# Patient Record
Sex: FEMALE | Race: WHITE | Employment: OTHER | URBAN - METROPOLITAN AREA
[De-identification: names, ages, dates, MRNs, and addresses within clinical notes are randomized per-mention and may not be internally consistent; named-entity substitution may affect disease eponyms.]

---

## 2017-01-08 PROBLEM — J18.9 HCAP (HEALTHCARE-ASSOCIATED PNEUMONIA): Status: ACTIVE | Noted: 2017-01-08

## 2017-01-15 PROBLEM — N17.9 ACUTE KIDNEY INJURY (HCC): Status: ACTIVE | Noted: 2017-01-15

## 2018-06-05 ENCOUNTER — HOSPITAL ENCOUNTER (OUTPATIENT)
Age: 63
Discharge: HOME | End: 2018-06-05
Payer: MEDICARE

## 2018-06-05 DIAGNOSIS — H54.3 BLINDNESS OF BOTH EYES: ICD-10-CM

## 2018-06-05 DIAGNOSIS — I47.29 NSVT (NONSUSTAINED VENTRICULAR TACHYCARDIA): ICD-10-CM

## 2018-06-05 DIAGNOSIS — F79 MENTAL RETARDATION: ICD-10-CM

## 2018-06-05 DIAGNOSIS — E03.2 HYPOTHYROIDISM DUE TO MEDICATION: ICD-10-CM

## 2018-06-05 LAB
ALBUMIN SERPL-MCNC: 3.6 G/DL (ref 3.5–5.2)
ALP BLD-CCNC: 42 U/L (ref 35–104)
ALT SERPL-CCNC: 9 U/L (ref 0–32)
ANION GAP SERPL CALCULATED.3IONS-SCNC: 7 MMOL/L (ref 7–16)
AST SERPL-CCNC: 16 U/L (ref 0–31)
BASOPHILS ABSOLUTE: 0.02 E9/L (ref 0–0.2)
BASOPHILS RELATIVE PERCENT: 0.4 % (ref 0–2)
BILIRUB SERPL-MCNC: 0.3 MG/DL (ref 0–1.2)
BUN BLDV-MCNC: 20 MG/DL (ref 8–23)
CALCIUM SERPL-MCNC: 9.6 MG/DL (ref 8.6–10.2)
CHLORIDE BLD-SCNC: 105 MMOL/L (ref 98–107)
CHOLESTEROL, TOTAL: 166 MG/DL (ref 0–199)
CO2: 28 MMOL/L (ref 22–29)
CREAT SERPL-MCNC: 1.1 MG/DL (ref 0.5–1)
EOSINOPHILS ABSOLUTE: 0.1 E9/L (ref 0.05–0.5)
EOSINOPHILS RELATIVE PERCENT: 2.1 % (ref 0–6)
GFR AFRICAN AMERICAN: >60
GFR NON-AFRICAN AMERICAN: 50 ML/MIN/1.73
GLUCOSE BLD-MCNC: 76 MG/DL (ref 74–109)
HCT VFR BLD CALC: 36.7 % (ref 34–48)
HDLC SERPL-MCNC: 70 MG/DL
HEMOGLOBIN: 11.1 G/DL (ref 11.5–15.5)
IMMATURE GRANULOCYTES #: 0.04 E9/L
IMMATURE GRANULOCYTES %: 0.8 % (ref 0–5)
LDL CHOLESTEROL CALCULATED: 84 MG/DL (ref 0–99)
LYMPHOCYTES ABSOLUTE: 1.63 E9/L (ref 1.5–4)
LYMPHOCYTES RELATIVE PERCENT: 34.2 % (ref 20–42)
MCH RBC QN AUTO: 30.9 PG (ref 26–35)
MCHC RBC AUTO-ENTMCNC: 30.2 % (ref 32–34.5)
MCV RBC AUTO: 102.2 FL (ref 80–99.9)
MONOCYTES ABSOLUTE: 0.47 E9/L (ref 0.1–0.95)
MONOCYTES RELATIVE PERCENT: 9.9 % (ref 2–12)
NEUTROPHILS ABSOLUTE: 2.5 E9/L (ref 1.8–7.3)
NEUTROPHILS RELATIVE PERCENT: 52.6 % (ref 43–80)
PDW BLD-RTO: 12.4 FL (ref 11.5–15)
PLATELET # BLD: 216 E9/L (ref 130–450)
PMV BLD AUTO: 10.6 FL (ref 7–12)
POTASSIUM SERPL-SCNC: 4.5 MMOL/L (ref 3.5–5)
RBC # BLD: 3.59 E12/L (ref 3.5–5.5)
SODIUM BLD-SCNC: 140 MMOL/L (ref 132–146)
T4 FREE: 1.76 NG/DL (ref 0.93–1.7)
TOTAL PROTEIN: 7.2 G/DL (ref 6.4–8.3)
TRIGL SERPL-MCNC: 61 MG/DL (ref 0–149)
TSH SERPL DL<=0.05 MIU/L-ACNC: 0.43 UIU/ML (ref 0.27–4.2)
VALPROIC ACID LEVEL: 53 MCG/ML (ref 50–100)
VLDLC SERPL CALC-MCNC: 12 MG/DL
WBC # BLD: 4.8 E9/L (ref 4.5–11.5)

## 2018-06-05 PROCEDURE — 80053 COMPREHEN METABOLIC PANEL: CPT

## 2018-06-05 PROCEDURE — 84439 ASSAY OF FREE THYROXINE: CPT

## 2018-06-05 PROCEDURE — 84443 ASSAY THYROID STIM HORMONE: CPT

## 2018-06-05 PROCEDURE — 80164 ASSAY DIPROPYLACETIC ACD TOT: CPT

## 2018-06-05 PROCEDURE — 80061 LIPID PANEL: CPT

## 2018-06-05 PROCEDURE — 36415 COLL VENOUS BLD VENIPUNCTURE: CPT

## 2018-06-05 PROCEDURE — 85025 COMPLETE CBC W/AUTO DIFF WBC: CPT

## 2018-08-09 ENCOUNTER — HOSPITAL ENCOUNTER (OUTPATIENT)
Age: 63
Discharge: HOME | End: 2018-08-09
Payer: MEDICARE

## 2018-08-09 ENCOUNTER — HOSPITAL ENCOUNTER (OUTPATIENT)
Dept: GENERAL RADIOLOGY | Age: 63
Discharge: HOME | End: 2018-08-11
Payer: MEDICARE

## 2018-08-09 DIAGNOSIS — R13.10 DYSPHAGIA, UNSPECIFIED TYPE: ICD-10-CM

## 2018-08-09 LAB
ALBUMIN SERPL-MCNC: 3.7 G/DL (ref 3.5–5.2)
ALP BLD-CCNC: 41 U/L (ref 35–104)
ALT SERPL-CCNC: 10 U/L (ref 0–32)
ANION GAP SERPL CALCULATED.3IONS-SCNC: 7 MMOL/L (ref 7–16)
AST SERPL-CCNC: 16 U/L (ref 0–31)
BASOPHILS ABSOLUTE: 0.03 E9/L (ref 0–0.2)
BASOPHILS RELATIVE PERCENT: 0.6 % (ref 0–2)
BILIRUB SERPL-MCNC: 0.2 MG/DL (ref 0–1.2)
BUN BLDV-MCNC: 22 MG/DL (ref 8–23)
CALCIUM SERPL-MCNC: 9.6 MG/DL (ref 8.6–10.2)
CHLORIDE BLD-SCNC: 106 MMOL/L (ref 98–107)
CHOLESTEROL, FASTING: 194 MG/DL (ref 0–199)
CO2: 30 MMOL/L (ref 22–29)
CREAT SERPL-MCNC: 1.2 MG/DL (ref 0.5–1)
EOSINOPHILS ABSOLUTE: 0.23 E9/L (ref 0.05–0.5)
EOSINOPHILS RELATIVE PERCENT: 4.8 % (ref 0–6)
GFR AFRICAN AMERICAN: 55
GFR NON-AFRICAN AMERICAN: 45 ML/MIN/1.73
GLUCOSE FASTING: 77 MG/DL (ref 74–109)
HCT VFR BLD CALC: 36.4 % (ref 34–48)
HDLC SERPL-MCNC: 81 MG/DL
HEMOGLOBIN: 11.1 G/DL (ref 11.5–15.5)
IMMATURE GRANULOCYTES #: 0.04 E9/L
IMMATURE GRANULOCYTES %: 0.8 % (ref 0–5)
LDL CHOLESTEROL CALCULATED: 97 MG/DL (ref 0–99)
LYMPHOCYTES ABSOLUTE: 1.69 E9/L (ref 1.5–4)
LYMPHOCYTES RELATIVE PERCENT: 35.6 % (ref 20–42)
MCH RBC QN AUTO: 31.4 PG (ref 26–35)
MCHC RBC AUTO-ENTMCNC: 30.5 % (ref 32–34.5)
MCV RBC AUTO: 103.1 FL (ref 80–99.9)
MONOCYTES ABSOLUTE: 0.43 E9/L (ref 0.1–0.95)
MONOCYTES RELATIVE PERCENT: 9.1 % (ref 2–12)
NEUTROPHILS ABSOLUTE: 2.33 E9/L (ref 1.8–7.3)
NEUTROPHILS RELATIVE PERCENT: 49.1 % (ref 43–80)
PDW BLD-RTO: 13 FL (ref 11.5–15)
PLATELET # BLD: 200 E9/L (ref 130–450)
PMV BLD AUTO: 11.1 FL (ref 7–12)
POTASSIUM SERPL-SCNC: 4.5 MMOL/L (ref 3.5–5)
RBC # BLD: 3.53 E12/L (ref 3.5–5.5)
SODIUM BLD-SCNC: 143 MMOL/L (ref 132–146)
TOTAL PROTEIN: 7.6 G/DL (ref 6.4–8.3)
TRIGLYCERIDE, FASTING: 80 MG/DL (ref 0–149)
TSH SERPL DL<=0.05 MIU/L-ACNC: 3.86 UIU/ML (ref 0.27–4.2)
VALPROIC ACID LEVEL: 50 MCG/ML (ref 50–100)
VLDLC SERPL CALC-MCNC: 16 MG/DL
WBC # BLD: 4.8 E9/L (ref 4.5–11.5)

## 2018-08-09 PROCEDURE — 80164 ASSAY DIPROPYLACETIC ACD TOT: CPT

## 2018-08-09 PROCEDURE — 2500000003 HC RX 250 WO HCPCS: Performed by: INTERNAL MEDICINE

## 2018-08-09 PROCEDURE — 74230 X-RAY XM SWLNG FUNCJ C+: CPT

## 2018-08-09 PROCEDURE — G8997 SWALLOW GOAL STATUS: HCPCS

## 2018-08-09 PROCEDURE — 80053 COMPREHEN METABOLIC PANEL: CPT

## 2018-08-09 PROCEDURE — 92611 MOTION FLUOROSCOPY/SWALLOW: CPT

## 2018-08-09 PROCEDURE — G8996 SWALLOW CURRENT STATUS: HCPCS

## 2018-08-09 PROCEDURE — 36415 COLL VENOUS BLD VENIPUNCTURE: CPT

## 2018-08-09 PROCEDURE — 84443 ASSAY THYROID STIM HORMONE: CPT

## 2018-08-09 PROCEDURE — 80061 LIPID PANEL: CPT

## 2018-08-09 PROCEDURE — 85025 COMPLETE CBC W/AUTO DIFF WBC: CPT

## 2018-08-09 RX ADMIN — BARIUM SULFATE 115 ML: 960 POWDER, FOR SUSPENSION ORAL at 08:56

## 2018-08-09 RX ADMIN — BARIUM SULFATE 45 G: 0.6 CREAM ORAL at 08:56

## 2018-08-09 NOTE — PROGRESS NOTES
Speech Pathologist                  PROCEDURE     Consistencies Administered During the Evaluation   Liquids: [x]Thin    [x]Nectar   [x]Honey   Solids:  [x]Pureed/Pudding  []Soft Solid   [x]Cookie   Other:       Method of Intake:   [x]Cup   [x]Spoon  []Straw  []Self Fed  [x]Fed by Clinician     Position:   [x]Upright seated  []Upright Standing  []Supine, elevated 45°   []Anterior/Posterior  [x]Lateral   []Oblique                   RESULTS     ORAL STAGE []Functional  [x]Abnormal      [] Dentition: ([]natural  []missing teeth []edentulous []partials []other )     [] Inadequate labial seal resulting anterior labial spillage from ([]right[] left []midline )     [x] Decreased mastication       [] Delayed A-P transit due to ([]decreased initiation[] reduced lingual strength[]cognitive function )     [] Decreased bolus formation resulting in premature pharyngeal spillage      [] Oral residuals []anterior sulcus  []sub lingually  []right buccal cavity []left buccal cavity  []on tongue base  []throughout oral cavity []on superior tongue  []on palate  []on velum          []Comments:        PHARYNGEAL STAGE     [] Functional  [x]Abnormal       ONSET TIME    [x] Onset time of the pharyngeal swallow was adequate      [] Delayed initiation of the pharyngeal swallow ([]mild []moderate []marked []severe []absent ).        Swallow reflex was triggered at: ([]tongue base []valleculae []pyriform sinus)      PHARYNGEAL RESIDUALS          Vallecula/Pharyngeal Wall    []No significant residuals were noted in the vallecula      [x]Reduced tongue base retraction resulting in: ([x]residuals in vallecula []residual on posterior pharyngeal wall)    These residuals were noted for ([]thin []nectar[x] honey [x]pureed [x]solid)      which( []cleared  []did not clear  []partially cleared [x]mostly cleared)       with ([x]cued []spontaneous [x]double swallow []multiple swallow (  times)  [x]liquid chaser(nectar thick))      []Residuals in the vallecula were noted due to inadequate epiglottic inversion        Pyriform Sinuses    [x]No significant residuals were noted in the pyriform sinuses      [] Residuals in the pyriform sinuses were noted due to ([]pharyngeal weakness []cricopharyngeal dysfunction.)       These residuals were noted for ([]thin []nectar []honey []pureed []solid)       which ([]cleared []did not clear  []partially cleared  []mostly cleared)        with ([]cued []spontaneous  []double swallow []multiple swallow (  times) []liquid chaser)    LARYNGEAL PENETRATION   []Laryngeal penetration was not present during this evaluation      [x]Laryngeal penetration occurred prior to aspiration. Further details under aspiration section. []Laryngeal penetration occurred in the absence of aspiration:       []BEFORE the swallow for ([]thin []nectar []honey[] pureed []solid)      due to: [] decreased bolus formation      []premature pharyngeal entry       []delayed pharyngeal swallow           which  []cleared from the laryngeal vestibule spontaneously  (transient)     []cleared from the laryngeal vestibule with a cued, re-directive throat clear       []remained in the laryngeal vestibule. []penetrated deep into the laryngeal vestibule (to the level of the true vocal folds)      Laryngeal penetration was  ([]trace []mild []moderate []marked []severe ) and      occurred ([]inconsistently  []consistently []only with use of a straw). []DURING  the swallow for ([]thin []nectar []honey[] pureed []solid)       due to: []delayed laryngeal closure      []inadequate laryngeal closure        which  []cleared from the laryngeal vestibule spontaneously  (transient)     []cleared from the laryngeal vestibule with a cued, re-directive throat clear       []remained in the laryngeal vestibule.       []penetrated deep into the laryngeal vestibule (to the level of the true vocal folds)          Laryngeal penetration was  ([]trace []mild []moderate ([]inconsistently  []consistently []only with use of a straw). In response to aspiration,  []A  spontaneous cough/throat clear [] an inconsistent/delayed cough      [x]an absent cough/throat clear was noted     COMPENSATORY STRATEGIES    [x] Compensatory strategies that were beneficial included: []chin tuck [x]double swallow []multiple swallow [x]alternating solids/liquids          []cued redirective cough []cued throat clear       [x] Compensatory strategies that were not beneficial included: []chin tuck []double swallow []multiple swallow []alternating solids/liquids          []cued redirective cough [x]cued throat clear       [] Compensatory strategies were not attempted. STRUCTURAL/FUNCTIONAL ANOMALIES    [x]No structural/functional anomalies were noted      []Inadequate velopharyngeal closure resulting in nasopharyngeal reflux. [] There was presence of Zenkers Diverticulum per Radiologist        []Comments:       CERVICAL ESOPHAGEAL STAGE : [x]Adequate []Inadequate  []Not Assessed     []Cervical osteophytes present per Radiologist   []Structural/mechanical abnormality in cervical esophagus per Radiologist  [] Redirection of bolus from the esophagus into pharynx                                []  Prognosis for improvements is   []  This plan will be re-evaluated and revised in 1 week  if warranted. []  Patient stated goals:   []  Treatment goals discussed with [] patient/  [] family. []  The []  patient/ []  family understand the diagnosis, prognosis and plan of care. [x]The admitting diagnosis and active problem list, as listed below have been reviewed prior to initiation of this evaluation.      ADMITTING DIAGNOSIS: Dysphagia, unspecified type [R13.10]     ACTIVE PROBLEM LIST:   Patient Active Problem List   Diagnosis    Mental retardation    Hypothyroidism    Blindness of both eyes    Hyperglycemia, unspecified    HCAP (healthcare-associated pneumonia)    Pneumonia due to organism  NSVT (nonsustained ventricular tachycardia) (HCC)    Pneumonia of left lower lobe due to infectious organism ([address]    Acidosis    Acute kidney injury ([address]    [facility]    Encounter regarding vascular access for dialysis for ESRD ([facility]    ESRD (end stage renal disease) on dialysis ([facility]    Encounter for palliative care

## 2018-08-21 ENCOUNTER — HOSPITAL ENCOUNTER (OUTPATIENT)
Age: 63
Discharge: HOME | End: 2018-08-21
Payer: MEDICARE

## 2018-08-21 LAB
ALBUMIN SERPL-MCNC: 3.6 G/DL (ref 3.5–5.2)
ALP BLD-CCNC: 41 U/L (ref 35–104)
ALT SERPL-CCNC: 10 U/L (ref 0–32)
ANION GAP SERPL CALCULATED.3IONS-SCNC: 8 MMOL/L (ref 7–16)
AST SERPL-CCNC: 24 U/L (ref 0–31)
BASOPHILS ABSOLUTE: 0.02 E9/L (ref 0–0.2)
BASOPHILS RELATIVE PERCENT: 0.3 % (ref 0–2)
BILIRUB SERPL-MCNC: 0.2 MG/DL (ref 0–1.2)
BUN BLDV-MCNC: 24 MG/DL (ref 8–23)
CALCIUM SERPL-MCNC: 9.9 MG/DL (ref 8.6–10.2)
CHLORIDE BLD-SCNC: 106 MMOL/L (ref 98–107)
CHOLESTEROL, TOTAL: 202 MG/DL (ref 0–199)
CO2: 28 MMOL/L (ref 22–29)
CREAT SERPL-MCNC: 1.2 MG/DL (ref 0.5–1)
EOSINOPHILS ABSOLUTE: 0.16 E9/L (ref 0.05–0.5)
EOSINOPHILS RELATIVE PERCENT: 2.6 % (ref 0–6)
GFR AFRICAN AMERICAN: 55
GFR NON-AFRICAN AMERICAN: 45 ML/MIN/1.73
GLUCOSE BLD-MCNC: 92 MG/DL (ref 74–109)
HCT VFR BLD CALC: 37.4 % (ref 34–48)
HDLC SERPL-MCNC: 79 MG/DL
HEMOGLOBIN: 11.4 G/DL (ref 11.5–15.5)
IMMATURE GRANULOCYTES #: 0.04 E9/L
IMMATURE GRANULOCYTES %: 0.7 % (ref 0–5)
LDL CHOLESTEROL CALCULATED: 101 MG/DL (ref 0–99)
LYMPHOCYTES ABSOLUTE: 1.69 E9/L (ref 1.5–4)
LYMPHOCYTES RELATIVE PERCENT: 27.7 % (ref 20–42)
MCH RBC QN AUTO: 31.7 PG (ref 26–35)
MCHC RBC AUTO-ENTMCNC: 30.5 % (ref 32–34.5)
MCV RBC AUTO: 103.9 FL (ref 80–99.9)
MONOCYTES ABSOLUTE: 0.63 E9/L (ref 0.1–0.95)
MONOCYTES RELATIVE PERCENT: 10.3 % (ref 2–12)
NEUTROPHILS ABSOLUTE: 3.56 E9/L (ref 1.8–7.3)
NEUTROPHILS RELATIVE PERCENT: 58.4 % (ref 43–80)
PDW BLD-RTO: 12.6 FL (ref 11.5–15)
PLATELET # BLD: 227 E9/L (ref 130–450)
PMV BLD AUTO: 10.9 FL (ref 7–12)
POTASSIUM SERPL-SCNC: 5 MMOL/L (ref 3.5–5)
RBC # BLD: 3.6 E12/L (ref 3.5–5.5)
SODIUM BLD-SCNC: 142 MMOL/L (ref 132–146)
T4 FREE: 1.23 NG/DL (ref 0.93–1.7)
TOTAL PROTEIN: 7.7 G/DL (ref 6.4–8.3)
TRIGL SERPL-MCNC: 110 MG/DL (ref 0–149)
TSH SERPL DL<=0.05 MIU/L-ACNC: 1.66 UIU/ML (ref 0.27–4.2)
VALPROIC ACID LEVEL: 36 MCG/ML (ref 50–100)
VLDLC SERPL CALC-MCNC: 22 MG/DL
WBC # BLD: 6.1 E9/L (ref 4.5–11.5)

## 2018-08-21 PROCEDURE — 80061 LIPID PANEL: CPT

## 2018-08-21 PROCEDURE — 80164 ASSAY DIPROPYLACETIC ACD TOT: CPT

## 2018-08-21 PROCEDURE — 84443 ASSAY THYROID STIM HORMONE: CPT

## 2018-08-21 PROCEDURE — 84439 ASSAY OF FREE THYROXINE: CPT

## 2018-08-21 PROCEDURE — 85025 COMPLETE CBC W/AUTO DIFF WBC: CPT

## 2018-08-21 PROCEDURE — 80053 COMPREHEN METABOLIC PANEL: CPT

## 2018-08-21 PROCEDURE — 36415 COLL VENOUS BLD VENIPUNCTURE: CPT

## 2018-08-27 PROBLEM — D75.89 MACROCYTOSIS: Status: ACTIVE | Noted: 2018-08-27

## 2018-09-25 ENCOUNTER — HOSPITAL ENCOUNTER (OUTPATIENT)
Age: 63
Discharge: HOME | End: 2018-09-25
Payer: MEDICARE

## 2018-09-25 DIAGNOSIS — D64.9 ANEMIA, UNSPECIFIED TYPE: ICD-10-CM

## 2018-09-25 DIAGNOSIS — E03.2 HYPOTHYROIDISM DUE TO MEDICATION: ICD-10-CM

## 2018-09-25 DIAGNOSIS — N18.30 CKD (CHRONIC KIDNEY DISEASE) STAGE 3, GFR 30-59 ML/MIN (HCC): ICD-10-CM

## 2018-09-25 LAB
ANION GAP SERPL CALCULATED.3IONS-SCNC: 6 MMOL/L (ref 7–16)
BASOPHILS ABSOLUTE: 0.02 E9/L (ref 0–0.2)
BASOPHILS RELATIVE PERCENT: 0.3 % (ref 0–2)
BUN BLDV-MCNC: 15 MG/DL (ref 8–23)
CALCIUM SERPL-MCNC: 9.3 MG/DL (ref 8.6–10.2)
CHLORIDE BLD-SCNC: 107 MMOL/L (ref 98–107)
CO2: 29 MMOL/L (ref 22–29)
CREAT SERPL-MCNC: 1.1 MG/DL (ref 0.5–1)
EOSINOPHILS ABSOLUTE: 0.19 E9/L (ref 0.05–0.5)
EOSINOPHILS RELATIVE PERCENT: 3.3 % (ref 0–6)
FERRITIN: 44 NG/ML
FOLATE: >20 NG/ML (ref 4.8–24.2)
GFR AFRICAN AMERICAN: >60
GFR NON-AFRICAN AMERICAN: 50 ML/MIN/1.73
GLUCOSE BLD-MCNC: 82 MG/DL (ref 74–109)
HCT VFR BLD CALC: 37.4 % (ref 34–48)
HEMOGLOBIN: 11.3 G/DL (ref 11.5–15.5)
IMMATURE GRANULOCYTES #: 0.03 E9/L
IMMATURE GRANULOCYTES %: 0.5 % (ref 0–5)
IMMATURE RETIC FRACT: 20.3 % (ref 3–15.9)
IRON SATURATION: 32 % (ref 15–50)
IRON: 98 MCG/DL (ref 37–145)
LYMPHOCYTES ABSOLUTE: 1.52 E9/L (ref 1.5–4)
LYMPHOCYTES RELATIVE PERCENT: 26.4 % (ref 20–42)
MCH RBC QN AUTO: 31.6 PG (ref 26–35)
MCHC RBC AUTO-ENTMCNC: 30.2 % (ref 32–34.5)
MCV RBC AUTO: 104.5 FL (ref 80–99.9)
MONOCYTES ABSOLUTE: 0.59 E9/L (ref 0.1–0.95)
MONOCYTES RELATIVE PERCENT: 10.3 % (ref 2–12)
NEUTROPHILS ABSOLUTE: 3.4 E9/L (ref 1.8–7.3)
NEUTROPHILS RELATIVE PERCENT: 59.2 % (ref 43–80)
PDW BLD-RTO: 12.6 FL (ref 11.5–15)
PLATELET # BLD: 246 E9/L (ref 130–450)
PMV BLD AUTO: 10.7 FL (ref 7–12)
POTASSIUM SERPL-SCNC: 5.2 MMOL/L (ref 3.5–5)
RBC # BLD: 3.58 E12/L (ref 3.5–5.5)
RETIC HGB EQUIVALENT: 32.4 PG (ref 28.2–36.6)
RETICULOCYTE ABSOLUTE COUNT: 0.09 E12/L
RETICULOCYTE COUNT PCT: 2.6 % (ref 0.4–1.9)
SODIUM BLD-SCNC: 142 MMOL/L (ref 132–146)
TOTAL IRON BINDING CAPACITY: 305 MCG/DL (ref 250–450)
VITAMIN B-12: 366 PG/ML (ref 211–946)
WBC # BLD: 5.8 E9/L (ref 4.5–11.5)

## 2018-09-25 PROCEDURE — 82728 ASSAY OF FERRITIN: CPT

## 2018-09-25 PROCEDURE — 83540 ASSAY OF IRON: CPT

## 2018-09-25 PROCEDURE — 36415 COLL VENOUS BLD VENIPUNCTURE: CPT

## 2018-09-25 PROCEDURE — 83550 IRON BINDING TEST: CPT

## 2018-09-25 PROCEDURE — 82607 VITAMIN B-12: CPT

## 2018-09-25 PROCEDURE — 82746 ASSAY OF FOLIC ACID SERUM: CPT

## 2018-09-25 PROCEDURE — 85045 AUTOMATED RETICULOCYTE COUNT: CPT

## 2018-09-25 PROCEDURE — 80048 BASIC METABOLIC PNL TOTAL CA: CPT

## 2018-09-25 PROCEDURE — 85025 COMPLETE CBC W/AUTO DIFF WBC: CPT

## 2018-12-10 ENCOUNTER — HOSPITAL ENCOUNTER (EMERGENCY)
Age: 63
Discharge: HOME | End: 2018-12-10
Attending: EMERGENCY MEDICINE
Payer: MEDICARE

## 2018-12-10 VITALS
WEIGHT: 134 LBS | OXYGEN SATURATION: 95 % | SYSTOLIC BLOOD PRESSURE: 145 MMHG | DIASTOLIC BLOOD PRESSURE: 81 MMHG | BODY MASS INDEX: 25.3 KG/M2 | HEIGHT: 61 IN | RESPIRATION RATE: 18 BRPM | HEART RATE: 56 BPM | TEMPERATURE: 98.1 F

## 2018-12-10 DIAGNOSIS — R06.2 WHEEZING: Primary | ICD-10-CM

## 2018-12-10 PROCEDURE — 99284 EMERGENCY DEPT VISIT MOD MDM: CPT

## 2018-12-10 NOTE — LETTER
5 [facility] Emergency Department  [address] [place] [id number]  Phone: [phone]               December 10, 2018    Patient: Bon Del Real   YOB: 1955   Date of Visit: 12/10/2018       To Whom It May Concern:    Harini Eisenberg was seen and treated in our emergency department on 12/10/2018. She is cleared to return to workshop 12/11/2018. Thank you.       Sincerely,       Stephanie Saavedra RN         Signature:__________________________________

## 2018-12-11 NOTE — ED PROVIDER NOTES
lb (60.8 kg)   SpO2 95%   BMI 25.32 kg/m²   Oxygen Saturation Interpretation: Normal      ---------------------------------------------------PHYSICAL EXAM--------------------------------------      Constitutional/General: Alert and oriented x3, well appearing, non toxic in NAD  Head: NC/AT  Eyes: PERRL, EOMI  Mouth: Oropharynx clear, handling secretions, no trismus  Neck: Supple, full ROM, no meningeal signs  Pulmonary: Lungs clear to auscultation bilaterally, no wheezes, rales, or rhonchi. Not in respiratory distress  Cardiovascular:  Regular rate and rhythm, no murmurs, gallops, or rubs. 2+ distal pulses  Abdomen: Soft, non tender, non distended,   Extremities: Moves all extremities x 4. Warm and well perfused  Skin: warm and dry without rash  Neurologic: GCS 15,  Psych: Normal Affect      ------------------------------ ED COURSE/MEDICAL DECISION MAKING----------------------  Medications - No data to display      Medical Decision Making: Will Dc home as she does not want to lay back, in no respiratory distress or discomfort---We'll discharge to group home with close follow-up    Counseling: The emergency provider has spoken with the patient and caregiver and discussed todays results, in addition to providing specific details for the plan of care and counseling regarding the diagnosis and prognosis. Questions are answered at this time and they are agreeable with the plan.      --------------------------------- IMPRESSION AND DISPOSITION ---------------------------------    IMPRESSION  1.  Wheezing        DISPOSITION  Disposition: Discharge to group home  Patient condition is good                  Eladio Burns MD  12/10/18 [id number]

## 2019-02-04 PROBLEM — F91.9 BEHAVIOR DISTURBANCE: Status: ACTIVE | Noted: 2019-02-04

## 2019-04-08 ENCOUNTER — HOSPITAL ENCOUNTER (OUTPATIENT)
Dept: NON INVASIVE DIAGNOSTICS | Age: 64
Discharge: HOME | End: 2019-04-08
Payer: MEDICARE

## 2019-04-08 ENCOUNTER — HOSPITAL ENCOUNTER (OUTPATIENT)
Age: 64
Discharge: HOME | End: 2019-04-08
Payer: MEDICARE

## 2019-04-08 LAB
ALBUMIN SERPL-MCNC: 3.6 G/DL (ref 3.5–5.2)
ALP BLD-CCNC: 43 U/L (ref 35–104)
ALT SERPL-CCNC: 8 U/L (ref 0–32)
ANION GAP SERPL CALCULATED.3IONS-SCNC: 7 MMOL/L (ref 7–16)
AST SERPL-CCNC: 16 U/L (ref 0–31)
BASOPHILS ABSOLUTE: 0.02 E9/L (ref 0–0.2)
BASOPHILS RELATIVE PERCENT: 0.4 % (ref 0–2)
BILIRUB SERPL-MCNC: <0.2 MG/DL (ref 0–1.2)
BUN BLDV-MCNC: 17 MG/DL (ref 8–23)
CALCIUM SERPL-MCNC: 9.4 MG/DL (ref 8.6–10.2)
CHLORIDE BLD-SCNC: 106 MMOL/L (ref 98–107)
CHOLESTEROL, TOTAL: 172 MG/DL (ref 0–199)
CO2: 26 MMOL/L (ref 22–29)
CREAT SERPL-MCNC: 1 MG/DL (ref 0.5–1)
EOSINOPHILS ABSOLUTE: 0.15 E9/L (ref 0.05–0.5)
EOSINOPHILS RELATIVE PERCENT: 2.6 % (ref 0–6)
GFR AFRICAN AMERICAN: >60
GFR NON-AFRICAN AMERICAN: 56 ML/MIN/1.73
GLUCOSE BLD-MCNC: 99 MG/DL (ref 74–99)
HCT VFR BLD CALC: 36.7 % (ref 34–48)
HDLC SERPL-MCNC: 62 MG/DL
HEMOGLOBIN: 11.2 G/DL (ref 11.5–15.5)
IMMATURE GRANULOCYTES #: 0.05 E9/L
IMMATURE GRANULOCYTES %: 0.9 % (ref 0–5)
LDL CHOLESTEROL CALCULATED: 92 MG/DL (ref 0–99)
LYMPHOCYTES ABSOLUTE: 1.37 E9/L (ref 1.5–4)
LYMPHOCYTES RELATIVE PERCENT: 24.1 % (ref 20–42)
MCH RBC QN AUTO: 30.8 PG (ref 26–35)
MCHC RBC AUTO-ENTMCNC: 30.5 % (ref 32–34.5)
MCV RBC AUTO: 100.8 FL (ref 80–99.9)
MONOCYTES ABSOLUTE: 0.7 E9/L (ref 0.1–0.95)
MONOCYTES RELATIVE PERCENT: 12.3 % (ref 2–12)
NEUTROPHILS ABSOLUTE: 3.39 E9/L (ref 1.8–7.3)
NEUTROPHILS RELATIVE PERCENT: 59.7 % (ref 43–80)
PDW BLD-RTO: 12.2 FL (ref 11.5–15)
PLATELET # BLD: 229 E9/L (ref 130–450)
PMV BLD AUTO: 10.6 FL (ref 7–12)
POTASSIUM SERPL-SCNC: 4.3 MMOL/L (ref 3.5–5)
RBC # BLD: 3.64 E12/L (ref 3.5–5.5)
SODIUM BLD-SCNC: 139 MMOL/L (ref 132–146)
TOTAL PROTEIN: 7.2 G/DL (ref 6.4–8.3)
TRIGL SERPL-MCNC: 92 MG/DL (ref 0–149)
VLDLC SERPL CALC-MCNC: 18 MG/DL
WBC # BLD: 5.7 E9/L (ref 4.5–11.5)

## 2019-04-08 PROCEDURE — 80061 LIPID PANEL: CPT

## 2019-04-08 PROCEDURE — 85025 COMPLETE CBC W/AUTO DIFF WBC: CPT

## 2019-04-08 PROCEDURE — 93005 ELECTROCARDIOGRAM TRACING: CPT | Performed by: PSYCHIATRY & NEUROLOGY

## 2019-04-08 PROCEDURE — 80053 COMPREHEN METABOLIC PANEL: CPT

## 2019-04-08 PROCEDURE — 36415 COLL VENOUS BLD VENIPUNCTURE: CPT

## 2019-04-09 LAB
EKG ATRIAL RATE: 60 BPM
EKG P AXIS: 35 DEGREES
EKG P-R INTERVAL: 176 MS
EKG Q-T INTERVAL: 408 MS
EKG QRS DURATION: 88 MS
EKG QTC CALCULATION (BAZETT): 408 MS
EKG R AXIS: -9 DEGREES
EKG T AXIS: -20 DEGREES
EKG VENTRICULAR RATE: 60 BPM

## 2019-04-09 PROCEDURE — 93010 ELECTROCARDIOGRAM REPORT: CPT | Performed by: INTERNAL MEDICINE

## 2019-05-08 ENCOUNTER — HOSPITAL ENCOUNTER (OUTPATIENT)
Age: 64
Discharge: HOME | End: 2019-05-08
Payer: MEDICARE

## 2019-05-09 ENCOUNTER — HOSPITAL ENCOUNTER (OUTPATIENT)
Age: 64
Discharge: HOME | End: 2019-05-09
Payer: MEDICARE

## 2019-05-09 DIAGNOSIS — D75.89 MACROCYTOSIS: ICD-10-CM

## 2019-05-09 DIAGNOSIS — F91.9 BEHAVIOR DISTURBANCE: ICD-10-CM

## 2019-05-09 DIAGNOSIS — F79 MENTAL RETARDATION: ICD-10-CM

## 2019-05-09 DIAGNOSIS — I47.29 NSVT (NONSUSTAINED VENTRICULAR TACHYCARDIA): ICD-10-CM

## 2019-05-09 LAB
ALBUMIN SERPL-MCNC: 4 G/DL (ref 3.5–5.2)
ALP BLD-CCNC: 47 U/L (ref 35–104)
ALT SERPL-CCNC: 10 U/L (ref 0–32)
ANION GAP SERPL CALCULATED.3IONS-SCNC: 9 MMOL/L (ref 7–16)
AST SERPL-CCNC: 16 U/L (ref 0–31)
BILIRUB SERPL-MCNC: 0.2 MG/DL (ref 0–1.2)
BUN BLDV-MCNC: 19 MG/DL (ref 8–23)
CALCIUM SERPL-MCNC: 9.7 MG/DL (ref 8.6–10.2)
CHLORIDE BLD-SCNC: 105 MMOL/L (ref 98–107)
CHOLESTEROL, TOTAL: 188 MG/DL (ref 0–199)
CO2: 29 MMOL/L (ref 22–29)
CREAT SERPL-MCNC: 1.1 MG/DL (ref 0.5–1)
GFR AFRICAN AMERICAN: >60
GFR NON-AFRICAN AMERICAN: 50 ML/MIN/1.73
GLUCOSE BLD-MCNC: 77 MG/DL (ref 74–99)
HDLC SERPL-MCNC: 65 MG/DL
LDL CHOLESTEROL CALCULATED: 106 MG/DL (ref 0–99)
POTASSIUM SERPL-SCNC: 4.4 MMOL/L (ref 3.5–5)
SODIUM BLD-SCNC: 143 MMOL/L (ref 132–146)
TOTAL PROTEIN: 7.8 G/DL (ref 6.4–8.3)
TRIGL SERPL-MCNC: 86 MG/DL (ref 0–149)
TSH SERPL DL<=0.05 MIU/L-ACNC: 2.47 UIU/ML (ref 0.27–4.2)
VLDLC SERPL CALC-MCNC: 17 MG/DL

## 2019-05-09 PROCEDURE — 80053 COMPREHEN METABOLIC PANEL: CPT

## 2019-05-09 PROCEDURE — 80061 LIPID PANEL: CPT

## 2019-05-09 PROCEDURE — 84443 ASSAY THYROID STIM HORMONE: CPT

## 2019-05-09 PROCEDURE — 36415 COLL VENOUS BLD VENIPUNCTURE: CPT

## 2019-07-11 ENCOUNTER — PROCEDURE VISIT (OUTPATIENT)
Dept: PODIATRY | Age: 64
End: 2019-07-11
Payer: MEDICARE

## 2019-07-11 VITALS — TEMPERATURE: 97.2 F | DIASTOLIC BLOOD PRESSURE: 70 MMHG | SYSTOLIC BLOOD PRESSURE: 128 MMHG

## 2019-07-11 DIAGNOSIS — L60.0 INGROWN NAIL: Primary | ICD-10-CM

## 2019-07-11 DIAGNOSIS — I73.9 PERIPHERAL VASCULAR DISEASE, UNSPECIFIED (HCC): ICD-10-CM

## 2019-07-11 DIAGNOSIS — M79.675 PAIN IN LEFT TOE(S): ICD-10-CM

## 2019-07-11 DIAGNOSIS — R26.2 DIFFICULTY WALKING: ICD-10-CM

## 2019-07-11 PROCEDURE — 11730 AVULSION NAIL PLATE SIMPLE 1: CPT | Performed by: PODIATRIST

## 2019-09-11 ENCOUNTER — HOSPITAL ENCOUNTER (OUTPATIENT)
Age: 64
Discharge: HOME | End: 2019-09-11
Payer: MEDICARE

## 2019-09-11 LAB
ALBUMIN SERPL-MCNC: 3.8 G/DL (ref 3.5–5.2)
ALP BLD-CCNC: 47 U/L (ref 35–104)
ALT SERPL-CCNC: 11 U/L (ref 0–32)
ANION GAP SERPL CALCULATED.3IONS-SCNC: 7 MMOL/L (ref 7–16)
AST SERPL-CCNC: 16 U/L (ref 0–31)
BASOPHILS ABSOLUTE: 0.03 E9/L (ref 0–0.2)
BASOPHILS RELATIVE PERCENT: 0.4 % (ref 0–2)
BILIRUB SERPL-MCNC: 0.2 MG/DL (ref 0–1.2)
BUN BLDV-MCNC: 16 MG/DL (ref 8–23)
CALCIUM SERPL-MCNC: 9.4 MG/DL (ref 8.6–10.2)
CHLORIDE BLD-SCNC: 107 MMOL/L (ref 98–107)
CO2: 28 MMOL/L (ref 22–29)
CREAT SERPL-MCNC: 1 MG/DL (ref 0.5–1)
EOSINOPHILS ABSOLUTE: 0.2 E9/L (ref 0.05–0.5)
EOSINOPHILS RELATIVE PERCENT: 2.9 % (ref 0–6)
GFR AFRICAN AMERICAN: >60
GFR NON-AFRICAN AMERICAN: 56 ML/MIN/1.73
GLUCOSE BLD-MCNC: 94 MG/DL (ref 74–99)
HCT VFR BLD CALC: 39.2 % (ref 34–48)
HEMOGLOBIN: 11.9 G/DL (ref 11.5–15.5)
IMMATURE GRANULOCYTES #: 0.06 E9/L
IMMATURE GRANULOCYTES %: 0.9 % (ref 0–5)
LYMPHOCYTES ABSOLUTE: 1.79 E9/L (ref 1.5–4)
LYMPHOCYTES RELATIVE PERCENT: 26.3 % (ref 20–42)
MCH RBC QN AUTO: 31.5 PG (ref 26–35)
MCHC RBC AUTO-ENTMCNC: 30.4 % (ref 32–34.5)
MCV RBC AUTO: 103.7 FL (ref 80–99.9)
MONOCYTES ABSOLUTE: 0.66 E9/L (ref 0.1–0.95)
MONOCYTES RELATIVE PERCENT: 9.7 % (ref 2–12)
NEUTROPHILS ABSOLUTE: 4.06 E9/L (ref 1.8–7.3)
NEUTROPHILS RELATIVE PERCENT: 59.8 % (ref 43–80)
PDW BLD-RTO: 12.6 FL (ref 11.5–15)
PLATELET # BLD: 229 E9/L (ref 130–450)
PMV BLD AUTO: 11.4 FL (ref 7–12)
POTASSIUM SERPL-SCNC: 4.2 MMOL/L (ref 3.5–5)
RBC # BLD: 3.78 E12/L (ref 3.5–5.5)
SODIUM BLD-SCNC: 142 MMOL/L (ref 132–146)
TOTAL PROTEIN: 7.7 G/DL (ref 6.4–8.3)
VALPROIC ACID LEVEL: 59 MCG/ML (ref 50–100)
WBC # BLD: 6.8 E9/L (ref 4.5–11.5)

## 2019-09-11 PROCEDURE — 80053 COMPREHEN METABOLIC PANEL: CPT

## 2019-09-11 PROCEDURE — 80164 ASSAY DIPROPYLACETIC ACD TOT: CPT

## 2019-09-11 PROCEDURE — 85025 COMPLETE CBC W/AUTO DIFF WBC: CPT

## 2019-09-11 PROCEDURE — 36415 COLL VENOUS BLD VENIPUNCTURE: CPT

## 2019-11-14 ENCOUNTER — PROCEDURE VISIT (OUTPATIENT)
Dept: PODIATRY | Age: 64
End: 2019-11-14
Payer: MEDICARE

## 2019-11-14 VITALS — DIASTOLIC BLOOD PRESSURE: 78 MMHG | TEMPERATURE: 98.2 F | SYSTOLIC BLOOD PRESSURE: 112 MMHG

## 2019-11-14 DIAGNOSIS — F79 INTELLECTUAL DISABILITY: ICD-10-CM

## 2019-11-14 DIAGNOSIS — R26.2 DIFFICULTY WALKING: ICD-10-CM

## 2019-11-14 DIAGNOSIS — M79.674 PAIN IN TOE OF RIGHT FOOT: ICD-10-CM

## 2019-11-14 DIAGNOSIS — B35.1 TINEA UNGUIUM: Primary | ICD-10-CM

## 2019-11-14 DIAGNOSIS — I73.9 PERIPHERAL VASCULAR DISEASE, UNSPECIFIED (HCC): ICD-10-CM

## 2019-11-14 DIAGNOSIS — M79.675 PAIN IN LEFT TOE(S): ICD-10-CM

## 2019-11-14 PROCEDURE — 11721 DEBRIDE NAIL 6 OR MORE: CPT | Performed by: PODIATRIST

## 2020-02-06 ENCOUNTER — PROCEDURE VISIT (OUTPATIENT)
Dept: PODIATRY | Age: 65
End: 2020-02-06
Payer: MEDICARE

## 2020-02-06 VITALS
WEIGHT: 128 LBS | TEMPERATURE: 98.1 F | HEIGHT: 61 IN | DIASTOLIC BLOOD PRESSURE: 78 MMHG | SYSTOLIC BLOOD PRESSURE: 129 MMHG | BODY MASS INDEX: 24.17 KG/M2

## 2020-02-06 PROBLEM — L60.0 INGROWN NAIL: Status: RESOLVED | Noted: 2019-07-11 | Resolved: 2020-02-06

## 2020-02-06 PROBLEM — R26.2 DIFFICULTY WALKING: Status: RESOLVED | Noted: 2019-07-11 | Resolved: 2020-02-06

## 2020-02-06 PROBLEM — M79.675 PAIN IN LEFT TOE(S): Status: RESOLVED | Noted: 2019-07-11 | Resolved: 2020-02-06

## 2020-02-06 PROBLEM — I73.9 PERIPHERAL VASCULAR DISEASE, UNSPECIFIED (HCC): Status: RESOLVED | Noted: 2019-07-11 | Resolved: 2020-02-06

## 2020-02-06 PROCEDURE — 11721 DEBRIDE NAIL 6 OR MORE: CPT | Performed by: PODIATRIST

## 2020-02-06 NOTE — PROGRESS NOTES
Walter Vasques  Return Patient    Chief Complaint   Patient presents with    Toe Pain     saw PCP Dr. Stephanie Hernandez on 10/18/2019       Subjective: This Durwood Zabala comes to office for foot and nail care. Pt currently has complaint of thickened, painful, elongated nails that he/she cannot manage by themselves. Pt. Relates pain to nails with shoe gear. Pt's primary care physician is Stephanie Hernandez, [id number]  Past Medical History:   Diagnosis Date    Acute kidney injury ([facility] 01/15/2017    d/t Vancomycin, on Dialysis [facility] right chest    Blind in both eyes     Hemodialysis [facility] ([facility]     Hyperthyroidism     MR (mental retardation)     Osteoarthritis     Pneumonia 01/06/2017       No Known Allergies  Current Outpatient Medications on File Prior to Visit   Medication Sig Dispense Refill    Cholecalciferol (VITAMIN D3) 25 MCG (1000 UT) TABS TAKE 1 TABLET BY MOUTH ONCE DAILY. 30 tablet 2    metoprolol tartrate (LOPRESSOR) 25 MG tablet TAKE 3 TABLETS BY MOUTH TWICE DAILY. HOLD FOR BP <42 SYSTOLIC OR QI<37. 595 tablet 1    Starch, Thickening, (THICK-IT #2) POWD USE AS DIRECTED FOR NECTAR THICK LIQUIDS DUE TO ASPIRATION 2040 g 3    famotidine (PEPCID) 20 MG tablet Take 1 tablet by mouth 2 times daily 60 tablet 3    docusate sodium (SILACE) 150 MG/15ML liquid TAKE 2 TEASPOONSFUL (10ML) BY MOUTH TWICE A  mL 3    amLODIPine (NORVASC) 5 MG tablet TAKE ONE TABLET BY MOUTH AT BEDTIME.  90 tablet 1    Cetylpyridinium Chloride (CREST PRO-HEALTH) 0.07 % LIQD USE AS MOUTH RINSE TWICE DAILY 1000 mL 3    clotrimazole (LOTRIMIN) 1 % cream Apply topically 2 times daily to feet 60 g 5    Disposable Gloves (NITRILE GLOVES MEDIUM) MISC Use as directed daily 200 each 11    docusate sodium (DOCU) 150 MG/15ML liquid TAKE 2 TEASPOONSFUL (10ML) BY MOUTH TWICE A DAY (PLUG/SYR) 810 mL 3    folic acid (FOLVITE) 1 MG tablet ONE DAILY 90 tablet 1    guaiFENesin-dextromethorphan (ROBITUSSIN Left    Dystrophic Changes   [x] [x] [x] [x] [x] [x] [x] [x] [x] [x]  5 4 3 2 1 1 2 3 4 5                         Right                                        Left    Color  [x] [x] [x] [x] [x] [x] [x] [x] [x] [x]  5 4 3 2 1 1 2 3 4 5                          Right                                        Left    Incurvation/Ingrowin   [x] [x] [x] [x] [x] [x] [x] [x] [x] [x]  5 4 3 2 1 1 2 3 4 5                         Right                                        Left    Inflammation/Pain   [x] [x] [x] [x] [x] [x] [x] [x] [x] [x]  5 4 3  2 1 1 2 3 4 5                         Right                                        Left      Nails that are described above are all elongated thickened pitting mycotic yellowish incurvated causing pain with both shoe gear. Palpation nails greater then 3 mm thick painful       Dermatologic Exam:hair loss noted  lower extremity    Skin lesion/ulceration   Skin   Callus   Musculoskeletal:     1st MPJ ROM normal, Bilateral.  Muscle strength 5/5, Bilateral.  Pain present upon palpation of toenails   Bilateral., Bilateral.  Ankle ROM normal,Bilateral.    Dorsally contracted digits , Bilateral.     Vascular: There are class B findings absent posterior tibialis pulses lack of hair growth thickened nails discoloration skin is discolored skin is shiny cool to touch to toes    Neurological:  Sensation present to light touch to level of digits, Bilateral    Foot Exam    General  General Appearance: appears stated age and healthy   Assistance: wheelchair use       Right Foot/Ankle     Inspection and Palpation  Skin Exam: skin changes and abnormal color; Neurovascular  Dorsalis pedis: 1+  Posterior tibial: absent      Left Foot/Ankle      Inspection and Palpation  Skin Exam: skin changes and abnormal color;      Neurovascular  Dorsalis pedis: 1+  Posterior tibial: absent             Ortho Exam  Q7   []Yes    []No                Q8   [x]Yes    []No                     Q9   []Yes []No  Assessment:  76 y.o. female with:   1. Tinea unguium    2. Pain in left toe(s)    3. Pain in toe of right foot    4. Peripheral vascular disease, unspecified ([address]    5. Difficulty walking    6. Mental retardation     No orders of the defined types were placed in this encounter. Plan:   Pt was evaluated and examined. Patient was given personalized discharge instructions. Nails 1-10 were debrided in length and thickness sharply with a nail nipper and  without incident. Pt will follow up in 9 weeks or sooner if any problems arise. Diagnosis was discussed with the pt and all of their questions were answered in detail. Proper foot hygiene and care was discussed with the pt. Patient to check feet daily and contact the office with any questions/problems/concerns. Other comorbidity noted and will be managed by PCP. Pain waiver discussed with patient and confirmed.    2/6/2020      Electronically signed by Joyce Parrish DPM on 2/6/2020 at 9:16 AM  2/6/2020

## 2020-03-25 PROBLEM — N17.9 ACUTE KIDNEY INJURY (HCC): Status: RESOLVED | Noted: 2017-01-15 | Resolved: 2020-03-24

## 2020-07-02 ENCOUNTER — PROCEDURE VISIT (OUTPATIENT)
Dept: PODIATRY | Age: 65
End: 2020-07-02
Payer: MEDICARE

## 2020-07-02 VITALS — SYSTOLIC BLOOD PRESSURE: 112 MMHG | TEMPERATURE: 98.3 F | DIASTOLIC BLOOD PRESSURE: 74 MMHG

## 2020-07-02 PROCEDURE — 99213 OFFICE O/P EST LOW 20 MIN: CPT | Performed by: PODIATRIST

## 2020-07-02 PROCEDURE — 3017F COLORECTAL CA SCREEN DOC REV: CPT | Performed by: PODIATRIST

## 2020-07-02 PROCEDURE — G8427 DOCREV CUR MEDS BY ELIG CLIN: HCPCS | Performed by: PODIATRIST

## 2020-07-02 PROCEDURE — 1090F PRES/ABSN URINE INCON ASSESS: CPT | Performed by: PODIATRIST

## 2020-07-02 PROCEDURE — G8420 CALC BMI NORM PARAMETERS: HCPCS | Performed by: PODIATRIST

## 2020-07-02 PROCEDURE — 1123F ACP DISCUSS/DSCN MKR DOCD: CPT | Performed by: PODIATRIST

## 2020-07-02 PROCEDURE — 4040F PNEUMOC VAC/ADMIN/RCVD: CPT | Performed by: PODIATRIST

## 2020-07-02 PROCEDURE — G8400 PT W/DXA NO RESULTS DOC: HCPCS | Performed by: PODIATRIST

## 2020-07-02 PROCEDURE — L4350 ANKLE CONTROL ORTHO PRE OTS: HCPCS | Performed by: PODIATRIST

## 2020-07-02 PROCEDURE — 11721 DEBRIDE NAIL 6 OR MORE: CPT | Performed by: PODIATRIST

## 2020-07-02 PROCEDURE — 1036F TOBACCO NON-USER: CPT | Performed by: PODIATRIST

## 2020-07-02 NOTE — PROGRESS NOTES
Derm  Toenail Description  Sites of Onychomycosis Involvement (Check affected area)  [x] [x] [x] [x] [x] [x] [x] [x] [x] [x]  5 4 3 2 1 1 2 3 4 5                          Right                                        Left    Thickness  [x] [x] [x] [x] [x] [x] [x] [x] [x] [x]  5 4 3 2 1 1 2 3 4 5                         Right                                        Left    Dystrophic Changes   [x] [x] [x] [x] [x] [x] [x] [x] [x] [x]  5 4 3 2 1 1 2 3 4 5                         Right                                        Left    Color  [x] [x] [x] [x] [x] [x] [x] [x] [x] [x]  5 4 3 2 1 1 2 3 4 5                          Right                                        Left    Incurvation/Ingrowin   [x] [x] [x] [x] [x] [x] [x] [x] [x] [x]  5 4 3 2 1 1 2 3 4 5                         Right                                        Left    Inflammation/Pain   [x] [x] [x] [x] [x] [x] [x] [x] [x] [x]  5 4 3  2 1 1 2 3 4 5                         Right                                        Left      Nails that are described above are all elongated thickened pitting mycotic yellowish incurvated causing pain with both shoe gear. Palpation nails greater then 3 mm thick painful       Dermatologic Exam:hair loss noted  lower extremity    Skin lesion/ulceration   Skin   Callus   Musculoskeletal:     1st MPJ ROM normal, Bilateral.  Muscle strength 5/5, Bilateral.  Pain present upon palpation of toenails   Bilateral., Bilateral.  Ankle ROM normal,Bilateral.    Dorsally contracted digits , Bilateral.     Vascular:   There are class B findings absent posterior tibialis pulses lack of hair growth thickened nails discoloration skin is discolored skin is shiny cool to touch to toes    Neurological:  Sensation present to light touch to level of digits, Bilateral    Foot Exam    General  General Appearance: appears stated age and healthy   Assistance: wheelchair use       Right Foot/Ankle     Inspection and Palpation  Skin Exam: skin changes and abnormal color; Neurovascular  Dorsalis pedis: 1+  Posterior tibial: absent    Muscle Strength  Ankle dorsiflexion: 1  Ankle plantar flexion: 1      Left Foot/Ankle      Inspection and Palpation  Skin Exam: skin changes and abnormal color; Neurovascular  Dorsalis pedis: 1+  Posterior tibial: absent    Muscle Strength  Ankle dorsiflexion: 1  Ankle plantar flexion: 1    Range of Motion    Normal left ankle ROM             Right Ankle Exam   Right ankle exam is normal.  Swelling: mild    Muscle Strength   Dorsiflexion:  1/5  Plantar flexion:  1/5  Anterior tibial:  1/5  Posterior tibial:  1/5  Gastrocsoleus:  1/5  Peroneal muscle:  1/5    Tests   Anterior drawer: physiological laxity  Varus tilt: physiological laxity      Left Ankle Exam   Left ankle exam is normal.  Swelling: mild    Range of Motion   The patient has normal left ankle ROM. Muscle Strength   Dorsiflexion:  1/5   Plantar flexion:  1/5   Anterior tibial:  1/5   Gastrocsoleus:  1/5  Peroneal muscle:  1/5    Tests   Anterior drawer: physiological laxity  Varus tilt: physiological laxity          Q7   []Yes    []No                Q8   [x]Yes    []No                     Q9   []Yes    []No  Assessment:  76 y.o. female with:   1. Ankle weakness    2. Tinea unguium    3. Pain in left toe(s)    4. Pain in toe of right foot    5. Difficulty walking    6. Peripheral vascular disease, unspecified ([address]    7. Mental retardation     No orders of the defined types were placed in this encounter. Plan: Today I did discuss treatment options for the ankle weakness the patient is unable to go through physical therapy will get a try to ankle stirrup braces to be worn during the day leaving off at night. Ankle Stirrup Brace Dispensing  Signed informed consent was obtained from the patient. Brace was fitted and applied. Patient was counseled. Brace is medically necessary to promote and expedite healing ad reduce pain and swelling.     Pt was evaluated and examined. Patient was given personalized discharge instructions. Nails 1-10 were debrided in length and thickness sharply with a nail nipper and  without incident. Pt will follow up in 9 weeks or sooner if any problems arise. Diagnosis was discussed with the pt and all of their questions were answered in detail. Proper foot hygiene and care was discussed with the pt. Patient to check feet daily and contact the office with any questions/problems/concerns. Other comorbidity noted and will be managed by PCP. Pain waiver discussed with patient and confirmed.    7/2/2020      Electronically signed by Sylwia Escobar DPM on 7/2/2020 at 9:27 AM  7/2/2020

## 2020-09-29 ENCOUNTER — HOSPITAL ENCOUNTER (EMERGENCY)
Age: 65
Discharge: HOME | End: 2020-09-29
Attending: EMERGENCY MEDICINE
Payer: MEDICARE

## 2020-09-29 ENCOUNTER — APPOINTMENT (OUTPATIENT)
Dept: ULTRASOUND IMAGING | Age: 65
End: 2020-09-29
Payer: MEDICARE

## 2020-09-29 ENCOUNTER — APPOINTMENT (OUTPATIENT)
Dept: GENERAL RADIOLOGY | Age: 65
End: 2020-09-29
Payer: MEDICARE

## 2020-09-29 VITALS
WEIGHT: 154 LBS | TEMPERATURE: 96.8 F | RESPIRATION RATE: 18 BRPM | BODY MASS INDEX: 29.1 KG/M2 | DIASTOLIC BLOOD PRESSURE: 76 MMHG | OXYGEN SATURATION: 96 % | SYSTOLIC BLOOD PRESSURE: 134 MMHG | HEART RATE: 60 BPM

## 2020-09-29 LAB
ALBUMIN SERPL-MCNC: 3.2 G/DL (ref 3.5–5.2)
ALP BLD-CCNC: 70 U/L (ref 35–104)
ALT SERPL-CCNC: 7 U/L (ref 0–32)
ANION GAP SERPL CALCULATED.3IONS-SCNC: 8 MMOL/L (ref 7–16)
AST SERPL-CCNC: 16 U/L (ref 0–31)
BASOPHILS ABSOLUTE: 0.03 E9/L (ref 0–0.2)
BASOPHILS RELATIVE PERCENT: 0.3 % (ref 0–2)
BILIRUB SERPL-MCNC: <0.2 MG/DL (ref 0–1.2)
BUN BLDV-MCNC: 19 MG/DL (ref 8–23)
CALCIUM SERPL-MCNC: 9.4 MG/DL (ref 8.6–10.2)
CHLORIDE BLD-SCNC: 108 MMOL/L (ref 98–107)
CO2: 28 MMOL/L (ref 22–29)
CREAT SERPL-MCNC: 1 MG/DL (ref 0.5–1)
EKG ATRIAL RATE: 59 BPM
EKG P AXIS: 40 DEGREES
EKG P-R INTERVAL: 154 MS
EKG Q-T INTERVAL: 390 MS
EKG QRS DURATION: 86 MS
EKG QTC CALCULATION (BAZETT): 386 MS
EKG R AXIS: -26 DEGREES
EKG T AXIS: -27 DEGREES
EKG VENTRICULAR RATE: 59 BPM
EOSINOPHILS ABSOLUTE: 0.17 E9/L (ref 0.05–0.5)
EOSINOPHILS RELATIVE PERCENT: 1.9 % (ref 0–6)
GFR AFRICAN AMERICAN: >60
GFR NON-AFRICAN AMERICAN: 55 ML/MIN/1.73
GLUCOSE BLD-MCNC: 94 MG/DL (ref 74–99)
HCT VFR BLD CALC: 28.9 % (ref 34–48)
HEMOGLOBIN: 8.4 G/DL (ref 11.5–15.5)
IMMATURE GRANULOCYTES #: 0.14 E9/L
IMMATURE GRANULOCYTES %: 1.6 % (ref 0–5)
LACTIC ACID: 1.1 MMOL/L (ref 0.5–2.2)
LYMPHOCYTES ABSOLUTE: 2.32 E9/L (ref 1.5–4)
LYMPHOCYTES RELATIVE PERCENT: 26.2 % (ref 20–42)
MCH RBC QN AUTO: 25.2 PG (ref 26–35)
MCHC RBC AUTO-ENTMCNC: 29.1 % (ref 32–34.5)
MCV RBC AUTO: 86.8 FL (ref 80–99.9)
MONOCYTES ABSOLUTE: 1.14 E9/L (ref 0.1–0.95)
MONOCYTES RELATIVE PERCENT: 12.9 % (ref 2–12)
NEUTROPHILS ABSOLUTE: 5.07 E9/L (ref 1.8–7.3)
NEUTROPHILS RELATIVE PERCENT: 57.1 % (ref 43–80)
PDW BLD-RTO: 19 FL (ref 11.5–15)
PLATELET # BLD: 381 E9/L (ref 130–450)
PMV BLD AUTO: 10.2 FL (ref 7–12)
POTASSIUM SERPL-SCNC: 4.4 MMOL/L (ref 3.5–5)
PRO-BNP: 303 PG/ML (ref 0–125)
RBC # BLD: 3.33 E12/L (ref 3.5–5.5)
SODIUM BLD-SCNC: 144 MMOL/L (ref 132–146)
TOTAL PROTEIN: 8 G/DL (ref 6.4–8.3)
TROPONIN: <0.01 NG/ML (ref 0–0.03)
WBC # BLD: 8.9 E9/L (ref 4.5–11.5)

## 2020-09-29 PROCEDURE — 71045 X-RAY EXAM CHEST 1 VIEW: CPT

## 2020-09-29 PROCEDURE — 80053 COMPREHEN METABOLIC PANEL: CPT

## 2020-09-29 PROCEDURE — 93010 ELECTROCARDIOGRAM REPORT: CPT | Performed by: INTERNAL MEDICINE

## 2020-09-29 PROCEDURE — 36415 COLL VENOUS BLD VENIPUNCTURE: CPT

## 2020-09-29 PROCEDURE — 93005 ELECTROCARDIOGRAM TRACING: CPT | Performed by: EMERGENCY MEDICINE

## 2020-09-29 PROCEDURE — 84484 ASSAY OF TROPONIN QUANT: CPT

## 2020-09-29 PROCEDURE — 85025 COMPLETE CBC W/AUTO DIFF WBC: CPT

## 2020-09-29 PROCEDURE — 83880 ASSAY OF NATRIURETIC PEPTIDE: CPT

## 2020-09-29 PROCEDURE — 83605 ASSAY OF LACTIC ACID: CPT

## 2020-09-29 PROCEDURE — 99283 EMERGENCY DEPT VISIT LOW MDM: CPT

## 2020-09-29 PROCEDURE — 99282 EMERGENCY DEPT VISIT SF MDM: CPT

## 2020-09-29 RX ORDER — HYDROCHLOROTHIAZIDE 12.5 MG/1
12.5 TABLET ORAL DAILY
Qty: 7 TABLET | Refills: 0 | Status: ON HOLD | OUTPATIENT
Start: 2020-09-29 | End: 2021-02-09 | Stop reason: HOSPADM

## 2020-09-29 RX ORDER — POLYETHYLENE GLYCOL 3350 17 G/17G
17 POWDER, FOR SOLUTION ORAL DAILY
Status: ON HOLD | COMMUNITY
End: 2021-03-16 | Stop reason: SDUPTHER

## 2020-09-29 NOTE — ED PROVIDER NOTES
HPI:  9/29/20, Time: 1:42 PM EDT      HPI per caregiver, nonverbal patient     Zainab Pagan is a 76 y.o. female presenting to the ED for swollen feet (worse on left, can't velcro shoe) and shortness of breath, beginning 2 days ago. Caregiver states patient having some difficulty bearing weight lately as well (not exactly off balance, might be due to increased edema in feet). The complaint has been persistent, moderate in severity, and worsened by nothing. Maribell Santo denies that patient is having any change in behavior or reactions, eliciting any indication that patient is in pain, any changes in her routine or medication or diet lately. Caregiver denies fever/chills, cough, congestion, chest pain, headache, visual disturbances, focal paresthesias, focal weakness, abdominal pain, nausea, vomiting, diarrhea, constipation, dysuria, hematuria, trauma, neck or back pain or other complaints. ROS:   Pertinent positives and negatives are stated within HPI, all other systems reviewed and are negative.      --------------------------------------------- PAST HISTORY ---------------------------------------------  Past Medical History:  has a past medical history of Acute kidney injury ([facility] [address] Blind in both eyes, Hemodialysis patient ([facility] [address] Hyperthyroidism, MR (mental retardation), Osteoarthritis, and Pneumonia. Past Surgical History:  has a past surgical history that includes other surgical history (Right, 01/17/2017); other surgical history (01/25/2017); chest tube insertion (Right, 02/09/2017); and bronchoscopy (02/10/2017). Social History:  reports that she has never smoked. She has never used smokeless tobacco. She reports that she does not drink alcohol or use drugs. Family History: family history is not on file. The patients home medications have been reviewed. Allergies: Patient has no known allergies.         ---------------------------------------------------PHYSICAL EXAM--------------------------------------    Constitutional:  Well developed, well nourished, no acute distress, non-toxic appearance   Eyes:  conjunctiva normal  HENT:  Atraumatic, external ears normal, nose normal, oropharynx moist. Neck- normal range of motion, no nuchal rigidity   Respiratory:  No respiratory distress, normal breath sounds, no rales, no wheezing   Cardiovascular:  Normal rate, normal rhythm, no murmurs, no gallops, no rubs. Radial and DP pulses 2+ bilaterally. GI:  Soft, nondistended, normal bowel sounds, nontender, no rebound, no guarding   :  No costovertebral angle tenderness   Musculoskeletal:  Bilateral pedal edema 2+ right, 3+ left, no calf redness warmth or tenderness, no deformities. Back- no tenderness  Integument:  Well hydrated, no rash. Adequate perfusion. Neurologic:  Alert & awake, roving eye movement, bound to wheelchair, gets agitated when move her from the chair (baseline per cargiver), attempted to swing at RN, no focal deficits noted. Psychiatric: unab;e      -------------------------------------------------- RESULTS -------------------------------------------------  I have personally reviewed all laboratory and imaging results for this patient. Results are listed below.      LABS:  Results for orders placed or performed during the hospital encounter of 09/29/20   CBC auto differential   Result Value Ref Range    WBC 8.9 4.5 - 11.5 E9/L    RBC 3.33 (L) 3.50 - 5.50 E12/L    Hemoglobin 8.4 (L) 11.5 - 15.5 g/dL    Hematocrit 28.9 (L) 34.0 - 48.0 %    MCV 86.8 80.0 - 99.9 fL    MCH 25.2 (L) 26.0 - 35.0 pg    MCHC 29.1 (L) 32.0 - 34.5 %    RDW 19.0 (H) 11.5 - 15.0 fL    Platelets 998 175 - 074 E9/L    MPV 10.2 7.0 - 12.0 fL    Neutrophils % 57.1 43.0 - 80.0 %    Immature Granulocytes % 1.6 0.0 - 5.0 %    Lymphocytes % 26.2 20.0 - 42.0 %    Monocytes % 12.9 (H) 2.0 - 12.0 %    Eosinophils % 1.9 0.0 - 6.0 %    Basophils % 0.3 0.0 - 2.0 %    Neutrophils Absolute 5.07 1.80 - EKG      ------------------------- NURSING NOTES AND VITALS REVIEWED ---------------------------   The nursing notes within the ED encounter and vital signs as below have been reviewed by myself. /76   Pulse 60   Temp 96.8 °F (36 °C) (Infrared)   Resp 18   Wt 154 lb (69.9 kg)   SpO2 96%   BMI 29.10 kg/m²   Oxygen Saturation Interpretation: Normal    The patients available past medical records and past encounters were reviewed. ------------------------------ ED COURSE/MEDICAL DECISION MAKING----------------------  Medications - No data to display        Procedures:  none      Medical Decision Makin:25 PM EDT  Patient care turned over to Dr Toby Conti pending labs results and further workup and disposition       This patient's ED course included: a personal history and physicial examination, re-evaluation prior to disposition and a personal history and physicial eaxmination    This patient has remained hemodynamically stable and remained unchanged during their ED course. Re-Evaluations: Unable to do ultrasound as the patient is unable to lie flat and the ultrasound tech advises that she cannot do the ultrasound with the patient in the chair  Examination finds good color and warmth of both feet there is 1+ bilateral ankle edema. She has a good pedal pulse bilaterally. Good color and warmth she is able to move her foot and wiggle her toes. Time:   Re-evaluation. Patients symptoms show no change  Repeat physical examination is not changed        Consultations:             na    Critical Care: na        Counseling: The emergency provider has spoken with the caregiver and  and discussed todays results, in addition to providing specific details for the plan of care and counseling regarding the diagnosis and prognosis.   Questions are answered at this time and they are agreeable with the plan.       --------------------------------- IMPRESSION AND DISPOSITION ---------------------------------    IMPRESSION  1.  Ankle edema, bilateral        DISPOSITION  Disposition: Discharge to group home  Patient condition is stable                 Davi Martinez MD  10/03/20 [id number]

## 2020-09-29 NOTE — ED NOTES
Pt fighting and highly upset with any attempt to assist her onto ed cart, pt calms when in w/c, dr Heena Jackson aware and ok to leave in w/c.      Leonidas Valle RN  09/29/20 [id number]

## 2020-09-29 NOTE — ED NOTES
Iv attempt x1 right ac, able to obtain labs but not able to use as an iv site, dsd applied, dr Martha Ann aware of no iv site, ok to hold at this time.      Luzma Pimentel RN  09/29/20 [id number]

## 2020-09-29 NOTE — ED NOTES
Pt again hightly upset with attempt to have her get on to cart for ultrasound, pt remains in w/c, dr Yusuf Ou notified of such-aware not able to do ultrasound     Miguelina Garcia RN  09/29/20 [id number]

## 2020-10-15 ENCOUNTER — PROCEDURE VISIT (OUTPATIENT)
Dept: PODIATRY | Age: 65
End: 2020-10-15
Payer: MEDICARE

## 2020-10-15 VITALS — SYSTOLIC BLOOD PRESSURE: 123 MMHG | TEMPERATURE: 98.2 F | DIASTOLIC BLOOD PRESSURE: 75 MMHG

## 2020-10-15 PROCEDURE — G8427 DOCREV CUR MEDS BY ELIG CLIN: HCPCS | Performed by: PODIATRIST

## 2020-10-15 PROCEDURE — G8484 FLU IMMUNIZE NO ADMIN: HCPCS | Performed by: PODIATRIST

## 2020-10-15 PROCEDURE — 4040F PNEUMOC VAC/ADMIN/RCVD: CPT | Performed by: PODIATRIST

## 2020-10-15 PROCEDURE — 1036F TOBACCO NON-USER: CPT | Performed by: PODIATRIST

## 2020-10-15 PROCEDURE — G8417 CALC BMI ABV UP PARAM F/U: HCPCS | Performed by: PODIATRIST

## 2020-10-15 PROCEDURE — 1090F PRES/ABSN URINE INCON ASSESS: CPT | Performed by: PODIATRIST

## 2020-10-15 PROCEDURE — 1123F ACP DISCUSS/DSCN MKR DOCD: CPT | Performed by: PODIATRIST

## 2020-10-15 PROCEDURE — 11730 AVULSION NAIL PLATE SIMPLE 1: CPT | Performed by: PODIATRIST

## 2020-10-15 PROCEDURE — G8400 PT W/DXA NO RESULTS DOC: HCPCS | Performed by: PODIATRIST

## 2020-10-15 PROCEDURE — 3017F COLORECTAL CA SCREEN DOC REV: CPT | Performed by: PODIATRIST

## 2020-10-15 PROCEDURE — 99213 OFFICE O/P EST LOW 20 MIN: CPT | Performed by: PODIATRIST

## 2020-10-15 NOTE — PROGRESS NOTES
10/15/20  Emre Tan : 1955 Sex: female  Age: 76 y.o. Patient was referred by Cristian Rosenthal MD    Chief Complaint   Patient presents with    Toe Pain     saw pcp Dr. Cristian Rosenthal on 2020       SUBJECTIVE patient is seen with caregiver regarding a discolored right great toenail and also some muscle weakness in the legs. Caregiver stated that she is having a hard time actually getting up she is noncommunicating. HPI  Review of Systems  Const: Denies constitutional symptoms  Musculo: Denies symptoms other than stated above  Skin: Denies symptoms other than stated above       Current Outpatient Medications:     polyethylene glycol (GLYCOLAX) 17 g packet, Take 17 g by mouth daily as needed for Constipation, Disp: , Rfl:     famotidine (PEPCID) 20 MG tablet, TAKE 1 TABLET BY MOUTH TWICE A DAY, Disp: 60 tablet, Rfl: 0    metoprolol tartrate (LOPRESSOR) 25 MG tablet, TAKE 3 TABLETS BY MOUTH TWICE DAILY.  HOLD FOR BP <91 SYSTOLIC OR OO<28., Disp: 180 tablet, Rfl: 0    vitamin D3 (CHOLECALCIFEROL) 25 MCG (1000 UT) TABS tablet, TAKE 1 TABLET BY MOUTH DAILY, Disp: 30 tablet, Rfl: 0    SILACE 150 MG/15ML liquid, TAKE 2 TEASPOONSFUL (10ML) BY MOUTH TWICE A DAY (PLUG/SYR), Disp: 600 mL, Rfl: 0    clotrimazole (LOTRIMIN) 1 % cream, APPLY TOPICALLY TO BOTH FEET TWICE DAILY, Disp: 60 g, Rfl: 0    montelukast (SINGULAIR) 10 MG tablet, TAKE ONE TABLET BY MOUTH AT BEDTIME., Disp: 90 tablet, Rfl: 0    PARoxetine (PAXIL) 10 MG tablet, TAKE 1 TABLET BY MOUTH TWICE A DAY., Disp: 180 tablet, Rfl: 0    Starch, Thickening, (THICK-IT #2) POWD, USE AS DIRECTED FOR NECTAR THICK LIQUIDS DUE TO ASPIRATION, Disp: 2040 g, Rfl: 3    folic acid (FOLVITE) 1 MG tablet, ONE DAILY, Disp: 90 tablet, Rfl: 0    Starch, Thickening, (THICK-IT #2) POWD, USE AS DIRECTED FOR NECTAR THICK LIQUIDS DUE TO ASPIRATION, Disp: 2040 g, Rfl: 3    docusate sodium (SILACE) 150 MG/15ML liquid, TAKE 2 TEASPOONSFUL (10ML) BY MOUTH TWICE A DAY, Disp: 600 mL, Rfl: 3    amLODIPine (NORVASC) 5 MG tablet, TAKE ONE TABLET BY MOUTH AT BEDTIME., Disp: 90 tablet, Rfl: 1    Cetylpyridinium Chloride (CREST PRO-HEALTH) 0.07 % LIQD, USE AS MOUTH RINSE TWICE DAILY, Disp: 1000 mL, Rfl: 3    Disposable Gloves (NITRILE GLOVES MEDIUM) MISC, Use as directed daily, Disp: 200 each, Rfl: 11    docusate sodium (DOCU) 150 MG/15ML liquid, TAKE 2 TEASPOONSFUL (10ML) BY MOUTH TWICE A DAY (PLUG/SYR), Disp: 600 mL, Rfl: 3    guaiFENesin-dextromethorphan (ROBITUSSIN DM) 100-10 MG/5ML syrup, Take 2 teaspoons (10ml) by mouth every 4 hours as needed for cough and congestion. , Disp: 236 mL, Rfl: 3    levothyroxine (SYNTHROID) 137 MCG tablet, ONE EVERY MORNING, Disp: 90 tablet, Rfl: 1    loperamide (RA ANTI-DIARRHEAL) 2 MG capsule, Take one capsule by mouth every 6 hours as needed for diarrhea, Disp: 30 capsule, Rfl: 5    ranitidine (ZANTAC) 150 MG tablet, TAKE 1 TABLET BY MOUTH TWICE A DAY., Disp: 60 tablet, Rfl: 3    Sodium Fluoride (CREST PRO-HEALTH COMPLETE) 0.0221 (0.01 F) % SOLN, Use as mouth rinse twice daily, Disp: 1000 mL, Rfl: 3    valproic acid (DEPAKENE) 250 MG capsule, TAKE 4 CAPSULES (1000MG) BY MOUTH AT BEDTIME, Disp: 360 capsule, Rfl: 1    [place]icap Placard [facility] [place] Does not apply route 3/8/19 - 3/8/24, Duration: 5 years, Disp: 1 each, Rfl: 0    acetaminophen (AMINOFEN) 325 MG tablet, Take 2 tablets by mouth every 4 hours as needed for Pain, Disp: 30 tablet, Rfl: 3    LORazepam (ATIVAN) 2 MG tablet, Take 2 mg by mouth. ., Disp: , Rfl:     OLANZapine zydis (ZYPREXA) 15 MG disintegrating tablet, TAKE ONE TABLET BY MOUTH AT BEDTIME. (Patient taking differently: Take 25 mg by mouth nightly TAKE ONE TABLET BY MOUTH AT BEDTIME.), Disp: 180 tablet, Rfl: 1    sodium bicarbonate 650 MG tablet, Take 1 tablet by mouth 2 times daily, Disp: 60 tablet, Rfl: 1    hydroCHLOROthiazide (HYDRODIURIL) 12.5 MG tablet, Take 1 tablet by mouth daily for 7 days, Disp: 7 tablet, Rfl: 0  No Known Allergies    Past Medical History:   Diagnosis Date    Acute kidney injury ([facility] [address] 01/15/2017    d/t Vancomycin, on Dialysis [facility] right chest    Blind in both eyes     Hemod[facility] patient ([facility]     Hyperthyroidism     MR (mental retardation)     Osteoarthritis     Pneumonia 01/06/2017     Past Surgical History:   Procedure Laterality Date    BRONCHOSCOPY  02/10/2017    CHEST TUBE INSERTION Right 02/09/2017    OTHER SURGICAL HISTORY Right 01/17/2017    tessio insertion     OTHER SURGICAL HISTORY  01/25/2017    PEG tube insertion     No family history on file.   Social History     Socioeconomic History    Marital status: Single     Spouse name: Not on file    Number of children: Not on file    Years of education: Not on file    Highest education level: Not on file   Occupational History    Not on file   Social Needs    Financial resource strain: Not on file    Food insecurity     Worry: Not on file     Inability: Not on file    Transportation needs     Medical: Not on file     Non-medical: Not on file   Tobacco Use    Smoking status: Never Smoker    Smokeless tobacco: Never Used   Substance and Sexual Activity    Alcohol use: No    Drug use: No    Sexual activity: Never   Lifestyle    Physical activity     Days per week: Not on file     Minutes per session: Not on file    Stress: Not on file   Relationships    Social connections     Talks on phone: Not on file     Gets together: Not on file     Attends Orthodoxy service: Not on file     Active member of club or organization: Not on file     Attends meetings of clubs or organizations: Not on file     Relationship status: Not on file    Intimate partner violence     Fear of current or ex partner: Not on file     Emotionally abused: Not on file     Physically abused: Not on file     Forced sexual activity: Not on file   Other Topics Concern    Not on file   Social History Narrative    Not on file Vitals:    10/15/20 1006   BP: 123/75   Temp: 98.2 °F (36.8 °C)   TempSrc: Temporal   Weight: Comment: wheelchair       Focused Lower Extremity Physical Exam:  Vitals:    10/15/20 1006   BP: 123/75   Temp: 98.2 °F (36.8 °C)        Foot Exam    General  General Appearance: appears stated age and healthy   Orientation: unable to assess   Gait: antalgic   Assistance: wheelchair use       Right Foot/Ankle     Inspection and Palpation  Skin Exam: skin changes and abnormal color;     Muscle Strength  Ankle dorsiflexion: 1  Ankle plantar flexion: 1  Ankle inversion: 1  Ankle eversion: 1  Great toe extension: 1  Great toe flexion: 1      Left Foot/Ankle      Muscle Strength  Ankle dorsiflexion: 1  Ankle plantar flexion: 1  Ankle inversion: 1  Ankle eversion: 1  Great toe flexion: 1             Right Ankle Exam     Muscle Strength   Dorsiflexion:  1/5  Plantar flexion:  1/5      Left Ankle Exam     Muscle Strength   Dorsiflexion:  1/5   Plantar flexion:  1/5             Vascular: pulses  dp  pt    Capillary Refill Time:   Hair growth  Skin:    Edema:    Neurologic:      Musculoskeletal/ Orthopedic examination: Muscle strength is decreased both in the ankle leg bilaterally  NAIL right hallux nail is black dried blood underneath the nail the entire nail is mildly painful there is no drainage noted the entire nail is discolored. Web space   Derm:          Assessment and Plan: Total Nail Avulsion  I discussed with the patient and care giver the procedure in extensive detail. We discussed the potential for recurrence, infection, prolonged drainage, delayed healing, overcorrection, undercorrection, recurrence of the deformity and potential need for further treatment. The patient understood. All questions were answered and all concerns were addressed. No guarantees were given. Upon receiving consent the patient was brought to the treatment room   At this point in the time the total nail avulsion was performed. Attention was directed to the nail plate which was freed from the eponychium and nailbed wit a [place] elevator. Next, a straight hemostat was utilized to avulse the nail in total.  No disruption or laceration of the nailbed was noted upon removal of the nail plate. The area was inspected for further evidence of nail and none was found. The area was flushed with saline. After the nail was removed the nail bed is in healthy condition with no signs of laceration there was no bleeding noted no was applied at this time the patient tolerated the procedure and anesthesia well and left the office with vial signs stable and vascular status intact. The patient was given home going instruction and will reappoint for postoperative follow-up. Care worker was advised that if there is any changes to contact me immediately. Today we did discuss of the muscle weakness patient does have orthopedic shoes and orthotics we will continue monitoring with this patient is unable to go through physical therapy. Prudence Hubbard was seen today for toe pain. Diagnoses and all orders for this visit:    Contusion of right great toe with damage to nail, initial encounter        No follow-ups on file. Seen By:  Jeffy Hennessy DPM      Document was created using voice recognition software. Note was reviewed, however may contain grammatical errors.

## 2020-11-03 PROBLEM — J18.9 PNEUMONIA DUE TO ORGANISM: Status: RESOLVED | Noted: 2020-11-03 | Resolved: 2020-11-03

## 2020-11-03 PROBLEM — J18.9 PNEUMONIA OF LEFT LOWER LOBE DUE TO INFECTIOUS ORGANISM: Status: RESOLVED | Noted: 2020-11-03 | Resolved: 2020-11-03

## 2020-11-12 ENCOUNTER — HOSPITAL ENCOUNTER (OUTPATIENT)
Age: 65
Discharge: HOME | End: 2020-11-12
Payer: MEDICARE

## 2020-11-12 LAB
ALBUMIN SERPL-MCNC: 2.6 G/DL (ref 3.5–5.2)
ALP BLD-CCNC: 51 U/L (ref 35–104)
ALT SERPL-CCNC: 6 U/L (ref 0–32)
ANION GAP SERPL CALCULATED.3IONS-SCNC: 6 MMOL/L (ref 7–16)
ANISOCYTOSIS: ABNORMAL
AST SERPL-CCNC: 18 U/L (ref 0–31)
BASOPHILS ABSOLUTE: 0.02 E9/L (ref 0–0.2)
BASOPHILS RELATIVE PERCENT: 0.2 % (ref 0–2)
BILIRUB SERPL-MCNC: <0.2 MG/DL (ref 0–1.2)
BUN BLDV-MCNC: 17 MG/DL (ref 8–23)
CALCIUM SERPL-MCNC: 8.7 MG/DL (ref 8.6–10.2)
CHLORIDE BLD-SCNC: 109 MMOL/L (ref 98–107)
CHOLESTEROL, TOTAL: 119 MG/DL (ref 0–199)
CO2: 27 MMOL/L (ref 22–29)
CREAT SERPL-MCNC: 1 MG/DL (ref 0.5–1)
EOSINOPHILS ABSOLUTE: 0.15 E9/L (ref 0.05–0.5)
EOSINOPHILS RELATIVE PERCENT: 1.8 % (ref 0–6)
GFR AFRICAN AMERICAN: >60
GFR NON-AFRICAN AMERICAN: 55 ML/MIN/1.73
GLUCOSE BLD-MCNC: 93 MG/DL (ref 74–99)
HCT VFR BLD CALC: 27.9 % (ref 34–48)
HDLC SERPL-MCNC: 46 MG/DL
HEMOGLOBIN: 7.7 G/DL (ref 11.5–15.5)
HYPOCHROMIA: ABNORMAL
IMMATURE GRANULOCYTES #: 0.06 E9/L
IMMATURE GRANULOCYTES %: 0.7 % (ref 0–5)
LDL CHOLESTEROL CALCULATED: 59 MG/DL (ref 0–99)
LYMPHOCYTES ABSOLUTE: 1.53 E9/L (ref 1.5–4)
LYMPHOCYTES RELATIVE PERCENT: 18 % (ref 20–42)
MCH RBC QN AUTO: 23.3 PG (ref 26–35)
MCHC RBC AUTO-ENTMCNC: 27.6 % (ref 32–34.5)
MCV RBC AUTO: 84.5 FL (ref 80–99.9)
MONOCYTES ABSOLUTE: 1.21 E9/L (ref 0.1–0.95)
MONOCYTES RELATIVE PERCENT: 14.2 % (ref 2–12)
NEUTROPHILS ABSOLUTE: 5.55 E9/L (ref 1.8–7.3)
NEUTROPHILS RELATIVE PERCENT: 65.1 % (ref 43–80)
PDW BLD-RTO: 19.5 FL (ref 11.5–15)
PLATELET # BLD: 410 E9/L (ref 130–450)
PMV BLD AUTO: 10.2 FL (ref 7–12)
POIKILOCYTES: ABNORMAL
POLYCHROMASIA: ABNORMAL
POTASSIUM SERPL-SCNC: 5 MMOL/L (ref 3.5–5)
RBC # BLD: 3.3 E12/L (ref 3.5–5.5)
SCHISTOCYTES: ABNORMAL
SODIUM BLD-SCNC: 142 MMOL/L (ref 132–146)
TARGET CELLS: ABNORMAL
TEAR DROP CELLS: ABNORMAL
TOTAL PROTEIN: 7.1 G/DL (ref 6.4–8.3)
TRIGL SERPL-MCNC: 70 MG/DL (ref 0–149)
VALPROIC ACID LEVEL: 62 MCG/ML (ref 50–100)
VLDLC SERPL CALC-MCNC: 14 MG/DL
WBC # BLD: 8.5 E9/L (ref 4.5–11.5)

## 2020-11-12 PROCEDURE — 80061 LIPID PANEL: CPT

## 2020-11-12 PROCEDURE — 36415 COLL VENOUS BLD VENIPUNCTURE: CPT

## 2020-11-12 PROCEDURE — 85025 COMPLETE CBC W/AUTO DIFF WBC: CPT

## 2020-11-12 PROCEDURE — 80164 ASSAY DIPROPYLACETIC ACD TOT: CPT

## 2020-11-12 PROCEDURE — 80053 COMPREHEN METABOLIC PANEL: CPT

## 2021-01-14 ENCOUNTER — PROCEDURE VISIT (OUTPATIENT)
Dept: PODIATRY | Age: 66
End: 2021-01-14
Payer: MEDICARE

## 2021-01-14 VITALS — TEMPERATURE: 98.2 F

## 2021-01-14 DIAGNOSIS — I73.9 PERIPHERAL VASCULAR DISEASE, UNSPECIFIED (HCC): ICD-10-CM

## 2021-01-14 DIAGNOSIS — M79.675 PAIN IN LEFT TOE(S): ICD-10-CM

## 2021-01-14 DIAGNOSIS — B35.1 TINEA UNGUIUM: Primary | ICD-10-CM

## 2021-01-14 DIAGNOSIS — R26.2 DIFFICULTY WALKING: ICD-10-CM

## 2021-01-14 DIAGNOSIS — M79.674 PAIN IN TOE OF RIGHT FOOT: ICD-10-CM

## 2021-01-14 DIAGNOSIS — F79 INTELLECTUAL DISABILITY: ICD-10-CM

## 2021-01-14 PROCEDURE — 11721 DEBRIDE NAIL 6 OR MORE: CPT | Performed by: PODIATRIST

## 2021-01-14 NOTE — PROGRESS NOTES
DUE TO ASPIRATION 3047 g 3    folic acid (FOLVITE) 1 MG tablet ONE DAILY 90 tablet 0    Starch, Thickening, (THICK-IT #2) POWD USE AS DIRECTED FOR NECTAR THICK LIQUIDS DUE TO ASPIRATION 2040 g 3    docusate sodium (SILACE) 150 MG/15ML liquid TAKE 2 TEASPOONSFUL (10ML) BY MOUTH TWICE A  mL 3    amLODIPine (NORVASC) 5 MG tablet TAKE ONE TABLET BY MOUTH AT BEDTIME. 90 tablet 1    Cetylpyridinium Chloride (CREST PRO-HEALTH) 0.07 % LIQD USE AS MOUTH RINSE TWICE DAILY 1000 mL 3    Disposable Gloves (NITRILE GLOVES MEDIUM) MISC Use as directed daily 200 each 11    docusate sodium (DOCU) 150 MG/15ML liquid TAKE 2 TEASPOONSFUL (10ML) BY MOUTH TWICE A DAY (PLUG/SYR) 600 mL 3    guaiFENesin-dextromethorphan (ROBITUSSIN DM) 100-10 MG/5ML syrup Take 2 teaspoons (10ml) by mouth every 4 hours as needed for cough and congestion. 236 mL 3    levothyroxine (SYNTHROID) 137 MCG tablet ONE EVERY MORNING 90 tablet 1    loperamide (RA ANTI-DIARRHEAL) 2 MG capsule Take one capsule by mouth every 6 hours as needed for diarrhea 30 capsule 5    ranitidine (ZANTAC) 150 MG tablet TAKE 1 TABLET BY MOUTH TWICE A DAY. 60 tablet 3    Sodium Fluoride (CREST PRO-HEALTH COMPLETE) 0.0221 (0.01 F) % SOLN Use as mouth rinse twice daily 1000 mL 3    valproic acid (DEPAKENE) 250 MG capsule TAKE 4 CAPSULES (1000MG) BY MOUTH AT BEDTIME 360 capsule 1    Handicap Placard MISC by Does not apply route 3/8/19 - 3/8/24, Duration: 5 years 1 each 0    acetaminophen (AMINOFEN) 325 MG tablet Take 2 tablets by mouth every 4 hours as needed for Pain 30 tablet 3    LORazepam (ATIVAN) 2 MG tablet Take 2 mg by mouth. .      OLANZapine zydis (ZYPREXA) 15 MG disintegrating tablet TAKE ONE TABLET BY MOUTH AT BEDTIME. (Patient taking differently: Take 25 mg by mouth nightly TAKE ONE TABLET BY MOUTH AT BEDTIME.) 180 tablet 1    sodium bicarbonate 650 MG tablet Take 1 tablet by mouth 2 times daily 60 tablet 1    hydroCHLOROthiazide (HYDRODIURIL) 12.5 MG tablet Take 1 tablet by mouth daily for 7 days 7 tablet 0     No current facility-administered medications on file prior to visit. Review of Systems  Objective:  General: AAO x 3 in NAD. Derm  Toenail Description  Sites of Onychomycosis Involvement (Check affected area)  [x] [x] [x] [x] [x] [x] [x] [x] [x] [x]  5 4 3 2 1 1 2 3 4 5                          Right                                        Left    Thickness  [x] [x] [x] [x] [x] [x] [x] [x] [x] [x]  5 4 3 2 1 1 2 3 4 5                         Right                                        Left    Dystrophic Changes   [x] [x] [x] [x] [x] [x] [x] [x] [x] [x]  5 4 3 2 1 1 2 3 4 5                         Right                                        Left    Color  [x] [x] [x] [x] [x] [x] [x] [x] [x] [x]  5 4 3 2 1 1 2 3 4 5                          Right                                        Left    Incurvation/Ingrowin   [x] [x] [x] [x] [x] [x] [x] [x] [x] [x]  5 4 3 2 1 1 2 3 4 5                         Right                                        Left    Inflammation/Pain   [x] [x] [x] [x] [x] [x] [x] [x] [x] [x]  5 4 3  2 1 1 2 3 4 5                         Right                                        Left      Nails that are described above are all elongated thickened pitting mycotic yellowish incurvated causing pain with both shoe gear. Palpation nails greater then 3 mm thick painful       Dermatologic Exam:hair loss noted  lower extremity    Skin lesion/ulceration   Skin   Callus   Musculoskeletal:     1st MPJ ROM normal, Bilateral.  Muscle strength 5/5, Bilateral.  Pain present upon palpation of toenails   Bilateral., Bilateral.  Ankle ROM normal,Bilateral.    Dorsally contracted digits , Bilateral.     Vascular:   There are class B findings absent posterior tibialis pulses lack of hair growth thickened nails discoloration skin is discolored skin is shiny cool to touch to toes    Neurological:  Sensation present to light touch to level of digits, Bilateral    Foot Exam    General  General Appearance: appears stated age and healthy   Assistance: wheelchair use       Right Foot/Ankle     Inspection and Palpation  Skin Exam: skin changes and abnormal color; Neurovascular  Dorsalis pedis: 1+  Posterior tibial: absent    Muscle Strength  Ankle dorsiflexion: 1  Ankle plantar flexion: 1      Left Foot/Ankle      Inspection and Palpation  Skin Exam: skin changes and abnormal color; Neurovascular  Dorsalis pedis: 1+  Posterior tibial: absent    Muscle Strength  Ankle dorsiflexion: 1  Ankle plantar flexion: 1    Range of Motion    Normal left ankle ROM             Right Ankle Exam   Right ankle exam is normal.  Swelling: mild    Muscle Strength   Dorsiflexion:  1/5  Plantar flexion:  1/5  Anterior tibial:  1/5  Posterior tibial:  1/5  Gastrocsoleus:  1/5  Peroneal muscle:  1/5    Tests   Anterior drawer: physiological laxity  Varus tilt: physiological laxity      Left Ankle Exam   Left ankle exam is normal.  Swelling: mild    Range of Motion   The patient has normal left ankle ROM. Muscle Strength   Dorsiflexion:  1/5   Plantar flexion:  1/5   Anterior tibial:  1/5   Gastrocsoleus:  1/5  Peroneal muscle:  1/5    Tests   Anterior drawer: physiological laxity  Varus tilt: physiological laxity          Q7   []Yes    []No                Q8   [x]Yes    []No                     Q9   []Yes    []No  Assessment:  78 y.o. female with:   1. Tinea unguium    2. Pain in left toe(s)    3. Pain in toe of right foot    4. Difficulty walking    5. Peripheral vascular disease, unspecified ([address]    6. Mental retardation     No orders of the defined types were placed in this encounter. Plan: .  Pt was evaluated and examined. Patient was given personalized discharge instructions. Nails 1-10 were debrided in length and thickness sharply with a nail nipper and  without incident. Pt will follow up in 9 weeks or sooner if any problems arise.  Diagnosis was discussed with the pt and all of their questions were answered in detail. Proper foot hygiene and care was discussed with the pt. Patient to check feet daily and contact the office with any questions/problems/concerns. Other comorbidity noted and will be managed by PCP. Pain waiver discussed with patient and confirmed.    1/14/2021      Electronically signed by Kelvin Viveros DPM on 1/14/2021 at 11:04 AM  1/14/2021

## 2021-02-08 ENCOUNTER — HOSPITAL ENCOUNTER (INPATIENT)
Age: 66
LOS: 1 days | Discharge: OTHER INSTITUTION | DRG: 641 | End: 2021-02-09
Attending: EMERGENCY MEDICINE | Admitting: INTERNAL MEDICINE
Payer: MEDICARE

## 2021-02-08 ENCOUNTER — APPOINTMENT (OUTPATIENT)
Dept: GENERAL RADIOLOGY | Age: 66
DRG: 641 | End: 2021-02-08
Payer: MEDICARE

## 2021-02-08 ENCOUNTER — APPOINTMENT (OUTPATIENT)
Dept: CT IMAGING | Age: 66
DRG: 641 | End: 2021-02-08
Payer: MEDICARE

## 2021-02-08 DIAGNOSIS — R41.82 ALTERED MENTAL STATUS, UNSPECIFIED ALTERED MENTAL STATUS TYPE: Primary | ICD-10-CM

## 2021-02-08 PROBLEM — J18.9 HCAP (HEALTHCARE-ASSOCIATED PNEUMONIA): Status: RESOLVED | Noted: 2017-01-08 | Resolved: 2021-02-08

## 2021-02-08 PROBLEM — R06.2 WHEEZING: Status: RESOLVED | Noted: 2018-12-10 | Resolved: 2021-02-08

## 2021-02-08 LAB
ALBUMIN SERPL-MCNC: 2.2 G/DL (ref 3.5–5.2)
ALP BLD-CCNC: 66 U/L (ref 35–104)
ALT SERPL-CCNC: 9 U/L (ref 0–32)
AMMONIA: 17.1 UMOL/L (ref 11–51)
ANION GAP SERPL CALCULATED.3IONS-SCNC: 8 MMOL/L (ref 7–16)
ANISOCYTOSIS: ABNORMAL
AST SERPL-CCNC: 27 U/L (ref 0–31)
ATYPICAL LYMPHOCYTE RELATIVE PERCENT: 2 % (ref 0–4)
B.E.: 2.1 MMOL/L (ref -3–0)
BACTERIA: ABNORMAL /HPF
BASOPHILS ABSOLUTE: 0 E9/L (ref 0–0.2)
BASOPHILS RELATIVE PERCENT: 0 % (ref 0–2)
BILIRUB SERPL-MCNC: 0.3 MG/DL (ref 0–1.2)
BILIRUBIN URINE: ABNORMAL
BLOOD, URINE: NEGATIVE
BUN BLDV-MCNC: 15 MG/DL (ref 8–23)
CALCIUM SERPL-MCNC: 8.9 MG/DL (ref 8.6–10.2)
CHLORIDE BLD-SCNC: 103 MMOL/L (ref 98–107)
CLARITY: CLEAR
CO2: 26 MMOL/L (ref 22–29)
COLOR: YELLOW
CREAT SERPL-MCNC: 0.8 MG/DL (ref 0.5–1)
DEVICE: ABNORMAL
EOSINOPHILS ABSOLUTE: 0.24 E9/L (ref 0.05–0.5)
EOSINOPHILS RELATIVE PERCENT: 2 % (ref 0–6)
FIO2 ARTERIAL: 21
GFR AFRICAN AMERICAN: >60
GFR NON-AFRICAN AMERICAN: >60 ML/MIN/1.73
GLUCOSE BLD-MCNC: 134 MG/DL (ref 74–99)
GLUCOSE URINE: NEGATIVE MG/DL
HCO3 ARTERIAL: 26.5 MMOL/L (ref 22–26)
HCT VFR BLD CALC: 39.1 % (ref 34–48)
HEMOGLOBIN: 11.4 G/DL (ref 11.5–15.5)
KETONES, URINE: ABNORMAL MG/DL
LACTIC ACID, SEPSIS: 1.1 MMOL/L (ref 0.5–1.9)
LEUKOCYTE ESTERASE, URINE: NEGATIVE
LYMPHOCYTES ABSOLUTE: 1.33 E9/L (ref 1.5–4)
LYMPHOCYTES RELATIVE PERCENT: 9 % (ref 20–42)
MCH RBC QN AUTO: 27.4 PG (ref 26–35)
MCHC RBC AUTO-ENTMCNC: 29.2 % (ref 32–34.5)
MCV RBC AUTO: 94 FL (ref 80–99.9)
METAMYELOCYTES RELATIVE PERCENT: 1 % (ref 0–1)
METER GLUCOSE: 126 MG/DL (ref 74–99)
MONOCYTES ABSOLUTE: 2.06 E9/L (ref 0.1–0.95)
MONOCYTES RELATIVE PERCENT: 17 % (ref 2–12)
MUCUS: PRESENT /LPF
NEUTROPHILS ABSOLUTE: 8.47 E9/L (ref 1.8–7.3)
NEUTROPHILS RELATIVE PERCENT: 69 % (ref 43–80)
NITRITE, URINE: NEGATIVE
O2 SATURATION: 95.4 % (ref 92–98.5)
OPERATOR ID: 467
PCO2 ARTERIAL: 39.3 MMHG (ref 35–45)
PDW BLD-RTO: 25.1 FL (ref 11.5–15)
PH BLOOD GAS: 7.44 (ref 7.35–7.45)
PH UA: 6 (ref 5–9)
PLATELET # BLD: 183 E9/L (ref 130–450)
PMV BLD AUTO: 10.7 FL (ref 7–12)
PO2 ARTERIAL: 75.2 MMHG (ref 60–80)
POIKILOCYTES: ABNORMAL
POLYCHROMASIA: ABNORMAL
POTASSIUM REFLEX MAGNESIUM: 4.8 MMOL/L (ref 3.5–5)
PRO-BNP: 469 PG/ML (ref 0–125)
PROTEIN UA: ABNORMAL MG/DL
RBC # BLD: 4.16 E12/L (ref 3.5–5.5)
RBC UA: ABNORMAL /HPF (ref 0–2)
REASON FOR REJECTION: NORMAL
REJECTED TEST: NORMAL
SARS-COV-2, NAAT: NOT DETECTED
SCHISTOCYTES: ABNORMAL
SODIUM BLD-SCNC: 137 MMOL/L (ref 132–146)
SOURCE, BLOOD GAS: ABNORMAL
SPECIFIC GRAVITY UA: >=1.03 (ref 1–1.03)
T4 FREE: 1.07 NG/DL (ref 0.93–1.7)
TARGET CELLS: ABNORMAL
TOTAL PROTEIN: 7.9 G/DL (ref 6.4–8.3)
TROPONIN: <0.01 NG/ML (ref 0–0.03)
TSH SERPL DL<=0.05 MIU/L-ACNC: 12.17 UIU/ML (ref 0.27–4.2)
UROBILINOGEN, URINE: 4 E.U./DL
VACUOLATED NEUTROPHILS: ABNORMAL
WBC # BLD: 12.1 E9/L (ref 4.5–11.5)
WBC UA: ABNORMAL /HPF (ref 0–5)

## 2021-02-08 PROCEDURE — 85025 COMPLETE CBC W/AUTO DIFF WBC: CPT

## 2021-02-08 PROCEDURE — 70450 CT HEAD/BRAIN W/O DYE: CPT

## 2021-02-08 PROCEDURE — 93005 ELECTROCARDIOGRAM TRACING: CPT | Performed by: STUDENT IN AN ORGANIZED HEALTH CARE EDUCATION/TRAINING PROGRAM

## 2021-02-08 PROCEDURE — 6370000000 HC RX 637 (ALT 250 FOR IP): Performed by: INTERNAL MEDICINE

## 2021-02-08 PROCEDURE — 71045 X-RAY EXAM CHEST 1 VIEW: CPT

## 2021-02-08 PROCEDURE — 84443 ASSAY THYROID STIM HORMONE: CPT

## 2021-02-08 PROCEDURE — 81001 URINALYSIS AUTO W/SCOPE: CPT

## 2021-02-08 PROCEDURE — 84439 ASSAY OF FREE THYROXINE: CPT

## 2021-02-08 PROCEDURE — 83880 ASSAY OF NATRIURETIC PEPTIDE: CPT

## 2021-02-08 PROCEDURE — 2580000003 HC RX 258: Performed by: INTERNAL MEDICINE

## 2021-02-08 PROCEDURE — 6360000002 HC RX W HCPCS: Performed by: INTERNAL MEDICINE

## 2021-02-08 PROCEDURE — 82962 GLUCOSE BLOOD TEST: CPT

## 2021-02-08 PROCEDURE — 87088 URINE BACTERIA CULTURE: CPT

## 2021-02-08 PROCEDURE — 82803 BLOOD GASES ANY COMBINATION: CPT

## 2021-02-08 PROCEDURE — 1200000000 HC SEMI PRIVATE

## 2021-02-08 PROCEDURE — 99283 EMERGENCY DEPT VISIT LOW MDM: CPT

## 2021-02-08 PROCEDURE — U0002 COVID-19 LAB TEST NON-CDC: HCPCS

## 2021-02-08 PROCEDURE — 83605 ASSAY OF LACTIC ACID: CPT

## 2021-02-08 PROCEDURE — 80053 COMPREHEN METABOLIC PANEL: CPT

## 2021-02-08 PROCEDURE — 2580000003 HC RX 258: Performed by: EMERGENCY MEDICINE

## 2021-02-08 PROCEDURE — 84484 ASSAY OF TROPONIN QUANT: CPT

## 2021-02-08 PROCEDURE — 87040 BLOOD CULTURE FOR BACTERIA: CPT

## 2021-02-08 PROCEDURE — 82140 ASSAY OF AMMONIA: CPT

## 2021-02-08 RX ORDER — FOLIC ACID 1 MG/1
1 TABLET ORAL DAILY
Status: DISCONTINUED | OUTPATIENT
Start: 2021-02-08 | End: 2021-02-10 | Stop reason: HOSPADM

## 2021-02-08 RX ORDER — MONTELUKAST SODIUM 10 MG/1
10 TABLET ORAL NIGHTLY
Status: DISCONTINUED | OUTPATIENT
Start: 2021-02-08 | End: 2021-02-10 | Stop reason: HOSPADM

## 2021-02-08 RX ORDER — AMLODIPINE BESYLATE 5 MG/1
5 TABLET ORAL NIGHTLY
Status: DISCONTINUED | OUTPATIENT
Start: 2021-02-08 | End: 2021-02-10 | Stop reason: HOSPADM

## 2021-02-08 RX ORDER — VALPROIC ACID 250 MG/1
1000 CAPSULE, LIQUID FILLED ORAL NIGHTLY
Status: DISCONTINUED | OUTPATIENT
Start: 2021-02-08 | End: 2021-02-10 | Stop reason: HOSPADM

## 2021-02-08 RX ORDER — SODIUM CHLORIDE 0.9 % (FLUSH) 0.9 %
10 SYRINGE (ML) INJECTION EVERY 12 HOURS SCHEDULED
Status: DISCONTINUED | OUTPATIENT
Start: 2021-02-08 | End: 2021-02-10 | Stop reason: HOSPADM

## 2021-02-08 RX ORDER — POTASSIUM CHLORIDE 20 MEQ/1
40 TABLET, EXTENDED RELEASE ORAL PRN
Status: DISCONTINUED | OUTPATIENT
Start: 2021-02-08 | End: 2021-02-10 | Stop reason: HOSPADM

## 2021-02-08 RX ORDER — POTASSIUM CHLORIDE 7.45 MG/ML
10 INJECTION INTRAVENOUS PRN
Status: DISCONTINUED | OUTPATIENT
Start: 2021-02-08 | End: 2021-02-10 | Stop reason: HOSPADM

## 2021-02-08 RX ORDER — LORAZEPAM 1 MG/1
2 TABLET ORAL NIGHTLY
Status: DISCONTINUED | OUTPATIENT
Start: 2021-02-08 | End: 2021-02-10 | Stop reason: HOSPADM

## 2021-02-08 RX ORDER — ONDANSETRON 2 MG/ML
4 INJECTION INTRAMUSCULAR; INTRAVENOUS EVERY 6 HOURS PRN
Status: DISCONTINUED | OUTPATIENT
Start: 2021-02-08 | End: 2021-02-10 | Stop reason: HOSPADM

## 2021-02-08 RX ORDER — ACETAMINOPHEN 325 MG/1
650 TABLET ORAL EVERY 6 HOURS PRN
Status: DISCONTINUED | OUTPATIENT
Start: 2021-02-08 | End: 2021-02-10 | Stop reason: HOSPADM

## 2021-02-08 RX ORDER — PAROXETINE 10 MG/1
10 TABLET, FILM COATED ORAL 2 TIMES DAILY
Status: DISCONTINUED | OUTPATIENT
Start: 2021-02-08 | End: 2021-02-10 | Stop reason: HOSPADM

## 2021-02-08 RX ORDER — 0.9 % SODIUM CHLORIDE 0.9 %
500 INTRAVENOUS SOLUTION INTRAVENOUS ONCE
Status: COMPLETED | OUTPATIENT
Start: 2021-02-08 | End: 2021-02-08

## 2021-02-08 RX ORDER — POLYETHYLENE GLYCOL 3350 17 G/17G
17 POWDER, FOR SOLUTION ORAL DAILY PRN
Status: DISCONTINUED | OUTPATIENT
Start: 2021-02-08 | End: 2021-02-10 | Stop reason: HOSPADM

## 2021-02-08 RX ORDER — ACETAMINOPHEN 650 MG/1
650 SUPPOSITORY RECTAL EVERY 6 HOURS PRN
Status: DISCONTINUED | OUTPATIENT
Start: 2021-02-08 | End: 2021-02-10 | Stop reason: HOSPADM

## 2021-02-08 RX ORDER — SODIUM CHLORIDE 0.9 % (FLUSH) 0.9 %
10 SYRINGE (ML) INJECTION PRN
Status: DISCONTINUED | OUTPATIENT
Start: 2021-02-08 | End: 2021-02-10 | Stop reason: HOSPADM

## 2021-02-08 RX ORDER — SODIUM CHLORIDE 9 MG/ML
INJECTION, SOLUTION INTRAVENOUS CONTINUOUS
Status: DISCONTINUED | OUTPATIENT
Start: 2021-02-08 | End: 2021-02-10 | Stop reason: HOSPADM

## 2021-02-08 RX ORDER — FAMOTIDINE 20 MG/1
10 TABLET, FILM COATED ORAL 2 TIMES DAILY
Status: DISCONTINUED | OUTPATIENT
Start: 2021-02-08 | End: 2021-02-10 | Stop reason: HOSPADM

## 2021-02-08 RX ORDER — SODIUM BICARBONATE 650 MG/1
650 TABLET ORAL 2 TIMES DAILY
Status: DISCONTINUED | OUTPATIENT
Start: 2021-02-08 | End: 2021-02-10 | Stop reason: HOSPADM

## 2021-02-08 RX ORDER — SENNA PLUS 8.6 MG/1
1 TABLET ORAL DAILY PRN
Status: DISCONTINUED | OUTPATIENT
Start: 2021-02-08 | End: 2021-02-10 | Stop reason: HOSPADM

## 2021-02-08 RX ORDER — PROMETHAZINE HYDROCHLORIDE 25 MG/1
12.5 TABLET ORAL EVERY 6 HOURS PRN
Status: DISCONTINUED | OUTPATIENT
Start: 2021-02-08 | End: 2021-02-10 | Stop reason: HOSPADM

## 2021-02-08 RX ORDER — DOCUSATE SODIUM 50 MG/5ML
150 LIQUID ORAL 2 TIMES DAILY
Status: DISCONTINUED | OUTPATIENT
Start: 2021-02-08 | End: 2021-02-10 | Stop reason: HOSPADM

## 2021-02-08 RX ADMIN — SODIUM CHLORIDE 500 ML: 9 INJECTION, SOLUTION INTRAVENOUS at 17:03

## 2021-02-08 RX ADMIN — VALPROIC ACID 1000 MG: 250 CAPSULE, LIQUID FILLED ORAL at 20:56

## 2021-02-08 RX ADMIN — LORAZEPAM 2 MG: 1 TABLET ORAL at 20:55

## 2021-02-08 RX ADMIN — SODIUM CHLORIDE: 9 INJECTION, SOLUTION INTRAVENOUS at 20:29

## 2021-02-08 RX ADMIN — FOLIC ACID 1 MG: 1 TABLET ORAL at 20:54

## 2021-02-08 RX ADMIN — Medication 10 ML: at 20:56

## 2021-02-08 RX ADMIN — SODIUM BICARBONATE 650 MG: 650 TABLET ORAL at 20:56

## 2021-02-08 RX ADMIN — METOPROLOL TARTRATE 75 MG: 50 TABLET, FILM COATED ORAL at 20:55

## 2021-02-08 RX ADMIN — FAMOTIDINE 10 MG: 20 TABLET ORAL at 20:54

## 2021-02-08 RX ADMIN — AMLODIPINE BESYLATE 5 MG: 5 TABLET ORAL at 20:53

## 2021-02-08 RX ADMIN — PAROXETINE 10 MG: 10 TABLET, FILM COATED ORAL at 20:55

## 2021-02-08 RX ADMIN — DOCUSATE SODIUM 150 MG: 50 LIQUID ORAL at 20:54

## 2021-02-08 RX ADMIN — OLANZAPINE 25 MG: 10 TABLET, ORALLY DISINTEGRATING ORAL at 20:55

## 2021-02-08 RX ADMIN — ENOXAPARIN SODIUM 40 MG: 40 INJECTION SUBCUTANEOUS at 20:54

## 2021-02-08 RX ADMIN — MONTELUKAST SODIUM 10 MG: 10 TABLET, FILM COATED ORAL at 20:55

## 2021-02-08 ASSESSMENT — PAIN SCALES - WONG BAKER: WONGBAKER_NUMERICALRESPONSE: 0

## 2021-02-08 NOTE — ED PROVIDER NOTES
Ernie Mata is a 78 y.o. female with a PMHx significant for MR, hypothyroidism, blindness, who presents for evaluation of fatigue and weakness, beginning one week ago. The complaint has been persistent, moderate in severity, and worsened by nothing. The patient is not saying anything. Care-giver at bedside helping to provide history. Notes that the patient is always under supervision, but usually is able to help the staff. Over the last week there has been a decline in the patients activity and consciousness. Patient has been sleeping a lot more over the last day or two and has not been able to get up or help the staff out. The history is provided by the patient and medical records. Review of Systems   Unable to perform ROS: Acuity of condition        Physical Exam  Vitals signs and nursing note reviewed. Constitutional:       General: She is not in acute distress. Appearance: She is well-developed. She is not ill-appearing. HENT:      Head: Normocephalic and atraumatic. Right Ear: External ear normal.      Left Ear: External ear normal.   Eyes:      Comments: Eyes closed  Hx of being blind     Neck:      Musculoskeletal: Normal range of motion and neck supple. Cardiovascular:      Rate and Rhythm: Normal rate and regular rhythm. Heart sounds: Normal heart sounds. No murmur. Pulmonary:      Effort: Pulmonary effort is normal. No respiratory distress. Breath sounds: Normal breath sounds. No stridor. No wheezing. Abdominal:      General: There is no distension. Palpations: Abdomen is soft. There is no mass. Tenderness: There is no abdominal tenderness. Hernia: No hernia is present. Musculoskeletal:         General: No swelling or tenderness. Skin:     General: Skin is warm and dry. Coloration: Skin is not jaundiced or pale. Neurological:      Mental Status: She is lethargic. GCS: GCS verbal subscore is 3. GCS motor subscore is 4. Comments: Confusion  Lethargic            Procedures     [facility]     ED Course as of Feb 08 1658   Mon Feb 08, 2021 1656 EKG: This EKG is signed and interpreted by me. Rate: 90  Rhythm: Sinus  Interpretation: non-specific EKG depressions, left ventricular hypertrophy, left atrial enlargement, , QRS of 84, QTc 440  Comparison: No significant changes when compared to prior EKG of 09/29/2020      [BB]      ED Course User Index  [BB] Wade Velez, [id number] [facility] presents to the ED for evaluation of fatigue and altered mental status. Patient with history of MR and blindness coming from [facility] [place] home. Workup in the ED revealed labs within / near normal limits, however a patient that is lethargic, but responds to pain. TSH was elevated therefore free T4 was added. Case was discussed with Dr. Tigre Mcknight, who will admit the patient. Patient requires continued workup and management of their symptoms and will be admitted to the hospital for further evaluation and treatment.      --------------------------------------------- PAST HISTORY ---------------------------------------------  Past Medical History:  has a past medical history of Acute kidney injury ([facility] [place] [address] Blind in both eyes, Hemodialysis patient ([facility] [place] [address] Hyperthyroidism, MR (mental retardation), Osteoarthritis, and Pneumonia. Past Surgical History:  has a past surgical history that includes other surgical history (Right, 01/17/2017); other surgical history (01/25/2017); chest tube insertion (Right, 02/09/2017); and bronchoscopy (02/10/2017). Social History:  reports that she has never smoked. She has never used smokeless tobacco. She reports that she does not drink alcohol or use drugs. Family History: family history is not on file. The patients home medications have been reviewed. Allergies: Patient has no known allergies.     -------------------------------------------------- RESULTS -------------------------------------------------    LABS:  Results for orders placed or performed during the hospital encounter of 02/08/21   Lactate, Sepsis   Result Value Ref Range    Lactic Acid, Sepsis 1.1 0.5 - 1.9 mmol/L   CBC Auto Differential   Result Value Ref Range    WBC 12.1 (H) 4.5 - 11.5 E9/L    RBC 4.16 3.50 - 5.50 E12/L    Hemoglobin 11.4 (L) 11.5 - 15.5 g/dL    Hematocrit 39.1 34.0 - 48.0 %    MCV 94.0 80.0 - 99.9 fL    MCH 27.4 26.0 - 35.0 pg    MCHC 29.2 (L) 32.0 - 34.5 %    RDW 25.1 (H) 11.5 - 15.0 fL    Platelets 195 544 - 712 E9/L    MPV 10.7 7.0 - 12.0 fL    Neutrophils % 69.0 43.0 - 80.0 %    Lymphocytes % 9.0 (L) 20.0 - 42.0 %    Monocytes % 17.0 (H) 2.0 - 12.0 %    Eosinophils % 2.0 0.0 - 6.0 %    Basophils % 0.0 0.0 - 2.0 %    Neutrophils Absolute 8.47 (H) 1.80 - 7.30 E9/L    Lymphocytes Absolute 1.33 (L) 1.50 - 4.00 E9/L    Monocytes Absolute 2.06 (H) 0.10 - 0.95 E9/L    Eosinophils Absolute 0.24 0.05 - 0.50 E9/L    Basophils Absolute 0.00 0.00 - 0.20 E9/L    Atypical Lymphocytes Relative 2.0 0.0 - 4.0 %    Metamyelocytes Relative 1.0 0.0 - 1.0 %    Vacuolated Neutrophils 1+     Anisocytosis 3+     Polychromasia 1+     Poikilocytes 1+     Schistocytes 1+     Target Cells 1+    Brain Natriuretic Peptide   Result Value Ref Range    Pro- (H) 0 - 125 pg/mL   Urinalysis, reflex to microscopic   Result Value Ref Range    Color, UA Yellow Straw/Yellow    Clarity, UA Clear Clear    Glucose, Ur Negative Negative mg/dL    Bilirubin Urine MODERATE (A) Negative    Ketones, Urine TRACE (A) Negative mg/dL    Specific Gravity, UA >=1.030 1.005 - 1.030    Blood, Urine Negative Negative    pH, UA 6.0 5.0 - 9.0    Protein, UA TRACE Negative mg/dL    Urobilinogen, Urine 4.0 (A) <2.0 E.U./dL    Nitrite, Urine Negative Negative    Leukocyte Esterase, Urine Negative Negative   TSH WITHOUT REFLEX   Result Value Ref Range    TSH 12.170 (H) 0.270 - 4.200 uIU/mL   COVID-19   Result Value Ref Range SARS-CoV-2, NAAT Not Detected Not Detected   Ammonia   Result Value Ref Range    Ammonia 17.1 11.0 - 51.0 umol/L   SPECIMEN REJECTION   Result Value Ref Range    Rejected Test LAC, TROP.  CMPX     Reason for Rejection see below    Comprehensive Metabolic Panel w/ Reflex to MG   Result Value Ref Range    Sodium 137 132 - 146 mmol/L    Potassium reflex Magnesium 4.8 3.5 - 5.0 mmol/L    Chloride 103 98 - 107 mmol/L    CO2 26 22 - 29 mmol/L    Anion Gap 8 7 - 16 mmol/L    Glucose 134 (H) 74 - 99 mg/dL    BUN 15 8 - 23 mg/dL    CREATININE 0.8 0.5 - 1.0 mg/dL    GFR Non-African American >60 >=60 mL/min/1.73    GFR African American >60     Calcium 8.9 8.6 - 10.2 mg/dL    Total Protein 7.9 6.4 - 8.3 g/dL    Albumin 2.2 (L) 3.5 - 5.2 g/dL    Total Bilirubin 0.3 0.0 - 1.2 mg/dL    Alkaline Phosphatase 66 35 - 104 U/L    ALT 9 0 - 32 U/L    AST 27 0 - 31 U/L   Troponin   Result Value Ref Range    Troponin <0.01 0.00 - 0.03 ng/mL   Arterial Blood Gas, Respiratory Only   Result Value Ref Range    Source: Arterial     FIO2 Arterial 21.0     pH, Blood Gas 7.436 7.350 - 7.450    pCO2, Arterial 39.3 35.0 - 45.0 mmHg    pO2, Arterial 75.2 60.0 - 80.0 mmHg    HCO3, Arterial 26.5 (H) 22.0 - 26.0 mmol/L    B.E. 2.1 (H) -3.0 - 0.0 mmol/L    O2 Sat 95.4 92.0 - 98.5 %          DEVICE 15,065,521,400,820    Microscopic Urinalysis   Result Value Ref Range    Mucus, UA Present (A) None Seen /LPF    WBC, UA 0-1 0 - 5 /HPF    RBC, UA 0-1 0 - 2 /HPF    Bacteria, UA FEW (A) None Seen /HPF   T4, Free   Result Value Ref Range    T4 Free 1.07 0.93 - 1.70 ng/dL   Comprehensive Metabolic Panel w/ Reflex to MG   Result Value Ref Range    Sodium 136 132 - 146 mmol/L    Potassium reflex Magnesium 4.8 3.5 - 5.0 mmol/L    Chloride 101 98 - 107 mmol/L    CO2 25 22 - 29 mmol/L    Anion Gap 10 7 - 16 mmol/L    Glucose 150 (H) 74 - 99 mg/dL    BUN 12 8 - 23 mg/dL    CREATININE 0.7 0.5 - 1.0 mg/dL    GFR Non-African American >60 >=60 mL/min/1.73 GFR African American >60     Calcium 9.0 8.6 - 10.2 mg/dL    Total Protein 7.8 6.4 - 8.3 g/dL    Albumin 2.2 (L) 3.5 - 5.2 g/dL    Total Bilirubin 0.2 0.0 - 1.2 mg/dL    Alkaline Phosphatase 66 35 - 104 U/L    ALT 9 0 - 32 U/L    AST 28 0 - 31 U/L   CBC auto differential   Result Value Ref Range    WBC 14.5 (H) 4.5 - 11.5 E9/L    RBC 4.01 3.50 - 5.50 E12/L    Hemoglobin 10.9 (L) 11.5 - 15.5 g/dL    Hematocrit 36.6 34.0 - 48.0 %    MCV 91.3 80.0 - 99.9 fL    MCH 27.2 26.0 - 35.0 pg    MCHC 29.8 (L) 32.0 - 34.5 %    RDW 24.2 (H) 11.5 - 15.0 fL    Platelets 302 (L) 623 - 450 E9/L    MPV NOT CALC 7.0 - 12.0 fL    Neutrophils % 78.7 43.0 - 80.0 %    Immature Granulocytes % 1.0 0.0 - 5.0 %    Lymphocytes % 7.2 (L) 20.0 - 42.0 %    Monocytes % 12.9 (H) 2.0 - 12.0 %    Eosinophils % 0.1 0.0 - 6.0 %    Basophils % 0.1 0.0 - 2.0 %    Neutrophils Absolute 11.39 (H) 1.80 - 7.30 E9/L    Immature Granulocytes # 0.14 E9/L    Lymphocytes Absolute 1.04 (L) 1.50 - 4.00 E9/L    Monocytes Absolute 1.86 (H) 0.10 - 0.95 E9/L    Eosinophils Absolute 0.01 (L) 0.05 - 0.50 E9/L    Basophils Absolute 0.01 0.00 - 0.20 E9/L    Anisocytosis 2+     Polychromasia 1+     Poikilocytes 1+     Target Cells 1+    POCT Glucose   Result Value Ref Range    Meter Glucose 126 (H) 74 - 99 mg/dL   EKG 12 Lead   Result Value Ref Range    Ventricular Rate 90 BPM    Atrial Rate 90 BPM    P-R Interval 140 ms    QRS Duration 84 ms    Q-T Interval 360 ms    QTc Calculation (Bazett) 440 ms    P Axis 37 degrees    R Axis -9 degrees    T Axis -9 degrees       RADIOLOGY:  CT Head WO Contrast   Final Result   1. There is no acute intracranial abnormality. Specifically, there is no   intracranial hemorrhage. 2. Atrophy and periventricular leukomalacia,      XR CHEST PORTABLE   Final Result   No active pulmonary process.           ------------------------- NURSING NOTES AND VITALS REVIEWED ---------------------------  Date / Time Roomed:  2/8/2021 12:59 PM  ED Bed Assignment:  [id number]    The nursing notes within the ED encounter and vital signs as below have been reviewed. Patient Vitals for the past 24 hrs:   BP Temp Temp src Pulse Resp SpO2 Height Weight   02/09/21 0500 -- -- -- -- -- -- -- 146 lb 4.8 oz (66.4 kg)   02/08/21 2045 132/78 97.8 °F (36.6 °C) Axillary 90 16 96 % -- --   02/08/21 1930 (!) 144/97 98.6 °F (37 °C) Axillary 108 16 95 % 5' 1\" (1.549 m) 143 lb 9.6 oz (65.1 kg)   02/08/21 1843 129/79 97.8 °F (36.6 °C) Axillary 84 16 94 % -- --   02/08/21 1255 137/71 97 °F (36.1 °C) Temporal 92 14 93 % 5' 1\" (1.549 m) 130 lb (59 kg)       Oxygen Saturation Interpretation: Normal    ------------------------------------------ PROGRESS NOTES ------------------------------------------  Counseling:  I have spoken with the patient and discussed todays results, in addition to providing specific details for the plan of care and counseling regarding the diagnosis and prognosis. Their questions are answered at this time and they are agreeable with the plan of admission.    --------------------------------- ADDITIONAL PROVIDER NOTES ---------------------------------  Consultations:  Spoke with Dr. Jasmin Valdovinos. Discussed case. They will admit the patient. This patient's ED course included: a personal history and physicial examination, re-evaluation prior to disposition, multiple bedside re-evaluations, cardiac monitoring, continuous pulse oximetry and complex medical decision making and emergency management    This patient has remained hemodynamically stable during their ED course. Diagnosis:  1. Altered mental status, unspecified altered mental status type        Disposition:  Patient's disposition: Admit to telemetry  Patient's condition is stable.              Sean Menon DO  Resident  02/09/21 7905

## 2021-02-08 NOTE — ED NOTES
Report faxed to [facility]. Spoke to [employer].   Pt ready for transport to bed 901 Levi Nieves, RN  02/08/21 0422

## 2021-02-08 NOTE — ED NOTES
Bed:  [place] [address]  Expected date:   Expected time:   Means of arrival:   Comments:  Feliciano Braun RN  02/08/21 7230

## 2021-02-08 NOTE — H&P
Internal Medicine History & Physical     Name: Roseanna Orosco  : 1955  Chief Complaint: Altered Mental Status (not eating or walking x1 week pt is mentally challanged but normall walks talks and eats but has not been doing any of these x 1 wkl)  Primary Care Physician: Tanya Layton MD  Admission date: 2021  Date of service: 2021     History of Present Illness  Azam Ely is a 78y.o. year old female. She resides at a group home. She has mental retardation. She is blind. There is a caregiver at bedside and the caregiver helps provide the history. The patient does not speak at baseline. She normally does walk at baseline and get up. Apparently the patient has not been eating or drinking much over the last 2 days. She has been very fatigued. She is a lot less active. History is provided by the caregiver at bedside as well as my discussion with the emergency department physician. Nothing in particular seems to make her symptoms better or worse. There is no other associated symptoms currently. ED course:   Initial blood work and imaging studies performed. Admission recommended by ED physician. Case discussed with ED provider. Meds in ED consisted of the following:  Medications   0.9 % sodium chloride bolus (500 mLs Intravenous New Bag 21 1703)       Past Medical History:   Diagnosis Date    Acute kidney injury ([address] 01/15/2017    d/t Vancomycin, on Dialysis [facility] right chest    Blind in both eyes     Hemodialysis patient ([facility] [address]     Hyperthyroidism     MR (mental retardation)     Osteoarthritis     Pneumonia 2017       Past Surgical History:   Procedure Laterality Date    BRONCHOSCOPY  02/10/2017    CHEST TUBE INSERTION Right 2017    OTHER SURGICAL HISTORY Right 2017    tessio insertion     OTHER SURGICAL HISTORY  2017    PEG tube insertion     [address]  No pertinent family medical history with regard to current issues.     Social History  Patient lives at a group home  Employment: None  Illicit drug use- denies  TOBACCO:   reports that she has never smoked. She has never used smokeless tobacco.  ETOH:   reports no history of alcohol use. Home Medications  Prior to Admission medications    Medication Sig Start Date End Date Taking? Authorizing Provider   polyethylene glycol (GLYCOLAX) 17 g packet Take 17 g by mouth daily as needed for Constipation    Historical Provider, MD   hydroCHLOROthiazide (HYDRODIURIL) 12.5 MG tablet Take 1 tablet by mouth daily for 7 days 9/29/20 10/6/20  Tadeo Ray MD   famotidine (PEPCID) 20 MG tablet TAKE 1 TABLET BY MOUTH TWICE A DAY 7/13/20   Junadi Velazquez MD   metoprolol tartrate (LOPRESSOR) 25 MG tablet TAKE 3 TABLETS BY MOUTH TWICE DAILY.  HOLD FOR BP <25 SYSTOLIC OR XA<59. 0/04/46   Junaid Velazquez MD   vitamin D3 (CHOLECALCIFEROL) 25 MCG (1000 UT) TABS tablet TAKE 1 TABLET BY MOUTH DAILY 7/13/20   Junaid Velazquez MD   SILACE 150 MG/15ML liquid TAKE 2 TEASPOONSFUL (10ML) BY MOUTH TWICE A DAY (PLUG/SYR) 6/29/20   Junaid Velazquez MD   clotrimazole (LOTRIMIN) 1 % cream APPLY TOPICALLY TO BOTH FEET TWICE DAILY 6/29/20   Junaid Velazquez MD   montelukast (SINGULAIR) 10 MG tablet TAKE ONE TABLET BY MOUTH AT BEDTIME. 3/20/20   Radha Braxton MD   PARoxetine (PAXIL) 10 MG tablet TAKE 1 TABLET BY MOUTH TWICE A DAY. 3/20/20   Junaid Velazquez MD   Starch, Thickening, (THICK-IT #2) POWD USE AS DIRECTED FOR NECTAR THICK LIQUIDS DUE TO ASPIRATION 3/5/20   Junaid Velazquez MD   folic acid (FOLVITE) 1 MG tablet ONE DAILY 2/19/20   MD Marian Hernandez, Thickening, (THICK-IT #2) POWD USE AS DIRECTED FOR NECTAR THICK LIQUIDS DUE TO ASPIRATION 12/10/19   Junaid Velazquez MD   docusate sodium (SILACE) 150 MG/15ML liquid TAKE 2 TEASPOONSFUL (10ML) BY MOUTH TWICE A DAY 11/22/19   Junaid Velazquez MD   amLODIPine (NORVASC) 5 MG tablet TAKE ONE TABLET BY MOUTH AT BEDTIME. 10/18/19 Mukul Odor, APRN - CNP   Cetylpyridinium Chloride (CREST PRO-HEALTH) 0.07 % LIQD USE AS MOUTH RINSE TWICE DAILY 10/18/19   Mukul Odor, APRN - CNP   Disposable Gloves (NITRILE GLOVES MEDIUM) MISC Use as directed daily 10/18/19   Mukul Odor, APRN - CNP   docusate sodium (DOCU) 150 MG/15ML liquid TAKE 2 TEASPOONSFUL (10ML) BY MOUTH TWICE A DAY (PLUG/SYR) 10/18/19   Mukul Odor, APRN - CNP   guaiFENesin-dextromethorphan (ROBITUSSIN DM) 100-10 MG/5ML syrup Take 2 teaspoons (10ml) by mouth every 4 hours as needed for cough and congestion. 10/18/19   Mukul Odor, APRN - CNP   levothyroxine (SYNTHROID) 137 MCG tablet ONE EVERY MORNING 10/18/19   Mukul Odor, APRN - CNP   loperamide (RA ANTI-DIARRHEAL) 2 MG capsule Take one capsule by mouth every 6 hours as needed for diarrhea 10/18/19   Mukul Odor, APRN - CNP   ranitidine (ZANTAC) 150 MG tablet TAKE 1 TABLET BY MOUTH TWICE A DAY. 10/18/19   Mukul Odor, APRN - CNP   Sodium Fluoride (CREST PRO-HEALTH COMPLETE) 0.0221 (0.01 F) % SOLN Use as mouth rinse twice daily 10/18/19   Mukul Odor, APRN - CNP   valproic acid (DEPAKENE) 250 MG capsule TAKE 4 CAPSULES (1000MG) BY MOUTH AT BEDTIME 10/18/19   Mukul Odor, APRN - CNP   Handicap Placard MISC by Does not apply route 3/8/19 - 3/8/24, Duration: 5 years 3/8/19   Liz Escobar MD   acetaminophen (AMINOFEN) 325 MG tablet Take 2 tablets by mouth every 4 hours as needed for Pain 9/18/18   Junaid Velazquez MD   LORazepam (ATIVAN) 2 MG tablet Take 2 mg by mouth. . 6/12/18   Historical MD Angie   OLANZapine zydis (ZYPREXA) 15 MG disintegrating tablet TAKE ONE TABLET BY MOUTH AT BEDTIME.   Patient taking differently: Take 25 mg by mouth nightly TAKE ONE TABLET BY MOUTH AT BEDTIME. 6/20/18   Junaid Velazquez MD   sodium bicarbonate 650 MG tablet Take 1 tablet by mouth 2 times daily 1/19/17   Junaid Velazquez MD       Allergies  No Known Allergies    Review of Systems  Unable to be obtained    Objective  VITALS:  /71   Pulse 92   Temp 97 °F (36.1 °C) (Temporal)   Resp 14   Ht 5' 1\" (1.549 m)   Wt 130 lb (59 kg)   SpO2 93%   BMI 24.56 kg/m²     Physical Exam:  General: Sleeping, difficult to awaken, appears comfortable, does not follow commands or talk  Eyes: EOM not able to be assessed  Ears: no obvious scars, no lesions, no masses, hearing intact  Mouth: mucous membranes dry, no obvious oral sores  Head: normocephalic, atraumatic  Neck: no JVD, no adenopathy, no thyromegaly, neck is supple, trachea is midline  Back: ROM not assessed, no CVA tenderness. Chest: no pain on palpation  Lungs: Diminished breath clear to auscultation bilaterally, without rhonchi, crackle, wheezing, or rale, no retractions or use of accessory muscles  Heart: regular rate and regular rhythm, no murmur, normal S1, S2  Abdomen: soft, non-tender; bowel sounds normal; no masses, no organomegaly  : Deferred   Extremities: no lower extremity edema, extremities atraumatic, no cyanosis, no clubbing, 2+ pedal pulses palpated  Skin: normal color, normal texture, normal turgor, no rashes, no lesions  Neurologic strength exam not performed, increased muscle tone throughout, face symmetric, tongue midline, no speech    Labs-   Lab Results   Component Value Date    WBC 12.1 (H) 02/08/2021    HGB 11.4 (L) 02/08/2021    HCT 39.1 02/08/2021     02/08/2021     02/08/2021    K 4.8 02/08/2021     02/08/2021    CREATININE 0.8 02/08/2021    BUN 15 02/08/2021    CO2 26 02/08/2021    GLUCOSE 134 (H) 02/08/2021    ALT 9 02/08/2021    AST 27 02/08/2021    INR 1.1 02/17/2017     Lab Results   Component Value Date    CKTOTAL 62 01/19/2017    TROPONINI <0.01 02/08/2021       Recent Radiological Studies:  CT Head WO Contrast   Final Result   1. There is no acute intracranial abnormality. Specifically, there is no   intracranial hemorrhage.    2. Atrophy and periventricular leukomalacia,      XR CHEST PORTABLE Final Result   No active pulmonary process. Assessment  Active Hospital Problems    Diagnosis    Altered mental state [R41.82]     Priority: High    Behavior disturbance [F91.9]    Macrocytosis [D75.89]    ESRD (end stage renal disease) on dialysis ([facility] [address] [N18.6, Z99.2]    Mental retardation [F79]    Hypothyroidism [E03.9]    Blindness of both eyes [H54.3]       Patient Active Problem List    Diagnosis Date Noted    Altered mental state 02/08/2021     Priority: High    Behavior disturbance 02/04/2019    Macrocytosis 08/27/2018    ESRD (end stage renal disease) on dialysis ([facility] [address]     Mental retardation 04/21/2016     Replacing deprecated diagnoses      Hypothyroidism 04/21/2016    Blindness of both eyes 04/21/2016       Plan  · AMS-likely related to dehydration: TSH 12-check free T4. Urinalysis benign. IVF hydration. CT head negative. Chest x-ray negative. · Mental retardation: From a group home. Palliative care consultation for CODE STATUS discussion. · Continue home medications  · PT/OT as able  · Follow labs  · DVT prophylaxis. · Please see orders for further management and care. ·  for discharge planning  · Discharge plan: TBD pending clinical improvement     Tenzin Rahman DO  2/8/2021  5:48 PM    I can be reached through [employer]. NOTE:  This report was transcribed using voice recognition software. Every effort was made to ensure accuracy; however, inadvertent computerized transcription errors may be present.

## 2021-02-09 VITALS
BODY MASS INDEX: 27.62 KG/M2 | DIASTOLIC BLOOD PRESSURE: 72 MMHG | OXYGEN SATURATION: 96 % | HEART RATE: 98 BPM | TEMPERATURE: 98.4 F | SYSTOLIC BLOOD PRESSURE: 140 MMHG | HEIGHT: 61 IN | WEIGHT: 146.3 LBS | RESPIRATION RATE: 16 BRPM

## 2021-02-09 LAB
ALBUMIN SERPL-MCNC: 2.2 G/DL (ref 3.5–5.2)
ALP BLD-CCNC: 66 U/L (ref 35–104)
ALT SERPL-CCNC: 9 U/L (ref 0–32)
ANION GAP SERPL CALCULATED.3IONS-SCNC: 10 MMOL/L (ref 7–16)
ANISOCYTOSIS: ABNORMAL
AST SERPL-CCNC: 28 U/L (ref 0–31)
BASOPHILS ABSOLUTE: 0.01 E9/L (ref 0–0.2)
BASOPHILS RELATIVE PERCENT: 0.1 % (ref 0–2)
BILIRUB SERPL-MCNC: 0.2 MG/DL (ref 0–1.2)
BUN BLDV-MCNC: 12 MG/DL (ref 8–23)
CALCIUM SERPL-MCNC: 9 MG/DL (ref 8.6–10.2)
CHLORIDE BLD-SCNC: 101 MMOL/L (ref 98–107)
CO2: 25 MMOL/L (ref 22–29)
CREAT SERPL-MCNC: 0.7 MG/DL (ref 0.5–1)
EKG ATRIAL RATE: 90 BPM
EKG P AXIS: 37 DEGREES
EKG P-R INTERVAL: 140 MS
EKG Q-T INTERVAL: 360 MS
EKG QRS DURATION: 84 MS
EKG QTC CALCULATION (BAZETT): 440 MS
EKG R AXIS: -9 DEGREES
EKG T AXIS: -9 DEGREES
EKG VENTRICULAR RATE: 90 BPM
EOSINOPHILS ABSOLUTE: 0.01 E9/L (ref 0.05–0.5)
EOSINOPHILS RELATIVE PERCENT: 0.1 % (ref 0–6)
GFR AFRICAN AMERICAN: >60
GFR NON-AFRICAN AMERICAN: >60 ML/MIN/1.73
GLUCOSE BLD-MCNC: 150 MG/DL (ref 74–99)
HCT VFR BLD CALC: 36.6 % (ref 34–48)
HEMOGLOBIN: 10.9 G/DL (ref 11.5–15.5)
IMMATURE GRANULOCYTES #: 0.14 E9/L
IMMATURE GRANULOCYTES %: 1 % (ref 0–5)
LYMPHOCYTES ABSOLUTE: 1.04 E9/L (ref 1.5–4)
LYMPHOCYTES RELATIVE PERCENT: 7.2 % (ref 20–42)
MCH RBC QN AUTO: 27.2 PG (ref 26–35)
MCHC RBC AUTO-ENTMCNC: 29.8 % (ref 32–34.5)
MCV RBC AUTO: 91.3 FL (ref 80–99.9)
METER GLUCOSE: 129 MG/DL (ref 74–99)
MONOCYTES ABSOLUTE: 1.86 E9/L (ref 0.1–0.95)
MONOCYTES RELATIVE PERCENT: 12.9 % (ref 2–12)
NEUTROPHILS ABSOLUTE: 11.39 E9/L (ref 1.8–7.3)
NEUTROPHILS RELATIVE PERCENT: 78.7 % (ref 43–80)
PDW BLD-RTO: 24.2 FL (ref 11.5–15)
PLATELET # BLD: 100 E9/L (ref 130–450)
PMV BLD AUTO: ABNORMAL FL (ref 7–12)
POIKILOCYTES: ABNORMAL
POLYCHROMASIA: ABNORMAL
POTASSIUM REFLEX MAGNESIUM: 4.8 MMOL/L (ref 3.5–5)
RBC # BLD: 4.01 E12/L (ref 3.5–5.5)
SODIUM BLD-SCNC: 136 MMOL/L (ref 132–146)
TARGET CELLS: ABNORMAL
TOTAL PROTEIN: 7.8 G/DL (ref 6.4–8.3)
WBC # BLD: 14.5 E9/L (ref 4.5–11.5)

## 2021-02-09 PROCEDURE — 82962 GLUCOSE BLOOD TEST: CPT

## 2021-02-09 PROCEDURE — 85025 COMPLETE CBC W/AUTO DIFF WBC: CPT

## 2021-02-09 PROCEDURE — 6370000000 HC RX 637 (ALT 250 FOR IP): Performed by: INTERNAL MEDICINE

## 2021-02-09 PROCEDURE — 36415 COLL VENOUS BLD VENIPUNCTURE: CPT

## 2021-02-09 PROCEDURE — 97161 PT EVAL LOW COMPLEX 20 MIN: CPT

## 2021-02-09 PROCEDURE — 6360000002 HC RX W HCPCS: Performed by: INTERNAL MEDICINE

## 2021-02-09 PROCEDURE — 80053 COMPREHEN METABOLIC PANEL: CPT

## 2021-02-09 PROCEDURE — 99221 1ST HOSP IP/OBS SF/LOW 40: CPT | Performed by: NURSE PRACTITIONER

## 2021-02-09 PROCEDURE — 2580000003 HC RX 258: Performed by: INTERNAL MEDICINE

## 2021-02-09 RX ADMIN — METOPROLOL TARTRATE 75 MG: 50 TABLET, FILM COATED ORAL at 10:07

## 2021-02-09 RX ADMIN — SODIUM BICARBONATE 650 MG: 650 TABLET ORAL at 10:07

## 2021-02-09 RX ADMIN — SODIUM CHLORIDE: 9 INJECTION, SOLUTION INTRAVENOUS at 06:00

## 2021-02-09 RX ADMIN — DOCUSATE SODIUM 150 MG: 50 LIQUID ORAL at 10:06

## 2021-02-09 RX ADMIN — ENOXAPARIN SODIUM 40 MG: 40 INJECTION SUBCUTANEOUS at 10:06

## 2021-02-09 RX ADMIN — FOLIC ACID 1 MG: 1 TABLET ORAL at 10:07

## 2021-02-09 RX ADMIN — FAMOTIDINE 10 MG: 20 TABLET ORAL at 10:07

## 2021-02-09 RX ADMIN — LEVOTHYROXINE SODIUM 137 MCG: 25 TABLET ORAL at 06:00

## 2021-02-09 RX ADMIN — PAROXETINE 10 MG: 10 TABLET, FILM COATED ORAL at 10:08

## 2021-02-09 ASSESSMENT — PAIN SCALES - PAIN ASSESSMENT IN ADVANCED DEMENTIA (PAINAD)
NEGVOCALIZATION: 0
BREATHING: 0
TOTALSCORE: 0
BODYLANGUAGE: 0
BODYLANGUAGE: 0
FACIALEXPRESSION: 0
FACIALEXPRESSION: 0
CONSOLABILITY: 0

## 2021-02-09 NOTE — PROGRESS NOTES
Physical Therapy    Facility/Department: [address] MED SURG/TELE  Initial Assessment    NAME: Geovanny Kimball  : 1955  MRN: [id number]    Date of Service: 2021      Attending Provider:  Levon Gore DO    Evaluating PT:  Verito Romero, P.T. Room #:  0466/8955-G  Diagnosis:  AMS, not eating or walking x 1 week  Pertinent PMHx/PSHx:  Mentally challenged, blind, does not speak  Precautions:  Falls, bed/chair alarm    SUBJECTIVE:    Pt lives at a group home per chart which also reports pt walks at baseline, but does not specify if she uses an AD or assist.     OBJECTIVE:   Initial Evaluation  Date: 21 Treatment Short Term/ Long Term   Goals   Was pt agreeable to Eval/treatment? yes     Does pt have pain? Grimaced in pain when I moved her RLE. Bed Mobility  Rolling: dependent  Supine to sit: dependent 1/4 way up  Sit to supine: dependent  Scooting: dependent  MIN A   Transfers Sit to stand: NA  Stand to sit: NA  Stand pivot: NA  MIN A   Ambulation   NA  100 feet with AAD if needed MIN A   Stair negotiation: ascended and descended NA  TBA   AM-PAC 6 Clicks        BLE ROM is WFL. BLE strength is grossly unable to formally assess due to pt not following directions. Balance: sitting is dependent   Endurance: poor    ASSESSMENT:    Comments:  Pt was found in bed asleep and lethargic. She did not wake up with verbal or tactile stimuli including sternal rub. Attempted to sit pt up to EOB to see if that would help wake pt up. Pt did not open her eyes and resisted attempt to sit up by pushing back towards the bed after only getting 1/4 way up. Attempted to arouse pt and pt still lethargic and unable to wake up at this time. Pt was assisted back to bed and left supine in bed L 1/4 turn as found with call light left by patient. Chair/bed alarm: bed alarm was re-activated. Pt's/ family goals   1. Pt did not participate in goal setting.      Patient does not understand(s) diagnosis, prognosis, and plan of care. PLAN OF CARE:    Current Treatment Recommendations      [x] Strengthening     [] ROM   [x] Balance Training   [x] Endurance Training   [x] Transfer Training   [x] Gait Training   [] Stair Training   [] Positioning   [x] Safety and Education Training   [x] Patient/Caregiver Education   [] HEP  [] Other     PT care will be provided in accordance with the objectives noted above. Exercises and functional mobility practice will be used as well as appropriate assistive devices or modalities to obtain goals. Patient and family education will also be administered as needed. Frequency of treatments: 2-5x/week x 1-2 weeks. Time in  10:35  Time out  10:45    Evaluation Time includes thorough review of current medical information, gathering information on past medical history/social history and prior level of function, completion of standardized testing/informal observation of tasks, assessment of data and education on plan of care and goals. CPT codes:  [x] Low Complexity PT evaluation 08715  [] Moderate Complexity PT evaluation 11443  [] High Complexity PT evaluation 73084  [] PT Re-evaluation 97189  [] Gait training 60794 ** minutes  [] Manual therapy 37420 ** minutes  [] Therapeutic activities 81938 ** minutes  [] Therapeutic exercises 11314 ** minutes  [] Neuromuscular reeducation 70532 ** minutes     Josemanuel Jordan., P.T.   License Number: [id number]

## 2021-02-09 NOTE — PROGRESS NOTES
Nurse to nurse called to [address] [facility] at [facility].  She will set up  time for 5:30 pm.

## 2021-02-09 NOTE — DISCHARGE SUMMARY
Internal Medicine Discharge Summary    NAME: Randa Morrow :  1955  MRN:  [id number] Jonh Du MD    ADMITTED: 2021   DISCHARGED: 2021 10:45 PM    ADMITTING PHYSICIAN: Collette Mar, DO    PCP: Pablo Rowan MD    CONSULTANT(S):   IP CONSULT TO INTERNAL MEDICINE  IP CONSULT TO SOCIAL WORK  IP CONSULT TO PALLIATIVE CARE     ADMITTING DIAGNOSIS:   Altered mental state [R41.82]     Please see H&P for further details    DISCHARGE DIAGNOSES:   Active Hospital Problems    Diagnosis    Altered mental state [R41.82]     Priority: High    Behavior disturbance [F91.9]    Macrocytosis [D75.89]    ESRD (end stage renal disease) on dialysis ([address] [N18.6, Z99.2]    Mental retardation [F79]    Hypothyroidism [E03.9]    Blindness of both eyes [H54.3]       BRIEF HISTORY OF PRESENT ILLNESS: Randa Morrow is a 78 y.o. female patient of Pablo Rowan MD who  has a past medical history of Acute kidney injury ([address] Blind in both eyes, Hemodialysis patient ([address] Hyperthyroidism, MR (mental retardation), Osteoarthritis, and Pneumonia. who originally had concerns including Altered Mental Status (not eating or walking x1 week pt is mentally challanged but normall walks talks and eats but has not been doing any of these x 1 wkl). at presentation on 2021, and was found to have Altered mental state [R41.82] after workup. Please see H&P for further details. HOSPITAL COURSE:   The patient presented to the hospital with the chief complaint of Altered Mental Status (not eating or walking x1 week pt is mentally challanged but normall walks talks and eats but has not been doing any of these x 1 wkl). The patient was admitted to the hospital.     · AMS-likely related to dehydration, resolved: TSH elevated--WNL free T4.  Urinalysis benign.  IVF hydration.  CT head negative.  Chest x-ray negative.    · Mental retardation: From a group home.  Palliative care consultation for CODE STATUS discussion. · DC'ed back to group home    BRIEF PHYSICAL EXAMINATION AND LABORATORIES ON DAY OF DISCHARGE:  VITALS:  BP (!) 140/72   Pulse 98   Temp 98.4 °F (36.9 °C) (Axillary)   Resp 16   Ht 5' 1\" (1.549 m)   Wt 146 lb 4.8 oz (66.4 kg)   SpO2 96%   BMI 27.64 kg/m²     Please see note from the same day. LABS[de-identified]  No results for input(s): NA, K, CL, CO2, BUN, CREATININE, GLUCOSE, CALCIUM in the last 72 hours. No results for input(s): ALKPHOS, ALT, AST, PROT, BILITOT, BILIDIR, LABALBU in the last 72 hours. No results for input(s): WBC, RBC, HGB, HCT, MCV, MCH, MCHC, RDW, PLT, MPV in the last 72 hours. Lab Results   Component Value Date    LABA1C 5.3 09/26/2016    LABA1C 5.2 09/22/2016     Lab Results   Component Value Date    INR 1.1 02/17/2017    INR 1.1 02/10/2017    INR 1.1 02/09/2017    PROTIME 11.6 02/17/2017    PROTIME 11.4 02/10/2017    PROTIME 11.9 02/09/2017      Lab Results   Component Value Date    TSH 12.170 (H) 02/08/2021    TSH 2.470 05/09/2019    TSH 1.660 08/21/2018     Lab Results   Component Value Date    TRIG 70 11/12/2020    TRIG 86 05/09/2019    TRIG 92 04/08/2019    HDL 46 11/12/2020    HDL 65 05/09/2019    HDL 62 04/08/2019    LDLCALC 59 11/12/2020    LDLCALC 106 (H) 05/09/2019    LDLCALC 92 04/08/2019     No results for input(s): MG in the last 72 hours. No results for input(s): CKTOTAL, CKMB, TROPONINI in the last 72 hours. No results for input(s): LACTA in the last 72 hours. IMAGING:  Ct Head Wo Contrast    Result Date: 2/8/2021  EXAMINATION: CT OF THE HEAD WITHOUT CONTRAST  2/8/2021 2:04 pm TECHNIQUE: CT of the head was performed without the administration of intravenous contrast. Dose modulation, iterative reconstruction, and/or weight based adjustment of the mA/kV was utilized to reduce the radiation dose to as low as reasonably achievable.  COMPARISON: 03/30/2007 HISTORY: ORDERING SYSTEM PROVIDED HISTORY: altered TECHNOLOGIST PROVIDED HISTORY: Reason for exam:->altered Has a \"code stroke\" or \"stroke alert\" been called? ->No Decision Support Exception->Emergency Medical Condition (MA) FINDINGS: BRAIN/VENTRICLES: There is no acute intracranial hemorrhage, mass effect or midline shift. No abnormal extra-axial fluid collection. The gray-white differentiation is maintained without evidence of an acute infarct. There is no evidence of hydrocephalus. The ventricles, cisterns and sulci are prominent consistent with atrophy. There is decreased attenuation within the periventricular white matter consistent with periventricular leukomalacia. ORBITS: There are chronic changes of the orbits. SINUSES: The visualized paranasal sinuses and mastoid air cells demonstrate no acute abnormality. SOFT TISSUES/SKULL:  No acute abnormality of the visualized skull or soft tissues. 1. There is no acute intracranial abnormality. Specifically, there is no intracranial hemorrhage. 2. Atrophy and periventricular leukomalacia,    Xr Chest Portable    Result Date: 2/8/2021  EXAMINATION: ONE XRAY VIEW OF THE CHEST 2/8/2021 2:05 pm COMPARISON: September 29, 2020 HISTORY: ORDERING SYSTEM PROVIDED HISTORY: altered TECHNOLOGIST PROVIDED HISTORY: Reason for exam:->altered FINDINGS: The lungs are clear. Right hemidiaphragm is elevated. The heart is mildly enlarged. No pneumothorax. The costophrenic angles are clear. No active pulmonary process. MICROBIOLOGY:  BLOOD CX #1  No results for input(s): BC in the last 72 hours. BLOOD CX #2  No results for input(s): Malia Heymann in the last 72 hours. TIP CULTURE  No results for input(s): CXCATHTIP in the last 72 hours. CULTURE, RESPIRATORY   No results for input(s): CULTRESP in the last 72 hours. RESPIRATORY SMEAR  No results for input(s): RESPSMEAR in the last 72 hours. DISPOSITION:  The patient's condition is fair.    The patient is being discharged to group home    DISCHARGE MEDICATIONS:    [address], [address] Medication Instructions AKR:[id number]    Printed on:02/12/21 0318   Medication Information                      acetaminophen (AMINOFEN) 325 MG tablet  Take 2 tablets by mouth every 4 hours as needed for Pain             amLODIPine (NORVASC) 5 MG tablet  TAKE ONE TABLET BY MOUTH AT BEDTIME. Cetylpyridinium Chloride (CREST PRO-HEALTH) 0.07 % LIQD  USE AS MOUTH RINSE TWICE DAILY             clotrimazole (LOTRIMIN) 1 % cream  APPLY TOPICALLY TO BOTH FEET TWICE DAILY             Disposable Gloves (NITRILE GLOVES MEDIUM) MISC  Use as directed daily             docusate sodium (DOCU) 150 MG/15ML liquid  TAKE 2 TEASPOONSFUL (10ML) BY MOUTH TWICE A DAY (PLUG/SYR)             docusate sodium (SILACE) 150 MG/15ML liquid  TAKE 2 TEASPOONSFUL (10ML) BY MOUTH TWICE A DAY             famotidine (PEPCID) 20 MG tablet  TAKE 1 TABLET BY MOUTH TWICE A DAY             folic acid (FOLVITE) 1 MG tablet  ONE DAILY             guaiFENesin-dextromethorphan (ROBITUSSIN DM) 100-10 MG/5ML syrup  Take 2 teaspoons (10ml) by mouth every 4 hours as needed for cough and congestion. Handicap Placard MISC  by Does not apply route 3/8/19 - 3/8/24, Duration: 5 years             levothyroxine (SYNTHROID) 137 MCG tablet  ONE EVERY MORNING             loperamide (RA ANTI-DIARRHEAL) 2 MG capsule  Take one capsule by mouth every 6 hours as needed for diarrhea             LORazepam (ATIVAN) 2 MG tablet  Take 2 mg by mouth. .             metoprolol tartrate (LOPRESSOR) 25 MG tablet  TAKE 3 TABLETS BY MOUTH TWICE DAILY. HOLD FOR BP <75 SYSTOLIC OR BRITT<48.             montelukast (SINGULAIR) 10 MG tablet  TAKE ONE TABLET BY MOUTH AT BEDTIME. OLANZapine zydis (ZYPREXA) 15 MG disintegrating tablet  TAKE ONE TABLET BY MOUTH AT BEDTIME. PARoxetine (PAXIL) 10 MG tablet  TAKE 1 TABLET BY MOUTH TWICE A DAY.              polyethylene glycol (GLYCOLAX) 17 g packet  Take 17 g by mouth daily as needed for Constipation SILACE 150 MG/15ML liquid  TAKE 2 TEASPOONSFUL (10ML) BY MOUTH TWICE A DAY (PLUG/SYR)             sodium bicarbonate 650 MG tablet  Take 1 tablet by mouth 2 times daily             Sodium Fluoride (CREST PRO-HEALTH COMPLETE) 0.0221 (0.01 F) % SOLN  Use as mouth rinse twice daily             Starch, Thickening, (THICK-IT #2) POWD  USE AS DIRECTED FOR NECTAR THICK LIQUIDS DUE TO ASPIRATION             Starch, Thickening, (THICK-IT #2) POWD  USE AS DIRECTED FOR NECTAR THICK LIQUIDS DUE TO ASPIRATION             valproic acid (DEPAKENE) 250 MG capsule  TAKE 4 CAPSULES (1000MG) BY MOUTH AT BEDTIME             vitamin D3 (CHOLECALCIFEROL) 25 MCG (1000 UT) TABS tablet  TAKE 1 TABLET BY MOUTH DAILY                 Discharge Medication List as of 2/9/2021  2:55 PM        Discharge Medication List as of 2/9/2021  2:55 PM      STOP taking these medications       hydroCHLOROthiazide (HYDRODIURIL) 12.5 MG tablet Comments:   Reason for Stopping:         ranitidine (ZANTAC) 150 MG tablet Comments:   Reason for Stopping:             Discharge Medication List as of 2/9/2021  2:55 PM          INTERNAL MEDICINE FOLLOW UP/INSTRUCTIONS:  · Follow-up with primary care physician as directed in discharge paperwork. · Please review results of imaging studies with PCP. · Follow-up with consultants as directed by them. · If recurrence or worsening of symptoms go to the ED or call primary care physician. Preparing for this patient's discharge, including paperwork, orders, instructions, and meeting with patient did required 34 minutes.     Electronically signed by Karen Aldana DO on 2/12/2021 at 12:45 PM

## 2021-02-09 NOTE — CONSULTS
Palliative Care Department  Palliative Care Initial Consult  Provider: Sy GARCIA-CNP  [address] Day: 2  Date of Initial Consult: 2/8/21  Referring Provider: Dr. Marty Epps was consulted for assistance with: Code status Discussion    Chief Complaint: David Harper is a 78 y.o. female with chief complaint of AMS    HPI:   David Harper is a 78 y.o. female with significant past medical history of MR residing at a group home, who is also blind, with ESRD on HD, who was admitted on 2/8/2021 with worse AMS, not eating and not walking as was her baseline. She was managed for dehydration. ASSESSMENT/PLAN:     Pertinent Hospital Diagnoses:  Current medical issues leading to Palliative Medicine involvement include   Active Hospital Problems    Diagnosis Date Noted    Altered mental state [R41.82] 02/08/2021    Behavior disturbance [F91.9] 02/04/2019    Macrocytosis [D75.89] 08/27/2018    ESRD (end stage renal disease) on dialysis ([facility] [N18.6, Z99.2]     Mental retardation [F79] 04/21/2016    Hypothyroidism [E03.9] 04/21/2016    Blindness of both eyes [H54.3] 04/21/2016       Palliative Care Encounter / Counseling Regarding Goals of Care:  Please see detailed goals of care discussion as below.  At this time, David Harper, Does Not have capacity for medical decision-making. Capacity is time limited and situation/question specific.  During encounter her Legal Guardian will act surrogate medical decision-maker   Outcome of goals of care meeting: continue current management, already with plan for d/c back to group home today   Code status: Full Code   o A call was placed to the Legal Guardian, but went to voicemail. DNR-CCA status would likely be appropriate for Ms. Paiz but this will need to be addressed with legal guardian. This may be addressed in the outpatient setting by her PCP or other specialty providers.    Advanced Directives: Legal Guardian Established   Surrogate/Legal NOK:   Darren Solis [phone] Ext# [phone]    Will follow and continued to discuss goals of care pending clinical progression.  Will try and reach guardian again tomorrow if not d/c this evening. Referrals: none today    SUBJECTIVE:   Events/Discussions:  2/9/21:  Ms. Kacey Sanchez was seen at the bedside, resting comfortably in bed. She was unable to participate in the exam and was not in any distress. A call was placed to her LG, but only got voice mail. There is a plan for d/c in place for tonight. Code status needs addressed with LG and the process for change in code status will need to be followed as per guardian's policy and likely can be addressed in outpatient setting. If she does not d/c tonight another attempt will be made tomorrow to discuss code with the patient's guardian. Past Medical History:   Diagnosis Date    Acute kidney injury ([address] 01/15/2017    d/t Vancomycin, on Dialysis [facility] right chest    Blind in both eyes     Hemodialysis patient [facility])     Hyperthyroidism     MR (mental retardation)     Osteoarthritis     Pneumonia 01/06/2017       Past Surgical History:   Procedure Laterality Date    BRONCHOSCOPY  02/10/2017    CHEST TUBE INSERTION Right 02/09/2017    OTHER SURGICAL HISTORY Right 01/17/2017    tessio insertion     OTHER SURGICAL HISTORY  01/25/2017    PEG tube insertion       No family history on file. No Known Allergies    ROS: Pt.  Unable to participate d/t mental status    OBJECTIVE:   Prognosis: unknown    Physical Exam:  BP (!) 144/74   Pulse 100   Temp 98.6 °F (37 °C) (Axillary)   Resp 16   Ht 5' 1\" (1.549 m)   Wt 146 lb 4.8 oz (66.4 kg)   SpO2 96%   BMI 27.64 kg/m²     Gen:  in no acute distress  HEENT:  Normocephalic, conjunctiva pink, no drainage, mucosa moist  Neck:  Supple  Lungs:  CTA bilaterally, no audible rhonchi or wheezes noted  Heart: RRR, no murmur, rub, or gallop noted during exam  Abd:  Soft, non tender, non distended, BS+  M/S/Ext:  Moving all extremities, no edema, pulses present  Skin:  Warm and dry  Neuro:  PERRL, Alert, oriented x 3; following commands    Objective data reviewed: labs, images, records, medication use, vitals and chart    Time/Communication:  Greater than 50% of time spent, total 30 minutes in counseling and coordination of care at the bedside regarding goals of care. Luana GARCIA-SHARA  Palliative Medicine    Patient and the plan of care discussed with the other IDT members of Palliative Care Team, and with patient, as appropriate and available. Thank you for allowing Palliative Medicine to participate in the care of Bobby Pinetta. Note: This report was completed using computerize voiced recognition software. Every effort has been made to ensure accuracy; however, inadvertent computerized transcription errors may be present.

## 2021-02-09 NOTE — DISCHARGE INSTR - COC
Continuity of Care Form    Patient Name: Genie Torres   :  1955  MRN:  [id number]    Admit date:  2021  Discharge date:  2021    Code Status Order: Full Code   Advance Directives:   [facility] Documentation     Date/Time Healthcare Directive Type of Healthcare Directive Copy in [facility] [address] Agent's Name Healthcare Agent's Phone Number    21 [phone]  No, patient does not have an advance directive for healthcare treatment -- -- -- -- --          Admitting Physician:  Debbie Malagon DO  PCP: Magdiel Barnes MD    Discharging Nurse: Phylicia Pickard Unit/Room#: 5572/5984-X  Discharging Unit Phone Number: [phone]    Emergency Contact:   Extended Emergency Contact Information  Primary Emergency Contact: Chad Stephens [facility])  Home Phone: [phone] [phone]  Work Phone: [phone]  Mobile Phone: [phone]  Relation: Legal Guardian   needed?  No    Past Surgical History:  Past Surgical History:   Procedure Laterality Date    BRONCHOSCOPY  02/10/2017    CHEST TUBE INSERTION Right 2017    OTHER SURGICAL HISTORY Right 2017    tessio insertion     OTHER SURGICAL HISTORY  2017    PEG tube insertion       Immunization History:   Immunization History   Administered Date(s) Administered    Influenza Vaccine, unspecified formulation 09/10/2014    Influenza Virus Vaccine 10/21/2017    Influenza, High Dose (Fluzone 65 yrs and older) 10/18/2019    Influenza, Quadv, IM, PF (6 mo and older Fluzone, Flulaval, Fluarix, and 3 yrs and older Afluria) 2016, 2017, 10/03/2018    Pneumococcal Polysaccharide ([id number]) 10/18/2019       Active Problems:  Patient Active Problem List   Diagnosis Code    Mental retardation F79    Hypothyroidism E03.9    Blindness of both eyes H54.3    ESRD (end stage renal disease) on dialysis ([facility] N18.6, Z99.2    Macrocytosis D75.89    Behavior disturbance F91.9  Altered mental state R41.82       Isolation/Infection:   Isolation          No Isolation        Patient Infection Status     Infection Onset Added Last Indicated Last Indicated By Review Planned Expiration Resolved Resolved By    None active    Resolved    COVID-19 Rule Out 02/08/21 02/08/21 02/08/21 COVID-19 (Ordered)   02/08/21 Rule-Out Test Resulted          Nurse Assessment:  Last Vital Signs: BP (!) 144/74   Pulse 100   Temp 98.6 °F (37 °C) (Axillary)   Resp 16   Ht 5' 1\" (1.549 m)   Wt 146 lb 4.8 oz (66.4 kg)   SpO2 96%   BMI 27.64 kg/m²     Last documented pain score (0-10 scale):    Last Weight:   Wt Readings from Last 1 Encounters:   02/09/21 146 lb 4.8 oz (66.4 kg)     Mental Status:  alert    IV Access:  - None    Nursing Mobility/ADLs:  Walking   Dependent  Transfer  Dependent  Bathing  Dependent  Dressing  Dependent  Toileting  Dependent  Feeding  Dependent  Med Admin  Dependent  Med Delivery   prefers mixed with applesauce    Wound Care Documentation and Therapy:        Elimination:  Continence:   · Bowel: No  · Bladder: No  Urinary Catheter: None   Colostomy/Ileostomy/Ileal Conduit: No       Date of Last BM: ***    Intake/Output Summary (Last 24 hours) at 2/9/2021 1446  Last data filed at 2/9/2021 0821  Gross per 24 hour   Intake 984 ml   Output --   Net 984 ml     I/O last 3 completed shifts: In: 100 [P.O.:100]  Out: -     Safety Concerns: At Risk for Falls    Impairments/Disabilities:      Speech and Vision    Nutrition Therapy:  Current Nutrition Therapy:   - Oral Diet:  Dysphagia 2 mechanically altered    Routes of Feeding: Oral  Liquids: Nectar Thick Liquids  Daily Fluid Restriction: no  Last Modified Barium Swallow with Video (Video Swallowing Test): not done    Treatments at the Time of Hospital Discharge:   Respiratory Treatments: ***  Oxygen Therapy:  is not on home oxygen therapy.   Ventilator:    - No ventilator support    Rehab Therapies: Speech/Language Therapy  Weight

## 2021-02-09 NOTE — CARE COORDINATION
Social Work / Discharge Planning : Patient was admitted from [address] [place] group Home. Patient is MR and Blind. Hx states ESRD and on Hemo. However, both LG and patient medical coordinator could NOT find any information in regards to [facility] Dialysis. Charge RN updated. SW spoke to both [facility] [phone] Ext# [phone] and [facility] [address] [facility] [address]  [phone] ext [phone]. Patient plan to return back to [facility] per LG . They will provide transport. Patient admitted with altered mental status. Patient does have a hx of [facility] 5 years ago and this could be when she had hemo-dialysis. SW to follow. Electronically signed by AILYN Granados on 2/9/21 at 9:11 AM EST      Addendum : Patient group Home  [phone] called and cannot  patient. Patient dependent and will need ambulance. Physicians will transport at 9:00 pm Group HOme aware. Please send patient specialized shoes in room with her and also worker from Group home is coming to  her wheelchair. RN updated.  Electronically signed by AILYN Granados on 2/9/21 at 3:34 PM EST

## 2021-02-09 NOTE — PROGRESS NOTES
Nightly Tenzin Loboino, DO   1,000 mg at 02/08/21 2056    sodium chloride flush 0.9 % injection 10 mL  10 mL Intravenous 2 times per day Tenzin E Volino, DO   10 mL at 02/08/21 2056    sodium chloride flush 0.9 % injection 10 mL  10 mL Intravenous PRN Tenzin AHN Volino, DO        potassium chloride (KLOR-CON M) extended release tablet 40 mEq  40 mEq Oral PRN Tenzin AHN Volino, DO        Or    potassium bicarb-citric acid (EFFER-K) effervescent tablet 40 mEq  40 mEq Oral PRN Tenzin Loboino, DO        Or    potassium chloride 10 mEq/100 mL IVPB (Peripheral Line)  10 mEq Intravenous PRN Tenzin E Lashellino, DO        enoxaparin (LOVENOX) injection 40 mg  40 mg Subcutaneous Daily Tenzin E Volino, DO   40 mg at 02/09/21 1006    promethazine (PHENERGAN) tablet 12.5 mg  12.5 mg Oral Q6H PRN Tenzin Loboino, DO        Or    ondansetron (ZOFRAN) injection 4 mg  4 mg Intravenous Q6H PRN Tenzin Loboino, DO        senna (SENOKOT) tablet 8.6 mg  1 tablet Oral Daily PRN Tenzin Loboino, DO        famotidine (PEPCID) tablet 10 mg  10 mg Oral BID Tenzin Loboino, DO   10 mg at 02/09/21 1007    acetaminophen (TYLENOL) tablet 650 mg  650 mg Oral Q6H PRN Tenzin Loboino, DO        Or    acetaminophen (TYLENOL) suppository 650 mg  650 mg Rectal Q6H PRN Tenzin Loboino, DO        0.9 % sodium chloride infusion   Intravenous Continuous Tenzin Rahman, DO 75 mL/hr at 02/09/21 0821 Rate Verify at 02/09/21 0821       PRN Medications  polyethylene glycol, sodium chloride flush, potassium chloride **OR** potassium alternative oral replacement **OR** potassium chloride, promethazine **OR** ondansetron, senna, acetaminophen **OR** acetaminophen    Objective  Most Recent Recorded Vitals  BP (!) 144/74   Pulse 100   Temp 98.6 °F (37 °C) (Axillary)   Resp 16   Ht 5' 1\" (1.549 m)   Wt 146 lb 4.8 oz (66.4 kg)   SpO2 96%   BMI 27.64 kg/m²   I/O last 3 completed shifts: In: 100 [P.O.:100]  Out: -   I/O this shift:   In: 884 [I.V.:884]  Out: - Physical Exam:  General: Eyes were open, moaning  Eyes: conjunctivae/corneas clear, sclera non icteric  Skin: color/texture/turgor normal, no rashes or lesions  Lungs: CTAB, no retractions/use of accessory muscles, no vocal fremitus, no rhonchi, no crackle, no rales  Heart: regular rate, regular rhythm, no murmur  Abdomen: soft, NT; bowel sounds normal; no masses,  no organomegaly  Extremities: atraumatic, no cyanosis, no edema  Neurologic: cranial nerves 2-12 grossly intact, no slurred speech. Most Recent Labs  Lab Results   Component Value Date    WBC 14.5 (H) 02/09/2021    HGB 10.9 (L) 02/09/2021    HCT 36.6 02/09/2021     (L) 02/09/2021     02/09/2021    K 4.8 02/09/2021     02/09/2021    CREATININE 0.7 02/09/2021    BUN 12 02/09/2021    CO2 25 02/09/2021    GLUCOSE 150 (H) 02/09/2021    ALT 9 02/09/2021    AST 28 02/09/2021    INR 1.1 02/17/2017    TSH 12.170 (H) 02/08/2021    LABA1C 5.3 09/26/2016       CT Head WO Contrast   Final Result   1. There is no acute intracranial abnormality. Specifically, there is no   intracranial hemorrhage. 2. Atrophy and periventricular leukomalacia,      XR CHEST PORTABLE   Final Result   No active pulmonary process. Assessment   Active Hospital Problems    Diagnosis    Altered mental state [R41.82]     Priority: High    Behavior disturbance [F91.9]    Macrocytosis [D75.89]    ESRD (end stage renal disease) on dialysis ([facility] [address] [N18.6, Z99.2]    Mental retardation [F79]    Hypothyroidism [E03.9]    Blindness of both eyes [H54.3]         Plan  · AMS-likely related to dehydration, resolved: TSH elevated--WNL free T4. Urinalysis benign. IVF hydration. CT head negative. Chest x-ray negative. · Mental retardation: From a group home. Palliative care consultation for CODE STATUS discussion. · PT/OT as able  · Follow labs  · DVT prophylaxis. · Please see orders for further management and care.   ·  for discharge planning  · Discharge plan: Back to group home today    Electronically signed by Eliseo Phoenix DO on 2/9/2021 at 12:09 PM    I can be reached through [employer].

## 2021-02-10 LAB — URINE CULTURE, ROUTINE: NORMAL

## 2021-02-10 NOTE — PROGRESS NOTES
[facility]  the patient at 5:30 pm, time got pushed back to 9:00 pm. Called to check ETA, was told they will be here within the hour.

## 2021-02-13 LAB
BLOOD CULTURE, ROUTINE: NORMAL
CULTURE, BLOOD 2: NORMAL

## 2021-03-04 ENCOUNTER — HOSPITAL ENCOUNTER (INPATIENT)
Age: 66
LOS: 12 days | Discharge: NURSING, REHAB OR LONG-TERM CARE | DRG: 871 | End: 2021-03-16
Attending: EMERGENCY MEDICINE | Admitting: INTERNAL MEDICINE
Payer: MEDICARE

## 2021-03-04 ENCOUNTER — APPOINTMENT (OUTPATIENT)
Dept: CT IMAGING | Age: 66
DRG: 871 | End: 2021-03-04
Payer: MEDICARE

## 2021-03-04 ENCOUNTER — APPOINTMENT (OUTPATIENT)
Dept: GENERAL RADIOLOGY | Age: 66
DRG: 871 | End: 2021-03-04
Payer: MEDICARE

## 2021-03-04 DIAGNOSIS — N39.0 URINARY TRACT INFECTION WITHOUT HEMATURIA, SITE UNSPECIFIED: ICD-10-CM

## 2021-03-04 DIAGNOSIS — E86.0 DEHYDRATION: ICD-10-CM

## 2021-03-04 DIAGNOSIS — N17.9 AKI (ACUTE KIDNEY INJURY) (HCC): ICD-10-CM

## 2021-03-04 DIAGNOSIS — R41.82 ALTERED MENTAL STATUS, UNSPECIFIED ALTERED MENTAL STATUS TYPE: Primary | ICD-10-CM

## 2021-03-04 DIAGNOSIS — A41.9 SEPTICEMIA (HCC): ICD-10-CM

## 2021-03-04 LAB
ALBUMIN SERPL-MCNC: 1.4 G/DL (ref 3.5–5.2)
ALBUMIN SERPL-MCNC: 1.7 G/DL (ref 3.5–5.2)
ALP BLD-CCNC: 59 U/L (ref 35–104)
ALP BLD-CCNC: 92 U/L (ref 35–104)
ALT SERPL-CCNC: 23 U/L (ref 0–32)
ALT SERPL-CCNC: 9 U/L (ref 0–32)
ANION GAP SERPL CALCULATED.3IONS-SCNC: 12 MMOL/L (ref 7–16)
ANION GAP SERPL CALCULATED.3IONS-SCNC: 8 MMOL/L (ref 7–16)
ANISOCYTOSIS: ABNORMAL
ANISOCYTOSIS: ABNORMAL
APTT: 25.1 SEC (ref 24.5–35.1)
AST SERPL-CCNC: 38 U/L (ref 0–31)
AST SERPL-CCNC: 88 U/L (ref 0–31)
B.E.: -2.1 MMOL/L (ref -3–3)
BACTERIA: ABNORMAL /HPF
BASOPHILIC STIPPLING: ABNORMAL
BASOPHILS ABSOLUTE: 0 E9/L (ref 0–0.2)
BASOPHILS ABSOLUTE: 0 E9/L (ref 0–0.2)
BASOPHILS RELATIVE PERCENT: 0 % (ref 0–2)
BASOPHILS RELATIVE PERCENT: 0 % (ref 0–2)
BILIRUB SERPL-MCNC: 0.5 MG/DL (ref 0–1.2)
BILIRUB SERPL-MCNC: 0.6 MG/DL (ref 0–1.2)
BILIRUBIN URINE: ABNORMAL
BLOOD, URINE: NEGATIVE
BUN BLDV-MCNC: 39 MG/DL (ref 8–23)
BUN BLDV-MCNC: 46 MG/DL (ref 8–23)
CALCIUM SERPL-MCNC: 7.8 MG/DL (ref 8.6–10.2)
CALCIUM SERPL-MCNC: 8.5 MG/DL (ref 8.6–10.2)
CHLORIDE BLD-SCNC: 125 MMOL/L (ref 98–107)
CHLORIDE BLD-SCNC: 132 MMOL/L (ref 98–107)
CHLORIDE URINE RANDOM: 159 MMOL/L
CHLORIDE URINE RANDOM: <20 MMOL/L
CLARITY: ABNORMAL
CO2: 22 MMOL/L (ref 22–29)
CO2: 23 MMOL/L (ref 22–29)
COHB: 0.7 % (ref 0–1.5)
COLOR: ABNORMAL
CREAT SERPL-MCNC: 2.7 MG/DL (ref 0.5–1)
CREAT SERPL-MCNC: 4.1 MG/DL (ref 0.5–1)
CREATININE URINE: 113 MG/DL (ref 29–226)
CREATININE URINE: 428 MG/DL (ref 29–226)
CRITICAL: ABNORMAL
DATE ANALYZED: ABNORMAL
DATE OF COLLECTION: ABNORMAL
EKG ATRIAL RATE: 136 BPM
EKG P AXIS: 41 DEGREES
EKG P-R INTERVAL: 124 MS
EKG Q-T INTERVAL: 280 MS
EKG QRS DURATION: 72 MS
EKG QTC CALCULATION (BAZETT): 421 MS
EKG R AXIS: 12 DEGREES
EKG T AXIS: 15 DEGREES
EKG VENTRICULAR RATE: 136 BPM
EOSINOPHILS ABSOLUTE: 0 E9/L (ref 0.05–0.5)
EOSINOPHILS ABSOLUTE: 0 E9/L (ref 0.05–0.5)
EOSINOPHILS RELATIVE PERCENT: 0 % (ref 0–6)
EOSINOPHILS RELATIVE PERCENT: 0 % (ref 0–6)
GFR AFRICAN AMERICAN: 13
GFR AFRICAN AMERICAN: 21
GFR NON-AFRICAN AMERICAN: 11 ML/MIN/1.73
GFR NON-AFRICAN AMERICAN: 18 ML/MIN/1.73
GLUCOSE BLD-MCNC: 118 MG/DL (ref 74–99)
GLUCOSE BLD-MCNC: 232 MG/DL (ref 74–99)
GLUCOSE URINE: 100 MG/DL
HCO3: 23.7 MMOL/L (ref 22–26)
HCT VFR BLD CALC: 29.5 % (ref 34–48)
HCT VFR BLD CALC: 30.9 % (ref 34–48)
HEMOGLOBIN: 8.4 G/DL (ref 11.5–15.5)
HEMOGLOBIN: 8.7 G/DL (ref 11.5–15.5)
HHB: 5.3 % (ref 0–5)
HYPOCHROMIA: ABNORMAL
HYPOCHROMIA: ABNORMAL
INR BLD: 1.7
KETONES, URINE: 15 MG/DL
LAB: ABNORMAL
LACTIC ACID: 2.6 MMOL/L (ref 0.5–2.2)
LACTIC ACID: 5.6 MMOL/L (ref 0.5–2.2)
LEUKOCYTE ESTERASE, URINE: NEGATIVE
LYMPHOCYTES ABSOLUTE: 1.61 E9/L (ref 1.5–4)
LYMPHOCYTES ABSOLUTE: 2.62 E9/L (ref 1.5–4)
LYMPHOCYTES RELATIVE PERCENT: 15 % (ref 20–42)
LYMPHOCYTES RELATIVE PERCENT: 19 % (ref 20–42)
Lab: ABNORMAL
MAGNESIUM: 1.6 MG/DL (ref 1.6–2.6)
MCH RBC QN AUTO: 29.5 PG (ref 26–35)
MCH RBC QN AUTO: 29.9 PG (ref 26–35)
MCHC RBC AUTO-ENTMCNC: 28.2 % (ref 32–34.5)
MCHC RBC AUTO-ENTMCNC: 28.5 % (ref 32–34.5)
MCV RBC AUTO: 104.7 FL (ref 80–99.9)
MCV RBC AUTO: 105 FL (ref 80–99.9)
METAMYELOCYTES RELATIVE PERCENT: 4 % (ref 0–1)
METAMYELOCYTES RELATIVE PERCENT: 5 % (ref 0–1)
METER GLUCOSE: 105 MG/DL (ref 74–99)
METHB: 0.6 % (ref 0–1.5)
MODE: ABNORMAL
MONOCYTES ABSOLUTE: 0.75 E9/L (ref 0.1–0.95)
MONOCYTES ABSOLUTE: 0.83 E9/L (ref 0.1–0.95)
MONOCYTES RELATIVE PERCENT: 6 % (ref 2–12)
MONOCYTES RELATIVE PERCENT: 7 % (ref 2–12)
MYELOCYTE PERCENT: 1 % (ref 0–0)
NEUTROPHILS ABSOLUTE: 10.35 E9/L (ref 1.8–7.3)
NEUTROPHILS ABSOLUTE: 8.35 E9/L (ref 1.8–7.3)
NEUTROPHILS RELATIVE PERCENT: 70 % (ref 43–80)
NEUTROPHILS RELATIVE PERCENT: 73 % (ref 43–80)
NITRITE, URINE: POSITIVE
NUCLEATED RED BLOOD CELLS: 10 /100 WBC
NUCLEATED RED BLOOD CELLS: 15 /100 WBC
O2 CONTENT: 14.4 ML/DL
O2 SATURATION: 94.6 % (ref 92–98.5)
O2HB: 93.4 % (ref 94–97)
OPERATOR ID: 1926
PATIENT TEMP: 37 C
PCO2: 45 MMHG (ref 35–45)
PDW BLD-RTO: 28 FL (ref 11.5–15)
PDW BLD-RTO: 28.2 FL (ref 11.5–15)
PH BLOOD GAS: 7.34 (ref 7.35–7.45)
PH UA: 5 (ref 5–9)
PHOSPHORUS: 3.5 MG/DL (ref 2.5–4.5)
PLATELET # BLD: 101 E9/L (ref 130–450)
PLATELET # BLD: 128 E9/L (ref 130–450)
PMV BLD AUTO: 10.5 FL (ref 7–12)
PMV BLD AUTO: 11.8 FL (ref 7–12)
PO2: 78.9 MMHG (ref 75–100)
POIKILOCYTES: ABNORMAL
POIKILOCYTES: ABNORMAL
POLYCHROMASIA: ABNORMAL
POLYCHROMASIA: ABNORMAL
POTASSIUM SERPL-SCNC: 4.1 MMOL/L (ref 3.5–5)
POTASSIUM SERPL-SCNC: 4.27 MMOL/L (ref 3.5–5)
POTASSIUM SERPL-SCNC: 5.7 MMOL/L (ref 3.5–5)
POTASSIUM, UR: 35.1 MMOL/L
POTASSIUM, UR: 72.1 MMOL/L
PROCALCITONIN: 25.8 NG/ML (ref 0–0.08)
PROTEIN UA: 100 MG/DL
PROTHROMBIN TIME: 18.8 SEC (ref 9.3–12.4)
RBC # BLD: 2.81 E12/L (ref 3.5–5.5)
RBC # BLD: 2.95 E12/L (ref 3.5–5.5)
RBC UA: ABNORMAL /HPF (ref 0–2)
REASON FOR REJECTION: NORMAL
REJECTED TEST: NORMAL
SARS-COV-2, NAAT: NOT DETECTED
SODIUM BLD-SCNC: 156 MMOL/L (ref 132–146)
SODIUM BLD-SCNC: 166 MMOL/L (ref 132–146)
SODIUM URINE: 153 MMOL/L
SODIUM URINE: 24 MMOL/L
SOURCE, BLOOD GAS: ABNORMAL
SPECIFIC GRAVITY UA: >=1.03 (ref 1–1.03)
TARGET CELLS: ABNORMAL
TARGET CELLS: ABNORMAL
THB: 10.9 G/DL (ref 11.5–16.5)
TIME ANALYZED: 1023
TOTAL PROTEIN: 6.4 G/DL (ref 6.4–8.3)
TOTAL PROTEIN: 6.4 G/DL (ref 6.4–8.3)
TROPONIN: 0.1 NG/ML (ref 0–0.03)
TROPONIN: 0.19 NG/ML (ref 0–0.03)
UREA NITROGEN, UR: 359 MG/DL (ref 800–1666)
UROBILINOGEN, URINE: 4 E.U./DL
WBC # BLD: 10.7 E9/L (ref 4.5–11.5)
WBC # BLD: 13.8 E9/L (ref 4.5–11.5)
WBC UA: ABNORMAL /HPF (ref 0–5)

## 2021-03-04 PROCEDURE — 82570 ASSAY OF URINE CREATININE: CPT

## 2021-03-04 PROCEDURE — 71045 X-RAY EXAM CHEST 1 VIEW: CPT

## 2021-03-04 PROCEDURE — 6370000000 HC RX 637 (ALT 250 FOR IP): Performed by: EMERGENCY MEDICINE

## 2021-03-04 PROCEDURE — 6360000002 HC RX W HCPCS: Performed by: EMERGENCY MEDICINE

## 2021-03-04 PROCEDURE — 85025 COMPLETE CBC W/AUTO DIFF WBC: CPT

## 2021-03-04 PROCEDURE — 96361 HYDRATE IV INFUSION ADD-ON: CPT

## 2021-03-04 PROCEDURE — 82805 BLOOD GASES W/O2 SATURATION: CPT

## 2021-03-04 PROCEDURE — 87635 SARS-COV-2 COVID-19 AMP PRB: CPT

## 2021-03-04 PROCEDURE — 36620 INSERTION CATHETER ARTERY: CPT

## 2021-03-04 PROCEDURE — 70450 CT HEAD/BRAIN W/O DYE: CPT

## 2021-03-04 PROCEDURE — 80053 COMPREHEN METABOLIC PANEL: CPT

## 2021-03-04 PROCEDURE — 84540 ASSAY OF URINE/UREA-N: CPT

## 2021-03-04 PROCEDURE — 93005 ELECTROCARDIOGRAM TRACING: CPT | Performed by: EMERGENCY MEDICINE

## 2021-03-04 PROCEDURE — 82436 ASSAY OF URINE CHLORIDE: CPT

## 2021-03-04 PROCEDURE — 87088 URINE BACTERIA CULTURE: CPT

## 2021-03-04 PROCEDURE — 84100 ASSAY OF PHOSPHORUS: CPT

## 2021-03-04 PROCEDURE — 84145 PROCALCITONIN (PCT): CPT

## 2021-03-04 PROCEDURE — 2580000003 HC RX 258: Performed by: EMERGENCY MEDICINE

## 2021-03-04 PROCEDURE — 84132 ASSAY OF SERUM POTASSIUM: CPT

## 2021-03-04 PROCEDURE — 87040 BLOOD CULTURE FOR BACTERIA: CPT

## 2021-03-04 PROCEDURE — 93010 ELECTROCARDIOGRAM REPORT: CPT | Performed by: INTERNAL MEDICINE

## 2021-03-04 PROCEDURE — 83605 ASSAY OF LACTIC ACID: CPT

## 2021-03-04 PROCEDURE — 02HV33Z INSERTION OF INFUSION DEVICE INTO SUPERIOR VENA CAVA, PERCUTANEOUS APPROACH: ICD-10-PCS | Performed by: INTERNAL MEDICINE

## 2021-03-04 PROCEDURE — 84133 ASSAY OF URINE POTASSIUM: CPT

## 2021-03-04 PROCEDURE — 51702 INSERT TEMP BLADDER CATH: CPT

## 2021-03-04 PROCEDURE — 99285 EMERGENCY DEPT VISIT HI MDM: CPT

## 2021-03-04 PROCEDURE — 2500000003 HC RX 250 WO HCPCS: Performed by: EMERGENCY MEDICINE

## 2021-03-04 PROCEDURE — 2580000003 HC RX 258: Performed by: INTERNAL MEDICINE

## 2021-03-04 PROCEDURE — 74176 CT ABD & PELVIS W/O CONTRAST: CPT

## 2021-03-04 PROCEDURE — 6360000002 HC RX W HCPCS: Performed by: INTERNAL MEDICINE

## 2021-03-04 PROCEDURE — 96374 THER/PROPH/DIAG INJ IV PUSH: CPT

## 2021-03-04 PROCEDURE — 2000000000 HC ICU R&B

## 2021-03-04 PROCEDURE — 81001 URINALYSIS AUTO W/SCOPE: CPT

## 2021-03-04 PROCEDURE — 85730 THROMBOPLASTIN TIME PARTIAL: CPT

## 2021-03-04 PROCEDURE — 85610 PROTHROMBIN TIME: CPT

## 2021-03-04 PROCEDURE — 83735 ASSAY OF MAGNESIUM: CPT

## 2021-03-04 PROCEDURE — 84484 ASSAY OF TROPONIN QUANT: CPT

## 2021-03-04 PROCEDURE — 84300 ASSAY OF URINE SODIUM: CPT

## 2021-03-04 PROCEDURE — 36415 COLL VENOUS BLD VENIPUNCTURE: CPT

## 2021-03-04 RX ORDER — ACETAMINOPHEN 650 MG/1
650 SUPPOSITORY RECTAL ONCE
Status: COMPLETED | OUTPATIENT
Start: 2021-03-04 | End: 2021-03-04

## 2021-03-04 RX ORDER — ONDANSETRON 2 MG/ML
4 INJECTION INTRAMUSCULAR; INTRAVENOUS EVERY 6 HOURS PRN
Status: DISCONTINUED | OUTPATIENT
Start: 2021-03-04 | End: 2021-03-16 | Stop reason: HOSPADM

## 2021-03-04 RX ORDER — PAROXETINE 10 MG/1
10 TABLET, FILM COATED ORAL 2 TIMES DAILY
Status: ON HOLD | COMMUNITY
End: 2021-03-16 | Stop reason: SDUPTHER

## 2021-03-04 RX ORDER — POLYETHYLENE GLYCOL 3350 17 G/17G
17 POWDER, FOR SOLUTION ORAL DAILY PRN
Status: DISCONTINUED | OUTPATIENT
Start: 2021-03-04 | End: 2021-03-16 | Stop reason: HOSPADM

## 2021-03-04 RX ORDER — ACETAMINOPHEN 325 MG/1
650 TABLET ORAL EVERY 6 HOURS PRN
Status: DISCONTINUED | OUTPATIENT
Start: 2021-03-04 | End: 2021-03-16 | Stop reason: HOSPADM

## 2021-03-04 RX ORDER — LEVOTHYROXINE SODIUM 0.15 MG/1
150 TABLET ORAL DAILY
Status: ON HOLD | COMMUNITY
End: 2021-03-16 | Stop reason: SDUPTHER

## 2021-03-04 RX ORDER — OLANZAPINE 5 MG/1
5 TABLET, ORALLY DISINTEGRATING ORAL 2 TIMES DAILY
Status: ON HOLD | COMMUNITY
End: 2021-03-16 | Stop reason: SDUPTHER

## 2021-03-04 RX ORDER — 0.9 % SODIUM CHLORIDE 0.9 %
2000 INTRAVENOUS SOLUTION INTRAVENOUS ONCE
Status: COMPLETED | OUTPATIENT
Start: 2021-03-04 | End: 2021-03-04

## 2021-03-04 RX ORDER — SODIUM CHLORIDE 0.9 % (FLUSH) 0.9 %
10 SYRINGE (ML) INJECTION EVERY 12 HOURS SCHEDULED
Status: DISCONTINUED | OUTPATIENT
Start: 2021-03-04 | End: 2021-03-16 | Stop reason: HOSPADM

## 2021-03-04 RX ORDER — FOLIC ACID 1 MG/1
1 TABLET ORAL DAILY
Status: ON HOLD | COMMUNITY
End: 2021-03-16 | Stop reason: SDUPTHER

## 2021-03-04 RX ORDER — DEXTROSE AND SODIUM CHLORIDE 5; .45 G/100ML; G/100ML
INJECTION, SOLUTION INTRAVENOUS CONTINUOUS
Status: DISCONTINUED | OUTPATIENT
Start: 2021-03-04 | End: 2021-03-07

## 2021-03-04 RX ORDER — SODIUM CHLORIDE, SODIUM LACTATE, POTASSIUM CHLORIDE, CALCIUM CHLORIDE 600; 310; 30; 20 MG/100ML; MG/100ML; MG/100ML; MG/100ML
INJECTION, SOLUTION INTRAVENOUS CONTINUOUS
Status: DISCONTINUED | OUTPATIENT
Start: 2021-03-04 | End: 2021-03-04

## 2021-03-04 RX ORDER — FAMOTIDINE 20 MG/1
20 TABLET, FILM COATED ORAL 2 TIMES DAILY
Status: ON HOLD | COMMUNITY
End: 2021-03-16 | Stop reason: SDUPTHER

## 2021-03-04 RX ORDER — HEPARIN SODIUM 10000 [USP'U]/ML
5000 INJECTION, SOLUTION INTRAVENOUS; SUBCUTANEOUS EVERY 8 HOURS SCHEDULED
Status: DISCONTINUED | OUTPATIENT
Start: 2021-03-04 | End: 2021-03-16 | Stop reason: HOSPADM

## 2021-03-04 RX ORDER — AMLODIPINE BESYLATE 5 MG/1
5 TABLET ORAL NIGHTLY
Status: ON HOLD | COMMUNITY
End: 2021-03-16 | Stop reason: SDUPTHER

## 2021-03-04 RX ORDER — FERROUS SULFATE 325(65) MG
325 TABLET ORAL
Status: ON HOLD | COMMUNITY
End: 2021-03-16 | Stop reason: SDUPTHER

## 2021-03-04 RX ORDER — MONTELUKAST SODIUM 10 MG/1
10 TABLET ORAL NIGHTLY
Status: ON HOLD | COMMUNITY
End: 2021-03-16 | Stop reason: SDUPTHER

## 2021-03-04 RX ORDER — SODIUM CHLORIDE 0.9 % (FLUSH) 0.9 %
10 SYRINGE (ML) INJECTION PRN
Status: DISCONTINUED | OUTPATIENT
Start: 2021-03-04 | End: 2021-03-16 | Stop reason: HOSPADM

## 2021-03-04 RX ORDER — PROMETHAZINE HYDROCHLORIDE 25 MG/1
12.5 TABLET ORAL EVERY 6 HOURS PRN
Status: DISCONTINUED | OUTPATIENT
Start: 2021-03-04 | End: 2021-03-16 | Stop reason: HOSPADM

## 2021-03-04 RX ORDER — ACETAMINOPHEN 650 MG/1
650 SUPPOSITORY RECTAL EVERY 6 HOURS PRN
Status: DISCONTINUED | OUTPATIENT
Start: 2021-03-04 | End: 2021-03-16 | Stop reason: HOSPADM

## 2021-03-04 RX ORDER — SODIUM CHLORIDE 9 MG/ML
INJECTION, SOLUTION INTRAVENOUS CONTINUOUS
Status: DISCONTINUED | OUTPATIENT
Start: 2021-03-04 | End: 2021-03-04

## 2021-03-04 RX ORDER — VALPROIC ACID 250 MG/5ML
1000 SOLUTION ORAL 2 TIMES DAILY
Status: ON HOLD | COMMUNITY
End: 2021-03-16 | Stop reason: SDUPTHER

## 2021-03-04 RX ORDER — OLANZAPINE 15 MG/1
15 TABLET, ORALLY DISINTEGRATING ORAL 2 TIMES DAILY
Status: ON HOLD | COMMUNITY
End: 2021-03-16 | Stop reason: SDUPTHER

## 2021-03-04 RX ADMIN — CEFEPIME HYDROCHLORIDE 2000 MG: 2 INJECTION, POWDER, FOR SOLUTION INTRAVENOUS at 12:01

## 2021-03-04 RX ADMIN — CEFEPIME 1000 MG: 1 INJECTION, POWDER, FOR SOLUTION INTRAMUSCULAR; INTRAVENOUS at 23:50

## 2021-03-04 RX ADMIN — SODIUM CHLORIDE, POTASSIUM CHLORIDE, SODIUM LACTATE AND CALCIUM CHLORIDE: 600; 310; 30; 20 INJECTION, SOLUTION INTRAVENOUS at 11:20

## 2021-03-04 RX ADMIN — SODIUM CHLORIDE 2000 ML: 9 INJECTION, SOLUTION INTRAVENOUS at 10:50

## 2021-03-04 RX ADMIN — Medication 10 ML: at 21:21

## 2021-03-04 RX ADMIN — ACETAMINOPHEN 650 MG: 650 SUPPOSITORY RECTAL at 11:13

## 2021-03-04 RX ADMIN — VASOPRESSIN 0.03 UNITS/MIN: 20 INJECTION INTRAVENOUS at 13:55

## 2021-03-04 RX ADMIN — HEPARIN SODIUM 5000 UNITS: 10000 INJECTION INTRAVENOUS; SUBCUTANEOUS at 21:30

## 2021-03-04 RX ADMIN — VASOPRESSIN 0.03 UNITS/MIN: 20 INJECTION INTRAVENOUS at 23:15

## 2021-03-04 RX ADMIN — DEXTROSE AND SODIUM CHLORIDE: 5; 450 INJECTION, SOLUTION INTRAVENOUS at 23:56

## 2021-03-04 RX ADMIN — SODIUM CHLORIDE: 9 INJECTION, SOLUTION INTRAVENOUS at 10:58

## 2021-03-04 RX ADMIN — VANCOMYCIN HYDROCHLORIDE 1250 MG: 10 INJECTION, POWDER, LYOPHILIZED, FOR SOLUTION INTRAVENOUS at 14:11

## 2021-03-04 RX ADMIN — SODIUM CHLORIDE, POTASSIUM CHLORIDE, SODIUM LACTATE AND CALCIUM CHLORIDE: 600; 310; 30; 20 INJECTION, SOLUTION INTRAVENOUS at 20:20

## 2021-03-04 RX ADMIN — Medication 10 MCG/MIN: at 10:45

## 2021-03-04 ASSESSMENT — PAIN SCALES - PAIN ASSESSMENT IN ADVANCED DEMENTIA (PAINAD)
BREATHING: 0
BREATHING: 0
TOTALSCORE: 0
CONSOLABILITY: 0
FACIALEXPRESSION: 0

## 2021-03-04 ASSESSMENT — PAIN SCALES - WONG BAKER: WONGBAKER_NUMERICALRESPONSE: 0

## 2021-03-04 NOTE — ED PROVIDER NOTES
Department of Emergency Medicine   ED  Provider Note  Admit Date/RoomTime: 3/4/2021 10:14 AM  ED Room: 09/09          History of Present Illness:  3/4/21, Time: 12:34 PM EST  Chief Complaint   Patient presents with    Altered Mental Status     unresponsive                Misa Rios is a 78 y.o. female presenting to the ED for unresponsive. Patient presents from a group home. She has a history of mental retardation, she is legally blind, it is reported that she is bedbound at baseline. They found her unresponsive this morning, around 7 AM, they waited 3 hours that she did not respond, and then they sent her in for evaluation. Came on suddenly, nothing makes it better or worse, there is no reported associated pain. Currently, the patient is unable to provide any other history due to her baseline mental status and current condition. Review of Systems:   Pertinent positives and negatives are stated within HPI, all other systems reviewed and are negative.        --------------------------------------------- PAST HISTORY ---------------------------------------------  Past Medical History:  has a past medical history of Acute kidney injury ([facility] [address] Blind in both eyes, Hemodialysis patient ([facility] [address] Hyperthyroidism, MR (mental retardation), Osteoarthritis, and Pneumonia. Past Surgical History:  has a past surgical history that includes other surgical history (Right, 01/17/2017); other surgical history (01/25/2017); chest tube insertion (Right, 02/09/2017); and bronchoscopy (02/10/2017). Social History:  reports that she has never smoked. She has never used smokeless tobacco. She reports that she does not drink alcohol or use drugs. Family History: family history is not on file. . Unless otherwise noted, family history is non contributory    The patients home medications have been reviewed. Allergies: Patient has no known allergies.         ---------------------------------------------------PHYSICAL EXAM--------------------------------------    Constitutional/General: Unresponsive   Head: Normocephalic and atraumatic  Eyes: PERRL, EOMI, sclera non icteric  Mouth: Oropharynx clear, handling secretions, no trismus, no asymmetry of the posterior oropharynx or uvular edema  Neck: Supple, full ROM, no stridor, no meningeal signs  Respiratory: Mildly coarse diffusely. Not in respiratory distress  Cardiovascular:  Regular tachycardia. Regular rhythm. 2+ distal pulses. Equal extremity pulses. Chest: No chest wall tenderness  GI:  Abdomen Soft, Non tender, Non distended. No rebound, guarding, or rigidity. No pulsatile masses. Musculoskeletal: Moves all extremities x 2. Contracted lower extremities  Integument: skin warm and dry. No rashes. Neurologic: Responds to painful stimuli   Psychiatric: Normal Affect          -------------------------------------------------- RESULTS -------------------------------------------------  I have personally reviewed all laboratory and imaging results for this patient. Results are listed below.      LABS: (Lab results interpreted by me)  Results for orders placed or performed during the hospital encounter of 03/04/21   COVID-19, Rapid    Specimen: Nasopharyngeal Swab   Result Value Ref Range    SARS-CoV-2, NAAT Not Detected Not Detected   Blood Gas, Arterial   Result Value Ref Range    Date Analyzed 20210304     Time Analyzed 1023     Source: Blood Arterial     pH, Blood Gas 7.340 (L) 7.350 - 7.450    PCO2 45.0 35.0 - 45.0 mmHg    PO2 78.9 75.0 - 100.0 mmHg    HCO3 23.7 22.0 - 26.0 mmol/L    B.E. -2.1 -3.0 - 3.0 mmol/L    O2 Sat 94.6 92.0 - 98.5 %    O2Hb 93.4 (L) 94.0 - 97.0 %    COHb 0.7 0.0 - 1.5 %    MetHb 0.6 0.0 - 1.5 %    O2 Content 14.4 mL/dL    HHb 5.3 (H) 0.0 - 5.0 %    tHb (est) 10.9 (L) 11.5 - 16.5 g/dL    Potassium 4.27 3.50 - 5.00 mmol/L    Mode NRB 15L     Date Of Collection      Time Collected      Pt Temp 37.0 C     ID 1926     Lab 85190     Critical(s) Notified .  No Critical Values    Comprehensive Metabolic Panel   Result Value Ref Range    Sodium 166 (HH) 132 - 146 mmol/L    Potassium 5.7 (H) 3.5 - 5.0 mmol/L    Chloride 132 (HH) 98 - 107 mmol/L    CO2 22 22 - 29 mmol/L    Anion Gap 12 7 - 16 mmol/L    Glucose 118 (H) 74 - 99 mg/dL    BUN 46 (H) 8 - 23 mg/dL    CREATININE 4.1 (H) 0.5 - 1.0 mg/dL    GFR Non-African American 11 >=60 mL/min/1.73    GFR African American 13     Calcium 8.5 (L) 8.6 - 10.2 mg/dL    Total Protein 6.4 6.4 - 8.3 g/dL    Albumin 1.4 (L) 3.5 - 5.2 g/dL    Total Bilirubin 0.5 0.0 - 1.2 mg/dL    Alkaline Phosphatase 59 35 - 104 U/L    ALT 9 0 - 32 U/L    AST 38 (H) 0 - 31 U/L   Troponin   Result Value Ref Range    Troponin 0.19 (H) 0.00 - 0.03 ng/mL   Protime-INR   Result Value Ref Range    Protime 18.8 (H) 9.3 - 12.4 sec    INR 1.7    APTT   Result Value Ref Range    aPTT 25.1 24.5 - 35.1 sec   Urinalysis   Result Value Ref Range    Color, UA DARK YELLOW (A) Straw/Yellow    Clarity, UA SL CLOUDY Clear    Glucose, Ur 100 (A) Negative mg/dL    Bilirubin Urine MODERATE (A) Negative    Ketones, Urine 15 (A) Negative mg/dL    Specific Gravity, UA >=1.030 1.005 - 1.030    Blood, Urine Negative Negative    pH, UA 5.0 5.0 - 9.0    Protein,  (A) Negative mg/dL    Urobilinogen, Urine 4.0 (A) <2.0 E.U./dL    Nitrite, Urine POSITIVE (A) Negative    Leukocyte Esterase, Urine Negative Negative   Lactic Acid, Plasma   Result Value Ref Range    Lactic Acid 5.6 (HH) 0.5 - 2.2 mmol/L   CBC Auto Differential   Result Value Ref Range    WBC 10.7 4.5 - 11.5 E9/L    RBC 2.95 (L) 3.50 - 5.50 E12/L    Hemoglobin 8.7 (L) 11.5 - 15.5 g/dL    Hematocrit 30.9 (L) 34.0 - 48.0 %    .7 (H) 80.0 - 99.9 fL    MCH 29.5 26.0 - 35.0 pg    MCHC 28.2 (L) 32.0 - 34.5 %    RDW 28.2 (H) 11.5 - 15.0 fL    Platelets 101 (L) 187 - 450 E9/L    MPV 11.8 7.0 - 12.0 fL    Neutrophils % 73.0 43.0 - 80.0 %    Lymphocytes % 15.0 (L) 20.0 - 42.0 %    Monocytes % 7.0 2.0 - 12.0 % Eosinophils % 0.0 0.0 - 6.0 %    Basophils % 0.0 0.0 - 2.0 %    Neutrophils Absolute 8.35 (H) 1.80 - 7.30 E9/L    Lymphocytes Absolute 1.61 1.50 - 4.00 E9/L    Monocytes Absolute 0.75 0.10 - 0.95 E9/L    Eosinophils Absolute 0.00 (L) 0.05 - 0.50 E9/L    Basophils Absolute 0.00 0.00 - 0.20 E9/L    Metamyelocytes Relative 5.0 (H) 0.0 - 1.0 %    nRBC 15.0 /100 WBC    Anisocytosis 2+     Polychromasia 1+     Hypochromia 1+     Poikilocytes 1+     Target Cells 1+     Basophilic Stippling 1+    SPECIMEN REJECTION   Result Value Ref Range    Rejected Test CBCWD     Reason for Rejection see below    Microscopic Urinalysis   Result Value Ref Range    WBC, UA NONE 0 - 5 /HPF    RBC, UA NONE 0 - 2 /HPF    Bacteria, UA MANY (A) None Seen /HPF   POCT Glucose   Result Value Ref Range    Meter Glucose 105 (H) 74 - 99 mg/dL   EKG 12 Lead   Result Value Ref Range    Ventricular Rate 136 BPM    Atrial Rate 136 BPM    P-R Interval 124 ms    QRS Duration 72 ms    Q-T Interval 280 ms    QTc Calculation (Bazett) 421 ms    P Axis 41 degrees    R Axis 12 degrees    T Axis 15 degrees   ,       RADIOLOGY:  Interpreted by Radiologist unless otherwise specified  CT ABDOMEN PELVIS WO CONTRAST Additional Contrast? None   Final Result   1. Bilateral pleural effusions of mild-to-moderate degree causing   compression atelectasis in the lower lobes posteriorly. 2.  Pattern of constipation. 3.  No indication for acute the inflammatory process in the mid mesentery fat   planes, free intraperitoneal air or ascites. 4.  No obstructive uropathy. 5.  No dilatation of the biliary tree. 6.  Cannot entirely exclude small gallstone in the gallbladder lumen,   artifacts are present. Further evaluation with gallbladder ultrasound is   recommended. CT HEAD WO CONTRAST   Final Result   No acute intracranial process is identified. XR CHEST PORTABLE   Final Result   No acute cardiopulmonary process is identified. EKG Interpretation  Interpreted by emergency department physician, Dr. Alice Reardon, rate 136, no STEMI        ------------------------- NURSING NOTES AND VITALS REVIEWED ---------------------------   The nursing notes within the ED encounter and vital signs as below have been reviewed by myself  BP (!) 120/56   Pulse 111   Temp 99.1 °F (37.3 °C)   Resp 16   Ht 5' 1\" (1.549 m)   Wt 146 lb (66.2 kg)   SpO2 100%   BMI 27.59 kg/m²     Oxygen Saturation Interpretation: Normal    The patients available past medical records and past encounters were reviewed. ------------------------------ ED COURSE/MEDICAL DECISION MAKING----------------------  Medications   norepinephrine (LEVOPHED) 16 mg in dextrose 5% 250 mL infusion (50 mcg/min Intravenous Rate/Dose Change 3/4/21 1338)   lactated ringers infusion ( Intravenous New Bag 3/4/21 1120)   cefepime (MAXIPIME) 2000 mg IVPB extended (mini-bag) (2,000 mg Intravenous New Bag 3/4/21 1201)   norepinephrine (LEVOPHED) 64 MCG/ML infusion SOLN (has no administration in time range)   vancomycin (VANCOCIN) 1,250 mg in dextrose 5 % 250 mL IVPB (1,250 mg Intravenous New Bag 3/4/21 1411)   vasopressin 20 Units in dextrose 5 % 100 mL infusion (0.03 Units/min Intravenous New Bag 3/4/21 1355)   acetaminophen (TYLENOL) suppository 650 mg (650 mg Rectal Given 3/4/21 1113)   0.9 % sodium chloride bolus (0 mLs Intravenous Stopped 3/4/21 1340)     PROCEDURE  3/4/21       Time: 1100 AM    CENTRAL LINE INSERTION  Risks, benefits and alternatives (for applicable procedures below) described. Performed By: Lucero Brar MD.    Indication: centrally administered medications. Informed consent: Unable to be obtained due to patient's condition. .  Procedure: After routine sterile preparation, a left 3-Lumen 7F Central Venous Catheter was placed by femoral vein approach and secured by standard fashion. Ultrasound Guidance:   used.   Number of Attempts: 1  Post-procedure Findings: A post procedural chest x-ray  was not indicated. Patient tolerated the procedure well. The cardiac monitor revealed sinus with a heart rate in the 100s as interpreted by me. The cardiac monitor was ordered secondary to the patient's sepsis and to monitor the patient for dysrhythmia. CPT [id number]         Medical Decision Making:    Patient was mostly normotensive on arrival.  Did receive IV fluids. Nursing unable obtain labs peripherally, FemoStop was obtained to obtain laboratory data. However, during her evaluation, patient did develop hypotension and longer responded to fluids. Central line was placed. She was also febrile, did receive Tylenol, blood cultures were obtained. Labs and imaging reviewed. Patient was started on antibiotics. Reevaluation, she was hypotensive once again despite maxed out Levophed, she was started on vasopressin, did respond. Discussed with the hospitalist along with the intensivist, patient will be admitted. Critical Care Time: 40 minutes         Counseling: The emergency provider has spoken with the patient and discussed todays results, in addition to providing specific details for the plan of care and counseling regarding the diagnosis and prognosis. Questions are answered at this time and they are agreeable with the plan.       --------------------------------- IMPRESSION AND DISPOSITION ---------------------------------    IMPRESSION  1. Altered mental status, unspecified altered mental status type    2. Dehydration    3. Septicemia ([facility] [address]    4. BEENA (acute kidney injury) ([facility]    5. Urinary tract infection without hematuria, site unspecified        DISPOSITION  Disposition: Admit to CCU/ICU  Patient condition is stable        NOTE: This report was transcribed using voice recognition software.  Every effort was made to ensure accuracy; however, inadvertent computerized transcription errors may be present       Fern Reyes MD  03/04/21 2110

## 2021-03-04 NOTE — LETTER
Beneficiary Notification Letter  BPCI Advanced     Your Doctor or [address],    We wanted to let you know that your health care provider, [address] [place] [address] Eduardo, has volunteered to take part in our [facility] for [facility] & Medicaid Services (CMS) Bundled Payments for [facility] (BPCI Advanced). This doesnt change your Medicare rights or benefits and you dont need to do anything. What are bundled payments? A bundled payment combines, or bundles together, payments that Medicare makes to your health care providers for the many different kinds of medical services you might get in a specific time period. In BPCI Advanced, this time period could include a hospital inpatient stay or outpatient procedure, plus 90 days. Why would Medicare bundle payments? Bundled payments are thought of as a value-based way to pay because health care providers are responsible for both the quality and cost of medical care they give. This is a relatively new way of paying health care providers compared to thefee-for-service way Medicare has traditionally paid, where providers are paid separately for each service they provide. Bundled payments encourage these providers to work together to provide better, more coordinated care during your hospital stay, or outpatient procedure, and through your recovery. What does BPCI Advance mean for me? Youre more likely to get even better care when hospitals, doctors, and other health care providers work together. In BPCI Advanced, hospitals, doctors, and other health care providers may be rewarded for providing better, more coordinated health care. Medicare will watch BPCI Advanced participants closely to make sure that you and other patients keep getting efficient, high quality care. What do I need to know about BPCI Advanced?   Whats most important for you to know is that your Medicare rights and benefits wont change because your health care provider is participating in [facility]. Medicare will keep covering all of your medically necessary services. Even though Medicare will pay your doctor in a different way under BPCI Advanced, how much you have to pay wont change. Health care providers and suppliers who are enrolled in Medicare will submit their Medicare claims like they always have. Youll have all the same Medicare rights and protections, including the right to choose which hospital, doctor, or other health care provider you see. If you dont want to get care from a health care provider whos participating in [facility], then youll have to choose a different health care provider whos not participating in the Model. How can I give feedback about my health care? Medicare might ask you to take a voluntary survey about the services and care you received from [address] [place] [address] during your hospital stay or outpatient procedure and for a specific period of time afterwards. You can decide whether you want to take the voluntary survey, but if you do, itll help Medicare make BPCI Advanced and the care of other Medicare patients better. If you have concerns or complaints about your care, you can:   · Talk to your doctor or health care provider. · Contact your Beneficiary and Family Centered Care Quality Improvement   Organization [employer]KA GONSALV[employer] [facility]). You can get your [employer]s phone number  at  Medicare.gov/contacts or by calling 1-800-MEDICARE. TTY users can call  2-750.737.1540. Where can I learn more about BPCI Advanced? Learn more about BPCI Advanced at https://innovation.cms.gov/initiatives/bpci-advanced/:  · A list of all the hospitals and physician group practices in the country participating in [address] [place] [address]. · All of the inpatient and outpatient Clinical Episodes that are currently included under BPCI Advanced.  A Clinical Episode is a grouping of medical conditions or diagnoses that are included in the [address].

## 2021-03-04 NOTE — ED NOTES
Bed: 09  Expected date:   Expected time:   Means of arrival:   Comments:   [id number] [facility], RN  03/04/21 3577

## 2021-03-04 NOTE — CONSULTS
Consult Note  NEPHROLOGY    Reason for Consult: Evaluation of acute kidney injury    Requesting Physician:  Zaire Leal MD    Chief Complaint:  AMS w/ BEENA    History Obtained From:  family member / electronic medical record    History of Present Ilness: This is a 78 y.o.  female with a H/O MR w/ blindness (previous episode of BEENA) who now presents to ED from local group home due to being unresponsive. They found her unresponsive this morning, around 7 AM, they waited 3 hours that she did not respond, and then they sent her in for evaluation    Vitals upon arrival included BP (!) 120/56   Pulse 111   Temp 99.1 °F (37.3 °C)   Resp 16   Ht 5' 1\" (1.549 m)   Wt 146 lb (66.2 kg)   SpO2 100%   BMI 27.59 kg/m². Pertinent labs include sodium 166, potassium 5.7, chloride 132, CO2 22, BUN 46 and creatinine 4.1. ABG show pH 7.340, PCO2 45, PO2 78.9, bicarb 23.7 and 94.6% O2 saturation. CBC includes WBC 10.7, hemoglobin 8.7, hematocrit 30.9, and platelet count 012. Initial UA: Dark yellow, slightly cloudy, moderate bili, >1.030, 100 protein, positive nitrites. CT scan shows bilateral pleural effusions of mild to moderate degree causing compression atelectasis in the lower lobes posteriorly. Also shows no obstructive uropathy. Patient was normotensive upon arrival.  Initially received IV fluids. She soon developed hypotension. Initially started on Levophed. Plans to admit to ICU. Currently examined in the ED setting: she is unresponsive to verbal stimuli. A complete ROS cannot be obtained. Family member is present who called the group home. Apparently pt has had very poor intake over the past several weeks; taking nothing by mouth past 48 hrs. Also no BM for \"several\" weeks. Of note, pt was at this facility in early Feb of this year w/ the same s/s. Also, the pt does not speak at baseline.          Past Medical History:        Diagnosis Date    Acute kidney injury ([facility] [address] 01/15/2017 Intake/Output Summary (Last 24 hours) at 3/4/2021 1436  Last data filed at 3/4/2021 1340  Gross per 24 hour   Intake 2000 ml   Output --   Net 2000 ml     Gen: Unresponsive  Skin: no rash, poor turgor  Heent:  eomi, mmm  Neck: no bruits or jvd noted  Cardiovascular:  S1, S2 without m/r/g  Respiratory: scattered rhonchi    Abdomen:  +bs, soft, nt, nd  Ext: no edema   Psychiatric: mood and affect appropriate  Musculoskeletal:  Rom, muscular strength intact    DATA:      CBC:   Lab Results   Component Value Date    WBC 10.7 03/04/2021    RBC 2.95 03/04/2021    HGB 8.7 03/04/2021    HCT 30.9 03/04/2021    .7 03/04/2021    MCH 29.5 03/04/2021    MCHC 28.2 03/04/2021    RDW 28.2 03/04/2021     03/04/2021    MPV 11.8 03/04/2021     BMP:    Lab Results   Component Value Date     03/04/2021    K 5.7 03/04/2021    K 4.8 02/09/2021     03/04/2021    CO2 22 03/04/2021    BUN 46 03/04/2021    LABALBU 1.4 03/04/2021    LABALBU 3.7 12/30/2011    CREATININE 4.1 03/04/2021    CALCIUM 8.5 03/04/2021    GFRAA 13 03/04/2021    LABGLOM 11 03/04/2021    GLUCOSE 118 03/04/2021    GLUCOSE 97 12/30/2011       RAD:  Ct Abdomen Pelvis Wo Contrast Additional Contrast? None    Result Date: 3/4/2021  EXAMINATION: CT OF THE ABDOMEN AND PELVIS WITHOUT CONTRAST 3/4/2021 12:28 pm TECHNIQUE: CT of the abdomen and pelvis was performed without the administration of intravenous contrast. Multiplanar reformatted images are provided for review. Dose modulation, iterative reconstruction, and/or weight based adjustment of the mA/kV was utilized to reduce the radiation dose to as low as reasonably achievable.  COMPARISON: March 6, 2017 HISTORY: ORDERING SYSTEM PROVIDED HISTORY: fever TECHNOLOGIST PROVIDED HISTORY: Reason for exam:->fever Additional Contrast?->None Decision Support Exception->Emergency Medical Condition (MA) What reading provider will be dictating this exam?->CRC FINDINGS: Presence of bilateral pleural effusions in mild-to-moderate degree causing compression atelectasis in the dependent portion of both lower lobes. The heart has normal size. The there is no pericardial effusion. There are preserved size and density for the liver. The gallbladder is normally distended. There is some increased density in the dependent portion of the gallbladder in part artifacts by adjacent peristalsis. Can further evaluate the gallbladder with gallbladder ultrasound. There is some atrophy of the pancreas. The spleen has unremarkable appearance. There is no dilatation of the biliary tree pancreatic ductal system. Adrenals are not enlarged. Aorta and IVC are of unremarkable appearance. The there is no midline retroperitoneal adenopathy. Patient has a left the femoral venous catheter with tip in the left the common iliac vein. The There are is streaking artifacts in the abdomen due monitoring devices and active peristalsis. There are no indication for free intraperitoneal air or acute inflammatory changes the mid mesentery fat planes. There is no stranding of the pericolonic fat planes. There is no signs for diverticulitis. Fair amount of fecal content is seen in the colon indicating pattern moderate constipation. The a component of fecal rectal retention is also observed. There remarkable appearance for the uterus and for the right ovary. The there is a 2 cm is stable fat containing cyst in the left ovary compatible with a the dermoid type of cyst with discrete the mey of calcification in the wall. There are normal size for the kidneys. There is no renal calculus. There is no obstruction. There is no midline retroperitoneal adenopathy. 1.  Bilateral pleural effusions of mild-to-moderate degree causing compression atelectasis in the lower lobes posteriorly. 2.  Pattern of constipation. 3.  No indication for acute the inflammatory process in the mid mesentery fat planes, free intraperitoneal air or ascites.  4.  No obstructive uropathy. 5.  No dilatation of the biliary tree. 6.  Cannot entirely exclude small gallstone in the gallbladder lumen, artifacts are present. Further evaluation with gallbladder ultrasound is recommended. Ct Head Wo Contrast    Result Date: 3/4/2021  EXAMINATION: CT OF THE HEAD WITHOUT CONTRAST  3/4/2021 8:33 am TECHNIQUE: CT of the head was performed without the administration of intravenous contrast. Dose modulation, iterative reconstruction, and/or weight based adjustment of the mA/kV was utilized to reduce the radiation dose to as low as reasonably achievable. COMPARISON: CT head without contrast 02/08/2021 HISTORY: ORDERING SYSTEM PROVIDED HISTORY: ams TECHNOLOGIST PROVIDED HISTORY: Has a \"code stroke\" or \"stroke alert\" been called? ->No Reason for exam:->ams Decision Support Exception->Emergency Medical Condition (MA) What reading provider will be dictating this exam?->CRC FINDINGS: BRAIN/VENTRICLES: There is no acute intracranial hemorrhage, mass effect or midline shift. No abnormal extra-axial fluid collection. The gray-white differentiation is maintained without evidence of an acute infarct. There is unchanged ventricular dilation without sulcal effacement, compatible with parenchymal atrophy. There is moderate burden of white matter hypoattenuation, which is typically seen in the setting of chronic small vessel ischemic disease. ORBITS: There is unchanged bilateral globe calcification. SINUSES: Mild sinus mucosal thickening is partially imaged in the right maxillary sinus. The visualized portions of the mastoid air cells are clear. SOFT TISSUES/SKULL:  No acute abnormality of the visualized skull or soft tissues. No acute intracranial process is identified.      Xr Chest Portable    Result Date: 3/4/2021  EXAMINATION: ONE XRAY VIEW OF THE CHEST 3/4/2021 8:35 am COMPARISON: One-view chest x-ray 02/08/2021 HISTORY: ORDERING SYSTEM PROVIDED HISTORY: weakness, Possible Stroke TECHNOLOGIST PROVIDED

## 2021-03-05 LAB
ALBUMIN SERPL-MCNC: 1.5 G/DL (ref 3.5–5.2)
ALP BLD-CCNC: 77 U/L (ref 35–104)
ALT SERPL-CCNC: 28 U/L (ref 0–32)
ANION GAP SERPL CALCULATED.3IONS-SCNC: 5 MMOL/L (ref 7–16)
ANION GAP SERPL CALCULATED.3IONS-SCNC: 8 MMOL/L (ref 7–16)
ANION GAP SERPL CALCULATED.3IONS-SCNC: 8 MMOL/L (ref 7–16)
ANISOCYTOSIS: ABNORMAL
ANISOCYTOSIS: ABNORMAL
AST SERPL-CCNC: 89 U/L (ref 0–31)
BASOPHILIC STIPPLING: ABNORMAL
BASOPHILIC STIPPLING: ABNORMAL
BASOPHILS ABSOLUTE: 0 E9/L (ref 0–0.2)
BASOPHILS ABSOLUTE: 0 E9/L (ref 0–0.2)
BASOPHILS RELATIVE PERCENT: 0.6 % (ref 0–2)
BASOPHILS RELATIVE PERCENT: 0.9 % (ref 0–2)
BILIRUB SERPL-MCNC: 0.4 MG/DL (ref 0–1.2)
BUN BLDV-MCNC: 27 MG/DL (ref 8–23)
BUN BLDV-MCNC: 30 MG/DL (ref 8–23)
BUN BLDV-MCNC: 35 MG/DL (ref 8–23)
CALCIUM SERPL-MCNC: 7.2 MG/DL (ref 8.6–10.2)
CALCIUM SERPL-MCNC: 7.3 MG/DL (ref 8.6–10.2)
CALCIUM SERPL-MCNC: 7.5 MG/DL (ref 8.6–10.2)
CHLORIDE BLD-SCNC: 115 MMOL/L (ref 98–107)
CHLORIDE BLD-SCNC: 120 MMOL/L (ref 98–107)
CHLORIDE BLD-SCNC: 120 MMOL/L (ref 98–107)
CO2: 22 MMOL/L (ref 22–29)
CO2: 23 MMOL/L (ref 22–29)
CO2: 25 MMOL/L (ref 22–29)
CREAT SERPL-MCNC: 1.3 MG/DL (ref 0.5–1)
CREAT SERPL-MCNC: 1.6 MG/DL (ref 0.5–1)
CREAT SERPL-MCNC: 2.1 MG/DL (ref 0.5–1)
EOSINOPHILS ABSOLUTE: 0 E9/L (ref 0.05–0.5)
EOSINOPHILS ABSOLUTE: 0.12 E9/L (ref 0.05–0.5)
EOSINOPHILS RELATIVE PERCENT: 0.2 % (ref 0–6)
EOSINOPHILS RELATIVE PERCENT: 0.9 % (ref 0–6)
FERRITIN: 370 NG/ML
FOLATE: 19.8 NG/ML (ref 4.8–24.2)
GFR AFRICAN AMERICAN: 28
GFR AFRICAN AMERICAN: 39
GFR AFRICAN AMERICAN: 49
GFR NON-AFRICAN AMERICAN: 23 ML/MIN/1.73
GFR NON-AFRICAN AMERICAN: 32 ML/MIN/1.73
GFR NON-AFRICAN AMERICAN: 41 ML/MIN/1.73
GLUCOSE BLD-MCNC: 166 MG/DL (ref 74–99)
GLUCOSE BLD-MCNC: 209 MG/DL (ref 74–99)
GLUCOSE BLD-MCNC: 232 MG/DL (ref 74–99)
HCT VFR BLD CALC: 28.4 % (ref 34–48)
HCT VFR BLD CALC: 28.4 % (ref 34–48)
HEMOGLOBIN: 7.7 G/DL (ref 11.5–15.5)
HEMOGLOBIN: 8 G/DL (ref 11.5–15.5)
HYPOCHROMIA: ABNORMAL
HYPOCHROMIA: ABNORMAL
IRON SATURATION: 18 % (ref 15–50)
IRON: 15 MCG/DL (ref 37–145)
LACTIC ACID: 1.5 MMOL/L (ref 0.5–2.2)
LACTIC ACID: 2.1 MMOL/L (ref 0.5–2.2)
LACTIC ACID: 2.3 MMOL/L (ref 0.5–2.2)
LACTIC ACID: 3.8 MMOL/L (ref 0.5–2.2)
LYMPHOCYTES ABSOLUTE: 1.44 E9/L (ref 1.5–4)
LYMPHOCYTES ABSOLUTE: 1.65 E9/L (ref 1.5–4)
LYMPHOCYTES RELATIVE PERCENT: 11.1 % (ref 20–42)
LYMPHOCYTES RELATIVE PERCENT: 13.3 % (ref 20–42)
MAGNESIUM: 2.4 MG/DL (ref 1.6–2.6)
MCH RBC QN AUTO: 28.7 PG (ref 26–35)
MCH RBC QN AUTO: 29.4 PG (ref 26–35)
MCHC RBC AUTO-ENTMCNC: 27.1 % (ref 32–34.5)
MCHC RBC AUTO-ENTMCNC: 28.2 % (ref 32–34.5)
MCV RBC AUTO: 104.4 FL (ref 80–99.9)
MCV RBC AUTO: 106 FL (ref 80–99.9)
METAMYELOCYTES RELATIVE PERCENT: 3.7 % (ref 0–1)
METAMYELOCYTES RELATIVE PERCENT: 8 % (ref 0–1)
METER GLUCOSE: 112 MG/DL (ref 74–99)
METER GLUCOSE: 153 MG/DL (ref 74–99)
METER GLUCOSE: 163 MG/DL (ref 74–99)
METER GLUCOSE: 176 MG/DL (ref 74–99)
METER GLUCOSE: 188 MG/DL (ref 74–99)
METER GLUCOSE: 203 MG/DL (ref 74–99)
MONOCYTES ABSOLUTE: 0.51 E9/L (ref 0.1–0.95)
MONOCYTES ABSOLUTE: 0.92 E9/L (ref 0.1–0.95)
MONOCYTES RELATIVE PERCENT: 3.5 % (ref 2–12)
MONOCYTES RELATIVE PERCENT: 6.5 % (ref 2–12)
MYELOCYTE PERCENT: 0.9 % (ref 0–0)
MYELOCYTE PERCENT: 0.9 % (ref 0–0)
NEUTROPHILS ABSOLUTE: 10.54 E9/L (ref 1.8–7.3)
NEUTROPHILS ABSOLUTE: 10.74 E9/L (ref 1.8–7.3)
NEUTROPHILS RELATIVE PERCENT: 74.3 % (ref 43–80)
NEUTROPHILS RELATIVE PERCENT: 76.9 % (ref 43–80)
NUCLEATED RED BLOOD CELLS: 23 /100 WBC
NUCLEATED RED BLOOD CELLS: 29.6 /100 WBC
PATHOLOGIST REVIEW: NORMAL
PDW BLD-RTO: 27.6 FL (ref 11.5–15)
PDW BLD-RTO: 27.6 FL (ref 11.5–15)
PHOSPHORUS: 2.5 MG/DL (ref 2.5–4.5)
PLATELET # BLD: 62 E9/L (ref 130–450)
PLATELET # BLD: 74 E9/L (ref 130–450)
PLATELET CONFIRMATION: NORMAL
PLATELET CONFIRMATION: NORMAL
PMV BLD AUTO: ABNORMAL FL (ref 7–12)
PMV BLD AUTO: ABNORMAL FL (ref 7–12)
POIKILOCYTES: ABNORMAL
POIKILOCYTES: ABNORMAL
POLYCHROMASIA: ABNORMAL
POLYCHROMASIA: ABNORMAL
POTASSIUM SERPL-SCNC: 3.5 MMOL/L (ref 3.5–5)
POTASSIUM SERPL-SCNC: 3.8 MMOL/L (ref 3.5–5)
POTASSIUM SERPL-SCNC: 3.9 MMOL/L (ref 3.5–5)
RBC # BLD: 2.68 E12/L (ref 3.5–5.5)
RBC # BLD: 2.72 E12/L (ref 3.5–5.5)
SODIUM BLD-SCNC: 145 MMOL/L (ref 132–146)
SODIUM BLD-SCNC: 150 MMOL/L (ref 132–146)
SODIUM BLD-SCNC: 151 MMOL/L (ref 132–146)
TARGET CELLS: ABNORMAL
TARGET CELLS: ABNORMAL
TOTAL IRON BINDING CAPACITY: 83 MCG/DL (ref 250–450)
TOTAL PROTEIN: 6.1 G/DL (ref 6.4–8.3)
TOXIC GRANULATION: ABNORMAL
TROPONIN: 0.08 NG/ML (ref 0–0.03)
VACUOLATED NEUTROPHILS: ABNORMAL
VACUOLATED NEUTROPHILS: ABNORMAL
VANCOMYCIN RANDOM: 13.8 MCG/ML (ref 5–40)
VITAMIN B-12: 1169 PG/ML (ref 211–946)
WBC # BLD: 12.7 E9/L (ref 4.5–11.5)
WBC # BLD: 13.1 E9/L (ref 4.5–11.5)

## 2021-03-05 PROCEDURE — 37799 UNLISTED PX VASCULAR SURGERY: CPT

## 2021-03-05 PROCEDURE — 83605 ASSAY OF LACTIC ACID: CPT

## 2021-03-05 PROCEDURE — 80202 ASSAY OF VANCOMYCIN: CPT

## 2021-03-05 PROCEDURE — 80053 COMPREHEN METABOLIC PANEL: CPT

## 2021-03-05 PROCEDURE — 87081 CULTURE SCREEN ONLY: CPT

## 2021-03-05 PROCEDURE — 2000000000 HC ICU R&B

## 2021-03-05 PROCEDURE — 84484 ASSAY OF TROPONIN QUANT: CPT

## 2021-03-05 PROCEDURE — 84100 ASSAY OF PHOSPHORUS: CPT

## 2021-03-05 PROCEDURE — 82746 ASSAY OF FOLIC ACID SERUM: CPT

## 2021-03-05 PROCEDURE — 82962 GLUCOSE BLOOD TEST: CPT

## 2021-03-05 PROCEDURE — 80048 BASIC METABOLIC PNL TOTAL CA: CPT

## 2021-03-05 PROCEDURE — 2500000003 HC RX 250 WO HCPCS: Performed by: EMERGENCY MEDICINE

## 2021-03-05 PROCEDURE — 2709999900 HC NON-CHARGEABLE SUPPLY

## 2021-03-05 PROCEDURE — 85025 COMPLETE CBC W/AUTO DIFF WBC: CPT

## 2021-03-05 PROCEDURE — 83540 ASSAY OF IRON: CPT

## 2021-03-05 PROCEDURE — 87449 NOS EACH ORGANISM AG IA: CPT

## 2021-03-05 PROCEDURE — 2500000003 HC RX 250 WO HCPCS: Performed by: INTERNAL MEDICINE

## 2021-03-05 PROCEDURE — 6360000002 HC RX W HCPCS: Performed by: INTERNAL MEDICINE

## 2021-03-05 PROCEDURE — 82728 ASSAY OF FERRITIN: CPT

## 2021-03-05 PROCEDURE — 2580000003 HC RX 258: Performed by: INTERNAL MEDICINE

## 2021-03-05 PROCEDURE — 36620 INSERTION CATHETER ARTERY: CPT

## 2021-03-05 PROCEDURE — 83550 IRON BINDING TEST: CPT

## 2021-03-05 PROCEDURE — 82607 VITAMIN B-12: CPT

## 2021-03-05 PROCEDURE — 6370000000 HC RX 637 (ALT 250 FOR IP): Performed by: INTERNAL MEDICINE

## 2021-03-05 PROCEDURE — 83735 ASSAY OF MAGNESIUM: CPT

## 2021-03-05 PROCEDURE — 2700000000 HC OXYGEN THERAPY PER DAY

## 2021-03-05 RX ORDER — LEVOTHYROXINE SODIUM 0.15 MG/1
150 TABLET ORAL DAILY
Status: DISCONTINUED | OUTPATIENT
Start: 2021-03-06 | End: 2021-03-16 | Stop reason: HOSPADM

## 2021-03-05 RX ORDER — MAGNESIUM SULFATE IN WATER 40 MG/ML
2000 INJECTION, SOLUTION INTRAVENOUS ONCE
Status: COMPLETED | OUTPATIENT
Start: 2021-03-05 | End: 2021-03-05

## 2021-03-05 RX ORDER — NICOTINE POLACRILEX 4 MG
15 LOZENGE BUCCAL PRN
Status: DISCONTINUED | OUTPATIENT
Start: 2021-03-05 | End: 2021-03-16 | Stop reason: HOSPADM

## 2021-03-05 RX ORDER — DEXTROSE MONOHYDRATE 50 MG/ML
100 INJECTION, SOLUTION INTRAVENOUS PRN
Status: DISCONTINUED | OUTPATIENT
Start: 2021-03-05 | End: 2021-03-16 | Stop reason: HOSPADM

## 2021-03-05 RX ORDER — FOLIC ACID 5 MG/ML
1 INJECTION, SOLUTION INTRAMUSCULAR; INTRAVENOUS; SUBCUTANEOUS DAILY
Status: DISCONTINUED | OUTPATIENT
Start: 2021-03-05 | End: 2021-03-08

## 2021-03-05 RX ORDER — DEXTROSE MONOHYDRATE 25 G/50ML
12.5 INJECTION, SOLUTION INTRAVENOUS PRN
Status: DISCONTINUED | OUTPATIENT
Start: 2021-03-05 | End: 2021-03-16 | Stop reason: HOSPADM

## 2021-03-05 RX ORDER — THIAMINE HYDROCHLORIDE 100 MG/ML
100 INJECTION, SOLUTION INTRAMUSCULAR; INTRAVENOUS DAILY
Status: DISCONTINUED | OUTPATIENT
Start: 2021-03-05 | End: 2021-03-08

## 2021-03-05 RX ORDER — FOLIC ACID 1 MG/1
1 TABLET ORAL DAILY
Status: DISCONTINUED | OUTPATIENT
Start: 2021-03-05 | End: 2021-03-05

## 2021-03-05 RX ADMIN — HYDROCORTISONE SODIUM SUCCINATE 100 MG: 100 INJECTION, POWDER, FOR SOLUTION INTRAMUSCULAR; INTRAVENOUS at 20:08

## 2021-03-05 RX ADMIN — DEXTROSE AND SODIUM CHLORIDE: 5; 450 INJECTION, SOLUTION INTRAVENOUS at 21:00

## 2021-03-05 RX ADMIN — MAGNESIUM SULFATE HEPTAHYDRATE 2000 MG: 40 INJECTION, SOLUTION INTRAVENOUS at 01:05

## 2021-03-05 RX ADMIN — INSULIN LISPRO 1 UNITS: 100 INJECTION, SOLUTION INTRAVENOUS; SUBCUTANEOUS at 20:30

## 2021-03-05 RX ADMIN — THIAMINE HYDROCHLORIDE 100 MG: 100 INJECTION, SOLUTION INTRAMUSCULAR; INTRAVENOUS at 09:23

## 2021-03-05 RX ADMIN — Medication 22 MCG/MIN: at 02:23

## 2021-03-05 RX ADMIN — Medication 10 ML: at 20:19

## 2021-03-05 RX ADMIN — Medication 29 MCG/MIN: at 12:28

## 2021-03-05 RX ADMIN — CEFEPIME 1000 MG: 1 INJECTION, POWDER, FOR SOLUTION INTRAMUSCULAR; INTRAVENOUS at 06:41

## 2021-03-05 RX ADMIN — Medication 10 ML: at 09:27

## 2021-03-05 RX ADMIN — INSULIN LISPRO 2 UNITS: 100 INJECTION, SOLUTION INTRAVENOUS; SUBCUTANEOUS at 01:06

## 2021-03-05 RX ADMIN — HEPARIN SODIUM 5000 UNITS: 10000 INJECTION INTRAVENOUS; SUBCUTANEOUS at 06:00

## 2021-03-05 RX ADMIN — FOLIC ACID 1 MG: 5 INJECTION, SOLUTION INTRAMUSCULAR; INTRAVENOUS; SUBCUTANEOUS at 09:27

## 2021-03-05 RX ADMIN — DEXTROSE AND SODIUM CHLORIDE: 5; 450 INJECTION, SOLUTION INTRAVENOUS at 10:24

## 2021-03-05 RX ADMIN — INSULIN LISPRO 1 UNITS: 100 INJECTION, SOLUTION INTRAVENOUS; SUBCUTANEOUS at 13:13

## 2021-03-05 RX ADMIN — INSULIN LISPRO 1 UNITS: 100 INJECTION, SOLUTION INTRAVENOUS; SUBCUTANEOUS at 17:03

## 2021-03-05 RX ADMIN — INSULIN LISPRO 1 UNITS: 100 INJECTION, SOLUTION INTRAVENOUS; SUBCUTANEOUS at 09:23

## 2021-03-05 RX ADMIN — FAMOTIDINE 20 MG: 10 INJECTION INTRAVENOUS at 12:28

## 2021-03-05 RX ADMIN — CEFEPIME 1000 MG: 1 INJECTION, POWDER, FOR SOLUTION INTRAMUSCULAR; INTRAVENOUS at 15:59

## 2021-03-05 RX ADMIN — HYDROCORTISONE SODIUM SUCCINATE 100 MG: 100 INJECTION, POWDER, FOR SOLUTION INTRAMUSCULAR; INTRAVENOUS at 12:28

## 2021-03-05 ASSESSMENT — PAIN SCALES - PAIN ASSESSMENT IN ADVANCED DEMENTIA (PAINAD)
BODYLANGUAGE: 0
NEGVOCALIZATION: 0
BODYLANGUAGE: 0
CONSOLABILITY: 0
CONSOLABILITY: 0
TOTALSCORE: 0
TOTALSCORE: 0
BODYLANGUAGE: 0
FACIALEXPRESSION: 0
BREATHING: 0
TOTALSCORE: 0
CONSOLABILITY: 0
FACIALEXPRESSION: 0
CONSOLABILITY: 0
NEGVOCALIZATION: 0
BODYLANGUAGE: 0
TOTALSCORE: 0
CONSOLABILITY: 0
CONSOLABILITY: 0
FACIALEXPRESSION: 0
NEGVOCALIZATION: 0
BREATHING: 0
FACIALEXPRESSION: 0

## 2021-03-05 ASSESSMENT — PAIN SCALES - GENERAL
PAINLEVEL_OUTOF10: 0

## 2021-03-05 NOTE — PLAN OF CARE
Problem: Skin Integrity:  Goal: Will show no infection signs and symptoms  Description: Will show no infection signs and symptoms  Outcome: Met This Shift  Goal: Absence of new skin breakdown  Description: Absence of new skin breakdown  Outcome: Met This Shift     Problem: Falls - Risk of:  Goal: Will remain free from falls  Description: Will remain free from falls  Outcome: Met This Shift  Goal: Absence of physical injury  Description: Absence of physical injury  Outcome: Met This Shift     Problem: Skin Integrity:  Goal: Will show no infection signs and symptoms  Description: Will show no infection signs and symptoms  Outcome: Met This Shift  Goal: Absence of new skin breakdown  Description: Absence of new skin breakdown  Outcome: Met This Shift     Problem: Falls - Risk of:  Goal: Will remain free from falls  Description: Will remain free from falls  Outcome: Met This Shift  Goal: Absence of physical injury  Description: Absence of physical injury  Outcome: Met This Shift   Plan of care reviewed with patient and family, as available.

## 2021-03-05 NOTE — PROCEDURES
Arterial line placement    Procedure: Right radial arterial line placement. Indications: Continuous monitoring of blood pressure in a patient with hypotension + shock, on Levophed. Anesthesia: Local infiltration of 1% lidocaine. Consent:  Unable to be obtained due to the emergent nature of this procedure. Technique: Time Out: Immediately prior to the procedure a \"timeout\" was called to verify the correct patient and procedure. Procedure was done using strict aseptic technique. Sy's test was performed and was normal. right radial site was cleaned with chloraprep and draped. Radial artery was identified, then Lidocaine 1% was infiltrated locally. Radial arterial line was inserted, a good blood flow was obtained, after which guidewire was inserted all the way with no resistance. Then the canula was inserted and needle with guidewire was withdrawn. Pulsatile bright red blood flow was observed. The canula was connected to BP monitoring apparatus and a good quality waveform was noted. Then the canula was secured with 2 stay sutures of 3-0 silk after Lidocaine infiltration, following which dressing was applied. Number of sticks: 2. Number of Kits used: 2. Complications: No immediate complication. Estimated blood loss: About 1 ml. Comment: Patient tolerated the procedure well.      Evangelina Lyle MD PGY-3  3/4/2021 10:02 PM

## 2021-03-05 NOTE — PROGRESS NOTES
Attempted nasoenteric tune x1 assist, pt unable to swallow and tube unable to advance into oropharnyx with must bdirection, RN notified

## 2021-03-05 NOTE — CONSULTS
GENERAL SURGERY  CONSULT NOTE  3/5/2021    Physician Consulted: Dr. Leonard Sue  Reason for Consult: Difficult NGT    CC: Poor oral intake    ADRIANA Orosco is a 78 y.o. female who presents from SNF for AMS and poor oral intake. Patient is MR, and blind, and nonverbal at baseline. Patient has not been able to tolerate oral feedings. She was found to have an BEENA, UTI, and severe dehydration with a sodium 150. An NG tube was attempted to be placed by MICU personnel and a CorPak by PICC team followed which were unsuccessful. CT of the abdomen and pelvis performed on 3/4 demonstrating large hiatal hernia. Patient has a history of previous PEG tube placed in 2017 for dysphagia. She no longer has the PEG tube in place and it is unclear why or how it was removed    Patient is a beltrán of the [facility]    Past Medical History:   Diagnosis Date    Acute kidney injury ([address] 01/15/2017    d/t Vancomycin, on Dialysis [facility] right chest    Blind in both eyes     Hemodialysis patient ([facility] [address]     Hyperthyroidism     MR (mental retardation)     Osteoarthritis     Pneumonia 01/06/2017       Past Surgical History:   Procedure Laterality Date    BRONCHOSCOPY  02/10/2017    CHEST TUBE INSERTION Right 02/09/2017    OTHER SURGICAL HISTORY Right 01/17/2017    tessio insertion     OTHER SURGICAL HISTORY  01/25/2017    PEG tube insertion       Medications Prior to Admission:    Prior to Admission medications    Medication Sig Start Date End Date Taking?  Authorizing Provider   amLODIPine (NORVASC) 5 MG tablet Take 5 mg by mouth nightly   Yes Historical Provider, MD   docusate (COLACE) 50 MG/5ML liquid Take 100 mg by mouth 2 times daily   Yes Historical Provider, MD   famotidine (PEPCID) 20 MG tablet Take 20 mg by mouth 2 times daily   Yes Historical Provider, MD   folic acid (FOLVITE) 1 MG tablet Take 1 mg by mouth daily   Yes Historical Provider, MD   levothyroxine (SYNTHROID) 150 MCG tablet Take 150 mcg by mouth Daily   Yes Historical Provider, MD   metoprolol tartrate (LOPRESSOR) 25 MG tablet Take 75 mg by mouth 2 times daily   Yes Historical Provider, MD   montelukast (SINGULAIR) 10 MG tablet Take 10 mg by mouth nightly   Yes Historical Provider, MD   OLANZapine zydis (ZYPREXA ZYDIS) 15 MG disintegrating tablet Take 15 mg by mouth 2 times daily Given with Zyprexa zydis 5 mg twice a day   Yes Historical Provider, MD   OLANZapine zydis (ZYPREXA) 5 MG disintegrating tablet Take 5 mg by mouth 2 times daily Given with Zyprexa zydis 15 mg twice a day   Yes Historical Provider, MD   ferrous sulfate (IRON 325) 325 (65 Fe) MG tablet Take 325 mg by mouth daily (with breakfast)   Yes Historical Provider, MD   PARoxetine (PAXIL) 10 MG tablet Take 10 mg by mouth 2 times daily   Yes Historical Provider, MD   vitamin D (CHOLECALCIFEROL) 25 MCG (1000 UT) TABS tablet Take 1,000 Units by mouth daily   Yes Historical Provider, MD   valproic acid (DEPACON) 250 MG/5ML SOLN oral solution Take 1,000 mg by mouth 2 times daily   Yes Historical Provider, MD   polyethylene glycol (GLYCOLAX) 17 g packet Take 17 g by mouth daily    Yes Historical Provider, MD       No Known Allergies    No family history on file. Social History     Tobacco Use    Smoking status: Never Smoker    Smokeless tobacco: Never Used   Substance Use Topics    Alcohol use: No    Drug use: No         Review of Systems   Unable to perform due to patient's mental status    PHYSICAL EXAM:    Vitals:    03/05/21 1400   BP:    Pulse: 71   Resp: 18   Temp:    SpO2: 95%       General Appearance:  awake, not alert, nonverbal, in no acute distress  Skin:  Dry  Head/face:  NCAT and Temporal wasting  Eyes:  No gross abnormalities. Lungs:  Breathing Pattern: regular, no distress, on nasal cannula  Heart:  Heart regular rate  Abdomen: Soft, nondistended, no grimace to palpation. Previous PEG site scar noted in epigastrium.   No guarding or rigidity  Extremities: Extremities warm to touch      LABS:    CBC  Recent Labs     03/05/21  0918   WBC 13.1*   HGB 7.7*   HCT 28.4*   PLT 62*     BMP  Recent Labs     03/05/21  1303   *   K 3.9   *   CO2 22   BUN 30*   CREATININE 1.6*   CALCIUM 7.5*     Liver Function  Recent Labs     03/05/21  0408   BILITOT 0.4   AST 89*   ALT 28   ALKPHOS 77   PROT 6.1*   LABALBU 1.5*     No results for input(s): LACTATE in the last 72 hours. Recent Labs     03/04/21  1112   INR 1.7       RADIOLOGY    Ct Abdomen Pelvis Wo Contrast Additional Contrast? None    Result Date: 3/4/2021  EXAMINATION: CT OF THE ABDOMEN AND PELVIS WITHOUT CONTRAST 3/4/2021 12:28 pm TECHNIQUE: CT of the abdomen and pelvis was performed without the administration of intravenous contrast. Multiplanar reformatted images are provided for review. Dose modulation, iterative reconstruction, and/or weight based adjustment of the mA/kV was utilized to reduce the radiation dose to as low as reasonably achievable. COMPARISON: March 6, 2017 HISTORY: ORDERING SYSTEM PROVIDED HISTORY: fever TECHNOLOGIST PROVIDED HISTORY: Reason for exam:->fever Additional Contrast?->None Decision Support Exception->Emergency Medical Condition (MA) What reading provider will be dictating this exam?->CRC FINDINGS: Presence of bilateral pleural effusions in mild-to-moderate degree causing compression atelectasis in the dependent portion of both lower lobes. The heart has normal size. The there is no pericardial effusion. There are preserved size and density for the liver. The gallbladder is normally distended. There is some increased density in the dependent portion of the gallbladder in part artifacts by adjacent peristalsis. Can further evaluate the gallbladder with gallbladder ultrasound. There is some atrophy of the pancreas. The spleen has unremarkable appearance. There is no dilatation of the biliary tree pancreatic ductal system. Adrenals are not enlarged. Aorta and IVC are of unremarkable appearance. The there is no midline retroperitoneal adenopathy. Patient has a left the femoral venous catheter with tip in the left the common iliac vein. The There are is streaking artifacts in the abdomen due monitoring devices and active peristalsis. There are no indication for free intraperitoneal air or acute inflammatory changes the mid mesentery fat planes. There is no stranding of the pericolonic fat planes. There is no signs for diverticulitis. Fair amount of fecal content is seen in the colon indicating pattern moderate constipation. The a component of fecal rectal retention is also observed. There remarkable appearance for the uterus and for the right ovary. The there is a 2 cm is stable fat containing cyst in the left ovary compatible with a the dermoid type of cyst with discrete the mey of calcification in the wall. There are normal size for the kidneys. There is no renal calculus. There is no obstruction. There is no midline retroperitoneal adenopathy. 1.  Bilateral pleural effusions of mild-to-moderate degree causing compression atelectasis in the lower lobes posteriorly. 2.  Pattern of constipation. 3.  No indication for acute the inflammatory process in the mid mesentery fat planes, free intraperitoneal air or ascites. 4.  No obstructive uropathy. 5.  No dilatation of the biliary tree. 6.  Cannot entirely exclude small gallstone in the gallbladder lumen, artifacts are present. Further evaluation with gallbladder ultrasound is recommended. Ct Head Wo Contrast    Result Date: 3/4/2021  EXAMINATION: CT OF THE HEAD WITHOUT CONTRAST  3/4/2021 8:33 am TECHNIQUE: CT of the head was performed without the administration of intravenous contrast. Dose modulation, iterative reconstruction, and/or weight based adjustment of the mA/kV was utilized to reduce the radiation dose to as low as reasonably achievable.  COMPARISON: CT head without contrast 02/08/2021 HISTORY: ORDERING SYSTEM PROVIDED HISTORY: ams TECHNOLOGIST PROVIDED HISTORY: Has a \"code stroke\" or \"stroke alert\" been called? ->No Reason for exam:->ams Decision Support Exception->Emergency Medical Condition (MA) What reading provider will be dictating this exam?->CRC FINDINGS: BRAIN/VENTRICLES: There is no acute intracranial hemorrhage, mass effect or midline shift. No abnormal extra-axial fluid collection. The gray-white differentiation is maintained without evidence of an acute infarct. There is unchanged ventricular dilation without sulcal effacement, compatible with parenchymal atrophy. There is moderate burden of white matter hypoattenuation, which is typically seen in the setting of chronic small vessel ischemic disease. ORBITS: There is unchanged bilateral globe calcification. SINUSES: Mild sinus mucosal thickening is partially imaged in the right maxillary sinus. The visualized portions of the mastoid air cells are clear. SOFT TISSUES/SKULL:  No acute abnormality of the visualized skull or soft tissues. No acute intracranial process is identified. Xr Chest Portable    Result Date: 3/4/2021  EXAMINATION: ONE XRAY VIEW OF THE CHEST 3/4/2021 8:35 am COMPARISON: One-view chest x-ray 02/08/2021 HISTORY: ORDERING SYSTEM PROVIDED HISTORY: weakness, Possible Stroke TECHNOLOGIST PROVIDED HISTORY: Reason for exam:->weakness, Possible Stroke What reading provider will be dictating this exam?->CRC FINDINGS: The cardiac silhouette is within normal limits. The mediastinal contours are within normal limits. Mild central peribronchial thickening appears similar to decreased compared to the previous exam.  No consolidations are identified. No significant pleural effusions or pneumothorax. Osseous degenerative changes are present. EKG leads overlie the chest and abdomen. No acute cardiopulmonary process is identified.          ASSESSMENT:  78 y.o. female with poor oral intake, severe dehydration, BEENA and UTI    PLAN:    -Difficulty passing NGT/CorPak is likely due to large hiatal hernia  -Patient will likely need enteral access long term to assist with nutritional status and hydration  -Planning for PEG tube placement tomorrow, 3/6    Plan discussed with Dr. Isabela Mejía    Electronically signed by Zander Begum DO on 3/5/21 at 4:05 PM EST

## 2021-03-05 NOTE — CARE COORDINATION
Pt is on 5-6 L O2, Fluids, zaragoza, getting Corpak and IV cefepime and Maxipeme. Spoke with Legal Guardian Patience Torres ([phone]  [phone]) about Transition plan of Care. Miguel Stack reports that Pt may be able to return to [facility] d/t facility doing some antibiotics and oxygen. Miguel Stack will speak with Facility and call back. Tentative Discharge plan is to return to [facility] when medically stable. SW/CM to follow for discharge needs.      Austin Huber, L.S.W.  [phone]

## 2021-03-05 NOTE — H&P
distally B/L  Lab Results   Component Value Date    WBC 13.1 (H) 03/05/2021    HGB 7.7 (L) 03/05/2021    HCT 28.4 (L) 03/05/2021    .0 (H) 03/05/2021    PLT 62 (L) 03/05/2021     Lab Results   Component Value Date     03/05/2021    K 3.8 03/05/2021    K 4.8 02/09/2021     03/05/2021    CO2 23 03/05/2021    BUN 35 03/05/2021    CREATININE 2.1 03/05/2021    GLUCOSE 232 03/05/2021    GLUCOSE 97 12/30/2011    CALCIUM 7.2 03/05/2021          ASSESSMENT  1. Altered mental status, unspecified altered mental status type    2. Dehydration    3. Septicemia ([facility] [address]    4. BEENA (acute kidney injury) ([facility] [address]    5.  Urinary tract infection without hematuria, site unspecified      PLAN  Critical care  ID and Renal teams consulted  Monitor labs  ADDENDUM  Updated

## 2021-03-05 NOTE — CONSULTS
(PEPCID) 20 MG tablet Take 20 mg by mouth 2 times daily   Yes Historical Provider, MD   folic acid (FOLVITE) 1 MG tablet Take 1 mg by mouth daily   Yes Historical Provider, MD   levothyroxine (SYNTHROID) 150 MCG tablet Take 150 mcg by mouth Daily   Yes Historical Provider, MD   metoprolol tartrate (LOPRESSOR) 25 MG tablet Take 75 mg by mouth 2 times daily   Yes Historical Provider, MD   montelukast (SINGULAIR) 10 MG tablet Take 10 mg by mouth nightly   Yes Historical Provider, MD   OLANZapine zydis (ZYPREXA ZYDIS) 15 MG disintegrating tablet Take 15 mg by mouth 2 times daily Given with Zyprexa zydis 5 mg twice a day   Yes Historical Provider, MD   OLANZapine zydis (ZYPREXA) 5 MG disintegrating tablet Take 5 mg by mouth 2 times daily Given with Zyprexa zydis 15 mg twice a day   Yes Historical Provider, MD   ferrous sulfate (IRON 325) 325 (65 Fe) MG tablet Take 325 mg by mouth daily (with breakfast)   Yes Historical Provider, MD   PARoxetine (PAXIL) 10 MG tablet Take 10 mg by mouth 2 times daily   Yes Historical Provider, MD   vitamin D (CHOLECALCIFEROL) 25 MCG (1000 UT) TABS tablet Take 1,000 Units by mouth daily   Yes Historical Provider, MD   valproic acid (DEPACON) 250 MG/5ML SOLN oral solution Take 1,000 mg by mouth 2 times daily   Yes Historical Provider, MD   polyethylene glycol (GLYCOLAX) 17 g packet Take 17 g by mouth daily    Yes Historical Provider, MD       Allergies:  Patient has no known allergies. Social History:   TOBACCO:   reports that she has never smoked. She has never used smokeless tobacco.  ETOH:   reports no history of alcohol use. Family History:   No family history on file.     REVIEW OF SYSTEMS:    Patient unresponsive; ROS cannot be obtained    Physical Exam   · Vitals: BP (!) 112/39   Pulse 95   Temp 99.1 °F (37.3 °C)   Resp 14   Ht 5' 1\" (1.549 m)   Wt 146 lb (66.2 kg)   SpO2 95%   BMI 27.59 kg/m²   · ABP (Arterial line BP): 112/48  · ABP mean (Arterial line mean): 69 None    Result Date: 3/4/2021  EXAMINATION: CT OF THE ABDOMEN AND PELVIS WITHOUT CONTRAST 3/4/2021 12:28 pm TECHNIQUE: CT of the abdomen and pelvis was performed without the administration of intravenous contrast. Multiplanar reformatted images are provided for review. Dose modulation, iterative reconstruction, and/or weight based adjustment of the mA/kV was utilized to reduce the radiation dose to as low as reasonably achievable. COMPARISON: March 6, 2017 HISTORY: ORDERING SYSTEM PROVIDED HISTORY: fever TECHNOLOGIST PROVIDED HISTORY: Reason for exam:->fever Additional Contrast?->None Decision Support Exception->Emergency Medical Condition (MA) What reading provider will be dictating this exam?->CRC FINDINGS: Presence of bilateral pleural effusions in mild-to-moderate degree causing compression atelectasis in the dependent portion of both lower lobes. The heart has normal size. The there is no pericardial effusion. There are preserved size and density for the liver. The gallbladder is normally distended. There is some increased density in the dependent portion of the gallbladder in part artifacts by adjacent peristalsis. Can further evaluate the gallbladder with gallbladder ultrasound. There is some atrophy of the pancreas. The spleen has unremarkable appearance. There is no dilatation of the biliary tree pancreatic ductal system. Adrenals are not enlarged. Aorta and IVC are of unremarkable appearance. The there is no midline retroperitoneal adenopathy. Patient has a left the femoral venous catheter with tip in the left the common iliac vein. The There are is streaking artifacts in the abdomen due monitoring devices and active peristalsis. There are no indication for free intraperitoneal air or acute inflammatory changes the mid mesentery fat planes. There is no stranding of the pericolonic fat planes. There is no signs for diverticulitis.  Fair amount of fecal content is seen in the colon indicating pattern moderate constipation. The a component of fecal rectal retention is also observed. There remarkable appearance for the uterus and for the right ovary. The there is a 2 cm is stable fat containing cyst in the left ovary compatible with a the dermoid type of cyst with discrete the mey of calcification in the wall. There are normal size for the kidneys. There is no renal calculus. There is no obstruction. There is no midline retroperitoneal adenopathy. 1.  Bilateral pleural effusions of mild-to-moderate degree causing compression atelectasis in the lower lobes posteriorly. 2.  Pattern of constipation. 3.  No indication for acute the inflammatory process in the mid mesentery fat planes, free intraperitoneal air or ascites. 4.  No obstructive uropathy. 5.  No dilatation of the biliary tree. 6.  Cannot entirely exclude small gallstone in the gallbladder lumen, artifacts are present. Further evaluation with gallbladder ultrasound is recommended. Ct Head Wo Contrast    Result Date: 3/4/2021  EXAMINATION: CT OF THE HEAD WITHOUT CONTRAST  3/4/2021 8:33 am TECHNIQUE: CT of the head was performed without the administration of intravenous contrast. Dose modulation, iterative reconstruction, and/or weight based adjustment of the mA/kV was utilized to reduce the radiation dose to as low as reasonably achievable. COMPARISON: CT head without contrast 02/08/2021 HISTORY: ORDERING SYSTEM PROVIDED HISTORY: ams TECHNOLOGIST PROVIDED HISTORY: Has a \"code stroke\" or \"stroke alert\" been called? ->No Reason for exam:->ams Decision Support Exception->Emergency Medical Condition (MA) What reading provider will be dictating this exam?->CRC FINDINGS: BRAIN/VENTRICLES: There is no acute intracranial hemorrhage, mass effect or midline shift. No abnormal extra-axial fluid collection. The gray-white differentiation is maintained without evidence of an acute infarct.   There is unchanged ventricular dilation without sulcal effacement, compatible with parenchymal atrophy. There is moderate burden of white matter hypoattenuation, which is typically seen in the setting of chronic small vessel ischemic disease. ORBITS: There is unchanged bilateral globe calcification. SINUSES: Mild sinus mucosal thickening is partially imaged in the right maxillary sinus. The visualized portions of the mastoid air cells are clear. SOFT TISSUES/SKULL:  No acute abnormality of the visualized skull or soft tissues. No acute intracranial process is identified. Ct Head Wo Contrast    Result Date: 2/8/2021  EXAMINATION: CT OF THE HEAD WITHOUT CONTRAST  2/8/2021 2:04 pm TECHNIQUE: CT of the head was performed without the administration of intravenous contrast. Dose modulation, iterative reconstruction, and/or weight based adjustment of the mA/kV was utilized to reduce the radiation dose to as low as reasonably achievable. COMPARISON: 03/30/2007 HISTORY: ORDERING SYSTEM PROVIDED HISTORY: altered TECHNOLOGIST PROVIDED HISTORY: Reason for exam:->altered Has a \"code stroke\" or \"stroke alert\" been called? ->No Decision Support Exception->Emergency Medical Condition (MA) FINDINGS: BRAIN/VENTRICLES: There is no acute intracranial hemorrhage, mass effect or midline shift. No abnormal extra-axial fluid collection. The gray-white differentiation is maintained without evidence of an acute infarct. There is no evidence of hydrocephalus. The ventricles, cisterns and sulci are prominent consistent with atrophy. There is decreased attenuation within the periventricular white matter consistent with periventricular leukomalacia. ORBITS: There are chronic changes of the orbits. SINUSES: The visualized paranasal sinuses and mastoid air cells demonstrate no acute abnormality. SOFT TISSUES/SKULL:  No acute abnormality of the visualized skull or soft tissues. 1. There is no acute intracranial abnormality. Specifically, there is no intracranial hemorrhage.  2. Atrophy and periventricular leukomalacia,    Xr Chest Portable    Result Date: 3/4/2021  EXAMINATION: ONE XRAY VIEW OF THE CHEST 3/4/2021 8:35 am COMPARISON: One-view chest x-ray 02/08/2021 HISTORY: ORDERING SYSTEM PROVIDED HISTORY: weakness, Possible Stroke TECHNOLOGIST PROVIDED HISTORY: Reason for exam:->weakness, Possible Stroke What reading provider will be dictating this exam?->CRC FINDINGS: The cardiac silhouette is within normal limits. The mediastinal contours are within normal limits. Mild central peribronchial thickening appears similar to decreased compared to the previous exam.  No consolidations are identified. No significant pleural effusions or pneumothorax. Osseous degenerative changes are present. EKG leads overlie the chest and abdomen. No acute cardiopulmonary process is identified. Xr Chest Portable    Result Date: 2/8/2021  EXAMINATION: ONE XRAY VIEW OF THE CHEST 2/8/2021 2:05 pm COMPARISON: September 29, 2020 HISTORY: ORDERING SYSTEM PROVIDED HISTORY: altered TECHNOLOGIST PROVIDED HISTORY: Reason for exam:->altered FINDINGS: The lungs are clear. Right hemidiaphragm is elevated. The heart is mildly enlarged. No pneumothorax. The costophrenic angles are clear. No active pulmonary process. EKG: sinus tachycardia. Resident's Assessment and Plan     Hailey Garcia is a 78 y.o. female with pmh of MR with blindness and CKD ( likely stage V),     Neuro  1. Mental retardation with legal blindness  - Patient nonverbal and bed bound at baseline  - presented from Group home for unresponsiveness     Cardio  1. Hypovolemic Shock 2/2 poor oral intake/ hypovolemia   - Initial BP: 66/39 mmHg  - Volume resuscitation in ED : 2L bolus NS (30cc/kg) , started on LR @ 150 cc/hr  - Continue vasopressors : Levophed and Vasopressin  - Will wean as able   - Continue to monitor vitals    Pulmonology  1.  Acute Hypoxic Respiratory Failure 2/2 pleural effusions b/l  - requiring 6L NC O2  - Tachypneic in ED - Continue to monitor  - CXR negative   - CT abdo/pelvis: bilateral pleural effusions of mild-to-moderate size causing atelectasis     GI  1. Poor Oral Intake  - no oral intake in last 48 hours , eating poorly over the last few weeks  - started on LR infusion  - monitor labs  - Bedside swallow when more awake, can start tube feeds as appropriate     Nephrology  1. BEENA likely 2/2 to dehydration  - BUN/Creatinine 46/4.1 on presentation; baseline creatinine 0.7-1.0  - K: 5.7 , Na: 166  - Oliguric  - likely pre renal , order urine lytes ; calculate FeNa  - Continue IV fluids  - BMP Q8h   - Follow urine output    2. HAGMA likely secondary to lactic acidosis vs Uremia  - A.5 corrected for albumin  - ABG: pH: 7.34 , CO2: 45, O2: 78  - Initial lactic acid: 5.6 , BUN: 46  - Continue IV fluids  - Trend Lactic Acid , BMP q8     3. Hyperkalemia  - K: 5.7 on presentation  - repeat BMP   - consider calcium gluconate , insulin with D50 if K still elevated  - continue IVF    4. Hypernatremia likely 2/2 dehydration  - Na: 166 on initial presentation  - LR started in ED  - Water deficit 7.379 L   - Repeat BMP  - Started patient on free water boluses ; 300cc q4 hr  - Nephrology consulted; appreciate recs    Endocrinology    No prior medical history    MSK    1. Bed-ridden with bilateral hip contractions    PT/OT evaluation: not ordered  DVT prophylaxis/ GI prophylaxis: heparin    Disposition: [facility]    Renetta Morrison MD, PGY-1   Attending physician: Dr. Chris Zapata Attending Addendum:    Patient seen and examined with the house staff. Old charts reviewed. X-rays personally reviewed through the PACS. Family is updated at the bedside as available. Additional findings listed below as necessary. Additional comments:  1. Septic shock from UTI vs severe hypovolemic shock- will hydrate aggressively  2. BEENA with hypernatremia undoubtedly prerenal- will hydrate and follow renal recs.   3. Probable UTI continue cefepime  4. Lactic acidosis hydration to continue, placed art line. 5. Bilateral pleural effusions are small in size.     30 min CCT

## 2021-03-05 NOTE — PROGRESS NOTES
[address] [facility]  Department of Internal Medicine   Internal Medicine Residency   MICU Progress Note    Patient:  Freddie Freeman 78 y.o. female  MRN: [id number]     Date of Service: 3/5/2021    Allergy: Patient has no known allergies. Subjective     Patient seen and examined this AM  No new changes over night  Hypotensive this AM -- given NICOM with 250 fluid bolus ; unresponsive     24h change: electrolytes improving  ROS: unable to obtain secondary to patient mentation  Objective     VS: BP (!) 113/48   Pulse 71   Temp 97.2 °F (36.2 °C) (Bladder)   Resp 18   Ht 5' 1\" (1.549 m)   Wt 135 lb 14.4 oz (61.6 kg)   SpO2 95%   BMI 25.68 kg/m²   ABP (Arterial line BP): 116/48  ABP mean (Arterial line mean): 72 mmHg    I & O - 24hr:     Intake/Output Summary (Last 24 hours) at 3/5/2021 1510  Last data filed at 3/5/2021 1400  Gross per 24 hour   Intake 4049.96 ml   Output 1160 ml   Net 2889.96 ml       Physical Exam:  · General Appearance: appears stated age and nonverbal  · Neck: no adenopathy, no carotid bruit, no JVD, supple, symmetrical, trachea midline and thyroid not enlarged, symmetric, no tenderness/mass/nodules  · Lung: Coarse breath sounds heard  · Heart: regular rate and rhythm, S1, S2 normal, no murmur, click, rub or gallop  · Abdomen: soft, non-tender; bowel sounds normal; no masses,  no organomegaly  · Extremities:  extremities normal, atraumatic, no cyanosis or edema  · Musculoskeletal: No joint swelling, no muscle tenderness. ROM normal in all joints of extremities.    · Neurologic: Mental status: Non verbal and unresponsive at this time    Lines     site day    Art line   R Radial 2   TLC L Fem 2   PICC None    Hemoaccess None      Oxygen:     6 L NC     ABG:     Lab Results   Component Value Date    PH 7.340 03/04/2021    WNX6KVV 39.3 02/08/2021    PCO2 45.0 03/04/2021    PO2ART 75.2 02/08/2021    PO2 78.9 03/04/2021    RCF1DUS 26.5 02/08/2021    HCO3 23.7 03/04/2021    BE -2.1 03/04/2021    THB 10.9 03/04/2021    O2SAT 94.6 03/04/2021        Medications     Infusions: (Fluid, Sedation, Vasopressors)  IVF:    D5W with 1/2 NS @ 100 cc/hr  Vasopressors   Levophed    Vasopressin  Sedation   none    Nutrition:   Tube feeds    ATB:   Antibiotics  Days   Vancomycin x1 1   Cefepime          Skin issues: none    Patient currently has   Urinary cath  Isolation  DVT prophylaxis/ GI prophylaxis,    Labs     CBC with Differential:    Lab Results   Component Value Date    WBC 13.1 03/05/2021    RBC 2.68 03/05/2021    HGB 7.7 03/05/2021    HCT 28.4 03/05/2021    PLT 62 03/05/2021    .0 03/05/2021    MCH 28.7 03/05/2021    MCHC 27.1 03/05/2021    RDW 27.6 03/05/2021    NRBC 29.6 03/05/2021    SEGSPCT 58 06/26/2011    METASPCT 3.7 03/05/2021    LYMPHOPCT 11.1 03/05/2021    MONOPCT 6.5 03/05/2021    MYELOPCT 0.9 03/05/2021    BASOPCT 0.6 03/05/2021    MONOSABS 0.92 03/05/2021    LYMPHSABS 1.44 03/05/2021    EOSABS 0.12 03/05/2021    BASOSABS 0.00 03/05/2021     CMP:    Lab Results   Component Value Date     03/05/2021    K 3.9 03/05/2021    K 4.8 02/09/2021     03/05/2021    CO2 22 03/05/2021    BUN 30 03/05/2021    CREATININE 1.6 03/05/2021    GFRAA 39 03/05/2021    LABGLOM 32 03/05/2021    GLUCOSE 166 03/05/2021    GLUCOSE 97 12/30/2011    PROT 6.1 03/05/2021    LABALBU 1.5 03/05/2021    LABALBU 3.7 12/30/2011    CALCIUM 7.5 03/05/2021    BILITOT 0.4 03/05/2021    ALKPHOS 77 03/05/2021    AST 89 03/05/2021    ALT 28 03/05/2021     U/A:    Lab Results   Component Value Date    COLORU DARK YELLOW 03/04/2021    PROTEINU 100 03/04/2021    PHUR 5.0 03/04/2021    LABCAST FEW 09/20/2017    WBCUA NONE 03/04/2021    RBCUA NONE 03/04/2021    MUCUS Present 02/08/2021    BACTERIA MANY 03/04/2021    CLARITYU SL CLOUDY 03/04/2021    SPECGRAV >=1.030 03/04/2021    LEUKOCYTESUR Negative 03/04/2021    UROBILINOGEN 4.0 03/04/2021    BILIRUBINUR MODERATE 03/04/2021    BLOODU Negative 03/04/2021 GLUCOSEU 100 2021    AMORPHOUS MODERATE 2017     ABG:    Lab Results   Component Value Date    PH 7.340 2021    PCO2 45.0 2021    PO2 78.9 2021    HCO3 23.7 2021    BE -2.1 2021    O2SAT 94.6 2021     FOLATE:    Lab Results   Component Value Date    FOLATE 19.8 2021     IRON:    Lab Results   Component Value Date    IRON 15 2021     TIBC:    Lab Results   Component Value Date    TIBC 83 2021     FERRITIN:    Lab Results   Component Value Date    FERRITIN 370 2021       Resident's Assessment and Plan     Neuro  1. Mental retardation with legal blindness  - Patient nonverbal and bed bound at baseline  - presented from Group home for unresponsiveness      Cardio  1. Hypovolemic Shock 2/2 poor oral intake/ hypovolemia   - Initial BP: 66/39 mmHg  - Volume resuscitation in ED : 2L bolus NS (30cc/kg) , started on LR @ 150 cc/hr  - NICOM with 250cc NS bolus - not fluid responsive  - Continue vasopressors : Levophed and Vasopressin  - Continue to monitor vitals     Pulmonology  1. Acute Hypoxic Respiratory Failure 2/2 pleural effusions b/l  - requiring 6L NC O2  - Tachypneic in ED   - Continue to monitor  - CXR negative, CT abdo/pelvis: bilateral pleural effusions of mild-to-moderate size causing atelectasis      GI  1. Poor Oral Intake  - no oral intake in last 48 hours prior to ED presentation , eating poorly over the last few weeks  - started on LR infusion --> difficult NG tube placement by IV team; general surgery consulted. Once NG tube placed ; can start tube feedings  - monitor labs     Nephrology  1. BEENA likely 2/2 to dehydration  - BUN/Creatinine 46/4.1 on presentation; baseline creatinine 0.7-1.0  - K: 5.7 , Na: 166  - Oliguric  - FeNa < 1%   - Continue IV fluids  - BMP Q6h   - Follow urine output     2. HAGMA likely secondary to lactic acidosis vs Uremia  - A.5 corrected for albumin  - ABG: pH: 7.34 , CO2: 45, O2: 78  - Initial lactic acid: 5.6 , BUN: 46  - Continue IV fluids  - Trend Lactic Acid , BMP q8     3. Hyperkalemia  - K: 5.7 on presentation , improved on repeat  - consider calcium gluconate , insulin with D50 if K still elevated  - continue IVF     4. Hypernatremia likely 2/2 dehydration  - Na: 166 on initial presentation  - LR started in ED  - Water deficit 7.379 L   - Repeat BMP  - Started patient on free water boluses ; 300cc q4 hr  - Nephrology consulted; appreciate recs    5. Lactic Acidosis  - elevated at 3.8 , already improving  - continue to monitor ; Lactic Acid q6      Endocrinology   1. No prior medical history     MSK     1. Bed-ridden with bilateral hip contractions    DVT/GI prophylaxis:  PT/OT:  Code status:  Disposition:    Renetta Morrison MD, PGY-1    Attending physician: Dr. Cam Edwards Attending Addendum:    Patient seen and examined with the house staff. X-rays personally reviewed through the PACS. Family is updated at the bedside as available. Additional findings listed below as necessary. Additional comments:  1. Hypotension probable hypovolemic shock, continue hydration despite being fluid unresponsive on NICOM. 2. Acute hypoxic respiratory failure- O2 needs coming down  3. BEENA and hypernatremia are responding to fluids and doubt ongoing sepsis so will stop abx tomorrow if no positive cx.  4. Anemic but hg acceptable.     >30 min CCT

## 2021-03-06 LAB
ALBUMIN SERPL-MCNC: 1.5 G/DL (ref 3.5–5.2)
ALP BLD-CCNC: 101 U/L (ref 35–104)
ALT SERPL-CCNC: 30 U/L (ref 0–32)
ANION GAP SERPL CALCULATED.3IONS-SCNC: 5 MMOL/L (ref 7–16)
ANION GAP SERPL CALCULATED.3IONS-SCNC: 6 MMOL/L (ref 7–16)
ANION GAP SERPL CALCULATED.3IONS-SCNC: 7 MMOL/L (ref 7–16)
ANISOCYTOSIS: ABNORMAL
AST SERPL-CCNC: 50 U/L (ref 0–31)
BASOPHILIC STIPPLING: ABNORMAL
BASOPHILS ABSOLUTE: 0 E9/L (ref 0–0.2)
BASOPHILS RELATIVE PERCENT: 0.4 % (ref 0–2)
BILIRUB SERPL-MCNC: 0.4 MG/DL (ref 0–1.2)
BUN BLDV-MCNC: 21 MG/DL (ref 8–23)
BUN BLDV-MCNC: 22 MG/DL (ref 8–23)
BUN BLDV-MCNC: 23 MG/DL (ref 8–23)
BUN BLDV-MCNC: 24 MG/DL (ref 8–23)
BUN BLDV-MCNC: 25 MG/DL (ref 8–23)
CALCIUM SERPL-MCNC: 7.4 MG/DL (ref 8.6–10.2)
CALCIUM SERPL-MCNC: 7.6 MG/DL (ref 8.6–10.2)
CALCIUM SERPL-MCNC: 7.7 MG/DL (ref 8.6–10.2)
CALCIUM SERPL-MCNC: 7.7 MG/DL (ref 8.6–10.2)
CALCIUM SERPL-MCNC: 7.8 MG/DL (ref 8.6–10.2)
CHLORIDE BLD-SCNC: 113 MMOL/L (ref 98–107)
CHLORIDE BLD-SCNC: 114 MMOL/L (ref 98–107)
CHLORIDE BLD-SCNC: 114 MMOL/L (ref 98–107)
CHLORIDE BLD-SCNC: 115 MMOL/L (ref 98–107)
CHLORIDE BLD-SCNC: 116 MMOL/L (ref 98–107)
CO2: 22 MMOL/L (ref 22–29)
CO2: 24 MMOL/L (ref 22–29)
CO2: 25 MMOL/L (ref 22–29)
CO2: 26 MMOL/L (ref 22–29)
CO2: 27 MMOL/L (ref 22–29)
CREAT SERPL-MCNC: 1 MG/DL (ref 0.5–1)
CREAT SERPL-MCNC: 1.1 MG/DL (ref 0.5–1)
CREAT SERPL-MCNC: 1.2 MG/DL (ref 0.5–1)
EOSINOPHILS ABSOLUTE: 0.12 E9/L (ref 0.05–0.5)
EOSINOPHILS RELATIVE PERCENT: 0.9 % (ref 0–6)
GFR AFRICAN AMERICAN: 54
GFR AFRICAN AMERICAN: 60
GFR AFRICAN AMERICAN: >60
GFR NON-AFRICAN AMERICAN: 45 ML/MIN/1.73
GFR NON-AFRICAN AMERICAN: 50 ML/MIN/1.73
GFR NON-AFRICAN AMERICAN: 55 ML/MIN/1.73
GLUCOSE BLD-MCNC: 159 MG/DL (ref 74–99)
GLUCOSE BLD-MCNC: 160 MG/DL (ref 74–99)
GLUCOSE BLD-MCNC: 212 MG/DL (ref 74–99)
GLUCOSE BLD-MCNC: 220 MG/DL (ref 74–99)
GLUCOSE BLD-MCNC: 224 MG/DL (ref 74–99)
HCT VFR BLD CALC: 27.3 % (ref 34–48)
HEMOGLOBIN: 7.8 G/DL (ref 11.5–15.5)
HYPOCHROMIA: ABNORMAL
L. PNEUMOPHILA SEROGP 1 UR AG: NORMAL
LACTIC ACID: 1.5 MMOL/L (ref 0.5–2.2)
LACTIC ACID: 1.9 MMOL/L (ref 0.5–2.2)
LYMPHOCYTES ABSOLUTE: 0.55 E9/L (ref 1.5–4)
LYMPHOCYTES RELATIVE PERCENT: 4.4 % (ref 20–42)
MAGNESIUM: 2 MG/DL (ref 1.6–2.6)
MCH RBC QN AUTO: 28.9 PG (ref 26–35)
MCHC RBC AUTO-ENTMCNC: 28.6 % (ref 32–34.5)
MCV RBC AUTO: 101.1 FL (ref 80–99.9)
METAMYELOCYTES RELATIVE PERCENT: 0.9 % (ref 0–1)
METER GLUCOSE: 155 MG/DL (ref 74–99)
METER GLUCOSE: 161 MG/DL (ref 74–99)
METER GLUCOSE: 175 MG/DL (ref 74–99)
METER GLUCOSE: 194 MG/DL (ref 74–99)
METER GLUCOSE: 258 MG/DL (ref 74–99)
METER GLUCOSE: 335 MG/DL (ref 74–99)
MONOCYTES ABSOLUTE: 0.55 E9/L (ref 0.1–0.95)
MONOCYTES RELATIVE PERCENT: 4.4 % (ref 2–12)
MRSA CULTURE ONLY: NORMAL
MYELOCYTE PERCENT: 1.8 % (ref 0–0)
NEUTROPHILS ABSOLUTE: 12.42 E9/L (ref 1.8–7.3)
NEUTROPHILS RELATIVE PERCENT: 87.7 % (ref 43–80)
NUCLEATED RED BLOOD CELLS: 13.2 /100 WBC
PDW BLD-RTO: 26.9 FL (ref 11.5–15)
PHOSPHORUS: 1.8 MG/DL (ref 2.5–4.5)
PLATELET # BLD: 57 E9/L (ref 130–450)
PLATELET CONFIRMATION: NORMAL
PMV BLD AUTO: ABNORMAL FL (ref 7–12)
POIKILOCYTES: ABNORMAL
POLYCHROMASIA: ABNORMAL
POTASSIUM SERPL-SCNC: 3 MMOL/L (ref 3.5–5)
POTASSIUM SERPL-SCNC: 3.5 MMOL/L (ref 3.5–5)
POTASSIUM SERPL-SCNC: 3.7 MMOL/L (ref 3.5–5)
POTASSIUM SERPL-SCNC: 4.3 MMOL/L (ref 3.5–5)
POTASSIUM SERPL-SCNC: 5.2 MMOL/L (ref 3.5–5)
RBC # BLD: 2.7 E12/L (ref 3.5–5.5)
SODIUM BLD-SCNC: 142 MMOL/L (ref 132–146)
SODIUM BLD-SCNC: 143 MMOL/L (ref 132–146)
SODIUM BLD-SCNC: 144 MMOL/L (ref 132–146)
SODIUM BLD-SCNC: 146 MMOL/L (ref 132–146)
SODIUM BLD-SCNC: 149 MMOL/L (ref 132–146)
STREP PNEUMONIAE ANTIGEN, URINE: NORMAL
TARGET CELLS: ABNORMAL
TOTAL PROTEIN: 6.3 G/DL (ref 6.4–8.3)
URINE CULTURE, ROUTINE: NORMAL
WBC # BLD: 13.8 E9/L (ref 4.5–11.5)

## 2021-03-06 PROCEDURE — 83735 ASSAY OF MAGNESIUM: CPT

## 2021-03-06 PROCEDURE — 2700000000 HC OXYGEN THERAPY PER DAY

## 2021-03-06 PROCEDURE — 85025 COMPLETE CBC W/AUTO DIFF WBC: CPT

## 2021-03-06 PROCEDURE — 2500000003 HC RX 250 WO HCPCS: Performed by: INTERNAL MEDICINE

## 2021-03-06 PROCEDURE — 2580000003 HC RX 258: Performed by: INTERNAL MEDICINE

## 2021-03-06 PROCEDURE — 6360000002 HC RX W HCPCS: Performed by: INTERNAL MEDICINE

## 2021-03-06 PROCEDURE — 92610 EVALUATE SWALLOWING FUNCTION: CPT

## 2021-03-06 PROCEDURE — 6370000000 HC RX 637 (ALT 250 FOR IP): Performed by: INTERNAL MEDICINE

## 2021-03-06 PROCEDURE — 2500000003 HC RX 250 WO HCPCS: Performed by: EMERGENCY MEDICINE

## 2021-03-06 PROCEDURE — 80053 COMPREHEN METABOLIC PANEL: CPT

## 2021-03-06 PROCEDURE — 84100 ASSAY OF PHOSPHORUS: CPT

## 2021-03-06 PROCEDURE — 2000000000 HC ICU R&B

## 2021-03-06 PROCEDURE — 83605 ASSAY OF LACTIC ACID: CPT

## 2021-03-06 PROCEDURE — 82962 GLUCOSE BLOOD TEST: CPT

## 2021-03-06 PROCEDURE — 80048 BASIC METABOLIC PNL TOTAL CA: CPT

## 2021-03-06 RX ORDER — POTASSIUM CHLORIDE 29.8 MG/ML
40 INJECTION INTRAVENOUS
Status: COMPLETED | OUTPATIENT
Start: 2021-03-06 | End: 2021-03-06

## 2021-03-06 RX ADMIN — HYDROCORTISONE SODIUM SUCCINATE 100 MG: 100 INJECTION, POWDER, FOR SOLUTION INTRAMUSCULAR; INTRAVENOUS at 11:09

## 2021-03-06 RX ADMIN — Medication 18 MCG/MIN: at 00:13

## 2021-03-06 RX ADMIN — FOLIC ACID 1 MG: 5 INJECTION, SOLUTION INTRAMUSCULAR; INTRAVENOUS; SUBCUTANEOUS at 09:05

## 2021-03-06 RX ADMIN — CEFEPIME 1000 MG: 1 INJECTION, POWDER, FOR SOLUTION INTRAMUSCULAR; INTRAVENOUS at 00:00

## 2021-03-06 RX ADMIN — DEXTROSE AND SODIUM CHLORIDE: 5; 450 INJECTION, SOLUTION INTRAVENOUS at 17:49

## 2021-03-06 RX ADMIN — POTASSIUM PHOSPHATE, MONOBASIC AND POTASSIUM PHOSPHATE, DIBASIC 20 MMOL: 224; 236 INJECTION, SOLUTION, CONCENTRATE INTRAVENOUS at 16:48

## 2021-03-06 RX ADMIN — INSULIN LISPRO 1 UNITS: 100 INJECTION, SOLUTION INTRAVENOUS; SUBCUTANEOUS at 09:20

## 2021-03-06 RX ADMIN — INSULIN LISPRO 3 UNITS: 100 INJECTION, SOLUTION INTRAVENOUS; SUBCUTANEOUS at 20:30

## 2021-03-06 RX ADMIN — INSULIN LISPRO 1 UNITS: 100 INJECTION, SOLUTION INTRAVENOUS; SUBCUTANEOUS at 17:01

## 2021-03-06 RX ADMIN — POTASSIUM CHLORIDE 40 MEQ: 400 INJECTION, SOLUTION INTRAVENOUS at 17:50

## 2021-03-06 RX ADMIN — INSULIN LISPRO 1 UNITS: 100 INJECTION, SOLUTION INTRAVENOUS; SUBCUTANEOUS at 13:10

## 2021-03-06 RX ADMIN — HYDROCORTISONE SODIUM SUCCINATE 100 MG: 100 INJECTION, POWDER, FOR SOLUTION INTRAMUSCULAR; INTRAVENOUS at 04:55

## 2021-03-06 RX ADMIN — THIAMINE HYDROCHLORIDE 100 MG: 100 INJECTION, SOLUTION INTRAMUSCULAR; INTRAVENOUS at 09:04

## 2021-03-06 RX ADMIN — Medication 10 ML: at 20:16

## 2021-03-06 RX ADMIN — CEFEPIME 1000 MG: 1 INJECTION, POWDER, FOR SOLUTION INTRAMUSCULAR; INTRAVENOUS at 06:30

## 2021-03-06 RX ADMIN — DEXTROSE AND SODIUM CHLORIDE: 5; 450 INJECTION, SOLUTION INTRAVENOUS at 07:40

## 2021-03-06 RX ADMIN — INSULIN LISPRO 1 UNITS: 100 INJECTION, SOLUTION INTRAVENOUS; SUBCUTANEOUS at 00:14

## 2021-03-06 RX ADMIN — Medication 10 ML: at 09:04

## 2021-03-06 RX ADMIN — INSULIN LISPRO 4 UNITS: 100 INJECTION, SOLUTION INTRAVENOUS; SUBCUTANEOUS at 04:55

## 2021-03-06 RX ADMIN — FAMOTIDINE 20 MG: 10 INJECTION INTRAVENOUS at 09:04

## 2021-03-06 RX ADMIN — HYDROCORTISONE SODIUM SUCCINATE 100 MG: 100 INJECTION, POWDER, FOR SOLUTION INTRAMUSCULAR; INTRAVENOUS at 18:10

## 2021-03-06 RX ADMIN — POTASSIUM CHLORIDE 40 MEQ: 400 INJECTION, SOLUTION INTRAVENOUS at 15:46

## 2021-03-06 ASSESSMENT — PAIN SCALES - PAIN ASSESSMENT IN ADVANCED DEMENTIA (PAINAD)
CONSOLABILITY: 0
TOTALSCORE: 0
BODYLANGUAGE: 0
TOTALSCORE: 0
BREATHING: 0
FACIALEXPRESSION: 0
BREATHING: 0
NEGVOCALIZATION: 0
BREATHING: 0
NEGVOCALIZATION: 0
BREATHING: 0
BODYLANGUAGE: 0
BODYLANGUAGE: 0
CONSOLABILITY: 0
NEGVOCALIZATION: 0
NEGVOCALIZATION: 0
TOTALSCORE: 0
NEGVOCALIZATION: 0
BREATHING: 0
BREATHING: 0
FACIALEXPRESSION: 0
NEGVOCALIZATION: 0
TOTALSCORE: 0
BODYLANGUAGE: 0
BODYLANGUAGE: 0
CONSOLABILITY: 0
TOTALSCORE: 0
CONSOLABILITY: 0
NEGVOCALIZATION: 0
BREATHING: 0
TOTALSCORE: 0
NEGVOCALIZATION: 0
FACIALEXPRESSION: 0
BREATHING: 0
BODYLANGUAGE: 0

## 2021-03-06 ASSESSMENT — PAIN SCALES - GENERAL
PAINLEVEL_OUTOF10: 0

## 2021-03-06 NOTE — PROGRESS NOTES
Sp[facility] with [facility] in regards to placement of PEG tube. Per guardian, Bianka Jacob, consent cannot be given by her until after patient has had a SLP evaluation for legal purposes since inserting a PEG tube is considered altering the patient's body. Dr. Martín Swann notified of need for SLP evaluation.

## 2021-03-06 NOTE — PROGRESS NOTES
Message left for on call guardian from [employer] in regards to SLP evaluation findings. Awaiting return call.

## 2021-03-06 NOTE — PROGRESS NOTES
[facility]  Department of Internal Medicine   Internal Medicine Residency   MICU Progress Note    Patient:  Suresh Thomasllor 78 y.o. female  MRN: [id number]     Date of Service: 3/6/2021    Allergy: Patient has no known allergies. Subjective     Patient seen and examined this AM  No acute changes over night     24h change: electrolytes improving  ROS: unable to obtain secondary to patient mentation  Objective     VS: BP 95/60   Pulse 75   Temp 96.6 °F (35.9 °C) (Bladder)   Resp 19   Ht 5' 1\" (1.549 m)   Wt 143 lb 1.6 oz (64.9 kg)   SpO2 96%   BMI 27.04 kg/m²   ABP (Arterial line BP): 129/60  ABP mean (Arterial line mean): 87 mmHg    I & O - 24hr:     Intake/Output Summary (Last 24 hours) at 3/6/2021 4641  Last data filed at 3/6/2021 0700  Gross per 24 hour   Intake 3143.96 ml   Output 1680 ml   Net 1463.96 ml       Physical Exam:  · General Appearance: appears stated age and nonverbal  · Neck: no adenopathy, no carotid bruit, no JVD, supple, symmetrical, trachea midline and thyroid not enlarged, symmetric, no tenderness/mass/nodules  · Lung: Coarse breath sounds heard  · Heart: regular rate and rhythm, S1, S2 normal, no murmur, click, rub or gallop  · Abdomen: soft, non-tender; bowel sounds normal; no masses,  no organomegaly  · Extremities:  extremities normal, atraumatic, no cyanosis or edema  · Musculoskeletal: No joint swelling, no muscle tenderness. ROM normal in all joints of extremities.    · Neurologic: Mental status: Non verbal and unresponsive at this time    Lines     site day    Art line   R Radial 3   TLC L Fem 3   PICC None    Hemoaccess None      Oxygen:     6 L NC     ABG:     Lab Results   Component Value Date    PH 7.340 03/04/2021    KQL5JKT 39.3 02/08/2021    PCO2 45.0 03/04/2021    PO2ART 75.2 02/08/2021    PO2 78.9 03/04/2021    PCK5LLJ 26.5 02/08/2021    HCO3 23.7 03/04/2021    BE -2.1 03/04/2021    THB 10.9 03/04/2021    O2SAT 94.6 03/04/2021        Medications Infusions: (Fluid, Sedation, Vasopressors)  IVF:    D5W with 1/2 NS @ 100 cc/hr  Vasopressors   Levophed 7 cc/hr   Vasopressin discontinued  Sedation   none    Nutrition:   On IVF for now ; requiring PEG tube for tube feed initiation    ATB:   Antibiotics  Days   Vancomycin x1 1   Cefepime 3 d/c 3/6/2021         Skin issues: none    Patient currently has   Urinary cath  Isolation  DVT prophylaxis/ GI prophylaxis,    Labs     CBC with Differential:    Lab Results   Component Value Date    WBC 13.8 03/06/2021    RBC 2.70 03/06/2021    HGB 7.8 03/06/2021    HCT 27.3 03/06/2021    PLT 57 03/06/2021    .1 03/06/2021    MCH 28.9 03/06/2021    MCHC 28.6 03/06/2021    RDW 26.9 03/06/2021    NRBC 13.2 03/06/2021    SEGSPCT 58 06/26/2011    METASPCT 0.9 03/06/2021    LYMPHOPCT 4.4 03/06/2021    MONOPCT 4.4 03/06/2021    MYELOPCT 1.8 03/06/2021    BASOPCT 0.4 03/06/2021    MONOSABS 0.55 03/06/2021    LYMPHSABS 0.55 03/06/2021    EOSABS 0.12 03/06/2021    BASOSABS 0.00 03/06/2021     CMP:    Lab Results   Component Value Date     03/06/2021    K 3.5 03/06/2021    K 4.8 02/09/2021     03/06/2021    CO2 26 03/06/2021    BUN 24 03/06/2021    CREATININE 1.2 03/06/2021    GFRAA 54 03/06/2021    LABGLOM 45 03/06/2021    GLUCOSE 224 03/06/2021    GLUCOSE 97 12/30/2011    PROT 6.3 03/06/2021    LABALBU 1.5 03/06/2021    LABALBU 3.7 12/30/2011    CALCIUM 7.7 03/06/2021    BILITOT 0.4 03/06/2021    ALKPHOS 101 03/06/2021    AST 50 03/06/2021    ALT 30 03/06/2021     U/A:    Lab Results   Component Value Date    COLORU DARK YELLOW 03/04/2021    PROTEINU 100 03/04/2021    PHUR 5.0 03/04/2021    LABCAST FEW 09/20/2017    WBCUA NONE 03/04/2021    RBCUA NONE 03/04/2021    MUCUS Present 02/08/2021    BACTERIA MANY 03/04/2021    CLARITYU SL CLOUDY 03/04/2021    SPECGRAV >=1.030 03/04/2021    LEUKOCYTESUR Negative 03/04/2021    UROBILINOGEN 4.0 03/04/2021    BILIRUBINUR MODERATE 03/04/2021    BLOODU Negative 03/04/2021 5  - Initial lactic acid: 5.6 , BUN: 46 , improved to normal LA, Bun: 24  - Continue IV fluids     3. Hyperkalemia - resolved  - K: 5.7 on presentation , current 3.5  - continue IVF     4. Hypernatremia likely 2/2 dehydration - improving  - Na: 166 on initial presentation --> 149 today  - LR started in ED  - Water deficit 7.379 L --> 2.5L currently   - Nephrology consulted; appreciate recs    5. Lactic Acidosis - resolved  - Initial 3.8 --> 1.9 today     Endocrinology   1. No prior medical history     Hematology:    1. Thrombocytopenia  - platelets acutely decreasing : 128 --> 101 --> 74 --> 62 --> 57 today  - Heparin held in setting of thrombocytopenia  - Cefepime discontinued. No other offending medications  - Refer to Pathology read of Peripheral Blood Smear  - Continue to monitor     MSK     1. Bed-ridden with bilateral hip contractions      DVT/GI prophylaxis: heparin held  PT/OT: not indicated  Code status: Full  [facility]    Tomeka Lopez MD, PGY-1    Attending physician: Dr. Vicente Prieto Attending Addendum:    Patient seen and examined with the house staff. X-rays personally reviewed through the PACS. Family is updated at the bedside as available. Additional findings listed below as necessary. Additional comments:  1. Hypotension resolving pull a line as nonfxing. Wean off norepi. 2. BEENA and hypernatremia are improving with hydration. 3. Thrombocytopenia may be due to cefepime and as no positive cx, will dc. 4. Dysphagia for PEG.

## 2021-03-06 NOTE — PROGRESS NOTES
Dr. Adkins Age updated on inability to obtain treatment consent at this time for PEG tube placement d/t need for SLP eval.

## 2021-03-06 NOTE — PROGRESS NOTES
Telephone consent obtained x 2 nurses from [facility], guardian from [facility] for placement of PEG tube.

## 2021-03-06 NOTE — PROGRESS NOTES
Upon attempt to swab mouth and perform mouthcare, patient clamped mouth shut on suction toothette. Patient unable to follow commands.

## 2021-03-06 NOTE — PROGRESS NOTES
SPEECH/LANGUAGE PATHOLOGY  CLINICAL ASSESSMENT OF SWALLOWING FUNCTION    PATIENT NAME:  Roseanna Orosco      :  1955      TODAY'S DATE:  3/6/2021  ROOM:  26/4426-A    SUMMARY OF EVALUATION     DYSPHAGIA DIAGNOSIS:  Symptoms of Severe Oropharyngeal Dysphagia       DIET RECOMMENDATIONS:  NPO (nothing by mouth including oral meds)  - recommend a PEG to maintain adequate nutrition/hydration. Patient is inappropriate for pleasure feeding secondary to overt symptoms of aspiration even with pudding. FEEDING RECOMMENDATIONS:     Assistance level:  Not applicable      Compensatory strategies recommended: Not applicable    THERAPY RECOMMENDATIONS:      Dysphagia therapy is not recommended- patient opened mouth reflexively to spoon, but did not follow any other commands. PROCEDURE     Consistencies Administered During the Evaluation   Liquids: pudding thick liquid   Solids:  N/A      Method of Intake:   spoon  Fed by clinician      Position:   Seated, upright                        RESULTS     Oral Stage:      SLP only administered pudding via spoon. Inadequate labial seal resulting anterior labial spillage from midline. Oral residuals post swallow. Pharyngeal Stage:      Latent wet cough was noted after presentation of pudding consistency liquid. Patient required suctioning from nursing after trials to clear. No further trials presented. The Speech Language Pathologist (SLP) completed education with the patient regarding results of evaluation. Explained that Speech Pathology intervention is not warranted  at this time     The Patient did not demonstrate understanding of the diagnosis, prognosis and plan of care- secondary to cognition        CPT code:  48786  bedside swallow eval      [x]The admitting diagnosis and active problem list, as listed below have been reviewed prior to initiation of this evaluation.      ADMITTING DIAGNOSIS: Sepsis ([facility] [address] [A41.9]     ACTIVE PROBLEM LIST:   Patient Active Problem List   Diagnosis    Mental retardation    Hypothyroidism    Blindness of both eyes    ESRD (end stage renal disease) on dialysis ([facility] [address]    Macrocytosis    Behavior disturbance    Altered mental state    Sepsis [facility])       [address] [place], [place] [address]Rhonda [address]  3/6/2021

## 2021-03-06 NOTE — PROGRESS NOTES
Comprehensive Nutrition Assessment    Type and Reason for Visit:  Initial, Positive Nutrition Screen    Nutrition Recommendations/Plan: Continue NPO  Noted need for SLP. Noted inability to place NGT at this time in addition to inability to contain consent for PEG placement. Recommend to advance nutrition as medically appropriate. Nutrition Assessment:  Pt found unresponsive, noted sepsis, AMS, BEENA. H/o  Remains NPO at this time. Malnutrition Assessment:  Malnutrition Status:  No malnutrition    Context:  Chronic Illness     Findings of the 6 clinical characteristics of malnutrition:  Energy Intake:  Mild decrease in energy intake (Comment)  Weight Loss:  No significant weight loss     Body Fat Loss:  No significant body fat loss     Muscle Mass Loss:  No significant muscle mass loss    Fluid Accumulation:  No significant fluid accumulation     Strength:  Not Performed    Estimated Daily Nutrient Needs:  Energy (kcal):  MSJ 1085 x1.2= 1302; ; Weight Used for Energy Requirements:  Admission     Protein (g):  60-70(1.3-1.5gm/kg IBW); Weight Used for Protein Requirements:  Ideal          Nutrition Related Findings:  nonverbal, blind, bedridden, hypernatremia, elevated BSL, hypotension, active BS, soft abd, +1 edema, + I/Os, AMS      Wounds:  None       Current Nutrition Therapies:    Diet NPO, After Midnight Exceptions are: Sips with Meds    Anthropometric Measures:  · Height: 5' 1\" (154.9 cm)  · Current Body Weight: 135 lb (61.2 kg)(3/5 actual; 3/6 143# actual)   · Usual Body Weight: (2/8/21 146# actual; 10/2019 134# actual- noted varied wt's however overall appears stable)     · Ideal Body Weight: 105 lbs; % Ideal Body Weight 128.6 %   · BMI: 25.5  · BMI Categories: Overweight (BMI 25.0-29. 9)       Nutrition Diagnosis:   · Inadequate oral intake related to cognitive or neurological impairment as evidenced by NPO or clear liquid status due to medical condition    Nutrition Interventions: Nutrition Education/Counseling:  Education not indicated   Coordination of Nutrition Care:  Continue to monitor while inpatient, Coordination of Community Care, Swallow Evaluation, Speech Therapy    Goals:  Nutrition progression       Nutrition Monitoring and Evaluation:   Food/Nutrient Intake Outcomes:  Diet Advancement/Tolerance  Physical Signs/Symptoms Outcomes:  Biochemical Data, Nutrition Focused Physical Findings, Skin, Chewing or Swallowing, Weight, GI Status, Fluid Status or Edema, Hemodynamic Status     Electronically signed by Edwin Velasquez MS, RD, LD on 3/6/21 at 9:18 AM EST

## 2021-03-06 NOTE — PROGRESS NOTES
[facility]  Nephrology Attending Progress Note  Reddy Perla. Mukul Romero MD        SUBJECTIVE:     3/4: This is a 78 y.o.  female with a H/O MR w/ blindness (previous episode of BEENA) who now presents to ED from local group home due to being unresponsive. They found her unresponsive this morning, around 7 AM, they waited 3 hours that she did not respond, and then they sent her in for evaluation     Vitals upon arrival included BP (!) 120/56   Pulse 111   Temp 99.1 °F (37.3 °C)   Resp 16   Ht 5' 1\" (1.549 m)   Wt 146 lb (66.2 kg)   SpO2 100%   BMI 27.59 kg/m². Pertinent labs include sodium 166, potassium 5.7, chloride 132, CO2 22, BUN 46 and creatinine 4.1. ABG show pH 7.340, PCO2 45, PO2 78.9, bicarb 23.7 and 94.6% O2 saturation. CBC includes WBC 10.7, hemoglobin 8.7, hematocrit 30.9, and platelet count 448. Initial UA: Dark yellow, slightly cloudy, moderate bili, >1.030, 100 protein, positive nitrites. CT scan shows bilateral pleural effusions of mild to moderate degree causing compression atelectasis in the lower lobes posteriorly. Also shows no obstructive uropathy. Patient was normotensive upon arrival.  Initially received IV fluids. She soon developed hypotension. Initially started on Levophed. Plans to admit to ICU.     Currently examined in the ED setting: she is unresponsive to verbal stimuli. A complete ROS cannot be obtained. Family member is present who called the group home. Apparently pt has had very poor intake over the past several weeks; taking nothing by mouth past 48 hrs. Also no BM for \"several\" weeks. Of note, pt was at this facility in early Feb of this year w/ the same s/s. Also, the pt does not speak at baseline.      3/5: pt seen in icu      PROBLEM LIST:    Patient Active Problem List   Diagnosis    Mental retardation    Hypothyroidism    Blindness of both eyes    ESRD (end stage renal disease) on dialysis ([facility] [address]    Macrocytosis    Behavior disturbance    Altered mental state    Sepsis ([facility]        PAST MEDICAL HISTORY:    Past Medical History:   Diagnosis Date    Acute kidney injury ([facility] 01/15/2017    d/t Vancomycin, on Dialysis [facility] right chest    Blind in both eyes     Hemodialysis patient ([facility]     Hyperthyroidism     MR (mental retardation)     Osteoarthritis     Pneumonia 01/06/2017       DIET:    Diet NPO, After Midnight Exceptions are: Sips with Meds     PHYSICAL EXAM:     Patient Vitals for the past 24 hrs:   BP Temp Temp src Pulse Resp SpO2 Height Weight   03/06/21 1000 102/62 95.2 °F (35.1 °C) Bladder 66 19 95 % -- --   03/06/21 0913 -- -- -- -- -- -- 5' 1\" (1.549 m) --   03/06/21 0900 (!) 102/47 -- -- 60 20 100 % -- --   03/06/21 0800 (!) 112/52 96.4 °F (35.8 °C) Bladder 65 16 97 % -- --   03/06/21 0700 -- -- -- 67 18 97 % -- --   03/06/21 0600 -- -- -- 75 19 96 % -- 143 lb 1.6 oz (64.9 kg)   03/06/21 0500 -- -- -- 77 21 95 % -- --   03/06/21 0400 95/60 96.6 °F (35.9 °C) Bladder 73 23 97 % -- --   03/06/21 0300 -- -- -- 83 25 97 % -- --   03/06/21 0200 -- -- -- 83 19 97 % -- --   03/06/21 0100 -- -- -- 81 21 97 % -- --   03/06/21 0000 139/60 96.8 °F (36 °C) Bladder 72 25 97 % -- --   03/05/21 2300 -- -- -- 66 21 97 % -- --   03/05/21 2200 -- -- -- 73 19 95 % -- --   03/05/21 2100 -- -- -- 74 22 95 % -- --   03/05/21 2000 (!) 121/51 96.8 °F (36 °C) Bladder 69 21 94 % -- --   03/05/21 1900 -- -- -- 71 20 96 % -- --   03/05/21 1800 (!) 130/57 -- -- 70 17 91 % -- --   03/05/21 1700 (!) 128/57 -- -- 76 16 96 % -- --   03/05/21 1600 (!) 131/57 97.3 °F (36.3 °C) Bladder 99 21 95 % -- --   03/05/21 1500 (!) 149/68 -- -- 85 19 94 % -- --   03/05/21 1400 (!) 116/48 -- -- 71 18 95 % -- --   03/05/21 1300 (!) 118/53 -- -- 86 17 94 % -- --   03/05/21 1200 (!) 116/47 97.2 °F (36.2 °C) Bladder 74 16 95 % -- --   @      Intake/Output Summary (Last 24 hours) at 3/6/2021 1117  Last data filed at 3/6/2021 1000  Gross per 24 hour   Intake 3143.96 ml   Output 1720 ml   Net 1423.96 ml         Wt Readings from Last 3 Encounters:   03/06/21 143 lb 1.6 oz (64.9 kg)   02/09/21 146 lb 4.8 oz (66.4 kg)   09/29/20 154 lb (69.9 kg)       Constitutional:  Pt is lethargic, nonverbal  Head: normocephalic, atraumatic  Neck: no JVD  Cardiovascular: regular rate and rhythm, no murmurs, gallops, or rubs  Respiratory:  No rales, rhochi, or wheezes  Gastrointestinal:  Soft, nontender, nondistended, bowel sounds x 4  Ext: no edema  Skin: dry, no rash    MEDS (scheduled):    insulin lispro  0-6 Units Subcutaneous K3D    folic acid  1 mg Intravenous Daily    thiamine  100 mg Intravenous Daily    hydrocortisone sodium succinate PF  100 mg Intravenous Q8H    famotidine (PEPCID) injection  20 mg Intravenous Daily    [Held by provider] levothyroxine  150 mcg Oral Daily    sodium chloride flush  10 mL Intravenous 2 times per day    [Held by provider] heparin (porcine)  5,000 Units Subcutaneous 3 times per day    cefepime  1,000 mg Intravenous Q8H       MEDS (infusions):   dextrose      norepinephrine 4 mcg/min (03/06/21 1041)    vasopressin (Septic Shock) infusion Stopped (03/05/21 0650)    dextrose 5 % and 0.45 % NaCl 100 mL/hr at 03/06/21 0740       MEDS (prn):  glucose, dextrose, glucagon (rDNA), dextrose, sodium chloride flush, promethazine **OR** ondansetron, polyethylene glycol, acetaminophen **OR** acetaminophen    DATA:    Recent Labs     03/05/21  0408 03/05/21  0918 03/06/21  0438   WBC 12.7* 13.1* 13.8*   HGB 8.0* 7.7* 7.8*   HCT 28.4* 28.4* 27.3*   .4* 106.0* 101.1*   PLT 74* 62* 57*     Recent Labs     03/04/21  2103 03/05/21  0408 03/05/21  0408 03/06/21  0153 03/06/21  0438 03/06/21  0906   * 151*   < > 143 149* 144   K 4.1 3.8   < > 4.3 3.5 3.7   * 120*   < > 113* 116* 115*   CO2 23 23   < > 25 26 24   BUN 39* 35*   < > 25* 24* 23   CREATININE 2.7* 2.1*   < > 1.1* 1.2* 1.0   LABGLOM 18 23   < > 50 45 55   GLUCOSE 232* 232*   < > 212* 224* 160*   CALCIUM 7.8* 7.2*   < > 7.4* 7.7* 7.7*   ALT 23 28  --   --  30  --    AST 88* 89*  --   --  50*  --    BILITOT 0.6 0.4  --   --  0.4  --    ALKPHOS 92 77  --   --  101  --    MG 1.6 2.4  --   --  2.0  --    PHOS 3.5 2.5  --   --  1.8*  --     < > = values in this interval not displayed. Lab Results   Component Value Date    LABALBU 1.5 (L) 03/06/2021    LABALBU 1.5 (L) 03/05/2021    LABALBU 1.7 (L) 03/04/2021     Lab Results   Component Value Date    TSH 12.170 (H) 02/08/2021       Iron Studies  Lab Results   Component Value Date    IRON 15 (L) 03/05/2021    TIBC 83 (L) 03/05/2021    FERRITIN 370 03/05/2021     Vitamin B-12   Date Value Ref Range Status   03/05/2021 1169 (H) 211 - 946 pg/mL Final     Folate   Date Value Ref Range Status   03/05/2021 19.8 4.8 - 24.2 ng/mL Final       Vit D, 25-Hydroxy   Date Value Ref Range Status   12/30/2011 46 30 - 80 ng/mL Final     Comment:     REFERENCE INTERVAL- Vitamin D, 25-Hydroxy    This assay accurately quantifies the sum of vitamin D3,  25-hydroxy and vitamin D2, 25-hydroxy.     0-17 years-  Deficiency- less than 20 ng/mL  Optimum level- greater than or equal to 20 ng/mL*  *(Bonds CL et al. Pediatrics 2008- 122- 1142-52.)    18 years and older-  Deficiency- Less than 20 ng/mL  Insufficiency- 20-29 ng/mL  Optimum Level- 30-80 ng/mL  Possible Toxicity- Greater than 150 ng/mL     PTH   Date Value Ref Range Status   01/26/2017 105 (H) 15 - 65 pg/mL Final       No components found for: URIC    Lab Results   Component Value Date    COLORU DARK YELLOW 03/04/2021    NITRU POSITIVE 03/04/2021    GLUCOSEU 100 03/04/2021    KETUA 15 03/04/2021    UROBILINOGEN 4.0 03/04/2021    BILIRUBINUR MODERATE 03/04/2021       No results found for: Paige Vital      IMPRESSION/RECOMMENDATIONS:      1) Acute kidney injury  Most recent baseline serum creatinine 0.7 -> 0.8 in February 2021  Of note patient had a episode of renal failure in 2017 requiring short-term hemodialysis  admitting serum creatinine is 4.1>2.1>1  Likely prerenal in the setting of severe dehydration and hypotension  CT scan reviewed: No obstructive process  UA noted: Very concentrated urine  Has been started on aggressive IV fluids in the ED  fena < 1  Good uo 1.6L     2) Hypernatremia  In the setting of severe dehydration  Follow response to aggressive hypotonic fluids     3) Hyperkalemia  In the setting of BEENA  Caution w/ recent constipation   May need treated medically      4) Hypotension  Likely in the setting of sepsis and hypovolemia  Has been started on IV pressors, now off  Blood/urine cultures drawn     5) Anemia  No obvious bleeding  Check fe b12 fol     6) Hx MR  Also blindness     Marcy Red.  Tommy Sung MD

## 2021-03-06 NOTE — PROGRESS NOTES
PT SEEN AND EXAMINED. discussed with Dr. Evert Dorado  Chart reviewed. meds reviewed. D/w nursing + family as available. EXAM: IN GENERAL, ROSEANNE Martinez ROS NEGx10 EXCEPT:   /71   Pulse 65   Temp 97 °F (36.1 °C) (Bladder)   Resp 23   Ht 5' 1\" (1.549 m)   Wt 143 lb 1.6 oz (64.9 kg)   SpO2 100%   BMI 27.04 kg/m²   GEN: not really responsive. HEENT: NCAT. EOMI. VIMAL  NECK: NO JVD. TRACH MIDLINE. NO BRUITS. NO THYROMEGALY. LUNGS: CTA BL NO RALES, RHONCHI OR WHEEZES. GOOD EXCURSION. CV: Regular rate and rhythm, NO Murmurs, Rubs, Or gallops  ABD: Soft. Nontender. Normal bowel sounds. No organomegaly  EXT:No clubbing cyanosis or edema  Neuro: Alert and oriented x 3. No focal motor deficits. No sensory deficits. Reflexes appear intact.   Labs/data reviewedLABS: CBC with Differential:    Lab Results   Component Value Date    WBC 13.8 03/06/2021    RBC 2.70 03/06/2021    HGB 7.8 03/06/2021    HCT 27.3 03/06/2021    PLT 57 03/06/2021    .1 03/06/2021    MCH 28.9 03/06/2021    MCHC 28.6 03/06/2021    RDW 26.9 03/06/2021    NRBC 13.2 03/06/2021    SEGSPCT 58 06/26/2011    METASPCT 0.9 03/06/2021    LYMPHOPCT 4.4 03/06/2021    MONOPCT 4.4 03/06/2021    MYELOPCT 1.8 03/06/2021    BASOPCT 0.4 03/06/2021    MONOSABS 0.55 03/06/2021    LYMPHSABS 0.55 03/06/2021    EOSABS 0.12 03/06/2021    BASOSABS 0.00 03/06/2021     Platelets:    Lab Results   Component Value Date    PLT 57 03/06/2021     CMP:    Lab Results   Component Value Date     03/06/2021    K 3.7 03/06/2021    K 4.8 02/09/2021     03/06/2021    CO2 24 03/06/2021    BUN 23 03/06/2021    CREATININE 1.0 03/06/2021    GFRAA >60 03/06/2021    LABGLOM 55 03/06/2021    GLUCOSE 160 03/06/2021    GLUCOSE 97 12/30/2011    PROT 6.3 03/06/2021    LABALBU 1.5 03/06/2021    LABALBU 3.7 12/30/2011    CALCIUM 7.7 03/06/2021    BILITOT 0.4 03/06/2021    ALKPHOS 101 03/06/2021    AST 50 03/06/2021    ALT 30 03/06/2021     Magnesium:    Lab Results   Component Value Date MG 2.0 03/06/2021     LDH:    Lab Results   Component Value Date     01/26/2017     PT/INR:    Lab Results   Component Value Date    PROTIME 18.8 03/04/2021    INR 1.7 03/04/2021     Last 3 Troponin:    Lab Results   Component Value Date    TROPONINI 0.08 03/05/2021    TROPONINI 0.10 03/04/2021    TROPONINI 0.19 03/04/2021     ABG:    Lab Results   Component Value Date    PH 7.340 03/04/2021    PCO2 45.0 03/04/2021    PO2 78.9 03/04/2021    HCO3 23.7 03/04/2021    BE -2.1 03/04/2021    O2SAT 94.6 03/04/2021     IRON:    Lab Results   Component Value Date    IRON 15 03/05/2021     IMAGING    Ct Abdomen Pelvis Wo Contrast Additional Contrast? None    Result Date: 3/4/2021  EXAMINATION: CT OF THE ABDOMEN AND PELVIS WITHOUT CONTRAST 3/4/2021 12:28 pm TECHNIQUE: CT of the abdomen and pelvis was performed without the administration of intravenous contrast. Multiplanar reformatted images are provided for review. Dose modulation, iterative reconstruction, and/or weight based adjustment of the mA/kV was utilized to reduce the radiation dose to as low as reasonably achievable. COMPARISON: March 6, 2017 HISTORY: ORDERING SYSTEM PROVIDED HISTORY: fever TECHNOLOGIST PROVIDED HISTORY: Reason for exam:->fever Additional Contrast?->None Decision Support Exception->Emergency Medical Condition (MA) What reading provider will be dictating this exam?->CRC FINDINGS: Presence of bilateral pleural effusions in mild-to-moderate degree causing compression atelectasis in the dependent portion of both lower lobes. The heart has normal size. The there is no pericardial effusion. There are preserved size and density for the liver. The gallbladder is normally distended. There is some increased density in the dependent portion of the gallbladder in part artifacts by adjacent peristalsis. Can further evaluate the gallbladder with gallbladder ultrasound. There is some atrophy of the pancreas. The spleen has unremarkable appearance. There is no dilatation of the biliary tree pancreatic ductal system. Adrenals are not enlarged. Aorta and IVC are of unremarkable appearance. The there is no midline retroperitoneal adenopathy. Patient has a left the femoral venous catheter with tip in the left the common iliac vein. The There are is streaking artifacts in the abdomen due monitoring devices and active peristalsis. There are no indication for free intraperitoneal air or acute inflammatory changes the mid mesentery fat planes. There is no stranding of the pericolonic fat planes. There is no signs for diverticulitis. Fair amount of fecal content is seen in the colon indicating pattern moderate constipation. The a component of fecal rectal retention is also observed. There remarkable appearance for the uterus and for the right ovary. The there is a 2 cm is stable fat containing cyst in the left ovary compatible with a the dermoid type of cyst with discrete the mey of calcification in the wall. There are normal size for the kidneys. There is no renal calculus. There is no obstruction. There is no midline retroperitoneal adenopathy. 1.  Bilateral pleural effusions of mild-to-moderate degree causing compression atelectasis in the lower lobes posteriorly. 2.  Pattern of constipation. 3.  No indication for acute the inflammatory process in the mid mesentery fat planes, free intraperitoneal air or ascites. 4.  No obstructive uropathy. 5.  No dilatation of the biliary tree. 6.  Cannot entirely exclude small gallstone in the gallbladder lumen, artifacts are present. Further evaluation with gallbladder ultrasound is recommended.      Ct Head Wo Contrast    Result Date: 3/4/2021  EXAMINATION: CT OF THE HEAD WITHOUT CONTRAST  3/4/2021 8:33 am TECHNIQUE: CT of the head was performed without the administration of intravenous contrast. Dose modulation, iterative reconstruction, and/or weight based adjustment of the mA/kV was utilized to reduce the Sips with Meds    Medications:    Scheduled Meds:   insulin lispro  0-6 Units Subcutaneous H5T    folic acid  1 mg Intravenous Daily    thiamine  100 mg Intravenous Daily    hydrocortisone sodium succinate PF  100 mg Intravenous Q8H    famotidine (PEPCID) injection  20 mg Intravenous Daily    [Held by provider] levothyroxine  150 mcg Oral Daily    sodium chloride flush  10 mL Intravenous 2 times per day    [Held by provider] heparin (porcine)  5,000 Units Subcutaneous 3 times per day       Continuous Infusions:   dextrose      norepinephrine 8 mcg/min (03/06/21 1249)    vasopressin (Septic Shock) infusion Stopped (03/05/21 0650)    dextrose 5 % and 0.45 % NaCl 100 mL/hr at 03/06/21 0740       PRN Meds:glucose, dextrose, glucagon (rDNA), dextrose, sodium chloride flush, promethazine **OR** ondansetron, polyethylene glycol, acetaminophen **OR** acetaminophen    A/P:      Patient Active Problem List   Diagnosis    Mental retardation    Hypothyroidism    Blindness of both eyes    ESRD (end stage renal disease) on dialysis ([facility] [address]    Macrocytosis    Behavior disturbance    Altered mental state    Sepsis ([facility] [address]    hypo-volemic shock  Acute hypoxic respiratory failure  BEENA  hyperkalemia  Lactic acidosis  PLAN:continue critical care monitoring in ICU  Will place PEG tube    I can be reached though [employer] or [facility] at [phone]

## 2021-03-07 ENCOUNTER — ANESTHESIA (OUTPATIENT)
Dept: ENDOSCOPY | Age: 66
DRG: 871 | End: 2021-03-07
Payer: MEDICARE

## 2021-03-07 ENCOUNTER — ANESTHESIA EVENT (OUTPATIENT)
Dept: ENDOSCOPY | Age: 66
DRG: 871 | End: 2021-03-07
Payer: MEDICARE

## 2021-03-07 VITALS — DIASTOLIC BLOOD PRESSURE: 69 MMHG | OXYGEN SATURATION: 95 % | SYSTOLIC BLOOD PRESSURE: 118 MMHG

## 2021-03-07 LAB
ALBUMIN SERPL-MCNC: 1.5 G/DL (ref 3.5–5.2)
ALP BLD-CCNC: 93 U/L (ref 35–104)
ALT SERPL-CCNC: 27 U/L (ref 0–32)
ANION GAP SERPL CALCULATED.3IONS-SCNC: 2 MMOL/L (ref 7–16)
ANION GAP SERPL CALCULATED.3IONS-SCNC: 4 MMOL/L (ref 7–16)
ANION GAP SERPL CALCULATED.3IONS-SCNC: 5 MMOL/L (ref 7–16)
ANISOCYTOSIS: ABNORMAL
AST SERPL-CCNC: 30 U/L (ref 0–31)
BASOPHILIC STIPPLING: ABNORMAL
BASOPHILS ABSOLUTE: 0 E9/L (ref 0–0.2)
BASOPHILS RELATIVE PERCENT: 0.3 % (ref 0–2)
BILIRUB SERPL-MCNC: 0.2 MG/DL (ref 0–1.2)
BUN BLDV-MCNC: 17 MG/DL (ref 8–23)
BUN BLDV-MCNC: 18 MG/DL (ref 8–23)
BUN BLDV-MCNC: 18 MG/DL (ref 8–23)
BUN BLDV-MCNC: 19 MG/DL (ref 8–23)
BUN BLDV-MCNC: 19 MG/DL (ref 8–23)
CALCIUM IONIZED: 1.25 MMOL/L (ref 1.15–1.33)
CALCIUM SERPL-MCNC: 7.4 MG/DL (ref 8.6–10.2)
CALCIUM SERPL-MCNC: 7.5 MG/DL (ref 8.6–10.2)
CALCIUM SERPL-MCNC: 7.6 MG/DL (ref 8.6–10.2)
CALCIUM SERPL-MCNC: 7.7 MG/DL (ref 8.6–10.2)
CALCIUM SERPL-MCNC: 7.9 MG/DL (ref 8.6–10.2)
CHLORIDE BLD-SCNC: 110 MMOL/L (ref 98–107)
CHLORIDE BLD-SCNC: 111 MMOL/L (ref 98–107)
CHLORIDE BLD-SCNC: 112 MMOL/L (ref 98–107)
CHLORIDE BLD-SCNC: 114 MMOL/L (ref 98–107)
CHLORIDE BLD-SCNC: 115 MMOL/L (ref 98–107)
CO2: 23 MMOL/L (ref 22–29)
CO2: 23 MMOL/L (ref 22–29)
CO2: 24 MMOL/L (ref 22–29)
CO2: 24 MMOL/L (ref 22–29)
CO2: 25 MMOL/L (ref 22–29)
CREAT SERPL-MCNC: 0.8 MG/DL (ref 0.5–1)
CREAT SERPL-MCNC: 0.9 MG/DL (ref 0.5–1)
EOSINOPHILS ABSOLUTE: 0 E9/L (ref 0.05–0.5)
EOSINOPHILS RELATIVE PERCENT: 0 % (ref 0–6)
GFR AFRICAN AMERICAN: >60
GFR NON-AFRICAN AMERICAN: >60 ML/MIN/1.73
GLUCOSE BLD-MCNC: 133 MG/DL (ref 74–99)
GLUCOSE BLD-MCNC: 138 MG/DL (ref 74–99)
GLUCOSE BLD-MCNC: 168 MG/DL (ref 74–99)
GLUCOSE BLD-MCNC: 193 MG/DL (ref 74–99)
GLUCOSE BLD-MCNC: 224 MG/DL (ref 74–99)
HCT VFR BLD CALC: 24.5 % (ref 34–48)
HEMOGLOBIN: 7.4 G/DL (ref 11.5–15.5)
HYPOCHROMIA: ABNORMAL
LYMPHOCYTES ABSOLUTE: 0.35 E9/L (ref 1.5–4)
LYMPHOCYTES RELATIVE PERCENT: 2.7 % (ref 20–42)
MAGNESIUM: 1.8 MG/DL (ref 1.6–2.6)
MCH RBC QN AUTO: 29.4 PG (ref 26–35)
MCHC RBC AUTO-ENTMCNC: 30.2 % (ref 32–34.5)
MCV RBC AUTO: 97.2 FL (ref 80–99.9)
METAMYELOCYTES RELATIVE PERCENT: 4.4 % (ref 0–1)
METER GLUCOSE: 132 MG/DL (ref 74–99)
METER GLUCOSE: 134 MG/DL (ref 74–99)
METER GLUCOSE: 158 MG/DL (ref 74–99)
METER GLUCOSE: 220 MG/DL (ref 74–99)
METER GLUCOSE: 222 MG/DL (ref 74–99)
MONOCYTES ABSOLUTE: 0.58 E9/L (ref 0.1–0.95)
MONOCYTES RELATIVE PERCENT: 5.3 % (ref 2–12)
MYELOCYTE PERCENT: 1.8 % (ref 0–0)
NEUTROPHILS ABSOLUTE: 10.65 E9/L (ref 1.8–7.3)
NEUTROPHILS RELATIVE PERCENT: 85 % (ref 43–80)
NUCLEATED RED BLOOD CELLS: 10.6 /100 WBC
OVALOCYTES: ABNORMAL
PDW BLD-RTO: 26.6 FL (ref 11.5–15)
PHOSPHORUS: 1.8 MG/DL (ref 2.5–4.5)
PLATELET # BLD: 53 E9/L (ref 130–450)
PLATELET CONFIRMATION: NORMAL
PMV BLD AUTO: ABNORMAL FL (ref 7–12)
POIKILOCYTES: ABNORMAL
POLYCHROMASIA: ABNORMAL
POTASSIUM SERPL-SCNC: 4.2 MMOL/L (ref 3.5–5)
POTASSIUM SERPL-SCNC: 4.3 MMOL/L (ref 3.5–5)
POTASSIUM SERPL-SCNC: 4.4 MMOL/L (ref 3.5–5)
POTASSIUM SERPL-SCNC: 4.4 MMOL/L (ref 3.5–5)
POTASSIUM SERPL-SCNC: 4.7 MMOL/L (ref 3.5–5)
PROMYELOCYTES PERCENT: 0.9 % (ref 0–0)
RBC # BLD: 2.52 E12/L (ref 3.5–5.5)
SODIUM BLD-SCNC: 137 MMOL/L (ref 132–146)
SODIUM BLD-SCNC: 139 MMOL/L (ref 132–146)
SODIUM BLD-SCNC: 141 MMOL/L (ref 132–146)
SODIUM BLD-SCNC: 142 MMOL/L (ref 132–146)
SODIUM BLD-SCNC: 143 MMOL/L (ref 132–146)
TARGET CELLS: ABNORMAL
TEAR DROP CELLS: ABNORMAL
TOTAL PROTEIN: 5.8 G/DL (ref 6.4–8.3)
WBC # BLD: 11.7 E9/L (ref 4.5–11.5)

## 2021-03-07 PROCEDURE — 6360000002 HC RX W HCPCS: Performed by: SURGERY

## 2021-03-07 PROCEDURE — 2700000000 HC OXYGEN THERAPY PER DAY

## 2021-03-07 PROCEDURE — 2580000003 HC RX 258: Performed by: INTERNAL MEDICINE

## 2021-03-07 PROCEDURE — 6370000000 HC RX 637 (ALT 250 FOR IP): Performed by: INTERNAL MEDICINE

## 2021-03-07 PROCEDURE — 2500000003 HC RX 250 WO HCPCS: Performed by: EMERGENCY MEDICINE

## 2021-03-07 PROCEDURE — 86022 PLATELET ANTIBODIES: CPT

## 2021-03-07 PROCEDURE — 2709999900 HC NON-CHARGEABLE SUPPLY: Performed by: SURGERY

## 2021-03-07 PROCEDURE — 0DH63UZ INSERTION OF FEEDING DEVICE INTO STOMACH, PERCUTANEOUS APPROACH: ICD-10-PCS | Performed by: SURGERY

## 2021-03-07 PROCEDURE — 6360000002 HC RX W HCPCS: Performed by: INTERNAL MEDICINE

## 2021-03-07 PROCEDURE — 80048 BASIC METABOLIC PNL TOTAL CA: CPT

## 2021-03-07 PROCEDURE — 36592 COLLECT BLOOD FROM PICC: CPT

## 2021-03-07 PROCEDURE — 3E0G76Z INTRODUCTION OF NUTRITIONAL SUBSTANCE INTO UPPER GI, VIA NATURAL OR ARTIFICIAL OPENING: ICD-10-PCS | Performed by: SURGERY

## 2021-03-07 PROCEDURE — 3700000000 HC ANESTHESIA ATTENDED CARE: Performed by: SURGERY

## 2021-03-07 PROCEDURE — 2500000003 HC RX 250 WO HCPCS: Performed by: SURGERY

## 2021-03-07 PROCEDURE — 82330 ASSAY OF CALCIUM: CPT

## 2021-03-07 PROCEDURE — 80053 COMPREHEN METABOLIC PANEL: CPT

## 2021-03-07 PROCEDURE — 2500000003 HC RX 250 WO HCPCS: Performed by: INTERNAL MEDICINE

## 2021-03-07 PROCEDURE — 6360000002 HC RX W HCPCS: Performed by: NURSE ANESTHETIST, CERTIFIED REGISTERED

## 2021-03-07 PROCEDURE — 43246 EGD PLACE GASTROSTOMY TUBE: CPT | Performed by: SURGERY

## 2021-03-07 PROCEDURE — 43762 RPLC GTUBE NO REVJ TRC: CPT

## 2021-03-07 PROCEDURE — 3700000001 HC ADD 15 MINUTES (ANESTHESIA): Performed by: SURGERY

## 2021-03-07 PROCEDURE — 84100 ASSAY OF PHOSPHORUS: CPT

## 2021-03-07 PROCEDURE — 85025 COMPLETE CBC W/AUTO DIFF WBC: CPT

## 2021-03-07 PROCEDURE — 99221 1ST HOSP IP/OBS SF/LOW 40: CPT | Performed by: PHYSICIAN ASSISTANT

## 2021-03-07 PROCEDURE — 83735 ASSAY OF MAGNESIUM: CPT

## 2021-03-07 PROCEDURE — 82962 GLUCOSE BLOOD TEST: CPT

## 2021-03-07 PROCEDURE — 3609013300 HC EGD TUBE PLACEMENT: Performed by: SURGERY

## 2021-03-07 PROCEDURE — 2000000000 HC ICU R&B

## 2021-03-07 RX ORDER — SODIUM CHLORIDE, SODIUM LACTATE, POTASSIUM CHLORIDE, CALCIUM CHLORIDE 600; 310; 30; 20 MG/100ML; MG/100ML; MG/100ML; MG/100ML
INJECTION, SOLUTION INTRAVENOUS CONTINUOUS
Status: ACTIVE | OUTPATIENT
Start: 2021-03-07 | End: 2021-03-07

## 2021-03-07 RX ORDER — LIDOCAINE HYDROCHLORIDE 10 MG/ML
INJECTION, SOLUTION INFILTRATION; PERINEURAL PRN
Status: DISCONTINUED | OUTPATIENT
Start: 2021-03-07 | End: 2021-03-07 | Stop reason: ALTCHOICE

## 2021-03-07 RX ORDER — MAGNESIUM SULFATE 1 G/100ML
1000 INJECTION INTRAVENOUS ONCE
Status: COMPLETED | OUTPATIENT
Start: 2021-03-07 | End: 2021-03-07

## 2021-03-07 RX ORDER — MINERAL OIL/HYDROPHIL PETROLAT
OINTMENT (GRAM) TOPICAL 2 TIMES DAILY PRN
Status: DISCONTINUED | OUTPATIENT
Start: 2021-03-07 | End: 2021-03-16 | Stop reason: HOSPADM

## 2021-03-07 RX ORDER — PROPOFOL 10 MG/ML
INJECTION, EMULSION INTRAVENOUS PRN
Status: DISCONTINUED | OUTPATIENT
Start: 2021-03-07 | End: 2021-03-07 | Stop reason: SDUPTHER

## 2021-03-07 RX ADMIN — INSULIN LISPRO 2 UNITS: 100 INJECTION, SOLUTION INTRAVENOUS; SUBCUTANEOUS at 04:05

## 2021-03-07 RX ADMIN — Medication 10 ML: at 09:00

## 2021-03-07 RX ADMIN — INSULIN LISPRO 2 UNITS: 100 INJECTION, SOLUTION INTRAVENOUS; SUBCUTANEOUS at 16:31

## 2021-03-07 RX ADMIN — HYDROCORTISONE SODIUM SUCCINATE 100 MG: 100 INJECTION, POWDER, FOR SOLUTION INTRAMUSCULAR; INTRAVENOUS at 11:07

## 2021-03-07 RX ADMIN — MAGNESIUM SULFATE HEPTAHYDRATE 1000 MG: 1 INJECTION, SOLUTION INTRAVENOUS at 08:59

## 2021-03-07 RX ADMIN — FAMOTIDINE 20 MG: 10 INJECTION INTRAVENOUS at 08:59

## 2021-03-07 RX ADMIN — HYDROCORTISONE SODIUM SUCCINATE 100 MG: 100 INJECTION, POWDER, FOR SOLUTION INTRAMUSCULAR; INTRAVENOUS at 04:06

## 2021-03-07 RX ADMIN — POTASSIUM PHOSPHATE, MONOBASIC AND POTASSIUM PHOSPHATE, DIBASIC 20 MMOL: 224; 236 INJECTION, SOLUTION, CONCENTRATE INTRAVENOUS at 09:00

## 2021-03-07 RX ADMIN — THIAMINE HYDROCHLORIDE 100 MG: 100 INJECTION, SOLUTION INTRAMUSCULAR; INTRAVENOUS at 08:59

## 2021-03-07 RX ADMIN — HYDROCORTISONE SODIUM SUCCINATE 100 MG: 100 INJECTION, POWDER, FOR SOLUTION INTRAMUSCULAR; INTRAVENOUS at 20:15

## 2021-03-07 RX ADMIN — PROPOFOL 150 MG: 10 INJECTION, EMULSION INTRAVENOUS at 14:03

## 2021-03-07 RX ADMIN — INSULIN LISPRO 4 UNITS: 100 INJECTION, SOLUTION INTRAVENOUS; SUBCUTANEOUS at 11:25

## 2021-03-07 RX ADMIN — Medication 10 ML: at 20:25

## 2021-03-07 RX ADMIN — FOLIC ACID 1 MG: 5 INJECTION, SOLUTION INTRAMUSCULAR; INTRAVENOUS; SUBCUTANEOUS at 09:00

## 2021-03-07 RX ADMIN — Medication 7 MCG/MIN: at 08:58

## 2021-03-07 RX ADMIN — CEFAZOLIN 2000 MG: 10 INJECTION, POWDER, FOR SOLUTION INTRAVENOUS at 14:03

## 2021-03-07 RX ADMIN — SODIUM CHLORIDE, POTASSIUM CHLORIDE, SODIUM LACTATE AND CALCIUM CHLORIDE: 600; 310; 30; 20 INJECTION, SOLUTION INTRAVENOUS at 11:07

## 2021-03-07 RX ADMIN — DEXTROSE AND SODIUM CHLORIDE: 5; 450 INJECTION, SOLUTION INTRAVENOUS at 05:38

## 2021-03-07 RX ADMIN — SODIUM CHLORIDE, PRESERVATIVE FREE 10 ML: 5 INJECTION INTRAVENOUS at 11:08

## 2021-03-07 ASSESSMENT — PAIN SCALES - PAIN ASSESSMENT IN ADVANCED DEMENTIA (PAINAD)
BODYLANGUAGE: 0
FACIALEXPRESSION: 0
BREATHING: 0
TOTALSCORE: 0
BODYLANGUAGE: 0
NEGVOCALIZATION: 0
CONSOLABILITY: 0
NEGVOCALIZATION: 0
FACIALEXPRESSION: 0
BREATHING: 0
TOTALSCORE: 0
BODYLANGUAGE: 0
NEGVOCALIZATION: 0
CONSOLABILITY: 0
TOTALSCORE: 0
BODYLANGUAGE: 1
CONSOLABILITY: 0
NEGVOCALIZATION: 0
BREATHING: 0
TOTALSCORE: 0
NEGVOCALIZATION: 1
FACIALEXPRESSION: 0
BODYLANGUAGE: 1

## 2021-03-07 ASSESSMENT — PAIN SCALES - GENERAL
PAINLEVEL_OUTOF10: 0
PAINLEVEL_OUTOF10: 2

## 2021-03-07 NOTE — ANESTHESIA PRE PROCEDURE
Department of Anesthesiology  Preprocedure Note       Name:  Bettie Covarrubias   Age:  78 y.o.  :  1955                                          MRN:  [id number]         Date:  3/7/2021      Surgeon: Jay Marcum):  Сергей Torres MD    Procedure: Procedure(s):  EGD PEG TUBE PLACEMENT    Medications prior to admission:   Prior to Admission medications    Medication Sig Start Date End Date Taking?  Authorizing Provider   amLODIPine (NORVASC) 5 MG tablet Take 5 mg by mouth nightly   Yes Historical Provider, MD   docusate (COLACE) 50 MG/5ML liquid Take 100 mg by mouth 2 times daily   Yes Historical Provider, MD   famotidine (PEPCID) 20 MG tablet Take 20 mg by mouth 2 times daily   Yes Historical Provider, MD   folic acid (FOLVITE) 1 MG tablet Take 1 mg by mouth daily   Yes Historical Provider, MD   levothyroxine (SYNTHROID) 150 MCG tablet Take 150 mcg by mouth Daily   Yes Historical Provider, MD   metoprolol tartrate (LOPRESSOR) 25 MG tablet Take 75 mg by mouth 2 times daily   Yes Historical Provider, MD   montelukast (SINGULAIR) 10 MG tablet Take 10 mg by mouth nightly   Yes Historical Provider, MD   OLANZapine zydis (ZYPREXA ZYDIS) 15 MG disintegrating tablet Take 15 mg by mouth 2 times daily Given with Zyprexa zydis 5 mg twice a day   Yes Historical Provider, MD   OLANZapine zydis (ZYPREXA) 5 MG disintegrating tablet Take 5 mg by mouth 2 times daily Given with Zyprexa zydis 15 mg twice a day   Yes Historical Provider, MD   ferrous sulfate (IRON 325) 325 (65 Fe) MG tablet Take 325 mg by mouth daily (with breakfast)   Yes Historical Provider, MD   PARoxetine (PAXIL) 10 MG tablet Take 10 mg by mouth 2 times daily   Yes Historical Provider, MD   vitamin D (CHOLECALCIFEROL) 25 MCG (1000 UT) TABS tablet Take 1,000 Units by mouth daily   Yes Historical Provider, MD   valproic acid (DEPACON) 250 MG/5ML SOLN oral solution Take 1,000 mg by mouth 2 times daily   Yes Historical Provider, MD   polyethylene glycol (GLYCOLAX) 17 g packet Take 17 g by mouth daily    Yes Historical Provider, MD       Current medications:    Current Facility-Administered Medications   Medication Dose Route Frequency Provider Last Rate Last Admin    insulin lispro (HUMALOG) injection vial 0-12 Units  0-12 Units Subcutaneous TID  Romy Kern MD   4 Units at 03/07/21 1125    lactated ringers infusion   Intravenous Continuous Preston Arriaga  mL/hr at 03/07/21 1107 New Bag at 03/07/21 1107    mineral oil-hydrophil petrolat ointment   Topical BID PRN Preston Arriaga MD        glucose (GLUTOSE) 40 % oral gel 15 g  15 g Oral PRN Paulo Bautista MD        dextrose 50 % IV solution  12.5 g Intravenous PRN Paulo Bautista MD        glucagon (rDNA) injection 1 mg  1 mg Intramuscular PRN Paulo Bautista MD        dextrose 5 % solution  100 mL/hr Intravenous PRN Paulo Bautista MD        folic acid injection 1 mg  1 mg Intravenous Daily Preston Arriaga MD   1 mg at 03/07/21 0900    thiamine (B-1) injection 100 mg  100 mg Intravenous Daily Preston Arriaga MD   100 mg at 03/07/21 0859    hydrocortisone sodium succinate PF (SOLU-CORTEF) injection 100 mg  100 mg Intravenous Q8H Preston Arriaga MD   100 mg at 03/07/21 1107    famotidine (PEPCID) injection 20 mg  20 mg Intravenous Daily Preston Arriaga MD   20 mg at 03/07/21 0859    [Held by provider] levothyroxine (SYNTHROID) tablet 150 mcg  150 mcg Oral Daily Paulo Bautista MD        norepinephrine (LEVOPHED) 16 mg in dextrose 5% 250 mL infusion  10 mcg/min Intravenous Continuous Williams Alvares MD 6.6 mL/hr at 03/07/21 0858 7 mcg/min at 03/07/21 0858    vasopressin 20 Units in dextrose 5 % 100 mL infusion  0.01-0.03 Units/min Intravenous Continuous Williams Alvares MD   Stopped at 03/05/21 0650    sodium chloride flush 0.9 % injection 10 mL  10 mL Intravenous 2 times per day Paulo Bautista MD   10 mL at 03/07/21 0900    sodium chloride flush 0.9 % injection 10 mL  10 mL Intravenous PRN Jose Carlos Dejesus MD   10 mL at 03/07/21 1108    [Held by provider] heparin (porcine) injection 5,000 Units  5,000 Units Subcutaneous 3 times per day Jose Carlos Dejesus MD   5,000 Units at 03/05/21 0600    promethazine (PHENERGAN) tablet 12.5 mg  12.5 mg Oral Q6H PRN Jose Carlos Dejesus MD        Or    ondansetron [facility]) injection 4 mg  4 mg Intravenous Q6H PRN Jose Carlos Dejesus MD        polyethylene glycol [facility]) packet 17 g  17 g Oral Daily PRN Jose Carlos Dejesus MD        acetaminophen (TYLENOL) tablet 650 mg  650 mg Oral Q6H PRN Jose Carlos Dejesus MD        Or    acetaminophen (TYLENOL) suppository 650 mg  650 mg Rectal Q6H PRN Jose Carlos Dejesus MD           Allergies:  No Known Allergies    Problem List:    Patient Active Problem List   Diagnosis Code    Mental retardation F79    Hypothyroidism E03.9    Blindness of both eyes H54.3    ESRD (end stage renal disease) on dialysis ([facility] [address] N18.6, Z99.2    Macrocytosis D75.89    Behavior disturbance F91.9    Altered mental state R41.82    Sepsis ([address] A41.9       Past Medical History:        Diagnosis Date    Acute kidney injury ([address] 01/15/2017    d/t Vancomycin, on Dialysis [facility] right chest    Blind in both eyes     Hemodialysis patient ([facility] [address]     Hyperthyroidism     MR (mental retardation)     Osteoarthritis     Pneumonia 01/06/2017       Past Surgical History:        Procedure Laterality Date    BRONCHOSCOPY  02/10/2017    CHEST TUBE INSERTION Right 02/09/2017    OTHER SURGICAL HISTORY Right 01/17/2017    tessio insertion     OTHER SURGICAL HISTORY  01/25/2017    PEG tube insertion       Social History:    Social History     Tobacco Use    Smoking status: Never Smoker    Smokeless tobacco: Never Used   Substance Use Topics    Alcohol use:  No                                Counseling given: Not Answered      Vital Signs (Current):   Vitals:    03/07/21 1000 03/07/21 1100 03/07/21 1200 03/07/21 1300   BP: (!) 105/45 (!) 96/46 (!) 101/49 (!) 98/48   Pulse: 91 90 89 80   Resp: (!) 31 25 24 28   Temp:   98.8 °F (37.1 °C)    TempSrc:   Bladder    SpO2: 99% 95% 98% 99%   Weight:       Height:                                                  BP Readings from Last 3 Encounters:   03/07/21 (!) 98/48   02/09/21 (!) 140/72   10/15/20 123/75       NPO Status:                                                                                 BMI:   Wt Readings from Last 3 Encounters:   03/07/21 145 lb 4.8 oz (65.9 kg)   02/09/21 146 lb 4.8 oz (66.4 kg)   09/29/20 154 lb (69.9 kg)     Body mass index is 27.45 kg/m². CBC:   Lab Results   Component Value Date    WBC 11.7 03/07/2021    RBC 2.52 03/07/2021    HGB 7.4 03/07/2021    HCT 24.5 03/07/2021    MCV 97.2 03/07/2021    RDW 26.6 03/07/2021    PLT 53 03/07/2021       CMP:   Lab Results   Component Value Date     03/07/2021    K 4.4 03/07/2021    K 4.8 02/09/2021     03/07/2021    CO2 23 03/07/2021    BUN 18 03/07/2021    CREATININE 0.9 03/07/2021    GFRAA >60 03/07/2021    LABGLOM >60 03/07/2021    GLUCOSE 168 03/07/2021    GLUCOSE 97 12/30/2011    PROT 5.8 03/07/2021    CALCIUM 7.6 03/07/2021    BILITOT 0.2 03/07/2021    ALKPHOS 93 03/07/2021    AST 30 03/07/2021    ALT 27 03/07/2021       POC Tests: No results for input(s): POCGLU, POCNA, POCK, POCCL, POCBUN, POCHEMO, POCHCT in the last 72 hours.     Coags:   Lab Results   Component Value Date    PROTIME 18.8 03/04/2021    INR 1.7 03/04/2021    APTT 25.1 03/04/2021       HCG (If Applicable): No results found for: PREGTESTUR, PREGSERUM, HCG, HCGQUANT     ABGs:   Lab Results   Component Value Date    PO2ART 75.2 02/08/2021    VGW2ZUP 39.3 02/08/2021    WQY1GQZ 26.5 02/08/2021        Type & Screen (If Applicable):  No results found for: LABABO, LABRH    Drug/Infectious Status (If Applicable):  No results found for: HIV, HEPCAB    COVID-19 Screening (If Applicable):   Lab Results   Component Value Date COVID19 Not Detected 03/04/2021         Anesthesia Evaluation    Airway: Mallampati: II  TM distance: >3 FB   Neck ROM: full  Mouth opening: > = 3 FB Dental:          Pulmonary:   (+) pneumonia:                             Cardiovascular:                      Neuro/Psych:   (+) psychiatric history:            GI/Hepatic/Renal:             Endo/Other:    (+) hypothyroidism, hyperthyroidism::., .                 Abdominal:           Vascular:                                        Anesthesia Plan      MAC     ASA 3       Induction: intravenous. MIPS: Postoperative opioids intended and Prophylactic antiemetics administered.                       Shyanne Fisher MD   3/7/2021

## 2021-03-07 NOTE — CONSULTS
Palliative Care Department  [phone]  Palliative Care Initial Consult  Provider Sylwia Gómez PA-C    Osvaldo Barlow  [id number]  Hospital Day: 4    Referring Provider: Malena Rider MD  Palliative Medicine was consulted for assistance with: Goals of care    CHIEF COMPLAINT: Unresponsiveness    ASSESSMENT/PLAN:     Pertinent hospital diagnoses:  1. Autisum/MR   - resides in group home/ has guardian appointed by [facility]  2. Dysphagia  -Speech, modified diet ordered prior to admission  -PEG tube placed 3/7  3. Acute kidney injury  -Nephrology consulted and following  -Likely prerenal due to severe dehydration and hypotension    PALLIATIVE CARE ENCOUNTER  - Outcome of goals of care meeting:   - n/a  - Capacity: At this time, Osvaldo Barlow, Does Not have capacity for medical decision-making. Capacity is time limited and situation/question specific  - Surrogate decision maker/Legal NOK:    -Guardian Adult Protective Services, Leonor Brito [phone] ext [phone]  - Code Status:   full  - Advanced directives: none  - Spiritual assessment: to be determine  - Bereavement and grief: none identified    - DISPO: PEG 3/7    Referrals to: none today    HPI:   Osvaldo Barlow is a 78 y.o. with a past medical history of severe autism, blindness, hypothyroidism, history of acute kidney injury, GERD, anemia, hypertension, dysphagia modified diet who presented to the emergency department from group home with unresponsiveness. Patient completed workup in the ED and was admitted on 3/4/2021 with diagnosis(ses) of altered mental status, dehydration, septicemia, acute kidney injury and urinary tract infection. Patient required ICU admission with requirements of pressure support. Unresponsive.   No visitors at bedside    Past Medical History:   Diagnosis Date    Acute kidney injury ([address] 01/15/2017    d/t Vancomycin, on Dialysis [facility] right chest    Blind in both eyes     Hemod[facility])     Hyperthyroidism     MR (mental retardation)     Osteoarthritis     Pneumonia 01/06/2017       Past Surgical History:   Procedure Laterality Date    BRONCHOSCOPY  02/10/2017    CHEST TUBE INSERTION Right 02/09/2017    OTHER SURGICAL HISTORY Right 01/17/2017    tessio insertion     OTHER SURGICAL HISTORY  01/25/2017    PEG tube insertion       Inpatient medications reviewed: yes  Home Medications reviewed: yes    No Known Allergies  No family history on file. Social history:   status: no  Marital status: Single  Living status: group  home  Work history: unknown  Tobacco use: no  Drug use: no  Alcohol use: no    Review of Systems:  unable to obtained due to current condition of patient    OBJECTIVE:   Prognosis: unknown    Physical Exam:  /60   Pulse 87   Temp 98.8 °F (37.1 °C) (Bladder)   Resp 25   Ht 5' 1\" (1.549 m)   Wt 145 lb 4.8 oz (65.9 kg)   SpO2 95%   BMI 27.45 kg/m²   Gen: Appears stated age, NAD, warm blanket on  HEENT:  Normocephalic, atraumatic, mucosa dry, O2 via nasal cannula  Neck:  Supple, trachea midline  Lungs: Unlabored but coarse breath sounds  Heart[de-identified]  RRR, distant heart tones, no murmur, rub, or gallop noted during exam  Abd:  Soft, non distended, bowel sounds present  :  zaragoza  Ext:  Trace edema of LE  Skin:  Warm and dry without rash, bruising, petechiae  Neuro: Nonverbal, not responsive    Objective data reviewed: labs, images, records, medication use, vitals and chart  Results for Julio C Bro (MRN [id number]) as of 3/7/2021 15:44   Ref.  Range 3/7/2021 04:04   WBC Latest Ref Range: 4.5 - 11.5 E9/L 11.7 (H)   RBC Latest Ref Range: 3.50 - 5.50 E12/L 2.52 (L)   Hemoglobin Quant Latest Ref Range: 11.5 - 15.5 g/dL 7.4 (L)   Hematocrit Latest Ref Range: 34.0 - 48.0 % 24.5 (L)   MCV Latest Ref Range: 80.0 - 99.9 fL 97.2   MCH Latest Ref Range: 26.0 - 35.0 pg 29.4   MCHC Latest Ref Range: 32.0 - 34.5 % 30.2 (L)   MPV Latest Ref Range: 7.0 - 12.0 fL NOT CALC   RDW Latest Ref Range: 11.5 - 15.0 fL 26.6 (H)   Platelet Count Latest Ref Range: 130 - 450 E9/L 53 (L)   Platelet Confirmation Unknown CONFIRMED   Neutrophils % Latest Ref Range: 43.0 - 80.0 % 85.0 (H)   Lymphocyte % Latest Ref Range: 20.0 - 42.0 % 2.7 (L)   Monocytes % Latest Ref Range: 2.0 - 12.0 % 5.3   Eosinophils % Latest Ref Range: 0.0 - 6.0 % 0.0   Basophils % Latest Ref Range: 0.0 - 2.0 % 0.3   Neutrophils Absolute Latest Ref Range: 1.80 - 7.30 E9/L 10.65 (H)   Lymphocytes Absolute Latest Ref Range: 1.50 - 4.00 E9/L 0.35 (L)   Monocytes Absolute Latest Ref Range: 0.10 - 0.95 E9/L 0.58   Eosinophils Absolute Latest Ref Range: 0.05 - 0.50 E9/L 0.00 (L)   Basophils Absolute Latest Ref Range: 0.00 - 0.20 E9/L 0.00   Myelocyte Percent Latest Ref Range: 0 - 0 % 1.8   Promyelocytes Percent Latest Ref Range: 0 - 0 % 0.9   Nucleated Red Blood Cells Latest Units: /100 WBC 10.6   Poikilocytes Unknown 2+   Polychromasia Unknown 2+   Target Cells Unknown 2+   Tear Drop Cells Unknown 1+   Anisocytosis Unknown 3+   Hypochromia Unknown 2+   Ovalocytes Unknown 1+   Basophilic Stippling Unknown 1+   Metamyelocytes Relative Latest Ref Range: 0.0 - 1.0 % 4.4 (H)       Time/Communication  Greater than 50% of time spent, total 30 minutes in counseling and coordination of care at the bedside/over the telephone regarding see above. Thank you for allowing Palliative Medicine to participate in the care of Malathi Spence. Provider Any Amaya PA-C  Palliative Medicine    Note: This report was completed using computerize voiced recognition software. Every effort has been made to ensure accuracy; however, inadvertent computerized transcription errors may be present.

## 2021-03-07 NOTE — PROGRESS NOTES
[facility]  Nephrology Attending Progress Note  Warner Goldmann. Scott Morrow MD        SUBJECTIVE:     3/4: This is a 78 y.o.  female with a H/O MR w/ blindness (previous episode of BEENA) who now presents to ED from local group home due to being unresponsive. They found her unresponsive this morning, around 7 AM, they waited 3 hours that she did not respond, and then they sent her in for evaluation     Vitals upon arrival included BP (!) 120/56   Pulse 111   Temp 99.1 °F (37.3 °C)   Resp 16   Ht 5' 1\" (1.549 m)   Wt 146 lb (66.2 kg)   SpO2 100%   BMI 27.59 kg/m². Pertinent labs include sodium 166, potassium 5.7, chloride 132, CO2 22, BUN 46 and creatinine 4.1. ABG show pH 7.340, PCO2 45, PO2 78.9, bicarb 23.7 and 94.6% O2 saturation. CBC includes WBC 10.7, hemoglobin 8.7, hematocrit 30.9, and platelet count 969. Initial UA: Dark yellow, slightly cloudy, moderate bili, >1.030, 100 protein, positive nitrites. CT scan shows bilateral pleural effusions of mild to moderate degree causing compression atelectasis in the lower lobes posteriorly. Also shows no obstructive uropathy. Patient was normotensive upon arrival.  Initially received IV fluids. She soon developed hypotension. Initially started on Levophed. Plans to admit to ICU.     Currently examined in the ED setting: she is unresponsive to verbal stimuli. A complete ROS cannot be obtained. Family member is present who called the group home. Apparently pt has had very poor intake over the past several weeks; taking nothing by mouth past 48 hrs. Also no BM for \"several\" weeks. Of note, pt was at this facility in early Feb of this year w/ the same s/s. Also, the pt does not speak at baseline.      3/5: pt seen in icu    3/7 :pt seen in icu, nonverbal, blind      PROBLEM LIST:    Patient Active Problem List   Diagnosis    Mental retardation    Hypothyroidism    Blindness of both eyes    ESRD (end stage renal disease) on dialysis ([facility] [address] Net 2151.92 ml         Wt Readings from Last 3 Encounters:   03/07/21 145 lb 4.8 oz (65.9 kg)   02/09/21 146 lb 4.8 oz (66.4 kg)   09/29/20 154 lb (69.9 kg)       Constitutional:  Pt is lethargic, nonverbal  Head: normocephalic, atraumatic  Neck: no JVD  Cardiovascular: regular rate and rhythm, no murmurs, gallops, or rubs  Respiratory:  No rales, rhochi, or wheezes  Gastrointestinal:  Soft, nontender, nondistended, bowel sounds x 4  Ext: no edema  Skin: dry, no rash    MEDS (scheduled):    potassium phosphate IVPB  20 mmol Intravenous Once    insulin lispro  0-12 Units Subcutaneous TID WC    folic acid  1 mg Intravenous Daily    thiamine  100 mg Intravenous Daily    hydrocortisone sodium succinate PF  100 mg Intravenous Q8H    famotidine (PEPCID) injection  20 mg Intravenous Daily    [Held by provider] levothyroxine  150 mcg Oral Daily    sodium chloride flush  10 mL Intravenous 2 times per day    [Held by provider] heparin (porcine)  5,000 Units Subcutaneous 3 times per day       MEDS (infusions):   lactated ringers      dextrose      norepinephrine 7 mcg/min (03/07/21 0858)    vasopressin (Septic Shock) infusion Stopped (03/05/21 0650)       MEDS (prn):  mineral oil-hydrophil petrolat, glucose, dextrose, glucagon (rDNA), dextrose, sodium chloride flush, promethazine **OR** ondansetron, polyethylene glycol, acetaminophen **OR** acetaminophen    DATA:    Recent Labs     03/05/21 0918 03/06/21 0438 03/07/21  0404   WBC 13.1* 13.8* 11.7*   HGB 7.7* 7.8* 7.4*   HCT 28.4* 27.3* 24.5*   .0* 101.1* 97.2   PLT 62* 57* 53*     Recent Labs     03/05/21  0408 03/05/21  0408 03/06/21 0438 03/06/21 0438 03/07/21  0212 03/07/21  0404 03/07/21  0918   *   < > 149*   < > 142 137 143   K 3.8   < > 3.5   < > 4.4 4.2 4.4   *   < > 116*   < > 114* 110* 115*   CO2 23   < > 26   < > 24 25 23   BUN 35*   < > 24*   < > 19 19 18   CREATININE 2.1*   < > 1.2*   < > 0.9 0.9 0.9   LABGLOM 23   < > 45   < > >60 >60 >60   GLUCOSE 232*   < > 224*   < > 193* 224* 168*   CALCIUM 7.2*   < > 7.7*   < > 7.5* 7.7* 7.6*   ALT 28  --  30  --   --  27  --    AST 89*  --  50*  --   --  30  --    BILITOT 0.4  --  0.4  --   --  0.2  --    ALKPHOS 77  --  101  --   --  93  --    MG 2.4  --  2.0  --   --  1.8  --    PHOS 2.5  --  1.8*  --   --  1.8*  --     < > = values in this interval not displayed. Lab Results   Component Value Date    LABALBU 1.5 (L) 03/07/2021    LABALBU 1.5 (L) 03/06/2021    LABALBU 1.5 (L) 03/05/2021     Lab Results   Component Value Date    TSH 12.170 (H) 02/08/2021       Iron Studies  Lab Results   Component Value Date    IRON 15 (L) 03/05/2021    TIBC 83 (L) 03/05/2021    FERRITIN 370 03/05/2021     Vitamin B-12   Date Value Ref Range Status   03/05/2021 1169 (H) 211 - 946 pg/mL Final     Folate   Date Value Ref Range Status   03/05/2021 19.8 4.8 - 24.2 ng/mL Final       Vit D, 25-Hydroxy   Date Value Ref Range Status   12/30/2011 46 30 - 80 ng/mL Final     Comment:     REFERENCE INTERVAL- Vitamin D, 25-Hydroxy    This assay accurately quantifies the sum of vitamin D3,  25-hydroxy and vitamin D2, 25-hydroxy.     0-17 years-  Deficiency- less than 20 ng/mL  Optimum level- greater than or equal to 20 ng/mL*  *(Bonds CL et al. Pediatrics 2008- 122- 1142-52.)    18 years and older-  Deficiency- Less than 20 ng/mL  Insufficiency- 20-29 ng/mL  Optimum Level- 30-80 ng/mL  Possible Toxicity- Greater than 150 ng/mL     PTH   Date Value Ref Range Status   01/26/2017 105 (H) 15 - 65 pg/mL Final       No components found for: URIC    Lab Results   Component Value Date    COLORU DARK YELLOW 03/04/2021    NITRU POSITIVE 03/04/2021    GLUCOSEU 100 03/04/2021    KETUA 15 03/04/2021    UROBILINOGEN 4.0 03/04/2021    BILIRUBINUR MODERATE 03/04/2021       No results found for: Jared Beckham      IMPRESSION/RECOMMENDATIONS:      1) Acute kidney injury  Most recent baseline serum creatinine 0.7 -> 0.8 in February 2021  Of note patient had a episode of renal failure in 2017 requiring short-term hemodialysis  admitting serum creatinine is 4.1>2.1>1>0.9  Likely prerenal in the setting of severe dehydration and hypotension  CT scan reviewed: No obstructive process  UA noted: Very concentrated urine  fena < 1  Good uo      2) Hypernatremia  In the setting of severe dehydration  Follow response to aggressive hypotonic fluids     3) Hyperkalemia  In the setting of BEENA  resolved     4) Hypotension  Likely in the setting of sepsis and hypovolemia  Has been started on IV pressors  Blood/urine cultures drawn     5) Anemia  No obvious bleeding  nl fe b12 fol  trf < 7     6) Hx MR  Also blindness     [facility].  Chad Rendon MD

## 2021-03-07 NOTE — PLAN OF CARE
Problem: Skin Integrity:  Goal: Will show no infection signs and symptoms  Description: Will show no infection signs and symptoms  Outcome: Met This Shift  Goal: Absence of new skin breakdown  Description: Absence of new skin breakdown  Outcome: Met This Shift     Problem: Falls - Risk of:  Goal: Will remain free from falls  Description: Will remain free from falls  Outcome: Met This Shift  Goal: Absence of physical injury  Description: Absence of physical injury  Outcome: Met This Shift   Plan of care reviewed with patient and family, as available.

## 2021-03-07 NOTE — PROGRESS NOTES
[facility]  Department of Internal Medicine   Internal Medicine Residency   MICU Progress Note    Patient:  Erlinda Cummings 78 y.o. female  MRN: [id number]     Date of Service: 3/7/2021    Allergy: Patient has no known allergies. Subjective     Patient seen and examined this morning   Open her eyes to voice command, doesn not follow other command   On 4l NC sating above 90  No acute changes over night  For PEG tube today   ROS: unable to obtain secondary to patient mentation     24h change: electrolytes improving      Objective     VS: /60   Pulse 87   Temp 98.8 °F (37.1 °C) (Bladder)   Resp 25   Ht 5' 1\" (1.549 m)   Wt 145 lb 4.8 oz (65.9 kg)   SpO2 95%   BMI 27.45 kg/m²   ABP (Arterial line BP): 91/43  ABP mean (Arterial line mean): (!) 59 mmHg    I & O - 24hr:     Intake/Output Summary (Last 24 hours) at 3/7/2021 1651  Last data filed at 3/7/2021 1600  Gross per 24 hour   Intake 3791.92 ml   Output 907 ml   Net 2884.92 ml       Physical Exam:  · General Appearance: appears stated age and nonverbal  · Neck: no adenopathy, no carotid bruit, no JVD, supple, symmetrical, trachea midline and thyroid not enlarged, symmetric, no tenderness/mass/nodules  · Lung: Coarse breath sounds heard  · Heart: regular rate and rhythm, S1, S2 normal, no murmur, click, rub or gallop  · Abdomen: soft, non-tender; bowel sounds normal; no masses,  no organomegaly  · Extremities:  extremities normal, atraumatic, no cyanosis or edema  · Musculoskeletal: No joint swelling, no muscle tenderness. ROM normal in all joints of extremities.    · Neurologic: Mental status: Non verbal and unresponsive at this time    Lines     site day    Art line   R Radial 3   TLC L Fem 3   PICC None    Hemoaccess None      Oxygen:     6 L NC     ABG:     Lab Results   Component Value Date    PH 7.340 03/04/2021    LPN3BGA 39.3 02/08/2021    PCO2 45.0 03/04/2021    PO2ART 75.2 02/08/2021    PO2 78.9 03/04/2021    OZV6KCK 26.5 SPECGRAV >=1.030 03/04/2021    LEUKOCYTESUR Negative 03/04/2021    UROBILINOGEN 4.0 03/04/2021    BILIRUBINUR MODERATE 03/04/2021    BLOODU Negative 03/04/2021    GLUCOSEU 100 03/04/2021    AMORPHOUS MODERATE 01/21/2017     ABG:    Lab Results   Component Value Date    PH 7.340 03/04/2021    PCO2 45.0 03/04/2021    PO2 78.9 03/04/2021    HCO3 23.7 03/04/2021    BE -2.1 03/04/2021    O2SAT 94.6 03/04/2021     FOLATE:    Lab Results   Component Value Date    FOLATE 19.8 03/05/2021     IRON:    Lab Results   Component Value Date    IRON 15 03/05/2021     TIBC:    Lab Results   Component Value Date    TIBC 83 03/05/2021     FERRITIN:    Lab Results   Component Value Date    FERRITIN 370 03/05/2021       Resident's Assessment and Plan     The patient is a 78 y.o. female with pmh of MR w/blindness and CKD (likely stage IV) , with previous episodes of BEENA who presents from group home for being unresponsive. Found to have BEENA, hypotensive, hypernatremic and hyperkalemia in the ED.  We are managing her in the follwog    Neuro  Mental retardation with legal blindness  Patient nonverbal and bed bound at baseline   presented from Group home for unresponsiveness      Hypovolemic Shock 2/2 poor oral intake/ hypovolemia   Initial BP: 66/39 mmHg, Volume resuscitation in ED : 2L bolus NS (30cc/kg) , started on LR @ 150 cc/hr  NICOM with 250cc NS bolus - not fluid responsive  Continue Levophed, wean as able   1 L LR today  Continue to monitor vitals  Improving      Acute Hypoxic Respiratory insufficiency 2/2 shock vs atelectasis   Required 6L NC O2 on presentation   CXR negative, CT abdo/pelvis: bilateral pleural effusions of mild-to-moderate size causing atelectasis   Improving, wean NC as able   Clx negative thus far     Dysphagia   No oral intake in last 48 hours prior to ED presentation , eating poorly over the last few weeks  Failed Bedside swallow, unsuccessful Corpak attempts   For PEG tube placement tonight or on Monday   She has history of peg tube per records   Continue  cc per hour     BEENA likely 2/2 to dehydration  BUN/Creatinine 46/4.1 on presentation; baseline creatinine 0.7-1.0, FeNa < 1%   Resolved   Dr. Bere Oquendo is following  Cr today is 0.9      HAGMA likely secondary to lactic acidosis vs Uremia - resolved  A.5 corrected for albumin , current A  Initial lactic acid: 5.6 , BUN: 46 , improved to normal LA, Bun: 24    Hyperkalemia - resolved  K: 5.7 on presentation , current 4.2     Hypernatremia likely 2/2 dehydration  Na: 166 on initial presentation, received LR in the ED  Nephrology following  Resolved with IV fluid   137 today     Thrombocytopenia  Platelets acutely decreasing : clowly treding down, today   Heparin held in setting of thrombocytopenia  Cefepime discontinued. No other offending medications  Hem onc consult on Monday      Lactic Acidosis - resolved    DVT/GI prophylaxis: PCD  PT/OT: not indicated  Code status: Full  Disposition:continue ICU care    Romy Kern MD, PGY-1  Internal Medicine   Attending physician: Dr. Vicente Prieto Attending Addendum:    Patient seen and examined with the house staff. X-rays personally reviewed through the PACS. Family is updated at the bedside as available. Additional findings listed below as necessary. Additional comments:  1. Now s/p peg  2. Hypotension persists and she continues to require norepi, likely on basis of continued volume depletion though better, continue hydration and wean norepi. 3. Acute hypoxic resp failure etiology unclear but O2 needs dropping. 4. Thrombocytopenia consult heme tomorrow if not improved.   5. BEENA and hypernatremia better

## 2021-03-07 NOTE — PROGRESS NOTES
PT SEEN AND EXAMINED. No significant change. Remains in ICU  Chart reviewed. meds reviewed. D/w nursing + family as available. EXAM: IN GENERAL, NAD. Vira Libman ROS NEGx10 EXCEPT:   /63   Pulse 87   Temp 97.3 °F (36.3 °C) (Bladder)   Resp 27   Ht 5' 1\" (1.549 m)   Wt 145 lb 4.8 oz (65.9 kg)   SpO2 97%   BMI 27.45 kg/m²   GEN: not really responsive. HEENT: NCAT. EOMI. VIMAL  NECK: NO JVD. TRACH MIDLINE. NO BRUITS. NO THYROMEGALY. LUNGS: CTA BL NO RALES, RHONCHI OR WHEEZES. GOOD EXCURSION. CV: Regular rate and rhythm, NO Murmurs, Rubs, Or gallops  ABD: Soft. Nontender. Normal bowel sounds. No organomegaly  EXT:No clubbing cyanosis or edema  Neuro: Alert and oriented x 3. No focal motor deficits. No sensory deficits. Reflexes appear intact.   Labs/data reviewedLABS: CBC with Differential:    Lab Results   Component Value Date    WBC 11.7 03/07/2021    RBC 2.52 03/07/2021    HGB 7.4 03/07/2021    HCT 24.5 03/07/2021    PLT 53 03/07/2021    MCV 97.2 03/07/2021    MCH 29.4 03/07/2021    MCHC 30.2 03/07/2021    RDW 26.6 03/07/2021    NRBC 10.6 03/07/2021    SEGSPCT 58 06/26/2011    METASPCT 4.4 03/07/2021    LYMPHOPCT 2.7 03/07/2021    PROMYELOPCT 0.9 03/07/2021    MONOPCT 5.3 03/07/2021    MYELOPCT 1.8 03/07/2021    BASOPCT 0.3 03/07/2021    MONOSABS 0.58 03/07/2021    LYMPHSABS 0.35 03/07/2021    EOSABS 0.00 03/07/2021    BASOSABS 0.00 03/07/2021     Platelets:    Lab Results   Component Value Date    PLT 53 03/07/2021     CMP:    Lab Results   Component Value Date     03/07/2021    K 4.2 03/07/2021    K 4.8 02/09/2021     03/07/2021    CO2 25 03/07/2021    BUN 19 03/07/2021    CREATININE 0.9 03/07/2021    GFRAA >60 03/07/2021    LABGLOM >60 03/07/2021    GLUCOSE 224 03/07/2021    GLUCOSE 97 12/30/2011    PROT 5.8 03/07/2021    LABALBU 1.5 03/07/2021    LABALBU 3.7 12/30/2011    CALCIUM 7.7 03/07/2021    BILITOT 0.2 03/07/2021    ALKPHOS 93 03/07/2021    AST 30 03/07/2021    ALT 27 03/07/2021 Magnesium:    Lab Results   Component Value Date    MG 1.8 03/07/2021     LDH:    Lab Results   Component Value Date     01/26/2017     PT/INR:    Lab Results   Component Value Date    PROTIME 18.8 03/04/2021    INR 1.7 03/04/2021     Last 3 Troponin:    Lab Results   Component Value Date    TROPONINI 0.08 03/05/2021    TROPONINI 0.10 03/04/2021    TROPONINI 0.19 03/04/2021     ABG:    Lab Results   Component Value Date    PH 7.340 03/04/2021    PCO2 45.0 03/04/2021    PO2 78.9 03/04/2021    HCO3 23.7 03/04/2021    BE -2.1 03/04/2021    O2SAT 94.6 03/04/2021     IRON:    Lab Results   Component Value Date    IRON 15 03/05/2021     IMAGING    Ct Abdomen Pelvis Wo Contrast Additional Contrast? None    Result Date: 3/4/2021  EXAMINATION: CT OF THE ABDOMEN AND PELVIS WITHOUT CONTRAST 3/4/2021 12:28 pm TECHNIQUE: CT of the abdomen and pelvis was performed without the administration of intravenous contrast. Multiplanar reformatted images are provided for review. Dose modulation, iterative reconstruction, and/or weight based adjustment of the mA/kV was utilized to reduce the radiation dose to as low as reasonably achievable. COMPARISON: March 6, 2017 HISTORY: ORDERING SYSTEM PROVIDED HISTORY: fever TECHNOLOGIST PROVIDED HISTORY: Reason for exam:->fever Additional Contrast?->None Decision Support Exception->Emergency Medical Condition (MA) What reading provider will be dictating this exam?->CRC FINDINGS: Presence of bilateral pleural effusions in mild-to-moderate degree causing compression atelectasis in the dependent portion of both lower lobes. The heart has normal size. The there is no pericardial effusion. There are preserved size and density for the liver. The gallbladder is normally distended. There is some increased density in the dependent portion of the gallbladder in part artifacts by adjacent peristalsis. Can further evaluate the gallbladder with gallbladder ultrasound.  There is some atrophy of the pancreas. The spleen has unremarkable appearance. There is no dilatation of the biliary tree pancreatic ductal system. Adrenals are not enlarged. Aorta and IVC are of unremarkable appearance. The there is no midline retroperitoneal adenopathy. Patient has a left the femoral venous catheter with tip in the left the common iliac vein. The There are is streaking artifacts in the abdomen due monitoring devices and active peristalsis. There are no indication for free intraperitoneal air or acute inflammatory changes the mid mesentery fat planes. There is no stranding of the pericolonic fat planes. There is no signs for diverticulitis. Fair amount of fecal content is seen in the colon indicating pattern moderate constipation. The a component of fecal rectal retention is also observed. There remarkable appearance for the uterus and for the right ovary. The there is a 2 cm is stable fat containing cyst in the left ovary compatible with a the dermoid type of cyst with discrete the mey of calcification in the wall. There are normal size for the kidneys. There is no renal calculus. There is no obstruction. There is no midline retroperitoneal adenopathy. 1.  Bilateral pleural effusions of mild-to-moderate degree causing compression atelectasis in the lower lobes posteriorly. 2.  Pattern of constipation. 3.  No indication for acute the inflammatory process in the mid mesentery fat planes, free intraperitoneal air or ascites. 4.  No obstructive uropathy. 5.  No dilatation of the biliary tree. 6.  Cannot entirely exclude small gallstone in the gallbladder lumen, artifacts are present. Further evaluation with gallbladder ultrasound is recommended.      Ct Head Wo Contrast    Result Date: 3/4/2021  EXAMINATION: CT OF THE HEAD WITHOUT CONTRAST  3/4/2021 8:33 am TECHNIQUE: CT of the head was performed without the administration of intravenous contrast. Dose modulation, iterative reconstruction, and/or weight based adjustment of the mA/kV was utilized to reduce the radiation dose to as low as reasonably achievable. COMPARISON: CT head without contrast 02/08/2021 HISTORY: ORDERING SYSTEM PROVIDED HISTORY: ams TECHNOLOGIST PROVIDED HISTORY: Has a \"code stroke\" or \"stroke alert\" been called? ->No Reason for exam:->ams Decision Support Exception->Emergency Medical Condition (MA) What reading provider will be dictating this exam?->CRC FINDINGS: BRAIN/VENTRICLES: There is no acute intracranial hemorrhage, mass effect or midline shift. No abnormal extra-axial fluid collection. The gray-white differentiation is maintained without evidence of an acute infarct. There is unchanged ventricular dilation without sulcal effacement, compatible with parenchymal atrophy. There is moderate burden of white matter hypoattenuation, which is typically seen in the setting of chronic small vessel ischemic disease. ORBITS: There is unchanged bilateral globe calcification. SINUSES: Mild sinus mucosal thickening is partially imaged in the right maxillary sinus. The visualized portions of the mastoid air cells are clear. SOFT TISSUES/SKULL:  No acute abnormality of the visualized skull or soft tissues. No acute intracranial process is identified. Ct Head Wo Contrast    Result Date: 2/8/2021  EXAMINATION: CT OF THE HEAD WITHOUT CONTRAST  2/8/2021 2:04 pm TECHNIQUE: CT of the head was performed without the administration of intravenous contrast. Dose modulation, iterative reconstruction, and/or weight based adjustment of the mA/kV was utilized to reduce the radiation dose to as low as reasonably achievable. COMPARISON: 03/30/2007 HISTORY: ORDERING SYSTEM PROVIDED HISTORY: altered TECHNOLOGIST PROVIDED HISTORY: Reason for exam:->altered Has a \"code stroke\" or \"stroke alert\" been called? ->No Decision Support Exception->Emergency Medical Condition (MA) FINDINGS: BRAIN/VENTRICLES: There is no acute intracranial hemorrhage, mass effect or midline shift.  No abnormal extra-axial fluid collection. The gray-white differentiation is maintained without evidence of an acute infarct. There is no evidence of hydrocephalus. The ventricles, cisterns and sulci are prominent consistent with atrophy. There is decreased attenuation within the periventricular white matter consistent with periventricular leukomalacia. ORBITS: There are chronic changes of the orbits. SINUSES: The visualized paranasal sinuses and mastoid air cells demonstrate no acute abnormality. SOFT TISSUES/SKULL:  No acute abnormality of the visualized skull or soft tissues. 1. There is no acute intracranial abnormality. Specifically, there is no intracranial hemorrhage. 2. Atrophy and periventricular leukomalacia,    Xr Chest Portable    Result Date: 3/4/2021  EXAMINATION: ONE XRAY VIEW OF THE CHEST 3/4/2021 8:35 am COMPARISON: One-view chest x-ray 02/08/2021 HISTORY: ORDERING SYSTEM PROVIDED HISTORY: weakness, Possible Stroke TECHNOLOGIST PROVIDED HISTORY: Reason for exam:->weakness, Possible Stroke What reading provider will be dictating this exam?->CRC FINDINGS: The cardiac silhouette is within normal limits. The mediastinal contours are within normal limits. Mild central peribronchial thickening appears similar to decreased compared to the previous exam.  No consolidations are identified. No significant pleural effusions or pneumothorax. Osseous degenerative changes are present. EKG leads overlie the chest and abdomen. No acute cardiopulmonary process is identified. Xr Chest Portable    Result Date: 2/8/2021  EXAMINATION: ONE XRAY VIEW OF THE CHEST 2/8/2021 2:05 pm COMPARISON: September 29, 2020 HISTORY: ORDERING SYSTEM PROVIDED HISTORY: altered TECHNOLOGIST PROVIDED HISTORY: Reason for exam:->altered FINDINGS: The lungs are clear. Right hemidiaphragm is elevated. The heart is mildly enlarged. No pneumothorax. The costophrenic angles are clear.     No active pulmonary process.       DIET:  Diet NPO, After Midnight Exceptions are: Sips with Meds    Medications:    Scheduled Meds:   potassium phosphate IVPB  20 mmol Intravenous Once    magnesium sulfate  1,000 mg Intravenous Once    insulin lispro  0-12 Units Subcutaneous TID WC    folic acid  1 mg Intravenous Daily    thiamine  100 mg Intravenous Daily    hydrocortisone sodium succinate PF  100 mg Intravenous Q8H    famotidine (PEPCID) injection  20 mg Intravenous Daily    [Held by provider] levothyroxine  150 mcg Oral Daily    sodium chloride flush  10 mL Intravenous 2 times per day    [Held by provider] heparin (porcine)  5,000 Units Subcutaneous 3 times per day       Continuous Infusions:   dextrose      norepinephrine 7 mcg/min (03/07/21 0858)    vasopressin (Septic Shock) infusion Stopped (03/05/21 0650)       PRN Meds:glucose, dextrose, glucagon (rDNA), dextrose, sodium chloride flush, promethazine **OR** ondansetron, polyethylene glycol, acetaminophen **OR** acetaminophen    A/P:      Patient Active Problem List   Diagnosis    Mental retardation    Hypothyroidism    Blindness of both eyes    ESRD (end stage renal disease) on dialysis ([facility] [address]    Macrocytosis    Behavior disturbance    Altered mental state    Sepsis ([facility] [address]    hypo-volemic shock  Acute hypoxic respiratory failure  BEENA  hyperkalemia  Lactic acidosis  PLAN:continue critical care monitoring in ICU  Will place PEG tube    I can be reached though [employer] or [facility] [place] at [phone]

## 2021-03-08 LAB
ALBUMIN SERPL-MCNC: 1.9 G/DL (ref 3.5–5.2)
ALP BLD-CCNC: 98 U/L (ref 35–104)
ALT SERPL-CCNC: 22 U/L (ref 0–32)
ANION GAP SERPL CALCULATED.3IONS-SCNC: 3 MMOL/L (ref 7–16)
ANION GAP SERPL CALCULATED.3IONS-SCNC: 5 MMOL/L (ref 7–16)
ANION GAP SERPL CALCULATED.3IONS-SCNC: 6 MMOL/L (ref 7–16)
ANION GAP SERPL CALCULATED.3IONS-SCNC: 6 MMOL/L (ref 7–16)
ANISOCYTOSIS: ABNORMAL
APTT: 25.7 SEC (ref 24.5–35.1)
AST SERPL-CCNC: 23 U/L (ref 0–31)
BASOPHILS ABSOLUTE: 0 E9/L (ref 0–0.2)
BASOPHILS RELATIVE PERCENT: 0.3 % (ref 0–2)
BILIRUB SERPL-MCNC: 0.3 MG/DL (ref 0–1.2)
BUN BLDV-MCNC: 18 MG/DL (ref 8–23)
BUN BLDV-MCNC: 18 MG/DL (ref 8–23)
BUN BLDV-MCNC: 19 MG/DL (ref 8–23)
BUN BLDV-MCNC: 20 MG/DL (ref 8–23)
CALCIUM SERPL-MCNC: 7.9 MG/DL (ref 8.6–10.2)
CALCIUM SERPL-MCNC: 8.1 MG/DL (ref 8.6–10.2)
CALCIUM SERPL-MCNC: 8.3 MG/DL (ref 8.6–10.2)
CALCIUM SERPL-MCNC: 8.4 MG/DL (ref 8.6–10.2)
CHLORIDE BLD-SCNC: 112 MMOL/L (ref 98–107)
CHLORIDE BLD-SCNC: 112 MMOL/L (ref 98–107)
CHLORIDE BLD-SCNC: 114 MMOL/L (ref 98–107)
CHLORIDE BLD-SCNC: 114 MMOL/L (ref 98–107)
CO2: 21 MMOL/L (ref 22–29)
CO2: 24 MMOL/L (ref 22–29)
CREAT SERPL-MCNC: 0.8 MG/DL (ref 0.5–1)
EOSINOPHILS ABSOLUTE: 0 E9/L (ref 0.05–0.5)
EOSINOPHILS RELATIVE PERCENT: 0.1 % (ref 0–6)
GFR AFRICAN AMERICAN: >60
GFR NON-AFRICAN AMERICAN: >60 ML/MIN/1.73
GLUCOSE BLD-MCNC: 197 MG/DL (ref 74–99)
GLUCOSE BLD-MCNC: 213 MG/DL (ref 74–99)
GLUCOSE BLD-MCNC: 215 MG/DL (ref 74–99)
GLUCOSE BLD-MCNC: 243 MG/DL (ref 74–99)
HCT VFR BLD CALC: 25.1 % (ref 34–48)
HEMOGLOBIN: 7.6 G/DL (ref 11.5–15.5)
HYPOCHROMIA: ABNORMAL
INR BLD: 1.1
LYMPHOCYTES ABSOLUTE: 0.12 E9/L (ref 1.5–4)
LYMPHOCYTES RELATIVE PERCENT: 0.9 % (ref 20–42)
MAGNESIUM: 2.1 MG/DL (ref 1.6–2.6)
MCH RBC QN AUTO: 28.8 PG (ref 26–35)
MCHC RBC AUTO-ENTMCNC: 30.3 % (ref 32–34.5)
MCV RBC AUTO: 95.1 FL (ref 80–99.9)
METAMYELOCYTES RELATIVE PERCENT: 0.9 % (ref 0–1)
METER GLUCOSE: 193 MG/DL (ref 74–99)
METER GLUCOSE: 199 MG/DL (ref 74–99)
METER GLUCOSE: 208 MG/DL (ref 74–99)
METER GLUCOSE: 225 MG/DL (ref 74–99)
METER GLUCOSE: 244 MG/DL (ref 74–99)
MONOCYTES ABSOLUTE: 0.6 E9/L (ref 0.1–0.95)
MONOCYTES RELATIVE PERCENT: 5.4 % (ref 2–12)
MYELOCYTE PERCENT: 1.8 % (ref 0–0)
NEUTROPHILS ABSOLUTE: 11.19 E9/L (ref 1.8–7.3)
NEUTROPHILS RELATIVE PERCENT: 91.1 % (ref 43–80)
NUCLEATED RED BLOOD CELLS: 23.2 /100 WBC
PDW BLD-RTO: 26.8 FL (ref 11.5–15)
PHOSPHORUS: 2.2 MG/DL (ref 2.5–4.5)
PLATELET # BLD: 66 E9/L (ref 130–450)
PLATELET CONFIRMATION: NORMAL
PMV BLD AUTO: ABNORMAL FL (ref 7–12)
POIKILOCYTES: ABNORMAL
POLYCHROMASIA: ABNORMAL
POTASSIUM SERPL-SCNC: 4 MMOL/L (ref 3.5–5)
POTASSIUM SERPL-SCNC: 4.3 MMOL/L (ref 3.5–5)
POTASSIUM SERPL-SCNC: 4.4 MMOL/L (ref 3.5–5)
POTASSIUM SERPL-SCNC: 4.4 MMOL/L (ref 3.5–5)
PROTHROMBIN TIME: 12.4 SEC (ref 9.3–12.4)
RBC # BLD: 2.64 E12/L (ref 3.5–5.5)
SCHISTOCYTES: ABNORMAL
SODIUM BLD-SCNC: 139 MMOL/L (ref 132–146)
SODIUM BLD-SCNC: 141 MMOL/L (ref 132–146)
SODIUM BLD-SCNC: 141 MMOL/L (ref 132–146)
SODIUM BLD-SCNC: 144 MMOL/L (ref 132–146)
TARGET CELLS: ABNORMAL
TOTAL PROTEIN: 6.3 G/DL (ref 6.4–8.3)
WBC # BLD: 11.9 E9/L (ref 4.5–11.5)

## 2021-03-08 PROCEDURE — 85610 PROTHROMBIN TIME: CPT

## 2021-03-08 PROCEDURE — 6360000002 HC RX W HCPCS: Performed by: INTERNAL MEDICINE

## 2021-03-08 PROCEDURE — 84100 ASSAY OF PHOSPHORUS: CPT

## 2021-03-08 PROCEDURE — 87206 SMEAR FLUORESCENT/ACID STAI: CPT

## 2021-03-08 PROCEDURE — 87186 SC STD MICRODIL/AGAR DIL: CPT

## 2021-03-08 PROCEDURE — 2700000000 HC OXYGEN THERAPY PER DAY

## 2021-03-08 PROCEDURE — 2000000000 HC ICU R&B

## 2021-03-08 PROCEDURE — 87077 CULTURE AEROBIC IDENTIFY: CPT

## 2021-03-08 PROCEDURE — 6370000000 HC RX 637 (ALT 250 FOR IP): Performed by: INTERNAL MEDICINE

## 2021-03-08 PROCEDURE — 85025 COMPLETE CBC W/AUTO DIFF WBC: CPT

## 2021-03-08 PROCEDURE — 99233 SBSQ HOSP IP/OBS HIGH 50: CPT | Performed by: INTERNAL MEDICINE

## 2021-03-08 PROCEDURE — 36592 COLLECT BLOOD FROM PICC: CPT

## 2021-03-08 PROCEDURE — 82962 GLUCOSE BLOOD TEST: CPT

## 2021-03-08 PROCEDURE — 2580000003 HC RX 258: Performed by: INTERNAL MEDICINE

## 2021-03-08 PROCEDURE — 83735 ASSAY OF MAGNESIUM: CPT

## 2021-03-08 PROCEDURE — 2500000003 HC RX 250 WO HCPCS: Performed by: INTERNAL MEDICINE

## 2021-03-08 PROCEDURE — 31720 CLEARANCE OF AIRWAYS: CPT

## 2021-03-08 PROCEDURE — 80053 COMPREHEN METABOLIC PANEL: CPT

## 2021-03-08 PROCEDURE — 80048 BASIC METABOLIC PNL TOTAL CA: CPT

## 2021-03-08 PROCEDURE — 85730 THROMBOPLASTIN TIME PARTIAL: CPT

## 2021-03-08 PROCEDURE — 87070 CULTURE OTHR SPECIMN AEROBIC: CPT

## 2021-03-08 RX ORDER — GAUZE BANDAGE 2" X 2"
100 BANDAGE TOPICAL DAILY
Status: DISCONTINUED | OUTPATIENT
Start: 2021-03-09 | End: 2021-03-16 | Stop reason: HOSPADM

## 2021-03-08 RX ORDER — FOLIC ACID 1 MG/1
1 TABLET ORAL DAILY
Status: DISCONTINUED | OUTPATIENT
Start: 2021-03-09 | End: 2021-03-16 | Stop reason: HOSPADM

## 2021-03-08 RX ORDER — MIDODRINE HYDROCHLORIDE 5 MG/1
5 TABLET ORAL
Status: DISCONTINUED | OUTPATIENT
Start: 2021-03-08 | End: 2021-03-16 | Stop reason: HOSPADM

## 2021-03-08 RX ORDER — FAMOTIDINE 20 MG/1
20 TABLET, FILM COATED ORAL DAILY
Status: DISCONTINUED | OUTPATIENT
Start: 2021-03-09 | End: 2021-03-10

## 2021-03-08 RX ORDER — BUMETANIDE 0.25 MG/ML
0.5 INJECTION, SOLUTION INTRAMUSCULAR; INTRAVENOUS 2 TIMES DAILY
Status: DISCONTINUED | OUTPATIENT
Start: 2021-03-08 | End: 2021-03-14

## 2021-03-08 RX ORDER — BUMETANIDE 0.25 MG/ML
1 INJECTION, SOLUTION INTRAMUSCULAR; INTRAVENOUS 2 TIMES DAILY
Status: DISCONTINUED | OUTPATIENT
Start: 2021-03-08 | End: 2021-03-08

## 2021-03-08 RX ADMIN — MIDODRINE HYDROCHLORIDE 5 MG: 5 TABLET ORAL at 16:52

## 2021-03-08 RX ADMIN — SODIUM CHLORIDE, PRESERVATIVE FREE 10 ML: 5 INJECTION INTRAVENOUS at 10:36

## 2021-03-08 RX ADMIN — INSULIN LISPRO 4 UNITS: 100 INJECTION, SOLUTION INTRAVENOUS; SUBCUTANEOUS at 12:13

## 2021-03-08 RX ADMIN — INSULIN LISPRO 4 UNITS: 100 INJECTION, SOLUTION INTRAVENOUS; SUBCUTANEOUS at 08:49

## 2021-03-08 RX ADMIN — BUMETANIDE 1 MG: 0.25 INJECTION, SOLUTION INTRAMUSCULAR; INTRAVENOUS at 10:36

## 2021-03-08 RX ADMIN — Medication 10 ML: at 20:30

## 2021-03-08 RX ADMIN — INSULIN LISPRO 2 UNITS: 100 INJECTION, SOLUTION INTRAVENOUS; SUBCUTANEOUS at 16:29

## 2021-03-08 RX ADMIN — HYDROCORTISONE SODIUM SUCCINATE 100 MG: 100 INJECTION, POWDER, FOR SOLUTION INTRAMUSCULAR; INTRAVENOUS at 10:35

## 2021-03-08 RX ADMIN — FAMOTIDINE 20 MG: 10 INJECTION INTRAVENOUS at 08:48

## 2021-03-08 RX ADMIN — Medication 10 ML: at 08:48

## 2021-03-08 RX ADMIN — BUMETANIDE 0.5 MG: 0.25 INJECTION, SOLUTION INTRAMUSCULAR; INTRAVENOUS at 16:29

## 2021-03-08 RX ADMIN — SODIUM CHLORIDE, PRESERVATIVE FREE 10 ML: 5 INJECTION INTRAVENOUS at 04:03

## 2021-03-08 RX ADMIN — FOLIC ACID 1 MG: 5 INJECTION, SOLUTION INTRAMUSCULAR; INTRAVENOUS; SUBCUTANEOUS at 09:08

## 2021-03-08 RX ADMIN — HYDROCORTISONE SODIUM SUCCINATE 50 MG: 100 INJECTION, POWDER, FOR SOLUTION INTRAMUSCULAR; INTRAVENOUS at 20:30

## 2021-03-08 RX ADMIN — MIDODRINE HYDROCHLORIDE 5 MG: 5 TABLET ORAL at 12:13

## 2021-03-08 RX ADMIN — THIAMINE HYDROCHLORIDE 100 MG: 100 INJECTION, SOLUTION INTRAMUSCULAR; INTRAVENOUS at 08:47

## 2021-03-08 RX ADMIN — HYDROCORTISONE SODIUM SUCCINATE 100 MG: 100 INJECTION, POWDER, FOR SOLUTION INTRAMUSCULAR; INTRAVENOUS at 04:03

## 2021-03-08 ASSESSMENT — PAIN SCALES - PAIN ASSESSMENT IN ADVANCED DEMENTIA (PAINAD)
NEGVOCALIZATION: 0
BREATHING: 0
BODYLANGUAGE: 1
FACIALEXPRESSION: 0
TOTALSCORE: 0
BODYLANGUAGE: 0
FACIALEXPRESSION: 0
CONSOLABILITY: 0
BREATHING: 1
NEGVOCALIZATION: 0
CONSOLABILITY: 0
BREATHING: 1
NEGVOCALIZATION: 0
TOTALSCORE: 0
CONSOLABILITY: 0
TOTALSCORE: 0
CONSOLABILITY: 0
BODYLANGUAGE: 0
BREATHING: 0
CONSOLABILITY: 0
FACIALEXPRESSION: 0

## 2021-03-08 ASSESSMENT — PAIN SCALES - GENERAL
PAINLEVEL_OUTOF10: 0
PAINLEVEL_OUTOF10: 2
PAINLEVEL_OUTOF10: 0

## 2021-03-08 NOTE — PROGRESS NOTES
[address] [facility]  Department of Internal Medicine   Internal Medicine Residency   MICU Progress Note    Patient:  Suresh Goetz 78 y.o. female  MRN: [id number]     Date of Service: 3/8/2021    Allergy: Patient has no known allergies. Cc follow up on hypoxia  Subjective     Patient seen and examined this morning   Open her eyes to voice command, doesn not follow other command   On 2l NC saturating above 90  No acute changes over night  POD 1 from PEG tube  ROS: unable to obtain secondary to patient mentation     24h change: electrolytes improved    Objective     VS: /70   Pulse 105   Temp 98.2 °F (36.8 °C)   Resp 26   Ht 5' 1\" (1.549 m)   Wt 151 lb (68.5 kg)   SpO2 94%   BMI 28.53 kg/m²   ABP (Arterial line BP): 91/43  ABP mean (Arterial line mean): (!) 59 mmHg    I & O - 24hr:     Intake/Output Summary (Last 24 hours) at 3/8/2021 1713  Last data filed at 3/8/2021 1700  Gross per 24 hour   Intake 2018.02 ml   Output 1240 ml   Net 778.02 ml       Physical Exam:  · General Appearance: appears stated age and nonverbal  · Neck: no adenopathy, no carotid bruit, no JVD, supple, symmetrical, trachea midline and thyroid not enlarged, symmetric, no tenderness/mass/nodules  · Lung: Coarse breath sounds heard  · Heart: regular rate and rhythm, S1, S2 normal, no murmur, click, rub or gallop  · Abdomen: soft, non-tender; bowel sounds normal; no masses,  no organomegaly  · Extremities:  extremities normal, atraumatic, no cyanosis or edema  · Musculoskeletal: No joint swelling, no muscle tenderness. ROM normal in all joints of extremities.    · Neurologic: Mental status: Non verbal and unresponsive at this time    Lines     site day    Art line   R Radial 4 - removed   TLC L Fem 4   PICC None    Hemoaccess None      Oxygen:     2 L NC     ABG:     Lab Results   Component Value Date    PH 7.340 03/04/2021    CPN0SLC 39.3 02/08/2021    PCO2 45.0 03/04/2021    PO2ART 75.2 02/08/2021    PO2 78.9 03/04/2021 JPR8YRY 26.5 02/08/2021    HCO3 23.7 03/04/2021    BE -2.1 03/04/2021    THB 10.9 03/04/2021    O2SAT 94.6 03/04/2021        Medications     Infusions: (Fluid, Sedation, Vasopressors)  IVF:      Vasopressors   Weaned off Levophed   Sedation   none    Nutrition:   Tube feeds initiated     ATB:   Antibiotics  Days   Vancomycin x1 1   Cefepime 3 d/c 3/6/2021         Skin issues: none    Patient currently has   Urinary cath  Isolation  DVT prophylaxis/ GI prophylaxis  Palliative care consult; signed off  Nephrology consult; following  Hematology consult ; following  Labs     CBC with Differential:    Lab Results   Component Value Date    WBC 11.9 03/08/2021    RBC 2.64 03/08/2021    HGB 7.6 03/08/2021    HCT 25.1 03/08/2021    PLT 66 03/08/2021    MCV 95.1 03/08/2021    MCH 28.8 03/08/2021    MCHC 30.3 03/08/2021    RDW 26.8 03/08/2021    NRBC 23.2 03/08/2021    SEGSPCT 58 06/26/2011    METASPCT 0.9 03/08/2021    LYMPHOPCT 0.9 03/08/2021    PROMYELOPCT 0.9 03/07/2021    MONOPCT 5.4 03/08/2021    MYELOPCT 1.8 03/08/2021    BASOPCT 0.3 03/08/2021    MONOSABS 0.60 03/08/2021    LYMPHSABS 0.12 03/08/2021    EOSABS 0.00 03/08/2021    BASOSABS 0.00 03/08/2021     CMP:    Lab Results   Component Value Date     03/08/2021    K 4.0 03/08/2021    K 4.8 02/09/2021     03/08/2021    CO2 21 03/08/2021    BUN 20 03/08/2021    CREATININE 0.8 03/08/2021    GFRAA >60 03/08/2021    LABGLOM >60 03/08/2021    GLUCOSE 197 03/08/2021    GLUCOSE 97 12/30/2011    PROT 6.3 03/08/2021    LABALBU 1.9 03/08/2021    LABALBU 3.7 12/30/2011    CALCIUM 8.3 03/08/2021    BILITOT 0.3 03/08/2021    ALKPHOS 98 03/08/2021    AST 23 03/08/2021    ALT 22 03/08/2021     U/A:    Lab Results   Component Value Date    COLORU DARK YELLOW 03/04/2021    PROTEINU 100 03/04/2021    PHUR 5.0 03/04/2021    LABCAST FEW 09/20/2017    WBCUA NONE 03/04/2021    RBCUA NONE 03/04/2021    MUCUS Present 02/08/2021    BACTERIA MANY 03/04/2021    CLARITYU SL CLOUDY 03/04/2021    SPECGRAV >=1.030 03/04/2021    LEUKOCYTESUR Negative 03/04/2021    UROBILINOGEN 4.0 03/04/2021    BILIRUBINUR MODERATE 03/04/2021    BLOODU Negative 03/04/2021    GLUCOSEU 100 03/04/2021    AMORPHOUS MODERATE 01/21/2017     ABG:    Lab Results   Component Value Date    PH 7.340 03/04/2021    PCO2 45.0 03/04/2021    PO2 78.9 03/04/2021    HCO3 23.7 03/04/2021    BE -2.1 03/04/2021    O2SAT 94.6 03/04/2021     FOLATE:    Lab Results   Component Value Date    FOLATE 19.8 03/05/2021     IRON:    Lab Results   Component Value Date    IRON 15 03/05/2021     TIBC:    Lab Results   Component Value Date    TIBC 83 03/05/2021     FERRITIN:    Lab Results   Component Value Date    FERRITIN 370 03/05/2021       Resident's Assessment and Plan     The patient is a 78 y.o. female with pmh of MR w/blindness and CKD (likely stage IV) , with previous episodes of BEENA who presents from group home for being unresponsive. Found to have BEENA, hypotensive, hypernatremic and hyperkalemia in the ED.  We are managing her in the follwog    Neuro - baseline  Mental retardation with legal blindness  Patient nonverbal and bed bound at baseline   presented from Group home for unresponsiveness      Hypovolemic Shock 2/2 poor oral intake/ hypovolemia - improved  Initial BP: 66/39 mmHg, Volume resuscitation in ED : 2L bolus NS (30cc/kg) , started on LR @ 150 cc/hr  NICOM with 250cc NS bolus - not fluid responsive  Weaned off Levophed, BP stable  Continue to monitor vitals      Acute Hypoxic Respiratory insufficiency 2/2 shock vs atelectasis   Required 6L NC O2 on presentation ---> improved to 2L NC this AM  CXR negative, CT abdo/pelvis: bilateral pleural effusions of mild-to-moderate size causing atelectasis   Improving, wean NC as able   Clx negative thus far     Dysphagia   No oral intake in last 48 hours prior to ED presentation , eating poorly over the last few weeks  Failed Bedside swallow, unsuccessful Corpak attempts   POD 1 PEG tube placement  She has history of peg tube per records     BEENA likely 2/2 to dehydration - resolved  BUN/Creatinine 46/4.1 on presentation; baseline creatinine 0.7-1.0, FeNa < 1%   Dr. Ferny Chávez is following  Cr today is 0.8      HAGMA likely secondary to lactic acidosis vs Uremia - resolved  A.5 corrected for albumin , current A  Initial lactic acid: 5.6 , BUN: 46 , improved to normal LA, Bun: 24    Hyperkalemia - resolved  K: 5.7 on presentation , current 4.0     Hypernatremia likely 2/2 dehydration - resolved  Na: 166 on initial presentation, received LR in the ED  Nephrology following  Resolved with IV fluid   141 today     Thrombocytopenia  Platelets acutely decreasing : clowly treding down, today   Heparin held in setting of thrombocytopenia  Cefepime discontinued. No other offending medications  Hem onc consulted, recs appreciated: Check Coagulopathy-INR, PT/APTT, check for VINH     Lactic Acidosis - resolved    DVT/GI prophylaxis: PCD  PT/OT: not indicated  Code status: Full  Disposition:continue ICU care    Lillard Goldmann, MD, PGY-1   Attending physician: Dr. Chely Lopez personally saw, examined and provided care for the patient. Radiographs, labs and medication list were reviewed by me independently. I spoke with bedside nursing, therapists and consultants. Critical care services and times documented are independent of procedures and multidisciplinary rounds with Residents. Additionally comprehensive, multidisciplinary rounds were conducted with the MICU team. The case was discussed in detail and plans for care were established. Review of Residents documentation was conducted and revisions were made as appropriate. I agree with the above documented exam, problem list and plan of care.     unresponsive   History of MR   Na 166   Non verbal   S.p peg   Low plt   Consult  hematology   Start Midodrine  Get rid off pressors   Ebenezer Treviño MD,[facility]  Pulmonary&Critical Care Medicine   Director of Lung Nodule [place]  Medical Director of [address] [place] [address]    Bernardo Aguilera

## 2021-03-08 NOTE — PROGRESS NOTES
GENERAL SURGERY PROGRESS NOTE:    Pt seen and examined. No acute issues overnight. PEG in place at 3.5 cm with bumper easily rotated. Abdomen is soft, nontender, nondistended. Okay for tube feeds and medications through PEG. Management per primary. Surgery will sign off.   Electronically signed by Hieu Gomez DO on 3/8/2021 at 5:58 PM

## 2021-03-08 NOTE — CONSULTS
Department of Medicine  Division of Hematology/Oncology  Attending Consult Note      Reason for Consult:  Worsening Thrombocytopenia   Requesting Physician: Viri Bartlett    CHIEF COMPLAINT:  unresponsiveness     History Obtained From:  Patient and EMR    HISTORY OF PRESENT ILLNESS:      The patient is a 78 y.o. female with significant past medical history of BEENA, blind in both eyes,  Hyperthyroidism, MR was found unresponsive at her group home. She is bed bound at baseline. They found her unresponsive in the morning around 7 am. She was brought in 3 hours later after initially finding patient unresponsive. Labs on admission show , Bun 46, CR 4.1, WBC 10.7, Hemoglobin 8.7, Platelets 132. CT abdomen/pelvis showed BL pleural effusions of mild-to-moderate degree causing compression atelectasis in the LL posteriorly. Pattern of constipation. No indication for acute inflammatory process in the mid mesentery fat planes, free intraperitoneal air or ascites. No obstructive uropathy. No dilation of the biliary tree. Cannot entirely exclude small gallstone in the gallbladder lumen, artifacts are present. CT head was negative. CXR was negative. Per family, patient has has decreased oral intake over the past several weeks. No BM for several weeks on admission. UA was positive for UTI. Received Maxipime 3/4-3/6. She received Vacomycin 3/4/21 and 1 dose of Ancef 3/7/21. She was also hypovolemic and hypotensive on admission requiring pressor blood pressure support. PEG tube placed 3/7/21. Received heparin 3/4/21-on hold currently. Patient is unable to provide any health information. No distress. On 4LNC.     Past Medical History:        Diagnosis Date    Acute kidney injury ([address] 01/15/2017    d/t Vancomycin, on Dialysis [facility] right chest    Blind in both eyes     [facility]     Hyperthyroidism     MR (mental retardation)     Osteoarthritis     Pneumonia 01/06/2017       Past Surgical History:        Procedure Laterality Date    BRONCHOSCOPY  02/10/2017    CHEST TUBE INSERTION Right 02/09/2017    GASTROSTOMY TUBE PLACEMENT N/A 3/7/2021    EGD PEG TUBE PLACEMENT performed by Cherilyn Landau, MD at [facility] [address] Right 01/17/2017    tessio insertion     OTHER SURGICAL HISTORY  01/25/2017    PEG tube insertion       Current Medications:    Current Facility-Administered Medications: [START ON 3/9/2021] famotidine (PEPCID) tablet 20 mg, 20 mg, Oral, Daily  [START ON 1/2/5634] folic acid (FOLVITE) tablet 1 mg, 1 mg, Oral, Daily  [START ON 3/9/2021] thiamine mononitrate tablet 100 mg, 100 mg, Oral, Daily  bumetanide (BUMEX) injection 1 mg, 1 mg, Intravenous, BID  insulin lispro (HUMALOG) injection vial 0-12 Units, 0-12 Units, Subcutaneous, TID WC  mineral oil-hydrophil petrolat ointment, , Topical, BID PRN  glucose (GLUTOSE) 40 % oral gel 15 g, 15 g, Oral, PRN  dextrose 50 % IV solution, 12.5 g, Intravenous, PRN  glucagon (rDNA) injection 1 mg, 1 mg, Intramuscular, PRN  dextrose 5 % solution, 100 mL/hr, Intravenous, PRN  hydrocortisone sodium succinate PF (SOLU-CORTEF) injection 100 mg, 100 mg, Intravenous, Q8H  [Held by provider] levothyroxine (SYNTHROID) tablet 150 mcg, 150 mcg, Oral, Daily  norepinephrine (LEVOPHED) 16 mg in dextrose 5% 250 mL infusion, 10 mcg/min, Intravenous, Continuous  sodium chloride flush 0.9 % injection 10 mL, 10 mL, Intravenous, 2 times per day  sodium chloride flush 0.9 % injection 10 mL, 10 mL, Intravenous, PRN  [Held by provider] heparin (porcine) injection 5,000 Units, 5,000 Units, Subcutaneous, 3 times per day  promethazine (PHENERGAN) tablet 12.5 mg, 12.5 mg, Oral, Q6H PRN **OR** ondansetron (ZOFRAN) injection 4 mg, 4 mg, Intravenous, Q6H PRN  polyethylene glycol (GLYCOLAX) packet 17 g, 17 g, Oral, Daily PRN  acetaminophen (TYLENOL) tablet 650 mg, 650 mg, Oral, Q6H PRN **OR** acetaminophen (TYLENOL) suppository 650 mg, 650 mg, Rectal, Q6H PRN    Allergies:  Patient has no known allergies. Social History:   Social History     Socioeconomic History    Marital status: Single     Spouse name: Not on file    Number of children: Not on file    Years of education: Not on file    Highest education level: Not on file   Occupational History    Not on file   Social Needs    Financial resource strain: Not on file    Food insecurity     Worry: Not on file     Inability: Not on file    Transportation needs     Medical: Not on file     Non-medical: Not on file   Tobacco Use    Smoking status: Never Smoker    Smokeless tobacco: Never Used   Substance and Sexual Activity    Alcohol use: No    Drug use: No    Sexual activity: Never   Lifestyle    Physical activity     Days per week: Not on file     Minutes per session: Not on file    Stress: Not on file   Relationships    Social connections     Talks on phone: Not on file     Gets together: Not on file     Attends [employer] service: Not on file     Active member of club or organization: Not on file     Attends meetings of clubs or organizations: Not on file     Relationship status: Not on file    Intimate partner violence     Fear of current or ex partner: Not on file     Emotionally abused: Not on file     Physically abused: Not on file     Forced sexual activity: Not on file   Other Topics Concern    Not on file   Social History Narrative    Not on file       Family History:     No family history on file. REVIEW OF SYSTEMS:    Patient is nonverbal.  Information obtained from the chart. Please see HPI    PHYSICAL EXAM:      Vitals:  BP (!) 109/55   Pulse 106   Temp 98.2 °F (36.8 °C) (Bladder)   Resp 27   Ht 5' 1\" (1.549 m)   Wt 151 lb (68.5 kg)   SpO2 93%   BMI 28.53 kg/m²     CONSTITUTIONAL:  no apparent distress, opens eyes when she hears voice, does not follow command otherwise, nonverbal,   HEENT:  Normocepalic, atraumatic, no obvious abnormality.  Lids and lashes normal, PERRLA, EOMI, sclera clear, conjunctiva normal,  NECK:  Supple, full ROM, symmetrical, trachea midline, no adenopathy, thyroid symmetric, not enlarged and no tenderness, skin normal  HEMATOLOGIC/LYMPHATICS:  no cervical or supraclavicular lymphadenopathy  LUNGS:  No increased work of breathing, good air exchange, diminished, no crackles or wheezing  CARDIOVASCULAR:  Normal apical impulse, regular rate and rhythm, normal S1 and S2, no S3 or S4, and no murmur noted  ABDOMEN:  No scars, normal BS, soft, non-distended, non-tender, no masses palpated, no hepatosplenomegaly, Peg tube C/D/I  MUSCULOSKELETAL:  No redness, warmth, or swelling of the joints; motor strength is 5 out of 5 in all extremities bilaterally; tone is normal  Lines: Curry    DATA:      CMP:    Lab Results   Component Value Date     03/08/2021    K 4.4 03/08/2021    K 4.8 02/09/2021     03/08/2021    CO2 24 03/08/2021    BUN 19 03/08/2021    PROT 6.3 03/08/2021       CBC:    Recent Labs     03/06/21  0438 03/07/21  0404 03/08/21  0401   WBC 13.8* 11.7* 11.9*   HGB 7.8* 7.4* 7.6*   PLT 57* 53* 66*       Radiology:    Ct Abdomen Pelvis Wo Contrast Additional Contrast? None    Result Date: 3/4/2021  EXAMINATION: CT OF THE ABDOMEN AND PELVIS WITHOUT CONTRAST 3/4/2021 12:28 pm TECHNIQUE: CT of the abdomen and pelvis was performed without the administration of intravenous contrast. Multiplanar reformatted images are provided for review. Dose modulation, iterative reconstruction, and/or weight based adjustment of the mA/kV was utilized to reduce the radiation dose to as low as reasonably achievable.  COMPARISON: March 6, 2017 HISTORY: ORDERING SYSTEM PROVIDED HISTORY: fever TECHNOLOGIST PROVIDED HISTORY: Reason for exam:->fever Additional Contrast?->None Decision Support Exception->Emergency Medical Condition (MA) What reading provider will be dictating this exam?->CRC FINDINGS: Presence of bilateral pleural effusions in mild-to-moderate degree causing compression atelectasis in the dependent portion of both lower lobes. The heart has normal size. The there is no pericardial effusion. There are preserved size and density for the liver. The gallbladder is normally distended. There is some increased density in the dependent portion of the gallbladder in part artifacts by adjacent peristalsis. Can further evaluate the gallbladder with gallbladder ultrasound. There is some atrophy of the pancreas. The spleen has unremarkable appearance. There is no dilatation of the biliary tree pancreatic ductal system. Adrenals are not enlarged. Aorta and IVC are of unremarkable appearance. The there is no midline retroperitoneal adenopathy. Patient has a left the femoral venous catheter with tip in the left the common iliac vein. The There are is streaking artifacts in the abdomen due monitoring devices and active peristalsis. There are no indication for free intraperitoneal air or acute inflammatory changes the mid mesentery fat planes. There is no stranding of the pericolonic fat planes. There is no signs for diverticulitis. Fair amount of fecal content is seen in the colon indicating pattern moderate constipation. The a component of fecal rectal retention is also observed. There remarkable appearance for the uterus and for the right ovary. The there is a 2 cm is stable fat containing cyst in the left ovary compatible with a the dermoid type of cyst with discrete the mey of calcification in the wall. There are normal size for the kidneys. There is no renal calculus. There is no obstruction. There is no midline retroperitoneal adenopathy. 1.  Bilateral pleural effusions of mild-to-moderate degree causing compression atelectasis in the lower lobes posteriorly. 2.  Pattern of constipation. 3.  No indication for acute the inflammatory process in the mid mesentery fat planes, free intraperitoneal air or ascites. 4.  No obstructive uropathy.  5.  No dilatation of the biliary tree. 6.  Cannot entirely exclude small gallstone in the gallbladder lumen, artifacts are present. Further evaluation with gallbladder ultrasound is recommended. Ct Head Wo Contrast    Result Date: 3/4/2021  EXAMINATION: CT OF THE HEAD WITHOUT CONTRAST  3/4/2021 8:33 am TECHNIQUE: CT of the head was performed without the administration of intravenous contrast. Dose modulation, iterative reconstruction, and/or weight based adjustment of the mA/kV was utilized to reduce the radiation dose to as low as reasonably achievable. COMPARISON: CT head without contrast 02/08/2021 HISTORY: ORDERING SYSTEM PROVIDED HISTORY: ams TECHNOLOGIST PROVIDED HISTORY: Has a \"code stroke\" or \"stroke alert\" been called? ->No Reason for exam:->ams Decision Support Exception->Emergency Medical Condition (MA) What reading provider will be dictating this exam?->CRC FINDINGS: BRAIN/VENTRICLES: There is no acute intracranial hemorrhage, mass effect or midline shift. No abnormal extra-axial fluid collection. The gray-white differentiation is maintained without evidence of an acute infarct. There is unchanged ventricular dilation without sulcal effacement, compatible with parenchymal atrophy. There is moderate burden of white matter hypoattenuation, which is typically seen in the setting of chronic small vessel ischemic disease. ORBITS: There is unchanged bilateral globe calcification. SINUSES: Mild sinus mucosal thickening is partially imaged in the right maxillary sinus. The visualized portions of the mastoid air cells are clear. SOFT TISSUES/SKULL:  No acute abnormality of the visualized skull or soft tissues. No acute intracranial process is identified.      Ct Head Wo Contrast    Result Date: 2/8/2021  EXAMINATION: CT OF THE HEAD WITHOUT CONTRAST  2/8/2021 2:04 pm TECHNIQUE: CT of the head was performed without the administration of intravenous contrast. Dose modulation, iterative reconstruction, and/or weight based adjustment of the mA/kV was utilized to reduce the radiation dose to as low as reasonably achievable. COMPARISON: 03/30/2007 HISTORY: ORDERING SYSTEM PROVIDED HISTORY: altered TECHNOLOGIST PROVIDED HISTORY: Reason for exam:->altered Has a \"code stroke\" or \"stroke alert\" been called? ->No Decision Support Exception->Emergency Medical Condition (MA) FINDINGS: BRAIN/VENTRICLES: There is no acute intracranial hemorrhage, mass effect or midline shift. No abnormal extra-axial fluid collection. The gray-white differentiation is maintained without evidence of an acute infarct. There is no evidence of hydrocephalus. The ventricles, cisterns and sulci are prominent consistent with atrophy. There is decreased attenuation within the periventricular white matter consistent with periventricular leukomalacia. ORBITS: There are chronic changes of the orbits. SINUSES: The visualized paranasal sinuses and mastoid air cells demonstrate no acute abnormality. SOFT TISSUES/SKULL:  No acute abnormality of the visualized skull or soft tissues. 1. There is no acute intracranial abnormality. Specifically, there is no intracranial hemorrhage. 2. Atrophy and periventricular leukomalacia,    Xr Chest Portable    Result Date: 3/4/2021  EXAMINATION: ONE XRAY VIEW OF THE CHEST 3/4/2021 8:35 am COMPARISON: One-view chest x-ray 02/08/2021 HISTORY: ORDERING SYSTEM PROVIDED HISTORY: weakness, Possible Stroke TECHNOLOGIST PROVIDED HISTORY: Reason for exam:->weakness, Possible Stroke What reading provider will be dictating this exam?->CRC FINDINGS: The cardiac silhouette is within normal limits. The mediastinal contours are within normal limits. Mild central peribronchial thickening appears similar to decreased compared to the previous exam.  No consolidations are identified. No significant pleural effusions or pneumothorax. Osseous degenerative changes are present. EKG leads overlie the chest and abdomen.      No acute cardiopulmonary process is identified. Xr Chest Portable    Result Date: 2/8/2021  EXAMINATION: ONE XRAY VIEW OF THE CHEST 2/8/2021 2:05 pm COMPARISON: September 29, 2020 HISTORY: ORDERING SYSTEM PROVIDED HISTORY: altered TECHNOLOGIST PROVIDED HISTORY: Reason for exam:->altered FINDINGS: The lungs are clear. Right hemidiaphragm is elevated. The heart is mildly enlarged. No pneumothorax. The costophrenic angles are clear. No active pulmonary process. IMPRESSION:   25-year-old with history of MR and legally blind presented with unresponsiveness was found with worsening thrombocytopenia. Today, WBC 11.9, Hemoglobin 7.6, Platelets 66. RECOMMENDATIONS:  Worsening thrombocytopenia likely resulted from septic shock from UTI, recent antibiotic use, BEENA, dehydration. Maintain platelets >46,287 or 30,000 with active signs of bleeding. Looking back in Epic, patient previously had normal platelet count dating back to 2019. Check Coagulopathy-INR, PT/APTT  Recently hospitalized in February, with slight decrease in platelet count at that time  Will discuss with     Electronically signed by RADHA Childers NP on 3/8/2021 at 11:46 AM     Patient was seen and examined. Chart reviewed. Case discussed with Mrs. Elin Hughes. I agree with above documentation. Anemia and thrombocytopenia are probably multifactorial including acute inflammatory process, medications, poor nutrition with possible chronic liver disorder (hypoalbuminemia and abnormal liver enzymes on admission). No evidence of hemolysis so far. Leukocytosis is reactive to inflammatory process. Continue current supportive care with blood transfusion support to keep hemoglobin above 7.5 g/dL. And platelet count above 10 or above 30 if there is active bleeding.

## 2021-03-08 NOTE — PROGRESS NOTES
Subjective:  Pt resting in nad, she does attempt to move your hand away when you touch her. Objective:    BP (!) 142/65   Pulse 78   Temp 98.8 °F (37.1 °C) (Bladder)   Resp 19   Ht 5' 1\" (1.549 m)   Wt 151 lb (68.5 kg)   SpO2 95%   BMI 28.53 kg/m²   HEENT no adenopathy no bruits  Heart:  RRR, no murmurs, gallops, or rubs.   Lungs:  CTA bilaterally, no wheeze, rales or rhonchi  Abd: bowel sounds present, nontender, nondistended, no masses  Extrem:  No clubbing, cyanosis, or edema  WBC/Hgb/Hct/Plts:  11.9/7.6/25.1/66 (03/08 6248) basic metabolic panel     Assessment:    Patient Active Problem List   Diagnosis    Mental retardation    Hypothyroidism    Blindness of both eyes    ESRD (end stage renal disease) on dialysis ([address]    Macrocytosis    Behavior disturbance    Altered mental state    Sepsis ([address]       Plan:    Start PEG tube feedings  Once more stable, PT consult      Mario Donovan  7:38 AM  3/8/2021

## 2021-03-08 NOTE — CARE COORDINATION
3/8 Care Coordination: Pt remains in MICU,Was found unresponsive at [address] [facility]. On 3lnc,Had PEG placed 3/7. Remains Hypotensive, On IV Levophed. Plan at discharge isto return to [address] [facility],[address]. Legal Guardian Ryan Velez ([phone]) Palliative Care is consulted. Pt remains Full Code. CM/SW will continue to follow for discharge planning.    Manuel HERNÁNDEZN,RN--[phone]

## 2021-03-08 NOTE — PLAN OF CARE
Problem: Skin Integrity:  Goal: Will show no infection signs and symptoms  Description: Will show no infection signs and symptoms  Outcome: Met This Shift  Goal: Absence of new skin breakdown  Description: Absence of new skin breakdown  Outcome: Met This Shift     Problem: Falls - Risk of:  Goal: Will remain free from falls  Description: Will remain free from falls  Outcome: Met This Shift  Goal: Absence of physical injury  Description: Absence of physical injury  Outcome: Met This Shift     Problem: Pain:  Goal: Pain level will decrease  Description: Pain level will decrease  Outcome: Met This Shift  Goal: Control of acute pain  Description: Control of acute pain  Outcome: Met This Shift  Goal: Control of chronic pain  Description: Control of chronic pain  Outcome: Met This Shift   Plan of care reviewed with patient and family, as available.

## 2021-03-08 NOTE — PROGRESS NOTES
Nephrology Resident  Inpatient Progress Note    Admit Date: 3/4/2021                                  PCP: Carmen Odonnell MD    Subjective: The patient is V 6 y.o. 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 Found to have BEENA, hypotension, hypernatremic and hyperkalemia in the ED. Patient seen and examined, remains altered which is her baseline. She is awake and groans at baseline, likely from her underlying history of severe cognitive impairment. Hypernatremia today improved Na down to 139 today, IVF d/cd yesterday. Cr down to 0.8, BUN 19, however Urine output still suboptimal <0.5 cc/kg/hr. Vitals:  VITALS:  /71   Pulse 105   Temp 98.2 °F (36.8 °C) (Bladder)   Resp 24   Ht 5' 1\" (1.549 m)   Wt 151 lb (68.5 kg)   SpO2 95%   BMI 28.53 kg/m²   24HR INTAKE/OUTPUT:      Intake/Output Summary (Last 24 hours) at 3/8/2021 1548  Last data filed at 3/8/2021 1417  Gross per 24 hour   Intake 1928.02 ml   Output 1177 ml   Net 751.02 ml     CURRENT PULSE OXIMETRY:  SpO2: 95 %  24HR PULSE OXIMETRY RANGE:  SpO2  Av.9 %  Min: 89 %  Max: 99 %        I/O:      I/O last 3 completed shifts: In: 1928 [I.V.:1204; NG/GT:724]  Out: 1177 [Urine:1177]  No intake/output data recorded.     Medications:    IV:   dextrose        [START ON 3/9/2021] famotidine  20 mg Oral Daily    [START ON 4798] folic acid  1 mg Oral Daily    [START ON 3/9/2021] thiamine mononitrate  100 mg Oral Daily    midodrine  5 mg Oral TID WC    bumetanide  0.5 mg Intravenous BID    hydrocortisone sodium succinate PF  50 mg Intravenous Q8H    insulin lispro  0-12 Units Subcutaneous TID WC    [Held by provider] levothyroxine  150 mcg Oral Daily    sodium chloride flush  10 mL Intravenous 2 times per day    [Held by provider] heparin (porcine)  5,000 Units Subcutaneous 3 times per day        Current Meds:  Current Facility-Administered Medications   Medication Dose Route Frequency Provider Last Rate Last Admin    [START ON 3/9/2021] famotidine (PEPCID) tablet 20 mg  20 mg Oral Daily Redell Grew., DO        [START ON 5/1/3285] folic acid (FOLVITE) tablet 1 mg  1 mg Oral Daily Redell Grew., DO        [START ON 3/9/2021] thiamine mononitrate tablet 100 mg  100 mg Oral Daily Redell Grew., DO        midodrine (PROAMATINE) tablet 5 mg  5 mg Oral TID  Js Haley Jr., DO   5 mg at 03/08/21 1213    bumetanide (BUMEX) injection 0.5 mg  0.5 mg Intravenous BID Js Haley Jr., DO        hydrocortisone sodium succinate PF (SOLU-CORTEF) injection 50 mg  50 mg Intravenous Q8H Ebenezer Treviño MD        insulin lispro (HUMALOG) injection vial 0-12 Units  0-12 Units Subcutaneous TID  Nathalie Smith MD   4 Units at 03/08/21 1213    mineral oil-hydrophil petrolat ointment   Topical BID PRN Alexandra Newman MD        glucose (GLUTOSE) 40 % oral gel 15 g  15 g Oral PRN Chi Bergman MD        dextrose 50 % IV solution  12.5 g Intravenous PRN Chi Bergman MD        glucagon (rDNA) injection 1 mg  1 mg Intramuscular PRN Chi Bergman MD        dextrose 5 % solution  100 mL/hr Intravenous PRN Chi Bergman MD        [Held by provider] levothyroxine (SYNTHROID) tablet 150 mcg  150 mcg Oral Daily Chi Bergman MD        sodium chloride flush 0.9 % injection 10 mL  10 mL Intravenous 2 times per day Chi Bergman MD   10 mL at 03/08/21 0848    sodium chloride flush 0.9 % injection 10 mL  10 mL Intravenous PRN Chi Bergman MD   10 mL at 03/08/21 1036    [Held by provider] heparin (porcine) injection 5,000 Units  5,000 Units Subcutaneous 3 times per day Chi Bergman MD   5,000 Units at 03/05/21 0600    promethazine (PHENERGAN) tablet 12.5 mg  12.5 mg Oral Q6H PRN Chi Bergman MD        Or    [facility] 4 mg  4 mg Assessment:   1. Severe dehydration with BEENA Cr 0.8 urine output still suboptimal, Hypernatremia (resolved), Hyperkalemia (resolved)   2. Shock, likely hypovolemic vs septic   3. History of severe cognitive dysfunction, blindness   4. Dysphagia s/p PEG 3/7 on TF   5. Hypoxic respiratory failure on NC      Plan:  1. Will start a trial of bumex 0.5 mg IV q12 with close monitoring of urine output. Monitor BMP Q 6 hourly   2. Remains on pressor support with levophed,weaning down. Also started on midodrine 5 mg tid   3. On stress dose steroids, being weaned down to 50 mg q 8   4. Other problems per MICU team        Catherine Link MD, PGY3  Internal Medicine Resident     Attending: Dr. Oumar Hsieh    Patient seen and examined ,all key components of the history and physical exam performed by me,  case discussed with  Medical Resident, all pertinent labs and radiologic tests independently reviewed, agree with above.     BEENA, with improvement; UO still low; fluid balance +10L with peripheral edema; still requiring pressors, being weaned; diuresis with caution      Wanda Gold MD

## 2021-03-08 NOTE — PROGRESS NOTES
PALLIATIVE MEDICINE    Palliative medicine consulted for goals of care. Patient has a legal guardian and had a PEG tube placed 3/7/2021. Patient's goals of care have been established. Patient code status remains a full code. 2 physicians need to establish a diagnosis and provide recommendations for code status changes for a guardian to present in court if changes are to be made to code status. Discharge plan is for patient to return to group home. No additional palliative medicine needs identified currently. Palliative medicine will sign-off. Please re-consult our service if additional Palliative medicine needs arise or there is a change in the patients condition. Thank you for the consult.     Ilia Vickers PA-C

## 2021-03-09 ENCOUNTER — APPOINTMENT (OUTPATIENT)
Dept: GENERAL RADIOLOGY | Age: 66
DRG: 871 | End: 2021-03-09
Payer: MEDICARE

## 2021-03-09 LAB
ALBUMIN SERPL-MCNC: 1.7 G/DL (ref 3.5–5.2)
ALP BLD-CCNC: 89 U/L (ref 35–104)
ALT SERPL-CCNC: 16 U/L (ref 0–32)
ANION GAP SERPL CALCULATED.3IONS-SCNC: 2 MMOL/L (ref 7–16)
ANISOCYTOSIS: ABNORMAL
AST SERPL-CCNC: 21 U/L (ref 0–31)
B.E.: 1.4 MMOL/L (ref -3–0)
BASOPHILIC STIPPLING: ABNORMAL
BASOPHILS ABSOLUTE: 0 E9/L (ref 0–0.2)
BASOPHILS RELATIVE PERCENT: 0.3 % (ref 0–2)
BILIRUB SERPL-MCNC: 0.2 MG/DL (ref 0–1.2)
BLOOD CULTURE, ROUTINE: NORMAL
BUN BLDV-MCNC: 27 MG/DL (ref 8–23)
CALCIUM SERPL-MCNC: 8.1 MG/DL (ref 8.6–10.2)
CHLORIDE BLD-SCNC: 112 MMOL/L (ref 98–107)
CO2: 29 MMOL/L (ref 22–29)
CREAT SERPL-MCNC: 0.9 MG/DL (ref 0.5–1)
CULTURE, BLOOD 2: NORMAL
DELIVERY SYSTEMS: ABNORMAL
DEVICE: ABNORMAL
EOSINOPHILS ABSOLUTE: 0 E9/L (ref 0.05–0.5)
EOSINOPHILS RELATIVE PERCENT: 0 % (ref 0–6)
FIO2 ARTERIAL: 5
GFR AFRICAN AMERICAN: >60
GFR NON-AFRICAN AMERICAN: >60 ML/MIN/1.73
GLUCOSE BLD-MCNC: 210 MG/DL (ref 74–99)
HBA1C MFR BLD: 5.4 % (ref 4–5.6)
HCO3 ARTERIAL: 25.8 MMOL/L (ref 22–26)
HCT VFR BLD CALC: 24.2 % (ref 34–48)
HEMOGLOBIN: 7.3 G/DL (ref 11.5–15.5)
HYPOCHROMIA: ABNORMAL
LYMPHOCYTES ABSOLUTE: 0.92 E9/L (ref 1.5–4)
LYMPHOCYTES RELATIVE PERCENT: 7.9 % (ref 20–42)
MAGNESIUM: 2.1 MG/DL (ref 1.6–2.6)
MCH RBC QN AUTO: 29.2 PG (ref 26–35)
MCHC RBC AUTO-ENTMCNC: 30.2 % (ref 32–34.5)
MCV RBC AUTO: 96.8 FL (ref 80–99.9)
METAMYELOCYTES RELATIVE PERCENT: 2.6 % (ref 0–1)
METER GLUCOSE: 120 MG/DL (ref 74–99)
METER GLUCOSE: 145 MG/DL (ref 74–99)
METER GLUCOSE: 161 MG/DL (ref 74–99)
METER GLUCOSE: 198 MG/DL (ref 74–99)
MONOCYTES ABSOLUTE: 1.15 E9/L (ref 0.1–0.95)
MONOCYTES RELATIVE PERCENT: 9.6 % (ref 2–12)
MYELOCYTE PERCENT: 2.6 % (ref 0–0)
NEUTROPHILS ABSOLUTE: 9.43 E9/L (ref 1.8–7.3)
NEUTROPHILS RELATIVE PERCENT: 77.2 % (ref 43–80)
NUCLEATED RED BLOOD CELLS: 35.1 /100 WBC
O2 SATURATION: 89.4 % (ref 92–98.5)
OPERATOR ID: 1921
OVALOCYTES: ABNORMAL
PATIENT TEMP: 37
PCO2 (TEMP CORRECTED): 39.3 MMHG (ref 35–45)
PDW BLD-RTO: 26.8 FL (ref 11.5–15)
PH (TEMPERATURE CORRECTED): 7.43 (ref 7.35–7.45)
PHOSPHORUS: 2.8 MG/DL (ref 2.5–4.5)
PLATELET # BLD: 84 E9/L (ref 130–450)
PLATELET CONFIRMATION: NORMAL
PMV BLD AUTO: ABNORMAL FL (ref 7–12)
PO2 (TEMP CORRECTED): 55.8 MMHG (ref 60–80)
POIKILOCYTES: ABNORMAL
POLYCHROMASIA: ABNORMAL
POTASSIUM SERPL-SCNC: 4.1 MMOL/L (ref 3.5–5)
RBC # BLD: 2.5 E12/L (ref 3.5–5.5)
SCHISTOCYTES: ABNORMAL
SODIUM BLD-SCNC: 143 MMOL/L (ref 132–146)
SOURCE, BLOOD GAS: ABNORMAL
TARGET CELLS: ABNORMAL
TOTAL PROTEIN: 5.9 G/DL (ref 6.4–8.3)
WBC # BLD: 11.5 E9/L (ref 4.5–11.5)

## 2021-03-09 PROCEDURE — 2000000000 HC ICU R&B

## 2021-03-09 PROCEDURE — 36592 COLLECT BLOOD FROM PICC: CPT

## 2021-03-09 PROCEDURE — 71045 X-RAY EXAM CHEST 1 VIEW: CPT

## 2021-03-09 PROCEDURE — 80053 COMPREHEN METABOLIC PANEL: CPT

## 2021-03-09 PROCEDURE — 94640 AIRWAY INHALATION TREATMENT: CPT

## 2021-03-09 PROCEDURE — 6360000002 HC RX W HCPCS: Performed by: INTERNAL MEDICINE

## 2021-03-09 PROCEDURE — 2580000003 HC RX 258: Performed by: FAMILY MEDICINE

## 2021-03-09 PROCEDURE — 6360000002 HC RX W HCPCS: Performed by: FAMILY MEDICINE

## 2021-03-09 PROCEDURE — 84100 ASSAY OF PHOSPHORUS: CPT

## 2021-03-09 PROCEDURE — 2500000003 HC RX 250 WO HCPCS: Performed by: INTERNAL MEDICINE

## 2021-03-09 PROCEDURE — 82962 GLUCOSE BLOOD TEST: CPT

## 2021-03-09 PROCEDURE — 82803 BLOOD GASES ANY COMBINATION: CPT

## 2021-03-09 PROCEDURE — 2580000003 HC RX 258: Performed by: INTERNAL MEDICINE

## 2021-03-09 PROCEDURE — 6370000000 HC RX 637 (ALT 250 FOR IP): Performed by: INTERNAL MEDICINE

## 2021-03-09 PROCEDURE — 85025 COMPLETE CBC W/AUTO DIFF WBC: CPT

## 2021-03-09 PROCEDURE — 2700000000 HC OXYGEN THERAPY PER DAY

## 2021-03-09 PROCEDURE — 99233 SBSQ HOSP IP/OBS HIGH 50: CPT | Performed by: INTERNAL MEDICINE

## 2021-03-09 PROCEDURE — 83735 ASSAY OF MAGNESIUM: CPT

## 2021-03-09 PROCEDURE — 6370000000 HC RX 637 (ALT 250 FOR IP): Performed by: FAMILY MEDICINE

## 2021-03-09 PROCEDURE — 83036 HEMOGLOBIN GLYCOSYLATED A1C: CPT

## 2021-03-09 RX ORDER — LIDOCAINE HYDROCHLORIDE 10 MG/ML
5 INJECTION, SOLUTION EPIDURAL; INFILTRATION; INTRACAUDAL; PERINEURAL ONCE
Status: DISCONTINUED | OUTPATIENT
Start: 2021-03-09 | End: 2021-03-16 | Stop reason: HOSPADM

## 2021-03-09 RX ORDER — SODIUM CHLORIDE FOR INHALATION 3 %
4 VIAL, NEBULIZER (ML) INHALATION PRN
Status: DISCONTINUED | OUTPATIENT
Start: 2021-03-09 | End: 2021-03-16 | Stop reason: HOSPADM

## 2021-03-09 RX ORDER — SODIUM CHLORIDE 0.9 % (FLUSH) 0.9 %
10 SYRINGE (ML) INJECTION EVERY 12 HOURS SCHEDULED
Status: DISCONTINUED | OUTPATIENT
Start: 2021-03-09 | End: 2021-03-16 | Stop reason: HOSPADM

## 2021-03-09 RX ORDER — SODIUM CHLORIDE 0.9 % (FLUSH) 0.9 %
10 SYRINGE (ML) INJECTION PRN
Status: DISCONTINUED | OUTPATIENT
Start: 2021-03-09 | End: 2021-03-16 | Stop reason: HOSPADM

## 2021-03-09 RX ORDER — IPRATROPIUM BROMIDE AND ALBUTEROL SULFATE 2.5; .5 MG/3ML; MG/3ML
1 SOLUTION RESPIRATORY (INHALATION)
Status: DISCONTINUED | OUTPATIENT
Start: 2021-03-09 | End: 2021-03-16 | Stop reason: HOSPADM

## 2021-03-09 RX ADMIN — MIDODRINE HYDROCHLORIDE 5 MG: 5 TABLET ORAL at 08:31

## 2021-03-09 RX ADMIN — Medication 100 MG: at 08:31

## 2021-03-09 RX ADMIN — BUMETANIDE 0.5 MG: 0.25 INJECTION, SOLUTION INTRAMUSCULAR; INTRAVENOUS at 16:32

## 2021-03-09 RX ADMIN — SODIUM CHLORIDE, PRESERVATIVE FREE 10 ML: 5 INJECTION INTRAVENOUS at 20:55

## 2021-03-09 RX ADMIN — MIDODRINE HYDROCHLORIDE 5 MG: 5 TABLET ORAL at 16:30

## 2021-03-09 RX ADMIN — IPRATROPIUM BROMIDE AND ALBUTEROL SULFATE 1 AMPULE: 2.5; .5 SOLUTION RESPIRATORY (INHALATION) at 20:19

## 2021-03-09 RX ADMIN — Medication 10 ML: at 08:31

## 2021-03-09 RX ADMIN — INSULIN LISPRO 2 UNITS: 100 INJECTION, SOLUTION INTRAVENOUS; SUBCUTANEOUS at 16:33

## 2021-03-09 RX ADMIN — INSULIN LISPRO 2 UNITS: 100 INJECTION, SOLUTION INTRAVENOUS; SUBCUTANEOUS at 12:03

## 2021-03-09 RX ADMIN — Medication: at 08:32

## 2021-03-09 RX ADMIN — SODIUM CHLORIDE, PRESERVATIVE FREE 10 ML: 5 INJECTION INTRAVENOUS at 12:01

## 2021-03-09 RX ADMIN — BUMETANIDE 0.5 MG: 0.25 INJECTION, SOLUTION INTRAMUSCULAR; INTRAVENOUS at 08:31

## 2021-03-09 RX ADMIN — FAMOTIDINE 20 MG: 20 TABLET, FILM COATED ORAL at 08:31

## 2021-03-09 RX ADMIN — INSULIN LISPRO 2 UNITS: 100 INJECTION, SOLUTION INTRAVENOUS; SUBCUTANEOUS at 08:34

## 2021-03-09 RX ADMIN — AMPICILLIN AND SULBACTAM 3000 MG: 2; 1 INJECTION, POWDER, FOR SOLUTION INTRAMUSCULAR; INTRAVENOUS at 19:49

## 2021-03-09 RX ADMIN — MIDODRINE HYDROCHLORIDE 5 MG: 5 TABLET ORAL at 12:07

## 2021-03-09 RX ADMIN — Medication 10 ML: at 20:55

## 2021-03-09 RX ADMIN — HYDROCORTISONE SODIUM SUCCINATE 50 MG: 100 INJECTION, POWDER, FOR SOLUTION INTRAMUSCULAR; INTRAVENOUS at 03:20

## 2021-03-09 RX ADMIN — SODIUM CHLORIDE, PRESERVATIVE FREE 10 ML: 5 INJECTION INTRAVENOUS at 16:32

## 2021-03-09 RX ADMIN — ACETAMINOPHEN 650 MG: 325 TABLET ORAL at 01:15

## 2021-03-09 RX ADMIN — HYDROCORTISONE SODIUM SUCCINATE 50 MG: 100 INJECTION, POWDER, FOR SOLUTION INTRAMUSCULAR; INTRAVENOUS at 19:49

## 2021-03-09 RX ADMIN — FOLIC ACID 1 MG: 1 TABLET ORAL at 08:31

## 2021-03-09 RX ADMIN — HYDROCORTISONE SODIUM SUCCINATE 50 MG: 100 INJECTION, POWDER, FOR SOLUTION INTRAMUSCULAR; INTRAVENOUS at 12:01

## 2021-03-09 RX ADMIN — IPRATROPIUM BROMIDE AND ALBUTEROL SULFATE 1 AMPULE: 2.5; .5 SOLUTION RESPIRATORY (INHALATION) at 16:46

## 2021-03-09 RX ADMIN — AMPICILLIN AND SULBACTAM 3000 MG: 2; 1 INJECTION, POWDER, FOR SOLUTION INTRAMUSCULAR; INTRAVENOUS at 14:21

## 2021-03-09 ASSESSMENT — PAIN SCALES - PAIN ASSESSMENT IN ADVANCED DEMENTIA (PAINAD)
NEGVOCALIZATION: 0
BODYLANGUAGE: 0
FACIALEXPRESSION: 0
TOTALSCORE: 0
BREATHING: 0
FACIALEXPRESSION: 0
BODYLANGUAGE: 0
TOTALSCORE: 0
BODYLANGUAGE: 0
CONSOLABILITY: 0
CONSOLABILITY: 0
NEGVOCALIZATION: 2
BREATHING: 0
FACIALEXPRESSION: 0
CONSOLABILITY: 1
BODYLANGUAGE: 0
CONSOLABILITY: 0
CONSOLABILITY: 0
TOTALSCORE: 0
FACIALEXPRESSION: 0
BODYLANGUAGE: 0
NEGVOCALIZATION: 0
NEGVOCALIZATION: 0
TOTALSCORE: 0
NEGVOCALIZATION: 0
TOTALSCORE: 0
FACIALEXPRESSION: 0
BREATHING: 0
TOTALSCORE: 0
BODYLANGUAGE: 0
TOTALSCORE: 0
BREATHING: 0
BREATHING: 0
CONSOLABILITY: 0
BREATHING: 0
NEGVOCALIZATION: 0
BREATHING: 0
CONSOLABILITY: 0
CONSOLABILITY: 0
FACIALEXPRESSION: 0
BREATHING: 0
NEGVOCALIZATION: 0
NEGVOCALIZATION: 0

## 2021-03-09 ASSESSMENT — PAIN SCALES - GENERAL
PAINLEVEL_OUTOF10: 0
PAINLEVEL_OUTOF10: 0

## 2021-03-09 NOTE — PATIENT CARE CONFERENCE
P Quality Flow/Interdisciplinary Rounds Progress Note        Quality Flow Rounds held on March 9, 2021    Disciplines Attending:  , pt/ot, resp therapy, bedside nurse, charge nurse, nursing management  Nurys Marr was admitted on 3/4/2021 10:14 AM    Anticipated Discharge Date:       Disposition:    Jose Score:  Jose Scale Score: 12    Readmission Risk              Risk of Unplanned Readmission:        30           Discussed patient goal for the day, patient clinical progression, and barriers to discharge. The following Goal(s) of the Day/Commitment(s) have been identified:  Check transfer.       Isatu Simon  March 9, 2021

## 2021-03-09 NOTE — PROGRESS NOTES
[address] [facility]  Department of Internal Medicine   Internal Medicine Residency   MICU Progress Note    Patient:  Erlinda Cummings 78 y.o. female  MRN: [id number]     Date of Service: 3/9/2021    Allergy: Patient has no known allergies. Cc follow up on hypoxia  Subjective     Patient seen and examined this morning   Open her eyes to voice command, doesn not follow other command   Acute increase of O2 requirement today ; on 5L , concern for aspiration pneumonia - CXR ordered. No acute changes over night  POD 2 from PEG tube  ROS: unable to obtain secondary to patient mentation     24h change: electrolytes improved    Objective     VS: /86   Pulse 121   Temp 98.2 °F (36.8 °C) (Bladder)   Resp 30   Ht 5' 1\" (1.549 m)   Wt 155 lb (70.3 kg)   SpO2 93%   BMI 29.29 kg/m²   ABP (Arterial line BP): 91/43  ABP mean (Arterial line mean): (!) 59 mmHg    I & O - 24hr:     Intake/Output Summary (Last 24 hours) at 3/9/2021 1140  Last data filed at 3/9/2021 1100  Gross per 24 hour   Intake 1802.36 ml   Output 1590 ml   Net 212.36 ml       Physical Exam:  · General Appearance: appears stated age and nonverbal  · Neck: no adenopathy, no carotid bruit, no JVD, supple, symmetrical, trachea midline and thyroid not enlarged, symmetric, no tenderness/mass/nodules  · Lung: Coarse breath sounds heard  · Heart: regular rate and rhythm, S1, S2 normal, no murmur, click, rub or gallop  · Abdomen: soft, non-tender; bowel sounds normal; no masses,  no organomegaly  · Extremities:  extremities normal, atraumatic, no cyanosis or edema  · Musculoskeletal: No joint swelling, no muscle tenderness. ROM normal in all joints of extremities.    · Neurologic: Mental status: Non verbal and unresponsive at this time    Lines     site day    Art line   R Radial 4 - removed   TLC L Fem 5   PICC None    Hemoaccess None      Oxygen:     5 L NC     ABG:     Lab Results   Component Value Date    PH 7.340 03/04/2021    FBO9YFH 39.3 02/08/2021    PCO2 45.0 03/04/2021    PO2ART 75.2 02/08/2021    PO2 78.9 03/04/2021    MGB2WXN 26.5 02/08/2021    HCO3 23.7 03/04/2021    BE -2.1 03/04/2021    THB 10.9 03/04/2021    O2SAT 94.6 03/04/2021        Medications     Infusions: (Fluid, Sedation, Vasopressors)  IVF:      Vasopressors   Weaned off Levophed   Sedation   none    Nutrition:   Tube feeds - held for concern of aspiration    ATB:   Antibiotics  Days   Vancomycin x1 1 dose only   Cefepime 3 d/c 3/6/2021   Unasysn 1     Skin issues: none    Patient currently has   Urinary cath  Isolation  DVT prophylaxis/ GI prophylaxis  Palliative care consult; signed off  Nephrology consult; following  Hematology consult ; following  Labs     CBC with Differential:    Lab Results   Component Value Date    WBC 11.5 03/09/2021    RBC 2.50 03/09/2021    HGB 7.3 03/09/2021    HCT 24.2 03/09/2021    PLT 84 03/09/2021    MCV 96.8 03/09/2021    MCH 29.2 03/09/2021    MCHC 30.2 03/09/2021    RDW 26.8 03/09/2021    NRBC 35.1 03/09/2021    SEGSPCT 58 06/26/2011    METASPCT 2.6 03/09/2021    LYMPHOPCT 7.9 03/09/2021    PROMYELOPCT 0.9 03/07/2021    MONOPCT 9.6 03/09/2021    MYELOPCT 2.6 03/09/2021    BASOPCT 0.3 03/09/2021    MONOSABS 1.15 03/09/2021    LYMPHSABS 0.92 03/09/2021    EOSABS 0.00 03/09/2021    BASOSABS 0.00 03/09/2021     CMP:    Lab Results   Component Value Date     03/09/2021    K 4.1 03/09/2021    K 4.8 02/09/2021     03/09/2021    CO2 29 03/09/2021    BUN 27 03/09/2021    CREATININE 0.9 03/09/2021    GFRAA >60 03/09/2021    LABGLOM >60 03/09/2021    GLUCOSE 210 03/09/2021    GLUCOSE 97 12/30/2011    PROT 5.9 03/09/2021    LABALBU 1.7 03/09/2021    LABALBU 3.7 12/30/2011    CALCIUM 8.1 03/09/2021    BILITOT 0.2 03/09/2021    ALKPHOS 89 03/09/2021    AST 21 03/09/2021    ALT 16 03/09/2021     U/A:    Lab Results   Component Value Date    COLORU DARK YELLOW 03/04/2021    PROTEINU 100 03/04/2021    PHUR 5.0 03/04/2021    LABCAST FEW 09/20/2017 requirement right now  Clx negative thus far     Concern for Aspiration Pneumonia   Acute increase in O2 requirement - currently 5l NC  CXR (3/9/2021) : New patchy opacity in left mid lung may be atelectasis or small focus of edema.  Differential includes a new small region of pneumonitis. Resp culture growing gram - rods  Patient started on Unasyn 3g q6 daily for a course of 10 days preferably   ABG ordered  Duonebs , 3% Saline Nebulizer, Chest Vest    Dysphagia   No oral intake in last 48 hours prior to ED presentation , eating poorly over the last few weeks  Failed Bedside swallow, unsuccessful [place]ak attempts   POD 1 PEG tube placement  She has history of peg tube per records     BEENA likely 2/2 to dehydration - resolved  BUN/Creatinine 46/4.1 on presentation; baseline creatinine 0.7-1.0, FeNa < 1%   Dr. Raman Loss is following  Cr today is 0.9      HAGMA likely secondary to lactic acidosis vs Uremia - resolved  A.5 corrected for albumin , current A  Initial lactic acid: 5.6 , BUN: 46 , improved to normal LA, Bun: 24    Hyperkalemia - resolved  K: 5.7 on presentation , current 4.1     Hypernatremia likely 2/2 dehydration - resolved  Na: 166 on initial presentation, received LR in the ED  Nephrology following  Resolved with IV fluid   143 today     Thrombocytopenia - improving  Platelets acutely decreasing initially: slowly trending down, improved today - 84   Heparin held in setting of thrombocytopenia ; PCDs in place  Hem onc consulted, recs appreciated: PT/PTT INR stable. Awaiting VINH antibodies. Lactic Acidosis - resolved    DVT/GI prophylaxis: PCD  PT/OT: not indicated  Code status: Full  Disposition:continue ICU care    Srinivasa Acevedo MD, PGY-1   Attending physician: Dr. Yasmin Lerma personally saw, examined and provided care for the patient. Radiographs, labs and medication list were reviewed by me independently. I spoke with bedside nursing, therapists and consultants.  Critical care services and times documented are independent of procedures and multidisciplinary rounds with Residents. Additionally comprehensive, multidisciplinary rounds were conducted with the MICU team. The case was discussed in detail and plans for care were established. Review of Residents documentation was conducted and revisions were made as appropriate. I agree with the above documented exam, problem list and plan of care. Non verbal ,more alert  Tachycardia  On 5 L O2 ,possible aspiration  History of MR   Na 143  Non verbal   S.p peg   Low plt ,improved   Consult  hematology . appreciate her input   Start Midodrine  Get rid off pressors  Nephrology following               Cinthia 36  Pulmonary&Critical Care Medicine   Director of [address] [place] [address] Director of [facility]    Christina Turpin

## 2021-03-09 NOTE — CARE COORDINATION
3/9 Care Coordination: Pt cont in MICU, Had Peg 3/8, start TF's. Spoke with Syl at [facility]. They agreeable to accept back with peg and TF's. Can not do IVF's. Legal Guardian Ryan Velez ([phone]  [phone]) is on board for [place] to return to [facility]. CM/SW will continue to follow for discharge planning.    Manuel HERNÁNDEZN,RN--[phone]

## 2021-03-09 NOTE — PROGRESS NOTES
Subjective:  Patient is non-verbal, appears in no distress. More alert today. She was just suctioned by bedside nurse. Tube feeds are currently on hold due to increase 02 levels and frequent NTS. Objective:    BP (!) 101/57   Pulse 116   Temp 98.6 °F (37 °C) (Bladder)   Resp 28   Ht 5' 1\" (1.549 m)   Wt 155 lb (70.3 kg)   SpO2 (!) 89%   BMI 29.29 kg/m²     General: NAD, non-verbal  HEENT: No thrush or mucositis, EOMI, PERRLA  Heart:  RRR, no murmurs, gallops, or rubs.   Lungs:  CTA bilaterally, no wheeze, rales or rhonchi  Abd: bowel sounds present, nontender, nondistended, no masses, PEG tube C/D/I  Extrem:  No clubbing, cyanosis, or edema  Lymphatics: No palpable adenopathy in cervical and supraclavicular regions  Skin: Intact, no petechia or purpura    CBC with Differential:    Lab Results   Component Value Date    WBC 11.5 03/09/2021    RBC 2.50 03/09/2021    HGB 7.3 03/09/2021    HCT 24.2 03/09/2021    PLT 84 03/09/2021    MCV 96.8 03/09/2021    MCH 29.2 03/09/2021    MCHC 30.2 03/09/2021    RDW 26.8 03/09/2021    NRBC 35.1 03/09/2021    SEGSPCT 58 06/26/2011    METASPCT 2.6 03/09/2021    LYMPHOPCT 7.9 03/09/2021    PROMYELOPCT 0.9 03/07/2021    MONOPCT 9.6 03/09/2021    MYELOPCT 2.6 03/09/2021    BASOPCT 0.3 03/09/2021    MONOSABS 1.15 03/09/2021    LYMPHSABS 0.92 03/09/2021    EOSABS 0.00 03/09/2021    BASOSABS 0.00 03/09/2021     CMP:    Lab Results   Component Value Date     03/09/2021    K 4.1 03/09/2021    K 4.8 02/09/2021     03/09/2021    CO2 29 03/09/2021    BUN 27 03/09/2021    CREATININE 0.9 03/09/2021    GFRAA >60 03/09/2021    LABGLOM >60 03/09/2021    GLUCOSE 210 03/09/2021    GLUCOSE 97 12/30/2011    PROT 5.9 03/09/2021    LABALBU 1.7 03/09/2021    LABALBU 3.7 12/30/2011    CALCIUM 8.1 03/09/2021    BILITOT 0.2 03/09/2021    ALKPHOS 89 03/09/2021    AST 21 03/09/2021    ALT 16 03/09/2021          Current Facility-Administered Medications:     ipratropium-albuterol (Moise Brandon) nebulizer solution 1 ampule, 1 ampule, Inhalation, Q4H Francisca GREEN MD    sodium chloride (Inhalant) 3 % nebulizer solution 4 mL, 4 mL, Nebulization, PRN, Francsica Anton MD    ampicillin-sulbactam (UNASYN) 3000 mg ivpb minibag, 3,000 mg, Intravenous, Q6H, Francisca Anton MD    famotidine (PEPCID) tablet 20 mg, 20 mg, Oral, Daily, Rober ., DO, 20 mg at 29/69/44 8444    folic acid (FOLVITE) tablet 1 mg, 1 mg, Oral, Daily, Rober ., DO, 1 mg at 03/09/21 3577    thiamine mononitrate tablet 100 mg, 100 mg, Oral, Daily, [place] ., DO, 100 mg at 03/09/21 0831    midodrine (PROAMATINE) tablet 5 mg, 5 mg, Oral, TID , Rober ., DO, 5 mg at 03/09/21 1207    bumetanide (BUMEX) injection 0.5 mg, 0.5 mg, Intravenous, BID, Rober ., DO, 0.5 mg at 03/09/21 0831    hydrocortisone sodium succinate PF (SOLU-CORTEF) injection 50 mg, 50 mg, Intravenous, Q8H, Ebenezer Treviño MD, 50 mg at 03/09/21 1201    insulin lispro (HUMALOG) injection vial 0-12 Units, 0-12 Units, Subcutaneous, TID , Lavon Perez MD, 2 Units at 03/09/21 1203    mineral oil-hydrophil petrolat ointment, , Topical, BID PRN, Gideon Cerna MD, Given at 03/09/21 0832    glucose (GLUTOSE) 40 % oral gel 15 g, 15 g, Oral, PRN, Mathew Giraldo MD    dextrose 50 % IV solution, 12.5 g, Intravenous, PRN, Mathew Giraldo MD    glucagon (rDNA) injection 1 mg, 1 mg, Intramuscular, PRN, Mathew Giraldo MD    dextrose 5 % solution, 100 mL/hr, Intravenous, PRN, Mathew Giraldo MD    [Held by provider] levothyroxine (SYNTHROID) tablet 150 mcg, 150 mcg, Oral, Daily, Mathew Giraldo MD    sodium chloride flush 0.9 % injection 10 mL, 10 mL, Intravenous, 2 times per day, Mathew Giraldo MD, 10 mL at 03/09/21 0831    sodium chloride flush 0.9 % injection 10 mL, 10 mL, Intravenous, PRN, Mathew Giraldo MD, 10 mL at 03/09/21 1201    [Held by provider] heparin (porcine) injection 5,000 Units, 5,000 Units, Subcutaneous, 3 times per day, Sola Ledesma MD, 5,000 Units at 03/05/21 0600    promethazine (PHENERGAN) tablet 12.5 mg, 12.5 mg, Oral, Q6H PRN **OR** ondansetron (ZOFRAN) injection 4 mg, 4 mg, Intravenous, Q6H PRN, Sola Ledesma MD    polyethylene glycol (GLYCOLAX) packet 17 g, 17 g, Oral, Daily PRN, Sola Ledesma MD    acetaminophen (TYLENOL) tablet 650 mg, 650 mg, Oral, Q6H PRN, 650 mg at 03/09/21 0115 **OR** acetaminophen (TYLENOL) suppository 650 mg, 650 mg, Rectal, Q6H PRN, Sola Ledesma MD    Assessment:    Active Problems:    Sepsis ([facility] [address]  Resolved Problems:    * No resolved hospital problems. *    58-year-old with history of MR and legally blind presented with unresponsiveness was found with worsening thrombocytopenia. Plan: Thrombocytopenia improving. Tube feeds on hold for possible aspiration. Anemia and thrombocytopenia likely are multifactorial resulting from septic shock from UTI, recent antibiotic use, BEENA, poor nutrition. Maintain platelets >42,562 or 30,000 with active signs of bleeding.   No evidence of hemolysis  Leukocytosis is reactive to inflammatory process  We will continue to follow along       Electronically signed by RADHA Dumont NP on 3/9/2021 at 2:02 PM

## 2021-03-09 NOTE — PLAN OF CARE
Problem: Skin Integrity:  Goal: Will show no infection signs and symptoms  Description: Will show no infection signs and symptoms  Outcome: Met This Shift  Goal: Absence of new skin breakdown  Description: Absence of new skin breakdown  Outcome: Met This Shift     Problem: Falls - Risk of:  Goal: Will remain free from falls  Description: Will remain free from falls  Outcome: Met This Shift  Goal: Absence of physical injury  Description: Absence of physical injury  Outcome: Met This Shift     Problem: Inadequate oral food/beverage intake (NI-2.1)  Goal: Food and/or Nutrient Delivery  Description: Individualized approach for food/nutrient provision. 3/9/2021 1318 by Yessy Rouse RD, LD  Outcome: Met This Shift     Problem: Pain:  Goal: Pain level will decrease  Description: Pain level will decrease  Outcome: Met This Shift  Goal: Control of acute pain  Description: Control of acute pain  Outcome: Met This Shift  Goal: Control of chronic pain  Description: Control of chronic pain  Outcome: Met This Shift   Plan of care reviewed with patient and guardian, as available.

## 2021-03-09 NOTE — PROGRESS NOTES
Subjective: The patient is awake and alert. No problems overnight. Denies chest pain, angina, and dyspnea. Denies abdominal pain. Tolerating diet. No nausea or vomiting. She is non-verbal.  Objective:    BP 98/61   Pulse 97   Temp 98.4 °F (36.9 °C) (Bladder)   Resp 20   Ht 5' 1\" (1.549 m)   Wt 155 lb (70.3 kg)   SpO2 96%   BMI 29.29 kg/m²   HEENT no adenopathy no bruits  Heart:  RRR, no murmurs, gallops, or rubs.   Lungs:  CTA bilaterally, no wheeze, rales or rhonchi  Abd: bowel sounds present, nontender, nondistended, no masses  Extrem:  No clubbing, cyanosis, or edema  WBC/Hgb/Hct/Plts:  11.5/7.3/24.2/84 (03/09 1528) basic metabolic panel   Lab Results   Component Value Date     03/09/2021    K 4.1 03/09/2021    K 4.8 02/09/2021     03/09/2021    CO2 29 03/09/2021    BUN 27 03/09/2021    CREATININE 0.9 03/09/2021    GLUCOSE 210 03/09/2021    GLUCOSE 97 12/30/2011    CALCIUM 8.1 03/09/2021        Assessment:    Patient Active Problem List   Diagnosis    Mental retardation    Hypothyroidism    Blindness of both eyes    ESRD (end stage renal disease) on dialysis ([facility] [address] [facility]    Macrocytosis    Behavior disturbance    Altered mental state    Sepsis ([facility] [address]       Plan:    Monitor TF and her tolerance  Critical care  May need placement       Prudence Ellyn  6:39 AM  3/9/2021

## 2021-03-09 NOTE — PROGRESS NOTES
Message left for Keiko Cavanaugh guardian from [facility] in regards to consent for PICC line insertion. Awaiting return call.

## 2021-03-09 NOTE — PROGRESS NOTES
Nephrology Resident  Inpatient Progress Note    Admit Date: 3/4/2021                                  PCP: Landen Landeros MD    Subjective: The patient is K 78 y.o. 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 Found to have BEENA, hypotension, hypernatremic and hyperkalemia in the ED. Patient seen and examined, remains altered which is her baseline. She is awake and groans at baseline, likely from her underlying history of severe cognitive impairment. Diuresis initiated yesterday with 0.5 mg IV Bumex every 12 hourly. Patient with urine output of 1.520 L in the last 24 hours, still remains 10 L positive today. CR 0.9, . Vitals:  VITALS:  /67   Pulse 110   Temp 98.6 °F (37 °C) (Bladder)   Resp 30   Ht 5' 1\" (1.549 m)   Wt 155 lb (70.3 kg)   SpO2 92%   BMI 29.29 kg/m²   24HR INTAKE/OUTPUT:      Intake/Output Summary (Last 24 hours) at 3/9/2021 1453  Last data filed at 3/9/2021 1400  Gross per 24 hour   Intake 1817.36 ml   Output 857 ml   Net 960.36 ml     CURRENT PULSE OXIMETRY:  SpO2: 92 %  24HR PULSE OXIMETRY RANGE:  SpO2  Av.7 %  Min: 89 %  Max: 98 %        I/O:      I/O last 3 completed shifts:   In: 1682.4 [I.V.:300.4; NG/GT:1382]  Out: 1520 [Urine:1520]  I/O this shift:  In: 637 [I.V.:100; NG/GT:537]  Out: 232 [Urine:232]    Medications:    IV:   dextrose        ipratropium-albuterol  1 ampule Inhalation Q4H WA    ampicillin-sulbactam  3,000 mg Intravenous Q6H    famotidine  20 mg Oral Daily    folic acid  1 mg Oral Daily    thiamine mononitrate  100 mg Oral Daily    midodrine  5 mg Oral TID WC    bumetanide  0.5 mg Intravenous BID    hydrocortisone sodium succinate PF  50 mg Intravenous Q8H    insulin lispro  0-12 Units Subcutaneous TID WC    [Held by provider] levothyroxine  150 mcg Oral Daily    sodium chloride flush  10 mL Intravenous 2 times per day   [facility] by provider] heparin (porcine)  5,000 Units Subcutaneous 3 times per day        Current Meds:  Current Facility-Administered Medications   Medication Dose Route Frequency Provider Last Rate Last Admin    ipratropium-albuterol (DUONEB) nebulizer solution 1 ampule  1 ampule Inhalation Q4H WA Chely Coyle MD        sodium chloride (Inhalant) 3 % nebulizer solution 4 mL  4 mL Nebulization PRN Chely Coyle MD        ampicillin-sulbactam (UNASYN) 3000 mg ivpb minibag  3,000 mg Intravenous Q6H Chely Coyle MD   Stopped at 03/09/21 1450    famotidine (PEPCID) tablet 20 mg  20 mg Oral Daily [facility] Presumjimy Tan., DO   20 mg at 04/87/19 8615    folic acid (FOLVITE) tablet 1 mg  1 mg Oral Daily [facility] DO   1 mg at 03/09/21 0831    thiamine mononitrate tablet 100 mg  100 mg Oral Daily [facility] Presume Mich Garcia, DO   100 mg at 03/09/21 0831    midodrine (PROAMATINE) tablet 5 mg  5 mg Oral TID  [facility]   5 mg at 03/09/21 1207    bumetanide (BUMEX) injection 0.5 mg  0.5 mg Intravenous BID Harlan Tubbs Jr., DO   0.5 mg at 03/09/21 0831    hydrocortisone sodium succinate PF (SOLU-CORTEF) injection 50 mg  50 mg Intravenous Q8H Ebenezer Treviño MD   50 mg at 03/09/21 1201    insulin lispro (HUMALOG) injection vial 0-12 Units  0-12 Units Subcutaneous TID  Mariam Hart MD   2 Units at 03/09/21 1203    mineral oil-hydrophil petrolat ointment   Topical BID PRN Selma Hernández MD   Given at 03/09/21 0832    glucose (GLUTOSE) 40 % oral gel 15 g  15 g Oral PRN Ashanti Hodge MD        dextrose 50 % IV solution  12.5 g Intravenous PRN Ashanti Hodge MD        glucagon (rDNA) injection 1 mg  1 mg Intramuscular PRN Ashanti Hodge MD        dextrose 5 % solution  100 mL/hr Intravenous PRN Ashanti Hodge MD        [Held by provider] levothyroxine (SYNTHROID) tablet 150 mcg  150 mcg Oral Daily Ashanti Hodge MD        sodium chloride flush 0.9 % injection 10 mL  10 mL Intravenous 2 times per day Paulo Bautista MD   10 mL at 03/09/21 0831    sodium chloride flush 0.9 % injection 10 mL  10 mL Intravenous PRN Paulo Bautista MD   10 mL at 03/09/21 1201    [Held by provider] heparin (porcine) injection 5,000 Units  5,000 Units Subcutaneous 3 times per day Paulo Bautista MD   5,000 Units at 03/05/21 0600    promethazine (PHENERGAN) tablet 12.5 mg  12.5 mg Oral Q6H PRN Paulo Bautista MD        Or    ondansetron [facility]) injection 4 mg  4 mg Intravenous Q6H PRN Paulo Bautista MD        polyethylene glycol [facility]) packet 17 g  17 g Oral Daily PRN Paulo Bautista MD        acetaminophen (TYLENOL) tablet 650 mg  650 mg Oral Q6H PRN Paulo Bautista MD   650 mg at 03/09/21 0115    Or    acetaminophen (TYLENOL) suppository 650 mg  650 mg Rectal Q6H PRN Paulo Bautista MD           Diet:  DIET TUBE FEED CONTINUOUS/CYCLIC NPO; Diabetic 1.5; Nasogastric; 20; 55; 24; Exceptions are: Sips with Meds     EXAM:  General: No distress. Awake, but non-verbal at baseline. Groans  Eyes: No sclera icterus. No conjunctival injection. ENT: No discharge. Pharynx clear. Neck: Trachea midline. Normal thyroid. Lungs: No accessory muscle use. No crackles. No wheezing. No rhonchi. CV: Regular rate. Regular rhythm. No murmur or rub. .   Abd: Non-tender. Non-distended. No masses. No organmegaly. Normal bowel sounds. PEG in-situ  Skin: Warm and dry. No nodule on exposed extremities. No rash on exposed extremities. Ext: No cyanosis, clubbing, ankle edema    Neuro: Awake.  But non-verbal at baseline     Results:   CBC:   Recent Labs     03/07/21  0404 03/08/21  0401 03/09/21  0439   WBC 11.7* 11.9* 11.5   HGB 7.4* 7.6* 7.3*   PLT 53* 66* 84*      BMP:    Recent Labs     03/08/21  0839 03/08/21  1416 03/09/21  0439    141 143   K 4.4 4.0 4.1   * 114* 112*   CO2 24 21* 29   BUN 19 20 27*   CREATININE 0.8 0.8 0.9   GLUCOSE 243* 197* 210* Hepatic:   Recent Labs     03/07/21  0404 03/08/21  0401 03/09/21  0439   AST 30 23 21   ALT 27 22 16   BILITOT 0.2 0.3 0.2   ALKPHOS 93 98 89      Troponin: No results for input(s): TROPONINI in the last 72 hours. BNP: No results for input(s): BNP in the last 72 hours. Lipids: No results for input(s): CHOL, HDL in the last 72 hours. Invalid input(s): LDLCALCU   ABGs:   Lab Results   Component Value Date    PO2ART 75.2 02/08/2021    OOX9YZD 39.3 02/08/2021      INR:   Recent Labs     03/08/21  1416   INR 1.1         Assessment:   1. Severe dehydration with oliguric BEENA-improving hypernatremia-, Hyperkalemia (resolved)   2. Shock-resolved, patient now off pressors   3. History of severe cognitive dysfunction, blindness   4. Dysphagia s/p PEG 3/7 on TF(held overnight due to concerns for aspiration)   5. Acute hypoxic respiratory failure on NC, increasing oxygen requirements today likely 2/2 aspiration. Plan:  1. Continue diuresis with bumex 0.5 mg IV q12 and continue with close monitoring of urine output. Monitor BMP DAILY    2.   Now off pressors, continue to wean down steroids 50 mg q. 8.   3. On stress dose steroids, being weaned down to 50 mg q 8   4. Started on Unasyn for possible aspiration. Follow CXR and respiratory status. 5.  Other problems per MICU team        Danish Moseley MD, PGY3  Internal Medicine Resident     Attending: Dr. Natali Hsieh    Patient seen and examined all key components of the physical performed independently , case discussed with NP, all pertinent labs and radiologic tests personally reviewed agree with above. BEENA  With near complete resolution; prerenal due to severe dehydration;  Na 166 on presentation ; Cr peaked at 4 now 0.9; required IVF, now fluid balance +10 L ; on low dose diuretics with good response  Lysbeth Hatchet, MD

## 2021-03-09 NOTE — PROGRESS NOTES
Comprehensive Nutrition Assessment    Type and Reason for Visit:  Reassess, Consult    Nutrition Recommendations/Plan: Recommend change in tube feed rate and product to better help meet nutritional needs. Recommend Diabetic (Glucerna 1.2) @50/hr to provide 1200ml, 1440 calories, 72g protein, 966ml water. This product and rate meets 100% of patients calorie and protein needs. Nutrition Assessment:  Patient from group home currently NPO and receiving tube feedings ; s/p PEG placement 3/7 ; hx of MR ; BEENA on CKD ; noted septic shock and thrombocyopenia ; symptoms of severe oropharyngeal dysphagia per speech ; will provide updated TF recommendations    Malnutrition Assessment:  Malnutrition Status:  Insufficient data    Context:  Acute Illness     Findings of the 6 clinical characteristics of malnutrition:  Energy Intake:  No significant decrease in energy intake(via TF)  Weight Loss:  Unable to assess(d/t lack of weight history)     Body Fat Loss:  Unable to assess     Muscle Mass Loss:  Unable to assess    Fluid Accumulation:  No significant fluid accumulation     Strength:  Not Performed    Estimated Daily Nutrient Needs:  Energy (kcal):  6515-1189 (REE 1136 x 1.2 SF); Weight Used for Energy Requirements:  Current     Protein (g):  60-75 (1.3-1.5g/kg IBW);  Weight Used for Protein Requirements:  Ideal        Fluid (ml/day):  per renal; Method Used for Fluid Requirements:  Standard Renal      Nutrition Related Findings:  +I&Os (+10.3 L), 1-2+ edema, non-verbal, blind, active BS, rounded abd, PEG, redness/excoriation to buttocks, dysphagia, elevated BSL      Wounds:  None       Current Nutrition Therapies:    Current Tube Feeding (TF) Orders:  · Feeding Route: Nasogastric  · Formula: 1.5 Diabetic  · Schedule: Continuous  · Water Flushes: per critical care  · Current TF & Flush Orders Provides: Glucerna 1.5 @55/hr provides 1320ml, 1980 calories, 109g protein, 1002ml water      Anthropometric Measures:  · Height: 5' 1\" (154.9 cm)  · Current Body Weight: 155 lb (70.3 kg)(3/9, no method ; will not use for calculations d/t most likely fluid accumulation)   · Admission Body Weight: 146 lb (66.2 kg)(3/4, no method ; will use for calculations)    · Usual Body Weight: (2/8/21 146# actual; 10/2019 134# actual- noted varied wt's however overall appears stable)     · Ideal Body Weight: 105 lbs; % Ideal Body Weight 147.6 %   · BMI: 29.3  · BMI Categories: Overweight (BMI 25.0-29. 9)       Nutrition Diagnosis:   · Inadequate oral intake related to cognitive or neurological impairment as evidenced by NPO or clear liquid status due to medical condition, poor intake prior to admission, swallow study results, nutrition support - enteral nutrition      Nutrition Interventions:   Food and/or Nutrient Delivery:  Continue NPO, Modify Tube Feeding  Nutrition Education/Counseling:  Education not indicated   Coordination of Nutrition Care:  Continue to monitor while inpatient, Speech Therapy, Swallow Evaluation    Goals:  Tube feeding will meet nutritional needs with good tolerance       Nutrition Monitoring and Evaluation:   Behavioral-Environmental Outcomes:  Beliefs and Attitutes   Food/Nutrient Intake Outcomes:  Enteral Nutrition Intake/Tolerance  Physical Signs/Symptoms Outcomes:  Biochemical Data, GI Status, Fluid Status or Edema, Hemodynamic Status, Nutrition Focused Physical Findings, Skin, Weight     Discharge Planning:     Too soon to determine     Electronically signed by Judit Dumas RD, LD on 3/9/21 at 1:18 PM EST    Contact: [phone]

## 2021-03-10 LAB
ABO/RH: NORMAL
ALBUMIN SERPL-MCNC: 1.8 G/DL (ref 3.5–5.2)
ALP BLD-CCNC: 75 U/L (ref 35–104)
ALT SERPL-CCNC: 14 U/L (ref 0–32)
ANION GAP SERPL CALCULATED.3IONS-SCNC: 4 MMOL/L (ref 7–16)
ANISOCYTOSIS: ABNORMAL
ANTIBODY SCREEN: NORMAL
AST SERPL-CCNC: 22 U/L (ref 0–31)
BASOPHILIC STIPPLING: ABNORMAL
BASOPHILS ABSOLUTE: 0 E9/L (ref 0–0.2)
BASOPHILS RELATIVE PERCENT: 0.2 % (ref 0–2)
BILIRUB SERPL-MCNC: 0.3 MG/DL (ref 0–1.2)
BLOOD BANK DISPENSE STATUS: NORMAL
BLOOD BANK PRODUCT CODE: NORMAL
BPU ID: NORMAL
BUN BLDV-MCNC: 30 MG/DL (ref 8–23)
CALCIUM SERPL-MCNC: 7.9 MG/DL (ref 8.6–10.2)
CHLORIDE BLD-SCNC: 110 MMOL/L (ref 98–107)
CO2: 29 MMOL/L (ref 22–29)
CREAT SERPL-MCNC: 0.7 MG/DL (ref 0.5–1)
CULTURE, RESPIRATORY: ABNORMAL
CULTURE, RESPIRATORY: ABNORMAL
DESCRIPTION BLOOD BANK: NORMAL
EOSINOPHILS ABSOLUTE: 0 E9/L (ref 0.05–0.5)
EOSINOPHILS RELATIVE PERCENT: 0 % (ref 0–6)
GFR AFRICAN AMERICAN: >60
GFR NON-AFRICAN AMERICAN: >60 ML/MIN/1.73
GLUCOSE BLD-MCNC: 115 MG/DL (ref 74–99)
HCT VFR BLD CALC: 21.6 % (ref 34–48)
HCT VFR BLD CALC: 29 % (ref 34–48)
HEMOGLOBIN: 6.6 G/DL (ref 11.5–15.5)
HEMOGLOBIN: 8.9 G/DL (ref 11.5–15.5)
HEPARIN PF4 ANTIBODY: 0.37 OD
HYPOCHROMIA: ABNORMAL
LYMPHOCYTES ABSOLUTE: 0.7 E9/L (ref 1.5–4)
LYMPHOCYTES RELATIVE PERCENT: 6.1 % (ref 20–42)
MAGNESIUM: 2.1 MG/DL (ref 1.6–2.6)
MCH RBC QN AUTO: 29.7 PG (ref 26–35)
MCHC RBC AUTO-ENTMCNC: 30.6 % (ref 32–34.5)
MCV RBC AUTO: 97.3 FL (ref 80–99.9)
METER GLUCOSE: 124 MG/DL (ref 74–99)
METER GLUCOSE: 130 MG/DL (ref 74–99)
METER GLUCOSE: 131 MG/DL (ref 74–99)
METER GLUCOSE: 148 MG/DL (ref 74–99)
METER GLUCOSE: 98 MG/DL (ref 74–99)
MONOCYTES ABSOLUTE: 0.46 E9/L (ref 0.1–0.95)
MONOCYTES RELATIVE PERCENT: 4.3 % (ref 2–12)
MYELOCYTE PERCENT: 0.9 % (ref 0–0)
NEUTROPHILS ABSOLUTE: 10.44 E9/L (ref 1.8–7.3)
NEUTROPHILS RELATIVE PERCENT: 88.7 % (ref 43–80)
NUCLEATED RED BLOOD CELLS: 10.4 /100 WBC
ORGANISM: ABNORMAL
OVALOCYTES: ABNORMAL
PDW BLD-RTO: 26.5 FL (ref 11.5–15)
PHOSPHORUS: 2.6 MG/DL (ref 2.5–4.5)
PLATELET # BLD: 110 E9/L (ref 130–450)
PMV BLD AUTO: 11.1 FL (ref 7–12)
POIKILOCYTES: ABNORMAL
POLYCHROMASIA: ABNORMAL
POTASSIUM SERPL-SCNC: 4 MMOL/L (ref 3.5–5)
RBC # BLD: 2.22 E12/L (ref 3.5–5.5)
SCHISTOCYTES: ABNORMAL
SMEAR, RESPIRATORY: ABNORMAL
SMUDGE CELLS: ABNORMAL
SODIUM BLD-SCNC: 143 MMOL/L (ref 132–146)
TARGET CELLS: ABNORMAL
TOTAL PROTEIN: 5.7 G/DL (ref 6.4–8.3)
WBC # BLD: 11.6 E9/L (ref 4.5–11.5)

## 2021-03-10 PROCEDURE — 6360000002 HC RX W HCPCS: Performed by: INTERNAL MEDICINE

## 2021-03-10 PROCEDURE — 82962 GLUCOSE BLOOD TEST: CPT

## 2021-03-10 PROCEDURE — P9047 ALBUMIN (HUMAN), 25%, 50ML: HCPCS | Performed by: INTERNAL MEDICINE

## 2021-03-10 PROCEDURE — 86900 BLOOD TYPING SEROLOGIC ABO: CPT

## 2021-03-10 PROCEDURE — 2580000003 HC RX 258: Performed by: INTERNAL MEDICINE

## 2021-03-10 PROCEDURE — 6370000000 HC RX 637 (ALT 250 FOR IP): Performed by: FAMILY MEDICINE

## 2021-03-10 PROCEDURE — C9113 INJ PANTOPRAZOLE SODIUM, VIA: HCPCS | Performed by: INTERNAL MEDICINE

## 2021-03-10 PROCEDURE — 36430 TRANSFUSION BLD/BLD COMPNT: CPT

## 2021-03-10 PROCEDURE — 83735 ASSAY OF MAGNESIUM: CPT

## 2021-03-10 PROCEDURE — 85025 COMPLETE CBC W/AUTO DIFF WBC: CPT

## 2021-03-10 PROCEDURE — 86850 RBC ANTIBODY SCREEN: CPT

## 2021-03-10 PROCEDURE — 2500000003 HC RX 250 WO HCPCS: Performed by: INTERNAL MEDICINE

## 2021-03-10 PROCEDURE — 85018 HEMOGLOBIN: CPT

## 2021-03-10 PROCEDURE — 86901 BLOOD TYPING SEROLOGIC RH(D): CPT

## 2021-03-10 PROCEDURE — 2580000003 HC RX 258: Performed by: FAMILY MEDICINE

## 2021-03-10 PROCEDURE — P9016 RBC LEUKOCYTES REDUCED: HCPCS

## 2021-03-10 PROCEDURE — 36415 COLL VENOUS BLD VENIPUNCTURE: CPT

## 2021-03-10 PROCEDURE — 80053 COMPREHEN METABOLIC PANEL: CPT

## 2021-03-10 PROCEDURE — 6360000002 HC RX W HCPCS: Performed by: FAMILY MEDICINE

## 2021-03-10 PROCEDURE — 84100 ASSAY OF PHOSPHORUS: CPT

## 2021-03-10 PROCEDURE — 86923 COMPATIBILITY TEST ELECTRIC: CPT

## 2021-03-10 PROCEDURE — 2700000000 HC OXYGEN THERAPY PER DAY

## 2021-03-10 PROCEDURE — 2060000000 HC ICU INTERMEDIATE R&B

## 2021-03-10 PROCEDURE — 6370000000 HC RX 637 (ALT 250 FOR IP): Performed by: INTERNAL MEDICINE

## 2021-03-10 PROCEDURE — 99222 1ST HOSP IP/OBS MODERATE 55: CPT | Performed by: SURGERY

## 2021-03-10 PROCEDURE — 85014 HEMATOCRIT: CPT

## 2021-03-10 PROCEDURE — 94640 AIRWAY INHALATION TREATMENT: CPT

## 2021-03-10 RX ORDER — SODIUM CHLORIDE 9 MG/ML
INJECTION, SOLUTION INTRAVENOUS PRN
Status: DISCONTINUED | OUTPATIENT
Start: 2021-03-10 | End: 2021-03-16 | Stop reason: HOSPADM

## 2021-03-10 RX ORDER — SODIUM CHLORIDE 9 MG/ML
10 INJECTION INTRAVENOUS 2 TIMES DAILY
Status: DISCONTINUED | OUTPATIENT
Start: 2021-03-10 | End: 2021-03-16 | Stop reason: HOSPADM

## 2021-03-10 RX ORDER — SODIUM CHLORIDE 9 MG/ML
10 INJECTION INTRAVENOUS DAILY
Status: DISCONTINUED | OUTPATIENT
Start: 2021-03-10 | End: 2021-03-10 | Stop reason: SDUPTHER

## 2021-03-10 RX ORDER — PANTOPRAZOLE SODIUM 40 MG/10ML
40 INJECTION, POWDER, LYOPHILIZED, FOR SOLUTION INTRAVENOUS 2 TIMES DAILY
Status: DISCONTINUED | OUTPATIENT
Start: 2021-03-10 | End: 2021-03-10 | Stop reason: SDUPTHER

## 2021-03-10 RX ORDER — ALBUMIN (HUMAN) 12.5 G/50ML
25 SOLUTION INTRAVENOUS 3 TIMES DAILY
Status: COMPLETED | OUTPATIENT
Start: 2021-03-10 | End: 2021-03-10

## 2021-03-10 RX ORDER — PANTOPRAZOLE SODIUM 40 MG/10ML
40 INJECTION, POWDER, LYOPHILIZED, FOR SOLUTION INTRAVENOUS 2 TIMES DAILY
Status: DISCONTINUED | OUTPATIENT
Start: 2021-03-10 | End: 2021-03-16 | Stop reason: HOSPADM

## 2021-03-10 RX ADMIN — IPRATROPIUM BROMIDE AND ALBUTEROL SULFATE 1 AMPULE: 2.5; .5 SOLUTION RESPIRATORY (INHALATION) at 20:43

## 2021-03-10 RX ADMIN — MIDODRINE HYDROCHLORIDE 5 MG: 5 TABLET ORAL at 11:46

## 2021-03-10 RX ADMIN — ALBUMIN (HUMAN) 25 G: 0.25 INJECTION, SOLUTION INTRAVENOUS at 15:05

## 2021-03-10 RX ADMIN — SODIUM CHLORIDE, PRESERVATIVE FREE 10 ML: 5 INJECTION INTRAVENOUS at 21:35

## 2021-03-10 RX ADMIN — BUMETANIDE 0.5 MG: 0.25 INJECTION, SOLUTION INTRAMUSCULAR; INTRAVENOUS at 08:42

## 2021-03-10 RX ADMIN — AMPICILLIN AND SULBACTAM 3000 MG: 2; 1 INJECTION, POWDER, FOR SOLUTION INTRAMUSCULAR; INTRAVENOUS at 08:09

## 2021-03-10 RX ADMIN — IPRATROPIUM BROMIDE AND ALBUTEROL SULFATE 1 AMPULE: 2.5; .5 SOLUTION RESPIRATORY (INHALATION) at 09:59

## 2021-03-10 RX ADMIN — SODIUM CHLORIDE, PRESERVATIVE FREE 10 ML: 5 INJECTION INTRAVENOUS at 08:07

## 2021-03-10 RX ADMIN — HYDROCORTISONE SODIUM SUCCINATE 50 MG: 100 INJECTION, POWDER, FOR SOLUTION INTRAMUSCULAR; INTRAVENOUS at 16:04

## 2021-03-10 RX ADMIN — BUMETANIDE 0.5 MG: 0.25 INJECTION, SOLUTION INTRAMUSCULAR; INTRAVENOUS at 16:14

## 2021-03-10 RX ADMIN — ALBUMIN (HUMAN) 25 G: 0.25 INJECTION, SOLUTION INTRAVENOUS at 21:35

## 2021-03-10 RX ADMIN — FAMOTIDINE 20 MG: 20 TABLET, FILM COATED ORAL at 08:42

## 2021-03-10 RX ADMIN — MIDODRINE HYDROCHLORIDE 5 MG: 5 TABLET ORAL at 08:42

## 2021-03-10 RX ADMIN — Medication 100 MG: at 08:42

## 2021-03-10 RX ADMIN — PANTOPRAZOLE SODIUM 40 MG: 40 INJECTION, POWDER, FOR SOLUTION INTRAVENOUS at 21:35

## 2021-03-10 RX ADMIN — FOLIC ACID 1 MG: 1 TABLET ORAL at 08:42

## 2021-03-10 RX ADMIN — Medication: at 08:49

## 2021-03-10 RX ADMIN — IPRATROPIUM BROMIDE AND ALBUTEROL SULFATE 1 AMPULE: 2.5; .5 SOLUTION RESPIRATORY (INHALATION) at 17:16

## 2021-03-10 RX ADMIN — AMPICILLIN AND SULBACTAM 3000 MG: 2; 1 INJECTION, POWDER, FOR SOLUTION INTRAMUSCULAR; INTRAVENOUS at 02:30

## 2021-03-10 RX ADMIN — SODIUM CHLORIDE, PRESERVATIVE FREE 10 ML: 5 INJECTION INTRAVENOUS at 11:46

## 2021-03-10 RX ADMIN — ALBUMIN (HUMAN) 25 G: 0.25 INJECTION, SOLUTION INTRAVENOUS at 11:41

## 2021-03-10 RX ADMIN — HYDROCORTISONE SODIUM SUCCINATE 50 MG: 100 INJECTION, POWDER, FOR SOLUTION INTRAMUSCULAR; INTRAVENOUS at 04:15

## 2021-03-10 RX ADMIN — CEFTRIAXONE SODIUM 2000 MG: 2 INJECTION, POWDER, FOR SOLUTION INTRAMUSCULAR; INTRAVENOUS at 12:59

## 2021-03-10 ASSESSMENT — PAIN SCALES - PAIN ASSESSMENT IN ADVANCED DEMENTIA (PAINAD)
BODYLANGUAGE: 0
BREATHING: 0
NEGVOCALIZATION: 0
BODYLANGUAGE: 0
TOTALSCORE: 0
TOTALSCORE: 0
NEGVOCALIZATION: 0
BODYLANGUAGE: 0
BREATHING: 0
FACIALEXPRESSION: 0
NEGVOCALIZATION: 0
BREATHING: 0
BODYLANGUAGE: 0
FACIALEXPRESSION: 0
TOTALSCORE: 0
BODYLANGUAGE: 0
BREATHING: 0
CONSOLABILITY: 0
NEGVOCALIZATION: 0
BREATHING: 0
FACIALEXPRESSION: 0
CONSOLABILITY: 0

## 2021-03-10 NOTE — PLAN OF CARE
Problem: Skin Integrity:  Goal: Will show no infection signs and symptoms  Description: Will show no infection signs and symptoms  3/9/2021 2235 by Steve Watson RN  Outcome: Ongoing  3/9/2021 1752 by Kirill Paris RN  Outcome: Met This Shift     Problem: Skin Integrity:  Goal: Absence of new skin breakdown  Description: Absence of new skin breakdown  3/9/2021 2235 by Steve Watson RN  Outcome: Ongoing     Problem: Falls - Risk of:  Goal: Will remain free from falls  Description: Will remain free from falls  3/9/2021 2235 by Steve Watson RN  Outcome: Ongoing     Problem: Falls - Risk of:  Goal: Absence of physical injury  Description: Absence of physical injury  3/9/2021 2235 by Steve Watson RN  Outcome: Ongoing     Problem: Pain:  Goal: Pain level will decrease  Description: Pain level will decrease  3/9/2021 2235 by Steve Watson RN  Outcome: Ongoing     Problem: Pain:  Goal: Control of acute pain  Description: Control of acute pain  3/9/2021 2235 by Steve Watson RN  Outcome: Ongoing     Problem: Pain:  Goal: Control of chronic pain  Description: Control of chronic pain  3/9/2021 2235 by Steve Watson RN  Outcome: Ongoing

## 2021-03-10 NOTE — PROGRESS NOTES
Subjective:  Patient is non-verbal, appears in no distress. More alert today, mumbling. Tube feeds were restarted. She is getting 1 unit of PRBCs today. Objective:    /71   Pulse 103   Temp 98.8 °F (37.1 °C) (Bladder)   Resp 20   Ht 5' 1\" (1.549 m)   Wt 155 lb (70.3 kg)   SpO2 93%   BMI 29.29 kg/m²     General: NAD, non-verbal  HEENT: No thrush or mucositis, EOMI, PERRLA  Heart:  RRR, no murmurs, gallops, or rubs.   Lungs:  CTA bilaterally, no wheeze, rales or rhonchi  Abd: bowel sounds present, nontender, nondistended, no masses, PEG tube C/D/I  Extrem:  No clubbing, cyanosis, or edema  Lymphatics: No palpable adenopathy in cervical and supraclavicular regions  Skin: Intact, no petechia or purpura    CBC with Differential:    Lab Results   Component Value Date    WBC 11.6 03/10/2021    RBC 2.22 03/10/2021    HGB 6.6 03/10/2021    HCT 21.6 03/10/2021     03/10/2021    MCV 97.3 03/10/2021    MCH 29.7 03/10/2021    MCHC 30.6 03/10/2021    RDW 26.5 03/10/2021    NRBC 10.4 03/10/2021    SEGSPCT 58 06/26/2011    METASPCT 2.6 03/09/2021    LYMPHOPCT 6.1 03/10/2021    PROMYELOPCT 0.9 03/07/2021    MONOPCT 4.3 03/10/2021    MYELOPCT 0.9 03/10/2021    BASOPCT 0.2 03/10/2021    MONOSABS 0.46 03/10/2021    LYMPHSABS 0.70 03/10/2021    EOSABS 0.00 03/10/2021    BASOSABS 0.00 03/10/2021     CMP:    Lab Results   Component Value Date     03/10/2021    K 4.0 03/10/2021    K 4.8 02/09/2021     03/10/2021    CO2 29 03/10/2021    BUN 30 03/10/2021    CREATININE 0.7 03/10/2021    GFRAA >60 03/10/2021    LABGLOM >60 03/10/2021    GLUCOSE 115 03/10/2021    GLUCOSE 97 12/30/2011    PROT 5.7 03/10/2021    LABALBU 1.8 03/10/2021    LABALBU 3.7 12/30/2011    CALCIUM 7.9 03/10/2021    BILITOT 0.3 03/10/2021    ALKPHOS 75 03/10/2021    AST 22 03/10/2021    ALT 14 03/10/2021          Current Facility-Administered Medications:     0.9 % sodium chloride infusion, , Intravenous, PRN, Anjali Arredondo., DO    hydrocortisone sodium succinate PF (SOLU-CORTEF) injection 50 mg, 50 mg, Intravenous, Q12H, [facility] Paci., DO    albumin human 25 % IV solution 25 g, 25 g, Intravenous, TID, Ray Keene MD, Stopped at 03/10/21 1243    cefTRIAXone (ROCEPHIN) 2,000 mg in sterile water 20 mL IV syringe, 2,000 mg, Intravenous, Q24H, Tamiko Raya Paci., DO    pantoprazole (PROTONIX) injection 40 mg, 40 mg, Intravenous, BID **AND** sodium chloride (PF) 0.9 % injection 10 mL, 10 mL, Intravenous, BID, [facility] Elver Paci., DO    ipratropium-albuterol (DUONEB) nebulizer solution 1 ampule, 1 ampule, Inhalation, Q4H WA, Ruth Ann Christensen MD, 1 ampule at 03/10/21 0959    sodium chloride (Inhalant) 3 % nebulizer solution 4 mL, 4 mL, Nebulization, PRN, Ruth Ann Christensen MD    lidocaine PF 1 % injection 5 mL, 5 mL, Intradermal, Once, Ruth Ann Christensen MD    sodium chloride flush 0.9 % injection 10 mL, 10 mL, Intravenous, 2 times per day, Ruth Ann Christensen MD, 10 mL at 03/10/21 0807    sodium chloride flush 0.9 % injection 10 mL, 10 mL, Intravenous, PRN, Ruth Ann Christensen MD    folic acid (FOLVITE) tablet 1 mg, 1 mg, Oral, Daily, Razia Fill., DO, 1 mg at 03/10/21 4169    thiamine mononitrate tablet 100 mg, 100 mg, Oral, Daily, Razia Fill., DO, 100 mg at 03/10/21 1751    midodrine (PROAMATINE) tablet 5 mg, 5 mg, Oral, TID , [facility] Paci., DO, 5 mg at 03/10/21 1146    bumetanide (BUMEX) injection 0.5 mg, 0.5 mg, Intravenous, BID, Razia Fill., DO, 0.5 mg at 03/10/21 8192    insulin lispro (HUMALOG) injection vial 0-12 Units, 0-12 Units, Subcutaneous, TID , Ben Vickers MD, Stopped at 03/10/21 1156    mineral oil-hydrophil petrolat ointment, , Topical, BID PRN, Nigel Ricks MD, Given at 03/10/21 0849    glucose (GLUTOSE) 40 % oral gel 15 g, 15 g, Oral, PRN, Sola Ledesma MD    dextrose 50 % IV solution, 12.5 g, Intravenous, PRN, Sola Ledesma MD    glucagon (rDNA) injection 1 mg, 1 mg, Intramuscular, PRN, Vania Guzman MD    dextrose 5 % solution, 100 mL/hr, Intravenous, PRN, Vania Guzman MD    levothyroxine (SYNTHROID) tablet 150 mcg, 150 mcg, Oral, Daily, Vania Guzman MD    sodium chloride flush 0.9 % injection 10 mL, 10 mL, Intravenous, 2 times per day, Vania Guzman MD, 10 mL at 03/09/21 2055    sodium chloride flush 0.9 % injection 10 mL, 10 mL, Intravenous, PRN, Vania Guzman MD, 10 mL at 03/09/21 1632    [Held by provider] heparin (porcine) injection 5,000 Units, 5,000 Units, Subcutaneous, 3 times per day, Vania Guzman MD, 5,000 Units at 03/05/21 0600    promethazine (PHENERGAN) tablet 12.5 mg, 12.5 mg, Oral, Q6H PRN **OR** ondansetron (ZOFRAN) injection 4 mg, 4 mg, Intravenous, Q6H PRN, Vania Guzman MD    polyethylene glycol (GLYCOLAX) packet 17 g, 17 g, Oral, Daily PRN, Vania Guzman MD    acetaminophen (TYLENOL) tablet 650 mg, 650 mg, Oral, Q6H PRN, 650 mg at 03/09/21 0115 **OR** acetaminophen (TYLENOL) suppository 650 mg, 650 mg, Rectal, Q6H PRN, Vania Guzman MD    Assessment:    Active Problems:    Sepsis ([address]  Resolved Problems:    * No resolved hospital problems. *    59-year-old with history of MR and legally blind presented with unresponsiveness was found with worsening thrombocytopenia. Plan: Thrombocytopenia improving- today 110. Anemia and thrombocytopenia likely are multifactorial resulting from septic shock from UTI, recent antibiotic use, BEENA, poor nutrition. Maintain platelets >20,206 or 30,000 with active signs of bleeding and hemoglobin >7g/dL- She is getting 1 unit of PRBCs for 6.6.    No evidence of hemolysis  Leukocytosis is reactive to inflammatory process  We will continue to follow along       Electronically signed by RADHA Iqbal NP on 3/10/2021 at 12:51 PM

## 2021-03-10 NOTE — CARE COORDINATION
3/10 Care Coordination:Katerina remains in MICU, Cont with High residuals with PEG. Will get 1 unit PC's. Spoke with Donal Arriaga on 3/9  at [facility]. They agreeable to accept back with peg and TF's. Can not do IVF's. Legal Guardian Josiane Morillo ([phone]) is on board [facility]. CM/SW will continue to follow for discharge planning.    Malena HERNÁNDEZN,RN--[phone]

## 2021-03-10 NOTE — PROGRESS NOTES
[facility]   ATTENDING PHYSICIAN PROGRESS NOTE     I have examined the patient, reviewed the record, and discussed the case with the APN/ Resident. I have reviewed all relevant labs and imaging data. The following summarizes my clinical findings and independent assessment. CC: anemia--rule out GI bleed    Asked to evaluate pt with anemia. Pt unable to provide history or ROS. Information obtained from discussion with nurse and review of medical record.     Awake  Hrt:  Regular  Lungs:  Fairly clear bilaterally  Abd:  Soft; BS active; NT/ND  Skin:  Warm/dry  Rectal:  Heme negative    Patient Active Problem List    Diagnosis Date Noted    Sepsis ([facility] [address] 03/04/2021    Altered mental state 02/08/2021    Behavior disturbance 02/04/2019    Macrocytosis 08/27/2018    ESRD (end stage renal disease) on dialysis ([facility] [address]     Mental retardation 04/21/2016    Hypothyroidism 04/21/2016    Blindness of both eyes 04/21/2016       Anemia--doubt GI bleed given heme negative stool  No plans for scopes  Please call if needed  Discussed with Dr. Fabian Andrade and MICU team    Ladan Landeros MD, FACS  3/10/2021  4:46 PM

## 2021-03-10 NOTE — PROGRESS NOTES
Nephrology Resident  Inpatient Progress Note    Admit Date: 3/4/2021                                  PCP: Tita Malagon MD    Subjective: The patient is I 49 y.o. 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 Found to have BEENA, hypotension, hypernatremic and hyperkalemia in the ED. Patient seen and examined, mentation a little improved today, patient able to vocalize to the best of her ability that she wants food. Diuresis increased continues with 0.5 mg IV Bumex every 12 hourly. Patient with urine output of 1.278 L in the last 24 hours, still remains 10 L positive today. CR 0.7, . H&H 6.6 today, planning for 1 unit PRBC. FOBT positive, Protonix increased to twice daily, consult placed to [address] [place] [address]. Vitals:  VITALS:  /70   Pulse 98   Temp 98.4 °F (36.9 °C)   Resp 18   Ht 5' 1\" (1.549 m)   Wt 155 lb (70.3 kg)   SpO2 92%   BMI 29.29 kg/m²   24HR INTAKE/OUTPUT:      Intake/Output Summary (Last 24 hours) at 3/10/2021 1146  Last data filed at 3/10/2021 1054  Gross per 24 hour   Intake 1327.2 ml   Output 1798 ml   Net -470.8 ml     CURRENT PULSE OXIMETRY:  SpO2: 92 %  24HR PULSE OXIMETRY RANGE:  SpO2  Av.9 %  Min: 89 %  Max: 99 %        I/O:      I/O last 3 completed shifts:   In: 5060 [I.V.:701; NG/GT:627]  Out: 1278 [Urine:1278]  I/O this shift:  In: 119.2 [I.V.:39.2; NG/GT:80]  Out: 685 [Urine:685]    Medications:    IV:   sodium chloride      dextrose        hydrocortisone sodium succinate PF  50 mg Intravenous Q12H    albumin human  25 g Intravenous TID    cefTRIAXone (ROCEPHIN) IV  2,000 mg Intravenous Q24H    pantoprazole  40 mg Intravenous BID    And    sodium chloride (PF)  10 mL Intravenous Daily    ipratropium-albuterol  1 ampule Inhalation Q4H WA    lidocaine 1 % injection  5 mL Intradermal Once    sodium chloride flush  10 mL Intravenous 2 times per day    folic acid  1 mg Oral Daily    thiamine mononitrate  100 mg Oral Daily    midodrine  5 mg Oral TID     bumetanide  0.5 mg Intravenous BID    insulin lispro  0-12 Units Subcutaneous TID     levothyroxine  150 mcg Oral Daily    sodium chloride flush  10 mL Intravenous 2 times per day    [Held by provider] heparin (porcine)  5,000 Units Subcutaneous 3 times per day        Current Meds:  Current Facility-Administered Medications   Medication Dose Route Frequency Provider Last Rate Last Admin    0.9 % sodium chloride infusion   Intravenous PRN Renato Contras., DO        hydrocortisone sodium succinate PF (SOLU-CORTEF) injection 50 mg  50 mg Intravenous Q12H Renato Contras., DO        albumin human 25 % IV solution 25 g  25 g Intravenous TID Damien Hsieh  mL/hr at 03/10/21 1141 25 g at 03/10/21 1141    cefTRIAXone (ROCEPHIN) 2,000 mg in sterile water 20 mL IV syringe  2,000 mg Intravenous Q24H Teresita Bhagat Jr., DO        pantoprazole (PROTONIX) injection 40 mg  40 mg Intravenous BID Marie Santana Jr., DO        And    sodium chloride (PF) 0.9 % injection 10 mL  10 mL Intravenous Daily Teresita Heart Sax., DO   10 mL at 03/10/21 1146    ipratropium-albuterol (DUONEB) nebulizer solution 1 ampule  1 ampule Inhalation Q4H WA Renetta Huff MD   1 ampule at 03/10/21 0959    sodium chloride (Inhalant) 3 % nebulizer solution 4 mL  4 mL Nebulization PRN Renetta Huff MD        lidocaine PF 1 % injection 5 mL  5 mL Intradermal Once Renetta Huff MD        sodium chloride flush 0.9 % injection 10 mL  10 mL Intravenous 2 times per day Renetta Huff MD   10 mL at 03/10/21 0807    sodium chloride flush 0.9 % injection 10 mL  10 mL Intravenous PRN Renetta Huff MD        folic acid (FOLVITE) tablet 1 mg  1 mg Oral Daily Renato Contras., DO   1 mg at 03/10/21 9311    thiamine mononitrate tablet 100 mg  100 mg Oral Daily Teresita Bhagat Jr., DO   100 mg at 03/10/21 1293    midodrine (PROAMATINE) tablet 5 mg  5 mg Oral TID  Brandon Foil., DO   5 mg at 03/10/21 1146    bumetanide (BUMEX) injection 0.5 mg  0.5 mg Intravenous BID Nolia Zheng Jr., DO   0.5 mg at 03/10/21 7314    insulin lispro (HUMALOG) injection vial 0-12 Units  0-12 Units Subcutaneous TID  Claven Schirmer, MD   2 Units at 03/09/21 1633    mineral oil-hydrophil petrolat ointment   Topical BID PRN Will Ryan MD   Given at 03/10/21 0849    glucose (GLUTOSE) 40 % oral gel 15 g  15 g Oral PRN Rk Tracey MD        dextrose 50 % IV solution  12.5 g Intravenous PRN Rk Tracey MD        glucagon (rDNA) injection 1 mg  1 mg Intramuscular PRN Rk Tracey MD        dextrose 5 % solution  100 mL/hr Intravenous PRN Rk Tracey MD        levothyroxine (SYNTHROID) tablet 150 mcg  150 mcg Oral Daily Rk Tracey MD        sodium chloride flush 0.9 % injection 10 mL  10 mL Intravenous 2 times per day Rk Tracey MD   10 mL at 03/09/21 2055    sodium chloride flush 0.9 % injection 10 mL  10 mL Intravenous PRN Rk Tracey MD   10 mL at 03/09/21 1632    [Held by provider] heparin (porcine) injection 5,000 Units  5,000 Units Subcutaneous 3 times per day Rk Tracey MD   5,000 Units at 03/05/21 0600    promethazine (PHENERGAN) tablet 12.5 mg  12.5 mg Oral Q6H PRN Rk Tracey MD        Or    ondansetron [facility]) injection 4 mg  4 mg Intravenous Q6H PRN Rk Tracey MD        polyethylene glycol (GLYCOLAX) packet 17 g  17 g Oral Daily PRN Rk Tracey MD        acetaminophen (TYLENOL) tablet 650 mg  650 mg Oral Q6H PRN Rk Tracey MD   650 mg at 03/09/21 0115    Or    acetaminophen (TYLENOL) suppository 650 mg  650 mg Rectal Q6H PRN Rk Tracey MD           Diet:  DIET TUBE FEED CONTINUOUS/CYCLIC NPO; Diabetic 1.5; Nasogastric; 20; 55; 24; Exceptions are: Sips with Meds     EXAM:  General: No distress. Awake, but non-verbal at baseline. Groans  Eyes: No sclera icterus. No conjunctival injection. ENT: No discharge. Pharynx clear. Neck: Trachea midline. Normal thyroid. Lungs: No accessory muscle use. No crackles. No wheezing. No rhonchi. CV: Regular rate. Regular rhythm. No murmur or rub. .   Abd: Non-tender. Non-distended. No masses. No organmegaly. Normal bowel sounds. PEG in-situ  Skin: Warm and dry. No nodule on exposed extremities. No rash on exposed extremities. Ext: No cyanosis, clubbing, pitting edema +2   Neuro: Awake. But non-verbal at baseline     Results:   CBC:   Recent Labs     03/08/21  0401 03/09/21  0439 03/10/21  0435   WBC 11.9* 11.5 11.6*   HGB 7.6* 7.3* 6.6*   PLT 66* 84* 110*      BMP:    Recent Labs     03/08/21  1416 03/09/21  0439 03/10/21  0435    143 143   K 4.0 4.1 4.0   * 112* 110*   CO2 21* 29 29   BUN 20 27* 30*   CREATININE 0.8 0.9 0.7   GLUCOSE 197* 210* 115*       Hepatic:   Recent Labs     03/08/21  0401 03/09/21  0439 03/10/21  0435   AST 23 21 22   ALT 22 16 14   BILITOT 0.3 0.2 0.3   ALKPHOS 98 89 75      Troponin: No results for input(s): TROPONINI in the last 72 hours. BNP: No results for input(s): BNP in the last 72 hours. Lipids: No results for input(s): CHOL, HDL in the last 72 hours. Invalid input(s): LDLCALCU   ABGs:   Lab Results   Component Value Date    PO2ART 75.2 02/08/2021    VDL4ACO 39.3 02/08/2021      INR:   Recent Labs     03/08/21  1416   INR 1.1         Assessment:   1. Severe dehydration with oliguric BEENA/hyponatremia/hyperkalemia-improved   2. Shock-resolved, patient now off pressors   3. History of severe cognitive dysfunction, blindness   4. Dysphagia s/p PEG 3/7 on TF(still on hold)   5. Acute hypoxic respiratory failure likely 2/2 Klebsiella pneumonia. Started on Unasyn yesterday   6. Volume overload, remains 10 L positive, patient third spacing Albumin low at 1.8   7.   Acute anemia, concerns for GI bleed with FOBT positive. 1 unit PRBC ordered 3/10 for Hb 6.6.  GS consulted    Plan:  1. Start albumin 25 g every 8 hourly for 3 doses with bumex 0.5 mg IV q12 and continue with close monitoring of urine output. Monitor BMP DAILY    2.   Now off pressors, continue to wean down steroids 50 mg q. 12, on midodrine 5 mg 3 times daily   4. Started on Unasyn for possible aspiration. Follow CXR and respiratory status. 5.  Other problems per MICU team        Taz Lyon MD, PGY3  Internal Medicine Resident     Attending: Dr. Ariel Hsieh    Patient seen and examined ,all key components of the history and physical exam performed by me,  case discussed with  Medical Resident, all pertinent labs and radiologic tests independently reviewed, agree with above.      Significant peripheral edema with hypoalbuminemia; good response to low dose bumex; will give few doses of Albumin  Coreen Douglas MD

## 2021-03-11 LAB
ALBUMIN SERPL-MCNC: 3 G/DL (ref 3.5–5.2)
ALP BLD-CCNC: 68 U/L (ref 35–104)
ALT SERPL-CCNC: 15 U/L (ref 0–32)
ANION GAP SERPL CALCULATED.3IONS-SCNC: 8 MMOL/L (ref 7–16)
ANISOCYTOSIS: ABNORMAL
AST SERPL-CCNC: 25 U/L (ref 0–31)
BASOPHILS ABSOLUTE: 0 E9/L (ref 0–0.2)
BASOPHILS RELATIVE PERCENT: 0.2 % (ref 0–2)
BILIRUB SERPL-MCNC: 0.4 MG/DL (ref 0–1.2)
BUN BLDV-MCNC: 23 MG/DL (ref 8–23)
CALCIUM SERPL-MCNC: 8.6 MG/DL (ref 8.6–10.2)
CHLORIDE BLD-SCNC: 110 MMOL/L (ref 98–107)
CO2: 32 MMOL/L (ref 22–29)
CREAT SERPL-MCNC: 0.6 MG/DL (ref 0.5–1)
EOSINOPHILS ABSOLUTE: 0.11 E9/L (ref 0.05–0.5)
EOSINOPHILS RELATIVE PERCENT: 0.9 % (ref 0–6)
GFR AFRICAN AMERICAN: >60
GFR NON-AFRICAN AMERICAN: >60 ML/MIN/1.73
GLUCOSE BLD-MCNC: 130 MG/DL (ref 74–99)
HCT VFR BLD CALC: 24.8 % (ref 34–48)
HCT VFR BLD CALC: 30.5 % (ref 34–48)
HCT VFR BLD CALC: 33.1 % (ref 34–48)
HEMOGLOBIN: 10.1 G/DL (ref 11.5–15.5)
HEMOGLOBIN: 7.9 G/DL (ref 11.5–15.5)
HEMOGLOBIN: 9.5 G/DL (ref 11.5–15.5)
HYPOCHROMIA: ABNORMAL
LYMPHOCYTES ABSOLUTE: 2.14 E9/L (ref 1.5–4)
LYMPHOCYTES RELATIVE PERCENT: 16.8 % (ref 20–42)
MAGNESIUM: 2 MG/DL (ref 1.6–2.6)
MCH RBC QN AUTO: 29.8 PG (ref 26–35)
MCHC RBC AUTO-ENTMCNC: 31.9 % (ref 32–34.5)
MCV RBC AUTO: 93.6 FL (ref 80–99.9)
METER GLUCOSE: 136 MG/DL (ref 74–99)
METER GLUCOSE: 149 MG/DL (ref 74–99)
METER GLUCOSE: 178 MG/DL (ref 74–99)
MONOCYTES ABSOLUTE: 1.01 E9/L (ref 0.1–0.95)
MONOCYTES RELATIVE PERCENT: 8 % (ref 2–12)
MYELOCYTE PERCENT: 0.9 % (ref 0–0)
NEUTROPHILS ABSOLUTE: 9.32 E9/L (ref 1.8–7.3)
NEUTROPHILS RELATIVE PERCENT: 73.5 % (ref 43–80)
NUCLEATED RED BLOOD CELLS: 15.9 /100 WBC
PAPPENHEIMER BODIES: ABNORMAL
PDW BLD-RTO: 23.8 FL (ref 11.5–15)
PHOSPHORUS: 2.3 MG/DL (ref 2.5–4.5)
PLATELET # BLD: 175 E9/L (ref 130–450)
PMV BLD AUTO: 11.2 FL (ref 7–12)
POIKILOCYTES: ABNORMAL
POLYCHROMASIA: ABNORMAL
POTASSIUM SERPL-SCNC: 3 MMOL/L (ref 3.5–5)
RBC # BLD: 2.65 E12/L (ref 3.5–5.5)
SODIUM BLD-SCNC: 150 MMOL/L (ref 132–146)
TARGET CELLS: ABNORMAL
TOTAL PROTEIN: 6 G/DL (ref 6.4–8.3)
WBC # BLD: 12.6 E9/L (ref 4.5–11.5)

## 2021-03-11 PROCEDURE — 99233 SBSQ HOSP IP/OBS HIGH 50: CPT | Performed by: INTERNAL MEDICINE

## 2021-03-11 PROCEDURE — 2580000003 HC RX 258: Performed by: INTERNAL MEDICINE

## 2021-03-11 PROCEDURE — 6360000002 HC RX W HCPCS: Performed by: INTERNAL MEDICINE

## 2021-03-11 PROCEDURE — 2060000000 HC ICU INTERMEDIATE R&B

## 2021-03-11 PROCEDURE — 82962 GLUCOSE BLOOD TEST: CPT

## 2021-03-11 PROCEDURE — 6370000000 HC RX 637 (ALT 250 FOR IP): Performed by: INTERNAL MEDICINE

## 2021-03-11 PROCEDURE — 80053 COMPREHEN METABOLIC PANEL: CPT

## 2021-03-11 PROCEDURE — 36415 COLL VENOUS BLD VENIPUNCTURE: CPT

## 2021-03-11 PROCEDURE — 84100 ASSAY OF PHOSPHORUS: CPT

## 2021-03-11 PROCEDURE — 85014 HEMATOCRIT: CPT

## 2021-03-11 PROCEDURE — 85025 COMPLETE CBC W/AUTO DIFF WBC: CPT

## 2021-03-11 PROCEDURE — C9113 INJ PANTOPRAZOLE SODIUM, VIA: HCPCS | Performed by: INTERNAL MEDICINE

## 2021-03-11 PROCEDURE — 2700000000 HC OXYGEN THERAPY PER DAY

## 2021-03-11 PROCEDURE — 85018 HEMOGLOBIN: CPT

## 2021-03-11 PROCEDURE — 2500000003 HC RX 250 WO HCPCS: Performed by: INTERNAL MEDICINE

## 2021-03-11 PROCEDURE — 94640 AIRWAY INHALATION TREATMENT: CPT

## 2021-03-11 PROCEDURE — 83735 ASSAY OF MAGNESIUM: CPT

## 2021-03-11 RX ADMIN — MIDODRINE HYDROCHLORIDE 5 MG: 5 TABLET ORAL at 08:00

## 2021-03-11 RX ADMIN — SODIUM CHLORIDE, PRESERVATIVE FREE 10 ML: 5 INJECTION INTRAVENOUS at 22:01

## 2021-03-11 RX ADMIN — IPRATROPIUM BROMIDE AND ALBUTEROL SULFATE 1 AMPULE: 2.5; .5 SOLUTION RESPIRATORY (INHALATION) at 15:49

## 2021-03-11 RX ADMIN — PANTOPRAZOLE SODIUM 40 MG: 40 INJECTION, POWDER, FOR SOLUTION INTRAVENOUS at 09:22

## 2021-03-11 RX ADMIN — Medication 10 ML: at 09:20

## 2021-03-11 RX ADMIN — MIDODRINE HYDROCHLORIDE 5 MG: 5 TABLET ORAL at 12:11

## 2021-03-11 RX ADMIN — HYDROCORTISONE SODIUM SUCCINATE 50 MG: 100 INJECTION, POWDER, FOR SOLUTION INTRAMUSCULAR; INTRAVENOUS at 05:24

## 2021-03-11 RX ADMIN — POTASSIUM BICARBONATE 40 MEQ: 782 TABLET, EFFERVESCENT ORAL at 13:31

## 2021-03-11 RX ADMIN — BUMETANIDE 0.5 MG: 0.25 INJECTION, SOLUTION INTRAMUSCULAR; INTRAVENOUS at 09:22

## 2021-03-11 RX ADMIN — IPRATROPIUM BROMIDE AND ALBUTEROL SULFATE 1 AMPULE: 2.5; .5 SOLUTION RESPIRATORY (INHALATION) at 21:03

## 2021-03-11 RX ADMIN — IPRATROPIUM BROMIDE AND ALBUTEROL SULFATE 1 AMPULE: 2.5; .5 SOLUTION RESPIRATORY (INHALATION) at 08:28

## 2021-03-11 RX ADMIN — HYDROCORTISONE SODIUM SUCCINATE 50 MG: 100 INJECTION, POWDER, FOR SOLUTION INTRAMUSCULAR; INTRAVENOUS at 16:33

## 2021-03-11 RX ADMIN — Medication 100 MG: at 09:22

## 2021-03-11 RX ADMIN — MIDODRINE HYDROCHLORIDE 5 MG: 5 TABLET ORAL at 16:33

## 2021-03-11 RX ADMIN — CEFTRIAXONE SODIUM 2000 MG: 2 INJECTION, POWDER, FOR SOLUTION INTRAMUSCULAR; INTRAVENOUS at 13:18

## 2021-03-11 RX ADMIN — SODIUM CHLORIDE, PRESERVATIVE FREE 10 ML: 5 INJECTION INTRAVENOUS at 09:22

## 2021-03-11 RX ADMIN — PANTOPRAZOLE SODIUM 40 MG: 40 INJECTION, POWDER, FOR SOLUTION INTRAVENOUS at 22:02

## 2021-03-11 RX ADMIN — SODIUM CHLORIDE, PRESERVATIVE FREE 10 ML: 5 INJECTION INTRAVENOUS at 05:25

## 2021-03-11 RX ADMIN — IPRATROPIUM BROMIDE AND ALBUTEROL SULFATE 1 AMPULE: 2.5; .5 SOLUTION RESPIRATORY (INHALATION) at 12:44

## 2021-03-11 RX ADMIN — FOLIC ACID 1 MG: 1 TABLET ORAL at 09:21

## 2021-03-11 RX ADMIN — INSULIN LISPRO 2 UNITS: 100 INJECTION, SOLUTION INTRAVENOUS; SUBCUTANEOUS at 12:14

## 2021-03-11 RX ADMIN — SODIUM CHLORIDE, PRESERVATIVE FREE 10 ML: 5 INJECTION INTRAVENOUS at 16:33

## 2021-03-11 RX ADMIN — LEVOTHYROXINE SODIUM 150 MCG: 0.15 TABLET ORAL at 05:24

## 2021-03-11 RX ADMIN — INSULIN LISPRO 2 UNITS: 100 INJECTION, SOLUTION INTRAVENOUS; SUBCUTANEOUS at 08:15

## 2021-03-11 ASSESSMENT — PAIN SCALES - GENERAL: PAINLEVEL_OUTOF10: 0

## 2021-03-11 NOTE — PROGRESS NOTES
Nephrology Resident  Inpatient Progress Note    Admit Date: 3/4/2021                                  PCP: Roberth Reich MD    Subjective: The patient is J 42 y.o. 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 Found to have BEEAN, hypotension, hypernatremic and hyperkalemia in the ED.     3/10: Patient seen and examined, mentation a little improved today, patient able to vocalize to the best of her ability that she wants food. Diuresis increased continues with 0.5 mg IV Bumex every 12 hourly. Patient with urine output of 1.278 L in the last 24 hours, still remains 10 L positive today. CR 0.7, . H&H 6.6 today, planning for 1 unit PRBC. FOBT positive, Protonix increased to twice daily, consult placed to GS.    3/11:Loud outbursts most of today    Vitals:  VITALS:  /68   Pulse 84   Temp 97.3 °F (36.3 °C) (Temporal)   Resp 22   Ht 5' 1\" (1.549 m)   Wt 155 lb (70.3 kg)   SpO2 95%   BMI 29.29 kg/m²   24HR INTAKE/OUTPUT:      Intake/Output Summary (Last 24 hours) at 3/11/2021 1255  Last data filed at 3/11/2021 0800  Gross per 24 hour   Intake 1215.19 ml   Output 3185 ml   Net -1969.81 ml     CURRENT PULSE OXIMETRY:  SpO2: 95 %  24HR PULSE OXIMETRY RANGE:  SpO2  Av.3 %  Min: 85 %  Max: 95 %        I/O:      I/O last 3 completed shifts:   In: 1364.4 [I.V.:366.4; Blood:330; NG/GT:668]  Out: 3720 [Urine:3870]  I/O this shift:  In: 30 [NG/GT:30]  Out: 100 [Urine:100]    Medications:    IV:   sodium chloride      dextrose        hydrocortisone sodium succinate PF  50 mg Intravenous Q12H    cefTRIAXone (ROCEPHIN) IV  2,000 mg Intravenous Q24H    pantoprazole  40 mg Intravenous BID    And    sodium chloride (PF)  10 mL Intravenous BID    ipratropium-albuterol  1 ampule Inhalation Q4H WA    lidocaine 1 % injection  5 mL Intradermal Once    sodium chloride flush  10 mL Intravenous 2 times per day    folic acid  1 mg Oral Daily    thiamine mononitrate  100 mg Oral Daily    midodrine  5 mg Oral TID     bumetanide  0.5 mg Intravenous BID    insulin lispro  0-12 Units Subcutaneous TID     levothyroxine  150 mcg Oral Daily    sodium chloride flush  10 mL Intravenous 2 times per day    [Held by provider] heparin (porcine)  5,000 Units Subcutaneous 3 times per day        Current Meds:  Current Facility-Administered Medications   Medication Dose Route Frequency Provider Last Rate Last Admin    0.9 % sodium chloride infusion   Intravenous PRN Bobie Thomas., DO        hydrocortisone sodium succinate PF (SOLU-CORTEF) injection 50 mg  50 mg Intravenous Q12H Nisa Bhagat Jr., DO   50 mg at 03/11/21 0524    cefTRIAXone (ROCEPHIN) 2,000 mg in sterile water 20 mL IV syringe  2,000 mg Intravenous Q24H Elijah Santo Jr., DO   2,000 mg at 03/10/21 1259    pantoprazole (PROTONIX) injection 40 mg  40 mg Intravenous BID Elijah Santo Jr., DO   40 mg at 03/11/21 6283    And    sodium chloride (PF) 0.9 % injection 10 mL  10 mL Intravenous BID Nisa Bhagat Jr., DO   10 mL at 03/11/21 2792    ipratropium-albuterol (DUONEB) nebulizer solution 1 ampule  1 ampule Inhalation Q4H WA Elijah Santo Jr., DO   1 ampule at 03/11/21 1244    sodium chloride (Inhalant) 3 % nebulizer solution 4 mL  4 mL Nebulization PRN Bobie Thomas., DO        lidocaine PF 1 % injection 5 mL  5 mL Intradermal Once Bobie Thomas., DO        sodium chloride flush 0.9 % injection 10 mL  10 mL Intravenous 2 times per day Bobie Thomas., DO   10 mL at 03/11/21 5365    sodium chloride flush 0.9 % injection 10 mL  10 mL Intravenous PRN Bobie Thomas., DO   10 mL at 10/10/75 7231    folic acid (FOLVITE) tablet 1 mg  1 mg Oral Daily Bobie Thomas., DO   1 mg at 03/11/21 6911    thiamine mononitrate tablet 100 mg  100 mg Oral Daily Nisa Bhagat Jr., DO   100 mg at 03/11/21 7128    midodrine (PROAMATINE) tablet 5 mg  5 mg Oral TID  Rollene Cowboy., DO   5 mg at 03/11/21 1211    bumetanide (BUMEX) injection 0.5 mg  0.5 mg Intravenous BID Irl Ira Ralph Alvaradoer., DO   0.5 mg at 03/11/21 2421    insulin lispro (HUMALOG) injection vial 0-12 Units  0-12 Units Subcutaneous TID  Laura Mejia Jr., DO   2 Units at 03/11/21 1214    mineral oil-hydrophil petrolat ointment   Topical BID PRN Rollene Cowboy., DO   Given at 03/10/21 0849    glucose (GLUTOSE) 40 % oral gel 15 g  15 g Oral PRN Rollene Cowboy., DO        dextrose 50 % IV solution  12.5 g Intravenous PRN Rollene Cowboy., DO        glucagon (rDNA) injection 1 mg  1 mg Intramuscular PRN Rollene Cowboy., DO        dextrose 5 % solution  100 mL/hr Intravenous PRN Rollene Cowboy., DO        levothyroxine (SYNTHROID) tablet 150 mcg  150 mcg Oral Daily Rollene Cowboy., DO   150 mcg at 03/11/21 9392    sodium chloride flush 0.9 % injection 10 mL  10 mL Intravenous 2 times per day Rollene Cowboy., DO   10 mL at 03/11/21 0920    sodium chloride flush 0.9 % injection 10 mL  10 mL Intravenous PRN Rollene Cowboy., DO   10 mL at 03/09/21 1632    [Held by provider] heparin (porcine) injection 5,000 Units  5,000 Units Subcutaneous 3 times per day Rollene Cowboy., DO   5,000 Units at 03/05/21 0600    promethazine (PHENERGAN) tablet 12.5 mg  12.5 mg Oral Q6H PRN Laura Abo ., DO        Or    ondansetron [facility]) injection 4 mg  4 mg Intravenous Q6H PRN Rollene Cowboy., DO        polyethylene glycol [facility]) packet 17 g  17 g Oral Daily PRN Rollene Cowboy., DO        acetaminophen (TYLENOL) tablet 650 mg  650 mg Oral Q6H PRN Rollene Cowboy., DO   650 mg at 03/09/21 0115    Or    acetaminophen (TYLENOL) suppository 650 mg  650 mg Rectal Q6H PRN Christine Bryson., DO           Diet:  DIET TUBE FEED CONTINUOUS/CYCLIC NPO; Diabetic 1.5; Nasogastric; 20; 54; 24; Exceptions are: Sips with Meds     EXAM:  General: No distress. Awake, but non-verbal at baseline. Loud outbursts  Eyes: No sclera icterus. No conjunctival injection. ENT: No discharge. Pharynx clear. Neck: Trachea midline. Normal thyroid. Lungs: No accessory muscle use. No crackles. No wheezing. No rhonchi. CV: Regular rate. Regular rhythm. No murmur or rub. .   Abd: Non-tender. Non-distended. No masses. No organmegaly. Normal bowel sounds. PEG in-situ  Skin: Warm and dry. No nodule on exposed extremities. No rash on exposed extremities. Ext: No cyanosis, clubbing, pitting edema +2   Neuro: Awake. But non-verbal at baseline     Results:   CBC:   Recent Labs     03/09/21  0439 03/10/21  0435 03/10/21  2156 03/11/21  0456   WBC 11.5 11.6*  --  12.6*   HGB 7.3* 6.6* 8.9* 7.9*   PLT 84* 110*  --  175      BMP:    Recent Labs     03/09/21  0439 03/10/21  0435 03/11/21  0456    143 150*   K 4.1 4.0 3.0*   * 110* 110*   CO2 29 29 32*   BUN 27* 30* 23   CREATININE 0.9 0.7 0.6   GLUCOSE 210* 115* 130*       Hepatic:   Recent Labs     03/09/21  0439 03/10/21  0435 03/11/21  0456   AST 21 22 25   ALT 16 14 15   BILITOT 0.2 0.3 0.4   ALKPHOS 89 75 68      Troponin: No results for input(s): TROPONINI in the last 72 hours. BNP: No results for input(s): BNP in the last 72 hours. Lipids: No results for input(s): CHOL, HDL in the last 72 hours. Invalid input(s): LDLCALCU   ABGs:   Lab Results   Component Value Date    PO2ART 75.2 02/08/2021    PLC6MWO 39.3 02/08/2021      INR:   Recent Labs     03/08/21  1416   INR 1.1         Assessment:   1. Severe dehydration with oliguric BEENA/hyponatremia/hyperkalemia-improved   2. Shock-resolved, patient now off pressors   3. History of severe cognitive dysfunction, blindness   4. Dysphagia s/p PEG 3/7 on TF(still on hold)   5. Acute hypoxic respiratory failure likely 2/2 Klebsiella pneumonia. Started on Unasyn 3/9   6. Volume overload, brisk diuresis   7.   Acute anemia, concerns for GI bleed with FOBT positive. Received 1 unit PRBC; Gen Surg consulted    Plan:  1. Hold bumex for now; brisk UO last 24 bhrs   2. Supplement K   3. Started on Unasyn for possible aspiration. Follow CXR and respiratory status. 4. Continue to monitor labs.       James Huynh MD

## 2021-03-11 NOTE — CARE COORDINATION
Spoke to Legal Glory Mack (-[phone] X 2) regarding discharge planning. She would like pt to return to [address] [place] [address]. They can accept her back with the Peg tube but NOT IV's. I will follow up with them once treatment plan is confirmed. She is agreeable to [facility] if needed. Will review choices with her if needed. She asked that pt get a new PCP. Called [facility] and set up new pt appt: [facility] 2 office. Mon 3/22 @ 1pm. All info added to AVS, will update guardian. IV Rocephin and IV Solu Cortef continues.  CM will continue to follow    Liam HERNÁNDEZN, RN  [facility] Case Management  [phone]

## 2021-03-11 NOTE — PROGRESS NOTES
HCO3 23.7 03/04/2021    BE 1.4 03/09/2021    THB 10.9 03/04/2021    O2SAT 89.4 03/09/2021        Medications     Infusions: (Fluid, Sedation, Vasopressors)   IVF: NONE    Nutrition:   N.p.o.    ATB:   Antibiotics  Days   Unasyn restarted 1               Patient currently has   Urinary cath  DVT prophylaxis/ GI prophylaxis,    Palliative care consult--signed off  Labs     CBC:   Lab Results   Component Value Date    WBC 11.6 03/10/2021    RBC 2.22 03/10/2021    HGB 8.9 03/10/2021    HCT 29.0 03/10/2021    MCV 97.3 03/10/2021    MCH 29.7 03/10/2021    MCHC 30.6 03/10/2021    RDW 26.5 03/10/2021     03/10/2021    MPV 11.1 03/10/2021     CMP:    Lab Results   Component Value Date     03/10/2021    K 4.0 03/10/2021    K 4.8 02/09/2021     03/10/2021    CO2 29 03/10/2021    BUN 30 03/10/2021    CREATININE 0.7 03/10/2021    GFRAA >60 03/10/2021    LABGLOM >60 03/10/2021    GLUCOSE 115 03/10/2021    GLUCOSE 97 12/30/2011    PROT 5.7 03/10/2021    LABALBU 1.8 03/10/2021    LABALBU 3.7 12/30/2011    CALCIUM 7.9 03/10/2021    BILITOT 0.3 03/10/2021    ALKPHOS 75 03/10/2021    AST 22 03/10/2021    ALT 14 03/10/2021       Imaging Studies:  CXR: 03/09/21:  Impression   New patchy opacity in left mid lung may be atelectasis or small focus of   edema.  Differential includes a new small region of pneumonitis. Resident's Assessment and Plan     1. Intellectual disability with legal blindness--stable  Patient is nonverbal and bedbound at group home, currently at baseline    2. Shock likely hypovolemic 2/2 poor oral intake--resolved  Patient currently off pressors, blood pressure has been stable  · Continue to monitor vitals    3. Acute hypoxic respiratory insufficiency 2/2 shock versus atelectasis vs concern for aspiration pneumonia --resolved  Oxygen requirement has gone down, from 6 L on presentation to room air. Chest x-ray showed some concern for aspiration pneumonia.   Receiving Unasyn  · On room air, monitor vitals  · ABG showing prolonged AA gradient  · Chest vest, 3% saline nebulizer, duo neb  · Respiratory culture showed gram-negative rods  · Rocephin 2 g daily started, stopped Unasyn    3. Dysphagia s/p PEG tube placement  Patient failed bedside swallow, unsuccessful CorPak attempts, PEG tube placed, can resume tube feeds    4. BEENA, likely prerenal 2/2 hypovolemia due to poor oral intake--resolved  BUN today: 30, creatinine: 0.7, Dr. Samano Rough following  · Continue daily BMP    5. Thrombocytopenia  Per Heme-onc consultation, thrombocytopenia likely secondary to septic shock, should resolve with resolution of acute infection. Platelets today: 684  · Daily CBC  · HIT panel has been negative, unlikely HIT    Resolved issues: Lactic acidosis, hypernatremia  Plan: Transfer out of ICU      Simon Keith MD, PGY-1  I personally saw, examined and provided care for the patient. Radiographs, labs and medication list were reviewed by me independently. I spoke with bedside nursing, therapists and consultants. Critical care services and times documented are independent of procedures and multidisciplinary rounds with Residents. Additionally comprehensive, multidisciplinary rounds were conducted with the MICU team. The case was discussed in detail and plans for care were established. Review of Residents documentation was conducted and revisions were made as appropriate. I agree with the above documented exam, problem list and plan of care.       Attending physician: Dr. Paola Foy  Pulmonary&Critical Care Medicine   Director of [address] [place] [address] Director of [address] [place] [address]    Maria Ines Mabry

## 2021-03-12 LAB
ALBUMIN SERPL-MCNC: 3.2 G/DL (ref 3.5–5.2)
ALP BLD-CCNC: 95 U/L (ref 35–104)
ALT SERPL-CCNC: 20 U/L (ref 0–32)
ANION GAP SERPL CALCULATED.3IONS-SCNC: 10 MMOL/L (ref 7–16)
ANISOCYTOSIS: ABNORMAL
AST SERPL-CCNC: 30 U/L (ref 0–31)
BASOPHILS ABSOLUTE: 0 E9/L (ref 0–0.2)
BASOPHILS RELATIVE PERCENT: 0.3 % (ref 0–2)
BILIRUB SERPL-MCNC: 0.4 MG/DL (ref 0–1.2)
BUN BLDV-MCNC: 29 MG/DL (ref 8–23)
CALCIUM SERPL-MCNC: 9.3 MG/DL (ref 8.6–10.2)
CHLORIDE BLD-SCNC: 105 MMOL/L (ref 98–107)
CO2: 33 MMOL/L (ref 22–29)
CREAT SERPL-MCNC: 0.6 MG/DL (ref 0.5–1)
EOSINOPHILS ABSOLUTE: 0 E9/L (ref 0.05–0.5)
EOSINOPHILS RELATIVE PERCENT: 0.5 % (ref 0–6)
GFR AFRICAN AMERICAN: >60
GFR NON-AFRICAN AMERICAN: >60 ML/MIN/1.73
GLUCOSE BLD-MCNC: 130 MG/DL (ref 74–99)
HCT VFR BLD CALC: 31.3 % (ref 34–48)
HCT VFR BLD CALC: 31.8 % (ref 34–48)
HCT VFR BLD CALC: 33.2 % (ref 34–48)
HEMOGLOBIN: 10 G/DL (ref 11.5–15.5)
HEMOGLOBIN: 9.4 G/DL (ref 11.5–15.5)
HEMOGLOBIN: 9.8 G/DL (ref 11.5–15.5)
HYPOCHROMIA: ABNORMAL
LYMPHOCYTES ABSOLUTE: 2.77 E9/L (ref 1.5–4)
LYMPHOCYTES RELATIVE PERCENT: 18.3 % (ref 20–42)
MAGNESIUM: 2.3 MG/DL (ref 1.6–2.6)
MCH RBC QN AUTO: 30.2 PG (ref 26–35)
MCHC RBC AUTO-ENTMCNC: 30.8 % (ref 32–34.5)
MCV RBC AUTO: 97.8 FL (ref 80–99.9)
METAMYELOCYTES RELATIVE PERCENT: 0.9 % (ref 0–1)
METER GLUCOSE: 164 MG/DL (ref 74–99)
METER GLUCOSE: 199 MG/DL (ref 74–99)
METER GLUCOSE: 86 MG/DL (ref 74–99)
METER GLUCOSE: 95 MG/DL (ref 74–99)
MONOCYTES ABSOLUTE: 0.62 E9/L (ref 0.1–0.95)
MONOCYTES RELATIVE PERCENT: 4.3 % (ref 2–12)
NEUTROPHILS ABSOLUTE: 11.86 E9/L (ref 1.8–7.3)
NEUTROPHILS RELATIVE PERCENT: 76.5 % (ref 43–80)
NUCLEATED RED BLOOD CELLS: 7.8 /100 WBC
OVALOCYTES: ABNORMAL
PDW BLD-RTO: 24.4 FL (ref 11.5–15)
PHOSPHORUS: 2 MG/DL (ref 2.5–4.5)
PLATELET # BLD: 245 E9/L (ref 130–450)
PMV BLD AUTO: 11.4 FL (ref 7–12)
POIKILOCYTES: ABNORMAL
POLYCHROMASIA: ABNORMAL
POTASSIUM SERPL-SCNC: 4.1 MMOL/L (ref 3.5–5)
RBC # BLD: 3.25 E12/L (ref 3.5–5.5)
SODIUM BLD-SCNC: 148 MMOL/L (ref 132–146)
TARGET CELLS: ABNORMAL
TOTAL PROTEIN: 7.3 G/DL (ref 6.4–8.3)
WBC # BLD: 15.4 E9/L (ref 4.5–11.5)

## 2021-03-12 PROCEDURE — 84100 ASSAY OF PHOSPHORUS: CPT

## 2021-03-12 PROCEDURE — 85025 COMPLETE CBC W/AUTO DIFF WBC: CPT

## 2021-03-12 PROCEDURE — 6370000000 HC RX 637 (ALT 250 FOR IP): Performed by: INTERNAL MEDICINE

## 2021-03-12 PROCEDURE — 2580000003 HC RX 258: Performed by: INTERNAL MEDICINE

## 2021-03-12 PROCEDURE — 83735 ASSAY OF MAGNESIUM: CPT

## 2021-03-12 PROCEDURE — 85014 HEMATOCRIT: CPT

## 2021-03-12 PROCEDURE — 99233 SBSQ HOSP IP/OBS HIGH 50: CPT | Performed by: INTERNAL MEDICINE

## 2021-03-12 PROCEDURE — 2700000000 HC OXYGEN THERAPY PER DAY

## 2021-03-12 PROCEDURE — 82962 GLUCOSE BLOOD TEST: CPT

## 2021-03-12 PROCEDURE — 36415 COLL VENOUS BLD VENIPUNCTURE: CPT

## 2021-03-12 PROCEDURE — 85018 HEMOGLOBIN: CPT

## 2021-03-12 PROCEDURE — C9113 INJ PANTOPRAZOLE SODIUM, VIA: HCPCS | Performed by: INTERNAL MEDICINE

## 2021-03-12 PROCEDURE — 80053 COMPREHEN METABOLIC PANEL: CPT

## 2021-03-12 PROCEDURE — 94640 AIRWAY INHALATION TREATMENT: CPT

## 2021-03-12 PROCEDURE — 6360000002 HC RX W HCPCS: Performed by: INTERNAL MEDICINE

## 2021-03-12 PROCEDURE — 2060000000 HC ICU INTERMEDIATE R&B

## 2021-03-12 RX ADMIN — FOLIC ACID 1 MG: 1 TABLET ORAL at 09:08

## 2021-03-12 RX ADMIN — CEFTRIAXONE SODIUM 2000 MG: 2 INJECTION, POWDER, FOR SOLUTION INTRAMUSCULAR; INTRAVENOUS at 12:15

## 2021-03-12 RX ADMIN — MIDODRINE HYDROCHLORIDE 5 MG: 5 TABLET ORAL at 08:00

## 2021-03-12 RX ADMIN — SODIUM CHLORIDE, PRESERVATIVE FREE 10 ML: 5 INJECTION INTRAVENOUS at 21:32

## 2021-03-12 RX ADMIN — SODIUM CHLORIDE, PRESERVATIVE FREE 10 ML: 5 INJECTION INTRAVENOUS at 09:08

## 2021-03-12 RX ADMIN — IPRATROPIUM BROMIDE AND ALBUTEROL SULFATE 1 AMPULE: 2.5; .5 SOLUTION RESPIRATORY (INHALATION) at 09:46

## 2021-03-12 RX ADMIN — SODIUM CHLORIDE, PRESERVATIVE FREE 10 ML: 5 INJECTION INTRAVENOUS at 05:48

## 2021-03-12 RX ADMIN — HYDROCORTISONE SODIUM SUCCINATE 50 MG: 100 INJECTION, POWDER, FOR SOLUTION INTRAMUSCULAR; INTRAVENOUS at 05:47

## 2021-03-12 RX ADMIN — IPRATROPIUM BROMIDE AND ALBUTEROL SULFATE 1 AMPULE: 2.5; .5 SOLUTION RESPIRATORY (INHALATION) at 12:52

## 2021-03-12 RX ADMIN — SODIUM CHLORIDE, PRESERVATIVE FREE 10 ML: 5 INJECTION INTRAVENOUS at 09:09

## 2021-03-12 RX ADMIN — MIDODRINE HYDROCHLORIDE 5 MG: 5 TABLET ORAL at 17:00

## 2021-03-12 RX ADMIN — Medication 100 MG: at 09:08

## 2021-03-12 RX ADMIN — PANTOPRAZOLE SODIUM 40 MG: 40 INJECTION, POWDER, FOR SOLUTION INTRAVENOUS at 21:32

## 2021-03-12 RX ADMIN — PANTOPRAZOLE SODIUM 40 MG: 40 INJECTION, POWDER, FOR SOLUTION INTRAVENOUS at 09:08

## 2021-03-12 RX ADMIN — LEVOTHYROXINE SODIUM 150 MCG: 0.15 TABLET ORAL at 05:47

## 2021-03-12 RX ADMIN — MIDODRINE HYDROCHLORIDE 5 MG: 5 TABLET ORAL at 12:15

## 2021-03-12 RX ADMIN — INSULIN LISPRO 2 UNITS: 100 INJECTION, SOLUTION INTRAVENOUS; SUBCUTANEOUS at 12:15

## 2021-03-12 RX ADMIN — INSULIN LISPRO 2 UNITS: 100 INJECTION, SOLUTION INTRAVENOUS; SUBCUTANEOUS at 08:20

## 2021-03-12 RX ADMIN — PETROLATUM: 420 OINTMENT TOPICAL at 22:42

## 2021-03-12 ASSESSMENT — PAIN SCALES - GENERAL
PAINLEVEL_OUTOF10: 0

## 2021-03-12 NOTE — CARE COORDINATION
Pt was changed from Iv Unasyn to Iv rocephin yesterday. Will need to determine length of abx. Per Nursing pt is at goal for TF and tolerating. If pt is NOT discharging with IV abx plan is to return to [place]. If pt will require long term IV abx pt will require [facility]IO[facility]TA.

## 2021-03-12 NOTE — PROGRESS NOTES
Subjective:    No problems overnight. Denies chest pain, angina, and dyspnea. Denies abdominal pain. No nausea or vomiting. Objective:  Pt is resting in nad  /68   Pulse 92   Temp 97.9 °F (36.6 °C) (Temporal)   Resp 22   Ht 5' 1\" (1.549 m)   Wt 155 lb (70.3 kg)   SpO2 94%   BMI 29.29 kg/m²   HEENT no adenopathy no bruits  Heart:  RRR, no murmurs, gallops, or rubs.   Lungs:  CTA bilaterally, no wheeze, rales or rhonchi  Abd: bowel sounds present, nontender, nondistended, no masses  Extrem:  No clubbing, cyanosis, or edema  WBC/Hgb/Hct/Plts:  15.4/9.8/31.8/245 (03/12 2113) basic metabolic panel     Assessment:    Patient Active Problem List   Diagnosis    Mental retardation    Hypothyroidism    Blindness of both eyes    ESRD (end stage renal disease) on dialysis ([address]    Macrocytosis    Behavior disturbance    Altered mental state    Sepsis ([address]       Plan:  Monitor PEG tube output  Cont as ordered         Diane Anaya  7:46 AM  3/12/2021

## 2021-03-12 NOTE — PROGRESS NOTES
Subjective:  Patient is non-verbal. No distress today. Resting comfortably. Denies any pain. Objective:    /68   Pulse 92   Temp 97.9 °F (36.6 °C) (Temporal)   Resp 22   Ht 5' 1\" (1.549 m)   Wt 155 lb (70.3 kg)   SpO2 94%   BMI 29.29 kg/m²     General: NAD, non-verbal  HEENT: No thrush or mucositis, EOMI, PERRLA  Heart:  RRR, no murmurs, gallops, or rubs.   Lungs:  CTA bilaterally, no wheeze, rales or rhonchi  Abd: bowel sounds present, nontender, nondistended, no masses, PEG tube C/D/I  Extrem:  No clubbing, cyanosis, or edema  Lymphatics: No palpable adenopathy in cervical and supraclavicular regions  Skin: Intact, no petechia or purpura    CBC with Differential:    Lab Results   Component Value Date    WBC 15.4 03/12/2021    RBC 3.25 03/12/2021    HGB 9.8 03/12/2021    HCT 31.8 03/12/2021     03/12/2021    MCV 97.8 03/12/2021    MCH 30.2 03/12/2021    MCHC 30.8 03/12/2021    RDW 24.4 03/12/2021    NRBC 7.8 03/12/2021    SEGSPCT 58 06/26/2011    METASPCT 0.9 03/12/2021    LYMPHOPCT 18.3 03/12/2021    PROMYELOPCT 0.9 03/07/2021    MONOPCT 4.3 03/12/2021    MYELOPCT 0.9 03/11/2021    BASOPCT 0.3 03/12/2021    MONOSABS 0.62 03/12/2021    LYMPHSABS 2.77 03/12/2021    EOSABS 0.00 03/12/2021    BASOSABS 0.00 03/12/2021     CMP:    Lab Results   Component Value Date     03/12/2021    K 4.1 03/12/2021    K 4.8 02/09/2021     03/12/2021    CO2 33 03/12/2021    BUN 29 03/12/2021    CREATININE 0.6 03/12/2021    GFRAA >60 03/12/2021    LABGLOM >60 03/12/2021    GLUCOSE 130 03/12/2021    GLUCOSE 97 12/30/2011    PROT 7.3 03/12/2021    LABALBU 3.2 03/12/2021    LABALBU 3.7 12/30/2011    CALCIUM 9.3 03/12/2021    BILITOT 0.4 03/12/2021    ALKPHOS 95 03/12/2021    AST 30 03/12/2021    ALT 20 03/12/2021          Current Facility-Administered Medications:     0.9 % sodium chloride infusion, , Intravenous, PRN, Laura Mejia Jr., DO    cefTRIAXone (ROCEPHIN) 2,000 mg in sterile water 20 mL IV syringe, 2,000 mg, Intravenous, Q24H, Bobglenis Thomas., DO, 2,000 mg at 03/11/21 1318    pantoprazole (PROTONIX) injection 40 mg, 40 mg, Intravenous, BID, 40 mg at 03/11/21 2202 **AND** sodium chloride (PF) 0.9 % injection 10 mL, 10 mL, Intravenous, BID, Nisa Rolon., DO, 10 mL at 03/11/21 2201    ipratropium-albuterol (DUONEB) nebulizer solution 1 ampule, 1 ampule, Inhalation, Q4H WA, Bobglenis Thomas., DO, 1 ampule at 03/11/21 2103    sodium chloride (Inhalant) 3 % nebulizer solution 4 mL, 4 mL, Nebulization, PRN, Rober Thomas., DO    lidocaine PF 1 % injection 5 mL, 5 mL, Intradermal, Once, Rober Thomas., DO    sodium chloride flush 0.9 % injection 10 mL, 10 mL, Intravenous, 2 times per day, Bobglenis Thomas., DO, 10 mL at 03/11/21 2201    sodium chloride flush 0.9 % injection 10 mL, 10 mL, Intravenous, PRN, Rober Thomas., DO, 10 mL at 13/29/26 6150    folic acid (FOLVITE) tablet 1 mg, 1 mg, Oral, Daily, Rober Thomas., DO, 1 mg at 03/11/21 5276    thiamine mononitrate tablet 100 mg, 100 mg, Oral, Daily, Rober Thomas., DO, 100 mg at 03/11/21 0072    midodrine (PROAMATINE) tablet 5 mg, 5 mg, Oral, TID Rober., DO, 5 mg at 03/11/21 1633    [Held by provider] bumetanide (BUMEX) injection 0.5 mg, 0.5 mg, Intravenous, BID, Rober Thomas., DO, 0.5 mg at 03/11/21 1716    insulin lispro (HUMALOG) injection vial 0-12 Units, 0-12 Units, Subcutaneous, TID Rober., DO, Stopped at 03/11/21 1629    mineral oil-hydrophil petrolat ointment, , Topical, BID PRN, Rober Guzman., DO, Given at 03/10/21 0849    glucose (GLUTOSE) 40 % oral gel 15 g, 15 g, Oral, PRN, Nisa Rolon., DO    dextrose 50 % IV solution, 12.5 g, Intravenous, PRN, Nisa Rolon., DO    glucagon (rDNA) injection 1 mg, 1 mg, Intramuscular, PRN, oRber Guzman., DO    dextrose 5 % solution, 100 mL/hr, Intravenous, PRN, Nisa Ballesteros Tejinder Hernandez., DO    levothyroxine (SYNTHROID) tablet 150 mcg, 150 mcg, Oral, Daily, Shima Chroman., DO, 150 mcg at 03/12/21 0547    sodium chloride flush 0.9 % injection 10 mL, 10 mL, Intravenous, 2 times per day, Shima Chroman., DO, 10 mL at 03/11/21 0920    sodium chloride flush 0.9 % injection 10 mL, 10 mL, Intravenous, PRN, Shima Chroman., DO, 10 mL at 03/09/21 1632    [Held by provider] heparin (porcine) injection 5,000 Units, 5,000 Units, Subcutaneous, 3 times per day, Shima Chroman., DO, 5,000 Units at 03/05/21 0600    promethazine (PHENERGAN) tablet 12.5 mg, 12.5 mg, Oral, Q6H PRN **OR** ondansetron (ZOFRAN) injection 4 mg, 4 mg, Intravenous, Q6H PRN, Shima Chroman., DO    polyethylene glycol [facility]) packet 17 g, 17 g, Oral, Daily PRN, Shima Chroman., DO    acetaminophen (TYLENOL) tablet 650 mg, 650 mg, Oral, Q6H PRN, 650 mg at 03/09/21 0115 **OR** acetaminophen (TYLENOL) suppository 650 mg, 650 mg, Rectal, Q6H PRN, Shima Chroman., DO    Assessment:    Active Problems:    Sepsis ([address]  Resolved Problems:    * No resolved hospital problems. *    41-year-old with history of MR and legally blind presented with unresponsiveness was found with worsening thrombocytopenia. Anemia and thrombocytopenia likely are multifactorial resulting from septic shock from UTI, recent antibiotic use, BEENA, poor nutrition. Plan: Thrombocytopenia improvied and now has normalized. Maintain platelets >95,368 or 30,000 with active signs of bleeding and hemoglobin >7g/dL  No evidence of hemolysis  Leukocytosis is reactive to inflammatory process  She is ok for discharge from our standpoint, if anything else is needed please let us know. Electronically signed by RADHA Hein NP on 3/12/2021 at 8:21 AM     Attending Addendum:     Patient seen and examined personally. Reviewed pertinent labs and imaging reports. Progress note has been updated to reflect my changes. Agree with the note above.       Garrett Tuttle MD  Medical Oncologist/Hematologist  [facility] for [address]

## 2021-03-12 NOTE — PROGRESS NOTES
Pulmonary [address] [facility]                             Critical Care Consult/Progress Note :      Follow up aspiration pneumonia    Subjective   Confused   History limited   No fever or chills   On 3 L  screeming most of the time     Objective     VS: /75   Pulse 96   Temp 96.9 °F (36.1 °C) (Temporal)   Resp 20   Ht 5' 1\" (1.549 m)   Wt 155 lb (70.3 kg)   SpO2 94%   BMI 29.29 kg/m²   ABP (Arterial line BP): 91/43  ABP mean (Arterial line mean): (!) 59 mmHg    I & O - 24hr:     Intake/Output Summary (Last 24 hours) at 3/12/2021 1552  Last data filed at 3/12/2021 1441  Gross per 24 hour   Intake 2329 ml   Output 1220 ml   Net 1109 ml       Physical Exam:  · General Appearance: appears stated age and nonverbal  · Neck: no adenopathy, no carotid bruit, no JVD, supple, symmetrical, trachea midline and thyroid not enlarged, symmetric, no tenderness/mass/nodules  · Lung: Coarse breath sounds heard  · Heart: regular rate and rhythm, S1, S2 normal, no murmur, click, rub or gallop  · Abdomen: soft, non-tender; bowel sounds normal; no masses,  no organomegaly  · Extremities:  extremities normal, atraumatic, no cyanosis or edema  · Musculoskeletal: No joint swelling, no muscle tenderness. ROM normal in all joints of extremities.    · Neurologic: Mental status: Non verbal and unresponsive at this time    Labs     CBC:   Lab Results   Component Value Date    WBC 15.4 03/12/2021    RBC 3.25 03/12/2021    HGB 9.4 03/12/2021    HCT 31.3 03/12/2021    MCV 97.8 03/12/2021    MCH 30.2 03/12/2021    MCHC 30.8 03/12/2021    RDW 24.4 03/12/2021     03/12/2021    MPV 11.4 03/12/2021     CMP:    Lab Results   Component Value Date     03/12/2021    K 4.1 03/12/2021    K 4.8 02/09/2021     03/12/2021    CO2 33 03/12/2021    BUN 29 03/12/2021    CREATININE 0.6 03/12/2021    GFRAA >60 03/12/2021    LABGLOM >60 03/12/2021    GLUCOSE 130 03/12/2021    GLUCOSE 97 12/30/2011    PROT 7.3 03/12/2021    LABALBU 3.2 03/12/2021    LABALBU 3.7 12/30/2011    CALCIUM 9.3 03/12/2021    BILITOT 0.4 03/12/2021    ALKPHOS 95 03/12/2021    AST 30 03/12/2021    ALT 20 03/12/2021       Imaging Studies:  CXR: 03/09/21:  Impression   New patchy opacity in left mid lung may be atelectasis or small focus of   edema.  Differential includes a new small region of pneumonitis. Assessment and Plan       1 - Shock likely hypovolemic 2/2 poor oral intake--resolved         Continue to monitor vitals    2-  Acute hypoxic respiratory insufficiency 2/2 shock versus atelectasis vs concern for aspiration pneumonia --resolved  Oxygen requirement has gone down, from 6 L on presentation to room air. Chest x-ray showed some concern for aspiration pneumonia. Receiving Unasyn  · On room air 3 L , monitor vitals  · Chest vest, 3% saline nebulizer, duo neb  · Respiratory culture showed gram-negative rods      4. Dysphagia s/p PEG tube placement  Patient failed bedside swallow, unsuccessful CorPak attempts, PEG tube placed, can resume tube feeds    4. BEENA, likely prerenal 2/2 hypovolemia due to poor oral intake--resolved  BUN today: 30, creatinine: 0.7, Dr. Renay Morfin following  · Continue daily BMP    · Daily CBC  · HIT panel has been negative, unlikely HIT  .       Ebenezer Treviño MD,[facility]  Pulmonary&Critical Care Medicine   Director of Lung Nodule Center  Medical Director of [address] [place] [address]    Edelmira Cristobal

## 2021-03-12 NOTE — PROGRESS NOTES
Nephrology Resident  Inpatient Progress Note    Admit Date: 3/4/2021                                  PCP: Luis Whatley MD    Subjective: The patient is W 8 y.o. 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 Found to have BEENA, hypotension, hypernatremic and hyperkalemia in the ED.     3/10: Patient seen and examined, mentation a little improved today, patient able to vocalize to the best of her ability that she wants food. Diuresis increased continues with 0.5 mg IV Bumex every 12 hourly. Patient with urine output of 1.278 L in the last 24 hours, still remains 10 L positive today. CR 0.7, . H&H 6.6 today, planning for 1 unit PRBC. FOBT positive, Protonix increased to twice daily, consult placed to GS.    3/11:Loud outbursts most of today  3/12: No new c/o    Vitals:  VITALS:  /75   Pulse 96   Temp 96.9 °F (36.1 °C) (Temporal)   Resp 20   Ht 5' 1\" (1.549 m)   Wt 155 lb (70.3 kg)   SpO2 94%   BMI 29.29 kg/m²   24HR INTAKE/OUTPUT:      Intake/Output Summary (Last 24 hours) at 3/12/2021 1712  Last data filed at 3/12/2021 1441  Gross per 24 hour   Intake 2329 ml   Output 1220 ml   Net 1109 ml     CURRENT PULSE OXIMETRY:  SpO2: 94 %  24HR PULSE OXIMETRY RANGE:  SpO2  Av %  Min: 94 %  Max: 97 %        I/O:      I/O last 3 completed shifts: In: 2329 [NG/GT:2329]  Out: 1220 [Urine:1220]  No intake/output data recorded.     Medications:    IV:   sodium chloride      dextrose        cefTRIAXone (ROCEPHIN) IV  2,000 mg Intravenous Q24H    pantoprazole  40 mg Intravenous BID    And    sodium chloride (PF)  10 mL Intravenous BID    ipratropium-albuterol  1 ampule Inhalation Q4H WA    lidocaine 1 % injection  5 mL Intradermal Once    sodium chloride flush  10 mL Intravenous 2 times per day    folic acid  1 mg Oral Daily    thiamine mononitrate  100 mg Oral Daily    midodrine  5 mg Oral TID     [Held by provider] bumetanide  0.5 mg Intravenous BID    insulin lispro  0-12 Units Subcutaneous TID     levothyroxine  150 mcg Oral Daily    sodium chloride flush  10 mL Intravenous 2 times per day    [Held by provider] heparin (porcine)  5,000 Units Subcutaneous 3 times per day        Current Meds:  Current Facility-Administered Medications   Medication Dose Route Frequency Provider Last Rate Last Admin    0.9 % sodium chloride infusion   Intravenous PRN Amber Stains., DO        cefTRIAXone (ROCEPHIN) 2,000 mg in sterile water 20 mL IV syringe  2,000 mg Intravenous Q24H [place] Jr., DO   2,000 mg at 03/12/21 1215    pantoprazole (PROTONIX) injection 40 mg  40 mg Intravenous BID [place] Jr., DO   40 mg at 03/12/21 4216    And    sodium chloride (PF) 0.9 % injection 10 mL  10 mL Intravenous BID Suman Pattong., DO   10 mL at 03/12/21 6057    ipratropium-albuterol (DUONEB) nebulizer solution 1 ampule  1 ampule Inhalation Q4H WA [place] Jr., DO   1 ampule at 03/12/21 1252    sodium chloride (Inhalant) 3 % nebulizer solution 4 mL  4 mL Nebulization PRN Amber Stains., DO        lidocaine PF 1 % injection 5 mL  5 mL Intradermal Once [place] Stains., DO        sodium chloride flush 0.9 % injection 10 mL  10 mL Intravenous 2 times per day [place] Stains., DO   10 mL at 03/12/21 0908    sodium chloride flush 0.9 % injection 10 mL  10 mL Intravenous PRN [place] Jr., DO   10 mL at 62/50/49 3879    folic acid (FOLVITE) tablet 1 mg  1 mg Oral Daily Suman Bhagat Jr., DO   1 mg at 03/12/21 8545    thiamine mononitrate tablet 100 mg  100 mg Oral Daily Suman Bhagat Jr., DO   100 mg at 03/12/21 0908    midodrine (PROAMATINE) tablet 5 mg  5 mg Oral TID  [place] Jr., DO   5 mg at 03/12/21 1700    [Held by provider] bumetanide (BUMEX) injection 0.5 mg  0.5 mg Intravenous BID [place] Stains., DO   0.5 mg at 03/11/21 0922    insulin lispro (HUMALOG) injection vial 0-12 Units  0-12 Units Subcutaneous TID  Ephriam Tom Chirinos., DO   Stopped at 03/12/21 1659    mineral oil-hydrophil petrolat ointment   Topical BID PRN Darryl Shikha., DO   Given at 03/10/21 0849    glucose (GLUTOSE) 40 % oral gel 15 g  15 g Oral PRN Darryl Shikha., DO        dextrose 50 % IV solution  12.5 g Intravenous PRN Darryl Shikha., DO        glucagon (rDNA) injection 1 mg  1 mg Intramuscular PRN Darryl Shikha., DO        dextrose 5 % solution  100 mL/hr Intravenous PRN Darryl Shikha., DO        levothyroxine (SYNTHROID) tablet 150 mcg  150 mcg Oral Daily Darryl Shikha., DO   150 mcg at 03/12/21 0547    sodium chloride flush 0.9 % injection 10 mL  10 mL Intravenous 2 times per day Darryl Shikha., DO   10 mL at 03/11/21 0920    sodium chloride flush 0.9 % injection 10 mL  10 mL Intravenous PRN [place] Shikha., DO   10 mL at 03/09/21 1632    [Held by provider] heparin (porcine) injection 5,000 Units  5,000 Units Subcutaneous 3 times per day [place] Shikha., DO   5,000 Units at 03/05/21 0600    promethazine (PHENERGAN) tablet 12.5 mg  12.5 mg Oral Q6H PRN Ephriam Tom Chirinos., DO        Or    ondansetron [facility]) injection 4 mg  4 mg Intravenous Q6H PRN [place] Shikha., DO        polyethylene glycol [facility]) packet 17 g  17 g Oral Daily PRN Darryl Shikha., DO        acetaminophen (TYLENOL) tablet 650 mg  650 mg Oral Q6H PRN Darryl Shikha., DO   650 mg at 03/09/21 0115    Or    acetaminophen (TYLENOL) suppository 650 mg  650 mg Rectal Q6H PRN Darryl Shikha., DO           Diet:  DIET TUBE FEED CONTINUOUS/CYCLIC NPO; Diabetic 1.5; Nasogastric; 20; 55; 24; Exceptions are: Sips with Meds     EXAM:  General: No distress. Awake, but non-verbal at baseline. Loud outbursts  Eyes: No sclera icterus. No conjunctival injection. ENT: No discharge. Pharynx clear.   Neck: Trachea midline. Normal thyroid. Lungs: No accessory muscle use. No crackles. No wheezing. No rhonchi. CV: Regular rate. Regular rhythm. No murmur or rub. .   Abd: Non-tender. Non-distended. No masses. No organmegaly. Normal bowel sounds. PEG in-situ  Skin: Warm and dry. No nodule on exposed extremities. No rash on exposed extremities. Ext: No cyanosis, clubbing, pitting edema +2   Neuro: Awake. But non-verbal at baseline     Results:   CBC:   Recent Labs     03/10/21  0435 03/10/21  0435 03/11/21  0456 03/11/21  0456 03/11/21  2254 03/12/21  0508 03/12/21  1316   WBC 11.6*  --  12.6*  --   --  15.4*  --    HGB 6.6*   < > 7.9*   < > 9.5* 9.8* 9.4*   *  --  175  --   --  245  --     < > = values in this interval not displayed. BMP:    Recent Labs     03/10/21  0435 03/11/21  0456 03/12/21  0508    150* 148*   K 4.0 3.0* 4.1   * 110* 105   CO2 29 32* 33*   BUN 30* 23 29*   CREATININE 0.7 0.6 0.6   GLUCOSE 115* 130* 130*       Hepatic:   Recent Labs     03/10/21  0435 03/11/21  0456 03/12/21  0508   AST 22 25 30   ALT 14 15 20   BILITOT 0.3 0.4 0.4   ALKPHOS 75 68 95      Troponin: No results for input(s): TROPONINI in the last 72 hours. BNP: No results for input(s): BNP in the last 72 hours. Lipids: No results for input(s): CHOL, HDL in the last 72 hours. Invalid input(s): LDLCALCU   ABGs:   Lab Results   Component Value Date    PO2ART 75.2 02/08/2021    IAQ0OWO 39.3 02/08/2021      INR:   No results for input(s): INR in the last 72 hours. Assessment:   1. Severe dehydration with oliguric BEENA/hyponatremia/hyperkalemia-improved   2. Shock-resolved, patient now off pressors   3. History of severe cognitive dysfunction, blindness   4. Dysphagia s/p PEG 3/7 on TF(still on hold)   5. Acute hypoxic respiratory failure likely 2/2 Klebsiella pneumonia. Started on Unasyn 3/9   6. Acute anemia, concerns for GI bleed with FOBT positive. Received 1 unit PRBC; Gen Surg consulted   7. Hypernatremia; improved    Plan:  1. Continue to hold bumex   2. Supplement K as needed   3. Started on Unasyn for possible aspiration. Follow CXR and respiratory status. 4. Continue to monitor labs.       Ventura Lassiter MD

## 2021-03-12 NOTE — PROGRESS NOTES
Pulmonary [address] [facility]                             Critical Care Consult/Progress Note :      Follow up aspiration pneumonia    Subjective   Confused   History limited   No fever or chills     Objective     VS: /66   Pulse 82   Temp 97.3 °F (36.3 °C) (Temporal)   Resp 20   Ht 5' 1\" (1.549 m)   Wt 155 lb (70.3 kg)   SpO2 95%   BMI 29.29 kg/m²   ABP (Arterial line BP): 91/43  ABP mean (Arterial line mean): (!) 59 mmHg    I & O - 24hr:     Intake/Output Summary (Last 24 hours) at 3/11/2021 2140  Last data filed at 3/11/2021 1800  Gross per 24 hour   Intake 1461 ml   Output 3200 ml   Net -1739 ml       Physical Exam:  · General Appearance: appears stated age and nonverbal  · Neck: no adenopathy, no carotid bruit, no JVD, supple, symmetrical, trachea midline and thyroid not enlarged, symmetric, no tenderness/mass/nodules  · Lung: Coarse breath sounds heard  · Heart: regular rate and rhythm, S1, S2 normal, no murmur, click, rub or gallop  · Abdomen: soft, non-tender; bowel sounds normal; no masses,  no organomegaly  · Extremities:  extremities normal, atraumatic, no cyanosis or edema  · Musculoskeletal: No joint swelling, no muscle tenderness. ROM normal in all joints of extremities.    · Neurologic: Mental status: Non verbal and unresponsive at this time    Labs     CBC:   Lab Results   Component Value Date    WBC 12.6 03/11/2021    RBC 2.65 03/11/2021    HGB 10.1 03/11/2021    HCT 33.1 03/11/2021    MCV 93.6 03/11/2021    MCH 29.8 03/11/2021    MCHC 31.9 03/11/2021    RDW 23.8 03/11/2021     03/11/2021    MPV 11.2 03/11/2021     CMP:    Lab Results   Component Value Date     03/11/2021    K 3.0 03/11/2021    K 4.8 02/09/2021     03/11/2021    CO2 32 03/11/2021    BUN 23 03/11/2021    CREATININE 0.6 03/11/2021    GFRAA >60 03/11/2021    LABGLOM >60 03/11/2021    GLUCOSE 130 03/11/2021    GLUCOSE 97 12/30/2011    PROT 6.0 03/11/2021    LABALBU 3.0 03/11/2021    LABALBU 3.7 12/30/2011    CALCIUM 8.6 03/11/2021    BILITOT 0.4 03/11/2021    ALKPHOS 68 03/11/2021    AST 25 03/11/2021    ALT 15 03/11/2021       Imaging Studies:  CXR: 03/09/21:  Impression   New patchy opacity in left mid lung may be atelectasis or small focus of   edema.  Differential includes a new small region of pneumonitis. Assessment and Plan     1. Intellectual disability with legal blindness--stable  Patient is nonverbal and bedbound at group home, currently at baseline    2. Shock likely hypovolemic 2/2 poor oral intake--resolved         Continue to monitor vitals    3. Acute hypoxic respiratory insufficiency 2/2 shock versus atelectasis vs concern for aspiration pneumonia --resolved  Oxygen requirement has gone down, from 6 L on presentation to room air. Chest x-ray showed some concern for aspiration pneumonia. Receiving Unasyn  · On room air, monitor vitals  · Chest vest, 3% saline nebulizer, duo neb  · Respiratory culture showed gram-negative rods      4. Dysphagia s/p PEG tube placement  Patient failed bedside swallow, unsuccessful CorPak attempts, PEG tube placed, can resume tube feeds    4. BEENA, likely prerenal 2/2 hypovolemia due to poor oral intake--resolved  BUN today: 30, creatinine: 0.7, Dr. Denia Angel following  · Continue daily BMP    5. Thrombocytopenia  Per Heme-onc consultation, thrombocytopenia likely secondary to septic shock, should resolve with resolution of acute infection. Platelets today: 599  · Daily CBC  · HIT panel has been negative, unlikely HIT  .       Ebenezer Treviño MD,[facility]  Pulmonary&Critical Care Medicine   Director of Lung Nodule Center  Medical Director of [address] [place] [address]    Lorrie Jara

## 2021-03-13 LAB
ALBUMIN SERPL-MCNC: 2.6 G/DL (ref 3.5–5.2)
ALP BLD-CCNC: 80 U/L (ref 35–104)
ALT SERPL-CCNC: 20 U/L (ref 0–32)
ANION GAP SERPL CALCULATED.3IONS-SCNC: 6 MMOL/L (ref 7–16)
ANISOCYTOSIS: ABNORMAL
AST SERPL-CCNC: 31 U/L (ref 0–31)
BASOPHILS ABSOLUTE: 0 E9/L (ref 0–0.2)
BASOPHILS RELATIVE PERCENT: 0.2 % (ref 0–2)
BILIRUB SERPL-MCNC: 0.4 MG/DL (ref 0–1.2)
BUN BLDV-MCNC: 26 MG/DL (ref 8–23)
CALCIUM SERPL-MCNC: 8.4 MG/DL (ref 8.6–10.2)
CHLORIDE BLD-SCNC: 105 MMOL/L (ref 98–107)
CO2: 32 MMOL/L (ref 22–29)
CREAT SERPL-MCNC: 0.5 MG/DL (ref 0.5–1)
EOSINOPHILS ABSOLUTE: 0 E9/L (ref 0.05–0.5)
EOSINOPHILS RELATIVE PERCENT: 1.5 % (ref 0–6)
GFR AFRICAN AMERICAN: >60
GFR NON-AFRICAN AMERICAN: >60 ML/MIN/1.73
GLUCOSE BLD-MCNC: 134 MG/DL (ref 74–99)
HCT VFR BLD CALC: 27.1 % (ref 34–48)
HCT VFR BLD CALC: 29.9 % (ref 34–48)
HEMOGLOBIN: 8.3 G/DL (ref 11.5–15.5)
HEMOGLOBIN: 9.1 G/DL (ref 11.5–15.5)
HYPOCHROMIA: ABNORMAL
LYMPHOCYTES ABSOLUTE: 2.15 E9/L (ref 1.5–4)
LYMPHOCYTES RELATIVE PERCENT: 14.8 % (ref 20–42)
MAGNESIUM: 2.2 MG/DL (ref 1.6–2.6)
MCH RBC QN AUTO: 30.1 PG (ref 26–35)
MCHC RBC AUTO-ENTMCNC: 30.4 % (ref 32–34.5)
MCV RBC AUTO: 99 FL (ref 80–99.9)
METAMYELOCYTES RELATIVE PERCENT: 0.9 % (ref 0–1)
METER GLUCOSE: 109 MG/DL (ref 74–99)
METER GLUCOSE: 138 MG/DL (ref 74–99)
METER GLUCOSE: 155 MG/DL (ref 74–99)
METER GLUCOSE: 93 MG/DL (ref 74–99)
MONOCYTES ABSOLUTE: 0.57 E9/L (ref 0.1–0.95)
MONOCYTES RELATIVE PERCENT: 3.5 % (ref 2–12)
NEUTROPHILS ABSOLUTE: 11.73 E9/L (ref 1.8–7.3)
NEUTROPHILS RELATIVE PERCENT: 80.9 % (ref 43–80)
NUCLEATED RED BLOOD CELLS: 2.6 /100 WBC
OVALOCYTES: ABNORMAL
PDW BLD-RTO: 24 FL (ref 11.5–15)
PHOSPHORUS: 2.4 MG/DL (ref 2.5–4.5)
PLATELET # BLD: 230 E9/L (ref 130–450)
PMV BLD AUTO: 10.9 FL (ref 7–12)
POIKILOCYTES: ABNORMAL
POLYCHROMASIA: ABNORMAL
POTASSIUM SERPL-SCNC: 3.6 MMOL/L (ref 3.5–5)
RBC # BLD: 3.02 E12/L (ref 3.5–5.5)
SODIUM BLD-SCNC: 143 MMOL/L (ref 132–146)
TARGET CELLS: ABNORMAL
TEAR DROP CELLS: ABNORMAL
TOTAL PROTEIN: 6 G/DL (ref 6.4–8.3)
WBC # BLD: 14.3 E9/L (ref 4.5–11.5)

## 2021-03-13 PROCEDURE — 85025 COMPLETE CBC W/AUTO DIFF WBC: CPT

## 2021-03-13 PROCEDURE — 80053 COMPREHEN METABOLIC PANEL: CPT

## 2021-03-13 PROCEDURE — 2580000003 HC RX 258: Performed by: INTERNAL MEDICINE

## 2021-03-13 PROCEDURE — 2700000000 HC OXYGEN THERAPY PER DAY

## 2021-03-13 PROCEDURE — 85014 HEMATOCRIT: CPT

## 2021-03-13 PROCEDURE — 85018 HEMOGLOBIN: CPT

## 2021-03-13 PROCEDURE — 83735 ASSAY OF MAGNESIUM: CPT

## 2021-03-13 PROCEDURE — 6370000000 HC RX 637 (ALT 250 FOR IP): Performed by: INTERNAL MEDICINE

## 2021-03-13 PROCEDURE — 99232 SBSQ HOSP IP/OBS MODERATE 35: CPT | Performed by: INTERNAL MEDICINE

## 2021-03-13 PROCEDURE — 2060000000 HC ICU INTERMEDIATE R&B

## 2021-03-13 PROCEDURE — 36415 COLL VENOUS BLD VENIPUNCTURE: CPT

## 2021-03-13 PROCEDURE — 6360000002 HC RX W HCPCS: Performed by: INTERNAL MEDICINE

## 2021-03-13 PROCEDURE — 82962 GLUCOSE BLOOD TEST: CPT

## 2021-03-13 PROCEDURE — C9113 INJ PANTOPRAZOLE SODIUM, VIA: HCPCS | Performed by: INTERNAL MEDICINE

## 2021-03-13 PROCEDURE — 94640 AIRWAY INHALATION TREATMENT: CPT

## 2021-03-13 PROCEDURE — 84100 ASSAY OF PHOSPHORUS: CPT

## 2021-03-13 RX ADMIN — PANTOPRAZOLE SODIUM 40 MG: 40 INJECTION, POWDER, FOR SOLUTION INTRAVENOUS at 21:00

## 2021-03-13 RX ADMIN — SODIUM CHLORIDE, PRESERVATIVE FREE 10 ML: 5 INJECTION INTRAVENOUS at 09:39

## 2021-03-13 RX ADMIN — INSULIN LISPRO 2 UNITS: 100 INJECTION, SOLUTION INTRAVENOUS; SUBCUTANEOUS at 09:42

## 2021-03-13 RX ADMIN — MIDODRINE HYDROCHLORIDE 5 MG: 5 TABLET ORAL at 17:15

## 2021-03-13 RX ADMIN — Medication 10 ML: at 09:00

## 2021-03-13 RX ADMIN — Medication 10 ML: at 21:00

## 2021-03-13 RX ADMIN — LEVOTHYROXINE SODIUM 150 MCG: 0.15 TABLET ORAL at 07:03

## 2021-03-13 RX ADMIN — SODIUM CHLORIDE, PRESERVATIVE FREE 10 ML: 5 INJECTION INTRAVENOUS at 21:00

## 2021-03-13 RX ADMIN — CEFTRIAXONE SODIUM 2000 MG: 2 INJECTION, POWDER, FOR SOLUTION INTRAMUSCULAR; INTRAVENOUS at 12:51

## 2021-03-13 RX ADMIN — IPRATROPIUM BROMIDE AND ALBUTEROL SULFATE 1 AMPULE: 2.5; .5 SOLUTION RESPIRATORY (INHALATION) at 11:40

## 2021-03-13 RX ADMIN — IPRATROPIUM BROMIDE AND ALBUTEROL SULFATE 1 AMPULE: 2.5; .5 SOLUTION RESPIRATORY (INHALATION) at 08:50

## 2021-03-13 RX ADMIN — Medication 100 MG: at 09:40

## 2021-03-13 RX ADMIN — FOLIC ACID 1 MG: 1 TABLET ORAL at 09:42

## 2021-03-13 RX ADMIN — PETROLATUM: 420 OINTMENT TOPICAL at 09:42

## 2021-03-13 RX ADMIN — PANTOPRAZOLE SODIUM 40 MG: 40 INJECTION, POWDER, FOR SOLUTION INTRAVENOUS at 09:39

## 2021-03-13 RX ADMIN — IPRATROPIUM BROMIDE AND ALBUTEROL SULFATE 1 AMPULE: 2.5; .5 SOLUTION RESPIRATORY (INHALATION) at 16:10

## 2021-03-13 RX ADMIN — MIDODRINE HYDROCHLORIDE 5 MG: 5 TABLET ORAL at 12:52

## 2021-03-13 RX ADMIN — MIDODRINE HYDROCHLORIDE 5 MG: 5 TABLET ORAL at 09:39

## 2021-03-13 RX ADMIN — IPRATROPIUM BROMIDE AND ALBUTEROL SULFATE 1 AMPULE: 2.5; .5 SOLUTION RESPIRATORY (INHALATION) at 20:53

## 2021-03-13 RX ADMIN — PETROLATUM: 420 OINTMENT TOPICAL at 01:25

## 2021-03-13 ASSESSMENT — PAIN SCALES - WONG BAKER
WONGBAKER_NUMERICALRESPONSE: 0

## 2021-03-13 NOTE — PROGRESS NOTES
Nephrology Resident  Inpatient Progress Note    Admit Date: 3/4/2021                                  PCP: Comfort Ambrosio MD    Subjective: The patient is P 26 y.o. 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 Found to have BEENA, hypotension, hypernatremic and hyperkalemia in the ED.     3/10: Patient seen and examined, mentation a little improved today, patient able to vocalize to the best of her ability that she wants food. Diuresis increased continues with 0.5 mg IV Bumex every 12 hourly. Patient with urine output of 1.278 L in the last 24 hours, still remains 10 L positive today. CR 0.7, . H&H 6.6 today, planning for 1 unit PRBC. FOBT positive, Protonix increased to twice daily, consult placed to [facility].    3/11:Loud outbursts most of today  3/12: No new c/o  3/13:No new c/o; less agitated today    Vitals:  VITALS:  BP (!) 157/77   Pulse 88   Temp 97 °F (36.1 °C) (Temporal)   Resp 18   Ht 5' 1\" (1.549 m)   Wt 146 lb 12.8 oz (66.6 kg)   SpO2 95%   BMI 27.74 kg/m²   24HR INTAKE/OUTPUT:      Intake/Output Summary (Last 24 hours) at 3/13/2021 1515  Last data filed at 3/13/2021 0800  Gross per 24 hour   Intake 1308 ml   Output 870 ml   Net 438 ml     CURRENT PULSE OXIMETRY:  SpO2: 95 %  24HR PULSE OXIMETRY RANGE:  SpO2  Av.8 %  Min: 89 %  Max: 99 %        I/O:      I/O last 3 completed shifts: In: 1308 [NG/GT:1308]  Out: 870 [Urine:870]  No intake/output data recorded.     Medications:    IV:   sodium chloride      dextrose        cefTRIAXone (ROCEPHIN) IV  2,000 mg Intravenous Q24H    pantoprazole  40 mg Intravenous BID    And    sodium chloride (PF)  10 mL Intravenous BID    ipratropium-albuterol  1 ampule Inhalation Q4H WA    lidocaine 1 % injection  5 mL Intradermal Once    sodium chloride flush  10 mL Intravenous 2 times per day    folic acid  1 mg Oral Daily    thiamine mononitrate  100 mg Oral Daily    midodrine  5 mg Oral TID     [Held by provider] bumetanide  0.5 mg Intravenous BID    insulin lispro  0-12 Units Subcutaneous TID     levothyroxine  150 mcg Oral Daily    sodium chloride flush  10 mL Intravenous 2 times per day    [Held by provider] heparin (porcine)  5,000 Units Subcutaneous 3 times per day        Current Meds:  Current Facility-Administered Medications   Medication Dose Route Frequency Provider Last Rate Last Admin    white petrolatum ointment   Topical BID PRN Zygmunt Seat, DO   Given at 03/13/21 6706    0.9 % sodium chloride infusion   Intravenous PRN Azael Lucas Jr., DO        cefTRIAXone (ROCEPHIN) 2,000 mg in sterile water 20 mL IV syringe  2,000 mg Intravenous Q24H Azael Lucas Jr., DO   2,000 mg at 03/13/21 1251    pantoprazole (PROTONIX) injection 40 mg  40 mg Intravenous BID Azael Lucas Jr., DO   40 mg at 03/13/21 8307    And    sodium chloride (PF) 0.9 % injection 10 mL  10 mL Intravenous BID Shanita Bhagat Jr., DO   10 mL at 03/13/21 0495    ipratropium-albuterol (DUONEB) nebulizer solution 1 ampule  1 ampule Inhalation Q4H WA Azael Lucas Jr., DO   1 ampule at 03/13/21 1140    sodium chloride (Inhalant) 3 % nebulizer solution 4 mL  4 mL Nebulization PRN Dory Combs., DO        lidocaine PF 1 % injection 5 mL  5 mL Intradermal Once Dory Olmstead, DO        sodium chloride flush 0.9 % injection 10 mL  10 mL Intravenous 2 times per day Dory Combs., DO   10 mL at 03/13/21 2649    sodium chloride flush 0.9 % injection 10 mL  10 mL Intravenous PRN Azael Lucas Jr., DO   10 mL at 88/95/17 2481    folic acid (FOLVITE) tablet 1 mg  1 mg Oral Daily Shanita Bhagat Jr., DO   1 mg at 03/13/21 7791    thiamine mononitrate tablet 100 mg  100 mg Oral Daily Shanita Bhagat Jr., DO   100 mg at 03/13/21 0940    midodrine (PROAMATINE) tablet 5 mg  5 mg Oral TID  Dory Combs., DO   5 mg bleed with FOBT positive. Received 1 unit PRBC; H/H stable   7. Hypernatremia; improved    Plan:  1. Continue to hold bumex   2. Supplement K as needed     3. Continue to monitor labs.       Roslynn Krabbe, MD

## 2021-03-13 NOTE — PROGRESS NOTES
Subjective:    Pt is resting in nad, no acute issues overnight. Objective:    BP (!) 122/58   Pulse 93   Temp 97.6 °F (36.4 °C) (Tympanic)   Resp 20   Ht 5' 1\" (1.549 m)   Wt 146 lb 12.8 oz (66.6 kg)   SpO2 97%   BMI 27.74 kg/m²   HEENT no adenopathy no bruits  Heart:  RRR, no murmurs, gallops, or rubs.   Lungs:  CTA bilaterally, no wheeze, rales or rhonchi  Abd: bowel sounds present, nontender, nondistended, no masses  Extrem:  No clubbing, cyanosis, or edema  WBC/Hgb/Hct/Plts:  14.3/9.1/29.9/230 (03/13 4016) basic metabolic panel     Assessment:    Patient Active Problem List   Diagnosis    Mental retardation    Hypothyroidism    Blindness of both eyes    ESRD (end stage renal disease) on dialysis ([address]    Macrocytosis    Behavior disturbance    Altered mental state    Sepsis ([address]       Plan:    Discharge once length of abx determined      Ray Orr  6:29 AM  3/13/2021

## 2021-03-13 NOTE — PLAN OF CARE
Problem: Skin Integrity:  Goal: Will show no infection signs and symptoms  Description: Will show no infection signs and symptoms  Outcome: Met This Shift     Problem: Falls - Risk of:  Goal: Will remain free from falls  Description: Will remain free from falls  Outcome: Met This Shift     Problem: Pain:  Goal: Control of acute pain  Description: Control of acute pain  Outcome: Met This Shift

## 2021-03-14 LAB
ALBUMIN SERPL-MCNC: 2.6 G/DL (ref 3.5–5.2)
ALP BLD-CCNC: 75 U/L (ref 35–104)
ALT SERPL-CCNC: 19 U/L (ref 0–32)
ANION GAP SERPL CALCULATED.3IONS-SCNC: 8 MMOL/L (ref 7–16)
ANISOCYTOSIS: ABNORMAL
AST SERPL-CCNC: 27 U/L (ref 0–31)
BASOPHILS ABSOLUTE: 0 E9/L (ref 0–0.2)
BASOPHILS RELATIVE PERCENT: 0.1 % (ref 0–2)
BILIRUB SERPL-MCNC: 0.3 MG/DL (ref 0–1.2)
BUN BLDV-MCNC: 22 MG/DL (ref 8–23)
CALCIUM SERPL-MCNC: 8.8 MG/DL (ref 8.6–10.2)
CHLORIDE BLD-SCNC: 103 MMOL/L (ref 98–107)
CO2: 32 MMOL/L (ref 22–29)
CREAT SERPL-MCNC: 0.6 MG/DL (ref 0.5–1)
EOSINOPHILS ABSOLUTE: 0.41 E9/L (ref 0.05–0.5)
EOSINOPHILS RELATIVE PERCENT: 2.7 % (ref 0–6)
GFR AFRICAN AMERICAN: >60
GFR NON-AFRICAN AMERICAN: >60 ML/MIN/1.73
GLUCOSE BLD-MCNC: 128 MG/DL (ref 74–99)
HCT VFR BLD CALC: 27.7 % (ref 34–48)
HCT VFR BLD CALC: 28.5 % (ref 34–48)
HCT VFR BLD CALC: 29.5 % (ref 34–48)
HEMOGLOBIN: 8.1 G/DL (ref 11.5–15.5)
HEMOGLOBIN: 8.6 G/DL (ref 11.5–15.5)
HEMOGLOBIN: 8.8 G/DL (ref 11.5–15.5)
HYPOCHROMIA: ABNORMAL
LYMPHOCYTES ABSOLUTE: 0.75 E9/L (ref 1.5–4)
LYMPHOCYTES RELATIVE PERCENT: 5.3 % (ref 20–42)
MAGNESIUM: 2.1 MG/DL (ref 1.6–2.6)
MCH RBC QN AUTO: 29.7 PG (ref 26–35)
MCHC RBC AUTO-ENTMCNC: 29.8 % (ref 32–34.5)
MCV RBC AUTO: 99.7 FL (ref 80–99.9)
METAMYELOCYTES RELATIVE PERCENT: 0.9 % (ref 0–1)
METER GLUCOSE: 116 MG/DL (ref 74–99)
METER GLUCOSE: 136 MG/DL (ref 74–99)
METER GLUCOSE: 137 MG/DL (ref 74–99)
METER GLUCOSE: 154 MG/DL (ref 74–99)
METER GLUCOSE: 55 MG/DL (ref 74–99)
MONOCYTES ABSOLUTE: 0 E9/L (ref 0.1–0.95)
MONOCYTES RELATIVE PERCENT: 4.2 % (ref 2–12)
MYELOCYTE PERCENT: 0.9 % (ref 0–0)
NEUTROPHILS ABSOLUTE: 13.8 E9/L (ref 1.8–7.3)
NEUTROPHILS RELATIVE PERCENT: 90.3 % (ref 43–80)
NUCLEATED RED BLOOD CELLS: 4.4 /100 WBC
OVALOCYTES: ABNORMAL
PDW BLD-RTO: 24.2 FL (ref 11.5–15)
PHOSPHORUS: 2.7 MG/DL (ref 2.5–4.5)
PLATELET # BLD: 225 E9/L (ref 130–450)
PMV BLD AUTO: 10.9 FL (ref 7–12)
POIKILOCYTES: ABNORMAL
POLYCHROMASIA: ABNORMAL
POTASSIUM SERPL-SCNC: 4.5 MMOL/L (ref 3.5–5)
RBC # BLD: 2.96 E12/L (ref 3.5–5.5)
SODIUM BLD-SCNC: 143 MMOL/L (ref 132–146)
TARGET CELLS: ABNORMAL
TOTAL PROTEIN: 6.2 G/DL (ref 6.4–8.3)
WBC # BLD: 15 E9/L (ref 4.5–11.5)

## 2021-03-14 PROCEDURE — 2060000000 HC ICU INTERMEDIATE R&B

## 2021-03-14 PROCEDURE — 94640 AIRWAY INHALATION TREATMENT: CPT

## 2021-03-14 PROCEDURE — 36415 COLL VENOUS BLD VENIPUNCTURE: CPT

## 2021-03-14 PROCEDURE — 82962 GLUCOSE BLOOD TEST: CPT

## 2021-03-14 PROCEDURE — 85018 HEMOGLOBIN: CPT

## 2021-03-14 PROCEDURE — 80053 COMPREHEN METABOLIC PANEL: CPT

## 2021-03-14 PROCEDURE — 85025 COMPLETE CBC W/AUTO DIFF WBC: CPT

## 2021-03-14 PROCEDURE — 6370000000 HC RX 637 (ALT 250 FOR IP): Performed by: INTERNAL MEDICINE

## 2021-03-14 PROCEDURE — 99232 SBSQ HOSP IP/OBS MODERATE 35: CPT | Performed by: INTERNAL MEDICINE

## 2021-03-14 PROCEDURE — 6360000002 HC RX W HCPCS: Performed by: INTERNAL MEDICINE

## 2021-03-14 PROCEDURE — 2580000003 HC RX 258: Performed by: INTERNAL MEDICINE

## 2021-03-14 PROCEDURE — 83735 ASSAY OF MAGNESIUM: CPT

## 2021-03-14 PROCEDURE — C9113 INJ PANTOPRAZOLE SODIUM, VIA: HCPCS | Performed by: INTERNAL MEDICINE

## 2021-03-14 PROCEDURE — 85014 HEMATOCRIT: CPT

## 2021-03-14 PROCEDURE — 84100 ASSAY OF PHOSPHORUS: CPT

## 2021-03-14 RX ORDER — BUMETANIDE 1 MG/1
1 TABLET ORAL 2 TIMES DAILY
Status: DISCONTINUED | OUTPATIENT
Start: 2021-03-14 | End: 2021-03-16 | Stop reason: HOSPADM

## 2021-03-14 RX ADMIN — PANTOPRAZOLE SODIUM 40 MG: 40 INJECTION, POWDER, FOR SOLUTION INTRAVENOUS at 09:40

## 2021-03-14 RX ADMIN — IPRATROPIUM BROMIDE AND ALBUTEROL SULFATE 1 AMPULE: 2.5; .5 SOLUTION RESPIRATORY (INHALATION) at 16:41

## 2021-03-14 RX ADMIN — CEFTRIAXONE SODIUM 2000 MG: 2 INJECTION, POWDER, FOR SOLUTION INTRAMUSCULAR; INTRAVENOUS at 13:35

## 2021-03-14 RX ADMIN — PANTOPRAZOLE SODIUM 40 MG: 40 INJECTION, POWDER, FOR SOLUTION INTRAVENOUS at 20:46

## 2021-03-14 RX ADMIN — SODIUM CHLORIDE, PRESERVATIVE FREE 10 ML: 5 INJECTION INTRAVENOUS at 20:46

## 2021-03-14 RX ADMIN — INSULIN LISPRO 2 UNITS: 100 INJECTION, SOLUTION INTRAVENOUS; SUBCUTANEOUS at 13:34

## 2021-03-14 RX ADMIN — IPRATROPIUM BROMIDE AND ALBUTEROL SULFATE 1 AMPULE: 2.5; .5 SOLUTION RESPIRATORY (INHALATION) at 12:04

## 2021-03-14 RX ADMIN — IPRATROPIUM BROMIDE AND ALBUTEROL SULFATE 1 AMPULE: 2.5; .5 SOLUTION RESPIRATORY (INHALATION) at 09:28

## 2021-03-14 RX ADMIN — LEVOTHYROXINE SODIUM 150 MCG: 0.15 TABLET ORAL at 06:57

## 2021-03-14 RX ADMIN — SODIUM CHLORIDE, PRESERVATIVE FREE 10 ML: 5 INJECTION INTRAVENOUS at 09:38

## 2021-03-14 RX ADMIN — FOLIC ACID 1 MG: 1 TABLET ORAL at 09:39

## 2021-03-14 RX ADMIN — Medication 10 ML: at 09:41

## 2021-03-14 RX ADMIN — DEXTROSE MONOHYDRATE 12.5 G: 25 INJECTION, SOLUTION INTRAVENOUS at 16:49

## 2021-03-14 RX ADMIN — Medication 100 MG: at 09:40

## 2021-03-14 RX ADMIN — IPRATROPIUM BROMIDE AND ALBUTEROL SULFATE 1 AMPULE: 2.5; .5 SOLUTION RESPIRATORY (INHALATION) at 20:37

## 2021-03-14 RX ADMIN — MIDODRINE HYDROCHLORIDE 5 MG: 5 TABLET ORAL at 17:01

## 2021-03-14 RX ADMIN — SODIUM CHLORIDE, PRESERVATIVE FREE 10 ML: 5 INJECTION INTRAVENOUS at 09:40

## 2021-03-14 RX ADMIN — MIDODRINE HYDROCHLORIDE 5 MG: 5 TABLET ORAL at 14:19

## 2021-03-14 RX ADMIN — MIDODRINE HYDROCHLORIDE 5 MG: 5 TABLET ORAL at 09:39

## 2021-03-14 RX ADMIN — BUMETANIDE 1 MG: 1 TABLET ORAL at 16:17

## 2021-03-14 ASSESSMENT — PAIN SCALES - WONG BAKER
WONGBAKER_NUMERICALRESPONSE: 0
WONGBAKER_NUMERICALRESPONSE: 0

## 2021-03-14 ASSESSMENT — PAIN SCALES - PAIN ASSESSMENT IN ADVANCED DEMENTIA (PAINAD)
BREATHING: 0
CONSOLABILITY: 0
NEGVOCALIZATION: 0
BREATHING: 0
TOTALSCORE: 0
BODYLANGUAGE: 0

## 2021-03-14 ASSESSMENT — PAIN SCALES - GENERAL
PAINLEVEL_OUTOF10: 0
PAINLEVEL_OUTOF10: 0

## 2021-03-14 NOTE — PROGRESS NOTES
Subjective: The patient is resting in nad. Objective:    /75   Pulse 96   Temp 98.2 °F (36.8 °C) (Axillary)   Resp 18   Ht 5' 1\" (1.549 m)   Wt 146 lb 12.8 oz (66.6 kg)   SpO2 92%   BMI 27.74 kg/m²   HEENT no adenopathy no bruits  Heart:  RRR, no murmurs, gallops, or rubs.   Lungs:  CTA bilaterally, no wheeze, rales or rhonchi  Abd: bowel sounds present, nontender, nondistended, no masses  Extrem:  No clubbing, cyanosis, or edema  WBC/Hgb/Hct/Plts:  15.0/8.8/29.5/225 (03/14 8975) basic metabolic panel     Assessment:    Patient Active Problem List   Diagnosis    Mental retardation    Hypothyroidism    Blindness of both eyes    [facility]D (end stage [facility] disease) [facility]    Macrocytosis    Behavior disturbance    Altered mental state    Sepsis [facility])       Plan:  Discharge planning, she may need SNF         Olivia Cartagena  6:53 AM  3/14/2021

## 2021-03-14 NOTE — PROGRESS NOTES
Pulmonary [facility]                             Critical Care Consult/Progress Note :      Follow up aspiration pneumonia    Subjective   Confused but more calm today   History limited   No fever or chills   On 1 L      Objective     VS: BP 98/65   Pulse 87   Temp 97.2 °F (36.2 °C) (Temporal)   Resp 20   Ht 5' 1\" (1.549 m)   Wt 146 lb 12.8 oz (66.6 kg)   SpO2 96%   BMI 27.74 kg/m²   ABP (Arterial line BP): 91/43  ABP mean (Arterial line mean): (!) 59 mmHg    I & O - 24hr:     Intake/Output Summary (Last 24 hours) at 3/13/2021 2028  Last data filed at 3/13/2021 0800  Gross per 24 hour   Intake 1058 ml   Output 610 ml   Net 448 ml       Physical Exam:  · General Appearance: appears stated age and nonverbal  · Neck: no adenopathy, no carotid bruit, no JVD, supple, symmetrical, trachea midline and thyroid not enlarged, symmetric, no tenderness/mass/nodules  · Lung: Coarse breath sounds heard  · Heart: regular rate and rhythm, S1, S2 normal, no murmur, click, rub or gallop  · Abdomen: soft, non-tender; bowel sounds normal; no masses,  no organomegaly  · Extremities:  extremities normal, atraumatic, no cyanosis or edema  · Musculoskeletal: No joint swelling, no muscle tenderness. ROM normal in all joints of extremities.    · Neurologic: Mental status: Non verbal and unresponsive at this time    Labs     CBC:   Lab Results   Component Value Date    WBC 14.3 03/13/2021    RBC 3.02 03/13/2021    HGB 8.3 03/13/2021    HCT 27.1 03/13/2021    MCV 99.0 03/13/2021    MCH 30.1 03/13/2021    MCHC 30.4 03/13/2021    RDW 24.0 03/13/2021     03/13/2021    MPV 10.9 03/13/2021     CMP:    Lab Results   Component Value Date     03/13/2021    K 3.6 03/13/2021    K 4.8 02/09/2021     03/13/2021    CO2 32 03/13/2021    BUN 26 03/13/2021    CREATININE 0.5 03/13/2021    GFRAA >60 03/13/2021    LABGLOM >60 03/13/2021    GLUCOSE 134 03/13/2021    GLUCOSE 97 12/30/2011    PROT 6.0 03/13/2021    LABALBU 2.6 03/13/2021    LABALBU 3.7 12/30/2011    CALCIUM 8.4 03/13/2021    BILITOT 0.4 03/13/2021    ALKPHOS 80 03/13/2021    AST 31 03/13/2021    ALT 20 03/13/2021       Imaging Studies:  CXR: 03/09/21:  Impression   New patchy opacity in left mid lung may be atelectasis or small focus of   edema.  Differential includes a new small region of pneumonitis. Assessment and Plan       1 - Shock likely hypovolemic 2/2 poor oral intake--resolved         Continue to monitor vitals    2-  Acute hypoxic respiratory insufficiency 2/2 shock versus atelectasis vs concern for aspiration pneumonia --resolved  On 1 L    4. Dysphagia s/p PEG tube placement  Patient failed bedside swallow, unsuccessful CorPak attempts, PEG tube placed, can resume tube feeds    4. BEENA, \improved   · Daily CBC  · HIT panel has been negative, unlikely HIT  .   Discharge plan     Ebenezer Treviño MD,[facility]  Pulmonary&Critical Care Medicine   Director of [address] [place] [address] Director of [address] [place] [address]    Ebenezer Edmonds

## 2021-03-14 NOTE — PLAN OF CARE
Problem: Skin Integrity:  Goal: Will show no infection signs and symptoms  Description: Will show no infection signs and symptoms  3/14/2021 1106 by Erskine Riedel, RN  Outcome: Met This Shift  3/14/2021 1106 by Erskine Riedel, RN  Outcome: Met This Shift

## 2021-03-14 NOTE — PROGRESS NOTES
Nephrology Resident  Inpatient Progress Note    Admit Date: 3/4/2021                                  PCP: Horacio Cruz MD    Subjective: The patient is H 87 y.o. 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 Found to have BEENA, hypotension, hypernatremic and hyperkalemia in the ED.     3/10: Patient seen and examined, mentation a little improved today, patient able to vocalize to the best of her ability that she wants food. Diuresis increased continues with 0.5 mg IV Bumex every 12 hourly. Patient with urine output of 1.278 L in the last 24 hours, still remains 10 L positive today. CR 0.7, . H&H 6.6 today, planning for 1 unit PRBC. FOBT positive, Protonix increased to twice daily, consult placed to [address] [place] [address].    3/11:Loud outbursts most of today  3/12: No new c/o  3/13:No new c/o; less agitated today  3/14: Continues to have outbursts, no other acute issues reported    Vitals:  VITALS:  /69   Pulse 95   Temp 98.1 °F (36.7 °C) (Axillary)   Resp 18   Ht 5' 1\" (1.549 m)   Wt 146 lb 12.8 oz (66.6 kg)   SpO2 98%   BMI 27.74 kg/m²   24HR INTAKE/OUTPUT:      Intake/Output Summary (Last 24 hours) at 3/14/2021 1101  Last data filed at 3/14/2021 2316  Gross per 24 hour   Intake 1380 ml   Output 520 ml   Net 860 ml     CURRENT PULSE OXIMETRY:  SpO2: 98 %  24HR PULSE OXIMETRY RANGE:  SpO2  Av.4 %  Min: 92 %  Max: 98 %        I/O:      I/O last 3 completed shifts: In: 1380 [NG/GT:1380]  Out: 580 [Urine:580]  No intake/output data recorded.     Medications:    IV:   sodium chloride      dextrose        cefTRIAXone (ROCEPHIN) IV  2,000 mg Intravenous Q24H    pantoprazole  40 mg Intravenous BID    And    sodium chloride (PF)  10 mL Intravenous BID    ipratropium-albuterol  1 ampule Inhalation Q4H WA    lidocaine 1 % injection  5 mL Intradermal Once    sodium chloride flush  10 mL Intravenous 2 times per day    folic acid  1 mg Oral Daily    thiamine mononitrate  100 mg Oral Daily    midodrine  5 mg Oral TID     [Held by provider] bumetanide  0.5 mg Intravenous BID    insulin lispro  0-12 Units Subcutaneous TID     levothyroxine  150 mcg Oral Daily    sodium chloride flush  10 mL Intravenous 2 times per day    [Held by provider] heparin (porcine)  5,000 Units Subcutaneous 3 times per day        Current Meds:  Current Facility-Administered Medications   Medication Dose Route Frequency Provider Last Rate Last Admin    white petrolatum ointment   Topical BID PRN Nanci Pronto, DO   Given at 03/13/21 2540    0.9 % sodium chloride infusion   Intravenous PRN Elijah Santo Jr., DO        cefTRIAXone (ROCEPHIN) 2,000 mg in sterile water 20 mL IV syringe  2,000 mg Intravenous Q24H Nisa Bhagat Jr., DO   2,000 mg at 03/13/21 1251    pantoprazole (PROTONIX) injection 40 mg  40 mg Intravenous BID Elijah Santo Jr., DO   40 mg at 03/14/21 0940    And    sodium chloride (PF) 0.9 % injection 10 mL  10 mL Intravenous BID Nisa Rolon., DO   10 mL at 03/14/21 0940    ipratropium-albuterol (DUONEB) nebulizer solution 1 ampule  1 ampule Inhalation Q4H WA Elijah Santo Jr., DO   1 ampule at 03/13/21 2053    sodium chloride (Inhalant) 3 % nebulizer solution 4 mL  4 mL Nebulization PRN Elijah Santo Jr., DO        lidocaine PF 1 % injection 5 mL  5 mL Intradermal Once Bobie Thomas., DO        sodium chloride flush 0.9 % injection 10 mL  10 mL Intravenous 2 times per day Bobie Thomas., DO   10 mL at 03/14/21 0940    sodium chloride flush 0.9 % injection 10 mL  10 mL Intravenous PRN Bobie Thomas., DO   10 mL at 82/05/75 0229    folic acid (FOLVITE) tablet 1 mg  1 mg Oral Daily Bobie Thomas., DO   1 mg at 03/14/21 4187    thiamine mononitrate tablet 100 mg  100 mg Oral Daily Nisa Bhagat Jr., DO   100 mg at 03/14/21 0940    midodrine (PROAMATINE) tablet 5 mg  5 mg Oral TID  Lizzy Opoka., DO   5 mg at 03/14/21 6686    [Held by provider] bumetanide (BUMEX) injection 0.5 mg  0.5 mg Intravenous BID Chris Lint Jr., DO   0.5 mg at 03/11/21 8980    insulin lispro (HUMALOG) injection vial 0-12 Units  0-12 Units Subcutaneous TID  Taniya Shells Rob Adolfo., DO   Stopped at 03/13/21 1606    mineral oil-hydrophil petrolat ointment   Topical BID PRN Lizzy Opoka., DO   Given at 03/10/21 0849    glucose (GLUTOSE) 40 % oral gel 15 g  15 g Oral PRN Lizzy Opoka., DO        dextrose 50 % IV solution  12.5 g Intravenous PRN Lizzy Opoka., DO        glucagon (rDNA) injection 1 mg  1 mg Intramuscular PRN Lizzy Opoka., DO        dextrose 5 % solution  100 mL/hr Intravenous PRN Lizzy Opoka., DO        levothyroxine (SYNTHROID) tablet 150 mcg  150 mcg Oral Daily Lizzy Opoka., DO   150 mcg at 03/14/21 1233    sodium chloride flush 0.9 % injection 10 mL  10 mL Intravenous 2 times per day Lizzy Opoka., DO   10 mL at 03/14/21 8615    sodium chloride flush 0.9 % injection 10 mL  10 mL Intravenous PRN Lizzy Opoka., DO   10 mL at 03/09/21 1632    [Held by provider] heparin (porcine) injection 5,000 Units  5,000 Units Subcutaneous 3 times per day Lizzy Opoka., DO   5,000 Units at 03/05/21 0600    promethazine (PHENERGAN) tablet 12.5 mg  12.5 mg Oral Q6H PRN Mittie Monica Jr., DO        Or    ondansetron [facility]) injection 4 mg  4 mg Intravenous Q6H PRN Lizzy Opoka., DO        polyethylene glycol [facility]) packet 17 g  17 g Oral Daily PRN Lizzy Opoka., DO        acetaminophen (TYLENOL) tablet 650 mg  650 mg Oral Q6H PRN Lizzy Opoka., DO   650 mg at 03/09/21 0115    Or    acetaminophen (TYLENOL) suppository 650 mg  650 mg Rectal Q6H PRN Lizzy Opoka., DO           Diet:  DIET TUBE FEED CONTINUOUS/CYCLIC NPO; Diabetic 1.5; Nasogastric; 20; 55; 24; Exceptions are: Sips with Meds     EXAM:  General: No distress. Awake, but non-verbal at baseline. Loud outbursts  Eyes: No sclera icterus. No conjunctival injection. ENT: No discharge. Pharynx clear. Neck: Trachea midline. Normal thyroid. Lungs: No accessory muscle use. No crackles. No wheezing. No rhonchi. CV: Regular rate. Regular rhythm. No murmur or rub. .   Abd: Non-tender. Non-distended. No masses. No organmegaly. Normal bowel sounds. PEG in-situ  Skin: Warm and dry. No nodule on exposed extremities. No rash on exposed extremities. Ext: No cyanosis, clubbing, pitting edema +2   Neuro: Awake. But non-verbal at baseline     Results:   CBC:   Recent Labs     03/12/21  0508 03/12/21  0508 03/13/21 0431 03/13/21  1502 03/13/21  2321 03/14/21 0458   WBC 15.4*  --  14.3*  --   --  15.0*   HGB 9.8*   < > 9.1* 8.3* 8.6* 8.8*     --  230  --   --  225    < > = values in this interval not displayed. BMP:    Recent Labs     03/12/21  0508 03/13/21 0431 03/14/21 0458   * 143 143   K 4.1 3.6 4.5    105 103   CO2 33* 32* 32*   BUN 29* 26* 22   CREATININE 0.6 0.5 0.6   GLUCOSE 130* 134* 128*       Hepatic:   Recent Labs     03/12/21  0508 03/13/21 0431 03/14/21  0458   AST 30 31 27   ALT 20 20 19   BILITOT 0.4 0.4 0.3   ALKPHOS 95 80 75      Troponin: No results for input(s): TROPONINI in the last 72 hours. BNP: No results for input(s): BNP in the last 72 hours. Lipids: No results for input(s): CHOL, HDL in the last 72 hours. Invalid input(s): LDLCALCU   ABGs:   Lab Results   Component Value Date    PO2ART 75.2 02/08/2021    [id number] 39.3 02/08/2021      INR:   No results for input(s): INR in the last 72 hours. Assessment/Plans  1. Severe dehydration with oliguric BEENA/hyponatremia/hyperkalemia-improved  -now with signs of overhydration; cum fluid balance +10 liters  -will resume low dose bumex  2. Shock-resolved  3. History of severe cognitive dysfunction, blindness  4.  Dysphagia s/p PEG 3/7 on TF(still on hold)  5. Acute hypoxic respiratory failure likely 2/2 Klebsiella pneumonia. Started on Unasyn 3/9  6. Acute anemia, concerns for GI bleed with FOBT positive. Received 1 unit PRBC; H/H stable  7.  Hypernatremia; improved      Severiano Rolon MD

## 2021-03-14 NOTE — PLAN OF CARE
Problem: Skin Integrity:  Goal: Will show no infection signs and symptoms  Description: Will show no infection signs and symptoms  3/14/2021 1704 by Jamal De Oliveira, RN  Outcome: Met This Shift  3/14/2021 1106 by Jamal De Oliveira RN  Outcome: Met This Shift  3/14/2021 1106 by Jamal De Oliveira, RN  Outcome: Met This Shift

## 2021-03-15 LAB
ALBUMIN SERPL-MCNC: 2.5 G/DL (ref 3.5–5.2)
ALP BLD-CCNC: 81 U/L (ref 35–104)
ALT SERPL-CCNC: 19 U/L (ref 0–32)
ANION GAP SERPL CALCULATED.3IONS-SCNC: 6 MMOL/L (ref 7–16)
ANISOCYTOSIS: ABNORMAL
AST SERPL-CCNC: 28 U/L (ref 0–31)
BASOPHILS ABSOLUTE: 0 E9/L (ref 0–0.2)
BASOPHILS RELATIVE PERCENT: 0.1 % (ref 0–2)
BILIRUB SERPL-MCNC: 0.3 MG/DL (ref 0–1.2)
BUN BLDV-MCNC: 21 MG/DL (ref 8–23)
CALCIUM SERPL-MCNC: 8.8 MG/DL (ref 8.6–10.2)
CHLORIDE BLD-SCNC: 101 MMOL/L (ref 98–107)
CO2: 30 MMOL/L (ref 22–29)
CREAT SERPL-MCNC: 0.6 MG/DL (ref 0.5–1)
EOSINOPHILS ABSOLUTE: 0.14 E9/L (ref 0.05–0.5)
EOSINOPHILS RELATIVE PERCENT: 0.9 % (ref 0–6)
GFR AFRICAN AMERICAN: >60
GFR NON-AFRICAN AMERICAN: >60 ML/MIN/1.73
GLUCOSE BLD-MCNC: 150 MG/DL (ref 74–99)
HCT VFR BLD CALC: 27.8 % (ref 34–48)
HCT VFR BLD CALC: 28.5 % (ref 34–48)
HCT VFR BLD CALC: 29.2 % (ref 34–48)
HCT VFR BLD CALC: 32.1 % (ref 34–48)
HEMOGLOBIN: 8.2 G/DL (ref 11.5–15.5)
HEMOGLOBIN: 8.7 G/DL (ref 11.5–15.5)
HEMOGLOBIN: 8.9 G/DL (ref 11.5–15.5)
HEMOGLOBIN: 9.4 G/DL (ref 11.5–15.5)
HYPOCHROMIA: ABNORMAL
LYMPHOCYTES ABSOLUTE: 1.86 E9/L (ref 1.5–4)
LYMPHOCYTES RELATIVE PERCENT: 12.4 % (ref 20–42)
MAGNESIUM: 2.1 MG/DL (ref 1.6–2.6)
MCH RBC QN AUTO: 30.3 PG (ref 26–35)
MCHC RBC AUTO-ENTMCNC: 30.5 % (ref 32–34.5)
MCV RBC AUTO: 99.3 FL (ref 80–99.9)
METER GLUCOSE: 105 MG/DL (ref 74–99)
METER GLUCOSE: 115 MG/DL (ref 74–99)
METER GLUCOSE: 127 MG/DL (ref 74–99)
METER GLUCOSE: 147 MG/DL (ref 74–99)
MONOCYTES ABSOLUTE: 0.16 E9/L (ref 0.1–0.95)
MONOCYTES RELATIVE PERCENT: 0.9 % (ref 2–12)
MYELOCYTE PERCENT: 0.9 % (ref 0–0)
NEUTROPHILS ABSOLUTE: 13.33 E9/L (ref 1.8–7.3)
NEUTROPHILS RELATIVE PERCENT: 85 % (ref 43–80)
NUCLEATED RED BLOOD CELLS: 0.9 /100 WBC
PDW BLD-RTO: 23.4 FL (ref 11.5–15)
PHOSPHORUS: 3.2 MG/DL (ref 2.5–4.5)
PLATELET # BLD: 257 E9/L (ref 130–450)
PMV BLD AUTO: 11.6 FL (ref 7–12)
POIKILOCYTES: ABNORMAL
POLYCHROMASIA: ABNORMAL
POTASSIUM SERPL-SCNC: 4.8 MMOL/L (ref 3.5–5)
RBC # BLD: 2.87 E12/L (ref 3.5–5.5)
SODIUM BLD-SCNC: 137 MMOL/L (ref 132–146)
TARGET CELLS: ABNORMAL
TOTAL PROTEIN: 6.5 G/DL (ref 6.4–8.3)
WBC # BLD: 15.5 E9/L (ref 4.5–11.5)

## 2021-03-15 PROCEDURE — 82962 GLUCOSE BLOOD TEST: CPT

## 2021-03-15 PROCEDURE — 2580000003 HC RX 258: Performed by: INTERNAL MEDICINE

## 2021-03-15 PROCEDURE — 2060000000 HC ICU INTERMEDIATE R&B

## 2021-03-15 PROCEDURE — 85025 COMPLETE CBC W/AUTO DIFF WBC: CPT

## 2021-03-15 PROCEDURE — 6370000000 HC RX 637 (ALT 250 FOR IP): Performed by: INTERNAL MEDICINE

## 2021-03-15 PROCEDURE — 85018 HEMOGLOBIN: CPT

## 2021-03-15 PROCEDURE — 83735 ASSAY OF MAGNESIUM: CPT

## 2021-03-15 PROCEDURE — 94640 AIRWAY INHALATION TREATMENT: CPT

## 2021-03-15 PROCEDURE — 36415 COLL VENOUS BLD VENIPUNCTURE: CPT

## 2021-03-15 PROCEDURE — 6360000002 HC RX W HCPCS: Performed by: INTERNAL MEDICINE

## 2021-03-15 PROCEDURE — C9113 INJ PANTOPRAZOLE SODIUM, VIA: HCPCS | Performed by: INTERNAL MEDICINE

## 2021-03-15 PROCEDURE — 80053 COMPREHEN METABOLIC PANEL: CPT

## 2021-03-15 PROCEDURE — 85014 HEMATOCRIT: CPT

## 2021-03-15 PROCEDURE — 84100 ASSAY OF PHOSPHORUS: CPT

## 2021-03-15 RX ORDER — GAUZE BANDAGE 2" X 2"
100 BANDAGE TOPICAL DAILY
Qty: 30 TABLET | Refills: 0 | Status: SHIPPED | OUTPATIENT
Start: 2021-03-16 | End: 2021-03-16

## 2021-03-15 RX ORDER — BUMETANIDE 1 MG/1
1 TABLET ORAL 2 TIMES DAILY
Qty: 30 TABLET | Refills: 3 | Status: SHIPPED | OUTPATIENT
Start: 2021-03-15 | End: 2021-03-16

## 2021-03-15 RX ORDER — MIDODRINE HYDROCHLORIDE 5 MG/1
5 TABLET ORAL
Qty: 90 TABLET | Refills: 3 | Status: SHIPPED | OUTPATIENT
Start: 2021-03-15 | End: 2021-03-16

## 2021-03-15 RX ADMIN — Medication 100 MG: at 08:36

## 2021-03-15 RX ADMIN — MIDODRINE HYDROCHLORIDE 5 MG: 5 TABLET ORAL at 08:36

## 2021-03-15 RX ADMIN — BUMETANIDE 1 MG: 1 TABLET ORAL at 16:10

## 2021-03-15 RX ADMIN — IPRATROPIUM BROMIDE AND ALBUTEROL SULFATE 1 AMPULE: 2.5; .5 SOLUTION RESPIRATORY (INHALATION) at 08:37

## 2021-03-15 RX ADMIN — SODIUM CHLORIDE, PRESERVATIVE FREE 10 ML: 5 INJECTION INTRAVENOUS at 08:37

## 2021-03-15 RX ADMIN — SODIUM CHLORIDE, PRESERVATIVE FREE 10 ML: 5 INJECTION INTRAVENOUS at 20:44

## 2021-03-15 RX ADMIN — LEVOTHYROXINE SODIUM 150 MCG: 0.15 TABLET ORAL at 06:34

## 2021-03-15 RX ADMIN — INSULIN LISPRO 2 UNITS: 100 INJECTION, SOLUTION INTRAVENOUS; SUBCUTANEOUS at 08:38

## 2021-03-15 RX ADMIN — PETROLATUM: 420 OINTMENT TOPICAL at 14:56

## 2021-03-15 RX ADMIN — PANTOPRAZOLE SODIUM 40 MG: 40 INJECTION, POWDER, FOR SOLUTION INTRAVENOUS at 08:37

## 2021-03-15 RX ADMIN — BUMETANIDE 1 MG: 1 TABLET ORAL at 08:36

## 2021-03-15 RX ADMIN — MIDODRINE HYDROCHLORIDE 5 MG: 5 TABLET ORAL at 16:14

## 2021-03-15 RX ADMIN — IPRATROPIUM BROMIDE AND ALBUTEROL SULFATE 1 AMPULE: 2.5; .5 SOLUTION RESPIRATORY (INHALATION) at 12:43

## 2021-03-15 RX ADMIN — FOLIC ACID 1 MG: 1 TABLET ORAL at 08:36

## 2021-03-15 RX ADMIN — PANTOPRAZOLE SODIUM 40 MG: 40 INJECTION, POWDER, FOR SOLUTION INTRAVENOUS at 20:44

## 2021-03-15 RX ADMIN — CEFTRIAXONE SODIUM 2000 MG: 2 INJECTION, POWDER, FOR SOLUTION INTRAMUSCULAR; INTRAVENOUS at 14:56

## 2021-03-15 RX ADMIN — IPRATROPIUM BROMIDE AND ALBUTEROL SULFATE 1 AMPULE: 2.5; .5 SOLUTION RESPIRATORY (INHALATION) at 21:00

## 2021-03-15 RX ADMIN — MIDODRINE HYDROCHLORIDE 5 MG: 5 TABLET ORAL at 12:25

## 2021-03-15 RX ADMIN — IPRATROPIUM BROMIDE AND ALBUTEROL SULFATE 1 AMPULE: 2.5; .5 SOLUTION RESPIRATORY (INHALATION) at 18:29

## 2021-03-15 ASSESSMENT — PAIN SCALES - GENERAL: PAINLEVEL_OUTOF10: 0

## 2021-03-15 ASSESSMENT — PAIN SCALES - PAIN ASSESSMENT IN ADVANCED DEMENTIA (PAINAD)
CONSOLABILITY: 0
BODYLANGUAGE: 0
BREATHING: 0
BODYLANGUAGE: 0
CONSOLABILITY: 0
BREATHING: 0
CONSOLABILITY: 0
NEGVOCALIZATION: 0
FACIALEXPRESSION: 0
TOTALSCORE: 0
BREATHING: 0
NEGVOCALIZATION: 0
NEGVOCALIZATION: 0
CONSOLABILITY: 0
FACIALEXPRESSION: 0
BODYLANGUAGE: 0
FACIALEXPRESSION: 0
NEGVOCALIZATION: 0
FACIALEXPRESSION: 0
BODYLANGUAGE: 0
FACIALEXPRESSION: 0
BODYLANGUAGE: 0
CONSOLABILITY: 0
CONSOLABILITY: 0

## 2021-03-15 NOTE — PROGRESS NOTES
Comprehensive Nutrition Assessment    Type and Reason for Visit:  Reassess    Nutrition Recommendations/Plan: 1. Current EN exceeding pt's estimated needs w/ potassium trending up:  -Recommend modifying current formula rate to 40 ml/hrx23 hrs (hold 30 min before/after synthroid)= 960 ml tv, 1440 kcal, 79 gm protein, 729 ml free water.     -Note, should potassium continue to trend up, recommend modifying to renal formula @ 35 ml/hrx23 hrs= 840 ml tv, 1512 kcal, 68 gm protein, 608 ml free water      Nutrition Assessment:  Pt admit w/ AMS 2/2 sepsis w/ noted dehydration at admit. PMH: ESRD on HD, mental retardation. Noted severe dysphagia. Multiple failed corpak placements now s/p PEG 3/7 on continuous feeds. No notes of intolerance to feed or nursing report. Potassium trending up. Per nephro, pt now experiencing possible over hydration. Will modify EN and follow.     Malnutrition Assessment:  Malnutrition Status:  Insufficient data    Context:  Acute Illness     Findings of the 6 clinical characteristics of malnutrition:  Energy Intake:  No significant decrease in energy intake(via TF)  Weight Loss:  Unable to assess(d/t lack of weight history)     Body Fat Loss:  Unable to assess     Muscle Mass Loss:  Unable to assess    Fluid Accumulation:  No significant fluid accumulation     Strength:  Not Performed    Estimated Daily Nutrient Needs:  Energy (kcal):  MSJ;1140x1.2=1368; kcal; Weight Used for Energy Requirements:  Current     Protein (g):  60-75 gm; Weight Used for Protein Requirements:  Ideal(1.3-1.5 g/kg IBW)        Fluid (ml/day):  Per renal; Method Used for Fluid Requirements:  Standard Renal      Nutrition Related Findings:  AMS, missing teeth, severe dysphagia, PEG, +BS, trace/+1 edema, +I/O's      Wounds:  Pressure Injury, Stage II(coccyx)       Current Nutrition Therapies:    Current Tube Feeding (TF) Orders:  · Feeding Route: PEG  · Formula: 1.5 Diabetic  · Schedule: Continuous  · Water

## 2021-03-15 NOTE — PROGRESS NOTES
Patient unable to be discharged back to [facility] as staff reports Melissa Harrell have not ordered feeding pump and tube feeding solution for Kecia Sterling! \"  Dr Lobito Caba notified via Datezr.

## 2021-03-15 NOTE — PROGRESS NOTES
Nephrology Resident  Inpatient Progress Note    Admit Date: 3/4/2021                                  PCP: Roberto Carlos Riddle MD    Subjective: The patient is Z 67 y.o. 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 Found to have BEENA, hypotension, hypernatremic and hyperkalemia in the ED.     3/10: Patient seen and examined, mentation a little improved today, patient able to vocalize to the best of her ability that she wants food. Diuresis increased continues with 0.5 mg IV Bumex every 12 hourly. Patient with urine output of 1.278 L in the last 24 hours, still remains 10 L positive today. CR 0.7, . H&H 6.6 today, planning for 1 unit PRBC. FOBT positive, Protonix increased to twice daily, consult placed to [address] [place] [address].    3/11:Loud outbursts most of today  3/12: No new c/o  3/13:No new c/o; less agitated today  3/14: Continues to have outbursts, no other acute issues reported  3/15: No new c/o    Vitals:  VITALS:  /76   Pulse 88   Temp 97 °F (36.1 °C) (Temporal)   Resp 18   Ht 5' 1\" (1.549 m)   Wt 147 lb 4.8 oz (66.8 kg)   SpO2 94%   BMI 27.83 kg/m²   24HR INTAKE/OUTPUT:      Intake/Output Summary (Last 24 hours) at 3/15/2021 1620  Last data filed at 3/15/2021 1549  Gross per 24 hour   Intake 2477 ml   Output 3260 ml   Net -783 ml     CURRENT PULSE OXIMETRY:  SpO2: 94 %  24HR PULSE OXIMETRY RANGE:  SpO2  Av.5 %  Min: 90 %  Max: 95 %        I/O:      I/O last 3 completed shifts: In: 6661 [NG/GT:2477]  Out: 3110 [Urine:3110]  I/O this shift:   In: 0   Out: 150 [Urine:150]    Medications:    IV:   sodium chloride      dextrose        bumetanide  1 mg Oral BID    cefTRIAXone (ROCEPHIN) IV  2,000 mg Intravenous Q24H    pantoprazole  40 mg Intravenous BID    And    sodium chloride (PF)  10 mL Intravenous BID    ipratropium-albuterol  1 ampule Inhalation Q4H WA    lidocaine 1 % injection  5 mL Intradermal Once    sodium chloride flush  10 mL Intravenous 2 times per day    folic acid  1 mg Oral Daily    thiamine mononitrate  100 mg Oral Daily    midodrine  5 mg Oral TID     insulin lispro  0-12 Units Subcutaneous TID     levothyroxine  150 mcg Oral Daily    sodium chloride flush  10 mL Intravenous 2 times per day    [Held by provider] heparin (porcine)  5,000 Units Subcutaneous 3 times per day        Current Meds:  Current Facility-Administered Medications   Medication Dose Route Frequency Provider Last Rate Last Admin    bumetanide (BUMEX) tablet 1 mg  1 mg Oral BID Yoseph Hsieh MD   1 mg at 03/15/21 1610    white petrolatum ointment   Topical BID PRN Marilyn Alvarez, DO   Given at 03/15/21 1456    0.9 % sodium chloride infusion   Intravenous PRN Jose R Henriquez Jr., DO        cefTRIAXone (ROCEPHIN) 2,000 mg in sterile water 20 mL IV syringe  2,000 mg Intravenous Q24H Jose R Henriquez Jr., DO   2,000 mg at 03/15/21 1456    pantoprazole (PROTONIX) injection 40 mg  40 mg Intravenous BID Jose R Henriquez Jr., DO   40 mg at 03/15/21 0873    And    sodium chloride (PF) 0.9 % injection 10 mL  10 mL Intravenous BID Latoya Shadi Cortez., DO   10 mL at 03/15/21 0837    ipratropium-albuterol (DUONEB) nebulizer solution 1 ampule  1 ampule Inhalation Q4H WA Jose R Henriquez Jr., DO   1 ampule at 03/15/21 1243    sodium chloride (Inhalant) 3 % nebulizer solution 4 mL  4 mL Nebulization PRN Georga Covert., DO        lidocaine PF 1 % injection 5 mL  5 mL Intradermal Once Georga Covert., DO        sodium chloride flush 0.9 % injection 10 mL  10 mL Intravenous 2 times per day Georga Covert., DO   10 mL at 03/15/21 0837    sodium chloride flush 0.9 % injection 10 mL  10 mL Intravenous PRN Jose R Henriquez Jr., DO   10 mL at 46/29/31 0883    folic acid (FOLVITE) tablet 1 mg  1 mg Oral Daily Latoya Shadi Bhagat Jr., DO   1 mg at 03/15/21 6521    thiamine mononitrate tablet 100 mg Dysphagia s/p PEG 3/7 on TF(still on hold)  5. Acute hypoxic respiratory failure likely 2/2 Klebsiella pneumonia. Started on Unasyn 3/9  6. Acute anemia, concerns for GI bleed with FOBT positive. Received 1 unit PRBC; H/H stable  7.  Hypernatremia; improved    OK for discharge from a Renal standpoint      Nura Dugan MD

## 2021-03-15 NOTE — PROGRESS NOTES
Pulmonary [facility]                             Pulmonary Radha Davis Note :      Follow up aspiration pneumonia    Subjective     No change in condtion Confused but more calm today   History limited   No fever or chills   On 1 L      Objective     VS: BP (!) 121/58   Pulse 91   Temp 97.5 °F (36.4 °C) (Axillary)   Resp 15   Ht 5' 1\" (1.549 m)   Wt 146 lb 12.8 oz (66.6 kg)   SpO2 95%   BMI 27.74 kg/m²   ABP (Arterial line BP): 91/43  ABP mean (Arterial line mean): (!) 59 mmHg    I & O - 24hr:     Intake/Output Summary (Last 24 hours) at 3/14/2021 2139  Last data filed at 3/14/2021 2106  Gross per 24 hour   Intake 1380 ml   Output 2230 ml   Net -850 ml       Physical Exam:  · General Appearance: appears stated age and nonverbal  · Neck: no adenopathy, no carotid bruit, no JVD, supple, symmetrical, trachea midline and        thyroid not enlarged, symmetric, no tenderness/mass/nodules  · Lung: Coarse breath sounds heard  · Heart: regular rate and rhythm, S1, S2 normal, no murmur, click, rub or gallop  · Abdomen: soft, non-tender; bowel sounds normal; no masses,  no organomegaly  · Extremities:  extremities normal, atraumatic, no cyanosis or edema  · Musculoskeletal: No joint swelling, no muscle tenderness. ROM normal in all joints of extremities.    · Neurologic: Mental status: Non verbal and unresponsive at this time    Labs     CBC:   Lab Results   Component Value Date    WBC 15.0 03/14/2021    RBC 2.96 03/14/2021    HGB 8.1 03/14/2021    HCT 27.7 03/14/2021    MCV 99.7 03/14/2021    MCH 29.7 03/14/2021    MCHC 29.8 03/14/2021    RDW 24.2 03/14/2021     03/14/2021    MPV 10.9 03/14/2021     CMP:    Lab Results   Component Value Date     03/14/2021    K 4.5 03/14/2021    K 4.8 02/09/2021     03/14/2021    CO2 32 03/14/2021    BUN 22 03/14/2021    CREATININE 0.6 03/14/2021    GFRAA >60 03/14/2021    LABGLOM >60 03/14/2021    GLUCOSE 128 03/14/2021 GLUCOSE 97 12/30/2011    PROT 6.2 03/14/2021    LABALBU 2.6 03/14/2021    LABALBU 3.7 12/30/2011    CALCIUM 8.8 03/14/2021    BILITOT 0.3 03/14/2021    ALKPHOS 75 03/14/2021    AST 27 03/14/2021    ALT 19 03/14/2021       Imaging Studies:  CXR: 03/09/21:  Impression   New patchy opacity in left mid lung may be atelectasis or small focus of   edema.  Differential includes a new small region of pneumonitis. Assessment and Plan   Hypoxia :   On 1 L and to wean RA   No issues   Aspiration precautions   Pulmonary toilet to avoid atelectasias       Shock likely hypovolemic 2/2 poor oral intake--resolved         Continue to monitor vitals      Dysphagia s/p PEG tube placement  Patient failed bedside swallow, unsuccessful CorPak attempts, PEG tube placed, can resume tube feeds    BEENA, improved   Daily CBC  HIT panel has been negative, unlikely HIT  .   Discharge plan     Ebenezer Treviño MD,[facility]  Pulmonary&Critical Care Medicine   Director of [address] [place] [address] Director of [address] [place] [address]    Christina Turpin

## 2021-03-15 NOTE — PLAN OF CARE
Problem: Inadequate oral food/beverage intake (NI-2.1)  Goal: Food and/or Nutrient Delivery  Description: Individualized approach for food/nutrient provision. Outcome: Met This Shift  Note: Recommend modifying current formula rate to 40 ml/hrx23 hrs (hold 30 min before/after synthroid)= 960 ml tv, 1440 kcal, 79 gm protein, 729 ml free water.

## 2021-03-15 NOTE — CARE COORDINATION
Spoke with Hitesh Cuellar at [facility], nurse coordinator. [phone] Despite knowing patient obtained PEG 3/7, they have made no preparations to have peg pump and supplies in place ? ?? And cannot explain why they have not made these arrangements. I faxed med rec and tube feeding info to Hitesh Cuellar [phone] and she will make arrangements to accept patient tomorrow. They are refusing discharge today. For questions I can be reached at [phone].  Cris Newell [facility]

## 2021-03-15 NOTE — PROGRESS NOTES
Subjective: The patient is resting in nad. No problems overnight. Objective:  Pt is resting in nad   BP (!) 149/77   Pulse 92   Temp 98.1 °F (36.7 °C) (Axillary)   Resp 16   Ht 5' 1\" (1.549 m)   Wt 147 lb 4.8 oz (66.8 kg)   SpO2 94%   BMI 27.83 kg/m²   HEENT no adenopathy no bruits  Heart:  RRR, no murmurs, gallops, or rubs.   Lungs:  CTA bilaterally, no wheeze, rales or rhonchi  Abd: bowel sounds present, nontender, nondistended, no masses  Extrem:  No clubbing, cyanosis, or edema  WBC/Hgb/Hct/Plts:  15.5/8.7/28.5/257 (03/15 4046) basic metabolic panel     Assessment:    Patient Active Problem List   Diagnosis    Mental retardation    Hypothyroidism    Blindness of both eyes    [facility]    Macrocytosis    Behavior disturbance    Altered mental state    Sepsis [facility])       Plan:  Discharge planning         Mary Canada  7:32 AM  3/15/2021

## 2021-03-16 VITALS
DIASTOLIC BLOOD PRESSURE: 49 MMHG | TEMPERATURE: 97.5 F | WEIGHT: 148 LBS | HEIGHT: 61 IN | RESPIRATION RATE: 16 BRPM | HEART RATE: 84 BPM | OXYGEN SATURATION: 96 % | BODY MASS INDEX: 27.94 KG/M2 | SYSTOLIC BLOOD PRESSURE: 97 MMHG

## 2021-03-16 LAB
ALBUMIN SERPL-MCNC: 2.3 G/DL (ref 3.5–5.2)
ALP BLD-CCNC: 78 U/L (ref 35–104)
ALT SERPL-CCNC: 18 U/L (ref 0–32)
ANION GAP SERPL CALCULATED.3IONS-SCNC: 7 MMOL/L (ref 7–16)
ANISOCYTOSIS: ABNORMAL
AST SERPL-CCNC: 27 U/L (ref 0–31)
BASOPHILS ABSOLUTE: 0 E9/L (ref 0–0.2)
BASOPHILS RELATIVE PERCENT: 0.2 % (ref 0–2)
BILIRUB SERPL-MCNC: 0.4 MG/DL (ref 0–1.2)
BUN BLDV-MCNC: 19 MG/DL (ref 8–23)
CALCIUM SERPL-MCNC: 8.7 MG/DL (ref 8.6–10.2)
CHLORIDE BLD-SCNC: 98 MMOL/L (ref 98–107)
CO2: 26 MMOL/L (ref 22–29)
CREAT SERPL-MCNC: 0.7 MG/DL (ref 0.5–1)
EOSINOPHILS ABSOLUTE: 0.21 E9/L (ref 0.05–0.5)
EOSINOPHILS RELATIVE PERCENT: 1.7 % (ref 0–6)
GFR AFRICAN AMERICAN: >60
GFR NON-AFRICAN AMERICAN: >60 ML/MIN/1.73
GLUCOSE BLD-MCNC: 136 MG/DL (ref 74–99)
HCT VFR BLD CALC: 28.1 % (ref 34–48)
HCT VFR BLD CALC: 31.1 % (ref 34–48)
HEMOGLOBIN: 8.4 G/DL (ref 11.5–15.5)
HEMOGLOBIN: 9.3 G/DL (ref 11.5–15.5)
HYPOCHROMIA: ABNORMAL
LYMPHOCYTES ABSOLUTE: 0.97 E9/L (ref 1.5–4)
LYMPHOCYTES RELATIVE PERCENT: 7.8 % (ref 20–42)
MAGNESIUM: 2.2 MG/DL (ref 1.6–2.6)
MCH RBC QN AUTO: 29.7 PG (ref 26–35)
MCHC RBC AUTO-ENTMCNC: 29.9 % (ref 32–34.5)
MCV RBC AUTO: 99.3 FL (ref 80–99.9)
METER GLUCOSE: 113 MG/DL (ref 74–99)
METER GLUCOSE: 155 MG/DL (ref 74–99)
MONOCYTES ABSOLUTE: 0 E9/L (ref 0.1–0.95)
MONOCYTES RELATIVE PERCENT: 3.1 % (ref 2–12)
NEUTROPHILS ABSOLUTE: 10.89 E9/L (ref 1.8–7.3)
NEUTROPHILS RELATIVE PERCENT: 90.4 % (ref 43–80)
OVALOCYTES: ABNORMAL
PDW BLD-RTO: 22.7 FL (ref 11.5–15)
PHOSPHORUS: 3.2 MG/DL (ref 2.5–4.5)
PLATELET # BLD: 229 E9/L (ref 130–450)
PMV BLD AUTO: 9.9 FL (ref 7–12)
POIKILOCYTES: ABNORMAL
POLYCHROMASIA: ABNORMAL
POTASSIUM SERPL-SCNC: 5 MMOL/L (ref 3.5–5)
RBC # BLD: 2.83 E12/L (ref 3.5–5.5)
SARS-COV-2, NAAT: NOT DETECTED
SCHISTOCYTES: ABNORMAL
SODIUM BLD-SCNC: 131 MMOL/L (ref 132–146)
TARGET CELLS: ABNORMAL
TOTAL PROTEIN: 6.8 G/DL (ref 6.4–8.3)
WBC # BLD: 12.1 E9/L (ref 4.5–11.5)

## 2021-03-16 PROCEDURE — 2580000003 HC RX 258: Performed by: INTERNAL MEDICINE

## 2021-03-16 PROCEDURE — 6370000000 HC RX 637 (ALT 250 FOR IP): Performed by: INTERNAL MEDICINE

## 2021-03-16 PROCEDURE — 85018 HEMOGLOBIN: CPT

## 2021-03-16 PROCEDURE — 6360000002 HC RX W HCPCS: Performed by: INTERNAL MEDICINE

## 2021-03-16 PROCEDURE — 85025 COMPLETE CBC W/AUTO DIFF WBC: CPT

## 2021-03-16 PROCEDURE — C9113 INJ PANTOPRAZOLE SODIUM, VIA: HCPCS | Performed by: INTERNAL MEDICINE

## 2021-03-16 PROCEDURE — 36415 COLL VENOUS BLD VENIPUNCTURE: CPT

## 2021-03-16 PROCEDURE — 83735 ASSAY OF MAGNESIUM: CPT

## 2021-03-16 PROCEDURE — 84100 ASSAY OF PHOSPHORUS: CPT

## 2021-03-16 PROCEDURE — 87635 SARS-COV-2 COVID-19 AMP PRB: CPT

## 2021-03-16 PROCEDURE — 94640 AIRWAY INHALATION TREATMENT: CPT

## 2021-03-16 PROCEDURE — 85014 HEMATOCRIT: CPT

## 2021-03-16 PROCEDURE — 82962 GLUCOSE BLOOD TEST: CPT

## 2021-03-16 PROCEDURE — 80053 COMPREHEN METABOLIC PANEL: CPT

## 2021-03-16 RX ORDER — FAMOTIDINE 20 MG/1
20 TABLET, FILM COATED ORAL 2 TIMES DAILY
Qty: 60 TABLET | Refills: 3 | Status: SHIPPED | OUTPATIENT
Start: 2021-03-16 | End: 2021-03-31 | Stop reason: SDUPTHER

## 2021-03-16 RX ORDER — GAUZE BANDAGE 2" X 2"
100 BANDAGE TOPICAL DAILY
Qty: 30 TABLET | Refills: 0 | Status: SHIPPED | OUTPATIENT
Start: 2021-03-16 | End: 2021-07-06

## 2021-03-16 RX ORDER — OLANZAPINE 15 MG/1
15 TABLET, ORALLY DISINTEGRATING ORAL 2 TIMES DAILY
Qty: 30 TABLET | Refills: 3 | Status: SHIPPED | OUTPATIENT
Start: 2021-03-16 | End: 2021-09-21 | Stop reason: ALTCHOICE

## 2021-03-16 RX ORDER — LEVOTHYROXINE SODIUM 0.15 MG/1
150 TABLET ORAL DAILY
Qty: 30 TABLET | Refills: 3 | Status: SHIPPED | OUTPATIENT
Start: 2021-03-16 | End: 2021-03-31 | Stop reason: SDUPTHER

## 2021-03-16 RX ORDER — MONTELUKAST SODIUM 10 MG/1
10 TABLET ORAL NIGHTLY
Qty: 30 TABLET | Refills: 3 | Status: SHIPPED | OUTPATIENT
Start: 2021-03-16 | End: 2021-03-31 | Stop reason: SDUPTHER

## 2021-03-16 RX ORDER — MIDODRINE HYDROCHLORIDE 5 MG/1
5 TABLET ORAL
Qty: 90 TABLET | Refills: 3 | Status: SHIPPED | OUTPATIENT
Start: 2021-03-16 | End: 2021-03-31 | Stop reason: ALTCHOICE

## 2021-03-16 RX ORDER — POLYETHYLENE GLYCOL 3350 17 G/17G
17 POWDER, FOR SOLUTION ORAL DAILY
Qty: 527 G | Refills: 1 | Status: SHIPPED | OUTPATIENT
Start: 2021-03-16 | End: 2022-10-17 | Stop reason: DRUGHIGH

## 2021-03-16 RX ORDER — OLANZAPINE 5 MG/1
5 TABLET, ORALLY DISINTEGRATING ORAL 2 TIMES DAILY
Qty: 30 TABLET | Refills: 3 | Status: SHIPPED | OUTPATIENT
Start: 2021-03-16 | End: 2021-09-21 | Stop reason: ALTCHOICE

## 2021-03-16 RX ORDER — BUMETANIDE 1 MG/1
1 TABLET ORAL 2 TIMES DAILY
Qty: 30 TABLET | Refills: 3 | Status: SHIPPED | OUTPATIENT
Start: 2021-03-16 | End: 2021-03-31 | Stop reason: SDUPTHER

## 2021-03-16 RX ORDER — PAROXETINE 10 MG/1
10 TABLET, FILM COATED ORAL 2 TIMES DAILY
Qty: 30 TABLET | Refills: 3 | Status: SHIPPED | OUTPATIENT
Start: 2021-03-16 | End: 2021-03-31 | Stop reason: SDUPTHER

## 2021-03-16 RX ORDER — VALPROIC ACID 250 MG/5ML
1000 SOLUTION ORAL 2 TIMES DAILY
Qty: 300 ML | Refills: 0 | Status: SHIPPED | OUTPATIENT
Start: 2021-03-16

## 2021-03-16 RX ORDER — FOLIC ACID 1 MG/1
1 TABLET ORAL DAILY
Qty: 30 TABLET | Refills: 3 | Status: SHIPPED | OUTPATIENT
Start: 2021-03-16 | End: 2021-03-31 | Stop reason: SDUPTHER

## 2021-03-16 RX ORDER — FERROUS SULFATE 325(65) MG
325 TABLET ORAL
Qty: 30 TABLET | Refills: 3 | Status: SHIPPED | OUTPATIENT
Start: 2021-03-16 | End: 2021-03-31 | Stop reason: SDUPTHER

## 2021-03-16 RX ORDER — AMLODIPINE BESYLATE 5 MG/1
5 TABLET ORAL NIGHTLY
Qty: 30 TABLET | Refills: 3 | Status: SHIPPED | OUTPATIENT
Start: 2021-03-16 | End: 2021-03-31 | Stop reason: ALTCHOICE

## 2021-03-16 RX ADMIN — CEFTRIAXONE SODIUM 2000 MG: 2 INJECTION, POWDER, FOR SOLUTION INTRAMUSCULAR; INTRAVENOUS at 13:05

## 2021-03-16 RX ADMIN — PANTOPRAZOLE SODIUM 40 MG: 40 INJECTION, POWDER, FOR SOLUTION INTRAVENOUS at 10:16

## 2021-03-16 RX ADMIN — MIDODRINE HYDROCHLORIDE 5 MG: 5 TABLET ORAL at 10:16

## 2021-03-16 RX ADMIN — SODIUM CHLORIDE, PRESERVATIVE FREE 10 ML: 5 INJECTION INTRAVENOUS at 10:16

## 2021-03-16 RX ADMIN — MIDODRINE HYDROCHLORIDE 5 MG: 5 TABLET ORAL at 13:05

## 2021-03-16 RX ADMIN — LEVOTHYROXINE SODIUM 150 MCG: 0.15 TABLET ORAL at 05:41

## 2021-03-16 RX ADMIN — BUMETANIDE 1 MG: 1 TABLET ORAL at 17:33

## 2021-03-16 RX ADMIN — IPRATROPIUM BROMIDE AND ALBUTEROL SULFATE 1 AMPULE: 2.5; .5 SOLUTION RESPIRATORY (INHALATION) at 17:47

## 2021-03-16 RX ADMIN — FOLIC ACID 1 MG: 1 TABLET ORAL at 10:17

## 2021-03-16 RX ADMIN — BUMETANIDE 1 MG: 1 TABLET ORAL at 10:16

## 2021-03-16 RX ADMIN — IPRATROPIUM BROMIDE AND ALBUTEROL SULFATE 1 AMPULE: 2.5; .5 SOLUTION RESPIRATORY (INHALATION) at 12:03

## 2021-03-16 RX ADMIN — MIDODRINE HYDROCHLORIDE 5 MG: 5 TABLET ORAL at 17:33

## 2021-03-16 RX ADMIN — IPRATROPIUM BROMIDE AND ALBUTEROL SULFATE 1 AMPULE: 2.5; .5 SOLUTION RESPIRATORY (INHALATION) at 08:44

## 2021-03-16 RX ADMIN — Medication 100 MG: at 10:16

## 2021-03-16 ASSESSMENT — PAIN SCALES - PAIN ASSESSMENT IN ADVANCED DEMENTIA (PAINAD)
CONSOLABILITY: 0
CONSOLABILITY: 0
TOTALSCORE: 0
FACIALEXPRESSION: 0
FACIALEXPRESSION: 0
BODYLANGUAGE: 0
BREATHING: 0
NEGVOCALIZATION: 0
FACIALEXPRESSION: 0
TOTALSCORE: 0
FACIALEXPRESSION: 0
BODYLANGUAGE: 0
BREATHING: 0
BODYLANGUAGE: 0

## 2021-03-16 ASSESSMENT — PAIN SCALES - GENERAL
PAINLEVEL_OUTOF10: 0
PAINLEVEL_OUTOF10: 0

## 2021-03-16 NOTE — CARE COORDINATION
Facilitated a call between Dr. Kamila Logan and Glenn Lovell. Both have spoken and Donnie Armas called me, she is now agreeable to [facility]. Aware that DC is for today. CM and BEVERLEY aware.

## 2021-03-16 NOTE — DISCHARGE INSTR - COC
Continuity of Care Form    Patient Name: Ernie Mata   :  1955  MRN:  [id number]    Admit date:  3/4/2021  Discharge date:  3/16/21    Code Status Order: Full Code   Advance Directives:   [facility] Documentation       Date/Time Healthcare Directive Type of Healthcare Directive Copy in [facility] [address] Agent's Name Healthcare Agent's Phone Number    21  No, patient does not have an advance directive for healthcare treatment -- -- -- -- --            Admitting Physician:  Roberth Reich MD  PCP: Rboerth Reich MD    Discharging Nurse: [facility] Unit/Room#: 9536/9826-L  Discharging Unit Phone Number: [phone]    Emergency Contact:   Extended Emergency Contact Information  Primary Emergency Contact: Carolin Gonzalez)  Home Phone: [phone] [phone]  Work Phone: [phone]  Mobile Phone: [phone]  Relation: Legal Guardian   needed?  No    Past Surgical History:  Past Surgical History:   Procedure Laterality Date    BRONCHOSCOPY  02/10/2017    CHEST TUBE INSERTION Right 2017    GASTROSTOMY TUBE PLACEMENT N/A 3/7/2021    EGD PEG TUBE PLACEMENT performed by Riley Buchanan MD at [place] [address] Right 2017    tessio insertion     OTHER SURGICAL HISTORY  2017    PEG tube insertion       Immunization History:   Immunization History   Administered Date(s) Administered    Influenza Vaccine, unspecified formulation 09/10/2014    Influenza Virus Vaccine 10/21/2017    Influenza, High Dose (Fluzone 65 yrs and older) 10/18/2019    Influenza, Quadv, IM, PF (6 mo and older Fluzone, Flulaval, Fluarix, and 3 yrs and older Afluria) 2016, 2017, 10/03/2018    Pneumococcal Polysaccharide ([id number]) 10/18/2019       Active Problems:  Patient Active Problem List   Diagnosis Code    Mental retardation F79    Hypothyroidism E03.9    Blindness of both eyes H54.3    ESRD (end stage renal disease) on dialysis ([facility] N18.6, Z99.2    Macrocytosis D75.89    Behavior disturbance F91.9    Altered mental state R41.82    [facility] A41.9       Isolation/Infection:   Isolation            No Isolation          Patient Infection Status       Infection Onset Added Last Indicated Last Indicated By Review Planned Expiration Resolved Resolved By    None active    Resolved    COVID-19 Rule Out 03/04/21 03/04/21 03/04/21 COVID-19, Rapid (Ordered)   03/04/21 Rule-Out Test Resulted    COVID-19 Rule Out 02/08/21 02/08/21 02/08/21 COVID-19 (Ordered)   02/08/21 Rule-Out Test Resulted            Nurse Assessment:  Last Vital Signs: /78   Pulse 100   Temp 97.4 °F (36.3 °C) (Axillary)   Resp 20   Ht 5' 1\" (1.549 m)   Wt 148 lb (67.1 kg)   SpO2 94%   BMI 27.96 kg/m²     Last documented pain score (0-10 scale): Pain Level: 0  Last Weight:   Wt Readings from Last 1 Encounters:   03/16/21 148 lb (67.1 kg)     Mental Status:  disoriented and hx MR, does not follow commands    IV Access:  - None    Nursing Mobility/ADLs:  Walking   Dependent  Transfer  Dependent  Bathing  Dependent  Dressing  Dependent  Toileting  Dependent  Feeding  Dependent  Med Admin  Dependent  Med Delivery   crushed and in PEG    Wound Care Documentation and Therapy:  Wound 03/13/21 Coccyx (Active)   Wound Etiology Pressure Stage  2 03/13/21 0632   Dressing/Treatment Pharmaceutical agent (see MAR) 03/16/21 0830   Wound Length (cm) 1.6 cm 03/13/21 0632   Wound Width (cm) 0.2 cm 03/13/21 9919   Wound Depth (cm) 0.1 cm 03/13/21 7741   Wound Surface Area (cm^2) 0.32 cm^2 03/13/21 0632   Wound Volume (cm^3) 0.03 cm^3 03/13/21 3075   Wound Assessment Pink/red 03/16/21 0830   Drainage Amount None 03/16/21 0830   Deann-wound Assessment Dry/flaky 03/16/21 0830   Number of days: 3        Elimination:  Continence:   · Bowel: No  · Bladder: No  Urinary Catheter:  Insertion Date: 3/4/21   Colostomy/Ileostomy/Ileal Conduit: No       Date of Last BM: 3/16/21    Safety Concerns:     Aspiration Risk    Impairments/Disabilities:      Speech, Vision and Hearing    Nutrition Therapy:  Current Nutrition Therapy:   - Tube Feedings:  Sandra@[employer], 250mL water Q6H    Routes of Feeding: Gastrostomy Tube  Liquids: No Liquids  Daily Fluid Restriction: no  Last Modified Barium Swallow with Video (Video Swallowing Test): not done    Treatments at the Time of Hospital Discharge:   Respiratory Treatments: see MAR  Oxygen Therapy:  is not on home oxygen therapy. Ventilator:    - No ventilator support    Rehab Therapies: Physical Therapy and Occupational Therapy  Weight Bearing Status/Restrictions: No weight bearing restirctions  Other Medical Equipment (for information only, NOT a DME order):  hospital bed    Patient's personal belongings (please select all that are sent with patient):  None    RN SIGNATURE:  Electronically signed by Anamika Macedo RN on 3/16/21 at 2:52 PM EDT    CASE MANAGEMENT/SOCIAL WORK SECTION    Inpatient Status Date: ***    Readmission Risk Assessment Score:  Readmission Risk              Risk of Unplanned Readmission:        27           Discharging to Facility/ Agency   · Name:   · Address:  · Phone:  · Fax:    Dialysis Facility (if applicable)   · Name:  · Address:  · Dialysis Schedule:  · Phone:  · Fax:    / signature: {Esignature:[id number]:::0}    PHYSICIAN SECTION    Prognosis: {Prognosis:8380215036:::0}    Condition at Discharge: Isidra Paulson Patient Condition:168920837:::0}    Rehab Potential (if transferring to Rehab): {Prognosis:3297461116:::0}    Recommended Labs or Other Treatments After Discharge: ***    Physician Certification: I certify the above information and transfer of Albin Saini  is necessary for the continuing treatment of the diagnosis listed and that she requires {Admit to Appropriate Level of Care:94587:::0} for {GREATER/LESS:615175436} 30 days.      Update Admission H&P: {CHP DME Changes in HDJSB:323754502:::3}    PHYSICIAN SIGNATURE:  Electronically signed by Lacy Pearson DO on 3/16/2021 at 1:30 PM

## 2021-03-16 NOTE — PROGRESS NOTES
Subjective:    Pt is resting in nad. Objective:    /68   Pulse 81   Temp 96.1 °F (35.6 °C) (Oral)   Resp 18   Ht 5' 1\" (1.549 m)   Wt 147 lb 4.8 oz (66.8 kg)   SpO2 94%   BMI 27.83 kg/m²   HEENT no adenopathy no bruits  Heart:  RRR, no murmurs, gallops, or rubs.   Lungs:  CTA bilaterally, no wheeze, rales or rhonchi  Abd: bowel sounds present, nontender, nondistended, no masses  Extrem:  No clubbing, cyanosis, or edema  WBC/Hgb/Hct/Plts:  12.1/8.4/28.1/229 (03/16 9284) basic metabolic panel     Assessment:    Patient Active Problem List   Diagnosis    Mental retardation    Hypothyroidism    Blindness of both eyes    ESRD (end stage renal disease) [facility] ([facility]    Macrocytosis    Behavior disturbance    Altered mental state    Sepsis [facility])       Plan:    Discharge today      Pillo Villalobos  6:51 AM  3/16/2021

## 2021-03-16 NOTE — CARE COORDINATION
Call placed to [facility]. Spoke with Ronak Sellers. Notified of discharge order for today. She states she is just reviewing documents faxed yesterday and needs to meet with another supervisor and will call me back. Asked that she please determine transportation prior to return call. Ariadna Sanders from [facility] called to the nurses station. They continue to not have any supplies for this pt. They have not contacted any companies as of yet and are still unprepared to accept his pt. Message sent to Dr Karissa James. Orders for all supplies and HHC received. Message left with Long Richardson with [facility] D[facility] for Supplies. Per [facility] they use [facility] for HHC Just need the orders. Goyo Lerma MRDD investigator called re: the above. He will look into the issues discussed and get Katerina's  involved. Spoke with Pt's guardian. Katrina Lucas. She is in agreement for [facility]. Prefers [facility] Referral called to [place] with [facility]. They are able to accept pt today. 1800 # for Latanya at [facility] given. Rapid COVID sent. Discharge transport set up with [facility] for 3pm. Pending COVID result    Syl from [facility] updated pt discharge plan in now [facility]. DME and Vishnu [address] orders faxed to [facility] at  [phone]. Per Leighton Ricardo at [facility] now states if orders are faxed they are able to have ALL equipment there today. If they can get arrangements made Latanya's supervisor would like her to go to [facility] due to Covid concerns. Transport changed to 6pm at request of [facility]. Pt's bedside RN updated.

## 2021-03-25 NOTE — DISCHARGE SUMMARY
Physician Discharge Summary     Patient ID:  Olman Flores  [id number]  71 y.o.  1955    Admit date: 3/4/2021    Discharge date and time: 3/16/2021  7:10 PM     Admission Diagnoses: Sepsis [facility]) [A41.9]    Discharge Diagnoses:   Patient Active Problem List   Diagnosis    Mental retardation    Hypothyroidism    Blindness of both eyes    ESRD (end stage renal disease) [facility] dialysis ([facility]    Macrocytosis    Behavior disturbance    Altered mental state    Sepsis ([facility]     No current facility-administered medications for this encounter.      Current Outpatient Medications:     montelukast (SINGULAIR) 10 MG tablet, 1 tablet by PEG Tube route nightly, Disp: 30 tablet, Rfl: 3    valproic acid (DEPACON) 250 MG/5ML SOLN oral solution, 20 mLs by Per G Tube route 2 times daily, Disp: 300 mL, Rfl: 0    PARoxetine (PAXIL) 10 MG tablet, 1 tablet by PEG Tube route 2 times daily, Disp: 30 tablet, Rfl: 3    OLANZapine zydis (ZYPREXA ZYDIS) 15 MG disintegrating tablet, Place 1 tablet under the tongue 2 times daily Given with Zyprexa zydis 5 mg twice a day, Disp: 30 tablet, Rfl: 3    OLANZapine zydis (ZYPREXA) 5 MG disintegrating tablet, Place 1 tablet under the tongue 2 times daily Given with Zyprexa zydis 15 mg twice a day, Disp: 30 tablet, Rfl: 3    metoprolol tartrate (LOPRESSOR) 25 MG tablet, 3 tablets by PEG Tube route 2 times daily, Disp: 60 tablet, Rfl: 3    amLODIPine (NORVASC) 5 MG tablet, 1 tablet by PEG Tube route nightly, Disp: 30 tablet, Rfl: 3    bumetanide (BUMEX) 1 MG tablet, 1 tablet by PEG Tube route 2 times daily, Disp: 30 tablet, Rfl: 3    ferrous sulfate (IRON 325) 325 (65 Fe) MG tablet, 1 tablet by PEG Tube route daily (with breakfast), Disp: 30 tablet, Rfl: 3    folic acid (FOLVITE) 1 MG tablet, 1 tablet by PEG Tube route daily, Disp: 30 tablet, Rfl: 3    docusate (COLACE) 50 MG/5ML liquid, 10 mLs by Per G Tube route 2 times daily, Disp: 300 mL, Rfl: 0    polyethylene glycol (GLYCOLAX) 17 g

## 2021-03-31 ENCOUNTER — OFFICE VISIT (OUTPATIENT)
Dept: FAMILY MEDICINE CLINIC | Age: 66
End: 2021-03-31
Payer: MEDICARE

## 2021-03-31 VITALS
DIASTOLIC BLOOD PRESSURE: 80 MMHG | SYSTOLIC BLOOD PRESSURE: 126 MMHG | OXYGEN SATURATION: 95 % | HEART RATE: 75 BPM | TEMPERATURE: 97.3 F

## 2021-03-31 DIAGNOSIS — A41.9 SEPSIS, DUE TO UNSPECIFIED ORGANISM, UNSPECIFIED WHETHER ACUTE ORGAN DYSFUNCTION PRESENT (HCC): ICD-10-CM

## 2021-03-31 DIAGNOSIS — R60.9 EDEMA, UNSPECIFIED TYPE: ICD-10-CM

## 2021-03-31 DIAGNOSIS — K59.00 CONSTIPATION, UNSPECIFIED CONSTIPATION TYPE: ICD-10-CM

## 2021-03-31 DIAGNOSIS — F79 INTELLECTUAL DISABILITY: Primary | ICD-10-CM

## 2021-03-31 DIAGNOSIS — I73.9 PERIPHERAL VASCULAR DISEASE (HCC): ICD-10-CM

## 2021-03-31 DIAGNOSIS — I10 ESSENTIAL HYPERTENSION: ICD-10-CM

## 2021-03-31 DIAGNOSIS — Z93.1 S/P PERCUTANEOUS ENDOSCOPIC GASTROSTOMY (PEG) TUBE PLACEMENT (HCC): ICD-10-CM

## 2021-03-31 DIAGNOSIS — E03.9 ACQUIRED HYPOTHYROIDISM: ICD-10-CM

## 2021-03-31 DIAGNOSIS — N18.9 ANEMIA DUE TO CHRONIC KIDNEY DISEASE, UNSPECIFIED CKD STAGE: ICD-10-CM

## 2021-03-31 DIAGNOSIS — D63.1 ANEMIA DUE TO CHRONIC KIDNEY DISEASE, UNSPECIFIED CKD STAGE: ICD-10-CM

## 2021-03-31 DIAGNOSIS — J30.2 SEASONAL ALLERGIES: ICD-10-CM

## 2021-03-31 DIAGNOSIS — K21.9 GASTROESOPHAGEAL REFLUX DISEASE WITHOUT ESOPHAGITIS: ICD-10-CM

## 2021-03-31 DIAGNOSIS — E55.9 VITAMIN D DEFICIENCY: ICD-10-CM

## 2021-03-31 PROCEDURE — 1123F ACP DISCUSS/DSCN MKR DOCD: CPT | Performed by: INTERNAL MEDICINE

## 2021-03-31 PROCEDURE — 1036F TOBACCO NON-USER: CPT | Performed by: INTERNAL MEDICINE

## 2021-03-31 PROCEDURE — G8427 DOCREV CUR MEDS BY ELIG CLIN: HCPCS | Performed by: INTERNAL MEDICINE

## 2021-03-31 PROCEDURE — 3017F COLORECTAL CA SCREEN DOC REV: CPT | Performed by: INTERNAL MEDICINE

## 2021-03-31 PROCEDURE — 1111F DSCHRG MED/CURRENT MED MERGE: CPT | Performed by: INTERNAL MEDICINE

## 2021-03-31 PROCEDURE — 99204 OFFICE O/P NEW MOD 45 MIN: CPT | Performed by: INTERNAL MEDICINE

## 2021-03-31 PROCEDURE — 4040F PNEUMOC VAC/ADMIN/RCVD: CPT | Performed by: INTERNAL MEDICINE

## 2021-03-31 PROCEDURE — G8417 CALC BMI ABV UP PARAM F/U: HCPCS | Performed by: INTERNAL MEDICINE

## 2021-03-31 PROCEDURE — G8400 PT W/DXA NO RESULTS DOC: HCPCS | Performed by: INTERNAL MEDICINE

## 2021-03-31 PROCEDURE — G8484 FLU IMMUNIZE NO ADMIN: HCPCS | Performed by: INTERNAL MEDICINE

## 2021-03-31 PROCEDURE — 1090F PRES/ABSN URINE INCON ASSESS: CPT | Performed by: INTERNAL MEDICINE

## 2021-03-31 RX ORDER — FAMOTIDINE 20 MG/1
20 TABLET, FILM COATED ORAL 2 TIMES DAILY
Qty: 60 TABLET | Refills: 3 | Status: ON HOLD
Start: 2021-03-31 | End: 2021-08-18 | Stop reason: HOSPADM

## 2021-03-31 RX ORDER — PAROXETINE 10 MG/1
10 TABLET, FILM COATED ORAL 2 TIMES DAILY
Qty: 30 TABLET | Refills: 3 | Status: SHIPPED
Start: 2021-03-31 | End: 2021-09-21

## 2021-03-31 RX ORDER — MONTELUKAST SODIUM 10 MG/1
10 TABLET ORAL NIGHTLY
Qty: 30 TABLET | Refills: 3 | Status: SHIPPED
Start: 2021-03-31 | End: 2021-09-21

## 2021-03-31 RX ORDER — FERROUS SULFATE 325(65) MG
325 TABLET ORAL
Qty: 30 TABLET | Refills: 3 | Status: SHIPPED
Start: 2021-03-31 | End: 2021-08-26

## 2021-03-31 RX ORDER — BUMETANIDE 1 MG/1
1 TABLET ORAL DAILY PRN
Qty: 30 TABLET | Refills: 3 | Status: SHIPPED
Start: 2021-03-31 | End: 2021-09-21 | Stop reason: ALTCHOICE

## 2021-03-31 RX ORDER — LEVOTHYROXINE SODIUM 0.15 MG/1
150 TABLET ORAL DAILY
Qty: 30 TABLET | Refills: 3 | Status: SHIPPED
Start: 2021-03-31 | End: 2021-07-07

## 2021-03-31 RX ORDER — FOLIC ACID 1 MG/1
1 TABLET ORAL DAILY
Qty: 30 TABLET | Refills: 3 | Status: SHIPPED
Start: 2021-03-31 | End: 2021-08-26

## 2021-03-31 NOTE — PROGRESS NOTES
[facility] PRIMARY CARE  [address] [address]  Dept: [phone]  Dept Fax: [phone]     NAME: Anahi Mccullough        :  1955        MRN:  [id number]    Chief Complaint   Patient presents with    New Patient     Patient brought in by  / Patient is now at [address] [place] [address],[address] from Hospital       History of Present Illness  Anahi Mccullough is a 78 y.o. female who presents today to [facility]. Patient with MR, severe autism, blindness, nonverbal, and wheelchair-bound. She was recently admitted to the hospital after being found unresponsive subsequently being diagnosed with aspiration pneumonia. History obtained via records from the EMR as patient is nonverbal and presents today with the  from her group home as well as a group home staff member that provides the face sheet. The patient's guardian is not present and there is no family or caretaker present that is familiar with the patient's hospitalization. Per hospital records patient had had poor p.o. intake for several days prior to presentation to the hospital.  After treatment for her aspiration pneumonia patient failed multiple swallow studies and it was decided that a PEG tube would be her best option and this was agreed upon by the patient's legal guardian. PEG tube was placed on 2021 by Dr. Christine Kirkpatrick. Dietitian made recommendations for tube feeds which patient seemed to have tolerated well. She finished her course of antibiotics and was discharged to SNF after the hospital.  She has since returned back to her group home and per reports of  and other staff members present with her today she is at her baseline mental status currently. Review of Systems  Please see HPI above. All bolded are positive. Unable to obtain full review of systems as patient is unresponsive and nonverbal at baseline.   Per staff members that work with the patient there are no reported fevers, chills, diarrhea, constipation, edema, or signs of distress or pain. Medical History   Past Medical History:   Diagnosis Date    Acute kidney injury ([facility] [address] 01/15/2017    d/t Vancomycin, on Dialysis [facility] right chest    Blind in both eyes     Hemodialysis patient ([facility] [address]     Hyperthyroidism     MR (mental retardation)     Osteoarthritis     Pneumonia 01/06/2017       Surgical History   Past Surgical History:   Procedure Laterality Date    BRONCHOSCOPY  02/10/2017    CHEST TUBE INSERTION Right 02/09/2017    GASTROSTOMY TUBE PLACEMENT N/A 3/7/2021    EGD PEG TUBE PLACEMENT performed by Kari Velasco [facility] Right 01/17/2017    tessio insertion     OTHER SURGICAL HISTORY  01/25/2017    PEG tube insertion       Family History  No family history on file.     Social History  Social History     Tobacco Use    Smoking status: Never Smoker    Smokeless tobacco: Never Used   Substance Use Topics    Alcohol use: No       Home Medications  Current Outpatient Medications   Medication Sig Dispense Refill    bumetanide (BUMEX) 1 MG tablet 1 tablet by PEG Tube route daily as needed (swelling) 30 tablet 3    docusate (COLACE) 50 MG/5ML liquid 10 mLs by Per G Tube route 2 times daily 300 mL 0    famotidine (PEPCID) 20 MG tablet 1 tablet by PEG Tube route 2 times daily 60 tablet 3    ferrous sulfate (IRON 325) 325 (65 Fe) MG tablet 1 tablet by PEG Tube route daily (with breakfast) 30 tablet 3    folic acid (FOLVITE) 1 MG tablet 1 tablet by PEG Tube route daily 30 tablet 3    levothyroxine (SYNTHROID) 150 MCG tablet 1 tablet by PEG Tube route Daily 30 tablet 3    metoprolol tartrate (LOPRESSOR) 25 MG tablet 3 tablets by PEG Tube route 2 times daily 60 tablet 3    montelukast (SINGULAIR) 10 MG tablet 1 tablet by PEG Tube route nightly 30 tablet 3    PARoxetine (PAXIL) 10 MG tablet 1 tablet by PEG Tube route 2 times daily 30 tablet 3    vitamin D (CHOLECALCIFEROL) 25 MCG (1000 UT) TABS tablet 1 tablet by PEG Tube route daily 30 tablet 0    valproic acid (DEPACON) 250 MG/5ML SOLN oral solution 20 mLs by Per G Tube route 2 times daily 300 mL 0    OLANZapine zydis (ZYPREXA ZYDIS) 15 MG disintegrating tablet Place 1 tablet under the tongue 2 times daily Given with Zyprexa zydis 5 mg twice a day 30 tablet 3    OLANZapine zydis (ZYPREXA) 5 MG disintegrating tablet Place 1 tablet under the tongue 2 times daily Given with Zyprexa zydis 15 mg twice a day 30 tablet 3    polyethylene glycol (GLYCOLAX) 17 g packet 17 g by Per G Tube route daily 527 g 1    thiamine mononitrate 100 MG tablet Take 1 tablet by mouth daily 30 tablet 0     No current facility-administered medications for this visit. Allergies  No Known Allergies    Objective  Vitals:    03/31/21 1424   BP: 126/80   Pulse: 75   Temp: 97.3 °F (36.3 °C)   TempSrc: Temporal   SpO2: 95%        Physical Exam:  General: Awake but has eyes closed most of the time, non verbal at baseline, wheelchair bound, No acute distress  Head: Normocephalic, atraumatic  Eyes: conjunctivae/corneas clear  Mouth: Mucous membranes moist with no pharyngeal exudate or erythema  Neck: no JVD, no adenopathy, no carotid bruit, supple, symmetrical, trachea midline  Back: symmetric, No CVA tenderness. Lungs: clear to auscultation bilaterally without wheezes, rales, or rhonchi  Heart: regular rate and rhythm, S1, S2 normal, no murmur, click, rub or gallop  Abdomen: soft, non-tender; bowel sounds normal; no masses,  no organomegaly, PEG tube site is clean and dry with no surrounding erythema. Extremities: atraumatic, no cyanosis, no edema  Skin: dry without rashes or lesions  Neurologic: non verbal and wheel chair bound. Does not follow commands. At her baseline.     Labs  Lab Results   Component Value Date    WBC 12.1 (H) 03/16/2021    HGB 9.3 (L) 03/16/2021    HCT 31.1 (L) 03/16/2021     03/16/2021 no pericardial effusion. There are preserved size and density for the liver. The gallbladder is normally distended. There is some increased density in the dependent portion of the gallbladder in part artifacts by adjacent peristalsis. Can further evaluate the gallbladder with gallbladder ultrasound. There is some atrophy of the pancreas. The spleen has unremarkable appearance. There is no dilatation of the biliary tree pancreatic ductal system. Adrenals are not enlarged. Aorta and IVC are of unremarkable appearance. The there is no midline retroperitoneal adenopathy. Patient has a left the femoral venous catheter with tip in the left the common iliac vein. The There are is streaking artifacts in the abdomen due monitoring devices and active peristalsis. There are no indication for free intraperitoneal air or acute inflammatory changes the mid mesentery fat planes. There is no stranding of the pericolonic fat planes. There is no signs for diverticulitis. Fair amount of fecal content is seen in the colon indicating pattern moderate constipation. The a component of fecal rectal retention is also observed. There remarkable appearance for the uterus and for the right ovary. The there is a 2 cm is stable fat containing cyst in the left ovary compatible with a the dermoid type of cyst with discrete the mey of calcification in the wall. There are normal size for the kidneys. There is no renal calculus. There is no obstruction. There is no midline retroperitoneal adenopathy. 1.  Bilateral pleural effusions of mild-to-moderate degree causing compression atelectasis in the lower lobes posteriorly. 2.  Pattern of constipation. 3.  No indication for acute the inflammatory process in the mid mesentery fat planes, free intraperitoneal air or ascites. 4.  No obstructive uropathy. 5.  No dilatation of the biliary tree. 6.  Cannot entirely exclude small gallstone in the gallbladder lumen, artifacts are present. Further evaluation with gallbladder ultrasound is recommended. Ct Head Wo Contrast    Result Date: 3/4/2021  EXAMINATION: CT OF THE HEAD WITHOUT CONTRAST  3/4/2021 8:33 am TECHNIQUE: CT of the head was performed without the administration of intravenous contrast. Dose modulation, iterative reconstruction, and/or weight based adjustment of the mA/kV was utilized to reduce the radiation dose to as low as reasonably achievable. COMPARISON: CT head without contrast 02/08/2021 HISTORY: ORDERING SYSTEM PROVIDED HISTORY: ams TECHNOLOGIST PROVIDED HISTORY: Has a \"code stroke\" or \"stroke alert\" been called? ->No Reason for exam:->ams Decision Support Exception->Emergency Medical Condition (MA) What reading provider will be dictating this exam?->CRC FINDINGS: BRAIN/VENTRICLES: There is no acute intracranial hemorrhage, mass effect or midline shift. No abnormal extra-axial fluid collection. The gray-white differentiation is maintained without evidence of an acute infarct. There is unchanged ventricular dilation without sulcal effacement, compatible with parenchymal atrophy. There is moderate burden of white matter hypoattenuation, which is typically seen in the setting of chronic small vessel ischemic disease. ORBITS: There is unchanged bilateral globe calcification. SINUSES: Mild sinus mucosal thickening is partially imaged in the right maxillary sinus. The visualized portions of the mastoid air cells are clear. SOFT TISSUES/SKULL:  No acute abnormality of the visualized skull or soft tissues. No acute intracranial process is identified.      Xr Chest Portable    Result Date: 3/9/2021  EXAMINATION: ONE XRAY VIEW OF THE CHEST 3/9/2021 1:58 pm COMPARISON: 03/04/2021 HISTORY: ORDERING SYSTEM PROVIDED HISTORY: concern for aspiration pneumonia TECHNOLOGIST PROVIDED HISTORY: Reason for exam:->concern for aspiration pneumonia What reading provider will be dictating this exam?->CRC FINDINGS: New small nonspecific patchy opacity is noted in the left mid lung adjacent to the cardiac margin. Cardiac silhouette size is at upper limits of normal.  No vascular congestion. No pneumothorax or pleural effusion. No acute displaced fracture. Degenerative change in the regional skeleton. New patchy opacity in left mid lung may be atelectasis or small focus of edema. Differential includes a new small region of pneumonitis. Xr Chest Portable    Result Date: 3/4/2021  EXAMINATION: ONE XRAY VIEW OF THE CHEST 3/4/2021 8:35 am COMPARISON: One-view chest x-ray 02/08/2021 HISTORY: ORDERING SYSTEM PROVIDED HISTORY: weakness, Possible Stroke TECHNOLOGIST PROVIDED HISTORY: Reason for exam:->weakness, Possible Stroke What reading provider will be dictating this exam?->CRC FINDINGS: The cardiac silhouette is within normal limits. The mediastinal contours are within normal limits. Mild central peribronchial thickening appears similar to decreased compared to the previous exam.  No consolidations are identified. No significant pleural effusions or pneumothorax. Osseous degenerative changes are present. EKG leads overlie the chest and abdomen. No acute cardiopulmonary process is identified. Health Maintenance Due   Topic Date Due    COVID-19 Vaccine (1) Never done    Hepatitis B vaccine (1 of 3 - Risk Recombivax 3-dose series) Never done    DTaP/Tdap/Td vaccine (1 - Tdap) Never done    Shingles Vaccine (1 of 2) Never done    DEXA (modify frequency per FRAX score)  Never done    Annual Wellness Visit (AWV)  Never done    Breast cancer screen  01/22/2020    Pneumococcal 65+ yrs at Risk Vaccine (2 of 2 - PCV13) 10/18/2020         Assessment and Roberta presents today to establish care.      Dashawn Never was seen today for new patient and follow-up from hospital.    Diagnoses and all orders for this visit:    Mental retardation  -     MN DISCHARGE MEDS RECONCILED W/ CURRENT OUTPATIENT MED if overdue, as this is important in assessing for undiagnosed pathology, especially cancer, as well as protecting against potentially harmful/life threatening disease. Care taker verbalizes understanding and agrees with above counseling, assessment and plan. All questions answered. Laurie Ronquillo DO  4/1/2021  1:34 PM        Post-Discharge Transitional Care Management Services or Hospital Follow Up    Ольга Awad   YOB: 1955    Date of Office Visit:  3/31/2021  Date of Hospital Admission: 3/4/21  Date of Hospital Discharge: 3/16/21  Risk of hospital readmission (high >=14%.  Medium >=10%) :Readmission Risk Score: 27      Care management risk score Rising risk (score 2-5) and Complex Care (Scores >=6): 1     Non face to face  following discharge, date last encounter closed (first attempt may have been earlier): *No documented post hospital discharge outreach found in the last 14 days    Call initiated 2 business days of discharge: *No response recorded in the last 14 days    Patient Active Problem List   Diagnosis    Mental retardation    Hypothyroidism    Blindness of both eyes    ESRD (end stage renal disease) on dialysis ([facility] [address]    Macrocytosis    Behavior disturbance    Peripheral vascular disease ([facility] [address]    Altered mental state    Sepsis ([facility] [address]       No Known Allergies    Medications listed as ordered at the time of discharge from hospital   [address] Medication Instructions OZIEL:    Printed on:04/01/21 7597   Medication Information                      bumetanide (BUMEX) 1 MG tablet  1 tablet by PEG Tube route daily as needed (swelling)             docusate (COLACE) 50 MG/5ML liquid  10 mLs by Per G Tube route 2 times daily             famotidine (PEPCID) 20 MG tablet  1 tablet by PEG Tube route 2 times daily             ferrous sulfate (IRON 325) 325 (65 Fe) MG tablet  1 tablet by PEG Tube route daily (with breakfast)             folic acid (FOLVITE) 1 MG tablet  1 tablet by PEG Tube route daily             levothyroxine (SYNTHROID) 150 MCG tablet  1 tablet by PEG Tube route Daily             metoprolol tartrate (LOPRESSOR) 25 MG tablet  3 tablets by PEG Tube route 2 times daily             montelukast (SINGULAIR) 10 MG tablet  1 tablet by PEG Tube route nightly             OLANZapine zydis (ZYPREXA ZYDIS) 15 MG disintegrating tablet  Place 1 tablet under the tongue 2 times daily Given with Zyprexa zydis 5 mg twice a day             OLANZapine zydis (ZYPREXA) 5 MG disintegrating tablet  Place 1 tablet under the tongue 2 times daily Given with Zyprexa zydis 15 mg twice a day             PARoxetine (PAXIL) 10 MG tablet  1 tablet by PEG Tube route 2 times daily             polyethylene glycol (GLYCOLAX) 17 g packet  17 g by Per G Tube route daily             thiamine mononitrate 100 MG tablet  Take 1 tablet by mouth daily             valproic acid (DEPACON) 250 MG/5ML SOLN oral solution  20 mLs by Per G Tube route 2 times daily             vitamin D (CHOLECALCIFEROL) 25 MCG (1000 UT) TABS tablet  1 tablet by PEG Tube route daily                   Medications marked \"taking\" at this time  Outpatient Medications Marked as Taking for the 3/31/21 encounter (Office Visit) with Rose Nichols, DO   Medication Sig Dispense Refill    bumetanide (BUMEX) 1 MG tablet 1 tablet by PEG Tube route daily as needed (swelling) 30 tablet 3    docusate (COLACE) 50 MG/5ML liquid 10 mLs by Per G Tube route 2 times daily 300 mL 0    famotidine (PEPCID) 20 MG tablet 1 tablet by PEG Tube route 2 times daily 60 tablet 3    ferrous sulfate (IRON 325) 325 (65 Fe) MG tablet 1 tablet by PEG Tube route daily (with breakfast) 30 tablet 3    folic acid (FOLVITE) 1 MG tablet 1 tablet by PEG Tube route daily 30 tablet 3    levothyroxine (SYNTHROID) 150 MCG tablet 1 tablet by PEG Tube route Daily 30 tablet 3    metoprolol tartrate (LOPRESSOR) 25 MG tablet 3 tablets by PEG Tube route 2 times daily 60 tablet 3    montelukast (SINGULAIR) 10 MG tablet 1 tablet by PEG Tube route nightly 30 tablet 3    PARoxetine (PAXIL) 10 MG tablet 1 tablet by PEG Tube route 2 times daily 30 tablet 3    vitamin D (CHOLECALCIFEROL) 25 MCG (1000 UT) TABS tablet 1 tablet by PEG Tube route daily 30 tablet 0    valproic acid (DEPACON) 250 MG/5ML SOLN oral solution 20 mLs by Per G Tube route 2 times daily 300 mL 0    OLANZapine zydis (ZYPREXA ZYDIS) 15 MG disintegrating tablet Place 1 tablet under the tongue 2 times daily Given with Zyprexa zydis 5 mg twice a day 30 tablet 3    OLANZapine zydis (ZYPREXA) 5 MG disintegrating tablet Place 1 tablet under the tongue 2 times daily Given with Zyprexa zydis 15 mg twice a day 30 tablet 3    polyethylene glycol (GLYCOLAX) 17 g packet 17 g by Per G Tube route daily 527 g 1    thiamine mononitrate 100 MG tablet Take 1 tablet by mouth daily 30 tablet 0        Medications patient taking as of now reconciled against medications ordered at time of hospital discharge: Yes    Chief Complaint   Patient presents with    New Patient     Patient brought in by  / Patient is now at [address] [facility] from Hospital       History of Present illness - Follow up of Hospital diagnosis(es):   Mental retardation  Legal blindness  Hypovolemic shock secondary to hypovolemia secondary to poor oral intake  Acute hypoxic respiratory insufficiency secondary to aspiration pneumonia  Dysphagia  BEENA on CKD    Inpatient course: Discharge summary reviewed- see chart.     Interval history/Current status: see HPI above    Medical Decision Making: high complexity    Shwetha Fisher DO  4/1/2021  1:34 PM

## 2021-04-01 ENCOUNTER — TELEPHONE (OUTPATIENT)
Dept: FAMILY MEDICINE CLINIC | Age: 66
End: 2021-04-01

## 2021-04-01 DIAGNOSIS — E55.9 VITAMIN D DEFICIENCY: ICD-10-CM

## 2021-04-02 PROBLEM — Z99.2 END STAGE RENAL FAILURE ON DIALYSIS (HCC): Status: ACTIVE | Noted: 2017-07-13

## 2021-04-02 PROBLEM — N18.9 ANEMIA DUE TO CHRONIC KIDNEY DISEASE: Status: ACTIVE | Noted: 2021-04-02

## 2021-04-02 PROBLEM — E55.9 VITAMIN D DEFICIENCY: Status: ACTIVE | Noted: 2021-04-02

## 2021-04-02 PROBLEM — Z93.1 S/P PERCUTANEOUS ENDOSCOPIC GASTROSTOMY (PEG) TUBE PLACEMENT (HCC): Status: ACTIVE | Noted: 2021-04-02

## 2021-04-02 PROBLEM — D63.1 ANEMIA DUE TO CHRONIC KIDNEY DISEASE: Status: ACTIVE | Noted: 2021-04-02

## 2021-04-02 PROBLEM — N18.6 END STAGE RENAL FAILURE ON DIALYSIS (HCC): Status: ACTIVE | Noted: 2017-07-13

## 2021-04-02 PROBLEM — J18.9 PNEUMONIA DUE TO INFECTIOUS ORGANISM: Status: ACTIVE | Noted: 2017-01-08

## 2021-04-02 PROBLEM — K21.9 GASTROESOPHAGEAL REFLUX DISEASE WITHOUT ESOPHAGITIS: Status: ACTIVE | Noted: 2021-04-02

## 2021-04-02 PROBLEM — I10 ESSENTIAL HYPERTENSION: Status: ACTIVE | Noted: 2021-04-02

## 2021-04-02 PROBLEM — N17.9 ACUTE RENAL FAILURE SYNDROME (HCC): Status: ACTIVE | Noted: 2017-01-15

## 2021-04-15 ENCOUNTER — PROCEDURE VISIT (OUTPATIENT)
Dept: PODIATRY | Age: 66
End: 2021-04-15
Payer: MEDICARE

## 2021-04-15 VITALS — TEMPERATURE: 98.2 F | SYSTOLIC BLOOD PRESSURE: 112 MMHG | DIASTOLIC BLOOD PRESSURE: 78 MMHG

## 2021-04-15 DIAGNOSIS — M79.675 PAIN IN LEFT TOE(S): ICD-10-CM

## 2021-04-15 DIAGNOSIS — F79 INTELLECTUAL DISABILITY: ICD-10-CM

## 2021-04-15 DIAGNOSIS — B35.1 TINEA UNGUIUM: Primary | ICD-10-CM

## 2021-04-15 DIAGNOSIS — I73.9 PERIPHERAL VASCULAR DISEASE, UNSPECIFIED (HCC): ICD-10-CM

## 2021-04-15 DIAGNOSIS — M79.674 PAIN IN TOE OF RIGHT FOOT: ICD-10-CM

## 2021-04-15 DIAGNOSIS — R26.2 DIFFICULTY WALKING: ICD-10-CM

## 2021-04-15 PROCEDURE — 11721 DEBRIDE NAIL 6 OR MORE: CPT | Performed by: PODIATRIST

## 2021-04-15 NOTE — PROGRESS NOTES
lesion/ulceration   Skin   Callus   Musculoskeletal:     1st MPJ ROM normal, Bilateral.  Muscle strength 5/5, Bilateral.  Pain present upon palpation of toenails   Bilateral., Bilateral.  Ankle ROM normal,Bilateral.    Dorsally contracted digits , Bilateral.     Vascular: There are class B findings absent posterior tibialis pulses lack of hair growth thickened nails discoloration skin is discolored skin is shiny cool to touch to toes    Neurological:  Sensation present to light touch to level of digits, Bilateral    Foot Exam    General  General Appearance: appears stated age and healthy   Assistance: wheelchair use       Right Foot/Ankle     Inspection and Palpation  Skin Exam: skin changes and abnormal color; Neurovascular  Dorsalis pedis: 1+  Posterior tibial: absent    Muscle Strength  Ankle dorsiflexion: 1  Ankle plantar flexion: 1      Left Foot/Ankle      Inspection and Palpation  Skin Exam: skin changes and abnormal color; Neurovascular  Dorsalis pedis: 1+  Posterior tibial: absent    Muscle Strength  Ankle dorsiflexion: 1  Ankle plantar flexion: 1    Range of Motion    Normal left ankle ROM             Right Ankle Exam   Right ankle exam is normal.  Swelling: mild    Muscle Strength   Dorsiflexion:  1/5  Plantar flexion:  1/5  Anterior tibial:  1/5  Posterior tibial:  1/5  Gastrocsoleus:  1/5  Peroneal muscle:  1/5    Tests   Anterior drawer: physiological laxity  Varus tilt: physiological laxity      Left Ankle Exam   Left ankle exam is normal.  Swelling: mild    Range of Motion   The patient has normal left ankle ROM. Muscle Strength   Dorsiflexion:  1/5   Plantar flexion:  1/5   Anterior tibial:  1/5   Gastrocsoleus:  1/5  Peroneal muscle:  1/5    Tests   Anterior drawer: physiological laxity  Varus tilt: physiological laxity          Q7   []Yes    []No                Q8   [x]Yes    []No                     Q9   []Yes    []No  Assessment:  78 y.o. female with:   1. Tinea unguium    2.  Pain

## 2021-04-26 RX ORDER — CHOLECALCIFEROL TAB 125 MCG (5000 UNIT) 125 MCG (5000 UT)
TAB
Qty: 28 TABLET | Refills: 0 | Status: SHIPPED
Start: 2021-04-26 | End: 2021-05-16

## 2021-04-30 ENCOUNTER — OFFICE VISIT (OUTPATIENT)
Dept: FAMILY MEDICINE CLINIC | Age: 66
End: 2021-04-30
Payer: MEDICARE

## 2021-04-30 VITALS
HEART RATE: 80 BPM | RESPIRATION RATE: 15 BRPM | SYSTOLIC BLOOD PRESSURE: 98 MMHG | HEIGHT: 63 IN | BODY MASS INDEX: 31.89 KG/M2 | WEIGHT: 180 LBS | DIASTOLIC BLOOD PRESSURE: 70 MMHG | OXYGEN SATURATION: 94 % | TEMPERATURE: 97 F

## 2021-04-30 DIAGNOSIS — I10 ESSENTIAL HYPERTENSION: ICD-10-CM

## 2021-04-30 DIAGNOSIS — E03.9 ACQUIRED HYPOTHYROIDISM: Primary | ICD-10-CM

## 2021-04-30 DIAGNOSIS — F73 PROFOUND INTELLECTUAL DISABILITY: ICD-10-CM

## 2021-04-30 PROBLEM — N18.6 END STAGE RENAL FAILURE ON DIALYSIS (HCC): Status: RESOLVED | Noted: 2017-07-13 | Resolved: 2021-04-30

## 2021-04-30 PROBLEM — Z99.2 END STAGE RENAL FAILURE ON DIALYSIS (HCC): Status: RESOLVED | Noted: 2017-07-13 | Resolved: 2021-04-30

## 2021-04-30 PROBLEM — J18.9 PNEUMONIA DUE TO INFECTIOUS ORGANISM: Status: RESOLVED | Noted: 2017-01-08 | Resolved: 2021-04-30

## 2021-04-30 PROBLEM — A41.9 SEPSIS (HCC): Status: RESOLVED | Noted: 2021-03-04 | Resolved: 2021-04-30

## 2021-04-30 PROBLEM — N18.9 CHRONIC KIDNEY DISEASE: Status: ACTIVE | Noted: 2017-07-13

## 2021-04-30 PROCEDURE — G8417 CALC BMI ABV UP PARAM F/U: HCPCS | Performed by: INTERNAL MEDICINE

## 2021-04-30 PROCEDURE — 3017F COLORECTAL CA SCREEN DOC REV: CPT | Performed by: INTERNAL MEDICINE

## 2021-04-30 PROCEDURE — 4040F PNEUMOC VAC/ADMIN/RCVD: CPT | Performed by: INTERNAL MEDICINE

## 2021-04-30 PROCEDURE — G8427 DOCREV CUR MEDS BY ELIG CLIN: HCPCS | Performed by: INTERNAL MEDICINE

## 2021-04-30 PROCEDURE — 99213 OFFICE O/P EST LOW 20 MIN: CPT | Performed by: INTERNAL MEDICINE

## 2021-04-30 PROCEDURE — 1036F TOBACCO NON-USER: CPT | Performed by: INTERNAL MEDICINE

## 2021-04-30 PROCEDURE — 1090F PRES/ABSN URINE INCON ASSESS: CPT | Performed by: INTERNAL MEDICINE

## 2021-04-30 PROCEDURE — 1123F ACP DISCUSS/DSCN MKR DOCD: CPT | Performed by: INTERNAL MEDICINE

## 2021-04-30 PROCEDURE — G8400 PT W/DXA NO RESULTS DOC: HCPCS | Performed by: INTERNAL MEDICINE

## 2021-04-30 RX ORDER — OLANZAPINE 20 MG/1
TABLET, ORALLY DISINTEGRATING ORAL
Status: ON HOLD | COMMUNITY
Start: 2021-04-21 | End: 2021-08-18 | Stop reason: HOSPADM

## 2021-04-30 ASSESSMENT — PATIENT HEALTH QUESTIONNAIRE - PHQ9
SUM OF ALL RESPONSES TO PHQ9 QUESTIONS 1 & 2: 0
SUM OF ALL RESPONSES TO PHQ QUESTIONS 1-9: 0
1. LITTLE INTEREST OR PLEASURE IN DOING THINGS: 0

## 2021-04-30 NOTE — PROGRESS NOTES
[facility] PRIMARY CARE  [address] [place] [address]  Dept: [phone]  Dept Fax: [phone]     NAME: Oskar Mcguire        :  1955        MRN:  [id number]    Chief Complaint   Patient presents with   Katarzyna Sidhu Follow-up     ONE MOS FOLLOW UP / HERE WITH CAREGIVER        Subjective     History of Present Illness  Oskar Mcguire is a 78 y.o. female who presents today for routine follow up and medication refill. Patient is non-verbal at baseline and unable to provide history. She has her caregiver Denise Lopez present with her today who provides history. There are no complaints or concerns today, patient is doing well. She is tolerating her feeds and is at her baseline. Review of Systems  Please see HPI above. All bolded are positive. Unable to obtain full review of systems as patient is unresponsive and nonverbal at baseline. Per staff members that work with the patient there are no reported fevers, chills, diarrhea, constipation, edema, or signs of distress or pain.     Past Medical Hx:  Past Medical History:   Diagnosis Date    Acute kidney injury ([facility] 01/15/2017    d/t Vancomycin, on Dialysis [facility] right chest    Blind in both eyes     Essential hypertension 2021    Gastroesophageal reflux disease without esophagitis 2021    Hemodialysis patient ([facility]     Hyperthyroidism     MR (mental retardation)     Osteoarthritis     Peripheral vascular disease ([facility]     Pneumonia 2017    Pneumonia due to infectious organism 2017    Sepsis ([facility] 3/4/2021       Past Surgical Hx:  Past Surgical History:   Procedure Laterality Date    BRONCHOSCOPY  02/10/2017    CHEST TUBE INSERTION Right 2017    GASTROSTOMY TUBE PLACEMENT N/A 3/7/2021    EGD PEG TUBE PLACEMENT performed by Svitlana Dailey MD at [facility] Right 2017    tessio insertion     OTHER SURGICAL HISTORY  2017    PEG tube insertion       Family Hx:  No family history on file.     Social Hx:  Social History     Tobacco Use    Smoking status: Never Smoker    Smokeless tobacco: Never Used   Substance Use Topics    Alcohol use: No       Home Medications:  Current Outpatient Medications   Medication Sig Dispense Refill    metoprolol tartrate (LOPRESSOR) 25 MG tablet 3 tablets by PEG Tube route 2 times daily 60 tablet 3    OLANZapine zydis (ZYPREXA) 20 MG disintegrating tablet       NATURAL VITAMIN D-3 125 MCG (5000 UT) TABS tablet TAKE 1 TABLET VIA G-TUBE ONCE DAILY 28 tablet 0    bumetanide (BUMEX) 1 MG tablet 1 tablet by PEG Tube route daily as needed (swelling) 30 tablet 3    docusate (COLACE) 50 MG/5ML liquid 10 mLs by Per G Tube route 2 times daily 300 mL 0    famotidine (PEPCID) 20 MG tablet 1 tablet by PEG Tube route 2 times daily 60 tablet 3    ferrous sulfate (IRON 325) 325 (65 Fe) MG tablet 1 tablet by PEG Tube route daily (with breakfast) 30 tablet 3    folic acid (FOLVITE) 1 MG tablet 1 tablet by PEG Tube route daily 30 tablet 3    levothyroxine (SYNTHROID) 150 MCG tablet 1 tablet by PEG Tube route Daily 30 tablet 3    montelukast (SINGULAIR) 10 MG tablet 1 tablet by PEG Tube route nightly 30 tablet 3    PARoxetine (PAXIL) 10 MG tablet 1 tablet by PEG Tube route 2 times daily 30 tablet 3    vitamin D (CHOLECALCIFEROL) 25 MCG (1000 UT) TABS tablet 1 tablet by PEG Tube route daily 30 tablet 0    valproic acid (DEPACON) 250 MG/5ML SOLN oral solution 20 mLs by Per G Tube route 2 times daily 300 mL 0    OLANZapine zydis (ZYPREXA ZYDIS) 15 MG disintegrating tablet Place 1 tablet under the tongue 2 times daily Given with Zyprexa zydis 5 mg twice a day 30 tablet 3    OLANZapine zydis (ZYPREXA) 5 MG disintegrating tablet Place 1 tablet under the tongue 2 times daily Given with Zyprexa zydis 15 mg twice a day 30 tablet 3    polyethylene glycol (GLYCOLAX) 17 g packet 17 g by Per G Tube route daily 527 g 1    thiamine mononitrate 100 MG tablet Take 1 tablet by mouth daily 30 tablet 0     No current facility-administered medications for this visit. Allergies:  No Known Allergies    Objective     Vitals:    04/30/21 1011   BP: 98/70   Pulse: 80   Resp: 15   Temp: 97 °F (36.1 °C)   TempSrc: Temporal   SpO2: 94%   Weight: 180 lb (81.6 kg)   Height: 5' 3\" (1.6 m)        Physical Exam  General: Awake but has eyes closed most of the time, non verbal at baseline, wheelchair bound, No acute distress  Head: Normocephalic, atraumatic  Eyes: conjunctivae/corneas clear  Mouth: Mucous membranes moist with no pharyngeal exudate or erythema  Neck: no JVD, no adenopathy, no carotid bruit, supple, symmetrical, trachea midline  Back: symmetric, No CVA tenderness. Lungs: clear to auscultation bilaterally without wheezes, rales, or rhonchi  Heart: regular rate and rhythm, S1, S2 normal, no murmur, click, rub or gallop  Abdomen: soft, non-tender; bowel sounds normal; no masses,  no organomegaly, PEG tube site is clean and dry with no surrounding erythema. Extremities: atraumatic, no cyanosis, no edema  Skin: dry without rashes or lesions  Neurologic: non verbal and wheel chair bound. Does not follow commands. At her baseline.     Labs:  Lab Results   Component Value Date    WBC 12.1 (H) 03/16/2021    HGB 9.3 (L) 03/16/2021    HCT 31.1 (L) 03/16/2021     03/16/2021     (L) 03/16/2021    K 5.0 03/16/2021    CL 98 03/16/2021    CREATININE 0.7 03/16/2021    BUN 19 03/16/2021    CO2 26 03/16/2021    GLUCOSE 136 (H) 03/16/2021    ALT 18 03/16/2021    AST 27 03/16/2021    INR 1.1 03/08/2021     Lab Results   Component Value Date    TSH 12.170 (H) 02/08/2021     Lab Results   Component Value Date    TRIG 70 11/12/2020    TRIG 86 05/09/2019    TRIG 92 04/08/2019     Lab Results   Component Value Date    HDL 46 11/12/2020    HDL 65 05/09/2019    HDL 62 04/08/2019     Lab Results   Component Value Date    LDLCALC 59 11/12/2020 to treatment; patient and/or guardian verbalizes understanding, agrees, feels comfortable with and wishes to proceed with above treatment plan. Advised patient to call Hilda Stringer new medication issues, and read all Rx info from pharmacy to assure aware of all possible risks and side effects of any medication before taking. Reviewed age and gender appropriate health screening exams and vaccinations. Advised patient regarding importance of keeping up with recommended health maintenance and to schedule as soon as possible if overdue, as this is important in assessing for undiagnosed pathology, especially cancer, as well as protecting against potentially harmful/life threatening disease. Patient verbalizes understanding and agrees with above counseling, assessment and plan. All questions answered.     Oleksandr Hairston,    4/30/2021  11:03 AM

## 2021-05-16 RX ORDER — CHOLECALCIFEROL TAB 125 MCG (5000 UNIT) 125 MCG (5000 UT)
TAB
Qty: 28 TABLET | Refills: 0 | Status: SHIPPED
Start: 2021-05-16 | Stop reason: SDUPTHER

## 2021-05-17 RX ORDER — VALPROIC ACID 250 MG/5ML
1000 SOLUTION ORAL 2 TIMES DAILY
Qty: 300 ML | Refills: 0 | OUTPATIENT
Start: 2021-05-17

## 2021-05-19 RX ORDER — VALPROIC ACID 250 MG/5ML
1000 SOLUTION ORAL 2 TIMES DAILY
Qty: 300 ML | Refills: 0 | OUTPATIENT
Start: 2021-05-19

## 2021-05-19 NOTE — TELEPHONE ENCOUNTER
Last Appointment:  4/30/2021  Future Appointments   Date Time Provider Marissa Law   7/15/2021 10:00 AM EUGENIO Bryant [facility]   7/30/2021 10:30 AM DO ANAI Patel [facility]

## 2021-06-02 RX ORDER — VALPROIC ACID 250 MG/5ML
1000 SOLUTION ORAL 2 TIMES DAILY
Qty: 300 ML | Refills: 0 | OUTPATIENT
Start: 2021-06-02

## 2021-06-04 RX ORDER — LANOLIN ALCOHOL/MO/W.PET/CERES
CREAM (GRAM) TOPICAL
Qty: 28 TABLET | Refills: 5 | Status: SHIPPED
Start: 2021-06-04 | End: 2021-09-21

## 2021-06-04 RX ORDER — CHOLECALCIFEROL TAB 125 MCG (5000 UNIT) 125 MCG (5000 UT)
TAB
Qty: 28 TABLET | Refills: 5 | Status: SHIPPED
Start: 2021-06-04 | End: 2021-09-21 | Stop reason: ALTCHOICE

## 2021-06-04 NOTE — TELEPHONE ENCOUNTER
Last Appointment:  4/30/2021  Future Appointments   Date Time Provider Marissa Law   7/15/2021 10:00 AM EUGENIO Lee [facility]   7/28/2021 10:00 AM Barry Early MD [facility]   7/30/2021 10:30 AM Alanis Zaldivar  [address]

## 2021-06-04 NOTE — TELEPHONE ENCOUNTER
Bethany spoke with Fabiola Ely at [facility] and advised that they would need to get the refill from Dr. Jasbir Curtis.

## 2021-07-02 ENCOUNTER — TELEPHONE (OUTPATIENT)
Dept: FAMILY MEDICINE CLINIC | Age: 66
End: 2021-07-02

## 2021-07-02 NOTE — TELEPHONE ENCOUNTER
Phone call from Reese at [facility].     Would like to know if patient should continue with vitamin B1 and if they could get a new script for twice daily. She would also like to know if order could be given for home health  RN or LPN twice daily to give supplement. Can reach Reese at [phone]. Please advise.

## 2021-07-13 NOTE — TELEPHONE ENCOUNTER
I spoke with Nura Chaidez at [facility]. She will need an order for a home health nurse to give all supplements. We can fax order to [phone].

## 2021-07-13 NOTE — TELEPHONE ENCOUNTER
There are refills for the B1 prescription that was previously sent. It looks like there were 5 refills on it just a couple months ago. Are they able to take a verbal order that I'm okay with a home health RN or LPN to give the supplement? They already give her all her other medications. ..not sure why they would need a separate order for the B1 supplement.

## 2021-07-14 ENCOUNTER — HOSPITAL ENCOUNTER (OUTPATIENT)
Age: 66
Discharge: HOME | End: 2021-07-14
Payer: MEDICARE

## 2021-07-14 DIAGNOSIS — E03.9 ACQUIRED HYPOTHYROIDISM: ICD-10-CM

## 2021-07-14 DIAGNOSIS — F73 PROFOUND INTELLECTUAL DISABILITY: Primary | ICD-10-CM

## 2021-07-14 LAB — T4 FREE: 1.04 NG/DL (ref 0.93–1.7)

## 2021-07-14 PROCEDURE — 36415 COLL VENOUS BLD VENIPUNCTURE: CPT

## 2021-07-14 PROCEDURE — 84439 ASSAY OF FREE THYROXINE: CPT

## 2021-07-30 ENCOUNTER — OFFICE VISIT (OUTPATIENT)
Dept: FAMILY MEDICINE CLINIC | Age: 66
End: 2021-07-30
Payer: MEDICARE

## 2021-07-30 VITALS
DIASTOLIC BLOOD PRESSURE: 60 MMHG | HEIGHT: 63 IN | WEIGHT: 145 LBS | TEMPERATURE: 97.3 F | SYSTOLIC BLOOD PRESSURE: 104 MMHG | RESPIRATION RATE: 12 BRPM | BODY MASS INDEX: 25.69 KG/M2 | OXYGEN SATURATION: 96 % | HEART RATE: 68 BPM

## 2021-07-30 DIAGNOSIS — F03.91 DEMENTIA WITH BEHAVIORAL DISTURBANCE, UNSPECIFIED DEMENTIA TYPE: ICD-10-CM

## 2021-07-30 DIAGNOSIS — N18.9 CHRONIC KIDNEY DISEASE, UNSPECIFIED CKD STAGE: ICD-10-CM

## 2021-07-30 DIAGNOSIS — E03.9 ACQUIRED HYPOTHYROIDISM: ICD-10-CM

## 2021-07-30 DIAGNOSIS — F73 PROFOUND INTELLECTUAL DISABILITY: Primary | ICD-10-CM

## 2021-07-30 DIAGNOSIS — E55.9 VITAMIN D DEFICIENCY: ICD-10-CM

## 2021-07-30 DIAGNOSIS — K21.9 GASTROESOPHAGEAL REFLUX DISEASE WITHOUT ESOPHAGITIS: ICD-10-CM

## 2021-07-30 PROCEDURE — 4040F PNEUMOC VAC/ADMIN/RCVD: CPT | Performed by: INTERNAL MEDICINE

## 2021-07-30 PROCEDURE — 1123F ACP DISCUSS/DSCN MKR DOCD: CPT | Performed by: INTERNAL MEDICINE

## 2021-07-30 PROCEDURE — 3017F COLORECTAL CA SCREEN DOC REV: CPT | Performed by: INTERNAL MEDICINE

## 2021-07-30 PROCEDURE — G8400 PT W/DXA NO RESULTS DOC: HCPCS | Performed by: INTERNAL MEDICINE

## 2021-07-30 PROCEDURE — 1036F TOBACCO NON-USER: CPT | Performed by: INTERNAL MEDICINE

## 2021-07-30 PROCEDURE — G8417 CALC BMI ABV UP PARAM F/U: HCPCS | Performed by: INTERNAL MEDICINE

## 2021-07-30 PROCEDURE — 99213 OFFICE O/P EST LOW 20 MIN: CPT | Performed by: INTERNAL MEDICINE

## 2021-07-30 PROCEDURE — 1090F PRES/ABSN URINE INCON ASSESS: CPT | Performed by: INTERNAL MEDICINE

## 2021-07-30 PROCEDURE — G8427 DOCREV CUR MEDS BY ELIG CLIN: HCPCS | Performed by: INTERNAL MEDICINE

## 2021-07-30 NOTE — PROGRESS NOTES
[facility] PRIMARY CARE  [address] [place] [id number]  Dept: [phone]  Dept Fax: [phone]     NAME: Delma Srinivasan        :  1955        MRN:  [id number]    Chief Complaint   Patient presents with    3 Month Follow-Up     3 month follow up. No concerns per caregiver. Subjective     History of Present Illness  Delam Srinivasan is a 78 y.o. female who presents today for routine follow up and medication refill. Patient is non-verbal at baseline and unable to provide history. She has her caregiver present with her today who provides history. There are no complaints or concerns today, patient is doing well. She is tolerating her feeds and is at her baseline. Review of Systems  Please see HPI above. All bolded are positive. Unable to obtain full review of systems as patient is unresponsive and nonverbal at baseline. Per staff members that work with the patient there are no reported fevers, chills, diarrhea, constipation, edema, or signs of distress or pain.     Past Medical Hx:  Past Medical History:   Diagnosis Date    Acute kidney injury ([address] 01/15/2017    d/t Vancomycin, on Dialysis [facility] right chest    Blind in both eyes     Essential hypertension 2021    Gastroesophageal reflux disease without esophagitis 2021    Hemodialysis patient ([address]     Hyperthyroidism     MR (mental retardation)     Osteoarthritis     Peripheral vascular disease ([address]     Pneumonia 2017    Pneumonia due to infectious organism 2017    Sepsis ([address] 3/4/2021       Past Surgical Hx:  Past Surgical History:   Procedure Laterality Date    BRONCHOSCOPY  02/10/2017    CHEST TUBE INSERTION Right 2017    GASTROSTOMY TUBE PLACEMENT N/A 3/7/2021    EGD PEG TUBE PLACEMENT performed by Charlie Earl MD at [facility] [address] Right 2017    tessio insertion     OTHER SURGICAL HISTORY  2017 PEG tube insertion       Family Hx:  No family history on file.     Social Hx:  Social History     Tobacco Use    Smoking status: Never Smoker    Smokeless tobacco: Never Used   Substance Use Topics    Alcohol use: No       Home Medications:  Current Outpatient Medications   Medication Sig Dispense Refill    levothyroxine (SYNTHROID) 150 MCG tablet Take 1 tablet by mouth Daily 30 tablet 5    metoprolol tartrate (LOPRESSOR) 25 MG tablet Take 3 tablets by mouth 2 times daily 180 tablet 5    NATURAL VITAMIN D-3 125 MCG (5000 UT) TABS tablet TAKE 1 TABLET VIA G-TUBE ONCE DAILY 28 tablet 5    thiamine 100 MG tablet TAKE 1 TABLET VIA G-TUBE DAILY 28 tablet 5    OLANZapine zydis (ZYPREXA) 20 MG disintegrating tablet Taking once daily      docusate (COLACE) 50 MG/5ML liquid 10 mLs by Per G Tube route 2 times daily 300 mL 0    famotidine (PEPCID) 20 MG tablet 1 tablet by PEG Tube route 2 times daily 60 tablet 3    ferrous sulfate (IRON 325) 325 (65 Fe) MG tablet 1 tablet by PEG Tube route daily (with breakfast) 30 tablet 3    folic acid (FOLVITE) 1 MG tablet 1 tablet by PEG Tube route daily 30 tablet 3    montelukast (SINGULAIR) 10 MG tablet 1 tablet by PEG Tube route nightly 30 tablet 3    PARoxetine (PAXIL) 10 MG tablet 1 tablet by PEG Tube route 2 times daily 30 tablet 3    vitamin D (CHOLECALCIFEROL) 25 MCG (1000 UT) TABS tablet 1 tablet by PEG Tube route daily 30 tablet 0    valproic acid (DEPACON) 250 MG/5ML SOLN oral solution 20 mLs by Per G Tube route 2 times daily 300 mL 0    OLANZapine zydis (ZYPREXA ZYDIS) 15 MG disintegrating tablet Place 1 tablet under the tongue 2 times daily Given with Zyprexa zydis 5 mg twice a day (Patient taking differently: Place 15 mg under the tongue 2 times daily Taking once daily) 30 tablet 3    OLANZapine zydis (ZYPREXA) 5 MG disintegrating tablet Place 1 tablet under the tongue 2 times daily Given with Zyprexa zydis 15 mg twice a day (Patient taking differently: Place 5 mg under the tongue 2 times daily Taking once daily) 30 tablet 3    polyethylene glycol (GLYCOLAX) 17 g packet 17 g by Per G Tube route daily 527 g 1    bumetanide (BUMEX) 1 MG tablet 1 tablet by PEG Tube route daily as needed (swelling) 30 tablet 3     No current facility-administered medications for this visit. Allergies:  No Known Allergies    Objective     Vitals:    07/30/21 1037   BP: 104/60   Site: Left Upper Arm   Pulse: 68   Resp: 12   Temp: 97.3 °F (36.3 °C)   TempSrc: Temporal   SpO2: 96%   Weight: 145 lb (65.8 kg)   Height: 5' 3\" (1.6 m)        Physical Exam  General: Awake but has eyes closed most of the time, non verbal at baseline, wheelchair bound, No acute distress  Head: Normocephalic, atraumatic  Eyes: conjunctivae/corneas clear  Mouth: Mucous membranes moist with no pharyngeal exudate or erythema  Neck: no JVD, no adenopathy, no carotid bruit, supple, symmetrical, trachea midline  Back: symmetric, No CVA tenderness. Lungs: clear to auscultation bilaterally without wheezes, rales, or rhonchi  Heart: regular rate and rhythm, S1, S2 normal, no murmur, click, rub or gallop  Abdomen: soft, non-tender; bowel sounds normal; no masses,  no organomegaly, PEG tube site is clean and dry with no surrounding erythema. Extremities: atraumatic, no cyanosis, no edema  Skin: dry without rashes or lesions  Neurologic: non verbal and wheel chair bound. Does not follow commands. At her baseline.     Labs:  Lab Results   Component Value Date    WBC 12.1 (H) 03/16/2021    HGB 9.3 (L) 03/16/2021    HCT 31.1 (L) 03/16/2021     03/16/2021     (L) 03/16/2021    K 5.0 03/16/2021    CL 98 03/16/2021    CREATININE 0.7 03/16/2021    BUN 19 03/16/2021    CO2 26 03/16/2021    GLUCOSE 136 (H) 03/16/2021    ALT 18 03/16/2021    AST 27 03/16/2021    INR 1.1 03/08/2021     Lab Results   Component Value Date    TSH 12.170 (H) 02/08/2021     Lab Results   Component Value Date    TRIG 70 11/12/2020 TRIG 86 05/09/2019    TRIG 92 04/08/2019     Lab Results   Component Value Date    HDL 46 11/12/2020    HDL 65 05/09/2019    HDL 62 04/08/2019     Lab Results   Component Value Date    LDLCALC 59 11/12/2020    LDLCALC 106 (H) 05/09/2019    LDLCALC 92 04/08/2019     Lab Results   Component Value Date    LABA1C 5.4 03/09/2021     Lab Results   Component Value Date    INR 1.1 03/08/2021    INR 1.7 03/04/2021    INR 1.1 02/17/2017    PROTIME 12.4 03/08/2021    PROTIME 18.8 (H) 03/04/2021    PROTIME 11.6 02/17/2017      *All recent labs were reviewed. Please see electronic chart for a more comprehensive set of labs    Radiology:  No results found. Assessment and Plan     Patient is a 78 y.o. female who presented to the office for follow up. Full problem list is as follows:  Patient Active Problem List   Diagnosis    Profound intellectual disability    Hypothyroidism    Impairment level: total impairment of both eyes    Hyperglycemia    Nonsustained ventricular tachycardia (HCC)    Acute renal failure syndrome (HCC)    Macrocytosis    Disruptive behavior disorder    Ingrowing nail    Peripheral vascular disease (HCC)    Altered mental state    Onychomycosis    Anemia due to chronic kidney disease    Essential hypertension    Vitamin D deficiency    Gastroesophageal reflux disease without esophagitis    S/P percutaneous endoscopic gastrostomy (PEG) tube placement (HCC)    Chronic kidney disease    Dementia with behavioral disturbance, unspecified dementia type ([address]       Maria Dolores Cintron was seen today for 3 month follow-up.     Diagnoses and all orders for this visit:    Profound intellectual disability    Dementia with behavioral disturbance, unspecified dementia type ([address]    Acquired hypothyroidism    Vitamin D deficiency    Gastroesophageal reflux disease without esophagitis    Chronic kidney disease, unspecified CKD stage           Educational materials and/or home exercises printed for patient's review and were included in patient instructions on his/her After Visit Summary and given to patient at the end of visit. Counseled regarding above diagnosis, including possible risks and complications, especially if left uncontrolled. Counseled regarding the possible side effects, risks, benefits and alternatives to treatment; patient and/or guardian verbalizes understanding, agrees, feels comfortable with and wishes to proceed with above treatment plan. Advised patient to call Aakash Duqueutz new medication issues, and read all Rx info from pharmacy to assure aware of all possible risks and side effects of any medication before taking. Reviewed age and gender appropriate health screening exams and vaccinations. Advised patient regarding importance of keeping up with recommended health maintenance and to schedule as soon as possible if overdue, as this is important in assessing for undiagnosed pathology, especially cancer, as well as protecting against potentially harmful/life threatening disease. Patient verbalizes understanding and agrees with above counseling, assessment and plan. All questions answered.     Margaret Cunha DO   7/30/2021  10:59 AM

## 2021-08-03 ENCOUNTER — APPOINTMENT (OUTPATIENT)
Dept: GENERAL RADIOLOGY | Age: 66
End: 2021-08-03
Payer: MEDICARE

## 2021-08-03 ENCOUNTER — HOSPITAL ENCOUNTER (EMERGENCY)
Age: 66
Discharge: NURSING, REHAB OR LONG-TERM CARE | End: 2021-08-04
Attending: EMERGENCY MEDICINE
Payer: MEDICARE

## 2021-08-03 DIAGNOSIS — K94.23 PEG TUBE MALFUNCTION (HCC): Primary | ICD-10-CM

## 2021-08-03 PROCEDURE — 99284 EMERGENCY DEPT VISIT MOD MDM: CPT

## 2021-08-03 PROCEDURE — 6360000004 HC RX CONTRAST MEDICATION: Performed by: RADIOLOGY

## 2021-08-03 PROCEDURE — 74018 RADEX ABDOMEN 1 VIEW: CPT

## 2021-08-03 PROCEDURE — 43762 RPLC GTUBE NO REVJ TRC: CPT

## 2021-08-03 RX ADMIN — DIATRIZOATE MEGLUMINE AND DIATRIZOATE SODIUM 20 ML: 600; 100 SOLUTION ORAL; RECTAL at 21:55

## 2021-08-04 VITALS
BODY MASS INDEX: 25.69 KG/M2 | TEMPERATURE: 97.8 F | HEART RATE: 75 BPM | SYSTOLIC BLOOD PRESSURE: 156 MMHG | WEIGHT: 145 LBS | RESPIRATION RATE: 16 BRPM | OXYGEN SATURATION: 94 % | HEIGHT: 63 IN | DIASTOLIC BLOOD PRESSURE: 63 MMHG

## 2021-08-04 NOTE — ED PROVIDER NOTES
pulses 2+ bilaterally. GI:  Soft, nondistended, normal bowel sounds, nontender, no organomegaly, no mass, no rebound, no guarding. PEG tube ostomy site open and some mild bleeding present   :  No costovertebral angle tenderness   Musculoskeletal:  No edema, no tenderness, no deformities. Integument:  Well hydrated, no visible rash. Adequate perfusion. Neurologic:  Alert & oriented x 3, CN 2-12 normal no focal deficits noted. Psychiatric:  Speech and behavior at baseline (swings at people but redirectable)      -------------------------------------------------- RESULTS -------------------------------------------------  I have personally reviewed all laboratory and imaging results for this patient. Results are listed below. LABS:  No results found for this visit on 08/03/21. RADIOLOGY:  Interpreted by Radiologist.  XR ABDOMEN (KUB) (SINGLE AP VIEW)   Final Result   Nonspecific bowel gas pattern. Gastrostomy tube in the stomach.           ------------------------- NURSING NOTES AND VITALS REVIEWED ---------------------------  The nursing notes within the ED encounter and vital signs as below have been reviewed by myself. BP (!) 105/58   Pulse 81   Temp 97.8 °F (36.6 °C) (Oral)   Resp 16   Ht 5' 3\" (1.6 m)   Wt 145 lb (65.8 kg)   SpO2 94%   BMI 25.69 kg/m²   Oxygen Saturation Interpretation: Normal      The patients available past medical records and past encounters were reviewed. ------------------------------ ED COURSE/MEDICAL DECISION MAKING----------------------  Medications   diatrizoate meglumine-sodium (GASTROGRAFIN) 66-10 % solution 20 mL (20 mLs Oral Given 8/3/21 3779)           Procedures:   PROCEDURE  8/3/21       Time: 9:00 PM EDT  FEEDING TUBE REPLACEMENT  Risks, benefits and alternatives (for applicable procedures below) described. Performed By: Letty Fernando DO. Indication: Tube pulled out by patient.    Informed consent: Consent unable to be obtained due to patient's condition. .  Local Anesthesia:  not required/indicated. Procedure: A feeding tube was replaced using a 20 Martiniquais gastrostomy tube and the bulb was inflated using 15 cc of sterile water. Tube was secured to skin pre-attached rubber skin bumper device                                      Post-Procedure: Tube position confirmed by x-ray with contrast injection. Patient tolerated the procedure well. Complications:  None. Medical Decision Making:    PEG tube in place. Abdominal binder placed to prevent patient from pulling at peg tube      This patient's ED course included: re-evaluation prior to disposition and a personal history and physicial eaxmination    This patient has remained hemodynamically stable and improved during their ED course. Re-Evaluations:  Time: 11:10 PM EDT   Re-evaluation. Patients symptoms are improving  Repeat physical examination is improved      Consultations:   none    Critical Care: none    I, Chanel Isidro, DO, am the Primary Provider of Record    Counseling: The emergency provider has spoken with the patient and caretaker and discussed todays results, in addition to providing specific details for the plan of care and counseling regarding the diagnosis and prognosis.   Questions are answered at this time and they are agreeable with the plan.    --------------------------- IMPRESSION AND DISPOSITION ---------------------------------    IMPRESSION  1. PEG tube malfunction ([facility]        DISPOSITION  Disposition: Discharge to home  Patient condition is stable              Muriel Jeffrey DO  08/03/21 4316

## 2021-08-04 NOTE — ED NOTES
Pt resting in bed. No concerns at this time. Caregiver at bedside.      Franco Fagan RN  08/04/21 0104

## 2021-08-04 NOTE — ED NOTES
[facility] called for transport back to [address] [place] [address]. Approx ETA 2 hours. 0130.      Kaitlynn Carrillo RN  08/03/21 4073

## 2021-08-04 NOTE — ED NOTES
Bed: 25  Expected date:   Expected time:   Means of arrival:   Comments:  [address] Ezequiel Lora RN  08/03/21 2004

## 2021-08-05 ENCOUNTER — HOSPITAL ENCOUNTER (EMERGENCY)
Age: 66
Discharge: HOME | End: 2021-08-06
Attending: STUDENT IN AN ORGANIZED HEALTH CARE EDUCATION/TRAINING PROGRAM
Payer: MEDICARE

## 2021-08-05 ENCOUNTER — APPOINTMENT (OUTPATIENT)
Dept: GENERAL RADIOLOGY | Age: 66
End: 2021-08-05
Payer: MEDICARE

## 2021-08-05 ENCOUNTER — APPOINTMENT (OUTPATIENT)
Dept: CT IMAGING | Age: 66
End: 2021-08-05
Payer: MEDICARE

## 2021-08-05 DIAGNOSIS — Z46.59 ENCOUNTER FOR CARE RELATED TO FEEDING TUBE: Primary | ICD-10-CM

## 2021-08-05 LAB
ALBUMIN SERPL-MCNC: 2.8 G/DL (ref 3.5–5.2)
ALP BLD-CCNC: 78 U/L (ref 35–104)
ALT SERPL-CCNC: 9 U/L (ref 0–32)
ANION GAP SERPL CALCULATED.3IONS-SCNC: 8 MMOL/L (ref 7–16)
AST SERPL-CCNC: 29 U/L (ref 0–31)
BASOPHILS ABSOLUTE: 0.03 E9/L (ref 0–0.2)
BASOPHILS RELATIVE PERCENT: 0.3 % (ref 0–2)
BILIRUB SERPL-MCNC: 0.2 MG/DL (ref 0–1.2)
BUN BLDV-MCNC: 25 MG/DL (ref 6–23)
CALCIUM SERPL-MCNC: 10.1 MG/DL (ref 8.6–10.2)
CHLORIDE BLD-SCNC: 98 MMOL/L (ref 98–107)
CO2: 27 MMOL/L (ref 22–29)
CREAT SERPL-MCNC: 0.7 MG/DL (ref 0.5–1)
EOSINOPHILS ABSOLUTE: 0.11 E9/L (ref 0.05–0.5)
EOSINOPHILS RELATIVE PERCENT: 1.3 % (ref 0–6)
GFR AFRICAN AMERICAN: >60
GFR NON-AFRICAN AMERICAN: >60 ML/MIN/1.73
GLUCOSE BLD-MCNC: 108 MG/DL (ref 74–99)
HCT VFR BLD CALC: 38.8 % (ref 34–48)
HEMOGLOBIN: 12.2 G/DL (ref 11.5–15.5)
IMMATURE GRANULOCYTES #: 0.08 E9/L
IMMATURE GRANULOCYTES %: 0.9 % (ref 0–5)
LACTIC ACID: 0.9 MMOL/L (ref 0.5–2.2)
LIPASE: 34 U/L (ref 13–60)
LYMPHOCYTES ABSOLUTE: 1.87 E9/L (ref 1.5–4)
LYMPHOCYTES RELATIVE PERCENT: 21.3 % (ref 20–42)
MCH RBC QN AUTO: 31.5 PG (ref 26–35)
MCHC RBC AUTO-ENTMCNC: 31.4 % (ref 32–34.5)
MCV RBC AUTO: 100.3 FL (ref 80–99.9)
MONOCYTES ABSOLUTE: 1.13 E9/L (ref 0.1–0.95)
MONOCYTES RELATIVE PERCENT: 12.9 % (ref 2–12)
NEUTROPHILS ABSOLUTE: 5.54 E9/L (ref 1.8–7.3)
NEUTROPHILS RELATIVE PERCENT: 63.3 % (ref 43–80)
PDW BLD-RTO: 15.4 FL (ref 11.5–15)
PLATELET # BLD: 259 E9/L (ref 130–450)
PMV BLD AUTO: 10.2 FL (ref 7–12)
POTASSIUM REFLEX MAGNESIUM: 5.1 MMOL/L (ref 3.5–5)
RBC # BLD: 3.87 E12/L (ref 3.5–5.5)
SODIUM BLD-SCNC: 133 MMOL/L (ref 132–146)
TOTAL PROTEIN: 8.6 G/DL (ref 6.4–8.3)
WBC # BLD: 8.8 E9/L (ref 4.5–11.5)

## 2021-08-05 PROCEDURE — 99283 EMERGENCY DEPT VISIT LOW MDM: CPT

## 2021-08-05 PROCEDURE — 83690 ASSAY OF LIPASE: CPT

## 2021-08-05 PROCEDURE — 74177 CT ABD & PELVIS W/CONTRAST: CPT

## 2021-08-05 PROCEDURE — 74018 RADEX ABDOMEN 1 VIEW: CPT

## 2021-08-05 PROCEDURE — 6360000004 HC RX CONTRAST MEDICATION: Performed by: RADIOLOGY

## 2021-08-05 PROCEDURE — 2580000003 HC RX 258: Performed by: RADIOLOGY

## 2021-08-05 PROCEDURE — 6360000004 HC RX CONTRAST MEDICATION: Performed by: STUDENT IN AN ORGANIZED HEALTH CARE EDUCATION/TRAINING PROGRAM

## 2021-08-05 PROCEDURE — 80053 COMPREHEN METABOLIC PANEL: CPT

## 2021-08-05 PROCEDURE — 83605 ASSAY OF LACTIC ACID: CPT

## 2021-08-05 PROCEDURE — 85025 COMPLETE CBC W/AUTO DIFF WBC: CPT

## 2021-08-05 RX ORDER — SODIUM CHLORIDE 0.9 % (FLUSH) 0.9 %
10 SYRINGE (ML) INJECTION PRN
Status: COMPLETED | OUTPATIENT
Start: 2021-08-05 | End: 2021-08-05

## 2021-08-05 RX ADMIN — IOHEXOL 50 ML: 240 INJECTION, SOLUTION INTRATHECAL; INTRAVASCULAR; INTRAVENOUS; ORAL at 19:30

## 2021-08-05 RX ADMIN — IOPAMIDOL 75 ML: 755 INJECTION, SOLUTION INTRAVENOUS at 22:57

## 2021-08-05 RX ADMIN — Medication 10 ML: at 22:54

## 2021-08-05 RX ADMIN — DIATRIZOATE MEGLUMINE AND DIATRIZOATE SODIUM 60 ML: 600; 100 SOLUTION ORAL; RECTAL at 16:59

## 2021-08-05 NOTE — ED PROVIDER NOTES
Olivia Ricardo is a 78 y.o. female with a PMHx significant for MR, non-verbal who presents for evaluation of feeding tube problem, beginning prior to arrival.  The complaint has been persistent, moderate in severity, and worsened by nothing. The patient is non-verbal with history of MR and the history is obtained from the caregiver at bedside. Notes that the patient was brought in two days prior for feeding tube replacement. Since returning to the facility she continues to have difficulty with feeds. They note that the feed just comes back out. The history is provided by the patient and medical records. Review of Systems   Unable to perform ROS: Patient nonverbal        Physical Exam  Vitals and nursing note reviewed. Constitutional:       General: She is not in acute distress. Appearance: Normal appearance. She is well-developed. She is not ill-appearing. HENT:      Head: Normocephalic and atraumatic. Right Ear: External ear normal.      Left Ear: External ear normal.   Eyes:      Conjunctiva/sclera: Conjunctivae normal.   Cardiovascular:      Rate and Rhythm: Normal rate and regular rhythm. Pulmonary:      Effort: Pulmonary effort is normal. No respiratory distress. Breath sounds: Normal breath sounds. Abdominal:      General: There is no distension. Palpations: Abdomen is soft. Tenderness: There is no abdominal tenderness. Comments: Site around tube without erythema or drainage. Musculoskeletal:         General: No deformity. Normal range of motion. Cervical back: Normal range of motion and neck supple. Skin:     General: Skin is warm. Coloration: Skin is not jaundiced or pale. Neurological:      Mental Status: She is alert. Mental status is at baseline. Procedures     MDM     ED Course as of Aug 08 1042   Thu Aug 05, 2021   0351 Imaging concerning for obstruction. Will order some additional labs and CT abdomen pelvis at this point time. XR ABDOMEN (KUB) (SINGLE AP VIEW) [BB]   2231 Patient was signed out to me by Dr. Ruth Ann Agosto. Please see Dr. Vickie Almaguer note for complete history and physical. Briefly, patient presents for evaluation of PEG tube. Signed out pending CT AP. [JA]   Fri Aug 06, 2021   0020 No acute findings on abdominal CT. I discussed findings with caregiver at bedside and advised PCP follow-up. [JA]      ED Course User Index  [BB] DO Pablo  [JA] MD Татьяна Hernándezo presents to the ED for evaluation of feeding tube problem. Workup in the ED revealed feeding tube that workds however appears to have residual. Imaging obtained to confirm placement. KUB then ordered to rule out obstruction which demonstrated non-specific bowel gas patterns with large amount of retained contrast throughout the bowel loops. At that time it was decided to add additional labs and imaging to rule out obstruction. Patient signed out to oncoming physician pending imaging results. CT was negative for findings of obstruction. Patient continues to be non-toxic on re-evaluation. Findings were discussed with the patient's care giver and reasons to immediately return to the ED were articulated to them. They will follow-up with their PCP.    --------------------------------------------- PAST HISTORY ---------------------------------------------  Past Medical History:  has a past medical history of Acute kidney injury ([facility] [address] Blind in both eyes, Essential hypertension, Gastroesophageal reflux disease without esophagitis, Hemodialysis patient ([facility] [address] Hyperthyroidism, MR (mental retardation), Osteoarthritis, Peripheral vascular disease ([facility] [address] Pneumonia, Pneumonia due to infectious organism, and Sepsis ([facility] [address]     Past Surgical History:  has a past surgical history that includes other surgical history (Right, 01/17/2017); other surgical history (01/25/2017); chest tube insertion (Right, 02/09/2017); bronchoscopy (02/10/2017); and Gastrostomy tube placement (N/A, 3/7/2021). Social History:  reports that she has never smoked. She has never used smokeless tobacco. She reports that she does not drink alcohol and does not use drugs. Family History: family history is not on file. The patients home medications have been reviewed. Allergies: Patient has no known allergies.     -------------------------------------------------- RESULTS -------------------------------------------------  Labs:  Results for orders placed or performed during the hospital encounter of 08/05/21   CBC Auto Differential   Result Value Ref Range    WBC 8.8 4.5 - 11.5 E9/L    RBC 3.87 3.50 - 5.50 E12/L    Hemoglobin 12.2 11.5 - 15.5 g/dL    Hematocrit 38.8 34.0 - 48.0 %    .3 (H) 80.0 - 99.9 fL    MCH 31.5 26.0 - 35.0 pg    MCHC 31.4 (L) 32.0 - 34.5 %    RDW 15.4 (H) 11.5 - 15.0 fL    Platelets 375 742 - 272 E9/L    MPV 10.2 7.0 - 12.0 fL    Neutrophils % 63.3 43.0 - 80.0 %    Immature Granulocytes % 0.9 0.0 - 5.0 %    Lymphocytes % 21.3 20.0 - 42.0 %    Monocytes % 12.9 (H) 2.0 - 12.0 %    Eosinophils % 1.3 0.0 - 6.0 %    Basophils % 0.3 0.0 - 2.0 %    Neutrophils Absolute 5.54 1.80 - 7.30 E9/L    Immature Granulocytes # 0.08 E9/L    Lymphocytes Absolute 1.87 1.50 - 4.00 E9/L    Monocytes Absolute 1.13 (H) 0.10 - 0.95 E9/L    Eosinophils Absolute 0.11 0.05 - 0.50 E9/L    Basophils Absolute 0.03 0.00 - 0.20 E9/L   Comprehensive Metabolic Panel w/ Reflex to MG   Result Value Ref Range    Sodium 133 132 - 146 mmol/L    Potassium reflex Magnesium 5.1 (H) 3.5 - 5.0 mmol/L    Chloride 98 98 - 107 mmol/L    CO2 27 22 - 29 mmol/L    Anion Gap 8 7 - 16 mmol/L    Glucose 108 (H) 74 - 99 mg/dL    BUN 25 (H) 6 - 23 mg/dL    CREATININE 0.7 0.5 - 1.0 mg/dL    GFR Non-African American >60 >=60 mL/min/1.73    GFR African American >60     Calcium 10.1 8.6 - 10.2 mg/dL    Total Protein 8.6 (H) 6.4 - 8.3 g/dL    Albumin 2.8 (L) 3.5 - 5.2 g/dL    Total Bilirubin 0.2 0.0 - 1.2 mg/dL    Alkaline Phosphatase 78 35 - 104 U/L    ALT 9 0 - 32 U/L    AST 29 0 - 31 U/L   Lipase   Result Value Ref Range    Lipase 34 13 - 60 U/L   Lactic Acid, Plasma   Result Value Ref Range    Lactic Acid 0.9 0.5 - 2.2 mmol/L       Radiology:  CT ABDOMEN PELVIS W IV CONTRAST Additional Contrast? Oral (Through tube)   Final Result   No evidence of bowel obstruction. Large hiatal hernia. Small bilateral pleural effusions and bilateral basilar atelectasis. Fatty lesion in the left adnexa consistent with dermoid measuring   approximately 2.8 cm in diameter. XR ABDOMEN (KUB) (SINGLE AP VIEW)   Final Result   Nonspecific bowel gas pattern with large amount of retained contrast   throughout the bowel loops. XR ABDOMEN FOR NG/OG/NE TUBE PLACEMENT   Final Result   1. Satisfactory position of the gastrostomy tube within the stomach.             ------------------------- NURSING NOTES AND VITALS REVIEWED ---------------------------  Date / Time Roomed:  8/5/2021  2:19 PM  ED Bed Assignment:  VIDAL/VIDAL    The nursing notes within the ED encounter and vital signs as below have been reviewed. /68   Pulse 76   Temp 98 °F (36.7 °C) (Temporal)   Resp 16   Wt 140 lb (63.5 kg)   SpO2 95%   BMI 24.80 kg/m²   Oxygen Saturation Interpretation: Normal      ------------------------------------------ PROGRESS NOTES ------------------------------------------  10:44 AM EDT  I have spoken with the patient and discussed todays results, in addition to providing specific details for the plan of care and counseling regarding the diagnosis and prognosis. Their questions are answered at this time and they are agreeable with the plan. I discussed at length with them reasons for immediate return here for re evaluation. They will followup with their primary care physician by calling their office tomorrow.       --------------------------------- ADDITIONAL PROVIDER NOTES ---------------------------------  At this time the patient is without objective evidence of an acute process requiring hospitalization or inpatient management. They have remained hemodynamically stable throughout their entire ED visit and are stable for discharge with outpatient follow-up. The plan has been discussed in detail and they are aware of the specific conditions for emergent return, as well as the importance of follow-up. Discharge Medication List as of 8/6/2021  1:33 AM          Diagnosis:  1. Encounter for care related to feeding tube        Disposition:  Patient's disposition: Discharge to facility  Patient's condition is stable.          Gardenia Ormond, DO  08/08/21 1046

## 2021-08-05 NOTE — ED PROVIDER NOTES
FIRST PROVIDER CONTACT ASSESSMENT NOTE                                                                                                Department of Emergency Medicine                                                      First Provider Note  21  12:14 PM EDT  NAME: Amrita Hong  : 1955  MRN: [id number]    Chief Complaint: No chief complaint on file. History of Present Illness:   Amrita Hong is a 78 y.o. female who presents to the ED for feeding tube issue. The patient pulled it out on Tuesday she was seen here in the ED and her feeding tube was replaced. Staff is reporting that since the tube was replaced both medications and food are coming back up and not staying down. Focused Physical Exam:  VS:    ED Triage Vitals   BP Temp Temp src Pulse Resp SpO2 Height Weight   -- -- -- -- -- -- -- --        General: Alert and in no apparent distress. Medical History:  has a past medical history of Acute kidney injury ([address] Blind in both eyes, Essential hypertension, Gastroesophageal reflux disease without esophagitis, Hemodialysis patient ([address] Hyperthyroidism, MR (mental retardation), Osteoarthritis, Peripheral vascular disease ([address] Pneumonia, Pneumonia due to infectious organism, and Sepsis ([address] Surgical History:  has a past surgical history that includes other surgical history (Right, 2017); other surgical history (2017); chest tube insertion (Right, 2017); bronchoscopy (02/10/2017); and Gastrostomy tube placement (N/A, 3/7/2021). Social History:  reports that she has never smoked. She has never used smokeless tobacco. She reports that she does not drink alcohol and does not use drugs. Family History: family history is not on file. Allergies: Patient has no known allergies.      Initial Plan of Care:  Initiate Treatment-Testing, Proceed toTreatment Area When Bed Available for ED Attending/MLP to Continue Care    -------------------------------------------------END OF FIRST PROVIDER CONTACT ASSESSMENT NOTE--------------------------------------------------------  Electronically signed by Citlalli Jeronimo PA-C   DD: 8/5/21        Citlalli Jeronimo PA-C  08/05/21 1215

## 2021-08-06 VITALS
BODY MASS INDEX: 24.8 KG/M2 | DIASTOLIC BLOOD PRESSURE: 68 MMHG | RESPIRATION RATE: 16 BRPM | OXYGEN SATURATION: 95 % | HEART RATE: 76 BPM | WEIGHT: 140 LBS | SYSTOLIC BLOOD PRESSURE: 118 MMHG | TEMPERATURE: 98 F

## 2021-08-06 NOTE — ED NOTES
Pt had large liquid, green BM.  and nurse cleaned pt, change linen, changed gown.      Yun Zhong RN  08/05/21 2100

## 2021-08-12 ENCOUNTER — OFFICE VISIT (OUTPATIENT)
Dept: FAMILY MEDICINE CLINIC | Age: 66
End: 2021-08-12
Payer: MEDICARE

## 2021-08-12 VITALS
HEIGHT: 63 IN | RESPIRATION RATE: 21 BRPM | WEIGHT: 150 LBS | OXYGEN SATURATION: 95 % | BODY MASS INDEX: 26.58 KG/M2 | TEMPERATURE: 97.9 F | HEART RATE: 98 BPM | DIASTOLIC BLOOD PRESSURE: 68 MMHG | SYSTOLIC BLOOD PRESSURE: 128 MMHG

## 2021-08-12 DIAGNOSIS — Z93.1 S/P PERCUTANEOUS ENDOSCOPIC GASTROSTOMY (PEG) TUBE PLACEMENT (HCC): Primary | ICD-10-CM

## 2021-08-12 PROCEDURE — G8417 CALC BMI ABV UP PARAM F/U: HCPCS | Performed by: INTERNAL MEDICINE

## 2021-08-12 PROCEDURE — G8427 DOCREV CUR MEDS BY ELIG CLIN: HCPCS | Performed by: INTERNAL MEDICINE

## 2021-08-12 PROCEDURE — 1036F TOBACCO NON-USER: CPT | Performed by: INTERNAL MEDICINE

## 2021-08-12 PROCEDURE — 1090F PRES/ABSN URINE INCON ASSESS: CPT | Performed by: INTERNAL MEDICINE

## 2021-08-12 PROCEDURE — 4040F PNEUMOC VAC/ADMIN/RCVD: CPT | Performed by: INTERNAL MEDICINE

## 2021-08-12 PROCEDURE — 99213 OFFICE O/P EST LOW 20 MIN: CPT | Performed by: INTERNAL MEDICINE

## 2021-08-12 PROCEDURE — G8400 PT W/DXA NO RESULTS DOC: HCPCS | Performed by: INTERNAL MEDICINE

## 2021-08-12 PROCEDURE — 1123F ACP DISCUSS/DSCN MKR DOCD: CPT | Performed by: INTERNAL MEDICINE

## 2021-08-12 PROCEDURE — 3017F COLORECTAL CA SCREEN DOC REV: CPT | Performed by: INTERNAL MEDICINE

## 2021-08-12 RX ORDER — LORAZEPAM 2 MG/1
2 TABLET ORAL PRN
Status: ON HOLD | COMMUNITY
End: 2022-10-02 | Stop reason: HOSPADM

## 2021-08-12 NOTE — PROGRESS NOTES
[facility]  [address] [place] [id number]  Dept: [phone]  Dept Fax: [phone]     NAME: Татьяна Rivas        :  1955        MRN:  [id number]    Chief Complaint   Patient presents with    Follow-up     ER follow up       Subjective     History of Present Illness  Татьяна Rivas is a 78 y.o. female who presents today for routine follow up and medication refill. Patient is non-verbal at baseline and unable to provide history. She has her caregiver present with her today who provides history. Patient was recently in the emergency department after pulling out her PEG tube dislodgment. PEG tube was successfully replaced in the ED patient was discharged home. The caregiver states that she has had some mild difficulty patient meds and tube feeds since the PEG tube was replaced. They will be calling the surgeon who initially placed the PEG later today to schedule a follow-up appointment. Review of Systems  Please see HPI above. All bolded are positive. Unable to obtain full review of systems as patient is unresponsive and nonverbal at baseline. Per staff members that work with the patient there are no reported fevers, chills, diarrhea, constipation, edema, or signs of distress or pain.     Past Medical Hx:  Past Medical History:   Diagnosis Date    Acute kidney injury ([address] 01/15/2017    d/t Vancomycin, on Dialysis [facility] right chest    Blind in both eyes     Essential hypertension 2021    Gastroesophageal reflux disease without esophagitis 2021    Hemodialysis patient ([address]     Hyperthyroidism     MR (mental retardation)     Osteoarthritis     Peripheral vascular disease ([address]     Pneumonia 2017    Pneumonia due to infectious organism 2017    Sepsis ([address] 3/4/2021       Past Surgical Hx:  Past Surgical History:   Procedure Laterality Date    BRONCHOSCOPY  02/10/2017    CHEST TUBE INSERTION Right erythema  Neck: no JVD, no adenopathy, no carotid bruit, supple, symmetrical, trachea midline  Back: symmetric, No CVA tenderness. Lungs: clear to auscultation bilaterally without wheezes, rales, or rhonchi  Heart: regular rate and rhythm, S1, S2 normal, no murmur, click, rub or gallop  Abdomen: soft, non-tender; bowel sounds normal; no masses,  no organomegaly, PEG tube site is clean and dry with no surrounding erythema. Extremities: atraumatic, no cyanosis, no edema  Skin: dry without rashes or lesions  Neurologic: non verbal and wheel chair bound. Does not follow commands. At her baseline. Labs:  Lab Results   Component Value Date    WBC 10.8 08/17/2021    HGB 13.2 08/17/2021    HCT 42.9 08/17/2021     08/17/2021     08/17/2021    K 4.0 08/17/2021     08/17/2021    CREATININE 0.8 08/17/2021    BUN 27 (H) 08/17/2021    CO2 28 08/17/2021    GLUCOSE 114 (H) 08/17/2021    ALT 6 08/17/2021    AST 15 08/17/2021    INR 1.1 03/08/2021     Lab Results   Component Value Date    TSH 12.170 (H) 02/08/2021     Lab Results   Component Value Date    TRIG 70 11/12/2020    TRIG 86 05/09/2019    TRIG 92 04/08/2019     Lab Results   Component Value Date    HDL 46 11/12/2020    HDL 65 05/09/2019    HDL 62 04/08/2019     Lab Results   Component Value Date    LDLCALC 59 11/12/2020    LDLCALC 106 (H) 05/09/2019    LDLCALC 92 04/08/2019     Lab Results   Component Value Date    LABA1C 5.4 03/09/2021     Lab Results   Component Value Date    INR 1.1 03/08/2021    INR 1.7 03/04/2021    INR 1.1 02/17/2017    PROTIME 12.4 03/08/2021    PROTIME 18.8 (H) 03/04/2021    PROTIME 11.6 02/17/2017      *All recent labs were reviewed. Please see electronic chart for a more comprehensive set of labs    Radiology:  No results found. Assessment and Plan     Patient is a 78 y.o. female who presented to the office for follow up.    Full problem list is as follows:  Patient Active Problem List   Diagnosis    Profound intellectual disability    Hypothyroidism    Impairment level: total impairment of both eyes    Hyperglycemia    Nonsustained ventricular tachycardia (HCC)    Acute renal failure syndrome (HCC)    Macrocytosis    Disruptive behavior disorder    Ingrowing nail    Peripheral vascular disease (HCC)    Altered mental state    Onychomycosis    Anemia due to chronic kidney disease    Essential hypertension    Vitamin D deficiency    Gastroesophageal reflux disease without esophagitis    S/P percutaneous endoscopic gastrostomy (PEG) tube placement (HCC)    Chronic kidney disease    Dementia with behavioral disturbance, unspecified dementia type ([address]    Gastrostomy tube dysfunction ([address]       George Aguillon was seen today for follow-up. Diagnoses and all orders for this visit:    S/P percutaneous endoscopic gastrostomy (PEG) tube placement [facility])    Patient's caregiver was encouraged to call the surgeon who initially placed her PEG tube and schedule a follow-up appointment for evaluation. Educational materials and/or home exercises printed for patient's review and were included in patient instructions on his/her After Visit Summary and given to patient at the end of visit. Counseled regarding above diagnosis, including possible risks and complications, especially if left uncontrolled. Counseled regarding the possible side effects, risks, benefits and alternatives to treatment; patient and/or guardian verbalizes understanding, agrees, feels comfortable with and wishes to proceed with above treatment plan. Advised patient to call Jose Hou new medication issues, and read all Rx info from pharmacy to assure aware of all possible risks and side effects of any medication before taking. Reviewed age and gender appropriate health screening exams and vaccinations.   Advised patient regarding importance of keeping up with recommended health maintenance and to schedule as soon as possible if overdue, as this is important in assessing for undiagnosed pathology, especially cancer, as well as protecting against potentially harmful/life threatening disease. Patient verbalizes understanding and agrees with above counseling, assessment and plan. All questions answered.     Lina Broussard DO   8/17/2021  6:07 PM

## 2021-08-16 ENCOUNTER — HOSPITAL ENCOUNTER (INPATIENT)
Age: 66
LOS: 2 days | Discharge: OTHER INSTITUTION | DRG: 393 | End: 2021-08-18
Attending: EMERGENCY MEDICINE | Admitting: INTERNAL MEDICINE
Payer: MEDICARE

## 2021-08-16 ENCOUNTER — APPOINTMENT (OUTPATIENT)
Dept: GENERAL RADIOLOGY | Age: 66
DRG: 393 | End: 2021-08-16
Payer: MEDICARE

## 2021-08-16 ENCOUNTER — APPOINTMENT (OUTPATIENT)
Dept: CT IMAGING | Age: 66
DRG: 393 | End: 2021-08-16
Payer: MEDICARE

## 2021-08-16 DIAGNOSIS — K44.9 HIATAL HERNIA: ICD-10-CM

## 2021-08-16 DIAGNOSIS — K94.23 GASTROSTOMY TUBE DYSFUNCTION (HCC): Primary | ICD-10-CM

## 2021-08-16 DIAGNOSIS — K92.0 HEMATEMESIS, PRESENCE OF NAUSEA NOT SPECIFIED: ICD-10-CM

## 2021-08-16 LAB
ALBUMIN SERPL-MCNC: 2.8 G/DL (ref 3.5–5.2)
ALP BLD-CCNC: 57 U/L (ref 35–104)
ALT SERPL-CCNC: 11 U/L (ref 0–32)
ANION GAP SERPL CALCULATED.3IONS-SCNC: 4 MMOL/L (ref 7–16)
AST SERPL-CCNC: 45 U/L (ref 0–31)
BASOPHILS ABSOLUTE: 0.06 E9/L (ref 0–0.2)
BASOPHILS RELATIVE PERCENT: 0.5 % (ref 0–2)
BILIRUB SERPL-MCNC: 0.2 MG/DL (ref 0–1.2)
BUN BLDV-MCNC: 27 MG/DL (ref 6–23)
CALCIUM SERPL-MCNC: 10 MG/DL (ref 8.6–10.2)
CHLORIDE BLD-SCNC: 103 MMOL/L (ref 98–107)
CO2: 31 MMOL/L (ref 22–29)
CREAT SERPL-MCNC: 0.7 MG/DL (ref 0.5–1)
EOSINOPHILS ABSOLUTE: 0.43 E9/L (ref 0.05–0.5)
EOSINOPHILS RELATIVE PERCENT: 3.5 % (ref 0–6)
GFR AFRICAN AMERICAN: >60
GFR NON-AFRICAN AMERICAN: >60 ML/MIN/1.73
GLUCOSE BLD-MCNC: 94 MG/DL (ref 74–99)
HCT VFR BLD CALC: 39.5 % (ref 34–48)
HEMOGLOBIN: 12.4 G/DL (ref 11.5–15.5)
IMMATURE GRANULOCYTES #: 0.19 E9/L
IMMATURE GRANULOCYTES %: 1.5 % (ref 0–5)
LIPASE: 39 U/L (ref 13–60)
LYMPHOCYTES ABSOLUTE: 2.53 E9/L (ref 1.5–4)
LYMPHOCYTES RELATIVE PERCENT: 20.3 % (ref 20–42)
MCH RBC QN AUTO: 32.5 PG (ref 26–35)
MCHC RBC AUTO-ENTMCNC: 31.4 % (ref 32–34.5)
MCV RBC AUTO: 103.4 FL (ref 80–99.9)
MONOCYTES ABSOLUTE: 1.3 E9/L (ref 0.1–0.95)
MONOCYTES RELATIVE PERCENT: 10.5 % (ref 2–12)
NEUTROPHILS ABSOLUTE: 7.93 E9/L (ref 1.8–7.3)
NEUTROPHILS RELATIVE PERCENT: 63.7 % (ref 43–80)
PDW BLD-RTO: 15.8 FL (ref 11.5–15)
PLATELET # BLD: 231 E9/L (ref 130–450)
PMV BLD AUTO: 11.3 FL (ref 7–12)
POTASSIUM REFLEX MAGNESIUM: 5.6 MMOL/L (ref 3.5–5)
PROCALCITONIN: 0.05 NG/ML (ref 0–0.08)
RBC # BLD: 3.82 E12/L (ref 3.5–5.5)
SODIUM BLD-SCNC: 138 MMOL/L (ref 132–146)
TOTAL PROTEIN: 8.5 G/DL (ref 6.4–8.3)
WBC # BLD: 12.4 E9/L (ref 4.5–11.5)

## 2021-08-16 PROCEDURE — 6360000002 HC RX W HCPCS: Performed by: EMERGENCY MEDICINE

## 2021-08-16 PROCEDURE — 74018 RADEX ABDOMEN 1 VIEW: CPT

## 2021-08-16 PROCEDURE — 71045 X-RAY EXAM CHEST 1 VIEW: CPT

## 2021-08-16 PROCEDURE — 85025 COMPLETE CBC W/AUTO DIFF WBC: CPT

## 2021-08-16 PROCEDURE — 6360000004 HC RX CONTRAST MEDICATION: Performed by: RADIOLOGY

## 2021-08-16 PROCEDURE — 83690 ASSAY OF LIPASE: CPT

## 2021-08-16 PROCEDURE — C9113 INJ PANTOPRAZOLE SODIUM, VIA: HCPCS | Performed by: EMERGENCY MEDICINE

## 2021-08-16 PROCEDURE — 80053 COMPREHEN METABOLIC PANEL: CPT

## 2021-08-16 PROCEDURE — 2060000000 HC ICU INTERMEDIATE R&B

## 2021-08-16 PROCEDURE — 99285 EMERGENCY DEPT VISIT HI MDM: CPT

## 2021-08-16 PROCEDURE — 99223 1ST HOSP IP/OBS HIGH 75: CPT | Performed by: INTERNAL MEDICINE

## 2021-08-16 PROCEDURE — 74176 CT ABD & PELVIS W/O CONTRAST: CPT

## 2021-08-16 PROCEDURE — 84145 PROCALCITONIN (PCT): CPT

## 2021-08-16 RX ORDER — BUMETANIDE 1 MG/1
1 TABLET ORAL DAILY PRN
Status: DISCONTINUED | OUTPATIENT
Start: 2021-08-16 | End: 2021-08-18 | Stop reason: HOSPADM

## 2021-08-16 RX ORDER — ONDANSETRON 2 MG/ML
4 INJECTION INTRAMUSCULAR; INTRAVENOUS EVERY 6 HOURS PRN
Status: DISCONTINUED | OUTPATIENT
Start: 2021-08-16 | End: 2021-08-18 | Stop reason: HOSPADM

## 2021-08-16 RX ORDER — SODIUM CHLORIDE 9 MG/ML
25 INJECTION, SOLUTION INTRAVENOUS PRN
Status: DISCONTINUED | OUTPATIENT
Start: 2021-08-16 | End: 2021-08-18 | Stop reason: HOSPADM

## 2021-08-16 RX ORDER — SODIUM CHLORIDE 0.9 % (FLUSH) 0.9 %
5-40 SYRINGE (ML) INJECTION EVERY 12 HOURS SCHEDULED
Status: DISCONTINUED | OUTPATIENT
Start: 2021-08-16 | End: 2021-08-18 | Stop reason: HOSPADM

## 2021-08-16 RX ORDER — FERROUS SULFATE 325(65) MG
325 TABLET ORAL
Status: DISCONTINUED | OUTPATIENT
Start: 2021-08-17 | End: 2021-08-18 | Stop reason: HOSPADM

## 2021-08-16 RX ORDER — OLANZAPINE 5 MG/1
5 TABLET, ORALLY DISINTEGRATING ORAL 2 TIMES DAILY
Status: DISCONTINUED | OUTPATIENT
Start: 2021-08-16 | End: 2021-08-18 | Stop reason: HOSPADM

## 2021-08-16 RX ORDER — LANOLIN ALCOHOL/MO/W.PET/CERES
100 CREAM (GRAM) TOPICAL DAILY
Status: DISCONTINUED | OUTPATIENT
Start: 2021-08-17 | End: 2021-08-18 | Stop reason: HOSPADM

## 2021-08-16 RX ORDER — LEVOFLOXACIN 5 MG/ML
750 INJECTION, SOLUTION INTRAVENOUS EVERY 24 HOURS
Status: DISCONTINUED | OUTPATIENT
Start: 2021-08-16 | End: 2021-08-17

## 2021-08-16 RX ORDER — POLYETHYLENE GLYCOL 3350 17 G/17G
17 POWDER, FOR SOLUTION ORAL DAILY
Status: DISCONTINUED | OUTPATIENT
Start: 2021-08-17 | End: 2021-08-18 | Stop reason: HOSPADM

## 2021-08-16 RX ORDER — PANTOPRAZOLE SODIUM 40 MG/10ML
40 INJECTION, POWDER, LYOPHILIZED, FOR SOLUTION INTRAVENOUS ONCE
Status: COMPLETED | OUTPATIENT
Start: 2021-08-16 | End: 2021-08-16

## 2021-08-16 RX ORDER — SODIUM CHLORIDE 0.9 % (FLUSH) 0.9 %
5-40 SYRINGE (ML) INJECTION PRN
Status: DISCONTINUED | OUTPATIENT
Start: 2021-08-16 | End: 2021-08-18 | Stop reason: HOSPADM

## 2021-08-16 RX ORDER — FOLIC ACID 1 MG/1
1 TABLET ORAL DAILY
Status: DISCONTINUED | OUTPATIENT
Start: 2021-08-17 | End: 2021-08-18 | Stop reason: HOSPADM

## 2021-08-16 RX ORDER — ACETAMINOPHEN 650 MG/1
650 SUPPOSITORY RECTAL EVERY 6 HOURS PRN
Status: DISCONTINUED | OUTPATIENT
Start: 2021-08-16 | End: 2021-08-18 | Stop reason: HOSPADM

## 2021-08-16 RX ORDER — ACETAMINOPHEN 325 MG/1
650 TABLET ORAL EVERY 6 HOURS PRN
Status: DISCONTINUED | OUTPATIENT
Start: 2021-08-16 | End: 2021-08-18 | Stop reason: HOSPADM

## 2021-08-16 RX ORDER — LORAZEPAM 1 MG/1
2 TABLET ORAL EVERY 4 HOURS PRN
Status: DISCONTINUED | OUTPATIENT
Start: 2021-08-16 | End: 2021-08-18 | Stop reason: HOSPADM

## 2021-08-16 RX ORDER — LEVOTHYROXINE SODIUM 0.07 MG/1
150 TABLET ORAL DAILY
Status: DISCONTINUED | OUTPATIENT
Start: 2021-08-17 | End: 2021-08-18 | Stop reason: HOSPADM

## 2021-08-16 RX ORDER — PAROXETINE 10 MG/1
10 TABLET, FILM COATED ORAL 2 TIMES DAILY
Status: DISCONTINUED | OUTPATIENT
Start: 2021-08-16 | End: 2021-08-18 | Stop reason: HOSPADM

## 2021-08-16 RX ORDER — POLYETHYLENE GLYCOL 3350 17 G/17G
17 POWDER, FOR SOLUTION ORAL DAILY PRN
Status: DISCONTINUED | OUTPATIENT
Start: 2021-08-16 | End: 2021-08-18 | Stop reason: HOSPADM

## 2021-08-16 RX ORDER — MONTELUKAST SODIUM 10 MG/1
10 TABLET ORAL NIGHTLY
Status: DISCONTINUED | OUTPATIENT
Start: 2021-08-16 | End: 2021-08-18 | Stop reason: HOSPADM

## 2021-08-16 RX ORDER — ONDANSETRON 4 MG/1
4 TABLET, ORALLY DISINTEGRATING ORAL EVERY 8 HOURS PRN
Status: DISCONTINUED | OUTPATIENT
Start: 2021-08-16 | End: 2021-08-18 | Stop reason: HOSPADM

## 2021-08-16 RX ORDER — VALPROIC ACID 250 MG/5ML
1000 SOLUTION ORAL 2 TIMES DAILY
Status: DISCONTINUED | OUTPATIENT
Start: 2021-08-16 | End: 2021-08-18 | Stop reason: HOSPADM

## 2021-08-16 RX ADMIN — DIATRIZOATE MEGLUMINE AND DIATRIZOATE SODIUM 120 ML: 660; 100 LIQUID ORAL; RECTAL at 18:05

## 2021-08-16 RX ADMIN — PANTOPRAZOLE SODIUM 40 MG: 40 INJECTION, POWDER, FOR SOLUTION INTRAVENOUS at 20:01

## 2021-08-16 NOTE — H&P
Sepsis ([facility] [address] 3/4/2021       Surgical History:  Past Surgical History:   Procedure Laterality Date    BRONCHOSCOPY  02/10/2017    CHEST TUBE INSERTION Right 02/09/2017    GASTROSTOMY TUBE PLACEMENT N/A 3/7/2021    EGD PEG TUBE PLACEMENT performed by Roseanna Morales MD at [facility] [address] Right 01/17/2017    tessio insertion     OTHER SURGICAL HISTORY  01/25/2017    PEG tube insertion       Medications Prior to Admission:    Prior to Admission medications    Medication Sig Start Date End Date Taking?  Authorizing Provider   LORazepam (ATIVAN) 2 MG tablet Take 2 mg by mouth. g tube    Historical Provider, MD   levothyroxine (SYNTHROID) 150 MCG tablet Take 1 tablet by mouth Daily 7/7/21 1/3/22  Mario Alberto Shoulders, DO   metoprolol tartrate (LOPRESSOR) 25 MG tablet Take 3 tablets by mouth 2 times daily 6/15/21 12/12/21  Mario Alberto Shoulders, DO   NATURAL VITAMIN D-3 125 MCG (5000 UT) TABS tablet TAKE 1 TABLET VIA G-TUBE ONCE DAILY 6/4/21   Mario Alberto Shoulders, DO   thiamine 100 MG tablet TAKE 1 TABLET VIA G-TUBE DAILY 6/4/21   Mario Alberto Shoulders, DO   NATURAL VITAMIN D-3 125 MCG (5000 UT) TABS tablet TAKE 1 TABLET VIA G-TUBE ONCE DAILY 5/16/21   RADHA Leon - CNP   OLANZapine zydis (ZYPREXA) 20 MG disintegrating tablet Taking once daily 4/21/21   Historical Provider, MD   bumetanide (BUMEX) 1 MG tablet 1 tablet by PEG Tube route daily as needed (swelling) 3/31/21 7/29/21  Mario Alberto Shoulders, DO   docusate (COLACE) 50 MG/5ML liquid 10 mLs by Per G Tube route 2 times daily 3/31/21   Mario Alberto Shoulders, DO   famotidine (PEPCID) 20 MG tablet 1 tablet by PEG Tube route 2 times daily 3/31/21   Mario Alberto Shoulders, DO   ferrous sulfate (IRON 325) 325 (65 Fe) MG tablet 1 tablet by PEG Tube route daily (with breakfast) 3/31/21   Mario Alberto Shoulders, DO   folic acid (FOLVITE) 1 MG tablet 1 tablet by PEG Tube route daily 3/31/21   Izabela N KwasDO lisa mukherjeest (SINGULAIR) 10 MG tablet 1 tablet by PEG Tube route nightly 3/31/21   Austin Hennessy DO   PARoxetine (PAXIL) 10 MG tablet 1 tablet by PEG Tube route 2 times daily 3/31/21   Austin Hennessy DO   vitamin D (CHOLECALCIFEROL) 25 MCG (1000 UT) TABS tablet 1 tablet by PEG Tube route daily 3/31/21   Austin Hennessy DO   valproic acid (DEPACON) 250 MG/5ML SOLN oral solution 20 mLs by Per G Tube route 2 times daily 3/16/21   Jennifer Diaz DO   OLANZapine zydis (ZYPREXA ZYDIS) 15 MG disintegrating tablet Place 1 tablet under the tongue 2 times daily Given with Zyprexa zydis 5 mg twice a day  Patient taking differently: Place 15 mg under the tongue 2 times daily Taking once daily 3/16/21   Jennifer Diaz,    OLANZapine zydis (ZYPREXA) 5 MG disintegrating tablet Place 1 tablet under the tongue 2 times daily Given with Zyprexa zydis 15 mg twice a day  Patient not taking: Reported on 8/12/2021 3/16/21   Jennifer Diaz DO   polyethylene glycol (GLYCOLAX) 17 g packet 17 g by Per G Tube route daily 3/16/21   Jennifer Diaz DO       Allergies:    Patient has no known allergies. Social History:    reports that she has never smoked. She has never used smokeless tobacco. She reports that she does not drink alcohol and does not use drugs. Family History:   family history is not on file.        PHYSICAL EXAM:  Vitals:  /60   Pulse 72   Temp 97.6 °F (36.4 °C) (Axillary)   Resp 15   Ht 5' 3\" (1.6 m)   Wt 150 lb (68 kg)   SpO2 96%   BMI 26.57 kg/m²   General Appearance: H/o MR- not answering questions appropriately or following commands  Skin: warm and dry  Head: normocephalic and atraumatic  Neck: neck supple and non tender without mass   Pulmonary/Chest: clear to auscultation bilaterally  Cardiovascular: normal rate, normal S1 and S2 and no carotid bruits  Abdomen: soft, non-tender, non-distended, normal bowel sounds- G- tube abd binder on  Extremities: no cyanosis, no clubbing and no edema  Neurologic: speech clear       LABS:  Recent Labs     08/16/21  1515      K 5.6*      CO2 31*   BUN 27*   CREATININE 0.7   GLUCOSE 94   CALCIUM 10.0       Recent Labs     08/16/21  1515   WBC 12.4*   RBC 3.82   HGB 12.4   HCT 39.5   .4*   MCH 32.5   MCHC 31.4*   RDW 15.8*      MPV 11.3       No results for input(s): POCGLU in the last 72 hours. Radiology:   CT ABDOMEN PELVIS WO CONTRAST Additional Contrast? None   Final Result   Infiltrates and pleural effusion lung bases concerning for pneumonia. A large hiatal hernia with a G-tube with bulb somewhat over distended in the   duodenum which may be inflamed and may need to be repositioned. Diverticulosis of colon without diverticulitis. There is constipation. 2.3 x 2.7 cm fatty mass/dermoid in the left adnexa. XR ABDOMEN FOR NG/OG/NE TUBE PLACEMENT    (Results Pending)         ASSESSMENT:      Active Problems:    Gastrostomy tube dysfunction (HCC)  Resolved Problems:    * No resolved hospital problems. *      PLAN:    1.  G- tube dysfunction: pt had placed several months ago when she became ill and not eating well. Now only takes meds/ feeding via peg. She has pulled out tube recently twice. 3rd tube placed at bedside in ER. Also she has been having N/V/ + diarrhea. CT abdomen ordered and reviewed. Showing large large hiatal hernia. Per ER - discussed with surgery ? Scope. Consult surgery. Will make NPO. OK for tube feeding/ meds when ok with surgery. Continue to keep ABD binder on. 2.  Leukocyotosis:? Volume contraction- monitor     3. Possible pneumonia: check procal- hold off on abx for now. No cough/ no sob    4. H/o MR: per caregiver pt appears at baseline. Continue meds     5. Intractable/ N/V/ diarrhea; surgery consulted. ? Issue with G tube vs viral etiology. monitor    Code Status: FULL  DVT prophylaxis: lovenox      NOTE: This report was transcribed using voice recognition software.  Every effort was made to ensure accuracy; however, inadvertent computerized transcription errors may be present.   Electronically signed by SCHUYLER Lake on 8/16/2021 at 6:28 PM

## 2021-08-17 LAB
ALBUMIN SERPL-MCNC: 3.2 G/DL (ref 3.5–5.2)
ALP BLD-CCNC: 65 U/L (ref 35–104)
ALT SERPL-CCNC: 6 U/L (ref 0–32)
ANION GAP SERPL CALCULATED.3IONS-SCNC: 7 MMOL/L (ref 7–16)
AST SERPL-CCNC: 15 U/L (ref 0–31)
BASOPHILS ABSOLUTE: 0.04 E9/L (ref 0–0.2)
BASOPHILS RELATIVE PERCENT: 0.4 % (ref 0–2)
BILIRUB SERPL-MCNC: 0.3 MG/DL (ref 0–1.2)
BUN BLDV-MCNC: 27 MG/DL (ref 6–23)
CALCIUM SERPL-MCNC: 10.6 MG/DL (ref 8.6–10.2)
CHLORIDE BLD-SCNC: 105 MMOL/L (ref 98–107)
CO2: 28 MMOL/L (ref 22–29)
CREAT SERPL-MCNC: 0.8 MG/DL (ref 0.5–1)
EOSINOPHILS ABSOLUTE: 0.27 E9/L (ref 0.05–0.5)
EOSINOPHILS RELATIVE PERCENT: 2.5 % (ref 0–6)
GFR AFRICAN AMERICAN: >60
GFR NON-AFRICAN AMERICAN: >60 ML/MIN/1.73
GLUCOSE BLD-MCNC: 114 MG/DL (ref 74–99)
HCT VFR BLD CALC: 42.9 % (ref 34–48)
HEMOGLOBIN: 13.2 G/DL (ref 11.5–15.5)
IMMATURE GRANULOCYTES #: 0.12 E9/L
IMMATURE GRANULOCYTES %: 1.1 % (ref 0–5)
LYMPHOCYTES ABSOLUTE: 2.18 E9/L (ref 1.5–4)
LYMPHOCYTES RELATIVE PERCENT: 20.2 % (ref 20–42)
MCH RBC QN AUTO: 31.7 PG (ref 26–35)
MCHC RBC AUTO-ENTMCNC: 30.8 % (ref 32–34.5)
MCV RBC AUTO: 103.1 FL (ref 80–99.9)
MONOCYTES ABSOLUTE: 1.1 E9/L (ref 0.1–0.95)
MONOCYTES RELATIVE PERCENT: 10.2 % (ref 2–12)
NEUTROPHILS ABSOLUTE: 7.06 E9/L (ref 1.8–7.3)
NEUTROPHILS RELATIVE PERCENT: 65.6 % (ref 43–80)
PDW BLD-RTO: 15.8 FL (ref 11.5–15)
PLATELET # BLD: 343 E9/L (ref 130–450)
PMV BLD AUTO: 9.5 FL (ref 7–12)
POTASSIUM SERPL-SCNC: 4 MMOL/L (ref 3.5–5)
PROCALCITONIN: 0.06 NG/ML (ref 0–0.08)
RBC # BLD: 4.16 E12/L (ref 3.5–5.5)
SODIUM BLD-SCNC: 140 MMOL/L (ref 132–146)
TOTAL PROTEIN: 8.7 G/DL (ref 6.4–8.3)
WBC # BLD: 10.8 E9/L (ref 4.5–11.5)

## 2021-08-17 PROCEDURE — 6370000000 HC RX 637 (ALT 250 FOR IP): Performed by: INTERNAL MEDICINE

## 2021-08-17 PROCEDURE — 2060000000 HC ICU INTERMEDIATE R&B

## 2021-08-17 PROCEDURE — 99232 SBSQ HOSP IP/OBS MODERATE 35: CPT | Performed by: INTERNAL MEDICINE

## 2021-08-17 PROCEDURE — 85025 COMPLETE CBC W/AUTO DIFF WBC: CPT

## 2021-08-17 PROCEDURE — 6360000002 HC RX W HCPCS: Performed by: INTERNAL MEDICINE

## 2021-08-17 PROCEDURE — 99222 1ST HOSP IP/OBS MODERATE 55: CPT | Performed by: SURGERY

## 2021-08-17 PROCEDURE — 36415 COLL VENOUS BLD VENIPUNCTURE: CPT

## 2021-08-17 PROCEDURE — 80053 COMPREHEN METABOLIC PANEL: CPT

## 2021-08-17 PROCEDURE — 2580000003 HC RX 258: Performed by: INTERNAL MEDICINE

## 2021-08-17 PROCEDURE — 84145 PROCALCITONIN (PCT): CPT

## 2021-08-17 RX ORDER — PANTOPRAZOLE SODIUM 40 MG/1
40 TABLET, DELAYED RELEASE ORAL
Status: DISCONTINUED | OUTPATIENT
Start: 2021-08-18 | End: 2021-08-18 | Stop reason: HOSPADM

## 2021-08-17 RX ORDER — PANTOPRAZOLE SODIUM 40 MG/1
40 TABLET, DELAYED RELEASE ORAL
Qty: 30 TABLET | Refills: 5 | Status: SHIPPED | OUTPATIENT
Start: 2021-08-17 | End: 2021-09-21 | Stop reason: ALTCHOICE

## 2021-08-17 RX ADMIN — LORAZEPAM 2 MG: 1 TABLET ORAL at 20:29

## 2021-08-17 RX ADMIN — VALPROIC ACID 1000 MG: 250 SOLUTION ORAL at 10:27

## 2021-08-17 RX ADMIN — PAROXETINE 10 MG: 10 TABLET, FILM COATED ORAL at 20:30

## 2021-08-17 RX ADMIN — LEVOFLOXACIN 750 MG: 5 INJECTION, SOLUTION INTRAVENOUS at 00:20

## 2021-08-17 RX ADMIN — MONTELUKAST SODIUM 10 MG: 10 TABLET, FILM COATED ORAL at 20:29

## 2021-08-17 RX ADMIN — METOPROLOL TARTRATE 75 MG: 50 TABLET, FILM COATED ORAL at 20:29

## 2021-08-17 RX ADMIN — SODIUM CHLORIDE, PRESERVATIVE FREE 10 ML: 5 INJECTION INTRAVENOUS at 00:20

## 2021-08-17 RX ADMIN — METOPROLOL TARTRATE 75 MG: 50 TABLET, FILM COATED ORAL at 10:28

## 2021-08-17 RX ADMIN — FERROUS SULFATE TAB 325 MG (65 MG ELEMENTAL FE) 325 MG: 325 (65 FE) TAB at 10:28

## 2021-08-17 RX ADMIN — SODIUM CHLORIDE, PRESERVATIVE FREE 10 ML: 5 INJECTION INTRAVENOUS at 10:29

## 2021-08-17 RX ADMIN — VALPROIC ACID 1000 MG: 250 SOLUTION ORAL at 20:30

## 2021-08-17 RX ADMIN — SODIUM CHLORIDE, PRESERVATIVE FREE 10 ML: 5 INJECTION INTRAVENOUS at 20:30

## 2021-08-17 RX ADMIN — OLANZAPINE 5 MG: 5 TABLET, ORALLY DISINTEGRATING ORAL at 20:30

## 2021-08-17 RX ADMIN — Medication 100 MG: at 10:27

## 2021-08-17 RX ADMIN — PAROXETINE 10 MG: 10 TABLET, FILM COATED ORAL at 10:28

## 2021-08-17 RX ADMIN — LEVOTHYROXINE SODIUM 150 MCG: 75 TABLET ORAL at 10:27

## 2021-08-17 RX ADMIN — OLANZAPINE 5 MG: 5 TABLET, ORALLY DISINTEGRATING ORAL at 10:28

## 2021-08-17 RX ADMIN — FOLIC ACID 1 MG: 1 TABLET ORAL at 10:28

## 2021-08-17 RX ADMIN — ENOXAPARIN SODIUM 40 MG: 40 INJECTION SUBCUTANEOUS at 10:27

## 2021-08-17 ASSESSMENT — PAIN SCALES - GENERAL
PAINLEVEL_OUTOF10: 0
PAINLEVEL_OUTOF10: 0

## 2021-08-17 NOTE — CARE COORDINATION
8/17/2021  Social Work Discharge Planning:Pt is for [facility] and guardian is Maryjo JACKSON. Pt is on room air. SW confirmed with Km Swann that plan is to return. Km Swann will need called when Pt discharges. SW left a voicemail with the [facility] requesting a call back to discuss discharge needs and planning. Electronically signed by AILYN Tellez on 8/17/2021 at 2:44 PM    8/17/2021  Social Work Discharge Planning:SW received a call from Dalton Solis [facility] healthcare coordinator at the [facility], who said when Pt is ready to discharge to call [facility] [place]-[phone] and she will need discharge paperwork faxed to her at [phone]. They will likely be able to transport Pt if they are notified early enough in the day.  Electronically signed by AILYN Tellez on 8/17/2021 at 3:05 PM

## 2021-08-17 NOTE — PROGRESS NOTES
Spoke with Bronson Moran at [facility] requesting name of tube feeding formula, rate and amount of flush. Bronson Moran unable to supply information. Waiting for call back from manager at [facility].

## 2021-08-17 NOTE — CONSULTS
GENERAL SURGERY  CONSULT NOTE  8/17/2021    Physician Consulted: Dr. Ranelle Heimlich  Reason for Consult: Peg tube malposition   Referring Physician: Dr. Donna WRIGHT  Jessica Silva is a 78 y.o. female who presents for evaluation of feeding tube problem. Patient has a history of significant intellectual disability, history obtained from ED provider note. States that patients feeding tube was replaced two days ago and patient has been having difficulty with feeds since, where he feeds regurgitate back through the tube. CTAP shows the tube distal to the stomach in the first part of the duodenum, which is likely the cause of her dysfunction. This morning tube was pulled back and no sits 2.5cm at skin and is appropriately mobile. Bumper placed firm to skin to avoid inward migration. Past Medical History:   Diagnosis Date    Acute kidney injury ([address] 01/15/2017    d/t Vancomycin, on Dialysis [facility] right chest    Blind in both eyes     Essential hypertension 4/2/2021    Gastroesophageal reflux disease without esophagitis 4/2/2021    Hemodialysis patient ([address]     Hyperthyroidism     MR (mental retardation)     Osteoarthritis     Peripheral vascular disease ([address]     Pneumonia 01/06/2017    Pneumonia due to infectious organism 1/8/2017    Sepsis ([address] 3/4/2021       Past Surgical History:   Procedure Laterality Date    BRONCHOSCOPY  02/10/2017    CHEST TUBE INSERTION Right 02/09/2017    GASTROSTOMY TUBE PLACEMENT N/A 3/7/2021    EGD PEG TUBE PLACEMENT performed by Beatriz Whittington MD at [facility] Right 01/17/2017    tessio insertion     OTHER SURGICAL HISTORY  01/25/2017    PEG tube insertion       Medications Prior to Admission:    Prior to Admission medications    Medication Sig Start Date End Date Taking?  Authorizing Provider   LORazepam (ATIVAN) 2 MG tablet Take 2 mg by mouth. g tube   Yes Historical Provider, MD   levothyroxine (SYNTHROID) 150 MCG tablet Take 1 tablet by mouth Daily 7/7/21 1/3/22 Yes Mando Quezada, DO   metoprolol tartrate (LOPRESSOR) 25 MG tablet Take 3 tablets by mouth 2 times daily 6/15/21 12/12/21 Yes Izabela Clemens, DO   NATURAL VITAMIN D-3 125 MCG (5000 UT) TABS tablet TAKE 1 TABLET VIA G-TUBE ONCE DAILY 6/4/21  Yes Mando Quezada, DO   OLANZapine zydis (ZYPREXA) 20 MG disintegrating tablet Taking once daily 4/21/21  Yes Historical Provider, MD   docusate (COLACE) 50 MG/5ML liquid 10 mLs by Per G Tube route 2 times daily 3/31/21  Yes Mando Quezada, DO   famotidine (PEPCID) 20 MG tablet 1 tablet by PEG Tube route 2 times daily 3/31/21  Yes Mando Quezada, DO   ferrous sulfate (IRON 325) 325 (65 Fe) MG tablet 1 tablet by PEG Tube route daily (with breakfast) 3/31/21  Yes Mando Quezada, DO   folic acid (FOLVITE) 1 MG tablet 1 tablet by PEG Tube route daily 3/31/21  Yes Mando Quezada, DO   montelukast (SINGULAIR) 10 MG tablet 1 tablet by PEG Tube route nightly 3/31/21  Yes Mando Quezada, DO   PARoxetine (PAXIL) 10 MG tablet 1 tablet by PEG Tube route 2 times daily 3/31/21  Yes Mando Quezada, DO   vitamin D (CHOLECALCIFEROL) 25 MCG (1000 UT) TABS tablet 1 tablet by PEG Tube route daily 3/31/21  Yes Mando Quezada, DO   valproic acid (DEPACON) 250 MG/5ML SOLN oral solution 20 mLs by Per G Tube route 2 times daily 3/16/21  Yes Zuleyka Harrington, DO   OLANZapine zydis (ZYPREXA ZYDIS) 15 MG disintegrating tablet Place 1 tablet under the tongue 2 times daily Given with Zyprexa zydis 5 mg twice a day  Patient taking differently: Place 15 mg under the tongue 2 times daily Taking once daily 3/16/21  Yes Larena , DO   OLANZapine zydis (ZYPREXA) 5 MG disintegrating tablet Place 1 tablet under the tongue 2 times daily Given with Zyprexa zydis 15 mg twice a day 3/16/21  Yes Zuleyka Harrington, DO   polyethylene glycol (GLYCOLAX) 17 g packet 17 g by Per G Tube route daily 3/16/21  Yes Zuleyka Harrington, DO thiamine 100 MG tablet TAKE 1 TABLET VIA G-TUBE DAILY 6/4/21   Doneta Serge, DO   NATURAL VITAMIN D-3 125 MCG (5000 UT) TABS tablet TAKE 1 TABLET VIA G-TUBE ONCE DAILY 5/16/21   RADHA Marsh - CNP   bumetanide (BUMEX) 1 MG tablet 1 tablet by PEG Tube route daily as needed (swelling) 3/31/21 7/29/21  Doneta Serge, DO     Allergies:  No Known Allergies    History reviewed. No pertinent family history related to PEG tube issues. Social History     Tobacco Use    Smoking status: Never Smoker    Smokeless tobacco: Never Used   Substance Use Topics    Alcohol use: No    Drug use: No         Review of Systems   Unable to assess      PHYSICAL EXAM:    Vitals:    08/17/21 0730   BP: 136/85   Pulse: 112   Resp: 18   Temp: 98.5 °F (36.9 °C)   SpO2: 93%       General -  no distress  HEENT - Normocephalic, Atraumatic. No Scleral Icterus  Neck - supple, trachea midline  Respiratory - Breathing comfortably, no respiratory distress  Heart - mild tachycardia  Abdomen - soft, nontender without rebound or guarding, nondistended. PEG in place ~3cm  Neurological - unable to assess due to patient status  Psych - unable to assess due to patient status  Musculoskeletal - unable to assess due to patient status  Skin - warm, pink      LABS:    CBC  Recent Labs     08/17/21  0325   WBC 10.8   HGB 13.2   HCT 42.9        BMP  Recent Labs     08/17/21  0325      K 4.0      CO2 28   BUN 27*   CREATININE 0.8   CALCIUM 10.6*     Liver Function  Recent Labs     08/16/21  1515 08/16/21  1515 08/17/21  0325   LIPASE 39  --   --    BILITOT 0.2   < > 0.3   AST 45*   < > 15   ALT 11   < > 6   ALKPHOS 57   < > 65   PROT 8.5*   < > 8.7*   LABALBU 2.8*   < > 3.2*    < > = values in this interval not displayed. No results for input(s): LACTATE in the last 72 hours. No results for input(s): INR, PTT in the last 72 hours.     Invalid input(s): PT    RADIOLOGY    CT ABDOMEN PELVIS WO CONTRAST Additional Contrast? None    Result Date: 8/16/2021  EXAMINATION: CT OF THE ABDOMEN AND PELVIS WITHOUT CONTRAST 8/16/2021 3:53 pm TECHNIQUE: CT of the abdomen and pelvis was performed without the administration of intravenous contrast. Multiplanar reformatted images are provided for review. Dose modulation, iterative reconstruction, and/or weight based adjustment of the mA/kV was utilized to reduce the radiation dose to as low as reasonably achievable. COMPARISON: August 5, 2020 HISTORY: ORDERING SYSTEM PROVIDED HISTORY: pain TECHNOLOGIST PROVIDED HISTORY: Reason for exam:->pain Additional Contrast?->None Decision Support Exception - unselect if not a suspected or confirmed emergency medical condition->Emergency Medical Condition (MA) FINDINGS: The lung bases demonstrate patchy bibasilar infiltrates and small pleural effusion likely pneumonia. Large hiatal hernia is noted. Liver, gallbladder, spleen, pancreas, the adrenals and the kidneys are normal.  A G-tube is noted with the  bulb somewhat over distended in the duodenum which may be inflamed and may need to be repositioned. 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 Pelvis. The bladder is distended. 2.7 x 2.3 cm some fatty mass in the left adnexa is noted concerning for a dermoid cyst.  There is diverticulosis of the colon without diverticulitis. Appendix is normal.     Infiltrates and pleural effusion lung bases concerning for pneumonia. A large hiatal hernia with a G-tube with bulb somewhat over distended in the duodenum which may be inflamed and may need to be repositioned. Diverticulosis of colon without diverticulitis. There is constipation. 2.3 x 2.7 cm fatty mass/dermoid in the left adnexa.      XR CHEST PORTABLE    Result Date: 8/17/2021  EXAMINATION: ONE XRAY VIEW OF THE CHEST 8/16/2021 11:25 pm COMPARISON: 03/09/2021 HISTORY: ORDERING SYSTEM PROVIDED HISTORY: dyspnea TECHNOLOGIST PROVIDED HISTORY: Reason for exam:->dyspnea FINDINGS: EKG leads overlie the chest.  Heart size is normal.  No pneumothorax. Lung volumes are diminished. Coarsened interstitial opacities are likely chronic. Minimal left greater than right parenchymal opacities appear similar to previous and are favored to represent areas of scarring. A right infrahilar opacity demonstrates a slight nodular configuration. This however is thought to be artifactual as no nodule was seen in this region on the CT of the abdomen performed 7 hours prior to this exam.     No significant change. PA and lateral views would be useful for further assessment, if symptoms persist.     XR ABDOMEN FOR NG/OG/NE TUBE PLACEMENT    Result Date: 8/16/2021  EXAMINATION: ONE SUPINE XRAY VIEW(S) OF THE ABDOMEN 8/16/2021 5:51 pm COMPARISON: None. HISTORY: ORDERING SYSTEM PROVIDED HISTORY: G-tube Placement / Confirmation TECHNOLOGIST PROVIDED HISTORY: Gastrograffin Reason for exam:->G-tube Placement / Confirmation FINDINGS: Contrast identified within the lumen of the stomach and duodenum. No evidence of extravasation of contrast.  This indicates that the gastrostomy tube tip is within the gastric body. The rest of the abdomen is unremarkable. Normal lung bases. Gastrostomy tube tip balloon within the lumen of the gastric body. No evidence of extravasation of contrast from the lumen.          ASSESSMENT:  78 y.o. female with peg tube dysfunction     PLAN:  - Gtube replaced and in position  -daily PPI     Plan discussed with Dr. Monika Mcbride     Electronically signed by Marlene Brenner MD on 8/17/21 at 8:08 AM EDT      Attending Physician Statement:     Chief Complaint:        Chief Complaint   Patient presents with    Hematemesis       Coffee ground emesis per EMS         I have examined the patient and performed the key aspects of physical exam, reviewed the record (including all pertinent and new radiology images and laboratory findings), and discussed the case with the surgical team.  I agree with the assessment and plan with the following additions, corrections, and changes. 14pt review of symptoms unable to be obtained due to patient status. Attending Physician Statement:          Discussed case with Dr. Sofia Lowry in the ER last evening. Appeared that the tube had migrated into the pyloric channel/duodenum causing mechanical outlet obstruction with vomiting. Questionable blood in the emesis reported. I recommended that the balloon be deflated and the tube completely removed and a new one replaced and a tube study performed. That was done and the follow-up placement shows adequate positioning of her feeding tube with filling of the stomach and duodenum. Tube is in place approximately 3 cm at the skin today. Bumper is secured. Abdomen is benign. Okay to trial tube feeds. Once she is tolerating that, she can discharged from surgical standpoint. No plans for endoscopy unless ongoing bleeding noted. Can continue PPI.     Navneet Eller MD  08/17/21  9:01 AM     NOTE: This report, in part or full, may have been transcribed using voice recognition software. Every effort was made to ensure accuracy; however, inadvertent computerized transcription errors may be present.  Please excuse any transcriptional grammatical or spelling errors that may have escaped my editorial review.

## 2021-08-17 NOTE — PLAN OF CARE
Problem: Falls - Risk of:  Goal: Will remain free from falls  Description: Will remain free from falls  8/17/2021 1156 by Basilio Mejía RN  Outcome: Ongoing  8/17/2021 0612 by Nanette Jones RN  Outcome: Met This Shift  Goal: Absence of physical injury  Description: Absence of physical injury  8/17/2021 1156 by Basilio Mejía RN  Outcome: Ongoing  8/17/2021 0612 by Nanette Jones RN  Outcome: Met This Shift

## 2021-08-17 NOTE — ED PROVIDER NOTES
Department of Emergency Medicine   ED  Provider Note  Admit Date/RoomTime: 8/16/2021  1:52 PM  ED Room: 0405/0405-A          History of Present Illness:  8/17/21, Time: 4:55 PM EDT  Chief Complaint   Patient presents with    Hematemesis     Coffee ground emesis per EMS                Yris Light is a 78 y.o. female presenting to the ED for hematemesis, beginning 3 days ago. The complaint has been intermittent, moderate in severity, and worsened by tube feeds. Caretaker is here with patient and provides history as the patient is developmentally delayed. Patient had PEG tube placed in March and had it replaced approximately a month ago after being dislodged. Caretaker says that tube feeds went well for a few weeks and over the last week they started to notice bile return after placing the tube feed. This progressed to the patient also vomiting after tube feeds and this developed into coffee-ground emesis over the last few days. Patient is still been acting her typical self. They did not notice any problems with the PEG tube. Review of Systems:   Unable to obtain given history MR.        --------------------------------------------- PAST HISTORY ---------------------------------------------  Past Medical History:  has a past medical history of Acute kidney injury ([facility] [address] Blind in both eyes, Essential hypertension, Gastroesophageal reflux disease without esophagitis, Hemodialysis patient ([facility] [address] Hyperthyroidism, MR (mental retardation), Osteoarthritis, Peripheral vascular disease ([facility] [address] Pneumonia, Pneumonia due to infectious organism, and Sepsis ([facility] [address] Past Surgical History:  has a past surgical history that includes other surgical history (Right, 01/17/2017); other surgical history (01/25/2017); chest tube insertion (Right, 02/09/2017); bronchoscopy (02/10/2017); and Gastrostomy tube placement (N/A, 3/7/2021). Social History:  reports that she has never smoked.  She has never used smokeless tobacco. 43.0 - 80.0 %    Immature Granulocytes % 1.5 0.0 - 5.0 %    Lymphocytes % 20.3 20.0 - 42.0 %    Monocytes % 10.5 2.0 - 12.0 %    Eosinophils % 3.5 0.0 - 6.0 %    Basophils % 0.5 0.0 - 2.0 %    Neutrophils Absolute 7.93 (H) 1.80 - 7.30 E9/L    Immature Granulocytes # 0.19 E9/L    Lymphocytes Absolute 2.53 1.50 - 4.00 E9/L    Monocytes Absolute 1.30 (H) 0.10 - 0.95 E9/L    Eosinophils Absolute 0.43 0.05 - 0.50 E9/L    Basophils Absolute 0.06 0.00 - 0.20 E9/L   Comprehensive Metabolic Panel w/ Reflex to MG   Result Value Ref Range    Sodium 138 132 - 146 mmol/L    Potassium reflex Magnesium 5.6 (H) 3.5 - 5.0 mmol/L    Chloride 103 98 - 107 mmol/L    CO2 31 (H) 22 - 29 mmol/L    Anion Gap 4 (L) 7 - 16 mmol/L    Glucose 94 74 - 99 mg/dL    BUN 27 (H) 6 - 23 mg/dL    CREATININE 0.7 0.5 - 1.0 mg/dL    GFR Non-African American >60 >=60 mL/min/1.73    GFR African American >60     Calcium 10.0 8.6 - 10.2 mg/dL    Total Protein 8.5 (H) 6.4 - 8.3 g/dL    Albumin 2.8 (L) 3.5 - 5.2 g/dL    Total Bilirubin 0.2 0.0 - 1.2 mg/dL    Alkaline Phosphatase 57 35 - 104 U/L    ALT 11 0 - 32 U/L    AST 45 (H) 0 - 31 U/L   Lipase   Result Value Ref Range    Lipase 39 13 - 60 U/L   Procalcitonin   Result Value Ref Range    Procalcitonin 0.05 0.00 - 0.08 ng/mL   Procalcitonin   Result Value Ref Range    Procalcitonin 0.06 0.00 - 0.08 ng/mL   CBC WITH AUTO DIFFERENTIAL   Result Value Ref Range    WBC 10.8 4.5 - 11.5 E9/L    RBC 4.16 3.50 - 5.50 E12/L    Hemoglobin 13.2 11.5 - 15.5 g/dL    Hematocrit 42.9 34.0 - 48.0 %    .1 (H) 80.0 - 99.9 fL    MCH 31.7 26.0 - 35.0 pg    MCHC 30.8 (L) 32.0 - 34.5 %    RDW 15.8 (H) 11.5 - 15.0 fL    Platelets 718 175 - 972 E9/L    MPV 9.5 7.0 - 12.0 fL    Neutrophils % 65.6 43.0 - 80.0 %    Immature Granulocytes % 1.1 0.0 - 5.0 %    Lymphocytes % 20.2 20.0 - 42.0 %    Monocytes % 10.2 2.0 - 12.0 %    Eosinophils % 2.5 0.0 - 6.0 %    Basophils % 0.4 0.0 - 2.0 %    Neutrophils Absolute 7.06 1.80 - 7.30 (Axillary)   Resp 19   Ht 5' 3\" (1.6 m)   Wt 142 lb (64.4 kg)   SpO2 96%   BMI 25.15 kg/m²     Oxygen Saturation Interpretation: Normal      The patients available past medical records and past encounters were reviewed.         ------------------------------ ED COURSE/MEDICAL DECISION MAKING----------------------  Medications   bumetanide (BUMEX) tablet 1 mg (has no administration in time range)   ferrous sulfate (IRON 325) tablet 325 mg (325 mg PEG Tube Given 9/99/44 1824)   folic acid (FOLVITE) tablet 1 mg (1 mg PEG Tube Given 8/17/21 1028)   levothyroxine (SYNTHROID) tablet 150 mcg (150 mcg Oral Given 8/17/21 1027)   LORazepam (ATIVAN) tablet 2 mg (has no administration in time range)   metoprolol tartrate (LOPRESSOR) tablet 75 mg (75 mg Oral Given 8/17/21 1028)   montelukast (SINGULAIR) tablet 10 mg (10 mg PEG Tube Not Given 8/16/21 2353)   vitamin D3 (CHOLECALCIFEROL) tablet 5,000 Units (has no administration in time range)   OLANZapine zydis (ZYPREXA) disintegrating tablet 5 mg (5 mg Sublingual Given 8/17/21 1028)   PARoxetine (PAXIL) tablet 10 mg (10 mg PEG Tube Given 8/17/21 1028)   polyethylene glycol (GLYCOLAX) packet 17 g (17 g Per G Tube Not Given 8/17/21 1123)   thiamine tablet 100 mg (100 mg Oral Given 8/17/21 1027)   valproic acid (DEPAKENE) 250 MG/5ML oral solution 1,000 mg (1,000 mg Per G Tube Given 8/17/21 1027)   vitamin D (CHOLECALCIFEROL) tablet 1,000 Units (has no administration in time range)   sodium chloride flush 0.9 % injection 5-40 mL (10 mLs Intravenous Given 8/17/21 1029)   sodium chloride flush 0.9 % injection 5-40 mL (has no administration in time range)   0.9 % sodium chloride infusion (has no administration in time range)   enoxaparin (LOVENOX) injection 40 mg (40 mg Subcutaneous Given 8/17/21 1027)   ondansetron (ZOFRAN-ODT) disintegrating tablet 4 mg (has no administration in time range)     Or   ondansetron (ZOFRAN) injection 4 mg (has no administration in time range) polyethylene glycol (GLYCOLAX) packet 17 g (has no administration in time range)   acetaminophen (TYLENOL) tablet 650 mg (has no administration in time range)     Or   acetaminophen (TYLENOL) suppository 650 mg (has no administration in time range)   diatrizoate meglumine-sodium (GASTROGRAFIN) 66-10 % solution 120 mL (120 mLs Per G Tube Given 8/16/21 1805)   pantoprazole (PROTONIX) tablet 40 mg (has no administration in time range)   pantoprazole (PROTONIX) injection 40 mg (40 mg Intravenous Given 8/16/21 2001)              Medical Decision Making:     I, Dr. Zoë Welch am the primary provider of record    Patient presents with coffee-ground emesis after tube feeds. Patient has a soft nontender nonsurgical abdomen. CT scan shows that the bulb is entering the duodenum with irritation to the area. I discussed patient care with Dr. Dheeraj Rose who recommend I deflate the bulb remove the PEG tube and replace it with another same size PEG. He thinks the coffee-ground emesis could be associated with ulceration from the bulb and he will see her tomorrow with medical admission. Discussed patient care with admitting physician. This was at the end of my shift so the linda Duarte replaced the PEG tube and obtained a Gastrografin study for tube placement. Re-Evaluations:       Exam unchanged. This patient's ED course included: a personal history and physicial examination, re-evaluation prior to disposition, multiple bedside re-evaluations, IV medications and complex medical decision making and emergency management    This patient has remained hemodynamically stable during their ED course. Consultations:  Dr. Lisa Cordero      Counseling: The emergency provider has spoken with the patient and caretaker and discussed todays results, in addition to providing specific details for the plan of care and counseling regarding the diagnosis and prognosis.   Questions are answered at this time and they are agreeable with the plan.       --------------------------------- IMPRESSION AND DISPOSITION ---------------------------------    IMPRESSION  1. Gastrostomy tube dysfunction ([address]    2. Hematemesis, presence of nausea not specified    3. Hiatal hernia        DISPOSITION  Disposition: Admit to med/surg floor  Patient condition is stable        NOTE: This report was transcribed using voice recognition software.  Every effort was made to ensure accuracy; however, inadvertent computerized transcription errors may be present        Aris Delgadillo MD  08/17/21 3375

## 2021-08-17 NOTE — PROGRESS NOTES
[facility]   Progress Note    Admitting Date and Time: 8/16/2021  1:52 PM  Admit Dx: Hiatal hernia [K44.9]  Gastrostomy tube dysfunction (HCC) [K94.23]  Hematemesis, presence of nausea not specified [K92.0]     Seen for follow on multiple problems as listed below    Subjective:  She follows simple commands , but unable to communicate fully. She does talk on her own , making noises & gestures. D/w nursi ng that's her baseline. She is comfortable & in no distress. ROS: denies fever, chills, cp, sob, n/v, HA unless stated above.      [START ON 8/18/2021] pantoprazole  40 mg Oral QAM AC    ferrous sulfate  325 mg PEG Tube Daily with breakfast    folic acid  1 mg PEG Tube Daily    levothyroxine  150 mcg Oral Daily    metoprolol tartrate  75 mg Oral BID    montelukast  10 mg PEG Tube Nightly    vitamin D3  5,000 Units Oral Daily    OLANZapine zydis  5 mg Sublingual BID    PARoxetine  10 mg PEG Tube BID    polyethylene glycol  17 g Per G Tube Daily    thiamine  100 mg Oral Daily    valproic acid  1,000 mg Per G Tube BID    vitamin D  1,000 Units PEG Tube Daily    sodium chloride flush  5-40 mL Intravenous 2 times per day    enoxaparin  40 mg Subcutaneous Daily    levofloxacin  750 mg Intravenous Q24H     bumetanide, 1 mg, Daily PRN  LORazepam, 2 mg, Q4H PRN  sodium chloride flush, 5-40 mL, PRN  sodium chloride, 25 mL, PRN  ondansetron, 4 mg, Q8H PRN   Or  ondansetron, 4 mg, Q6H PRN  polyethylene glycol, 17 g, Daily PRN  acetaminophen, 650 mg, Q6H PRN   Or  acetaminophen, 650 mg, Q6H PRN  diatrizoate meglumine-sodium, 120 mL, ONCE PRN         Objective:    /85   Pulse 112   Temp 98.5 °F (36.9 °C) (Axillary)   Resp 18   Ht 5' 3\" (1.6 m)   Wt 142 lb (64.4 kg)   SpO2 93%   BMI 25.15 kg/m²   General Appearance: alert , with MR, talking on her own , making gestures, at times follow simple commands,can not communicate  Skin: warm and dry  Head: normocephalic and

## 2021-08-18 VITALS
HEART RATE: 80 BPM | SYSTOLIC BLOOD PRESSURE: 120 MMHG | WEIGHT: 146.8 LBS | BODY MASS INDEX: 26.01 KG/M2 | TEMPERATURE: 98.7 F | DIASTOLIC BLOOD PRESSURE: 55 MMHG | RESPIRATION RATE: 18 BRPM | OXYGEN SATURATION: 97 % | HEIGHT: 63 IN

## 2021-08-18 PROCEDURE — 6370000000 HC RX 637 (ALT 250 FOR IP): Performed by: SURGERY

## 2021-08-18 PROCEDURE — 99232 SBSQ HOSP IP/OBS MODERATE 35: CPT | Performed by: SURGERY

## 2021-08-18 PROCEDURE — 99238 HOSP IP/OBS DSCHRG MGMT 30/<: CPT | Performed by: INTERNAL MEDICINE

## 2021-08-18 PROCEDURE — 2580000003 HC RX 258: Performed by: INTERNAL MEDICINE

## 2021-08-18 PROCEDURE — 6360000002 HC RX W HCPCS: Performed by: INTERNAL MEDICINE

## 2021-08-18 PROCEDURE — 6370000000 HC RX 637 (ALT 250 FOR IP): Performed by: INTERNAL MEDICINE

## 2021-08-18 RX ORDER — METOPROLOL TARTRATE 75 MG/1
75 TABLET, FILM COATED ORAL 2 TIMES DAILY
Qty: 60 TABLET | Refills: 3 | Status: SHIPPED | OUTPATIENT
Start: 2021-08-18 | End: 2021-09-21 | Stop reason: ALTCHOICE

## 2021-08-18 RX ADMIN — VALPROIC ACID 1000 MG: 250 SOLUTION ORAL at 08:24

## 2021-08-18 RX ADMIN — FOLIC ACID 1 MG: 1 TABLET ORAL at 08:24

## 2021-08-18 RX ADMIN — FERROUS SULFATE TAB 325 MG (65 MG ELEMENTAL FE) 325 MG: 325 (65 FE) TAB at 08:24

## 2021-08-18 RX ADMIN — ENOXAPARIN SODIUM 40 MG: 40 INJECTION SUBCUTANEOUS at 08:24

## 2021-08-18 RX ADMIN — SODIUM CHLORIDE, PRESERVATIVE FREE 10 ML: 5 INJECTION INTRAVENOUS at 08:25

## 2021-08-18 RX ADMIN — LEVOTHYROXINE SODIUM 150 MCG: 75 TABLET ORAL at 06:19

## 2021-08-18 RX ADMIN — OLANZAPINE 5 MG: 5 TABLET, ORALLY DISINTEGRATING ORAL at 08:24

## 2021-08-18 RX ADMIN — METOPROLOL TARTRATE 75 MG: 50 TABLET, FILM COATED ORAL at 08:24

## 2021-08-18 RX ADMIN — PANTOPRAZOLE SODIUM 40 MG: 40 TABLET, DELAYED RELEASE ORAL at 06:19

## 2021-08-18 RX ADMIN — PAROXETINE 10 MG: 10 TABLET, FILM COATED ORAL at 08:24

## 2021-08-18 RX ADMIN — Medication 100 MG: at 08:23

## 2021-08-18 ASSESSMENT — PAIN SCALES - GENERAL: PAINLEVEL_OUTOF10: 0

## 2021-08-18 NOTE — DISCHARGE SUMMARY
[facility] EMERGENCY SERVICE Physician Discharge Summary       Tong Gomez, [address] [id number] [address] [address] [phone]          Activity level: As tolerated    Diet: Diet NPO  ADULT TUBE FEEDING; PEG; 2.0 Calorie; Bolus; 4 TIMES DAILY; 240; Syringe Push; 130; Before and after each bolus    Dispo: to group home    Condition on Discharge - stable     Patient ID:  Adele Schuler  [id number]  89 y.o.  1955    Admit date: 8/16/2021    Discharge date and time:  8/18/2021  9:29 AM    Admission Diagnoses: Active Problems:    Gastrostomy tube dysfunction (HCC)  Resolved Problems:    * No resolved hospital problems. *      Discharge Diagnoses: Active Problems:    Gastrostomy tube dysfunction (HCC)  Resolved Problems:    * No resolved hospital problems. *      Consults:  IP CONSULT TO GENERAL SURGERY    Hospital Course:  She is a 79-year-old female with past medical history of MR, blindness , history of PEG tube and on tube feeds, she is a resident of [facility] -she was sent to ER for evaluation of nausea vomiting and diarrhea. As per caregiver at [facility] she has pulled out G-tube 2 times, which was replaced 2 days prior to arrival in ER. After replacement tube feeds were accepted until the day of admission she started with nausea vomiting and diarrhea. Denied any fever chills or rigors. No ill contacts at [facility]. No dysuria. She cannot provide any information. All information was obtained from caregiver. Surgery was consulted from ER. As per surgery PEG tube was pulled out and replaced. She was admitted overnight for observation. Surgery evaluated and recommended that tube is in the right position and tube feeds can be started. She is accepting tube feeds very well. Now she is returning back to [facility].   CT showed infiltrates and pleural effusion at lung bases concerning for pneumonia, large hiatal hernia with G-tube bulb somewhat over distended and may be inflamed and need to be repositioned-which was repositioned. She initially received antibiotics in ER. But since she did not had any cough, short of breath, fever or chills, procalcitonin was normal, she was observed off of antibiotics and monitored. She had reactive leukocytosis which resolved in 1 day. Diarrhea was secondary to laxatives, which also has resolved. Other comorbid conditions were managed by continuing home medications. She is now discharged to group home in stable status. She will follow with PCP as out pt. Discharge Exam:  Vitals:    08/17/21 1500 08/17/21 2015 08/18/21 0238 08/18/21 0800   BP: 133/83 139/77  (!) 120/55   Pulse: 99 94  80   Resp: 19 18  18   Temp: 98.3 °F (36.8 °C) 97.2 °F (36.2 °C)  98.7 °F (37.1 °C)   TempSrc: Axillary Axillary  Axillary   SpO2: 96% 97%  97%   Weight:   146 lb 12.8 oz (66.6 kg)    Height:           General Appearance: alert , with MR , making gestures and talking to her own, at times following simple commands. Skin: warm and dry  Head: normocephalic and atraumatic  Neck: supple and non-tender without mass  Pulmonary/Chest: clear to auscultation bilaterally  Cardiovascular: normal rate, regular rhythm, normal S1 and S2  Abdomen: soft, non-tender, non-distended, normal bowel sounds, no masses or organomegaly, PEG in place  & binder on. Extremities: no cyanosis, clubbing  Neurologic: speech clear  LABS:  Recent Labs     08/16/21  1515 08/17/21  0325    140   K 5.6* 4.0    105   CO2 31* 28   BUN 27* 27*   CREATININE 0.7 0.8   GLUCOSE 94 114*   CALCIUM 10.0 10.6*       Recent Labs     08/16/21  1515 08/17/21  0325   WBC 12.4* 10.8   RBC 3.82 4.16   HGB 12.4 13.2   HCT 39.5 42.9   .4* 103.1*   MCH 32.5 31.7   MCHC 31.4* 30.8*   RDW 15.8* 15.8*    343   MPV 11.3 9.5       No results for input(s): POCGLU in the last 72 hours. Imaging:   XR CHEST PORTABLE   Final Result   No significant change.   PA and lateral views would be useful for further   assessment, if symptoms persist.         XR ABDOMEN FOR NG/OG/NE TUBE PLACEMENT   Final Result   Gastrostomy tube tip balloon within the lumen of the gastric body. No   evidence of extravasation of contrast from the lumen. CT ABDOMEN PELVIS WO CONTRAST Additional Contrast? None   Final Result   Infiltrates and pleural effusion lung bases concerning for pneumonia. A large hiatal hernia with a G-tube with bulb somewhat over distended in the   duodenum which may be inflamed and may need to be repositioned. Diverticulosis of colon without diverticulitis. There is constipation. 2.3 x 2.7 cm fatty mass/dermoid in the left adnexa. Patient Instructions:      Medication List      START taking these medications    pantoprazole 40 MG tablet  Commonly known as: PROTONIX  Take 1 tablet by mouth every morning (before breakfast)        CHANGE how you take these medications    * OLANZapine zydis 15 MG disintegrating tablet  Commonly known as: ZyPREXA zydis  Place 1 tablet under the tongue 2 times daily Given with Zyprexa zydis 5 mg twice a day  What changed:   · additional instructions  · Another medication with the same name was removed. Continue taking this medication, and follow the directions you see here. * OLANZapine zydis 5 MG disintegrating tablet  Commonly known as: ZYPREXA  Place 1 tablet under the tongue 2 times daily Given with Zyprexa zydis 15 mg twice a day  What changed:   · Another medication with the same name was changed. Make sure you understand how and when to take each. · Another medication with the same name was removed. Continue taking this medication, and follow the directions you see here. * This list has 2 medication(s) that are the same as other medications prescribed for you. Read the directions carefully, and ask your doctor or other care provider to review them with you.             CONTINUE taking these medications    bumetanide 1 MG tablet  Commonly known as: BUMEX  1 tablet by PEG Tube route daily as needed (swelling)     docusate 50 MG/5ML liquid  Commonly known as: COLACE  10 mLs by Per G Tube route 2 times daily     ferrous sulfate 325 (65 Fe) MG tablet  Commonly known as: IRON 325  1 tablet by PEG Tube route daily (with breakfast)     folic acid 1 MG tablet  Commonly known as: FOLVITE  1 tablet by PEG Tube route daily     levothyroxine 150 MCG tablet  Commonly known as: SYNTHROID  Take 1 tablet by mouth Daily     LORazepam 2 MG tablet  Commonly known as: ATIVAN     metoprolol tartrate 25 MG tablet  Commonly known as: LOPRESSOR  Take 3 tablets by mouth 2 times daily     montelukast 10 MG tablet  Commonly known as: SINGULAIR  1 tablet by PEG Tube route nightly     PARoxetine 10 MG tablet  Commonly known as: PAXIL  1 tablet by PEG Tube route 2 times daily     polyethylene glycol 17 g packet  Commonly known as: GLYCOLAX  17 g by Per G Tube route daily     thiamine 100 MG tablet  TAKE 1 TABLET VIA G-TUBE DAILY     valproic acid 250 MG/5ML Soln oral solution  Commonly known as: DEPAKENE  20 mLs by Per G Tube route 2 times daily     * vitamin D 25 MCG (1000 UT) Tabs tablet  Commonly known as: CHOLECALCIFEROL  1 tablet by PEG Tube route daily     * Natural Vitamin D-3 125 MCG (5000 UT) Tabs tablet  Generic drug: vitamin D3  TAKE 1 TABLET VIA G-TUBE ONCE DAILY         * This list has 2 medication(s) that are the same as other medications prescribed for you. Read the directions carefully, and ask your doctor or other care provider to review them with you.             STOP taking these medications    famotidine 20 MG tablet  Commonly known as: PEPCID           Where to Get Your Medications      These medications were sent to [facility], [address] [facility] [address], [address] [address]    Phone: [phone]   · pantoprazole 40 MG tablet           Note that 30 minutes was spent in preparing discharge papers, discussing discharge with patient, medication review, etc.    Signed:  Electronically signed by Kailey Sanders MD on 8/18/2021 at 9:29 AM    NOTE: This report was transcribed using voice recognition software. Every effort was made to ensure accuracy; however, inadvertent computerized transcription errors may be present.

## 2021-08-18 NOTE — DISCHARGE INSTR - DIET
 Good nutrition is important when healing from an illness, injury, or surgery. Follow any nutrition recommendations given to you during your hospital stay.  If you were given an oral nutrition supplement while in the hospital, continue to take this supplement at home. You can take it with meals, in-between meals, and/or before bedtime. These supplements can be purchased at most local grocery stores, pharmacies, and chain super-stores.  If you have any questions about your diet or nutrition, call the hospital and ask for the dietitian.     Peg tube  Bolus tube feeding 2 anita hn 4 times daily 250ml with 130 ml water flush before and after  10 ml water flush before and after meds

## 2021-08-18 NOTE — CARE COORDINATION
8/18/2021  Social Work Discharge Planning:Pt will return to the [address] [facility] group home. BEVERLEY called [phone] - to inform of discharge. Mabel Perdue said they will not set up transport until she is faxed [phone]- Pts discharge paperwork to review. BEVERLEY notified nurse for discharge paperwork. Venus Meléndez [phone] will need to be notified of discharge as well. Electronically signed by AILYN Ferreira on 8/18/2021 at 9:38 AM    8/18/2021  Social Work Discharge Planning:BEVERLEY was informed by Mabel Perdue with [facility] that they will pick Pt up between 1-2 today. They are requesting [facility]. BEVERLEY made a referral to [facility].  Electronically signed by AILYN Ferreira on 8/18/2021 at 12:04 PM

## 2021-08-18 NOTE — PROGRESS NOTES
Chief Complaint   Patient presents with    Hematemesis     Coffee ground emesis per EMS      I have examined the patient, performed key aspects of physical exam, and reviewed the record (including all pertinent and new radiology images and laboratory findings). GENERAL SURGERY PROGRESS NOTE         Subjective    No events overnight. Tolerating Diet NPO  ADULT TUBE FEEDING; PEG; 2.0 Calorie; Bolus; 4 TIMES DAILY; 240; Syringe Push; 130; Before and after each bolus. No nausea/vomiting. Scheduled medications:   pantoprazole  40 mg Oral QAM AC    ferrous sulfate  325 mg PEG Tube Daily with breakfast    folic acid  1 mg PEG Tube Daily    levothyroxine  150 mcg Oral Daily    metoprolol tartrate  75 mg Oral BID    montelukast  10 mg PEG Tube Nightly    vitamin D3  5,000 Units Oral Daily    OLANZapine zydis  5 mg Sublingual BID    PARoxetine  10 mg PEG Tube BID    polyethylene glycol  17 g Per G Tube Daily    thiamine  100 mg Oral Daily    valproic acid  1,000 mg Per G Tube BID    vitamin D  1,000 Units PEG Tube Daily    sodium chloride flush  5-40 mL Intravenous 2 times per day    enoxaparin  40 mg Subcutaneous Daily       PRN medications:   bumetanide, LORazepam, sodium chloride flush, sodium chloride, ondansetron **OR** ondansetron, polyethylene glycol, acetaminophen **OR** acetaminophen, diatrizoate meglumine-sodium      Objective    Vitals:    08/17/21 1500 08/17/21 2015 08/18/21 0238 08/18/21 0800   BP: 133/83 139/77  (!) 120/55   Pulse: 99 94  80   Resp: 19 18  18   Temp: 98.3 °F (36.8 °C) 97.2 °F (36.2 °C)  98.7 °F (37.1 °C)   TempSrc: Axillary Axillary  Axillary   SpO2: 96% 97%  97%   Weight:   146 lb 12.8 oz (66.6 kg)    Height:           I/O last 3 completed shifts:   In: 1400 [NG/GT:1400]  Out: -      Gen: NAD  Resp: Breathing Comfortably  CV: Reg Rate  Abd: Soft, No TTP, ND, No R/G. PEG in place 3cm      Labs:    CBC  Recent Labs     08/17/21  0325   WBC 10.8   HGB 13.2   HCT 42.9   PLT 343     BMP  Recent Labs     08/17/21  0325      K 4.0      CO2 28   BUN 27*   CREATININE 0.8   CALCIUM 10.6*     Liver Function  Recent Labs     08/16/21  1515 08/16/21  1515 08/17/21  0325   LIPASE 39  --   --    BILITOT 0.2   < > 0.3   AST 45*   < > 15   ALT 11   < > 6   ALKPHOS 57   < > 65   PROT 8.5*   < > 8.7*   LABALBU 2.8*   < > 3.2*    < > = values in this interval not displayed. No results for input(s): LACTATE in the last 72 hours. No results for input(s): INR, PTT in the last 72 hours. Invalid input(s): PT    Imaging:    CT ABDOMEN PELVIS WO CONTRAST Additional Contrast? None    Result Date: 8/16/2021  EXAMINATION: CT OF THE ABDOMEN AND PELVIS WITHOUT CONTRAST 8/16/2021 3:53 pm TECHNIQUE: CT of the abdomen and pelvis was performed without the administration of intravenous contrast. Multiplanar reformatted images are provided for review. Dose modulation, iterative reconstruction, and/or weight based adjustment of the mA/kV was utilized to reduce the radiation dose to as low as reasonably achievable. COMPARISON: August 5, 2020 HISTORY: ORDERING SYSTEM PROVIDED HISTORY: pain TECHNOLOGIST PROVIDED HISTORY: Reason for exam:->pain Additional Contrast?->None Decision Support Exception - unselect if not a suspected or confirmed emergency medical condition->Emergency Medical Condition (MA) FINDINGS: The lung bases demonstrate patchy bibasilar infiltrates and small pleural effusion likely pneumonia. Large hiatal hernia is noted. Liver, gallbladder, spleen, pancreas, the adrenals and the kidneys are normal.  A G-tube is noted with the  bulb somewhat over distended in the duodenum which may be inflamed and may need to be repositioned. 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 Pelvis. The bladder is distended.   2.7 x 2.3 cm some fatty mass in the left adnexa is noted concerning for a dermoid cyst.  There is diverticulosis of the colon without diverticulitis. Appendix is normal.     Infiltrates and pleural effusion lung bases concerning for pneumonia. A large hiatal hernia with a G-tube with bulb somewhat over distended in the duodenum which may be inflamed and may need to be repositioned. Diverticulosis of colon without diverticulitis. There is constipation. 2.3 x 2.7 cm fatty mass/dermoid in the left adnexa. XR CHEST PORTABLE    Result Date: 8/17/2021  EXAMINATION: ONE XRAY VIEW OF THE CHEST 8/16/2021 11:25 pm COMPARISON: 03/09/2021 HISTORY: ORDERING SYSTEM PROVIDED HISTORY: dyspnea TECHNOLOGIST PROVIDED HISTORY: Reason for exam:->dyspnea FINDINGS: EKG leads overlie the chest.  Heart size is normal.  No pneumothorax. Lung volumes are diminished. Coarsened interstitial opacities are likely chronic. Minimal left greater than right parenchymal opacities appear similar to previous and are favored to represent areas of scarring. A right infrahilar opacity demonstrates a slight nodular configuration. This however is thought to be artifactual as no nodule was seen in this region on the CT of the abdomen performed 7 hours prior to this exam.     No significant change. PA and lateral views would be useful for further assessment, if symptoms persist.     XR ABDOMEN FOR NG/OG/NE TUBE PLACEMENT    Result Date: 8/16/2021  EXAMINATION: ONE SUPINE XRAY VIEW(S) OF THE ABDOMEN 8/16/2021 5:51 pm COMPARISON: None. HISTORY: ORDERING SYSTEM PROVIDED HISTORY: G-tube Placement / Confirmation TECHNOLOGIST PROVIDED HISTORY: Gastrograffin Reason for exam:->G-tube Placement / Confirmation FINDINGS: Contrast identified within the lumen of the stomach and duodenum. No evidence of extravasation of contrast.  This indicates that the gastrostomy tube tip is within the gastric body. The rest of the abdomen is unremarkable. Normal lung bases.      Gastrostomy tube tip balloon within the lumen of the gastric body.  No evidence of extravasation of contrast from the lumen. Assessment/Plan  78 y.o. female admitted 8/16/2021 with PEG malposition, now   resolved s/p replacement in ED. Tolerating Tube feeds. Important to keep bolster about 3cm to prevent migration of tube, and if binder being used to keep on abdomen and not chest. Otherwise, okay to discharge from surgical standpoint. Carlin Condon MD  08/18/21  8:44 AM    NOTE: This report, in part or full, may have been transcribed using voice recognition software. Every effort was made to ensure accuracy; however, inadvertent computerized transcription errors may be present. Please excuse any transcriptional grammatical or spelling errors that may have escaped my editorial review.

## 2021-08-18 NOTE — PROGRESS NOTES
8/18/2021  12:08 PM      Nutrition Assessment- Enteral Nutrition    Type and Reason for Visit:  Initial    Nutrition Recommendations/Plan: Continue current EN and flushes, as tolerated and consider decrease water flushes, if pt appears volume overloaded    Nutrition Assessment:  Discharge planning to [facility] home today. PEG had been malpositioned and now fixed. Pt tolerating TF. Estimated Daily Nutrient Needs:  Energy (kcal):   (25-30 kcal/kg); Weight Used for Energy Requirements:  Admission     Protein (g):  65-75 (1.2-1.4 g/kg); Weight Used for Protein Requirements:  Ideal        Fluid (ml/day):  ; Method Used for Fluid Requirements:  1 ml/kcal        Current Nutrition Therapies:    Diet NPO  ADULT TUBE FEEDING; PEG; 2.0 Calorie; Bolus; 4 TIMES DAILY; 240; Syringe Push; 130; Before and after each bolus  Current Tube Feeding (TF) Orders:  · Feeding Route: PEG  · Formula: 2.0 Calorie  · Schedule:  Bolus  · Additives/Modulars:  None  · Water Flushes: 130 ml before and after each bolus  · Goal TF & Flush Orders Provides: 240 ml bolus QID= 960 ml/d,  1920 anita, 81 g pro, 2688 ml total free water      Anthropometric Measures:  · Height: 5' 3\" (160 cm)  · Current Body Weight: 146 lb 12.8 oz (66.6 kg) (8/18 bedscale)   · Admission Body Weight: 142 lb (64.4 kg) (8/17 bedscale)    · Usual Body Weight: 148 lb (67.1 kg) (per EMR x 5 mo)     · Ideal Body Weight: 115 lbs; % Ideal Body Weight 127.7 %   · BMI: 26    Discharge Planning:      Continue Enteral Nutrition    Electronically signed by Juan Manuel Cerna RD, CNSC, LD on 8/18/21 at 12:08 PM EDT    Contact: [phone]

## 2021-08-18 NOTE — PLAN OF CARE
Problem: Skin Integrity:  Goal: Will show no infection signs and symptoms  Description: Will show no infection signs and symptoms  Outcome: Ongoing  Goal: Absence of new skin breakdown  Description: Absence of new skin breakdown  Outcome: Ongoing     Problem: Falls - Risk of:  Goal: Will remain free from falls  Description: Will remain free from falls  8/17/2021 2048 by CRYSTAL Bermeo  Outcome: Ongoing  8/17/2021 1156 by Moreno Gómez RN  Outcome: Ongoing  Goal: Absence of physical injury  Description: Absence of physical injury  8/17/2021 2048 by Gail Bermeo  Outcome: Ongoing  8/17/2021 1156 by Moreno Gómez RN  Outcome: Ongoing

## 2021-08-18 NOTE — DISCHARGE INSTR - COC
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    Ingrowing nail L60.0    Peripheral vascular disease ([facility]) I73.9    Altered mental state R41.82    Onychomycosis B35.1    Anemia due to chronic kidney disease N18.9, D63.1    Essential hypertension I10    Vitamin D deficiency E55.9    Gastroesophageal reflux disease without esophagitis K21.9    S/P percutaneous endoscopic gastrostomy (PEG) tube placement ([facility]) Z93.1    Chronic kidney disease N18.9    Dementia with behavioral disturbance, unspecified dementia type ([facility]) F03.91    Gastrostomy tube dysfunction ([facility]) K94.23       Isolation/Infection:   Isolation          No Isolation        Patient Infection Status     Infection Onset Added Last Indicated Last Indicated By Review Planned Expiration Resolved Resolved By    None active    Resolved    COVID-19 Rule Out 03/04/21 03/04/21 03/04/21 COVID-19, Rapid (Ordered)   03/04/21 Rule-Out Test Resulted    COVID-19 Rule Out 02/08/21 02/08/21 02/08/21 COVID-19 (Ordered)   02/08/21 Rule-Out Test Resulted          Nurse Assessment:  Last Vital Signs: BP (!) 120/55   Pulse 80   Temp 98.7 °F (37.1 °C) (Axillary)   Resp 18   Ht 5' 3\" (1.6 m)   Wt 146 lb 12.8 oz (66.6 kg)   SpO2 97%   BMI 26.00 kg/m²     Last documented pain score (0-10 scale): Pain Level: 0  Last Weight:   Wt Readings from Last 1 Encounters:   08/18/21 146 lb 12.8 oz (66.6 kg)     Mental Status:  disoriented    IV Access:  - None    Nursing Mobility/ADLs:  Walking   Dependent  Transfer  Dependent  Bathing  Dependent  Dressing  Dependent  Toileting  Dependent  Feeding  Dependent  Med Admin  Dependent  Med Delivery   crushed    Wound Care Documentation and Therapy:  Wound 03/13/21 Coccyx (Active)   Number of days: 158        Elimination:  Continence:   · Bowel: No  · Bladder: No  Urinary Catheter: None   Colostomy/Ileostomy/Ileal Conduit: No       Date of Last BM: 8/18/21    Intake/Output Summary (Last 24 hours) at 8/18/2021 0938  Last data filed at 8/18/2021 0800  Gross per 24 hour   Intake 1420 ml   Output --   Net 1420 ml     I/O last 3 completed shifts: In: 1400 [NG/GT:1400]  Out: -     Safety Concerns: At Risk for Falls    Impairments/Disabilities:      Vision    Nutrition Therapy:  Current Nutrition Therapy:   - Tube Feedings:  250 ml 4 times per day    Routes of Feeding: Gastrostomy Tube  Liquids: Thin Liquids  Daily Fluid Restriction: no  Last Modified Barium Swallow with Video (Video Swallowing Test): not done    Treatments at the Time of Hospital Discharge:   Respiratory Treatments: ***  Oxygen Therapy:  is not on home oxygen therapy.   Ventilator:    - No ventilator support    Rehab Therapies: Physical Therapy  Weight Bearing Status/Restrictions: No weight bearing restirctions  Other Medical Equipment (for information only, NOT a DME order):  hospital bed  Other Treatments: ***    Patient's personal belongings (please select all that are sent with patient):  None    RN SIGNATURE:  Electronically signed by Ed Hernandez RN on 8/18/21 at 9:41 AM EDT    CASE MANAGEMENT/SOCIAL WORK SECTION    Inpatient Status Date: ***    Readmission Risk Assessment Score:  Readmission Risk              Risk of Unplanned Readmission:  28           Discharging to Facility/ Agency   · Name:   · Address:  · Phone:  · Fax:    Dialysis Facility (if applicable)   · Name:  · Address:  · Dialysis Schedule:  · Phone:  · Fax:    / signature: {Esignature:[id number]}    PHYSICIAN SECTION    Prognosis: {Prognosis:0601495634}    Condition at Discharge: [address] [place] [address] Patient Condition:[id number]}    Rehab Potential (if transferring to Rehab): {Prognosis:3029557224}    Recommended Labs or Other Treatments After Discharge: ***    Physician Certification: I certify the above information and transfer of Mena Felder  is necessary for the continuing treatment of the diagnosis listed and that she requires {Admit to Appropriate Level of Care:06005} for {GREATER/LESS:274856907} 30 days.      Update Admission H&P: {CHP DME Changes in FEYRN:104722427}    PHYSICIAN SIGNATURE:  {Esignature:[id number]}

## 2021-08-19 ENCOUNTER — CARE COORDINATION (OUTPATIENT)
Dept: CASE MANAGEMENT | Age: 66
End: 2021-08-19

## 2021-08-19 NOTE — CARE COORDINATION
[facility] Transitions Initial Follow Up Call    Call within 2 business days of discharge: Yes    Patient: Shanon Cornejo Patient : 1955   MRN: [id number]  Reason for Admission: Hematemesis  Discharge Date: 21 RARS: Readmission Risk Score: 28      Last Discharge [facility]       Complaint Diagnosis Description Type Department Provider    21 Hematemesis Gastrostomy tube dysfunction ([facility] [address] . .. ED to Hosp-Admission (Discharged) (ADMITTED) PERNELL Valenzuela MD; Josue Kramer. .. Spoke with: Patient's caregiver, Roberto Da Silva, at [address]. CTN explained role and reason for call, and Austin Saavedra is receptive to participating in hospital follow up call. Facility: [facility]    Non-face-to-face services provided:  Obtained and reviewed discharge summary and/or continuity of care documents   [place]hel Springsyolanda Saavedra is unable to complete medication review at this time and reports she is aware of patient's medication changes upon hospital discharge    Care Transitions 24 Hour Call    Do you have any ongoing symptoms?: No  Do you have a copy of your discharge instructions?: Yes  Do you have all of your prescriptions and are they filled?: Yes  Have you scheduled your follow up appointment?:  (Comment: facility to schedule appointment for next week with PCP)  Were you discharged with any Home Care or Post Acute Services: No  Do you feel like you have everything you need to keep you well at home?: Yes  Care Transitions Interventions  No Identified Needs    Austin Saavedra reports patient is doing well. -reports functioning Gastrostomy tube   -reports patient is tolerating bolus tube feedings  -denies patient with hematemesis or diarrhea  -voices no needs or concerns at this time    CTN thanked Austin Saavedra for her time regarding this patient.        Follow Up  Future Appointments   Date Time Provider Marissa Law   2021 10:00 AM Chacha Hernandez MD [facility]   10/29/2021 11:00 AM Nikky Langston [address] [facility]       Reford Reasons, RN

## 2021-08-23 ENCOUNTER — TELEPHONE (OUTPATIENT)
Dept: FAMILY MEDICINE CLINIC | Age: 66
End: 2021-08-23

## 2021-08-23 NOTE — TELEPHONE ENCOUNTER
Lana Jiménez from [facility] home health said they went to see yolanda and she needs in home continuous care for tube feedings 3x daily and medications home health care is not a good fit they recommend you getting her the care she needs

## 2021-08-23 NOTE — TELEPHONE ENCOUNTER
What does she need? Previously her care givers at her group home were doing all her tube feeds and medications. If this is no longer the case and home health care is unable to provide the care she needs, she may need to move to a different facility that is able to meet all her needs. This is a conversation that will need to be had with her legal guardian.

## 2021-08-25 DIAGNOSIS — D63.1 ANEMIA DUE TO CHRONIC KIDNEY DISEASE, UNSPECIFIED CKD STAGE: ICD-10-CM

## 2021-08-25 DIAGNOSIS — N18.9 ANEMIA DUE TO CHRONIC KIDNEY DISEASE, UNSPECIFIED CKD STAGE: ICD-10-CM

## 2021-08-25 NOTE — TELEPHONE ENCOUNTER
Last Appointment:  8/12/2021  Future Appointments   Date Time Provider Marissa Law   8/27/2021 11:00 AM Ibteh Webb  [address]   9/22/2021 10:00 AM Una Phillip MD [facility]   10/29/2021 11:00 AM Ibeth Webb  [address]

## 2021-08-26 RX ORDER — FOLIC ACID 1 MG/1
TABLET ORAL
Qty: 28 TABLET | Refills: 0 | Status: SHIPPED
Start: 2021-08-26 | End: 2021-09-21

## 2021-08-26 RX ORDER — FERROUS SULFATE 325(65) MG
TABLET ORAL
Qty: 28 TABLET | Refills: 0 | Status: SHIPPED
Start: 2021-08-26 | End: 2021-09-21

## 2021-08-27 ENCOUNTER — OFFICE VISIT (OUTPATIENT)
Dept: FAMILY MEDICINE CLINIC | Age: 66
End: 2021-08-27
Payer: MEDICARE

## 2021-08-27 VITALS
HEIGHT: 62 IN | DIASTOLIC BLOOD PRESSURE: 70 MMHG | HEART RATE: 75 BPM | WEIGHT: 150 LBS | TEMPERATURE: 97.3 F | SYSTOLIC BLOOD PRESSURE: 118 MMHG | OXYGEN SATURATION: 95 % | BODY MASS INDEX: 27.6 KG/M2

## 2021-08-27 DIAGNOSIS — K94.23 GASTROSTOMY TUBE DYSFUNCTION (HCC): Primary | ICD-10-CM

## 2021-08-27 DIAGNOSIS — R39.81 FUNCTIONAL URINARY INCONTINENCE: ICD-10-CM

## 2021-08-27 DIAGNOSIS — Z93.1 S/P PERCUTANEOUS ENDOSCOPIC GASTROSTOMY (PEG) TUBE PLACEMENT (HCC): ICD-10-CM

## 2021-08-27 PROCEDURE — 99495 TRANSJ CARE MGMT MOD F2F 14D: CPT | Performed by: INTERNAL MEDICINE

## 2021-08-27 PROCEDURE — 1111F DSCHRG MED/CURRENT MED MERGE: CPT | Performed by: INTERNAL MEDICINE

## 2021-08-27 NOTE — PROGRESS NOTES
tablet  Take 1 tablet by mouth Daily             LORazepam (ATIVAN) 2 MG tablet  Take 2 mg by mouth. g tube             metoprolol tartrate (LOPRESSOR) 25 MG tablet  Take 3 tablets by mouth 2 times daily             metoprolol tartrate 75 MG TABS  75 mg by PEG Tube route 2 times daily             montelukast (SINGULAIR) 10 MG tablet  1 tablet by PEG Tube route nightly             NATURAL VITAMIN D-3 125 MCG (5000 UT) TABS tablet  TAKE 1 TABLET VIA G-TUBE ONCE DAILY             OLANZapine zydis (ZYPREXA ZYDIS) 15 MG disintegrating tablet  Place 1 tablet under the tongue 2 times daily Given with Zyprexa zydis 5 mg twice a day             OLANZapine zydis (ZYPREXA) 5 MG disintegrating tablet  Place 1 tablet under the tongue 2 times daily Given with Zyprexa zydis 15 mg twice a day             pantoprazole (PROTONIX) 40 MG tablet  Take 1 tablet by mouth every morning (before breakfast)             PARoxetine (PAXIL) 10 MG tablet  1 tablet by PEG Tube route 2 times daily             polyethylene glycol (GLYCOLAX) 17 g packet  17 g by Per G Tube route daily             thiamine 100 MG tablet  TAKE 1 TABLET VIA G-TUBE DAILY             valproic acid (DEPACON) 250 MG/5ML SOLN oral solution  20 mLs by Per G Tube route 2 times daily             vitamin D (CHOLECALCIFEROL) 25 MCG (1000 UT) TABS tablet  1 tablet by PEG Tube route daily                   Medications marked \"taking\" at this time  Outpatient Medications Marked as Taking for the 8/27/21 encounter (Office Visit) with Randolph Strange, DO   Medication Sig Dispense Refill    FEROSUL 325 (65 Fe) MG tablet TAKE 1 TABLET VIA G-TUBE ONCE A DAY.  28 tablet 0    folic acid (FOLVITE) 1 MG tablet TAKE 1 TABLET VIA G-TUBE DAILY 28 tablet 0    metoprolol tartrate 75 MG TABS 75 mg by PEG Tube route 2 times daily 60 tablet 3    pantoprazole (PROTONIX) 40 MG tablet Take 1 tablet by mouth every morning (before breakfast) 30 tablet 5    levothyroxine (SYNTHROID) 150 MCG Problems:    Gastrostomy tube dysfunction (HCC)  Resolved Problems:    * No resolved hospital problems. *    Inpatient course: Discharge summary reviewed- see chart. She is a 79-year-old female with past medical history of MR, blindness , history of PEG tube and on tube feeds, she is a resident of nursing home -she was sent to ER for evaluation of nausea vomiting and diarrhea. As per caregiver at nursing home she has pulled out G-tube 2 times, which was replaced 2 days prior to arrival in ER. After replacement tube feeds were accepted until the day of admission she started with nausea vomiting and diarrhea. Denied any fever chills or rigors. No ill contacts at nursing home. No dysuria. She cannot provide any information. All information was obtained from caregiver. Surgery was consulted from ER. As per surgery PEG tube was pulled out and replaced. She was admitted overnight for observation. Surgery evaluated and recommended that tube is in the right position and tube feeds can be started. She is accepting tube feeds very well. Now she is returning back to nursing home. CT showed infiltrates and pleural effusion at lung bases concerning for pneumonia, large hiatal hernia with G-tube bulb somewhat over distended and may be inflamed and need to be repositioned-which was repositioned. She initially received antibiotics in ER. But since she did not had any cough, short of breath, fever or chills, procalcitonin was normal, she was observed off of antibiotics and monitored. She had reactive leukocytosis which resolved in 1 day. Diarrhea was secondary to laxatives, which also has resolved. Other comorbid conditions were managed by continuing home medications. She is now discharged to [facility] in stable status. She will follow with PCP as out pt. Interval history/Current status: Patient has been doing well since discharge.  Her caregiver, who is present with her today states that the tube feeds and medications have been tolerated well. She was encouraged to make a follow up appointment with the surgeon who placed the tube for any future problems. A comprehensive review of systems was negative except for what was noted in the HPI. There were no vitals filed for this visit. There is no height or weight on file to calculate BMI. Wt Readings from Last 3 Encounters:   08/18/21 146 lb 12.8 oz (66.6 kg)   08/12/21 150 lb (68 kg)   08/05/21 140 lb (63.5 kg)     BP Readings from Last 3 Encounters:   08/18/21 (!) 120/55   08/12/21 128/68   08/06/21 118/68        Physical Exam:  Akshat Alberto but has eyes closed most of the time, non verbal at baseline, wheelchair bound, No acute distress  Head: Normocephalic, atraumatic  Eyes: conjunctivae/corneas clear  Mouth: Mucous membranes moist with no pharyngeal exudate or erythema  Neck: no JVD, no adenopathy, no carotid bruit, supple, symmetrical, trachea midline  Back: symmetric, No CVA tenderness. Lungs: clear to auscultation bilaterally without wheezes, rales, or rhonchi  Heart: regular rate and rhythm, S1, S2 normal, no murmur, click, rub or gallop  Abdomen: soft, non-tender; bowel sounds normal; no masses,  no organomegaly, PEG tube site is clean and dry with no surrounding erythema. Extremities: atraumatic, no cyanosis, no edema  Skin: dry without rashes or lesions  Neurologic: non verbal and wheel chair bound. Does not follow commands. At her baseline. Assessment/Plan:  1. Functional urinary incontinence    2. S/P percutaneous endoscopic gastrostomy (PEG) tube placement ([address]    - MA DISCHARGE MEDS RECONCILED W/ CURRENT OUTPATIENT MED LIST    3.  Gastrostomy tube dysfunction ([address]    - MA DISCHARGE MEDS RECONCILED W/ CURRENT OUTPATIENT MED LIST        Medical Decision Making: moderate complexity    Wade Mcpherson DO  9/6/2021  5:51 PM

## 2021-08-31 ENCOUNTER — HOSPITAL ENCOUNTER (EMERGENCY)
Age: 66
Discharge: HOME | End: 2021-08-31
Attending: EMERGENCY MEDICINE
Payer: MEDICARE

## 2021-08-31 VITALS
SYSTOLIC BLOOD PRESSURE: 101 MMHG | BODY MASS INDEX: 26.58 KG/M2 | OXYGEN SATURATION: 94 % | DIASTOLIC BLOOD PRESSURE: 66 MMHG | HEIGHT: 63 IN | HEART RATE: 90 BPM | RESPIRATION RATE: 14 BRPM | WEIGHT: 150 LBS | TEMPERATURE: 97 F

## 2021-08-31 DIAGNOSIS — Z93.1 S/P PERCUTANEOUS ENDOSCOPIC GASTROSTOMY (PEG) TUBE PLACEMENT (HCC): Primary | ICD-10-CM

## 2021-08-31 PROCEDURE — 99283 EMERGENCY DEPT VISIT LOW MDM: CPT

## 2021-08-31 NOTE — ED PROVIDER NOTES
HPI:  8/31/21, Time: 3:49 PM EDT         Yon Mata is a 78 y.o. female presenting to the ED for possible PEG tube dysfunction. Patient's care has a chronic PEG tube. Group home was concerned the PEG tube was difficult to flush, they say there was a green contents when they flushed it. Otherwise marking appropriate. No report of leaking. No report of bleeding. No report of vomiting. Came on suddenly, nothing makes it better or worse, no associated pain. Patient is otherwise a poor historian due to mental retardation and cannot provide any other history. Review of Systems:   Unable to obtain due to the patient's baseline mental status          --------------------------------------------- PAST HISTORY ---------------------------------------------  Past Medical History:  has a past medical history of Acute kidney injury ([facility] [address] Blind in both eyes, Essential hypertension, Gastroesophageal reflux disease without esophagitis, Hemodialysis patient ([facility] [address] Hyperthyroidism, MR (mental retardation), Osteoarthritis, Peripheral vascular disease ([facility] [address] Pneumonia, Pneumonia due to infectious organism, and Sepsis ([facility] [address] Past Surgical History:  has a past surgical history that includes other surgical history (Right, 01/17/2017); other surgical history (01/25/2017); chest tube insertion (Right, 02/09/2017); bronchoscopy (02/10/2017); and Gastrostomy tube placement (N/A, 3/7/2021). Social History:  reports that she has never smoked. She has never used smokeless tobacco. She reports that she does not drink alcohol and does not use drugs. Family History: family history is not on file. The patients home medications have been reviewed. Allergies: Patient has no known allergies.     -------------------------------------------------- RESULTS -------------------------------------------------  All laboratory and radiology results have been personally reviewed by myself   LABS:  No results found for this visit on 08/31/21. RADIOLOGY:  Interpreted by Radiologist.  No orders to display       ------------------------- NURSING NOTES AND VITALS REVIEWED ---------------------------   The nursing notes within the ED encounter and vital signs as below have been reviewed. /66   Pulse 90   Temp 97 °F (36.1 °C) (Temporal)   Resp 14   Ht 5' 3\" (1.6 m)   Wt 150 lb (68 kg)   SpO2 94%   BMI 26.57 kg/m²   Oxygen Saturation Interpretation: Normal      ---------------------------------------------------PHYSICAL EXAM--------------------------------------      Constitutional/General: Alert   Head: Normocephalic and atraumatic  Eyes: PERRL, EOMI  Mouth: Oropharynx clear, handling secretions, no trismus  Neck: Supple, full ROM,   Pulmonary: Lungs clear to auscultation bilaterally, no wheezes, rales, or rhonchi. Not in respiratory distress  Cardiovascular:  Regular rate and rhythm, no murmurs, gallops, or rubs. 2+ distal pulses  Abdomen: Soft, non tender, non distended, PEG tube intact, no acute findings surrounding the PEG tube  Extremities: Moves all extremities x 4. Warm and well perfused  Skin: warm and dry without rash  Neurologic: GCS 15,  Psych: Normal Affect      ------------------------------ ED COURSE/MEDICAL DECISION MAKING----------------------  Medications - No data to display      ED COURSE:       Medical Decision Making:    PEG tube was flushed easily per nursing. Patient hemodynamically stable, afebrile, no infectious signs. Therefore, patient was discharged back to her facility. Counseling: The emergency provider has spoken with the patient and discussed todays results, in addition to providing specific details for the plan of care and counseling regarding the diagnosis and prognosis. Questions are answered at this time and they are agreeable with the plan.      --------------------------------- IMPRESSION AND DISPOSITION ---------------------------------    IMPRESSION  1.  S/P percutaneous endoscopic gastrostomy (PEG) tube placement ([facility] [address]        DISPOSITION  Disposition: Discharge to home  Patient condition is stable      NOTE: This report was transcribed using voice recognition software.  Every effort was made to ensure accuracy; however, inadvertent computerized transcription errors may be present        Minerva Sesay MD  09/02/21 [id number]

## 2021-08-31 NOTE — ED PROVIDER NOTES
FIRST PROVIDER CONTACT ASSESSMENT NOTE                                                                                                Department of Emergency Medicine                                                      First Provider Note  21  3:03 PM EDT  NAME: Mahendra Darling  : 1955  MRN: [id number]    Chief Complaint: Other (peg tube complications)      History of Present Illness:   Mahendra Darling is a 78 y.o. female who presents to the ED for concern for PEG tube malfunction per caregiver    Focused Physical Exam:  VS:    ED Triage Vitals [21 1459]   BP Temp Temp Source Pulse Resp SpO2 Height Weight   101/66 97 °F (36.1 °C) Temporal 90 14 94 % 5' 3\" (1.6 m) 150 lb (68 kg)        General: Alert and in no apparent distress. Medical History:  has a past medical history of Acute kidney injury ([facility] [address] Blind in both eyes, Essential hypertension, Gastroesophageal reflux disease without esophagitis, Hemodialysis patient ([facility] [address] Hyperthyroidism, MR (mental retardation), Osteoarthritis, Peripheral vascular disease ([facility] [address] Pneumonia, Pneumonia due to infectious organism, and Sepsis ([facility] [address] Surgical History:  has a past surgical history that includes other surgical history (Right, 2017); other surgical history (2017); chest tube insertion (Right, 2017); bronchoscopy (02/10/2017); and Gastrostomy tube placement (N/A, 3/7/2021). Social History:  reports that she has never smoked. She has never used smokeless tobacco. She reports that she does not drink alcohol and does not use drugs. Family History: family history is not on file. Allergies: Patient has no known allergies.      Initial Plan of Care:  Initiate Treatment-Testing, Proceed toTreatment Area When Bed Available for ED Attending/MLP to Continue Care    -------------------------------------------------END OF FIRST PROVIDER CONTACT ASSESSMENT NOTE--------------------------------------------------------  Electronically signed by Valerie Styles   DD: 8/31/21       Elise Bueno PA-C  08/31/21 [id number]

## 2021-08-31 NOTE — ED NOTES
Brendan Galvan RN flushed peg tube with no resistance or issues.  Dr. Itzel Giles notified of this     Cherelle Herrera RN  08/31/21 [id number]

## 2021-09-02 ENCOUNTER — TELEPHONE (OUTPATIENT)
Dept: FAMILY MEDICINE CLINIC | Age: 66
End: 2021-09-02

## 2021-09-02 NOTE — TELEPHONE ENCOUNTER
I'm not sure what Blu Vazquez was following for her. I would recommend that she follow up with them based on their recommendations.

## 2021-09-02 NOTE — TELEPHONE ENCOUNTER
I spoke with Azael Greene and gave her your instructions. She will call tomorrow and schedule an appointment. She also inquired about Duane Najera continuing with [facility]?

## 2021-09-02 NOTE — TELEPHONE ENCOUNTER
----- Message from Tim Jiménez sent at 9/1/2021  4:03 PM EDT -----  Subject: Message to Provider    QUESTIONS  Information for Provider? Duane Najera was in for an appt on 8-12 and there was a   conversation in regards to a general surgeon with regard to peg 2. She is   wanting to know if this is something your office will set up or will they   have to. Please contact Lili Flores to let her know. [address] [facility]  ---------------------------------------------------------------------------  --------------  Sebastián DE LEON  What is the best way for the office to contact you? OK to leave message on   voicemail  Preferred Call Back Phone Number? [phone]  ---------------------------------------------------------------------------  --------------  SCRIPT ANSWERS  Relationship to Patient? Third Party  Representative Name?  Florencia Segura

## 2021-09-03 ENCOUNTER — TELEPHONE (OUTPATIENT)
Dept: SURGERY | Age: 66
End: 2021-09-03

## 2021-09-03 NOTE — TELEPHONE ENCOUNTER
Received a call from [facility] [phone] #[phone] who is wanting to schedule with Dr. Argentina Bernstein for peg tube. [facility] stated the patient is having a problem with peg tube and went to ED recently. MA asked if peg is working. [facility] stated yes, and it flushes, no infections sign not none what type of problem she is having. MA told her to talk to her nurse and call back our office. Told her we are happy to schedule the appt but wants to know what type of issues she has with her peg. She verbalized understanding and stated she is going to talk with the nurse and call back our office.    Electronically signed by River Renee on 9/3/2021 at 5:21 PM

## 2021-09-08 ENCOUNTER — HOSPITAL ENCOUNTER (OUTPATIENT)
Age: 66
Discharge: HOME | End: 2021-09-08
Payer: MEDICARE

## 2021-09-08 LAB
ALBUMIN SERPL-MCNC: 3.3 G/DL (ref 3.5–5.2)
ALP BLD-CCNC: 67 U/L (ref 35–104)
ALT SERPL-CCNC: 5 U/L (ref 0–32)
ANION GAP SERPL CALCULATED.3IONS-SCNC: 10 MMOL/L (ref 7–16)
AST SERPL-CCNC: 13 U/L (ref 0–31)
BASOPHILS ABSOLUTE: 0.04 E9/L (ref 0–0.2)
BASOPHILS RELATIVE PERCENT: 0.3 % (ref 0–2)
BILIRUB SERPL-MCNC: 0.3 MG/DL (ref 0–1.2)
BUN BLDV-MCNC: 42 MG/DL (ref 6–23)
CALCIUM SERPL-MCNC: 10 MG/DL (ref 8.6–10.2)
CHLORIDE BLD-SCNC: 115 MMOL/L (ref 98–107)
CHOLESTEROL, TOTAL: 170 MG/DL (ref 0–199)
CO2: 25 MMOL/L (ref 22–29)
CREAT SERPL-MCNC: 0.9 MG/DL (ref 0.5–1)
EOSINOPHILS ABSOLUTE: 0.25 E9/L (ref 0.05–0.5)
EOSINOPHILS RELATIVE PERCENT: 2.1 % (ref 0–6)
GFR AFRICAN AMERICAN: >60
GFR NON-AFRICAN AMERICAN: >60 ML/MIN/1.73
GLUCOSE BLD-MCNC: 125 MG/DL (ref 74–99)
HCT VFR BLD CALC: 41.5 % (ref 34–48)
HDLC SERPL-MCNC: 72 MG/DL
HEMOGLOBIN: 12.5 G/DL (ref 11.5–15.5)
IMMATURE GRANULOCYTES #: 0.08 E9/L
IMMATURE GRANULOCYTES %: 0.7 % (ref 0–5)
LDL CHOLESTEROL CALCULATED: 79 MG/DL (ref 0–99)
LYMPHOCYTES ABSOLUTE: 1.95 E9/L (ref 1.5–4)
LYMPHOCYTES RELATIVE PERCENT: 16.1 % (ref 20–42)
MCH RBC QN AUTO: 32.6 PG (ref 26–35)
MCHC RBC AUTO-ENTMCNC: 30.1 % (ref 32–34.5)
MCV RBC AUTO: 108.1 FL (ref 80–99.9)
MONOCYTES ABSOLUTE: 1.22 E9/L (ref 0.1–0.95)
MONOCYTES RELATIVE PERCENT: 10.1 % (ref 2–12)
NEUTROPHILS ABSOLUTE: 8.56 E9/L (ref 1.8–7.3)
NEUTROPHILS RELATIVE PERCENT: 70.7 % (ref 43–80)
PDW BLD-RTO: 14.6 FL (ref 11.5–15)
PLATELET # BLD: 296 E9/L (ref 130–450)
PMV BLD AUTO: 10.4 FL (ref 7–12)
POTASSIUM SERPL-SCNC: 3.7 MMOL/L (ref 3.5–5)
RBC # BLD: 3.84 E12/L (ref 3.5–5.5)
SODIUM BLD-SCNC: 150 MMOL/L (ref 132–146)
TOTAL PROTEIN: 8.3 G/DL (ref 6.4–8.3)
TRIGL SERPL-MCNC: 94 MG/DL (ref 0–149)
TSH SERPL DL<=0.05 MIU/L-ACNC: 18.46 UIU/ML (ref 0.27–4.2)
VALPROIC ACID LEVEL: 12 MCG/ML (ref 50–100)
VLDLC SERPL CALC-MCNC: 19 MG/DL
WBC # BLD: 12.1 E9/L (ref 4.5–11.5)

## 2021-09-08 PROCEDURE — 80061 LIPID PANEL: CPT

## 2021-09-08 PROCEDURE — 84443 ASSAY THYROID STIM HORMONE: CPT

## 2021-09-08 PROCEDURE — 80053 COMPREHEN METABOLIC PANEL: CPT

## 2021-09-08 PROCEDURE — 36415 COLL VENOUS BLD VENIPUNCTURE: CPT

## 2021-09-08 PROCEDURE — 85025 COMPLETE CBC W/AUTO DIFF WBC: CPT

## 2021-09-08 PROCEDURE — 80164 ASSAY DIPROPYLACETIC ACD TOT: CPT

## 2021-09-15 ENCOUNTER — OFFICE VISIT (OUTPATIENT)
Dept: SURGERY | Age: 66
End: 2021-09-15
Payer: MEDICARE

## 2021-09-15 VITALS — HEART RATE: 80 BPM | RESPIRATION RATE: 18 BRPM | SYSTOLIC BLOOD PRESSURE: 108 MMHG | DIASTOLIC BLOOD PRESSURE: 60 MMHG

## 2021-09-15 DIAGNOSIS — K94.23 PEG TUBE MALFUNCTION (HCC): Primary | ICD-10-CM

## 2021-09-15 PROCEDURE — 4040F PNEUMOC VAC/ADMIN/RCVD: CPT | Performed by: SURGERY

## 2021-09-15 PROCEDURE — 1090F PRES/ABSN URINE INCON ASSESS: CPT | Performed by: SURGERY

## 2021-09-15 PROCEDURE — 1123F ACP DISCUSS/DSCN MKR DOCD: CPT | Performed by: SURGERY

## 2021-09-15 PROCEDURE — 1036F TOBACCO NON-USER: CPT | Performed by: SURGERY

## 2021-09-15 PROCEDURE — 3017F COLORECTAL CA SCREEN DOC REV: CPT | Performed by: SURGERY

## 2021-09-15 PROCEDURE — 1111F DSCHRG MED/CURRENT MED MERGE: CPT | Performed by: SURGERY

## 2021-09-15 PROCEDURE — G8400 PT W/DXA NO RESULTS DOC: HCPCS | Performed by: SURGERY

## 2021-09-15 PROCEDURE — 99213 OFFICE O/P EST LOW 20 MIN: CPT | Performed by: SURGERY

## 2021-09-15 PROCEDURE — G8427 DOCREV CUR MEDS BY ELIG CLIN: HCPCS | Performed by: SURGERY

## 2021-09-15 PROCEDURE — G8417 CALC BMI ABV UP PARAM F/U: HCPCS | Performed by: SURGERY

## 2021-09-15 NOTE — PROGRESS NOTES
General Surgery Progress Note:    Cc: peg issues    S: she is ok, having some issues with tube feeds having issues with some spit up as well. Objective:  @/60   Pulse 80   Resp 18 @    Physical -     Gen: NAD, pleasant confused non verbal,   Resp: Breathing Comfortably, no resp distress  CV: Reg Rate  Abd: G tube was burried to the hilt. I withdrew it to 3cm at the skin and secured the bumper with a blue zip tie. EXT nvi    Assessment/Plan: G tube malfunction,     - I think the peg is intermittently obstructing and causing GOO and emesis/ severe reflux which I think is where her spit up is coming from. - I educated care givers about keeping the tube at 3cm at the skin or 4cm at the bumper.     - I would not pursue treatment for the hiatal hernia     Electronically Signed by Ermelinda Boudreaux MD FACS   2:53 PM

## 2021-09-16 ENCOUNTER — TELEPHONE (OUTPATIENT)
Dept: SURGERY | Age: 66
End: 2021-09-16

## 2021-09-16 NOTE — TELEPHONE ENCOUNTER
Received a call from [facility] the nurse Kendra Peck. She wants to know if the patient need a follow up appt. MA told her the follow up is as needed. Kendra Peck stated she was able to see her mychart so no need to fax the notes.   Electronically signed by Heather Blake on 9/16/2021 at 10:32 AM

## 2021-09-21 ENCOUNTER — HOSPITAL ENCOUNTER (INPATIENT)
Age: 66
LOS: 9 days | Discharge: NURSING, REHAB OR LONG-TERM CARE | DRG: 871 | End: 2021-09-30
Attending: EMERGENCY MEDICINE | Admitting: FAMILY MEDICINE
Payer: MEDICARE

## 2021-09-21 ENCOUNTER — APPOINTMENT (OUTPATIENT)
Dept: GENERAL RADIOLOGY | Age: 66
DRG: 871 | End: 2021-09-21
Payer: MEDICARE

## 2021-09-21 ENCOUNTER — APPOINTMENT (OUTPATIENT)
Dept: CT IMAGING | Age: 66
DRG: 871 | End: 2021-09-21
Payer: MEDICARE

## 2021-09-21 DIAGNOSIS — J18.9 PNEUMONIA DUE TO INFECTIOUS ORGANISM, UNSPECIFIED LATERALITY, UNSPECIFIED PART OF LUNG: ICD-10-CM

## 2021-09-21 DIAGNOSIS — R41.82 ALTERED MENTAL STATUS, UNSPECIFIED ALTERED MENTAL STATUS TYPE: Primary | ICD-10-CM

## 2021-09-21 DIAGNOSIS — R65.21 SEPSIS WITH ACUTE ORGAN DYSFUNCTION AND SEPTIC SHOCK, DUE TO UNSPECIFIED ORGANISM, UNSPECIFIED TYPE (HCC): ICD-10-CM

## 2021-09-21 DIAGNOSIS — A41.9 SEPSIS WITH ACUTE ORGAN DYSFUNCTION AND SEPTIC SHOCK, DUE TO UNSPECIFIED ORGANISM, UNSPECIFIED TYPE (HCC): ICD-10-CM

## 2021-09-21 PROBLEM — E86.0 SEVERE DEHYDRATION: Status: ACTIVE | Noted: 2021-09-21

## 2021-09-21 PROBLEM — J96.01 ACUTE RESPIRATORY FAILURE WITH HYPOXIA (HCC): Status: ACTIVE | Noted: 2021-09-21

## 2021-09-21 PROBLEM — N17.9 ACUTE RENAL FAILURE (ARF) (HCC): Status: ACTIVE | Noted: 2021-09-21

## 2021-09-21 LAB
ADENOVIRUS BY PCR: NOT DETECTED
ALBUMIN SERPL-MCNC: 2.2 G/DL (ref 3.5–5.2)
ALP BLD-CCNC: 57 U/L (ref 35–104)
ALT SERPL-CCNC: 14 U/L (ref 0–32)
ANION GAP SERPL CALCULATED.3IONS-SCNC: 11 MMOL/L (ref 7–16)
ANION GAP SERPL CALCULATED.3IONS-SCNC: 12 MMOL/L (ref 7–16)
ANION GAP SERPL CALCULATED.3IONS-SCNC: 14 MMOL/L (ref 7–16)
ANISOCYTOSIS: ABNORMAL
AST SERPL-CCNC: 23 U/L (ref 0–31)
ATYPICAL LYMPHOCYTE RELATIVE PERCENT: 2 % (ref 0–4)
B.E.: -2.3 MMOL/L (ref -3–0)
BACTERIA: ABNORMAL /HPF
BASOPHILS ABSOLUTE: 0 E9/L (ref 0–0.2)
BASOPHILS RELATIVE PERCENT: 0 % (ref 0–2)
BILIRUB SERPL-MCNC: 0.4 MG/DL (ref 0–1.2)
BILIRUBIN URINE: ABNORMAL
BLOOD, URINE: NEGATIVE
BORDETELLA PARAPERTUSSIS BY PCR: NOT DETECTED
BORDETELLA PERTUSSIS BY PCR: NOT DETECTED
BUN BLDV-MCNC: 53 MG/DL (ref 6–23)
BUN BLDV-MCNC: 59 MG/DL (ref 6–23)
BUN BLDV-MCNC: 59 MG/DL (ref 6–23)
CALCIUM SERPL-MCNC: 7.3 MG/DL (ref 8.6–10.2)
CALCIUM SERPL-MCNC: 8.2 MG/DL (ref 8.6–10.2)
CALCIUM SERPL-MCNC: 9.3 MG/DL (ref 8.6–10.2)
CHLAMYDOPHILIA PNEUMONIAE BY PCR: NOT DETECTED
CHLORIDE BLD-SCNC: 126 MMOL/L (ref 98–107)
CHLORIDE BLD-SCNC: 127 MMOL/L (ref 98–107)
CHLORIDE BLD-SCNC: 132 MMOL/L (ref 98–107)
CHLORIDE URINE RANDOM: <20 MMOL/L
CLARITY: CLEAR
CO2: 15 MMOL/L (ref 22–29)
CO2: 16 MMOL/L (ref 22–29)
CO2: 19 MMOL/L (ref 22–29)
COLOR: YELLOW
CORONAVIRUS 229E BY PCR: NOT DETECTED
CORONAVIRUS HKU1 BY PCR: NOT DETECTED
CORONAVIRUS NL63 BY PCR: NOT DETECTED
CORONAVIRUS OC43 BY PCR: NOT DETECTED
CORTISOL TOTAL: 23.93 MCG/DL (ref 2.68–18.4)
CREAT SERPL-MCNC: 2.1 MG/DL (ref 0.5–1)
CREAT SERPL-MCNC: 2.3 MG/DL (ref 0.5–1)
CREAT SERPL-MCNC: 2.4 MG/DL (ref 0.5–1)
CREATININE URINE: 295 MG/DL (ref 29–226)
CRYSTALS, UA: ABNORMAL /HPF
DELIVERY SYSTEMS: ABNORMAL
DEVICE: ABNORMAL
EOSINOPHILS ABSOLUTE: 0 E9/L (ref 0.05–0.5)
EOSINOPHILS RELATIVE PERCENT: 0 % (ref 0–6)
FIO2 ARTERIAL: 15
GFR AFRICAN AMERICAN: 24
GFR AFRICAN AMERICAN: 26
GFR AFRICAN AMERICAN: 28
GFR NON-AFRICAN AMERICAN: 20 ML/MIN/1.73
GFR NON-AFRICAN AMERICAN: 21 ML/MIN/1.73
GFR NON-AFRICAN AMERICAN: 23 ML/MIN/1.73
GLUCOSE BLD-MCNC: 252 MG/DL (ref 74–99)
GLUCOSE BLD-MCNC: 320 MG/DL (ref 74–99)
GLUCOSE BLD-MCNC: 345 MG/DL (ref 74–99)
GLUCOSE URINE: 100 MG/DL
HCO3 ARTERIAL: 22.2 MMOL/L (ref 22–26)
HCT VFR BLD CALC: 41.3 % (ref 34–48)
HEMOGLOBIN: 12.3 G/DL (ref 11.5–15.5)
HUMAN METAPNEUMOVIRUS BY PCR: NOT DETECTED
HUMAN RHINOVIRUS/ENTEROVIRUS BY PCR: NOT DETECTED
HYPOCHROMIA: ABNORMAL
INFLUENZA A BY PCR: NOT DETECTED
INFLUENZA B BY PCR: NOT DETECTED
KETONES, URINE: ABNORMAL MG/DL
LACTIC ACID, SEPSIS: 2.8 MMOL/L (ref 0.5–1.9)
LACTIC ACID, SEPSIS: 3.2 MMOL/L (ref 0.5–1.9)
LACTIC ACID: 2.8 MMOL/L (ref 0.5–2.2)
LEUKOCYTE ESTERASE, URINE: NEGATIVE
LYMPHOCYTES ABSOLUTE: 1.39 E9/L (ref 1.5–4)
LYMPHOCYTES RELATIVE PERCENT: 19 % (ref 20–42)
MCH RBC QN AUTO: 33.1 PG (ref 26–35)
MCHC RBC AUTO-ENTMCNC: 29.8 % (ref 32–34.5)
MCV RBC AUTO: 111 FL (ref 80–99.9)
METAMYELOCYTES RELATIVE PERCENT: 3 % (ref 0–1)
MONOCYTES ABSOLUTE: 0.53 E9/L (ref 0.1–0.95)
MONOCYTES RELATIVE PERCENT: 8 % (ref 2–12)
MYCOPLASMA PNEUMONIAE BY PCR: NOT DETECTED
NEUTROPHILS ABSOLUTE: 4.69 E9/L (ref 1.8–7.3)
NEUTROPHILS RELATIVE PERCENT: 68 % (ref 43–80)
NITRITE, URINE: NEGATIVE
NUCLEATED RED BLOOD CELLS: 7 /100 WBC
O2 SATURATION: 95.8 % (ref 92–98.5)
OPERATOR ID: 913
PARAINFLUENZA VIRUS 1 BY PCR: NOT DETECTED
PARAINFLUENZA VIRUS 2 BY PCR: NOT DETECTED
PARAINFLUENZA VIRUS 3 BY PCR: NOT DETECTED
PARAINFLUENZA VIRUS 4 BY PCR: NOT DETECTED
PATIENT TEMP: 37
PCO2 (TEMP CORRECTED): 36.8 MMHG (ref 35–45)
PDW BLD-RTO: 15.7 FL (ref 11.5–15)
PH (TEMPERATURE CORRECTED): 7.39 (ref 7.35–7.45)
PH UA: 5 (ref 5–9)
PLATELET # BLD: 131 E9/L (ref 130–450)
PMV BLD AUTO: 14 FL (ref 7–12)
PO2 (TEMP CORRECTED): 81.1 MMHG (ref 60–80)
POLYCHROMASIA: ABNORMAL
POTASSIUM REFLEX MAGNESIUM: 4.2 MMOL/L (ref 3.5–5)
POTASSIUM SERPL-SCNC: 4.1 MMOL/L (ref 3.5–5)
POTASSIUM SERPL-SCNC: 4.8 MMOL/L (ref 3.5–5)
POTASSIUM, UR: 75.7 MMOL/L
PRO-BNP: 1249 PG/ML (ref 0–125)
PROCALCITONIN: 61.46 NG/ML (ref 0–0.08)
PROTEIN UA: 100 MG/DL
RBC # BLD: 3.72 E12/L (ref 3.5–5.5)
RBC UA: ABNORMAL /HPF (ref 0–2)
RESPIRATORY SYNCYTIAL VIRUS BY PCR: NOT DETECTED
SARS-COV-2, NAAT: NOT DETECTED
SARS-COV-2, PCR: NOT DETECTED
SODIUM BLD-SCNC: 152 MMOL/L (ref 132–146)
SODIUM BLD-SCNC: 160 MMOL/L (ref 132–146)
SODIUM BLD-SCNC: 160 MMOL/L (ref 132–146)
SODIUM URINE: 49 MMOL/L
SOURCE, BLOOD GAS: ABNORMAL
SPECIFIC GRAVITY UA: >=1.03 (ref 1–1.03)
TOTAL PROTEIN: 6.9 G/DL (ref 6.4–8.3)
TROPONIN, HIGH SENSITIVITY: 132 NG/L (ref 0–9)
TROPONIN, HIGH SENSITIVITY: 145 NG/L (ref 0–9)
UREA NITROGEN, UR: 495 MG/DL (ref 800–1666)
UROBILINOGEN, URINE: 1 E.U./DL
VALPROIC ACID LEVEL: 3 MCG/ML (ref 50–100)
WBC # BLD: 6.6 E9/L (ref 4.5–11.5)
WBC UA: ABNORMAL /HPF (ref 0–5)

## 2021-09-21 PROCEDURE — 2580000003 HC RX 258: Performed by: INTERNAL MEDICINE

## 2021-09-21 PROCEDURE — 2580000003 HC RX 258: Performed by: FAMILY MEDICINE

## 2021-09-21 PROCEDURE — 82436 ASSAY OF URINE CHLORIDE: CPT

## 2021-09-21 PROCEDURE — 82533 TOTAL CORTISOL: CPT

## 2021-09-21 PROCEDURE — 87449 NOS EACH ORGANISM AG IA: CPT

## 2021-09-21 PROCEDURE — 36415 COLL VENOUS BLD VENIPUNCTURE: CPT

## 2021-09-21 PROCEDURE — 6360000002 HC RX W HCPCS: Performed by: FAMILY MEDICINE

## 2021-09-21 PROCEDURE — 87040 BLOOD CULTURE FOR BACTERIA: CPT

## 2021-09-21 PROCEDURE — 2000000000 HC ICU R&B

## 2021-09-21 PROCEDURE — 96365 THER/PROPH/DIAG IV INF INIT: CPT

## 2021-09-21 PROCEDURE — 80048 BASIC METABOLIC PNL TOTAL CA: CPT

## 2021-09-21 PROCEDURE — 99284 EMERGENCY DEPT VISIT MOD MDM: CPT

## 2021-09-21 PROCEDURE — 36592 COLLECT BLOOD FROM PICC: CPT

## 2021-09-21 PROCEDURE — 96361 HYDRATE IV INFUSION ADD-ON: CPT

## 2021-09-21 PROCEDURE — 2500000003 HC RX 250 WO HCPCS: Performed by: STUDENT IN AN ORGANIZED HEALTH CARE EDUCATION/TRAINING PROGRAM

## 2021-09-21 PROCEDURE — 02HV33Z INSERTION OF INFUSION DEVICE INTO SUPERIOR VENA CAVA, PERCUTANEOUS APPROACH: ICD-10-PCS | Performed by: STUDENT IN AN ORGANIZED HEALTH CARE EDUCATION/TRAINING PROGRAM

## 2021-09-21 PROCEDURE — 87088 URINE BACTERIA CULTURE: CPT

## 2021-09-21 PROCEDURE — 83605 ASSAY OF LACTIC ACID: CPT

## 2021-09-21 PROCEDURE — 2580000003 HC RX 258: Performed by: STUDENT IN AN ORGANIZED HEALTH CARE EDUCATION/TRAINING PROGRAM

## 2021-09-21 PROCEDURE — 74176 CT ABD & PELVIS W/O CONTRAST: CPT

## 2021-09-21 PROCEDURE — 83880 ASSAY OF NATRIURETIC PEPTIDE: CPT

## 2021-09-21 PROCEDURE — 84484 ASSAY OF TROPONIN QUANT: CPT

## 2021-09-21 PROCEDURE — 6370000000 HC RX 637 (ALT 250 FOR IP): Performed by: STUDENT IN AN ORGANIZED HEALTH CARE EDUCATION/TRAINING PROGRAM

## 2021-09-21 PROCEDURE — 2500000003 HC RX 250 WO HCPCS: Performed by: FAMILY MEDICINE

## 2021-09-21 PROCEDURE — 80164 ASSAY DIPROPYLACETIC ACD TOT: CPT

## 2021-09-21 PROCEDURE — 84540 ASSAY OF URINE/UREA-N: CPT

## 2021-09-21 PROCEDURE — 84145 PROCALCITONIN (PCT): CPT

## 2021-09-21 PROCEDURE — 96360 HYDRATION IV INFUSION INIT: CPT

## 2021-09-21 PROCEDURE — 70450 CT HEAD/BRAIN W/O DYE: CPT

## 2021-09-21 PROCEDURE — 71250 CT THORAX DX C-: CPT

## 2021-09-21 PROCEDURE — 6360000002 HC RX W HCPCS: Performed by: STUDENT IN AN ORGANIZED HEALTH CARE EDUCATION/TRAINING PROGRAM

## 2021-09-21 PROCEDURE — 85025 COMPLETE CBC W/AUTO DIFF WBC: CPT

## 2021-09-21 PROCEDURE — 99223 1ST HOSP IP/OBS HIGH 75: CPT | Performed by: FAMILY MEDICINE

## 2021-09-21 PROCEDURE — 80053 COMPREHEN METABOLIC PANEL: CPT

## 2021-09-21 PROCEDURE — 84300 ASSAY OF URINE SODIUM: CPT

## 2021-09-21 PROCEDURE — 82570 ASSAY OF URINE CREATININE: CPT

## 2021-09-21 PROCEDURE — 87081 CULTURE SCREEN ONLY: CPT

## 2021-09-21 PROCEDURE — 81001 URINALYSIS AUTO W/SCOPE: CPT

## 2021-09-21 PROCEDURE — 0202U NFCT DS 22 TRGT SARS-COV-2: CPT

## 2021-09-21 PROCEDURE — 6370000000 HC RX 637 (ALT 250 FOR IP): Performed by: FAMILY MEDICINE

## 2021-09-21 PROCEDURE — 87635 SARS-COV-2 COVID-19 AMP PRB: CPT

## 2021-09-21 PROCEDURE — 82803 BLOOD GASES ANY COMBINATION: CPT

## 2021-09-21 PROCEDURE — 2580000003 HC RX 258: Performed by: EMERGENCY MEDICINE

## 2021-09-21 PROCEDURE — 84133 ASSAY OF URINE POTASSIUM: CPT

## 2021-09-21 PROCEDURE — 93005 ELECTROCARDIOGRAM TRACING: CPT | Performed by: STUDENT IN AN ORGANIZED HEALTH CARE EDUCATION/TRAINING PROGRAM

## 2021-09-21 PROCEDURE — 71045 X-RAY EXAM CHEST 1 VIEW: CPT

## 2021-09-21 PROCEDURE — 2700000000 HC OXYGEN THERAPY PER DAY

## 2021-09-21 RX ORDER — OLANZAPINE 20 MG/1
20 TABLET ORAL 2 TIMES DAILY
COMMUNITY

## 2021-09-21 RX ORDER — BISACODYL 10 MG
10 SUPPOSITORY, RECTAL RECTAL DAILY PRN
Status: DISCONTINUED | OUTPATIENT
Start: 2021-09-21 | End: 2021-09-30 | Stop reason: HOSPADM

## 2021-09-21 RX ORDER — LEVOTHYROXINE SODIUM 0.07 MG/1
150 TABLET ORAL DAILY
Status: DISCONTINUED | OUTPATIENT
Start: 2021-09-22 | End: 2021-09-30 | Stop reason: HOSPADM

## 2021-09-21 RX ORDER — FOLIC ACID 1 MG/1
1 TABLET ORAL DAILY
COMMUNITY
End: 2021-12-16

## 2021-09-21 RX ORDER — ONDANSETRON 2 MG/ML
4 INJECTION INTRAMUSCULAR; INTRAVENOUS EVERY 6 HOURS PRN
Status: DISCONTINUED | OUTPATIENT
Start: 2021-09-21 | End: 2021-09-30 | Stop reason: HOSPADM

## 2021-09-21 RX ORDER — SODIUM CHLORIDE 9 MG/ML
25 INJECTION, SOLUTION INTRAVENOUS PRN
Status: DISCONTINUED | OUTPATIENT
Start: 2021-09-21 | End: 2021-09-30 | Stop reason: HOSPADM

## 2021-09-21 RX ORDER — BUMETANIDE 1 MG/1
1 TABLET ORAL PRN
COMMUNITY
End: 2022-05-18

## 2021-09-21 RX ORDER — GUAIFENESIN AND DEXTROMETHORPHAN HYDROBROMIDE 100; 10 MG/5ML; MG/5ML
10 SOLUTION ORAL EVERY 4 HOURS PRN
COMMUNITY
End: 2022-10-17

## 2021-09-21 RX ORDER — OLANZAPINE 15 MG/1
20 TABLET, ORALLY DISINTEGRATING ORAL EVERY MORNING
COMMUNITY
End: 2022-10-17

## 2021-09-21 RX ORDER — SODIUM CHLORIDE 0.9 % (FLUSH) 0.9 %
5-40 SYRINGE (ML) INJECTION EVERY 12 HOURS SCHEDULED
Status: DISCONTINUED | OUTPATIENT
Start: 2021-09-21 | End: 2021-09-30 | Stop reason: HOSPADM

## 2021-09-21 RX ORDER — ACETAMINOPHEN 325 MG/1
650 TABLET ORAL EVERY 6 HOURS PRN
Status: DISCONTINUED | OUTPATIENT
Start: 2021-09-21 | End: 2021-09-30 | Stop reason: HOSPADM

## 2021-09-21 RX ORDER — FERROUS SULFATE 325(65) MG
325 TABLET ORAL
COMMUNITY
End: 2021-10-13

## 2021-09-21 RX ORDER — 0.9 % SODIUM CHLORIDE 0.9 %
30 INTRAVENOUS SOLUTION INTRAVENOUS ONCE
Status: DISCONTINUED | OUTPATIENT
Start: 2021-09-21 | End: 2021-09-21 | Stop reason: ALTCHOICE

## 2021-09-21 RX ORDER — VALPROIC ACID 250 MG/5ML
1000 SOLUTION ORAL 2 TIMES DAILY
Status: DISCONTINUED | OUTPATIENT
Start: 2021-09-21 | End: 2021-09-23

## 2021-09-21 RX ORDER — FAMOTIDINE 20 MG/1
20 TABLET, FILM COATED ORAL 2 TIMES DAILY
Status: DISCONTINUED | OUTPATIENT
Start: 2021-09-21 | End: 2021-09-22

## 2021-09-21 RX ORDER — ONDANSETRON 4 MG/1
4 TABLET, ORALLY DISINTEGRATING ORAL EVERY 8 HOURS PRN
Status: DISCONTINUED | OUTPATIENT
Start: 2021-09-21 | End: 2021-09-30 | Stop reason: HOSPADM

## 2021-09-21 RX ORDER — OLANZAPINE 10 MG/1
20 TABLET ORAL NIGHTLY
Status: DISCONTINUED | OUTPATIENT
Start: 2021-09-21 | End: 2021-09-30 | Stop reason: HOSPADM

## 2021-09-21 RX ORDER — ACETAMINOPHEN 650 MG/1
650 SUPPOSITORY RECTAL ONCE
Status: COMPLETED | OUTPATIENT
Start: 2021-09-21 | End: 2021-09-21

## 2021-09-21 RX ORDER — PAROXETINE 10 MG/1
10 TABLET, FILM COATED ORAL 2 TIMES DAILY
COMMUNITY
End: 2021-10-26

## 2021-09-21 RX ORDER — MONTELUKAST SODIUM 10 MG/1
10 TABLET ORAL NIGHTLY
Status: DISCONTINUED | OUTPATIENT
Start: 2021-09-21 | End: 2021-09-30 | Stop reason: HOSPADM

## 2021-09-21 RX ORDER — ACETAMINOPHEN 325 MG/1
650 TABLET ORAL EVERY 4 HOURS PRN
COMMUNITY

## 2021-09-21 RX ORDER — LEVOTHYROXINE SODIUM 0.15 MG/1
150 TABLET ORAL DAILY
COMMUNITY
End: 2021-12-16

## 2021-09-21 RX ORDER — MONTELUKAST SODIUM 10 MG/1
10 TABLET ORAL NIGHTLY
COMMUNITY
End: 2021-10-26

## 2021-09-21 RX ORDER — LANOLIN ALCOHOL/MO/W.PET/CERES
100 CREAM (GRAM) TOPICAL DAILY
Status: DISCONTINUED | OUTPATIENT
Start: 2021-09-22 | End: 2021-09-30 | Stop reason: HOSPADM

## 2021-09-21 RX ORDER — OLANZAPINE 10 MG/1
20 TABLET, ORALLY DISINTEGRATING ORAL EVERY MORNING
Status: DISCONTINUED | OUTPATIENT
Start: 2021-09-22 | End: 2021-09-26

## 2021-09-21 RX ORDER — 0.9 % SODIUM CHLORIDE 0.9 %
1000 INTRAVENOUS SOLUTION INTRAVENOUS ONCE
Status: COMPLETED | OUTPATIENT
Start: 2021-09-21 | End: 2021-09-21

## 2021-09-21 RX ORDER — POLYETHYLENE GLYCOL 3350 17 G/17G
17 POWDER, FOR SOLUTION ORAL DAILY
Status: DISCONTINUED | OUTPATIENT
Start: 2021-09-22 | End: 2021-09-30 | Stop reason: HOSPADM

## 2021-09-21 RX ORDER — THIAMINE MONONITRATE (VIT B1) 100 MG
100 TABLET ORAL DAILY
COMMUNITY
End: 2021-12-10 | Stop reason: ALTCHOICE

## 2021-09-21 RX ORDER — OLANZAPINE 5 MG/1
20 TABLET, ORALLY DISINTEGRATING ORAL EVERY MORNING
COMMUNITY
End: 2022-10-17

## 2021-09-21 RX ORDER — SODIUM CHLORIDE 9 MG/ML
INJECTION, SOLUTION INTRAVENOUS EVERY 24 HOURS
Status: DISCONTINUED | OUTPATIENT
Start: 2021-09-22 | End: 2021-09-27

## 2021-09-21 RX ORDER — PAROXETINE 10 MG/1
10 TABLET, FILM COATED ORAL 2 TIMES DAILY
Status: DISCONTINUED | OUTPATIENT
Start: 2021-09-21 | End: 2021-09-30 | Stop reason: HOSPADM

## 2021-09-21 RX ORDER — DIMETHICONE, OXYBENZONE, AND PADIMATE O 2; 2.5; 6.6 G/100G; G/100G; G/100G
STICK TOPICAL PRN
COMMUNITY

## 2021-09-21 RX ORDER — BISACODYL 10 MG
10 SUPPOSITORY, RECTAL RECTAL DAILY PRN
COMMUNITY
End: 2022-10-17

## 2021-09-21 RX ORDER — VITAMIN B COMPLEX
5000 TABLET ORAL DAILY
Status: DISCONTINUED | OUTPATIENT
Start: 2021-09-22 | End: 2021-09-30 | Stop reason: HOSPADM

## 2021-09-21 RX ORDER — DIMETHICONE, OXYBENZONE, AND PADIMATE O 2; 2.5; 6.6 G/100G; G/100G; G/100G
STICK TOPICAL PRN
Status: DISCONTINUED | OUTPATIENT
Start: 2021-09-21 | End: 2021-09-30 | Stop reason: HOSPADM

## 2021-09-21 RX ORDER — SODIUM CHLORIDE 0.9 % (FLUSH) 0.9 %
5-40 SYRINGE (ML) INJECTION PRN
Status: DISCONTINUED | OUTPATIENT
Start: 2021-09-21 | End: 2021-09-30 | Stop reason: HOSPADM

## 2021-09-21 RX ORDER — SODIUM CHLORIDE 9 MG/ML
INJECTION, SOLUTION INTRAVENOUS CONTINUOUS
Status: DISCONTINUED | OUTPATIENT
Start: 2021-09-21 | End: 2021-09-22

## 2021-09-21 RX ORDER — FAMOTIDINE 20 MG/1
20 TABLET, FILM COATED ORAL 2 TIMES DAILY
COMMUNITY
End: 2021-10-07 | Stop reason: SDUPTHER

## 2021-09-21 RX ORDER — ACETAMINOPHEN 650 MG/1
650 SUPPOSITORY RECTAL EVERY 6 HOURS PRN
Status: DISCONTINUED | OUTPATIENT
Start: 2021-09-21 | End: 2021-09-30 | Stop reason: HOSPADM

## 2021-09-21 RX ORDER — BACITRACIN ZINC, POLYMYXIN B SULFATE, NEOMYCIN SULFATE 400; 5000; 3.5 [USP'U]/G; [USP'U]/G; MG/G
OINTMENT TOPICAL 2 TIMES DAILY PRN
COMMUNITY

## 2021-09-21 RX ADMIN — PAROXETINE 10 MG: 10 TABLET, FILM COATED ORAL at 21:13

## 2021-09-21 RX ADMIN — SODIUM CHLORIDE 1000 ML: 9 INJECTION, SOLUTION INTRAVENOUS at 11:53

## 2021-09-21 RX ADMIN — MONTELUKAST SODIUM 10 MG: 10 TABLET, FILM COATED ORAL at 21:13

## 2021-09-21 RX ADMIN — DOCUSATE SODIUM 100 MG: 50 LIQUID ORAL at 21:13

## 2021-09-21 RX ADMIN — SODIUM CHLORIDE 1000 ML: 9 INJECTION, SOLUTION INTRAVENOUS at 18:00

## 2021-09-21 RX ADMIN — SODIUM CHLORIDE: 9 INJECTION, SOLUTION INTRAVENOUS at 11:23

## 2021-09-21 RX ADMIN — CEFEPIME HYDROCHLORIDE 2000 MG: 2 INJECTION, POWDER, FOR SOLUTION INTRAVENOUS at 11:18

## 2021-09-21 RX ADMIN — HYDROCORTISONE SODIUM SUCCINATE 100 MG: 100 INJECTION, POWDER, FOR SOLUTION INTRAMUSCULAR; INTRAVENOUS at 17:59

## 2021-09-21 RX ADMIN — SODIUM CHLORIDE 1000 ML: 9 INJECTION, SOLUTION INTRAVENOUS at 11:52

## 2021-09-21 RX ADMIN — ACETAMINOPHEN 650 MG: 650 SUPPOSITORY RECTAL at 11:19

## 2021-09-21 RX ADMIN — SODIUM CHLORIDE: 0.45 INJECTION, SOLUTION INTRAVENOUS at 22:13

## 2021-09-21 RX ADMIN — Medication 10 ML: at 21:13

## 2021-09-21 RX ADMIN — FAMOTIDINE 20 MG: 20 TABLET, FILM COATED ORAL at 21:13

## 2021-09-21 RX ADMIN — ENOXAPARIN SODIUM 30 MG: 30 INJECTION, SOLUTION INTRAVENOUS; SUBCUTANEOUS at 21:12

## 2021-09-21 RX ADMIN — SODIUM CHLORIDE: 0.45 INJECTION, SOLUTION INTRAVENOUS at 18:03

## 2021-09-21 RX ADMIN — NOREPINEPHRINE BITARTRATE 12 MCG/MIN: 1 INJECTION, SOLUTION, CONCENTRATE INTRAVENOUS at 14:41

## 2021-09-21 RX ADMIN — NOREPINEPHRINE BITARTRATE 50 MCG/MIN: 1 INJECTION, SOLUTION, CONCENTRATE INTRAVENOUS at 22:14

## 2021-09-21 RX ADMIN — NOREPINEPHRINE BITARTRATE 17 MCG/MIN: 1 INJECTION, SOLUTION, CONCENTRATE INTRAVENOUS at 16:10

## 2021-09-21 RX ADMIN — Medication: at 21:13

## 2021-09-21 RX ADMIN — OLANZAPINE 20 MG: 10 TABLET, FILM COATED ORAL at 21:13

## 2021-09-21 RX ADMIN — NOREPINEPHRINE BITARTRATE 27 MG/ML: 1 INJECTION, SOLUTION, CONCENTRATE INTRAVENOUS at 16:48

## 2021-09-21 RX ADMIN — SODIUM CHLORIDE: 9 INJECTION, SOLUTION INTRAVENOUS at 23:00

## 2021-09-21 RX ADMIN — VALPROIC ACID 1000 MG: 250 SOLUTION ORAL at 21:13

## 2021-09-21 RX ADMIN — NOREPINEPHRINE BITARTRATE 37 MCG/MIN: 1 INJECTION, SOLUTION, CONCENTRATE INTRAVENOUS at 18:00

## 2021-09-21 ASSESSMENT — PAIN SCALES - GENERAL
PAINLEVEL_OUTOF10: 0

## 2021-09-21 NOTE — H&P
[facility] History and Physical      CHIEF COMPLAINT:    Fever, shortness of breath, altered mental status    History of Present Illness:    78 yr old female with a past hx of renal failure requiring HD X 2, complete blindness, MRDD who presented from her group home with fever last night at 102 and shortness of breath that started today, elevated HR today at group home -- pt's temp at 103 when she arrived to ER. Pt is usually verbal with saying her name repeatedly but has stopped talking altogether. Usually alert and mainly stays in her room after eating. No known sick contacts at [facility]. Pt has PEG tube and doesn't take anything orally. No diarrhea. No noted cough. No N/V. Tolerating tube feeds and PEG working OK. Patient unable to give any of her ROS. In ER -- received cefepime 2000 mg IV X 1, vancomycin 1000 mg IV X 1, fluid bolus, Levophed started in ER for shock, right femoral line placed.     Informant(s) for H&P: caregiver    REVIEW OF SYSTEMS:  A comprehensive review of systems was negative except for: what is in the HPI  No headache, no ear or eye symptoms, no pharyngitis, no rhinorrhea, no neck pain, no chest pain, no shortness of breath, no palpitations, no presyncope, no syncope, no abdominal pain, no nausea or vomiting, no diarrhea, no urinary symptoms, no vertigo, no rashes, no pruritus, no anorexia, no fevers, no chills, no sweats, no fatigue, no focal numbness, no tingling, no weakness, no hematemesis, no hematochezia    PMH:  Past Medical History:   Diagnosis Date    Acute kidney injury ([facility] [address] 01/15/2017    d/t Vancomycin, on Dialysis [facility] right chest    Blind in both eyes     Essential hypertension 4/2/2021    Gastroesophageal reflux disease without esophagitis 4/2/2021    Hemodialysis patient ([facility] [address]     Hyperthyroidism     MR (mental retardation)     Osteoarthritis     Peripheral vascular disease ([facility] [address]     Pneumonia 01/06/2017    Pneumonia due to infectious organism 1/8/2017    Sepsis ([facility] 3/4/2021       Surgical History:  Past Surgical History:   Procedure Laterality Date    BRONCHOSCOPY  02/10/2017    CHEST TUBE INSERTION Right 02/09/2017    GASTROSTOMY TUBE PLACEMENT N/A 3/7/2021    EGD PEG TUBE PLACEMENT performed by Clement Roth MD at [id number] Right 01/17/2017    tessio insertion     OTHER SURGICAL HISTORY  01/25/2017    PEG tube insertion       Medications Prior to Admission:    Prior to Admission medications    Medication Sig Start Date End Date Taking?  Authorizing Provider   famotidine (PEPCID) 20 MG tablet 20 mg by Per G Tube route 2 times daily   Yes Historical Provider, MD   ferrous sulfate (IRON 325) 325 (65 Fe) MG tablet 325 mg by Per G Tube route daily (with breakfast)   Yes Historical Provider, MD   folic acid (FOLVITE) 1 MG tablet 1 mg by Per G Tube route daily   Yes Historical Provider, MD   levothyroxine (SYNTHROID) 150 MCG tablet 150 mcg by Per G Tube route Daily   Yes Historical Provider, MD   metoprolol tartrate (LOPRESSOR) 25 MG tablet 75 mg by Per G Tube route 2 times daily Indications: *HOLD IF SBP<100 AND/OR PULSE <60*   Yes Historical Provider, MD   montelukast (SINGULAIR) 10 MG tablet 10 mg by Per G Tube route nightly   Yes Historical Provider, MD   PARoxetine (PAXIL) 10 MG tablet 10 mg by Per G Tube route 2 times daily   Yes Historical Provider, MD   vitamin B-1 (THIAMINE) 100 MG tablet 100 mg by Per G Tube route daily   Yes Historical Provider, MD   Cholecalciferol (VITAMIN D3) 125 MCG (5000 UT) TABS 5,000 Units by Per G Tube route daily   Yes Historical Provider, MD   acetaminophen (TYLENOL) 325 MG tablet 650 mg by Per G Tube route every 4 hours as needed for Pain or Fever   Yes Historical Provider, MD   bismuth subsalicylate (PEPTO BISMOL) 262 MG/15ML suspension 30 mLs by Per G Tube route every 6 hours as needed for Indigestion, Heartburn, Diarrhea, Nausea or Other (GAS)   Yes Historical Provider, MD   medicated lip balm (BLISTEX/CARMEX) 2-2.5-6.6 % STCK Apply topically as needed for Dry Lips (CRACKED LIPS)   Yes Historical Provider, MD   bisacodyl (DULCOLAX) 10 MG suppository Place 10 mg rectally daily as needed for Constipation (NO BM FOR 3 DAYS)   Yes Historical Provider, MD   SUNSCREENS EX Apply topically as needed (SUNBURN PREVENTION) Indications: *SPF 48*   Yes Historical Provider, MD   Dextromethorphan-guaiFENesin  MG/5ML SYRP 10 mLs by Per G Tube route every 4 hours as needed for Cough (CONGESTION)   Yes Historical Provider, MD   Neomycin-Bacitracin-Polymyxin (TRIPLE ANTIBIOTIC) OINT Apply topically 2 times daily as needed (CUTS/SCRAPES/SKIN ABRASIONS)   Yes Historical Provider, MD   bumetanide (BUMEX) 1 MG tablet 1 mg by Per G Tube route as needed (SWELLING)   Yes Historical Provider, MD   OLANZapine zydis (ZYPREXA ZYDIS) 5 MG disintegrating tablet Take 20 mg by mouth every morning Indications: VIA G-TUBE *TAKE ALONG WITH 15MG=20MG*  *SEE OTHER ORDER*   Yes Historical Provider, MD   OLANZapine zydis (ZYPREXA ZYDIS) 15 MG disintegrating tablet 20 mg every morning Indications: VIA G-TUBE *TAKE ALONG WITH 5MG=20MG*  *SEE OTHER ORDER*   Yes Historical Provider, MD   OLANZapine (ZYPREXA) 20 MG tablet 20 mg by Per G Tube route nightly   Yes Historical Provider, MD   LORazepam (ATIVAN) 2 MG tablet 2 mg by Per G Tube route as needed (2HRS BEFORE DENTAL APPT, TAKE 2ND DOSE TO DENTAL APPT).     Yes Historical Provider, MD   docusate (COLACE) 50 MG/5ML liquid 10 mLs by Per G Tube route 2 times daily 3/31/21  Yes Dipti Baron, DO   valproic acid (DEPACON) 250 MG/5ML SOLN oral solution 20 mLs by Per G Tube route 2 times daily 3/16/21  Yes Mukul Barnett DO   polyethylene glycol (GLYCOLAX) 17 g packet 17 g by Per G Tube route daily 3/16/21  Yes Mukul Barnett,    NATURAL VITAMIN D-3 125 MCG (5000 UT) TABS tablet TAKE 1 TABLET VIA G-TUBE ONCE DAILY 5/16/21   Catie Waddell, APRN - CNP Allergies:    Patient has no known allergies. Social History:    reports that she has never smoked. She has never used smokeless tobacco. She reports that she does not drink alcohol and does not use drugs. Family History:   family history is not on file. Unavailable -- caregiver is not aware of pt's family history    PHYSICAL EXAM:  Vitals:  BP (!) 68/45   Pulse 124   Temp 103 °F (39.4 °C)   Resp 22   Wt 115 lb 11.2 oz (52.5 kg)   SpO2 92%   BMI 20.50 kg/m²   General Appearance: obtunded, not responsive to voice or to touch, in no acute distress, nourished, caregiver present, ill appearing  Skin: warm and dry, poor turgor, no rashes, no jaundice  Head: normocephalic and atraumatic  Mouth:  Very dry, tacky, coated tongue (is chronically NPO)  Eyes: corneae are completely opaque bilaterally, unsure if equal pupils or if they are reactive, unable to assess occular movement, conjunctivae normal  Neck: neck supple and non tender without mass, no thyromegaly  Pulmonary/Chest: clear to auscultation bilaterally- no wheezes, rales or rhonchi, normal air movement, no respiratory distress on 15 liters  Cardiovascular: normal rate, normal S1 and S2 and no carotid bruits  Abdomen: soft, non-tender, non-distended, normal bowel sounds, no masses or organomegaly, no guarding, no rebound  Extremities: no cyanosis, no clubbing and no edema, good pedal pulses bilaterally, good cap refill of fingers/toes  Neurologic: obtunded, not able to perform neuro exam, no facial asymmetry, moving extremities equally      LABS:  Recent Labs     09/21/21  1057   *   K 4.2   *   CO2 19*   BUN 59*   CREATININE 2.4*   GLUCOSE 345*   CALCIUM 9.3       Recent Labs     09/21/21  1057   WBC 6.6   RBC 3.72   HGB 12.3   HCT 41.3   .0*   MCH 33.1   MCHC 29.8*   RDW 15.7*      MPV 14.0*       No results for input(s): POCGLU in the last 72 hours.     Hepatic Function Panel:    Lab Results   Component Value Date ALKPHOS 57 09/21/2021    ALT 14 09/21/2021    AST 23 09/21/2021    PROT 6.9 09/21/2021    BILITOT 0.4 09/21/2021    LABALBU 2.2 09/21/2021    LABALBU 3.7 12/30/2011     Albumin:    Lab Results   Component Value Date    LABALBU 2.2 09/21/2021    LABALBU 3.7 12/30/2011     Calcium:    Lab Results   Component Value Date    CALCIUM 9.3 09/21/2021     Magnesium:    Lab Results   Component Value Date    MG 2.2 03/16/2021     Phosphorus:    Lab Results   Component Value Date    PHOS 3.2 03/16/2021     U/A:    Lab Results   Component Value Date    COLORU Yellow 09/21/2021    PROTEINU 100 09/21/2021    PHUR 5.0 09/21/2021    LABCAST FEW 09/20/2017    WBCUA 0-1 09/21/2021    RBCUA NONE 09/21/2021    MUCUS Present 02/08/2021    BACTERIA MODERATE 09/21/2021    CLARITYU Clear 09/21/2021    SPECGRAV >=1.030 09/21/2021    LEUKOCYTESUR Negative 09/21/2021    UROBILINOGEN 1.0 09/21/2021    BILIRUBINUR SMALL 09/21/2021    BLOODU Negative 09/21/2021    GLUCOSEU 100 09/21/2021    AMORPHOUS MODERATE 01/21/2017     ABG:    Lab Results   Component Value Date    PH 7.340 03/04/2021    PCO2 45.0 03/04/2021    PO2 78.9 03/04/2021    HCO3 23.7 03/04/2021    BE -2.3 09/21/2021    O2SAT 95.8 09/21/2021     VBG:    Lab Results   Component Value Date    PHVEN 7.38 01/26/2017     LIPASE:    Lab Results   Component Value Date    LIPASE 39 08/16/2021       Radiology:   CT HEAD WO CONTRAST   Final Result   1. There is no acute intracranial abnormality. Specifically, there is no   intracranial hemorrhage. 2. Atrophy and periventricular leukomalacia,         CT CHEST WO CONTRAST   Final Result   CT OF THE CHEST:      1. Pulmonale infiltrates in the dependent aspects of the lungs bilaterally,   left greater than right, concerning for pneumonia, possibly   aspiration-related. The appearance and distribution is not characteristic of   COVID-19, although it cannot be definitively excluded. 2. Large hiatal hernia.    CT OF THE ABDOMEN AND PELVIS: 1.  No acute abnormality is seen in the abdomen or the pelvis. 2. Stable 2.3 cm left ovarian dermoid cyst.   3. Short segment of distal colonic diverticulosis without diverticulitis. 4. Gastrostomy tube in situ. CT ABDOMEN PELVIS WO CONTRAST Additional Contrast? None   Final Result   CT OF THE CHEST:      1. Pulmonale infiltrates in the dependent aspects of the lungs bilaterally,   left greater than right, concerning for pneumonia, possibly   aspiration-related. The appearance and distribution is not characteristic of   COVID-19, although it cannot be definitively excluded. 2. Large hiatal hernia. CT OF THE ABDOMEN AND PELVIS:      1.  No acute abnormality is seen in the abdomen or the pelvis. 2. Stable 2.3 cm left ovarian dermoid cyst.   3. Short segment of distal colonic diverticulosis without diverticulitis. 4. Gastrostomy tube in situ. XR CHEST PORTABLE   Final Result   No acute process. EKG:   SR, no acute ischemic changes    ASSESSMENT:      Principal Problem:    Septic shock (HCC)  Active Problems:    Impairment level: total impairment of both eyes    Essential hypertension    S/P percutaneous endoscopic gastrostomy (PEG) tube placement ([facility])    Dementia with behavioral disturbance, unspecified dementia type (HCC)    Severe dehydration    Acute renal failure (ARF) (HCC)    Acute respiratory failure with hypoxia (HCC)  Resolved Problems:    * No resolved hospital problems. *      PLAN:    1. Septic shock (versus volume shock)  -IV fluid  -ICU admit  -Levophed drip  -IV vancomycin and cefepime for now empirically  -watch urine/blood culture  -CT does show bilateral LL infiltrates    2. Acute respiratory failure -- CXR clear, no pneumonia, but CT of chest does show infiltrates in bilateral lower lobes  -Oxygen support, RT, wean as able    3.   Acute renal failure -- has required HD X 2 in the past for ARF; baseline creatinine 0.6 mg; likely mostly pre-renal due to shock and due to excess fluid losses from illness/fever and also on fairly low amt of free fluid flushing for PEG (60 cc every 4 hours) -- severe dehydration  With hyponatremia -- 160  -BMP daily  -IV fluid  -Consult to nephrology/Dr. Medley/in process    4. Hyponatremia -- free water deficit, at 160 today  -IV fluid as ordered by nephrology  -BMP daily  -Discharge on higher free water flush regimen    5. Fever -- bacterial vs viral etiology -- COVID negative  -Await full resp viral panel  -Doesn't have diarrhea -- no need to send stools at this time    6. Elevated pro-BNP -- 1249 -- likely due to renal failure    7. Elevated lactic acid -- 2.8, then 3.2 -- due to septic shock    8. Metabolic acidosis -- due to lactic acidosis/shock  -Daily BMP    9. Hx of MRDD, complete blindness -- lives in group home    10. Atrial tachycardia -- upon ER arrival HR was in the 140's/150's -- likely due to septic shock and dehydration, HR has improved to the 120's after IV fluid  -Telemetry      Code Status: TOTAL support  DVT prophylaxis: Lovenox      NOTE: This report was transcribed using voice recognition software. Every effort was made to ensure accuracy; however, inadvertent computerized transcription errors may be present.     Electronically signed by Taylor Aquino DO on 9/21/2021 at 5:03 PM

## 2021-09-21 NOTE — FLOWSHEET NOTE
CI HR MAP TPRI SVI TFC   Baseline 2.9 387 65 0240 24 31.8   Test 3.3 211 47 0801 28 33.2   Change 10.4% -0.2% 0.0% -9.4% 14.0% 4.5%     NICOM w/ 250ml IV NS fluid challenge over 5min

## 2021-09-21 NOTE — ED PROVIDER NOTES
Patient presents with respiratory distress. She is MR and at baseline talks but typically in the third person. Her caregiver states that last night patient developed a fever greater than 102 degrees. When she came in this morning patient was having difficulty breathing and continued to have a fever of 101.4. She states that the patient does have a feeding tube however there is no episode where they were concerned for aspiration. They also noted that patient had a heart rate in the 150s and was hypotensive and so they called the EMS squad. Patient was found to be hypoxic in the 40s% and was placed on nonrebreather with improvement. The history is provided by a caregiver. The history is limited by the condition of the patient. No  was used. Review of Systems   Unable to perform ROS: Mental status change        Physical Exam  Vitals and nursing note reviewed. Constitutional:       Appearance: She is well-developed. She is ill-appearing. HENT:      Head: Normocephalic and atraumatic. Eyes:      Conjunctiva/sclera: Conjunctivae normal.   Cardiovascular:      Rate and Rhythm: Regular rhythm. Tachycardia present. Heart sounds: Normal heart sounds. No murmur heard. Pulmonary:      Effort: Tachypnea and respiratory distress present. Breath sounds: Examination of the right-upper field reveals rhonchi and rales. Examination of the left-upper field reveals rhonchi and rales. Examination of the right-middle field reveals rhonchi and rales. Examination of the right-lower field reveals rhonchi and rales. Examination of the left-lower field reveals rhonchi and rales. Rhonchi and rales present. No wheezing. Abdominal:      General: Bowel sounds are normal.      Palpations: Abdomen is soft. Tenderness: There is no abdominal tenderness. There is no guarding or rebound.       Comments: PEG tube in place   Musculoskeletal:      Cervical back: Normal range of motion and neck supple. Right lower leg: No edema. Left lower leg: No edema. Skin:     General: Skin is warm and dry. Neurological:      Mental Status: She is alert and oriented to person, place, and time. Cranial Nerves: No cranial nerve deficit. Coordination: Coordination normal.          Procedures   Central Line Placement Procedure Note    Indication: centrally administered medicationscentral venous monitoring    Consent: Unable to be obtained due to patient's condition. Procedure: The patient was positioned appropriately and the skin over the right femoral vein was prepped with betadine and draped in a sterile fashion. Local anesthesia was obtained by infiltration using 1% Lidocaine without epinephrine. A large bore needle was used to identify the vein. A guide wire was then inserted into the vein through the needle. A triple lumen catheter was then inserted into the vessel over the guide wire using the Seldinger technique. All ports showed good, free flowing blood return and were flushed with saline solution. The catheter was then securely fastened to the skin with suture at 20 cm. Two sutures were placed into the kit included tube clamp, proximal eyelets and a suture end from each of the securing sutures was extended around the catheter and tied to the proximal eyelets as an added measure to prevent dislodgement. An antibiotic disk was placed and the site was then covered with a sterile dressing. A post procedure X-ray was not indicated. The patient tolerated the procedure well. Complications: bleeding        MDM  Number of Diagnoses or Management Options  Diagnosis management comments: Patient presents with altered mental status and respiratory distress. Patient had a fever. Broad-spectrum antibiotics were started in addition patient was started on Tylenol. She was also hypotensive and tachycardic. 30 cc/kg bolus was started. Cultures were drawn.   Chest x-ray did not show any acute cardiopulmonary processes. CBC was largely unremarkable. CMP did show hyper natremia and hyper chloremia at 1 6127 respectively. Patient also had an elevated creatinine at 2.4 which is up from her baseline of 0.8. Fluids have already been given. Lactate was elevated 2.8. Troponin was elevated at 145. Repeat was. Urinalysis not show any leukocytes or nitrites. ABG was unremarkable. CT of the head did not show any acute intracranial abnormalities. CT of the abdomen did not show any acute intra-abdominal pathology. CT of the chest did however show concern for pneumonia. Patient is already on broad-spectrum antibiotics. Patient did not improve with fluids. Central line was placed and patient was started on Levophed. At this point patient continues to be altered. She will also need to be admitted for continued IV antibiotics. Patient admitted to ICU. Amount and/or Complexity of Data Reviewed  Clinical lab tests: reviewed  Tests in the radiology section of CPT®: reviewed  Tests in the medicine section of CPT®: reviewed  Decide to obtain previous medical records or to obtain history from someone other than the patient: yes         ED Course as of Sep 22 1123   Tue Sep 21, 2021   1151 EKG shows sinus tachycardia with a ventricular rate of 143 bpm.  Normal axis. There are no obvious ST changes or T wave inversions. Largely comparable to previous EKG on 3/4/2021. [BB]      ED Course User Index  [BB] Monique Longoria DO        ED Course as of Sep 22 1123   Tue Sep 21, 2021   1151 EKG shows sinus tachycardia with a ventricular rate of 143 bpm.  Normal axis. There are no obvious ST changes or T wave inversions. Largely comparable to previous EKG on 3/4/2021.     [BB]      ED Course User Index  [BB] Monique Longoria DO       --------------------------------------------- PAST HISTORY ---------------------------------------------  Past Medical History:  has a past medical history of Acute kidney injury ([facility] [address] Blind in both eyes, Essential hypertension, Gastroesophageal reflux disease without esophagitis, Hemodialysis patient ([facility] [address] Hyperthyroidism, MR (mental retardation), Osteoarthritis, Peripheral vascular disease ([facility] [address] Pneumonia, Pneumonia due to infectious organism, and [facility] Past Surgical History:  has a past surgical history that includes other surgical history (Right, 01/17/2017); other surgical history (01/25/2017); chest tube insertion (Right, 02/09/2017); bronchoscopy (02/10/2017); and Gastrostomy tube placement (N/A, 3/7/2021). Social History:  reports that she has never smoked. She has never used smokeless tobacco. She reports that she does not drink alcohol and does not use drugs. Family History: family history is not on file. The patients home medications have been reviewed. Allergies: Patient has no known allergies.     -------------------------------------------------- RESULTS -------------------------------------------------    LABS:  Results for orders placed or performed during the hospital encounter of 09/21/21   COVID-19, Rapid    Specimen: Nasopharyngeal Swab   Result Value Ref Range    SARS-CoV-2, NAAT Not Detected Not Detected   Respiratory Panel, Molecular, with COVID-19 (Restricted: peds pts or suitable admitted adults)    Specimen: Nasopharyngeal   Result Value Ref Range    Adenovirus by PCR Not Detected Not Detected    Bordetella parapertussis by PCR Not Detected Not Detected    Bordetella pertussis by PCR Not Detected Not Detected    Chlamydophilia pneumoniae by PCR Not Detected Not Detected    Coronavirus 229E by PCR Not Detected Not Detected    Coronavirus HKU1 by PCR Not Detected Not Detected    Coronavirus NL63 by PCR Not Detected Not Detected    Coronavirus OC43 by PCR Not Detected Not Detected    SARS-CoV-2, PCR Not Detected Not Detected    Human Metapneumovirus by PCR Not Detected Not Detected    Human Rhinovirus/Enterovirus by PCR Not Detected Not Detected    Influenza A by PCR Not Detected Not Detected    Influenza B by PCR Not Detected Not Detected    Mycoplasma pneumoniae by PCR Not Detected Not Detected    Parainfluenza Virus 1 by PCR Not Detected Not Detected    Parainfluenza Virus 2 by PCR Not Detected Not Detected    Parainfluenza Virus 3 by PCR Not Detected Not Detected    Parainfluenza Virus 4 by PCR Not Detected Not Detected    Respiratory Syncytial Virus by PCR Not Detected Not Detected   Legionella antigen, urine    Specimen: Urine, straight catheter   Result Value Ref Range    L. pneumophila Serogp 1 Ur Ag       Presumptive Negative -suggesting no recent or current infections  with Legionella pneumophila serogroup 1. Infection to Legionella cannot be ruled out since other serogroups  and species may cause infection, antigen may not be present in  early infection, or level of antigen may be below the  detection limit.   Normal Range: Presumptive Negative     CBC Auto Differential   Result Value Ref Range    WBC 6.6 4.5 - 11.5 E9/L    RBC 3.72 3.50 - 5.50 E12/L    Hemoglobin 12.3 11.5 - 15.5 g/dL    Hematocrit 41.3 34.0 - 48.0 %    .0 (H) 80.0 - 99.9 fL    MCH 33.1 26.0 - 35.0 pg    MCHC 29.8 (L) 32.0 - 34.5 %    RDW 15.7 (H) 11.5 - 15.0 fL    Platelets 985 640 - 151 E9/L    MPV 14.0 (H) 7.0 - 12.0 fL    Neutrophils % 68.0 43.0 - 80.0 %    Lymphocytes % 19.0 (L) 20.0 - 42.0 %    Monocytes % 8.0 2.0 - 12.0 %    Eosinophils % 0.0 0.0 - 6.0 %    Basophils % 0.0 0.0 - 2.0 %    Neutrophils Absolute 4.69 1.80 - 7.30 E9/L    Lymphocytes Absolute 1.39 (L) 1.50 - 4.00 E9/L    Monocytes Absolute 0.53 0.10 - 0.95 E9/L    Eosinophils Absolute 0.00 (L) 0.05 - 0.50 E9/L    Basophils Absolute 0.00 0.00 - 0.20 E9/L    Atypical Lymphocytes Relative 2.0 0.0 - 4.0 %    Metamyelocytes Relative 3.0 (H) 0.0 - 1.0 %    nRBC 7.0 /100 WBC    Anisocytosis 1+     Polychromasia 1+     Hypochromia 1+    Comprehensive Metabolic Panel w/ Reflex to MG   Result Value Ref Range    Sodium 160 (H) 132 - 146 mmol/L    Potassium reflex Magnesium 4.2 3.5 - 5.0 mmol/L    Chloride 127 (HH) 98 - 107 mmol/L    CO2 19 (L) 22 - 29 mmol/L    Anion Gap 14 7 - 16 mmol/L    Glucose 345 (H) 74 - 99 mg/dL    BUN 59 (H) 6 - 23 mg/dL    CREATININE 2.4 (H) 0.5 - 1.0 mg/dL    GFR Non-African American 20 >=60 mL/min/1.73    GFR African American 24     Calcium 9.3 8.6 - 10.2 mg/dL    Total Protein 6.9 6.4 - 8.3 g/dL    Albumin 2.2 (L) 3.5 - 5.2 g/dL    Total Bilirubin 0.4 0.0 - 1.2 mg/dL    Alkaline Phosphatase 57 35 - 104 U/L    ALT 14 0 - 32 U/L    AST 23 0 - 31 U/L   Brain Natriuretic Peptide   Result Value Ref Range    Pro-BNP 1,249 (H) 0 - 125 pg/mL   Troponin   Result Value Ref Range    Troponin, High Sensitivity 145 (H) 0 - 9 ng/L   Lactate, Sepsis   Result Value Ref Range    Lactic Acid, Sepsis 2.8 (H) 0.5 - 1.9 mmol/L   Lactate, Sepsis   Result Value Ref Range    Lactic Acid, Sepsis 3.2 (H) 0.5 - 1.9 mmol/L   Urinalysis, reflex to microscopic   Result Value Ref Range    Color, UA Yellow Straw/Yellow    Clarity, UA Clear Clear    Glucose, Ur 100 (A) Negative mg/dL    Bilirubin Urine SMALL (A) Negative    Ketones, Urine TRACE (A) Negative mg/dL    Specific Gravity, UA >=1.030 1.005 - 1.030    Blood, Urine Negative Negative    pH, UA 5.0 5.0 - 9.0    Protein,  (A) Negative mg/dL    Urobilinogen, Urine 1.0 <2.0 E.U./dL    Nitrite, Urine Negative Negative    Leukocyte Esterase, Urine Negative Negative   Arterial Blood Gas, Respiratory Only   Result Value Ref Range    Source: Arterial     FIO2 Arterial 15.0     Pt Temp 37.0     PH (TEMPERATURE CORRECTED) 7.389 7.350 - 7.450    PCO2 (TEMP CORRECTED) 36.8 35.0 - 45.0 mmHg    PO2 (TEMP CORRECTED) 81.1 (H) 60.0 - 80.0 mmHg    HCO3, Arterial 22.2 22.0 - 26.0 mmol/L    B.E. -2.3 -3.0 - 0.0 mmol/L    O2 Sat 95.8 92.0 - 98.5 %          DEVICE 15,065,[id number],820     Delivery Systems NRB    Microscopic Urinalysis   Result Value Ref Range WBC, UA 0-1 0 - 5 /HPF    RBC, UA NONE 0 - 2 /HPF    Bacteria, UA MODERATE (A) None Seen /HPF    CRYSTALS, UA Few (A) None Seen /HPF   Troponin   Result Value Ref Range    Troponin, High Sensitivity 132 (H) 0 - 9 ng/L   CORTISOL   Result Value Ref Range    Cortisol 23.93 (H) 2.68 - 18.40 mcg/dL   Urine electrolytes   Result Value Ref Range    Sodium, Ur 49 Not Established mmol/L    Potassium, Ur 75.7 Not Established mmol/L    Chloride <20 Not Established mmol/L   Creatinine, Random Urine   Result Value Ref Range    Creatinine, Ur 295 (H) 29 - 226 mg/dL   Urea nitrogen, urine   Result Value Ref Range    Urea Nitrogen, Ur 495 (L) 800 - 1666 mg/dL   Basic Metabolic Panel   Result Value Ref Range    Sodium 160 (H) 132 - 146 mmol/L    Potassium 4.1 3.5 - 5.0 mmol/L    Chloride 132 (HH) 98 - 107 mmol/L    CO2 16 (L) 22 - 29 mmol/L    Anion Gap 12 7 - 16 mmol/L    Glucose 252 (H) 74 - 99 mg/dL    BUN 59 (H) 6 - 23 mg/dL    CREATININE 2.3 (H) 0.5 - 1.0 mg/dL    GFR Non-African American 21 >=60 mL/min/1.73    GFR African American 26     Calcium 8.2 (L) 8.6 - 10.2 mg/dL   Lactic Acid, Plasma   Result Value Ref Range    Lactic Acid 2.8 (H) 0.5 - 2.2 mmol/L   Valproic acid level, total   Result Value Ref Range    Valproic Acid Lvl 3 (L) 50 - 100 mcg/mL   Basic metabolic panel   Result Value Ref Range    Sodium 152 (H) 132 - 146 mmol/L    Potassium 4.8 3.5 - 5.0 mmol/L    Chloride 126 (HH) 98 - 107 mmol/L    CO2 15 (L) 22 - 29 mmol/L    Anion Gap 11 7 - 16 mmol/L    Glucose 320 (H) 74 - 99 mg/dL    BUN 53 (H) 6 - 23 mg/dL    CREATININE 2.1 (H) 0.5 - 1.0 mg/dL    GFR Non-African American 23 >=60 mL/min/1.73    GFR African American 28     Calcium 7.3 (L) 8.6 - 10.2 mg/dL   Basic metabolic panel   Result Value Ref Range    Sodium 150 (H) 132 - 146 mmol/L    Potassium 5.1 (H) 3.5 - 5.0 mmol/L    Chloride 125 (H) 98 - 107 mmol/L    CO2 15 (L) 22 - 29 mmol/L    Anion Gap 10 7 - 16 mmol/L    Glucose 379 (H) 74 - 99 mg/dL    BUN 50 (H) 6 - 23 mg/dL    CREATININE 1.8 (H) 0.5 - 1.0 mg/dL    GFR Non-African American 28 >=60 mL/min/1.73    GFR African American 34     Calcium 7.2 (L) 8.6 - 10.2 mg/dL   Comprehensive Metabolic Panel   Result Value Ref Range    Sodium 151 (H) 132 - 146 mmol/L    Potassium 4.3 3.5 - 5.0 mmol/L    Chloride 125 (H) 98 - 107 mmol/L    CO2 16 (L) 22 - 29 mmol/L    Anion Gap 10 7 - 16 mmol/L    Glucose 304 (H) 74 - 99 mg/dL    BUN 46 (H) 6 - 23 mg/dL    CREATININE 1.5 (H) 0.5 - 1.0 mg/dL    GFR Non-African American 35 >=60 mL/min/1.73    GFR African American 42     Calcium 7.4 (L) 8.6 - 10.2 mg/dL    Total Protein 6.3 (L) 6.4 - 8.3 g/dL    Albumin 2.2 (L) 3.5 - 5.2 g/dL    Total Bilirubin 0.3 0.0 - 1.2 mg/dL    Alkaline Phosphatase 56 35 - 104 U/L    ALT 15 0 - 32 U/L    AST 28 0 - 31 U/L   Magnesium   Result Value Ref Range    Magnesium 2.0 1.6 - 2.6 mg/dL   Phosphorus   Result Value Ref Range    Phosphorus 2.8 2.5 - 4.5 mg/dL   CBC WITH AUTO DIFFERENTIAL   Result Value Ref Range    WBC 19.3 (H) 4.5 - 11.5 E9/L    RBC 2.99 (L) 3.50 - 5.50 E12/L    Hemoglobin 9.8 (L) 11.5 - 15.5 g/dL    Hematocrit 34.1 34.0 - 48.0 %    .0 (H) 80.0 - 99.9 fL    MCH 32.8 26.0 - 35.0 pg    MCHC 28.7 (L) 32.0 - 34.5 %    RDW 15.3 (H) 11.5 - 15.0 fL    Platelets 277 (L) 562 - 450 E9/L    MPV 13.6 (H) 7.0 - 12.0 fL    Neutrophils % 76.5 43.0 - 80.0 %    Lymphocytes % 11.3 (L) 20.0 - 42.0 %    Monocytes % 3.5 2.0 - 12.0 %    Eosinophils % 0.0 0.0 - 6.0 %    Basophils % 0.9 0.0 - 2.0 %    Neutrophils Absolute 16.21 (H) 1.80 - 7.30 E9/L    Lymphocytes Absolute 2.12 1.50 - 4.00 E9/L    Monocytes Absolute 0.77 0.10 - 0.95 E9/L    Eosinophils Absolute 0.00 (L) 0.05 - 0.50 E9/L    Basophils Absolute 0.17 0.00 - 0.20 E9/L    Metamyelocytes Relative 7.8 (H) 0.0 - 1.0 %    nRBC 0.0 /100 WBC    Poikilocytes 1+     [place] Cells 1+    Lactic acid, plasma   Result Value Ref Range    Lactic Acid 4.4 (HH) 0.5 - 2.2 mmol/L   Lactic acid, plasma   Result Value Ref Range    Lactic Acid 2.3 (H) 0.5 - 2.2 mmol/L   Valproic acid level, total   Result Value Ref Range    Valproic Acid Lvl 32 (L) 50 - 100 mcg/mL   VANCOMYCIN, RANDOM   Result Value Ref Range    Vancomycin Rm <4.0 (L) 5.0 - 40.0 mcg/mL   Procalcitonin   Result Value Ref Range    Procalcitonin 61.46 (H) 0.00 - 0.08 ng/mL   EKG 12 Lead   Result Value Ref Range    Ventricular Rate 143 BPM    Atrial Rate 143 BPM    P-R Interval 128 ms    QRS Duration 104 ms    Q-T Interval 296 ms    QTc Calculation (Bazett) 456 ms    P Axis 22 degrees    R Axis 10 degrees    T Axis 44 degrees       RADIOLOGY:  CT HEAD WO CONTRAST   Final Result   1. There is no acute intracranial abnormality. Specifically, there is no   intracranial hemorrhage. 2. Atrophy and periventricular leukomalacia,         CT CHEST WO CONTRAST   Final Result   CT OF THE CHEST:      1. Pulmonale infiltrates in the dependent aspects of the lungs bilaterally,   left greater than right, concerning for pneumonia, possibly   aspiration-related. The appearance and distribution is not characteristic of   COVID-19, although it cannot be definitively excluded. 2. Large hiatal hernia. CT OF THE ABDOMEN AND PELVIS:      1.  No acute abnormality is seen in the abdomen or the pelvis. 2. Stable 2.3 cm left ovarian dermoid cyst.   3. Short segment of distal colonic diverticulosis without diverticulitis. 4. Gastrostomy tube in situ. CT ABDOMEN PELVIS WO CONTRAST Additional Contrast? None   Final Result   CT OF THE CHEST:      1. Pulmonale infiltrates in the dependent aspects of the lungs bilaterally,   left greater than right, concerning for pneumonia, possibly   aspiration-related. The appearance and distribution is not characteristic of   COVID-19, although it cannot be definitively excluded. 2. Large hiatal hernia. CT OF THE ABDOMEN AND PELVIS:      1.  No acute abnormality is seen in the abdomen or the pelvis.    2. Stable 2.3 cm left ovarian dermoid cyst.   3. Short segment of distal colonic diverticulosis without diverticulitis. 4. Gastrostomy tube in situ. XR CHEST PORTABLE   Final Result   No acute process. ------------------------- NURSING NOTES AND VITALS REVIEWED ---------------------------  Date / Time Roomed:  9/21/2021 10:16 AM  ED Bed Assignment:  [id number]    The nursing notes within the ED encounter and vital signs as below have been reviewed.      Patient Vitals for the past 24 hrs:   BP Temp Temp src Pulse Resp SpO2 Height Weight   09/22/21 0952 -- -- -- 96 17 98 % -- --   09/22/21 0600 (!) 116/58 -- -- 96 24 94 % -- 140 lb 14 oz (63.9 kg)   09/22/21 0500 (!) 121/56 -- -- 80 21 95 % -- --   09/22/21 0400 (!) 118/54 98.4 °F (36.9 °C) Bladder 90 (!) 36 91 % -- --   09/22/21 0300 (!) 119/58 -- -- 85 22 98 % -- --   09/22/21 0205 (!) 123/58 -- -- 79 29 93 % -- --   09/22/21 0100 (!) 111/58 -- -- 94 18 94 % -- --   09/22/21 0000 (!) 125/58 98.6 °F (37 °C) Rectal 103 20 91 % -- --   09/21/21 2355 -- -- -- -- -- 93 % -- --   09/21/21 2300 (!) 110/58 -- -- 99 17 98 % -- --   09/21/21 2200 (!) 84/46 -- -- 95 26 97 % -- --   09/21/21 2100 (!) 107/51 -- -- 97 30 96 % -- --   09/21/21 2000 (!) 73/40 -- -- 107 24 97 % -- --   09/21/21 1918 (!) 97/49 101.3 °F (38.5 °C) Axillary 122 30 94 % -- --   09/21/21 1913 -- 101.3 °F (38.5 °C) Axillary -- -- -- 5' 3\" (1.6 m) 134 lb 4.2 oz (60.9 kg)   09/21/21 1820 (!) 90/50 103 °F (39.4 °C) -- 120 22 92 % -- --   09/21/21 1649 (!) 68/45 -- -- 124 22 92 % -- --   09/21/21 1608 (!) 70/45 103 °F (39.4 °C) -- 130 22 93 % -- --   09/21/21 1439 (!) 78/43 103.1 °F (39.5 °C) Axillary 129 22 93 % -- --   09/21/21 1336 (!) 80/41 -- -- 127 22 93 % -- --   09/21/21 1157 83/66 -- -- 137 24 93 % -- --   09/21/21 1124 82/61 -- -- -- -- 93 % -- --       Oxygen Saturation Interpretation: Normal    ------------------------------------------ PROGRESS NOTES

## 2021-09-21 NOTE — PROGRESS NOTES
-Consulted to dose vancomycin for sepsis of unknown origin.  -Height listed from 8/31 was 63 inches, making CrCl < 20 mL/min.  -Vancomycin loading dose of 1000 mg x 1 ordered.  -Will dose vancomycin by level. -Vancomycin level ordered for 9/22 (0600). -Will evaluate vancomycin level and renal function to determine further vancomycin dosing plans.

## 2021-09-21 NOTE — CONSULTS
Nephrology Consult Note  Patient's Name: Flakita Lara  3:00 PM  9/21/2021    Nephrologist: Mariam Wilson    Reason for Consult: BEENA  Requesting Physician:  Barrett Lundborg, DO    Chief Complaint:  Fever and SOB    History Obtained From:  past medical records    History of Present Ilness:    Flakita Lara is a 78 y.o. female with prior history of BEENA in March of 2021 and in 2017 requiring hemodialysis. Creatinine at D/C 0.6mg/dl. Pt has a Hx MR and being blind and has a PEG tube in place and lives in a group home. Pt developed a fever to 102 wih tachycardia and hypotension and hypoxic at the home today and in the ED T 103.1 and BP down to 78/43 wih . Per the care giver with the pt she was doing OK yesterday. She currently has a PEG in place and receives 4 cans of per day of TF and what sounds by description as four 60ml syringes of water    Past Medical History:   Diagnosis Date    Acute kidney injury ([address] 01/15/2017    d/t Vancomycin, on Dialysis [facility] right chest    Blind in both eyes     Essential hypertension 4/2/2021    Gastroesophageal reflux disease without esophagitis 4/2/2021    Hemodialysis patient ([address]     Hyperthyroidism     MR (mental retardation)     Osteoarthritis     Peripheral vascular disease ([address]     Pneumonia 01/06/2017    Pneumonia due to infectious organism 1/8/2017    Sepsis ([address] 3/4/2021       Past Surgical History:   Procedure Laterality Date    BRONCHOSCOPY  02/10/2017    CHEST TUBE INSERTION Right 02/09/2017    GASTROSTOMY TUBE PLACEMENT N/A 3/7/2021    EGD PEG TUBE PLACEMENT performed by Ezekiel Wilson MD at [facility] [address] Right 01/17/2017    tessio insertion     OTHER SURGICAL HISTORY  01/25/2017    PEG tube insertion       No family history on file. reports that she has never smoked. She has never used smokeless tobacco. She reports that she does not drink alcohol and does not use drugs.     Allergies:  Patient has no known allergies. Current Medications:    vancomycin 1000 mg IVPB in 250 mL D5W addavial, Once  norepinephrine (LEVOPHED) 16,000 mcg in dextrose 5 % 250 mL infusion, Continuous  hydrocortisone sodium succinate PF (SOLU-CORTEF) injection 100 mg, Once        Review of Systems:   Review of systems not obtained due to patient factors.     Physical exam:   Constitutional:  Older female on a venti mask  Vitals: VITALS:  BP (!) 78/43   Pulse 129   Temp 103.1 °F (39.5 °C) (Axillary)   Resp 22   Wt 115 lb 11.2 oz (52.5 kg)   SpO2 93%   BMI 20.50 kg/m²   24HR INTAKE/OUTPUT:  No intake or output data in the 24 hours ending 09/21/21 1502  URINARY CATHETER OUTPUT (Curry):     DRAIN/TUBE OUTPUT:     VENT SETTINGS:  Vent Information  SpO2: 93 %  Additional Respiratory  Assessments  Pulse: 129  Resp: 22  SpO2: 93 %    Skin: no rash, turgor poor  Heent:  eomi, mmm  Neck: no bruits or jvd noted  Cardiovascular:Tachy  S1, S2 without S3 or a rub  Respiratory: Poor inspiratory effort, crackles in the base  Abdomen:  +bs, soft, nt, nd, PEG in place  Ext: (-) bilat lower extremity edema  Psychiatric:pt withdrew to physical touch did not verbally interact    Data:   Labs:  CBC:   Lab Results   Component Value Date    WBC 6.6 09/21/2021    RBC 3.72 09/21/2021    HGB 12.3 09/21/2021    HCT 41.3 09/21/2021    .0 09/21/2021    MCH 33.1 09/21/2021    MCHC 29.8 09/21/2021    RDW 15.7 09/21/2021     09/21/2021    MPV 14.0 09/21/2021     CBC with Differential:    Lab Results   Component Value Date    WBC 6.6 09/21/2021    RBC 3.72 09/21/2021    HGB 12.3 09/21/2021    HCT 41.3 09/21/2021     09/21/2021    .0 09/21/2021    MCH 33.1 09/21/2021    MCHC 29.8 09/21/2021    RDW 15.7 09/21/2021    NRBC 7.0 09/21/2021    SEGSPCT 58 06/26/2011    METASPCT 3.0 09/21/2021    LYMPHOPCT 19.0 09/21/2021    PROMYELOPCT 0.9 03/07/2021    MONOPCT 8.0 09/21/2021    MYELOPCT 0.9 03/15/2021    BASOPCT 0.0 09/21/2021    MONOSABS 0.53 09/21/2021    LYMPHSABS 1.39 09/21/2021    EOSABS 0.00 09/21/2021    BASOSABS 0.00 09/21/2021     Hemoglobin/Hematocrit:    Lab Results   Component Value Date    HGB 12.3 09/21/2021    HCT 41.3 09/21/2021     CMP:    Lab Results   Component Value Date     09/21/2021    K 4.2 09/21/2021     09/21/2021    CO2 19 09/21/2021    BUN 59 09/21/2021    CREATININE 2.4 09/21/2021    GFRAA 24 09/21/2021    LABGLOM 20 09/21/2021    GLUCOSE 345 09/21/2021    GLUCOSE 97 12/30/2011    PROT 6.9 09/21/2021    LABALBU 2.2 09/21/2021    LABALBU 3.7 12/30/2011    CALCIUM 9.3 09/21/2021    BILITOT 0.4 09/21/2021    ALKPHOS 57 09/21/2021    AST 23 09/21/2021    ALT 14 09/21/2021     BMP:    Lab Results   Component Value Date     09/21/2021    K 4.2 09/21/2021     09/21/2021    CO2 19 09/21/2021    BUN 59 09/21/2021    LABALBU 2.2 09/21/2021    LABALBU 3.7 12/30/2011    CREATININE 2.4 09/21/2021    CALCIUM 9.3 09/21/2021    GFRAA 24 09/21/2021    LABGLOM 20 09/21/2021    GLUCOSE 345 09/21/2021    GLUCOSE 97 12/30/2011     Hepatic Function Panel:    Lab Results   Component Value Date    ALKPHOS 57 09/21/2021    ALT 14 09/21/2021    AST 23 09/21/2021    PROT 6.9 09/21/2021    BILITOT 0.4 09/21/2021    LABALBU 2.2 09/21/2021    LABALBU 3.7 12/30/2011     Albumin:    Lab Results   Component Value Date    LABALBU 2.2 09/21/2021    LABALBU 3.7 12/30/2011     Calcium:    Lab Results   Component Value Date    CALCIUM 9.3 09/21/2021     Ionized Calcium:  No results found for: IONCA  Magnesium:    Lab Results   Component Value Date    MG 2.2 03/16/2021     Phosphorus:    Lab Results   Component Value Date    PHOS 3.2 03/16/2021     LDH:    Lab Results   Component Value Date     01/26/2017     Uric Acid:  No results found for: LABURIC, URICACID  PT/INR:    Lab Results   Component Value Date    PROTIME 12.4 03/08/2021    INR 1.1 03/08/2021     PTT:    Lab Results   Component Value Date    APTT 25.7 03/08/2021 [APTT}  Troponin:    Lab Results   Component Value Date    TROPONINI 0.08 03/05/2021     U/A:    Lab Results   Component Value Date    COLORU Yellow 09/21/2021    PROTEINU 100 09/21/2021    PHUR 5.0 09/21/2021    LABCAST FEW 09/20/2017    WBCUA 0-1 09/21/2021    RBCUA NONE 09/21/2021    MUCUS Present 02/08/2021    BACTERIA MODERATE 09/21/2021    CLARITYU Clear 09/21/2021    SPECGRAV >=1.030 09/21/2021    LEUKOCYTESUR Negative 09/21/2021    UROBILINOGEN 1.0 09/21/2021    BILIRUBINUR SMALL 09/21/2021    BLOODU Negative 09/21/2021    GLUCOSEU 100 09/21/2021    AMORPHOUS MODERATE 01/21/2017     ABG:    Lab Results   Component Value Date    PH 7.340 03/04/2021    PCO2 45.0 03/04/2021    PO2 78.9 03/04/2021    HCO3 23.7 03/04/2021    BE -2.3 09/21/2021    O2SAT 95.8 09/21/2021     HgBA1c:    Lab Results   Component Value Date    LABA1C 5.4 03/09/2021     Microalbumen/Creatinine ratio:  No components found for: RUCREAT  FLP:    Lab Results   Component Value Date    TRIG 94 09/08/2021    HDL 72 09/08/2021    LDLCALC 79 09/08/2021    LABVLDL 19 09/08/2021     TSH:    Lab Results   Component Value Date    TSH 18.460 09/08/2021     VITAMIN B12: No components found for: B12  FOLATE:    Lab Results   Component Value Date    FOLATE 19.8 03/05/2021     Iron Saturation:  No components found for: PERCENTFE  FERRITIN:    Lab Results   Component Value Date    FERRITIN 370 03/05/2021     AMYLASE:  No results found for: AMYLASE  LIPASE:    Lab Results   Component Value Date    LIPASE 39 08/16/2021     Fibrinogen Level:  No components found for: FIB  24 Hour Urine for Protein:  No components found for: RAWUPRO, UHRS3, JCUK17YL, UTV3  24 Hour Urine for Creatinine Clearance:  No components found for: CREAT4, UHRS10, UTV10     Imaging:  CXR results:  EXAMINATION:   ONE XRAY VIEW OF THE CHEST       9/21/2021 11:16 am       COMPARISON:   None.       HISTORY:   ORDERING SYSTEM PROVIDED HISTORY: SOB   TECHNOLOGIST PROVIDED HISTORY:   Reason for exam:->SOB       FINDINGS:   The lungs are without acute focal process.  There is no effusion or   pneumothorax. The cardiomediastinal silhouette is without acute process. The   osseous structures are without acute process.           Impression   No acute process. EXAMINATION:   CT OF THE CHEST WITHOUT CONTRAST; CT OF THE ABDOMEN AND PELVIS WITHOUT   CONTRAST 9/21/2021 12:23 pm       TECHNIQUE:   CT of the chest was performed without the administration of intravenous   contrast. Multiplanar reformatted images are provided for review. Dose   modulation, iterative reconstruction, and/or weight based adjustment of the   mA/kV was utilized to reduce the radiation dose to as low as reasonably   achievable.; CT of the abdomen and pelvis was performed without the   administration of intravenous contrast. Multiplanar reformatted images are   provided for review. Dose modulation, iterative reconstruction, and/or weight   based adjustment of the mA/kV was utilized to reduce the radiation dose to as   low as reasonably achievable.       COMPARISON:   None.       HISTORY:   ORDERING SYSTEM PROVIDED HISTORY: sepsis   TECHNOLOGIST PROVIDED HISTORY:   Reason for exam:->sepsis   Decision Support Exception - unselect if not a suspected or confirmed   emergency medical condition->Emergency Medical Condition (MA)       FINDINGS:   CT OF THE CHEST:       MEDIASTINUM: The heart is normal in size.  The main pulmonary artery is   normal in caliber.  The thoracic aorta is of normal caliber.  Large hiatal   hernia is seen.       LUNGS/PLEURA: Pulmonary infiltrates are seen in the dependent of the lungs   bilaterally most in the lower lobes left greater The central airway is clear.    No pneumothorax or pleural effusion is seen.       BONES/SOFT TISSUES: No fracture or bony destructive lesion.  Prominent   flowing anterior osteophytes in the cervical spine, extending to the upper   thoracic spine to the level T4 indicate diffuse idiopathic skeletal   hyperostosis.       CT OF THE ABDOMEN AND PELVIS:       ORGANS:       Liver: Unremarkable.       Gallbladder: Unremarkable.       Pancreas: Unremarkable.       Spleen:  Unremarkable.       Adrenals: Unremarkable.       Kidneys: Unremarkable.       GI/BOWEL: No bowel wall thickening or obstruction.  Short segment of   prominent diverticulosis is seen in the distal descending and proximal   sigmoid colon.  No evidence of acute diverticulitis.  Normal appendix. Gastrostomy tube in situ.       PERITONEUM/EXTRA PERITONEUM: No lymphadenopathy.  No free air or free fluid   is seen.  The abdominal aorta and pelvic arteries are unremarkable.       PELVIS: The urinary bladder is decompressed by a  Curry catheter.  It is   therefore not well evaluated.  Within this limitation, no gross abnormality   is detected.  Within limits of the CT technique, the uterus is unremarkable. Grossly stable 2.3  cm left ovarian fatty dermoid. .       BONES/SOFT TISSUES: The visualized bones are intact without fracture or focal   lesion.           Impression   CT OF THE CHEST:       1. Pulmonale infiltrates in the dependent aspects of the lungs bilaterally,   left greater than right, concerning for pneumonia, possibly   aspiration-related.  The appearance and distribution is not characteristic of   COVID-19, although it cannot be definitively excluded. 2. Large hiatal hernia. CT OF THE ABDOMEN AND PELVIS:       1.  No acute abnormality is seen in the abdomen or the pelvis. 2. Stable 2.3 cm left ovarian dermoid cyst.   3. Short segment of distal colonic diverticulosis without diverticulitis. 4. Gastrostomy tube in situ. Assessment  1-Stage III BEENA sec to decreased effective renal perfusion in the setting of the hypotension and the sepsis  UA gluc 100, bili small, ketones tr, SG 1.030, blood (-), protein 100mg/dl, Ni and LE (-), lorenza mod  Dosed with cefepime and Vanc in the ED  PLAN:  1. Await  UC and BC  2.

## 2021-09-22 LAB
ALBUMIN SERPL-MCNC: 2.2 G/DL (ref 3.5–5.2)
ALP BLD-CCNC: 56 U/L (ref 35–104)
ALT SERPL-CCNC: 15 U/L (ref 0–32)
ANION GAP SERPL CALCULATED.3IONS-SCNC: 10 MMOL/L (ref 7–16)
ANION GAP SERPL CALCULATED.3IONS-SCNC: 8 MMOL/L (ref 7–16)
AST SERPL-CCNC: 28 U/L (ref 0–31)
BASOPHILS ABSOLUTE: 0.17 E9/L (ref 0–0.2)
BASOPHILS RELATIVE PERCENT: 0.9 % (ref 0–2)
BILIRUB SERPL-MCNC: 0.3 MG/DL (ref 0–1.2)
BUN BLDV-MCNC: 37 MG/DL (ref 6–23)
BUN BLDV-MCNC: 41 MG/DL (ref 6–23)
BUN BLDV-MCNC: 46 MG/DL (ref 6–23)
BUN BLDV-MCNC: 50 MG/DL (ref 6–23)
BURR CELLS: ABNORMAL
CALCIUM SERPL-MCNC: 7.2 MG/DL (ref 8.6–10.2)
CALCIUM SERPL-MCNC: 7.3 MG/DL (ref 8.6–10.2)
CALCIUM SERPL-MCNC: 7.4 MG/DL (ref 8.6–10.2)
CALCIUM SERPL-MCNC: 7.4 MG/DL (ref 8.6–10.2)
CHLORIDE BLD-SCNC: 122 MMOL/L (ref 98–107)
CHLORIDE BLD-SCNC: 125 MMOL/L (ref 98–107)
CO2: 15 MMOL/L (ref 22–29)
CO2: 16 MMOL/L (ref 22–29)
CO2: 17 MMOL/L (ref 22–29)
CO2: 20 MMOL/L (ref 22–29)
CORTISOL TOTAL: 45.07 MCG/DL (ref 2.68–18.4)
CREAT SERPL-MCNC: 1.1 MG/DL (ref 0.5–1)
CREAT SERPL-MCNC: 1.3 MG/DL (ref 0.5–1)
CREAT SERPL-MCNC: 1.5 MG/DL (ref 0.5–1)
CREAT SERPL-MCNC: 1.8 MG/DL (ref 0.5–1)
EKG ATRIAL RATE: 143 BPM
EKG P AXIS: 22 DEGREES
EKG P-R INTERVAL: 128 MS
EKG Q-T INTERVAL: 296 MS
EKG QRS DURATION: 104 MS
EKG QTC CALCULATION (BAZETT): 456 MS
EKG R AXIS: 10 DEGREES
EKG T AXIS: 44 DEGREES
EKG VENTRICULAR RATE: 143 BPM
EOSINOPHILS ABSOLUTE: 0 E9/L (ref 0.05–0.5)
EOSINOPHILS RELATIVE PERCENT: 0 % (ref 0–6)
GFR AFRICAN AMERICAN: 34
GFR AFRICAN AMERICAN: 42
GFR AFRICAN AMERICAN: 49
GFR AFRICAN AMERICAN: 60
GFR NON-AFRICAN AMERICAN: 28 ML/MIN/1.73
GFR NON-AFRICAN AMERICAN: 35 ML/MIN/1.73
GFR NON-AFRICAN AMERICAN: 41 ML/MIN/1.73
GFR NON-AFRICAN AMERICAN: 49 ML/MIN/1.73
GLUCOSE BLD-MCNC: 133 MG/DL (ref 74–99)
GLUCOSE BLD-MCNC: 197 MG/DL (ref 74–99)
GLUCOSE BLD-MCNC: 304 MG/DL (ref 74–99)
GLUCOSE BLD-MCNC: 379 MG/DL (ref 74–99)
HCT VFR BLD CALC: 34.1 % (ref 34–48)
HEMOGLOBIN: 9.8 G/DL (ref 11.5–15.5)
L. PNEUMOPHILA SEROGP 1 UR AG: NORMAL
LACTIC ACID: 1.4 MMOL/L (ref 0.5–2.2)
LACTIC ACID: 2.3 MMOL/L (ref 0.5–2.2)
LACTIC ACID: 3.8 MMOL/L (ref 0.5–2.2)
LACTIC ACID: 4.4 MMOL/L (ref 0.5–2.2)
LYMPHOCYTES ABSOLUTE: 2.12 E9/L (ref 1.5–4)
LYMPHOCYTES RELATIVE PERCENT: 11.3 % (ref 20–42)
MAGNESIUM: 2 MG/DL (ref 1.6–2.6)
MCH RBC QN AUTO: 32.8 PG (ref 26–35)
MCHC RBC AUTO-ENTMCNC: 28.7 % (ref 32–34.5)
MCV RBC AUTO: 114 FL (ref 80–99.9)
METAMYELOCYTES RELATIVE PERCENT: 7.8 % (ref 0–1)
MONOCYTES ABSOLUTE: 0.77 E9/L (ref 0.1–0.95)
MONOCYTES RELATIVE PERCENT: 3.5 % (ref 2–12)
NEUTROPHILS ABSOLUTE: 16.21 E9/L (ref 1.8–7.3)
NEUTROPHILS RELATIVE PERCENT: 76.5 % (ref 43–80)
NUCLEATED RED BLOOD CELLS: 0 /100 WBC
PDW BLD-RTO: 15.3 FL (ref 11.5–15)
PHOSPHORUS: 2.8 MG/DL (ref 2.5–4.5)
PLATELET # BLD: 117 E9/L (ref 130–450)
PMV BLD AUTO: 13.6 FL (ref 7–12)
POIKILOCYTES: ABNORMAL
POTASSIUM SERPL-SCNC: 3.5 MMOL/L (ref 3.5–5)
POTASSIUM SERPL-SCNC: 3.8 MMOL/L (ref 3.5–5)
POTASSIUM SERPL-SCNC: 4.3 MMOL/L (ref 3.5–5)
POTASSIUM SERPL-SCNC: 5.1 MMOL/L (ref 3.5–5)
RBC # BLD: 2.99 E12/L (ref 3.5–5.5)
SODIUM BLD-SCNC: 150 MMOL/L (ref 132–146)
SODIUM BLD-SCNC: 150 MMOL/L (ref 132–146)
SODIUM BLD-SCNC: 151 MMOL/L (ref 132–146)
SODIUM BLD-SCNC: 152 MMOL/L (ref 132–146)
TOTAL PROTEIN: 6.3 G/DL (ref 6.4–8.3)
URIC ACID, SERUM: 8 MG/DL (ref 2.4–5.7)
VALPROIC ACID LEVEL: 32 MCG/ML (ref 50–100)
VANCOMYCIN RANDOM: <4 MCG/ML (ref 5–40)
WBC # BLD: 19.3 E9/L (ref 4.5–11.5)

## 2021-09-22 PROCEDURE — 80164 ASSAY DIPROPYLACETIC ACD TOT: CPT

## 2021-09-22 PROCEDURE — 99233 SBSQ HOSP IP/OBS HIGH 50: CPT | Performed by: STUDENT IN AN ORGANIZED HEALTH CARE EDUCATION/TRAINING PROGRAM

## 2021-09-22 PROCEDURE — 6370000000 HC RX 637 (ALT 250 FOR IP): Performed by: INTERNAL MEDICINE

## 2021-09-22 PROCEDURE — 2580000003 HC RX 258: Performed by: FAMILY MEDICINE

## 2021-09-22 PROCEDURE — 83735 ASSAY OF MAGNESIUM: CPT

## 2021-09-22 PROCEDURE — 2000000000 HC ICU R&B

## 2021-09-22 PROCEDURE — 6370000000 HC RX 637 (ALT 250 FOR IP): Performed by: FAMILY MEDICINE

## 2021-09-22 PROCEDURE — 83605 ASSAY OF LACTIC ACID: CPT

## 2021-09-22 PROCEDURE — 84550 ASSAY OF BLOOD/URIC ACID: CPT

## 2021-09-22 PROCEDURE — 94667 MNPJ CHEST WALL 1ST: CPT

## 2021-09-22 PROCEDURE — 36592 COLLECT BLOOD FROM PICC: CPT

## 2021-09-22 PROCEDURE — 6360000002 HC RX W HCPCS: Performed by: INTERNAL MEDICINE

## 2021-09-22 PROCEDURE — 2500000003 HC RX 250 WO HCPCS: Performed by: FAMILY MEDICINE

## 2021-09-22 PROCEDURE — 80053 COMPREHEN METABOLIC PANEL: CPT

## 2021-09-22 PROCEDURE — 6360000002 HC RX W HCPCS: Performed by: FAMILY MEDICINE

## 2021-09-22 PROCEDURE — 2500000003 HC RX 250 WO HCPCS: Performed by: INTERNAL MEDICINE

## 2021-09-22 PROCEDURE — 94664 DEMO&/EVAL PT USE INHALER: CPT

## 2021-09-22 PROCEDURE — 94640 AIRWAY INHALATION TREATMENT: CPT

## 2021-09-22 PROCEDURE — 2580000003 HC RX 258: Performed by: INTERNAL MEDICINE

## 2021-09-22 PROCEDURE — 80202 ASSAY OF VANCOMYCIN: CPT

## 2021-09-22 PROCEDURE — 85025 COMPLETE CBC W/AUTO DIFF WBC: CPT

## 2021-09-22 PROCEDURE — 2700000000 HC OXYGEN THERAPY PER DAY

## 2021-09-22 PROCEDURE — 80048 BASIC METABOLIC PNL TOTAL CA: CPT

## 2021-09-22 PROCEDURE — 84100 ASSAY OF PHOSPHORUS: CPT

## 2021-09-22 PROCEDURE — 36415 COLL VENOUS BLD VENIPUNCTURE: CPT

## 2021-09-22 PROCEDURE — 82533 TOTAL CORTISOL: CPT

## 2021-09-22 RX ORDER — SODIUM CHLORIDE, SODIUM LACTATE, POTASSIUM CHLORIDE, AND CALCIUM CHLORIDE .6; .31; .03; .02 G/100ML; G/100ML; G/100ML; G/100ML
1000 INJECTION, SOLUTION INTRAVENOUS ONCE
Status: COMPLETED | OUTPATIENT
Start: 2021-09-22 | End: 2021-09-22

## 2021-09-22 RX ORDER — SODIUM CHLORIDE FOR INHALATION 3 %
4 VIAL, NEBULIZER (ML) INHALATION EVERY 6 HOURS
Status: DISCONTINUED | OUTPATIENT
Start: 2021-09-22 | End: 2021-09-30 | Stop reason: HOSPADM

## 2021-09-22 RX ORDER — MIDODRINE HYDROCHLORIDE 5 MG/1
5 TABLET ORAL
Status: DISCONTINUED | OUTPATIENT
Start: 2021-09-22 | End: 2021-09-23

## 2021-09-22 RX ORDER — ALBUTEROL SULFATE 2.5 MG/3ML
2.5 SOLUTION RESPIRATORY (INHALATION) EVERY 6 HOURS
Status: DISCONTINUED | OUTPATIENT
Start: 2021-09-22 | End: 2021-09-30 | Stop reason: HOSPADM

## 2021-09-22 RX ORDER — FAMOTIDINE 20 MG/1
20 TABLET, FILM COATED ORAL DAILY
Status: DISCONTINUED | OUTPATIENT
Start: 2021-09-23 | End: 2021-09-30 | Stop reason: HOSPADM

## 2021-09-22 RX ADMIN — Medication 5000 UNITS: at 08:34

## 2021-09-22 RX ADMIN — SODIUM BICARBONATE: 84 INJECTION, SOLUTION INTRAVENOUS at 08:29

## 2021-09-22 RX ADMIN — Medication 10 ML: at 20:56

## 2021-09-22 RX ADMIN — MIDODRINE HYDROCHLORIDE 5 MG: 5 TABLET ORAL at 11:48

## 2021-09-22 RX ADMIN — MIDODRINE HYDROCHLORIDE 5 MG: 5 TABLET ORAL at 16:59

## 2021-09-22 RX ADMIN — POLYETHYLENE GLYCOL 3350 17 G: 17 POWDER, FOR SOLUTION ORAL at 08:35

## 2021-09-22 RX ADMIN — SODIUM CHLORIDE SOLN NEBU 3% 4 ML: 3 NEBU SOLN at 22:10

## 2021-09-22 RX ADMIN — NOREPINEPHRINE BITARTRATE 34 MCG/MIN: 1 INJECTION, SOLUTION, CONCENTRATE INTRAVENOUS at 04:20

## 2021-09-22 RX ADMIN — MONTELUKAST SODIUM 10 MG: 10 TABLET, FILM COATED ORAL at 20:56

## 2021-09-22 RX ADMIN — PAROXETINE 10 MG: 10 TABLET, FILM COATED ORAL at 20:57

## 2021-09-22 RX ADMIN — PAROXETINE 10 MG: 10 TABLET, FILM COATED ORAL at 08:36

## 2021-09-22 RX ADMIN — SODIUM BICARBONATE: 84 INJECTION, SOLUTION INTRAVENOUS at 19:41

## 2021-09-22 RX ADMIN — DOCUSATE SODIUM 100 MG: 50 LIQUID ORAL at 20:56

## 2021-09-22 RX ADMIN — OLANZAPINE 20 MG: 10 TABLET, FILM COATED ORAL at 23:30

## 2021-09-22 RX ADMIN — SODIUM CHLORIDE SOLN NEBU 3% 4 ML: 3 NEBU SOLN at 14:42

## 2021-09-22 RX ADMIN — VALPROIC ACID 1000 MG: 250 SOLUTION ORAL at 20:57

## 2021-09-22 RX ADMIN — ALBUTEROL SULFATE 2.5 MG: 2.5 SOLUTION RESPIRATORY (INHALATION) at 22:10

## 2021-09-22 RX ADMIN — VALPROIC ACID 1000 MG: 250 SOLUTION ORAL at 08:35

## 2021-09-22 RX ADMIN — ENOXAPARIN SODIUM 30 MG: 30 INJECTION, SOLUTION INTRAVENOUS; SUBCUTANEOUS at 08:36

## 2021-09-22 RX ADMIN — CEFEPIME HYDROCHLORIDE 2000 MG: 2 INJECTION, POWDER, FOR SOLUTION INTRAVENOUS at 11:48

## 2021-09-22 RX ADMIN — ALBUTEROL SULFATE 2.5 MG: 2.5 SOLUTION RESPIRATORY (INHALATION) at 14:42

## 2021-09-22 RX ADMIN — VANCOMYCIN HYDROCHLORIDE 1250 MG: 10 INJECTION, POWDER, LYOPHILIZED, FOR SOLUTION INTRAVENOUS at 11:51

## 2021-09-22 RX ADMIN — Medication 10 ML: at 08:36

## 2021-09-22 RX ADMIN — SODIUM CHLORIDE, POTASSIUM CHLORIDE, SODIUM LACTATE AND CALCIUM CHLORIDE 1000 ML: 600; 310; 30; 20 INJECTION, SOLUTION INTRAVENOUS at 09:50

## 2021-09-22 RX ADMIN — Medication 100 MG: at 08:35

## 2021-09-22 RX ADMIN — FAMOTIDINE 20 MG: 20 TABLET, FILM COATED ORAL at 08:35

## 2021-09-22 RX ADMIN — DOCUSATE SODIUM 100 MG: 50 LIQUID ORAL at 08:35

## 2021-09-22 RX ADMIN — LEVOTHYROXINE SODIUM 150 MCG: 75 TABLET ORAL at 05:56

## 2021-09-22 RX ADMIN — OLANZAPINE 20 MG: 10 TABLET, ORALLY DISINTEGRATING ORAL at 08:35

## 2021-09-22 ASSESSMENT — PAIN SCALES - GENERAL
PAINLEVEL_OUTOF10: 0
PAINLEVEL_OUTOF10: 0

## 2021-09-22 NOTE — PROGRESS NOTES
Department of Internal Medicine  Nephrology Attending Progress Note        SUBJECTIVE:  Miss Cat Leon not responding to questioning, had issues with hypotension during the night, receiving a bolus of lactated ringers with some improvement, dose of pressors reduced, Hypernatremia improving      PMH  Prior hx of BEENA secondary to [facility]  toxicity required dialysis  Baseline CKD 2 cr 0.9  HTN  Blindness  Hiatus hernia with GERD  Mental retardation with behavioral disturbances  PEG with recent issues flushing  Hypoalbuminemia  Hypothyroidism  Constipation with overflow diarrhea, CT confirming stool burden   anemia    Physical Exam:    Vitals:    09/22/21 0952   BP:    Pulse: 96   Resp: 17   Temp:    SpO2: 98%       I/O last 24 hours:  Intake/Output 2723/535    Weight: 140?      General Appearance:  Opens eyes but not responding to verbal questioning, poor dental hygeine  Skin:  No rashes   Neck:  neck- supple, no mass, non-tender and no bruits  Lungs:  Coarse rhonchi  Heart:   regular rate and rhythm     Abdominal: +bs. + peg +distension  Extremities: 1+ edema of  bilateral lower extremities   Peripheral Pulses:  +2    DATA:    CBC with Differential:    Lab Results   Component Value Date    WBC 19.3 09/22/2021    RBC 2.99 09/22/2021    HGB 9.8 09/22/2021    HCT 34.1 09/22/2021     09/22/2021    .0 09/22/2021    MCH 32.8 09/22/2021    MCHC 28.7 09/22/2021    RDW 15.3 09/22/2021    NRBC 0.0 09/22/2021    SEGSPCT 58 06/26/2011    METASPCT 7.8 09/22/2021    LYMPHOPCT 11.3 09/22/2021    PROMYELOPCT 0.9 03/07/2021    MONOPCT 3.5 09/22/2021    MYELOPCT 0.9 03/15/2021    BASOPCT 0.9 09/22/2021    MONOSABS 0.77 09/22/2021    LYMPHSABS 2.12 09/22/2021    EOSABS 0.00 09/22/2021    BASOSABS 0.17 09/22/2021     CMP:    Lab Results   Component Value Date     09/22/2021    K 4.3 09/22/2021    K 4.2 09/21/2021     09/22/2021    CO2 16 09/22/2021    BUN 46 09/22/2021    CREATININE 1.5 09/22/2021    GFRAA 42 09/22/2021 LABGLOM 35 09/22/2021    GLUCOSE 304 09/22/2021    GLUCOSE 97 12/30/2011    PROT 6.3 09/22/2021    LABALBU 2.2 09/22/2021    LABALBU 3.7 12/30/2011    CALCIUM 7.4 09/22/2021    BILITOT 0.3 09/22/2021    ALKPHOS 56 09/22/2021    AST 28 09/22/2021    ALT 15 09/22/2021     Magnesium:    Lab Results   Component Value Date    MG 2.0 09/22/2021     Phosphorus:    Lab Results   Component Value Date    PHOS 2.8 09/22/2021     U/A:    Lab Results   Component Value Date    COLORU Yellow 09/21/2021    PROTEINU 100 09/21/2021    PHUR 5.0 09/21/2021    LABCAST FEW 09/20/2017    WBCUA 0-1 09/21/2021    RBCUA NONE 09/21/2021    MUCUS Present 02/08/2021    BACTERIA MODERATE 09/21/2021    CLARITYU Clear 09/21/2021    SPECGRAV >=1.030 09/21/2021    LEUKOCYTESUR Negative 09/21/2021    UROBILINOGEN 1.0 09/21/2021    BILIRUBINUR SMALL 09/21/2021    BLOODU Negative 09/21/2021    GLUCOSEU 100 09/21/2021    AMORPHOUS MODERATE 01/21/2017     ABG:    Lab Results   Component Value Date    PH 7.340 03/04/2021    PCO2 45.0 03/04/2021    PO2 78.9 03/04/2021    HCO3 23.7 03/04/2021    BE -2.3 09/21/2021    O2SAT 95.8 09/21/2021     fx ex of Na 0.23%  fx ex of urea 6.6%     vancomycin  20 mg/kg IntraVENous Once    sodium chloride (Inhalant)  4 mL Nebulization Q6H    albuterol  2.5 mg Nebulization Q6H    [START ON 9/23/2021] famotidine  20 mg Per G Tube Daily    lactated ringers bolus  1,000 mL IntraVENous Once    midodrine  5 mg Oral TID WC    thiamine  100 mg Per G Tube Daily    valproic acid  1,000 mg Per G Tube BID    polyethylene glycol  17 g Per G Tube Daily    PARoxetine  10 mg Per G Tube BID    OLANZapine zydis  20 mg Oral QAM    OLANZapine  20 mg Per G Tube Nightly    Vitamin D  5,000 Units Per G Tube Daily    docusate  100 mg Per G Tube BID    levothyroxine  150 mcg Per G Tube Daily    [Held by provider] metoprolol tartrate  75 mg Per G Tube BID    montelukast  10 mg Per G Tube Nightly    sodium chloride flush  5-40 mL IntraVENous 2 times per day    enoxaparin  30 mg SubCUTAneous Daily    cefepime  2,000 mg IntraVENous Q24H    vancomycin (VANCOCIN) intermittent dosing (placeholder)   Other RX Placeholder      sodium bicarbonate infusion 100 mL/hr at 09/22/21 0829    norepinephrine 30 mcg/min (09/22/21 0537)    sodium chloride      sodium chloride       bisacodyl, medicated lip balm, sodium chloride flush, sodium chloride, ondansetron **OR** ondansetron, acetaminophen **OR** acetaminophen    IMPRESSION/RECOMMENDATIONS:      Hypernatremia improving   BEENA stage3 with prerenal indices consistent with sepsis? Volume depletion  Sepsis secondary to aspiration from recent peg tube issues?  Cultures pending   Metabolic acidosis NAG receiving iv replacement hopefully can transition to po  Hypoalbuminemia reflecting decreased feedings related to peg tube issues, had been 3.3 beginning of the month  Anemia drop from admission reflection hydration check iron     Yayo Garcia MD  9/22/2021 10:11 AM

## 2021-09-22 NOTE — PROGRESS NOTES
Physician Progress Note      Adry Hunter  CSN #:                  [id number]  :                       1955  ADMIT DATE:       2021 10:16 AM  DISCH DATE:  RESPONDING  PROVIDER #:        Lona Awad MD          QUERY TEXT:    Patient admitted with Sepsis. Noted documentation of ESRD in IM  progress   note. In order to support the diagnosis of ESRD, please include additional   clinical indicators in your documentation. Or please document if the diagnosis   of ESRD has been ruled out after further study. The medical record reflects the following:  Risk Factors: h/o HD d/t BEENA  Clinical Indicators: BUN/Cr/GFR: 42/0.9/>60 (21) -> 59/2.4/24 on   admission, per IM  \". Eladio Anaya Septic shock  likely secondary to HAP pneumonia,   ESRD on hemodialysis. Eladio Anaya Acute renal failure -- has required HD X 2 in the past   for ARF; baseline creatinine 0.6 mg; likely mostly pre-renal due to shock and   due to excess fluid losses from illness/fever. Eladio [employer]ricks [employer] \", per renal \". .. Prior hx of   BEENA secondary to [facility] toxicity required dialysis  Baseline CKD 2 cr 0.9. Eladio Power Eladio Power \"  Treatment: renal consult, serial lab monitoring    Thank you,  April Nohemi Jones RN, BSN, CDIS  Clinical Documentation Improvement  [email or web address]  Options provided:  -- ESRD present as evidenced by, Please document evidence. -- ESRD was ruled out, BEENA on CKD stage 2  -- Other - I will add my own diagnosis  -- Disagree - Not applicable / Not valid  -- Disagree - Clinically unable to determine / Unknown  -- Refer to Clinical Documentation Reviewer    PROVIDER RESPONSE TEXT:    BEENA on CKD Stage 2, ESRD was ruled out after study.     Query created by: Evelyn Álvarez on 2021 2:34 PM      Electronically signed by:  Lona Awad MD 2021 2:52 PM

## 2021-09-22 NOTE — PROGRESS NOTES
Patient admitted from ER to 216, with the following belongings socks, placed on monitor, patient oriented to room and unit visiting hours. Patient guide at bedside, reviewed patient rights and responsibilities. MRSA nasal swab obtained. Bed alarm on. Call light within reach.  Trupti Jose RN

## 2021-09-22 NOTE — FLOWSHEET NOTE
Notified Dr. Elizabeth Crew of 1800 lab results. Mere Christine Na 150, K: 3.5, BUN: 37, , Cr 1.1. No new orders at this time.

## 2021-09-22 NOTE — PROGRESS NOTES
[facility] Progress Note    Admitting Date and Time: 9/21/2021 10:16 AM  Admit Dx: Sepsis ([address] [A41.9]    Subjective:  Patient is being followed for Sepsis [facility]) [A41.9]   Patient poor historian, minimally answering questions. Per RN: Patient continues to be on Levophed. ROS: denies fever, chills, cp, sob, n/v, HA unless stated above.       vancomycin  20 mg/kg IntraVENous Once    sodium chloride (Inhalant)  4 mL Nebulization Q6H    albuterol  2.5 mg Nebulization Q6H    [START ON 9/23/2021] famotidine  20 mg Per G Tube Daily    midodrine  5 mg Oral TID WC    thiamine  100 mg Per G Tube Daily    valproic acid  1,000 mg Per G Tube BID    polyethylene glycol  17 g Per G Tube Daily    PARoxetine  10 mg Per G Tube BID    OLANZapine zydis  20 mg Oral QAM    OLANZapine  20 mg Per G Tube Nightly    Vitamin D  5,000 Units Per G Tube Daily    docusate  100 mg Per G Tube BID    levothyroxine  150 mcg Per G Tube Daily    [Held by provider] metoprolol tartrate  75 mg Per G Tube BID    montelukast  10 mg Per G Tube Nightly    sodium chloride flush  5-40 mL IntraVENous 2 times per day    enoxaparin  30 mg SubCUTAneous Daily    cefepime  2,000 mg IntraVENous Q24H    vancomycin (VANCOCIN) intermittent dosing (placeholder)   Other RX Placeholder     bisacodyl, 10 mg, Daily PRN  medicated lip balm, , PRN  sodium chloride flush, 5-40 mL, PRN  sodium chloride, 25 mL, PRN  ondansetron, 4 mg, Q8H PRN   Or  ondansetron, 4 mg, Q6H PRN  acetaminophen, 650 mg, Q6H PRN   Or  acetaminophen, 650 mg, Q6H PRN         Objective:    BP (!) 100/55   Pulse 86   Temp 97.3 °F (36.3 °C) (Bladder)   Resp 18   Ht 5' 3\" (1.6 m)   Wt 140 lb 14 oz (63.9 kg)   SpO2 99%   BMI 24.95 kg/m²     General Appearance: alert , minimally responding to questions, on 4 L oxygen by nasal cannula  Skin: warm and dry  Head: normocephalic and atraumatic  Eyes: pupils equal, round, and reactive to light, extraocular eye movements intact, conjunctivae normal  Neck: neck supple and non tender without mass   Pulmonary/Chest: Diminished bilaterally- no wheezes, + rales or rhonchi, normal air movement, no respiratory distress  Cardiovascular: normal rate, normal S1 and S2 and no carotid bruits  Abdomen: soft, non-tender, non-distended, normal bowel sounds, no masses or organomegaly  Extremities: no cyanosis, no clubbing and no edema  Neurologic: no cranial nerve deficit and speech normal        Recent Labs     09/22/21  0200 09/22/21  0555 09/22/21  1055   * 151* 152*   K 5.1* 4.3 3.8   * 125* 125*   CO2 15* 16* 17*   BUN 50* 46* 41*   CREATININE 1.8* 1.5* 1.3*   GLUCOSE 379* 304* 197*   CALCIUM 7.2* 7.4* 7.4*       Recent Labs     09/21/21  1057 09/22/21  0555   WBC 6.6 19.3*   RBC 3.72 2.99*   HGB 12.3 9.8*   HCT 41.3 34.1   .0* 114.0*   MCH 33.1 32.8   MCHC 29.8* 28.7*   RDW 15.7* 15.3*    117*   MPV 14.0* 13.6*       Micro:  No components found for: [facility])    Radiology:   CT ABDOMEN PELVIS WO CONTRAST Additional Contrast? None    Result Date: 9/21/2021  EXAMINATION: CT OF THE CHEST WITHOUT CONTRAST; CT OF THE ABDOMEN AND PELVIS WITHOUT CONTRAST 9/21/2021 12:23 pm TECHNIQUE: CT of the chest was performed without the administration of intravenous contrast. Multiplanar reformatted images are provided for review. Dose modulation, iterative reconstruction, and/or weight based adjustment of the mA/kV was utilized to reduce the radiation dose to as low as reasonably achievable.; CT of the abdomen and pelvis was performed without the administration of intravenous contrast. Multiplanar reformatted images are provided for review. Dose modulation, iterative reconstruction, and/or weight based adjustment of the mA/kV was utilized to reduce the radiation dose to as low as reasonably achievable. COMPARISON: None.  HISTORY: ORDERING SYSTEM PROVIDED HISTORY: sepsis TECHNOLOGIST PROVIDED HISTORY: Reason for exam:->sepsis definitively excluded. 2. Large hiatal hernia. CT OF THE ABDOMEN AND PELVIS: 1.  No acute abnormality is seen in the abdomen or the pelvis. 2. Stable 2.3 cm left ovarian dermoid cyst. 3. Short segment of distal colonic diverticulosis without diverticulitis. 4. Gastrostomy tube in situ. CT HEAD WO CONTRAST    Result Date: 9/21/2021  EXAMINATION: CT OF THE HEAD WITHOUT CONTRAST  9/21/2021 12:23 pm TECHNIQUE: CT of the head was performed without the administration of intravenous contrast. Dose modulation, iterative reconstruction, and/or weight based adjustment of the mA/kV was utilized to reduce the radiation dose to as low as reasonably achievable. COMPARISON: 02/08/2021 HISTORY: ORDERING SYSTEM PROVIDED HISTORY: AMS TECHNOLOGIST PROVIDED HISTORY: Has a \"code stroke\" or \"stroke alert\" been called? ->No Reason for exam:->[facility] Decision Support Exception - unselect if not a suspected or confirmed emergency medical condition->Emergency Medical Condition (MA) FINDINGS: BRAIN/VENTRICLES: There is no acute intracranial hemorrhage, mass effect or midline shift. No abnormal extra-axial fluid collection. The gray-white differentiation is maintained without evidence of an acute infarct. There is no evidence of hydrocephalus. The ventricles, cisterns and sulci are prominent consistent with atrophy. There is decreased attenuation within the periventricular white matter consistent with periventricular leukomalacia. ORBITS: The visualized portion of the orbits demonstrate no acute abnormality. SINUSES: The visualized paranasal sinuses and mastoid air cells demonstrate no acute abnormality. SOFT TISSUES/SKULL:  No acute abnormality of the visualized skull or soft tissues. 1. There is no acute intracranial abnormality. Specifically, there is no intracranial hemorrhage.  2. Atrophy and periventricular leukomalacia,     CT CHEST WO CONTRAST    Result Date: 9/21/2021  EXAMINATION: CT OF THE CHEST WITHOUT CONTRAST; CT OF THE ABDOMEN AND PELVIS WITHOUT CONTRAST 9/21/2021 12:23 pm TECHNIQUE: CT of the chest was performed without the administration of intravenous contrast. Multiplanar reformatted images are provided for review. Dose modulation, iterative reconstruction, and/or weight based adjustment of the mA/kV was utilized to reduce the radiation dose to as low as reasonably achievable.; CT of the abdomen and pelvis was performed without the administration of intravenous contrast. Multiplanar reformatted images are provided for review. Dose modulation, iterative reconstruction, and/or weight based adjustment of the mA/kV was utilized to reduce the radiation dose to as low as reasonably achievable. COMPARISON: None. HISTORY: ORDERING SYSTEM PROVIDED HISTORY: sepsis TECHNOLOGIST PROVIDED HISTORY: Reason for exam:->sepsis Decision Support Exception - unselect if not a suspected or confirmed emergency medical condition->Emergency Medical Condition (MA) FINDINGS: CT OF THE CHEST: MEDIASTINUM: The heart is normal in size. The main pulmonary artery is normal in caliber. The thoracic aorta is of normal caliber. Large hiatal hernia is seen. LUNGS/PLEURA: Pulmonary infiltrates are seen in the dependent of the lungs bilaterally most in the lower lobes left greater The central airway is clear. No pneumothorax or pleural effusion is seen. BONES/SOFT TISSUES: No fracture or bony destructive lesion. Prominent flowing anterior osteophytes in the cervical spine, extending to the upper thoracic spine to the level T4 indicate diffuse idiopathic skeletal hyperostosis. CT OF THE ABDOMEN AND PELVIS: ORGANS: Liver: Unremarkable. Gallbladder: Unremarkable. Pancreas: Unremarkable. Spleen:  Unremarkable. Adrenals: Unremarkable. Kidneys: Unremarkable. GI/BOWEL: No bowel wall thickening or obstruction. Short segment of prominent diverticulosis is seen in the distal descending and proximal sigmoid colon. No evidence of acute diverticulitis. Normal appendix. Gastrostomy tube in situ. PERITONEUM/EXTRA PERITONEUM: No lymphadenopathy. No free air or free fluid is seen. The abdominal aorta and pelvic arteries are unremarkable. PELVIS: The urinary bladder is decompressed by a  Curry catheter. It is therefore not well evaluated. Within this limitation, no gross abnormality is detected. Within limits of the CT technique, the uterus is unremarkable. Grossly stable 2.3  cm left ovarian fatty dermoid. Mere Emmett BONES/SOFT TISSUES: The visualized bones are intact without fracture or focal lesion. CT OF THE CHEST: 1. Pulmonale infiltrates in the dependent aspects of the lungs bilaterally, left greater than right, concerning for pneumonia, possibly aspiration-related. The appearance and distribution is not characteristic of COVID-19, although it cannot be definitively excluded. 2. Large hiatal hernia. CT OF THE ABDOMEN AND PELVIS: 1.  No acute abnormality is seen in the abdomen or the pelvis. 2. Stable 2.3 cm left ovarian dermoid cyst. 3. Short segment of distal colonic diverticulosis without diverticulitis. 4. Gastrostomy tube in situ. XR CHEST PORTABLE    Result Date: 9/21/2021  EXAMINATION: ONE XRAY VIEW OF THE CHEST 9/21/2021 11:16 am COMPARISON: None. HISTORY: ORDERING SYSTEM PROVIDED HISTORY: SOB TECHNOLOGIST PROVIDED HISTORY: Reason for exam:->SOB FINDINGS: The lungs are without acute focal process. There is no effusion or pneumothorax. The cardiomediastinal silhouette is without acute process. The osseous structures are without acute process. No acute process.        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)    Dementia with behavioral disturbance, unspecified dementia type (HCC)    Severe dehydration    Acute renal failure (ARF) (HCC)    Acute respiratory failure with hypoxia [facility])  Resolved Problems:    * No resolved hospital problems. *      Plan:  1. Septic shock  likely secondary to HAP pneumonia. -IV fluid, Levophed drip  -IV vancomycin and cefepime   - f/u urine/blood culture - CT does show bilateral LL infiltrates     2. Acute respiratory failure -- CXR clear, no pneumonia, but CT of chest does show infiltrates in bilateral lower lobes  -Oxygen support -currently on 4 L oxygen by nasal cannula, RT, wean as able     3. Acute renal failure -- has required HD X 2 in the past for ARF; baseline creatinine 0.6 mg; likely mostly pre-renal due to shock and due to excess fluid losses from illness/fever and also on fairly low amt of free fluid flushing for PEG (60 cc every 4 hours) -- severe dehydration  With hyponatremia -- 160, BMP daily, IV fluid  -Consult to nephrology/Dr. Medley.     4. Hyponatremia -- free water deficit, at 160 today  -IV fluid as ordered by nephrology, BMP daily  -Discharge on higher free water flush regimen     5. Fever -- bacterial vs viral etiology -- COVID negative,  resp viral panel -negative, pro-Braden 61     6. Elevated pro-BNP -- 1249 -- likely due to renal failure     7.  Elevated lactic acid -- 2.8, then 3.2 -- due to septic shock     8. Metabolic acidosis -- due to lactic acidosis/shock  -Daily BMP     9. Hx of MRDD, complete blindness -- lives in group home     10. Atrial tachycardia -- upon ER arrival HR was in the 140's/150's -- likely due to septic shock and dehydration, HR has improved to the 120's after IV fluid  -Telemetry        Code Status: TOTAL support -family in process of updating CODE STATUS to DNR CCA. DVT prophylaxis: Lovenox      NOTE: This report was transcribed using voice recognition software. Every effort was made to ensure accuracy; however, inadvertent computerized transcription errors may be present.   Electronically signed by Raúl Pritchett MD on 9/22/2021 at 1:05 PM

## 2021-09-22 NOTE — PROGRESS NOTES
2200 BMP reviewed with Dr Sadaf Barajas. Per Dr Sadaf Barajas, change the iv fluids to 0.9 NS at 100ml/hr, repeat BMP in 4 hrs.  Jerri Coker RN

## 2021-09-22 NOTE — PLAN OF CARE
Problem: Falls - Risk of:  Goal: Will remain free from falls  Description: Will remain free from falls  Outcome: Met This Shift  Goal: Absence of physical injury  Description: Absence of physical injury  Outcome: Met This Shift     Problem: Skin Integrity:  Goal: Will show no infection signs and symptoms  Description: Will show no infection signs and symptoms  Outcome: Met This Shift  Goal: Absence of new skin breakdown  Description: Absence of new skin breakdown  Outcome: Met This Shift     Problem: Gas Exchange - Impaired:  Goal: Levels of oxygenation will improve  Description: Levels of oxygenation will improve  Outcome: Met This Shift     Problem: Venous Thromboembolism:  Goal: Will show no signs or symptoms of venous thromboembolism  Description: Will show no signs or symptoms of venous thromboembolism  Outcome: Met This Shift     Problem: Discharge Planning:  Goal: Discharged to appropriate level of care  Description: Discharged to appropriate level of care  Outcome: Not Met This Shift     Problem: Infection, Septic Shock:  Goal: Will show no infection signs and symptoms  Description: Will show no infection signs and symptoms  Outcome: Not Met This Shift     Problem: Tissue Perfusion, Altered:  Goal: Circulatory function within specified parameters  Description: Circulatory function within specified parameters  Outcome: Not Met This Shift

## 2021-09-22 NOTE — PATIENT CARE CONFERENCE
Intensive Care Daily Quality Rounding Checklist      ICU Team Members: Dr. Isamar Harper, Dr. Iva Cardenas (resident), clinical pharmacist, pharmacy students, charge nurse, bedside nurse, respiratory therapist    ICU Day #: NUMBER: 2    Intubation Date: N/A    Ventilator Day #: N/A    Central Line Insertion Date: September 21        Day #: NUMBER: 2     Arterial Line Insertion Date: N/A      Day #: N/A    Temporary Hemodialysis Catheter Insertion Date: N/A      Day #: N/A    DVT Prophylaxis: Lovenox    GI Prophylaxis: Pepcid    Curry Catheter Insertion Date: September 21       Day #: 2      Continued need (if yes, reason documented and discussed with physician): yes, ICU, BEENA, I&O management     Skin Issues/ Wounds and ordered treatment discussed on rounds: No issues, SOS precautions    Goals/ Plans for the Day: Daily labs, wean O2 as able, wean Levophed as able, serial BMP checks per Nephrology, give 1000mL IV bolus, start tube feeds

## 2021-09-22 NOTE — CONSULTS
Critical Care Admit/Consult Note         Patient - Flakita Lara   MRN -  [id number]   [facility] # - [de-identified]   - 1955      Date of Admission -  2021 10:16 AM  Date of evaluation -  2021  [address] Day - 1            ADMIT/CONSULT DETAILS     Reason for Admit/Consult   Septic shock    Gokul Quinones MD  Primary Care Physician - Barrett Lundborg,          HPI   The patient is a 78 y.o. female with significant past medical history of hypothyroidism, MRDD, chronic kidney disease. Patient is nonverbal at baseline, answering my questions history obtained via chart review. Patient presented emergency department due to complaint of fever from her group home complaint of fever. Reported fever was 103 when she arrived to the ER. Patient was given fluid resuscitation was noted to have pneumonia on her CAT scans and was started on vasopressors due to her continued hypotension. She was on vancomycin and cefepime in the ER as well. Patient also still noted to have an acute kidney injury nephrology was also consulted. .         Past Medical History         Diagnosis Date    Acute kidney injury ([address] 01/15/2017    d/t Vancomycin, on Dialysis [facility] right chest    Blind in both eyes     Essential hypertension 2021    Gastroesophageal reflux disease without esophagitis 2021    Hemodialysis patient ([address]     Hyperthyroidism     MR (mental retardation)     Osteoarthritis     Peripheral vascular disease ([address]     Pneumonia 2017    Pneumonia due to infectious organism 2017    Sepsis ([address] 3/4/2021        Past Surgical History           Procedure Laterality Date    BRONCHOSCOPY  02/10/2017    CHEST TUBE INSERTION Right 2017    GASTROSTOMY TUBE PLACEMENT N/A 3/7/2021    EGD PEG TUBE PLACEMENT performed by Ezekiel Wilson MD at [facility] [address] Right 2017    tessio insertion     OTHER SURGICAL nightly  PARoxetine (PAXIL) 10 MG tablet, 10 mg by Per G Tube route 2 times daily  vitamin B-1 (THIAMINE) 100 MG tablet, 100 mg by Per G Tube route daily  Cholecalciferol (VITAMIN D3) 125 MCG (5000 UT) TABS, 5,000 Units by Per G Tube route daily  acetaminophen (TYLENOL) 325 MG tablet, 650 mg by Per G Tube route every 4 hours as needed for Pain or Fever  bismuth subsalicylate (PEPTO BISMOL) 262 MG/15ML suspension, 30 mLs by Per G Tube route every 6 hours as needed for Indigestion, Heartburn, Diarrhea, Nausea or Other (GAS)  medicated lip balm (BLISTEX/CARMEX) 2-2.5-6.6 % STCK, Apply topically as needed for Dry Lips (CRACKED LIPS)  bisacodyl (DULCOLAX) 10 MG suppository, Place 10 mg rectally daily as needed for Constipation (NO BM FOR 3 DAYS)  SUNSCREENS EX, Apply topically as needed (SUNBURN PREVENTION) Indications: *SPF 50*  Dextromethorphan-guaiFENesin  MG/5ML SYRP, 10 mLs by Per G Tube route every 4 hours as needed for Cough (CONGESTION)  Neomycin-Bacitracin-Polymyxin (TRIPLE ANTIBIOTIC) OINT, Apply topically 2 times daily as needed (CUTS/SCRAPES/SKIN ABRASIONS)  bumetanide (BUMEX) 1 MG tablet, 1 mg by Per G Tube route as needed (SWELLING)  OLANZapine zydis (ZYPREXA ZYDIS) 5 MG disintegrating tablet, Take 20 mg by mouth every morning Indications: VIA G-TUBE *TAKE ALONG WITH 15MG=20MG* *SEE OTHER ORDER*  OLANZapine zydis (ZYPREXA ZYDIS) 15 MG disintegrating tablet, 20 mg every morning Indications: VIA G-TUBE *TAKE ALONG WITH 5MG=20MG* *SEE OTHER ORDER*  OLANZapine (ZYPREXA) 20 MG tablet, 20 mg by Per G Tube route nightly  LORazepam (ATIVAN) 2 MG tablet, 2 mg by Per G Tube route as needed (2HRS BEFORE DENTAL APPT, TAKE 2ND DOSE TO DENTAL APPT).    docusate (COLACE) 50 MG/5ML liquid, 10 mLs by Per G Tube route 2 times daily  valproic acid (DEPACON) 250 MG/5ML SOLN oral solution, 20 mLs by Per G Tube route 2 times daily  polyethylene glycol (GLYCOLAX) 17 g packet, 17 g by Per G Tube route daily    Diet/Nutrition Diet NPO  ADULT TUBE FEEDING; PEG; Standard with Fiber; Continuous; 10; Yes; 10; Q 4 hours; 10; 40; Q 1 hour    Allergies   Patient has no known allergies. Social History   Tobacco   reports that she has never smoked. She has never used smokeless tobacco.    Alcohol     reports no history of alcohol use. Occupational history :    Family History   History reviewed. No pertinent family history. Sleep History   n/a    ROS     REVIEW OF SYSTEMS:  Review of systems not obtained due to patient factors - mental status    Lines and Devices   2 peripheral IVs  Triple-lumen catheter right femoral    Mechanical Ventilation Data   VENT SETTINGS (Comprehensive)  Vent Information  SpO2: 97 %  Additional Respiratory  Assessments  Pulse: 81  Resp: 16  SpO2: 97 %  Oral Care: Mouth swabbed, Lip moisturizer applied    ABG  Lab Results   Component Value Date    PH 7.340 2021    PCO2 45.0 2021    PO2 78.9 2021    HCO3 23.7 2021    O2SAT 95.8 2021     Lab Results   Component Value Date    MODE NRB 15L 2021           Vitals    height is 5' 3\" (1.6 m) and weight is 140 lb 14 oz (63.9 kg). Her bladder temperature is 97.3 °F (36.3 °C). Her blood pressure is 87/49 (abnormal) and her pulse is 81. Her respiration is 16 and oxygen saturation is 97%.        Temperature Range: Temp: 97.3 °F (36.3 °C) Temp  Av °F (37.8 °C)  Min: 97.3 °F (36.3 °C)  Max: 103 °F (39.4 °C)  BP Range:  Systolic (89IDD), IIX:106 , Min:68 , HML:997     Diastolic (22GCP), HUW:24, Min:40, Max:64    Pulse Range: Pulse  Av.7  Min: 79  Max: 130  Respiration Range: Resp  Av.4  Min: 15  Max: 36  Current Pulse Ox[de-identified]  SpO2: 97 %  24HR Pulse Ox Range:  SpO2  Av.3 %  Min: 91 %  Max: 99 %  Oxygen Amount and Delivery: O2 Flow Rate (L/min): 6 L/min      I/O (24 Hours)    Patient Vitals for the past 8 hrs:   BP Temp Temp src Pulse Resp SpO2   21 1400 (!) 87/49 -- -- 81 16 97 %   21 1300 (!) 96/52 -- -- 83 17 97 %   09/22/21 1200 (!) 100/55 97.3 °F (36.3 °C) Bladder 86 18 99 %   09/22/21 1158 -- 97.3 °F (36.3 °C) -- -- -- --   09/22/21 1100 122/64 -- -- 86 19 98 %   09/22/21 1000 (!) 100/54 -- -- 95 15 96 %   09/22/21 0952 -- -- -- 96 17 98 %   09/22/21 0900 (!) 114/54 -- -- 79 27 98 %       Intake/Output Summary (Last 24 hours) at 9/22/2021 1440  Last data filed at 9/22/2021 1400  Gross per 24 hour   Intake 4943 ml   Output 1140 ml   Net 3803 ml     I/O last 3 completed shifts: In: 6106 [I.V.:2723]  Out: 535 [Urine:535]   Date 09/22/21 0000 - 09/22/21 2359   Shift 9147-3438 2523-2943 0883-3485 24 Hour Total   INTAKE   I.V.(mL/kg) 979(15.3) 2220(34.7)  3199(50.1)   Shift Total(mL/kg) 668(78.6) 2220(34.7)  3199(50.1)   OUTPUT   Urine(mL/kg/hr) 430(0.8) 605  1035   Shift Total(mL/kg) 430(6.7) 605(9.5)  1035(16.2)   Weight (kg) 63.9 63.9 63.9 63.9     Patient Vitals for the past 96 hrs (Last 3 readings):   Weight   09/22/21 0600 140 lb 14 oz (63.9 kg)   09/21/21 1913 134 lb 4.2 oz (60.9 kg)   09/21/21 1120 115 lb 11.2 oz (52.5 kg)         Drains/Tubes Outputs  PEG tube  Curry catheter  Exam         PHYSICAL EXAM:  CONSTITUTIONAL: Ill-appearing, nonverbal  EYES: Pupils equal and reactive  LUNGS: Coarse breath sounds bilaterally, crackles noted  CARDIOVASCULAR:  Normal apical impulse, regular rate and rhythm, normal S1 and S2, no S3 or S4, and no murmur noted  ABDOMEN:  No scars, normal bowel sounds, soft, non-distended, non-tender, no masses palpated, no hepatosplenomegally, PEG tube noted, abdominal binder noted  MUSCULOSKELETAL:  There is no redness, warmth, or swelling of the joints.     NEUROLOGIC: Patient nonverbal, does not follow commands  SKIN:  no bruising or bleeding and normal skin color, texture, turgor    Data   Old records and images have been reviewed    Lab Results   CBC     Lab Results   Component Value Date    WBC 19.3 09/22/2021    RBC 2.99 09/22/2021    HGB 9.8 09/22/2021    HCT 34.1 09/22/2021     09/22/2021    .0 09/22/2021    MCH 32.8 09/22/2021    MCHC 28.7 09/22/2021    RDW 15.3 09/22/2021    NRBC 0.0 09/22/2021    SEGSPCT 58 06/26/2011    METASPCT 7.8 09/22/2021    LYMPHOPCT 11.3 09/22/2021    PROMYELOPCT 0.9 03/07/2021    MONOPCT 3.5 09/22/2021    MYELOPCT 0.9 03/15/2021    BASOPCT 0.9 09/22/2021    MONOSABS 0.77 09/22/2021    LYMPHSABS 2.12 09/22/2021    EOSABS 0.00 09/22/2021    BASOSABS 0.17 09/22/2021       BMP   Lab Results   Component Value Date     09/22/2021    K 3.8 09/22/2021    K 4.2 09/21/2021     09/22/2021    CO2 17 09/22/2021    BUN 41 09/22/2021    CREATININE 1.3 09/22/2021    GLUCOSE 197 09/22/2021    GLUCOSE 97 12/30/2011    CALCIUM 7.4 09/22/2021       LFTS  Lab Results   Component Value Date    ALKPHOS 56 09/22/2021    ALT 15 09/22/2021    AST 28 09/22/2021    PROT 6.3 09/22/2021    BILITOT 0.3 09/22/2021    LABALBU 2.2 09/22/2021    LABALBU 3.7 12/30/2011       INR  No results for input(s): PROTIME, INR in the last 72 hours. APTT  No results for input(s): APTT in the last 72 hours. Lactic Acid  Lab Results   Component Value Date    LACTA 3.8 09/22/2021    LACTA 2.3 09/22/2021    LACTA 4.4 09/22/2021        BNP   No results for input(s): BNP in the last 72 hours. Cultures     Recent Labs     09/21/21  1052   BC 24 Hours no growth     Recent Labs     09/21/21  1052   BLOODCULT2 24 Hours no growth       Recent Labs     09/21/21  1059   LABURIN <10,000 CFU/mL  Gram positive organism               Radiology     CT Scans  Impression   CT OF THE CHEST:       1. Pulmonale infiltrates in the dependent aspects of the lungs bilaterally,   left greater than right, concerning for pneumonia, possibly   aspiration-related.  The appearance and distribution is not characteristic of   COVID-19, although it cannot be definitively excluded. 2. Large hiatal hernia. CT OF THE ABDOMEN AND PELVIS:       1.  No acute abnormality is seen in the abdomen or the pelvis.    2. Stable 2.3 cm left ovarian dermoid cyst.   3. Short segment of distal colonic diverticulosis without diverticulitis. 4. Gastrostomy tube in situ. SYSTEMS ASSESSMENT    Neuro   History of MR, nonverbal  Paxil  Zyprexa  Valproic acid    Respiratory   Bilateral Pneumonia-cefepime and Vanco  3% normal saline nebulizer with albuterol  On 3 L nasal cannula-wean as able  Follow cultures  Legionella urine  Chest vest  Cardiovascular   Septic shock requiring Levophed-wean as able MAP greater than 70  Midodrine 5 mg 3 times daily    Gastrointestinal   Colace, glycocalyx  GI prophylaxis Pepcid 20 mg  Okay to start tube feeds  Has peg tube    Renal   Acute kidney injury  Renal function improving  Nephrology following  Continue monitor     Infectious Disease   Sepsis with septic shock requiring Levophed  Pneumonia-cefepime  Follow cultures  Continue monitor      Hematology/Oncology   Lovenox 30 mg daily  Anemia  Place less than 7  Continue monitor    Endocrine   History of hypothyroidism Synthroid 150 MCG  Permissive hyperglycemia  Continue monitor    Social/Spiritual/DNR/Other   Full code    The patient was seen and evaluated by myself and Krupa Kaiser MD PGY-2  9/22/2021 2:40 PM    I personally saw, examined and provided care for the patient. Radiographs, labs and medication list were reviewed by me independently. I spoke with bedside nursing, therapists and consultants. Critical care services and times documented are independent of procedures and multidisciplinary rounds with Residents. Additionally comprehensive, multidisciplinary rounds were conducted with the MICU team. The case was discussed in detail and plans for care were established. Review of Residents documentation was conducted and revisions were made as appropriate. I agree with the above documented exam, problem list and plan of care.   Edd Bello MD   CCT excluding procedures:45'

## 2021-09-22 NOTE — FLOWSHEET NOTE
Updated [facility], guardian. Updated on patients status, will fax over [facility] paperwork.

## 2021-09-23 LAB
ALBUMIN SERPL-MCNC: 1.9 G/DL (ref 3.5–5.2)
ALP BLD-CCNC: 61 U/L (ref 35–104)
ALT SERPL-CCNC: 13 U/L (ref 0–32)
ANION GAP SERPL CALCULATED.3IONS-SCNC: 7 MMOL/L (ref 7–16)
ANION GAP SERPL CALCULATED.3IONS-SCNC: 7 MMOL/L (ref 7–16)
ANION GAP SERPL CALCULATED.3IONS-SCNC: 8 MMOL/L (ref 7–16)
ANISOCYTOSIS: ABNORMAL
AST SERPL-CCNC: 22 U/L (ref 0–31)
BASOPHILS ABSOLUTE: 0 E9/L (ref 0–0.2)
BASOPHILS RELATIVE PERCENT: 0 % (ref 0–2)
BILIRUB SERPL-MCNC: 0.3 MG/DL (ref 0–1.2)
BUN BLDV-MCNC: 27 MG/DL (ref 6–23)
BUN BLDV-MCNC: 30 MG/DL (ref 6–23)
BUN BLDV-MCNC: 35 MG/DL (ref 6–23)
BURR CELLS: ABNORMAL
CALCIUM SERPL-MCNC: 7 MG/DL (ref 8.6–10.2)
CALCIUM SERPL-MCNC: 7 MG/DL (ref 8.6–10.2)
CALCIUM SERPL-MCNC: 7.1 MG/DL (ref 8.6–10.2)
CHLORIDE BLD-SCNC: 115 MMOL/L (ref 98–107)
CHLORIDE BLD-SCNC: 117 MMOL/L (ref 98–107)
CHLORIDE BLD-SCNC: 118 MMOL/L (ref 98–107)
CO2: 21 MMOL/L (ref 22–29)
CO2: 22 MMOL/L (ref 22–29)
CO2: 23 MMOL/L (ref 22–29)
CREAT SERPL-MCNC: 0.8 MG/DL (ref 0.5–1)
CREAT SERPL-MCNC: 0.9 MG/DL (ref 0.5–1)
CREAT SERPL-MCNC: 1 MG/DL (ref 0.5–1)
EOSINOPHILS ABSOLUTE: 0.25 E9/L (ref 0.05–0.5)
EOSINOPHILS RELATIVE PERCENT: 1.7 % (ref 0–6)
GFR AFRICAN AMERICAN: >60
GFR NON-AFRICAN AMERICAN: 55 ML/MIN/1.73
GFR NON-AFRICAN AMERICAN: >60 ML/MIN/1.73
GFR NON-AFRICAN AMERICAN: >60 ML/MIN/1.73
GLUCOSE BLD-MCNC: 104 MG/DL (ref 74–99)
GLUCOSE BLD-MCNC: 118 MG/DL (ref 74–99)
GLUCOSE BLD-MCNC: 91 MG/DL (ref 74–99)
HCT VFR BLD CALC: 30.4 % (ref 34–48)
HEMOGLOBIN: 9 G/DL (ref 11.5–15.5)
IRON SATURATION: 36 % (ref 15–50)
IRON: 42 MCG/DL (ref 37–145)
LACTIC ACID: 1.4 MMOL/L (ref 0.5–2.2)
LACTIC ACID: 1.8 MMOL/L (ref 0.5–2.2)
LYMPHOCYTES ABSOLUTE: 2.24 E9/L (ref 1.5–4)
LYMPHOCYTES RELATIVE PERCENT: 14.8 % (ref 20–42)
MAGNESIUM: 1.9 MG/DL (ref 1.6–2.6)
MCH RBC QN AUTO: 32.5 PG (ref 26–35)
MCHC RBC AUTO-ENTMCNC: 29.6 % (ref 32–34.5)
MCV RBC AUTO: 109.7 FL (ref 80–99.9)
METAMYELOCYTES RELATIVE PERCENT: 6.1 % (ref 0–1)
MONOCYTES ABSOLUTE: 0.6 E9/L (ref 0.1–0.95)
MONOCYTES RELATIVE PERCENT: 4.4 % (ref 2–12)
MRSA CULTURE ONLY: NORMAL
NEUTROPHILS ABSOLUTE: 11.77 E9/L (ref 1.8–7.3)
NEUTROPHILS RELATIVE PERCENT: 73 % (ref 43–80)
NUCLEATED RED BLOOD CELLS: 1.7 /100 WBC
PDW BLD-RTO: 15 FL (ref 11.5–15)
PHOSPHORUS: 1.9 MG/DL (ref 2.5–4.5)
PLATELET # BLD: 87 E9/L (ref 130–450)
PLATELET CONFIRMATION: NORMAL
PMV BLD AUTO: 13.4 FL (ref 7–12)
POIKILOCYTES: ABNORMAL
POLYCHROMASIA: ABNORMAL
POTASSIUM SERPL-SCNC: 3.4 MMOL/L (ref 3.5–5)
POTASSIUM SERPL-SCNC: 3.4 MMOL/L (ref 3.5–5)
POTASSIUM SERPL-SCNC: 3.8 MMOL/L (ref 3.5–5)
PROCALCITONIN: 42.14 NG/ML (ref 0–0.08)
RBC # BLD: 2.77 E12/L (ref 3.5–5.5)
SODIUM BLD-SCNC: 145 MMOL/L (ref 132–146)
SODIUM BLD-SCNC: 146 MMOL/L (ref 132–146)
SODIUM BLD-SCNC: 147 MMOL/L (ref 132–146)
TOTAL IRON BINDING CAPACITY: 117 MCG/DL (ref 250–450)
TOTAL PROTEIN: 4.8 G/DL (ref 6.4–8.3)
URINE CULTURE, ROUTINE: NORMAL
VACUOLATED NEUTROPHILS: ABNORMAL
VANCOMYCIN RANDOM: 11.5 MCG/ML (ref 5–40)
WBC # BLD: 14.9 E9/L (ref 4.5–11.5)

## 2021-09-23 PROCEDURE — 83605 ASSAY OF LACTIC ACID: CPT

## 2021-09-23 PROCEDURE — 83735 ASSAY OF MAGNESIUM: CPT

## 2021-09-23 PROCEDURE — 85025 COMPLETE CBC W/AUTO DIFF WBC: CPT

## 2021-09-23 PROCEDURE — 2500000003 HC RX 250 WO HCPCS: Performed by: INTERNAL MEDICINE

## 2021-09-23 PROCEDURE — 80053 COMPREHEN METABOLIC PANEL: CPT

## 2021-09-23 PROCEDURE — 2060000000 HC ICU INTERMEDIATE R&B

## 2021-09-23 PROCEDURE — 6370000000 HC RX 637 (ALT 250 FOR IP): Performed by: INTERNAL MEDICINE

## 2021-09-23 PROCEDURE — 99233 SBSQ HOSP IP/OBS HIGH 50: CPT | Performed by: INTERNAL MEDICINE

## 2021-09-23 PROCEDURE — 2580000003 HC RX 258: Performed by: INTERNAL MEDICINE

## 2021-09-23 PROCEDURE — 6360000002 HC RX W HCPCS: Performed by: INTERNAL MEDICINE

## 2021-09-23 PROCEDURE — 6370000000 HC RX 637 (ALT 250 FOR IP): Performed by: STUDENT IN AN ORGANIZED HEALTH CARE EDUCATION/TRAINING PROGRAM

## 2021-09-23 PROCEDURE — 2580000003 HC RX 258: Performed by: FAMILY MEDICINE

## 2021-09-23 PROCEDURE — 83550 IRON BINDING TEST: CPT

## 2021-09-23 PROCEDURE — 80202 ASSAY OF VANCOMYCIN: CPT

## 2021-09-23 PROCEDURE — 6360000002 HC RX W HCPCS: Performed by: FAMILY MEDICINE

## 2021-09-23 PROCEDURE — 6370000000 HC RX 637 (ALT 250 FOR IP): Performed by: FAMILY MEDICINE

## 2021-09-23 PROCEDURE — 2700000000 HC OXYGEN THERAPY PER DAY

## 2021-09-23 PROCEDURE — 83540 ASSAY OF IRON: CPT

## 2021-09-23 PROCEDURE — 84100 ASSAY OF PHOSPHORUS: CPT

## 2021-09-23 PROCEDURE — 80048 BASIC METABOLIC PNL TOTAL CA: CPT

## 2021-09-23 PROCEDURE — 94640 AIRWAY INHALATION TREATMENT: CPT

## 2021-09-23 PROCEDURE — 84145 PROCALCITONIN (PCT): CPT

## 2021-09-23 PROCEDURE — 94668 MNPJ CHEST WALL SBSQ: CPT

## 2021-09-23 PROCEDURE — 36415 COLL VENOUS BLD VENIPUNCTURE: CPT

## 2021-09-23 PROCEDURE — 36592 COLLECT BLOOD FROM PICC: CPT

## 2021-09-23 RX ORDER — VALPROIC ACID 250 MG/5ML
500 SOLUTION ORAL 2 TIMES DAILY
Status: DISCONTINUED | OUTPATIENT
Start: 2021-09-23 | End: 2021-09-30 | Stop reason: HOSPADM

## 2021-09-23 RX ORDER — MIDODRINE HYDROCHLORIDE 5 MG/1
10 TABLET ORAL
Status: DISCONTINUED | OUTPATIENT
Start: 2021-09-23 | End: 2021-09-26

## 2021-09-23 RX ADMIN — POTASSIUM BICARBONATE 20 MEQ: 782 TABLET, EFFERVESCENT ORAL at 02:17

## 2021-09-23 RX ADMIN — FAMOTIDINE 20 MG: 20 TABLET, FILM COATED ORAL at 08:29

## 2021-09-23 RX ADMIN — Medication 100 MG: at 08:30

## 2021-09-23 RX ADMIN — LEVOTHYROXINE SODIUM 150 MCG: 75 TABLET ORAL at 05:43

## 2021-09-23 RX ADMIN — Medication 5000 UNITS: at 08:29

## 2021-09-23 RX ADMIN — OLANZAPINE 20 MG: 10 TABLET, ORALLY DISINTEGRATING ORAL at 08:30

## 2021-09-23 RX ADMIN — VALPROIC ACID 500 MG: 250 SOLUTION ORAL at 21:52

## 2021-09-23 RX ADMIN — POLYETHYLENE GLYCOL 3350 17 G: 17 POWDER, FOR SOLUTION ORAL at 08:31

## 2021-09-23 RX ADMIN — DOCUSATE SODIUM 100 MG: 50 LIQUID ORAL at 08:31

## 2021-09-23 RX ADMIN — SODIUM CHLORIDE SOLN NEBU 3% 4 ML: 3 NEBU SOLN at 13:20

## 2021-09-23 RX ADMIN — OLANZAPINE 20 MG: 10 TABLET, FILM COATED ORAL at 08:31

## 2021-09-23 RX ADMIN — MONTELUKAST SODIUM 10 MG: 10 TABLET, FILM COATED ORAL at 21:52

## 2021-09-23 RX ADMIN — PAROXETINE 10 MG: 10 TABLET, FILM COATED ORAL at 21:52

## 2021-09-23 RX ADMIN — SODIUM CHLORIDE SOLN NEBU 3% 4 ML: 3 NEBU SOLN at 08:21

## 2021-09-23 RX ADMIN — ALBUTEROL SULFATE 2.5 MG: 2.5 SOLUTION RESPIRATORY (INHALATION) at 13:20

## 2021-09-23 RX ADMIN — DOCUSATE SODIUM 100 MG: 50 LIQUID ORAL at 21:51

## 2021-09-23 RX ADMIN — MIDODRINE HYDROCHLORIDE 10 MG: 5 TABLET ORAL at 17:12

## 2021-09-23 RX ADMIN — ENOXAPARIN SODIUM 30 MG: 30 INJECTION, SOLUTION INTRAVENOUS; SUBCUTANEOUS at 08:29

## 2021-09-23 RX ADMIN — PAROXETINE 10 MG: 10 TABLET, FILM COATED ORAL at 08:30

## 2021-09-23 RX ADMIN — SODIUM BICARBONATE: 84 INJECTION, SOLUTION INTRAVENOUS at 05:45

## 2021-09-23 RX ADMIN — Medication 10 ML: at 08:28

## 2021-09-23 RX ADMIN — MIDODRINE HYDROCHLORIDE 5 MG: 5 TABLET ORAL at 11:57

## 2021-09-23 RX ADMIN — MIDODRINE HYDROCHLORIDE 5 MG: 5 TABLET ORAL at 08:30

## 2021-09-23 RX ADMIN — OLANZAPINE 20 MG: 10 TABLET, FILM COATED ORAL at 21:52

## 2021-09-23 RX ADMIN — VALPROIC ACID 1000 MG: 250 SOLUTION ORAL at 08:31

## 2021-09-23 RX ADMIN — SODIUM CHLORIDE: 9 INJECTION, SOLUTION INTRAVENOUS at 16:23

## 2021-09-23 RX ADMIN — CEFEPIME HYDROCHLORIDE 2000 MG: 2 INJECTION, POWDER, FOR SOLUTION INTRAVENOUS at 10:50

## 2021-09-23 RX ADMIN — ALBUTEROL SULFATE 2.5 MG: 2.5 SOLUTION RESPIRATORY (INHALATION) at 08:21

## 2021-09-23 RX ADMIN — POTASSIUM PHOSPHATE, MONOBASIC AND POTASSIUM PHOSPHATE, DIBASIC 20 MMOL: 224; 236 INJECTION, SOLUTION, CONCENTRATE INTRAVENOUS at 10:51

## 2021-09-23 ASSESSMENT — PAIN SCALES - GENERAL
PAINLEVEL_OUTOF10: 0
PAINLEVEL_OUTOF10: 0

## 2021-09-23 NOTE — PROGRESS NOTES
Critical Care Team - Daily Progress Note      Date and time: 2021 1:42 PM  Patient's name:  Gibson Gann Record Number: [id number]  Patient's account/billing number: [de-identified]  Patient's YOB: 1955  Age: 78 y.o. Date of Admission: 2021 10:16 AM  Length of stay during current admission: 2      Primary Care Physician: Radha Power DO  ICU Attending Physician: Dr. Caesar Ng Status: Full Code    Reason for ICU admission: sepsis with septic shock      SUBJECTIVE:     OVERNIGHT EVENTS:        : Patient overnight remained critically ill, will increase her midodrine today, she has been able to get weaned off her Levophed. OBJECTIVE:     VITAL SIGNS:  BP (!) 97/52   Pulse 79   Temp 98 °F (36.7 °C) (Rectal)   Resp 20   Ht 5' 3\" (1.6 m)   Wt 143 lb 11.8 oz (65.2 kg)   SpO2 95%   BMI 25.46 kg/m²   Tmax over 24 hours:  Temp (24hrs), Av.3 °F (36.3 °C), Min:97 °F (36.1 °C), Max:98 °F (36.7 °C)      Patient Vitals for the past 6 hrs:   BP Temp Temp src Pulse Resp SpO2   21 1323 -- -- -- -- 20 --   21 1322 (!) 97/52 -- -- -- 20 --   21 1225 (!) 96/51 98 °F (36.7 °C) Rectal 79 14 95 %   21 1200 -- -- -- 83 14 96 %   21 1100 -- -- -- 81 14 96 %   21 0947 (!) 81/54 -- -- -- 18 98 %   21 0855 -- -- -- -- 18 --   21 0825 -- -- -- -- 20 --   21 0807 (!) 97/55 97.3 °F (36.3 °C) Rectal 82 18 97 %   21 0806 -- -- Rectal -- -- --         Intake/Output Summary (Last 24 hours) at 2021 1342  Last data filed at 2021 0858  Gross per 24 hour   Intake 3348 ml   Output 995 ml   Net 2353 ml     Wt Readings from Last 2 Encounters:   21 143 lb 11.8 oz (65.2 kg)   21 150 lb (68 kg)     Body mass index is 25.46 kg/m².         PHYSICAL EXAM:  CONSTITUTIONAL: Ill-appearing, nonverbal  EYES: Pupils equal and reactive  LUNGS: Coarse breath sounds bilaterally, improved from yesterday  CARDIOVASCULAR:  Normal apical impulse, regular rate and rhythm, normal S1 and S2, no S3 or S4, and no murmur noted  ABDOMEN:  No scars, normal bowel sounds, soft, non-distended, non-tender, no masses palpated, no hepatosplenomegally, PEG tube noted, abdominal binder noted  MUSCULOSKELETAL:  There is no redness, warmth, or swelling of the joints.     NEUROLOGIC: Patient nonverbal, does not follow commands  SKIN:  no bruising or bleeding and normal skin color, texture, turgor  Any additional physical findings:    MEDICATIONS:    Scheduled Meds:   potassium phosphate IVPB  20 mmol IntraVENous Once    valproic acid  500 mg Per G Tube BID    sodium chloride (Inhalant)  4 mL Nebulization Q6H    albuterol  2.5 mg Nebulization Q6H    famotidine  20 mg Per G Tube Daily    midodrine  5 mg Oral TID WC    thiamine  100 mg Per G Tube Daily    polyethylene glycol  17 g Per G Tube Daily    PARoxetine  10 mg Per G Tube BID    OLANZapine zydis  20 mg Oral QAM    OLANZapine  20 mg Per G Tube Nightly    Vitamin D  5,000 Units Per G Tube Daily    docusate  100 mg Per G Tube BID    levothyroxine  150 mcg Per G Tube Daily    [Held by provider] metoprolol tartrate  75 mg Per G Tube BID    montelukast  10 mg Per G Tube Nightly    sodium chloride flush  5-40 mL IntraVENous 2 times per day    enoxaparin  30 mg SubCUTAneous Daily    cefepime  2,000 mg IntraVENous Q24H     Continuous Infusions:   sodium bicarbonate infusion 100 mL/hr at 09/23/21 0545    sodium chloride      sodium chloride       PRN Meds:   bisacodyl, 10 mg, Daily PRN  medicated lip balm, , PRN  sodium chloride flush, 5-40 mL, PRN  sodium chloride, 25 mL, PRN  ondansetron, 4 mg, Q8H PRN   Or  ondansetron, 4 mg, Q6H PRN  acetaminophen, 650 mg, Q6H PRN   Or  acetaminophen, 650 mg, Q6H PRN          VENT SETTINGS (Comprehensive) (if applicable):  Vent Information  SpO2: 95 %  Additional Respiratory  Assessments  Pulse: 79  Resp: 20  SpO2: 95 %  Oral Care: Mouth swabbed, Lip moisturizer applied, Mouth suctioned, Mouth moisturizer    ABGs:     Laboratory findings:    Complete Blood Count:   Recent Labs     09/21/21  1057 09/22/21  0555 09/23/21  0555   WBC 6.6 19.3* 14.9*   HGB 12.3 9.8* 9.0*   HCT 41.3 34.1 30.4*    117* 87*        Last 3 Blood Glucose:   Recent Labs     09/23/21  0010 09/23/21  0555 09/23/21  1032   GLUCOSE 118* 91 104*        PT/INR:    Lab Results   Component Value Date    PROTIME 12.4 03/08/2021    INR 1.1 03/08/2021     PTT:    Lab Results   Component Value Date    APTT 25.7 03/08/2021       Comprehensive Metabolic Profile:   Recent Labs     09/22/21  0555 09/23/21  0010 09/23/21  0555 09/23/21  1032   NA  --  147* 146 145   K  --  3.4* 3.8 3.4*   CL  --  118* 117* 115*   CO2  --  21* 22 23   BUN  --  35* 30* 27*   CREATININE  --  1.0 0.9 0.8   GLUCOSE  --  118* 91 104*   CALCIUM  --  7.1* 7.0* 7.0*   PROT   < >  --  4.8*  --    LABALBU   < >  --  1.9*  --    BILITOT   < >  --  0.3  --    ALKPHOS   < >  --  61  --    AST   < >  --  22  --    ALT   < >  --  13  --     < > = values in this interval not displayed. Magnesium:   Lab Results   Component Value Date    MG 1.9 09/23/2021     Phosphorus:   Lab Results   Component Value Date    PHOS 1.9 09/23/2021     Ionized Calcium:   Lab Results   Component Value Date    CAION 1.25 03/07/2021        Urinalysis:     Troponin: No results for input(s): TROPONINI in the last 72 hours.      SYSTEMS ASSESSMENT    Neuro   History of MR, nonverbal  Paxil  Zyprexa  Valproic acid    Respiratory   Bilateral Pneumonia-cefepime and Vanco  3% normal saline nebulizer with albuterol  On 3 L nasal cannula-wean as able  Follow cultures  Legionella urine  Chest vest  Cardiovascular   Sepsis with septic shock-resolved  Midodrine 10 mg 3 times daily    Gastrointestinal   Colace, glycocalyx  GI prophylaxis Pepcid 20 mg  Okay to start tube feeds  Has peg tube    Renal   Acute kidney injury  Renal function improving  Nephrology following  Continue

## 2021-09-23 NOTE — CARE COORDINATION
9/23/2021  Social Work Discharge Planning:Pt is from the [address] [place] [address]. Pt is MRDD, blind, nonverbal and has a PEG tube. Pt is currently on 3l o2 here. IV ATB. SW left voicemail with guardian Dulce Chelsie  X 2. Guardian will need to be notified when Pt discharges. SW called the [facility] home and left a voicemail for a return [phone]  [phone].  Electronically signed by AILYN Shi on 9/23/2021 at 12:13 PM

## 2021-09-23 NOTE — PATIENT CARE CONFERENCE
Intensive Care Daily Quality Rounding Checklist      ICU Team Members: Dr. Rene Linda, clinical pharmacist, pharmacy students, bedside RN, TL    ICU Day #: 3    Intubation Date: N/A    Ventilator Day #: N/A    Central Line Insertion Date: Sept. 21        Day #: 3     Arterial Line Insertion Date: N/A      Day #: NA    Temporary Hemodialysis Catheter Insertion Date:       Day # :N/A    DVT Prophylaxis: lovenox    GI Prophylaxis: pepcid    Curry Catheter Insertion Date:  Sept 21       Day #: 3      Continued need (if yes, reason documented and discussed with physician):     Skin Issues/ Wounds and ordered treatment discussed on rounds:    Goals/ Plans for the Day: insert PIVs, remove Fem TLC once PIVs in place, if unable to place, keep TLC, increase TF to goal, check with renal regarding IVFs, transfer IM

## 2021-09-23 NOTE — PROGRESS NOTES
Nephrology Progress Note  Patient's Name: Ruthie Hendrix  2:49 PM  9/23/2021    Nephrologist: Antonio Ariza    Reason for Consult:  BEENA    History of Present Ilness from the 9/21/21 note:    Ruthie Hendrix is a 78 y.o. female with prior history of BEENA in March of 2021 and in 2017 requiring hemodialysis. Creatinine at D/C 0.6mg/dl. Pt has a Hx MR and being blind and has a PEG tube in place and lives in a group home. Pt developed a fever to 102 wih tachycardia and hypotension and hypoxic at the home today and in the ED T 103.1 and BP down to 78/43 wih . Per the care giver with the pt she was doing OK yesterday. She currently has a PEG in place and receives 4 cans of per day of TF and what sounds by description as four 60ml syringes of water    9/23/21: Pt somnolent seen in MICU-off pressor at the time of my visit    Past Medical History:   Diagnosis Date    Acute kidney injury ([address] 01/15/2017    d/t Vancomycin, on Dialysis [facility] right chest    Blind in both eyes     Essential hypertension 4/2/2021    Gastroesophageal reflux disease without esophagitis 4/2/2021    Hemodialysis patient ([address]     Hyperthyroidism     MR (mental retardation)     Osteoarthritis     Peripheral vascular disease ([address]     Pneumonia 01/06/2017    Pneumonia due to infectious organism 1/8/2017    Sepsis ([address] 3/4/2021       Past Surgical History:   Procedure Laterality Date    BRONCHOSCOPY  02/10/2017    CHEST TUBE INSERTION Right 02/09/2017    GASTROSTOMY TUBE PLACEMENT N/A 3/7/2021    EGD PEG TUBE PLACEMENT performed by Clement Roth MD at [facility] [address]90 Right 01/17/2017    tessio insertion     OTHER SURGICAL HISTORY  01/25/2017    PEG tube insertion       History reviewed. No pertinent family history. reports that she has never smoked. She has never used smokeless tobacco. She reports that she does not drink alcohol and does not use drugs.     Allergies:  Patient has no known allergies. Current Medications:    potassium phosphate 20 mmol in dextrose 5 % 250 mL IVPB, Once  valproic acid (DEPAKENE) 250 MG/5ML oral solution 500 mg, BID  midodrine (PROAMATINE) tablet 10 mg, TID WC  sodium bicarbonate 50 mEq in sodium chloride 0.45 % 1,000 mL infusion, Continuous  sodium chloride (Inhalant) 3 % nebulizer solution 4 mL, Q6H  albuterol (PROVENTIL) nebulizer solution 2.5 mg, Q6H  famotidine (PEPCID) tablet 20 mg, Daily  thiamine tablet 100 mg, Daily  polyethylene glycol (GLYCOLAX) packet 17 g, Daily  PARoxetine (PAXIL) tablet 10 mg, BID  OLANZapine zydis (ZYPREXA) disintegrating tablet 20 mg, QAM  OLANZapine (ZYPREXA) tablet 20 mg, Nightly  bisacodyl (DULCOLAX) suppository 10 mg, Daily PRN  Vitamin D (CHOLECALCIFEROL) tablet 5,000 Units, Daily  docusate (COLACE) 50 MG/5ML liquid 100 mg, BID  levothyroxine (SYNTHROID) tablet 150 mcg, Daily  medicated lip balm (BLISTEX/CARMEX) stick, PRN  [Held by provider] metoprolol tartrate (LOPRESSOR) tablet 75 mg, BID  montelukast (SINGULAIR) tablet 10 mg, Nightly  sodium chloride flush 0.9 % injection 5-40 mL, 2 times per day  sodium chloride flush 0.9 % injection 5-40 mL, PRN  0.9 % sodium chloride infusion, PRN  enoxaparin (LOVENOX) injection 30 mg, Daily  ondansetron (ZOFRAN-ODT) disintegrating tablet 4 mg, Q8H PRN   Or  ondansetron (ZOFRAN) injection 4 mg, Q6H PRN  acetaminophen (TYLENOL) tablet 650 mg, Q6H PRN   Or  acetaminophen (TYLENOL) suppository 650 mg, Q6H PRN  cefepime (MAXIPIME) 2000 mg IVPB extended (mini-bag), Q24H  0.9 % sodium chloride infusion, Q24H        Review of Systems:   Review of systems not obtained due to patient factors.     Physical exam:   Constitutional:  Older female somnolent  Vitals: VITALS:  BP (!) 101/57   Pulse 79   Temp 98 °F (36.7 °C) (Rectal)   Resp 20   Ht 5' 3\" (1.6 m)   Wt 143 lb 11.8 oz (65.2 kg)   SpO2 95%   BMI 25.46 kg/m²   24HR INTAKE/OUTPUT:      Intake/Output Summary (Last 24 hours) at 9/23/2021 [address] filed at 9/23/2021 1448  Gross per 24 hour   Intake 3741 ml   Output 1055 ml   Net 2686 ml     URINARY CATHETER OUTPUT (Curry):  Urethral Catheter Straight-tip 16 fr-Output (mL): 250 mL  DRAIN/TUBE OUTPUT:     VENT SETTINGS:  Vent Information  SpO2: 95 %  Additional Respiratory  Assessments  Pulse: 79  Resp: 20  SpO2: 95 %  Oral Care: Mouth swabbed, Lip moisturizer applied, Mouth suctioned, Mouth moisturizer    Skin: no rash, turgor poor  Heent:  eomi, mmm  Neck: no bruits or jvd noted  Cardiovascular:Tachy  S1, S2 without S3 or a rub  Respiratory: Poor inspiratory effort, crackles in the base  Abdomen:  +bs, soft, nt, nd, PEG in place  Ext: (-) bilat lower extremity edema  Psychiatric:pt withdrew to physical touch did not verbally interact    Data:   Labs:  CBC:   Lab Results   Component Value Date    WBC 14.9 09/23/2021    RBC 2.77 09/23/2021    HGB 9.0 09/23/2021    HCT 30.4 09/23/2021    .7 09/23/2021    MCH 32.5 09/23/2021    MCHC 29.6 09/23/2021    RDW 15.0 09/23/2021    PLT 87 09/23/2021    MPV 13.4 09/23/2021     CBC with Differential:    Lab Results   Component Value Date    WBC 14.9 09/23/2021    RBC 2.77 09/23/2021    HGB 9.0 09/23/2021    HCT 30.4 09/23/2021    PLT 87 09/23/2021    .7 09/23/2021    MCH 32.5 09/23/2021    MCHC 29.6 09/23/2021    RDW 15.0 09/23/2021    NRBC 1.7 09/23/2021    SEGSPCT 58 06/26/2011    METASPCT 6.1 09/23/2021    LYMPHOPCT 14.8 09/23/2021    PROMYELOPCT 0.9 03/07/2021    MONOPCT 4.4 09/23/2021    MYELOPCT 0.9 03/15/2021    BASOPCT 0.0 09/23/2021    MONOSABS 0.60 09/23/2021    LYMPHSABS 2.24 09/23/2021    EOSABS 0.25 09/23/2021    BASOSABS 0.00 09/23/2021     Hemoglobin/Hematocrit:    Lab Results   Component Value Date    HGB 9.0 09/23/2021    HCT 30.4 09/23/2021     CMP:    Lab Results   Component Value Date     09/23/2021    K 3.4 09/23/2021    K 4.2 09/21/2021     09/23/2021    CO2 23 09/23/2021    BUN 27 09/23/2021    CREATININE 0.8 09/23/2021    GFRAA >60 09/23/2021    LABGLOM >60 09/23/2021    GLUCOSE 104 09/23/2021    GLUCOSE 97 12/30/2011    PROT 4.8 09/23/2021    LABALBU 1.9 09/23/2021    LABALBU 3.7 12/30/2011    CALCIUM 7.0 09/23/2021    BILITOT 0.3 09/23/2021    ALKPHOS 61 09/23/2021    AST 22 09/23/2021    ALT 13 09/23/2021     BMP:    Lab Results   Component Value Date     09/23/2021    K 3.4 09/23/2021    K 4.2 09/21/2021     09/23/2021    CO2 23 09/23/2021    BUN 27 09/23/2021    LABALBU 1.9 09/23/2021    LABALBU 3.7 12/30/2011    CREATININE 0.8 09/23/2021    CALCIUM 7.0 09/23/2021    GFRAA >60 09/23/2021    LABGLOM >60 09/23/2021    GLUCOSE 104 09/23/2021    GLUCOSE 97 12/30/2011     Hepatic Function Panel:    Lab Results   Component Value Date    ALKPHOS 61 09/23/2021    ALT 13 09/23/2021    AST 22 09/23/2021    PROT 4.8 09/23/2021    BILITOT 0.3 09/23/2021    LABALBU 1.9 09/23/2021    LABALBU 3.7 12/30/2011     Albumin:    Lab Results   Component Value Date    LABALBU 1.9 09/23/2021    LABALBU 3.7 12/30/2011     Calcium:    Lab Results   Component Value Date    CALCIUM 7.0 09/23/2021     Ionized Calcium:  No results found for: IONCA  Magnesium:    Lab Results   Component Value Date    MG 1.9 09/23/2021     Phosphorus:    Lab Results   Component Value Date    PHOS 1.9 09/23/2021     LDH:    Lab Results   Component Value Date     01/26/2017     Uric Acid:    Lab Results   Component Value Date    LABURIC 8.0 09/22/2021     PT/INR:    Lab Results   Component Value Date    PROTIME 12.4 03/08/2021    INR 1.1 03/08/2021     PTT:    Lab Results   Component Value Date    APTT 25.7 03/08/2021   [APTT}  Troponin:    Lab Results   Component Value Date    TROPONINI 0.08 03/05/2021     U/A:    Lab Results   Component Value Date    COLORU Yellow 09/21/2021    PROTEINU 100 09/21/2021    PHUR 5.0 09/21/2021    LABCAST FEW 09/20/2017    WBCUA 0-1 09/21/2021    RBCUA NONE 09/21/2021    MUCUS Present 02/08/2021    BACTERIA MODERATE 09/21/2021    CLARITYU Clear 09/21/2021    SPECGRAV >=1.030 09/21/2021    LEUKOCYTESUR Negative 09/21/2021    UROBILINOGEN 1.0 09/21/2021    BILIRUBINUR SMALL 09/21/2021    BLOODU Negative 09/21/2021    GLUCOSEU 100 09/21/2021    AMORPHOUS MODERATE 01/21/2017     ABG:    Lab Results   Component Value Date    PH 7.340 03/04/2021    PCO2 45.0 03/04/2021    PO2 78.9 03/04/2021    HCO3 23.7 03/04/2021    BE -2.3 09/21/2021    O2SAT 95.8 09/21/2021     HgBA1c:    Lab Results   Component Value Date    LABA1C 5.4 03/09/2021     Microalbumen/Creatinine ratio:  No components found for: RUCREAT  FLP:    Lab Results   Component Value Date    TRIG 94 09/08/2021    HDL 72 09/08/2021    LDLCALC 79 09/08/2021    LABVLDL 19 09/08/2021     TSH:    Lab Results   Component Value Date    TSH 18.460 09/08/2021     VITAMIN B12: No components found for: B12  FOLATE:    Lab Results   Component Value Date    FOLATE 19.8 03/05/2021     Iron Saturation:  No components found for: PERCENTFE  FERRITIN:    Lab Results   Component Value Date    FERRITIN 370 03/05/2021     AMYLASE:  No results found for: AMYLASE  LIPASE:    Lab Results   Component Value Date    LIPASE 39 08/16/2021     Fibrinogen Level:  No components found for: FIB  24 Hour Urine for Protein:  No components found for: RAWUPRO, UHRS3, PCEX29UX, UTV3  24 Hour Urine for Creatinine Clearance:  No components found for: CREAT4, UHRS10, UTV10     Imaging:  CXR results:  EXAMINATION:   ONE XRAY VIEW OF THE CHEST       9/21/2021 11:16 am       COMPARISON:   None.       HISTORY:   ORDERING SYSTEM PROVIDED HISTORY: SOB   TECHNOLOGIST PROVIDED HISTORY:   Reason for exam:->SOB       FINDINGS:   The lungs are without acute focal process.  There is no effusion or   pneumothorax. The cardiomediastinal silhouette is without acute process. The   osseous structures are without acute process.           Impression   No acute process.      EXAMINATION:   CT OF THE CHEST WITHOUT CONTRAST; CT OF THE ABDOMEN AND PELVIS WITHOUT   CONTRAST 9/21/2021 12:23 pm       TECHNIQUE:   CT of the chest was performed without the administration of intravenous   contrast. Multiplanar reformatted images are provided for review. Dose   modulation, iterative reconstruction, and/or weight based adjustment of the   mA/kV was utilized to reduce the radiation dose to as low as reasonably   achievable.; CT of the abdomen and pelvis was performed without the   administration of intravenous contrast. Multiplanar reformatted images are   provided for review. Dose modulation, iterative reconstruction, and/or weight   based adjustment of the mA/kV was utilized to reduce the radiation dose to as   low as reasonably achievable.       COMPARISON:   None.       HISTORY:   ORDERING SYSTEM PROVIDED HISTORY: sepsis   TECHNOLOGIST PROVIDED HISTORY:   Reason for exam:->sepsis   Decision Support Exception - unselect if not a suspected or confirmed   emergency medical condition->Emergency Medical Condition (MA)       FINDINGS:   CT OF THE CHEST:       MEDIASTINUM: The heart is normal in size.  The main pulmonary artery is   normal in caliber.  The thoracic aorta is of normal caliber.  Large hiatal   hernia is seen.       LUNGS/PLEURA: Pulmonary infiltrates are seen in the dependent of the lungs   bilaterally most in the lower lobes left greater The central airway is clear.    No pneumothorax or pleural effusion is seen.       BONES/SOFT TISSUES: No fracture or bony destructive lesion.  Prominent   flowing anterior osteophytes in the cervical spine, extending to the upper   thoracic spine to the level T4 indicate diffuse idiopathic skeletal   hyperostosis.       CT OF THE ABDOMEN AND PELVIS:       ORGANS:       Liver: Unremarkable.       Gallbladder: Unremarkable.       Pancreas: Unremarkable.       Spleen:  Unremarkable.       Adrenals: Unremarkable.       Kidneys: Unremarkable.       GI/BOWEL: No bowel wall thickening or obstruction.  Short segment of   prominent diverticulosis is seen in the distal descending and proximal   sigmoid colon.  No evidence of acute diverticulitis.  Normal appendix. Gastrostomy tube in situ.       PERITONEUM/EXTRA PERITONEUM: No lymphadenopathy.  No free air or free fluid   is seen.  The abdominal aorta and pelvic arteries are unremarkable.       PELVIS: The urinary bladder is decompressed by a  Curry catheter.  It is   therefore not well evaluated.  Within this limitation, no gross abnormality   is detected.  Within limits of the CT technique, the uterus is unremarkable. Grossly stable 2.3  cm left ovarian fatty dermoid. .       BONES/SOFT TISSUES: The visualized bones are intact without fracture or focal   lesion.           Impression   CT OF THE CHEST:       1. Pulmonale infiltrates in the dependent aspects of the lungs bilaterally,   left greater than right, concerning for pneumonia, possibly   aspiration-related.  The appearance and distribution is not characteristic of   COVID-19, although it cannot be definitively excluded. 2. Large hiatal hernia. CT OF THE ABDOMEN AND PELVIS:       1.  No acute abnormality is seen in the abdomen or the pelvis. 2. Stable 2.3 cm left ovarian dermoid cyst.   3. Short segment of distal colonic diverticulosis without diverticulitis. 4. Gastrostomy tube in situ. Assessment  1-Stage III BEENA sec to decreased effective renal perfusion in the setting of the hypotension and the sepsis as evidenced by the FeNa  UA gluc 100, bili small, ketones tr, SG 1.030, blood (-), protein 100mg/dl, Ni and LE (-), lorenza mod  Dosed with cefepime and Vanc in the ED  BC (-)   UC <10k gram (+)   Cr down to 0.8mg/dl  PLAN:  1. Follow labs    2-Hypernatremia with a free water deficit 3.73L based on the wt 115# and a Na+ 160  PLAN:  1. Stop the IV HCO3  2.  Increase the Free water via the feeding tube    3-Mixed Acid base Disorder with an AGMA from the lactic acidosis and a met alkalosis due to outpt loop diuretic  PLAN:  1. Follow     4- Hyperglycemia-improved  PLAN:  1. Defer to Primary Service    5- Hypokalemia and Hypophosphatemia in the setting of the improving BEENA  PLAN:  1.  Supplementing with po K+ and IV KPO4    Thank you  for allowing us to participate in care of Gregory Rodriguez MD  2:49 PM  9/23/2021

## 2021-09-23 NOTE — PROGRESS NOTES
[facility]   Progress Note    Admitting Date and Time: 9/21/2021 10:16 AM  Admit Dx: Sepsis ([facility] [A41.9]    Subjective: Admitted on 21st, came with fever, shortness of breath, change in mental status. Patient with ESRD, on hemodialysis, does have blindness, MRDD from group home. Patient with PEG. Admitted with septic shock, needed pressors, evidence of acute respiratory failure, CT had shown bilateral lower lobe infiltrates. Patient Covid negative. Evidence of lactic acidosis. Did have atrial tachycardia. Per nephrology sepsis suspected secondary to aspiration from recent PEG tube issues. Remains on cefepime. Patient was admitted with Sepsis ([facility] [A41.9]. Patient is resting, on nasal cannula, no communication, minimal response to pain, only time she moves when she tries to cough. Per RN: We have received paperwork from POA to change to CCA status, needed to physicians to fill out the papers. ROS: denies fever, chills, cp, sob, n/v, HA unless stated above.      potassium phosphate IVPB  20 mmol IntraVENous Once    valproic acid  500 mg Per G Tube BID    midodrine  10 mg Oral TID WC    sodium chloride (Inhalant)  4 mL Nebulization Q6H    albuterol  2.5 mg Nebulization Q6H    famotidine  20 mg Per G Tube Daily    thiamine  100 mg Per G Tube Daily    polyethylene glycol  17 g Per G Tube Daily    PARoxetine  10 mg Per G Tube BID    OLANZapine zydis  20 mg Oral QAM    OLANZapine  20 mg Per G Tube Nightly    Vitamin D  5,000 Units Per G Tube Daily    docusate  100 mg Per G Tube BID    levothyroxine  150 mcg Per G Tube Daily    [Held by provider] metoprolol tartrate  75 mg Per G Tube BID    montelukast  10 mg Per G Tube Nightly    sodium chloride flush  5-40 mL IntraVENous 2 times per day    enoxaparin  30 mg SubCUTAneous Daily    cefepime  2,000 mg IntraVENous Q24H     bisacodyl, 10 mg, Daily PRN  medicated lip balm, , PRN  sodium chloride flush, 5-40 mL, PRN  sodium chloride, 25 mL, PRN  ondansetron, 4 mg, Q8H PRN   Or  ondansetron, 4 mg, Q6H PRN  acetaminophen, 650 mg, Q6H PRN   Or  acetaminophen, 650 mg, Q6H PRN         Objective:    BP (!) 101/57   Pulse 79   Temp 98 °F (36.7 °C) (Rectal)   Resp 20   Ht 5' 3\" (1.6 m)   Wt 143 lb 11.8 oz (65.2 kg)   SpO2 95%   BMI 25.46 kg/m²   General Appearance: Resting on nasal cannula, no communication. Skin: warm and dry, no rash or erythema  Head: normocephalic and atraumatic  Eyes: pupils equal, round, and reactive to light, extraocular eye movements intact, conjunctivae normal  ENT: tympanic membrane, external ear and ear canal normal bilaterally, oropharynx clear and moist with normal mucous membranes  Neck: neck supple and non tender without mass, no thyromegaly or thyroid nodules, no cervical lymphadenopathy   Pulmonary/Chest: clear to auscultation bilaterally- no wheezes, rales or rhonchi, normal air movement, no respiratory distress  Cardiovascular: normal rate, normal S1 and S2, no gallops, intact distal pulses and no carotid bruits  Abdomen: soft, non-tender, non-distended, normal bowel sounds, no masses or organomegaly and does have a PEG tube, PEG tube site wet with somewhat leaking. Does have a right groin triple-lumen. Does have Curry catheter, makes urine. Recent Labs     09/23/21  0010 09/23/21  0555 09/23/21  1032   * 146 145   K 3.4* 3.8 3.4*   * 117* 115*   CO2 21* 22 23   BUN 35* 30* 27*   CREATININE 1.0 0.9 0.8   GLUCOSE 118* 91 104*   CALCIUM 7.1* 7.0* 7.0*       Recent Labs     09/21/21  1057 09/22/21  0555 09/23/21  0555   WBC 6.6 19.3* 14.9*   RBC 3.72 2.99* 2.77*   HGB 12.3 9.8* 9.0*   HCT 41.3 34.1 30.4*   .0* 114.0* 109.7*   MCH 33.1 32.8 32.5   MCHC 29.8* 28.7* 29.6*   RDW 15.7* 15.3* 15.0    117* 87*   MPV 14.0* 13.6* 13.4*     Sodium 147  K3.4  Chloride 118  Calcium 7.1    Radiology:   CT HEAD WO CONTRAST   Final Result   1.  There is no acute intracranial abnormality. Specifically, there is no   intracranial hemorrhage. 2. Atrophy and periventricular leukomalacia,         CT CHEST WO CONTRAST   Final Result   CT OF THE CHEST:      1. Pulmonale infiltrates in the dependent aspects of the lungs bilaterally,   left greater than right, concerning for pneumonia, possibly   aspiration-related. The appearance and distribution is not characteristic of   COVID-19, although it cannot be definitively excluded. 2. Large hiatal hernia. CT OF THE ABDOMEN AND PELVIS:      1.  No acute abnormality is seen in the abdomen or the pelvis. 2. Stable 2.3 cm left ovarian dermoid cyst.   3. Short segment of distal colonic diverticulosis without diverticulitis. 4. Gastrostomy tube in situ. CT ABDOMEN PELVIS WO CONTRAST Additional Contrast? None   Final Result   CT OF THE CHEST:      1. Pulmonale infiltrates in the dependent aspects of the lungs bilaterally,   left greater than right, concerning for pneumonia, possibly   aspiration-related. The appearance and distribution is not characteristic of   COVID-19, although it cannot be definitively excluded. 2. Large hiatal hernia. CT OF THE ABDOMEN AND PELVIS:      1.  No acute abnormality is seen in the abdomen or the pelvis. 2. Stable 2.3 cm left ovarian dermoid cyst.   3. Short segment of distal colonic diverticulosis without diverticulitis. 4. Gastrostomy tube in situ. XR CHEST PORTABLE   Final Result   No acute process. 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 *      Plan:  1.  Septic shock, improving, now off of Levophed, however started on midodrine. 2. More due to HCAP, remains on cefepime, day 2.  3. Acute renal failure, remains on HD, nephrology following, no change  4. Hypernatremia, hyperchloremia, hypokalemia, nephrology on board. 5. Changing CODE STATUS requested by POA, my part completed. 6. Patient with MRDD, blindness, at baseline. 7. Presented with fever, now improved, normal temperature for last 36 hours. 8. Hypothyroidism, remains on her Synthroid at 150 MCG. 9. Remains on DVT prophylaxis with Lovenox and GI prophylaxis with famotidine.            Electronically signed by Sana Barton MD on 9/23/2021 at 3:08 PM

## 2021-09-24 LAB
ALBUMIN SERPL-MCNC: 1.7 G/DL (ref 3.5–5.2)
ALP BLD-CCNC: 76 U/L (ref 35–104)
ALT SERPL-CCNC: 12 U/L (ref 0–32)
ANION GAP SERPL CALCULATED.3IONS-SCNC: 5 MMOL/L (ref 7–16)
AST SERPL-CCNC: 15 U/L (ref 0–31)
BASOPHILS ABSOLUTE: 0 E9/L (ref 0–0.2)
BASOPHILS RELATIVE PERCENT: 0 % (ref 0–2)
BILIRUB SERPL-MCNC: 0.3 MG/DL (ref 0–1.2)
BUN BLDV-MCNC: 20 MG/DL (ref 6–23)
CALCIUM SERPL-MCNC: 7.8 MG/DL (ref 8.6–10.2)
CHLORIDE BLD-SCNC: 109 MMOL/L (ref 98–107)
CO2: 25 MMOL/L (ref 22–29)
CREAT SERPL-MCNC: 0.7 MG/DL (ref 0.5–1)
EOSINOPHILS ABSOLUTE: 0 E9/L (ref 0.05–0.5)
EOSINOPHILS RELATIVE PERCENT: 0 % (ref 0–6)
GFR AFRICAN AMERICAN: >60
GFR NON-AFRICAN AMERICAN: >60 ML/MIN/1.73
GLUCOSE BLD-MCNC: 129 MG/DL (ref 74–99)
HCT VFR BLD CALC: 28.9 % (ref 34–48)
HEMOGLOBIN: 8.8 G/DL (ref 11.5–15.5)
HYPOCHROMIA: ABNORMAL
LYMPHOCYTES ABSOLUTE: 1.13 E9/L (ref 1.5–4)
LYMPHOCYTES RELATIVE PERCENT: 11.3 % (ref 20–42)
MAGNESIUM: 2 MG/DL (ref 1.6–2.6)
MCH RBC QN AUTO: 32.2 PG (ref 26–35)
MCHC RBC AUTO-ENTMCNC: 30.4 % (ref 32–34.5)
MCV RBC AUTO: 105.9 FL (ref 80–99.9)
METER GLUCOSE: 109 MG/DL (ref 74–99)
MONOCYTES ABSOLUTE: 0.31 E9/L (ref 0.1–0.95)
MONOCYTES RELATIVE PERCENT: 2.6 % (ref 2–12)
NEUTROPHILS ABSOLUTE: 8.86 E9/L (ref 1.8–7.3)
NEUTROPHILS RELATIVE PERCENT: 86.1 % (ref 43–80)
NUCLEATED RED BLOOD CELLS: 3.5 /100 WBC
PDW BLD-RTO: 14.6 FL (ref 11.5–15)
PHOSPHORUS: 2 MG/DL (ref 2.5–4.5)
PLATELET # BLD: 92 E9/L (ref 130–450)
PLATELET CONFIRMATION: NORMAL
PMV BLD AUTO: 13.2 FL (ref 7–12)
POTASSIUM SERPL-SCNC: 4 MMOL/L (ref 3.5–5)
RBC # BLD: 2.73 E12/L (ref 3.5–5.5)
SODIUM BLD-SCNC: 139 MMOL/L (ref 132–146)
TOTAL PROTEIN: 5.2 G/DL (ref 6.4–8.3)
VACUOLATED NEUTROPHILS: ABNORMAL
WBC # BLD: 10.3 E9/L (ref 4.5–11.5)

## 2021-09-24 PROCEDURE — 94640 AIRWAY INHALATION TREATMENT: CPT

## 2021-09-24 PROCEDURE — 99233 SBSQ HOSP IP/OBS HIGH 50: CPT | Performed by: INTERNAL MEDICINE

## 2021-09-24 PROCEDURE — 94669 MECHANICAL CHEST WALL OSCILL: CPT

## 2021-09-24 PROCEDURE — 2500000003 HC RX 250 WO HCPCS: Performed by: INTERNAL MEDICINE

## 2021-09-24 PROCEDURE — 2580000003 HC RX 258: Performed by: INTERNAL MEDICINE

## 2021-09-24 PROCEDURE — 83735 ASSAY OF MAGNESIUM: CPT

## 2021-09-24 PROCEDURE — 36415 COLL VENOUS BLD VENIPUNCTURE: CPT

## 2021-09-24 PROCEDURE — 84100 ASSAY OF PHOSPHORUS: CPT

## 2021-09-24 PROCEDURE — 6360000002 HC RX W HCPCS: Performed by: INTERNAL MEDICINE

## 2021-09-24 PROCEDURE — 6370000000 HC RX 637 (ALT 250 FOR IP): Performed by: INTERNAL MEDICINE

## 2021-09-24 PROCEDURE — 82962 GLUCOSE BLOOD TEST: CPT

## 2021-09-24 PROCEDURE — 6370000000 HC RX 637 (ALT 250 FOR IP): Performed by: STUDENT IN AN ORGANIZED HEALTH CARE EDUCATION/TRAINING PROGRAM

## 2021-09-24 PROCEDURE — 94668 MNPJ CHEST WALL SBSQ: CPT

## 2021-09-24 PROCEDURE — 85025 COMPLETE CBC W/AUTO DIFF WBC: CPT

## 2021-09-24 PROCEDURE — 80053 COMPREHEN METABOLIC PANEL: CPT

## 2021-09-24 PROCEDURE — 2700000000 HC OXYGEN THERAPY PER DAY

## 2021-09-24 PROCEDURE — 2060000000 HC ICU INTERMEDIATE R&B

## 2021-09-24 RX ADMIN — VALPROIC ACID 500 MG: 250 SOLUTION ORAL at 20:27

## 2021-09-24 RX ADMIN — SODIUM BICARBONATE: 84 INJECTION, SOLUTION INTRAVENOUS at 01:38

## 2021-09-24 RX ADMIN — PAROXETINE 10 MG: 10 TABLET, FILM COATED ORAL at 08:21

## 2021-09-24 RX ADMIN — ENOXAPARIN SODIUM 30 MG: 30 INJECTION, SOLUTION INTRAVENOUS; SUBCUTANEOUS at 08:21

## 2021-09-24 RX ADMIN — SODIUM CHLORIDE SOLN NEBU 3% 4 ML: 3 NEBU SOLN at 19:50

## 2021-09-24 RX ADMIN — VALPROIC ACID 500 MG: 250 SOLUTION ORAL at 08:21

## 2021-09-24 RX ADMIN — ALBUTEROL SULFATE 2.5 MG: 2.5 SOLUTION RESPIRATORY (INHALATION) at 19:50

## 2021-09-24 RX ADMIN — SODIUM CHLORIDE SOLN NEBU 3% 4 ML: 3 NEBU SOLN at 08:42

## 2021-09-24 RX ADMIN — Medication 10 ML: at 08:22

## 2021-09-24 RX ADMIN — MIDODRINE HYDROCHLORIDE 10 MG: 5 TABLET ORAL at 12:05

## 2021-09-24 RX ADMIN — ALBUTEROL SULFATE 2.5 MG: 2.5 SOLUTION RESPIRATORY (INHALATION) at 01:13

## 2021-09-24 RX ADMIN — Medication 10 ML: at 20:27

## 2021-09-24 RX ADMIN — LEVOTHYROXINE SODIUM 150 MCG: 75 TABLET ORAL at 05:47

## 2021-09-24 RX ADMIN — SODIUM BICARBONATE: 84 INJECTION, SOLUTION INTRAVENOUS at 12:33

## 2021-09-24 RX ADMIN — Medication 5000 UNITS: at 08:22

## 2021-09-24 RX ADMIN — MIDODRINE HYDROCHLORIDE 10 MG: 5 TABLET ORAL at 18:24

## 2021-09-24 RX ADMIN — SODIUM CHLORIDE SOLN NEBU 3% 4 ML: 3 NEBU SOLN at 13:39

## 2021-09-24 RX ADMIN — Medication 100 MG: at 08:22

## 2021-09-24 RX ADMIN — ALBUTEROL SULFATE 2.5 MG: 2.5 SOLUTION RESPIRATORY (INHALATION) at 13:39

## 2021-09-24 RX ADMIN — OLANZAPINE 20 MG: 10 TABLET, FILM COATED ORAL at 20:27

## 2021-09-24 RX ADMIN — DOCUSATE SODIUM 100 MG: 50 LIQUID ORAL at 20:27

## 2021-09-24 RX ADMIN — FAMOTIDINE 20 MG: 20 TABLET, FILM COATED ORAL at 08:22

## 2021-09-24 RX ADMIN — CEFEPIME HYDROCHLORIDE 2000 MG: 2 INJECTION, POWDER, FOR SOLUTION INTRAVENOUS at 12:05

## 2021-09-24 RX ADMIN — MIDODRINE HYDROCHLORIDE 10 MG: 5 TABLET ORAL at 08:21

## 2021-09-24 RX ADMIN — POTASSIUM PHOSPHATE, MONOBASIC AND POTASSIUM PHOSPHATE, DIBASIC 30 MMOL: 224; 236 INJECTION, SOLUTION, CONCENTRATE INTRAVENOUS at 13:54

## 2021-09-24 RX ADMIN — OLANZAPINE 20 MG: 10 TABLET, ORALLY DISINTEGRATING ORAL at 08:24

## 2021-09-24 RX ADMIN — POLYETHYLENE GLYCOL 3350 17 G: 17 POWDER, FOR SOLUTION ORAL at 08:21

## 2021-09-24 RX ADMIN — ALBUTEROL SULFATE 2.5 MG: 2.5 SOLUTION RESPIRATORY (INHALATION) at 08:42

## 2021-09-24 RX ADMIN — SODIUM CHLORIDE SOLN NEBU 3% 4 ML: 3 NEBU SOLN at 01:13

## 2021-09-24 RX ADMIN — PAROXETINE 10 MG: 10 TABLET, FILM COATED ORAL at 20:27

## 2021-09-24 RX ADMIN — MONTELUKAST SODIUM 10 MG: 10 TABLET, FILM COATED ORAL at 20:27

## 2021-09-24 RX ADMIN — DOCUSATE SODIUM 100 MG: 50 LIQUID ORAL at 08:21

## 2021-09-24 ASSESSMENT — PAIN SCALES - GENERAL
PAINLEVEL_OUTOF10: 0

## 2021-09-24 NOTE — PLAN OF CARE
Problem: Falls - Risk of:  Goal: Will remain free from falls  Description: Will remain free from falls  9/24/2021 1230 by Filomena Justin RN  Outcome: Met This Shift  9/23/2021 2255 by Dereck Molina RN  Outcome: Ongoing  Goal: Absence of physical injury  Description: Absence of physical injury  9/24/2021 1230 by Filomena Justin RN  Outcome: Met This Shift  9/23/2021 2255 by Dereck Molina RN  Outcome: Ongoing     Problem: Skin Integrity:  Goal: Will show no infection signs and symptoms  Description: Will show no infection signs and symptoms  9/24/2021 1230 by Filomena Justin RN  Outcome: Met This Shift  9/23/2021 2255 by Dereck Molina RN  Outcome: Ongoing  Goal: Absence of new skin breakdown  Description: Absence of new skin breakdown  9/24/2021 1230 by Filomena Justin RN  Outcome: Met This Shift  9/23/2021 2255 by Dereck Molina RN  Outcome: Ongoing     Problem: Gas Exchange - Impaired:  Goal: Levels of oxygenation will improve  Description: Levels of oxygenation will improve  9/24/2021 1230 by Filomena Justin RN  Outcome: Met This Shift  9/23/2021 2255 by Dereck Molina RN  Outcome: Ongoing     Problem: Infection, Septic Shock:  Goal: Will show no infection signs and symptoms  Description: Will show no infection signs and symptoms  Outcome: Met This Shift

## 2021-09-24 NOTE — PROGRESS NOTES
Nephrology Progress Note  Patient's Name: Jerri Marrero  3:28 PM  9/24/2021    Nephrologist: Brayden Tiwari    Reason for Consult:  BEENA    History of Present Ilness from the 9/21/21 note:    Jerri Marrero is a 78 y.o. female with prior history of BEENA in March of 2021 and in 2017 requiring hemodialysis. Creatinine at D/C 0.6mg/dl. Pt has a Hx MR and being blind and has a PEG tube in place and lives in a group home. Pt developed a fever to 102 wih tachycardia and hypotension and hypoxic at the home today and in the ED T 103.1 and BP down to 78/43 wih . Per the care giver with the pt she was doing OK yesterday. She currently has a PEG in place and receives 4 cans of per day of TF and what sounds by description as four 60ml syringes of water    9/24/21: No new issues overnight    Past Medical History:   Diagnosis Date    Acute kidney injury ([address] 01/15/2017    d/t Vancomycin, on Dialysis [facility] right chest    Blind in both eyes     Essential hypertension 4/2/2021    Gastroesophageal reflux disease without esophagitis 4/2/2021    Hemodialysis patient ([address]     Hyperthyroidism     MR (mental retardation)     Osteoarthritis     Peripheral vascular disease ([address]     Pneumonia 01/06/2017    Pneumonia due to infectious organism 1/8/2017    Sepsis ([address] 3/4/2021       Past Surgical History:   Procedure Laterality Date    BRONCHOSCOPY  02/10/2017    CHEST TUBE INSERTION Right 02/09/2017    GASTROSTOMY TUBE PLACEMENT N/A 3/7/2021    EGD PEG TUBE PLACEMENT performed by Adriana Burris MD at [facility] Right 01/17/2017    tessio insertion     OTHER SURGICAL HISTORY  01/25/2017    PEG tube insertion       History reviewed. No pertinent family history. reports that she has never smoked. She has never used smokeless tobacco. She reports that she does not drink alcohol and does not use drugs. Allergies:  Patient has no known allergies.     Current Medications:    potassium phosphate 30 mmol in dextrose 5 % 250 mL IVPB, Once  valproic acid (DEPAKENE) 250 MG/5ML oral solution 500 mg, BID  midodrine (PROAMATINE) tablet 10 mg, TID WC  sodium bicarbonate 50 mEq in sodium chloride 0.45 % 1,000 mL infusion, Continuous  sodium chloride (Inhalant) 3 % nebulizer solution 4 mL, Q6H  albuterol (PROVENTIL) nebulizer solution 2.5 mg, Q6H  famotidine (PEPCID) tablet 20 mg, Daily  thiamine tablet 100 mg, Daily  polyethylene glycol (GLYCOLAX) packet 17 g, Daily  PARoxetine (PAXIL) tablet 10 mg, BID  OLANZapine zydis (ZYPREXA) disintegrating tablet 20 mg, QAM  OLANZapine (ZYPREXA) tablet 20 mg, Nightly  bisacodyl (DULCOLAX) suppository 10 mg, Daily PRN  Vitamin D (CHOLECALCIFEROL) tablet 5,000 Units, Daily  docusate (COLACE) 50 MG/5ML liquid 100 mg, BID  levothyroxine (SYNTHROID) tablet 150 mcg, Daily  medicated lip balm (BLISTEX/CARMEX) stick, PRN  [Held by provider] metoprolol tartrate (LOPRESSOR) tablet 75 mg, BID  montelukast (SINGULAIR) tablet 10 mg, Nightly  sodium chloride flush 0.9 % injection 5-40 mL, 2 times per day  sodium chloride flush 0.9 % injection 5-40 mL, PRN  0.9 % sodium chloride infusion, PRN  enoxaparin (LOVENOX) injection 30 mg, Daily  ondansetron (ZOFRAN-ODT) disintegrating tablet 4 mg, Q8H PRN   Or  ondansetron (ZOFRAN) injection 4 mg, Q6H PRN  acetaminophen (TYLENOL) tablet 650 mg, Q6H PRN   Or  acetaminophen (TYLENOL) suppository 650 mg, Q6H PRN  cefepime (MAXIPIME) 2000 mg IVPB extended (mini-bag), Q24H  0.9 % sodium chloride infusion, Q24H        Review of Systems:   Review of systems not obtained due to patient factors.     Physical exam:   Constitutional:  Older female somnolent  Vitals: VITALS:  BP 93/67   Pulse 86   Temp 97.3 °F (36.3 °C) (Axillary)   Resp 18   Ht 5' 3\" (1.6 m)   Wt 162 lb 9.6 oz (73.8 kg)   SpO2 98%   BMI 28.80 kg/m²   24HR INTAKE/OUTPUT:      Intake/Output Summary (Last 24 hours) at 9/24/2021 1407  Last data filed at 9/24/2021 1322  Gross per 24 hour   Intake 4273 ml   Output 1275 ml   Net 2998 ml     URINARY CATHETER OUTPUT (Curry):  Urethral Catheter Straight-tip 16 fr-Output (mL): 450 mL  DRAIN/TUBE OUTPUT:     VENT SETTINGS:  Vent Information  SpO2: 98 %  Additional Respiratory  Assessments  Pulse: 86  Resp: 18  SpO2: 98 %  Oral Care: Mouth swabbed, Mouth moisturizer    Skin: no rash, turgor poor  Heent:  eomi, mmm  Neck: no bruits or jvd noted  Cardiovascular:Tachy  S1, S2 without S3 or a rub  Respiratory: Poor inspiratory effort, crackles in the base  Abdomen:  +bs, soft, nt, nd, PEG in place  Ext: (-) bilat lower extremity edema  Psychiatric:pt withdrew to physical touch did not verbally interact    Data:   Labs:  CBC:   Lab Results   Component Value Date    WBC 10.3 09/24/2021    RBC 2.73 09/24/2021    HGB 8.8 09/24/2021    HCT 28.9 09/24/2021    .9 09/24/2021    MCH 32.2 09/24/2021    MCHC 30.4 09/24/2021    RDW 14.6 09/24/2021    PLT 92 09/24/2021    MPV 13.2 09/24/2021     CBC with Differential:    Lab Results   Component Value Date    WBC 10.3 09/24/2021    RBC 2.73 09/24/2021    HGB 8.8 09/24/2021    HCT 28.9 09/24/2021    PLT 92 09/24/2021    .9 09/24/2021    MCH 32.2 09/24/2021    MCHC 30.4 09/24/2021    RDW 14.6 09/24/2021    NRBC 3.5 09/24/2021    SEGSPCT 58 06/26/2011    METASPCT 6.1 09/23/2021    LYMPHOPCT 11.3 09/24/2021    PROMYELOPCT 0.9 03/07/2021    MONOPCT 2.6 09/24/2021    MYELOPCT 0.9 03/15/2021    BASOPCT 0.0 09/24/2021    MONOSABS 0.31 09/24/2021    LYMPHSABS 1.13 09/24/2021    EOSABS 0.00 09/24/2021    BASOSABS 0.00 09/24/2021     Hemoglobin/Hematocrit:    Lab Results   Component Value Date    HGB 8.8 09/24/2021    HCT 28.9 09/24/2021     CMP:    Lab Results   Component Value Date     09/24/2021    K 4.0 09/24/2021    K 4.2 09/21/2021     09/24/2021    CO2 25 09/24/2021    BUN 20 09/24/2021    CREATININE 0.7 09/24/2021    GFRAA >60 09/24/2021    LABGLOM >60 09/24/2021    GLUCOSE 129 09/24/2021 GLUCOSE 97 12/30/2011    PROT 5.2 09/24/2021    LABALBU 1.7 09/24/2021    LABALBU 3.7 12/30/2011    CALCIUM 7.8 09/24/2021    BILITOT 0.3 09/24/2021    ALKPHOS 76 09/24/2021    AST 15 09/24/2021    ALT 12 09/24/2021     BMP:    Lab Results   Component Value Date     09/24/2021    K 4.0 09/24/2021    K 4.2 09/21/2021     09/24/2021    CO2 25 09/24/2021    BUN 20 09/24/2021    LABALBU 1.7 09/24/2021    LABALBU 3.7 12/30/2011    CREATININE 0.7 09/24/2021    CALCIUM 7.8 09/24/2021    GFRAA >60 09/24/2021    LABGLOM >60 09/24/2021    GLUCOSE 129 09/24/2021    GLUCOSE 97 12/30/2011     Hepatic Function Panel:    Lab Results   Component Value Date    ALKPHOS 76 09/24/2021    ALT 12 09/24/2021    AST 15 09/24/2021    PROT 5.2 09/24/2021    BILITOT 0.3 09/24/2021    LABALBU 1.7 09/24/2021    LABALBU 3.7 12/30/2011     Albumin:    Lab Results   Component Value Date    LABALBU 1.7 09/24/2021    LABALBU 3.7 12/30/2011     Calcium:    Lab Results   Component Value Date    CALCIUM 7.8 09/24/2021     Ionized Calcium:  No results found for: IONCA  Magnesium:    Lab Results   Component Value Date    MG 2.0 09/24/2021     Phosphorus:    Lab Results   Component Value Date    PHOS 2.0 09/24/2021     LDH:    Lab Results   Component Value Date     01/26/2017     Uric Acid:    Lab Results   Component Value Date    LABURIC 8.0 09/22/2021     PT/INR:    Lab Results   Component Value Date    PROTIME 12.4 03/08/2021    INR 1.1 03/08/2021     PTT:    Lab Results   Component Value Date    APTT 25.7 03/08/2021   [APTT}  Troponin:    Lab Results   Component Value Date    TROPONINI 0.08 03/05/2021     U/A:    Lab Results   Component Value Date    COLORU Yellow 09/21/2021    PROTEINU 100 09/21/2021    PHUR 5.0 09/21/2021    LABCAST FEW 09/20/2017    WBCUA 0-1 09/21/2021    RBCUA NONE 09/21/2021    MUCUS Present 02/08/2021    BACTERIA MODERATE 09/21/2021    CLARITYU Clear 09/21/2021    SPECGRAV >=1.030 09/21/2021    LEUKOCYTESUR Negative 09/21/2021    UROBILINOGEN 1.0 09/21/2021    BILIRUBINUR SMALL 09/21/2021    BLOODU Negative 09/21/2021    GLUCOSEU 100 09/21/2021    AMORPHOUS MODERATE 01/21/2017     ABG:    Lab Results   Component Value Date    PH 7.340 03/04/2021    PCO2 45.0 03/04/2021    PO2 78.9 03/04/2021    HCO3 23.7 03/04/2021    BE -2.3 09/21/2021    O2SAT 95.8 09/21/2021     HgBA1c:    Lab Results   Component Value Date    LABA1C 5.4 03/09/2021     Microalbumen/Creatinine ratio:  No components found for: RUCREAT  FLP:    Lab Results   Component Value Date    TRIG 94 09/08/2021    HDL 72 09/08/2021    LDLCALC 79 09/08/2021    LABVLDL 19 09/08/2021     TSH:    Lab Results   Component Value Date    TSH 18.460 09/08/2021     VITAMIN B12: No components found for: B12  FOLATE:    Lab Results   Component Value Date    FOLATE 19.8 03/05/2021     Iron Saturation:  No components found for: PERCENTFE  FERRITIN:    Lab Results   Component Value Date    FERRITIN 370 03/05/2021     AMYLASE:  No results found for: AMYLASE  LIPASE:    Lab Results   Component Value Date    LIPASE 39 08/16/2021     Fibrinogen Level:  No components found for: FIB  24 Hour Urine for Protein:  No components found for: RAWUPRO, UHRS3, XAKC59PO, UTV3  24 Hour Urine for Creatinine Clearance:  No components found for: CREAT4, UHRS10, UTV10     Imaging:  CXR results:  EXAMINATION:   ONE XRAY VIEW OF THE CHEST       9/21/2021 11:16 am       COMPARISON:   None.       HISTORY:   ORDERING SYSTEM PROVIDED HISTORY: SOB   TECHNOLOGIST PROVIDED HISTORY:   Reason for exam:->SOB       FINDINGS:   The lungs are without acute focal process.  There is no effusion or   pneumothorax. The cardiomediastinal silhouette is without acute process. The   osseous structures are without acute process.           Impression   No acute process.      EXAMINATION:   CT OF THE CHEST WITHOUT CONTRAST; CT OF THE ABDOMEN AND PELVIS WITHOUT   CONTRAST 9/21/2021 12:23 pm       TECHNIQUE:   CT of the chest was performed without the administration of intravenous   contrast. Multiplanar reformatted images are provided for review. Dose   modulation, iterative reconstruction, and/or weight based adjustment of the   mA/kV was utilized to reduce the radiation dose to as low as reasonably   achievable.; CT of the abdomen and pelvis was performed without the   administration of intravenous contrast. Multiplanar reformatted images are   provided for review. Dose modulation, iterative reconstruction, and/or weight   based adjustment of the mA/kV was utilized to reduce the radiation dose to as   low as reasonably achievable.       COMPARISON:   None.       HISTORY:   ORDERING SYSTEM PROVIDED HISTORY: sepsis   TECHNOLOGIST PROVIDED HISTORY:   Reason for exam:->sepsis   Decision Support Exception - unselect if not a suspected or confirmed   emergency medical condition->Emergency Medical Condition (MA)       FINDINGS:   CT OF THE CHEST:       MEDIASTINUM: The heart is normal in size.  The main pulmonary artery is   normal in caliber.  The thoracic aorta is of normal caliber.  Large hiatal   hernia is seen.       LUNGS/PLEURA: Pulmonary infiltrates are seen in the dependent of the lungs   bilaterally most in the lower lobes left greater The central airway is clear. No pneumothorax or pleural effusion is seen.       BONES/SOFT TISSUES: No fracture or bony destructive lesion.  Prominent   flowing anterior osteophytes in the cervical spine, extending to the upper   thoracic spine to the level T4 indicate diffuse idiopathic skeletal   hyperostosis.       CT OF THE ABDOMEN AND PELVIS:       ORGANS:       Liver: Unremarkable.       Gallbladder: Unremarkable.       Pancreas: Unremarkable.       Spleen:  Unremarkable.       Adrenals: Unremarkable.       Kidneys: Unremarkable.       GI/BOWEL: No bowel wall thickening or obstruction.  Short segment of   prominent diverticulosis is seen in the distal descending and proximal   sigmoid colon. Hypophosphatemia in the setting of the improving BEENA  K+ WNL  PO4 2.0  PLAN:  1.  Supplementing with  IV KPO4    Little else to add from a renal standpoint will sign off  Thank you  for allowing us to participate in care of Elizabeth Live MD  2:07 PM  9/24/2021

## 2021-09-24 NOTE — PROGRESS NOTES
Comprehensive Nutrition Assessment    Type and Reason for Visit:  Initial, Positive Nutrition Screen    Nutrition Recommendations/Plan: Continue NPO. Recommend to Modify Current EN to meet estimated needs more appropriately. TF Recommendations:  Standard w/ Fiber (Jevity 1.5) @ 45 ml/hr Cyclic x 23 hrs/d (30 min hold pre/post Synthroid) to provide 1035 ml TV, 1553 kcals, 66 gm Pro, 787 ml water. 100 ml flush q 4 hrs to provide 1387 ml total water (TF+Flush). If bolus feeds desired prior to d/c, please consult for updated recommendations. Nutrition Assessment:  Pt adm w/ SOB/fever and worsening AMS x ~1d pta. PMHx Dementia/MRDD; adm w/ sepsis/septic shock, BEENA/CKD, dehydration, metabolic acidosis, ARF. Pt at risk d/t poor intake pta d/t AMS w/ dysphagia/Chronic PEG 2/2 Dementia/MRDD hx. Will provide TF Rec's and monitor. Malnutrition Assessment:  Malnutrition Status: At risk for malnutrition (Comment)    Context:  Chronic Illness     Findings of the 6 clinical characteristics of malnutrition:  Energy Intake:  7 - 75% or less estimated energy requirements for 1 month or longer  Weight Loss:  Unable to assess (2/2 fluid shifts w/ CKD hx)     Body Fat Loss:  No significant body fat loss     Muscle Mass Loss:  No significant muscle mass loss    Fluid Accumulation:  Unable to assess (2/2 CKD)     Strength:  Not Performed    Estimated Daily Nutrient Needs:  Energy (kcal):  5843-8351 (MSJx1. 2SF); Weight Used for Energy Requirements:  Admission     Protein (g):  60-75 (1.2-1.4 gm/kg d/t BEENA/CKD hx); Weight Used for Protein Requirements:  Admission        Fluid (ml/day):  7946-7618; Method Used for Fluid Requirements:  1 ml/kcal      Nutrition Related Findings:   AMSx4, MRDD, Blind, poor dentition, Abd/BS WDL, +PEG, +1 Generalized edema, +I/O's      Wounds:  None       Current Nutrition Therapies:    Current Tube Feeding (TF) Orders:  · Feeding Route: PEG  · Formula: Standard with Outcomes:  GI Status, Fluid Status or Edema, Hemodynamic Status, Biochemical Data, Nutrition Focused Physical Findings, Skin, Weight     Discharge Planning:    Enteral Nutrition     Electronically signed by Ghazala Shea RD, LD on 9/24/21 at 1:01 PM EDT    Contact: ext [phone]

## 2021-09-24 NOTE — PROGRESS NOTES
[facility]   Progress Note    Admitting Date and Time: 9/21/2021 10:16 AM  Admit Dx: Sepsis ([facility] [A41.9]    Subjective: Admitted on 21st, came with fever, shortness of breath, change in mental status. Patient with ESRD, on hemodialysis, does have blindness, MRDD from group home. Patient was not on oxygen at the group home prior to coming to hospital.  Patient with PEG. Admitted with septic shock, needed pressors, evidence of acute respiratory failure, CT had shown bilateral lower lobe infiltrates. Patient Covid negative. Evidence of lactic acidosis. Did have atrial tachycardia. Per nephrology sepsis suspected secondary to aspiration from recent PEG tube issues. Remains on cefepime. POA had requested change in CODE STATUS, paperwork was completed on 23rd. renal has signed off. Patient was admitted with Sepsis ([facility] [A41.9]. Patient is resting, on nasal cannula, no communication, more response to pain compared to yesterday. Per RN: Patient with nasty cough, pulmonary consult is changed to different group, at this time evaluation pending. ROS: denies fever, chills, cp, sob, n/v, HA unless stated above.      valproic acid  500 mg Per G Tube BID    midodrine  10 mg Oral TID WC    sodium chloride (Inhalant)  4 mL Nebulization Q6H    albuterol  2.5 mg Nebulization Q6H    famotidine  20 mg Per G Tube Daily    thiamine  100 mg Per G Tube Daily    polyethylene glycol  17 g Per G Tube Daily    PARoxetine  10 mg Per G Tube BID    OLANZapine zydis  20 mg Oral QAM    OLANZapine  20 mg Per G Tube Nightly    Vitamin D  5,000 Units Per G Tube Daily    docusate  100 mg Per G Tube BID    levothyroxine  150 mcg Per G Tube Daily    [Held by provider] metoprolol tartrate  75 mg Per G Tube BID    montelukast  10 mg Per G Tube Nightly    sodium chloride flush  5-40 mL IntraVENous 2 times per day    enoxaparin  30 mg SubCUTAneous Daily    cefepime  2,000 mg IntraVENous Q24H bisacodyl, 10 mg, Daily PRN  medicated lip balm, , PRN  sodium chloride flush, 5-40 mL, PRN  sodium chloride, 25 mL, PRN  ondansetron, 4 mg, Q8H PRN   Or  ondansetron, 4 mg, Q6H PRN  acetaminophen, 650 mg, Q6H PRN   Or  acetaminophen, 650 mg, Q6H PRN         Objective:    BP 93/67   Pulse 86   Temp 97.3 °F (36.3 °C) (Axillary)   Resp 18   Ht 5' 3\" (1.6 m)   Wt 162 lb 9.6 oz (73.8 kg)   SpO2 98%   BMI 28.80 kg/m²   General Appearance: Resting on nasal cannula, no communication. Skin: warm and dry, no rash or erythema  Head: normocephalic and atraumatic  Eyes: pupils equal, round, and reactive to light, extraocular eye movements intact, conjunctivae normal  ENT: tympanic membrane, external ear and ear canal normal bilaterally, oropharynx clear and moist with normal mucous membranes  Neck: neck supple and non tender without mass, no thyromegaly or thyroid nodules, no cervical lymphadenopathy   Pulmonary/Chest: clear to auscultation bilaterally- no wheezes, rales or rhonchi, normal air movement, no respiratory distress  Cardiovascular: normal rate, normal S1 and S2, no gallops, intact distal pulses and no carotid bruits  Abdomen: soft, non-tender, non-distended, normal bowel sounds, no masses or organomegaly and does have a PEG tube, PEG tube site wet with somewhat leaking. Does have a right groin triple-lumen. Does have Curry catheter, makes urine. Does have a PEG tube and binder.       Recent Labs     09/23/21  0555 09/23/21  1032 09/24/21  0540    145 139   K 3.8 3.4* 4.0   * 115* 109*   CO2 22 23 25   BUN 30* 27* 20   CREATININE 0.9 0.8 0.7   GLUCOSE 91 104* 129*   CALCIUM 7.0* 7.0* 7.8*       Recent Labs     09/22/21  0555 09/23/21  0555 09/24/21  0540   WBC 19.3* 14.9* 10.3   RBC 2.99* 2.77* 2.73*   HGB 9.8* 9.0* 8.8*   HCT 34.1 30.4* 28.9*   .0* 109.7* 105.9*   MCH 32.8 32.5 32.2   MCHC 28.7* 29.6* 30.4*   RDW 15.3* 15.0 14.6   * 87* 92*   MPV 13.6* 13.4* 13.2*     Sodium 147 /139  K3.4 /4.0  Phosphorus 2.0  Chloride 118  Calcium 7.1  Hemoglobin 8.8  Procalcitonin 42.1 on 23rd    Radiology:   CT HEAD WO CONTRAST   Final Result   1. There is no acute intracranial abnormality. Specifically, there is no   intracranial hemorrhage. 2. Atrophy and periventricular leukomalacia,         CT CHEST WO CONTRAST   Final Result   CT OF THE CHEST:      1. Pulmonale infiltrates in the dependent aspects of the lungs bilaterally,   left greater than right, concerning for pneumonia, possibly   aspiration-related. The appearance and distribution is not characteristic of   COVID-19, although it cannot be definitively excluded. 2. Large hiatal hernia. CT OF THE ABDOMEN AND PELVIS:      1.  No acute abnormality is seen in the abdomen or the pelvis. 2. Stable 2.3 cm left ovarian dermoid cyst.   3. Short segment of distal colonic diverticulosis without diverticulitis. 4. Gastrostomy tube in situ. CT ABDOMEN PELVIS WO CONTRAST Additional Contrast? None   Final Result   CT OF THE CHEST:      1. Pulmonale infiltrates in the dependent aspects of the lungs bilaterally,   left greater than right, concerning for pneumonia, possibly   aspiration-related. The appearance and distribution is not characteristic of   COVID-19, although it cannot be definitively excluded. 2. Large hiatal hernia. CT OF THE ABDOMEN AND PELVIS:      1.  No acute abnormality is seen in the abdomen or the pelvis. 2. Stable 2.3 cm left ovarian dermoid cyst.   3. Short segment of distal colonic diverticulosis without diverticulitis. 4. Gastrostomy tube in situ. XR CHEST PORTABLE   Final Result   No acute process.              Assessment:    Principal Problem:    Septic shock (HCC)  Active Problems:    Impairment level: total impairment of both eyes    Essential hypertension    S/P percutaneous endoscopic gastrostomy (PEG) tube placement (HCC)    Dementia with behavioral disturbance, unspecified dementia type ([facility]    Severe dehydration    Acute renal failure (ARF) (HCC)    Acute respiratory failure with hypoxia (HCC)  Resolved Problems:    * No resolved hospital problems. *      Plan:  1. Septic shock, improving, now off of Levophed, patient out of ICU, still BP borderline, does remain on midodrine. 2. More due to HCAP, remains on cefepime, day 3.  3. Acute renal failure, remains on HD, nephrology following, no change  4. Hypernatremia, hyperchloremia, hypokalemia, improved, nephrology has signed off.  5. Changing CODE STATUS requested by POA, my part completed. Patient still in the computer showing full code, okay to change to CCA. 6. Patient with MRDD, blindness, at baseline. 7. Presented with fever, now improved, normal temperature for last 36 hours. More due to suspected aspiration pneumonia, with still BP borderline in spite of midodrine we will continue same antibiotic coverage. Antibiotics were started by pulmonary critical care, will wait for input from pulmonology group, though this is going to be a different group taking over. 8. Hypothyroidism, remains on her Synthroid at 150 MCG. 9. Remains on DVT prophylaxis with Lovenox and GI prophylaxis with famotidine. 10. DC planning, likely will depend upon duration and type of antibiotics planned, stabilizing of her blood pressure without any pressors, at this time patient is not ready for DC planning.            Electronically signed by Chris Bañuelos MD on 9/24/2021 at 9:05 AM

## 2021-09-24 NOTE — CARE COORDINATION
Social work / Discharge Planning:        Patient is from [facility]. Social work spoke to [address]. Either Hillary Mccloud or Laney Ramirez will need to be contacted when discharge is anticipated. Hillary Mccloud is not sure if patient can return on IV antibiotics if needed for discharge. Social work requested call back today with that information. The patient uses a wc at the group home. She is currently using 3 liters of oxygen which she does not use at the group home. Social work informed [facility] staff that any barriers to her return to the group home need to be communicated prior to discharge. Case management staff will need to inform [facility] as soon as discharge is anticipated for a medical review of information. Social work questioned if they will transport patient for discharge and did not get a clear answer.     Electronically signed by AILYN Garcia on 9/24/2021 at 1:17 PM

## 2021-09-25 LAB
ALBUMIN SERPL-MCNC: 1.9 G/DL (ref 3.5–5.2)
ALP BLD-CCNC: 75 U/L (ref 35–104)
ALT SERPL-CCNC: 11 U/L (ref 0–32)
ANION GAP SERPL CALCULATED.3IONS-SCNC: 7 MMOL/L (ref 7–16)
ANISOCYTOSIS: ABNORMAL
AST SERPL-CCNC: 17 U/L (ref 0–31)
BASOPHILS ABSOLUTE: 0.05 E9/L (ref 0–0.2)
BASOPHILS RELATIVE PERCENT: 0.4 % (ref 0–2)
BILIRUB SERPL-MCNC: 0.2 MG/DL (ref 0–1.2)
BUN BLDV-MCNC: 14 MG/DL (ref 6–23)
CALCIUM SERPL-MCNC: 8.3 MG/DL (ref 8.6–10.2)
CHLORIDE BLD-SCNC: 106 MMOL/L (ref 98–107)
CO2: 26 MMOL/L (ref 22–29)
CREAT SERPL-MCNC: 0.6 MG/DL (ref 0.5–1)
EOSINOPHILS ABSOLUTE: 0.14 E9/L (ref 0.05–0.5)
EOSINOPHILS RELATIVE PERCENT: 1.2 % (ref 0–6)
GFR AFRICAN AMERICAN: >60
GFR NON-AFRICAN AMERICAN: >60 ML/MIN/1.73
GLUCOSE BLD-MCNC: 141 MG/DL (ref 74–99)
HCT VFR BLD CALC: 29 % (ref 34–48)
HEMOGLOBIN: 9.2 G/DL (ref 11.5–15.5)
IMMATURE GRANULOCYTES #: 0.19 E9/L
IMMATURE GRANULOCYTES %: 1.6 % (ref 0–5)
LYMPHOCYTES ABSOLUTE: 1.18 E9/L (ref 1.5–4)
LYMPHOCYTES RELATIVE PERCENT: 10 % (ref 20–42)
MAGNESIUM: 2.1 MG/DL (ref 1.6–2.6)
MCH RBC QN AUTO: 33 PG (ref 26–35)
MCHC RBC AUTO-ENTMCNC: 31.7 % (ref 32–34.5)
MCV RBC AUTO: 103.9 FL (ref 80–99.9)
MONOCYTES ABSOLUTE: 0.6 E9/L (ref 0.1–0.95)
MONOCYTES RELATIVE PERCENT: 5.1 % (ref 2–12)
NEUTROPHILS ABSOLUTE: 9.61 E9/L (ref 1.8–7.3)
NEUTROPHILS RELATIVE PERCENT: 81.7 % (ref 43–80)
PDW BLD-RTO: 14.1 FL (ref 11.5–15)
PHOSPHORUS: 2.7 MG/DL (ref 2.5–4.5)
PLATELET # BLD: 107 E9/L (ref 130–450)
PMV BLD AUTO: 12.9 FL (ref 7–12)
POTASSIUM SERPL-SCNC: 5 MMOL/L (ref 3.5–5)
PROCALCITONIN: 13.79 NG/ML (ref 0–0.08)
RBC # BLD: 2.79 E12/L (ref 3.5–5.5)
SODIUM BLD-SCNC: 139 MMOL/L (ref 132–146)
TOTAL PROTEIN: 5.6 G/DL (ref 6.4–8.3)
VACUOLATED NEUTROPHILS: ABNORMAL
WBC # BLD: 11.8 E9/L (ref 4.5–11.5)

## 2021-09-25 PROCEDURE — 83735 ASSAY OF MAGNESIUM: CPT

## 2021-09-25 PROCEDURE — 94668 MNPJ CHEST WALL SBSQ: CPT

## 2021-09-25 PROCEDURE — 84145 PROCALCITONIN (PCT): CPT

## 2021-09-25 PROCEDURE — 6370000000 HC RX 637 (ALT 250 FOR IP): Performed by: INTERNAL MEDICINE

## 2021-09-25 PROCEDURE — 99233 SBSQ HOSP IP/OBS HIGH 50: CPT | Performed by: INTERNAL MEDICINE

## 2021-09-25 PROCEDURE — 2580000003 HC RX 258: Performed by: INTERNAL MEDICINE

## 2021-09-25 PROCEDURE — 6360000002 HC RX W HCPCS: Performed by: INTERNAL MEDICINE

## 2021-09-25 PROCEDURE — 2700000000 HC OXYGEN THERAPY PER DAY

## 2021-09-25 PROCEDURE — 36415 COLL VENOUS BLD VENIPUNCTURE: CPT

## 2021-09-25 PROCEDURE — 94640 AIRWAY INHALATION TREATMENT: CPT

## 2021-09-25 PROCEDURE — 85025 COMPLETE CBC W/AUTO DIFF WBC: CPT

## 2021-09-25 PROCEDURE — 1200000000 HC SEMI PRIVATE

## 2021-09-25 PROCEDURE — 80053 COMPREHEN METABOLIC PANEL: CPT

## 2021-09-25 PROCEDURE — 94669 MECHANICAL CHEST WALL OSCILL: CPT

## 2021-09-25 PROCEDURE — 84100 ASSAY OF PHOSPHORUS: CPT

## 2021-09-25 PROCEDURE — 6370000000 HC RX 637 (ALT 250 FOR IP): Performed by: STUDENT IN AN ORGANIZED HEALTH CARE EDUCATION/TRAINING PROGRAM

## 2021-09-25 RX ADMIN — MIDODRINE HYDROCHLORIDE 10 MG: 5 TABLET ORAL at 10:50

## 2021-09-25 RX ADMIN — LEVOTHYROXINE SODIUM 150 MCG: 75 TABLET ORAL at 05:43

## 2021-09-25 RX ADMIN — SODIUM CHLORIDE SOLN NEBU 3% 4 ML: 3 NEBU SOLN at 14:29

## 2021-09-25 RX ADMIN — SODIUM CHLORIDE SOLN NEBU 3% 4 ML: 3 NEBU SOLN at 19:32

## 2021-09-25 RX ADMIN — VALPROIC ACID 500 MG: 250 SOLUTION ORAL at 10:50

## 2021-09-25 RX ADMIN — DOCUSATE SODIUM 100 MG: 50 LIQUID ORAL at 22:05

## 2021-09-25 RX ADMIN — POLYETHYLENE GLYCOL 3350 17 G: 17 POWDER, FOR SOLUTION ORAL at 10:50

## 2021-09-25 RX ADMIN — SODIUM CHLORIDE, PRESERVATIVE FREE 10 ML: 5 INJECTION INTRAVENOUS at 09:55

## 2021-09-25 RX ADMIN — Medication 5000 UNITS: at 10:50

## 2021-09-25 RX ADMIN — SODIUM CHLORIDE: 9 INJECTION, SOLUTION INTRAVENOUS at 12:39

## 2021-09-25 RX ADMIN — MONTELUKAST SODIUM 10 MG: 10 TABLET, FILM COATED ORAL at 22:05

## 2021-09-25 RX ADMIN — ENOXAPARIN SODIUM 30 MG: 30 INJECTION, SOLUTION INTRAVENOUS; SUBCUTANEOUS at 10:49

## 2021-09-25 RX ADMIN — Medication 100 MG: at 10:51

## 2021-09-25 RX ADMIN — Medication 10 ML: at 22:05

## 2021-09-25 RX ADMIN — CEFEPIME HYDROCHLORIDE 2000 MG: 2 INJECTION, POWDER, FOR SOLUTION INTRAVENOUS at 10:55

## 2021-09-25 RX ADMIN — MIDODRINE HYDROCHLORIDE 10 MG: 5 TABLET ORAL at 17:58

## 2021-09-25 RX ADMIN — MIDODRINE HYDROCHLORIDE 10 MG: 5 TABLET ORAL at 13:11

## 2021-09-25 RX ADMIN — SODIUM CHLORIDE SOLN NEBU 3% 4 ML: 3 NEBU SOLN at 02:20

## 2021-09-25 RX ADMIN — DOCUSATE SODIUM 100 MG: 50 LIQUID ORAL at 10:50

## 2021-09-25 RX ADMIN — PAROXETINE 10 MG: 10 TABLET, FILM COATED ORAL at 22:06

## 2021-09-25 RX ADMIN — FAMOTIDINE 20 MG: 20 TABLET, FILM COATED ORAL at 10:51

## 2021-09-25 RX ADMIN — ALBUTEROL SULFATE 2.5 MG: 2.5 SOLUTION RESPIRATORY (INHALATION) at 02:20

## 2021-09-25 RX ADMIN — SODIUM CHLORIDE SOLN NEBU 3% 4 ML: 3 NEBU SOLN at 10:54

## 2021-09-25 RX ADMIN — VALPROIC ACID 500 MG: 250 SOLUTION ORAL at 22:06

## 2021-09-25 RX ADMIN — PAROXETINE 10 MG: 10 TABLET, FILM COATED ORAL at 10:50

## 2021-09-25 RX ADMIN — ALBUTEROL SULFATE 2.5 MG: 2.5 SOLUTION RESPIRATORY (INHALATION) at 14:29

## 2021-09-25 RX ADMIN — OLANZAPINE 20 MG: 10 TABLET, FILM COATED ORAL at 22:06

## 2021-09-25 RX ADMIN — OLANZAPINE 20 MG: 10 TABLET, ORALLY DISINTEGRATING ORAL at 10:51

## 2021-09-25 RX ADMIN — ALBUTEROL SULFATE 2.5 MG: 2.5 SOLUTION RESPIRATORY (INHALATION) at 19:32

## 2021-09-25 RX ADMIN — ALBUTEROL SULFATE 2.5 MG: 2.5 SOLUTION RESPIRATORY (INHALATION) at 10:54

## 2021-09-25 RX ADMIN — Medication 10 ML: at 10:51

## 2021-09-25 ASSESSMENT — PAIN SCALES - GENERAL
PAINLEVEL_OUTOF10: 0
PAINLEVEL_OUTOF10: 0

## 2021-09-25 NOTE — PLAN OF CARE
Problem: Falls - Risk of:  Goal: Will remain free from falls  Description: Will remain free from falls  9/24/2021 2130 by Nava Garcia RN  Outcome: Met This Shift  9/24/2021 1230 by Nava Garcia RN  Outcome: Met This Shift  Goal: Absence of physical injury  Description: Absence of physical injury  9/24/2021 2130 by Nvaa Garcia RN  Outcome: Met This Shift  9/24/2021 1230 by Nava Garcia RN  Outcome: Met This Shift     Problem: Skin Integrity:  Goal: Will show no infection signs and symptoms  Description: Will show no infection signs and symptoms  9/24/2021 2130 by Nava Garcia RN  Outcome: Met This Shift  9/24/2021 1230 by Nava Garcia RN  Outcome: Met This Shift  Goal: Absence of new skin breakdown  Description: Absence of new skin breakdown  9/24/2021 2130 by Nava Garcia RN  Outcome: Met This Shift  9/24/2021 1230 by Nava Garcia RN  Outcome: Met This Shift     Problem: Gas Exchange - Impaired:  Goal: Levels of oxygenation will improve  Description: Levels of oxygenation will improve  9/24/2021 2130 by Nava Garcia RN  Outcome: Met This Shift  9/24/2021 1230 by Nava Garcia RN  Outcome: Met This Shift     Problem: Infection, Septic Shock:  Goal: Will show no infection signs and symptoms  Description: Will show no infection signs and symptoms  Outcome: Met This Shift     Problem: Tissue Perfusion, Altered:  Goal: Circulatory function within specified parameters  Description: Circulatory function within specified parameters  Outcome: Met This Shift     Problem: Inadequate oral food/beverage intake (NI-2.1)  Goal: Food and/or Nutrient Delivery  Description: Individualized approach for food/nutrient provision.   9/24/2021 1259 by Td Mcnulty RD, LD  Outcome: Met This Shift

## 2021-09-25 NOTE — PROGRESS NOTES
[facility]   [facility]    Admitting Date and Time: 9/21/2021 10:16 AM  Admit Dx: Sepsis ([facility] [A41.9]    Subjective: Admitted on 21st, came with fever, shortness of breath, change in mental status. Patient with ESRD, on hemodialysis, does have blindness, MRDD from group home. Patient was not on oxygen at the group home prior to coming to hospital.  Patient with PEG. Admitted with septic shock, needed pressors, evidence of acute respiratory failure, CT had shown bilateral lower lobe infiltrates. Patient Covid negative. Evidence of lactic acidosis. Did have atrial tachycardia. Per nephrology sepsis suspected secondary to aspiration from recent PEG tube issues. Remains on cefepime. POA had requested change in CODE STATUS, paperwork was completed on 23rd. renal has signed off. Pulmonary input pending    Patient was admitted with Sepsis ([facility] [A41.9]. Patient is resting, on nasal cannula, does try to obey some commands, more interactive compared to yesterday. Still does have cough. Per RN: Patient has new peripheral IV, can be discontinue groin triple-lumen as well as can patient go to [employer], both were okayed. ROS: denies fever, chills, cp, sob, n/v, HA unless stated above.      valproic acid  500 mg Per G Tube BID    midodrine  10 mg Oral TID WC    sodium chloride (Inhalant)  4 mL Nebulization Q6H    albuterol  2.5 mg Nebulization Q6H    famotidine  20 mg Per G Tube Daily    thiamine  100 mg Per G Tube Daily    polyethylene glycol  17 g Per G Tube Daily    PARoxetine  10 mg Per G Tube BID    OLANZapine zydis  20 mg Oral QAM    OLANZapine  20 mg Per G Tube Nightly    Vitamin D  5,000 Units Per G Tube Daily    docusate  100 mg Per G Tube BID    levothyroxine  150 mcg Per G Tube Daily    [Held by provider] metoprolol tartrate  75 mg Per G Tube BID    montelukast  10 mg Per G Tube Nightly    sodium chloride flush  5-40 mL IntraVENous 2 times per day    enoxaparin  30 mg SubCUTAneous Daily    cefepime  2,000 mg IntraVENous Q24H     bisacodyl, 10 mg, Daily PRN  medicated lip balm, , PRN  sodium chloride flush, 5-40 mL, PRN  sodium chloride, 25 mL, PRN  ondansetron, 4 mg, Q8H PRN   Or  ondansetron, 4 mg, Q6H PRN  acetaminophen, 650 mg, Q6H PRN   Or  acetaminophen, 650 mg, Q6H PRN         Objective:    /65   Pulse 89   Temp 98 °F (36.7 °C) (Axillary)   Resp 20   Ht 5' 3\" (1.6 m)   Wt 161 lb 4.8 oz (73.2 kg)   SpO2 98%   BMI 28.57 kg/m²   General Appearance: Resting on nasal cannula, no communication. Skin: warm and dry, no rash or erythema  Head: normocephalic and atraumatic  Eyes: pupils equal, round, and reactive to light, extraocular eye movements intact, conjunctivae normal  ENT: tympanic membrane, external ear and ear canal normal bilaterally, oropharynx clear and moist with normal mucous membranes  Neck: neck supple and non tender without mass, no thyromegaly or thyroid nodules, no cervical lymphadenopathy   Pulmonary/Chest: clear to auscultation bilaterally- no wheezes, rales or rhonchi, normal air movement, no respiratory distress  Cardiovascular: normal rate, normal S1 and S2, no gallops, intact distal pulses and no carotid bruits  Abdomen: soft, non-tender, non-distended, normal bowel sounds, no masses or organomegaly and does have a PEG tube, PEG tube site wet with somewhat leaking. Does have a right groin triple-lumen. Does have Curry catheter, makes urine. Does have a PEG tube and secured with abdominal binder.       Recent Labs     09/23/21  1032 09/24/21  0540 09/25/21  0230    139 139   K 3.4* 4.0 5.0   * 109* 106   CO2 23 25 26   BUN 27* 20 14   CREATININE 0.8 0.7 0.6   GLUCOSE 104* 129* 141*   CALCIUM 7.0* 7.8* 8.3*       Recent Labs     09/23/21  0555 09/24/21  0540 09/25/21  0230   WBC 14.9* 10.3 11.8*   RBC 2.77* 2.73* 2.79*   HGB 9.0* 8.8* 9.2*   HCT 30.4* 28.9* 29.0*   .7* 105.9* 103.9*   MCH 32.5 32.2 33.0 MCHC 29.6* 30.4* 31.7*   RDW 15.0 14.6 14.1   PLT 87* 92* 107*   MPV 13.4* 13.2* 12.9*     Sodium 147 /139  K3.4 /4.0  Phosphorus 2.0  Chloride 118  Calcium 7.1  Hemoglobin 8.8  Procalcitonin 42.1 on 23rd  WBC 11.8    Radiology:   CT HEAD WO CONTRAST   Final Result   1. There is no acute intracranial abnormality. Specifically, there is no   intracranial hemorrhage. 2. Atrophy and periventricular leukomalacia,         CT CHEST WO CONTRAST   Final Result   CT OF THE CHEST:      1. Pulmonale infiltrates in the dependent aspects of the lungs bilaterally,   left greater than right, concerning for pneumonia, possibly   aspiration-related. The appearance and distribution is not characteristic of   COVID-19, although it cannot be definitively excluded. 2. Large hiatal hernia. CT OF THE ABDOMEN AND PELVIS:      1.  No acute abnormality is seen in the abdomen or the pelvis. 2. Stable 2.3 cm left ovarian dermoid cyst.   3. Short segment of distal colonic diverticulosis without diverticulitis. 4. Gastrostomy tube in situ. CT ABDOMEN PELVIS WO CONTRAST Additional Contrast? None   Final Result   CT OF THE CHEST:      1. Pulmonale infiltrates in the dependent aspects of the lungs bilaterally,   left greater than right, concerning for pneumonia, possibly   aspiration-related. The appearance and distribution is not characteristic of   COVID-19, although it cannot be definitively excluded. 2. Large hiatal hernia. CT OF THE ABDOMEN AND PELVIS:      1.  No acute abnormality is seen in the abdomen or the pelvis. 2. Stable 2.3 cm left ovarian dermoid cyst.   3. Short segment of distal colonic diverticulosis without diverticulitis. 4. Gastrostomy tube in situ. XR CHEST PORTABLE   Final Result   No acute process.              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 *      Plan:  1. Septic shock, improving, now off of Levophed, patient out of ICU, still BP borderline, does remain on midodrine. 2. More due to HCAP, remains on cefepime, day 4.  3. Acute renal failure, remains on HD, nephrology following, no change  4. Hypernatremia, hyperchloremia, hypokalemia, improved, nephrology has signed off.  5. As requested by POA, patient is now CCA. 6. Patient with MRDD, blindness, at baseline. 7. Presented with fever, now improved, normal temperature for several days. More due to suspected aspiration pneumonia, with still BP borderline in spite of midodrine we will continue same antibiotic coverage. Antibiotics were started by pulmonary critical care, will wait for input from pulmonology group-pending at this time, though this is going to be a different group taking over. Nursing were told to renotify if patient was not seen after 24 hours of  consult. 8. Hypothyroidism, remains on her Synthroid at 150 MCG. 9. Remains on DVT prophylaxis with Lovenox and GI prophylaxis with famotidine. 10. DC planning, likely will depend upon duration and type of antibiotics planned, stabilizing of her blood pressure without any pressors, when okay with pulmonary, at this time patient is not ready for DC planning, however can be transferred out of stepdown unit. Also okay to DC TLC.            Electronically signed by Heidi Galeana MD on 9/25/2021 at 9:03 AM

## 2021-09-26 ENCOUNTER — APPOINTMENT (OUTPATIENT)
Dept: GENERAL RADIOLOGY | Age: 66
DRG: 871 | End: 2021-09-26
Payer: MEDICARE

## 2021-09-26 LAB
ALBUMIN SERPL-MCNC: 1.7 G/DL (ref 3.5–5.2)
ALP BLD-CCNC: 82 U/L (ref 35–104)
ALT SERPL-CCNC: 12 U/L (ref 0–32)
ANION GAP SERPL CALCULATED.3IONS-SCNC: 5 MMOL/L (ref 7–16)
ANISOCYTOSIS: ABNORMAL
AST SERPL-CCNC: 18 U/L (ref 0–31)
BASOPHILS ABSOLUTE: 0 E9/L (ref 0–0.2)
BASOPHILS RELATIVE PERCENT: 0 % (ref 0–2)
BILIRUB SERPL-MCNC: <0.2 MG/DL (ref 0–1.2)
BLOOD CULTURE, ROUTINE: NORMAL
BUN BLDV-MCNC: 10 MG/DL (ref 6–23)
CALCIUM SERPL-MCNC: 8.8 MG/DL (ref 8.6–10.2)
CHLORIDE BLD-SCNC: 105 MMOL/L (ref 98–107)
CO2: 27 MMOL/L (ref 22–29)
CREAT SERPL-MCNC: 0.6 MG/DL (ref 0.5–1)
CULTURE, BLOOD 2: NORMAL
EOSINOPHILS ABSOLUTE: 0 E9/L (ref 0.05–0.5)
EOSINOPHILS RELATIVE PERCENT: 0 % (ref 0–6)
GFR AFRICAN AMERICAN: >60
GFR NON-AFRICAN AMERICAN: >60 ML/MIN/1.73
GLUCOSE BLD-MCNC: 113 MG/DL (ref 74–99)
HCT VFR BLD CALC: 25.6 % (ref 34–48)
HEMOGLOBIN: 8.2 G/DL (ref 11.5–15.5)
HYPOCHROMIA: ABNORMAL
LYMPHOCYTES ABSOLUTE: 1.48 E9/L (ref 1.5–4)
LYMPHOCYTES RELATIVE PERCENT: 10.4 % (ref 20–42)
MAGNESIUM: 2 MG/DL (ref 1.6–2.6)
MCH RBC QN AUTO: 33.3 PG (ref 26–35)
MCHC RBC AUTO-ENTMCNC: 32 % (ref 32–34.5)
MCV RBC AUTO: 104.1 FL (ref 80–99.9)
MONOCYTES ABSOLUTE: 0.74 E9/L (ref 0.1–0.95)
MONOCYTES RELATIVE PERCENT: 5.2 % (ref 2–12)
NEUTROPHILS ABSOLUTE: 12.43 E9/L (ref 1.8–7.3)
NEUTROPHILS RELATIVE PERCENT: 84.4 % (ref 43–80)
NUCLEATED RED BLOOD CELLS: 0.9 /100 WBC
PDW BLD-RTO: 14.5 FL (ref 11.5–15)
PHOSPHORUS: 2.5 MG/DL (ref 2.5–4.5)
PLATELET # BLD: 128 E9/L (ref 130–450)
PMV BLD AUTO: 12.9 FL (ref 7–12)
POIKILOCYTES: ABNORMAL
POLYCHROMASIA: ABNORMAL
POTASSIUM SERPL-SCNC: 4.7 MMOL/L (ref 3.5–5)
RBC # BLD: 2.46 E12/L (ref 3.5–5.5)
SODIUM BLD-SCNC: 137 MMOL/L (ref 132–146)
TARGET CELLS: ABNORMAL
TOTAL PROTEIN: 5.2 G/DL (ref 6.4–8.3)
WBC # BLD: 14.8 E9/L (ref 4.5–11.5)

## 2021-09-26 PROCEDURE — 6370000000 HC RX 637 (ALT 250 FOR IP): Performed by: STUDENT IN AN ORGANIZED HEALTH CARE EDUCATION/TRAINING PROGRAM

## 2021-09-26 PROCEDURE — 94669 MECHANICAL CHEST WALL OSCILL: CPT

## 2021-09-26 PROCEDURE — 36415 COLL VENOUS BLD VENIPUNCTURE: CPT

## 2021-09-26 PROCEDURE — 85025 COMPLETE CBC W/AUTO DIFF WBC: CPT

## 2021-09-26 PROCEDURE — 71045 X-RAY EXAM CHEST 1 VIEW: CPT

## 2021-09-26 PROCEDURE — 94640 AIRWAY INHALATION TREATMENT: CPT

## 2021-09-26 PROCEDURE — 99233 SBSQ HOSP IP/OBS HIGH 50: CPT | Performed by: INTERNAL MEDICINE

## 2021-09-26 PROCEDURE — 80053 COMPREHEN METABOLIC PANEL: CPT

## 2021-09-26 PROCEDURE — 2580000003 HC RX 258: Performed by: INTERNAL MEDICINE

## 2021-09-26 PROCEDURE — 94668 MNPJ CHEST WALL SBSQ: CPT

## 2021-09-26 PROCEDURE — 1200000000 HC SEMI PRIVATE

## 2021-09-26 PROCEDURE — 6360000002 HC RX W HCPCS: Performed by: INTERNAL MEDICINE

## 2021-09-26 PROCEDURE — 6370000000 HC RX 637 (ALT 250 FOR IP): Performed by: INTERNAL MEDICINE

## 2021-09-26 PROCEDURE — 83735 ASSAY OF MAGNESIUM: CPT

## 2021-09-26 PROCEDURE — 84100 ASSAY OF PHOSPHORUS: CPT

## 2021-09-26 RX ORDER — MIDODRINE HYDROCHLORIDE 5 MG/1
7.5 TABLET ORAL
Status: DISCONTINUED | OUTPATIENT
Start: 2021-09-26 | End: 2021-09-30 | Stop reason: HOSPADM

## 2021-09-26 RX ADMIN — OLANZAPINE 20 MG: 10 TABLET, ORALLY DISINTEGRATING ORAL at 09:37

## 2021-09-26 RX ADMIN — VALPROIC ACID 500 MG: 250 SOLUTION ORAL at 20:40

## 2021-09-26 RX ADMIN — ALBUTEROL SULFATE 2.5 MG: 2.5 SOLUTION RESPIRATORY (INHALATION) at 19:37

## 2021-09-26 RX ADMIN — MIDODRINE HYDROCHLORIDE 10 MG: 5 TABLET ORAL at 12:37

## 2021-09-26 RX ADMIN — DOCUSATE SODIUM 100 MG: 50 LIQUID ORAL at 20:40

## 2021-09-26 RX ADMIN — DOCUSATE SODIUM 100 MG: 50 LIQUID ORAL at 09:38

## 2021-09-26 RX ADMIN — LEVOTHYROXINE SODIUM 150 MCG: 75 TABLET ORAL at 06:53

## 2021-09-26 RX ADMIN — CEFEPIME HYDROCHLORIDE 2000 MG: 2 INJECTION, POWDER, FOR SOLUTION INTRAVENOUS at 12:37

## 2021-09-26 RX ADMIN — Medication 5000 UNITS: at 09:36

## 2021-09-26 RX ADMIN — Medication 10 ML: at 20:40

## 2021-09-26 RX ADMIN — FAMOTIDINE 20 MG: 20 TABLET, FILM COATED ORAL at 09:36

## 2021-09-26 RX ADMIN — PAROXETINE 10 MG: 10 TABLET, FILM COATED ORAL at 09:37

## 2021-09-26 RX ADMIN — ENOXAPARIN SODIUM 30 MG: 30 INJECTION, SOLUTION INTRAVENOUS; SUBCUTANEOUS at 09:36

## 2021-09-26 RX ADMIN — MONTELUKAST SODIUM 10 MG: 10 TABLET, FILM COATED ORAL at 20:40

## 2021-09-26 RX ADMIN — MIDODRINE HYDROCHLORIDE 7.5 MG: 5 TABLET ORAL at 16:57

## 2021-09-26 RX ADMIN — ALBUTEROL SULFATE 2.5 MG: 2.5 SOLUTION RESPIRATORY (INHALATION) at 01:26

## 2021-09-26 RX ADMIN — SODIUM CHLORIDE SOLN NEBU 3% 4 ML: 3 NEBU SOLN at 13:33

## 2021-09-26 RX ADMIN — SODIUM CHLORIDE: 9 INJECTION, SOLUTION INTRAVENOUS at 14:24

## 2021-09-26 RX ADMIN — SODIUM CHLORIDE SOLN NEBU 3% 4 ML: 3 NEBU SOLN at 09:32

## 2021-09-26 RX ADMIN — POLYETHYLENE GLYCOL 3350 17 G: 17 POWDER, FOR SOLUTION ORAL at 09:36

## 2021-09-26 RX ADMIN — Medication 100 MG: at 09:36

## 2021-09-26 RX ADMIN — MIDODRINE HYDROCHLORIDE 10 MG: 5 TABLET ORAL at 09:36

## 2021-09-26 RX ADMIN — VALPROIC ACID 500 MG: 250 SOLUTION ORAL at 09:37

## 2021-09-26 RX ADMIN — SODIUM CHLORIDE SOLN NEBU 3% 4 ML: 3 NEBU SOLN at 01:26

## 2021-09-26 RX ADMIN — ALBUTEROL SULFATE 2.5 MG: 2.5 SOLUTION RESPIRATORY (INHALATION) at 13:33

## 2021-09-26 RX ADMIN — OLANZAPINE 20 MG: 10 TABLET, FILM COATED ORAL at 20:40

## 2021-09-26 RX ADMIN — Medication 10 ML: at 09:38

## 2021-09-26 RX ADMIN — PAROXETINE 10 MG: 10 TABLET, FILM COATED ORAL at 20:40

## 2021-09-26 RX ADMIN — SODIUM CHLORIDE SOLN NEBU 3% 4 ML: 3 NEBU SOLN at 19:36

## 2021-09-26 RX ADMIN — ALBUTEROL SULFATE 2.5 MG: 2.5 SOLUTION RESPIRATORY (INHALATION) at 09:31

## 2021-09-26 NOTE — PROGRESS NOTES
Consult to Dr. Jen Reid, Pulmonology, resent out per Dr. Calista Canavan request.  Electronically signed by Carol Ann Tadeo RN on 9/26/2021 at 12:42 PM     Dr. Jen Reid will see the pt.   Electronically signed by Carol Ann Tadeo RN on 9/26/2021 at 12:43 PM

## 2021-09-26 NOTE — PROGRESS NOTES
[facility]   Progress Note    Admitting Date and Time: 9/21/2021 10:16 AM  Admit Dx: Sepsis ([facility] [A41.9]    Subjective: Admitted on 21st, came with fever, shortness of breath, change in mental status. Patient with ESRD, on hemodialysis, does have blindness, MRDD from group home. Patient was not on oxygen at the group home prior to coming to hospital.  Patient with PEG. Admitted with septic shock, needed pressors, evidence of acute respiratory failure, CT had shown bilateral lower lobe infiltrates. Patient Covid negative. Evidence of lactic acidosis. Did have atrial tachycardia. Per nephrology sepsis suspected secondary to aspiration from recent PEG tube issues. Remains on cefepime. POA had requested change in CODE STATUS, paperwork was completed on 23rd. renal has signed off. Pulmonary input pending    Patient was admitted with Sepsis ([facility] [A41.9]. Patient is resting, on nasal cannula, today in deep sleep, no good communication. Per RN: Pulmonary has been renotified of the pending consult. ROS: denies fever, chills, cp, sob, n/v, HA unless stated above.      valproic acid  500 mg Per G Tube BID    midodrine  10 mg Oral TID WC    sodium chloride (Inhalant)  4 mL Nebulization Q6H    albuterol  2.5 mg Nebulization Q6H    famotidine  20 mg Per G Tube Daily    thiamine  100 mg Per G Tube Daily    polyethylene glycol  17 g Per G Tube Daily    PARoxetine  10 mg Per G Tube BID    OLANZapine zydis  20 mg Oral QAM    OLANZapine  20 mg Per G Tube Nightly    Vitamin D  5,000 Units Per G Tube Daily    docusate  100 mg Per G Tube BID    levothyroxine  150 mcg Per G Tube Daily    [Held by provider] metoprolol tartrate  75 mg Per G Tube BID    montelukast  10 mg Per G Tube Nightly    sodium chloride flush  5-40 mL IntraVENous 2 times per day    enoxaparin  30 mg SubCUTAneous Daily    cefepime  2,000 mg IntraVENous Q24H     bisacodyl, 10 mg, Daily PRN  medicated lip balm, , PRN  sodium chloride flush, 5-40 mL, PRN  sodium chloride, 25 mL, PRN  ondansetron, 4 mg, Q8H PRN   Or  ondansetron, 4 mg, Q6H PRN  acetaminophen, 650 mg, Q6H PRN   Or  acetaminophen, 650 mg, Q6H PRN         Objective:    /65   Pulse 102   Temp 97.1 °F (36.2 °C) (Axillary)   Resp 18   Ht 5' 3\" (1.6 m)   Wt 162 lb (73.5 kg)   SpO2 93%   BMI 28.70 kg/m²   General Appearance: Resting on nasal cannula, no communication. Skin: warm and dry, no rash or erythema  Head: normocephalic and atraumatic  Eyes: pupils equal, round, and reactive to light, extraocular eye movements intact, conjunctivae normal  ENT: tympanic membrane, external ear and ear canal normal bilaterally, oropharynx clear and moist with normal mucous membranes  Neck: neck supple and non tender without mass, no thyromegaly or thyroid nodules, no cervical lymphadenopathy   Pulmonary/Chest: clear to auscultation bilaterally- no wheezes, rales or rhonchi, normal air movement, no respiratory distress  Cardiovascular: normal rate, normal S1 and S2, no gallops, intact distal pulses and no carotid bruits  Abdomen: soft, non-tender, non-distended, normal bowel sounds, no masses or organomegaly and does have a PEG tube, PEG tube site wet with somewhat leaking. Does have a right groin triple-lumen. Does have Curry catheter, makes urine. Does have a PEG tube and secured with abdominal binder.       Recent Labs     09/24/21  0540 09/25/21  0230 09/26/21  0541    139 137   K 4.0 5.0 4.7   * 106 105   CO2 25 26 27   BUN 20 14 10   CREATININE 0.7 0.6 0.6   GLUCOSE 129* 141* 113*   CALCIUM 7.8* 8.3* 8.8       Recent Labs     09/24/21  0540 09/25/21  0230 09/26/21  0541   WBC 10.3 11.8* 14.8*   RBC 2.73* 2.79* 2.46*   HGB 8.8* 9.2* 8.2*   HCT 28.9* 29.0* 25.6*   .9* 103.9* 104.1*   MCH 32.2 33.0 33.3   MCHC 30.4* 31.7* 32.0   RDW 14.6 14.1 14.5   PLT 92* 107* 128*   MPV 13.2* 12.9* 12.9*     Sodium 147 /139  K3.4 /4.0  Phosphorus 2.0  Chloride 118  Calcium 7.1  Hemoglobin 8.8  Procalcitonin 42.1 on 23rd  WBC 11.8    Radiology:   CT HEAD WO CONTRAST   Final Result   1. There is no acute intracranial abnormality. Specifically, there is no   intracranial hemorrhage. 2. Atrophy and periventricular leukomalacia,         CT CHEST WO CONTRAST   Final Result   CT OF THE CHEST:      1. Pulmonale infiltrates in the dependent aspects of the lungs bilaterally,   left greater than right, concerning for pneumonia, possibly   aspiration-related. The appearance and distribution is not characteristic of   COVID-19, although it cannot be definitively excluded. 2. Large hiatal hernia. CT OF THE ABDOMEN AND PELVIS:      1.  No acute abnormality is seen in the abdomen or the pelvis. 2. Stable 2.3 cm left ovarian dermoid cyst.   3. Short segment of distal colonic diverticulosis without diverticulitis. 4. Gastrostomy tube in situ. CT ABDOMEN PELVIS WO CONTRAST Additional Contrast? None   Final Result   CT OF THE CHEST:      1. Pulmonale infiltrates in the dependent aspects of the lungs bilaterally,   left greater than right, concerning for pneumonia, possibly   aspiration-related. The appearance and distribution is not characteristic of   COVID-19, although it cannot be definitively excluded. 2. Large hiatal hernia. CT OF THE ABDOMEN AND PELVIS:      1.  No acute abnormality is seen in the abdomen or the pelvis. 2. Stable 2.3 cm left ovarian dermoid cyst.   3. Short segment of distal colonic diverticulosis without diverticulitis. 4. Gastrostomy tube in situ. XR CHEST PORTABLE   Final Result   No acute process.              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 *      Plan:  1. Septic shock, improving, now off of Levophed, patient out of ICU, still BP borderline, does remain on midodrine. Will try to decrease midodrine to 7.5 mg 3 times a day from 10 3 times daily. 2. More due to HCAP, remains on cefepime, day 5. We will also repeat chest x-ray today. 3. Acute renal failure, remains on HD, nephrology following, no change  4. Hypernatremia, hyperchloremia, hypokalemia, improved, nephrology has signed off.  5. As requested by POA, patient is now CCA. 6. Patient with MRDD, blindness, at baseline. 7. Presented with fever, now improved, normal temperature for several days. More due to suspected aspiration pneumonia, for now he will try to decrease midodrine, will plan to continue 7 days of cefepime, pulmonary input is pending, I will repeat a chest x-ray. 8. Hypothyroidism, remains on her Synthroid at 150 MCG. 9. Remains on DVT prophylaxis with Lovenox and GI prophylaxis with famotidine. 10. Patient more sleepy today, did cut back on the morning dose of Zyprexa back to 15 as she was taking at [facility] from 20. 11. DC planning, likely will be back to [facility] on completion of antibiotics. If no other complications arise.            Electronically signed by Sana Barton MD on 9/26/2021 at 8:43 AM

## 2021-09-26 NOTE — PROGRESS NOTES
Notified Dr. Wilmer Durham that CXR he ordered has resulted.   Electronically signed by Tracey Whittington RN on 9/26/2021 at 4:49 PM

## 2021-09-26 NOTE — PROGRESS NOTES
OK with Dr. Parks Leader to increase tube feed to 45mL/hr as recommended by dietary.   Electronically signed by Nadia Hairston RN on 9/26/2021 at 12:05 PM

## 2021-09-27 LAB
ALBUMIN SERPL-MCNC: 1.9 G/DL (ref 3.5–5.2)
ALP BLD-CCNC: 104 U/L (ref 35–104)
ALT SERPL-CCNC: 15 U/L (ref 0–32)
ANION GAP SERPL CALCULATED.3IONS-SCNC: 4 MMOL/L (ref 7–16)
ANISOCYTOSIS: ABNORMAL
AST SERPL-CCNC: 24 U/L (ref 0–31)
ATYPICAL LYMPHOCYTE RELATIVE PERCENT: 1.8 % (ref 0–4)
BASOPHILIC STIPPLING: ABNORMAL
BASOPHILS ABSOLUTE: 0 E9/L (ref 0–0.2)
BASOPHILS RELATIVE PERCENT: 0 % (ref 0–2)
BILIRUB SERPL-MCNC: <0.2 MG/DL (ref 0–1.2)
BUN BLDV-MCNC: 11 MG/DL (ref 6–23)
CALCIUM SERPL-MCNC: 9.1 MG/DL (ref 8.6–10.2)
CHLORIDE BLD-SCNC: 105 MMOL/L (ref 98–107)
CO2: 28 MMOL/L (ref 22–29)
CREAT SERPL-MCNC: 0.7 MG/DL (ref 0.5–1)
EOSINOPHILS ABSOLUTE: 0.37 E9/L (ref 0.05–0.5)
EOSINOPHILS RELATIVE PERCENT: 2.6 % (ref 0–6)
GFR AFRICAN AMERICAN: >60
GFR NON-AFRICAN AMERICAN: >60 ML/MIN/1.73
GLUCOSE BLD-MCNC: 119 MG/DL (ref 74–99)
HCT VFR BLD CALC: 28.1 % (ref 34–48)
HEMOGLOBIN: 8.8 G/DL (ref 11.5–15.5)
HYPOCHROMIA: ABNORMAL
LYMPHOCYTES ABSOLUTE: 2.56 E9/L (ref 1.5–4)
LYMPHOCYTES RELATIVE PERCENT: 16.5 % (ref 20–42)
MAGNESIUM: 1.9 MG/DL (ref 1.6–2.6)
MCH RBC QN AUTO: 32.4 PG (ref 26–35)
MCHC RBC AUTO-ENTMCNC: 31.3 % (ref 32–34.5)
MCV RBC AUTO: 103.3 FL (ref 80–99.9)
METAMYELOCYTES RELATIVE PERCENT: 1.7 % (ref 0–1)
MONOCYTES ABSOLUTE: 1.28 E9/L (ref 0.1–0.95)
MONOCYTES RELATIVE PERCENT: 8.7 % (ref 2–12)
MYELOCYTE PERCENT: 0.9 % (ref 0–0)
NEUTROPHILS ABSOLUTE: 9.94 E9/L (ref 1.8–7.3)
NEUTROPHILS RELATIVE PERCENT: 67.8 % (ref 43–80)
NUCLEATED RED BLOOD CELLS: 0.9 /100 WBC
PDW BLD-RTO: 14.5 FL (ref 11.5–15)
PHOSPHORUS: 3.5 MG/DL (ref 2.5–4.5)
PLATELET # BLD: 221 E9/L (ref 130–450)
PMV BLD AUTO: 11.9 FL (ref 7–12)
POIKILOCYTES: ABNORMAL
POLYCHROMASIA: ABNORMAL
POTASSIUM SERPL-SCNC: 5.3 MMOL/L (ref 3.5–5)
RBC # BLD: 2.72 E12/L (ref 3.5–5.5)
REASON FOR REJECTION: NORMAL
REJECTED TEST: NORMAL
SODIUM BLD-SCNC: 137 MMOL/L (ref 132–146)
TARGET CELLS: ABNORMAL
TEAR DROP CELLS: ABNORMAL
TOTAL PROTEIN: 6 G/DL (ref 6.4–8.3)
VACUOLATED NEUTROPHILS: ABNORMAL
WBC # BLD: 14.2 E9/L (ref 4.5–11.5)

## 2021-09-27 PROCEDURE — 99223 1ST HOSP IP/OBS HIGH 75: CPT | Performed by: INTERNAL MEDICINE

## 2021-09-27 PROCEDURE — 83735 ASSAY OF MAGNESIUM: CPT

## 2021-09-27 PROCEDURE — 6370000000 HC RX 637 (ALT 250 FOR IP): Performed by: INTERNAL MEDICINE

## 2021-09-27 PROCEDURE — 80053 COMPREHEN METABOLIC PANEL: CPT

## 2021-09-27 PROCEDURE — 6360000002 HC RX W HCPCS: Performed by: INTERNAL MEDICINE

## 2021-09-27 PROCEDURE — 99232 SBSQ HOSP IP/OBS MODERATE 35: CPT | Performed by: INTERNAL MEDICINE

## 2021-09-27 PROCEDURE — 2580000003 HC RX 258: Performed by: INTERNAL MEDICINE

## 2021-09-27 PROCEDURE — 94640 AIRWAY INHALATION TREATMENT: CPT

## 2021-09-27 PROCEDURE — 2700000000 HC OXYGEN THERAPY PER DAY

## 2021-09-27 PROCEDURE — 36415 COLL VENOUS BLD VENIPUNCTURE: CPT

## 2021-09-27 PROCEDURE — 85025 COMPLETE CBC W/AUTO DIFF WBC: CPT

## 2021-09-27 PROCEDURE — 84100 ASSAY OF PHOSPHORUS: CPT

## 2021-09-27 PROCEDURE — 94669 MECHANICAL CHEST WALL OSCILL: CPT

## 2021-09-27 PROCEDURE — 1200000000 HC SEMI PRIVATE

## 2021-09-27 RX ADMIN — Medication 10 ML: at 09:02

## 2021-09-27 RX ADMIN — CEFEPIME HYDROCHLORIDE 2000 MG: 2 INJECTION, POWDER, FOR SOLUTION INTRAVENOUS at 11:09

## 2021-09-27 RX ADMIN — ENOXAPARIN SODIUM 30 MG: 30 INJECTION, SOLUTION INTRAVENOUS; SUBCUTANEOUS at 08:54

## 2021-09-27 RX ADMIN — Medication 5000 UNITS: at 08:54

## 2021-09-27 RX ADMIN — MIDODRINE HYDROCHLORIDE 7.5 MG: 5 TABLET ORAL at 11:09

## 2021-09-27 RX ADMIN — ALBUTEROL SULFATE 2.5 MG: 2.5 SOLUTION RESPIRATORY (INHALATION) at 01:07

## 2021-09-27 RX ADMIN — OLANZAPINE 15 MG: 10 TABLET, ORALLY DISINTEGRATING ORAL at 08:52

## 2021-09-27 RX ADMIN — PAROXETINE 10 MG: 10 TABLET, FILM COATED ORAL at 08:53

## 2021-09-27 RX ADMIN — Medication 10 ML: at 21:39

## 2021-09-27 RX ADMIN — SODIUM CHLORIDE SOLN NEBU 3% 4 ML: 3 NEBU SOLN at 01:06

## 2021-09-27 RX ADMIN — VALPROIC ACID 500 MG: 250 SOLUTION ORAL at 08:51

## 2021-09-27 RX ADMIN — ALBUTEROL SULFATE 2.5 MG: 2.5 SOLUTION RESPIRATORY (INHALATION) at 07:50

## 2021-09-27 RX ADMIN — ALBUTEROL SULFATE 2.5 MG: 2.5 SOLUTION RESPIRATORY (INHALATION) at 14:00

## 2021-09-27 RX ADMIN — POLYETHYLENE GLYCOL 3350 17 G: 17 POWDER, FOR SOLUTION ORAL at 08:54

## 2021-09-27 RX ADMIN — OLANZAPINE 20 MG: 10 TABLET, FILM COATED ORAL at 21:40

## 2021-09-27 RX ADMIN — LEVOTHYROXINE SODIUM 150 MCG: 75 TABLET ORAL at 06:56

## 2021-09-27 RX ADMIN — MIDODRINE HYDROCHLORIDE 7.5 MG: 5 TABLET ORAL at 08:53

## 2021-09-27 RX ADMIN — PAROXETINE 10 MG: 10 TABLET, FILM COATED ORAL at 21:40

## 2021-09-27 RX ADMIN — Medication 100 MG: at 08:53

## 2021-09-27 RX ADMIN — MIDODRINE HYDROCHLORIDE 7.5 MG: 5 TABLET ORAL at 17:44

## 2021-09-27 RX ADMIN — SODIUM CHLORIDE SOLN NEBU 3% 4 ML: 3 NEBU SOLN at 07:51

## 2021-09-27 RX ADMIN — AMPICILLIN AND SULBACTAM 3000 MG: 1; 2 INJECTION, POWDER, FOR SOLUTION INTRAMUSCULAR; INTRAVENOUS at 21:42

## 2021-09-27 RX ADMIN — VALPROIC ACID 500 MG: 250 SOLUTION ORAL at 21:40

## 2021-09-27 RX ADMIN — DOCUSATE SODIUM 100 MG: 50 LIQUID ORAL at 21:40

## 2021-09-27 RX ADMIN — ALBUTEROL SULFATE 2.5 MG: 2.5 SOLUTION RESPIRATORY (INHALATION) at 21:02

## 2021-09-27 RX ADMIN — FAMOTIDINE 20 MG: 20 TABLET, FILM COATED ORAL at 08:54

## 2021-09-27 RX ADMIN — SODIUM CHLORIDE SOLN NEBU 3% 4 ML: 3 NEBU SOLN at 14:00

## 2021-09-27 RX ADMIN — SODIUM CHLORIDE SOLN NEBU 3% 4 ML: 3 NEBU SOLN at 21:02

## 2021-09-27 RX ADMIN — MONTELUKAST SODIUM 10 MG: 10 TABLET, FILM COATED ORAL at 21:40

## 2021-09-27 RX ADMIN — DOCUSATE SODIUM 100 MG: 50 LIQUID ORAL at 08:54

## 2021-09-27 NOTE — CONSULTS
Pulmonary [address] [facility]                             Pulmonary Consult/Progress Note :          Patient: Flory Pope  MRN: [id number]  : 1955      Date of Admission: .2021 10:16 AM    Consulting Physician:        Reason for Consultation:Dr Downs   CC : follow up on pneumonia   HPI:   Flory Pope is a 78y.o. year old with   significant past medical history of  MRDD, chronic kidney disease and recent admission on 2021 to ICU with hypovolemia shock and possible septic shock . Barbara Stands Patient is nonverbal at baseline, answering my questions history obtained via chart review. Patient presented emergency department  On  due to complaint of fever from her group home complaint of fever. Reported fever was 103 when she arrived to the ER. Patient was given fluid resuscitation was noted to have pneumonia on her CAT scans and was started on vasopressors due to her continued hypotension. She was on vancomycin and cefepime in the ER as well. Patient also still noted to have an acute kidney injury nephrology was also consulted. .     Most of history obtained from chart and records as patient non verbal and does not give any information   She had high wbc on admission     PAST MEDICAL HISTORY:   Past Medical History:   Diagnosis Date    Acute kidney injury ([address] 01/15/2017    d/t Vancomycin, on Dialysis [facility] right chest    Blind in both eyes     Essential hypertension 2021    Gastroesophageal reflux disease without esophagitis 2021    Hemodialysis patient ([address]     Hyperthyroidism     MR (mental retardation)     Osteoarthritis     Peripheral vascular disease ([address]     Pneumonia 2017    Pneumonia due to infectious organism 2017    Sepsis ([address] 3/4/2021       PAST SURGICAL HISTORY:   Past Surgical History:   Procedure Laterality Date    BRONCHOSCOPY  02/10/2017    CHEST TUBE INSERTION Right 2017    GASTROSTOMY TUBE PLACEMENT N/A 3/7/2021    EGD PEG TUBE PLACEMENT performed by Milburn Lanes, MD at [address] Right 01/17/2017    tessio insertion     OTHER SURGICAL HISTORY  01/25/2017    PEG tube insertion       FAMILY HISTORY:   History reviewed. No pertinent family history. SOCIAL HISTORY:   Social History     Socioeconomic History    Marital status: Single     Spouse name: Not on file    Number of children: Not on file    Years of education: Not on file    Highest education level: Not on file   Occupational History    Not on file   Tobacco Use    Smoking status: Never Smoker    Smokeless tobacco: Never Used   Vaping Use    Vaping Use: Never used   Substance and Sexual Activity    Alcohol use: No    Drug use: No    Sexual activity: Never   Other Topics Concern    Not on file   Social History Narrative    Not on file     Social Determinants of Health     Financial Resource Strain:     Difficulty of Paying Living Expenses:    Food Insecurity:     Worried About Running Out of Food in the Last Year:     [address] in the Last Year:    Transportation Needs:     Lack of Transportation (Medical):      Lack of Transportation (Non-Medical):    Physical Activity:     Days of Exercise per Week:     Minutes of Exercise per Session:    Stress:     Feeling of Stress :    Social Connections:     Frequency of Communication with Friends and Family:     Frequency of Social Gatherings with Friends and Family:     Attends Jain Services:     Active Member of Clubs or Organizations:     Attends Club or Organization Meetings:     Marital Status:    Intimate Partner Violence:     Fear of Current or Ex-Partner:     Emotionally Abused:     Physically Abused:     Sexually Abused:      Social History     Tobacco Use   Smoking Status Never Smoker   Smokeless Tobacco Never Used     Social History     Substance and Sexual Activity   Alcohol Use No     Social History     Substance and Sexual Activity   Drug Use No             HOME MEDICATIONS:  Prior to Admission medications    Medication Sig Start Date End Date Taking?  Authorizing Provider   famotidine (PEPCID) 20 MG tablet 20 mg by Per G Tube route 2 times daily   Yes Historical Provider, MD   ferrous sulfate (IRON 325) 325 (65 Fe) MG tablet 325 mg by Per G Tube route daily (with breakfast)   Yes Historical Provider, MD   folic acid (FOLVITE) 1 MG tablet 1 mg by Per G Tube route daily   Yes Historical Provider, MD   levothyroxine (SYNTHROID) 150 MCG tablet 150 mcg by Per G Tube route Daily   Yes Historical Provider, MD   metoprolol tartrate (LOPRESSOR) 25 MG tablet 75 mg by Per G Tube route 2 times daily Indications: *HOLD IF SBP<100 AND/OR PULSE <60*   Yes Historical Provider, MD   montelukast (SINGULAIR) 10 MG tablet 10 mg by Per G Tube route nightly   Yes Historical Provider, MD   PARoxetine (PAXIL) 10 MG tablet 10 mg by Per G Tube route 2 times daily   Yes Historical Provider, MD   vitamin B-1 (THIAMINE) 100 MG tablet 100 mg by Per G Tube route daily   Yes Historical Provider, MD   Cholecalciferol (VITAMIN D3) 125 MCG (5000 UT) TABS 5,000 Units by Per G Tube route daily   Yes Historical Provider, MD   acetaminophen (TYLENOL) 325 MG tablet 650 mg by Per G Tube route every 4 hours as needed for Pain or Fever   Yes Historical Provider, MD   bismuth subsalicylate (PEPTO BISMOL) 262 MG/15ML suspension 30 mLs by Per G Tube route every 6 hours as needed for Indigestion, Heartburn, Diarrhea, Nausea or Other (GAS)   Yes Historical Provider, MD   medicated lip balm (BLISTEX/CARMEX) 2-2.5-6.6 % STCK Apply topically as needed for Dry Lips (CRACKED LIPS)   Yes Historical Provider, MD   bisacodyl (DULCOLAX) 10 MG suppository Place 10 mg rectally daily as needed for Constipation (NO BM FOR 3 DAYS)   Yes Historical Provider, MD   SUNSCREENS EX Apply topically as needed (SUNBURN PREVENTION) Indications: *SPF 48*   Yes Historical Provider, MD Dextromethorphan-guaiFENesin  MG/5ML SYRP 10 mLs by Per G Tube route every 4 hours as needed for Cough (CONGESTION)   Yes Historical Provider, MD   Neomycin-Bacitracin-Polymyxin (TRIPLE ANTIBIOTIC) OINT Apply topically 2 times daily as needed (CUTS/SCRAPES/SKIN ABRASIONS)   Yes Historical Provider, MD   bumetanide (BUMEX) 1 MG tablet 1 mg by Per G Tube route as needed (SWELLING)   Yes Historical Provider, MD   OLANZapine zydis (ZYPREXA ZYDIS) 5 MG disintegrating tablet Take 20 mg by mouth every morning Indications: VIA G-TUBE *TAKE ALONG WITH 15MG=20MG*  *SEE OTHER ORDER*   Yes Historical Provider, MD   OLANZapine zydis (ZYPREXA ZYDIS) 15 MG disintegrating tablet 20 mg every morning Indications: VIA G-TUBE *TAKE ALONG WITH 5MG=20MG*  *SEE OTHER ORDER*   Yes Historical Provider, MD   OLANZapine (ZYPREXA) 20 MG tablet 20 mg by Per G Tube route nightly   Yes Historical Provider, MD   LORazepam (ATIVAN) 2 MG tablet 2 mg by Per G Tube route as needed (2HRS BEFORE DENTAL APPT, TAKE 2ND DOSE TO DENTAL APPT).     Yes Historical Provider, MD   docusate (COLACE) 50 MG/5ML liquid 10 mLs by Per G Tube route 2 times daily 3/31/21  Yes Catie Guzmán, DO   valproic acid (DEPACON) 250 MG/5ML SOLN oral solution 20 mLs by Per G Tube route 2 times daily 3/16/21  Yes Xander Gomez, DO   polyethylene glycol (GLYCOLAX) 17 g packet 17 g by Per G Tube route daily 3/16/21  Yes Xander Gomez, DO   NATURAL VITAMIN D-3 125 MCG (5000 UT) TABS tablet TAKE 1 TABLET VIA G-TUBE ONCE DAILY 5/16/21   Amanda Christianson APRN - CNP       CURRENT MEDICATIONS:  Current Facility-Administered Medications: [START ON 9/27/2021] OLANZapine zydis (ZYPREXA) disintegrating tablet 15 mg, 15 mg, Oral, QAM  midodrine (PROAMATINE) tablet 7.5 mg, 7.5 mg, Oral, TID WC  valproic acid (DEPAKENE) 250 MG/5ML oral solution 500 mg, 500 mg, Per G Tube, BID  sodium chloride (Inhalant) 3 % nebulizer solution 4 mL, 4 mL, Nebulization, Q6H  albuterol (PROVENTIL) 09/26/21 162 lb (73.5 kg)   08/31/21 150 lb (68 kg)   08/27/21 150 lb (68 kg)     Temp Readings from Last 3 Encounters:   09/26/21 97.8 °F (36.6 °C) (Axillary)   08/31/21 97 °F (36.1 °C) (Temporal)   08/27/21 97.3 °F (36.3 °C) (Temporal)     TMAX:  BP Readings from Last 3 Encounters:   09/26/21 120/60   09/15/21 108/60   08/31/21 101/66     Pulse Readings from Last 3 Encounters:   09/26/21 102   09/15/21 80   08/31/21 90           INTAKE/OUTPUTS:  I/O last 3 completed shifts:   In: 1727 [NG/GT:2548]  Out: 1850 [Urine:1850]    Intake/Output Summary (Last 24 hours) at 9/26/2021 2011  Last data filed at 9/26/2021 1746  Gross per 24 hour   Intake 588 ml   Output 1900 ml   Net -1312 ml       General Appearance:   Non verbal   Neck: neck supple and non tender without mass, no thyromegaly, no thyroid nodules and no cervical adenopathy   Pulmonary/Chest:rhonchi bilteral    Cardiovascular: normal rate, regular rhythm, normal S1 and S2, no murmurs, rubs, clicks or gallops, distal pulses intact, no carotid bruits, no murmurs, no gallops, no carotid bruits and no JVD   Abdomen: obese, soft, non-tender, non-distended, normal bowel sounds, no masses or organomegaly   Extremitiesno edema   Musculoskeletal: normal range of motion, no joint swelling, deformity or tenderness   Neurologic awake ,alert but no communication     LABS/IMAGING:    CBC:  Lab Results   Component Value Date    WBC 14.8 (H) 09/26/2021    HGB 8.2 (L) 09/26/2021    HCT 25.6 (L) 09/26/2021    .1 (H) 09/26/2021     (L) 09/26/2021    LYMPHOPCT 10.4 (L) 09/26/2021    RBC 2.46 (L) 09/26/2021    MCH 33.3 09/26/2021    MCHC 32.0 09/26/2021    RDW 14.5 09/26/2021    NEUTOPHILPCT 84.4 (H) 09/26/2021    MONOPCT 5.2 09/26/2021    BASOPCT 0.0 09/26/2021    NEUTROABS 12.43 (H) 09/26/2021    LYMPHSABS 1.48 (L) 09/26/2021    MONOSABS 0.74 09/26/2021    EOSABS 0.00 (L) 09/26/2021    BASOSABS 0.00 09/26/2021       Recent Labs     09/26/21  0541 09/25/21  0230 09/24/21  0540   WBC 14.8* 11.8* 10.3   HGB 8.2* 9.2* 8.8*   HCT 25.6* 29.0* 28.9*   .1* 103.9* 105.9*   * 107* 92*       BMP:   Recent Labs     09/24/21  0540 09/25/21  0230 09/26/21  0541    139 137   K 4.0 5.0 4.7   * 106 105   CO2 25 26 27   PHOS 2.0* 2.7 2.5   BUN 20 14 10   CREATININE 0.7 0.6 0.6       MG:   Lab Results   Component Value Date    MG 2.0 09/26/2021     Ca/Phos:   Lab Results   Component Value Date    CALCIUM 8.8 09/26/2021    PHOS 2.5 09/26/2021     Amylase: No results found for: AMYLASE  Lipase:   Lab Results   Component Value Date    LIPASE 39 08/16/2021     LIVER PROFILE:   Recent Labs     09/24/21  0540 09/25/21  0230 09/26/21  0541   AST 15 17 18   ALT 12 11 12   BILITOT 0.3 0.2 <0.2   ALKPHOS 76 75 82       PT/INR: No results for input(s): PROTIME, INR in the last 72 hours. APTT: No results for input(s): APTT in the last 72 hours.     Cardiac Enzymes:  Lab Results   Component Value Date    CKTOTAL 62 01/19/2017    TROPONINI 0.08 (H) 03/05/2021             PROBLEM LIST:  Patient Active Problem List   Diagnosis    Profound intellectual disability    Hypothyroidism    Impairment level: total impairment of both eyes    Hyperglycemia    Nonsustained ventricular tachycardia (HCC)    Acute renal failure syndrome (HCC)    Macrocytosis    Disruptive behavior disorder    Ingrowing nail    Peripheral vascular disease (HCC)    Altered mental state    Onychomycosis    Anemia due to chronic kidney disease    Essential hypertension    Vitamin D deficiency    Gastroesophageal reflux disease without esophagitis    S/P percutaneous endoscopic gastrostomy (PEG) tube placement (HCC)    Chronic kidney disease    Dementia with behavioral disturbance, unspecified dementia type ([address]    Gastrostomy tube dysfunction ([address]    Sepsis ([address]    Septic shock ([address]    Severe dehydration    Acute renal failure (ARF) ([address]    Acute respiratory failure with hypoxia ([address] ASSESSMENT:  1.) Bilateral pneumonia  2.)septic shock resolved   3. )Hypovolemia   4.)Patinet with PEG tube   5. )BEENA       PLAN:  *-will change abx to Unasyn and on discharge Augmentin 875 mg bid X 7 days   *-shock has resolve d  *-unfortunately cannot cooperate as she need aggressive pulmonary toilet ,incentiivie spirometer and flutter valve   *-prognosis poor giving all above ,but will continue with therapy   *_if diuresis needed ,defer to nephrology     *wean O2   Once wbc go down ,then she can be discharged from pulmonary stand point  BD       Thank you very much for allowing me to participate in the care of this pleasant patient , should you have any questions ,please do not hesitate to contact me      Ebenezer Treviño MD,[facility]  Pulmonary&Critical Care Medicine   Director of [address] [place] [address] Director of [address] [place] [address]    Murali Annland    NOTE: This report was transcribed using voice recognition software. Every effort was made to ensure accuracy; however, inadvertent computerized transcription errors may be present.

## 2021-09-27 NOTE — PLAN OF CARE
Problem: Falls - Risk of:  Goal: Will remain free from falls  Description: Will remain free from falls  9/27/2021 0229 by Shayan Arnold RN  Outcome: Met This Shift     Problem: Falls - Risk of:  Goal: Absence of physical injury  Description: Absence of physical injury  9/27/2021 0229 by Shayan Arnold RN  Outcome: Met This Shift     Problem: Skin Integrity:  Goal: Will show no infection signs and symptoms  Description: Will show no infection signs and symptoms  9/27/2021 0229 by Shayan Arnold RN  Outcome: Met This Shift     Problem: Skin Integrity:  Goal: Absence of new skin breakdown  Description: Absence of new skin breakdown  9/27/2021 0229 by Shayan Arnold RN  Outcome: Met This Shift

## 2021-09-27 NOTE — PROGRESS NOTES
[facility]   Progress Note    Admitting Date and Time: 9/21/2021 10:16 AM  Admit Dx: Sepsis ([facility] [A41.9]    Subjective: Admitted on 21st, came with fever, shortness of breath, change in mental status. Patient with ESRD, on hemodialysis, does have blindness, MRDD from group home. Patient was not on oxygen at the group home prior to coming to hospital.  Patient with PEG. Admitted with septic shock, needed pressors, evidence of acute respiratory failure, CT had shown bilateral lower lobe infiltrates. Patient Covid negative. Evidence of lactic acidosis. Did have atrial tachycardia. Per nephrology sepsis suspected secondary to aspiration from recent PEG tube issues. Remains on cefepime. POA had requested change in CODE STATUS, paperwork was completed on 23rd. renal has signed off. Pulmonary input pending, unfortunately worsening radiographic findings yesterday. Patient was admitted with Sepsis ([facility] [A41.9]. Patient is resting, on nasal cannula, today in deep sleep, no good communication. Does do some grimacing, little activity when try to move the patient. Per RN: Will update me once pulmonary evaluation is in. ROS: denies fever, chills, cp, sob, n/v, HA unless stated above.      OLANZapine zydis  15 mg Oral QAM    midodrine  7.5 mg Oral TID WC    valproic acid  500 mg Per G Tube BID    sodium chloride (Inhalant)  4 mL Nebulization Q6H    albuterol  2.5 mg Nebulization Q6H    famotidine  20 mg Per G Tube Daily    thiamine  100 mg Per G Tube Daily    polyethylene glycol  17 g Per G Tube Daily    PARoxetine  10 mg Per G Tube BID    OLANZapine  20 mg Per G Tube Nightly    Vitamin D  5,000 Units Per G Tube Daily    docusate  100 mg Per G Tube BID    levothyroxine  150 mcg Per G Tube Daily    [Held by provider] metoprolol tartrate  75 mg Per G Tube BID    montelukast  10 mg Per G Tube Nightly    sodium chloride flush  5-40 mL IntraVENous 2 times per day    enoxaparin  30 mg SubCUTAneous Daily    cefepime  2,000 mg IntraVENous Q24H     bisacodyl, 10 mg, Daily PRN  medicated lip balm, , PRN  sodium chloride flush, 5-40 mL, PRN  sodium chloride, 25 mL, PRN  ondansetron, 4 mg, Q8H PRN   Or  ondansetron, 4 mg, Q6H PRN  acetaminophen, 650 mg, Q6H PRN   Or  acetaminophen, 650 mg, Q6H PRN         Objective:    BP (!) 110/54   Pulse 107   Temp 97.8 °F (36.6 °C) (Axillary)   Resp 18   Ht 5' 3\" (1.6 m)   Wt 161 lb 3.2 oz (73.1 kg)   SpO2 93%   BMI 28.56 kg/m²   General Appearance: Resting on nasal cannula, no communication. Skin: warm and dry, no rash or erythema  Head: normocephalic and atraumatic  Eyes: pupils equal, round, and reactive to light, extraocular eye movements intact, conjunctivae normal  ENT: tympanic membrane, external ear and ear canal normal bilaterally, oropharynx clear and moist with normal mucous membranes  Neck: neck supple and non tender without mass, no thyromegaly or thyroid nodules, no cervical lymphadenopathy   Pulmonary/Chest: clear to auscultation bilaterally- no wheezes, rales or rhonchi, normal air movement, no respiratory distress  Cardiovascular: normal rate, normal S1 and S2, no gallops, intact distal pulses and no carotid bruits  Abdomen: soft, non-tender, non-distended, normal bowel sounds, no masses or organomegaly and does have a PEG tube, PEG tube site wet with somewhat leaking. Does have a right groin triple-lumen. Does have Curry catheter, makes urine. Does have a PEG tube and secured with abdominal binder.       Recent Labs     09/25/21  0230 09/26/21  0541 09/27/21  0315    137 137   K 5.0 4.7 5.3*    105 105   CO2 26 27 28   BUN 14 10 11   CREATININE 0.6 0.6 0.7   GLUCOSE 141* 113* 119*   CALCIUM 8.3* 8.8 9.1       Recent Labs     09/25/21  0230 09/26/21  0541 09/27/21  0448   WBC 11.8* 14.8* 14.2*   RBC 2.79* 2.46* 2.72*   HGB 9.2* 8.2* 8.8*   HCT 29.0* 25.6* 28.1*   .9* 104.1* 103.3*   MCH 33.0 33.3 32.4 MCHC 31.7* 32.0 31.3*   RDW 14.1 14.5 14.5   * 128* 221   MPV 12.9* 12.9* 11.9     Sodium 147 /139  K3.4 /4.0  Phosphorus 2.0  Chloride 118  Calcium 7.1  Hemoglobin 8.8  Procalcitonin 42.1 on 23rd  WBC 11.8    Radiology:   XR CHEST PORTABLE   Final Result   Increased opacities in left hilar location and retrocardiac left lower lobe   as well as left costophrenic sulcus. Findings could suggest increasing   atelectasis or pneumonia with left pleural effusion. CT HEAD WO CONTRAST   Final Result   1. There is no acute intracranial abnormality. Specifically, there is no   intracranial hemorrhage. 2. Atrophy and periventricular leukomalacia,         CT CHEST WO CONTRAST   Final Result   CT OF THE CHEST:      1. Pulmonale infiltrates in the dependent aspects of the lungs bilaterally,   left greater than right, concerning for pneumonia, possibly   aspiration-related. The appearance and distribution is not characteristic of   COVID-19, although it cannot be definitively excluded. 2. Large hiatal hernia. CT OF THE ABDOMEN AND PELVIS:      1.  No acute abnormality is seen in the abdomen or the pelvis. 2. Stable 2.3 cm left ovarian dermoid cyst.   3. Short segment of distal colonic diverticulosis without diverticulitis. 4. Gastrostomy tube in situ. CT ABDOMEN PELVIS WO CONTRAST Additional Contrast? None   Final Result   CT OF THE CHEST:      1. Pulmonale infiltrates in the dependent aspects of the lungs bilaterally,   left greater than right, concerning for pneumonia, possibly   aspiration-related. The appearance and distribution is not characteristic of   COVID-19, although it cannot be definitively excluded. 2. Large hiatal hernia. CT OF THE ABDOMEN AND PELVIS:      1.  No acute abnormality is seen in the abdomen or the pelvis. 2. Stable 2.3 cm left ovarian dermoid cyst.   3. Short segment of distal colonic diverticulosis without diverticulitis. 4. Gastrostomy tube in situ. XR CHEST PORTABLE   Final Result   No acute process. Assessment:    Principal Problem:    Septic shock (HCC)  Active Problems:    Impairment level: total impairment of both eyes    Essential hypertension    S/P percutaneous endoscopic gastrostomy (PEG) tube placement ([facility])    Dementia with behavioral disturbance, unspecified dementia type ([facility])    Severe dehydration    Acute renal failure (ARF) ([facility])    Acute respiratory failure with hypoxia ([facility])  Resolved Problems:    * No resolved hospital problems. *      Plan:  1. Septic shock, improving, now off of Levophed, patient out of ICU, still BP borderline, does remain on midodrine. Will try to decrease midodrine to 7.5 mg 3 times a day from 10 3 times daily. Though BP has remained stable but cannot titrate further lower down now. 2. More due to HCAP, remains on cefepime, day 6.   3. Acute renal failure, remains on HD, nephrology following, no change  4. Hypernatremia, hyperchloremia, hypokalemia, improved, nephrology has signed off.  5. As requested by POA, patient is now CCA. 6. Patient with MRDD, blindness, at baseline. 7. Presented with fever, now improved, normal temperature for several days. More due to suspected aspiration pneumonia, for now he will try to decrease midodrine, will plan to continue 7 days of cefepime, pulmonary input is pending, I will repeat a chest x-ray. 8. Hypothyroidism, remains on her Synthroid at 150 MCG. 9. Remains on DVT prophylaxis with Lovenox and GI prophylaxis with famotidine. 10. Patient more sleepy today, did cut back on the morning dose of Zyprexa back to 15 as she was taking at [facility] from 20. 11. DC planning, likely will be back to [facility] on completion of antibiotics. Can happen as early as tomorrow, however for now will wait for input from pulmonary. Also if no other complications arise.            Electronically signed by Dipti Lockhart MD on 9/27/2021 at 11:43 AM

## 2021-09-27 NOTE — PLAN OF CARE
Problem: Falls - Risk of:  Goal: Will remain free from falls  Description: Will remain free from falls  Outcome: Met This Shift  Goal: Absence of physical injury  Description: Absence of physical injury  Outcome: Met This Shift     Problem: Skin Integrity:  Goal: Will show no infection signs and symptoms  Description: Will show no infection signs and symptoms  Outcome: Met This Shift  Goal: Absence of new skin breakdown  Description: Absence of new skin breakdown  Outcome: Met This Shift     Problem: Gas Exchange - Impaired:  Goal: Levels of oxygenation will improve  Description: Levels of oxygenation will improve  Outcome: Met This Shift     Problem: Infection, Septic Shock:  Goal: Will show no infection signs and symptoms  Description: Will show no infection signs and symptoms  Outcome: Met This Shift

## 2021-09-27 NOTE — PROGRESS NOTES
P Quality Flow/Interdisciplinary Rounds Progress Note        Quality Flow Rounds held on September 27, 2021    Disciplines Attending:  Bedside Nurse, ,  and Nursing Unit Leadership    Da Santana was admitted on 9/21/2021 10:16 AM    Anticipated Discharge Date:  Expected Discharge Date: 09/30/21    Disposition:    Jose Score:  Jose Scale Score: 11    Readmission Risk              Risk of Unplanned Readmission:  41           Discussed patient goal for the day, patient clinical progression, and barriers to discharge.   The following Goal(s) of the Day/Commitment(s) have been identified:  Diagnostics - Report Results and Labs - Report Results, discharge planning      Jeanette Pinon RN  September 27, 2021

## 2021-09-28 LAB
ALBUMIN SERPL-MCNC: 1.7 G/DL (ref 3.5–5.2)
ALP BLD-CCNC: 80 U/L (ref 35–104)
ALT SERPL-CCNC: 12 U/L (ref 0–32)
ANION GAP SERPL CALCULATED.3IONS-SCNC: 4 MMOL/L (ref 7–16)
ANISOCYTOSIS: ABNORMAL
AST SERPL-CCNC: 17 U/L (ref 0–31)
BASOPHILIC STIPPLING: ABNORMAL
BASOPHILS ABSOLUTE: 0.14 E9/L (ref 0–0.2)
BASOPHILS RELATIVE PERCENT: 0.9 % (ref 0–2)
BILIRUB SERPL-MCNC: <0.2 MG/DL (ref 0–1.2)
BUN BLDV-MCNC: 8 MG/DL (ref 6–23)
CALCIUM SERPL-MCNC: 8.8 MG/DL (ref 8.6–10.2)
CHLORIDE BLD-SCNC: 101 MMOL/L (ref 98–107)
CO2: 28 MMOL/L (ref 22–29)
CREAT SERPL-MCNC: 0.6 MG/DL (ref 0.5–1)
EOSINOPHILS ABSOLUTE: 0.29 E9/L (ref 0.05–0.5)
EOSINOPHILS RELATIVE PERCENT: 1.8 % (ref 0–6)
GFR AFRICAN AMERICAN: >60
GFR NON-AFRICAN AMERICAN: >60 ML/MIN/1.73
GLUCOSE BLD-MCNC: 111 MG/DL (ref 74–99)
HCT VFR BLD CALC: 23.9 % (ref 34–48)
HEMOGLOBIN: 7.6 G/DL (ref 11.5–15.5)
LYMPHOCYTES ABSOLUTE: 2.9 E9/L (ref 1.5–4)
LYMPHOCYTES RELATIVE PERCENT: 17.5 % (ref 20–42)
MAGNESIUM: 1.8 MG/DL (ref 1.6–2.6)
MCH RBC QN AUTO: 33.3 PG (ref 26–35)
MCHC RBC AUTO-ENTMCNC: 31.8 % (ref 32–34.5)
MCV RBC AUTO: 104.8 FL (ref 80–99.9)
METAMYELOCYTES RELATIVE PERCENT: 3.5 % (ref 0–1)
MONOCYTES ABSOLUTE: 1.13 E9/L (ref 0.1–0.95)
MONOCYTES RELATIVE PERCENT: 7 % (ref 2–12)
MYELOCYTE PERCENT: 1.8 % (ref 0–0)
NEUTROPHILS ABSOLUTE: 11.75 E9/L (ref 1.8–7.3)
NEUTROPHILS RELATIVE PERCENT: 67.5 % (ref 43–80)
NUCLEATED RED BLOOD CELLS: 3.5 /100 WBC
PDW BLD-RTO: 14.6 FL (ref 11.5–15)
PHOSPHORUS: 3.3 MG/DL (ref 2.5–4.5)
PLATELET # BLD: 334 E9/L (ref 130–450)
PMV BLD AUTO: 11 FL (ref 7–12)
POIKILOCYTES: ABNORMAL
POLYCHROMASIA: ABNORMAL
POTASSIUM SERPL-SCNC: 5.1 MMOL/L (ref 3.5–5)
RBC # BLD: 2.28 E12/L (ref 3.5–5.5)
SODIUM BLD-SCNC: 133 MMOL/L (ref 132–146)
TARGET CELLS: ABNORMAL
TOTAL PROTEIN: 5.4 G/DL (ref 6.4–8.3)
WBC # BLD: 16.1 E9/L (ref 4.5–11.5)

## 2021-09-28 PROCEDURE — 6370000000 HC RX 637 (ALT 250 FOR IP): Performed by: INTERNAL MEDICINE

## 2021-09-28 PROCEDURE — 2580000003 HC RX 258: Performed by: INTERNAL MEDICINE

## 2021-09-28 PROCEDURE — 1200000000 HC SEMI PRIVATE

## 2021-09-28 PROCEDURE — 6360000002 HC RX W HCPCS: Performed by: INTERNAL MEDICINE

## 2021-09-28 PROCEDURE — 80053 COMPREHEN METABOLIC PANEL: CPT

## 2021-09-28 PROCEDURE — 99233 SBSQ HOSP IP/OBS HIGH 50: CPT | Performed by: INTERNAL MEDICINE

## 2021-09-28 PROCEDURE — 85025 COMPLETE CBC W/AUTO DIFF WBC: CPT

## 2021-09-28 PROCEDURE — 94640 AIRWAY INHALATION TREATMENT: CPT

## 2021-09-28 PROCEDURE — 83735 ASSAY OF MAGNESIUM: CPT

## 2021-09-28 PROCEDURE — 36415 COLL VENOUS BLD VENIPUNCTURE: CPT

## 2021-09-28 PROCEDURE — 84100 ASSAY OF PHOSPHORUS: CPT

## 2021-09-28 PROCEDURE — 94669 MECHANICAL CHEST WALL OSCILL: CPT

## 2021-09-28 PROCEDURE — 2700000000 HC OXYGEN THERAPY PER DAY

## 2021-09-28 RX ADMIN — VALPROIC ACID 500 MG: 250 SOLUTION ORAL at 20:23

## 2021-09-28 RX ADMIN — ALBUTEROL SULFATE 2.5 MG: 2.5 SOLUTION RESPIRATORY (INHALATION) at 14:39

## 2021-09-28 RX ADMIN — DOCUSATE SODIUM 100 MG: 50 LIQUID ORAL at 20:23

## 2021-09-28 RX ADMIN — VALPROIC ACID 500 MG: 250 SOLUTION ORAL at 11:06

## 2021-09-28 RX ADMIN — SODIUM CHLORIDE SOLN NEBU 3% 4 ML: 3 NEBU SOLN at 19:01

## 2021-09-28 RX ADMIN — PAROXETINE 10 MG: 10 TABLET, FILM COATED ORAL at 20:22

## 2021-09-28 RX ADMIN — AMPICILLIN AND SULBACTAM 3000 MG: 1; 2 INJECTION, POWDER, FOR SOLUTION INTRAMUSCULAR; INTRAVENOUS at 03:11

## 2021-09-28 RX ADMIN — SODIUM CHLORIDE SOLN NEBU 3% 4 ML: 3 NEBU SOLN at 01:33

## 2021-09-28 RX ADMIN — SODIUM CHLORIDE SOLN NEBU 3% 4 ML: 3 NEBU SOLN at 14:39

## 2021-09-28 RX ADMIN — Medication 10 ML: at 20:24

## 2021-09-28 RX ADMIN — PAROXETINE 10 MG: 10 TABLET, FILM COATED ORAL at 11:03

## 2021-09-28 RX ADMIN — Medication 100 MG: at 11:06

## 2021-09-28 RX ADMIN — OLANZAPINE 20 MG: 10 TABLET, FILM COATED ORAL at 11:04

## 2021-09-28 RX ADMIN — POLYETHYLENE GLYCOL 3350 17 G: 17 POWDER, FOR SOLUTION ORAL at 11:03

## 2021-09-28 RX ADMIN — ALBUTEROL SULFATE 2.5 MG: 2.5 SOLUTION RESPIRATORY (INHALATION) at 01:33

## 2021-09-28 RX ADMIN — DOCUSATE SODIUM 100 MG: 50 LIQUID ORAL at 11:06

## 2021-09-28 RX ADMIN — MONTELUKAST SODIUM 10 MG: 10 TABLET, FILM COATED ORAL at 20:22

## 2021-09-28 RX ADMIN — ALBUTEROL SULFATE 2.5 MG: 2.5 SOLUTION RESPIRATORY (INHALATION) at 07:57

## 2021-09-28 RX ADMIN — ENOXAPARIN SODIUM 30 MG: 30 INJECTION, SOLUTION INTRAVENOUS; SUBCUTANEOUS at 11:09

## 2021-09-28 RX ADMIN — MIDODRINE HYDROCHLORIDE 7.5 MG: 5 TABLET ORAL at 16:40

## 2021-09-28 RX ADMIN — MIDODRINE HYDROCHLORIDE 7.5 MG: 5 TABLET ORAL at 11:03

## 2021-09-28 RX ADMIN — OLANZAPINE 20 MG: 10 TABLET, FILM COATED ORAL at 20:22

## 2021-09-28 RX ADMIN — OLANZAPINE 15 MG: 10 TABLET, ORALLY DISINTEGRATING ORAL at 11:11

## 2021-09-28 RX ADMIN — AMPICILLIN AND SULBACTAM 3000 MG: 1; 2 INJECTION, POWDER, FOR SOLUTION INTRAMUSCULAR; INTRAVENOUS at 14:51

## 2021-09-28 RX ADMIN — Medication 5000 UNITS: at 11:02

## 2021-09-28 RX ADMIN — FAMOTIDINE 20 MG: 20 TABLET, FILM COATED ORAL at 11:06

## 2021-09-28 RX ADMIN — LEVOTHYROXINE SODIUM 150 MCG: 75 TABLET ORAL at 06:00

## 2021-09-28 RX ADMIN — ALBUTEROL SULFATE 2.5 MG: 2.5 SOLUTION RESPIRATORY (INHALATION) at 19:01

## 2021-09-28 RX ADMIN — SODIUM CHLORIDE SOLN NEBU 3% 4 ML: 3 NEBU SOLN at 07:57

## 2021-09-28 RX ADMIN — Medication 10 ML: at 11:12

## 2021-09-28 RX ADMIN — AMPICILLIN AND SULBACTAM 3000 MG: 1; 2 INJECTION, POWDER, FOR SOLUTION INTRAMUSCULAR; INTRAVENOUS at 11:09

## 2021-09-28 RX ADMIN — AMPICILLIN AND SULBACTAM 3000 MG: 1; 2 INJECTION, POWDER, FOR SOLUTION INTRAMUSCULAR; INTRAVENOUS at 20:21

## 2021-09-28 ASSESSMENT — PAIN SCALES - GENERAL: PAINLEVEL_OUTOF10: 0

## 2021-09-28 NOTE — CONSULTS
[address] [place] [address] [facility]  Consultation Note     Admit Date: 9/21/2021 10:16 AM    Reason for Consult:   Persistent leukocytosis. Unresolving pneumonia. Questionable postobstructive pneumonia. Choice of antibiotic. Possible discharge following input from ID    Attending Physician:  Randa Olivia*    HISTORY OF PRESENT ILLNESS:             The history is obtained from extensive review of available past medical records. The patient is a 78 y.o. female who is not previously known to the ID service. The patient presents to the ED at [facility] on 9/21/2021 with a fever greater than 102 °F and difficulty breathing. There was concern for possible aspiration. She was hypoxemic and placed on a nonrebreather. Her white count was normal but then jumped up to 19.3. Respiratory panel was negative. Sodium was 160 with a creatinine of 2.6. Lactic acid was 3.2. The patient was admitted to the ICU with a diagnosis of septic shock. She was placed on vasopressors. She was treated with Vancomycin and Cefepime. Pharmacy was asked to dose antibiotics. Vasopressors were eventually discontinued. Nares screen was negative for MRSA. Vancomycin was discontinued. The patient was transferred out of the ICU. She was followed by pulmonary. Antibiotics were changed to Unasyn.     Past Medical History:        Diagnosis Date    Acute kidney injury ([address] 01/15/2017    d/t Vancomycin, on Dialysis [facility] right chest    Blind in both eyes     Essential hypertension 4/2/2021    Gastroesophageal reflux disease without esophagitis 4/2/2021    Hemodialysis patient ([address]     Hyperthyroidism     MR (mental retardation)     Osteoarthritis     Peripheral vascular disease ([address]     Pneumonia 01/06/2017    Pneumonia due to infectious organism 1/8/2017    Sepsis ([address] 3/4/2021     Past Surgical History:        Procedure Laterality Date    BRONCHOSCOPY  02/10/2017    CHEST TUBE INSERTION Right 02/09/2017    GASTROSTOMY TUBE PLACEMENT N/A 3/7/2021    EGD PEG TUBE PLACEMENT performed by Billy Mars [facility] at [facility] [address] Right 01/17/2017    tessio insertion     OTHER SURGICAL HISTORY  01/25/2017    PEG tube insertion     Current Medications:   Scheduled Meds:   ampicillin-sulbactam  3,000 mg IntraVENous Q6H    OLANZapine zydis  15 mg Oral QAM    midodrine  7.5 mg Oral TID WC    valproic acid  500 mg Per G Tube BID    sodium chloride (Inhalant)  4 mL Nebulization Q6H    albuterol  2.5 mg Nebulization Q6H    famotidine  20 mg Per G Tube Daily    thiamine  100 mg Per G Tube Daily    polyethylene glycol  17 g Per G Tube Daily    PARoxetine  10 mg Per G Tube BID    OLANZapine  20 mg Per G Tube Nightly    Vitamin D  5,000 Units Per G Tube Daily    docusate  100 mg Per G Tube BID    levothyroxine  150 mcg Per G Tube Daily    [Held by provider] metoprolol tartrate  75 mg Per G Tube BID    montelukast  10 mg Per G Tube Nightly    sodium chloride flush  5-40 mL IntraVENous 2 times per day    enoxaparin  30 mg SubCUTAneous Daily     Continuous Infusions:   sodium chloride       PRN Meds:bisacodyl, medicated lip balm, sodium chloride flush, sodium chloride, ondansetron **OR** ondansetron, acetaminophen **OR** acetaminophen    Allergies:  Patient has no known allergies.     Social History:   Social History     Socioeconomic History    Marital status: Single     Spouse name: None    Number of children: None    Years of education: None    Highest education level: None   Occupational History    None   Tobacco Use    Smoking status: Never Smoker    Smokeless tobacco: Never Used   Vaping Use    Vaping Use: Never used   Substance and Sexual Activity    Alcohol use: No    Drug use: No    Sexual activity: Never   Other Topics Concern    None   Social History Narrative    None     Social Determinants of Health     Financial Resource Strain:     Difficulty of Paying Living Expenses:    Food Insecurity:     Worried About Running Out of Food in the Last Year:     [address] in the Last Year:    Transportation Needs:     Lack of Transportation (Medical):  Lack of Transportation (Non-Medical):    Physical Activity:     Days of Exercise per Week:     Minutes of Exercise per Session:    Stress:     Feeling of Stress :    Social Connections:     Frequency of Communication with Friends and Family:     Frequency of Social Gatherings with Friends and Family:     Attends Jewish Services:     Active Member of Clubs or Organizations:     Attends Club or Organization Meetings:     Marital Status:    Intimate Partner Violence:     Fear of Current or Ex-Partner:     Emotionally Abused:     Physically Abused:     Sexually Abused:         Family History:   History reviewed. No pertinent family history. . Otherwise non-pertinent to the chief complaint. REVIEW OF SYSTEMS:    A 10 point review of systems could not be obtained from the patient. She has MRDD and it is nonverbal.  Otherwise, as in the HPI    PHYSICAL EXAM:    Vitals:   BP (!) 106/58   Pulse 109   Temp 97.6 °F (36.4 °C) (Axillary)   Resp 20   Ht 5' 3\" (1.6 m)   Wt 162 lb 1 oz (73.5 kg)   SpO2 97%   BMI 28.71 kg/m²   Constitutional: The patient is lying in bed and resting comfortably. No distress. She moans but does not utter comprehensible words. Skin: Warm and dry. No rashes were noted. HEENT: Opacified corneas. No jaundice. Moist mucous membranes, no ulcerations, no thrush. Neck: Supple to movements. No lymphadenopathy. Chest: No use of accessory muscles to breathe. Symmetrical expansion. Scattered rhonchi. No crackles. Cardiovascular: S1 and S2 are rhythmic and regular. No murmurs appreciated. Abdomen: Positive bowel sounds to auscultation. Benign to palpation. No masses felt. No hepatosplenomegaly. Binder. PEG. Extremities:  minimal edema.   Lines: peripheral      CBC+dif:  Recent Labs     09/26/21  0541 09/26/21  0541 09/27/21  0448 09/27/21  0448 09/28/21  0308   WBC 14.8*  --  14.2*  --  16.1*   HGB 8.2*   < > 8.8*   < > 7.6*   HCT 25.6*   < > 28.1*   < > 23.9*   .1*   < > 103.3*   < > 104.8*   *   < > 221   < > 334   NEUTROABS 12.43*   < > 9.94*   < > 11.75*    < > = values in this interval not displayed.      Lab Results   Component Value Date    CRP 11.5 (H) 01/25/2017      No results found for: CRPHS  No results found for: SEDRATE  Lab Results   Component Value Date    ALT 12 09/28/2021    AST 17 09/28/2021    ALKPHOS 80 09/28/2021    BILITOT <0.2 09/28/2021     Lab Results   Component Value Date     09/28/2021    K 5.1 09/28/2021    K 4.2 09/21/2021     09/28/2021    CO2 28 09/28/2021    BUN 8 09/28/2021    CREATININE 0.6 09/28/2021    GFRAA >60 09/28/2021    LABGLOM >60 09/28/2021    GLUCOSE 111 09/28/2021    GLUCOSE 97 12/30/2011    PROT 5.4 09/28/2021    LABALBU 1.7 09/28/2021    LABALBU 3.7 12/30/2011    CALCIUM 8.8 09/28/2021    BILITOT <0.2 09/28/2021    ALKPHOS 80 09/28/2021    AST 17 09/28/2021    ALT 12 09/28/2021       Lab Results   Component Value Date    PROTIME 12.4 03/08/2021    INR 1.1 03/08/2021       Lab Results   Component Value Date    TSH 18.460 09/08/2021       Lab Results   Component Value Date    COLORU Yellow 09/21/2021    PHUR 5.0 09/21/2021    LABCAST FEW 09/20/2017    WBCUA 0-1 09/21/2021    RBCUA NONE 09/21/2021    MUCUS Present 02/08/2021    BACTERIA MODERATE 09/21/2021    CLARITYU Clear 09/21/2021    SPECGRAV >=1.030 09/21/2021    LEUKOCYTESUR Negative 09/21/2021    UROBILINOGEN 1.0 09/21/2021    BILIRUBINUR SMALL 09/21/2021    BLOODU Negative 09/21/2021    GLUCOSEU 100 09/21/2021    AMORPHOUS MODERATE 01/21/2017       Lab Results   Component Value Date    HCO3 23.7 03/04/2021    BE -2.3 09/21/2021    O2SAT 95.8 09/21/2021    PH 7.340 03/04/2021    PCO2 45.0 03/04/2021    PO2 78.9 03/04/2021 Radiology:  Chest x-ray reviewed    Microbiology:  Pending  No results for input(s): BC in the last 72 hours. No results for input(s): ORG in the last 72 hours. No results for input(s): Remona Finley in the last 72 hours. No results for input(s): STREPNEUMAGU in the last 72 hours. No results for input(s): LP1UAG in the last 72 hours. No results for input(s): ASO in the last 72 hours. No results for input(s): CULTRESP in the last 72 hours. No results for input(s): PROCAL in the last 72 hours. Assessment:  · Probable left lower lobe pneumonia  · Sepsis with septic shock associated to the above, resolved  · Leukocytosis associated to the above  · Fever secondary to pneumonia, improved    Plan:    · Antibiotics are currently being managed by pulmonary. We will defer to them  · You may consider changing Augmentin to Cefdinir and Levofloxacin  · Will follow with you    Thank you for having us see this patient in consultation. I will be discussing this case with the treating physicians.     Joel Steen MD  1:24 PM  9/28/2021

## 2021-09-28 NOTE — PROGRESS NOTES
[facility]   Progress Note    Admitting Date and Time: 9/21/2021 10:16 AM  Admit Dx: Sepsis ([facility] [A41.9]    Subjective: Admitted on 21st, came with fever, shortness of breath, change in mental status. Patient with ESRD, on hemodialysis, does have blindness, MRDD from group home. Patient was not on oxygen at the group home prior to coming to hospital.  Patient with PEG. Admitted with septic shock, needed pressors, evidence of acute respiratory failure, CT had shown bilateral lower lobe infiltrates. Patient Covid negative. Evidence of lactic acidosis. Did have atrial tachycardia. Per nephrology sepsis suspected secondary to aspiration from recent PEG tube issues. Remains on cefepime. POA had requested change in CODE STATUS, paperwork was completed on 23rd. renal has signed off. Pulmonary evaluated, plans to change antibiotics to Augmentin on DC, worsening radiographic findings yesterday, okay for DC pending leukocytosis improves, recommendations from ID where of Levaquin plus Omnicef instead of Augmentin. Patient was admitted with Sepsis ([facility] [A41.9]. Patient is resting, on nasal cannula, today in deep sleep, no good communication. Does do some grimacing, little activity when try to move the patient. Per RN: No new issues. ROS: denies fever, chills, cp, sob, n/v, HA unless stated above.      ampicillin-sulbactam  3,000 mg IntraVENous Q6H    OLANZapine zydis  15 mg Oral QAM    midodrine  7.5 mg Oral TID WC    valproic acid  500 mg Per G Tube BID    sodium chloride (Inhalant)  4 mL Nebulization Q6H    albuterol  2.5 mg Nebulization Q6H    famotidine  20 mg Per G Tube Daily    thiamine  100 mg Per G Tube Daily    polyethylene glycol  17 g Per G Tube Daily    PARoxetine  10 mg Per G Tube BID    OLANZapine  20 mg Per G Tube Nightly    Vitamin D  5,000 Units Per G Tube Daily    docusate  100 mg Per G Tube BID    levothyroxine  150 mcg Per G Tube Daily    [Held by provider] metoprolol tartrate  75 mg Per G Tube BID    montelukast  10 mg Per G Tube Nightly    sodium chloride flush  5-40 mL IntraVENous 2 times per day    enoxaparin  30 mg SubCUTAneous Daily     bisacodyl, 10 mg, Daily PRN  medicated lip balm, , PRN  sodium chloride flush, 5-40 mL, PRN  sodium chloride, 25 mL, PRN  ondansetron, 4 mg, Q8H PRN   Or  ondansetron, 4 mg, Q6H PRN  acetaminophen, 650 mg, Q6H PRN   Or  acetaminophen, 650 mg, Q6H PRN         Objective:    BP (!) 106/58   Pulse 109   Temp 97.6 °F (36.4 °C) (Axillary)   Resp 20   Ht 5' 3\" (1.6 m)   Wt 162 lb 1 oz (73.5 kg)   SpO2 97%   BMI 28.71 kg/m²   General Appearance: Resting on nasal cannula, no communication. Skin: warm and dry, no rash or erythema  Head: normocephalic and atraumatic  Eyes: pupils equal, round, and reactive to light, extraocular eye movements intact, conjunctivae normal  ENT: tympanic membrane, external ear and ear canal normal bilaterally, oropharynx clear and moist with normal mucous membranes  Neck: neck supple and non tender without mass, no thyromegaly or thyroid nodules, no cervical lymphadenopathy   Pulmonary/Chest: clear to auscultation bilaterally- no wheezes, rales or rhonchi, normal air movement, no respiratory distress  Cardiovascular: normal rate, normal S1 and S2, no gallops, intact distal pulses and no carotid bruits  Abdomen: soft, non-tender, non-distended, normal bowel sounds, no masses or organomegaly and does have a PEG tube, PEG tube site wet with somewhat leaking. Does have a right groin triple-lumen. Does have Curry catheter, makes urine. Does have a PEG tube and secured with abdominal binder.       Recent Labs     09/26/21  0541 09/27/21  0315 09/28/21  0308    137 133   K 4.7 5.3* 5.1*    105 101   CO2 27 28 28   BUN 10 11 8   CREATININE 0.6 0.7 0.6   GLUCOSE 113* 119* 111*   CALCIUM 8.8 9.1 8.8       Recent Labs     09/26/21  0541 09/27/21  0448 09/28/21  0308   WBC 14.8* 14.2* 16.1*   RBC 2.46* 2.72* 2.28*   HGB 8.2* 8.8* 7.6*   HCT 25.6* 28.1* 23.9*   .1* 103.3* 104.8*   MCH 33.3 32.4 33.3   MCHC 32.0 31.3* 31.8*   RDW 14.5 14.5 14.6   * 221 334   MPV 12.9* 11.9 11.0     Sodium 147 /139  K3.4 /4.0 /5.1  Phosphorus 2.0  Chloride 118  Calcium 7.1  Hemoglobin 8.8 /7.6  Procalcitonin 42.1 on 23rd  WBC 11.8 /16.1    Radiology:   XR CHEST PORTABLE   Final Result   Increased opacities in left hilar location and retrocardiac left lower lobe   as well as left costophrenic sulcus. Findings could suggest increasing   atelectasis or pneumonia with left pleural effusion. CT HEAD WO CONTRAST   Final Result   1. There is no acute intracranial abnormality. Specifically, there is no   intracranial hemorrhage. 2. Atrophy and periventricular leukomalacia,         CT CHEST WO CONTRAST   Final Result   CT OF THE CHEST:      1. Pulmonale infiltrates in the dependent aspects of the lungs bilaterally,   left greater than right, concerning for pneumonia, possibly   aspiration-related. The appearance and distribution is not characteristic of   COVID-19, although it cannot be definitively excluded. 2. Large hiatal hernia. CT OF THE ABDOMEN AND PELVIS:      1.  No acute abnormality is seen in the abdomen or the pelvis. 2. Stable 2.3 cm left ovarian dermoid cyst.   3. Short segment of distal colonic diverticulosis without diverticulitis. 4. Gastrostomy tube in situ. CT ABDOMEN PELVIS WO CONTRAST Additional Contrast? None   Final Result   CT OF THE CHEST:      1. Pulmonale infiltrates in the dependent aspects of the lungs bilaterally,   left greater than right, concerning for pneumonia, possibly   aspiration-related. The appearance and distribution is not characteristic of   COVID-19, although it cannot be definitively excluded. 2. Large hiatal hernia. CT OF THE ABDOMEN AND PELVIS:      1.  No acute abnormality is seen in the abdomen or the pelvis.    2. Stable 2.3 cm left ovarian dermoid cyst.   3. Short segment of distal colonic diverticulosis without diverticulitis. 4. Gastrostomy tube in situ. XR CHEST PORTABLE   Final Result   No acute process. Assessment:    Principal Problem:    Septic shock (HCC)  Active Problems:    Impairment level: total impairment of both eyes    Essential hypertension    S/P percutaneous endoscopic gastrostomy (PEG) tube placement (HCC)    Dementia with behavioral disturbance, unspecified dementia type (HCC)    Severe dehydration    Acute renal failure (ARF) ([facility])    Acute respiratory failure with hypoxia ([facility])  Resolved Problems:    * No resolved hospital problems. *      Plan:  1. Septic shock, improving, now off of Levophed, patient out of ICU, still BP borderline, does remain on midodrine. Will try to decrease midodrine to 7.5 mg 3 times a day from 10 3 times daily. Though BP has remained stable but cannot titrate further lower down now. 2. More due to HCAP, remains on cefepime, day 6.   3. Acute renal failure, remains on HD, nephrology following, no change  4. Hypernatremia, hyperchloremia, hypokalemia, improved, nephrology has signed off.  5. As requested by POA, patient is now CCA. 6. Patient with MRDD, blindness, at baseline. 7. Presented with fever, now improved, normal temperature for several days. More due to suspected aspiration pneumonia, for now he will try to decrease midodrine, will plan to continue 7 days of cefepime, pulmonary input is pending, I will repeat a chest x-ray. 8. Hypothyroidism, remains on her Synthroid at 150 MCG. 9. Remains on DVT prophylaxis with Lovenox and GI prophylaxis with famotidine. 10. Patient more sleepy today, did cut back on the morning dose of Zyprexa back to 15 as she was taking at [facility] from 20.   11. Leukocytosis, likely related to infection, pulmonary wants to discharge patient when leukocytosis improves, unfortunately has been worsening, did get ID opinion, did speak to them pulmonary remains final on antibiotics. 12. DC planning, likely will be back to ECF, unfortunately will stay in hospital till at least downward trend begins on leukocytosis. If worsening of leukocytosis tomorrow then will need CT of the chest and further evaluation. DC can happen as early as tomorrow, however for now will wait for another day.            Electronically signed by Mukul Jaramillo MD on 9/28/2021 at 8:19 AM

## 2021-09-28 NOTE — PATIENT CARE CONFERENCE
[facility] Quality Flow/Interdisciplinary Rounds Progress Note        Quality Flow Rounds held on September 28, 2021    Disciplines Attending:  Bedside Nurse, ,  and Nursing Unit Leadership    Khris Mo was admitted on 9/21/2021 10:16 AM    Anticipated Discharge Date:  Expected Discharge Date: 09/30/21    Disposition:    Jose Score:  Jose Scale Score: 13    Readmission Risk              Risk of Unplanned Readmission:  41           Discussed patient goal for the day, patient clinical progression, and barriers to discharge.   The following Goal(s) of the Day/Commitment(s) have been identified:  Labs - Report Results      Ling Graham RN  September 28, 2021

## 2021-09-29 LAB
ALBUMIN SERPL-MCNC: 2.1 G/DL (ref 3.5–5.2)
ALP BLD-CCNC: 80 U/L (ref 35–104)
ALT SERPL-CCNC: 12 U/L (ref 0–32)
ANION GAP SERPL CALCULATED.3IONS-SCNC: 6 MMOL/L (ref 7–16)
ANISOCYTOSIS: ABNORMAL
AST SERPL-CCNC: 20 U/L (ref 0–31)
BASOPHILIC STIPPLING: ABNORMAL
BASOPHILS ABSOLUTE: 0 E9/L (ref 0–0.2)
BASOPHILS RELATIVE PERCENT: 0 % (ref 0–2)
BILIRUB SERPL-MCNC: <0.2 MG/DL (ref 0–1.2)
BUN BLDV-MCNC: 7 MG/DL (ref 6–23)
CALCIUM SERPL-MCNC: 9.5 MG/DL (ref 8.6–10.2)
CHLORIDE BLD-SCNC: 102 MMOL/L (ref 98–107)
CO2: 29 MMOL/L (ref 22–29)
CREAT SERPL-MCNC: 0.6 MG/DL (ref 0.5–1)
EOSINOPHILS ABSOLUTE: 0.25 E9/L (ref 0.05–0.5)
EOSINOPHILS RELATIVE PERCENT: 1.7 % (ref 0–6)
GFR AFRICAN AMERICAN: >60
GFR NON-AFRICAN AMERICAN: >60 ML/MIN/1.73
GLUCOSE BLD-MCNC: 104 MG/DL (ref 74–99)
HCT VFR BLD CALC: 26.7 % (ref 34–48)
HEMOGLOBIN: 8.2 G/DL (ref 11.5–15.5)
LYMPHOCYTES ABSOLUTE: 1.47 E9/L (ref 1.5–4)
LYMPHOCYTES RELATIVE PERCENT: 10.4 % (ref 20–42)
MAGNESIUM: 1.7 MG/DL (ref 1.6–2.6)
MCH RBC QN AUTO: 33.5 PG (ref 26–35)
MCHC RBC AUTO-ENTMCNC: 30.7 % (ref 32–34.5)
MCV RBC AUTO: 109 FL (ref 80–99.9)
METAMYELOCYTES RELATIVE PERCENT: 0.9 % (ref 0–1)
MONOCYTES ABSOLUTE: 1.91 E9/L (ref 0.1–0.95)
MONOCYTES RELATIVE PERCENT: 13.1 % (ref 2–12)
MYELOCYTE PERCENT: 3.5 % (ref 0–0)
NEUTROPHILS ABSOLUTE: 11.03 E9/L (ref 1.8–7.3)
NEUTROPHILS RELATIVE PERCENT: 70.4 % (ref 43–80)
NUCLEATED RED BLOOD CELLS: 3.5 /100 WBC
PDW BLD-RTO: 14.9 FL (ref 11.5–15)
PHOSPHORUS: 3.1 MG/DL (ref 2.5–4.5)
PLATELET # BLD: 519 E9/L (ref 130–450)
PMV BLD AUTO: 10.8 FL (ref 7–12)
POLYCHROMASIA: ABNORMAL
POTASSIUM SERPL-SCNC: 5 MMOL/L (ref 3.5–5)
RBC # BLD: 2.45 E12/L (ref 3.5–5.5)
SODIUM BLD-SCNC: 137 MMOL/L (ref 132–146)
TOTAL PROTEIN: 6 G/DL (ref 6.4–8.3)
WBC # BLD: 14.7 E9/L (ref 4.5–11.5)

## 2021-09-29 PROCEDURE — 85025 COMPLETE CBC W/AUTO DIFF WBC: CPT

## 2021-09-29 PROCEDURE — 6360000002 HC RX W HCPCS: Performed by: INTERNAL MEDICINE

## 2021-09-29 PROCEDURE — 36415 COLL VENOUS BLD VENIPUNCTURE: CPT

## 2021-09-29 PROCEDURE — 6370000000 HC RX 637 (ALT 250 FOR IP): Performed by: INTERNAL MEDICINE

## 2021-09-29 PROCEDURE — 84100 ASSAY OF PHOSPHORUS: CPT

## 2021-09-29 PROCEDURE — 2580000003 HC RX 258: Performed by: INTERNAL MEDICINE

## 2021-09-29 PROCEDURE — 94668 MNPJ CHEST WALL SBSQ: CPT

## 2021-09-29 PROCEDURE — 1200000000 HC SEMI PRIVATE

## 2021-09-29 PROCEDURE — 2700000000 HC OXYGEN THERAPY PER DAY

## 2021-09-29 PROCEDURE — 80053 COMPREHEN METABOLIC PANEL: CPT

## 2021-09-29 PROCEDURE — 94669 MECHANICAL CHEST WALL OSCILL: CPT

## 2021-09-29 PROCEDURE — 94640 AIRWAY INHALATION TREATMENT: CPT

## 2021-09-29 PROCEDURE — 83735 ASSAY OF MAGNESIUM: CPT

## 2021-09-29 PROCEDURE — 99239 HOSP IP/OBS DSCHRG MGMT >30: CPT | Performed by: INTERNAL MEDICINE

## 2021-09-29 RX ORDER — ALBUTEROL SULFATE 2.5 MG/3ML
2.5 SOLUTION RESPIRATORY (INHALATION) EVERY 6 HOURS
Qty: 120 EACH | Refills: 3 | Status: SHIPPED | OUTPATIENT
Start: 2021-09-29

## 2021-09-29 RX ORDER — LEVOFLOXACIN 250 MG/1
500 TABLET ORAL DAILY
Qty: 14 TABLET | Refills: 0 | Status: SHIPPED | OUTPATIENT
Start: 2021-09-29 | End: 2021-10-06

## 2021-09-29 RX ORDER — LEVOFLOXACIN 250 MG/1
500 TABLET ORAL DAILY
Qty: 14 TABLET | Refills: 0 | Status: SHIPPED | OUTPATIENT
Start: 2021-09-29 | End: 2021-09-29 | Stop reason: SDUPTHER

## 2021-09-29 RX ORDER — CEFDINIR 300 MG/1
300 CAPSULE ORAL 2 TIMES DAILY
Qty: 14 CAPSULE | Refills: 0 | Status: SHIPPED | OUTPATIENT
Start: 2021-09-29 | End: 2021-10-06

## 2021-09-29 RX ORDER — MIDODRINE HYDROCHLORIDE 2.5 MG/1
7.5 TABLET ORAL
Qty: 135 TABLET | Refills: 0 | Status: SHIPPED | OUTPATIENT
Start: 2021-09-29 | End: 2021-10-07 | Stop reason: SDUPTHER

## 2021-09-29 RX ORDER — CEFDINIR 300 MG/1
300 CAPSULE ORAL 2 TIMES DAILY
Qty: 14 CAPSULE | Refills: 0 | Status: SHIPPED | OUTPATIENT
Start: 2021-09-29 | End: 2021-09-29 | Stop reason: SDUPTHER

## 2021-09-29 RX ADMIN — AMPICILLIN AND SULBACTAM 3000 MG: 1; 2 INJECTION, POWDER, FOR SOLUTION INTRAMUSCULAR; INTRAVENOUS at 08:44

## 2021-09-29 RX ADMIN — ALBUTEROL SULFATE 2.5 MG: 2.5 SOLUTION RESPIRATORY (INHALATION) at 12:11

## 2021-09-29 RX ADMIN — MIDODRINE HYDROCHLORIDE 7.5 MG: 5 TABLET ORAL at 08:41

## 2021-09-29 RX ADMIN — LEVOTHYROXINE SODIUM 150 MCG: 75 TABLET ORAL at 06:08

## 2021-09-29 RX ADMIN — SODIUM CHLORIDE SOLN NEBU 3% 4 ML: 3 NEBU SOLN at 21:40

## 2021-09-29 RX ADMIN — VALPROIC ACID 500 MG: 250 SOLUTION ORAL at 08:44

## 2021-09-29 RX ADMIN — Medication 10 ML: at 08:42

## 2021-09-29 RX ADMIN — ENOXAPARIN SODIUM 30 MG: 30 INJECTION, SOLUTION INTRAVENOUS; SUBCUTANEOUS at 08:42

## 2021-09-29 RX ADMIN — SODIUM CHLORIDE SOLN NEBU 3% 4 ML: 3 NEBU SOLN at 01:32

## 2021-09-29 RX ADMIN — MIDODRINE HYDROCHLORIDE 7.5 MG: 5 TABLET ORAL at 16:23

## 2021-09-29 RX ADMIN — POLYETHYLENE GLYCOL 3350 17 G: 17 POWDER, FOR SOLUTION ORAL at 08:41

## 2021-09-29 RX ADMIN — ALBUTEROL SULFATE 2.5 MG: 2.5 SOLUTION RESPIRATORY (INHALATION) at 01:32

## 2021-09-29 RX ADMIN — FAMOTIDINE 20 MG: 20 TABLET, FILM COATED ORAL at 08:41

## 2021-09-29 RX ADMIN — SODIUM CHLORIDE SOLN NEBU 3% 4 ML: 3 NEBU SOLN at 07:37

## 2021-09-29 RX ADMIN — Medication 5000 UNITS: at 08:41

## 2021-09-29 RX ADMIN — PAROXETINE 10 MG: 10 TABLET, FILM COATED ORAL at 08:45

## 2021-09-29 RX ADMIN — SODIUM CHLORIDE SOLN NEBU 3% 4 ML: 3 NEBU SOLN at 12:10

## 2021-09-29 RX ADMIN — ALBUTEROL SULFATE 2.5 MG: 2.5 SOLUTION RESPIRATORY (INHALATION) at 21:40

## 2021-09-29 RX ADMIN — OLANZAPINE 15 MG: 10 TABLET, ORALLY DISINTEGRATING ORAL at 08:44

## 2021-09-29 RX ADMIN — AMPICILLIN AND SULBACTAM 3000 MG: 1; 2 INJECTION, POWDER, FOR SOLUTION INTRAMUSCULAR; INTRAVENOUS at 16:22

## 2021-09-29 RX ADMIN — AMPICILLIN AND SULBACTAM 3000 MG: 1; 2 INJECTION, POWDER, FOR SOLUTION INTRAMUSCULAR; INTRAVENOUS at 02:25

## 2021-09-29 RX ADMIN — Medication 100 MG: at 08:45

## 2021-09-29 RX ADMIN — ALBUTEROL SULFATE 2.5 MG: 2.5 SOLUTION RESPIRATORY (INHALATION) at 07:37

## 2021-09-29 RX ADMIN — DOCUSATE SODIUM 100 MG: 50 LIQUID ORAL at 08:41

## 2021-09-29 ASSESSMENT — PAIN SCALES - GENERAL: PAINLEVEL_OUTOF10: 0

## 2021-09-29 NOTE — PROGRESS NOTES
Event Note    · Discussed with [facility] pulmonology. Star for discharge. Geoff Ocampo. Samuel Saleh M.D., F.C.C.P.     [facility] [address] [facility], [address] [place] [address] [facility] R[facility], [address] [place], [facility]

## 2021-09-29 NOTE — DISCHARGE SUMMARY
[facility] EMERGENCY SERVICE Physician Discharge Summary       No follow-up provider specified. Activity level: As tolerated and allowed at [facility]    Diet: Diet NPO  ADULT TUBE FEEDING; PEG; Standard with Fiber; Continuous; 10; Yes; 10; Q 4 hours; 45; 80; Q 1 hour    Labs:    Dispo: Back to ECF    Condition at discharge: Stable     Continue supplemental oxygen via nasal canula @ 2 LPM round-the-clock. Continue CPAP / BiPAP during sleep as prior to admission. Patient ID:  Cecilia Campos  [id number]  78 y.o.  1955    Admit date: 9/21/2021    Discharge date and time:  9/29/2021  10:53 AM    Admission Diagnoses: Principal Problem:    Septic shock ([address]  Active Problems:    Impairment level: total impairment of both eyes    Essential hypertension    S/P percutaneous endoscopic gastrostomy (PEG) tube placement ([address]    Dementia with behavioral disturbance, unspecified dementia type ([address]    Severe dehydration    Acute renal failure (ARF) (HCC)    Acute respiratory failure with hypoxia (HCC)  Resolved Problems:    * No resolved hospital problems. 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 *      Consults:  IP CONSULT TO NEPHROLOGY  IP CONSULT TO CRITICAL CARE  IP CONSULT TO PULMONOLOGY  IP CONSULT TO IV TEAM  IP CONSULT TO INFECTIOUS DISEASES  IP CONSULT TO IV TEAM    Procedures: None    Hospital Course: Patient was admitted with Sepsis ([address] [A41.9]. Patient Admitted on 21st, came with fever, shortness of breath, change in mental status.   Patient with ESRD, on hemodialysis, does have blindness, MRDD from group home. Patient was not on oxygen at the group home prior to coming to hospital.  Patient with PEG. Admitted with septic shock, needed pressors, evidence of acute respiratory failure, CT had shown bilateral lower lobe infiltrates. Patient Covid negative. Evidence of lactic acidosis. Did have atrial tachycardia. Per nephrology sepsis suspected secondary to aspiration from recent PEG tube issues. Remains on cefepime. POA had requested change in CODE STATUS, paperwork was completed on 23rd. renal has signed off. Pulmonary evaluated, plans to change antibiotics to Augmentin on DC, worsening radiographic findings yesterday, okay for DC pending leukocytosis improves, recommendations from ID where of Levaquin plus Omnicef instead of Augmentin. As of today patient is more alert, also shows downward trend in leukocytosis, will continue with the DC plans for patient to be able to go to ECF.   Nursing will also verify with pulmonary    Discharge Exam:  Vitals:    09/28/21 1915 09/29/21 0032 09/29/21 0734 09/29/21 0830   BP: (!) 106/56   131/60   Pulse: 90   107   Resp: 20  20 20   Temp: 97.6 °F (36.4 °C)   98.7 °F (37.1 °C)   TempSrc: Axillary   Oral   SpO2: 93%  95%    Weight:  158 lb (71.7 kg)     Height:           General Appearance: disoriented, with flat affect, frail-appearing and at baseline  Skin: warm and dry, no rash or erythema  Head: normocephalic and atraumatic  Eyes: pupils equal, round, and reactive to light, extraocular eye movements intact, conjunctivae normal  ENT: tympanic membrane, external ear and ear canal normal bilaterally, oropharynx clear and moist with normal mucous membranes  Neck: neck supple and non tender without mass, no thyromegaly or thyroid nodules, no cervical lymphadenopathy   Pulmonary/Chest: clear to auscultation bilaterally- no wheezes, rales or rhonchi, normal air movement, no respiratory distress  Cardiovascular: normal rate, normal S1 and S2, no gallops, intact distal pulses and no carotid bruits  Abdomen: soft, non-tender, non-distended, normal bowel sounds, no masses or organomegaly  I/O last 3 completed shifts: In: 2090 [NG/GT:2090]  Out: 500 [Urine:500]  I/O this shift:  In: 10 [I.V.:10]  Out: -       LABS:  Recent Labs     09/27/21  0315 09/28/21 0308 09/29/21  0622    133 137   K 5.3* 5.1* 5.0    101 102   CO2 28 28 29   BUN 11 8 7   CREATININE 0.7 0.6 0.6   GLUCOSE 119* 111* 104*   CALCIUM 9.1 8.8 9.5       Recent Labs     09/27/21 0448 09/28/21 0308 09/29/21  0622   WBC 14.2* 16.1* 14.7*   RBC 2.72* 2.28* 2.45*   HGB 8.8* 7.6* 8.2*   HCT 28.1* 23.9* 26.7*   .3* 104.8* 109.0*   MCH 32.4 33.3 33.5   MCHC 31.3* 31.8* 30.7*   RDW 14.5 14.6 14.9    334 519*   MPV 11.9 11.0 10.8       Imaging:   XR CHEST PORTABLE   Final Result   Increased opacities in left hilar location and retrocardiac left lower lobe   as well as left costophrenic sulcus. Findings could suggest increasing   atelectasis or pneumonia with left pleural effusion. CT HEAD WO CONTRAST   Final Result   1. There is no acute intracranial abnormality. Specifically, there is no   intracranial hemorrhage. 2. Atrophy and periventricular leukomalacia,         CT CHEST WO CONTRAST   Final Result   CT OF THE CHEST:      1. Pulmonale infiltrates in the dependent aspects of the lungs bilaterally,   left greater than right, concerning for pneumonia, possibly   aspiration-related. The appearance and distribution is not characteristic of   COVID-19, although it cannot be definitively excluded. 2. Large hiatal hernia. CT OF THE ABDOMEN AND PELVIS:      1.  No acute abnormality is seen in the abdomen or the pelvis. 2. Stable 2.3 cm left ovarian dermoid cyst.   3. Short segment of distal colonic diverticulosis without diverticulitis. 4. Gastrostomy tube in situ.          CT ABDOMEN PELVIS WO CONTRAST Additional Contrast? None   Final Result   CT OF THE CHEST:      1. Pulmonale infiltrates in the dependent aspects of the lungs bilaterally,   left greater than right, concerning for pneumonia, possibly   aspiration-related. The appearance and distribution is not characteristic of   COVID-19, although it cannot be definitively excluded. 2. Large hiatal hernia. CT OF THE ABDOMEN AND PELVIS:      1.  No acute abnormality is seen in the abdomen or the pelvis. 2. Stable 2.3 cm left ovarian dermoid cyst.   3. Short segment of distal colonic diverticulosis without diverticulitis. 4. Gastrostomy tube in situ. XR CHEST PORTABLE   Final Result   No acute process. Patient Instructions:      Medication List      START taking these medications    albuterol (2.5 MG/3ML) 0.083% nebulizer solution  Commonly known as: PROVENTIL  Take 3 mLs by nebulization every 6 hours     cefdinir 300 MG capsule  Commonly known as: OMNICEF  Take 1 capsule by mouth 2 times daily for 7 days     Full Kit Nebulizer Set Misc  Use as directed with nebulized medication. levoFLOXacin 250 MG tablet  Commonly known as: Levaquin  Take 2 tablets by mouth daily for 7 days     midodrine 2.5 MG tablet  Commonly known as: PROAMATINE  Take 3 tablets by mouth 3 times daily (with meals) for 15 days        CHANGE how you take these medications    ferrous sulfate 325 (65 Fe) MG tablet  Commonly known as: IRON 325  What changed: Another medication with the same name was removed. Continue taking this medication, and follow the directions you see here. folic acid 1 MG tablet  Commonly known as: Constanza Mattes  What changed: Another medication with the same name was removed. Continue taking this medication, and follow the directions you see here. metoprolol tartrate 25 MG tablet  Commonly known as: LOPRESSOR  What changed: Another medication with the same name was removed. Continue taking this medication, and follow the directions you see here.      montelukast 10 MG tablet  Commonly known as: SINGULAIR  What changed: Another medication with the same name was removed. Continue taking this medication, and follow the directions you see here. PARoxetine 10 MG tablet  Commonly known as: PAXIL  What changed: Another medication with the same name was removed. Continue taking this medication, and follow the directions you see here. Vitamin D3 125 MCG (5000 UT) Tabs  What changed: Another medication with the same name was removed. Continue taking this medication, and follow the directions you see here. CONTINUE taking these medications    acetaminophen 325 MG tablet  Commonly known as: TYLENOL     bisacodyl 10 MG suppository  Commonly known as: DULCOLAX     bumetanide 1 MG tablet  Commonly known as: BUMEX     Dextromethorphan-guaiFENesin  MG/5ML Syrp     docusate 50 MG/5ML liquid  Commonly known as: COLACE  10 mLs by Per G Tube route 2 times daily     famotidine 20 MG tablet  Commonly known as: PEPCID     levothyroxine 150 MCG tablet  Commonly known as: SYNTHROID     LORazepam 2 MG tablet  Commonly known as: ATIVAN     * medicated lip balm 2-2.5-6.6 % Stck     * SUNSCREENS EX     polyethylene glycol 17 g packet  Commonly known as: GLYCOLAX  17 g by Per G Tube route daily     Triple Antibiotic Oint     valproic acid 250 MG/5ML Soln oral solution  Commonly known as: DEPAKENE  20 mLs by Per G Tube route 2 times daily     vitamin B-1 100 MG tablet  Commonly known as: THIAMINE     * ZyPREXA zydis 5 MG disintegrating tablet  Generic drug: OLANZapine zydis     * ZyPREXA zydis 15 MG disintegrating tablet  Generic drug: OLANZapine zydis     * OLANZapine 20 MG tablet  Commonly known as: ZYPREXA         * This list has 5 medication(s) that are the same as other medications prescribed for you. Read the directions carefully, and ask your doctor or other care provider to review them with you.             STOP taking these medications    bismuth subsalicylate 631 WM/86GV suspension  Commonly known as: PEPTO BISMOL     thiamine 100 MG tablet           Where to Get Your Medications      These medications were sent to [facility] [address] [address] [facility] [address] [place] [address]    Phone: [phone]   · albuterol (2.5 MG/3ML) 0.083% nebulizer solution  · cefdinir 300 MG capsule  · levoFLOXacin 250 MG tablet  · midodrine 2.5 MG tablet     You can get these medications from any pharmacy    Bring a paper prescription for each of these medications  · Full Kit Nebulizer Set Misc           Note that more than 30 minutes was spent in preparing discharge papers, discussing discharge with patient, medication review, etc.    Signed:  Electronically signed by Masood Baird MD on 9/29/2021 at 10:53 AM    NOTE: This report was transcribed using voice recognition software. Every effort was made to ensure accuracy; however, inadvertent computerized transcription errors may be present.

## 2021-09-29 NOTE — CARE COORDINATION
Social Work discharge planning   Sw also spoke to Reese at [facility], who advised they need all of pt's new Rxs and orders an to transport pt by 3p any day  Due to their staffing and ability to obtain pt's meds. Sw notified charge RN Herlinda Davis. Reese said they will transport pt back to [facility] at discharge.   Electronically signed by Cindy Acuna on 9/29/2021 at 9:42 AM

## 2021-09-29 NOTE — DISCHARGE INSTR - COC
Continuity of Care Form    Patient Name: Elene Paget   :  1955  MRN:  [id number]    Admit date:  2021  Discharge date:  21    Code Status Order: DNR-CCA   Advance Directives:      Admitting Physician:  Governor Fidencio DO  PCP: Dwight Victor DO    Discharging Nurse: [facility] Unit/Room#: 5413/3956-Z  Discharging Unit Phone Number: [phone]    Emergency Contact:   Extended Emergency Contact Information  Primary Emergency Contact: Thyra Patient)  Home Phone: [phone]  Mobile Phone: [phone]  Relation: Legal Guardian   needed? No  Secondary Emergency Contact: [facility] Phone: [phone]  Work Phone: [phone]  Relation: Other  Preferred language: [address] needed?  No    Past Surgical History:  Past Surgical History:   Procedure Laterality Date    BRONCHOSCOPY  02/10/2017    CHEST TUBE INSERTION Right 2017    GASTROSTOMY TUBE PLACEMENT N/A 3/7/2021    EGD PEG TUBE PLACEMENT performed by Eduardo Bernabe MD at [place] [address] Right 2017    tessio insertion     OTHER SURGICAL HISTORY  2017    PEG tube insertion       Immunization History:   Immunization History   Administered Date(s) Administered    Influenza Vaccine, unspecified formulation 09/10/2014    Influenza Virus Vaccine 10/21/2017    Influenza Whole 09/10/2014    Influenza, High Dose (Fluzone 65 yrs and older) 10/18/2019    Influenza, Darlene Pleva, IM, PF (6 mo and older Fluzone, Flulaval, Fluarix, and 3 yrs and older Afluria) 2016, 2017, 10/21/2017, 10/03/2018    Pneumococcal Polysaccharide (Ptaotthvx63) 10/18/2019       Active Problems:  Patient Active Problem List   Diagnosis Code    Profound intellectual disability F73    Hypothyroidism E03.9    Impairment level: total impairment of both eyes H54.0X55    Hyperglycemia R73.9    Nonsustained ventricular tachycardia (HCC) I47.2    Acute renal failure syndrome ([facility] N17.9    Macrocytosis D75.89    Disruptive behavior disorder F91.9    Ingrowing nail L60.0    Peripheral vascular disease ([facility]) I73.9    Altered mental state R41.82    Onychomycosis B35.1    Anemia due to chronic kidney disease N18.9, D63.1    Essential hypertension I10    Vitamin D deficiency E55.9    Gastroesophageal reflux disease without esophagitis K21.9    S/P percutaneous endoscopic gastrostomy (PEG) tube placement ([facility]) Z93.1    Chronic kidney disease N18.9    Dementia with behavioral disturbance, unspecified dementia type ([facility] F03.91    Gastrostomy tube dysfunction ([facility]) K94.23    Sepsis ([facility]) A41.9    Septic shock ([facility]) A41.9, R65.21    Severe dehydration E86.0    Acute renal failure (ARF) ([facility]) N17.9    Acute respiratory failure with hypoxia ([facility]) J96.01       Isolation/Infection:   Isolation            No Isolation          Patient Infection Status       Infection Onset Added Last Indicated Last Indicated By Review Planned Expiration Resolved Resolved By    None active    Resolved    COVID-19 Rule Out 09/21/21 09/21/21 09/21/21 Respiratory Panel, Molecular, with COVID-19 (Restricted: peds pts or suitable admitted adults) (Ordered)   09/21/21 Rule-Out Test Resulted    COVID-19 Rule Out 09/21/21 09/21/21 09/21/21 COVID-19, Rapid (Ordered)   09/21/21 Rule-Out Test Resulted    COVID-19 Rule Out 03/04/21 03/04/21 03/04/21 COVID-19, Rapid (Ordered)   03/04/21 Rule-Out Test Resulted    COVID-19 Rule Out 02/08/21 02/08/21 02/08/21 COVID-19 (Ordered)   02/08/21 Rule-Out Test Resulted            Nurse Assessment:  Last Vital Signs: /60   Pulse 107   Temp 98.7 °F (37.1 °C) (Oral)   Resp 20   Ht 5' 3\" (1.6 m)   Wt 158 lb (71.7 kg)   SpO2 95%   BMI 27.99 kg/m²     Last documented pain score (0-10 scale): Pain Level: 0  Last Weight:   Wt Readings from Last 1 Encounters:   09/29/21 158 lb (71.7 kg)     Mental Status:  disoriented    IV Access:  - None    Nursing Fax: [phone]        ignature: Electronically signed by Preston Troncoso on 9/30/21 at 12:51 PM EDT    PHYSICIAN SECTION    Prognosis: {Prognosis:[id number]:::0}    Condition at Discharge: Stable    Rehab Potential (if transferring to Rehab): {Prognosis:0755679786:::0}    Recommended Labs or Other Treatments After Discharge: ***    Physician Certification: I certify the above information and transfer of Zoey Torres  is necessary for the continuing treatment of the diagnosis listed and that she requires [place] for less 30 days.      Update Admission H&P: {CHP DME Changes in HandP:[id number]:::0}    PHYSICIAN SIGNATURE:  Electronically signed by Mihir Echavarria MD on 9/29/21 at 10:53 AM EDT

## 2021-09-29 NOTE — PROGRESS NOTES
Reached out to Dr. Paulette Gil via perfect serve regarding discharge, stated Dr. Linda Encarnacion will see today.  Electronically signed by Mary Niño RN on 9/29/2021 at 10:02 AM

## 2021-09-29 NOTE — PROGRESS NOTES
[address] [place] [address] [facility]  Progress Note    SUBJECTIVE:  Chief Complaint   Patient presents with    Altered Mental Status     Patient is lying in bed, no distress  Seems comfortable. No issues reported per nursing     Review of systems:  As stated above in the chief complaint, otherwise negative. Medications:  Scheduled Meds:   ampicillin-sulbactam  3,000 mg IntraVENous Q6H    OLANZapine zydis  15 mg Oral QAM    midodrine  7.5 mg Oral TID WC    valproic acid  500 mg Per G Tube BID    sodium chloride (Inhalant)  4 mL Nebulization Q6H    albuterol  2.5 mg Nebulization Q6H    famotidine  20 mg Per G Tube Daily    thiamine  100 mg Per G Tube Daily    polyethylene glycol  17 g Per G Tube Daily    PARoxetine  10 mg Per G Tube BID    OLANZapine  20 mg Per G Tube Nightly    Vitamin D  5,000 Units Per G Tube Daily    docusate  100 mg Per G Tube BID    levothyroxine  150 mcg Per G Tube Daily    [Held by provider] metoprolol tartrate  75 mg Per G Tube BID    montelukast  10 mg Per G Tube Nightly    sodium chloride flush  5-40 mL IntraVENous 2 times per day    enoxaparin  30 mg SubCUTAneous Daily     Continuous Infusions:   sodium chloride       PRN Meds:bisacodyl, medicated lip balm, sodium chloride flush, sodium chloride, ondansetron **OR** ondansetron, acetaminophen **OR** acetaminophen    OBJECTIVE:  /60   Pulse 107   Temp 98.7 °F (37.1 °C) (Oral)   Resp 20   Ht 5' 3\" (1.6 m)   Wt 158 lb (71.7 kg)   SpO2 95%   BMI 27.99 kg/m²   Temp  Av °F (36.7 °C)  Min: 97.6 °F (36.4 °C)  Max: 98.7 °F (37.1 °C)  Constitutional: The patient is lying in bed, resting comfortably. Nonverbal.   Skin: Warm and dry. No rashes were noted. HEENT: eyes are milky white, opacified. blind. Moist mucous membranes. No ulcerations or thrush. Neck: Supple to movements. Chest: No respiratory distress. Symmetrical expansion. Scattered rhonchi but improved.    Cardiovascular: S1 and S2 are rhythmic and regular. No murmurs appreciated. Abdomen: Positive bowel sounds to auscultation. Benign to palpation. No masses felt. PEG. Binder. Extremities: trace edema.   Lines: peripheral    Laboratory and Tests Review:  Lab Results   Component Value Date    WBC 14.7 (H) 09/29/2021    WBC 16.1 (H) 09/28/2021    WBC 14.2 (H) 09/27/2021    HGB 8.2 (L) 09/29/2021    HCT 26.7 (L) 09/29/2021    .0 (H) 09/29/2021     (H) 09/29/2021     Lab Results   Component Value Date    NEUTROABS 11.03 (H) 09/29/2021    NEUTROABS 11.75 (H) 09/28/2021    NEUTROABS 9.94 (H) 09/27/2021     No results found for: [facility]  Lab Results   Component Value Date    ALT 12 09/29/2021    AST 20 09/29/2021    ALKPHOS 80 09/29/2021    BILITOT <0.2 09/29/2021     Lab Results   Component Value Date     09/29/2021    K 5.0 09/29/2021    K 4.2 09/21/2021     09/29/2021    CO2 29 09/29/2021    BUN 7 09/29/2021    CREATININE 0.6 09/29/2021    CREATININE 0.6 09/28/2021    CREATININE 0.7 09/27/2021    GFRAA >60 09/29/2021    LABGLOM >60 09/29/2021    GLUCOSE 104 09/29/2021    GLUCOSE 97 12/30/2011    PROT 6.0 09/29/2021    LABALBU 2.1 09/29/2021    LABALBU 3.7 12/30/2011    CALCIUM 9.5 09/29/2021    BILITOT <0.2 09/29/2021    ALKPHOS 80 09/29/2021    AST 20 09/29/2021    ALT 12 09/29/2021     Lab Results   Component Value Date    CRP 11.5 (H) 01/25/2017     No results found for: [address]  Radiology:  Noted     Microbiology:   Blood cultures: negative final   MRSA screen: negative  Urine culture: <10K GPO  Respiratory Viral Panel: negative     ASSESSMENT:  · Probable left lower lobe pneumonia  · Sepsis with septic shock associated to the above, resolved  · Leukocytosis associated to the above, improved   · Fever secondary to pneumonia, improved    PLAN:  · Antibiotics reconciled for discharge by hospitalist - cefdinir & levofloxacin  · Aspiration precautions   · Ok to discharge back to group home today     Naun Sosa, APRN - CNP  12:20 PM  9/29/2021     Patient seen and examined. I had a face to face encounter with the patient. Agree with exam.  Assessment and plan as outlined above and directed by me. Addition and corrections were done as deemed appropriate. My exam and plan include: The patient seems to be responding to current antibiotic therapy. These have been reconciled. She can be discharged from ID standpoint.     Moreno Wilson MD  9/29/2021  12:58 PM

## 2021-09-29 NOTE — PROGRESS NOTES
Discharge paperwork faxed at this time.  Electronically signed by Renetta Raygoza RN on 9/29/2021 at 11:48 AM

## 2021-09-29 NOTE — PROGRESS NOTES
Young Ariza at [facility] confirmed receiving fax, nurse to nurse report was done she said that they were still working out a time for her to come back but it would definitely be before 3pm

## 2021-09-29 NOTE — PROGRESS NOTES
Spoke to Reese from [employer], updated that patient likely to be discharged today. Asked that she be called at discharge at [phone] and discharge paperwork faxed to [phone].   Electronically signed by Renetta Raygoza RN on 9/29/2021 at 9:30 AM

## 2021-09-30 VITALS
HEIGHT: 63 IN | OXYGEN SATURATION: 93 % | SYSTOLIC BLOOD PRESSURE: 141 MMHG | HEART RATE: 102 BPM | TEMPERATURE: 99.1 F | WEIGHT: 153 LBS | BODY MASS INDEX: 27.11 KG/M2 | DIASTOLIC BLOOD PRESSURE: 69 MMHG | RESPIRATION RATE: 18 BRPM

## 2021-09-30 LAB
ALBUMIN SERPL-MCNC: 2 G/DL (ref 3.5–5.2)
ALP BLD-CCNC: 87 U/L (ref 35–104)
ALT SERPL-CCNC: 11 U/L (ref 0–32)
ANION GAP SERPL CALCULATED.3IONS-SCNC: 7 MMOL/L (ref 7–16)
ANISOCYTOSIS: ABNORMAL
AST SERPL-CCNC: 19 U/L (ref 0–31)
ATYPICAL LYMPHOCYTE RELATIVE PERCENT: 0.9 % (ref 0–4)
BASOPHILIC STIPPLING: ABNORMAL
BASOPHILS ABSOLUTE: 0 E9/L (ref 0–0.2)
BASOPHILS RELATIVE PERCENT: 0 % (ref 0–2)
BILIRUB SERPL-MCNC: <0.2 MG/DL (ref 0–1.2)
BUN BLDV-MCNC: 6 MG/DL (ref 6–23)
CALCIUM SERPL-MCNC: 9.6 MG/DL (ref 8.6–10.2)
CHLORIDE BLD-SCNC: 101 MMOL/L (ref 98–107)
CO2: 27 MMOL/L (ref 22–29)
CREAT SERPL-MCNC: 0.7 MG/DL (ref 0.5–1)
EOSINOPHILS ABSOLUTE: 0.26 E9/L (ref 0.05–0.5)
EOSINOPHILS RELATIVE PERCENT: 1.8 % (ref 0–6)
GFR AFRICAN AMERICAN: >60
GFR NON-AFRICAN AMERICAN: >60 ML/MIN/1.73
GLUCOSE BLD-MCNC: 116 MG/DL (ref 74–99)
HCT VFR BLD CALC: 27.9 % (ref 34–48)
HEMOGLOBIN: 8.5 G/DL (ref 11.5–15.5)
HYPOCHROMIA: ABNORMAL
LYMPHOCYTES ABSOLUTE: 2.27 E9/L (ref 1.5–4)
LYMPHOCYTES RELATIVE PERCENT: 15 % (ref 20–42)
MAGNESIUM: 2.1 MG/DL (ref 1.6–2.6)
MCH RBC QN AUTO: 32.6 PG (ref 26–35)
MCHC RBC AUTO-ENTMCNC: 30.5 % (ref 32–34.5)
MCV RBC AUTO: 106.9 FL (ref 80–99.9)
METAMYELOCYTES RELATIVE PERCENT: 3.5 % (ref 0–1)
MONOCYTES ABSOLUTE: 1.42 E9/L (ref 0.1–0.95)
MONOCYTES RELATIVE PERCENT: 9.7 % (ref 2–12)
MYELOCYTE PERCENT: 1.8 % (ref 0–0)
NEUTROPHILS ABSOLUTE: 10.37 E9/L (ref 1.8–7.3)
NEUTROPHILS RELATIVE PERCENT: 67.3 % (ref 43–80)
NUCLEATED RED BLOOD CELLS: 6.2 /100 WBC
PDW BLD-RTO: 15.1 FL (ref 11.5–15)
PHOSPHORUS: 3.2 MG/DL (ref 2.5–4.5)
PLATELET # BLD: 622 E9/L (ref 130–450)
PMV BLD AUTO: 10.3 FL (ref 7–12)
POIKILOCYTES: ABNORMAL
POLYCHROMASIA: ABNORMAL
POTASSIUM SERPL-SCNC: 4.8 MMOL/L (ref 3.5–5)
RBC # BLD: 2.61 E12/L (ref 3.5–5.5)
SARS-COV-2, NAAT: NOT DETECTED
SODIUM BLD-SCNC: 135 MMOL/L (ref 132–146)
TARGET CELLS: ABNORMAL
TOTAL PROTEIN: 6.4 G/DL (ref 6.4–8.3)
VACUOLATED NEUTROPHILS: ABNORMAL
WBC # BLD: 14.2 E9/L (ref 4.5–11.5)

## 2021-09-30 PROCEDURE — 36415 COLL VENOUS BLD VENIPUNCTURE: CPT

## 2021-09-30 PROCEDURE — 6360000002 HC RX W HCPCS: Performed by: INTERNAL MEDICINE

## 2021-09-30 PROCEDURE — 94640 AIRWAY INHALATION TREATMENT: CPT

## 2021-09-30 PROCEDURE — 2700000000 HC OXYGEN THERAPY PER DAY

## 2021-09-30 PROCEDURE — 2580000003 HC RX 258: Performed by: INTERNAL MEDICINE

## 2021-09-30 PROCEDURE — 84100 ASSAY OF PHOSPHORUS: CPT

## 2021-09-30 PROCEDURE — 6370000000 HC RX 637 (ALT 250 FOR IP): Performed by: INTERNAL MEDICINE

## 2021-09-30 PROCEDURE — 87635 SARS-COV-2 COVID-19 AMP PRB: CPT

## 2021-09-30 PROCEDURE — 83735 ASSAY OF MAGNESIUM: CPT

## 2021-09-30 PROCEDURE — 80053 COMPREHEN METABOLIC PANEL: CPT

## 2021-09-30 PROCEDURE — 99238 HOSP IP/OBS DSCHRG MGMT 30/<: CPT | Performed by: INTERNAL MEDICINE

## 2021-09-30 PROCEDURE — 85025 COMPLETE CBC W/AUTO DIFF WBC: CPT

## 2021-09-30 PROCEDURE — 94669 MECHANICAL CHEST WALL OSCILL: CPT

## 2021-09-30 RX ADMIN — AMPICILLIN AND SULBACTAM 3000 MG: 1; 2 INJECTION, POWDER, FOR SOLUTION INTRAMUSCULAR; INTRAVENOUS at 00:12

## 2021-09-30 RX ADMIN — POLYETHYLENE GLYCOL 3350 17 G: 17 POWDER, FOR SOLUTION ORAL at 09:10

## 2021-09-30 RX ADMIN — FAMOTIDINE 20 MG: 20 TABLET, FILM COATED ORAL at 09:10

## 2021-09-30 RX ADMIN — MONTELUKAST SODIUM 10 MG: 10 TABLET, FILM COATED ORAL at 00:13

## 2021-09-30 RX ADMIN — ALBUTEROL SULFATE 2.5 MG: 2.5 SOLUTION RESPIRATORY (INHALATION) at 07:53

## 2021-09-30 RX ADMIN — ALBUTEROL SULFATE 2.5 MG: 2.5 SOLUTION RESPIRATORY (INHALATION) at 12:31

## 2021-09-30 RX ADMIN — ENOXAPARIN SODIUM 30 MG: 30 INJECTION, SOLUTION INTRAVENOUS; SUBCUTANEOUS at 09:10

## 2021-09-30 RX ADMIN — VALPROIC ACID 500 MG: 250 SOLUTION ORAL at 00:12

## 2021-09-30 RX ADMIN — Medication 100 MG: at 09:09

## 2021-09-30 RX ADMIN — AMPICILLIN AND SULBACTAM 3000 MG: 1; 2 INJECTION, POWDER, FOR SOLUTION INTRAMUSCULAR; INTRAVENOUS at 13:13

## 2021-09-30 RX ADMIN — LEVOTHYROXINE SODIUM 150 MCG: 75 TABLET ORAL at 06:02

## 2021-09-30 RX ADMIN — AMPICILLIN AND SULBACTAM 3000 MG: 1; 2 INJECTION, POWDER, FOR SOLUTION INTRAMUSCULAR; INTRAVENOUS at 05:52

## 2021-09-30 RX ADMIN — VALPROIC ACID 500 MG: 250 SOLUTION ORAL at 09:08

## 2021-09-30 RX ADMIN — DOCUSATE SODIUM 100 MG: 50 LIQUID ORAL at 00:12

## 2021-09-30 RX ADMIN — PAROXETINE 10 MG: 10 TABLET, FILM COATED ORAL at 00:13

## 2021-09-30 RX ADMIN — Medication 5000 UNITS: at 09:11

## 2021-09-30 RX ADMIN — MIDODRINE HYDROCHLORIDE 7.5 MG: 5 TABLET ORAL at 13:13

## 2021-09-30 RX ADMIN — PAROXETINE 10 MG: 10 TABLET, FILM COATED ORAL at 09:09

## 2021-09-30 RX ADMIN — MIDODRINE HYDROCHLORIDE 7.5 MG: 5 TABLET ORAL at 09:10

## 2021-09-30 RX ADMIN — DOCUSATE SODIUM 100 MG: 50 LIQUID ORAL at 09:10

## 2021-09-30 RX ADMIN — OLANZAPINE 20 MG: 10 TABLET, FILM COATED ORAL at 00:13

## 2021-09-30 RX ADMIN — Medication 10 ML: at 09:11

## 2021-09-30 RX ADMIN — OLANZAPINE 15 MG: 10 TABLET, ORALLY DISINTEGRATING ORAL at 09:09

## 2021-09-30 RX ADMIN — SODIUM CHLORIDE, PRESERVATIVE FREE 10 ML: 5 INJECTION INTRAVENOUS at 13:18

## 2021-09-30 RX ADMIN — SODIUM CHLORIDE SOLN NEBU 3% 4 ML: 3 NEBU SOLN at 07:53

## 2021-09-30 RX ADMIN — SODIUM CHLORIDE SOLN NEBU 3% 4 ML: 3 NEBU SOLN at 12:31

## 2021-09-30 NOTE — PROGRESS NOTES
[address] [place] [address] [facility]  Progress Note    Face to face encounter   SUBJECTIVE:  Chief Complaint   Patient presents with    Altered Mental Status     Patient is lying in bed, no distress- on room air. No fevers. Seems comfortable. No issues reported per nursing - for discharge hopefully later today     Review of systems:  As stated above in the chief complaint, otherwise negative. Medications:  Scheduled Meds:   ampicillin-sulbactam  3,000 mg IntraVENous Q6H    OLANZapine zydis  15 mg Oral QAM    midodrine  7.5 mg Oral TID WC    valproic acid  500 mg Per G Tube BID    sodium chloride (Inhalant)  4 mL Nebulization Q6H    albuterol  2.5 mg Nebulization Q6H    famotidine  20 mg Per G Tube Daily    thiamine  100 mg Per G Tube Daily    polyethylene glycol  17 g Per G Tube Daily    PARoxetine  10 mg Per G Tube BID    OLANZapine  20 mg Per G Tube Nightly    Vitamin D  5,000 Units Per G Tube Daily    docusate  100 mg Per G Tube BID    levothyroxine  150 mcg Per G Tube Daily    [Held by provider] metoprolol tartrate  75 mg Per G Tube BID    montelukast  10 mg Per G Tube Nightly    sodium chloride flush  5-40 mL IntraVENous 2 times per day    enoxaparin  30 mg SubCUTAneous Daily     Continuous Infusions:   sodium chloride       PRN Meds:bisacodyl, medicated lip balm, sodium chloride flush, sodium chloride, ondansetron **OR** ondansetron, acetaminophen **OR** acetaminophen    OBJECTIVE:  BP (!) 141/69   Pulse 102   Temp 99.1 °F (37.3 °C) (Axillary)   Resp 18   Ht 5' 3\" (1.6 m)   Wt 153 lb (69.4 kg)   SpO2 93%   BMI 27.10 kg/m²   Temp  Av.5 °F (36.9 °C)  Min: 97.9 °F (36.6 °C)  Max: 99.1 °F (37.3 °C)  Constitutional: The patient is lying in bed, resting comfortably. Nonverbal.   Skin: Warm and dry. No rashes were noted. HEENT: eyes are milky white, opacified. blind. Moist mucous membranes. No ulcerations or thrush. Neck: Supple to movements.    Chest: No hospitalist - cefdinir & levofloxacin  · Aspiration precautions - at high risk for aspiration pneumonia   · Ok to discharge back to group home  · For discharge hopefully later today     RADHA Pacheco - CNS  12:03 PM  9/30/2021     Patient seen and examined. I had a face to face encounter with the patient. Agree with exam.  Assessment and plan as outlined above and directed by me. Addition and corrections were done as deemed appropriate. My exam and plan include: The patient is still on Unasyn. We will defer further antibiotic treatment to hospitalist service. ID will sign off.     Mireya Wolf MD  9/30/2021  12:54 PM

## 2021-09-30 NOTE — CARE COORDINATION
Social Work discharge planning   Alfreda called Jose Martin Dominguez at [employer] [phone] but was only able to leave message to call Sw back asap for discharge today. Alfreda also called pt's guardian PAM (spoke to [facility]) [phone] and advised her of issues yesterday with pt's discharge, and asked her assistance to get pt back to the group home today. [facility] said she will call Alan Avalos and call SW back. Alfreda faed discharge Summary to Jose Martin Dominguez at [facility] fax [phone]. SW called WhitneyRodrigomor with [id number] who confirmed delivery of nebulizer to group home last night. Electronically signed by Preston Troncoso on 9/30/2021 at 10:59 AM     Addendum-    Sw received call back from [facility] worker [facility], who advised she spoke to [address] [facility] and they are awaiting approval for pt to return with [facility] Nurse [phone]. [facility] with Manueli advised Sw to call the [facility] nurse idalia, who has to approve pt's return to [place] reportedly. Sw called number and was transferred to [facility] in [employer]. But Sw only able to lvm to call Sw back asap today for pt that has been discharged for 2 days. Electronically signed by Preston Troncoso on 9/30/2021 at 11:17 AM     Addendum    Alfreda received call back from [facility] with [address] of DD ph [phone]. She said she thought pt went back to group home yesterday. Alfreda advised her that nebulizer was confirmed delivered yesterday. And that pt is at risk for infection here in the hospital.  And that charge Rn Ze Haley talked to [place] a few times yesterday re: meds through PEG, which pt had PEG prior to admit. Alfreda asked [facility] to assist in getting pt back to [place] by 3pm ([place]'s rule) today. Sw updated charge [employer]. Electronically signed by Preston Troncoso on 9/30/2021 at 11:53 AM     Addendum-    Sw received call back from [facility] guardian Jed Wilhelm, who advised the [address] will not have a nurse until Monday 10/4/21 and can not accept pt back until after they have a nurse there. Connor Fontanez said she and Fabián Shafer from [facility] in [facility] are trying to see if there is any other home pt can go to with nurse. Pt does not have LTAC criteria and [facility] will require [facility]. Electronically signed by Shakira Yates on 9/30/2021 at 12:37 PM     Addendum-    Per Vicki Tracy with [facility], they can accept pt and will call SW when we can set uptime. [facility] advised THEY will do [facility] once pt get there. Sw notified [facility] guardian Connor Fontanez and [facility] worker Fabián Shafer, and charge [employer]. Will give RN Covid test.  Electronically signed by Shakira Yates on 9/30/2021 at 12:49 PM     Addendum- Per Vicki Tracy at [facility], their sister facility [facility] is going to accept pt today instead of [facility]. Alfreda notified RN and [facility]. [facility] advised they are ordering chest vest for pt also.   Electronically signed by Shakira Yates on 9/30/2021 at 1:44 PM

## 2021-09-30 NOTE — PROGRESS NOTES
P Quality Flow/Interdisciplinary Rounds Progress Note        Quality Flow Rounds held on September 30, 2021    Disciplines Attending:  Bedside Nurse, ,  and Nursing Unit Leadership    Cecilia Campos was admitted on 9/21/2021 10:16 AM    Anticipated Discharge Date:  Expected Discharge Date: 09/30/21    Disposition:    Jose Score:  Jose Scale Score: 15    Readmission Risk              Risk of Unplanned Readmission:  43           Discussed patient goal for the day, patient clinical progression, and barriers to discharge.   The following Goal(s) of the Day/Commitment(s) have been identified:  Discharge - Obtain Order      Otilio Harris RN  September 30, 2021

## 2021-09-30 NOTE — DISCHARGE SUMMARY
[facility] EMERGENCY SERVICE Physician Discharge Summary       [address] [place], [place]. [facility] [place] [address]  [phone]        Patient ID:  Tank Johnson  [id number]  78 y.o.  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 *      Consults:  IP CONSULT TO NEPHROLOGY  IP CONSULT TO CRITICAL CARE  IP CONSULT TO PULMONOLOGY  IP CONSULT TO IV TEAM  IP CONSULT TO INFECTIOUS DISEASES  IP CONSULT TO IV TEAM    Hospital Course: Patient was admitted with Sepsis ([address] [A41.9]. 42-year-old female who was admitted with sepsis due to pneumonia from a group [place]. Patient was discharged from this hospital on 9/29 and discharge summary was done at that time. However no one could get a hold of anyone at the group home and she stayed overnight. [facility] is very reluctant to take patient back and instead she was referred to [facility] nursing [facility].   Patient will be discharged there until she can be sent back to her group home. There were no medical issues in the intervening overnight. Full discharge summary with explanation of hospital stay was done on 9/29. Discharge Exam:  Vitals:    09/30/21 0000 09/30/21 0035 09/30/21 0630 09/30/21 0845   BP: (!) 141/78   (!) 141/69   Pulse: 107   102   Resp: 18   18   Temp: 97.9 °F (36.6 °C)   99.1 °F (37.3 °C)   TempSrc: Axillary   Axillary   SpO2: 98%  93% 93%   Weight:  153 lb (69.4 kg)     Height:         LABS:  Recent Labs     09/28/21  0308 09/29/21  0622 09/30/21  0544    137 135   K 5.1* 5.0 4.8    102 101   CO2 28 29 27   BUN 8 7 6   CREATININE 0.6 0.6 0.7   GLUCOSE 111* 104* 116*   CALCIUM 8.8 9.5 9.6       Recent Labs     09/28/21  0308 09/29/21  0622 09/30/21  0544   WBC 16.1* 14.7* 14.2*   RBC 2.28* 2.45* 2.61*   HGB 7.6* 8.2* 8.5*   HCT 23.9* 26.7* 27.9*   .8* 109.0* 106.9*   MCH 33.3 33.5 32.6   MCHC 31.8* 30.7* 30.5*   RDW 14.6 14.9 15.1*    519* 622*   MPV 11.0 10.8 10.3       No results for input(s): POCGLU in the last 72 hours. Imaging:   XR CHEST PORTABLE   Final Result   Increased opacities in left hilar location and retrocardiac left lower lobe   as well as left costophrenic sulcus. Findings could suggest increasing   atelectasis or pneumonia with left pleural effusion. CT HEAD WO CONTRAST   Final Result   1. There is no acute intracranial abnormality. Specifically, there is no   intracranial hemorrhage. 2. Atrophy and periventricular leukomalacia,         CT CHEST WO CONTRAST   Final Result   CT OF THE CHEST:      1. Pulmonale infiltrates in the dependent aspects of the lungs bilaterally,   left greater than right, concerning for pneumonia, possibly   aspiration-related. The appearance and distribution is not characteristic of   COVID-19, although it cannot be definitively excluded. 2. Large hiatal hernia. CT OF THE ABDOMEN AND PELVIS:      1.  No acute abnormality is seen in the abdomen or the pelvis.    2. Stable 2.3 cm left ovarian dermoid cyst.   3. Short segment of distal colonic diverticulosis without diverticulitis. 4. Gastrostomy tube in situ. CT ABDOMEN PELVIS WO CONTRAST Additional Contrast? None   Final Result   CT OF THE CHEST:      1. Pulmonale infiltrates in the dependent aspects of the lungs bilaterally,   left greater than right, concerning for pneumonia, possibly   aspiration-related. The appearance and distribution is not characteristic of   COVID-19, although it cannot be definitively excluded. 2. Large hiatal hernia. CT OF THE ABDOMEN AND PELVIS:      1.  No acute abnormality is seen in the abdomen or the pelvis. 2. Stable 2.3 cm left ovarian dermoid cyst.   3. Short segment of distal colonic diverticulosis without diverticulitis. 4. Gastrostomy tube in situ. XR CHEST PORTABLE   Final Result   No acute process. Patient Instructions:      Medication List      START taking these medications    albuterol (2.5 MG/3ML) 0.083% nebulizer solution  Commonly known as: PROVENTIL  Take 3 mLs by nebulization every 6 hours     cefdinir 300 MG capsule  Commonly known as: OMNICEF  1 capsule by PEG Tube route 2 times daily for 7 days     Full Kit Nebulizer Set Misc  Use as directed with nebulized medication. levoFLOXacin 250 MG tablet  Commonly known as: Levaquin  2 tablets by PEG Tube route daily for 7 days     midodrine 2.5 MG tablet  Commonly known as: PROAMATINE  Take 3 tablets by mouth 3 times daily (with meals) for 15 days        CHANGE how you take these medications    ferrous sulfate 325 (65 Fe) MG tablet  Commonly known as: IRON 325  What changed: Another medication with the same name was removed. Continue taking this medication, and follow the directions you see here. folic acid 1 MG tablet  Commonly known as: Rosibel Mcconnell  What changed: Another medication with the same name was removed. Continue taking this medication, and follow the directions you see here. metoprolol tartrate 25 MG tablet  Commonly known as: LOPRESSOR  What changed: Another medication with the same name was removed. Continue taking this medication, and follow the directions you see here. montelukast 10 MG tablet  Commonly known as: SINGULAIR  What changed: Another medication with the same name was removed. Continue taking this medication, and follow the directions you see here. PARoxetine 10 MG tablet  Commonly known as: PAXIL  What changed: Another medication with the same name was removed. Continue taking this medication, and follow the directions you see here. Vitamin D3 125 MCG (5000 UT) Tabs  What changed: Another medication with the same name was removed. Continue taking this medication, and follow the directions you see here.         CONTINUE taking these medications    acetaminophen 325 MG tablet  Commonly known as: TYLENOL     bisacodyl 10 MG suppository  Commonly known as: DULCOLAX     bumetanide 1 MG tablet  Commonly known as: BUMEX     Dextromethorphan-guaiFENesin  MG/5ML Syrp     docusate 50 MG/5ML liquid  Commonly known as: COLACE  10 mLs by Per G Tube route 2 times daily     famotidine 20 MG tablet  Commonly known as: PEPCID     levothyroxine 150 MCG tablet  Commonly known as: SYNTHROID     LORazepam 2 MG tablet  Commonly known as: ATIVAN     * medicated lip balm 2-2.5-6.6 % Stck     * SUNSCREENS EX     polyethylene glycol 17 g packet  Commonly known as: GLYCOLAX  17 g by Per G Tube route daily     Triple Antibiotic Oint     valproic acid 250 MG/5ML Soln oral solution  Commonly known as: DEPAKENE  20 mLs by Per G Tube route 2 times daily     vitamin B-1 100 MG tablet  Commonly known as: THIAMINE     * ZyPREXA zydis 5 MG disintegrating tablet  Generic drug: OLANZapine zydis     * ZyPREXA zydis 15 MG disintegrating tablet  Generic drug: OLANZapine zydis     * OLANZapine 20 MG tablet  Commonly known as: ZYPREXA         * This list has 5 medication(s) that are the same as other medications prescribed for you. Read the directions carefully, and ask your doctor or other care provider to review them with you. STOP taking these medications    bismuth subsalicylate 564 OQ/04IB suspension  Commonly known as: PEPTO BISMOL     thiamine 100 MG tablet           Where to Get Your Medications      These medications were sent to [address] [facility] [address] [facility] [address]    Phone: [phone]   · albuterol (2.5 MG/3ML) 0.083% nebulizer solution  · cefdinir 300 MG capsule  · levoFLOXacin 250 MG tablet  · midodrine 2.5 MG tablet     You can get these medications from any pharmacy    Bring a paper prescription for each of these medications  · Full Kit Nebulizer Set Misc           Note that less than 30 minutes was spent in preparing discharge papers, discussing discharge with patient, medication review, etc.    Signed:  Electronically signed by Miriam Lawson MD on 9/30/2021 at 2:47 PM    NOTE: This report was transcribed using voice recognition software. Every effort was made to ensure accuracy; however, inadvertent computerized transcription errors may be present.

## 2021-09-30 NOTE — PROGRESS NOTES
Notified Dr. Frantz Peterson of discharge to [facility].   Electronically signed by Sim Carvajal RN on 9/30/2021 at 1:27 PM

## 2021-10-07 RX ORDER — ALBUTEROL SULFATE 2.5 MG/3ML
2.5 SOLUTION RESPIRATORY (INHALATION) EVERY 6 HOURS PRN
COMMUNITY
End: 2021-10-07 | Stop reason: SDUPTHER

## 2021-10-07 RX ORDER — ALBUTEROL SULFATE 2.5 MG/3ML
2.5 SOLUTION RESPIRATORY (INHALATION) EVERY 6 HOURS PRN
Qty: 120 EACH | Refills: 3 | Status: SHIPPED
Start: 2021-10-07 | End: 2022-10-17 | Stop reason: SDUPTHER

## 2021-10-07 RX ORDER — FAMOTIDINE 20 MG/1
20 TABLET, FILM COATED ORAL 2 TIMES DAILY
Qty: 60 TABLET | Refills: 3 | Status: SHIPPED
Start: 2021-10-07 | End: 2022-01-18

## 2021-10-07 RX ORDER — MIDODRINE HYDROCHLORIDE 2.5 MG/1
7.5 TABLET ORAL
Qty: 270 TABLET | Refills: 3 | Status: SHIPPED
Start: 2021-10-07 | End: 2021-10-29 | Stop reason: SDUPTHER

## 2021-10-13 ENCOUNTER — TELEPHONE (OUTPATIENT)
Dept: SURGERY | Age: 66
End: 2021-10-13

## 2021-10-14 ENCOUNTER — HOSPITAL ENCOUNTER (EMERGENCY)
Age: 66
Discharge: HOME | End: 2021-10-15
Attending: EMERGENCY MEDICINE
Payer: MEDICARE

## 2021-10-14 ENCOUNTER — APPOINTMENT (OUTPATIENT)
Dept: GENERAL RADIOLOGY | Age: 66
End: 2021-10-14
Payer: MEDICARE

## 2021-10-14 ENCOUNTER — TELEPHONE (OUTPATIENT)
Dept: OTHER | Facility: CLINIC | Age: 66
End: 2021-10-14

## 2021-10-14 VITALS
HEIGHT: 63 IN | HEART RATE: 84 BPM | DIASTOLIC BLOOD PRESSURE: 69 MMHG | WEIGHT: 160 LBS | OXYGEN SATURATION: 99 % | BODY MASS INDEX: 28.35 KG/M2 | TEMPERATURE: 98.2 F | SYSTOLIC BLOOD PRESSURE: 132 MMHG | RESPIRATION RATE: 16 BRPM

## 2021-10-14 DIAGNOSIS — K94.23 GASTROSTOMY TUBE DYSFUNCTION (HCC): Primary | ICD-10-CM

## 2021-10-14 PROCEDURE — 99284 EMERGENCY DEPT VISIT MOD MDM: CPT

## 2021-10-14 PROCEDURE — 6360000004 HC RX CONTRAST MEDICATION: Performed by: EMERGENCY MEDICINE

## 2021-10-14 PROCEDURE — 6370000000 HC RX 637 (ALT 250 FOR IP): Performed by: EMERGENCY MEDICINE

## 2021-10-14 PROCEDURE — 74018 RADEX ABDOMEN 1 VIEW: CPT

## 2021-10-14 PROCEDURE — 43762 RPLC GTUBE NO REVJ TRC: CPT

## 2021-10-14 RX ADMIN — Medication 10 ML: at 20:22

## 2021-10-14 RX ADMIN — DIATRIZOATE MEGLUMINE AND DIATRIZOATE SODIUM 40 ML: 600; 100 SOLUTION ORAL; RECTAL at 20:55

## 2021-10-14 RX ADMIN — DIATRIZOATE MEGLUMINE AND DIATRIZOATE SODIUM 30 ML: 600; 100 SOLUTION ORAL; RECTAL at 22:50

## 2021-10-14 NOTE — ED PROVIDER NOTES
HPI:  10/14/21,   Time: 7:15 PM EDT       Redd Todd is a 78 y.o. female presenting to the ED for G-tube clogged, beginning 6 hours ago. The complaint has been persistent, moderate in severity, and worsened by nothing. [age]-year-old female comes in from a group home she is nonverbal at baseline due to intellectual disability. She comes in with group caregiver who gives the history stating that the G-tube was placed in March. They have really not had much issue. Today it was just clogged. Had a little issue with yesterday but were able to manually suction and get her to start working today just was not working for them despite suctioning. Staff states this is the patient's main source of feeds. They are able to push medication but unable to do gravity trickle tube feeds. Patient has not had any nausea vomiting no fever no distress no belly pain. Review of Systems:   Pertinent positives and negatives are stated within HPI, all other systems reviewed and are negative.          --------------------------------------------- PAST HISTORY ---------------------------------------------  Past Medical History:  has a past medical history of Acute kidney injury ([facility] Blind in both eyes, Essential hypertension, Gastroesophageal reflux disease without esophagitis, Hemodialysis patient ([facility] Hyperthyroidism, MR (mental retardation), Osteoarthritis, Peripheral vascular disease ([facility] Pneumonia, Pneumonia due to infectious organism, and Sepsis ([facility] Past Surgical History:  has a past surgical history that includes other surgical history (Right, 01/17/2017); other surgical history (01/25/2017); chest tube insertion (Right, 02/09/2017); bronchoscopy (02/10/2017); and Gastrostomy tube placement (N/A, 3/7/2021). Social History:  reports that she has never smoked. She has never used smokeless tobacco. She reports that she does not drink alcohol and does not use drugs.     Family History: family history is not on file.     The patients home medications have been reviewed. Allergies: Patient has no known allergies. ---------------------------------------------------PHYSICAL EXAM--------------------------------------    Constitutional/General: Alert well appearing, non toxic in NAD; nonverbal at baseline. Head: Normocephalic and atraumatic  Eyes: Bilateral cornea scarred and clouded. Blind at baseline. Conjunctive normal, sclera non icteric  Mouth: Oropharynx clear, handling secretions, no trismus, no asymmetry of the posterior oropharynx or uvular edema  Neck: Supple, full ROM, non tender to palpation in the midline, no stridor, no crepitus, no meningeal signs  Respiratory: Lungs clear to auscultation bilaterally, no wheezes, rales, or rhonchi. Not in respiratory distress  Cardiovascular:  Regular rate. Regular rhythm. No murmurs, gallops, or rubs. 2+ distal pulses  Chest: No chest wall tenderness  GI:  Abdomen Soft, Non tender, G-tube in place no redness or irritation around the site no drainage. Non distended. +BS. No organomegaly, no palpable masses,  No rebound, guarding, or rigidity. Musculoskeletal: Moves all extremities x 4. Warm and well perfused, no clubbing, cyanosis, or edema. Capillary refill <3 seconds  Integument: skin warm and dry. No rashes. Lymphatic: no lymphadenopathy noted  Neurologic no focal deficits  Psychiatric: Normal Affect    -------------------------------------------------- RESULTS -------------------------------------------------  I have personally reviewed all laboratory and imaging results for this patient. Results are listed below. LABS:  No results found for this visit on 10/14/21.     RADIOLOGY:  Interpreted by Radiologist.  XR ABDOMEN FOR NG/OG/NE TUBE PLACEMENT    (Results Pending)         ------------------------- NURSING NOTES AND VITALS REVIEWED ---------------------------   The nursing notes within the ED encounter and vital signs as below have been reviewed by myself. /60   Pulse 70   Temp 98.2 °F (36.8 °C) (Oral)   Resp 16   Ht 5' 3\" (1.6 m)   Wt 160 lb (72.6 kg)   SpO2 98%   BMI 28.34 kg/m²   Oxygen Saturation Interpretation: Normal    The patients available past medical records and past encounters were reviewed. ------------------------------ ED COURSE/MEDICAL DECISION MAKING----------------------  Medications   clog zapper kit 10 mL (has no administration in time range)         ED COURSE:  ED Course as of Oct 14 2016   Thu Oct 14, 2021   2016 Signed ou to Dr. Juanito Gimenez awaiting clogs after an x-ray confirming G-tube placement. [KK]      ED Course User Index  [KK] Fani Kevin MD       Medical Decision Making:    nonunctioning G-tube group home today. Unable to flush. Presented for further evaluation. Nonverbal at baseline we will attempt to declog           This patient has remained hemodynamically stable during their ED course. Nursing states they were able to push fluids into the tube unable to retrieve anything back. They can retry the labs after to see if we are able to get better functioning of the tube. Possible, Tube Will Need to Be Replaced. I Signed the Patient out to My Colleague Dr. Seema Solitario and Determination Whether She Will Need Replaced. Patient Was Stable on Signout. Re-Evaluations:             Re-evaluation. Patients symptoms are improving    Re-examination  10/14/21   7:15 PM EDT          Vital Signs:   Vitals:    10/14/21 1909 10/14/21 1947   BP: (!) 150/77 137/60   Pulse: 75 70   Resp: 18 16   Temp: 98.2 °F (36.8 °C)    TempSrc: Oral    SpO2: 94% 98%   Weight:  160 lb (72.6 kg)   Height:  5' 3\" (1.6 m)           Counseling: The emergency provider has spoken with the patient and discussed todays results, in addition to providing specific details for the plan of care and counseling regarding the diagnosis and prognosis.   Questions are answered at this time and they are agreeable with the plan.       --------------------------------- IMPRESSION AND DISPOSITION ---------------------------------    IMPRESSION  G-tube malfunction    DISPOSITION  Disposition:8:17 PM EDT  I have signed this patient out to the oncoming physician, Dr. Yvette Brewster. I have discussed the patient's initial exam, treatment and plan of care with the on coming physician. I have notified the patient / family of the change in treating physician and answered their questions to this point. NOTE: This report was transcribed using voice recognition software.  Every effort was made to ensure accuracy; however, inadvertent computerized transcription errors may be present        Al Culver MD  10/16/21 [id number]

## 2021-10-14 NOTE — TELEPHONE ENCOUNTER
Received a call back from [address] The Nurse stated the pt is doing well. She will ask the facility if they offers therapy for patient in stead of going to outpatient therapy.   Electronically signed by Toney Condon on 10/14/2021 at 8:57 AM

## 2021-10-14 NOTE — TELEPHONE ENCOUNTER
Last Appointment:  8/27/2021  Future Appointments   Date Time Provider Marissa Law   10/22/2021 11:30 AM Catie Guzmán [facility]   10/29/2021 11:00 AM Catie Guzmán, [address] [facility]

## 2021-10-15 ENCOUNTER — TELEPHONE (OUTPATIENT)
Dept: OTHER | Facility: CLINIC | Age: 66
End: 2021-10-15

## 2021-10-15 NOTE — ED PROVIDER NOTES
48-year-old female history of renal failure dementia hypertension history of have gastrostomy tube placed sent in by nursing home for evaluation for gastrostomy tube dysfunction. Per nursing facility they are brought flush it but not able to get trickle feeds through and there seems to be an obstruction with a trying to slow feeds. They were concerned because this is the patient's main source of nutrition so sentiment for evaluation. Patient is not a provide any history. But there caregiver at bedside states no other issues at this time. The history is provided by a caregiver. The history is limited by the condition of the patient. Review of Systems   Unable to perform ROS: Dementia        Physical Exam  Constitutional:       Comments: Patient is alert but nonresponsive to my questions. Likely due to dementia. Patient appears to be mildly chronically ill. HENT:      Head: Normocephalic and atraumatic. Mouth/Throat:      Mouth: Mucous membranes are moist.   Eyes:      Extraocular Movements: Extraocular movements intact. Pupils: Pupils are equal, round, and reactive to light. Cardiovascular:      Rate and Rhythm: Normal rate and regular rhythm. Pulses: Normal pulses. Heart sounds: Normal heart sounds. Pulmonary:      Effort: Pulmonary effort is normal.      Breath sounds: Normal breath sounds. Abdominal:      General: Abdomen is flat. Bowel sounds are normal. There is no distension. Palpations: Abdomen is soft. Tenderness: There is no abdominal tenderness. There is no guarding. Musculoskeletal:         General: Normal range of motion. Skin:     General: Skin is warm. Capillary Refill: Capillary refill takes less than 2 seconds. Neurological:      General: No focal deficit present. Mental Status: She is alert and oriented to person, place, and time.           Procedures   PROCEDURE  10/14/21       Time: 9:40PM    FEEDING TUBE REPLACEMENT  Risks, benefits and alternatives (for applicable procedures below) described. Performed By: Ruba Blake DO. Indication: Tube malfunction. Informed consent: Consent unable to be obtained due to the emergent nature of this procedure. .  Local Anesthesia:  not required/indicated. Procedure: A feeding tube was replaced using a 20 Thai gastrostomy tube and the bulb was inflated using 15 cc of sterile water. Tube was secured to skin pre-attached rubber skin bumper device                                                          . Post-Procedure: Tube position confirmed by x-ray with contrast injection. Patient tolerated the procedure well. Complications:  None. MDM  Number of Diagnoses or Management Options  Gastrostomy tube dysfunction [facility])  Diagnosis management comments: Received patient from Dr. Graciela Domínguez. Patient was seen and evaluated initial x-ray was performed showing good flow into the intralumen M however patient was not able to get slow trickle tube feeds to pass through G-tube so the G-tube was replaced. It was found to successfully be replaced patient was discharged back to nursing home for further evaluation. ED Course as of Oct 14 2347   Thu Oct 14, 2021   2016 Signed ou to Dr. James Peterson awaiting clogs after an x-ray confirming G-tube placement. [KK]      ED Course User Index  [KK] Bobby Elmore MD        --------------------------------------------- PAST HISTORY ---------------------------------------------  Past Medical History:  has a past medical history of Acute kidney injury ([address] Blind in both eyes, Essential hypertension, Gastroesophageal reflux disease without esophagitis, Hemod[facility] ([facility] Hyperthyroidism, MR (mental retardation), Osteoarthritis, Peripheral vascular disease ([address] Pneumonia, Pneumonia due to infectious organism, and Sepsis ([facility]     Past Surgical History:  has a past surgical history that includes other surgical history (Right, 01/17/2017); other surgical history (01/25/2017); chest tube insertion (Right, 02/09/2017); bronchoscopy (02/10/2017); and Gastrostomy tube placement (N/A, 3/7/2021). Social History:  reports that she has never smoked. She has never used smokeless tobacco. She reports that she does not drink alcohol and does not use drugs. Family History: family history is not on file. The patients home medications have been reviewed. Allergies: Patient has no known allergies. -------------------------------------------------- RESULTS -------------------------------------------------  Labs:  No results found for this visit on 10/14/21. Radiology:  XR ABDOMEN FOR NG/OG/NE TUBE PLACEMENT   Final Result   Feeding tube identified overlying the upper abdomen. Following injection of   an oral contrast material, duodenum and small-bowel outlined suggesting that   the tube tip is intraluminal.         XR ABDOMEN FOR NG/OG/NE TUBE PLACEMENT   Final Result   Intraluminal position of the feeding tube tip as evidenced by small-bowel   outlined by oral contrast following injection.             ------------------------- NURSING NOTES AND VITALS REVIEWED ---------------------------  Date / Time Roomed:  10/14/2021  6:48 PM  ED Bed Assignment:  13/13    The nursing notes within the ED encounter and vital signs as below have been reviewed. /60   Pulse 70   Temp 98.2 °F (36.8 °C) (Oral)   Resp 16   Ht 5' 3\" (1.6 m)   Wt 160 lb (72.6 kg)   SpO2 98%   BMI 28.34 kg/m²   Oxygen Saturation Interpretation: Normal      ------------------------------------------ PROGRESS NOTES ------------------------------------------  I have spoken with the patient and discussed todays results, in addition to providing specific details for the plan of care and counseling regarding the diagnosis and prognosis. Their questions are answered at this time and they are agreeable with the plan.  I discussed at length with them reasons for immediate return here for re evaluation. They will followup with their primary care physician by calling their office on Monday.      --------------------------------- ADDITIONAL PROVIDER NOTES ---------------------------------  At this time the patient is without objective evidence of an acute process requiring hospitalization or inpatient management. They have remained hemodynamically stable throughout their entire ED visit and are stable for discharge with outpatient follow-up. The plan has been discussed in detail and they are aware of the specific conditions for emergent return, as well as the importance of follow-up. New Prescriptions    No medications on file       Diagnosis:  1. Gastrostomy tube dysfunction (HCC)        Disposition:  Patient's disposition: Discharge to nursing home  Patient's condition is stable.        Nelsy Tarango, DO  10/14/21 [id number]

## 2021-10-15 NOTE — ED NOTES
Attempt to withdraw 8cc clog zapper fluid unsuccessful. No residual obtained but what appears to be bile visible at distal end of tube. Dr. Shon Silva notified.      Margarette Hodgkin, RN  10/14/21 2133

## 2021-10-15 NOTE — ED NOTES
G-tube placement confirmation via auscultation of 30cc air. Abdomen is soft non-distended with BS x4 quadrants. Able to easily flush peg with 30cc H2O but unable to get a return of same. H20 will not flow to gravity, but pushes easily. Dr. Dupree Necessary notified. Clog zapper instilled per order. Will assess in 30/60 minutes per instructions.      Shobha Roberson RN  10/14/21 2027

## 2021-10-22 ENCOUNTER — OFFICE VISIT (OUTPATIENT)
Dept: FAMILY MEDICINE CLINIC | Age: 66
End: 2021-10-22
Payer: MEDICARE

## 2021-10-22 VITALS
TEMPERATURE: 97.3 F | HEART RATE: 72 BPM | OXYGEN SATURATION: 95 % | BODY MASS INDEX: 26.57 KG/M2 | SYSTOLIC BLOOD PRESSURE: 106 MMHG | WEIGHT: 150 LBS | DIASTOLIC BLOOD PRESSURE: 68 MMHG

## 2021-10-22 DIAGNOSIS — J96.01 ACUTE RESPIRATORY FAILURE WITH HYPOXIA (HCC): Primary | ICD-10-CM

## 2021-10-22 DIAGNOSIS — A41.9 SEPTIC SHOCK (HCC): ICD-10-CM

## 2021-10-22 DIAGNOSIS — F73 PROFOUND INTELLECTUAL DISABILITY: ICD-10-CM

## 2021-10-22 DIAGNOSIS — F03.91 DEMENTIA WITH BEHAVIORAL DISTURBANCE, UNSPECIFIED DEMENTIA TYPE: ICD-10-CM

## 2021-10-22 DIAGNOSIS — R65.21 SEPTIC SHOCK (HCC): ICD-10-CM

## 2021-10-22 PROCEDURE — 1111F DSCHRG MED/CURRENT MED MERGE: CPT | Performed by: INTERNAL MEDICINE

## 2021-10-22 PROCEDURE — 1036F TOBACCO NON-USER: CPT | Performed by: INTERNAL MEDICINE

## 2021-10-22 PROCEDURE — G8427 DOCREV CUR MEDS BY ELIG CLIN: HCPCS | Performed by: INTERNAL MEDICINE

## 2021-10-22 PROCEDURE — G8400 PT W/DXA NO RESULTS DOC: HCPCS | Performed by: INTERNAL MEDICINE

## 2021-10-22 PROCEDURE — 99214 OFFICE O/P EST MOD 30 MIN: CPT | Performed by: INTERNAL MEDICINE

## 2021-10-22 PROCEDURE — G8484 FLU IMMUNIZE NO ADMIN: HCPCS | Performed by: INTERNAL MEDICINE

## 2021-10-22 PROCEDURE — 4040F PNEUMOC VAC/ADMIN/RCVD: CPT | Performed by: INTERNAL MEDICINE

## 2021-10-22 PROCEDURE — G8417 CALC BMI ABV UP PARAM F/U: HCPCS | Performed by: INTERNAL MEDICINE

## 2021-10-22 PROCEDURE — 1090F PRES/ABSN URINE INCON ASSESS: CPT | Performed by: INTERNAL MEDICINE

## 2021-10-22 PROCEDURE — 3017F COLORECTAL CA SCREEN DOC REV: CPT | Performed by: INTERNAL MEDICINE

## 2021-10-22 PROCEDURE — 1123F ACP DISCUSS/DSCN MKR DOCD: CPT | Performed by: INTERNAL MEDICINE

## 2021-10-22 RX ORDER — MIDODRINE HYDROCHLORIDE 2.5 MG/1
2.5 TABLET ORAL 3 TIMES DAILY
COMMUNITY
End: 2021-10-22

## 2021-10-22 NOTE — PROGRESS NOTES
History:   Diagnosis Date    Acute kidney injury ([facility] 01/15/2017    d/t Vancomycin, on Dialysis [facility] right chest    Blind in both eyes     Essential hypertension 4/2/2021    Gastroesophageal reflux disease without esophagitis 4/2/2021    [facility]     Hyperthyroidism     MR (mental retardation)     Osteoarthritis     Peripheral vascular disease ([facility]     Pneumonia 01/06/2017    Pneumonia due to infectious organism 1/8/2017    Sepsis ([facility] 3/4/2021       Past Surgical Hx:  Past Surgical History:   Procedure Laterality Date    BRONCHOSCOPY  02/10/2017    CHEST TUBE INSERTION Right 02/09/2017    GASTROSTOMY TUBE PLACEMENT N/A 3/7/2021    EGD PEG TUBE PLACEMENT performed by Mary Turner MD at [place] [address] Right 01/17/2017    tessio insertion     OTHER SURGICAL HISTORY  01/25/2017    PEG tube insertion       Family Hx:  No family history on file. Social Hx:  Social History     Tobacco Use    Smoking status: Never Smoker    Smokeless tobacco: Never Used   Substance Use Topics    Alcohol use: No       Home Medications:  Current Outpatient Medications   Medication Sig Dispense Refill    FEROSUL 325 (65 Fe) MG tablet TAKE 1 TABLET VIA G-TUBE ONCE A DAY.  28 tablet 5    midodrine (PROAMATINE) 2.5 MG tablet Take 3 tablets by mouth 3 times daily (with meals) 270 tablet 3    famotidine (PEPCID) 20 MG tablet 1 tablet by Per G Tube route 2 times daily 60 tablet 3    folic acid (FOLVITE) 1 MG tablet 1 mg by Per G Tube route daily      levothyroxine (SYNTHROID) 150 MCG tablet 150 mcg by Per G Tube route Daily      montelukast (SINGULAIR) 10 MG tablet 10 mg by Per G Tube route nightly      PARoxetine (PAXIL) 10 MG tablet 10 mg by Per G Tube route 2 times daily      vitamin B-1 (THIAMINE) 100 MG tablet 100 mg by Per G Tube route daily      OLANZapine zydis (ZYPREXA ZYDIS) 5 MG disintegrating tablet Take 20 mg by mouth every morning Indications: VIA G-TUBE *TAKE ALONG WITH 15MG=20MG*  *SEE OTHER ORDER*      OLANZapine zydis (ZYPREXA ZYDIS) 15 MG disintegrating tablet 20 mg every morning Indications: VIA G-TUBE *TAKE ALONG WITH 5MG=20MG*  *SEE OTHER ORDER*      OLANZapine (ZYPREXA) 20 MG tablet 20 mg by Per G Tube route nightly      docusate (COLACE) 50 MG/5ML liquid 10 mLs by Per G Tube route 2 times daily 300 mL 0    polyethylene glycol (GLYCOLAX) 17 g packet 17 g by Per G Tube route daily 527 g 1    albuterol (PROVENTIL) (2.5 MG/3ML) 0.083% nebulizer solution Take 3 mLs by nebulization every 6 hours as needed for Wheezing 120 each 3    albuterol (PROVENTIL) (2.5 MG/3ML) 0.083% nebulizer solution Take 3 mLs by nebulization every 6 hours (Patient not taking: Reported on 10/7/2021) 120 each 3    Respiratory Therapy Supplies (FULL KIT NEBULIZER SET) MISC Use as directed with nebulized medication. 1 each 0    Cholecalciferol (VITAMIN D3) 125 MCG (5000 UT) TABS 5,000 Units by Per G Tube route daily      acetaminophen (TYLENOL) 325 MG tablet 650 mg by Per G Tube route every 4 hours as needed for Pain or Fever      medicated lip balm (BLISTEX/CARMEX) 2-2.5-6.6 % STCK Apply topically as needed for Dry Lips (CRACKED LIPS)      bisacodyl (DULCOLAX) 10 MG suppository Place 10 mg rectally daily as needed for Constipation (NO BM FOR 3 DAYS)      SUNSCREENS EX Apply topically as needed (SUNBURN PREVENTION) Indications: *SPF 50*      Dextromethorphan-guaiFENesin  MG/5ML SYRP 10 mLs by Per G Tube route every 4 hours as needed for Cough (CONGESTION)      Neomycin-Bacitracin-Polymyxin (TRIPLE ANTIBIOTIC) OINT Apply topically 2 times daily as needed (CUTS/SCRAPES/SKIN ABRASIONS)      bumetanide (BUMEX) 1 MG tablet 1 mg by Per G Tube route as needed (SWELLING)      LORazepam (ATIVAN) 2 MG tablet 2 mg by Per G Tube route as needed (2HRS BEFORE DENTAL APPT, TAKE 2ND DOSE TO DENTAL APPT).   (Patient not taking: Reported on 10/22/2021)      valproic acid (DEPACON) 250 MG/5ML SOLN oral solution 20 mLs by Per G Tube route 2 times daily 300 mL 0     No current facility-administered medications for this visit. Allergies:  No Known Allergies    Objective     Vitals:    10/22/21 1132   BP: 106/68   Site: Left Upper Arm   Position: Sitting   Cuff Size: Medium Adult   Pulse: 72   Temp: 97.3 °F (36.3 °C)   TempSrc: Temporal   SpO2: 95%   Weight: 150 lb (68 kg)        Physical Exam  General: Awake but has eyes closed most of the time, non verbal at baseline, wheelchair bound, No acute distress  Head: Normocephalic, atraumatic  Eyes: conjunctivae/corneas clear  Mouth: Mucous membranes moist with no pharyngeal exudate or erythema  Neck: no JVD, no adenopathy, no carotid bruit, supple, symmetrical, trachea midline  Back: symmetric, No CVA tenderness. Lungs: clear to auscultation bilaterally without wheezes, rales, or rhonchi  Heart: regular rate and rhythm, S1, S2 normal, no murmur, click, rub or gallop  Abdomen: soft, non-tender; bowel sounds normal; no masses,  no organomegaly, PEG tube site is clean and dry with no surrounding erythema. Extremities: atraumatic, no cyanosis, no edema  Skin: dry without rashes or lesions  Neurologic: non verbal and wheel chair bound. Does not follow commands. At her baseline.     Labs:  Lab Results   Component Value Date    WBC 14.2 (H) 09/30/2021    HGB 8.5 (L) 09/30/2021    HCT 27.9 (L) 09/30/2021     (H) 09/30/2021     09/30/2021    K 4.8 09/30/2021     09/30/2021    CREATININE 0.7 09/30/2021    BUN 6 09/30/2021    CO2 27 09/30/2021    GLUCOSE 116 (H) 09/30/2021    ALT 11 09/30/2021    AST 19 09/30/2021    INR 1.1 03/08/2021     Lab Results   Component Value Date    TSH 18.460 (H) 09/08/2021     Lab Results   Component Value Date    TRIG 94 09/08/2021    TRIG 70 11/12/2020    TRIG 86 05/09/2019     Lab Results   Component Value Date    HDL 72 09/08/2021    HDL 46 11/12/2020    HDL 65 05/09/2019     Lab Results Component Value Date    LDLCALC 79 09/08/2021    LDLCALC 59 11/12/2020    LDLCALC 106 (H) 05/09/2019     Lab Results   Component Value Date    LABA1C 5.4 03/09/2021     Lab Results   Component Value Date    INR 1.1 03/08/2021    INR 1.7 03/04/2021    INR 1.1 02/17/2017    PROTIME 12.4 03/08/2021    PROTIME 18.8 (H) 03/04/2021    PROTIME 11.6 02/17/2017      *All recent labs were reviewed. Please see electronic chart for a more comprehensive set of labs    Radiology:  No results found. Assessment and Plan     Patient is a 78 y.o. female who presented to the office for follow up. Full problem list is as follows:  Patient Active Problem List   Diagnosis    Profound intellectual disability    Hypothyroidism    Impairment level: total impairment of both eyes    Hyperglycemia    Nonsustained ventricular tachycardia (HCC)    Acute renal failure syndrome (HCC)    Macrocytosis    Disruptive behavior disorder    Ingrowing nail    Peripheral vascular disease (HCC)    Altered mental state    Onychomycosis    Anemia due to chronic kidney disease    Essential hypertension    Vitamin D deficiency    Gastroesophageal reflux disease without esophagitis    S/P percutaneous endoscopic gastrostomy (PEG) tube placement (HCC)    Chronic kidney disease    Dementia with behavioral disturbance, unspecified dementia type ([address]    Gastrostomy tube dysfunction ([address]    Sepsis ([address]    Septic shock ([address]    Severe dehydration    Acute renal failure (ARF) ([address]    Acute respiratory failure with hypoxia ([address]       Evert Steen was seen today for follow-up from hospital.    Diagnoses and all orders for this visit:    Acute respiratory failure with hypoxia ([address]    Profound intellectual disability    Dementia with behavioral disturbance, unspecified dementia type ([address]    Septic shock ([address]    -Patient's hospitalization was reviewed. She is outside the window for a TCM visit.   -Medication list was reviewed and patient's metoprolol will be discontinued at this time as it seems patient is being given metoprolol in the morning of her systolic pressures are above 100 and then later throughout the day she is being dosed with midodrine due to low blood pressures. We will trial patient off of metoprolol altogether for couple weeks with midodrine on board as needed. Patient's caregiver was instructed to call with any elevated blood pressures. Patient will have her blood pressure checked 3 times daily.    -Patient's next visit will be her annual wellness visit. I did advise the patient should be accompanied by the lead caregiver who knows the most regarding her history and if possible her legal guardian should also be present. Educational materials and/or home exercises printed for patient's review and were included in patient instructions on his/her After Visit Summary and given to patient at the end of visit. Counseled regarding above diagnosis, including possible risks and complications, especially if left uncontrolled. Counseled regarding the possible side effects, risks, benefits and alternatives to treatment; patient and/or guardian verbalizes understanding, agrees, feels comfortable with and wishes to proceed with above treatment plan. Advised patient to call Kriss Lopez new medication issues, and read all Rx info from pharmacy to assure aware of all possible risks and side effects of any medication before taking. Patient caregiver verbalizes understanding and agrees with above counseling, assessment and plan. All questions answered.     Kody Sanabria DO   10/22/2021  12:25 PM

## 2021-10-28 ENCOUNTER — TELEPHONE (OUTPATIENT)
Dept: FAMILY MEDICINE CLINIC | Age: 66
End: 2021-10-28

## 2021-10-28 NOTE — TELEPHONE ENCOUNTER
[facility] pharmacy needs updated Midodrine rx to state that patient is taking prn for Bp less then 100.

## 2021-10-29 ENCOUNTER — TELEPHONE (OUTPATIENT)
Dept: FAMILY MEDICINE CLINIC | Age: 66
End: 2021-10-29

## 2021-10-29 DIAGNOSIS — K59.00 CONSTIPATION, UNSPECIFIED CONSTIPATION TYPE: ICD-10-CM

## 2021-10-29 DIAGNOSIS — I95.9 HYPOTENSION, UNSPECIFIED HYPOTENSION TYPE: Primary | ICD-10-CM

## 2021-10-29 RX ORDER — MIDODRINE HYDROCHLORIDE 2.5 MG/1
7.5 TABLET ORAL 3 TIMES DAILY PRN
Qty: 270 TABLET | Refills: 3 | Status: SHIPPED
Start: 2021-10-29 | End: 2022-05-18

## 2021-10-29 NOTE — TELEPHONE ENCOUNTER
[facility] called to say they never received the corrected script for midodrine 2.5mg as needed with a blood pressure less than 100. Can you please send today?

## 2021-11-04 NOTE — TELEPHONE ENCOUNTER
Last Appointment:  10/22/2021  Future Appointments   Date Time Provider Marissa Law   11/12/2021 11:00 AM Brennan Mcclendon  [address]

## 2021-12-10 ENCOUNTER — OFFICE VISIT (OUTPATIENT)
Dept: FAMILY MEDICINE CLINIC | Age: 66
End: 2021-12-10
Payer: MEDICARE

## 2021-12-10 ENCOUNTER — HOSPITAL ENCOUNTER (EMERGENCY)
Age: 66
Discharge: HOME | End: 2021-12-11
Attending: EMERGENCY MEDICINE
Payer: MEDICARE

## 2021-12-10 ENCOUNTER — APPOINTMENT (OUTPATIENT)
Dept: GENERAL RADIOLOGY | Age: 66
End: 2021-12-10
Payer: MEDICARE

## 2021-12-10 VITALS
BODY MASS INDEX: 23.94 KG/M2 | HEART RATE: 92 BPM | WEIGHT: 135.1 LBS | TEMPERATURE: 99.1 F | OXYGEN SATURATION: 100 % | SYSTOLIC BLOOD PRESSURE: 144 MMHG | HEIGHT: 63 IN | RESPIRATION RATE: 16 BRPM | DIASTOLIC BLOOD PRESSURE: 78 MMHG

## 2021-12-10 VITALS
HEART RATE: 84 BPM | DIASTOLIC BLOOD PRESSURE: 60 MMHG | RESPIRATION RATE: 16 BRPM | SYSTOLIC BLOOD PRESSURE: 110 MMHG | WEIGHT: 150 LBS | BODY MASS INDEX: 26.58 KG/M2 | HEIGHT: 63 IN | TEMPERATURE: 96.8 F | OXYGEN SATURATION: 94 %

## 2021-12-10 DIAGNOSIS — Z23 NEEDS FLU SHOT: ICD-10-CM

## 2021-12-10 DIAGNOSIS — Z00.00 ROUTINE GENERAL MEDICAL EXAMINATION AT A HEALTH CARE FACILITY: Primary | ICD-10-CM

## 2021-12-10 DIAGNOSIS — Z43.1 PEG (PERCUTANEOUS ENDOSCOPIC GASTROSTOMY) ADJUSTMENT/REPLACEMENT/REMOVAL (HCC): Primary | ICD-10-CM

## 2021-12-10 PROCEDURE — G0438 PPPS, INITIAL VISIT: HCPCS | Performed by: INTERNAL MEDICINE

## 2021-12-10 PROCEDURE — 74018 RADEX ABDOMEN 1 VIEW: CPT

## 2021-12-10 PROCEDURE — 4040F PNEUMOC VAC/ADMIN/RCVD: CPT | Performed by: INTERNAL MEDICINE

## 2021-12-10 PROCEDURE — 99283 EMERGENCY DEPT VISIT LOW MDM: CPT

## 2021-12-10 PROCEDURE — 1123F ACP DISCUSS/DSCN MKR DOCD: CPT | Performed by: INTERNAL MEDICINE

## 2021-12-10 PROCEDURE — G0008 ADMIN INFLUENZA VIRUS VAC: HCPCS | Performed by: INTERNAL MEDICINE

## 2021-12-10 PROCEDURE — 90694 VACC AIIV4 NO PRSRV 0.5ML IM: CPT | Performed by: INTERNAL MEDICINE

## 2021-12-10 PROCEDURE — G8484 FLU IMMUNIZE NO ADMIN: HCPCS | Performed by: INTERNAL MEDICINE

## 2021-12-10 PROCEDURE — 3017F COLORECTAL CA SCREEN DOC REV: CPT | Performed by: INTERNAL MEDICINE

## 2021-12-10 RX ORDER — PANTOPRAZOLE SODIUM 40 MG/1
40 GRANULE, DELAYED RELEASE ORAL
COMMUNITY

## 2021-12-10 SDOH — ECONOMIC STABILITY: FOOD INSECURITY: WITHIN THE PAST 12 MONTHS, THE FOOD YOU BOUGHT JUST DIDN'T LAST AND YOU DIDN'T HAVE MONEY TO GET MORE.: NEVER TRUE

## 2021-12-10 SDOH — ECONOMIC STABILITY: FOOD INSECURITY: WITHIN THE PAST 12 MONTHS, YOU WORRIED THAT YOUR FOOD WOULD RUN OUT BEFORE YOU GOT MONEY TO BUY MORE.: NEVER TRUE

## 2021-12-10 ASSESSMENT — LIFESTYLE VARIABLES
AUDIT TOTAL SCORE: INCOMPLETE
AUDIT-C TOTAL SCORE: INCOMPLETE
HOW OFTEN DO YOU HAVE A DRINK CONTAINING ALCOHOL: 0
HOW OFTEN DO YOU HAVE A DRINK CONTAINING ALCOHOL: NEVER

## 2021-12-10 ASSESSMENT — PATIENT HEALTH QUESTIONNAIRE - PHQ9
1. LITTLE INTEREST OR PLEASURE IN DOING THINGS: 0
SUM OF ALL RESPONSES TO PHQ QUESTIONS 1-9: 0
SUM OF ALL RESPONSES TO PHQ9 QUESTIONS 1 & 2: 0
SUM OF ALL RESPONSES TO PHQ QUESTIONS 1-9: 0
SUM OF ALL RESPONSES TO PHQ QUESTIONS 1-9: 0
2. FEELING DOWN, DEPRESSED OR HOPELESS: 0

## 2021-12-10 ASSESSMENT — SOCIAL DETERMINANTS OF HEALTH (SDOH): HOW HARD IS IT FOR YOU TO PAY FOR THE VERY BASICS LIKE FOOD, HOUSING, MEDICAL CARE, AND HEATING?: NOT HARD AT ALL

## 2021-12-10 ASSESSMENT — PAIN SCALES - GENERAL: PAINLEVEL_OUTOF10: 0

## 2021-12-10 ASSESSMENT — PAIN SCALES - WONG BAKER: WONGBAKER_NUMERICALRESPONSE: 0

## 2021-12-10 NOTE — PATIENT INSTRUCTIONS
Personalized Preventive Plan for Flory Pope - 12/10/2021  Medicare offers a range of preventive health benefits. Some of the tests and screenings are paid in full while other may be subject to a deductible, co-insurance, and/or copay. Some of these benefits include a comprehensive review of your medical history including lifestyle, illnesses that may run in your family, and various assessments and screenings as appropriate. After reviewing your medical record and screening and assessments performed today your provider may have ordered immunizations, labs, imaging, and/or referrals for you. A list of these orders (if applicable) as well as your Preventive Care list are included within your After Visit Summary for your review. Other Preventive Recommendations:    · A preventive eye exam performed by an eye specialist is recommended every 1-2 years to screen for glaucoma; cataracts, macular degeneration, and other eye disorders. · A preventive dental visit is recommended every 6 months. · Try to get at least 150 minutes of exercise per week or 10,000 steps per day on a pedometer . · Order or download the FREE \"Exercise & Physical Activity: Your Everyday Guide\" from [employer]. Call 3-504.399.3944 or search [employer] online. · You need 4427-8031 mg of calcium and 9800-0580 IU of vitamin D per day. It is possible to meet your calcium requirement with diet alone, but a vitamin D supplement is usually necessary to meet this goal.  · When exposed to the sun, use a sunscreen that protects against both UVA and UVB radiation with an SPF of 30 or greater. Reapply every 2 to 3 hours or after sweating, drying off with a towel, or swimming. · Always wear a seat belt when traveling in a car. Always wear a helmet when riding a bicycle or motorcycle.

## 2021-12-10 NOTE — PROGRESS NOTES
imMedicare Annual Wellness Visit  Name: Olayinka Hoyt Date: 2021   MRN: [id number] Sex: Female   Age: 78 y.o. Ethnicity: Non- / Non    : 1955 Race: White (non-)      Chung Butterfield is here for Medicare AWV (Patient is here for AWV.)    Screenings for behavioral, psychosocial and functional/safety risks, and cognitive dysfunction are all negative except as indicated below. These results, as well as other patient data from the [address] [facility] form, are documented in Flowsheets linked to this Encounter. No Known Allergies      Prior to Visit Medications    Medication Sig Taking? Authorizing Provider   NATURAL VITAMIN D-3 125 MCG (5000 UT) TABS tablet TAKE 1 TABLET VIA G-TUBE ONCE DAILY Yes Dori Glass, DO   Disposable Gloves (POWDER FREE NITRILE GLOVES MED) MISC As Directed Yes Dori Glass, DO   midodrine (PROAMATINE) 2.5 MG tablet Take 3 tablets by mouth 3 times daily as needed (hypotension, give only if systolic BP is <171 (BP checks three times daily)) Yes Dori Glass, DO   docusate (COLACE) 50 MG/5ML liquid 10 mLs by Per G Tube route 2 times daily Yes Dori Glass, DO   PARoxetine (PAXIL) 10 MG tablet 1 tablet by Per G Tube route 2 times daily Yes Izabela Dove, DO   montelukast (SINGULAIR) 10 MG tablet 1 tablet by Per G Tube route nightly Yes Izabela Dove, DO   FEROSUL 325 (65 Fe) MG tablet TAKE 1 TABLET VIA G-TUBE ONCE A DAY.  Yes Dori Glass, DO   famotidine (PEPCID) 20 MG tablet 1 tablet by Per G Tube route 2 times daily Yes Jennifer Hou, DO   albuterol (PROVENTIL) (2.5 MG/3ML) 0.083% nebulizer solution Take 3 mLs by nebulization every 6 hours as needed for Wheezing Yes Jennifer Felicianoo, DO   albuterol (PROVENTIL) (2.5 MG/3ML) 0.083% nebulizer solution Take 3 mLs by nebulization every 6 hours Yes Freddrick Fothergill, MD   Respiratory Therapy Supplies (FULL KIT NEBULIZER SET) MISC Use as directed with polyethylene glycol (GLYCOLAX) 17 g packet 17 g by Per G Tube route daily Yes Maria Luz Husbands, DO   pantoprazole sodium (PROTONIX) 40 MG PACK packet 40 mg by Per G Tube route every morning (before breakfast)  Historical Provider, MD   NATURAL VITAMIN D-3 125 MCG (5000 UT) TABS tablet TAKE 1 TABLET VIA G-TUBE ONCE DAILY  Tong Omalley, APRN - CNP         Past Medical History:   Diagnosis Date    Acute kidney injury ([facility] 01/15/2017    d/t Vancomycin, on Dialysis [facility] right chest    Blind in both eyes     Essential hypertension 4/2/2021    Gastroesophageal reflux disease without esophagitis 4/2/2021    Hemodialysis patient ([facility]     Hyperthyroidism     MR (mental retardation)     Osteoarthritis     Peripheral vascular disease ([facility]     Pneumonia 01/06/2017    Pneumonia due to infectious organism 1/8/2017    Sepsis ([facility] 3/4/2021       Past Surgical History:   Procedure Laterality Date    BRONCHOSCOPY  02/10/2017    CHEST TUBE INSERTION Right 02/09/2017    GASTROSTOMY TUBE PLACEMENT N/A 3/7/2021    EGD PEG TUBE PLACEMENT performed by Billy Mars MD at [place] [address] Right 01/17/2017    tessio insertion     OTHER SURGICAL HISTORY  01/25/2017    PEG tube insertion       No family history on file. CareTeam (Including outside providers/suppliers regularly involved in providing care):   Patient Care Team:  Roque Lange DO as PCP - General (Internal Medicine)  Roque Lange DO as PCP - [facility] Empaneled Provider    Wt Readings from Last 3 Encounters:   12/10/21 135 lb 1.6 oz (61.3 kg)   12/10/21 150 lb (68 kg)   10/22/21 150 lb (68 kg)     Vitals:    12/10/21 1044   BP: 110/60   Pulse: 84   Resp: 16   Temp: 96.8 °F (36 °C)   SpO2: 94%   Weight: 150 lb (68 kg)   Height: 5' 3\" (1.6 m)     Body mass index is 26.57 kg/m².     Based upon direct observation of the patient, evaluation of cognition reveals patient with a profound chronic cognitive deficit. Patient's complete Health Risk Assessment and screening values have been reviewed and are found in Flowsheets. The following problems were reviewed today and where indicated follow up appointments were made and/or referrals ordered. All questions below were answered by patient's legal guardian who was present at her appointment today. Positive Risk Factor Screenings with Interventions:            General Health and ACP:  General  In general, how would you say your health is?: Good  In the past 7 days, have you experienced any of the following? New or Increased Pain, New or Increased Fatigue, Loneliness, Social Isolation, Stress or Anger?: None of These  Do you get the social and emotional support that you need?: Yes  Do you have a Living Will?: (!) No  Advance Directives     Power of  Living Will ACP-Advance Directive ACP-Power of     Not on File Filed on 10/04/21 Filed Not on File      General Health Risk Interventions:  · No living will on file, due to patient having a court appointmented legal gardian there are protacols in place if patient were to have an illness or condition incompatable with life.      Health Habits/Nutrition:  Health Habits/Nutrition  Do you exercise for at least 20 minutes 2-3 times per week?: (!) No  Have you lost any weight without trying in the past 3 months?: No  Do you eat only one meal per day?: No  Have you seen the dentist within the past year?: Yes  Body mass index: (!) 26.57  Health Habits/Nutrition Interventions:  · Patient is wheelchair bound    Hearing/Vision:  No exam data present  Hearing/Vision  Do you or your family notice any trouble with your hearing that hasn't been managed with hearing aids?: No  Do you have difficulty driving, watching TV, or doing any of your daily activities because of your eyesight?: (!) Yes  Have you had an eye exam within the past year?: Yes  Hearing/Vision Interventions:  · patient is blind     ADL:  ADLs  In the past 7 days, did you need help from others to perform any of the following everyday activities? Eating, dressing, grooming, bathing, toileting, or walking/balance?: (!) Eating, Dressing, Grooming, Bathing, Toileting, Walking/Balance  In the past 7 days, did you need help from others to take care of any of the following? Laundry, housekeeping, banking/finances, shopping, telephone use, food preparation, transportation, or taking medications?: (!) Laundry, Housekeeping, Banking/Finances, Shopping, Telephone Use, Food Preparation, Transportation, Taking Medications  ADL Interventions:  · Patient is a resident at [facility], staff performs all ADLS for her.     Personalized Preventive Plan   Current Health Maintenance Status  Immunization History   Administered Date(s) Administered    Influenza Vaccine, unspecified formulation 09/10/2014    Influenza Virus Vaccine 10/21/2017    Influenza Whole 09/10/2014    Influenza, High Dose (Fluzone 65 yrs and older) 10/18/2019    Influenza, Darlene Pleva, IM, PF (6 mo and older Fluzone, Flulaval, Fluarix, and 3 yrs and older Afluria) 09/26/2016, 09/25/2017, 10/21/2017, 10/03/2018    Influenza, Quadv, adjuvanted, 65 yrs +, IM, PF (Fluad) 12/10/2021    Pneumococcal Polysaccharide ([id number]) 10/18/2019        Health Maintenance   Topic Date Due    COVID-19 Vaccine (1) Never done    DTaP/Tdap/Td vaccine (1 - Tdap) Never done    Shingles Vaccine (1 of 2) Never done    DEXA (modify frequency per FRAX score)  Never done   [employer] Visit (AWV)  Never done    Breast cancer screen  01/22/2020    TSH testing  09/08/2022    Potassium monitoring  09/30/2022    Creatinine monitoring  09/30/2022    Lipid screen  09/08/2026    Colon cancer screen colonoscopy  01/19/2028    Flu vaccine  Completed    Pneumococcal 65+ years Vaccine  Completed    Hepatitis C screen  Completed    Hepatitis A vaccine  Aged Out    Hepatitis B vaccine  Aged Out    Hib vaccine  Aged C/ Vinicio Portia 19 Meningococcal (ACWY) vaccine  Aged Out     Recommendations for Preventive Services Due: see orders and patient instructions/AVS.  . Recommended screening schedule for the next 5-10 years is provided to the patient in written form: see Patient Instructions/AVS.    Alicia Carey was seen today for medicare awv.     Diagnoses and all orders for this visit:    Routine general medical examination at a health care facility    Needs flu shot  -     INFLUENZA, QUADV, ADJUVANTED, 65 YRS =, IM, PF, PREFILL SYR, 0.5ML (FLUAD)             Saurabh Urias DO

## 2021-12-11 NOTE — ED NOTES
[facility] notified of need for transport back to [facility].  ETA 0430.      Maddie Hassan RN  12/10/21 1418

## 2021-12-11 NOTE — ED PROVIDER NOTES
[facility]  eMERGENCY dEPARTMENT eNCOUnter      Pt Name: Leonie Barajas  MRN: [id number]  [place] 1955  Date of evaluation: 12/10/2021  Provider: Cosme Torres MD     [address] [place] [address]       Chief Complaint   Patient presents with    G Tube Complications     patient pulled out; from [facility]   (Location/Symptom, Timing/Onset,Context/Setting, Quality, Duration, Modifying Factors, Severity) Note limiting factors. Presents here with a chief complaint of PEG tube out. Patient really is not verbal.  She is a member of a group home. According to her caregiver the tube has been out approximately 1 hour. No other complaints. Leonie Barajas is a 78 y.o. female who presents to the emergency department      Nursing Notes were reviewed.     REVIEW OF SYSTEMS    (2+ for4; 10+ for level 5)     Review of Systems   Unable to perform ROS: Patient nonverbal       PAST MEDICAL HISTORY     Past Medical History:   Diagnosis Date    Acute kidney injury ([address] 01/15/2017    d/t Vancomycin, on Dialysis [facility] right chest    Blind in both eyes     Essential hypertension 4/2/2021    Gastroesophageal reflux disease without esophagitis 4/2/2021    Hemodialysis patient ([address]     Hyperthyroidism     MR (mental retardation)     Osteoarthritis     Peripheral vascular disease ([address]     Pneumonia 01/06/2017    Pneumonia due to infectious organism 1/8/2017    Sepsis ([address] 3/4/2021       SURGICALHISTORY       Past Surgical History:   Procedure Laterality Date    BRONCHOSCOPY  02/10/2017    CHEST TUBE INSERTION Right 02/09/2017    GASTROSTOMY TUBE PLACEMENT N/A 3/7/2021    EGD PEG TUBE PLACEMENT performed by Billy Mars MD at [place] [address] Right 01/17/2017    tessio insertion     OTHER SURGICAL HISTORY  01/25/2017    PEG tube insertion       CURRENT MEDICATIONS       Previous Medications    ACETAMINOPHEN (TYLENOL) 325 MG TABLET    650 mg by Per G Tube route every 4 hours as needed for Pain or Fever    ALBUTEROL (PROVENTIL) (2.5 MG/3ML) 0.083% NEBULIZER SOLUTION    Take 3 mLs by nebulization every 6 hours    ALBUTEROL (PROVENTIL) (2.5 MG/3ML) 0.083% NEBULIZER SOLUTION    Take 3 mLs by nebulization every 6 hours as needed for Wheezing    BISACODYL (DULCOLAX) 10 MG SUPPOSITORY    Place 10 mg rectally daily as needed for Constipation (NO BM FOR 3 DAYS)    BUMETANIDE (BUMEX) 1 MG TABLET    1 mg by Per G Tube route as needed (SWELLING)    DEXTROMETHORPHAN-GUAIFENESIN  MG/5ML SYRP    10 mLs by Per G Tube route every 4 hours as needed for Cough (CONGESTION)    DISPOSABLE GLOVES (POWDER FREE NITRILE GLOVES MED) MISC    As Directed    DOCUSATE (COLACE) 50 MG/5ML LIQUID    10 mLs by Per G Tube route 2 times daily    FAMOTIDINE (PEPCID) 20 MG TABLET    1 tablet by Per G Tube route 2 times daily    FEROSUL 325 (65 FE) MG TABLET    TAKE 1 TABLET VIA G-TUBE ONCE A DAY. FOLIC ACID (FOLVITE) 1 MG TABLET    1 mg by Per G Tube route daily    LEVOTHYROXINE (SYNTHROID) 150 MCG TABLET    150 mcg by Per G Tube route Daily    LORAZEPAM (ATIVAN) 2 MG TABLET    2 mg by Per G Tube route as needed (2HRS BEFORE DENTAL APPT, TAKE 2ND DOSE TO DENTAL APPT).      MEDICATED LIP BALM (BLISTEX/CARMEX) 2-2.5-6.6 % STCK    Apply topically as needed for Dry Lips (CRACKED LIPS)    MIDODRINE (PROAMATINE) 2.5 MG TABLET    Take 3 tablets by mouth 3 times daily as needed (hypotension, give only if systolic BP is <356 (BP checks three times daily))    MONTELUKAST (SINGULAIR) 10 MG TABLET    1 tablet by Per G Tube route nightly    NATURAL VITAMIN D-3 125 MCG (5000 UT) TABS TABLET    TAKE 1 TABLET VIA G-TUBE ONCE DAILY    NEOMYCIN-BACITRACIN-POLYMYXIN (TRIPLE ANTIBIOTIC) OINT    Apply topically 2 times daily as needed (CUTS/SCRAPES/SKIN ABRASIONS)    OLANZAPINE (ZYPREXA) 20 MG TABLET    20 mg by Per G Tube route nightly OLANZAPINE ZYDIS (ZYPREXA ZYDIS) 15 MG DISINTEGRATING TABLET    20 mg every morning Indications: VIA G-TUBE *TAKE ALONG WITH 5MG=20MG*  *SEE OTHER ORDER*    OLANZAPINE ZYDIS (ZYPREXA ZYDIS) 5 MG DISINTEGRATING TABLET    Take 20 mg by mouth every morning Indications: VIA G-TUBE *TAKE ALONG WITH 15MG=20MG*  *SEE OTHER ORDER*    PANTOPRAZOLE SODIUM (PROTONIX) 40 MG PACK PACKET    40 mg by Per G Tube route every morning (before breakfast)    PAROXETINE (PAXIL) 10 MG TABLET    1 tablet by Per G Tube route 2 times daily    POLYETHYLENE GLYCOL (GLYCOLAX) 17 G PACKET    17 g by Per G Tube route daily    RESPIRATORY THERAPY SUPPLIES (FULL KIT NEBULIZER SET) MISC    Use as directed with nebulized medication. SUNSCREENS EX    Apply topically as needed (SUNBURN PREVENTION) Indications: *SPF 50*    VALPROIC ACID (DEPACON) 250 MG/5ML SOLN ORAL SOLUTION    20 mLs by Per G Tube route 2 times daily            Patient has no known allergies. FAMILY HISTORY     History reviewed. No pertinent family history. SOCIAL HISTORY       Social History     Socioeconomic History    Marital status: Single     Spouse name: None    Number of children: None    Years of education: None    Highest education level: None   Occupational History    None   Tobacco Use    Smoking status: Never Smoker    Smokeless tobacco: Never Used   Vaping Use    Vaping Use: Never used   Substance and Sexual Activity    Alcohol use: No    Drug use: No    Sexual activity: Never   Other Topics Concern    None   Social History Narrative    None     Social Determinants of Health     Financial Resource Strain: Low Risk     Difficulty of Paying Living Expenses: Not hard at all   Food Insecurity: No Food Insecurity    Worried About Running Out of Food in the Last Year: Never true    Tosha of Food in the Last Year: Never true   Transportation Needs:     Lack of Transportation (Medical): Not on file    Lack of Transportation (Non-Medical):  Not on file   Physical Activity:     Days of Exercise per Week: Not on file    Minutes of Exercise per Session: Not on file   Stress:     Feeling of Stress : Not on file   Social Connections:     Frequency of Communication with Friends and Family: Not on file    Frequency of Social Gatherings with Friends and Family: Not on file    Attends Mandaen Services: Not on file    Active Member of [address] [place] [address] or Organizations: Not on file    Attends Club or Organization Meetings: Not on file    Marital Status: Not on file   Intimate Partner Violence:     Fear of Current or Ex-Partner: Not on file    Emotionally Abused: Not on file    Physically Abused: Not on file    Sexually Abused: Not on file   Housing Stability:     Unable to Pay for Housing in the Last Year: Not on file    Number of [place] in the Last Year: Not on file    Unstable Housing in the Last Year: Not on file       SCREENINGS    [place] Coma Scale  Eye Opening: Spontaneous  Best Verbal Response: Confused  Best Motor Response: Localizes pain  [place] Coma Scale Score: 13 @FLOW([id number])@    PHYSICAL EXAM    (5+ for level 4, 8+ for level 5)     ED Triage Vitals   BP Temp Temp src Pulse Resp SpO2 Height Weight   -- -- -- -- -- -- -- --       Physical Exam  Vitals and nursing note reviewed. Constitutional:       General: She is not in acute distress. Appearance: She is well-developed. She is not diaphoretic. HENT:      Head: Normocephalic and atraumatic. Nose: Nose normal.      Mouth/Throat:      Pharynx: No oropharyngeal exudate. Eyes:      Comments: Opaque corneas. Patient is blind   Neck:      Thyroid: No thyromegaly. Vascular: No JVD. Trachea: No tracheal deviation. Cardiovascular:      Rate and Rhythm: Normal rate and regular rhythm. Heart sounds: Normal heart sounds. No murmur heard. No gallop. Pulmonary:      Effort: Pulmonary effort is normal. No respiratory distress. Breath sounds: Normal breath sounds. No stridor. No wheezing or rales. Chest:      Chest wall: No tenderness. Abdominal:      General: There is no distension. Palpations: Abdomen is soft. There is no mass. Tenderness: There is no abdominal tenderness. There is no guarding or rebound. Comments: PEG site without trauma   Musculoskeletal:         General: No tenderness or deformity. Cervical back: Normal range of motion and neck supple. Comments: Baseline   Lymphadenopathy:      Cervical: No cervical adenopathy. Skin:     General: Skin is warm and dry. Coloration: Skin is not pale. Findings: No erythema or rash. Neurological:      Mental Status: She is alert. Mental status is at baseline. Motor: No abnormal muscle tone. Psychiatric:      Comments: Unable to evaluate         DIAGNOSTIC RESULTS     EKG (Per Emergency Physician):       RADIOLOGY (Per Rockney Gitelman):   Adequate placement of G-tube    Interpretation per the Radiologist below, if available at the time of this note:  No results found.    :  Labs Reviewed - No data to display    All other labs were within normal range or not returned as of this dictation. EMERGENCY DEPARTMENT COURSE and DIFFERENTIALDIAGNOSIS/MDM:   Vitals:    Vitals:    12/10/21 2051   BP: (!) 144/78   Pulse: 92   Resp: 16   Temp: 99.1 °F (37.3 °C)   TempSrc: Oral   SpO2: 100%   Weight: 135 lb 1.6 oz (61.3 kg)   Height: 5' 3\" (1.6 m)       Medications - No data to display    MDM  Number of Diagnoses or Management Options  PEG (percutaneous endoscopic gastrostomy) adjustment/replacement/removal ([facility]  Diagnosis management comments: Patient presented after having her G-tube out. This was replaced with a [address]. Placement was checked and appears to be placed without any extravasation. Patient discharged back to her group home.   .      CONSULTS:  None    PROCEDURES:  Unless otherwise noted below, none     Procedures    FINAL IMPRESSION      1. PEG (percutaneous endoscopic gastrostomy) adjustment/replacement/removal [facility])        DISPOSITION/PLAN   DISPOSITION Decision To Discharge 12/10/2021 09:08:11 PM      PATIENT REFERRED TO:  Dariusz Alvarez DO  [address] [place] [address]  [phone]    Schedule an appointment as soon as possible for a visit         DISCHARGE MEDICATIONS:  New Prescriptions    No medications on file          (Please note:  Portions of this note were completed with a voice recognition program.  Efforts were made to edit thedictations but occasionally words and phrases are mis-transcribed.)    Form v2016. J.5-cn    Lm Packer MD (electronically signed)  Emergency Medicine Provider         Lm Packer MD  12/10/21 0858

## 2021-12-11 NOTE — ED NOTES
Discharge instructions given and reviewed with house supervisor at bedside with [address] to follow up with Dr Manuel Álvarez. Questions and concerns addressed. Transportation to return back to facility to be arranged.       Danial Cranker, RN  12/10/21 6367

## 2021-12-11 NOTE — ED NOTES
Patient's caregiver decided to take patient home in her personal car. Patient departed ED in w/c with staff and caregiver in no apparent distress, personal belongings taken.       Valentino Maas, RN  12/11/21 0141

## 2022-01-06 ENCOUNTER — PROCEDURE VISIT (OUTPATIENT)
Dept: PODIATRY | Age: 67
End: 2022-01-06
Payer: MEDICARE

## 2022-01-06 VITALS — DIASTOLIC BLOOD PRESSURE: 74 MMHG | TEMPERATURE: 98.2 F | SYSTOLIC BLOOD PRESSURE: 128 MMHG

## 2022-01-06 DIAGNOSIS — R26.2 DIFFICULTY WALKING: ICD-10-CM

## 2022-01-06 DIAGNOSIS — M79.675 PAIN IN LEFT TOE(S): ICD-10-CM

## 2022-01-06 DIAGNOSIS — M79.674 PAIN IN TOE OF RIGHT FOOT: ICD-10-CM

## 2022-01-06 DIAGNOSIS — F79 INTELLECTUAL DISABILITY: ICD-10-CM

## 2022-01-06 DIAGNOSIS — B35.1 TINEA UNGUIUM: Primary | ICD-10-CM

## 2022-01-06 DIAGNOSIS — I73.9 PERIPHERAL VASCULAR DISEASE, UNSPECIFIED (HCC): ICD-10-CM

## 2022-01-06 PROCEDURE — 11721 DEBRIDE NAIL 6 OR MORE: CPT | Performed by: PODIATRIST

## 2022-01-06 NOTE — PROGRESS NOTES
[facility]  Return Patient    Chief Complaint   Patient presents with    Toe Pain     saw pcp Dr. Dorothy Kramer 12/10/2021       Subjective: This Ruthann Ards comes to office for foot and nail care. Pt currently has complaint of thickened, painful, elongated nails that he/she cannot manage by themselves. Pt. Relates pain to nails with shoe gear. Pt's primary care physician is Ash Flannery DO.   Patient is seen with caregiver  Past Medical History:   Diagnosis Date    Acute kidney injury ([address] 01/15/2017    d/t Vancomycin, on Dialysis [facility] right chest    Blind in both eyes     Essential hypertension 4/2/2021    Gastroesophageal reflux disease without esophagitis 4/2/2021    Hemodialysis patient ([address]     Hyperthyroidism     MR (mental retardation)     Osteoarthritis     Peripheral vascular disease ([address]     Pneumonia 01/06/2017    Pneumonia due to infectious organism 1/8/2017    Sepsis ([address] 3/4/2021       No Known Allergies  Current Outpatient Medications on File Prior to Visit   Medication Sig Dispense Refill    folic acid (FOLVITE) 1 MG tablet TAKE 1 TABLET VIA G-TUBE DAILY 30 tablet 5    levothyroxine (SYNTHROID) 150 MCG tablet [email or web address] 1 TABLET VIA G-TUBE ONCE DAILY 30 tablet 5    pantoprazole sodium (PROTONIX) 40 MG PACK packet 40 mg by Per G Tube route every morning (before breakfast)      NATURAL VITAMIN D-3 125 MCG (5000 UT) TABS tablet TAKE 1 TABLET VIA G-TUBE ONCE DAILY 28 tablet 5    Disposable Gloves (POWDER FREE NITRILE GLOVES MED) MISC As Directed 2 each 5    midodrine (PROAMATINE) 2.5 MG tablet Take 3 tablets by mouth 3 times daily as needed (hypotension, give only if systolic BP is <662 (BP checks three times daily)) 270 tablet 3    docusate (COLACE) 50 MG/5ML liquid 10 mLs by Per G Tube route 2 times daily 600 mL 11    PARoxetine (PAXIL) 10 MG tablet 1 tablet by Per G Tube route 2 times daily 180 tablet 1    montelukast (SINGULAIR) 10 MG tablet 1 tablet by Per G Tube route nightly 90 tablet 1    FEROSUL 325 (65 Fe) MG tablet TAKE 1 TABLET VIA G-TUBE ONCE A DAY. 28 tablet 5    famotidine (PEPCID) 20 MG tablet 1 tablet by Per G Tube route 2 times daily 60 tablet 3    albuterol (PROVENTIL) (2.5 MG/3ML) 0.083% nebulizer solution Take 3 mLs by nebulization every 6 hours as needed for Wheezing 120 each 3    albuterol (PROVENTIL) (2.5 MG/3ML) 0.083% nebulizer solution Take 3 mLs by nebulization every 6 hours 120 each 3    Respiratory Therapy Supplies (FULL KIT NEBULIZER SET) MISC Use as directed with nebulized medication. 1 each 0    acetaminophen (TYLENOL) 325 MG tablet 650 mg by Per G Tube route every 4 hours as needed for Pain or Fever      medicated lip balm (BLISTEX/CARMEX) 2-2.5-6.6 % STCK Apply topically as needed for Dry Lips (CRACKED LIPS)      bisacodyl (DULCOLAX) 10 MG suppository Place 10 mg rectally daily as needed for Constipation (NO BM FOR 3 DAYS)      SUNSCREENS EX Apply topically as needed (SUNBURN PREVENTION) Indications: *SPF 50*      Dextromethorphan-guaiFENesin  MG/5ML SYRP 10 mLs by Per G Tube route every 4 hours as needed for Cough (CONGESTION)      Neomycin-Bacitracin-Polymyxin (TRIPLE ANTIBIOTIC) OINT Apply topically 2 times daily as needed (CUTS/SCRAPES/SKIN ABRASIONS)      bumetanide (BUMEX) 1 MG tablet 1 mg by Per G Tube route as needed (SWELLING)      OLANZapine zydis (ZYPREXA ZYDIS) 5 MG disintegrating tablet Take 20 mg by mouth every morning Indications: VIA G-TUBE *TAKE ALONG WITH 15MG=20MG*  *SEE OTHER ORDER*      OLANZapine zydis (ZYPREXA ZYDIS) 15 MG disintegrating tablet 20 mg every morning Indications: VIA G-TUBE *TAKE ALONG WITH 5MG=20MG*  *SEE OTHER ORDER*      OLANZapine (ZYPREXA) 20 MG tablet 20 mg by Per G Tube route nightly      LORazepam (ATIVAN) 2 MG tablet 2 mg by Per G Tube route as needed (2HRS BEFORE DENTAL APPT, TAKE 2ND DOSE TO DENTAL APPT).        valproic acid (DEPACON) 250 MG/5ML SOLN oral solution 20 mLs by Per G Tube route 2 times daily 300 mL 0    polyethylene glycol (GLYCOLAX) 17 g packet 17 g by Per G Tube route daily 527 g 1    [DISCONTINUED] NATURAL VITAMIN D-3 125 MCG (5000 UT) TABS tablet TAKE 1 TABLET VIA G-TUBE ONCE DAILY 28 tablet 0     No current facility-administered medications on file prior to visit. Review of Systems  Objective:  General: AAO x 3 in NAD. Derm  Toenail Description  Sites of Onychomycosis Involvement (Check affected area)  [x] [x] [x] [x] [x] [x] [x] [x] [x] [x]  5 4 3 2 1 1 2 3 4 5                          Right                                        Left    Thickness  [x] [x] [x] [x] [x] [x] [x] [x] [x] [x]  5 4 3 2 1 1 2 3 4 5                         Right                                        Left    Dystrophic Changes   [x] [x] [x] [x] [x] [x] [x] [x] [x] [x]  5 4 3 2 1 1 2 3 4 5                         Right                                        Left    Color  [x] [x] [x] [x] [x] [x] [x] [x] [x] [x]  5 4 3 2 1 1 2 3 4 5                          Right                                        Left    Incurvation/Ingrowin   [x] [x] [x] [x] [x] [x] [x] [x] [x] [x]  5 4 3 2 1 1 2 3 4 5                         Right                                        Left    Inflammation/Pain   [x] [x] [x] [x] [x] [x] [x] [x] [x] [x]  5 4 3  2 1 1 2 3 4 5                         Right                                        Left      Nails that are described above are all elongated thickened pitting mycotic yellowish incurvated causing pain with both shoe gear. Palpation nails greater then 3 mm thick painful       Dermatologic Exam:hair loss noted  lower extremity    Skin lesion/ulceration   Skin   Callus   Musculoskeletal:     1st MPJ ROM normal, Bilateral.  Muscle strength 5/5, Bilateral.  Pain present upon palpation of toenails   Bilateral., Bilateral.  Ankle ROM normal,Bilateral.    Dorsally contracted digits , Bilateral.     Vascular:   There are class B findings absent posterior tibialis pulses lack of hair growth thickened nails discoloration skin is discolored skin is shiny cool to touch to toes    Neurological:  Sensation present to light touch to level of digits, Bilateral    Foot Exam    General  General Appearance: appears stated age and healthy   Assistance: wheelchair use       Right Foot/Ankle     Inspection and Palpation  Skin Exam: skin changes and abnormal color; Neurovascular  Dorsalis pedis: 1+  Posterior tibial: absent    Muscle Strength  Ankle dorsiflexion: 1  Ankle plantar flexion: 1      Left Foot/Ankle      Inspection and Palpation  Skin Exam: skin changes and abnormal color; Neurovascular  Dorsalis pedis: 1+  Posterior tibial: absent    Muscle Strength  Ankle dorsiflexion: 1  Ankle plantar flexion: 1    Range of Motion    Normal left ankle ROM             Right Ankle Exam   Right ankle exam is normal.  Swelling: mild    Muscle Strength   Dorsiflexion:  1/5  Plantar flexion:  1/5  Anterior tibial:  1/5  Posterior tibial:  1/5  Gastrocsoleus:  1/5  Peroneal muscle:  1/5    Tests   Anterior drawer: physiological laxity  Varus tilt: physiological laxity      Left Ankle Exam   Left ankle exam is normal.  Swelling: mild    Range of Motion   The patient has normal left ankle ROM. Muscle Strength   Dorsiflexion:  1/5   Plantar flexion:  1/5   Anterior tibial:  1/5   Gastrocsoleus:  1/5  Peroneal muscle:  1/5    Tests   Anterior drawer: physiological laxity  Varus tilt: physiological laxity          Q7   []Yes    []No                Q8   [x]Yes    []No                     Q9   []Yes    []No  Assessment:  75 y.o. female with:   1. Tinea unguium    2. Pain in left toe(s)    3. Pain in toe of right foot    4. Difficulty walking    5. Peripheral vascular disease, unspecified ([facility] [address]    6. Mental retardation     No orders of the defined types were placed in this encounter. Plan: .  Pt was evaluated and examined.  Patient was given personalized discharge instructions. Nails 1-10 were debrided in length and thickness sharply with a nail nipper and  without incident. Pt will follow up in 9 weeks or sooner if any problems arise. Diagnosis was discussed with the pt and all of their questions were answered in detail. Proper foot hygiene and care was discussed with the pt. Patient to check feet daily and contact the office with any questions/problems/concerns. Other comorbidity noted and will be managed by PCP. Pain waiver discussed with patient and confirmed.    1/6/2022      Electronically signed by Robert Whitaker DPM on 1/6/2022 at 9:22 AM  1/6/2022

## 2022-01-13 DIAGNOSIS — Z20.822 EXPOSURE TO CONFIRMED CASE OF COVID-19: ICD-10-CM

## 2022-01-13 DIAGNOSIS — J10.1 INFLUENZA B: Primary | ICD-10-CM

## 2022-01-13 RX ORDER — OSELTAMIVIR PHOSPHATE 6 MG/ML
75 FOR SUSPENSION ORAL DAILY
Qty: 62.5 ML | Refills: 0 | Status: SHIPPED | OUTPATIENT
Start: 2022-01-13 | End: 2022-01-18

## 2022-02-03 ENCOUNTER — HOSPITAL ENCOUNTER (OUTPATIENT)
Age: 67
Discharge: HOME | End: 2022-02-03
Payer: MEDICARE

## 2022-02-03 LAB
ALBUMIN SERPL-MCNC: 3.4 G/DL (ref 3.5–5.2)
ALP BLD-CCNC: 59 U/L (ref 35–104)
ALT SERPL-CCNC: 8 U/L (ref 0–32)
ANION GAP SERPL CALCULATED.3IONS-SCNC: 10 MMOL/L (ref 7–16)
AST SERPL-CCNC: 15 U/L (ref 0–31)
BILIRUB SERPL-MCNC: 0.2 MG/DL (ref 0–1.2)
BUN BLDV-MCNC: 20 MG/DL (ref 6–23)
CALCIUM SERPL-MCNC: 9.9 MG/DL (ref 8.6–10.2)
CHLORIDE BLD-SCNC: 98 MMOL/L (ref 98–107)
CO2: 27 MMOL/L (ref 22–29)
CREAT SERPL-MCNC: 0.7 MG/DL (ref 0.5–1)
GFR AFRICAN AMERICAN: >60
GFR NON-AFRICAN AMERICAN: >60 ML/MIN/1.73
GLUCOSE BLD-MCNC: 94 MG/DL (ref 74–99)
HCT VFR BLD CALC: 37.9 % (ref 34–48)
HEMOGLOBIN: 12 G/DL (ref 11.5–15.5)
MCH RBC QN AUTO: 31.3 PG (ref 26–35)
MCHC RBC AUTO-ENTMCNC: 31.7 % (ref 32–34.5)
MCV RBC AUTO: 98.7 FL (ref 80–99.9)
PDW BLD-RTO: 13.2 FL (ref 11.5–15)
PLATELET # BLD: 223 E9/L (ref 130–450)
PMV BLD AUTO: 10.8 FL (ref 7–12)
POTASSIUM SERPL-SCNC: 4.7 MMOL/L (ref 3.5–5)
RBC # BLD: 3.84 E12/L (ref 3.5–5.5)
SODIUM BLD-SCNC: 135 MMOL/L (ref 132–146)
TOTAL PROTEIN: 7.7 G/DL (ref 6.4–8.3)
WBC # BLD: 6.7 E9/L (ref 4.5–11.5)

## 2022-02-03 PROCEDURE — 80053 COMPREHEN METABOLIC PANEL: CPT

## 2022-02-03 PROCEDURE — 85027 COMPLETE CBC AUTOMATED: CPT

## 2022-02-03 PROCEDURE — 36415 COLL VENOUS BLD VENIPUNCTURE: CPT

## 2022-02-08 ENCOUNTER — OFFICE VISIT (OUTPATIENT)
Dept: OBGYN | Age: 67
End: 2022-02-08
Payer: MEDICARE

## 2022-02-08 VITALS
HEIGHT: 62 IN | SYSTOLIC BLOOD PRESSURE: 136 MMHG | BODY MASS INDEX: 25.76 KG/M2 | RESPIRATION RATE: 16 BRPM | DIASTOLIC BLOOD PRESSURE: 82 MMHG | HEART RATE: 80 BPM | WEIGHT: 140 LBS

## 2022-02-08 DIAGNOSIS — Z12.31 ENCOUNTER FOR SCREENING MAMMOGRAM FOR BREAST CANCER: ICD-10-CM

## 2022-02-08 DIAGNOSIS — Z01.419 WELL WOMAN EXAM: Primary | ICD-10-CM

## 2022-02-08 PROCEDURE — 3017F COLORECTAL CA SCREEN DOC REV: CPT | Performed by: OBSTETRICS & GYNECOLOGY

## 2022-02-08 PROCEDURE — 1123F ACP DISCUSS/DSCN MKR DOCD: CPT | Performed by: OBSTETRICS & GYNECOLOGY

## 2022-02-08 PROCEDURE — 99213 OFFICE O/P EST LOW 20 MIN: CPT | Performed by: OBSTETRICS & GYNECOLOGY

## 2022-02-08 PROCEDURE — 4040F PNEUMOC VAC/ADMIN/RCVD: CPT | Performed by: OBSTETRICS & GYNECOLOGY

## 2022-02-08 PROCEDURE — 1036F TOBACCO NON-USER: CPT | Performed by: OBSTETRICS & GYNECOLOGY

## 2022-02-08 PROCEDURE — 99203 OFFICE O/P NEW LOW 30 MIN: CPT | Performed by: OBSTETRICS & GYNECOLOGY

## 2022-02-08 PROCEDURE — 1090F PRES/ABSN URINE INCON ASSESS: CPT | Performed by: OBSTETRICS & GYNECOLOGY

## 2022-02-08 PROCEDURE — G8427 DOCREV CUR MEDS BY ELIG CLIN: HCPCS | Performed by: OBSTETRICS & GYNECOLOGY

## 2022-02-08 PROCEDURE — G8417 CALC BMI ABV UP PARAM F/U: HCPCS | Performed by: OBSTETRICS & GYNECOLOGY

## 2022-02-08 PROCEDURE — G8484 FLU IMMUNIZE NO ADMIN: HCPCS | Performed by: OBSTETRICS & GYNECOLOGY

## 2022-02-08 PROCEDURE — G8400 PT W/DXA NO RESULTS DOC: HCPCS | Performed by: OBSTETRICS & GYNECOLOGY

## 2022-02-08 NOTE — PROGRESS NOTES
Profoundly retarded patient for well woman. Blind as well. Last time here we were unable to examine. Paps are no longer indicated after age 72. Upper PE is wnl all regions systems ans areas. .  Slight tachycardia. Breasts: no palpable masses. Mammogram requested. Pelvic:  External exam was negative. Digital exam was normal.  No masses noted. No bleeding found. Pap no longer indicated. IMP: Basically normal exam.  Mammogram ordered. RTC prn basic. Check Tachycardia.

## 2022-02-08 NOTE — PROGRESS NOTES
Pt from facility today with caregiver.   Pap no longer needed  External exam was negative  Upper exam normal Mammogram ordered

## 2022-03-10 NOTE — TELEPHONE ENCOUNTER
Last Appointment:  12/10/2021  Future Appointments   Date Time Provider Marissa Law   5/5/2022  9:00 AM EUGENIO Mccullough [facility]

## 2022-03-11 RX ORDER — FERROUS SULFATE 325(65) MG
TABLET ORAL
Qty: 90 TABLET | Refills: 1 | Status: SHIPPED | OUTPATIENT
Start: 2022-03-11

## 2022-03-20 ENCOUNTER — HOSPITAL ENCOUNTER (EMERGENCY)
Age: 67
Discharge: HOME | End: 2022-03-20
Attending: STUDENT IN AN ORGANIZED HEALTH CARE EDUCATION/TRAINING PROGRAM
Payer: MEDICARE

## 2022-03-20 ENCOUNTER — APPOINTMENT (OUTPATIENT)
Dept: GENERAL RADIOLOGY | Age: 67
End: 2022-03-20
Payer: MEDICARE

## 2022-03-20 VITALS
RESPIRATION RATE: 16 BRPM | WEIGHT: 140 LBS | TEMPERATURE: 98.7 F | SYSTOLIC BLOOD PRESSURE: 151 MMHG | OXYGEN SATURATION: 98 % | HEART RATE: 80 BPM | BODY MASS INDEX: 25.61 KG/M2 | DIASTOLIC BLOOD PRESSURE: 81 MMHG

## 2022-03-20 DIAGNOSIS — K94.23 PEG TUBE MALFUNCTION (HCC): Primary | ICD-10-CM

## 2022-03-20 PROCEDURE — 99283 EMERGENCY DEPT VISIT LOW MDM: CPT

## 2022-03-20 PROCEDURE — 43762 RPLC GTUBE NO REVJ TRC: CPT

## 2022-03-20 PROCEDURE — 6360000004 HC RX CONTRAST MEDICATION: Performed by: STUDENT IN AN ORGANIZED HEALTH CARE EDUCATION/TRAINING PROGRAM

## 2022-03-20 PROCEDURE — 74018 RADEX ABDOMEN 1 VIEW: CPT

## 2022-03-20 RX ADMIN — DIATRIZOATE MEGLUMINE AND DIATRIZOATE SODIUM 60 ML: 600; 100 SOLUTION ORAL; RECTAL at 03:02

## 2022-03-20 NOTE — ED NOTES
Unable to flush peg tube. Used 50cc syringe and water. Met with resistance unable to flush.      Stephanie Martinez RN  03/20/22 [id number]

## 2022-03-20 NOTE — ED PROVIDER NOTES
Patient is a 78-year-old female presents from [facility] for concern for malfunctioning PEG tube. Patient presents with caregiver who states that the PEG tube has not been functional for the past day. They have been unable to push fluids through it. PEG tube is not new, is noted to be a 22 Frisian/15 cc. Patient is at her baseline CO x1, incomprehensible sounds. Patient without any trauma. Patient without any reported bleeding. Review of Systems   Unable to perform ROS: Patient nonverbal      Physical Exam  Vitals and nursing note reviewed. Constitutional:       General: She is awake. She is not in acute distress. Appearance: She is normal weight. She is not ill-appearing or toxic-appearing. HENT:      Mouth/Throat:      Mouth: Mucous membranes are moist.   Eyes:      Pupils: Pupils are equal, round, and reactive to light. Comments: Pupils and iris, noted to be opaque white bilateral   Cardiovascular:      Rate and Rhythm: Normal rate and regular rhythm. Pulses: Normal pulses. Radial pulses are 2+ on the right side and 2+ on the left side. Heart sounds: Normal heart sounds. Pulmonary:      Effort: Pulmonary effort is normal.      Breath sounds: Normal breath sounds. Abdominal:      General: Abdomen is flat. There is no distension. Palpations: Abdomen is soft. Tenderness: There is no abdominal tenderness. Comments: PEG tube in place, no erythema, stoma is clean, no signs of infection. Musculoskeletal:         General: Normal range of motion. Cervical back: Normal range of motion and neck supple. Skin:     General: Skin is warm and dry. Capillary Refill: Capillary refill takes less than 2 seconds. Neurological:      General: No focal deficit present. Mental Status: She is alert. Mental status is at baseline. GCS: GCS eye subscore is 4. GCS verbal subscore is 2. GCS motor subscore is 5. Motor: Motor function is intact. PROCEDURE  3/20/22         FEEDING TUBE REPLACEMENT  Risks, benefits and alternatives (for applicable procedures below) described. Performed By: Lora Alonzo MD.    Indication: Tube malfunction. Informed consent obtained: Verbal   Procedure: A feeding tube was replaced using a 22 french gastrostomy tube and the bulb was inflated using 15 cc of normal saline at 6cm depth. Tube was secured to skin with an adhesive dressing. Post-Procedure: Tube position confirmed by x-ray with contrast injection. Patient tolerated the procedure well. Complications:  None. MDM  Number of Diagnoses or Management Options  PEG tube malfunction [facility])  Diagnosis management comments: Patient is a 79-year-old female presents for evaluation of nonfunctional PEG tube. Patient presents via EMS, with caretaker. Patient presents awake, at her baseline mental status, generally nonverbal.  Vital signs are noted. Physical exam shows PEG tube in place in the abdomen, significantly pushed into the abdomen. Nurse attempted flushing and it was unsuccessful. Decision was made to replace the tube and this was successful per procedure note. X-ray with contrast shows good placement. Patient was monitored in the emergency department and will be discharged back to skilled nursing facility, patient to return if needed. Patient to follow-up outpatient as needed. Amount and/or Complexity of Data Reviewed  Tests in the radiology section of CPT®: ordered       --------------------------------------------- PAST HISTORY ---------------------------------------------  Past Medical History:  has a past medical history of Acute kidney injury ([address] Blind in both eyes, Essential hypertension, Gastroesophageal reflux disease without esophagitis, Hemodialysis patient ([facility] [address] Hyperthyroidism, MR (mental retardation), Osteoarthritis, Peripheral vascular disease ([facility] [address] Pneumonia, Pneumonia due to infectious organism, and Sepsis ([facility] [address]     Past Surgical History:  has a past surgical history that includes other surgical history (Right, 01/17/2017); other surgical history (01/25/2017); chest tube insertion (Right, 02/09/2017); bronchoscopy (02/10/2017); and Gastrostomy tube placement (N/A, 3/7/2021). Social History:  reports that she has never smoked. She has never used smokeless tobacco. She reports that she does not drink alcohol and does not use drugs. Family History: family history is not on file. The patients home medications have been reviewed. Allergies: Patient has no known allergies. -------------------------------------------------- RESULTS -------------------------------------------------  Labs:  No results found for this visit on 03/20/22. Radiology:  XR ABDOMEN FOR NG/OG/NE TUBE PLACEMENT   Final Result   Gastrostomy tube in satisfactory position. Nonobstructive bowel gas pattern. Possible hiatal hernia.           ------------------------- NURSING NOTES AND VITALS REVIEWED ---------------------------  Date / Time Roomed:  3/20/2022  1:27 AM  ED Bed Assignment:  27/27    The nursing notes within the ED encounter and vital signs as below have been reviewed. BP (!) 151/81   Pulse 80   Temp 98.7 °F (37.1 °C)   Resp 16   Wt 140 lb (63.5 kg)   SpO2 98%   BMI 25.61 kg/m²   Oxygen Saturation Interpretation: Normal      ------------------------------------------ PROGRESS NOTES ------------------------------------------  3:15 AM EDT  I have spoken with the Caregiver and discussed todays results, in addition to providing specific details for the plan of care and counseling regarding the diagnosis and prognosis. Their questions are answered at this time and they are agreeable with the plan. I discussed at length with them reasons for immediate return here for re evaluation. They will followup with their primary care physician by calling their office as needed.     --------------------------------- ADDITIONAL PROVIDER NOTES ---------------------------------  At this time the patient is without objective evidence of an acute process requiring hospitalization or inpatient management. They have remained hemodynamically stable throughout their entire ED visit and are stable for discharge with outpatient follow-up. The plan has been discussed in detail and they are aware of the specific conditions for emergent return, as well as the importance of follow-up. Discharge Medication List as of 3/20/2022  3:53 AM        Diagnosis:  1. PEG tube malfunction ([facility] [address]        Disposition:  Patient's disposition: Discharge to nursing home  Patient's condition is stable. 3/20/22, 3:15 AM EDT.     This note is prepared by Dorita Parrish MD -PGY- 3                     Dorita Parrish MD  Resident  03/21/22 5539

## 2022-03-21 ENCOUNTER — HOSPITAL ENCOUNTER (EMERGENCY)
Age: 67
Discharge: HOME | End: 2022-03-21
Attending: EMERGENCY MEDICINE
Payer: MEDICARE

## 2022-03-21 ENCOUNTER — APPOINTMENT (OUTPATIENT)
Dept: GENERAL RADIOLOGY | Age: 67
End: 2022-03-21
Payer: MEDICARE

## 2022-03-21 VITALS
SYSTOLIC BLOOD PRESSURE: 133 MMHG | TEMPERATURE: 97.1 F | OXYGEN SATURATION: 93 % | DIASTOLIC BLOOD PRESSURE: 67 MMHG | HEART RATE: 93 BPM | RESPIRATION RATE: 14 BRPM

## 2022-03-21 DIAGNOSIS — R11.10 NON-INTRACTABLE VOMITING, PRESENCE OF NAUSEA NOT SPECIFIED, UNSPECIFIED VOMITING TYPE: Primary | ICD-10-CM

## 2022-03-21 PROCEDURE — 99283 EMERGENCY DEPT VISIT LOW MDM: CPT

## 2022-03-21 PROCEDURE — 71046 X-RAY EXAM CHEST 2 VIEWS: CPT

## 2022-03-21 NOTE — CARE COORDINATION
Social Work/Transition of Care:      Pt in need of transportation back to [facility] contacted [facility] they are agreeable to transport ETA  3 hours, SW requested trip to be outsourced. SW completed Ambulance transport information and updated caregiver at bedside.     Electronically signed by Lauryn Gonzalez on 0/72/0328 at 2:17 PM

## 2022-03-21 NOTE — ED PROVIDER NOTES
no distension. Palpations: Abdomen is soft. There is no mass. Tenderness: There is no right CVA tenderness, left CVA tenderness or guarding. Hernia: No hernia is present. Genitourinary:     Rectum: Guaiac result negative. Musculoskeletal:      Cervical back: Normal range of motion. No rigidity. Right lower leg: No edema. Left lower leg: No edema. Skin:     General: Skin is warm and dry. Capillary Refill: Capillary refill takes less than 2 seconds. Findings: No bruising, erythema or lesion. Neurological:      Mental Status: She is alert. Mental status is at baseline. Psychiatric:         Judgment: Judgment normal.          Procedures     MDM     75 y.o. female  MR, autism, hypothyroidism presents from home with caregivers who provide all history. They state that this morning patient had black thick sputum by her bed at around 6 am- about half a cup. While in the ED patient was hemodynamically stable, nontoxic-appearing, in no respiratory distress. PEG tube was suction, stomach contents did look dark brown and watery-Gastroccult negative for blood. Guaiac was Hemoccult negative. Patient did not have any new episodes of vomiting while in the ED and remained stable throughout his stay. Caregivers feel comfortable bringing patient home and will follow up outpatient with PCP. They understands to return to the ED in case of worsening symptoms. ED Course as of 03/24/22 1631   Mon Mar 21, 2022   1154 ATTENDING PROVIDER ATTESTATION:     I have personally performed and/or participated in the history, exam, medical decision making, and procedures and agree with all pertinent clinical information unless otherwise noted. I have also reviewed and agree with the past medical, family and social history unless otherwise noted.     I have discussed this patient in detail with the resident and provided the instruction and education regarding the evidence-based evaluation and treatment of blood on pillow. History: Patient from local nursing facility. They advised that this morning, he noted a small amount of dried brown material on his pillow next to his head. Question of hemoptysis, hematemesis, or other nonbloody source. He just had G-tube replacement performed a couple days ago at this facility. G-tube is not working without difficulty since change. My findings: Janice Caldwell is a 75 y.o. female whom is in no distress. Physical exam reveals she is nonresponsive at baseline. Lungs are diminished, abdomen with G-tube present and no hemorrhage. Abdomen is soft, nondistended. Bowel sounds normal.  Extremities are intact. Skin is warm and dry. My plan: Symptomatic and supportive care. G-tube to suction to evaluate for hemorrhage. Chest x-ray. Electronically signed by Ian Cárdenas DO on 3/21/22 at 11:54 AM EDT       [TG]      ED Course User Index  [TG] Ian Cárdenas DO       --------------------------------------------- PAST HISTORY ---------------------------------------------  Past Medical History:  has a past medical history of Acute kidney injury ([facility] [address] Blind in both eyes, Essential hypertension, Gastroesophageal reflux disease without esophagitis, Hemodialysis patient ([facility] [address] Hyperthyroidism, MR (mental retardation), Osteoarthritis, Peripheral vascular disease ([facility] [address] Pneumonia, Pneumonia due to infectious organism, and Sepsis ([facility] [address] Past Surgical History:  has a past surgical history that includes other surgical history (Right, 01/17/2017); other surgical history (01/25/2017); chest tube insertion (Right, 02/09/2017); bronchoscopy (02/10/2017); and Gastrostomy tube placement (N/A, 3/7/2021). Social History:  reports that she has never smoked. She has never used smokeless tobacco. She reports that she does not drink alcohol and does not use drugs. Family History: family history is not on file.      The patients home medications have been reviewed. Allergies: Patient has no known allergies. -------------------------------------------------- RESULTS -------------------------------------------------  Labs:  No results found for this visit on 03/21/22. Radiology:  XR CHEST (2 VW)   Final Result   Diminished lung volumes with findings which are favored to be chronic and   appear similar to previous. Given the patient's history of hemoptysis,   consider additional imaging including CT as indicated. ------------------------- NURSING NOTES AND VITALS REVIEWED ---------------------------  Date / Time Roomed:  3/21/2022 10:46 AM  ED Bed Assignment:  02/02    The nursing notes within the ED encounter and vital signs as below have been reviewed. /67   Pulse 93   Temp 97.1 °F (36.2 °C) (Axillary)   Resp 14   SpO2 93%   Oxygen Saturation Interpretation: Normal      ------------------------------------------ PROGRESS NOTES ------------------------------------------  4:31 PM EDT  I have spoken with the patient and discussed todays results, in addition to providing specific details for the plan of care and counseling regarding the diagnosis and prognosis. Their questions are answered at this time and they are agreeable with the plan. I discussed at length with them reasons for immediate return here for re evaluation. They will followup with their primary care physician by calling their office tomorrow. --------------------------------- ADDITIONAL PROVIDER NOTES ---------------------------------  At this time the patient is without objective evidence of an acute process requiring hospitalization or inpatient management. They have remained hemodynamically stable throughout their entire ED visit and are stable for discharge with outpatient follow-up. The plan has been discussed in detail and they are aware of the specific conditions for emergent return, as well as the importance of follow-up.       Discharge Medication List as of 3/21/2022  1:33 PM          Diagnosis:  1. Non-intractable vomiting, presence of nausea not specified, unspecified vomiting type        Disposition:  Patient's disposition: Discharge to home  Patient's condition is stable.            Niko Rodriguez MD  Resident  03/24/22 2707

## 2022-03-23 ENCOUNTER — TELEPHONE (OUTPATIENT)
Dept: SURGERY | Age: 67
End: 2022-03-23

## 2022-03-23 NOTE — TELEPHONE ENCOUNTER
Received a call from [facility] Cheli Gar [phone] who stated that the patient went to ED for peg tube replacement. She wants to make a follow up appt with Dr. Victor M Cadena. MA told her that as long as the peg tube is working and she is feeling ok, follow up is as needed PRN. Cheli Gar stated she still wants to make an appt with Dr. Victor M Cadena for peg tube management. MA told her that MA can talk with her nurse. Cheli Gar stated her nurse will call our office. They have my direct phone number.    Electronically signed by Klaus Padilla on 3/23/2022 at 9:25 AM

## 2022-03-23 NOTE — TELEPHONE ENCOUNTER
Received a call from the nurse Sharath Lechuga who stated that the patient is having nausea vomiting and occationally, the peg tube is not working then she goes back to ED. Per Pricila Fournier, her PCP refused to look and treat her peg tube. She wants to know if the patient can see Dr. Flor Lujan. MA told her we can schedule the patient in the office. Sharath Lechuga stated she will have [address] call our office to schedule the appt.   Electronically signed by Valiant Blizzard on 3/23/2022 at 3:42 PM

## 2022-03-24 ENCOUNTER — OFFICE VISIT (OUTPATIENT)
Dept: FAMILY MEDICINE CLINIC | Age: 67
End: 2022-03-24
Payer: MEDICARE

## 2022-03-24 VITALS
SYSTOLIC BLOOD PRESSURE: 114 MMHG | TEMPERATURE: 97.8 F | OXYGEN SATURATION: 95 % | DIASTOLIC BLOOD PRESSURE: 74 MMHG | HEART RATE: 94 BPM

## 2022-03-24 DIAGNOSIS — K94.23 GASTROSTOMY TUBE DYSFUNCTION (HCC): Primary | ICD-10-CM

## 2022-03-24 PROCEDURE — 3017F COLORECTAL CA SCREEN DOC REV: CPT | Performed by: INTERNAL MEDICINE

## 2022-03-24 PROCEDURE — 1090F PRES/ABSN URINE INCON ASSESS: CPT | Performed by: INTERNAL MEDICINE

## 2022-03-24 PROCEDURE — 1036F TOBACCO NON-USER: CPT | Performed by: INTERNAL MEDICINE

## 2022-03-24 PROCEDURE — G8427 DOCREV CUR MEDS BY ELIG CLIN: HCPCS | Performed by: INTERNAL MEDICINE

## 2022-03-24 PROCEDURE — G8400 PT W/DXA NO RESULTS DOC: HCPCS | Performed by: INTERNAL MEDICINE

## 2022-03-24 PROCEDURE — 99213 OFFICE O/P EST LOW 20 MIN: CPT | Performed by: INTERNAL MEDICINE

## 2022-03-24 PROCEDURE — 1123F ACP DISCUSS/DSCN MKR DOCD: CPT | Performed by: INTERNAL MEDICINE

## 2022-03-24 PROCEDURE — 4040F PNEUMOC VAC/ADMIN/RCVD: CPT | Performed by: INTERNAL MEDICINE

## 2022-03-24 PROCEDURE — G8417 CALC BMI ABV UP PARAM F/U: HCPCS | Performed by: INTERNAL MEDICINE

## 2022-03-24 PROCEDURE — G8484 FLU IMMUNIZE NO ADMIN: HCPCS | Performed by: INTERNAL MEDICINE

## 2022-03-24 NOTE — PROGRESS NOTES
[facility] PRIMARY CARE  [address] [place] [address]  Yuni Harvey [address]  Dept: [phone]  Dept Fax: [phone]     NAME: Carlos Fernandez        :  1955        MRN:  [id number]    Chief Complaint   Patient presents with   [facility] ED Follow-up     Was in the ER twice. Once because her g tube was clogged and once for vomiting. Subjective     History of Present Illness  Carlos Fernandez is a 75 y.o. female who presents today for an acute visit for ED follow up. Patient has been seen in the ED twice recently due to issues with her G-tube. Initially tube was not flushing and needed replaced. On monday she had coffee ground emesis and was brought back to the ED for evaluation. The new G-tube that was replaced in the ED, does not have clamp, it slides in and out and is difficult to maintain and use, per staff. They report that the prior tube she had that was placed by the surgeon works much better and they are hoping they can get some to have on hand so that when future problems arise, they have their own tubes to bring to the ED with them to be replaced. Follow up appointment was made with surgery on 22         Review of Systems  Please see HPI above. All bolded are positive. Unable to obtain full review of systems as patient is unresponsive and nonverbal at baseline.  Per staff members that work with the patient there are no reported fevers, chills, diarrhea, constipation, edema, or signs of distress or pain      Home Medications:  Current Outpatient Medications   Medication Sig Dispense Refill    FEROSUL 325 (65 Fe) MG tablet TAKE 1 TABLET VIA G-TUBE ONCE A DAY. 90 tablet 1    famotidine (PEPCID) 20 MG tablet TAKE (1) TABLET VIA PEG TUBE TWO TIMES DAILY.  60 tablet 5    folic acid (FOLVITE) 1 MG tablet TAKE 1 TABLET VIA G-TUBE DAILY 30 tablet 5    levothyroxine (SYNTHROID) 150 MCG tablet [email or web address] 1 TABLET VIA G-TUBE ONCE DAILY 30 tablet 5    pantoprazole sodium (PROTONIX) 40 MG PACK packet 40 mg by Per G Tube route every morning (before breakfast)      NATURAL VITAMIN D-3 125 MCG (5000 UT) TABS tablet TAKE 1 TABLET VIA G-TUBE ONCE DAILY 28 tablet 5    Disposable Gloves (POWDER FREE NITRILE GLOVES MED) MISC As Directed 2 each 5    midodrine (PROAMATINE) 2.5 MG tablet Take 3 tablets by mouth 3 times daily as needed (hypotension, give only if systolic BP is <529 (BP checks three times daily)) 270 tablet 3    docusate (COLACE) 50 MG/5ML liquid 10 mLs by Per G Tube route 2 times daily 600 mL 11    PARoxetine (PAXIL) 10 MG tablet 1 tablet by Per G Tube route 2 times daily 180 tablet 1    montelukast (SINGULAIR) 10 MG tablet 1 tablet by Per G Tube route nightly 90 tablet 1    albuterol (PROVENTIL) (2.5 MG/3ML) 0.083% nebulizer solution Take 3 mLs by nebulization every 6 hours 120 each 3    Respiratory Therapy Supplies (FULL KIT NEBULIZER SET) MISC Use as directed with nebulized medication.  1 each 0    acetaminophen (TYLENOL) 325 MG tablet 650 mg by Per G Tube route every 4 hours as needed for Pain or Fever      medicated lip balm (BLISTEX/CARMEX) 2-2.5-6.6 % STCK Apply topically as needed for Dry Lips (CRACKED LIPS)      SUNSCREENS EX Apply topically as needed (SUNBURN PREVENTION) Indications: *SPF 48*      Dextromethorphan-guaiFENesin  MG/5ML SYRP 10 mLs by Per G Tube route every 4 hours as needed for Cough (CONGESTION)      Neomycin-Bacitracin-Polymyxin (TRIPLE ANTIBIOTIC) OINT Apply topically 2 times daily as needed (CUTS/SCRAPES/SKIN ABRASIONS)      bumetanide (BUMEX) 1 MG tablet 1 mg by Per G Tube route as needed (SWELLING)      OLANZapine zydis (ZYPREXA ZYDIS) 5 MG disintegrating tablet Take 20 mg by mouth every morning Indications: VIA G-TUBE *TAKE ALONG WITH 15MG=20MG*  *SEE OTHER ORDER*      OLANZapine zydis (ZYPREXA ZYDIS) 15 MG disintegrating tablet 20 mg every morning Indications: VIA G-TUBE *TAKE ALONG WITH 5MG=20MG*  *SEE OTHER ORDER*      OLANZapine (ZYPREXA) 20 MG tablet 20 mg by Per G Tube route nightly      LORazepam (ATIVAN) 2 MG tablet 2 mg by Per G Tube route as needed (2HRS BEFORE DENTAL APPT, TAKE 2ND DOSE TO DENTAL APPT).  valproic acid (DEPACON) 250 MG/5ML SOLN oral solution 20 mLs by Per G Tube route 2 times daily 300 mL 0    polyethylene glycol (GLYCOLAX) 17 g packet 17 g by Per G Tube route daily 527 g 1    albuterol (PROVENTIL) (2.5 MG/3ML) 0.083% nebulizer solution Take 3 mLs by nebulization every 6 hours as needed for Wheezing (Patient not taking: Reported on 3/24/2022) 120 each 3    bisacodyl (DULCOLAX) 10 MG suppository Place 10 mg rectally daily as needed for Constipation (NO BM FOR 3 DAYS) (Patient not taking: Reported on 3/24/2022)       No current facility-administered medications for this visit. Allergies:  No Known Allergies    Objective     Vitals:    03/24/22 1514   BP: 114/74   Pulse: 94   Temp: 97.8 °F (36.6 °C)   TempSrc: Temporal   SpO2: 95%        Physical Exam  [place] but has eyes closed most of the time, non verbal at baseline, wheelchair bound, No acute distress  Head: Normocephalic, atraumatic  Eyes: conjunctivae/corneas clear  Mouth: Mucous membranes moist with no pharyngeal exudate or erythema  Neck: no JVD, no adenopathy, no carotid bruit, supple, symmetrical, trachea midline  Back: symmetric, ROM normal, No CVA tenderness. Lungs: clear to auscultation bilaterally without wheezes, rales, or rhonchi  Heart: regular rate and rhythm, S1, S2 normal, no murmur, click, rub or gallop  Abdomen: soft, non-tender; bowel sounds normal; no masses,  no organomegaly, PEG tube site is clean and dry with no surrounding erythema. Extremities: atraumatic, no cyanosis, no edema  Skin: dry without rashes or lesions  Neurologic: non verbal and wheel chair bound. Does not follow commands. At her baseline. Assessment and Plan     Patient is a 75 y.o. female who presented to the office for an acute visit. Jaqui Espinoza was seen today for ed follow-up. Diagnoses and all orders for this visit:    Gastrostomy tube dysfunction ([facility] [address]    - needs follow up with surgery. Ideally patient's caregivers would be able to have back ups of the G-tube that works well for the patient so that when future problems occur, they have their own supplies to bring to the ED with them. Educational materials and/or home exercises printed for patient's review and were included in patient instructions on his/her After Visit Summary and given to patient at the end of visit. Counseled regarding above diagnosis, including possible risks and complications, especially if left uncontrolled or untreated. Advised to present to the Emergency Department if symptoms worsen. Counseled regarding the possible side effects, risks, benefits and alternatives to treatment; patient and/or guardian verbalizes understanding, agrees, feels comfortable with and wishes to proceed with above treatment plan. Advised caregiver to call [facility] new medication issues, and read all Rx info from pharmacy to assure aware of all possible risks and side effects of any medication before taking. Patient's caregiver verbalizes understanding and agrees with above counseling, assessment and plan. All questions answered.     Adam Babin,

## 2022-04-06 NOTE — TELEPHONE ENCOUNTER
Last Appointment:  3/24/2022  Future Appointments   Date Time Provider Marissa Kevini   4/15/2022  8:30 AM Kaylyn Carranza MD [facility] GEN SURG [facility]   5/5/2022  9:00 AM EUGENIO Mcgrath POD [facility]   6/21/2022  9:30 AM DO ANAI Pruitt [facility]

## 2022-04-07 RX ORDER — MONTELUKAST SODIUM 10 MG/1
TABLET ORAL
Qty: 90 TABLET | Refills: 0 | Status: SHIPPED
Start: 2022-04-07 | End: 2022-06-29

## 2022-04-07 RX ORDER — PAROXETINE 10 MG/1
TABLET, FILM COATED ORAL
Qty: 180 TABLET | Refills: 0 | Status: SHIPPED
Start: 2022-04-07 | End: 2022-06-29

## 2022-04-12 ENCOUNTER — APPOINTMENT (OUTPATIENT)
Dept: GENERAL RADIOLOGY | Age: 67
End: 2022-04-12
Payer: MEDICARE

## 2022-04-12 ENCOUNTER — HOSPITAL ENCOUNTER (EMERGENCY)
Age: 67
Discharge: NURSING, REHAB OR LONG-TERM CARE | End: 2022-04-12
Attending: EMERGENCY MEDICINE
Payer: MEDICARE

## 2022-04-12 VITALS
SYSTOLIC BLOOD PRESSURE: 129 MMHG | TEMPERATURE: 98.8 F | DIASTOLIC BLOOD PRESSURE: 66 MMHG | RESPIRATION RATE: 16 BRPM | HEART RATE: 82 BPM | OXYGEN SATURATION: 97 %

## 2022-04-12 DIAGNOSIS — K94.20 COMPLICATION OF GASTROSTOMY TUBE (HCC): Primary | ICD-10-CM

## 2022-04-12 PROCEDURE — 43762 RPLC GTUBE NO REVJ TRC: CPT

## 2022-04-12 PROCEDURE — 74018 RADEX ABDOMEN 1 VIEW: CPT

## 2022-04-12 PROCEDURE — 6360000004 HC RX CONTRAST MEDICATION: Performed by: STUDENT IN AN ORGANIZED HEALTH CARE EDUCATION/TRAINING PROGRAM

## 2022-04-12 PROCEDURE — 99284 EMERGENCY DEPT VISIT MOD MDM: CPT

## 2022-04-12 RX ADMIN — DIATRIZOATE MEGLUMINE AND DIATRIZOATE SODIUM 60 ML: 600; 100 SOLUTION ORAL; RECTAL at 16:25

## 2022-04-12 NOTE — ED PROVIDER NOTES
[address]  Department of Emergency Medicine     Written by: Dorothy Garcia DO  Patient Name: Tonya Schafer  Attending Provider: Renata Rebollar DO  Admit Date: 2022  1:28 PM  MRN: [id number]    : 1955        Chief Complaint   Patient presents with   Uy[facility] Me G Tube Complications     per group home, G Tube not working, getting worse over past few days     - Chief complaint    HPI   Tonya Schafer is a 75 y.o. female presenting to the ED for evaluation of G Tube Complications (per group home, G Tube not working, getting worse over past few days )    Patient is a 27-year-old female with history of dementia with behavioral disturbance and profound intellectual disability, nonambulatory at baseline with chronic extremity contractures who lives at a group home and has a chronic PEG tube, presenting for evaluation of G-tube complications. She has history of multiple encounters for this in the past.  Per caretaker who is with her at bedside, they have been having issues with flushing the G-tube over the past few days but were able to fix it up until today. The G-tube has not been dislodged or changed; has not been able to flush anything for the past several hours. History is obtained from caretaker and chart review. Otherwise limited due to baseline mentation. ENCOUNTER LIMITATION:    Please note that the HPI, ROS, Past History, and Physical Examination are limited due to this patients dementia. Review of Systems:   A complete review of systems was unable to be performed secondary to the limitations noted above    Physical Exam  Vitals and nursing note reviewed. Constitutional:       General: She is not in acute distress. Appearance: She is not toxic-appearing. HENT:      Head: Normocephalic and atraumatic. Right Ear: External ear normal.      Left Ear: External ear normal.      Nose: Nose normal. No rhinorrhea.       Mouth/Throat:      Mouth: Mucous membranes are moist.      Pharynx: Oropharynx is clear. Eyes:      Extraocular Movements: Extraocular movements intact. Conjunctiva/sclera: Conjunctivae normal.      Pupils: Pupils are equal, round, and reactive to light. Cardiovascular:      Rate and Rhythm: Normal rate and regular rhythm. Pulses: Normal pulses. Heart sounds: Normal heart sounds. Pulmonary:      Effort: Pulmonary effort is normal. No respiratory distress. Breath sounds: Normal breath sounds. No wheezing or rales. Abdominal:      General: Bowel sounds are normal.      Palpations: Abdomen is soft. Tenderness: There is no abdominal tenderness. There is no guarding. Comments: PEG tube in place and is normal in appearance at site of entry. No redness or warmth or sign of infection. Musculoskeletal:         General: Normal range of motion. Cervical back: Normal range of motion and neck supple. Right lower leg: No edema. Left lower leg: No edema. Skin:     General: Skin is warm and dry. Capillary Refill: Capillary refill takes less than 2 seconds. Neurological:      Mental Status: She is alert. Comments: Baseline mild contractures of all 4 extremities, she is at her baseline mentation and physical functioning. History of dementia and cognitive deficits. Psychiatric:         Mood and Affect: Mood normal.         Behavior: Behavior normal.          Procedures   Gastrostomy Tube Replacement Procedure Note    Indication: gastrostomy tube malfunction    Procedure: The patient was placed in the supine position and the patient's gastric tube was replaced using a 22 Portuguese gastrostomy tube and the bulb was inflated using 15 cc of normal saline. The placement was verified by injection and aspiration of fluid and x-ray with contrast injection. The patient tolerated the procedure well.     Complications: None          MDM     This is a 68-year-old female with chronic PEG tube presenting for evaluation of PEG tube malfunction. Tube is in place on arrival and is normal in appearance but will not flush. Tube was exchanged as noted above, flushes normally and confirmed placement with Gastrografin x-ray. Patient's vitals are stable, she is in no acute distress and there are no other complaints. She is stable and appropriate for discharge back to group home. Discussed results and plan with patient's caretaker who is at bedside, they voiced understanding and are amenable. Return precautions were discussed. ED Course as of 04/14/22 1123   Tue Apr 12, 2022   1448 RN unable to flush the G-tube. We will replace. [VG]   1526 G tube exchanged using 22 Fr 15 cc g tube, no issues during procedure. XR ordered and is pending.  [VG]      ED Course User Index  [VG] Shelley Sher DO       I have discussed this patient with my attending, who has seen the patient and agrees with this disposition. Patient was seen and evaluated by myself and my attending Edyta Pereira DO. Assessment and Plan discussed with attending provider, please see attestation for final plan of care.       --------------------------------------------- PAST HISTORY ---------------------------------------------  Past Medical History:  has a past medical history of Acute kidney injury ([facility] [address] Blind in both eyes, Essential hypertension, Gastroesophageal reflux disease without esophagitis, Hemodialysis patient ([facility] [address] Hyperthyroidism, MR (mental retardation), Osteoarthritis, Peripheral vascular disease ([facility] [address] Pneumonia, Pneumonia due to infectious organism, and Sepsis ([facility] [address] Past Surgical History:  has a past surgical history that includes other surgical history (Right, 01/17/2017); other surgical history (01/25/2017); chest tube insertion (Right, 02/09/2017); bronchoscopy (02/10/2017); and Gastrostomy tube placement (N/A, 3/7/2021). Social History:  reports that she has never smoked.  She has never used smokeless tobacco. She reports that she follow-up. Discharge Medication List as of 4/12/2022  4:36 PM          Diagnosis:  1. Complication of gastrostomy tube [facility])        Disposition:  Patient's disposition: Discharge to group home  Patient's condition is stable.        Pinky Patino DO  Resident  04/14/22 1134

## 2022-04-15 ENCOUNTER — OFFICE VISIT (OUTPATIENT)
Dept: SURGERY | Age: 67
End: 2022-04-15
Payer: MEDICARE

## 2022-04-15 VITALS
DIASTOLIC BLOOD PRESSURE: 53 MMHG | HEART RATE: 87 BPM | BODY MASS INDEX: 25.76 KG/M2 | TEMPERATURE: 97.3 F | RESPIRATION RATE: 18 BRPM | SYSTOLIC BLOOD PRESSURE: 125 MMHG | WEIGHT: 140 LBS | HEIGHT: 62 IN | OXYGEN SATURATION: 96 %

## 2022-04-15 DIAGNOSIS — K94.23 PEG TUBE MALFUNCTION (HCC): Primary | ICD-10-CM

## 2022-04-15 PROCEDURE — G8417 CALC BMI ABV UP PARAM F/U: HCPCS | Performed by: SURGERY

## 2022-04-15 PROCEDURE — 99211 OFF/OP EST MAY X REQ PHY/QHP: CPT | Performed by: SURGERY

## 2022-04-15 PROCEDURE — G8427 DOCREV CUR MEDS BY ELIG CLIN: HCPCS | Performed by: SURGERY

## 2022-04-15 NOTE — PROGRESS NOTES
General Surgery Progress Note:    Cc: peg issues    S: peg is getting advanced into the stomach/duodenum and then not working. ... Objective:  @BP (!) 125/53   Pulse 87   Temp 97.3 °F (36.3 °C) (Infrared)   Resp 18   Ht 5' 2\" (1.575 m)   Wt 140 lb (63.5 kg)   SpO2 96%   BMI 25.61 kg/m² @    Physical -     Gen: NAD  Resp: Breathing Comfortably, no resp distress  CV: Reg Rate  Abd: peg bumper adjusted tape applied to shaft of tube to keep it at 4 cm  EXT nvi    Assessment/Plan: peg issues    Discussed case at length with care giver, discussed case with nurse who covers facility. PEG instructions given.      Electronically Signed by Amalia Davis MD FACS   8:55 AM

## 2022-05-04 RX ORDER — CHOLECALCIFEROL TAB 125 MCG (5000 UNIT) 125 MCG (5000 UT)
TAB
Qty: 90 TABLET | Refills: 1 | Status: SHIPPED | OUTPATIENT
Start: 2022-05-04

## 2022-05-04 NOTE — TELEPHONE ENCOUNTER
Last Appointment:  3/24/2022  Future Appointments   Date Time Provider Marissa Law   5/5/2022  9:00 AM EUGENIO Sánchez [facility]   6/21/2022  9:30 AM Julee Ray  [address]

## 2022-05-05 ENCOUNTER — PROCEDURE VISIT (OUTPATIENT)
Dept: PODIATRY | Age: 67
End: 2022-05-05
Payer: MEDICARE

## 2022-05-05 VITALS
WEIGHT: 140 LBS | BODY MASS INDEX: 25.61 KG/M2 | SYSTOLIC BLOOD PRESSURE: 124 MMHG | TEMPERATURE: 97.3 F | DIASTOLIC BLOOD PRESSURE: 63 MMHG

## 2022-05-05 DIAGNOSIS — R26.2 DIFFICULTY WALKING: ICD-10-CM

## 2022-05-05 DIAGNOSIS — M79.675 PAIN IN LEFT TOE(S): ICD-10-CM

## 2022-05-05 DIAGNOSIS — R29.898 ANKLE WEAKNESS: ICD-10-CM

## 2022-05-05 DIAGNOSIS — I73.9 PERIPHERAL VASCULAR DISEASE, UNSPECIFIED (HCC): ICD-10-CM

## 2022-05-05 DIAGNOSIS — F79 INTELLECTUAL DISABILITY: ICD-10-CM

## 2022-05-05 DIAGNOSIS — M79.674 PAIN IN TOE OF RIGHT FOOT: ICD-10-CM

## 2022-05-05 DIAGNOSIS — B35.1 TINEA UNGUIUM: Primary | ICD-10-CM

## 2022-05-05 PROCEDURE — 11721 DEBRIDE NAIL 6 OR MORE: CPT | Performed by: PODIATRIST

## 2022-05-05 NOTE — PROGRESS NOTES
Shannon Damonortega  Return Patient    Chief Complaint   Patient presents with    Toe Pain     saw pcp Dr. Derek French 4/15/22       Subjective: This Littie Dus comes to office for foot and nail care. Pt currently has complaint of thickened, painful, elongated nails that he/she cannot manage by themselves. Pt. Relates pain to nails with shoe gear. Pt's primary care physician is Sharath Gillette DO. Patient is seen with caregiver  Past Medical History:   Diagnosis Date    Acute kidney injury ([facility] [address] 01/15/2017    d/t Vancomycin, on Dialysis [facility] right chest    Blind in both eyes     Essential hypertension 4/2/2021    Gastroesophageal reflux disease without esophagitis 4/2/2021    Hemodialysis patient ([facility] [address]     Hyperthyroidism     MR (mental retardation)     Osteoarthritis     Peripheral vascular disease ([facility] [address]     Pneumonia 01/06/2017    Pneumonia due to infectious organism 1/8/2017    Sepsis ([facility] [address] 3/4/2021       No Known Allergies  Current Outpatient Medications on File Prior to Visit   Medication Sig Dispense Refill    NATURAL VITAMIN D-3 125 MCG (5000 UT) TABS tablet TAKE 1 TABLET VIA G-TUBE ONCE DAILY 90 tablet 1    montelukast (SINGULAIR) 10 MG tablet TAKE 1 TABLET VIA G-TUBE AT BEDTIME. 90 tablet 0    PARoxetine (PAXIL) 10 MG tablet TAKE 1 TABLET VIA G-TUBE TWICE A  tablet 0    FEROSUL 325 (65 Fe) MG tablet TAKE 1 TABLET VIA G-TUBE ONCE A DAY.  90 tablet 1    folic acid (FOLVITE) 1 MG tablet TAKE 1 TABLET VIA G-TUBE DAILY 30 tablet 5    levothyroxine (SYNTHROID) 150 MCG tablet [email or web address] 1 TABLET VIA G-TUBE ONCE DAILY 30 tablet 5    pantoprazole sodium (PROTONIX) 40 MG PACK packet 40 mg by Per G Tube route every morning (before breakfast)      Disposable Gloves (POWDER FREE NITRILE GLOVES MED) MISC As Directed 2 each 5    docusate (COLACE) 50 MG/5ML liquid 10 mLs by Per G Tube route 2 times daily 600 mL 11    albuterol (PROVENTIL) (2.5 MG/3ML) 0.083% nebulizer solution Take 3 mLs by nebulization every 6 hours as needed for Wheezing 120 each 3    albuterol (PROVENTIL) (2.5 MG/3ML) 0.083% nebulizer solution Take 3 mLs by nebulization every 6 hours 120 each 3    Respiratory Therapy Supplies (FULL KIT NEBULIZER SET) MISC Use as directed with nebulized medication. 1 each 0    acetaminophen (TYLENOL) 325 MG tablet 650 mg by Per G Tube route every 4 hours as needed for Pain or Fever      medicated lip balm (BLISTEX/CARMEX) 2-2.5-6.6 % STCK Apply topically as needed for Dry Lips (CRACKED LIPS)      bisacodyl (DULCOLAX) 10 MG suppository Place 10 mg rectally daily as needed for Constipation (NO BM FOR 3 DAYS)       SUNSCREENS EX Apply topically as needed (SUNBURN PREVENTION) Indications: *SPF 50*      Dextromethorphan-guaiFENesin  MG/5ML SYRP 10 mLs by Per G Tube route every 4 hours as needed for Cough (CONGESTION)      Neomycin-Bacitracin-Polymyxin (TRIPLE ANTIBIOTIC) OINT Apply topically 2 times daily as needed (CUTS/SCRAPES/SKIN ABRASIONS)      bumetanide (BUMEX) 1 MG tablet 1 mg by Per G Tube route as needed (SWELLING)      OLANZapine zydis (ZYPREXA ZYDIS) 5 MG disintegrating tablet Take 20 mg by mouth every morning Indications: VIA G-TUBE *TAKE ALONG WITH 15MG=20MG*  *SEE OTHER ORDER*      OLANZapine zydis (ZYPREXA ZYDIS) 15 MG disintegrating tablet 20 mg every morning Indications: VIA G-TUBE *TAKE ALONG WITH 5MG=20MG*  *SEE OTHER ORDER*      OLANZapine (ZYPREXA) 20 MG tablet 20 mg by Per G Tube route nightly      LORazepam (ATIVAN) 2 MG tablet 2 mg by Per G Tube route as needed (2HRS BEFORE DENTAL APPT, TAKE 2ND DOSE TO DENTAL APPT).  valproic acid (DEPACON) 250 MG/5ML SOLN oral solution 20 mLs by Per G Tube route 2 times daily 300 mL 0    polyethylene glycol (GLYCOLAX) 17 g packet 17 g by Per G Tube route daily 527 g 1    famotidine (PEPCID) 20 MG tablet TAKE (1) TABLET VIA PEG TUBE TWO TIMES DAILY.  60 tablet 5    midodrine (PROAMATINE) 2.5 MG tablet Take 3 tablets by mouth 3 times daily as needed (hypotension, give only if systolic BP is <393 (BP checks three times daily)) 270 tablet 3    [DISCONTINUED] NATURAL VITAMIN D-3 125 MCG (5000 UT) TABS tablet TAKE 1 TABLET VIA G-TUBE ONCE DAILY 28 tablet 0     No current facility-administered medications on file prior to visit. Review of Systems  Objective:  General: AAO x 3 in NAD. Derm  Toenail Description  Sites of Onychomycosis Involvement (Check affected area)  [x] [x] [x] [x] [x] [x] [x] [x] [x] [x]  5 4 3 2 1 1 2 3 4 5                          Right                                        Left    Thickness  [x] [x] [x] [x] [x] [x] [x] [x] [x] [x]  5 4 3 2 1 1 2 3 4 5                         Right                                        Left    Dystrophic Changes   [x] [x] [x] [x] [x] [x] [x] [x] [x] [x]  5 4 3 2 1 1 2 3 4 5                         Right                                        Left    Color  [x] [x] [x] [x] [x] [x] [x] [x] [x] [x]  5 4 3 2 1 1 2 3 4 5                          Right                                        Left    Incurvation/Ingrowin   [x] [x] [x] [x] [x] [x] [x] [x] [x] [x]  5 4 3 2 1 1 2 3 4 5                         Right                                        Left    Inflammation/Pain   [x] [x] [x] [x] [x] [x] [x] [x] [x] [x]  5 4 3  2 1 1 2 3 4 5                         Right                                        Left      Nails that are described above are all elongated thickened pitting mycotic yellowish incurvated causing pain with both shoe gear. Palpation nails greater then 3 mm thick painful       Dermatologic Exam:hair loss noted  lower extremity    Skin lesion/ulceration   Skin   Callus   Musculoskeletal:     1st MPJ ROM normal, Bilateral.  Muscle strength 5/5, Bilateral.  Pain present upon palpation of toenails   Bilateral., Bilateral.  Ankle ROM normal,Bilateral.    Dorsally contracted digits , Bilateral.     Vascular:   There are class B findings absent posterior tibialis pulses lack of hair growth thickened nails discoloration skin is discolored skin is shiny cool to touch to toes    Neurological:  Sensation present to light touch to level of digits, Bilateral    Foot Exam    General  General Appearance: appears stated age and healthy   Assistance: wheelchair use       Right Foot/Ankle     Inspection and Palpation  Skin Exam: skin changes and abnormal color; Neurovascular  Dorsalis pedis: 1+  Posterior tibial: absent    Muscle Strength  Ankle dorsiflexion: 1  Ankle plantar flexion: 1      Left Foot/Ankle      Inspection and Palpation  Skin Exam: skin changes and abnormal color; Neurovascular  Dorsalis pedis: 1+  Posterior tibial: absent    Muscle Strength  Ankle dorsiflexion: 1  Ankle plantar flexion: 1    Range of Motion    Normal left ankle ROM             Right Ankle Exam   Right ankle exam is normal.  Swelling: mild    Muscle Strength   Dorsiflexion:  1/5  Plantar flexion:  1/5  Anterior tibial:  1/5  Posterior tibial:  1/5  Gastrocsoleus:  1/5  Peroneal muscle:  1/5    Tests   Anterior drawer: physiological laxity  Varus tilt: physiological laxity      Left Ankle Exam   Left ankle exam is normal.  Swelling: mild    Range of Motion   The patient has normal left ankle ROM. Muscle Strength   Dorsiflexion:  1/5   Plantar flexion:  1/5   Anterior tibial:  1/5   Gastrocsoleus:  1/5  Peroneal muscle:  1/5    Tests   Anterior drawer: physiological laxity  Varus tilt: physiological laxity          Q7   []Yes    []No                Q8   [x]Yes    []No                     Q9   []Yes    []No  Assessment:  75 y.o. female with:   1. Tinea unguium    2. Pain in left toe(s)    3. Pain in toe of right foot    4. Difficulty walking    5. Peripheral vascular disease, unspecified ([facility] [address]    6. Ankle weakness    7. Mental retardation     No orders of the defined types were placed in this encounter. Plan: .  Pt was evaluated and examined.  Patient was given personalized discharge instructions. Nails 1-10 were debrided in length and thickness sharply with a nail nipper and  without incident. Pt will follow up in 9 weeks or sooner if any problems arise. Diagnosis was discussed with the pt and all of their questions were answered in detail. Proper foot hygiene and care was discussed with the pt. Patient to check feet daily and contact the office with any questions/problems/concerns. Other comorbidity noted and will be managed by PCP. Pain waiver discussed with patient and confirmed.    5/5/2022      Electronically signed by Caitlin Peralta DPM on 5/5/2022 at 9:13 AM  5/5/2022

## 2022-05-18 DIAGNOSIS — I95.9 HYPOTENSION, UNSPECIFIED HYPOTENSION TYPE: ICD-10-CM

## 2022-05-18 RX ORDER — MIDODRINE HYDROCHLORIDE 2.5 MG/1
TABLET ORAL
Qty: 252 TABLET | Refills: 0 | Status: SHIPPED | OUTPATIENT
Start: 2022-05-18

## 2022-05-18 RX ORDER — BUMETANIDE 1 MG/1
TABLET ORAL
Qty: 30 TABLET | Refills: 5 | Status: SHIPPED | OUTPATIENT
Start: 2022-05-18

## 2022-05-18 NOTE — TELEPHONE ENCOUNTER
Last Appointment:  3/24/2022  Future Appointments   Date Time Provider Marissa Law   6/21/2022  9:30 AM Dontrell Mccollum [facility]   9/8/2022  9:00 AM Chevis Hodgkin, DPM [facility]

## 2022-05-23 DIAGNOSIS — I95.9 HYPOTENSION, UNSPECIFIED HYPOTENSION TYPE: ICD-10-CM

## 2022-05-23 RX ORDER — MIDODRINE HYDROCHLORIDE 2.5 MG/1
TABLET ORAL
Qty: 252 TABLET | Refills: 0 | OUTPATIENT
Start: 2022-05-23

## 2022-06-02 NOTE — TELEPHONE ENCOUNTER
Last Appointment:  3/24/2022  Future Appointments   Date Time Provider Marissa Law   6/21/2022  9:30 AM Alejandro Vera [facility]   9/8/2022  9:00 AM EUGENIO العلي [facility]

## 2022-06-03 RX ORDER — FOLIC ACID 1 MG/1
TABLET ORAL
Qty: 90 TABLET | Refills: 1 | Status: SHIPPED | OUTPATIENT
Start: 2022-06-03

## 2022-06-03 RX ORDER — LEVOTHYROXINE SODIUM 0.15 MG/1
TABLET ORAL
Qty: 90 TABLET | Refills: 1 | Status: SHIPPED | OUTPATIENT
Start: 2022-06-03

## 2022-06-14 ENCOUNTER — HOSPITAL ENCOUNTER (EMERGENCY)
Age: 67
Discharge: HOME | End: 2022-06-14
Attending: EMERGENCY MEDICINE
Payer: MEDICARE

## 2022-06-14 ENCOUNTER — APPOINTMENT (OUTPATIENT)
Dept: GENERAL RADIOLOGY | Age: 67
End: 2022-06-14
Payer: MEDICARE

## 2022-06-14 VITALS
SYSTOLIC BLOOD PRESSURE: 140 MMHG | HEIGHT: 62 IN | HEART RATE: 80 BPM | RESPIRATION RATE: 16 BRPM | OXYGEN SATURATION: 96 % | WEIGHT: 140 LBS | DIASTOLIC BLOOD PRESSURE: 72 MMHG | TEMPERATURE: 98 F | BODY MASS INDEX: 25.76 KG/M2

## 2022-06-14 DIAGNOSIS — K94.23 MALFUNCTION OF PERCUTANEOUS ENDOSCOPIC GASTROSTOMY (PEG) TUBE (HCC): Primary | ICD-10-CM

## 2022-06-14 PROCEDURE — 6360000004 HC RX CONTRAST MEDICATION: Performed by: RADIOLOGY

## 2022-06-14 PROCEDURE — 74018 RADEX ABDOMEN 1 VIEW: CPT

## 2022-06-14 PROCEDURE — 99283 EMERGENCY DEPT VISIT LOW MDM: CPT

## 2022-06-14 RX ADMIN — DIATRIZOATE MEGLUMINE AND DIATRIZOATE SODIUM 30 ML: 660; 100 LIQUID ORAL; RECTAL at 13:52

## 2022-06-14 NOTE — ED PROVIDER NOTES
HPI     Patient is a 75 y.o. female presents with a chief complaint oF PEG tube stoma change. Patient is coming with a feeding tube problem. Patient reportedly has a PEG tube stoma that has coming out. Patient has been here multiple times for this in the past.  Patient still has a normal functioning PEG tube. Patient appears to be in no distress. Patient is unable to provide further history Seeing as she is nonverbal.  Review of Systems   Unable to perform ROS: Dementia        Physical Exam  Vitals and nursing note reviewed. Constitutional:       General: She is not in acute distress. Appearance: She is well-developed. She is not ill-appearing, toxic-appearing or diaphoretic. HENT:      Head: Normocephalic and atraumatic. Eyes:      Conjunctiva/sclera: Conjunctivae normal.   Cardiovascular:      Rate and Rhythm: Normal rate and regular rhythm. Heart sounds: Normal heart sounds. No murmur heard. Pulmonary:      Effort: Pulmonary effort is normal. No respiratory distress. Breath sounds: Normal breath sounds. No wheezing or rales. Abdominal:      General: Bowel sounds are normal.      Palpations: Abdomen is soft. Tenderness: There is no abdominal tenderness. There is no guarding or rebound. Comments: Patient has a PEG tube in place small area of stoma. No apparent distress. Abdomen is soft. Musculoskeletal:      Cervical back: Normal range of motion and neck supple. Skin:     General: Skin is warm and dry. Neurological:      Mental Status: She is oriented to person, place, and time. Cranial Nerves: No cranial nerve deficit. Coordination: Coordination normal.          Procedures     MDM         Patient is a 75 y.o. female presenting with PEG tube. Patient's PEG tube appears appropriate. Patient had KUB with Gastrografin. Patient clinically appears well. Patient be discharged home with follow-up with general surgeon. .     Patient was given return precautions. Patient will follow up with their primary care provider. Patient is agreeable to this plan. Patient has remained stable throughout their stay in the ED. Patient was seen and evaluated by myself and my attending Jeison Romero DO. Assessment and Plan discussed with attending provider, please see attestation for final plan of care. This note was done using dictation software and there may be some grammatical errors associated with this. Shahida Jackson MD            --------------------------------------------- PAST HISTORY ---------------------------------------------  Past Medical History:  has a past medical history of Acute kidney injury ([facility] [address] Blind in both eyes, Essential hypertension, Gastroesophageal reflux disease without esophagitis, Hemodialysis patient ([facility] [place] [address] Hyperthyroidism, MR (mental retardation), Osteoarthritis, Peripheral vascular disease ([facility] [place] [address] Pneumonia, Pneumonia due to infectious organism, and Sepsis ([facility] [place] [address] Past Surgical History:  has a past surgical history that includes other surgical history (Right, 01/17/2017); other surgical history (01/25/2017); chest tube insertion (Right, 02/09/2017); bronchoscopy (02/10/2017); and Gastrostomy tube placement (N/A, 3/7/2021). Social History:  reports that she has never smoked. She has never used smokeless tobacco. She reports that she does not drink alcohol and does not use drugs. Family History: family history is not on file. The patients home medications have been reviewed. Allergies: Patient has no known allergies. -------------------------------------------------- RESULTS -------------------------------------------------  Labs:  No results found for this visit on 06/14/22.     Radiology:  XR ABDOMEN FOR NG/OG/NE TUBE PLACEMENT   Final Result   Peg tube tip within the distal gastric body with no evidence for   extravasation of contrast.             ------------------------- NURSING NOTES AND VITALS REVIEWED ---------------------------  Date / Time Roomed:  6/14/2022 12:08 PM  ED Bed Assignment:  28/28    The nursing notes within the ED encounter and vital signs as below have been reviewed. BP (!) 140/72   Pulse 80   Temp 98 °F (36.7 °C)   Resp 16   Ht 5' 2\" (1.575 m)   Wt 140 lb (63.5 kg)   SpO2 96%   BMI 25.61 kg/m²   Oxygen Saturation Interpretation: Normal      ------------------------------------------ PROGRESS NOTES ------------------------------------------  3:17 PM EDT  I have spoken with the patient and family if present and discussed todays results, in addition to providing specific details for the plan of care and counseling regarding the diagnosis and prognosis. Their questions are answered at this time and they are agreeable with the plan. I discussed at length with them reasons for immediate return here for re evaluation. They will followup with their primary care provider by calling their office as soon as possible.      --------------------------------- ADDITIONAL PROVIDER NOTES ---------------------------------  At this time the patient is without objective evidence of an acute process requiring hospitalization or inpatient management. They have remained hemodynamically stable throughout their entire ED visit and are stable for discharge with outpatient follow-up. The plan has been discussed in detail and they are aware of the specific conditions for emergent return, as well as the importance of follow-up. New Prescriptions    No medications on file       Diagnosis:  1. Malfunction of percutaneous endoscopic gastrostomy (PEG) tube ([address]        Disposition:  Patient's disposition: Discharge to home  Patient's condition is stable.          Hardy Carpenter MD  Resident  06/14/22 0901

## 2022-06-19 ENCOUNTER — HOSPITAL ENCOUNTER (EMERGENCY)
Age: 67
Discharge: NURSING, REHAB OR LONG-TERM CARE | End: 2022-06-19
Attending: EMERGENCY MEDICINE
Payer: MEDICARE

## 2022-06-19 VITALS
TEMPERATURE: 97.8 F | WEIGHT: 140 LBS | DIASTOLIC BLOOD PRESSURE: 65 MMHG | HEART RATE: 94 BPM | SYSTOLIC BLOOD PRESSURE: 143 MMHG | BODY MASS INDEX: 25.76 KG/M2 | RESPIRATION RATE: 16 BRPM | OXYGEN SATURATION: 95 % | HEIGHT: 62 IN

## 2022-06-19 DIAGNOSIS — T85.528A DISLODGED GASTROSTOMY TUBE: Primary | ICD-10-CM

## 2022-06-19 PROCEDURE — 99283 EMERGENCY DEPT VISIT LOW MDM: CPT

## 2022-06-19 PROCEDURE — 43762 RPLC GTUBE NO REVJ TRC: CPT

## 2022-06-19 NOTE — ED PROVIDER NOTES
Patient presents from a local nursing facility for reinsertion of a dislodged gastrostomy tube. It is unknown when the tube had dislodged. Patient was found today with the tube out. The history is provided by the EMS personnel. Illness   The current episode started today. The onset is undetermined. The problem occurs continuously. The problem has been unchanged. The problem is mild. Nothing relieves the symptoms. Nothing aggravates the symptoms. Pertinent negatives include no fever. Review of Systems   Unable to perform ROS: Patient nonverbal   Constitutional: Negative for fever. Physical Exam  Vitals and nursing note reviewed. Constitutional:       Appearance: She is well-developed. HENT:      Head: Normocephalic and atraumatic. Eyes:      Comments: Bilateral opacification of the eyes. Patient is blind. Cardiovascular:      Rate and Rhythm: Normal rate and regular rhythm. Heart sounds: Normal heart sounds. No murmur heard. Pulmonary:      Effort: Pulmonary effort is normal. No respiratory distress. Breath sounds: Normal breath sounds. No wheezing or rales. Abdominal:      General: Bowel sounds are normal.      Palpations: Abdomen is soft. Tenderness: There is no abdominal tenderness. There is no guarding or rebound. Comments: Left-sided ostomy is noted. Musculoskeletal:      Cervical back: Normal range of motion and neck supple. Skin:     General: Skin is warm and dry. Neurological:      Mental Status: She is alert. GCS: GCS eye subscore is 4. GCS verbal subscore is 5. GCS motor subscore is 6. Comments: Patient's verbal responses and intelligible. Procedures     Gastrostomy Tube Replacement Procedure Note    Indication: Spontaneous dislodgement    Procedure: Attempt at replacement with a 25 [place] was not possible due to significant closing of the ostomy site.  The patient was placed in the supine position and the patient's gastric tube was replaced using a 18 Turkish gastrostomy tube and the bulb was inflated using 15 cc of normal saline. The placement was verified by injection of air with auscultation and injection and aspiration of fluid. The patient tolerated the procedure well. Complications: None      --------------------------------------------- PAST HISTORY ---------------------------------------------  Past Medical History:  has a past medical history of Acute kidney injury ([facility] [address] Blind in both eyes, Essential hypertension, Gastroesophageal reflux disease without esophagitis, Hemodialysis patient ([facility] [address] Hyperthyroidism, MR (mental retardation), Osteoarthritis, [facility] Pneumonia, Pneumonia due to infectious organism, and Sepsis ([facility] Past Surgical History:  has a past surgical history that includes other surgical history (Right, 01/17/2017); other surgical history (01/25/2017); chest tube insertion (Right, 02/09/2017); bronchoscopy (02/10/2017); and Gastrostomy tube placement (N/A, 3/7/2021). Social History:  reports that she has never smoked. She has never used smokeless tobacco. She reports that she does not drink alcohol and does not use drugs. Family History: family history is not on file. The patients home medications have been reviewed. Allergies: Patient has no known allergies. -------------------------------------------------- RESULTS -------------------------------------------------  Labs:  No results found for this visit on 06/19/22. Radiology:  No orders to display       ------------------------- NURSING NOTES AND VITALS REVIEWED ---------------------------  Date / Time Roomed:  6/19/2022 10:02 AM  ED Bed Assignment:  23/23    The nursing notes within the ED encounter and vital signs as below have been reviewed.    BP (!) 143/65   Pulse 94   Temp 97.8 °F (36.6 °C) (Axillary)   Resp 16   Ht 5' 2\" (1.575 m)   Wt 140 lb (63.5 kg)   SpO2 95%   BMI 25.61 kg/m² Oxygen Saturation Interpretation: Normal      ------------------------------------------ PROGRESS NOTES ------------------------------------------  10:18 AM EDT  They will followup with their primary care physician by calling their office tomorrow. --------------------------------- ADDITIONAL PROVIDER NOTES ---------------------------------  At this time the patient is without objective evidence of an acute process requiring hospitalization or inpatient management. They have remained hemodynamically stable throughout their entire ED visit and are stable for discharge with outpatient follow-up. The plan has been discussed in detail and they are aware of the specific conditions for emergent return, as well as the importance of follow-up. New Prescriptions    No medications on file       Diagnosis:  1. Dislodged gastrostomy tube        Disposition:  Patient's disposition: Discharge to nursing home  Patient's condition is stable.             Marcia Govea DO  06/19/22 1023

## 2022-06-19 NOTE — ED NOTES
Placement verified with 20ml brown aspirate and 60 ml flush pt tolerated well, no leakage/drainage noted. abd binder secured.       Isadora Morin RN  06/19/22 7038

## 2022-06-21 ENCOUNTER — OFFICE VISIT (OUTPATIENT)
Dept: FAMILY MEDICINE CLINIC | Age: 67
End: 2022-06-21
Payer: MEDICARE

## 2022-06-21 VITALS
DIASTOLIC BLOOD PRESSURE: 78 MMHG | HEART RATE: 56 BPM | RESPIRATION RATE: 14 BRPM | SYSTOLIC BLOOD PRESSURE: 138 MMHG | TEMPERATURE: 98.6 F | OXYGEN SATURATION: 95 %

## 2022-06-21 DIAGNOSIS — K94.23 GASTROSTOMY TUBE DYSFUNCTION (HCC): Primary | ICD-10-CM

## 2022-06-21 DIAGNOSIS — F03.91 DEMENTIA WITH BEHAVIORAL DISTURBANCE, UNSPECIFIED DEMENTIA TYPE: ICD-10-CM

## 2022-06-21 DIAGNOSIS — E86.0 SEVERE DEHYDRATION: ICD-10-CM

## 2022-06-21 DIAGNOSIS — R63.39 FEEDING DIFFICULTY IN ADULT: ICD-10-CM

## 2022-06-21 DIAGNOSIS — I73.9 PERIPHERAL VASCULAR DISEASE (HCC): ICD-10-CM

## 2022-06-21 DIAGNOSIS — F73 PROFOUND INTELLECTUAL DISABILITY: ICD-10-CM

## 2022-06-21 PROBLEM — N17.9 ACUTE RENAL FAILURE SYNDROME (HCC): Status: RESOLVED | Noted: 2017-01-15 | Resolved: 2022-06-21

## 2022-06-21 PROBLEM — A41.9 SEPSIS (HCC): Status: RESOLVED | Noted: 2021-09-21 | Resolved: 2022-06-21

## 2022-06-21 PROBLEM — J96.01 ACUTE RESPIRATORY FAILURE WITH HYPOXIA (HCC): Status: RESOLVED | Noted: 2021-09-21 | Resolved: 2022-06-21

## 2022-06-21 PROBLEM — R65.21 SEPTIC SHOCK (HCC): Status: RESOLVED | Noted: 2021-09-21 | Resolved: 2022-06-21

## 2022-06-21 PROBLEM — A41.9 SEPTIC SHOCK (HCC): Status: RESOLVED | Noted: 2021-09-21 | Resolved: 2022-06-21

## 2022-06-21 PROBLEM — N17.9 ACUTE RENAL FAILURE (ARF) (HCC): Status: RESOLVED | Noted: 2021-09-21 | Resolved: 2022-06-21

## 2022-06-21 PROCEDURE — 1036F TOBACCO NON-USER: CPT | Performed by: INTERNAL MEDICINE

## 2022-06-21 PROCEDURE — G8417 CALC BMI ABV UP PARAM F/U: HCPCS | Performed by: INTERNAL MEDICINE

## 2022-06-21 PROCEDURE — 1123F ACP DISCUSS/DSCN MKR DOCD: CPT | Performed by: INTERNAL MEDICINE

## 2022-06-21 PROCEDURE — 3017F COLORECTAL CA SCREEN DOC REV: CPT | Performed by: INTERNAL MEDICINE

## 2022-06-21 PROCEDURE — 1090F PRES/ABSN URINE INCON ASSESS: CPT | Performed by: INTERNAL MEDICINE

## 2022-06-21 PROCEDURE — 99213 OFFICE O/P EST LOW 20 MIN: CPT | Performed by: INTERNAL MEDICINE

## 2022-06-21 PROCEDURE — G8427 DOCREV CUR MEDS BY ELIG CLIN: HCPCS | Performed by: INTERNAL MEDICINE

## 2022-06-21 PROCEDURE — G8400 PT W/DXA NO RESULTS DOC: HCPCS | Performed by: INTERNAL MEDICINE

## 2022-06-21 RX ORDER — BACITRACIN ZINC AND POLYMYXIN B SULFATE 500; 1000 [USP'U]/G; [USP'U]/G
OINTMENT TOPICAL
Qty: 30 G | Refills: 5 | Status: SHIPPED | OUTPATIENT
Start: 2022-06-21 | End: 2022-06-28

## 2022-06-21 ASSESSMENT — PATIENT HEALTH QUESTIONNAIRE - PHQ9
1. LITTLE INTEREST OR PLEASURE IN DOING THINGS: 0
SUM OF ALL RESPONSES TO PHQ QUESTIONS 1-9: 0
SUM OF ALL RESPONSES TO PHQ QUESTIONS 1-9: 0
SUM OF ALL RESPONSES TO PHQ9 QUESTIONS 1 & 2: 0
SUM OF ALL RESPONSES TO PHQ QUESTIONS 1-9: 0
SUM OF ALL RESPONSES TO PHQ QUESTIONS 1-9: 0
2. FEELING DOWN, DEPRESSED OR HOPELESS: 0

## 2022-06-21 NOTE — PROGRESS NOTES
[facility]  [address] [place] [address]  [place] [address]  Dept: [phone]  Dept Fax: [phone]     NAME: Shanon Cornejo        :  1955        MRN:  [id number]    Chief Complaint   Patient presents with    3 Month Follow-Up     feeding tube prolapsed; went to ER 1 week ago awaiting a surgical appt; wants to have the tube DC'd so she can eat orally again       Subjective     History of Present Illness  Shanon Cornejo is a 75 y.o. female who presents today for routine follow up. Patient was again seen in the emergency department for her PEG tube being displaced. Tube was initially replaced on 2022 and she returned to the ED on 2022 for recurrent dislodgment. A smaller tube had to be placed at that time. Caregivers with the patient today report that they would like the feeding tube to be removed so the patient can eat orally again. Patient has been repeatedly asking for food. Patient will need to have a swallow study first.  They do have an appointment coming up with general surgery to discuss this on 2022. Swallowing study will be ordered today to start the process.           Review of Systems  Please see HPI above. All bolded are positive. Unable to obtain full review of systems as patient is unresponsive and nonverbal at baseline.  Per staff members that work with the patient there are no reported fevers, chills, diarrhea, constipation, edema, or signs of distress or pain      Home Medications:  Current Outpatient Medications   Medication Sig Dispense Refill    bacitracin-polymyxin b (POLYSPORIN) 500-90326 UNIT/GM ointment Apply topically 2 times daily.  30 g 5    Gauze Pads & Dressings 2\"X2\" PADS 1 each by Does not apply route daily Apply to stoma 30 each 5    folic acid (FOLVITE) 1 MG tablet TAKE 1 TABLET VIA G-TUBE DAILY 90 tablet 1    levothyroxine (SYNTHROID) 150 MCG tablet [email or web address] 1 TABLET VIA G-TUBE ONCE DAILY 90 tablet 1    bumetanide (BUMEX) 1 MG tablet TAKE 1 TABLET VIA G-TUBE DAILY AS NEEDED FOR SWELLING. 30 tablet 5    midodrine (PROAMATINE) 2.5 MG tablet TAKE 3 TABLET VIA G-TUBE THREE TIMES DAILY AS NEEDED. (HYPOTENSION, GIVE ONLY IF SYSTOLIC BP IS <092. (BP CHECKS 3 TIMES A DAY. 252 tablet 0    NATURAL VITAMIN D-3 125 MCG (5000 UT) TABS tablet TAKE 1 TABLET VIA G-TUBE ONCE DAILY 90 tablet 1    montelukast (SINGULAIR) 10 MG tablet TAKE 1 TABLET VIA G-TUBE AT BEDTIME. 90 tablet 0    PARoxetine (PAXIL) 10 MG tablet TAKE 1 TABLET VIA G-TUBE TWICE A  tablet 0    FEROSUL 325 (65 Fe) MG tablet TAKE 1 TABLET VIA G-TUBE ONCE A DAY. 90 tablet 1    pantoprazole sodium (PROTONIX) 40 MG PACK packet 40 mg by Per G Tube route every morning (before breakfast)      Disposable Gloves (POWDER FREE NITRILE GLOVES MED) MISC As Directed 2 each 5    docusate (COLACE) 50 MG/5ML liquid 10 mLs by Per G Tube route 2 times daily 600 mL 11    albuterol (PROVENTIL) (2.5 MG/3ML) 0.083% nebulizer solution Take 3 mLs by nebulization every 6 hours as needed for Wheezing 120 each 3    albuterol (PROVENTIL) (2.5 MG/3ML) 0.083% nebulizer solution Take 3 mLs by nebulization every 6 hours 120 each 3    Respiratory Therapy Supplies (FULL KIT NEBULIZER SET) MISC Use as directed with nebulized medication.  1 each 0    acetaminophen (TYLENOL) 325 MG tablet 650 mg by Per G Tube route every 4 hours as needed for Pain or Fever      medicated lip balm (BLISTEX/CARMEX) 2-2.5-6.6 % STCK Apply topically as needed for Dry Lips (CRACKED LIPS)      bisacodyl (DULCOLAX) 10 MG suppository Place 10 mg rectally daily as needed for Constipation (NO BM FOR 3 DAYS)       SUNSCREENS EX Apply topically as needed (SUNBURN PREVENTION) Indications: *SPF 50*      Dextromethorphan-guaiFENesin  MG/5ML SYRP 10 mLs by Per G Tube route every 4 hours as needed for Cough (CONGESTION)      Neomycin-Bacitracin-Polymyxin (TRIPLE ANTIBIOTIC) OINT Apply topically 2 times daily as needed (CUTS/SCRAPES/SKIN ABRASIONS)      OLANZapine zydis (ZYPREXA ZYDIS) 5 MG disintegrating tablet Take 20 mg by mouth every morning Indications: VIA G-TUBE *TAKE ALONG WITH 15MG=20MG*  *SEE OTHER ORDER*      OLANZapine zydis (ZYPREXA ZYDIS) 15 MG disintegrating tablet 20 mg every morning Indications: VIA G-TUBE *TAKE ALONG WITH 5MG=20MG*  *SEE OTHER ORDER*      OLANZapine (ZYPREXA) 20 MG tablet 20 mg by Per G Tube route nightly      LORazepam (ATIVAN) 2 MG tablet 2 mg by Per G Tube route as needed (2HRS BEFORE DENTAL APPT, TAKE 2ND DOSE TO DENTAL APPT).  valproic acid (DEPACON) 250 MG/5ML SOLN oral solution 20 mLs by Per G Tube route 2 times daily 300 mL 0    polyethylene glycol (GLYCOLAX) 17 g packet 17 g by Per G Tube route daily 527 g 1    famotidine (PEPCID) 20 MG tablet TAKE (1) TABLET VIA PEG TUBE TWO TIMES DAILY. 60 tablet 5     No current facility-administered medications for this visit. Allergies:  No Known Allergies    Objective     Vitals:    06/21/22 0916   BP: 138/78   Pulse: 56   Resp: 14   Temp: 98.6 °F (37 °C)   SpO2: 95%        Physical Exam  Parminder Pressman but has eyes closed most of the time, non verbal at baseline, wheelchair bound, No acute distress  Head: Normocephalic, atraumatic  Eyes: conjunctivae/corneas clear  Mouth: Mucous membranes moist with no pharyngeal exudate or erythema  Neck: no JVD, no adenopathy, no carotid bruit, supple, symmetrical, trachea midline  Back: symmetric, ROM normal, No CVA tenderness. Lungs: clear to auscultation bilaterally without wheezes, rales, or rhonchi  Heart: regular rate and rhythm, S1, S2 normal, no murmur, click, rub or gallop  Abdomen: soft, non-tender; bowel sounds normal; no masses,  no organomegaly, PEG tube site is clean and dry with no surrounding erythema. Mild prolapse present at the ostomy site.   Extremities: atraumatic, no cyanosis, no edema  Skin: dry without rashes or lesions  Neurologic: non verbal and wheel chair bound. Does not follow commands. At her baseline. Assessment and Plan     Patient is a 75 y.o. female who presented to the office for an acute visit. Nikolai Fitzgerald was seen today for 3 month follow-up. Diagnoses and all orders for this visit:    Gastrostomy tube dysfunction ([facility] [address]  -     bacitracin-polymyxin b (POLYSPORIN) [id number] UNIT/GM ointment; Apply topically 2 times daily.  -     Gauze Pads & Dressings 2\"X2\" PADS; 1 each by Does not apply route daily Apply to stoma    Dementia with behavioral disturbance, unspecified dementia type ([facility] [address]    Peripheral vascular disease ([facility] [address]    Profound intellectual disability    Feeding difficulty in adult  -     SLP video swallow; Future    Severe dehydration           Educational materials and/or home exercises printed for patient's review and were included in patient instructions on his/her After Visit Summary and given to patient at the end of visit. Counseled regarding above diagnosis, including possible risks and complications, especially if left uncontrolled or untreated. Advised to present to the Emergency Department if symptoms worsen. Counseled regarding the possible side effects, risks, benefits and alternatives to treatment; patient and/or guardian verbalizes understanding, agrees, feels comfortable with and wishes to proceed with above treatment plan. Advised caregiver to call Illene Bud new medication issues, and read all Rx info from pharmacy to assure aware of all possible risks and side effects of any medication before taking. Patient's caregiver verbalizes understanding and agrees with above counseling, assessment and plan. All questions answered.     Tien Gordon DO

## 2022-06-23 DIAGNOSIS — K94.23 GASTROSTOMY TUBE DYSFUNCTION (HCC): ICD-10-CM

## 2022-06-28 DIAGNOSIS — R13.10 DYSPHAGIA, UNSPECIFIED TYPE: Primary | ICD-10-CM

## 2022-06-29 ENCOUNTER — TELEPHONE (OUTPATIENT)
Dept: FAMILY MEDICINE CLINIC | Age: 67
End: 2022-06-29

## 2022-06-29 NOTE — TELEPHONE ENCOUNTER
Sanjiv Ward called to ask that an order be put into epic for a modified barium swallow with video. Current order cannot be used.   Please call stef with any questions [phone]

## 2022-06-29 NOTE — TELEPHONE ENCOUNTER
There was an order placed for a modified barium swallow with video in Katerina's chart already. Order was placed by Niko Lawson under my name. I had tried placing this order yesterday and it alerted me that it was a duplicate.

## 2022-06-30 DIAGNOSIS — R13.10 DYSPHAGIA, UNSPECIFIED TYPE: Primary | ICD-10-CM

## 2022-06-30 RX ORDER — FAMOTIDINE 20 MG/1
TABLET, FILM COATED ORAL
Qty: 180 TABLET | Refills: 1 | Status: SHIPPED | OUTPATIENT
Start: 2022-06-30 | End: 2022-10-17

## 2022-06-30 RX ORDER — MONTELUKAST SODIUM 10 MG/1
TABLET ORAL
Qty: 90 TABLET | Refills: 1 | Status: SHIPPED | OUTPATIENT
Start: 2022-06-30

## 2022-06-30 RX ORDER — PAROXETINE 10 MG/1
TABLET, FILM COATED ORAL
Qty: 180 TABLET | Refills: 1 | Status: SHIPPED | OUTPATIENT
Start: 2022-06-30

## 2022-07-05 DIAGNOSIS — Z12.39 SCREENING FOR BREAST CANCER USING NON-MAMMOGRAM MODALITY: Primary | ICD-10-CM

## 2022-07-05 DIAGNOSIS — N64.2 ATROPHY OF BREAST: ICD-10-CM

## 2022-07-06 ENCOUNTER — OFFICE VISIT (OUTPATIENT)
Dept: SURGERY | Age: 67
End: 2022-07-06
Payer: MEDICARE

## 2022-07-06 VITALS
HEIGHT: 62 IN | TEMPERATURE: 97.2 F | OXYGEN SATURATION: 97 % | BODY MASS INDEX: 25.61 KG/M2 | DIASTOLIC BLOOD PRESSURE: 72 MMHG | HEART RATE: 81 BPM | SYSTOLIC BLOOD PRESSURE: 125 MMHG | RESPIRATION RATE: 18 BRPM

## 2022-07-06 DIAGNOSIS — K94.23 PEG TUBE MALFUNCTION (HCC): Primary | ICD-10-CM

## 2022-07-06 PROCEDURE — 3017F COLORECTAL CA SCREEN DOC REV: CPT | Performed by: SURGERY

## 2022-07-06 PROCEDURE — 99212 OFFICE O/P EST SF 10 MIN: CPT | Performed by: SURGERY

## 2022-07-06 PROCEDURE — 1036F TOBACCO NON-USER: CPT | Performed by: SURGERY

## 2022-07-06 PROCEDURE — G8417 CALC BMI ABV UP PARAM F/U: HCPCS | Performed by: SURGERY

## 2022-07-06 PROCEDURE — 1090F PRES/ABSN URINE INCON ASSESS: CPT | Performed by: SURGERY

## 2022-07-06 PROCEDURE — 1123F ACP DISCUSS/DSCN MKR DOCD: CPT | Performed by: SURGERY

## 2022-07-06 PROCEDURE — G8400 PT W/DXA NO RESULTS DOC: HCPCS | Performed by: SURGERY

## 2022-07-06 PROCEDURE — G8427 DOCREV CUR MEDS BY ELIG CLIN: HCPCS | Performed by: SURGERY

## 2022-07-07 ENCOUNTER — HOSPITAL ENCOUNTER (OUTPATIENT)
Dept: GENERAL RADIOLOGY | Age: 67
Discharge: HOME | End: 2022-07-09
Payer: MEDICARE

## 2022-07-07 DIAGNOSIS — R63.39 FEEDING DISTURBANCE: ICD-10-CM

## 2022-07-07 PROCEDURE — 92611 MOTION FLUOROSCOPY/SWALLOW: CPT | Performed by: SPEECH-LANGUAGE PATHOLOGIST

## 2022-07-07 PROCEDURE — 74230 X-RAY XM SWLNG FUNCJ C+: CPT

## 2022-07-07 PROCEDURE — 2500000003 HC RX 250 WO HCPCS: Performed by: RADIOLOGY

## 2022-07-07 PROCEDURE — 92526 ORAL FUNCTION THERAPY: CPT | Performed by: SPEECH-LANGUAGE PATHOLOGIST

## 2022-07-07 RX ADMIN — BARIUM SULFATE 120 ML: 400 SUSPENSION ORAL at 10:11

## 2022-07-07 RX ADMIN — BARIUM SULFATE 10 ML: 400 PASTE ORAL at 10:10

## 2022-07-07 RX ADMIN — BARIUM SULFATE 70 G: 0.81 POWDER, FOR SUSPENSION ORAL at 10:10

## 2022-07-07 NOTE — PROGRESS NOTES
SPEECH/LANGUAGE PATHOLOGY  VIDEOFLUOROSCOPIC STUDY OF SWALLOWING (MBS)   and PLAN OF CARE    PATIENT NAME:  Jennifer Busch  (female)     MRN:  [id number]    :  1955  (75 y.o.)  STATUS:  Outpatient    TODAY'S DATE:  2022  REFERRING PROVIDER:   Katie Brown DO   SPECIFIC PROVIDER ORDER: FL modified barium swallow with video   REASON FOR REFERRAL: assess for possible initiation of oral diet   EVALUATING THERAPIST: Shanthi Ruggiero SLP      RESULTS:      DYSPHAGIA DIAGNOSIS:  moderate oral phase dysphagia and severe-profound pharyngeal phase dysphagia including high risk of aspiration of all consistencies    DIET RECOMMENDATIONS:  NPO -- including medications.  Nutrition, fluids and medication to be administered through current PEG tube    FEEDING RECOMMENDATIONS:    Assistance level:  Not applicable     Compensatory strategies recommended: Not applicable     Discussed recommendations with nursing and/or faxed report to referring provider: Yes    Laryngeal Penetration and Aspiration:  Penetration WITH aspiration was observed in today's study with  thin liquid, mildly thick liquid (nectar)    SPEECH THERAPY  PLAN OF CARE   The dysphagia POC is established based on physician order and dysphagia diagnosis    Dysphagia therapy is not recommended       Conditions Requiring Skilled Therapeutic Intervention for dysphagia:    Not applicable    SPECIFIC DYSPHAGIA INTERVENTIONS TO INCLUDE:     not applicable    Specific instructions for next treatment:  not applicable   Treatment Goals:    Short Term Goals:  Not applicable no therapy warranted     Long Term Goals:   Not applicable no therapy warranted      Patient/family Goal:    not applicable    Plan of care discussed with Patient and caregiver  The Caregiver understand(s) the diagnosis, prognosis and plan of care     Rehabilitation Potential/Prognosis: poor                      ADMITTING DIAGNOSIS: Feeding disturbance [R63.39]     VISIT DIAGNOSIS:   Visit Diagnoses       Codes    Feeding disturbance     R63.39              PATIENT REPORT/COMPLAINT: patient currently NPO pending results of this evaluation    PRIOR LEVEL OF SWALLOW FUNCTION:    Past History of Dysphagia?:  yes    Home diet: NPO -- including medications. Nutrition, fluids and medication to be administered through current NG/PEG tube. Current Diet Order:  No diet orders on file    PROCEDURE:  Consistencies Administered During the Evaluation   Liquids: thin liquid and nectar thick liquid   Solids:  pureed foods      Method of Intake:   cup, spoon  Fed by clinician      Position:   Seated, upright, Lateral plane    INSTRUMENTAL ASSESSMENT:    ORAL PREP/ ORAL PHASE:    Inadequate labial seal resulting anterior labial spillage from midline  Delayed A-P transit due to: reduced lingual strength  and cognitive function   Piecemeal deglutition  Oral residuals were noted :  throughout the oral cavity     PHARYNGEAL PHASE:     ONSET TIME       Delayed initiation of the pharyngeal swallow was noted with swallow reflex triggered at the level of the vallecula        PHARYNGEAL RESIDUALS        Vallecula/Pharyngeal Wall          Significant residuals in vallecula and/or posterior pharyngeal wall for all consistencies administered which eventually cleared with spontaneous multiple swallow. This may be partially due to anatomical changes to the cervical spine. Pyriform Sinuses      No significant residuals were noted in the pyriform sinuses     LARYNGEAL PENETRATION   Laryngeal penetration occurred prior to aspiration. Further details under aspiration section. ASPIRATION  Aspiration occurred AFTER the swallow for thin liquid and nectar consistency liquid due to pharyngeal residuals and residuals in the laryngeal vestibule . Aspiration was moderate and occurred consistently . an inconsistent cough was noted. Pt is at high risk of aspiration of puree due to significant residuals.      PENETRATION-ASPIRATION SCALE (PAS):  THIN 7 = Material enters the airway, passes below the vocal folds, and is not ejected from the trachea despite effort   MILDLY THICK 7 = Material enters the airway, passes below the vocal folds, and is not ejected from the trachea despite effort   MODERATELY THICK item not administered  PUREE 1 = Material does not enter the airway  HARD SOLID item not administered       COMPENSATORY STRATEGIES    Compensatory strategies were not attempted      STRUCTURAL/FUNCTIONAL ANOMALIES   Anatomical changes to cervical spine    CERVICAL ESOPHAGEAL STAGE :     Was not assessed          ___________    Cognition:   Confusion noted, Language impaired,  and Insight to current deficits impaired,    Oral Peripheral Examination   Could not test    Current Respiratory Status   room air     Parameters of Speech Production  CNT  Pain: No pain reported. EDUCATION:   The Speech Language Pathologist (SLP) completed education regarding results of evaluation and that intervention is not warranted at this time. Learner: Patient and caregiver  Education: Reviewed results and recommendations of this evaluation  Evaluation of Education:  Verbalizes understanding    This plan may be re-evaluated and revised as warranted. Evaluation Time includes thorough review of current medical information, gathering information on past medical history/social history and prior level of function, completion of standardized testing/informal observation of tasks, assessment of data and education on plan of care and goals. INTERVENTION    Pt/family educated on above results and plan of care. Pt/family trained on compensatory strategies for safe swallow and signs and symptoms of aspiration (throat clearing, coughing/choking, wet vocal quality. , etc.) and encouraged to notify staff if observed. Handouts provided as warranted. Pt/family encouraged to engage in question/answer session.  All questions answered and pt/family verbalized understanding of above. [x]The admitting diagnosis and active problem list, have been reviewed prior to initiation of this evaluation. CPT Code: 28118  dysphagia study, 77195 dysphagia therapy    ACTIVE PROBLEM LIST:   Patient Active Problem List   Diagnosis    Profound intellectual disability    Hypothyroidism    Impairment level: total impairment of both eyes    Hyperglycemia    Nonsustained ventricular tachycardia (HCC)    Macrocytosis    Disruptive behavior disorder    Ingrowing nail    Peripheral vascular disease (HCC)    Altered mental state    Onychomycosis    Anemia due to chronic kidney disease    Essential hypertension    Vitamin D deficiency    Gastroesophageal reflux disease without esophagitis    S/P percutaneous endoscopic gastrostomy (PEG) tube placement (HCC)    Chronic kidney disease    Dementia with behavioral disturbance, unspecified dementia type ([address]    Gastrostomy tube dysfunction ([address]    Severe dehydration         Erby Crigler Narciso Browning M. A. CCC/SLP [id number]  Speech-Language Pathologist

## 2022-07-08 ENCOUNTER — TELEPHONE (OUTPATIENT)
Dept: FAMILY MEDICINE CLINIC | Age: 67
End: 2022-07-08

## 2022-07-08 DIAGNOSIS — R13.12 DYSPHAGIA, OROPHARYNGEAL PHASE: Primary | ICD-10-CM

## 2022-07-08 NOTE — TELEPHONE ENCOUNTER
Jacques Paris called in requesting speech Eval in home for Caridad Maciasen to get feeding tube out.      Please contact jagjit at [phone]

## 2022-07-19 ENCOUNTER — HOSPITAL ENCOUNTER (EMERGENCY)
Age: 67
Discharge: HOME | End: 2022-07-19
Attending: STUDENT IN AN ORGANIZED HEALTH CARE EDUCATION/TRAINING PROGRAM
Payer: MEDICARE

## 2022-07-19 VITALS
WEIGHT: 140 LBS | BODY MASS INDEX: 25.76 KG/M2 | OXYGEN SATURATION: 94 % | HEIGHT: 62 IN | RESPIRATION RATE: 16 BRPM | HEART RATE: 92 BPM | SYSTOLIC BLOOD PRESSURE: 144 MMHG | DIASTOLIC BLOOD PRESSURE: 69 MMHG | TEMPERATURE: 98.1 F

## 2022-07-19 DIAGNOSIS — K94.23 PEG TUBE MALFUNCTION (HCC): Primary | ICD-10-CM

## 2022-07-19 PROCEDURE — 99283 EMERGENCY DEPT VISIT LOW MDM: CPT

## 2022-07-19 PROCEDURE — 6370000000 HC RX 637 (ALT 250 FOR IP): Performed by: STUDENT IN AN ORGANIZED HEALTH CARE EDUCATION/TRAINING PROGRAM

## 2022-07-19 RX ADMIN — Medication 10 ML: at 11:38

## 2022-07-19 ASSESSMENT — PAIN - FUNCTIONAL ASSESSMENT: PAIN_FUNCTIONAL_ASSESSMENT: NONE - DENIES PAIN

## 2022-07-19 NOTE — ED NOTES
Report to [facility], care of patient relinquished.       Ame Emerson RN  07/19/22 2710

## 2022-07-19 NOTE — ED PROVIDER NOTES
Department of Emergency Medicine   ED  Provider Note  Admit Date/RoomTime: 7/19/2022 10:03 AM  ED Room: 10/10          History of Present Illness:  7/19/22, Time: 10:08 AM EDT  Chief Complaint   Patient presents with    G Tube Complications     Clogged g tube          Charlene Patterson is a 75 y.o. female presenting to the ED for peg tube being clogged, beginning today. The complaint has been persistent, moderate in severity, and worsened by nothing. The patient is a 76-year-old female with a history of intellectual disability and nonverbal at baseline who presents to the emergency department via EMS from home with her caregiver for a clogged G-tube. Patient symptoms are sudden onset, persistent, moderate in severity, and nothing makes it better or worse. This occurred today and she was brought here after the facility noticed the PEG tube was not working. The PEG tube was initially placed in March 2021 by Dr. Petar Maradiaga. History is provided by caregiver. She states that was at midnight last night and then this morning around 9:00 AM she noticed that she could not flush it and it was clogged. She states that this has happened in the past.    Review of Systems: Limited due to mental status        --------------------------------------------- PAST HISTORY ---------------------------------------------  Past Medical History:  has a past medical history of Acute kidney injury ([facility] [address] Blind in both eyes, Essential hypertension, Gastroesophageal reflux disease without esophagitis, Hemodialysis patient ([facility] [address] Hyperthyroidism, MR (mental retardation), Osteoarthritis, Peripheral vascular disease ([facility] [address] Pneumonia, Pneumonia due to infectious organism, and Sepsis ([facility] [address]     Past Surgical History:  has a past surgical history that includes other surgical history (Right, 01/17/2017); other surgical history (01/25/2017); chest tube insertion (Right, 02/09/2017); bronchoscopy (02/10/2017); and Gastrostomy tube placement (N/A, 3/7/2021). Social History:  reports that she has never smoked. She has never used smokeless tobacco. She reports that she does not drink alcohol and does not use drugs. Family History: family history is not on file. . Unless otherwise noted, family history is non contributory    The patients home medications have been reviewed. Allergies: Patient has no known allergies. I have reviewed the past medical history, past surgical history, social history, and family history    ---------------------------------------------------PHYSICAL EXAM--------------------------------------    Constitutional/General: Patient is alert and at her baseline mentation. Head: Normocephalic and atraumatic  Eyes:  EOMI, sclera non icteric  ENT: Oropharynx clear, handling secretions, no trismus, no asymmetry of the posterior oropharynx or uvular edema  Neck: Supple, full ROM, no stridor, no meningeal signs  Respiratory: Lungs clear to auscultation bilaterally, no wheezes, rales, or rhonchi. Not in respiratory distress  Cardiovascular:  Regular rate. Regular rhythm. No murmurs, no gallops, no rubs. 2+ distal pulses. Equal extremity pulses. Gastrointestinal: Abdomen soft and nontender. There is a feeding tube in the upper abdomen that is clean/dry/intact. No surrounding erythema present or drainage or discharge surrounding the site. Musculoskeletal: Moves all extremities x 4. Warm and well perfused, no clubbing, no cyanosis, no edema. Capillary refill <3 seconds  Skin: skin warm and dry. No rashes. Neurologic: GCS 14 at baseline mentation, no focal deficits, symmetric strength 5/5 in the upper and lower extremities bilaterally  Psychiatric: Normal Affect    -------------------------------------------------- RESULTS -------------------------------------------------  I have personally reviewed all laboratory and imaging results for this patient. Results are listed below.      LABS: (Lab results interpreted by me)  No results found for this visit on 07/19/22.,       RADIOLOGY:  Interpreted by Radiologist unless otherwise specified  No orders to display         ------------------------- NURSING NOTES AND VITALS REVIEWED ---------------------------   The nursing notes within the ED encounter and vital signs as below have been reviewed by myself  BP (!) 144/69   Pulse 92   Temp 98.1 °F (36.7 °C)   Resp 16   Ht 5' 2\" (1.575 m)   Wt 140 lb (63.5 kg)   SpO2 94%   BMI 25.61 kg/m²     Oxygen Saturation Interpretation: Normal    The patients available past medical records and past encounters were reviewed. ------------------------------ ED COURSE/MEDICAL DECISION MAKING----------------------  Medications   clog zapper kit 10 mL (10 mLs PEG Tube Given 7/19/22 1138)         I, Dr. Kiko Selby, am the primary provider of record    Medical Decision Making:   The patient is a 19-year-old female who presents the emergency department for clogged feeding tube. She is hemodynamically stable, nontoxic, and in no acute distress. No other concerns or complaints. Use the clogs zapper and the PEG tube then flushed without any difficulties. Patient will be discharged home with caregiver at this time. Oxygen Saturation Interpretation: 94 % on room air. Re-Evaluations:  ED Course as of 07/19/22 1221   Tue Jul 19, 2022   1219 Nurse states after because [id number] flushes with ease. Patient will be discharged home with caregiver at this time. [KG]      ED Course User Index  [KG] Jeremy Novoa DO             This patient's ED course included: a personal history and physicial examination, re-evaluation prior to disposition, multiple bedside re-evaluations, IV medications, cardiac monitoring, continuous pulse oximetry, and complex medical decision making and emergency management    This patient has remained hemodynamically stable during their ED course. Counseling:    The emergency provider has spoken with the patient and discussed todays results, in addition to providing specific details for the plan of care and counseling regarding the diagnosis and prognosis. Questions are answered at this time and they are agreeable with the plan.       --------------------------------- IMPRESSION AND DISPOSITION ---------------------------------    IMPRESSION  1. PEG tube malfunction ([address]        DISPOSITION  Disposition: Discharge to home  Patient condition is stable        NOTE: This report was transcribed using voice recognition software.  Every effort was made to ensure accuracy; however, inadvertent computerized transcription errors may be present       Seferino Carter DO  07/19/22 1224

## 2022-08-05 ENCOUNTER — HOSPITAL ENCOUNTER (OUTPATIENT)
Dept: GENERAL RADIOLOGY | Age: 67
Discharge: HOME | End: 2022-08-07
Payer: MEDICARE

## 2022-08-05 DIAGNOSIS — Z01.419 WELL WOMAN EXAM: ICD-10-CM

## 2022-08-05 DIAGNOSIS — Z12.31 ENCOUNTER FOR SCREENING MAMMOGRAM FOR BREAST CANCER: ICD-10-CM

## 2022-08-05 PROCEDURE — 77063 BREAST TOMOSYNTHESIS BI: CPT

## 2022-08-15 ENCOUNTER — HOSPITAL ENCOUNTER (EMERGENCY)
Age: 67
Discharge: HOME | End: 2022-08-15
Attending: EMERGENCY MEDICINE
Payer: MEDICARE

## 2022-08-15 ENCOUNTER — APPOINTMENT (OUTPATIENT)
Dept: GENERAL RADIOLOGY | Age: 67
End: 2022-08-15
Payer: MEDICARE

## 2022-08-15 ENCOUNTER — APPOINTMENT (OUTPATIENT)
Dept: CT IMAGING | Age: 67
End: 2022-08-15
Payer: MEDICARE

## 2022-08-15 VITALS
OXYGEN SATURATION: 99 % | HEART RATE: 80 BPM | TEMPERATURE: 97 F | RESPIRATION RATE: 20 BRPM | BODY MASS INDEX: 25.76 KG/M2 | WEIGHT: 140 LBS | DIASTOLIC BLOOD PRESSURE: 80 MMHG | SYSTOLIC BLOOD PRESSURE: 157 MMHG | HEIGHT: 62 IN

## 2022-08-15 DIAGNOSIS — E16.2 HYPOGLYCEMIA: ICD-10-CM

## 2022-08-15 DIAGNOSIS — R40.4 TRANSIENT ALTERATION OF AWARENESS: Primary | ICD-10-CM

## 2022-08-15 LAB
ALBUMIN SERPL-MCNC: 3.1 G/DL (ref 3.5–5.2)
ALP BLD-CCNC: 55 U/L (ref 35–104)
ALT SERPL-CCNC: 8 U/L (ref 0–32)
ANION GAP SERPL CALCULATED.3IONS-SCNC: 8 MMOL/L (ref 7–16)
AST SERPL-CCNC: 17 U/L (ref 0–31)
BACTERIA: ABNORMAL /HPF
BASOPHILS ABSOLUTE: 0.01 E9/L (ref 0–0.2)
BASOPHILS RELATIVE PERCENT: 0.2 % (ref 0–2)
BILIRUB SERPL-MCNC: 0.3 MG/DL (ref 0–1.2)
BILIRUBIN URINE: NEGATIVE
BLOOD, URINE: NEGATIVE
BUN BLDV-MCNC: 17 MG/DL (ref 6–23)
CALCIUM SERPL-MCNC: 9.7 MG/DL (ref 8.6–10.2)
CHLORIDE BLD-SCNC: 95 MMOL/L (ref 98–107)
CHP ED QC CHECK: NORMAL
CLARITY: CLEAR
CO2: 25 MMOL/L (ref 22–29)
COLOR: YELLOW
CREAT SERPL-MCNC: 0.7 MG/DL (ref 0.5–1)
EKG ATRIAL RATE: 64 BPM
EKG P AXIS: 27 DEGREES
EKG P-R INTERVAL: 160 MS
EKG Q-T INTERVAL: 378 MS
EKG QRS DURATION: 78 MS
EKG QTC CALCULATION (BAZETT): 389 MS
EKG R AXIS: 16 DEGREES
EKG T AXIS: -20 DEGREES
EKG VENTRICULAR RATE: 64 BPM
EOSINOPHILS ABSOLUTE: 0.14 E9/L (ref 0.05–0.5)
EOSINOPHILS RELATIVE PERCENT: 2.5 % (ref 0–6)
GFR AFRICAN AMERICAN: >60
GFR NON-AFRICAN AMERICAN: >60 ML/MIN/1.73
GLUCOSE BLD-MCNC: 62 MG/DL (ref 74–99)
GLUCOSE BLD-MCNC: 64 MG/DL
GLUCOSE URINE: NEGATIVE MG/DL
HCT VFR BLD CALC: 37 % (ref 34–48)
HEMOGLOBIN: 11.8 G/DL (ref 11.5–15.5)
IMMATURE GRANULOCYTES #: 0.01 E9/L
IMMATURE GRANULOCYTES %: 0.2 % (ref 0–5)
KETONES, URINE: NEGATIVE MG/DL
LACTIC ACID: 0.9 MMOL/L (ref 0.5–2.2)
LEUKOCYTE ESTERASE, URINE: NEGATIVE
LYMPHOCYTES ABSOLUTE: 1.45 E9/L (ref 1.5–4)
LYMPHOCYTES RELATIVE PERCENT: 26 % (ref 20–42)
MCH RBC QN AUTO: 30.8 PG (ref 26–35)
MCHC RBC AUTO-ENTMCNC: 31.9 % (ref 32–34.5)
MCV RBC AUTO: 96.6 FL (ref 80–99.9)
METER GLUCOSE: 109 MG/DL (ref 74–99)
METER GLUCOSE: 64 MG/DL (ref 74–99)
MONOCYTES ABSOLUTE: 0.65 E9/L (ref 0.1–0.95)
MONOCYTES RELATIVE PERCENT: 11.6 % (ref 2–12)
NEUTROPHILS ABSOLUTE: 3.32 E9/L (ref 1.8–7.3)
NEUTROPHILS RELATIVE PERCENT: 59.5 % (ref 43–80)
NITRITE, URINE: NEGATIVE
PDW BLD-RTO: 12.6 FL (ref 11.5–15)
PH UA: 6.5 (ref 5–9)
PLATELET # BLD: 215 E9/L (ref 130–450)
PMV BLD AUTO: 10.6 FL (ref 7–12)
POTASSIUM REFLEX MAGNESIUM: 4.7 MMOL/L (ref 3.5–5)
PRO-BNP: 76 PG/ML (ref 0–125)
PROTEIN UA: NEGATIVE MG/DL
RBC # BLD: 3.83 E12/L (ref 3.5–5.5)
RBC UA: ABNORMAL /HPF (ref 0–2)
SARS-COV-2, NAAT: NOT DETECTED
SODIUM BLD-SCNC: 128 MMOL/L (ref 132–146)
SPECIFIC GRAVITY UA: <=1.005 (ref 1–1.03)
TOTAL PROTEIN: 7 G/DL (ref 6.4–8.3)
TROPONIN, HIGH SENSITIVITY: 11 NG/L (ref 0–9)
TROPONIN, HIGH SENSITIVITY: 11 NG/L (ref 0–9)
UROBILINOGEN, URINE: 2 E.U./DL
WBC # BLD: 5.6 E9/L (ref 4.5–11.5)
WBC UA: ABNORMAL /HPF (ref 0–5)

## 2022-08-15 PROCEDURE — 85025 COMPLETE CBC W/AUTO DIFF WBC: CPT

## 2022-08-15 PROCEDURE — 83605 ASSAY OF LACTIC ACID: CPT

## 2022-08-15 PROCEDURE — 2580000003 HC RX 258: Performed by: STUDENT IN AN ORGANIZED HEALTH CARE EDUCATION/TRAINING PROGRAM

## 2022-08-15 PROCEDURE — 93005 ELECTROCARDIOGRAM TRACING: CPT | Performed by: EMERGENCY MEDICINE

## 2022-08-15 PROCEDURE — 82962 GLUCOSE BLOOD TEST: CPT

## 2022-08-15 PROCEDURE — 99285 EMERGENCY DEPT VISIT HI MDM: CPT

## 2022-08-15 PROCEDURE — 80053 COMPREHEN METABOLIC PANEL: CPT

## 2022-08-15 PROCEDURE — 83880 ASSAY OF NATRIURETIC PEPTIDE: CPT

## 2022-08-15 PROCEDURE — 96360 HYDRATION IV INFUSION INIT: CPT

## 2022-08-15 PROCEDURE — 93005 ELECTROCARDIOGRAM TRACING: CPT | Performed by: STUDENT IN AN ORGANIZED HEALTH CARE EDUCATION/TRAINING PROGRAM

## 2022-08-15 PROCEDURE — 87635 SARS-COV-2 COVID-19 AMP PRB: CPT

## 2022-08-15 PROCEDURE — 84484 ASSAY OF TROPONIN QUANT: CPT

## 2022-08-15 PROCEDURE — 71045 X-RAY EXAM CHEST 1 VIEW: CPT

## 2022-08-15 PROCEDURE — 81001 URINALYSIS AUTO W/SCOPE: CPT

## 2022-08-15 PROCEDURE — 70450 CT HEAD/BRAIN W/O DYE: CPT

## 2022-08-15 RX ORDER — DEXTROSE MONOHYDRATE 100 MG/ML
INJECTION, SOLUTION INTRAVENOUS CONTINUOUS
Status: ACTIVE | OUTPATIENT
Start: 2022-08-15 | End: 2022-08-15

## 2022-08-15 RX ADMIN — DEXTROSE MONOHYDRATE 1000 ML: 100 INJECTION, SOLUTION INTRAVENOUS at 13:56

## 2022-08-15 ASSESSMENT — PAIN - FUNCTIONAL ASSESSMENT: PAIN_FUNCTIONAL_ASSESSMENT: NONE - DENIES PAIN

## 2022-08-15 NOTE — DISCHARGE INSTRUCTIONS
Continue all medications as prescribed  Follow-up with primary care doctor  If you notice any new worrisome symptoms please return to emergency department for evaluation

## 2022-08-15 NOTE — ED PROVIDER NOTES
[facility]  Department of Emergency Medicine     Written by: Kaur Galvan DO  Patient Name: Tank Johnson  Attending Provider: Daria Ngo MD  Admit Date: 8/15/2022 11:36 AM  MRN: [id number]                   : 1955        No chief complaint on file. - Chief complaint    Patient is a 68-year-old female past medical history of hypothyroidism, MR, hypertension and ESRD. Patient presents with chief complaint of altered mental status. According to caretakers at the bedside patient has been less responsive this morning than usual.  Patient is usually alert and playful and she will talk but not make sense. However this morning patient was somewhat difficult to arouse she did not appear to be at her normal state. Symptoms appear to be moderate in severity and cough since onset there are no exacerbating relieving factors. There are no similar episodes reported in the past.  Further history review of systems limited secondary to patient's nonverbal status. The history is provided by the patient. No  was used. Review of Systems   Unable to perform ROS: Patient nonverbal      Physical Exam  Vitals and nursing note reviewed. Constitutional:       General: She is not in acute distress. Appearance: Normal appearance. HENT:      Head: Normocephalic and atraumatic. Nose: No congestion or rhinorrhea. Mouth/Throat:      Mouth: Mucous membranes are moist.      Pharynx: Oropharynx is clear. Eyes:      Extraocular Movements: Extraocular movements intact. Pupils: Pupils are equal, round, and reactive to light. Cardiovascular:      Rate and Rhythm: Normal rate and regular rhythm. Heart sounds: No murmur heard. No gallop. Pulmonary:      Effort: Pulmonary effort is normal. No respiratory distress. Breath sounds: No wheezing, rhonchi or rales. Abdominal:      General: Abdomen is flat. Palpations: Abdomen is soft.  There is no mass. Tenderness: There is no abdominal tenderness. There is no guarding. Hernia: No hernia is present. Musculoskeletal:         General: No swelling, tenderness or signs of injury. Normal range of motion. Cervical back: Normal range of motion. No rigidity. No muscular tenderness. Skin:     General: Skin is warm and dry. Capillary Refill: Capillary refill takes less than 2 seconds. Neurological:      General: No focal deficit present. Mental Status: Mental status is at baseline. She is confused. Comments: Patient is alert to stimuli. She is moving all extremities. Patient largely nonverbal.   Psychiatric:         Mood and Affect: Mood normal.         Behavior: Behavior normal.        Procedures   EKG #1:  Interpreted by emergency department physician unless otherwise noted. Time:  1340    Rate: 64  Rhythm: Sinus. Interpretation: EKG obtained demonstrated normal sinus rhythm, rate 64, normal axis, , T wave inversions noted in leads V3 as well as aVF, mild ST segment elevations noted in aVL as well as V2 likely repolarization abnormality. Comparison: changes compared to previous EKG. EKG #2:  Interpreted by emergency department physician unless otherwise noted. Time:  1425    Rate: 72  Rhythm: Sinus. Interpretation: EKG obtained demonstrated normal sinus rhythm, rate 72, normal axis, , persistent T wave inversions noted in leads III and aVF as well as ST segment elevations consistent with LVH. [employer] Comparison: stable as compared to patient's most recent EKG. MDM  Number of Diagnoses or Management Options  Hypoglycemia  Transient alteration of awareness  Diagnosis management comments: Patient 70-year-old female past medical history of hypothyroidism, MR, and hypertension. Patient presents with chief complaint of altered mental status. Vital signs stable presentation.   On physical exam heart regular rate and rhythm, lungs clear to auscultation bilaterally, abdomen soft nontender no rebound or guarding. On neuro exam patient is alert, oriented to person place or time she is largely nonverbal but she is moving all extremities to painful stimuli. EKG obtained demonstrate no acute ischemic changes likely LVH. CBC obtained demonstrate no acute abnormalities, CMP obtained demonstrate demonstrated mild hypoglycemia sugar of 62, lactic acid 0.9, proBNP 76, troponin was obtained was abnormal repeat 11. CT scan of the head obtained demonstrate no acute maladies, patient given D10 infusion repeat blood sugar 109. Repeat EKG unchanged. Plan consistent transient alteration likely secondary to mild hyperglycemia. Reevaluation patient is back to baseline staff at the bedside confirmed. Decision made to discharge patient. Patient instructed to follow-up with primary care doctor tomorrow in addition if caretakers note any new worrisome symptoms patient should return to my department for evaluation. Plan of care discussed including discharge, all questions were answered, patient discharged in stable condition       Amount and/or Complexity of Data Reviewed  Clinical lab tests: ordered and reviewed  Tests in the radiology section of CPT®: ordered and reviewed  Decide to obtain previous medical records or to obtain history from someone other than the patient: yes       --------------------------------------------- PAST HISTORY ---------------------------------------------  Past Medical History:  has a past medical history of Acute kidney injury ([facility] [address] Blind in both eyes, Essential hypertension, Gastroesophageal reflux disease without esophagitis, Hemodialysis patient ([facility] [address] Hyperthyroidism, MR (mental retardation), Osteoarthritis, Peripheral vascular disease ([facility] [address] Pneumonia, Pneumonia due to infectious organism, and Sepsis ([facility] [address]     Past Surgical History:  has a past surgical history that includes other surgical history (Right, 01/17/2017); other surgical history (01/25/2017); chest tube insertion (Right, 02/09/2017); bronchoscopy (02/10/2017); and Gastrostomy tube placement (N/A, 3/7/2021). Social History:  reports that she has never smoked. She has never used smokeless tobacco. She reports that she does not drink alcohol and does not use drugs. Family History: family history is not on file. The patients home medications have been reviewed. Allergies: Patient has no known allergies.     -------------------------------------------------- RESULTS -------------------------------------------------  Labs:  Results for orders placed or performed during the hospital encounter of 08/15/22   COVID-19, Rapid    Specimen: Nasopharyngeal Swab   Result Value Ref Range    SARS-CoV-2, NAAT Not Detected Not Detected   CBC with Auto Differential   Result Value Ref Range    WBC 5.6 4.5 - 11.5 E9/L    RBC 3.83 3.50 - 5.50 E12/L    Hemoglobin 11.8 11.5 - 15.5 g/dL    Hematocrit 37.0 34.0 - 48.0 %    MCV 96.6 80.0 - 99.9 fL    MCH 30.8 26.0 - 35.0 pg    MCHC 31.9 (L) 32.0 - 34.5 %    RDW 12.6 11.5 - 15.0 fL    Platelets 611 276 - 620 E9/L    MPV 10.6 7.0 - 12.0 fL    Neutrophils % 59.5 43.0 - 80.0 %    Immature Granulocytes % 0.2 0.0 - 5.0 %    Lymphocytes % 26.0 20.0 - 42.0 %    Monocytes % 11.6 2.0 - 12.0 %    Eosinophils % 2.5 0.0 - 6.0 %    Basophils % 0.2 0.0 - 2.0 %    Neutrophils Absolute 3.32 1.80 - 7.30 E9/L    Immature Granulocytes # 0.01 E9/L    Lymphocytes Absolute 1.45 (L) 1.50 - 4.00 E9/L    Monocytes Absolute 0.65 0.10 - 0.95 E9/L    Eosinophils Absolute 0.14 0.05 - 0.50 E9/L    Basophils Absolute 0.01 0.00 - 0.20 E9/L   Comprehensive Metabolic Panel w/ Reflex to MG   Result Value Ref Range    Sodium 128 (L) 132 - 146 mmol/L    Potassium reflex Magnesium 4.7 3.5 - 5.0 mmol/L    Chloride 95 (L) 98 - 107 mmol/L    CO2 25 22 - 29 mmol/L    Anion Gap 8 7 - 16 mmol/L    Glucose 62 (L) 74 - 99 mg/dL    BUN 17 6 - 23 mg/dL    Creatinine 0.7 0.5 - 1.0 mg/dL    GFR Non-African American >60 >=60 mL/min/1.73    GFR African American >60     Calcium 9.7 8.6 - 10.2 mg/dL    Total Protein 7.0 6.4 - 8.3 g/dL    Albumin 3.1 (L) 3.5 - 5.2 g/dL    Total Bilirubin 0.3 0.0 - 1.2 mg/dL    Alkaline Phosphatase 55 35 - 104 U/L    ALT 8 0 - 32 U/L    AST 17 0 - 31 U/L   Troponin   Result Value Ref Range    Troponin, High Sensitivity 11 (H) 0 - 9 ng/L   Brain Natriuretic Peptide   Result Value Ref Range    Pro-BNP 76 0 - 125 pg/mL   Urinalysis with Microscopic   Result Value Ref Range    Color, UA Yellow Straw/Yellow    Clarity, UA Clear Clear    Glucose, Ur Negative Negative mg/dL    Bilirubin Urine Negative Negative    Ketones, Urine Negative Negative mg/dL    Specific Gravity, UA <=1.005 1.005 - 1.030    Blood, Urine Negative Negative    pH, UA 6.5 5.0 - 9.0    Protein, UA Negative Negative mg/dL    Urobilinogen, Urine 2.0 (A) <2.0 E.U./dL    Nitrite, Urine Negative Negative    Leukocyte Esterase, Urine Negative Negative    WBC, UA NONE 0 - 5 /HPF    RBC, UA NONE 0 - 2 /HPF    Bacteria, UA NONE SEEN None Seen /HPF   Lactic Acid   Result Value Ref Range    Lactic Acid 0.9 0.5 - 2.2 mmol/L   Troponin   Result Value Ref Range    Troponin, High Sensitivity 11 (H) 0 - 9 ng/L   POCT Glucose   Result Value Ref Range    Glucose 64 mg/dL    QC OK? pass    POCT Glucose   Result Value Ref Range    Meter Glucose 64 (L) 74 - 99 mg/dL   POCT Glucose   Result Value Ref Range    Meter Glucose 109 (H) 74 - 99 mg/dL   EKG 12 Lead   Result Value Ref Range    Ventricular Rate 64 BPM    Atrial Rate 64 BPM    P-R Interval 160 ms    QRS Duration 78 ms    Q-T Interval 378 ms    QTc Calculation (Bazett) 389 ms    P Axis 27 degrees    R Axis 16 degrees    T Axis -20 degrees       Radiology:  CT Head WO Contrast   Final Result   1. No acute intracranial abnormality. 2. Chronic small vessel ischemic disease and generalized cerebral volume loss. XR CHEST PORTABLE   Final Result   No acute process. Calcific density overlying the left heart border. This is favored to   represent a benign calcification and of no clinical significance. ------------------------- NURSING NOTES AND VITALS REVIEWED ---------------------------  Date / Time Roomed:  8/15/2022 11:36 AM  ED Bed Assignment:  24/24    The nursing notes within the ED encounter and vital signs as below have been reviewed. BP (!) 157/80   Pulse 80   Temp 97 °F (36.1 °C) (Axillary)   Resp 20   Ht 5' 2\" (1.575 m)   Wt 140 lb (63.5 kg)   SpO2 99%   BMI 25.61 kg/m²   Oxygen Saturation Interpretation: Normal      ------------------------------------------ PROGRESS NOTES ------------------------------------------  4:52 PM EDT  I have spoken with the patient and discussed todays results, in addition to providing specific details for the plan of care and counseling regarding the diagnosis and prognosis. Their questions are answered at this time and they are agreeable with the plan. I discussed at length with them reasons for immediate return here for re evaluation. They will followup with their primary care physician by calling their office tomorrow. --------------------------------- ADDITIONAL PROVIDER NOTES ---------------------------------  At this time the patient is without objective evidence of an acute process requiring hospitalization or inpatient management. They have remained hemodynamically stable throughout their entire ED visit and are stable for discharge with outpatient follow-up. The plan has been discussed in detail and they are aware of the specific conditions for emergent return, as well as the importance of follow-up. New Prescriptions    No medications on file       Diagnosis:  1. Transient alteration of awareness    2. Hypoglycemia        Disposition:  Patient's disposition: Discharge to group home  Patient's condition is stable.             Patient was seen and evaluated by myself and my attending Chuck Javed MD Lilia. Assessment and Plan discussed with attending provider, please see attestation for final plan of care.      DO Jaky Agee DO  Resident  08/15/22 [id number]

## 2022-08-16 LAB
EKG ATRIAL RATE: 72 BPM
EKG P AXIS: 47 DEGREES
EKG P-R INTERVAL: 162 MS
EKG Q-T INTERVAL: 384 MS
EKG QRS DURATION: 86 MS
EKG QTC CALCULATION (BAZETT): 420 MS
EKG R AXIS: 13 DEGREES
EKG T AXIS: -12 DEGREES
EKG VENTRICULAR RATE: 72 BPM

## 2022-08-17 ENCOUNTER — OFFICE VISIT (OUTPATIENT)
Dept: SURGERY | Age: 67
End: 2022-08-17
Payer: MEDICARE

## 2022-08-17 VITALS
HEART RATE: 83 BPM | HEIGHT: 62 IN | OXYGEN SATURATION: 98 % | DIASTOLIC BLOOD PRESSURE: 78 MMHG | RESPIRATION RATE: 18 BRPM | TEMPERATURE: 97.9 F | SYSTOLIC BLOOD PRESSURE: 129 MMHG | BODY MASS INDEX: 25.61 KG/M2

## 2022-08-17 DIAGNOSIS — K94.23 PEG TUBE MALFUNCTION (HCC): Primary | ICD-10-CM

## 2022-08-17 PROCEDURE — G8427 DOCREV CUR MEDS BY ELIG CLIN: HCPCS | Performed by: SURGERY

## 2022-08-17 PROCEDURE — G8417 CALC BMI ABV UP PARAM F/U: HCPCS | Performed by: SURGERY

## 2022-08-17 PROCEDURE — 1036F TOBACCO NON-USER: CPT | Performed by: SURGERY

## 2022-08-17 PROCEDURE — 3017F COLORECTAL CA SCREEN DOC REV: CPT | Performed by: SURGERY

## 2022-08-17 PROCEDURE — G8400 PT W/DXA NO RESULTS DOC: HCPCS | Performed by: SURGERY

## 2022-08-17 PROCEDURE — 1090F PRES/ABSN URINE INCON ASSESS: CPT | Performed by: SURGERY

## 2022-08-17 PROCEDURE — 99212 OFFICE O/P EST SF 10 MIN: CPT | Performed by: SURGERY

## 2022-08-17 PROCEDURE — 1123F ACP DISCUSS/DSCN MKR DOCD: CPT | Performed by: SURGERY

## 2022-08-25 ENCOUNTER — TELEPHONE (OUTPATIENT)
Dept: SURGERY | Age: 67
End: 2022-08-25

## 2022-08-25 NOTE — TELEPHONE ENCOUNTER
Received a call from [facility]  Kendell Francis [phone] wanting to inform our office that she will stop delegate DSP(direct service personal) to assess her G-tube. Instead, she will ask the nursing agency to send nurse to patient's group home to assess her G-tube. Juniorortega Royce stated that DSP is not adequate of her G tube care.   Electronically signed by Alexia Chong on 8/25/2022 at 3:57 PM

## 2022-09-02 DIAGNOSIS — K59.00 CONSTIPATION, UNSPECIFIED CONSTIPATION TYPE: ICD-10-CM

## 2022-09-02 NOTE — TELEPHONE ENCOUNTER
Last Appointment:  6/21/2022  Future Appointments   Date Time Provider Marissa Law   9/8/2022  9:00 AM EUGENIO Connelly [facility]   9/13/2022  2:30 PM Kody Sanabria DO [facility]   9/21/2022  9:45 AM Kody Sanabria DO [address] [place] [address] Street

## 2022-09-09 ENCOUNTER — APPOINTMENT (OUTPATIENT)
Dept: GENERAL RADIOLOGY | Age: 67
End: 2022-09-09
Payer: MEDICARE

## 2022-09-09 ENCOUNTER — HOSPITAL ENCOUNTER (EMERGENCY)
Age: 67
Discharge: HOME | End: 2022-09-09
Attending: EMERGENCY MEDICINE
Payer: MEDICARE

## 2022-09-09 VITALS
OXYGEN SATURATION: 96 % | HEART RATE: 95 BPM | DIASTOLIC BLOOD PRESSURE: 60 MMHG | RESPIRATION RATE: 18 BRPM | TEMPERATURE: 98.1 F | SYSTOLIC BLOOD PRESSURE: 122 MMHG | HEIGHT: 62 IN | WEIGHT: 140 LBS | BODY MASS INDEX: 25.76 KG/M2

## 2022-09-09 DIAGNOSIS — K94.23 PEG TUBE MALFUNCTION (HCC): Primary | ICD-10-CM

## 2022-09-09 LAB
CHP ED QC CHECK: NORMAL
GLUCOSE BLD-MCNC: 78 MG/DL
METER GLUCOSE: 78 MG/DL (ref 74–99)

## 2022-09-09 PROCEDURE — 99283 EMERGENCY DEPT VISIT LOW MDM: CPT

## 2022-09-09 PROCEDURE — 74018 RADEX ABDOMEN 1 VIEW: CPT

## 2022-09-09 PROCEDURE — 6360000004 HC RX CONTRAST MEDICATION

## 2022-09-09 PROCEDURE — 43762 RPLC GTUBE NO REVJ TRC: CPT

## 2022-09-09 PROCEDURE — 82962 GLUCOSE BLOOD TEST: CPT

## 2022-09-09 RX ADMIN — DIATRIZOATE MEGLUMINE AND DIATRIZOATE SODIUM 30 ML: 600; 100 SOLUTION ORAL; RECTAL at 09:23

## 2022-09-09 RX ADMIN — DIATRIZOATE MEGLUMINE AND DIATRIZOATE SODIUM 60 ML: 660; 100 LIQUID ORAL; RECTAL at 12:15

## 2022-09-09 ASSESSMENT — PAIN - FUNCTIONAL ASSESSMENT: PAIN_FUNCTIONAL_ASSESSMENT: NONE - DENIES PAIN

## 2022-09-09 NOTE — ED PROVIDER NOTES
[facility]  Department of Emergency Medicine     Written by: Jelani Wilkins MD  Patient Name: Terrell Arita  Attending Provider: Brigitte Cruz DO  Admit Date: 2022  8:36 AM  MRN: [id number]                   : 1955        Chief Complaint   Patient presents with    G Tube Complications     Pulled out gtube at home     - Chief complaint    Patient is a 71-year-old female with past medical history of CKD, dementia, and PEG tube presenting after pulling out her G-tube. Chart review reveals the PEG tube was initially placed in 2021 by Dr. Kaylan Gardner. History was unable to be obtained from the patient and obtained from the home staff at bedside. Home staff reports patient pulled out her PEG tube which is happened several times in the past.  Staff replaced the tube however wants to confirm placement of the tube prior to using it. Staff reports no recent fevers, mental status change, evidence of shortness of breath, chest pain, nausea, vomiting, abdominal pain, urinary symptoms, change in bowel habits, or recent sick contacts. Review of Systems   Unable to perform ROS: Dementia      Physical Exam  Constitutional:       General: She is not in acute distress. Appearance: Normal appearance. She is not ill-appearing or toxic-appearing. HENT:      Head: Normocephalic and atraumatic. Right Ear: Hearing normal.      Left Ear: Hearing normal.      Nose: Nose normal.      Mouth/Throat:      Lips: Pink. Mouth: Mucous membranes are moist.      Pharynx: Oropharynx is clear. Eyes:      Extraocular Movements: Extraocular movements intact. Conjunctiva/sclera: Conjunctivae normal.      Pupils: Pupils are equal, round, and reactive to light. Cardiovascular:      Rate and Rhythm: Normal rate and regular rhythm. Pulses: Normal pulses. Heart sounds: S1 normal and S2 normal.   Pulmonary:      Effort: Pulmonary effort is normal. No respiratory distress.       Breath sounds: Normal breath sounds and air entry. Abdominal:      General: Abdomen is flat. The ostomy site is clean. Bowel sounds are normal. There is no distension or abdominal bruit. Palpations: Abdomen is soft. There is no fluid wave or mass. Tenderness: There is no abdominal tenderness. There is no right CVA tenderness, left CVA tenderness or guarding. Musculoskeletal:         General: No swelling or tenderness. Normal range of motion. Cervical back: Normal range of motion and neck supple. Right lower leg: No edema. Left lower leg: No edema. Skin:     General: Skin is warm and dry. Capillary Refill: Capillary refill takes less than 2 seconds. Findings: No bruising, lesion or rash. Neurological:      General: No focal deficit present. Mental Status: She is alert and oriented to person, place, and time. Mental status is at baseline. Motor: No weakness. Psychiatric:         Attention and Perception: Attention normal.         Mood and Affect: Mood and affect normal.         Behavior: Behavior is cooperative. Procedures       MDM  Number of Diagnoses or Management Options  PEG tube malfunction ([facility] [address]  Diagnosis management comments: Patient is a 68-year-old female with past medical history of CKD, dementia, and PEG tube presenting after pulling out her G-tube. At the time of examination the patient was vitally stable. KUB with contrast reveals PEG tube is in place. Upon reevaluation house staff reports PEG tube balloon may not be functioning properly and would like to consider changing out the PEG tube. PEG tube balloon appears to not inflate. We will change the PEG tube. 18 Grenadian PEG was replaced with new 18 Grenadian PEG. Repeat KUB with contrast reveals new PEG tube is in place. Will provide patient her tube feed to avoid hypoglycemia per home staff. Following tube feed, patient remains vitally stable and will be discharged.             ED Course as of 09/10/22 1424   Fri Sep 09, 2022   1156 PEG tube was replaced at bedside. Old 25 Finnish tube had evidence of insufficient balloon. New 18 Finnish tube was placed with 15 cc of sterile water inflated. [VG]   7360 She tolerated her feeding well through the new PEG tube. She is ready for discharge at this time. [VG]      ED Course User Index  [VG] Malaika Keene MD       --------------------------------------------- PAST HISTORY ---------------------------------------------  Past Medical History:  has a past medical history of Acute kidney injury ([facility] [address] Blind in both eyes, Essential hypertension, Gastroesophageal reflux disease without esophagitis, Hemodialysis patient ([facility] [address] Hyperthyroidism, MR (mental retardation), Osteoarthritis, Peripheral vascular disease ([facility] [address] Pneumonia, Pneumonia due to infectious organism, and Sepsis ([facility] [address] Past Surgical History:  has a past surgical history that includes other surgical history (Right, 01/17/2017); other surgical history (01/25/2017); chest tube insertion (Right, 02/09/2017); bronchoscopy (02/10/2017); and Gastrostomy tube placement (N/A, 3/7/2021). Social History:  reports that she has never smoked. She has never used smokeless tobacco. She reports that she does not drink alcohol and does not use drugs. Family History: family history is not on file. The patients home medications have been reviewed. Allergies: Patient has no known allergies. -------------------------------------------------- RESULTS -------------------------------------------------  Labs:  Results for orders placed or performed during the hospital encounter of 09/09/22   POCT glucose   Result Value Ref Range    Glucose 78 mg/dL    QC OK? ok    POCT Glucose   Result Value Ref Range    Meter Glucose 78 74 - 99 mg/dL       Radiology:  XR ABDOMEN FOR NG/OG/NE TUBE PLACEMENT   Final Result   Gastrostomy tube within the stomach.          XR ABDOMEN (KUB) (SINGLE AP VIEW)   Final Result   Position of the gastrostomy tube within the gastric lumen. No significant   abnormal findings. See above.             ------------------------- NURSING NOTES AND VITALS REVIEWED ---------------------------  Date / Time Roomed:  9/9/2022  8:36 AM  ED Bed Assignment:  25/25    The nursing notes within the ED encounter and vital signs as below have been reviewed. /60   Pulse 95   Temp 98.1 °F (36.7 °C) (Axillary)   Resp 18   Ht 5' 2\" (1.575 m)   Wt 140 lb (63.5 kg)   SpO2 96%   BMI 25.61 kg/m²   Oxygen Saturation Interpretation: Normal      ------------------------------------------ PROGRESS NOTES ------------------------------------------  I have spoken with the patient and discussed todays results, in addition to providing specific details for the plan of care and counseling regarding the diagnosis and prognosis. Their questions are answered at this time and they are agreeable with the plan. I discussed at length with them reasons for immediate return here for re evaluation. They will followup with primary care by calling their office tomorrow. --------------------------------- ADDITIONAL PROVIDER NOTES ---------------------------------  At this time the patient is without objective evidence of an acute process requiring hospitalization or inpatient management. They have remained hemodynamically stable throughout their entire ED visit and are stable for discharge with outpatient follow-up. The plan has been discussed in detail and they are aware of the specific conditions for emergent return, as well as the importance of follow-up. Discharge Medication List as of 9/9/2022 10:40 AM          Diagnosis:  1. PEG tube malfunction ([facility] [address]        Disposition:  Patient's disposition: Discharge to home  Patient's condition is stable. Patient was seen and evaluated by myself and my attending Eric Fernandez DO.  Assessment and Plan discussed with attending provider, please see attestation for final plan of care.      MD Andreas Montero MD  Resident  09/10/22 0036

## 2022-09-09 NOTE — DISCHARGE INSTRUCTIONS
Please follow-up with your primary care physician to discuss your recent emergency room visit and follow-up on your symptoms. Return to the emergency room if you experience further complications of your PEG tube, PEG tube falls out, bleeding around the site of the tube, inability to feed through the tube, or changes to stool.

## 2022-09-09 NOTE — ED NOTES
240 cc Jevity tube feed given over gravity with 30 cc sterile water flush before and after feeding. HOB maintained greater than 30 degrees during and after feeding. Pt tolerated well.      Xuan Schwartz RN  09/09/22 [id number]

## 2022-09-09 NOTE — ED NOTES
Discharge instructions reviewed with caregiver. She verbalized understanding.      Zoila Weiner RN  09/09/22 [id number]

## 2022-09-09 NOTE — ED NOTES
Pt is from group home and has caregiver present. Pt pulled g-tube out around 6 am while caregiver was with another resident. This has apparently happened before. Caregiver states pt is at baseline and there have been no other issues or complaints.      Keenan Johnson RN  09/09/22 1720

## 2022-09-09 NOTE — ED NOTES
Pt's caregiver came to desk and stated that patient's blood sugar is going to drop soon d/t her not having a feeding. She would like tube replaced so she can get pt her feeding.      Renay Shaw RN  09/09/22 2476

## 2022-09-13 ENCOUNTER — OFFICE VISIT (OUTPATIENT)
Dept: FAMILY MEDICINE CLINIC | Age: 67
End: 2022-09-13
Payer: MEDICARE

## 2022-09-13 VITALS
OXYGEN SATURATION: 99 % | HEART RATE: 67 BPM | SYSTOLIC BLOOD PRESSURE: 116 MMHG | TEMPERATURE: 97.4 F | DIASTOLIC BLOOD PRESSURE: 74 MMHG | RESPIRATION RATE: 16 BRPM

## 2022-09-13 DIAGNOSIS — I10 ESSENTIAL HYPERTENSION: Primary | ICD-10-CM

## 2022-09-13 DIAGNOSIS — H54.0X55: ICD-10-CM

## 2022-09-13 DIAGNOSIS — I73.9 PERIPHERAL VASCULAR DISEASE (HCC): ICD-10-CM

## 2022-09-13 DIAGNOSIS — F73 PROFOUND INTELLECTUAL DISABILITY: ICD-10-CM

## 2022-09-13 DIAGNOSIS — F03.91 DEMENTIA WITH BEHAVIORAL DISTURBANCE, UNSPECIFIED DEMENTIA TYPE: ICD-10-CM

## 2022-09-13 DIAGNOSIS — K21.9 GASTROESOPHAGEAL REFLUX DISEASE WITHOUT ESOPHAGITIS: ICD-10-CM

## 2022-09-13 DIAGNOSIS — Z93.1 S/P PERCUTANEOUS ENDOSCOPIC GASTROSTOMY (PEG) TUBE PLACEMENT (HCC): ICD-10-CM

## 2022-09-13 DIAGNOSIS — I47.29 NONSUSTAINED VENTRICULAR TACHYCARDIA: ICD-10-CM

## 2022-09-13 PROCEDURE — G8427 DOCREV CUR MEDS BY ELIG CLIN: HCPCS | Performed by: INTERNAL MEDICINE

## 2022-09-13 PROCEDURE — 1123F ACP DISCUSS/DSCN MKR DOCD: CPT | Performed by: INTERNAL MEDICINE

## 2022-09-13 PROCEDURE — G8400 PT W/DXA NO RESULTS DOC: HCPCS | Performed by: INTERNAL MEDICINE

## 2022-09-13 PROCEDURE — G8417 CALC BMI ABV UP PARAM F/U: HCPCS | Performed by: INTERNAL MEDICINE

## 2022-09-13 PROCEDURE — 3017F COLORECTAL CA SCREEN DOC REV: CPT | Performed by: INTERNAL MEDICINE

## 2022-09-13 PROCEDURE — 1036F TOBACCO NON-USER: CPT | Performed by: INTERNAL MEDICINE

## 2022-09-13 PROCEDURE — 1090F PRES/ABSN URINE INCON ASSESS: CPT | Performed by: INTERNAL MEDICINE

## 2022-09-13 PROCEDURE — 99213 OFFICE O/P EST LOW 20 MIN: CPT | Performed by: INTERNAL MEDICINE

## 2022-09-13 NOTE — PROGRESS NOTES
[facility] PRIMARY CARE  [address] [place] [id number]  Dept: [phone]  Dept Fax: [phone]     NAME: Steven Jiang        :  1955        MRN:  [id number]    Chief Complaint   Patient presents with    Follow-up     ER follow up. She pulled her PEG tube out. She was diagnosed with hypoglycemia. Subjective     History of Present Illness  Steven Jiang is a 75 y.o. female who presents today for routine follow up and recent ED visit. Peg tube was displaced again. Caregiver present with patient today explains that the hypoglycemia was due to the PEG not functioning for over 12 hours prior to being replaced. Patient's only intake of calories is via peg. Review of Systems  Please see HPI above. All bolded are positive. Unable to obtain full review of systems as patient is unresponsive and nonverbal at baseline.   Per staff members that work with the patient there are no reported fevers, chills, diarrhea, constipation, edema, or signs of distress or pain    Past Medical Hx:  Past Medical History:   Diagnosis Date    Acute kidney injury ([facility] [address] 01/15/2017    d/t Vancomycin, on Dialysis [facility] right chest    Blind in both eyes     Essential hypertension 2021    Gastroesophageal reflux disease without esophagitis 2021    Hemodialysis patient [facility])     Hyperthyroidism     MR (mental retardation)     Osteoarthritis     Peripheral vascular disease ([facility] [address]     Pneumonia 2017    Pneumonia due to infectious organism 2017    Sepsis ([facility] [address] 3/4/2021       Past Surgical Hx:  Past Surgical History:   Procedure Laterality Date    BRONCHOSCOPY  02/10/2017    CHEST TUBE INSERTION Right 2017    GASTROSTOMY TUBE PLACEMENT N/A 3/7/2021    EGD PEG TUBE PLACEMENT performed by Ophelai Elias MD at [address] [place] [address] Right 2017    tessio insertion     OTHER SURGICAL HISTORY  2017    PEG tube insertion       Family Hx:  No family history on file. Social Hx:  Social History     Tobacco Use    Smoking status: Never    Smokeless tobacco: Never   Substance Use Topics    Alcohol use: No       Home Medications:  Current Outpatient Medications   Medication Sig Dispense Refill    docusate (COLACE) 50 MG/5ML liquid TAKE 2 TEASPOONSFUL (10ML) VIA G-TUBE TWICE A DAY (PLUG/SYR) 600 mL 0    famotidine (PEPCID) 20 MG tablet TAKE (1) TABLET VIA PEG TUBE TWO TIMES DAILY. 180 tablet 1    montelukast (SINGULAIR) 10 MG tablet TAKE 1 TABLET VIA G-TUBE AT BEDTIME. 90 tablet 1    PARoxetine (PAXIL) 10 MG tablet TAKE 1 TABLET VIA G-TUBE TWICE A  tablet 1    folic acid (FOLVITE) 1 MG tablet TAKE 1 TABLET VIA G-TUBE DAILY 90 tablet 1    levothyroxine (SYNTHROID) 150 MCG tablet [employer] 1 TABLET VIA G-TUBE ONCE DAILY 90 tablet 1    bumetanide (BUMEX) 1 MG tablet TAKE 1 TABLET VIA G-TUBE DAILY AS NEEDED FOR SWELLING. 30 tablet 5    midodrine (PROAMATINE) 2.5 MG tablet TAKE 3 TABLET VIA G-TUBE THREE TIMES DAILY AS NEEDED. (HYPOTENSION, GIVE ONLY IF SYSTOLIC BP IS <498. (BP CHECKS 3 TIMES A DAY. 252 tablet 0    NATURAL VITAMIN D-3 125 MCG (5000 UT) TABS tablet TAKE 1 TABLET VIA G-TUBE ONCE DAILY 90 tablet 1    FEROSUL 325 (65 Fe) MG tablet TAKE 1 TABLET VIA G-TUBE ONCE A DAY. 90 tablet 1    pantoprazole sodium (PROTONIX) 40 MG PACK packet 40 mg by Per G Tube route every morning (before breakfast)      Disposable Gloves (POWDER FREE NITRILE GLOVES MED) MISC As Directed 2 each 5    albuterol (PROVENTIL) (2.5 MG/3ML) 0.083% nebulizer solution Take 3 mLs by nebulization every 6 hours 120 each 3    Respiratory Therapy Supplies (FULL KIT NEBULIZER SET) MISC Use as directed with nebulized medication.  1 each 0    acetaminophen (TYLENOL) 325 MG tablet 650 mg by Per G Tube route every 4 hours as needed for Pain or Fever      medicated lip balm (BLISTEX/CARMEX) 2-2.5-6.6 % STCK Apply topically as needed for Dry Lips (CRACKED LIPS)      bisacodyl (DULCOLAX) 10 MG suppository Place 10 mg rectally daily as needed for Constipation (NO BM FOR 3 DAYS)       SUNSCREENS EX Apply topically as needed (SUNBURN PREVENTION) Indications: *SPF 50*      Dextromethorphan-guaiFENesin  MG/5ML SYRP 10 mLs by Per G Tube route every 4 hours as needed for Cough (CONGESTION)      Neomycin-Bacitracin-Polymyxin (TRIPLE ANTIBIOTIC) OINT Apply topically 2 times daily as needed (CUTS/SCRAPES/SKIN ABRASIONS)      OLANZapine zydis (ZYPREXA) 5 MG disintegrating tablet Take 20 mg by mouth every morning Indications: VIA G-TUBE *TAKE ALONG WITH 15MG=20MG*  *SEE OTHER ORDER*      OLANZapine zydis (ZYPREXA) 15 MG disintegrating tablet 20 mg every morning Indications: VIA G-TUBE *TAKE ALONG WITH 5MG=20MG*  *SEE OTHER ORDER*      OLANZapine (ZYPREXA) 20 MG tablet 20 mg by Per G Tube route nightly      LORazepam (ATIVAN) 2 MG tablet 2 mg by Per G Tube route as needed (2HRS BEFORE DENTAL APPT, TAKE 2ND DOSE TO DENTAL APPT). valproic acid (DEPACON) 250 MG/5ML SOLN oral solution 20 mLs by Per G Tube route 2 times daily 300 mL 0    polyethylene glycol (GLYCOLAX) 17 g packet 17 g by Per G Tube route daily 527 g 1    Gauze Pads & Dressings 2\"X2\" PADS 1 each by Does not apply route in the morning and at bedtime Apply to stoma 10 each 5    albuterol (PROVENTIL) (2.5 MG/3ML) 0.083% nebulizer solution Take 3 mLs by nebulization every 6 hours as needed for Wheezing (Patient not taking: Reported on 9/13/2022) 120 each 3     No current facility-administered medications for this visit.         Allergies:  No Known Allergies    Objective     Vitals:    09/13/22 1430   BP: 116/74   Pulse: 67   Resp: 16   Temp: 97.4 °F (36.3 °C)   TempSrc: Temporal   SpO2: 99%        Physical Exam  General: Awake and speaking today although difficult to understand, wheelchair bound, No acute distress  Head: Normocephalic, atraumatic  Eyes: conjunctivae/corneas clear  Mouth: Mucous membranes moist with no pharyngeal exudate or Confirmation of course of PEG tube and location of tip of tube TECHNOLOGIST PROVIDED HISTORY: water-soluble contrast to confirm placement Reason for exam:->Confirmation of course of PEG tube and location of tip of tube FINDINGS: Please note that this study consists of 2 images. The 1st is a noncontrast AP supine view of the abdomen excluding much of the pelvis. The 2nd image is after injection of water-soluble contrast agent through this patient's gastrostomy tube. Results: No free air, bowel wall pneumatosis or dilated bowel. No abnormal abdominal calcifications. Post G-tube injection there is filling of the gastric lumen with contrast flow into the duodenum indicating patency of the gastric outlet. No extravasation. No abnormal findings. Position of the gastrostomy tube within the gastric lumen. No significant abnormal findings. See above. CT Head WO Contrast    Result Date: 8/15/2022  EXAMINATION: CT OF THE HEAD WITHOUT CONTRAST  8/15/2022 2:54 pm TECHNIQUE: CT of the head was performed without the administration of intravenous contrast. Automated exposure control, iterative reconstruction, and/or weight based adjustment of the mA/kV was utilized to reduce the radiation dose to as low as reasonably achievable. COMPARISON: CT head 09/21/2021 HISTORY: ORDERING SYSTEM PROVIDED HISTORY: AMS TECHNOLOGIST PROVIDED HISTORY: Reason for exam:->AMS Has a \"code stroke\" or \"stroke alert\" been called? ->No Decision Support Exception - unselect if not a suspected or confirmed emergency medical condition->Emergency Medical Condition (MA) What reading provider will be dictating this exam?->CRC FINDINGS: There is patchy hypoattenuation within white matter of the bilateral cerebral hemispheres. There is no evidence of mass, mass effect, or midline shift. There is enlargement of the ventricles and sulci suggesting generalized cerebral volume loss. No extra-axial fluid collections or acute hemorrhage.  The gray white after administration of contrast through the gastrostomy tube shows the tip in good position within the lumen. There is opacification of stomach and duodenum as well as proximal jejunum. No extravasation noted. Gastrostomy tube within the stomach. Assessment and Plan     Patient is a 75 y.o. female who presented to the office for follow up. Full problem list is as follows:  Patient Active Problem List   Diagnosis    Profound intellectual disability    Hypothyroidism    Impairment level: total impairment of both eyes    Hyperglycemia    Nonsustained ventricular tachycardia (HCC)    Macrocytosis    Disruptive behavior disorder    Ingrowing nail    Peripheral vascular disease (HCC)    Altered mental state    Onychomycosis    Anemia due to chronic kidney disease    Essential hypertension    Vitamin D deficiency    Gastroesophageal reflux disease without esophagitis    S/P percutaneous endoscopic gastrostomy (PEG) tube placement (HCC)    Chronic kidney disease    Dementia with behavioral disturbance, unspecified dementia type ([address]    Gastrostomy tube dysfunction ([address]    Severe dehydration       Rd Barlow was seen today for follow-up. Diagnoses and all orders for this visit:    Essential hypertension    Nonsustained ventricular tachycardia ([address]    Peripheral vascular disease ([address]    Gastroesophageal reflux disease without esophagitis    Dementia with behavioral disturbance, unspecified dementia type ([address]    Impairment level: total impairment of both eyes    Profound intellectual disability    S/P percutaneous endoscopic gastrostomy (PEG) tube placement [facility])      Educational materials and/or home exercises printed for patient's review and were included in patient instructions on his/her After Visit Summary and given to patient at the end of visit. Counseled regarding above diagnosis, including possible risks and complications, especially if left uncontrolled.      Counseled regarding the possible side effects, risks, benefits and alternatives to treatment; patient and/or guardian verbalizes understanding, agrees, feels comfortable with and wishes to proceed with above treatment plan. Advised patient to call Vipin Matthews new medication issues, and read all Rx info from pharmacy to assure aware of all possible risks and side effects of any medication before taking. Patient verbalizes understanding and agrees with above counseling, assessment and plan. All questions answered.     Merrick Cintron, DO

## 2022-09-19 ENCOUNTER — APPOINTMENT (OUTPATIENT)
Dept: CT IMAGING | Age: 67
End: 2022-09-19
Payer: MEDICARE

## 2022-09-19 ENCOUNTER — HOSPITAL ENCOUNTER (EMERGENCY)
Age: 67
Discharge: HOME | End: 2022-09-19
Attending: EMERGENCY MEDICINE
Payer: MEDICARE

## 2022-09-19 VITALS
HEIGHT: 62 IN | HEART RATE: 82 BPM | RESPIRATION RATE: 16 BRPM | WEIGHT: 140 LBS | BODY MASS INDEX: 25.76 KG/M2 | OXYGEN SATURATION: 96 % | DIASTOLIC BLOOD PRESSURE: 68 MMHG | TEMPERATURE: 98.6 F | SYSTOLIC BLOOD PRESSURE: 144 MMHG

## 2022-09-19 DIAGNOSIS — Z04.9 CONDITION NOT FOUND: Primary | ICD-10-CM

## 2022-09-19 LAB — METER GLUCOSE: 84 MG/DL (ref 74–99)

## 2022-09-19 PROCEDURE — 74176 CT ABD & PELVIS W/O CONTRAST: CPT

## 2022-09-19 PROCEDURE — 99284 EMERGENCY DEPT VISIT MOD MDM: CPT

## 2022-09-19 PROCEDURE — 82962 GLUCOSE BLOOD TEST: CPT

## 2022-09-19 ASSESSMENT — PAIN - FUNCTIONAL ASSESSMENT: PAIN_FUNCTIONAL_ASSESSMENT: ADULT NONVERBAL PAIN SCALE (NPVS)

## 2022-09-19 NOTE — CARE COORDINATION
Social Work/Transition of Care:     Pt in need of transportation home. BEVERLEY met with facility representative at bedside who reports pt can go by ambulette due to pt DX dementia and A X O X 0 would not be safe to be left alone in the back of ambulette, pt to go by ambulance B[facility]LEY called [facility] ETA 4 hrs. BEVERLEY completed medical necessity and updated ED RN. Electronically signed by Delma Gill on 2/84/9758 at 4:03 PM       [facility] called and transport cancelled due to facility transported patient.     Electronically signed by Delma Gill on 5/71/5213 at 4:44 PM Are Labs Available For Review?: Yes

## 2022-09-19 NOTE — ED PROVIDER NOTES
[facility]  Department of Emergency Medicine     Written by: Darleen Ash MD  Patient Name: Zoey Torres  Attending Provider: No att. providers found  Admit Date: 2022 11:49 AM  MRN: [id number]                   : 1955        Chief Complaint   Patient presents with    Other     Peg tube complication; home health nurse states that foul smelling odor was coming from the peg tube    - Chief complaint    78-year-old female with known history of blindness, mental retardation, peripheral vascular disease, PEG tube placement presents to ED with complaints of dark-colored contents being treated for the PEG tube. The patient had the PEG tube replaced 10 days ago after the patient was found pulling on it. This was done so at [address] [facility] emergency department. The PEG tube was functioning properly, however started draining darker colored contents, which concerned the aide. She also noticed the content to be foul-smelling. This is what prompted her to come to the ER. Symptoms were sudden onset, nonprogressive nature, nothing makes it better or worse, quality of symptoms cannot describe the patient is nonverbal.  There are no radiating symptoms, severity is no described by the patient. Timing is constant. The aide denies any fever, vomiting, diarrhea, constipation. Review of Systems   Unable to perform ROS: Patient nonverbal      Physical Exam  Constitutional:       General: She is not in acute distress. Appearance: Normal appearance. She is not ill-appearing. HENT:      Head: Normocephalic and atraumatic. Nose: Nose normal. No congestion. Mouth/Throat:      Mouth: Mucous membranes are moist.      Pharynx: No posterior oropharyngeal erythema. Eyes:      General: No scleral icterus. Extraocular Movements: Extraocular movements intact. Pupils: Pupils are equal, round, and reactive to light.    Cardiovascular:      Rate and Rhythm: Normal rate and regular rhythm. Pulses: Normal pulses. Heart sounds: Normal heart sounds. Pulmonary:      Effort: Pulmonary effort is normal.      Breath sounds: Normal breath sounds. Abdominal:      General: Bowel sounds are normal. There is no distension. Palpations: Abdomen is soft. There is no mass. Tenderness: There is no abdominal tenderness. Musculoskeletal:         General: No swelling, tenderness or deformity. Normal range of motion. Cervical back: Normal range of motion. No rigidity or tenderness. Skin:     Capillary Refill: Capillary refill takes less than 2 seconds. Coloration: Skin is not jaundiced or pale. Findings: No bruising or erythema. Neurological:      General: No focal deficit present. Mental Status: She is alert and oriented to person, place, and time. Mental status is at baseline. Procedures       MDM  Number of Diagnoses or Management Options  Condition not found  Diagnosis management comments: This is a 68-year-old female with a PEG tube in place presents to the ED due to the aids concern for foul-smelling content and darker in color. She did have PEG tube replacement 10 days ago after the patient was found pulling on it. She reports no fevers, nausea, vomiting, or any other GI upset. She was only concerned for the color and smell. The patient is hemodynamically stable, and is nonverbal.  The PEG tube flushed well, no pus drainage from the incision site, there is no tenderness, erythema, any signs of infection. Reassured the caregiver and patient is ready for discharge. Labs Reviewed   POCT GLUCOSE     CT ABDOMEN PELVIS WO CONTRAST Additional Contrast? None   Final Result   1. Limited due to motion. 2. Balloon tip of PEG tube is located in the stomach. 3. No obvious inflammatory changes or loculated fluid collections to suggest   abscess within soft tissue of ventral abdominal wall surrounding the PEG tube. 4. Diverticulosis. 5. Stable left ovarian dermoid cyst.           3:38 PM EDT  I have spoken with the caretaker and discussed todays results, in addition to providing specific details for the plan of care and counseling regarding the diagnosis and prognosis. Their questions are answered at this time and they are agreeable with the plan. I discussed at length with them reasons for immediate return here for re evaluation. They will followup with their and the on call primary care physician by calling their office tomorrow and on Monday.    --------------------------------- ADDITIONAL PROVIDER NOTES ---------------------------------  At this time the patient is without objective evidence of an acute process requiring hospitalization or inpatient management. They have remained hemodynamically stable throughout their entire ED visit and are stable for discharge with outpatient follow-up. The plan has been discussed in detail and they are aware of the specific conditions for emergent return, as well as the importance of follow-up. New Prescriptions    No medications on file       Diagnosis:  1. Condition not found        Disposition:  Patient's disposition: Discharge to home  Patient's condition is stable. Patient was seen and evaluated by myself and my attending No att. providers found. Assessment and Plan discussed with attending provider, please see attestation for final plan of care.      MD Haile Marion MD  Resident  09/19/22 4764

## 2022-09-19 NOTE — ED NOTES
Called [facility] and spoke to [facility] to update on patient condition and ETA.      Sean Diaz RN  09/19/22 9443

## 2022-09-19 NOTE — ED NOTES
Patient presents to the ED via EMS for peg tube complications per home health nurse. Patient has a history of MRDD and is nonverbal, all information was provided by her home health nurse. Home health nurse states that while pulling back from peg tube, there was dark, foul-smelling fluid and her family doctor advised them to report to the ED.       Tremaine Womack RN  09/19/22 9890

## 2022-09-29 ENCOUNTER — PROCEDURE VISIT (OUTPATIENT)
Dept: PODIATRY | Age: 67
End: 2022-09-29
Payer: MEDICARE

## 2022-09-29 VITALS — HEIGHT: 62 IN | WEIGHT: 140 LBS | BODY MASS INDEX: 25.76 KG/M2

## 2022-09-29 DIAGNOSIS — B35.1 TINEA UNGUIUM: Primary | ICD-10-CM

## 2022-09-29 DIAGNOSIS — M79.675 PAIN IN LEFT TOE(S): ICD-10-CM

## 2022-09-29 DIAGNOSIS — F79 INTELLECTUAL DISABILITY: ICD-10-CM

## 2022-09-29 DIAGNOSIS — R29.898 ANKLE WEAKNESS: ICD-10-CM

## 2022-09-29 DIAGNOSIS — I73.9 PERIPHERAL VASCULAR DISEASE, UNSPECIFIED (HCC): ICD-10-CM

## 2022-09-29 DIAGNOSIS — M79.674 PAIN IN TOE OF RIGHT FOOT: ICD-10-CM

## 2022-09-29 DIAGNOSIS — R26.2 DIFFICULTY WALKING: ICD-10-CM

## 2022-09-29 PROCEDURE — 11721 DEBRIDE NAIL 6 OR MORE: CPT | Performed by: PODIATRIST

## 2022-09-29 NOTE — PROGRESS NOTES
Ld Pugh  Return Patient    Chief Complaint   Patient presents with    Nail Problem     Patient is here for nail care. Catie Guzmán DO 9/15/2022  Electronically signed by Ericka Herzog LPN on 4/53/6156 at 1:92 AM           Subjective: This Khris Mo comes to office for foot and nail care. Pt currently has complaint of thickened, painful, elongated nails that he/she cannot manage by themselves. Pt. Relates pain to nails with shoe gear. Pt's primary care physician is Catie Guzmán DO. Patient is seen with caregiver  Past Medical History:   Diagnosis Date    Acute kidney injury ([facility] [address] 01/15/2017    d/t Vancomycin, on Dialysis [facility] right chest    Blind in both eyes     Essential hypertension 4/2/2021    Gastroesophageal reflux disease without esophagitis 4/2/2021    Hemodialysis patient [facility])     Hyperthyroidism     MR (mental retardation)     Osteoarthritis     Peripheral vascular disease ([facility] [address]     Pneumonia 01/06/2017    Pneumonia due to infectious organism 1/8/2017    Sepsis ([facility] [address] 3/4/2021       No Known Allergies  Current Outpatient Medications on File Prior to Visit   Medication Sig Dispense Refill    docusate (COLACE) 50 MG/5ML liquid TAKE 2 TEASPOONSFUL (10ML) VIA G-TUBE TWICE A DAY (PLUG/SYR) 600 mL 0    montelukast (SINGULAIR) 10 MG tablet TAKE 1 TABLET VIA G-TUBE AT BEDTIME. 90 tablet 1    PARoxetine (PAXIL) 10 MG tablet TAKE 1 TABLET VIA G-TUBE TWICE A  tablet 1    folic acid (FOLVITE) 1 MG tablet TAKE 1 TABLET VIA G-TUBE DAILY 90 tablet 1    levothyroxine (SYNTHROID) 150 MCG tablet [email or web address] 1 TABLET VIA G-TUBE ONCE DAILY 90 tablet 1    bumetanide (BUMEX) 1 MG tablet TAKE 1 TABLET VIA G-TUBE DAILY AS NEEDED FOR SWELLING. 30 tablet 5    midodrine (PROAMATINE) 2.5 MG tablet TAKE 3 TABLET VIA G-TUBE THREE TIMES DAILY AS NEEDED. (HYPOTENSION, GIVE ONLY IF SYSTOLIC BP IS <464. (BP CHECKS 3 TIMES A DAY.  252 tablet 0    NATURAL VITAMIN D-3 125 MCG (5000 UT) TABS tablet TAKE 1 TABLET VIA G-TUBE ONCE DAILY 90 tablet 1    FEROSUL 325 (65 Fe) MG tablet TAKE 1 TABLET VIA G-TUBE ONCE A DAY. 90 tablet 1    pantoprazole sodium (PROTONIX) 40 MG PACK packet 40 mg by Per G Tube route every morning (before breakfast)      Disposable Gloves (POWDER FREE NITRILE GLOVES MED) MISC As Directed 2 each 5    albuterol (PROVENTIL) (2.5 MG/3ML) 0.083% nebulizer solution Take 3 mLs by nebulization every 6 hours as needed for Wheezing 120 each 3    albuterol (PROVENTIL) (2.5 MG/3ML) 0.083% nebulizer solution Take 3 mLs by nebulization every 6 hours 120 each 3    Respiratory Therapy Supplies (FULL KIT NEBULIZER SET) MISC Use as directed with nebulized medication. 1 each 0    acetaminophen (TYLENOL) 325 MG tablet 650 mg by Per G Tube route every 4 hours as needed for Pain or Fever      medicated lip balm (BLISTEX/CARMEX) 2-2.5-6.6 % STCK Apply topically as needed for Dry Lips (CRACKED LIPS)      bisacodyl (DULCOLAX) 10 MG suppository Place 10 mg rectally daily as needed for Constipation (NO BM FOR 3 DAYS)       SUNSCREENS EX Apply topically as needed (SUNBURN PREVENTION) Indications: *SPF 50*      Dextromethorphan-guaiFENesin  MG/5ML SYRP 10 mLs by Per G Tube route every 4 hours as needed for Cough (CONGESTION)      Neomycin-Bacitracin-Polymyxin (TRIPLE ANTIBIOTIC) OINT Apply topically 2 times daily as needed (CUTS/SCRAPES/SKIN ABRASIONS)      OLANZapine zydis (ZYPREXA) 5 MG disintegrating tablet Take 20 mg by mouth every morning Indications: VIA G-TUBE *TAKE ALONG WITH 15MG=20MG*  *SEE OTHER ORDER*      OLANZapine zydis (ZYPREXA) 15 MG disintegrating tablet 20 mg every morning Indications: VIA G-TUBE *TAKE ALONG WITH 5MG=20MG*  *SEE OTHER ORDER*      OLANZapine (ZYPREXA) 20 MG tablet 20 mg by Per G Tube route nightly      LORazepam (ATIVAN) 2 MG tablet 2 mg by Per G Tube route as needed (2HRS BEFORE DENTAL APPT, TAKE 2ND DOSE TO DENTAL APPT).        valproic acid (DEPACON) 250 MG/5ML SOLN oral solution 20 mLs by Per G Tube route 2 times daily 300 mL 0    polyethylene glycol (GLYCOLAX) 17 g packet 17 g by Per G Tube route daily 527 g 1    famotidine (PEPCID) 20 MG tablet TAKE (1) TABLET VIA PEG TUBE TWO TIMES DAILY. 180 tablet 1    Gauze Pads & Dressings 2\"X2\" PADS 1 each by Does not apply route in the morning and at bedtime Apply to stoma 10 each 5    [DISCONTINUED] NATURAL VITAMIN D-3 125 MCG (5000 UT) TABS tablet TAKE 1 TABLET VIA G-TUBE ONCE DAILY 28 tablet 0     No current facility-administered medications on file prior to visit. Review of Systems  Objective:  General: AAO x 3 in NAD. Derm  Toenail Description  Sites of Onychomycosis Involvement (Check affected area)  [x] [x] [x] [x] [x] [x] [x] [x] [x] [x]  5 4 3 2 1 1 2 3 4 5                          Right                                        Left    Thickness  [x] [x] [x] [x] [x] [x] [x] [x] [x] [x]  5 4 3 2 1 1 2 3 4 5                         Right                                        Left    Dystrophic Changes   [x] [x] [x] [x] [x] [x] [x] [x] [x] [x]  5 4 3 2 1 1 2 3 4 5                         Right                                        Left    Color  [x] [x] [x] [x] [x] [x] [x] [x] [x] [x]  5 4 3 2 1 1 2 3 4 5                          Right                                        Left    Incurvation/Ingrowin   [x] [x] [x] [x] [x] [x] [x] [x] [x] [x]  5 4 3 2 1 1 2 3 4 5                         Right                                        Left    Inflammation/Pain   [x] [x] [x] [x] [x] [x] [x] [x] [x] [x]  5 4 3  2 1 1 2 3 4 5                         Right                                        Left      Nails that are described above are all elongated thickened pitting mycotic yellowish incurvated causing pain with both shoe gear.  Palpation nails greater then 3 mm thick painful       Dermatologic Exam:hair loss noted  lower extremity    Skin lesion/ulceration   Skin   Callus   Musculoskeletal:     1st MPJ ROM normal, Bilateral.  Muscle strength 5/5, Bilateral.  Pain present upon palpation of toenails   Bilateral., Bilateral.  Ankle ROM normal,Bilateral.    Dorsally contracted digits , Bilateral.     Vascular: There are class B findings absent posterior tibialis pulses lack of hair growth thickened nails discoloration skin is discolored skin is shiny cool to touch to toes    Neurological:  Sensation present to light touch to level of digits, Bilateral    Foot Exam    General  General Appearance: appears stated age and healthy   Assistance: wheelchair use       Right Foot/Ankle     Inspection and Palpation  Skin Exam: skin changes and abnormal color; Neurovascular  Dorsalis pedis: 1+  Posterior tibial: absent    Muscle Strength  Ankle dorsiflexion: 1  Ankle plantar flexion: 1      Left Foot/Ankle      Inspection and Palpation  Skin Exam: skin changes and abnormal color; Neurovascular  Dorsalis pedis: 1+  Posterior tibial: absent    Muscle Strength  Ankle dorsiflexion: 1  Ankle plantar flexion: 1    Range of Motion    Normal left ankle ROM         Right Ankle Exam   Right ankle exam is normal.  Swelling: mild    Muscle Strength   Dorsiflexion:  1/5  Plantar flexion:  1/5  Anterior tibial:  1/5  Posterior tibial:  1/5  Gastrocsoleus:  1/5  Peroneal muscle:  1/5    Tests   Anterior drawer: physiological laxity  Varus tilt: physiological laxity       Left Ankle Exam   Left ankle exam is normal.  Swelling: mild    Range of Motion   The patient has normal left ankle ROM. Muscle Strength   Dorsiflexion:  1/5   Plantar flexion:  1/5   Anterior tibial:  1/5   Gastrocsoleus:  1/5  Peroneal muscle:  1/5    Tests   Anterior drawer: physiological laxity  Varus tilt: physiological laxity        Q7   []Yes    []No                Q8   [x]Yes    []No                     Q9   []Yes    []No  Assessment:  75 y.o. female with:   1. Tinea unguium    2. Pain in left toe(s)    3. Pain in toe of right foot    4. Difficulty walking    5.  Peripheral vascular disease, unspecified ([facility] [address]    6. Ankle weakness    7. Mental retardation     No orders of the defined types were placed in this encounter. Plan: .  Pt was evaluated and examined. Patient was given personalized discharge instructions. Nails 1-10 were debrided in length and thickness sharply with a nail nipper and  without incident. Pt will follow up in 9 weeks or sooner if any problems arise. Diagnosis was discussed with the pt and all of their questions were answered in detail. Proper foot hygiene and care was discussed with the pt. Patient to check feet daily and contact the office with any questions/problems/concerns. Other comorbidity noted and will be managed by PCP. Pain waiver discussed with patient and confirmed.    9/29/2022      Electronically signed by Daniel Ibanez DPM on 9/29/2022 at 9:38 AM  9/29/2022

## 2022-09-30 ENCOUNTER — APPOINTMENT (OUTPATIENT)
Dept: CT IMAGING | Age: 67
DRG: 392 | End: 2022-09-30
Payer: MEDICARE

## 2022-09-30 ENCOUNTER — APPOINTMENT (OUTPATIENT)
Dept: GENERAL RADIOLOGY | Age: 67
DRG: 392 | End: 2022-09-30
Payer: MEDICARE

## 2022-09-30 ENCOUNTER — HOSPITAL ENCOUNTER (INPATIENT)
Age: 67
LOS: 3 days | Discharge: HOME | DRG: 392 | End: 2022-10-03
Attending: EMERGENCY MEDICINE | Admitting: INTERNAL MEDICINE
Payer: MEDICARE

## 2022-09-30 DIAGNOSIS — R11.2 INTRACTABLE VOMITING WITH NAUSEA, UNSPECIFIED VOMITING TYPE: Primary | ICD-10-CM

## 2022-09-30 LAB
ALBUMIN SERPL-MCNC: 3.6 G/DL (ref 3.5–5.2)
ALP BLD-CCNC: 61 U/L (ref 35–104)
ALT SERPL-CCNC: 10 U/L (ref 0–32)
ANION GAP SERPL CALCULATED.3IONS-SCNC: 8 MMOL/L (ref 7–16)
AST SERPL-CCNC: 21 U/L (ref 0–31)
BACTERIA: ABNORMAL /HPF
BASOPHILIC STIPPLING: ABNORMAL
BASOPHILS ABSOLUTE: 0.14 E9/L (ref 0–0.2)
BASOPHILS RELATIVE PERCENT: 0.9 % (ref 0–2)
BILIRUB SERPL-MCNC: 0.3 MG/DL (ref 0–1.2)
BILIRUBIN URINE: NEGATIVE
BLOOD, URINE: NEGATIVE
BUN BLDV-MCNC: 21 MG/DL (ref 6–23)
CALCIUM SERPL-MCNC: 10.3 MG/DL (ref 8.6–10.2)
CHLORIDE BLD-SCNC: 90 MMOL/L (ref 98–107)
CLARITY: CLEAR
CO2: 28 MMOL/L (ref 22–29)
COLOR: YELLOW
CREAT SERPL-MCNC: 0.6 MG/DL (ref 0.5–1)
EOSINOPHILS ABSOLUTE: 0 E9/L (ref 0.05–0.5)
EOSINOPHILS RELATIVE PERCENT: 0 % (ref 0–6)
GFR AFRICAN AMERICAN: >60
GFR NON-AFRICAN AMERICAN: >60 ML/MIN/1.73
GLUCOSE BLD-MCNC: 219 MG/DL (ref 74–99)
GLUCOSE URINE: 250 MG/DL
HCT VFR BLD CALC: 37.8 % (ref 34–48)
HEMOGLOBIN: 12.6 G/DL (ref 11.5–15.5)
KETONES, URINE: ABNORMAL MG/DL
LACTIC ACID: 1.9 MMOL/L (ref 0.5–2.2)
LEUKOCYTE ESTERASE, URINE: NEGATIVE
LIPASE: 49 U/L (ref 13–60)
LYMPHOCYTES ABSOLUTE: 0.45 E9/L (ref 1.5–4)
LYMPHOCYTES RELATIVE PERCENT: 2.6 % (ref 20–42)
MCH RBC QN AUTO: 32 PG (ref 26–35)
MCHC RBC AUTO-ENTMCNC: 33.3 % (ref 32–34.5)
MCV RBC AUTO: 95.9 FL (ref 80–99.9)
METAMYELOCYTES RELATIVE PERCENT: 0.9 % (ref 0–1)
MONOCYTES ABSOLUTE: 1.51 E9/L (ref 0.1–0.95)
MONOCYTES RELATIVE PERCENT: 9.5 % (ref 2–12)
NEUTROPHILS ABSOLUTE: 13.14 E9/L (ref 1.8–7.3)
NEUTROPHILS RELATIVE PERCENT: 86.1 % (ref 43–80)
NITRITE, URINE: NEGATIVE
NUCLEATED RED BLOOD CELLS: 0 /100 WBC
PDW BLD-RTO: 12.1 FL (ref 11.5–15)
PH UA: 7 (ref 5–9)
PLATELET # BLD: 250 E9/L (ref 130–450)
PMV BLD AUTO: 10.6 FL (ref 7–12)
POTASSIUM REFLEX MAGNESIUM: 4.8 MMOL/L (ref 3.5–5)
PROTEIN UA: ABNORMAL MG/DL
RBC # BLD: 3.94 E12/L (ref 3.5–5.5)
RBC UA: ABNORMAL /HPF (ref 0–2)
SARS-COV-2, NAAT: NOT DETECTED
SODIUM BLD-SCNC: 126 MMOL/L (ref 132–146)
SPECIFIC GRAVITY UA: 1.01 (ref 1–1.03)
TOTAL PROTEIN: 7.7 G/DL (ref 6.4–8.3)
TROPONIN, HIGH SENSITIVITY: 13 NG/L (ref 0–9)
TROPONIN, HIGH SENSITIVITY: 15 NG/L (ref 0–9)
UROBILINOGEN, URINE: 1 E.U./DL
VACUOLATED NEUTROPHILS: ABNORMAL
WBC # BLD: 15.1 E9/L (ref 4.5–11.5)
WBC UA: ABNORMAL /HPF (ref 0–5)

## 2022-09-30 PROCEDURE — 96361 HYDRATE IV INFUSION ADD-ON: CPT

## 2022-09-30 PROCEDURE — 1200000000 HC SEMI PRIVATE

## 2022-09-30 PROCEDURE — 81001 URINALYSIS AUTO W/SCOPE: CPT

## 2022-09-30 PROCEDURE — 83690 ASSAY OF LIPASE: CPT

## 2022-09-30 PROCEDURE — 83605 ASSAY OF LACTIC ACID: CPT

## 2022-09-30 PROCEDURE — 2580000003 HC RX 258

## 2022-09-30 PROCEDURE — 6360000004 HC RX CONTRAST MEDICATION: Performed by: RADIOLOGY

## 2022-09-30 PROCEDURE — 6360000002 HC RX W HCPCS

## 2022-09-30 PROCEDURE — 74177 CT ABD & PELVIS W/CONTRAST: CPT

## 2022-09-30 PROCEDURE — 99285 EMERGENCY DEPT VISIT HI MDM: CPT

## 2022-09-30 PROCEDURE — 87635 SARS-COV-2 COVID-19 AMP PRB: CPT

## 2022-09-30 PROCEDURE — 93005 ELECTROCARDIOGRAM TRACING: CPT

## 2022-09-30 PROCEDURE — 84484 ASSAY OF TROPONIN QUANT: CPT

## 2022-09-30 PROCEDURE — 85025 COMPLETE CBC W/AUTO DIFF WBC: CPT

## 2022-09-30 PROCEDURE — 80053 COMPREHEN METABOLIC PANEL: CPT

## 2022-09-30 PROCEDURE — 96374 THER/PROPH/DIAG INJ IV PUSH: CPT

## 2022-09-30 PROCEDURE — 71045 X-RAY EXAM CHEST 1 VIEW: CPT

## 2022-09-30 RX ORDER — ONDANSETRON 2 MG/ML
4 INJECTION INTRAMUSCULAR; INTRAVENOUS ONCE
Status: COMPLETED | OUTPATIENT
Start: 2022-09-30 | End: 2022-09-30

## 2022-09-30 RX ORDER — 0.9 % SODIUM CHLORIDE 0.9 %
1000 INTRAVENOUS SOLUTION INTRAVENOUS ONCE
Status: COMPLETED | OUTPATIENT
Start: 2022-09-30 | End: 2022-10-01

## 2022-09-30 RX ORDER — ONDANSETRON 2 MG/ML
4 INJECTION INTRAMUSCULAR; INTRAVENOUS ONCE
Status: COMPLETED | OUTPATIENT
Start: 2022-09-30 | End: 2022-10-01

## 2022-09-30 RX ORDER — ONDANSETRON 2 MG/ML
4 INJECTION INTRAMUSCULAR; INTRAVENOUS EVERY 6 HOURS PRN
Status: DISCONTINUED | OUTPATIENT
Start: 2022-09-30 | End: 2022-09-30

## 2022-09-30 RX ADMIN — SODIUM CHLORIDE 1000 ML: 9 INJECTION, SOLUTION INTRAVENOUS at 20:52

## 2022-09-30 RX ADMIN — ONDANSETRON 4 MG: 2 INJECTION INTRAMUSCULAR; INTRAVENOUS at 20:51

## 2022-09-30 RX ADMIN — IOPAMIDOL 70 ML: 755 INJECTION, SOLUTION INTRAVENOUS at 22:32

## 2022-09-30 ASSESSMENT — ENCOUNTER SYMPTOMS
VOMITING: 1
CONSTIPATION: 0
SHORTNESS OF BREATH: 0
COUGH: 0
SINUS PRESSURE: 0
BLOOD IN STOOL: 0
ABDOMINAL PAIN: 0
CHEST TIGHTNESS: 0
NAUSEA: 1
DIARRHEA: 0

## 2022-09-30 ASSESSMENT — PAIN - FUNCTIONAL ASSESSMENT: PAIN_FUNCTIONAL_ASSESSMENT: NONE - DENIES PAIN

## 2022-10-01 LAB
ANION GAP SERPL CALCULATED.3IONS-SCNC: 6 MMOL/L (ref 7–16)
BASOPHILS ABSOLUTE: 0.01 E9/L (ref 0–0.2)
BASOPHILS RELATIVE PERCENT: 0.1 % (ref 0–2)
BUN BLDV-MCNC: 15 MG/DL (ref 6–23)
CALCIUM SERPL-MCNC: 9.3 MG/DL (ref 8.6–10.2)
CHLORIDE BLD-SCNC: 99 MMOL/L (ref 98–107)
CO2: 27 MMOL/L (ref 22–29)
CREAT SERPL-MCNC: 0.6 MG/DL (ref 0.5–1)
EOSINOPHILS ABSOLUTE: 0.04 E9/L (ref 0.05–0.5)
EOSINOPHILS RELATIVE PERCENT: 0.3 % (ref 0–6)
GFR AFRICAN AMERICAN: >60
GFR NON-AFRICAN AMERICAN: >60 ML/MIN/1.73
GLUCOSE BLD-MCNC: 88 MG/DL (ref 74–99)
HCT VFR BLD CALC: 33 % (ref 34–48)
HEMOGLOBIN: 10.8 G/DL (ref 11.5–15.5)
IMMATURE GRANULOCYTES #: 0.06 E9/L
IMMATURE GRANULOCYTES %: 0.5 % (ref 0–5)
INFLUENZA A BY PCR: NOT DETECTED
INFLUENZA B BY PCR: NOT DETECTED
LYMPHOCYTES ABSOLUTE: 1.94 E9/L (ref 1.5–4)
LYMPHOCYTES RELATIVE PERCENT: 16.2 % (ref 20–42)
MCH RBC QN AUTO: 32.3 PG (ref 26–35)
MCHC RBC AUTO-ENTMCNC: 32.7 % (ref 32–34.5)
MCV RBC AUTO: 98.8 FL (ref 80–99.9)
MONOCYTES ABSOLUTE: 1.21 E9/L (ref 0.1–0.95)
MONOCYTES RELATIVE PERCENT: 10.1 % (ref 2–12)
NEUTROPHILS ABSOLUTE: 8.73 E9/L (ref 1.8–7.3)
NEUTROPHILS RELATIVE PERCENT: 72.8 % (ref 43–80)
PDW BLD-RTO: 12.7 FL (ref 11.5–15)
PLATELET # BLD: 227 E9/L (ref 130–450)
PMV BLD AUTO: 10.3 FL (ref 7–12)
POTASSIUM SERPL-SCNC: 4 MMOL/L (ref 3.5–5)
RBC # BLD: 3.34 E12/L (ref 3.5–5.5)
SODIUM BLD-SCNC: 132 MMOL/L (ref 132–146)
WBC # BLD: 12 E9/L (ref 4.5–11.5)

## 2022-10-01 PROCEDURE — 85025 COMPLETE CBC W/AUTO DIFF WBC: CPT

## 2022-10-01 PROCEDURE — A4216 STERILE WATER/SALINE, 10 ML: HCPCS | Performed by: INTERNAL MEDICINE

## 2022-10-01 PROCEDURE — 80048 BASIC METABOLIC PNL TOTAL CA: CPT

## 2022-10-01 PROCEDURE — 36415 COLL VENOUS BLD VENIPUNCTURE: CPT

## 2022-10-01 PROCEDURE — C9113 INJ PANTOPRAZOLE SODIUM, VIA: HCPCS | Performed by: INTERNAL MEDICINE

## 2022-10-01 PROCEDURE — 2580000003 HC RX 258: Performed by: EMERGENCY MEDICINE

## 2022-10-01 PROCEDURE — 2500000003 HC RX 250 WO HCPCS: Performed by: EMERGENCY MEDICINE

## 2022-10-01 PROCEDURE — 6370000000 HC RX 637 (ALT 250 FOR IP): Performed by: INTERNAL MEDICINE

## 2022-10-01 PROCEDURE — A4216 STERILE WATER/SALINE, 10 ML: HCPCS | Performed by: EMERGENCY MEDICINE

## 2022-10-01 PROCEDURE — 87502 INFLUENZA DNA AMP PROBE: CPT

## 2022-10-01 PROCEDURE — 2580000003 HC RX 258: Performed by: INTERNAL MEDICINE

## 2022-10-01 PROCEDURE — 99222 1ST HOSP IP/OBS MODERATE 55: CPT | Performed by: INTERNAL MEDICINE

## 2022-10-01 PROCEDURE — 6360000002 HC RX W HCPCS: Performed by: INTERNAL MEDICINE

## 2022-10-01 PROCEDURE — 1200000000 HC SEMI PRIVATE

## 2022-10-01 PROCEDURE — 6360000002 HC RX W HCPCS: Performed by: EMERGENCY MEDICINE

## 2022-10-01 RX ORDER — ENOXAPARIN SODIUM 100 MG/ML
40 INJECTION SUBCUTANEOUS DAILY
Status: DISCONTINUED | OUTPATIENT
Start: 2022-10-01 | End: 2022-10-03 | Stop reason: HOSPADM

## 2022-10-01 RX ORDER — LEVOTHYROXINE SODIUM 0.07 MG/1
150 TABLET ORAL DAILY
Status: DISCONTINUED | OUTPATIENT
Start: 2022-10-01 | End: 2022-10-03 | Stop reason: HOSPADM

## 2022-10-01 RX ORDER — SODIUM CHLORIDE 0.9 % (FLUSH) 0.9 %
5-40 SYRINGE (ML) INJECTION EVERY 12 HOURS SCHEDULED
Status: DISCONTINUED | OUTPATIENT
Start: 2022-10-01 | End: 2022-10-03 | Stop reason: HOSPADM

## 2022-10-01 RX ORDER — SODIUM CHLORIDE 9 MG/ML
INJECTION, SOLUTION INTRAVENOUS CONTINUOUS
Status: DISCONTINUED | OUTPATIENT
Start: 2022-10-01 | End: 2022-10-02

## 2022-10-01 RX ORDER — SODIUM CHLORIDE 9 MG/ML
INJECTION, SOLUTION INTRAVENOUS PRN
Status: DISCONTINUED | OUTPATIENT
Start: 2022-10-01 | End: 2022-10-03 | Stop reason: HOSPADM

## 2022-10-01 RX ORDER — ONDANSETRON 2 MG/ML
4 INJECTION INTRAMUSCULAR; INTRAVENOUS EVERY 6 HOURS PRN
Status: DISCONTINUED | OUTPATIENT
Start: 2022-10-01 | End: 2022-10-03 | Stop reason: HOSPADM

## 2022-10-01 RX ORDER — VALPROIC ACID 250 MG/5ML
1000 SOLUTION ORAL 2 TIMES DAILY
Status: DISCONTINUED | OUTPATIENT
Start: 2022-10-01 | End: 2022-10-03 | Stop reason: HOSPADM

## 2022-10-01 RX ORDER — ACETAMINOPHEN 325 MG/1
650 TABLET ORAL EVERY 6 HOURS PRN
Status: DISCONTINUED | OUTPATIENT
Start: 2022-10-01 | End: 2022-10-03 | Stop reason: HOSPADM

## 2022-10-01 RX ORDER — ONDANSETRON 4 MG/1
4 TABLET, ORALLY DISINTEGRATING ORAL EVERY 8 HOURS PRN
Status: DISCONTINUED | OUTPATIENT
Start: 2022-10-01 | End: 2022-10-03 | Stop reason: HOSPADM

## 2022-10-01 RX ORDER — ACETAMINOPHEN 650 MG/1
650 SUPPOSITORY RECTAL EVERY 6 HOURS PRN
Status: DISCONTINUED | OUTPATIENT
Start: 2022-10-01 | End: 2022-10-03 | Stop reason: HOSPADM

## 2022-10-01 RX ORDER — POLYETHYLENE GLYCOL 3350 17 G/17G
17 POWDER, FOR SOLUTION ORAL DAILY PRN
Status: DISCONTINUED | OUTPATIENT
Start: 2022-10-01 | End: 2022-10-03 | Stop reason: HOSPADM

## 2022-10-01 RX ORDER — SODIUM CHLORIDE 0.9 % (FLUSH) 0.9 %
5-40 SYRINGE (ML) INJECTION PRN
Status: DISCONTINUED | OUTPATIENT
Start: 2022-10-01 | End: 2022-10-03 | Stop reason: HOSPADM

## 2022-10-01 RX ADMIN — Medication 10 ML: at 20:34

## 2022-10-01 RX ADMIN — VALPROIC ACID 1000 MG: 250 SOLUTION ORAL at 20:34

## 2022-10-01 RX ADMIN — ONDANSETRON 4 MG: 2 INJECTION INTRAMUSCULAR; INTRAVENOUS at 00:36

## 2022-10-01 RX ADMIN — SODIUM CHLORIDE: 9 INJECTION, SOLUTION INTRAVENOUS at 03:15

## 2022-10-01 RX ADMIN — FAMOTIDINE 20 MG: 10 INJECTION, SOLUTION INTRAVENOUS at 00:33

## 2022-10-01 RX ADMIN — ACETAMINOPHEN 650 MG: 325 TABLET ORAL at 05:22

## 2022-10-01 RX ADMIN — VALPROIC ACID 1000 MG: 250 SOLUTION ORAL at 09:13

## 2022-10-01 RX ADMIN — SODIUM CHLORIDE 40 MG: 9 INJECTION, SOLUTION INTRAMUSCULAR; INTRAVENOUS; SUBCUTANEOUS at 09:13

## 2022-10-01 RX ADMIN — LEVOTHYROXINE SODIUM 150 MCG: 75 TABLET ORAL at 05:23

## 2022-10-01 RX ADMIN — ENOXAPARIN SODIUM 40 MG: 100 INJECTION SUBCUTANEOUS at 09:14

## 2022-10-01 RX ADMIN — VALPROIC ACID 1000 MG: 250 SOLUTION ORAL at 03:00

## 2022-10-01 RX ADMIN — SODIUM CHLORIDE: 9 INJECTION, SOLUTION INTRAVENOUS at 15:01

## 2022-10-01 ASSESSMENT — PAIN - FUNCTIONAL ASSESSMENT: PAIN_FUNCTIONAL_ASSESSMENT: ACTIVITIES ARE NOT PREVENTED

## 2022-10-01 ASSESSMENT — PAIN DESCRIPTION - LOCATION: LOCATION: GENERALIZED

## 2022-10-01 ASSESSMENT — LIFESTYLE VARIABLES
HOW MANY STANDARD DRINKS CONTAINING ALCOHOL DO YOU HAVE ON A TYPICAL DAY: PATIENT DOES NOT DRINK
HOW OFTEN DO YOU HAVE A DRINK CONTAINING ALCOHOL: NEVER

## 2022-10-01 ASSESSMENT — PAIN DESCRIPTION - DESCRIPTORS: DESCRIPTORS: ACHING

## 2022-10-01 NOTE — PROGRESS NOTES
RN spoke with staff member Mireya Loera from [facility]. Gave update and plan of care.     Electronically signed by Helena Thao RN on 10/1/2022 at 2:27 PM

## 2022-10-01 NOTE — PLAN OF CARE
75year old female presented to [facility] ED from SNF with complaints of multiple episodes of emesis and concern for dehydration. Beginning 9/30 noon feeding through PEG unable to tolerate. Na+126 Glucose 2199. HS troponin 13>15. WBC 15.1. COVID-19 negative. UA negative. CXR no acute process. CT A/P Large hiatal hernia/small intra vesicular gas. Small left adnexal cyst. She received 1L NSS bolus and antiemetics in ED course. Decision to admit pt for further evaluation and treatment. Pt seen earlier this morning by admitting physician    Intractable Vomiting- No noted emesis this morning per nursing. NPO. CT A/P Large hiatal hernia/small intra vesicular gas. Small left adnexal cyst. She received 1L NSS bolus and antiemetics in ED course. Continue antiemetics. Hyponatremia- Chronic/Volume? Na+ 126. Received 1L NSS bolus in ED course. IVF Hydration. Follow closely. Large Hiatal Hernia- CT A/P Large hiatal hernia/small intra vesicular gas. Leukocytosis- WBC 15.1. UA Negative. IVF Hydration. Monitor   MRDD/Dementia- Supportive care. GERD- Continue Pantoprazole. Thyroid Disease- Continue Levothyroxine. Hx Dysphagia- S/P PEG placement per Dr. Thanh Chavez 3/2021. Typically receives Jevity 1.2 240mL every four hours with water flushes. Currently NPO. Consult Dietician for recommendations.

## 2022-10-01 NOTE — PROGRESS NOTES
Patient mentally retarded and non-verbal. Arrived to the unit with no caregiver or anyone to answer questions or verify her medications. Patient from [facility]. Called and left message for her legal guardian listed on the chart, Vinay Virgen. Then called and spoke with residential on-call team for [facility] at [phone].  said they will message someone who would know what medications she takes and have them return the call.

## 2022-10-01 NOTE — ED PROVIDER NOTES
HPI   69-year-old female presents emergency department from nursing facility with past medical history for hypothyroidism, hyperglycemia, GERD, and G-tube who is coming in today for multiple episodes of emesis and concern for dehydration. Nursing aide present with patient provides history for her and states that since about noon today she has not been able to keep any food or fluids down that have been administered from the G-tube and that the last episode of emesis appeared to be bilious. Nursing aide states that she does not feel like patient is receiving appropriate care at outside care facility and feels that she needs hydration and inpatient care. Patient has not had episodes similar to this in the past.  She is not complaining of anything else at this time. Review of Systems   Constitutional:  Positive for fatigue. Negative for chills and fever. HENT:  Negative for congestion, sinus pressure and tinnitus. Respiratory:  Negative for cough, chest tightness and shortness of breath. Cardiovascular:  Negative for chest pain and leg swelling. Gastrointestinal:  Positive for nausea and vomiting. Negative for abdominal pain, blood in stool, constipation and diarrhea. Endocrine: Negative for polydipsia and polyphagia. Genitourinary:  Negative for difficulty urinating and frequency. Skin:  Negative for rash and wound. Neurological:  Negative for weakness, light-headedness, numbness and headaches. Psychiatric/Behavioral:  Negative for agitation and confusion. Physical Exam  Vitals and nursing note reviewed. Constitutional:       General: She is not in acute distress. Appearance: Normal appearance. She is ill-appearing. She is not toxic-appearing or diaphoretic. HENT:      Head: Normocephalic and atraumatic. Mouth/Throat:      Mouth: Mucous membranes are dry. Cardiovascular:      Rate and Rhythm: Normal rate and regular rhythm. Pulmonary:      Breath sounds:  No wheezing, rhonchi or rales. Abdominal:      General: Bowel sounds are normal.      Palpations: Abdomen is soft. Tenderness: There is no abdominal tenderness. There is no guarding or rebound. Comments: PEG tube in place   Musculoskeletal:         General: No swelling, tenderness, deformity or signs of injury. Cervical back: No rigidity or tenderness. Right lower leg: No edema. Left lower leg: No edema. Skin:     General: Skin is warm and dry. Capillary Refill: Capillary refill takes less than 2 seconds. Coloration: Skin is not jaundiced or pale. Findings: No bruising, erythema, lesion or rash. Neurological:      General: No focal deficit present. Mental Status: She is alert and oriented to person, place, and time. Mental status is at baseline. Cranial Nerves: No cranial nerve deficit. Sensory: No sensory deficit. Motor: No weakness. Coordination: Coordination normal.   Psychiatric:         Mood and Affect: Mood normal.         Behavior: Behavior normal.      EKG: Sinus tachycardia with a rate of 110 bpm with left axis deviation noted. QTC of 441. This EKG appears to be unchanged from prior EKG on 8/15/2022. Procedures     MDM  58-year-old female presents to the emergency department with chief complaint of nausea vomiting and fatigue since this morning. Laboratory studies for CBC, CMP, lipase, lactic acid, EKG with troponin, urinalysis in addition imaging studies for CT abdomen pelvis and chest x-ray. Chemistry returns demonstrating hyponatremia of 126 and a glucose of 219. Initial troponin returned at 13 with a repeat of 15 demonstrating low likelihood of cardiac event at this time. Patient has leukocytosis of 15.1 WBCs and is negative for COVID-19 infection.   Urinalysis does not demonstrate any signs of urinary tract infection, chest x-ray shows no acute processes, and CT abdomen pelvis demonstrates a large hiatal hernia, and small intra vesicular gas which could reflect recent instrumentation. Incidental finding for small left adnexal cyst that does not need intervention based on size is noted. Patient has received 1 L of fluids and 2 doses of 4 mg Zofran throughout the course of the stay due to her nausea and vomiting and is still having persistent nausea and vomiting. With this being said, decision made to admit patient to the hospital for further medical management. Spoke with Dr. Lluvia Post who is willing to accept the patient to the medical service. Informed patient and just present of these decisions and laboratory findings and answered all questions. Patient has remained hemodynamically stable throughout the course of the emergency department stay and is admitted to the hospital.    See ED course for additional MDM. ED Course as of 10/01/22 0855   Fri Sep 30, 2022   2000 Patient evaluated at bedside at this time. She is hemodynamically stable. [RW]   2128 Patient reevaluated at this time. [RW]      ED Course User Index  [RW] Alfa Mahan DO        ED Course as of 10/01/22 0859   Fri Sep 30, 2022   2000 Patient evaluated at bedside at this time. She is hemodynamically stable. [RW]   2128 Patient reevaluated at this time. [RW]      ED Course User Index  [RW] Alfa Mahan DO       --------------------------------------------- PAST HISTORY ---------------------------------------------  Past Medical History:  has a past medical history of Acute kidney injury ([address] Blind in both eyes, Essential hypertension, Gastroesophageal reflux disease without esophagitis, Hemodialysis patient ([facility] [address] Hyperthyroidism, MR (mental retardation), Osteoarthritis, Peripheral vascular disease ([address] Pneumonia, Pneumonia due to infectious organism, and Sepsis ([facility] [address]     Past Surgical History:  has a past surgical history that includes other surgical history (Right, 01/17/2017); other surgical history (01/25/2017); chest tube insertion (Right, 02/09/2017); bronchoscopy (02/10/2017); and Gastrostomy tube placement (N/A, 3/7/2021). Social History:  reports that she has never smoked. She has never used smokeless tobacco. She reports that she does not drink alcohol and does not use drugs. Family History: family history is not on file. The patients home medications have been reviewed. Allergies: Patient has no known allergies.     -------------------------------------------------- RESULTS -------------------------------------------------    LABS:  Results for orders placed or performed during the hospital encounter of 09/30/22   COVID-19, Rapid    Specimen: Nasopharyngeal Swab   Result Value Ref Range    SARS-CoV-2, NAAT Not Detected Not Detected   CBC with Auto Differential   Result Value Ref Range    WBC 15.1 (H) 4.5 - 11.5 E9/L    RBC 3.94 3.50 - 5.50 E12/L    Hemoglobin 12.6 11.5 - 15.5 g/dL    Hematocrit 37.8 34.0 - 48.0 %    MCV 95.9 80.0 - 99.9 fL    MCH 32.0 26.0 - 35.0 pg    MCHC 33.3 32.0 - 34.5 %    RDW 12.1 11.5 - 15.0 fL    Platelets 610 690 - 340 E9/L    MPV 10.6 7.0 - 12.0 fL    Neutrophils % 86.1 (H) 43.0 - 80.0 %    Lymphocytes % 2.6 (L) 20.0 - 42.0 %    Monocytes % 9.5 2.0 - 12.0 %    Eosinophils % 0.0 0.0 - 6.0 %    Basophils % 0.9 0.0 - 2.0 %    Neutrophils Absolute 13.14 (H) 1.80 - 7.30 E9/L    Lymphocytes Absolute 0.45 (L) 1.50 - 4.00 E9/L    Monocytes Absolute 1.51 (H) 0.10 - 0.95 E9/L    Eosinophils Absolute 0.00 (L) 0.05 - 0.50 E9/L    Basophils Absolute 0.14 0.00 - 0.20 E9/L    Metamyelocytes Relative 0.9 0.0 - 1.0 %    nRBC 0.0 /100 WBC    Vacuolated Neutrophils 1+     Basophilic Stippling 1+    Comprehensive Metabolic Panel w/ Reflex to MG   Result Value Ref Range    Sodium 126 (L) 132 - 146 mmol/L    Potassium reflex Magnesium 4.8 3.5 - 5.0 mmol/L    Chloride 90 (L) 98 - 107 mmol/L    CO2 28 22 - 29 mmol/L    Anion Gap 8 7 - 16 mmol/L    Glucose 219 (H) 74 - 99 mg/dL    BUN 21 6 - 23 mg/dL    Creatinine 0.6 0.5 - 1.0 mg/dL    GFR Non- American >60 >=60 mL/min/1.73    GFR African American >60     Calcium 10.3 (H) 8.6 - 10.2 mg/dL    Total Protein 7.7 6.4 - 8.3 g/dL    Albumin 3.6 3.5 - 5.2 g/dL    Total Bilirubin 0.3 0.0 - 1.2 mg/dL    Alkaline Phosphatase 61 35 - 104 U/L    ALT 10 0 - 32 U/L    AST 21 0 - 31 U/L   Lipase   Result Value Ref Range    Lipase 49 13 - 60 U/L   Troponin   Result Value Ref Range    Troponin, High Sensitivity 13 (H) 0 - 9 ng/L   Lactic Acid   Result Value Ref Range    Lactic Acid 1.9 0.5 - 2.2 mmol/L   Urinalysis with Microscopic   Result Value Ref Range    Color, UA Yellow Straw/Yellow    Clarity, UA Clear Clear    Glucose, Ur 250 (A) Negative mg/dL    Bilirubin Urine Negative Negative    Ketones, Urine TRACE (A) Negative mg/dL    Specific Gravity, UA 1.015 1.005 - 1.030    Blood, Urine Negative Negative    pH, UA 7.0 5.0 - 9.0    Protein, UA TRACE Negative mg/dL    Urobilinogen, Urine 1.0 <2.0 E.U./dL    Nitrite, Urine Negative Negative    Leukocyte Esterase, Urine Negative Negative    WBC, UA 0-1 0 - 5 /HPF    RBC, UA 1-3 0 - 2 /HPF    Bacteria, UA NONE SEEN None Seen /HPF   Troponin   Result Value Ref Range    Troponin, High Sensitivity 15 (H) 0 - 9 ng/L   EKG 12 Lead   Result Value Ref Range    Ventricular Rate 110 BPM    Atrial Rate 110 BPM    P-R Interval 162 ms    QRS Duration 82 ms    Q-T Interval 326 ms    QTc Calculation (Bazett) 441 ms    P Axis 48 degrees    R Axis -9 degrees    T Axis -15 degrees       RADIOLOGY:  CT ABDOMEN PELVIS W IV CONTRAST Additional Contrast? None   Final Result   Large hiatal hernia. Small volume intravesicular gas, which could reflect recent instrumentation. Clinical correlation recommended. Left adnexal cyst.  See below for recommendations.       RECOMMENDATIONS:   Managing Incidental Adnexal Cystic Mass by CT or MR      Benign cyst:      Premenopausal (< or equal to 50 years if LMP unknown)      < or equal to 5 cm: no follow up      >5 cm: US in 6-12 weeks      > or Saturation Interpretation: Normal    ------------------------------------------ PROGRESS NOTES ------------------------------------------  Re-evaluation(s):  Multiple reevaluations were performed and patient has remained unchanged physically and on clinical examination throughout the course of her stay. Counseling:  I have spoken with the patient and nursing home caretaker  and discussed todays results, in addition to providing specific details for the plan of care and counseling regarding the diagnosis and prognosis. Their questions are answered at this time and they are agreeable with the plan of admission.    --------------------------------- ADDITIONAL PROVIDER NOTES ---------------------------------  Consultations:  Time: 2345. Spoke with Dr. Marika Vickers. Discussed case. They will admit the patient. This patient's ED course included: a personal history and physicial examination, re-evaluation prior to disposition, multiple bedside re-evaluations, IV medications, cardiac monitoring, continuous pulse oximetry, and complex medical decision making and emergency management    This patient has remained hemodynamically stable during their ED course. Diagnosis:  1. Intractable vomiting with nausea, unspecified vomiting type        Disposition:  Patient's disposition: Admit to med/surg floor  Patient's condition is fair.          Geovanny Cooley DO  Resident  10/01/22 0900

## 2022-10-01 NOTE — PROGRESS NOTES
Tammy from [facility] returned call regarding patient. Reviewed patient's medications with her to make sure they are correct in chart. Said patient takes Jevity 1.2 240mL every four hours with water flushes before and after meds and tube feedings. Said for the weekend to call her Joselo Woods) at [phone] as her immediate contact. Also said we can reach another on-call contact, Nohemi Manning at [phone].

## 2022-10-01 NOTE — PLAN OF CARE
Problem: Safety - Adult  Goal: Free from fall injury  10/1/2022 1233 by Chari Bernstein RN  Outcome: Progressing  10/1/2022 0312 by Mesha Martinez  Outcome: Progressing     Problem: Skin/Tissue Integrity  Goal: Absence of new skin breakdown  Description: 1. Monitor for areas of redness and/or skin breakdown  2. Assess vascular access sites hourly  3. Every 4-6 hours minimum:  Change oxygen saturation probe site  4. Every 4-6 hours:  If on nasal continuous positive airway pressure, respiratory therapy assess nares and determine need for appliance change or resting period.   10/1/2022 1233 by Chari Bernstein RN  Outcome: Progressing  10/1/2022 0312 by Mesha Martinez  Outcome: Progressing     Problem: Nutrition Deficit:  Goal: Optimize nutritional status  Outcome: Progressing     Problem: Pain  Goal: Verbalizes/displays adequate comfort level or baseline comfort level  Outcome: Progressing

## 2022-10-01 NOTE — HOME CARE
Patient is active with [facility] for speech therapy.  Will need  home care orders at discharge from hospital. Stacey Ortiz lpn

## 2022-10-01 NOTE — H&P
[facility] History and Physical          ASSESSMENT:      Principal Problem:    Intractable vomiting with nausea  Resolved Problems:    * No resolved hospital problems. *      PLAN:    1. Intractable vomiting  For now we will keep patient n.p.o. and give IV fluids and IV antiemetics as needed. CT scan does not show any acute abdominal pathology. This is likely self-limited and will hopefully stop with symptomatic care. Patient not vomiting or dry heaving during my examination and abdomen appears benign. PEG tube site looks clean. Will hold most of patient's medications, however will use Depakote and Synthroid. Protonix can be given IV. Will start tube feeds and medications as able. 2.   Hyponatremia  Likely to have some volume component and patient be placed on IV fluids. Should be noted that patient's sodium was 128 in mid August at ER visit but was not checked subsequently. Patient may also have a chronic hyponatremia secondary to some of the behavioral control medications she has been getting. Follow sodium. Code Status:  Full  DVT prophylaxis: Lovenox    CHIEF COMPLAINT:  Intractable vomiting    History of Present Illness:   68-year-old female with severe mental retardation who requires total care. She is in a group home and is a beltrán of the state at this time. Patient has a PEG tube for feedings because she will not otherwise eat. All history given by  who is at the bedside. She states patient was normal the day prior to admission, however the day of admission looked unwell and was vomiting. They were unable to keep up with volume needs as any water placed through the PEG would be immediately vomited up by patient and for that reason they came to the emergency room. Patient being admitted for intractable vomiting.     Informant(s) for H&P:  at bedside    REVIEW OF SYSTEMS:  Unable to do because of severe mental retardation    PMH:  Past Medical History:   Diagnosis Date    Acute kidney injury ([facility] [address] 01/15/2017    d/t Vancomycin, on Dialysis [facility] right chest    Blind in both eyes     Essential hypertension 4/2/2021    Gastroesophageal reflux disease without esophagitis 4/2/2021    Hemodialysis patient [facility])     Hyperthyroidism     MR (mental retardation)     Osteoarthritis     Peripheral vascular disease ([facility] [address]     Pneumonia 01/06/2017    Pneumonia due to infectious organism 1/8/2017    Sepsis ([facility] [address] 3/4/2021       Surgical History:  Past Surgical History:   Procedure Laterality Date    BRONCHOSCOPY  02/10/2017    CHEST TUBE INSERTION Right 02/09/2017    GASTROSTOMY TUBE PLACEMENT N/A 3/7/2021    EGD PEG TUBE PLACEMENT performed by Dotty Alegria MD at [address] [place] [address] Right 01/17/2017    tessio insertion     OTHER SURGICAL HISTORY  01/25/2017    PEG tube insertion       Medications Prior to Admission:    Prior to Admission medications    Medication Sig Start Date End Date Taking? Authorizing Provider   docusate (COLACE) 50 MG/5ML liquid TAKE 2 TEASPOONSFUL (10ML) VIA G-TUBE TWICE A DAY (PLUG/SYR) 9/2/22   Montserrat Cerna DO   famotidine (PEPCID) 20 MG tablet TAKE (1) TABLET VIA PEG TUBE TWO TIMES DAILY.  6/30/22 9/13/22  Montserrat Cerna DO   montelukast (SINGULAIR) 10 MG tablet TAKE 1 TABLET VIA G-TUBE AT BEDTIME. 6/30/22   Montserrat Cerna DO   PARoxetine (PAXIL) 10 MG tablet TAKE 1 TABLET VIA G-TUBE TWICE A DAY 6/30/22   Montserrat Cerna DO   Gauze Pads & Dressings 2\"X2\" PADS 1 each by Does not apply route in the morning and at bedtime Apply to stoma 6/23/22 7/23/22  Jennifer Hou,    folic acid (FOLVITE) 1 MG tablet TAKE 1 TABLET VIA G-TUBE DAILY 6/3/22   Montserrat Cerna DO   levothyroxine (SYNTHROID) 150 MCG tablet [email or web address] 1 TABLET VIA G-TUBE ONCE DAILY 6/3/22   Montserrat Cerna DO   bumetanide (BUMEX) 1 MG tablet TAKE 1 TABLET VIA G-TUBE DAILY AS NEEDED FOR SWELLING. 5/18/22   Mardel Bence, DO   midodrine (PROAMATINE) 2.5 MG tablet TAKE 3 TABLET VIA G-TUBE THREE TIMES DAILY AS NEEDED. (HYPOTENSION, GIVE ONLY IF SYSTOLIC BP IS <333. (BP CHECKS 3 TIMES A DAY. 5/18/22   Mardel Bence, DO   NATURAL VITAMIN D-3 125 MCG (5000 UT) TABS tablet TAKE 1 TABLET VIA G-TUBE ONCE DAILY 5/4/22   Mardel Bence, DO   FEROSUL 325 (65 Fe) MG tablet TAKE 1 TABLET VIA G-TUBE ONCE A DAY. 3/11/22   Mardel Bence, DO   pantoprazole sodium (PROTONIX) 40 MG PACK packet 40 mg by Per G Tube route every morning (before breakfast)    Historical Provider, MD   Disposable Gloves (POWDER FREE NITRILE GLOVES MED) [address] As Directed 11/4/21   Mardel Bence, DO   albuterol (PROVENTIL) (2.5 MG/3ML) 0.083% nebulizer solution Take 3 mLs by nebulization every 6 hours as needed for Wheezing 10/7/21   Jennifer Hou,    albuterol (PROVENTIL) (2.5 MG/3ML) 0.083% nebulizer solution Take 3 mLs by nebulization every 6 hours 9/29/21   Ross Daniels MD   Respiratory Therapy Supplies (FULL KIT NEBULIZER SET) MISC Use as directed with nebulized medication.  9/29/21   Jeannine Hurd MD   acetaminophen (TYLENOL) 325 MG tablet 650 mg by Per G Tube route every 4 hours as needed for Pain or Fever    Historical Provider, MD   medicated lip balm (BLISTEX/CARMEX) 2-2.5-6.6 % STCK Apply topically as needed for Dry Lips (CRACKED LIPS)    Historical Provider, MD   bisacodyl (DULCOLAX) 10 MG suppository Place 10 mg rectally daily as needed for Constipation (NO BM FOR 3 DAYS)     Historical Provider, MD   SUNSCREENS EX Apply topically as needed (SUNBURN PREVENTION) Indications: *SPF 48*    Historical Provider, MD   Dextromethorphan-guaiFENesin  MG/5ML SYRP 10 mLs by Per G Tube route every 4 hours as needed for Cough (CONGESTION)    Historical Provider, MD   Neomycin-Bacitracin-Polymyxin (TRIPLE ANTIBIOTIC) OINT Apply topically 2 times daily as needed (CUTS/SCRAPES/SKIN ABRASIONS)    Historical Provider, MD   OLANZapine zydis (ZYPREXA) 5 MG disintegrating tablet Take 20 mg by mouth every morning Indications: VIA G-TUBE *TAKE ALONG WITH 15MG=20MG*  *SEE OTHER ORDER*    Historical Provider, MD   OLANZapine zydis (ZYPREXA) 15 MG disintegrating tablet 20 mg every morning Indications: VIA G-TUBE *TAKE ALONG WITH 5MG=20MG*  *SEE OTHER ORDER*    Historical Provider, MD   OLANZapine (ZYPREXA) 20 MG tablet 20 mg by Per G Tube route nightly    Historical Provider, MD   LORazepam (ATIVAN) 2 MG tablet 2 mg by Per G Tube route as needed (2HRS BEFORE DENTAL APPT, TAKE 2ND DOSE TO DENTAL APPT). Historical Provider, MD   NATURAL VITAMIN D-3 125 MCG (5000 UT) TABS tablet TAKE 1 TABLET VIA G-TUBE ONCE DAILY 5/16/21   RADHA Angelo - CNP   valproic acid (DEPACON) 250 MG/5ML SOLN oral solution 20 mLs by Per G Tube route 2 times daily 3/16/21   Sally Boroughs, DO   polyethylene glycol (GLYCOLAX) 17 g packet 17 g by Per G Tube route daily 3/16/21   Sally Boroughs, DO       Allergies:    Patient has no known allergies. Social History:    reports that she has never smoked. She has never used smokeless tobacco. She reports that she does not drink alcohol and does not use drugs.     Family History:   Patient can not give information due to severe mental retardation, she is a beltrán of the [facility] and family is unknown    PHYSICAL EXAM:  Vitals:  BP (!) 164/85   Pulse (!) 122   Temp 99.5 °F (37.5 °C) (Axillary)   Resp 19   Ht 5' 1\" (1.549 m)   Wt 140 lb (63.5 kg)   SpO2 95%   BMI 26.45 kg/m²   General Appearance: lying with sheet over head, does not speak  Skin: warm and dry  Head: normocephalic and atraumatic  Neck: neck supple and non tender   Pulmonary/Chest: clear to auscultation bilaterally  Cardiovascular: Regular, tachycardic, no murmur  Abdomen: soft, non-tender, non-distended, tube under binder, no cellulitis irritation or drainage from PEG site  Extremities: no edema  Neurologic: can move all extremities, severe MR will not follow any commands    LABS:  Recent Labs     09/30/22 2021   *   K 4.8   CL 90*   CO2 28   BUN 21   CREATININE 0.6   GLUCOSE 219*   CALCIUM 10.3*       Recent Labs     09/30/22 2021   WBC 15.1*   RBC 3.94   HGB 12.6   HCT 37.8   MCV 95.9   MCH 32.0   MCHC 33.3   RDW 12.1      MPV 10.6         Radiology:   CT ABDOMEN PELVIS W IV CONTRAST Additional Contrast? None   Final Result   Large hiatal hernia. Small volume intravesicular gas, which could reflect recent instrumentation. Clinical correlation recommended. Left adnexal cyst.  See below for recommendations. RECOMMENDATIONS:   Managing Incidental Adnexal Cystic Mass by CT or MR      Benign cyst:      Premenopausal (< or equal to 50 years if LMP unknown)      < or equal to 5 cm: no follow up      >5 cm: US in 6-12 weeks      > or equal to 10 cm: US promptly      Early postmenopausal (0-5 years after LMP)      < or equal to 3 cm: no follow up      3-5 cm: US in 6-12 weeks      >5 cm: US promptly      Late postmenopausal      < or equal to 3 cm: no follow up      >3 cm: US promptly      Probably benign cyst : {benign appearing except demonstrates one or more of   the following - (a) angulated margin, (b) not round/oval shape, (c) not well   imaged due to artifact or technical parameters (ex. noncontrast CT)      Premenopausal (< than or equal to 50 years if LMP unknown)      < or equal to 3 cm: no follow up      3-5 cm: US in 6-12 weeks      >5 cm: US      Early postmenopausal      < or equal to 3 cm: no follow up      >3 cm: US promptly      Late postmenopausal      < or equal to 1 cm: no follow up      >1 cm: US      Reference:      Kwaku Solis et al. Managing Incidental Findings on Abdominal CT: White Paper of   the ACR Incidental Findings Committee. J Am Jaki Radiol 2010;7:754-773         XR CHEST PORTABLE   Final Result   No acute process.            EKG:  Sinus tachycardia, likely LVH    NOTE: This report was transcribed using voice recognition software. Every effort was made to ensure accuracy; however, inadvertent computerized transcription errors may be present.   Electronically signed by Cam Nuñez MD on 10/1/2022 at 1:19 AM

## 2022-10-01 NOTE — CONSULTS
Comprehensive Nutrition Assessment    Type and Reason for Visit:  Initial, Positive Nutrition Screen, Consult (TF order & managment)    Nutrition Recommendations/Plan:   Continue NPO    Start Tube Feeding:    Standard with Fiber (Jevity 1.5) goal rate @ 45 ml/hr X 23 hrs (hold 30 min before & after Synthroid) with 30 ml q 4 hr free water flush  To provide: 1035 ml tv, 1553 kcal, 66 g pro, 787 ml free water (967 ml w/ flush)  Regimen meets 100% estimated calorie & protein needs    Initiate at 25 ml/hr and advance to goal rate as tolerated in the setting of recent intractable vomiting    Once IVF discontinued recommend 95 ml q 3 hr free water flush     Malnutrition Assessment:  Malnutrition Status: At risk for malnutrition (Comment) (10/01/22 1211)    Context:  Acute Illness     Findings of the 6 clinical characteristics of malnutrition:  Energy Intake:  Mild decrease in energy intake (Comment)  Weight Loss:  No significant weight loss     Body Fat Loss:  Unable to assess (d/t pt sleeping w/ sheet over head)     Muscle Mass Loss:  Unable to assess    Fluid Accumulation:  No significant fluid accumulation     Strength:  Not Performed    Nutrition Assessment:    Pt w/ intractable vomiting. Hx dementia/MRDD, CKD, dysphagia w/ chronic PEG. Pt NPO, will provide TF orders per consult & monitor. Nutrition Related Findings:    non-verbal, blind, abd soft/rounded, +BS, PEG, contractured extremities, Na WDL at this time Wound Type: None       Current Nutrition Intake & Therapies:    Average Meal Intake: NPO  Average Supplements Intake: NPO  Diet NPO    Anthropometric Measures:  Height: 5' 1\" (154.9 cm)  Ideal Body Weight (IBW): 105 lbs (48 kg)    Admission Body Weight: 128 lb (58.1 kg) (10/1 bed)  Current Body Weight: 128 lb (58.1 kg), 121.9 % IBW.  Weight Source: Bed Scale (10/1)  Current BMI (kg/m2): 24.2  Usual Body Weight: 135 lb (61.2 kg) (12/2021 actual per EMR)  % Weight Change (Calculated): -5.2  Weight Adjustment For: No Adjustment                 BMI Categories: Normal Weight (BMI 22.0 to 24.9) age over 72    Estimated Daily Nutrient Needs:  Energy Requirements Based On: Kcal/kg     Energy (kcal/day):   Weight Used for Protein Requirements: Current  Protein (g/day): 60-70 (1-1.2)  Method Used for Fluid Requirements: 1 ml/kcal  Fluid (ml/day):     Nutrition Diagnosis:   Inadequate oral intake related to swallowing difficulty as evidenced by NPO or clear liquid status due to medical condition, nutrition support - enteral nutrition    Nutrition Interventions:   Food and/or Nutrient Delivery: Continue NPO, Start Tube Feeding  Nutrition Education/Counseling: Education not indicated  Coordination of Nutrition Care: Continue to monitor while inpatient       Goals:     Goals: Initiate nutrition support       Nutrition Monitoring and Evaluation:      Food/Nutrient Intake Outcomes: Enteral Nutrition Intake/Tolerance  Physical Signs/Symptoms Outcomes: Biochemical Data, GI Status, Fluid Status or Edema, Nutrition Focused Physical Findings, Skin, Weight, Nausea or Vomiting    Discharge Planning:    Enteral Nutrition     Rodrigue Mendes RD, LD  Contact: [phone]

## 2022-10-02 LAB
ANION GAP SERPL CALCULATED.3IONS-SCNC: 4 MMOL/L (ref 7–16)
BASOPHILS ABSOLUTE: 0.02 E9/L (ref 0–0.2)
BASOPHILS RELATIVE PERCENT: 0.2 % (ref 0–2)
BUN BLDV-MCNC: 13 MG/DL (ref 6–23)
CALCIUM SERPL-MCNC: 8.9 MG/DL (ref 8.6–10.2)
CHLORIDE BLD-SCNC: 106 MMOL/L (ref 98–107)
CO2: 25 MMOL/L (ref 22–29)
CREAT SERPL-MCNC: 0.6 MG/DL (ref 0.5–1)
EKG ATRIAL RATE: 110 BPM
EKG P AXIS: 48 DEGREES
EKG P-R INTERVAL: 162 MS
EKG Q-T INTERVAL: 326 MS
EKG QRS DURATION: 82 MS
EKG QTC CALCULATION (BAZETT): 441 MS
EKG R AXIS: -9 DEGREES
EKG T AXIS: -15 DEGREES
EKG VENTRICULAR RATE: 110 BPM
EOSINOPHILS ABSOLUTE: 0.12 E9/L (ref 0.05–0.5)
EOSINOPHILS RELATIVE PERCENT: 1 % (ref 0–6)
GFR AFRICAN AMERICAN: >60
GFR NON-AFRICAN AMERICAN: >60 ML/MIN/1.73
GLUCOSE BLD-MCNC: 112 MG/DL (ref 74–99)
HCT VFR BLD CALC: 31.8 % (ref 34–48)
HEMOGLOBIN: 10.2 G/DL (ref 11.5–15.5)
IMMATURE GRANULOCYTES #: 0.05 E9/L
IMMATURE GRANULOCYTES %: 0.4 % (ref 0–5)
LYMPHOCYTES ABSOLUTE: 1.85 E9/L (ref 1.5–4)
LYMPHOCYTES RELATIVE PERCENT: 14.8 % (ref 20–42)
MAGNESIUM: 1.7 MG/DL (ref 1.6–2.6)
MCH RBC QN AUTO: 32 PG (ref 26–35)
MCHC RBC AUTO-ENTMCNC: 32.1 % (ref 32–34.5)
MCV RBC AUTO: 99.7 FL (ref 80–99.9)
MONOCYTES ABSOLUTE: 1.31 E9/L (ref 0.1–0.95)
MONOCYTES RELATIVE PERCENT: 10.5 % (ref 2–12)
NEUTROPHILS ABSOLUTE: 9.17 E9/L (ref 1.8–7.3)
NEUTROPHILS RELATIVE PERCENT: 73.1 % (ref 43–80)
PDW BLD-RTO: 12.9 FL (ref 11.5–15)
PLATELET # BLD: 225 E9/L (ref 130–450)
PMV BLD AUTO: 10.7 FL (ref 7–12)
POTASSIUM SERPL-SCNC: 4.6 MMOL/L (ref 3.5–5)
RBC # BLD: 3.19 E12/L (ref 3.5–5.5)
SODIUM BLD-SCNC: 135 MMOL/L (ref 132–146)
WBC # BLD: 12.5 E9/L (ref 4.5–11.5)

## 2022-10-02 PROCEDURE — 2580000003 HC RX 258: Performed by: INTERNAL MEDICINE

## 2022-10-02 PROCEDURE — C9113 INJ PANTOPRAZOLE SODIUM, VIA: HCPCS | Performed by: INTERNAL MEDICINE

## 2022-10-02 PROCEDURE — 36415 COLL VENOUS BLD VENIPUNCTURE: CPT

## 2022-10-02 PROCEDURE — 99232 SBSQ HOSP IP/OBS MODERATE 35: CPT | Performed by: NURSE PRACTITIONER

## 2022-10-02 PROCEDURE — 1200000000 HC SEMI PRIVATE

## 2022-10-02 PROCEDURE — A4216 STERILE WATER/SALINE, 10 ML: HCPCS | Performed by: INTERNAL MEDICINE

## 2022-10-02 PROCEDURE — 85025 COMPLETE CBC W/AUTO DIFF WBC: CPT

## 2022-10-02 PROCEDURE — 83735 ASSAY OF MAGNESIUM: CPT

## 2022-10-02 PROCEDURE — 6360000002 HC RX W HCPCS: Performed by: INTERNAL MEDICINE

## 2022-10-02 PROCEDURE — 6370000000 HC RX 637 (ALT 250 FOR IP): Performed by: INTERNAL MEDICINE

## 2022-10-02 PROCEDURE — 80048 BASIC METABOLIC PNL TOTAL CA: CPT

## 2022-10-02 RX ADMIN — ENOXAPARIN SODIUM 40 MG: 100 INJECTION SUBCUTANEOUS at 10:43

## 2022-10-02 RX ADMIN — Medication 10 ML: at 21:57

## 2022-10-02 RX ADMIN — VALPROIC ACID 1000 MG: 250 SOLUTION ORAL at 21:57

## 2022-10-02 RX ADMIN — SODIUM CHLORIDE: 9 INJECTION, SOLUTION INTRAVENOUS at 01:05

## 2022-10-02 RX ADMIN — SODIUM CHLORIDE 40 MG: 9 INJECTION, SOLUTION INTRAMUSCULAR; INTRAVENOUS; SUBCUTANEOUS at 10:37

## 2022-10-02 RX ADMIN — LEVOTHYROXINE SODIUM 150 MCG: 75 TABLET ORAL at 06:04

## 2022-10-02 RX ADMIN — VALPROIC ACID 1000 MG: 250 SOLUTION ORAL at 10:43

## 2022-10-02 NOTE — DISCHARGE INSTRUCTIONS
Please limit administration of free water to 30ml free water flush every 4 hours- arrange med administration around the time of the free water flush- Patient sodium dropped likely secondary to increased free water being given.

## 2022-10-02 NOTE — DISCHARGE SUMMARY
[facility] Physician Discharge Summary       No follow-up provider specified. Activity level: As tolerated     Dispo:Return to group home      Condition on discharge: Stable     Patient ID:  Felisha Argue  [id number]  64 y.o.  1955    Admit date: 9/30/2022    Discharge date and time:  10/2/2022  1:20 PM    Admission Diagnoses: Principal Problem:    Intractable vomiting with nausea  Resolved Problems:    * No resolved hospital problems. *      Discharge Diagnoses: Principal Problem:    Intractable vomiting with nausea  Resolved Problems:    * No resolved hospital problems. *      Consults:  IP CONSULT TO SOCIAL WORK  IP CONSULT TO DIETITIAN    Procedures: None     Hospital Course:    75year old female presented to [facility] ED from SNF with complaints of multiple episodes of emesis and concern for dehydration. Beginning 9/30 noon feeding through PEG unable to tolerate. Na+126 Glucose 2199. HS troponin 13>15. WBC 15.1. COVID-19 negative. UA negative. CXR no acute process. CT A/P Large hiatal hernia/small intra vesicular gas. Small left adnexal cyst. She received 1L NSS bolus and antiemetics in ED course. Decision to admit pt for further evaluation and treatmentShe was initially kept NPO and placed on IV fluids. Today her Na is WNL. Patient has had no further emesis. Her TF has been restarted and advance to goal rate which she is toleration well. Patient is deemed stable at this time for discharge.       Discharge Exam:  General Appearance: alert and in no distress  Skin: warm and dry  Head: normocephalic and atraumatic  Eyes: pupils equal, round, and reactive to light, extraocular eye movements intact, conjunctivae normal  Neck: neck supple and non tender without mass   Pulmonary/Chest: clear to auscultation bilaterally- no wheezes, rales or rhonchi, normal air movement, no respiratory distress  Cardiovascular: normal rate, normal S1 and S2 and no carotid bruits  Abdomen: soft, non-tender, non-distended, normal bowel sounds, no masses or organomegaly  Extremities: no cyanosis, no clubbing and no edema      I/O last 3 completed shifts: In: 2075.5 [I.V.:1391.5; NG/GT:684]  Out: -   No intake/output data recorded. LABS:  Recent Labs     09/30/22  2021 10/01/22  1115 10/02/22  0248   * 132 135   K 4.8 4.0 4.6   CL 90* 99 106   CO2 28 27 25   BUN 21 15 13   CREATININE 0.6 0.6 0.6   GLUCOSE 219* 88 112*   CALCIUM 10.3* 9.3 8.9       Recent Labs     09/30/22  2021 10/01/22  1115 10/02/22  0248   WBC 15.1* 12.0* 12.5*   RBC 3.94 3.34* 3.19*   HGB 12.6 10.8* 10.2*   HCT 37.8 33.0* 31.8*   MCV 95.9 98.8 99.7   MCH 32.0 32.3 32.0   MCHC 33.3 32.7 32.1   RDW 12.1 12.7 12.9    227 225   MPV 10.6 10.3 10.7       No results for input(s): POCGLU in the last 72 hours. Imaging:  CT ABDOMEN PELVIS W IV CONTRAST Additional Contrast? None    Result Date: 9/30/2022  EXAMINATION: CT OF THE ABDOMEN AND PELVIS WITH CONTRAST 9/30/2022 10:35 pm TECHNIQUE: CT of the abdomen and pelvis was performed with the administration of intravenous contrast. Multiplanar reformatted images are provided for review. Automated exposure control, iterative reconstruction, and/or weight based adjustment of the mA/kV was utilized to reduce the radiation dose to as low as reasonably achievable. COMPARISON: 09/19/2022 HISTORY: ORDERING SYSTEM PROVIDED HISTORY: emesis TECHNOLOGIST PROVIDED HISTORY: Additional Contrast?->None Reason for exam:->emesis Decision Support Exception - unselect if not a suspected or confirmed emergency medical condition->Emergency Medical Condition (MA) FINDINGS: Lower Chest:  Visualized portion of the lower chest demonstrates no acute abnormality. There is a large hiatal hernia. Organs: The liver, spleen, pancreas, and adrenals are within normal limits. The gallbladder is not visualized, possibly surgically absent. There are bilateral renal cysts. No hydronephrosis.  GI/Bowel: Percutaneous gastrostomy tube with retention balloon in the gastric lumen. There is no evidence of bowel obstruction. No evidence of abnormal bowel wall thickening or distension. The appendix is not visualized. However, there is no inflammatory change in the right lower quadrant to suggest acute appendicitis. There is moderate stool throughout the colon. Pelvis: The urinary bladder is distended. There is small volume intravesicular gas. The uterus is unremarkable. There is a 2.3 x 1.8 cm left adnexal cyst. Peritoneum/Retroperitoneum: There is small volume free fluid. No extraluminal gas. No evidence of lymphadenopathy. Aorta is normal in caliber. Bones/Soft Tissues:  No acute abnormality of the visualized osseous structures. Large hiatal hernia. Small volume intravesicular gas, which could reflect recent instrumentation. Clinical correlation recommended. Left adnexal cyst.  See below for recommendations. RECOMMENDATIONS: Managing Incidental Adnexal Cystic Mass by CT or MR Benign cyst: Premenopausal (< or equal to 50 years if LMP unknown) < or equal to 5 cm: no follow up >5 cm: US in 6-12 weeks > or equal to 10 cm: US promptly Early postmenopausal (0-5 years after LMP) < or equal to 3 cm: no follow up 3-5 cm: US in 6-12 weeks >5 cm: US promptly Late postmenopausal < or equal to 3 cm: no follow up >3 cm: US promptly Probably benign cyst : {benign appearing except demonstrates one or more of the following - (a) angulated margin, (b) not round/oval shape, (c) not well imaged due to artifact or technical parameters (ex. noncontrast CT) Premenopausal (< than or equal to 50 years if LMP unknown) < or equal to 3 cm: no follow up 3-5 cm: US in 6-12 weeks >5 cm: US Early postmenopausal < or equal to 3 cm: no follow up >3 cm: US promptly Late postmenopausal < or equal to 1 cm: no follow up >1 cm: US Reference: Faye Steele et al. Managing Incidental Findings on Abdominal CT: White Paper of the ACR Incidental Findings Committee.  J Am Jaki Radiol 5659;9:714-528     XR CHEST PORTABLE    Result Date: 9/30/2022  EXAMINATION: ONE XRAY VIEW OF THE CHEST 9/30/2022 8:30 pm COMPARISON: 08/15/2022 HISTORY: ORDERING SYSTEM PROVIDED HISTORY: emesis and ams TECHNOLOGIST PROVIDED HISTORY: Reason for exam:->emesis and ams FINDINGS: The lungs are without acute focal process. There is no effusion or pneumothorax. The cardiomediastinal silhouette is without acute process. The osseous structures are without acute process. No acute process. Patient Instructions:      Medication List        CONTINUE taking these medications      Full Kit Nebulizer Set Misc  Use as directed with nebulized medication. Gauze Pads & Dressings 2\"X2\" Pads  1 each by Does not apply route in the morning and at bedtime Apply to stoma     Powder Free Nitrile Gloves Med Misc  As Directed            ASK your doctor about these medications      acetaminophen 325 MG tablet  Commonly known as: TYLENOL     * albuterol (2.5 MG/3ML) 0.083% nebulizer solution  Commonly known as: PROVENTIL  Take 3 mLs by nebulization every 6 hours     * albuterol (2.5 MG/3ML) 0.083% nebulizer solution  Commonly known as: PROVENTIL  Take 3 mLs by nebulization every 6 hours as needed for Wheezing     bisacodyl 10 MG suppository  Commonly known as: DULCOLAX     bumetanide 1 MG tablet  Commonly known as: BUMEX  TAKE 1 TABLET VIA G-TUBE DAILY AS NEEDED FOR SWELLING. Dextromethorphan-guaiFENesin  MG/5ML Syrp     docusate 50 MG/5ML liquid  Commonly known as: COLACE  TAKE 2 TEASPOONSFUL (10ML) VIA G-TUBE TWICE A DAY (PLUG/SYR)     famotidine 20 MG tablet  Commonly known as: PEPCID  TAKE (1) TABLET VIA PEG TUBE TWO TIMES DAILY. FeroSul 325 (65 Fe) MG tablet  Generic drug: ferrous sulfate  TAKE 1 TABLET VIA G-TUBE ONCE A DAY.      folic acid 1 MG tablet  Commonly known as: FOLVITE  TAKE 1 TABLET VIA G-TUBE DAILY     levothyroxine 150 MCG tablet  Commonly known as: SYNTHROID  [email or web address] 1 TABLET VIA G-TUBE ONCE DAILY     LORazepam 2 MG tablet  Commonly known as: ATIVAN     * medicated lip balm 2-2.5-6.6 % Stck     * SUNSCREENS EX     midodrine 2.5 MG tablet  Commonly known as: PROAMATINE  TAKE 3 TABLET VIA G-TUBE THREE TIMES DAILY AS NEEDED. (HYPOTENSION, GIVE ONLY IF SYSTOLIC BP IS <570. (BP CHECKS 3 TIMES A DAY. montelukast 10 MG tablet  Commonly known as: SINGULAIR  TAKE 1 TABLET VIA G-TUBE AT BEDTIME. Natural Vitamin D-3 125 MCG (5000 UT) Tabs tablet  Generic drug: vitamin D3  TAKE 1 TABLET VIA G-TUBE ONCE DAILY     * OLANZapine zydis 5 MG disintegrating tablet  Commonly known as: ZYPREXA     * OLANZapine zydis 15 MG disintegrating tablet  Commonly known as: ZYPREXA     * OLANZapine 20 MG tablet  Commonly known as: ZYPREXA     pantoprazole sodium 40 MG Pack packet  Commonly known as: PROTONIX     PARoxetine 10 MG tablet  Commonly known as: PAXIL  TAKE 1 TABLET VIA G-TUBE TWICE A DAY     polyethylene glycol 17 g packet  Commonly known as: GLYCOLAX  17 g by Per G Tube route daily     Triple Antibiotic Oint     valproic acid 250 MG/5ML Soln oral solution  Commonly known as: DEPAKENE  20 mLs by Per G Tube route 2 times daily           * This list has 7 medication(s) that are the same as other medications prescribed for you. Read the directions carefully, and ask your doctor or other care provider to review them with you. In review of the EMR, evaluation, management, and diagnosis. Discharge plan has been discussed with attending. Time spent 45 mins.     Signed:  Electronically signed by RADHA Rooney CNP on 10/2/2022 at 1:20 PM

## 2022-10-02 NOTE — PROGRESS NOTES
Notified Dr. Kendall Maguire of BP and HR. Call placed to [facility] on call service to see if patient could return to their facility today. Await return phone call.     Electronically signed by Eldon Finney RN on 10/2/2022 at 3:17 PM

## 2022-10-02 NOTE — PROGRESS NOTES
[facility] Progress Note    Admitting Date and Time: 9/30/2022  6:44 PM  Admit Dx: Intractable vomiting with nausea [R11.2]  Intractable vomiting with nausea, unspecified vomiting type [R11.2]    Subjective:  Patient is being followed for Intractable vomiting with nausea [R11.2]  Intractable vomiting with nausea, unspecified vomiting type [R11.2]      Patient seen laying in bed. She is alert and seems in better spirit   Today. She does not appear to be in any pain. TF are running at goal rate and patient is tolerating well. ROS: denies fever, chills, cp, sob, n/v, HA unless stated above.      levothyroxine  150 mcg Per G Tube Daily    valproic acid  1,000 mg Per G Tube BID    pantoprazole (PROTONIX) 40 mg injection  40 mg IntraVENous Daily    sodium chloride flush  5-40 mL IntraVENous 2 times per day    enoxaparin  40 mg SubCUTAneous Daily     sodium chloride flush, 5-40 mL, PRN  sodium chloride, , PRN  ondansetron, 4 mg, Q8H PRN   Or  ondansetron, 4 mg, Q6H PRN  polyethylene glycol, 17 g, Daily PRN  acetaminophen, 650 mg, Q6H PRN   Or  acetaminophen, 650 mg, Q6H PRN         Objective:    BP (!) 164/94   Pulse 94   Temp 97.8 °F (36.6 °C) (Axillary)   Resp 18   Ht 5' 1\" (1.549 m)   Wt 128 lb (58.1 kg)   SpO2 95%   BMI 24.19 kg/m²     General Appearance: alert and in no acute distress  Skin: warm and dry  Head: normocephalic and atraumatic  Eyes: pupils equal, round, and reactive to light, extraocular eye movements intact, conjunctivae normal  Neck: neck supple and non tender without mass   Pulmonary/Chest: clear to auscultation bilaterally- no wheezes, rales or rhonchi, normal air movement, no respiratory distress  Cardiovascular: normal rate, normal S1 and S2 and no carotid bruits  Abdomen: soft, non-tender, non-distended, normal bowel sounds, no masses or organomegaly. PEG tube intact.   Extremities: no cyanosis, no clubbing and no edema  Neurologic: aphasic        Recent Labs 09/30/22  2021 10/01/22  1115 10/02/22  0248   * 132 135   K 4.8 4.0 4.6   CL 90* 99 106   CO2 28 27 25   BUN 21 15 13   CREATININE 0.6 0.6 0.6   GLUCOSE 219* 88 112*   CALCIUM 10.3* 9.3 8.9       Recent Labs     09/30/22  2021 10/01/22  1115 10/02/22  0248   WBC 15.1* 12.0* 12.5*   RBC 3.94 3.34* 3.19*   HGB 12.6 10.8* 10.2*   HCT 37.8 33.0* 31.8*   MCV 95.9 98.8 99.7   MCH 32.0 32.3 32.0   MCHC 33.3 32.7 32.1   RDW 12.1 12.7 12.9    227 225   MPV 10.6 10.3 10.7           Assessment:    Principal Problem:    Intractable vomiting with nausea  Resolved Problems:    * No resolved hospital problems. *      Plan:  Intractable Vomiting- No noted emesis this morning per nursing. NPO. CT A/P Large hiatal hernia/small intra vesicular gas. Small left adnexal cyst. She received 1L NSS bolus and antiemetics in ED course. Received IVF's- IVFs dc'd-Now tolerating TF at goal rate. Continue antiemetics as needed. Hyponatremia- ?  Due to increased administration of free water. Na+ 126. Received 1L NSS bolus in ED course and IVF Hydration. Na now 135. Monitor BMP. Large Hiatal Hernia- CT A/P Large hiatal hernia/small intra vesicular gas. Leukocytosis- WBC 15.1>12.5. UA Negative. Received IVFs. Afebrile. Monitor   MRDD/Dementia- Supportive care. GERD- Continue Pantoprazole. Thyroid Disease- Continue Levothyroxine. Hx Dysphagia- S/P PEG placement per Dr. Watkins Slice 3/2021. Tolerating TF at goal rate per dietitian recommendations. In review of the EMR, evaluation, management, and diagnosis. Care plan has been discussed with attending. Time spent 25 mins    NOTE: This report was transcribed using voice recognition software. Every effort was made to ensure accuracy; however, inadvertent computerized transcription errors may be present.   Electronically signed by RADHA Starr - CNP on 10/2/2022 at 2:52 PM

## 2022-10-03 VITALS
HEIGHT: 61 IN | OXYGEN SATURATION: 98 % | RESPIRATION RATE: 16 BRPM | HEART RATE: 79 BPM | WEIGHT: 131 LBS | BODY MASS INDEX: 24.73 KG/M2 | TEMPERATURE: 98 F | SYSTOLIC BLOOD PRESSURE: 130 MMHG | DIASTOLIC BLOOD PRESSURE: 92 MMHG

## 2022-10-03 PROBLEM — R11.2 INTRACTABLE NAUSEA AND VOMITING: Status: ACTIVE | Noted: 2022-10-03

## 2022-10-03 PROBLEM — E87.1 HYPONATREMIA: Status: ACTIVE | Noted: 2022-10-03

## 2022-10-03 LAB
ANION GAP SERPL CALCULATED.3IONS-SCNC: 7 MMOL/L (ref 7–16)
BUN BLDV-MCNC: 14 MG/DL (ref 6–23)
CALCIUM SERPL-MCNC: 9.4 MG/DL (ref 8.6–10.2)
CHLORIDE BLD-SCNC: 99 MMOL/L (ref 98–107)
CO2: 28 MMOL/L (ref 22–29)
CREAT SERPL-MCNC: 0.6 MG/DL (ref 0.5–1)
GFR AFRICAN AMERICAN: >60
GFR NON-AFRICAN AMERICAN: >60 ML/MIN/1.73
GLUCOSE BLD-MCNC: 133 MG/DL (ref 74–99)
HCT VFR BLD CALC: 37.2 % (ref 34–48)
HEMOGLOBIN: 11.8 G/DL (ref 11.5–15.5)
MAGNESIUM: 2 MG/DL (ref 1.6–2.6)
MCH RBC QN AUTO: 31.2 PG (ref 26–35)
MCHC RBC AUTO-ENTMCNC: 31.7 % (ref 32–34.5)
MCV RBC AUTO: 98.4 FL (ref 80–99.9)
PDW BLD-RTO: 12.7 FL (ref 11.5–15)
PLATELET # BLD: 241 E9/L (ref 130–450)
PMV BLD AUTO: 10.5 FL (ref 7–12)
POTASSIUM SERPL-SCNC: 4.6 MMOL/L (ref 3.5–5)
RBC # BLD: 3.78 E12/L (ref 3.5–5.5)
SODIUM BLD-SCNC: 134 MMOL/L (ref 132–146)
WBC # BLD: 10.2 E9/L (ref 4.5–11.5)

## 2022-10-03 PROCEDURE — 36415 COLL VENOUS BLD VENIPUNCTURE: CPT

## 2022-10-03 PROCEDURE — 2580000003 HC RX 258: Performed by: INTERNAL MEDICINE

## 2022-10-03 PROCEDURE — 6370000000 HC RX 637 (ALT 250 FOR IP): Performed by: INTERNAL MEDICINE

## 2022-10-03 PROCEDURE — A4216 STERILE WATER/SALINE, 10 ML: HCPCS | Performed by: INTERNAL MEDICINE

## 2022-10-03 PROCEDURE — 6360000002 HC RX W HCPCS: Performed by: INTERNAL MEDICINE

## 2022-10-03 PROCEDURE — 83735 ASSAY OF MAGNESIUM: CPT

## 2022-10-03 PROCEDURE — 80048 BASIC METABOLIC PNL TOTAL CA: CPT

## 2022-10-03 PROCEDURE — 85027 COMPLETE CBC AUTOMATED: CPT

## 2022-10-03 PROCEDURE — 99239 HOSP IP/OBS DSCHRG MGMT >30: CPT | Performed by: NURSE PRACTITIONER

## 2022-10-03 PROCEDURE — C9113 INJ PANTOPRAZOLE SODIUM, VIA: HCPCS | Performed by: INTERNAL MEDICINE

## 2022-10-03 RX ADMIN — LEVOTHYROXINE SODIUM 150 MCG: 75 TABLET ORAL at 05:53

## 2022-10-03 RX ADMIN — SODIUM CHLORIDE 40 MG: 9 INJECTION, SOLUTION INTRAMUSCULAR; INTRAVENOUS; SUBCUTANEOUS at 08:32

## 2022-10-03 RX ADMIN — ENOXAPARIN SODIUM 40 MG: 100 INJECTION SUBCUTANEOUS at 08:32

## 2022-10-03 RX ADMIN — VALPROIC ACID 1000 MG: 250 SOLUTION ORAL at 08:33

## 2022-10-03 RX ADMIN — Medication 10 ML: at 08:33

## 2022-10-03 NOTE — CARE COORDINATION
Social Work discharge planning   Beverley called Lurdes Vazquez at [facility] home [phone], but only able to leave message. Beverley also called Rosalba Freddie [phone] from pt's face sheet. Sw only able to leave message there as well. Beverley also called [employer] at [phone], option 2, option 1 but not able to speak to someone live. Beverley also called pt's guardian at [phone] x2 Shwetha Hou. Beverley only able to leave  message for Barbara Sidhu as well that pt is discharged today. Electronically signed by Roddy Ventura on 10/3/2022 at 11:58 AM     Addendum-    Beverley spoke to Hinckley with [facility] home [phone]. She asked Beverley to fax her pt's AVS, order for water with tf, and latest progress note, which Sw did fax [phone]. Pt's RN Jhon Barnhart aware of need to fax AVS to Hinckley as well asap. Belia said they will review info to make sure they can accept pt back today. Belia said they will arrange transport before  if they can accept pt back. Beverley asked Hinckley to call  back once they have a  time. Electronically signed by Roddy Ventura on 10/3/2022 at 1:21 PM     Addendum   Beverley notified Jeovany Bey with [facility] order for discharge back to group home today. Elijah advised they only see pt for home speech therapy. Beverley called [facility] back and left second message for [place] that pt is returning to group home today. Awaiting call back from Hinckley at [place] for  time. Electronically signed by GEOVANNA Gerardo on 10/3/2022 at 1:43 PM     Addendum-    Beverley called Belia at [address], who advised she is still waiting to hear from their staff to advise when they will be at hospital to transport pt back to her group home. Belia said her staff is supposed to call her back with a time, but they may be here [employer] soon\" BEVERLEY updated pt's MIKE Olsen.   Electronically signed by Roddy Ventura on 10/3/2022 at 3:00 PM

## 2022-10-03 NOTE — DISCHARGE SUMMARY
[facility] Physician Discharge Summary       Carlos Enrique Hernandez DO  [address]  [phone]    Schedule an appointment as soon as possible for a visit      Cecilejimy DO David  [address]  [phone]    Schedule an appointment as soon as possible for a visit  Make an appointment with PCP in 1 week to go over hospitalization, medications and follow up. Activity level: As tolerated     Dispo: Return to Group Home     Condition on discharge: Stable     Continue TF/ Free water recommendations per dietician    Patient ID:  Fuentes Al  [id number]  86 y.o.  1955    Admit date: 9/30/2022    Discharge date and time:  10/3/2022  1:43 PM    Admission Diagnoses:   Principal Problem:    Intractable vomiting with nausea  Resolved Problems:    * No resolved hospital problems. *      Discharge Diagnoses:   Principal Problem:    Intractable vomiting with nausea  Resolved Problems:    * No resolved hospital problems. *      Consults:  IP CONSULT TO SOCIAL WORK  IP CONSULT TO [facility] Course:   Patient Fuentes Al is a 75 y.o. presented with Intractable vomiting with nausea [R11.2]  Intractable vomiting with nausea, unspecified vomiting type [R11.2]   Patient was sent to the ER from group home for concerns of vomiting. She was followed and treated for;     1. Intractable nausea/ vomiting- resolved. :  pt has a history of MRDD/ dementia and resides at a group home. She is a beltrán of the [facility] and has a peg tube for nutrition as she will not eat otherwise. Pt came to ER as she looked unwell and was vomiting. Unable to keep any volume down via peg tube. CT abdomen/ pelvis completed on admission- large hiatal hernia, but no acute issue. Symptoms appear to be resolving and pt is now tolerating tube feeding with no vomiting. Ok to return to group home with tube feeding and water flushes recommended by dietary.       2. Hyponatremia: 126 on admission- likely due to above- appears to be resolving. Noted that Sodium was 128 in August. She may have some component of underlying chronic hyponatremia from psych meds. Also concern that group home may have been giving too much free water via flushes. Instructed to follow dietary recommendations on flushes.  on discharge. 3. Large hiatal hernia: N/V resolving. No acute issue      4. Leukocytosis: likely component of volume contraction/ dehydration- trending down      5. H/o MRDD / Dementia: supportive care- pt resides at a group home     6. GERD: PPI     7. Thyroid disease: synthroid     8. H/o dysphagia - h/o peg tube placement by surgery Anaya Schwartz 3/2021. Patient ready to discharge back to group home. Patient discharged in stable condition with the following medications, instructions and follow up. Discharge Exam:    General Appearance: sleeping - awakens easily   Skin: warm and dry  Head: normocephalic and atraumatic  Neck: neck supple and non tender without mass   Pulmonary/Chest: clear to auscultation bilaterally-   Cardiovascular: normal rate, normal S1 and S2 and no carotid bruits  Abdomen: soft, non-tender, non-distended, normal bowel sounds- peg tube   Extremities: no cyanosis, no clubbing and no edema  Neurologic: aphasic     I/O last 3 completed shifts: In: 8729 [I.V.:3449; NG/GT:1167]  Out: 1250 [Urine:1250]  No intake/output data recorded.       LABS:  Recent Labs     09/30/22  2021 10/01/22  1115 10/02/22  0248   * 132 135   K 4.8 4.0 4.6   CL 90* 99 106   CO2 28 27 25   BUN 21 15 13   CREATININE 0.6 0.6 0.6   GLUCOSE 219* 88 112*   CALCIUM 10.3* 9.3 8.9       Recent Labs     09/30/22  2021 10/01/22  1115 10/02/22  0248   WBC 15.1* 12.0* 12.5*   RBC 3.94 3.34* 3.19*   HGB 12.6 10.8* 10.2*   HCT 37.8 33.0* 31.8*   MCV 95.9 98.8 99.7   MCH 32.0 32.3 32.0   MCHC 33.3 32.7 32.1   RDW 12.1 12.7 12.9    227 225   MPV 10.6 10.3 10.7       No results for input(s): POCGLU in the last 72 hours. Imaging:  CT ABDOMEN PELVIS W IV CONTRAST Additional Contrast? None    Result Date: 9/30/2022  EXAMINATION: CT OF THE ABDOMEN AND PELVIS WITH CONTRAST 9/30/2022 10:35 pm TECHNIQUE: CT of the abdomen and pelvis was performed with the administration of intravenous contrast. Multiplanar reformatted images are provided for review. Automated exposure control, iterative reconstruction, and/or weight based adjustment of the mA/kV was utilized to reduce the radiation dose to as low as reasonably achievable. COMPARISON: 09/19/2022 HISTORY: ORDERING SYSTEM PROVIDED HISTORY: emesis TECHNOLOGIST PROVIDED HISTORY: Additional Contrast?->None Reason for exam:->emesis Decision Support Exception - unselect if not a suspected or confirmed emergency medical condition->Emergency Medical Condition (MA) FINDINGS: Lower Chest:  Visualized portion of the lower chest demonstrates no acute abnormality. There is a large hiatal hernia. Organs: The liver, spleen, pancreas, and adrenals are within normal limits. The gallbladder is not visualized, possibly surgically absent. There are bilateral renal cysts. No hydronephrosis. GI/Bowel: Percutaneous gastrostomy tube with retention balloon in the gastric lumen. There is no evidence of bowel obstruction. No evidence of abnormal bowel wall thickening or distension. The appendix is not visualized. However, there is no inflammatory change in the right lower quadrant to suggest acute appendicitis. There is moderate stool throughout the colon. Pelvis: The urinary bladder is distended. There is small volume intravesicular gas. The uterus is unremarkable. There is a 2.3 x 1.8 cm left adnexal cyst. Peritoneum/Retroperitoneum: There is small volume free fluid. No extraluminal gas. No evidence of lymphadenopathy. Aorta is normal in caliber. Bones/Soft Tissues:  No acute abnormality of the visualized osseous structures. Large hiatal hernia.  Small volume intravesicular gas, which could reflect recent instrumentation. Clinical correlation recommended. Left adnexal cyst.  See below for recommendations. RECOMMENDATIONS: Managing Incidental Adnexal Cystic Mass by CT or MR Benign cyst: Premenopausal (< or equal to 50 years if LMP unknown) < or equal to 5 cm: no follow up >5 cm: US in 6-12 weeks > or equal to 10 cm: US promptly Early postmenopausal (0-5 years after LMP) < or equal to 3 cm: no follow up 3-5 cm: US in 6-12 weeks >5 cm: US promptly Late postmenopausal < or equal to 3 cm: no follow up >3 cm: US promptly Probably benign cyst : {benign appearing except demonstrates one or more of the following - (a) angulated margin, (b) not round/oval shape, (c) not well imaged due to artifact or technical parameters (ex. noncontrast CT) Premenopausal (< than or equal to 50 years if LMP unknown) < or equal to 3 cm: no follow up 3-5 cm: US in 6-12 weeks >5 cm: US Early postmenopausal < or equal to 3 cm: no follow up >3 cm: US promptly Late postmenopausal < or equal to 1 cm: no follow up >1 cm: US Reference: Maricel Guillen et al. Managing Incidental Findings on Abdominal CT: White Paper of the ACR Incidental Findings Committee. J Am Jaki Radiol 2010;7:754-773     XR CHEST PORTABLE    Result Date: 9/30/2022  EXAMINATION: ONE XRAY VIEW OF THE CHEST 9/30/2022 8:30 pm COMPARISON: 08/15/2022 HISTORY: ORDERING SYSTEM PROVIDED HISTORY: emesis and ams TECHNOLOGIST PROVIDED HISTORY: Reason for exam:->emesis and ams FINDINGS: The lungs are without acute focal process. There is no effusion or pneumothorax. The cardiomediastinal silhouette is without acute process. The osseous structures are without acute process. No acute process.        Patient Instructions:      Medication List        CONTINUE taking these medications      acetaminophen 325 MG tablet  Commonly known as: TYLENOL     * albuterol (2.5 MG/3ML) 0.083% nebulizer solution  Commonly known as: PROVENTIL  Take 3 mLs by nebulization every 6 hours     * albuterol (2.5 MG/3ML) 0.083% nebulizer solution  Commonly known as: PROVENTIL  Take 3 mLs by nebulization every 6 hours as needed for Wheezing     bisacodyl 10 MG suppository  Commonly known as: DULCOLAX     bumetanide 1 MG tablet  Commonly known as: BUMEX  TAKE 1 TABLET VIA G-TUBE DAILY AS NEEDED FOR SWELLING. Dextromethorphan-guaiFENesin  MG/5ML Syrp     docusate 50 MG/5ML liquid  Commonly known as: COLACE  TAKE 2 TEASPOONSFUL (10ML) VIA G-TUBE TWICE A DAY (PLUG/SYR)     famotidine 20 MG tablet  Commonly known as: PEPCID  TAKE (1) TABLET VIA PEG TUBE TWO TIMES DAILY. FeroSul 325 (65 Fe) MG tablet  Generic drug: ferrous sulfate  TAKE 1 TABLET VIA G-TUBE ONCE A DAY. folic acid 1 MG tablet  Commonly known as: FOLVITE  TAKE 1 TABLET VIA G-TUBE DAILY     Full Kit Nebulizer Set Misc  Use as directed with nebulized medication. Gauze Pads & Dressings 2\"X2\" Pads  1 each by Does not apply route in the morning and at bedtime Apply to stoma     levothyroxine 150 MCG tablet  Commonly known as: SYNTHROID  Thandey@[employer] 1 TABLET VIA G-TUBE ONCE DAILY     * medicated lip balm 2-2.5-6.6 % Stck     * SUNSCREENS EX     midodrine 2.5 MG tablet  Commonly known as: PROAMATINE  TAKE 3 TABLET VIA G-TUBE THREE TIMES DAILY AS NEEDED. (HYPOTENSION, GIVE ONLY IF SYSTOLIC BP IS <230. (BP CHECKS 3 TIMES A DAY. montelukast 10 MG tablet  Commonly known as: SINGULAIR  TAKE 1 TABLET VIA G-TUBE AT BEDTIME.      Natural Vitamin D-3 125 MCG (5000 UT) Tabs tablet  Generic drug: vitamin D3  TAKE 1 TABLET VIA G-TUBE ONCE DAILY     * OLANZapine zydis 5 MG disintegrating tablet  Commonly known as: ZYPREXA     * OLANZapine zydis 15 MG disintegrating tablet  Commonly known as: ZYPREXA     * OLANZapine 20 MG tablet  Commonly known as: ZYPREXA     pantoprazole sodium 40 MG Pack packet  Commonly known as: PROTONIX     PARoxetine 10 MG tablet  Commonly known as: PAXIL  TAKE 1 TABLET VIA G-TUBE TWICE A DAY     polyethylene glycol 17 g packet  Commonly known as: GLYCOLAX  17 g by Per G Tube route daily     Powder Free Nitrile Gloves Med Misc  As Directed     Triple Antibiotic Oint     valproic acid 250 MG/5ML Soln oral solution  Commonly known as: DEPAKENE  20 mLs by Per G Tube route 2 times daily           * This list has 7 medication(s) that are the same as other medications prescribed for you. Read the directions carefully, and ask your doctor or other care provider to review them with you.                 STOP taking these medications      LORazepam 2 MG tablet  Commonly known as: ATIVAN                Note that more than 30 minutes was spent in preparing discharge papers, discussing discharge with patient, medication review, etc.    Signed:  Electronically signed by SCHUYLER Olivia on 10/3/2022 at 1:43 PM

## 2022-10-03 NOTE — DISCHARGE INSTR - COC
Continuity of Care Form    Patient Name: Leonidas Tuttle   :  1955  MRN:  [id number]    Admit date:  2022  Discharge date: 10/3/2022    Code Status Order: Full Code   Advance Directives:     Admitting Physician:  Esther Couch MD  PCP: Damien Carias DO    Discharging Nurse:  [facility] Unit/Room#: 3836/8877-O  Discharging Unit Phone Number:  [phone]    Emergency Contact:   Extended Emergency Contact Information  Primary Emergency Contact: [facility])  Home Phone: [phone] [phone]  Mobile Phone: [phone]  Relation: Legal Guardian   needed? No  Secondary Emergency Contact: [facility] Phone: [phone]  Work Phone: [phone]  Relation: Other  Preferred language: [address] needed?  No    Past Surgical History:  Past Surgical History:   Procedure Laterality Date    BRONCHOSCOPY  02/10/2017    CHEST TUBE INSERTION Right 2017    GASTROSTOMY TUBE PLACEMENT N/A 3/7/2021    EGD PEG TUBE PLACEMENT performed by Lili Hoyt MD at [address] [place] [address] Right 2017    tessio insertion     OTHER SURGICAL HISTORY  2017    PEG tube insertion       Immunization History:   Immunization History   Administered Date(s) Administered    Influenza Vaccine, unspecified formulation 09/10/2014    Influenza Virus Vaccine 10/21/2017    Influenza Whole 09/10/2014    Influenza, FLUAD, (age 72 y+), Adjuvanted, 0.5mL 12/10/2021    Influenza, FLUARIX, FLULAVAL, FLUZONE (age 10 mo+) AND AFLURIA, (age 1 y+), PF, 0.5mL 2016, 2017, 10/21/2017, 10/03/2018    Influenza, High Dose (Fluzone 65 yrs and older) 10/18/2019    Pneumococcal Polysaccharide ([id number]) 10/18/2019       Active Problems:  Patient Active Problem List   Diagnosis Code    Profound intellectual disability F73    Hypothyroidism E03.9    Impairment level: total impairment of both eyes H54.0X55    Hyperglycemia R73.9    Nonsustained ventricular tachycardia I47.29    Macrocytosis D75.89    Disruptive behavior disorder F91.9    Ingrowing nail L60.0    Peripheral vascular disease (HCC) I73.9    Altered mental state R41.82    Onychomycosis B35.1    Anemia due to chronic kidney disease N18.9, D63.1    Essential hypertension I10    Vitamin D deficiency E55.9    Gastroesophageal reflux disease without esophagitis K21.9    S/P percutaneous endoscopic gastrostomy (PEG) tube placement ([facility]) Z93.1    Chronic kidney disease N18.9    Dementia with behavioral disturbance, unspecified dementia type F03.91    Gastrostomy tube dysfunction ([facility]) K94.23    Severe dehydration E86.0    Intractable vomiting with nausea R11.2       Isolation/Infection:   Isolation            No Isolation          Patient Infection Status       Infection Onset Added Last Indicated Last Indicated By Review Planned Expiration Resolved Resolved By    None active    Resolved    COVID-19 (Rule Out) 09/30/22 09/30/22 09/30/22 COVID-19, Rapid (Ordered)   09/30/22 Rule-Out Test Resulted    COVID-19 (Rule Out) 08/15/22 08/15/22 08/15/22 COVID-19, Rapid (Ordered)   08/15/22 Rule-Out Test Resulted    COVID-19 (Rule Out) 09/21/21 09/21/21 09/21/21 Respiratory Panel, Molecular, with COVID-19 (Restricted: peds pts or suitable admitted adults) (Ordered)   09/21/21 Rule-Out Test Resulted    COVID-19 (Rule Out) 09/21/21 09/21/21 09/21/21 COVID-19, Rapid (Ordered)   09/21/21 Rule-Out Test Resulted    COVID-19 (Rule Out) 03/04/21 03/04/21 03/04/21 COVID-19, Rapid (Ordered)   03/04/21 Rule-Out Test Resulted    COVID-19 (Rule Out) 02/08/21 02/08/21 02/08/21 COVID-19 (Ordered)   02/08/21 Rule-Out Test Resulted            Nurse Assessment:  Last Vital Signs: BP (!) 130/92   Pulse 79   Temp 98 °F (36.7 °C) (Axillary)   Resp 16   Ht 5' 1\" (1.549 m)   Wt 131 lb (59.4 kg)   SpO2 98%   BMI 24.75 kg/m²     Last documented pain score (0-10 scale): Pain Level:  (VIRAL-grimacing)  Last Weight:   Wt Readings from Last 1 Encounters: 10/03/22 131 lb (59.4 kg)     Mental Status:  ***    IV Access:  - None    Nursing Mobility/ADLs:  Walking   Dependent  Transfer  Dependent  Bathing  Dependent  Dressing  Dependent  Toileting  Dependent  Feeding  Dependent  Med Admin  Dependent  Med Delivery   ***    Wound Care Documentation and Therapy:        Elimination:  Continence: Bowel: No  Bladder: No  Urinary Catheter: None   Colostomy/Ileostomy/Ileal Conduit: No       Date of Last BM:      Intake/Output Summary (Last 24 hours) at 10/3/2022 1319  Last data filed at 10/3/2022 0553  Gross per 24 hour   Intake 2560.48 ml   Output 1250 ml   Net 1310.48 ml     I/O last 3 completed shifts: In: 1 [I.V.:3449; NG/GT:1167]  Out: 1250 [Urine:1250]    Safety Concerns:     None    Impairments/Disabilities:      Language Barrier - non verbal, Vision, and Contractures - bilat extremities    Nutrition Therapy:  Current Nutrition Therapy:   - Tube Feedings:  Standard with fiber    Routes of Feeding: Jejunal Tube  Liquids: ***  Daily Fluid Restriction: yes - amount NPO  Last Modified Barium Swallow with Video (Video Swallowing Test): not done    Treatments at the Time of Hospital Discharge:   Respiratory Treatments:    Oxygen Therapy:  is not on home oxygen therapy.   Ventilator:    - No ventilator support    Rehab Therapies: ***  Weight Bearing Status/Restrictions: No weight bearing restrictions  Other Medical Equipment (for information only, NOT a DME order):  wheelchair  Other Treatments: ***    Patient's personal belongings (please select all that are sent with patient):  None    RN SIGNATURE:  Electronically signed by Hanna Teresa RN on 10/3/22 at 1:22 PM EDT    CASE MANAGEMENT/SOCIAL WORK SECTION    Inpatient Status Date: ***    Readmission Risk Assessment Score:  Readmission Risk              Risk of Unplanned Readmission:  33           Discharging to Facility/ Agency   Name:   Address:  Phone:  Fax:    Dialysis Facility (if applicable) Name:  Address:  Dialysis Schedule:  Phone:  Fax:    / signature: {Esignature:[id number]}    PHYSICIAN SECTION    Prognosis: {Prognosis:8283968215}    Condition at Discharge: Isidra Paulson Patient Condition:[id number]}    Rehab Potential (if transferring to Rehab): {Prognosis:6883376035}    Recommended Labs or Other Treatments After Discharge: ***    Physician Certification: I certify the above information and transfer of Sheridan Morales  is necessary for the continuing treatment of the diagnosis listed and that she requires {Admit to Appropriate Level of Care:95872} for {GREATER/LESS:554882874} 30 days.      Update Admission H&P: {CHP DME Changes in MXDBZ:919477449}    PHYSICIAN SIGNATURE:  {Esignature:[id number]}

## 2022-10-03 NOTE — PATIENT CARE CONFERENCE
P Quality Flow/Interdisciplinary Rounds Progress Note        Quality Flow Rounds held on October 3, 2022    Disciplines Attending:  Bedside Nurse, , , and Nursing Unit Leadership    Satya English was admitted on 9/30/2022  6:44 PM    Anticipated Discharge Date:       Disposition:    Jose Score:  Jose Scale Score: 12    Readmission Risk              Risk of Unplanned Readmission:  33           Discussed patient goal for the day, patient clinical progression, and barriers to discharge.   The following Goal(s) of the Day/Commitment(s) have been identified:  Discharge - Obtain Order      Stephanie Robertson RN  October 3, 2022

## 2022-10-07 ENCOUNTER — TELEPHONE (OUTPATIENT)
Dept: FAMILY MEDICINE CLINIC | Age: 67
End: 2022-10-07
Payer: MEDICARE

## 2022-10-07 DIAGNOSIS — K94.29: Primary | ICD-10-CM

## 2022-10-07 PROCEDURE — G0179 MD RECERTIFICATION HHA PT: HCPCS | Performed by: INTERNAL MEDICINE

## 2022-10-17 ENCOUNTER — OFFICE VISIT (OUTPATIENT)
Dept: FAMILY MEDICINE CLINIC | Age: 67
End: 2022-10-17

## 2022-10-17 VITALS
RESPIRATION RATE: 18 BRPM | OXYGEN SATURATION: 94 % | SYSTOLIC BLOOD PRESSURE: 98 MMHG | BODY MASS INDEX: 24.73 KG/M2 | TEMPERATURE: 97.8 F | HEART RATE: 88 BPM | DIASTOLIC BLOOD PRESSURE: 60 MMHG | HEIGHT: 61 IN | WEIGHT: 131 LBS

## 2022-10-17 DIAGNOSIS — R11.2 INTRACTABLE NAUSEA AND VOMITING: ICD-10-CM

## 2022-10-17 DIAGNOSIS — Z09 HOSPITAL DISCHARGE FOLLOW-UP: Primary | ICD-10-CM

## 2022-10-17 DIAGNOSIS — E87.1 HYPONATREMIA: ICD-10-CM

## 2022-10-17 DIAGNOSIS — E16.2 HYPOGLYCEMIA: ICD-10-CM

## 2022-10-17 DIAGNOSIS — F03.918 DEMENTIA WITH BEHAVIORAL DISTURBANCE: ICD-10-CM

## 2022-10-17 DIAGNOSIS — K94.23 GASTROSTOMY TUBE DYSFUNCTION (HCC): ICD-10-CM

## 2022-10-17 DIAGNOSIS — F73 PROFOUND INTELLECTUAL DISABILITY: ICD-10-CM

## 2022-10-17 RX ORDER — POLYETHYLENE GLYCOL 3350 17 G/17G
17 POWDER, FOR SOLUTION ORAL DAILY PRN
Qty: 527 G | Refills: 1 | Status: SHIPPED | OUTPATIENT
Start: 2022-10-17

## 2022-10-17 RX ORDER — ONDANSETRON 4 MG/1
4 TABLET, ORALLY DISINTEGRATING ORAL EVERY 8 HOURS PRN
Qty: 90 TABLET | Refills: 3 | Status: SHIPPED | OUTPATIENT
Start: 2022-10-17 | End: 2022-11-16

## 2022-10-17 NOTE — PROGRESS NOTES
Post-Discharge Transitional Care  Follow Up      Lisa Can   YOB: 1955    Date of Office Visit:  10/17/2022  Date of Hospital Admission: 9/30/22  Date of Hospital Discharge: 10/3/22  Risk of hospital readmission (high >=14%. Medium >=10%) :Readmission Risk Score: 19.6      Care management risk score Rising risk (score 2-5) and Complex Care (Scores >=6): No Risk Score On File     Non face to face  following discharge, date last encounter closed (first attempt may have been earlier): *No documented post hospital discharge outreach found in the last 14 days    Call initiated 2 business days of discharge: *No response recorded in the last 14 days    ASSESSMENT/PLAN:   Hospital discharge follow-up  -     AL DISCHARGE MEDS RECONCILED W/ CURRENT OUTPATIENT MED LIST  Intractable nausea and vomiting  -     AL DISCHARGE MEDS RECONCILED W/ CURRENT OUTPATIENT MED LIST  -     ondansetron (ZOFRAN ODT) 4 MG disintegrating tablet; Take 1 tablet by mouth every 8 hours as needed for Nausea or Vomiting, Disp-90 tablet, R-3Normal  -     blood glucose monitor kit and supplies; Dispense sufficient amount for indicated testing frequency plus additional to accommodate PRN testing needs with ONE TOUCH glucometer. Dispense all needed supplies to include: monitor, strips, lancing device, lancets, control solutions, alcohol swabs., Disp-1 kit, R-0, Normal  Gastrostomy tube dysfunction (HCC)  -     AL DISCHARGE MEDS RECONCILED W/ CURRENT OUTPATIENT MED LIST  Hyponatremia  -     AL DISCHARGE MEDS RECONCILED W/ CURRENT OUTPATIENT MED LIST  Dementia with behavioral disturbance  -     AL DISCHARGE MEDS RECONCILED W/ CURRENT OUTPATIENT MED LIST  Profound intellectual disability  -     AL DISCHARGE MEDS RECONCILED W/ CURRENT OUTPATIENT MED LIST  Hypoglycemia  -     blood glucose monitor kit and supplies;  Dispense sufficient amount for indicated testing frequency plus additional to accommodate PRN testing needs with ONE [facility] glucometer. Dispense all needed supplies to include: monitor, strips, lancing device, lancets, control solutions, alcohol swabs., Disp-1 kit, R-0, Normal    Patient and caregiver was instructed that with >2 episodes of vomiting Alfredo Morfin need to be evaluated ASAP in the ED. Medical Decision Making: high complexity  Return if symptoms worsen or fail to improve. On this date 10/17/2022 I have spent 40 minutes reviewing previous notes, test results and face to face with the patient discussing the diagnosis and importance of compliance with the treatment plan as well as documenting on the day of the visit. Subjective:   HPI:  Follow up of Hospital problems/diagnosis(es):   Principal Problem:    Intractable vomiting with nausea  Active Problems:    Hyponatremia    Intractable nausea and vomiting  Resolved Problems:    * No resolved hospital problems. *    Inpatient course: Discharge summary reviewed- see chart. Hospital Course:   Patient Shelley Lopez is a 75 y.o. presented with Intractable vomiting with nausea [R11.2]  Intractable vomiting with nausea, unspecified vomiting type [R11.2]              Patient was sent to the ER from group home for concerns of vomiting. She was followed and treated for;      1. Intractable nausea/ vomiting- resolved. :  pt has a history of MRDD/ dementia and resides at a group home. She is a beltrán of the [facility] and has a peg tube for nutrition as she will not eat otherwise. Pt came to ER as she looked unwell and was vomiting. Unable to keep any volume down via peg tube. CT abdomen/ pelvis completed on admission- large hiatal hernia, but no acute issue. Symptoms appear to be resolving and pt is now tolerating tube feeding with no vomiting. Ok to return to group home with tube feeding and water flushes recommended by dietary. 2. Hyponatremia: 126 on admission- likely due to above- appears to be resolving.  Noted that Sodium was 128 in August. She may have some component of underlying chronic hyponatremia from psych meds. Also concern that group home may have been giving too much free water via flushes. Instructed to follow dietary recommendations on flushes.  on discharge. 3. Large hiatal hernia: N/V resolving. No acute issue      4. Leukocytosis: likely component of volume contraction/ dehydration- trending down      5. H/o MRDD / Dementia: supportive care- pt resides at a group home     6. GERD: PPI     7. Thyroid disease: synthroid     8. H/o dysphagia - h/o peg tube placement by surgery Kurtis Villegas 3/2021. Patient ready to discharge back to group home. Patient discharged in stable condition with the following medications, instructions and follow up. 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    Hyperglycemia    Nonsustained ventricular tachycardia    Macrocytosis    Disruptive behavior disorder    Ingrowing nail    Peripheral vascular disease (HCC)    Altered mental state    Onychomycosis    Anemia due to chronic kidney disease    Essential hypertension    Vitamin D deficiency    Gastroesophageal reflux disease without esophagitis    S/P percutaneous endoscopic gastrostomy (PEG) tube placement (HCC)    Chronic kidney disease    Dementia with behavioral disturbance    Gastrostomy tube dysfunction (HCC)    Severe dehydration    Intractable vomiting with nausea    Hyponatremia    Intractable nausea and vomiting       Medications listed as ordered at the time of discharge from hospital     Medication List            Accurate as of October 17, 2022 12:14 PM. If you have any questions, ask your nurse or doctor. START taking these medications      blood glucose monitor kit and supplies  Dispense sufficient amount for indicated testing frequency plus additional to accommodate PRN testing needs with ONE TOUCH glucometer. Dispense all needed supplies to include: monitor, strips, lancing device, lancets, control solutions, alcohol swabs. Started by: Clementina Sierra, DO     ondansetron 4 MG disintegrating tablet  Commonly known as: Zofran ODT  Take 1 tablet by mouth every 8 hours as needed for Nausea or Vomiting  Started by: Clementina Sierra, DO            CHANGE how you take these medications      polyethylene glycol 17 g packet  Commonly known as: GLYCOLAX  17 g by Per G Tube route daily as needed  What changed:   when to take this  reasons to take this  Changed by: Clementina Sierra, DO            CONTINUE taking these medications      acetaminophen 325 MG tablet  Commonly known as: TYLENOL     albuterol (2.5 MG/3ML) 0.083% nebulizer solution  Commonly known as: PROVENTIL  Take 3 mLs by nebulization every 6 hours     bumetanide 1 MG tablet  Commonly known as: BUMEX  TAKE 1 TABLET VIA G-TUBE DAILY AS NEEDED FOR SWELLING. famotidine 20 MG tablet  Commonly known as: PEPCID  TAKE (1) TABLET VIA PEG TUBE TWO TIMES DAILY. FeroSul 325 (65 Fe) MG tablet  Generic drug: ferrous sulfate  TAKE 1 TABLET VIA G-TUBE ONCE A DAY. folic acid 1 MG tablet  Commonly known as: FOLVITE  TAKE 1 TABLET VIA G-TUBE DAILY     Full Kit Nebulizer Set Misc  Use as directed with nebulized medication. Gauze Pads & Dressings 2\"X2\" Pads  1 each by Does not apply route in the morning and at bedtime Apply to stoma     levothyroxine 150 MCG tablet  Commonly known as: SYNTHROID  [email or web address] 1 TABLET VIA G-TUBE ONCE DAILY     * medicated lip balm 2-2.5-6.6 % Stck     * SUNSCREENS EX     midodrine 2.5 MG tablet  Commonly known as: PROAMATINE  TAKE 3 TABLET VIA G-TUBE THREE TIMES DAILY AS NEEDED. (HYPOTENSION, GIVE ONLY IF SYSTOLIC BP IS <277. (BP CHECKS 3 TIMES A DAY. montelukast 10 MG tablet  Commonly known as: SINGULAIR  TAKE 1 TABLET VIA G-TUBE AT BEDTIME.      Natural Vitamin D-3 125 MCG (5000 UT) Tabs tablet  Generic drug: vitamin D3  TAKE 1 TABLET VIA G-TUBE ONCE DAILY     OLANZapine 20 MG tablet  Commonly known as: ZYPREXA     pantoprazole sodium 40 MG Pack packet  Commonly known as: PROTONIX     PARoxetine 10 MG tablet  Commonly known as: PAXIL  TAKE 1 TABLET VIA G-TUBE TWICE A DAY     Powder Free Nitrile Gloves Med Misc  As Directed     Triple Antibiotic Oint     valproic acid 250 MG/5ML Soln oral solution  Commonly known as: DEPAKENE  20 mLs by Per G Tube route 2 times daily           * This list has 2 medication(s) that are the same as other medications prescribed for you. Read the directions carefully, and ask your doctor or other care provider to review them with you. Where to Get Your Medications        These medications were sent to [facility], [address] [phone]  [place] [address], [address] [address] [address]      Phone: [phone]   blood glucose monitor kit and supplies  ondansetron 4 MG disintegrating tablet       Information about where to get these medications is not yet available    Ask your nurse or doctor about these medications  polyethylene glycol 17 g packet           Medications marked \"taking\" at this time  Outpatient Medications Marked as Taking for the 10/17/22 encounter (Office Visit) with Dulce Maria Fernandes, DO   Medication Sig Dispense Refill    ondansetron (ZOFRAN ODT) 4 MG disintegrating tablet Take 1 tablet by mouth every 8 hours as needed for Nausea or Vomiting 90 tablet 3    blood glucose monitor kit and supplies Dispense sufficient amount for indicated testing frequency plus additional to accommodate PRN testing needs with ONE TOUCH glucometer. Dispense all needed supplies to include: monitor, strips, lancing device, lancets, control solutions, alcohol swabs. 1 kit 0    polyethylene glycol (GLYCOLAX) 17 g packet 17 g by Per G Tube route daily as needed 527 g 1    famotidine (PEPCID) 20 MG tablet TAKE (1) TABLET VIA PEG TUBE TWO TIMES DAILY.  180 tablet 1 montelukast (SINGULAIR) 10 MG tablet TAKE 1 TABLET VIA G-TUBE AT BEDTIME. 90 tablet 1    PARoxetine (PAXIL) 10 MG tablet TAKE 1 TABLET VIA G-TUBE TWICE A  tablet 1    Gauze Pads & Dressings 2\"X2\" PADS 1 each by Does not apply route in the morning and at bedtime Apply to stoma 10 each 5    folic acid (FOLVITE) 1 MG tablet TAKE 1 TABLET VIA G-TUBE DAILY 90 tablet 1    levothyroxine (SYNTHROID) 150 MCG tablet [email or web address] 1 TABLET VIA G-TUBE ONCE DAILY 90 tablet 1    bumetanide (BUMEX) 1 MG tablet TAKE 1 TABLET VIA G-TUBE DAILY AS NEEDED FOR SWELLING. 30 tablet 5    midodrine (PROAMATINE) 2.5 MG tablet TAKE 3 TABLET VIA G-TUBE THREE TIMES DAILY AS NEEDED. (HYPOTENSION, GIVE ONLY IF SYSTOLIC BP IS <201. (BP CHECKS 3 TIMES A DAY. 252 tablet 0    NATURAL VITAMIN D-3 125 MCG (5000 UT) TABS tablet TAKE 1 TABLET VIA G-TUBE ONCE DAILY 90 tablet 1    FEROSUL 325 (65 Fe) MG tablet TAKE 1 TABLET VIA G-TUBE ONCE A DAY. 90 tablet 1    pantoprazole sodium (PROTONIX) 40 MG PACK packet 40 mg by Per G Tube route every morning (before breakfast)      Disposable Gloves (POWDER FREE NITRILE GLOVES MED) MISC As Directed 2 each 5    albuterol (PROVENTIL) (2.5 MG/3ML) 0.083% nebulizer solution Take 3 mLs by nebulization every 6 hours 120 each 3    Respiratory Therapy Supplies (FULL KIT NEBULIZER SET) MISC Use as directed with nebulized medication.  1 each 0    acetaminophen (TYLENOL) 325 MG tablet 650 mg by Per G Tube route every 4 hours as needed for Pain or Fever      medicated lip balm (BLISTEX/CARMEX) 2-2.5-6.6 % STCK Apply topically as needed for Dry Lips (CRACKED LIPS)      SUNSCREENS EX Apply topically as needed (SUNBURN PREVENTION) Indications: *SPF 50*      Neomycin-Bacitracin-Polymyxin (TRIPLE ANTIBIOTIC) OINT Apply topically 2 times daily as needed (CUTS/SCRAPES/SKIN ABRASIONS)      OLANZapine (ZYPREXA) 20 MG tablet 20 mg by Per G Tube route in the morning and at bedtime      valproic acid (DEPACON) 250 MG/5ML SOLN oral solution 20 mLs by Per G Tube route 2 times daily 300 mL 0        Medications patient taking as of now reconciled against medications ordered at time of hospital discharge: Yes    A comprehensive review of systems was negative except for what was noted in the HPI. Objective:    BP 98/60   Pulse 88   Temp 97.8 °F (36.6 °C) (Temporal)   Resp 18   Ht 5' 1\" (1.549 m)   Wt 131 lb (59.4 kg)   SpO2 94%   BMI 24.75 kg/m²   Physical Exam  General: Awake but has eyes closed most of the time, non verbal at baseline, wheelchair bound, No acute distress  Head: Normocephalic, atraumatic  Eyes: conjunctivae/corneas clear  Mouth: Mucous membranes moist with no pharyngeal exudate or erythema  Neck: no JVD, no adenopathy, no carotid bruit, supple, symmetrical, trachea midline  Back: symmetric, ROM normal, No CVA tenderness. Lungs: clear to auscultation bilaterally without wheezes, rales, or rhonchi  Heart: regular rate and rhythm, S1, S2 normal, no murmur, click, rub or gallop  Abdomen: soft, non-tender; bowel sounds normal; no masses,  no organomegaly, PEG tube site is clean and dry with no surrounding erythema. Extremities: atraumatic, no cyanosis, no edema  Skin: dry without rashes or lesions  Neurologic: non verbal and wheel chair bound. Does not follow commands. At her baseline. An electronic signature was used to authenticate this note.   --Claudio Smith DO

## 2022-10-18 ENCOUNTER — TELEPHONE (OUTPATIENT)
Dept: FAMILY MEDICINE CLINIC | Age: 67
End: 2022-10-18

## 2022-10-18 NOTE — TELEPHONE ENCOUNTER
Phone call from [employer] with [facility]. Orders need to be faxed to the pharmacy with med changes that were made at her recent appointment. Colace was d/c and miralax was changed to prn. She would also like to know when patient needs to have her blood sugar tested. I spoke with Dr. Pepper Lundborg about this and did advise her to check sugar when she is not able to eat or if she is vomiting.

## 2022-11-03 ENCOUNTER — TELEPHONE (OUTPATIENT)
Dept: FAMILY MEDICINE CLINIC | Age: 67
End: 2022-11-03
Payer: MEDICARE

## 2022-11-03 DIAGNOSIS — I51.9 MYXEDEMA HEART DISEASE: Primary | ICD-10-CM

## 2022-11-03 DIAGNOSIS — E03.9 MYXEDEMA HEART DISEASE: Primary | ICD-10-CM

## 2022-11-03 PROCEDURE — G0179 MD RECERTIFICATION HHA PT: HCPCS | Performed by: INTERNAL MEDICINE

## 2022-11-03 NOTE — TELEPHONE ENCOUNTER
Forms completed for Home Health Certification. Per patient request, blister on arm was wrapped to protect it from any type of shearing injury. Patient given all discharge instructions and education. To follow up with dermatology. Seen leaving the department with a steady gait and all belongings.

## 2022-11-11 ENCOUNTER — HOSPITAL ENCOUNTER (OUTPATIENT)
Age: 67
Discharge: HOME | End: 2022-11-11
Payer: MEDICARE

## 2022-11-11 LAB
ALBUMIN SERPL-MCNC: 3.2 G/DL (ref 3.5–5.2)
ALP BLD-CCNC: 56 U/L (ref 35–104)
ALT SERPL-CCNC: 9 U/L (ref 0–32)
ANION GAP SERPL CALCULATED.3IONS-SCNC: 7 MMOL/L (ref 7–16)
AST SERPL-CCNC: 17 U/L (ref 0–31)
BASOPHILS ABSOLUTE: 0.02 E9/L (ref 0–0.2)
BASOPHILS RELATIVE PERCENT: 0.3 % (ref 0–2)
BILIRUB SERPL-MCNC: 0.3 MG/DL (ref 0–1.2)
BUN BLDV-MCNC: 20 MG/DL (ref 6–23)
CALCIUM SERPL-MCNC: 10.1 MG/DL (ref 8.6–10.2)
CHLORIDE BLD-SCNC: 99 MMOL/L (ref 98–107)
CHOLESTEROL, TOTAL: 168 MG/DL (ref 0–199)
CO2: 29 MMOL/L (ref 22–29)
CREAT SERPL-MCNC: 0.8 MG/DL (ref 0.5–1)
EOSINOPHILS ABSOLUTE: 0.06 E9/L (ref 0.05–0.5)
EOSINOPHILS RELATIVE PERCENT: 0.8 % (ref 0–6)
GFR SERPL CREATININE-BSD FRML MDRD: >60 ML/MIN/1.73
GLUCOSE BLD-MCNC: 109 MG/DL (ref 74–99)
HCT VFR BLD CALC: 39.1 % (ref 34–48)
HDLC SERPL-MCNC: 75 MG/DL
HEMOGLOBIN: 12.5 G/DL (ref 11.5–15.5)
IMMATURE GRANULOCYTES #: 0.02 E9/L
IMMATURE GRANULOCYTES %: 0.3 % (ref 0–5)
LDL CHOLESTEROL CALCULATED: 82 MG/DL (ref 0–99)
LYMPHOCYTES ABSOLUTE: 1.6 E9/L (ref 1.5–4)
LYMPHOCYTES RELATIVE PERCENT: 20.7 % (ref 20–42)
MCH RBC QN AUTO: 31.4 PG (ref 26–35)
MCHC RBC AUTO-ENTMCNC: 32 % (ref 32–34.5)
MCV RBC AUTO: 98.2 FL (ref 80–99.9)
MONOCYTES ABSOLUTE: 0.73 E9/L (ref 0.1–0.95)
MONOCYTES RELATIVE PERCENT: 9.4 % (ref 2–12)
NEUTROPHILS ABSOLUTE: 5.3 E9/L (ref 1.8–7.3)
NEUTROPHILS RELATIVE PERCENT: 68.5 % (ref 43–80)
PDW BLD-RTO: 12.1 FL (ref 11.5–15)
PLATELET # BLD: 266 E9/L (ref 130–450)
PMV BLD AUTO: 10 FL (ref 7–12)
POTASSIUM SERPL-SCNC: 4.7 MMOL/L (ref 3.5–5)
RBC # BLD: 3.98 E12/L (ref 3.5–5.5)
SODIUM BLD-SCNC: 135 MMOL/L (ref 132–146)
TOTAL PROTEIN: 7.1 G/DL (ref 6.4–8.3)
TRIGL SERPL-MCNC: 57 MG/DL (ref 0–149)
TSH SERPL DL<=0.05 MIU/L-ACNC: 2.68 UIU/ML (ref 0.27–4.2)
VALPROIC ACID LEVEL: 32 MCG/ML (ref 50–100)
VLDLC SERPL CALC-MCNC: 11 MG/DL
WBC # BLD: 7.7 E9/L (ref 4.5–11.5)

## 2022-11-11 PROCEDURE — 80053 COMPREHEN METABOLIC PANEL: CPT

## 2022-11-11 PROCEDURE — 84443 ASSAY THYROID STIM HORMONE: CPT

## 2022-11-11 PROCEDURE — 80061 LIPID PANEL: CPT

## 2022-11-11 PROCEDURE — 80164 ASSAY DIPROPYLACETIC ACD TOT: CPT

## 2022-11-11 PROCEDURE — 36415 COLL VENOUS BLD VENIPUNCTURE: CPT

## 2022-11-11 PROCEDURE — 85025 COMPLETE CBC W/AUTO DIFF WBC: CPT

## 2022-11-17 RX ORDER — LEVOTHYROXINE SODIUM 0.15 MG/1
TABLET ORAL
Qty: 90 TABLET | Refills: 3 | Status: SHIPPED | OUTPATIENT
Start: 2022-11-17

## 2022-11-17 RX ORDER — FOLIC ACID 1 MG/1
TABLET ORAL
Qty: 90 TABLET | Refills: 3 | Status: SHIPPED | OUTPATIENT
Start: 2022-11-17

## 2022-11-17 NOTE — TELEPHONE ENCOUNTER
Last Appointment:  10/17/2022  Future Appointments   Date Time Provider Marissa Law   12/8/2022  9:45 AM EUGENIO Brice [facility]   12/13/2022  2:00 PM Cordell Casas  [address]

## 2022-11-22 RX ORDER — DEXTROMETHORPHAN HYDROBROMIDE, GUAIFENESIN, PHENYLEPHRINE HYDROCHLORIDE 20; 200; 10 MG/10ML; MG/10ML; MG/10ML
SOLUTION ORAL
Qty: 28 G | Refills: 11 | Status: SHIPPED | OUTPATIENT
Start: 2022-11-22

## 2022-11-22 NOTE — TELEPHONE ENCOUNTER
Last Appointment:  10/17/2022  Future Appointments   Date Time Provider Marissa Law   12/8/2022  9:45 AM EUGENIO Chavira [facility]   12/13/2022  2:00 PM Austin Hennessy  [address]

## 2022-11-23 DIAGNOSIS — E16.2 HYPOGLYCEMIA: Primary | ICD-10-CM

## 2022-11-30 ENCOUNTER — HOSPITAL ENCOUNTER (EMERGENCY)
Age: 67
Discharge: HOME | End: 2022-11-30
Attending: EMERGENCY MEDICINE
Payer: MEDICARE

## 2022-11-30 VITALS
DIASTOLIC BLOOD PRESSURE: 88 MMHG | RESPIRATION RATE: 16 BRPM | SYSTOLIC BLOOD PRESSURE: 155 MMHG | OXYGEN SATURATION: 96 % | HEART RATE: 92 BPM | TEMPERATURE: 98.4 F

## 2022-11-30 DIAGNOSIS — K94.23 PEG TUBE MALFUNCTION (HCC): Primary | ICD-10-CM

## 2022-11-30 PROCEDURE — 99283 EMERGENCY DEPT VISIT LOW MDM: CPT

## 2022-11-30 ASSESSMENT — ENCOUNTER SYMPTOMS
EYE DISCHARGE: 0
EYE REDNESS: 0
EYE PAIN: 0
SHORTNESS OF BREATH: 0
WHEEZING: 0
SORE THROAT: 0
COUGH: 0
VOMITING: 0
DIARRHEA: 0
SINUS PRESSURE: 0
NAUSEA: 0
ABDOMINAL DISTENTION: 0
BACK PAIN: 0

## 2022-11-30 NOTE — ED NOTES
PAS set up for D/C pt caregiver bedside 1st stating she need ambulance now says she can take in her car stating she have verifed okay with her company pt is able will assist into wheelchair and into car      Leodan Mcintosh, [address] [place] [address]  11/30/22 [id number]

## 2022-11-30 NOTE — ED PROVIDER NOTES
Patient with chronic PEG tube. Receives all of her nutrition via this route. Family states they were unable to flush it this evening. The history is provided by a relative. Illness   The current episode started today. The onset was sudden. The problem occurs continuously. The problem has been unchanged. The problem is mild. Nothing relieves the symptoms. Nothing aggravates the symptoms. Pertinent negatives include no fever, no diarrhea, no nausea, no vomiting, no headaches, no sore throat, no cough, no wheezing, no rash, no eye discharge, no eye pain and no eye redness. Review of Systems   Constitutional:  Negative for chills and fever. HENT:  Negative for sinus pressure and sore throat. Eyes:  Negative for pain, discharge and redness. Respiratory:  Negative for cough, shortness of breath and wheezing. Cardiovascular:  Negative for chest pain. Gastrointestinal:  Negative for abdominal distention, diarrhea, nausea and vomiting. Peg tube problem   Genitourinary:  Negative for dysuria and frequency. Musculoskeletal:  Negative for arthralgias and back pain. Skin:  Negative for rash and wound. Neurological:  Negative for weakness and headaches. Hematological:  Negative for adenopathy. All other systems reviewed and are negative. Physical Exam  Vitals and nursing note reviewed. Constitutional:       Appearance: She is well-developed. HENT:      Head: Normocephalic and atraumatic. Eyes:      Pupils: Pupils are equal, round, and reactive to light. Cardiovascular:      Rate and Rhythm: Normal rate and regular rhythm. Heart sounds: Normal heart sounds. No murmur heard. Pulmonary:      Effort: Pulmonary effort is normal. No respiratory distress. Breath sounds: Normal breath sounds. No wheezing or rales. Abdominal:      General: Bowel sounds are normal.      Palpations: Abdomen is soft. Tenderness: There is no abdominal tenderness.  There is no guarding or rebound. Comments: PEG tube in place to the left upper quadrant. No sign of cellulitis. No drainage. Musculoskeletal:      Cervical back: Normal range of motion and neck supple. Skin:     General: Skin is warm and dry. Neurological:      Mental Status: She is alert. GCS: GCS eye subscore is 4. GCS verbal subscore is 1. GCS motor subscore is 5. Cranial Nerves: No cranial nerve deficit. Coordination: Coordination normal.      Comments: Patient is at her baseline mental status per family. Procedures     MDM              --------------------------------------------- PAST HISTORY ---------------------------------------------  Past Medical History:  has a past medical history of Acute kidney injury ([facility] [address] Blind in both eyes, Essential hypertension, Gastroesophageal reflux disease without esophagitis, Hemodialysis patient ([facility] [address] Hyperthyroidism, MR (mental retardation), Osteoarthritis, Peripheral vascular disease ([facility] [address] Pneumonia, Pneumonia due to infectious organism, and Sepsis ([facility] [address] Past Surgical History:  has a past surgical history that includes other surgical history (Right, 01/17/2017); other surgical history (01/25/2017); chest tube insertion (Right, 02/09/2017); bronchoscopy (02/10/2017); and Gastrostomy tube placement (N/A, 3/7/2021). Social History:  reports that she has never smoked. She has never used smokeless tobacco. She reports that she does not drink alcohol and does not use drugs. Family History: family history is not on file. The patients home medications have been reviewed. Allergies: Patient has no known allergies. -------------------------------------------------- RESULTS -------------------------------------------------  Labs:  No results found for this visit on 11/30/22.     Radiology:  No orders to display       ------------------------- NURSING NOTES AND VITALS REVIEWED ---------------------------  Date / Time Roomed:  11/30/2022  5:20 PM  ED Bed Assignment:  15/15    The nursing notes within the ED encounter and vital signs as below have been reviewed. BP (!) 155/88   Pulse 92   Temp 98.4 °F (36.9 °C) (Axillary)   Resp 16   SpO2 96%   Oxygen Saturation Interpretation: Normal      ------------------------------------------ PROGRESS NOTES ------------------------------------------  6:04 PM EST  Gastric tube is securely in place. It has been flushed and aspirated by nursing staff without difficulty. No sign of infection. I have spoken with the  caretaker  and discussed todays results, in addition to providing specific details for the plan of care and counseling regarding the diagnosis and prognosis. Their questions are answered at this time and they are agreeable with the plan. I discussed at length with them reasons for immediate return here for re evaluation. They will followup with their primary care physician by calling their office tomorrow. --------------------------------- ADDITIONAL PROVIDER NOTES ---------------------------------  At this time the patient is without objective evidence of an acute process requiring hospitalization or inpatient management. They have remained hemodynamically stable throughout their entire ED visit and are stable for discharge with outpatient follow-up. The plan has been discussed in detail and they are aware of the specific conditions for emergent return, as well as the importance of follow-up. New Prescriptions    No medications on file       Diagnosis:  1. PEG tube malfunction ([facility] [address]        Disposition:  Patient's disposition: Discharge to home  Patient's condition is stable.        Eugene Guerrero [place]  11/30/22 0336

## 2022-12-01 RX ORDER — GLUCOSAMINE HCL/CHONDROITIN SU 500-400 MG
CAPSULE ORAL
Qty: 100 STRIP | Refills: 5 | Status: SHIPPED | OUTPATIENT
Start: 2022-12-01

## 2022-12-01 RX ORDER — LANCETS 30 GAUGE
1 EACH MISCELLANEOUS DAILY
Qty: 100 EACH | Refills: 5 | Status: SHIPPED | OUTPATIENT
Start: 2022-12-01

## 2022-12-01 RX ORDER — PEN NEEDLE, DIABETIC 31 GX5/16"
1 NEEDLE, DISPOSABLE MISCELLANEOUS DAILY
Qty: 100 EACH | Refills: 5 | Status: SHIPPED | OUTPATIENT
Start: 2022-12-01 | End: 2022-12-31

## 2022-12-01 NOTE — TELEPHONE ENCOUNTER
Last Appointment:  10/17/2022  Future Appointments   Date Time Provider Marissa Law   12/8/2022  9:45 AM EUGENIO Brown [facility]   12/13/2022  2:00 PM Chris Galeana  [address]

## 2022-12-08 ENCOUNTER — PROCEDURE VISIT (OUTPATIENT)
Dept: PODIATRY | Age: 67
End: 2022-12-08
Payer: MEDICARE

## 2022-12-08 VITALS — BODY MASS INDEX: 24.75 KG/M2 | WEIGHT: 131 LBS | TEMPERATURE: 97.6 F

## 2022-12-08 DIAGNOSIS — R26.2 DIFFICULTY WALKING: ICD-10-CM

## 2022-12-08 DIAGNOSIS — B35.1 TINEA UNGUIUM: Primary | ICD-10-CM

## 2022-12-08 DIAGNOSIS — M79.674 PAIN IN TOE OF RIGHT FOOT: ICD-10-CM

## 2022-12-08 DIAGNOSIS — F79 INTELLECTUAL DISABILITY: ICD-10-CM

## 2022-12-08 DIAGNOSIS — M79.675 PAIN IN LEFT TOE(S): ICD-10-CM

## 2022-12-08 DIAGNOSIS — I73.9 PERIPHERAL VASCULAR DISEASE, UNSPECIFIED (HCC): ICD-10-CM

## 2022-12-08 PROCEDURE — 11721 DEBRIDE NAIL 6 OR MORE: CPT | Performed by: PODIATRIST

## 2022-12-08 NOTE — PROGRESS NOTES
Yordy Mini  Return Patient    Chief Complaint   Patient presents with    Toe Pain     Saw pcp Dr. Javan Washburn 12/1/22       Subjective: This Roberto Grates comes to office for foot and nail care. Pt currently has complaint of thickened, painful, elongated nails that he/she cannot manage by themselves. Pt. Relates pain to nails with shoe gear. Pt's primary care physician is Carolin Nunn DO. Patient is seen with caregiver  Past Medical History:   Diagnosis Date    Acute kidney injury ([facility] [address] 01/15/2017    d/t Vancomycin, on Dialysis [facility] right chest    Blind in both eyes     Essential hypertension 4/2/2021    Gastroesophageal reflux disease without esophagitis 4/2/2021    Hemodialysis patient [facility])     Hyperthyroidism     MR (mental retardation)     Osteoarthritis     Peripheral vascular disease ([facility] [address]     Pneumonia 01/06/2017    Pneumonia due to infectious organism 1/8/2017    Sepsis ([facility] [address] 3/4/2021       No Known Allergies  Current Outpatient Medications on File Prior to Visit   Medication Sig Dispense Refill    blood glucose monitor kit and supplies Dispense one Leo Matrix monitor and one lancet device. Patient tests blood glucose when nauseous, vomiting, or when tube feeding is held due to hypoglycemia risk. 1 kit 0    Lancets MISC 1 each by Does not apply route daily Pt is in need of Leo Matrix blood glucose lancets. Patient tests bood glucose when nauseous, vomiting, or when tube feeding are held due to hypoglycemia risk. 100 each 5    blood glucose monitor strips Leo Matrix testing strips. Patient tests bood glucose when nauseous, vomiting, or when tube feeding are held due to hypoglycemia risk. 100 strip 5    Alcohol Swabs (ALCOHOL PREP) PADS 1 each by Does not apply route daily Use before testing blood glucose. Patient tests bood glucose when nauseous, vomiting, or when tube feeding are held due to hypoglycemia risk.  100 each 5    blood glucose monitor kit and supplies Dispense sufficient amount for once daily testing frequency with ONE TOUCH glucometer. Dispense all needed supplies to include: monitor, strips, lancing device, lancets, control solutions, alcohol swabs. Patient tests BG when nauseous, vomiting, or when tube feeds are held due to hypoglycemia risk. 1 kit 0    Bacitracin-Polymyxin B (POLY BACITRACIN) [id number] UNIT/GM OINT APPLY TOPICALLY TO OSTOMY TWICE DAILY. 28 g 11    folic acid (FOLVITE) 1 MG tablet TAKE 1 TABLET VIA G-TUBE DAILY 90 tablet 3    levothyroxine (SYNTHROID) 150 MCG tablet Zainab@[employer] 1 TABLET VIA G-TUBE ONCE DAILY 90 tablet 3    polyethylene glycol (GLYCOLAX) 17 g packet 17 g by Per G Tube route daily as needed 527 g 1    montelukast (SINGULAIR) 10 MG tablet TAKE 1 TABLET VIA G-TUBE AT BEDTIME. 90 tablet 1    PARoxetine (PAXIL) 10 MG tablet TAKE 1 TABLET VIA G-TUBE TWICE A  tablet 1    bumetanide (BUMEX) 1 MG tablet TAKE 1 TABLET VIA G-TUBE DAILY AS NEEDED FOR SWELLING. 30 tablet 5    midodrine (PROAMATINE) 2.5 MG tablet TAKE 3 TABLET VIA G-TUBE THREE TIMES DAILY AS NEEDED. (HYPOTENSION, GIVE ONLY IF SYSTOLIC BP IS <685. (BP CHECKS 3 TIMES A DAY. 252 tablet 0    NATURAL VITAMIN D-3 125 MCG (5000 UT) TABS tablet TAKE 1 TABLET VIA G-TUBE ONCE DAILY 90 tablet 1    FEROSUL 325 (65 Fe) MG tablet TAKE 1 TABLET VIA G-TUBE ONCE A DAY. 90 tablet 1    pantoprazole sodium (PROTONIX) 40 MG PACK packet 40 mg by Per G Tube route every morning (before breakfast)      Disposable Gloves (POWDER FREE NITRILE GLOVES MED) MISC As Directed 2 each 5    albuterol (PROVENTIL) (2.5 MG/3ML) 0.083% nebulizer solution Take 3 mLs by nebulization every 6 hours 120 each 3    Respiratory Therapy Supplies (FULL KIT NEBULIZER SET) MISC Use as directed with nebulized medication.  1 each 0    acetaminophen (TYLENOL) 325 MG tablet 650 mg by Per G Tube route every 4 hours as needed for Pain or Fever      medicated lip balm (BLISTEX/CARMEX) 2-2.5-6.6 % STCK Apply topically as needed for Dry Lips (CRACKED LIPS)      SUNSCREENS EX Apply topically as needed (SUNBURN PREVENTION) Indications: *SPF 50*      Neomycin-Bacitracin-Polymyxin (TRIPLE ANTIBIOTIC) OINT Apply topically 2 times daily as needed (CUTS/SCRAPES/SKIN ABRASIONS)      OLANZapine (ZYPREXA) 20 MG tablet 20 mg by Per G Tube route in the morning and at bedtime      valproic acid (DEPACON) 250 MG/5ML SOLN oral solution 20 mLs by Per G Tube route 2 times daily 300 mL 0    famotidine (PEPCID) 20 MG tablet TAKE (1) TABLET VIA PEG TUBE TWO TIMES DAILY. 180 tablet 1    Gauze Pads & Dressings 2\"X2\" PADS 1 each by Does not apply route in the morning and at bedtime Apply to stoma 10 each 5    [DISCONTINUED] NATURAL VITAMIN D-3 125 MCG (5000 UT) TABS tablet TAKE 1 TABLET VIA G-TUBE ONCE DAILY 28 tablet 0     No current facility-administered medications on file prior to visit. Review of Systems  Objective:  General: AAO x 3 in NAD.     Derm  Toenail Description  Sites of Onychomycosis Involvement (Check affected area)  [x] [x] [x] [x] [x] [x] [x] [x] [x] [x]  5 4 3 2 1 1 2 3 4 5                          Right                                        Left    Thickness  [x] [x] [x] [x] [x] [x] [x] [x] [x] [x]  5 4 3 2 1 1 2 3 4 5                         Right                                        Left    Dystrophic Changes   [x] [x] [x] [x] [x] [x] [x] [x] [x] [x]  5 4 3 2 1 1 2 3 4 5                         Right                                        Left    Color  [x] [x] [x] [x] [x] [x] [x] [x] [x] [x]  5 4 3 2 1 1 2 3 4 5                          Right                                        Left    Incurvation/Ingrowin   [x] [x] [x] [x] [x] [x] [x] [x] [x] [x]  5 4 3 2 1 1 2 3 4 5                         Right                                        Left    Inflammation/Pain   [x] [x] [x] [x] [x] [x] [x] [x] [x] [x]  5 4 3  2 1 1 2 3 4 5                         Right                                        Left      Nails that are described above are all elongated thickened pitting mycotic yellowish incurvated causing pain with both shoe gear. Palpation nails greater then 3 mm thick painful       Dermatologic Exam:hair loss noted  lower extremity    Skin lesion/ulceration   Skin   Callus   Musculoskeletal:     1st MPJ ROM normal, Bilateral.  Muscle strength 5/5, Bilateral.  Pain present upon palpation of toenails   Bilateral., Bilateral.  Ankle ROM normal,Bilateral.    Dorsally contracted digits , Bilateral.     Vascular: There are class B findings absent posterior tibialis pulses lack of hair growth thickened nails discoloration skin is discolored skin is shiny cool to touch to toes    Neurological:  Sensation present to light touch to level of digits, Bilateral    Foot Exam    General  General Appearance: appears stated age and healthy   Assistance: wheelchair use       Right Foot/Ankle     Inspection and Palpation  Skin Exam: skin changes and abnormal color; Neurovascular  Dorsalis pedis: 1+  Posterior tibial: absent    Muscle Strength  Ankle dorsiflexion: 1  Ankle plantar flexion: 1      Left Foot/Ankle      Inspection and Palpation  Skin Exam: skin changes and abnormal color; Neurovascular  Dorsalis pedis: 1+  Posterior tibial: absent    Muscle Strength  Ankle dorsiflexion: 1  Ankle plantar flexion: 1    Range of Motion    Normal left ankle ROM         Right Ankle Exam   Right ankle exam is normal.  Swelling: mild    Muscle Strength   Dorsiflexion:  1/5  Plantar flexion:  1/5  Anterior tibial:  1/5  Posterior tibial:  1/5  Gastrocsoleus:  1/5  Peroneal muscle:  1/5    Tests   Anterior drawer: physiological laxity  Varus tilt: physiological laxity       Left Ankle Exam   Left ankle exam is normal.  Swelling: mild    Range of Motion   The patient has normal left ankle ROM.      Muscle Strength   Dorsiflexion:  1/5   Plantar flexion:  1/5   Anterior tibial:  1/5   Gastrocsoleus:  1/5  Peroneal muscle:  1/5    Tests   Anterior drawer: physiological laxity  Varus tilt: physiological laxity        Q7   []Yes    []No                Q8   [x]Yes    []No                     Q9   []Yes    []No  Assessment:  75 y.o. female with:   1. Tinea unguium    2. Peripheral vascular disease, unspecified ([address]    3. Pain in left toe(s)    4. Difficulty walking    5. Pain in toe of right foot    6. Mental retardation     No orders of the defined types were placed in this encounter. Plan: .  Pt was evaluated and examined. Patient was given personalized discharge instructions. Nails 1-10 were debrided in length and thickness sharply with a nail nipper and  without incident. Pt will follow up in 9 weeks or sooner if any problems arise. Diagnosis was discussed with the pt and all of their questions were answered in detail. Proper foot hygiene and care was discussed with the pt. Patient to check feet daily and contact the office with any questions/problems/concerns. Other comorbidity noted and will be managed by PCP. Pain waiver discussed with patient and confirmed.    12/8/2022      Electronically signed by Vandana Mei DPM on 12/8/2022 at 10:02 AM  12/8/2022

## 2023-01-03 ENCOUNTER — HOSPITAL ENCOUNTER (EMERGENCY)
Age: 68
Discharge: HOME | End: 2023-01-03
Attending: EMERGENCY MEDICINE
Payer: MEDICARE

## 2023-01-03 ENCOUNTER — APPOINTMENT (OUTPATIENT)
Dept: GENERAL RADIOLOGY | Age: 68
End: 2023-01-03
Payer: MEDICARE

## 2023-01-03 VITALS
HEIGHT: 61 IN | RESPIRATION RATE: 15 BRPM | DIASTOLIC BLOOD PRESSURE: 85 MMHG | WEIGHT: 131 LBS | SYSTOLIC BLOOD PRESSURE: 139 MMHG | TEMPERATURE: 98.1 F | OXYGEN SATURATION: 94 % | BODY MASS INDEX: 24.73 KG/M2 | HEART RATE: 98 BPM

## 2023-01-03 DIAGNOSIS — K94.23 PEG TUBE MALFUNCTION (HCC): Primary | ICD-10-CM

## 2023-01-03 PROCEDURE — 43762 RPLC GTUBE NO REVJ TRC: CPT

## 2023-01-03 PROCEDURE — 99283 EMERGENCY DEPT VISIT LOW MDM: CPT

## 2023-01-03 PROCEDURE — 6360000004 HC RX CONTRAST MEDICATION: Performed by: EMERGENCY MEDICINE

## 2023-01-03 PROCEDURE — 74018 RADEX ABDOMEN 1 VIEW: CPT

## 2023-01-03 RX ADMIN — DIATRIZOATE MEGLUMINE AND DIATRIZOATE SODIUM 30 ML: 600; 100 SOLUTION ORAL; RECTAL at 19:03

## 2023-01-03 ASSESSMENT — PAIN SCALES - WONG BAKER: WONGBAKER_NUMERICALRESPONSE: 2

## 2023-01-03 NOTE — ED PROVIDER NOTES
[id number] [facility]  ED Provider Note  Department of Emergency Medicine     ED Room: [place] [address]      Written by: Paramjit Tate DO  Patient Name: Da Santana  Attending Provider: Radha Simons DO  Admit Date: 1/3/2023  3:30 PM  MRN: [id number]    : 1955        Chief Complaint   Patient presents with    Other     Pt pulled G-tube out    - Chief complaint    HPI   Da Santana is a 75 y.o. female presenting to the ED for evaluation of Other (Pt pulled G-tube out)      Patient has history of profound intellectual disability, she is at her baseline. She lives in a group, arrives with a caregiver. She is here for evaluation after she purposely pulled out her G-tube sometime this afternoon, this was witnessed by caregivers. They are unsure if the balloon was still intact. They did not bring the G-tube with them. Patient has a longstanding history of frequently only out her PEG tube or having issues with flushing. Per chart review the most recent sizing of the PEG tube that I can find is from 2022, documented 25 [facility]. Per caregivers there have been no other recent complaints the patient has not been vomiting or having any diarrhea. She receives all of her nutrition via PEG tube. History otherwise limited due to patient's baseline mentation. Review of Systems   Unable to perform ROS: Other (Severe intellectual disability, chronic)      Physical Exam  Vitals and nursing note reviewed. Constitutional:       General: She is not in acute distress. Appearance: She is ill-appearing (chronically). HENT:      Head: Normocephalic and atraumatic. Right Ear: External ear normal.      Left Ear: External ear normal.      Nose: Nose normal. No rhinorrhea. Mouth/Throat:      Mouth: Mucous membranes are moist.      Pharynx: Oropharynx is clear.    Eyes:      Conjunctiva/sclera: Conjunctivae normal.      Comments: Chronic bilateral opacities of her irises, blind at baseline   Cardiovascular:      Rate and Rhythm: Normal rate and regular rhythm. Pulses: Normal pulses. Heart sounds: Normal heart sounds. Pulmonary:      Effort: Pulmonary effort is normal. No respiratory distress. Breath sounds: Normal breath sounds. No wheezing, rhonchi or rales. Abdominal:      General: Abdomen is flat. Palpations: Abdomen is soft. Tenderness: There is no abdominal tenderness. There is no guarding. Comments: Stoma from previous PEG tube site appreciated, appears patent, no bleeding   Musculoskeletal:         General: Normal range of motion. Cervical back: Normal range of motion and neck supple. Right lower leg: No edema. Left lower leg: No edema. Skin:     General: Skin is warm and dry. Capillary Refill: Capillary refill takes less than 2 seconds. Coloration: Skin is not jaundiced or pale. Findings: No rash. Neurological:      Mental Status: She is alert. Mental status is at baseline. GCS: GCS eye subscore is 4. GCS verbal subscore is 1. GCS motor subscore is 5. Comments: Patient is at her baseline mentation per chart review history and per caregiver with whom she arrives        Procedures    Gastrostomy Tube Replacement Procedure Note    Indication: removed by patient    Procedure: The patient was placed in the supine position and the patient's gastric tube was replaced using an 18 [place] gastrostomy tube and the bulb was inflated using 20 cc of sterile water. The placement was verified by x-ray with contrast injection. The patient tolerated the procedure well. Complications: None          Medical Decision Making: This is a 75 y.o. female presenting for evaluation of g-tube problem, she pulled it out a few hours prior to arrival; does have a history of doing this in the past. Please see HPI for further history and details.    On my evaluation today, patient is at her baseline mentation, non-toxic in appearance. Vitals are stable; initially documented hypotension, however patient was not wearing the appropriate cuff size. Once an appropriate cuff size was placed, BP remained normal.    Exam findings are as documented above; PEG tube stoma appears normal and patent. G-tube was replaced as documented above. Post-procedural XR with contrast shows appropriate position. Patient discharged in stable condition. Results and plan discussed with her caregiver. Return precautions were discussed. While not exhaustive, the following diagnoses and their severity were considered: PEG tube malfunction. Independent interpretation of Laboratory tests by Francois Robin DO: none     Independent interpretation of Radiology tests by Francois Robin DO: XR abdomen shows appropriate position of PEG tube. EKG reviewed and interpreted by me: This EKG is signed by emergency department physician. An EKG was not performed during this encounter. Labs & imaging were reviewed and interpreted, see RESULTS. I have personally reviewed all laboratory and imaging results for this patient. Are there any additional factors to consider that affect care (uninsured, homeless, illiterate, history from another source, etc.) (If yes, which ones). Yes, patient with severe intellectual disability, history obtained from caregiver and chart review. Name and Route of medications administered in the ED:  Medications - No data to display        Re-Evaluations:  ED Course as of 01/04/23 0556   Tue Jan 03, 2023   1643 BP is not hypotensive, suspect initial was an incorrect measurement. [VG]   1939 XR abdomen shows appropriate placement of G-tube. Patient appropriate for discharge. [VG]      ED Course User Index  [VG] Francois Robin DO           Please see ED course for any additional MDM documentation.         I have discussed this patient with my attending, who has seen the patient and agrees with this disposition. Patient was seen and evaluated by myself and my attending Heather Larios DO. Assessment and Plan discussed with attending provider, please see attestation for final plan of care.           --------------------------------------------- PAST HISTORY ---------------------------------------------  Past Medical History:  has a past medical history of Acute kidney injury ([facility] [address] Blind in both eyes, Essential hypertension, Gastroesophageal reflux disease without esophagitis, [facility] Hyperthyroidism, MR (mental retardation), Osteoarthritis, Peripheral vascular disease ([facility] Pneumonia, Pneumonia due to infectious organism, and Sepsis ([facility] Past Surgical History:  has a past surgical history that includes other surgical history (Right, 01/17/2017); other surgical history (01/25/2017); chest tube insertion (Right, 02/09/2017); bronchoscopy (02/10/2017); and Gastrostomy tube placement (N/A, 3/7/2021). Social History:  reports that she has never smoked. She has never used smokeless tobacco. She reports that she does not drink alcohol and does not use drugs. Family History: family history is not on file. Unless otherwise noted, family history is non contributory. The patients home medications have been reviewed. Allergies: Patient has no known allergies. -------------------------------------------------- RESULTS -------------------------------------------------  Labs:  No results found for this visit on 01/03/23. Radiology:  XR ABDOMEN FOR NG/OG/NE TUBE PLACEMENT   Final Result   Appropriate placement of a G-tube in the stomach             Interpreted by the radiologist unless otherwise specified.      ------------------------- NURSING NOTES AND VITALS REVIEWED ---------------------------  Date / Time Roomed:  1/3/2023  3:30 PM  ED Bed Assignment:  [facility]/[place] [address]    The nursing notes within the ED encounter and vital signs as below have been reviewed by myself.    BP 139/85   Pulse 98   Temp 98.1 °F (36.7 °C) (Axillary)   Resp 15   Ht 5' 1\" (1.549 m)   Wt 131 lb (59.4 kg)   SpO2 94%   BMI 24.75 kg/m²   Oxygen Saturation Interpretation: Normal    The patients available past medical records and past encounters were reviewed. ------------------------------------------ PROGRESS NOTES ------------------------------------------  5:56 AM EST  I have spoken with the  patient's caregiver  and discussed todays results, in addition to providing specific details for the plan of care and counseling regarding the diagnosis and prognosis. Their questions are answered at this time and they are agreeable with the plan. I discussed at length with them reasons for immediate return here for re evaluation. They will followup with their primary care physician by calling their office tomorrow. --------------------------------- ADDITIONAL PROVIDER NOTES ---------------------------------  At this time the patient is without objective evidence of an acute process requiring hospitalization or inpatient management. They have remained hemodynamically stable throughout their entire ED visit and are stable for discharge with outpatient follow-up. The plan has been discussed in detail and they are aware of the specific conditions for emergent return, as well as the importance of follow-up. Discharge Medication List as of 1/3/2023  8:23 PM          Diagnosis:  1. PEG tube malfunction ([facility] [address]        Disposition:  Patient's disposition: Discharge to group home  Patient's condition is stable. Pito Ash D.O. PGY-3     Resident Physician     Emergency Medicine      1/3/2023 4:43 PM      NOTE: This report was transcribed using voice recognition software.  Every effort was made to ensure accuracy; however, inadvertent computerized transcription errors may be present            Pito Ash DO  Resident  01/04/23 1010 Moises Frank, DO  Resident  01/06/23 8121

## 2023-01-13 ENCOUNTER — HOSPITAL ENCOUNTER (EMERGENCY)
Age: 68
Discharge: HOME | End: 2023-01-14
Attending: EMERGENCY MEDICINE
Payer: MEDICARE

## 2023-01-13 VITALS
SYSTOLIC BLOOD PRESSURE: 122 MMHG | RESPIRATION RATE: 16 BRPM | TEMPERATURE: 97.7 F | DIASTOLIC BLOOD PRESSURE: 55 MMHG | HEART RATE: 95 BPM | OXYGEN SATURATION: 94 %

## 2023-01-13 DIAGNOSIS — R11.2 NAUSEA AND VOMITING, UNSPECIFIED VOMITING TYPE: Primary | ICD-10-CM

## 2023-01-13 LAB
ALBUMIN SERPL-MCNC: 2.8 G/DL (ref 3.5–5.2)
ALP BLD-CCNC: 52 U/L (ref 35–104)
ALT SERPL-CCNC: 8 U/L (ref 0–32)
ANION GAP SERPL CALCULATED.3IONS-SCNC: 6 MMOL/L (ref 7–16)
AST SERPL-CCNC: 15 U/L (ref 0–31)
BACTERIA: NORMAL /HPF
BASOPHILS ABSOLUTE: 0.02 E9/L (ref 0–0.2)
BASOPHILS RELATIVE PERCENT: 0.2 % (ref 0–2)
BILIRUB SERPL-MCNC: <0.2 MG/DL (ref 0–1.2)
BILIRUBIN URINE: NEGATIVE
BLOOD, URINE: NEGATIVE
BUN BLDV-MCNC: 25 MG/DL (ref 6–23)
CALCIUM SERPL-MCNC: 9.2 MG/DL (ref 8.6–10.2)
CHLORIDE BLD-SCNC: 99 MMOL/L (ref 98–107)
CLARITY: CLEAR
CO2: 29 MMOL/L (ref 22–29)
COLOR: YELLOW
CREAT SERPL-MCNC: 0.7 MG/DL (ref 0.5–1)
EOSINOPHILS ABSOLUTE: 0.09 E9/L (ref 0.05–0.5)
EOSINOPHILS RELATIVE PERCENT: 1 % (ref 0–6)
GFR SERPL CREATININE-BSD FRML MDRD: >60 ML/MIN/1.73
GLUCOSE BLD-MCNC: 119 MG/DL (ref 74–99)
GLUCOSE URINE: NEGATIVE MG/DL
HCT VFR BLD CALC: 36.9 % (ref 34–48)
HEMOGLOBIN: 11.3 G/DL (ref 11.5–15.5)
IMMATURE GRANULOCYTES #: 0.06 E9/L
IMMATURE GRANULOCYTES %: 0.7 % (ref 0–5)
KETONES, URINE: NEGATIVE MG/DL
LEUKOCYTE ESTERASE, URINE: NEGATIVE
LYMPHOCYTES ABSOLUTE: 1.63 E9/L (ref 1.5–4)
LYMPHOCYTES RELATIVE PERCENT: 18.6 % (ref 20–42)
MCH RBC QN AUTO: 31 PG (ref 26–35)
MCHC RBC AUTO-ENTMCNC: 30.6 % (ref 32–34.5)
MCV RBC AUTO: 101.4 FL (ref 80–99.9)
MONOCYTES ABSOLUTE: 0.77 E9/L (ref 0.1–0.95)
MONOCYTES RELATIVE PERCENT: 8.8 % (ref 2–12)
NEUTROPHILS ABSOLUTE: 6.18 E9/L (ref 1.8–7.3)
NEUTROPHILS RELATIVE PERCENT: 70.7 % (ref 43–80)
NITRITE, URINE: NEGATIVE
PDW BLD-RTO: 12.6 FL (ref 11.5–15)
PH UA: 7.5 (ref 5–9)
PLATELET # BLD: 226 E9/L (ref 130–450)
PMV BLD AUTO: 11.4 FL (ref 7–12)
POTASSIUM SERPL-SCNC: 4.6 MMOL/L (ref 3.5–5)
PROTEIN UA: NEGATIVE MG/DL
RBC # BLD: 3.64 E12/L (ref 3.5–5.5)
RBC UA: NORMAL /HPF (ref 0–2)
SODIUM BLD-SCNC: 134 MMOL/L (ref 132–146)
SPECIFIC GRAVITY UA: 1.01 (ref 1–1.03)
TOTAL PROTEIN: 7 G/DL (ref 6.4–8.3)
UROBILINOGEN, URINE: 1 E.U./DL
WBC # BLD: 8.8 E9/L (ref 4.5–11.5)
WBC UA: NORMAL /HPF (ref 0–5)

## 2023-01-13 PROCEDURE — 2580000003 HC RX 258: Performed by: STUDENT IN AN ORGANIZED HEALTH CARE EDUCATION/TRAINING PROGRAM

## 2023-01-13 PROCEDURE — 81001 URINALYSIS AUTO W/SCOPE: CPT

## 2023-01-13 PROCEDURE — 96374 THER/PROPH/DIAG INJ IV PUSH: CPT

## 2023-01-13 PROCEDURE — 85025 COMPLETE CBC W/AUTO DIFF WBC: CPT

## 2023-01-13 PROCEDURE — 80053 COMPREHEN METABOLIC PANEL: CPT

## 2023-01-13 PROCEDURE — 99284 EMERGENCY DEPT VISIT MOD MDM: CPT

## 2023-01-13 PROCEDURE — 6360000002 HC RX W HCPCS: Performed by: STUDENT IN AN ORGANIZED HEALTH CARE EDUCATION/TRAINING PROGRAM

## 2023-01-13 RX ORDER — SODIUM CHLORIDE 0.9 % (FLUSH) 0.9 %
SYRINGE (ML) INJECTION
Status: DISCONTINUED
Start: 2023-01-13 | End: 2023-01-14 | Stop reason: HOSPADM

## 2023-01-13 RX ORDER — 0.9 % SODIUM CHLORIDE 0.9 %
1000 INTRAVENOUS SOLUTION INTRAVENOUS ONCE
Status: COMPLETED | OUTPATIENT
Start: 2023-01-13 | End: 2023-01-14

## 2023-01-13 RX ORDER — ONDANSETRON 4 MG/1
4 TABLET, ORALLY DISINTEGRATING ORAL 3 TIMES DAILY PRN
Qty: 21 TABLET | Refills: 0 | Status: SHIPPED | OUTPATIENT
Start: 2023-01-13

## 2023-01-13 RX ORDER — ONDANSETRON 2 MG/ML
4 INJECTION INTRAMUSCULAR; INTRAVENOUS ONCE
Status: COMPLETED | OUTPATIENT
Start: 2023-01-13 | End: 2023-01-13

## 2023-01-13 RX ADMIN — SODIUM CHLORIDE 1000 ML: 9 INJECTION, SOLUTION INTRAVENOUS at 21:37

## 2023-01-13 RX ADMIN — ONDANSETRON 4 MG: 2 INJECTION INTRAMUSCULAR; INTRAVENOUS at 21:38

## 2023-01-14 NOTE — ED PROVIDER NOTES
[address] [facility] ENCOUNTER        Pt Name: Vito Milian  MRN: [id number]  [place] 1955  Date of evaluation: 1/13/2023  Provider: Gerardo Johnson DO  PCP: Bobby Osborne DO  Note Started: 8:31 PM EST 1/13/23    CHIEF COMPLAINT       Chief Complaint   Patient presents with    Emesis     Patient had 3 episodes of emesis at her facility       HISTORY OF PRESENT ILLNESS: 1 or more Elements   History From: Caregiver     Limitations to history : patient nonverbal essentially and blind     Vito Milian is a 70 y.o. female with past medical history of MR, hypothyroidism, hypertension, CKD and status post chronic G-tube who presents to the emergency department for evaluation of vomiting. Patient unable to provide history because she is essentially nonverbal.  Caregiver is at bedside and provided history. Patient was apparently given her medication through the G-tube earlier today and vomited several times afterwards. Caregiver states that she vomited 5 times back to back. She states that she is unable to discern whether the patient is in pain or nauseous because she hardly talks. Patient also had 1 episode of emesis yesterday as well. Caregiver denies any fever, constipation, blood in stool or diarrhea for the patient. Review of systems and history otherwise severely limited due to patient's nonverbal status. On initial evaluation, she is nontoxic-appearing and in no acute distress however. No active vomiting noted on initial assessment. Caregiver denies any recent sick contacts or abnormal food ingestions.     Nursing Notes were all reviewed and agreed with or any disagreements were addressed in the HPI.    ROS:   Pertinent positives and negatives are stated within HPI, all other systems reviewed and are negative.    --------------------------------------------- PAST HISTORY ---------------------------------------------  Past Medical History:  has a past medical history of Acute kidney injury ([facility] [address] Blind in both eyes, Essential hypertension, Gastroesophageal reflux disease without esophagitis, Hemodialysis patient ([facility] [address] Hyperthyroidism, MR (mental retardation), Osteoarthritis, Peripheral vascular disease ([facility] [address] Pneumonia, Pneumonia due to infectious organism, and Sepsis ([facility] Past Surgical History:  has a past surgical history that includes other surgical history (Right, 01/17/2017); other surgical history (01/25/2017); chest tube insertion (Right, 02/09/2017); bronchoscopy (02/10/2017); and Gastrostomy tube placement (N/A, 3/7/2021). Social History:  reports that she has never smoked. She has never used smokeless tobacco. She reports that she does not drink alcohol and does not use drugs. Family History: family history is not on file. The patients home medications have been reviewed. Allergies: Patient has no known allergies. ---------------------------------------------------PHYSICAL EXAM--------------------------------------    Constitutional/General:  well appearing, non toxic in NAD  Head: Normocephalic and atraumatic  Mouth: Poor dentition. Oropharynx clear, handling secretions, no trismus  Neck: Supple, full ROM,  Pulmonary: Lungs clear to auscultation bilaterally, no wheezes, rales, or rhonchi. Not in respiratory distress  Cardiovascular:  Regular rate. Regular rhythm. No murmurs  Chest: no chest wall tenderness  Abdomen: G-tube in place. Soft. Non tender. Non distended. No rebound, guarding, or rigidity. No pulsatile masses appreciated. Musculoskeletal: Contracted limbs. Warm and well perfused, no clubbing, cyanosis, or edema. Capillary refill <3 seconds  Skin: warm and dry. No rashes.    Neurologic: Difficult to assess considering patient is blind and nonverbal  Psych: Normal Affect    -------------------------------------------------- RESULTS -------------------------------------------------  I have personally reviewed all laboratory and imaging results for this patient. Results are listed below.      LABS:  Results for orders placed or performed during the hospital encounter of 01/13/23   CBC with Auto Differential   Result Value Ref Range    WBC 8.8 4.5 - 11.5 E9/L    RBC 3.64 3.50 - 5.50 E12/L    Hemoglobin 11.3 (L) 11.5 - 15.5 g/dL    Hematocrit 36.9 34.0 - 48.0 %    .4 (H) 80.0 - 99.9 fL    MCH 31.0 26.0 - 35.0 pg    MCHC 30.6 (L) 32.0 - 34.5 %    RDW 12.6 11.5 - 15.0 fL    Platelets 640 645 - 632 E9/L    MPV 11.4 7.0 - 12.0 fL    Neutrophils % 70.7 43.0 - 80.0 %    Immature Granulocytes % 0.7 0.0 - 5.0 %    Lymphocytes % 18.6 (L) 20.0 - 42.0 %    Monocytes % 8.8 2.0 - 12.0 %    Eosinophils % 1.0 0.0 - 6.0 %    Basophils % 0.2 0.0 - 2.0 %    Neutrophils Absolute 6.18 1.80 - 7.30 E9/L    Immature Granulocytes # 0.06 E9/L    Lymphocytes Absolute 1.63 1.50 - 4.00 E9/L    Monocytes Absolute 0.77 0.10 - 0.95 E9/L    Eosinophils Absolute 0.09 0.05 - 0.50 E9/L    Basophils Absolute 0.02 0.00 - 0.20 E9/L   CMP   Result Value Ref Range    Sodium 134 132 - 146 mmol/L    Potassium 4.6 3.5 - 5.0 mmol/L    Chloride 99 98 - 107 mmol/L    CO2 29 22 - 29 mmol/L    Anion Gap 6 (L) 7 - 16 mmol/L    Glucose 119 (H) 74 - 99 mg/dL    BUN 25 (H) 6 - 23 mg/dL    Creatinine 0.7 0.5 - 1.0 mg/dL    Est, Glom Filt Rate >60 >=60 mL/min/1.73    Calcium 9.2 8.6 - 10.2 mg/dL    Total Protein 7.0 6.4 - 8.3 g/dL    Albumin 2.8 (L) 3.5 - 5.2 g/dL    Total Bilirubin <0.2 0.0 - 1.2 mg/dL    Alkaline Phosphatase 52 35 - 104 U/L    ALT 8 0 - 32 U/L    AST 15 0 - 31 U/L   Urinalysis with Microscopic   Result Value Ref Range    Color, UA Yellow Straw/Yellow    Clarity, UA Clear Clear    Glucose, Ur Negative Negative mg/dL    Bilirubin Urine Negative Negative    Ketones, Urine Negative Negative mg/dL    Specific Gravity, UA 1.010 1.005 - 1.030    Blood, Urine Negative Negative    pH, UA 7.5 5.0 - 9.0    Protein, UA Negative Negative mg/dL Urobilinogen, Urine 1.0 <2.0 E.U./dL    Nitrite, Urine Negative Negative    Leukocyte Esterase, Urine Negative Negative    WBC, UA 0-1 0 - 5 /HPF    RBC, UA 0-1 0 - 2 /HPF    Bacteria, UA NONE SEEN None Seen /HPF       RADIOLOGY:   Interpretation per the Radiologist below, if available at the time of this note:    No orders to display     No results found. No results found. See preliminary interpretation by me below. ------------------------- NURSING NOTES AND VITALS REVIEWED ---------------------------   The nursing notes within the ED encounter and vital signs as below have been reviewed by myself. BP (!) 122/55   Pulse 95   Temp 97.7 °F (36.5 °C) (Axillary)   Resp 16   SpO2 94%   Oxygen Saturation Interpretation: Normal    The patients available past medical records and past encounters were reviewed. ------------------------------ ED COURSE/MEDICAL DECISION MAKING----------------------  Medications   ondansetron (ZOFRAN) injection 4 mg (4 mg IntraVENous Given 1/13/23 2138)   0.9 % sodium chloride bolus (0 mLs IntraVENous Stopped 1/14/23 0325)       Medical Decision Making/Differential Diagnosis:    CC/HPI Summary, Pertinent Physical Exam Findings, Social Determinants of health, Records Reviewed, DDx, testing done/not done, ED Course, Reassessment, disposition considerations/shared decision making with patient, consults, disposition:        Medical Decision Making:   I, Dr. Eliecer Small am the resident physician of record. History From: Caregiver    Limitations to history : Patient is nonverbal and cannot provide history or review of systems     Fabiola Molina is a 70 y.o. female who presents to the ED for emesis  Vital signs upon arrival BP (!) 122/55   Pulse 95   Temp 97.7 °F (36.5 °C) (Axillary)   Resp 16   SpO2 94%     On initial evaluation, patient is nontoxic-appearing, afebrile, hemodynamically stable and in no acute distress.   History and review of systems severely limited concerning patient has essentially nonverbal due to profound intellectual disability. Differential diagnosis includes but is not limited to gastroenteritis, acute viral syndrome, dehydration, BEENA or UTI. Physical exam was essentially benign. Patient has chronic contractures of her limbs on exam.  Abdomen soft, nontender nondistended without rebound, guarding or rigidity. No peritoneal signs noted. No findings suggesting acute surgical abdomen. Remaining physical exam was benign. Work-up in the emergency department as interpreted by me include CBC with no leukocytosis or left shift. Hemoglobin 11.3 which appears close to patient's baseline of 12. CMP with stable renal function, creatinine 0.7/BUN 25. No major electrolyte abnormalities including normal potassium of 4.6. No elevations in LFTs. Urinalysis does not suggest acute infection. Patient was given 1 L of IV fluids, 0.9 numbness and IV Zofran 4 mg in the emergency department. Fluid given through G-tube and patient tolerated without any episodes of emesis on reevaluation. Work-up and results discussed with caregiver who was agreeable to discharge with outpatient follow-up as needed after shared decision making. Patient remained in stable condition in the ED. Caregiver given return precautions in case of new or worsening symptoms including persistent nausea, fevers or worsening abdominal pain. Patient was discharged in stable condition. No imaging studies were indicated during this visit    Discussion with Other Profesionals : None    Social Determinants : None    Records Reviewed : Inpatient Notes inpatient note from 9/30/2022 reviewed and patient was admitted from 9/30/2022 to 10/3/2022 for intractable nausea/vomiting.   No states history of dysphagia and patient is status post PEG tube placement by surgery 3/2021 by Dr. Jeffrey Cook    Chronic conditions: MR, hypothyroidism, hypertension, CKD and status post chronic G-tube    CONSULTS: None      Disposition:   Appropriate for outpatient management      Pt will be d/c and will follow up with her PCP . She is educated on signs and symptoms that require emergent evaluation. Pt is advised to return to the ED if her symptoms change or worsen. If her pain persists, pt may need further evaluation. Pt is agreeable to plan and all questions have been answered at this time. 1. Nausea and vomiting, unspecified vomiting type          Re-Evaluations/Consultations:             ED Course as of 01/14/23 0949   Fri Jan 13, 2023 2043 Patient seen and examined. Patient unable to provide any history as she is developmentally delayed and with the [facility] provider. On initial valuation patient was 82/50 for a blood pressure I did change patient's blood pressure cuff that was 96/60. Patient exam is benign without any tenderness to the abdomen it is soft no distention is noted patient will be given IV fluids antiemetics and check basic lab work. [CF]      ED Course User Index  [CF] Ariadna Molina, DO         This patient's ED course included: History, physical examination, reevaluation prior to disposition    This patient has remained hemodynamically stable during their ED course. Counseling: The emergency provider has spoken with the caregiver and discussed todays results, in addition to providing specific details for the plan of care and counseling regarding the diagnosis and prognosis. Questions are answered at this time and they are agreeable with the plan.       --------------------------------- IMPRESSION AND DISPOSITION ---------------------------------    IMPRESSION  1. Nausea and vomiting, unspecified vomiting type        DISPOSITION  Disposition: Discharge to group home  Patient condition is stable        NOTE: This report was transcribed using voice recognition software.  Every effort was made to ensure accuracy; however, inadvertent computerized transcription errors may be present Rukhsana Florence DO  Resident  01/14/23 6851

## 2023-01-16 ENCOUNTER — OFFICE VISIT (OUTPATIENT)
Dept: FAMILY MEDICINE CLINIC | Age: 68
End: 2023-01-16
Payer: MEDICARE

## 2023-01-16 VITALS
DIASTOLIC BLOOD PRESSURE: 64 MMHG | RESPIRATION RATE: 16 BRPM | TEMPERATURE: 97.4 F | SYSTOLIC BLOOD PRESSURE: 114 MMHG | OXYGEN SATURATION: 94 % | HEART RATE: 90 BPM

## 2023-01-16 DIAGNOSIS — R11.2 INTRACTABLE NAUSEA AND VOMITING: ICD-10-CM

## 2023-01-16 DIAGNOSIS — Z93.1 S/P PERCUTANEOUS ENDOSCOPIC GASTROSTOMY (PEG) TUBE PLACEMENT (HCC): Primary | ICD-10-CM

## 2023-01-16 DIAGNOSIS — F03.918 DEMENTIA WITH BEHAVIORAL DISTURBANCE: ICD-10-CM

## 2023-01-16 DIAGNOSIS — F73 PROFOUND INTELLECTUAL DISABILITY: ICD-10-CM

## 2023-01-16 DIAGNOSIS — I73.9 PERIPHERAL VASCULAR DISEASE (HCC): ICD-10-CM

## 2023-01-16 PROCEDURE — 1123F ACP DISCUSS/DSCN MKR DOCD: CPT | Performed by: INTERNAL MEDICINE

## 2023-01-16 PROCEDURE — 99214 OFFICE O/P EST MOD 30 MIN: CPT | Performed by: INTERNAL MEDICINE

## 2023-01-16 PROCEDURE — 3078F DIAST BP <80 MM HG: CPT | Performed by: INTERNAL MEDICINE

## 2023-01-16 PROCEDURE — 3074F SYST BP LT 130 MM HG: CPT | Performed by: INTERNAL MEDICINE

## 2023-01-16 PROCEDURE — G8484 FLU IMMUNIZE NO ADMIN: HCPCS | Performed by: INTERNAL MEDICINE

## 2023-01-16 PROCEDURE — 1090F PRES/ABSN URINE INCON ASSESS: CPT | Performed by: INTERNAL MEDICINE

## 2023-01-16 PROCEDURE — 1036F TOBACCO NON-USER: CPT | Performed by: INTERNAL MEDICINE

## 2023-01-16 PROCEDURE — G8420 CALC BMI NORM PARAMETERS: HCPCS | Performed by: INTERNAL MEDICINE

## 2023-01-16 PROCEDURE — G8427 DOCREV CUR MEDS BY ELIG CLIN: HCPCS | Performed by: INTERNAL MEDICINE

## 2023-01-16 PROCEDURE — G8400 PT W/DXA NO RESULTS DOC: HCPCS | Performed by: INTERNAL MEDICINE

## 2023-01-16 PROCEDURE — 3017F COLORECTAL CA SCREEN DOC REV: CPT | Performed by: INTERNAL MEDICINE

## 2023-01-16 SDOH — ECONOMIC STABILITY: FOOD INSECURITY: WITHIN THE PAST 12 MONTHS, THE FOOD YOU BOUGHT JUST DIDN'T LAST AND YOU DIDN'T HAVE MONEY TO GET MORE.: NEVER TRUE

## 2023-01-16 SDOH — ECONOMIC STABILITY: FOOD INSECURITY: WITHIN THE PAST 12 MONTHS, YOU WORRIED THAT YOUR FOOD WOULD RUN OUT BEFORE YOU GOT MONEY TO BUY MORE.: NEVER TRUE

## 2023-01-16 ASSESSMENT — SOCIAL DETERMINANTS OF HEALTH (SDOH): HOW HARD IS IT FOR YOU TO PAY FOR THE VERY BASICS LIKE FOOD, HOUSING, MEDICAL CARE, AND HEATING?: NOT HARD AT ALL

## 2023-01-16 NOTE — PROGRESS NOTES
[facility] PRIMARY CARE  [address] [place] [address]  [place] [address]  Dept: [phone]  Dept Fax: [phone]     NAME: Shefali Barlow        :  1955        MRN:  [id number]    Chief Complaint   Patient presents with    Hypertension    3 Month Follow-Up    Follow-up     ER follow up. Was in the ER Friday for vomiting. Subjective     History of Present Illness  Shefali Barlow is a 70 y.o. female who presents today for routine follow up. Recent ED visit reviewed  22: seen for PEG tube malfunction due to it not flushing at the facility. It was flushed and aspirated in the ED and she was discharged home   1/3/23: seen for her PEG tube being pulled out. It was replaced without issue and she was discharged home. 23: seen for nausea and vomiting. She was medicated with zofran and hydrated with IVF and discharged home. Doing well today, no concerns from caregiver. Review of Systems  Unable to obtain full review of systems as patient is unresponsive and nonverbal at baseline.   Per staff members that work with the patient there are no reported fevers, chills, diarrhea, constipation, edema, or signs of distress or pain    Past Medical Hx:  Past Medical History:   Diagnosis Date    Acute kidney injury ([address] 01/15/2017    d/t Vancomycin, on Dialysis [facility] right chest    Blind in both eyes     Essential hypertension 2021    Gastroesophageal reflux disease without esophagitis 2021    Hemodialysis patient [facility])     Hyperthyroidism     MR (mental retardation)     Osteoarthritis     Peripheral vascular disease ([address]     Pneumonia 2017    Pneumonia due to infectious organism 2017    Sepsis ([address] 3/4/2021       Past Surgical Hx:  Past Surgical History:   Procedure Laterality Date    BRONCHOSCOPY  02/10/2017    CHEST TUBE INSERTION Right 2017    GASTROSTOMY TUBE PLACEMENT N/A 3/7/2021    EGD PEG TUBE PLACEMENT performed by Venessa Jaquez Kwaku Stephen MD at [address] [place] [address] [place] Right 01/17/2017    tessio insertion     OTHER SURGICAL HISTORY  01/25/2017    PEG tube insertion       Family Hx:  No family history on file. Social Hx:  Social History     Tobacco Use    Smoking status: Never    Smokeless tobacco: Never   Substance Use Topics    Alcohol use: No       Home Medications:  Current Outpatient Medications   Medication Sig Dispense Refill    ondansetron (ZOFRAN-ODT) 4 MG disintegrating tablet Take 1 tablet by mouth 3 times daily as needed for Nausea or Vomiting 21 tablet 0    blood glucose monitor kit and supplies Dispense one [employer] monitor and one lancet device. Patient tests blood glucose when nauseous, vomiting, or when tube feeding is held due to hypoglycemia risk. 1 kit 0    Lancets MISC 1 each by Does not apply route daily Pt is in need of [employer] blood glucose lancets. Patient tests bood glucose when nauseous, vomiting, or when tube feeding are held due to hypoglycemia risk. 100 each 5    blood glucose monitor strips [employer] Matrix testing strips. Patient tests bood glucose when nauseous, vomiting, or when tube feeding are held due to hypoglycemia risk. 100 strip 5    Alcohol Swabs (ALCOHOL PREP) PADS 1 each by Does not apply route daily Use before testing blood glucose. Patient tests bood glucose when nauseous, vomiting, or when tube feeding are held due to hypoglycemia risk. 100 each 5    blood glucose monitor kit and supplies Dispense sufficient amount for once daily testing frequency with ONE TOUCH glucometer. Dispense all needed supplies to include: monitor, strips, [place] device, lancets, control solutions, alcohol swabs. Patient tests BG when nauseous, vomiting, or when tube feeds are held due to hypoglycemia risk. 1 kit 0    Bacitracin-Polymyxin B (POLY BACITRACIN) [id number] UNIT/GM OINT APPLY TOPICALLY TO OSTOMY TWICE DAILY.  28 g 11    folic acid (FOLVITE) 1 MG tablet TAKE 1 TABLET VIA G-TUBE DAILY 90 tablet 3    levothyroxine (SYNTHROID) 150 MCG tablet [email or web address] 1 TABLET VIA G-TUBE ONCE DAILY 90 tablet 3    polyethylene glycol (GLYCOLAX) 17 g packet 17 g by Per G Tube route daily as needed 527 g 1    famotidine (PEPCID) 20 MG tablet TAKE (1) TABLET VIA PEG TUBE TWO TIMES DAILY. 180 tablet 1    montelukast (SINGULAIR) 10 MG tablet TAKE 1 TABLET VIA G-TUBE AT BEDTIME. 90 tablet 1    PARoxetine (PAXIL) 10 MG tablet TAKE 1 TABLET VIA G-TUBE TWICE A  tablet 1    Gauze Pads & Dressings 2\"X2\" PADS 1 each by Does not apply route in the morning and at bedtime Apply to stoma 10 each 5    bumetanide (BUMEX) 1 MG tablet TAKE 1 TABLET VIA G-TUBE DAILY AS NEEDED FOR SWELLING. 30 tablet 5    midodrine (PROAMATINE) 2.5 MG tablet TAKE 3 TABLET VIA G-TUBE THREE TIMES DAILY AS NEEDED. (HYPOTENSION, GIVE ONLY IF SYSTOLIC BP IS <794. (BP CHECKS 3 TIMES A DAY. 252 tablet 0    NATURAL VITAMIN D-3 125 MCG (5000 UT) TABS tablet TAKE 1 TABLET VIA G-TUBE ONCE DAILY 90 tablet 1    FEROSUL 325 (65 Fe) MG tablet TAKE 1 TABLET VIA G-TUBE ONCE A DAY. 90 tablet 1    Disposable Gloves (POWDER FREE NITRILE GLOVES MED) MISC As Directed 2 each 5    albuterol (PROVENTIL) (2.5 MG/3ML) 0.083% nebulizer solution Take 3 mLs by nebulization every 6 hours 120 each 3    Respiratory Therapy Supplies (FULL KIT NEBULIZER SET) MISC Use as directed with nebulized medication.  1 each 0    acetaminophen (TYLENOL) 325 MG tablet 650 mg by Per G Tube route every 4 hours as needed for Pain or Fever      medicated lip balm (BLISTEX/CARMEX) 2-2.5-6.6 % STCK Apply topically as needed for Dry Lips (CRACKED LIPS)      SUNSCREENS EX Apply topically as needed (SUNBURN PREVENTION) Indications: *SPF 50*      Neomycin-Bacitracin-Polymyxin (TRIPLE ANTIBIOTIC) OINT Apply topically 2 times daily as needed (CUTS/SCRAPES/SKIN ABRASIONS)      OLANZapine (ZYPREXA) 20 MG tablet 20 mg by Per G Tube route in the morning and at bedtime      valproic acid (DEPACON) 250 MG/5ML SOLN oral solution 20 mLs by Per G Tube route 2 times daily 300 mL 0    pantoprazole sodium (PROTONIX) 40 MG PACK packet 40 mg by Per G Tube route every morning (before breakfast)       No current facility-administered medications for this visit. Allergies:  No Known Allergies    Objective     Vitals:    01/16/23 1004   BP: 114/64   Pulse: 90   Resp: 16   Temp: 97.4 °F (36.3 °C)   TempSrc: Temporal   SpO2: 94%        Physical Exam  General: Awake, non verbal, wheelchair bound, No acute distress  Head: Normocephalic, atraumatic  Eyes: conjunctivae/corneas clear  Mouth: Mucous membranes moist with no pharyngeal exudate or erythema  Neck: no JVD, no adenopathy, no carotid bruit, supple, symmetrical, trachea midline  Back: symmetric, ROM normal, No CVA tenderness. Lungs: clear to auscultation bilaterally without wheezes, rales, or rhonchi  Heart: regular rate and rhythm, S1, S2 normal, no murmur, click, rub or gallop  Abdomen: soft, non-tender; bowel sounds normal; no masses,  no organomegaly, PEG tube site is clean and dry with no surrounding erythema. Extremities: atraumatic, no cyanosis, no edema  Skin: dry without rashes or lesions  Neurologic: non verbal and wheel chair bound. Does not follow commands. At her baseline.     Labs:  Lab Results   Component Value Date    WBC 8.8 01/13/2023    HGB 11.3 (L) 01/13/2023    HCT 36.9 01/13/2023     01/13/2023     01/13/2023    K 4.6 01/13/2023    CL 99 01/13/2023    CREATININE 0.7 01/13/2023    BUN 25 (H) 01/13/2023    CO2 29 01/13/2023    GLUCOSE 119 (H) 01/13/2023    ALT 8 01/13/2023    AST 15 01/13/2023    INR 1.1 03/08/2021     Lab Results   Component Value Date    TSH 2.680 11/11/2022     Lab Results   Component Value Date    TRIG 57 11/11/2022    TRIG 94 09/08/2021    TRIG 70 11/12/2020     Lab Results   Component Value Date    HDL 75 11/11/2022    HDL 72 09/08/2021    HDL 46 11/12/2020     Lab Results   Component Value Date    LDLCALC 82 11/11/2022 LDLCALC 79 09/08/2021    LDLCALC 59 11/12/2020     Lab Results   Component Value Date    LABA1C 5.4 03/09/2021     Lab Results   Component Value Date    INR 1.1 03/08/2021    INR 1.7 03/04/2021    INR 1.1 02/17/2017    PROTIME 12.4 03/08/2021    PROTIME 18.8 (H) 03/04/2021    PROTIME 11.6 02/17/2017      *All recent labs were reviewed. Please see electronic chart for a more comprehensive set of labs    Radiology:  XR ABDOMEN FOR NG/OG/NE TUBE PLACEMENT    Result Date: 1/3/2023  EXAMINATION: ONE SUPINE XRAY VIEW(S) OF THE ABDOMEN 1/3/2023 7:01 pm COMPARISON: September 9, 2022 HISTORY: ORDERING SYSTEM PROVIDED HISTORY: Confirm G-tube placement TECHNOLOGIST PROVIDED HISTORY: Reason for exam:->Confirm G-tube placement FINDINGS: The 1st image demonstrate G-tube in the left upper quadrant. The 2nd image demonstrate contrast in the stomach without contrast extravasation. Hiatal hernia is noted. Appropriate placement of a G-tube in the stomach       Assessment and Plan     Patient is a 70 y.o. female who presented to the office for follow up. Full problem list is as follows:  Patient Active Problem List   Diagnosis    Profound intellectual disability    Hypothyroidism    Impairment level: total impairment of both eyes    Hyperglycemia    Nonsustained ventricular tachycardia    Macrocytosis    Disruptive behavior disorder    Ingrowing nail    Peripheral vascular disease (HCC)    Altered mental state    Onychomycosis    Anemia due to chronic kidney disease    Essential hypertension    Vitamin D deficiency    Gastroesophageal reflux disease without esophagitis    S/P percutaneous endoscopic gastrostomy (PEG) tube placement (HCC)    Chronic kidney disease    Dementia with behavioral disturbance    Gastrostomy tube dysfunction (HCC)    Severe dehydration    Hyponatremia    Intractable nausea and vomiting       Pauline Whitten was seen today for hypertension, 3 month follow-up and follow-up.     Diagnoses and all orders for this visit:    S/P percutaneous endoscopic gastrostomy (PEG) tube placement [facility])    Peripheral vascular disease ([facility]    Dementia with behavioral disturbance  - stable, continue on all current medications, no refills needed     Profound intellectual disability  - stable, continue on all current medications, no refills needed     Intractable nausea and vomiting  - resolved since ED visit 1/13/22  - all recent ED records reviewed. Educational materials and/or home exercises printed for patient's review and were included in patient instructions on his/her After Visit Summary and given to patient at the end of visit. Counseled regarding above diagnosis, including possible risks and complications, especially if left uncontrolled. Counseled regarding the possible side effects, risks, benefits and alternatives to treatment; patient and/or guardian verbalizes understanding, agrees, feels comfortable with and wishes to proceed with above treatment plan. Advised patient to call Adalberto Runner new medication issues, and read all Rx info from pharmacy to assure aware of all possible risks and side effects of any medication before taking. Patient verbalizes understanding and agrees with above counseling, assessment and plan. All questions answered.     Wade Mcpherson, DO

## 2023-01-19 ENCOUNTER — TELEPHONE (OUTPATIENT)
Dept: FAMILY MEDICINE CLINIC | Age: 68
End: 2023-01-19

## 2023-01-19 NOTE — TELEPHONE ENCOUNTER
Voice mail from [facility] with [employer].   Patient had a speech therapy assessment and it was advised that patient have honey thickened liquids. Gali Wisdom will need an order for this. She also needs an order for specific times for pleasure feedings that need to include pudding, applesauce and yogurt. Another order is also needed for split gauze(drain sponge) for g tube. Any questions, call Gali Wisdom at [phone].

## 2023-01-29 ENCOUNTER — HOSPITAL ENCOUNTER (EMERGENCY)
Age: 68
Discharge: HOME | End: 2023-01-30
Attending: EMERGENCY MEDICINE
Payer: MEDICARE

## 2023-01-29 ENCOUNTER — APPOINTMENT (OUTPATIENT)
Dept: GENERAL RADIOLOGY | Age: 68
End: 2023-01-29
Payer: MEDICARE

## 2023-01-29 DIAGNOSIS — R11.2 NAUSEA AND VOMITING, UNSPECIFIED VOMITING TYPE: Primary | ICD-10-CM

## 2023-01-29 DIAGNOSIS — E86.0 DEHYDRATION: ICD-10-CM

## 2023-01-29 LAB
ALBUMIN SERPL-MCNC: 2.9 G/DL (ref 3.5–5.2)
ALP BLD-CCNC: 56 U/L (ref 35–104)
ALT SERPL-CCNC: 6 U/L (ref 0–32)
ANION GAP SERPL CALCULATED.3IONS-SCNC: 9 MMOL/L (ref 7–16)
AST SERPL-CCNC: 12 U/L (ref 0–31)
BACTERIA: ABNORMAL /HPF
BASOPHILS ABSOLUTE: 0.02 E9/L (ref 0–0.2)
BASOPHILS RELATIVE PERCENT: 0.2 % (ref 0–2)
BILIRUB SERPL-MCNC: 0.2 MG/DL (ref 0–1.2)
BILIRUBIN URINE: NEGATIVE
BLOOD, URINE: NEGATIVE
BUN BLDV-MCNC: 21 MG/DL (ref 6–23)
CALCIUM SERPL-MCNC: 9.8 MG/DL (ref 8.6–10.2)
CHLORIDE BLD-SCNC: 99 MMOL/L (ref 98–107)
CLARITY: CLEAR
CO2: 28 MMOL/L (ref 22–29)
COLOR: YELLOW
CREAT SERPL-MCNC: 0.6 MG/DL (ref 0.5–1)
EOSINOPHILS ABSOLUTE: 0.11 E9/L (ref 0.05–0.5)
EOSINOPHILS RELATIVE PERCENT: 1 % (ref 0–6)
GFR SERPL CREATININE-BSD FRML MDRD: >60 ML/MIN/1.73
GLUCOSE BLD-MCNC: 104 MG/DL (ref 74–99)
GLUCOSE URINE: NEGATIVE MG/DL
HCT VFR BLD CALC: 37.6 % (ref 34–48)
HEMOGLOBIN: 11.9 G/DL (ref 11.5–15.5)
IMMATURE GRANULOCYTES #: 0.05 E9/L
IMMATURE GRANULOCYTES %: 0.5 % (ref 0–5)
INFLUENZA A BY PCR: NOT DETECTED
INFLUENZA B BY PCR: NOT DETECTED
KETONES, URINE: 15 MG/DL
LACTIC ACID: 0.9 MMOL/L (ref 0.5–2.2)
LEUKOCYTE ESTERASE, URINE: ABNORMAL
LIPASE: 78 U/L (ref 13–60)
LYMPHOCYTES ABSOLUTE: 1.34 E9/L (ref 1.5–4)
LYMPHOCYTES RELATIVE PERCENT: 12.1 % (ref 20–42)
MAGNESIUM: 2 MG/DL (ref 1.6–2.6)
MCH RBC QN AUTO: 31 PG (ref 26–35)
MCHC RBC AUTO-ENTMCNC: 31.6 % (ref 32–34.5)
MCV RBC AUTO: 97.9 FL (ref 80–99.9)
MONOCYTES ABSOLUTE: 1.27 E9/L (ref 0.1–0.95)
MONOCYTES RELATIVE PERCENT: 11.5 % (ref 2–12)
NEUTROPHILS ABSOLUTE: 8.25 E9/L (ref 1.8–7.3)
NEUTROPHILS RELATIVE PERCENT: 74.7 % (ref 43–80)
NITRITE, URINE: NEGATIVE
PDW BLD-RTO: 13.2 FL (ref 11.5–15)
PH UA: 7 (ref 5–9)
PLATELET # BLD: 257 E9/L (ref 130–450)
PMV BLD AUTO: 10.6 FL (ref 7–12)
POTASSIUM SERPL-SCNC: 4.2 MMOL/L (ref 3.5–5)
PROTEIN UA: NEGATIVE MG/DL
RBC # BLD: 3.84 E12/L (ref 3.5–5.5)
RBC UA: ABNORMAL /HPF (ref 0–2)
SARS-COV-2, NAAT: NOT DETECTED
SODIUM BLD-SCNC: 136 MMOL/L (ref 132–146)
SPECIFIC GRAVITY UA: 1.02 (ref 1–1.03)
TOTAL PROTEIN: 7.1 G/DL (ref 6.4–8.3)
TROPONIN, HIGH SENSITIVITY: 33 NG/L (ref 0–9)
TROPONIN, HIGH SENSITIVITY: 35 NG/L (ref 0–9)
UROBILINOGEN, URINE: 1 E.U./DL
WBC # BLD: 11 E9/L (ref 4.5–11.5)
WBC UA: ABNORMAL /HPF (ref 0–5)

## 2023-01-29 PROCEDURE — 96374 THER/PROPH/DIAG INJ IV PUSH: CPT

## 2023-01-29 PROCEDURE — 87635 SARS-COV-2 COVID-19 AMP PRB: CPT

## 2023-01-29 PROCEDURE — 87502 INFLUENZA DNA AMP PROBE: CPT

## 2023-01-29 PROCEDURE — 2580000003 HC RX 258: Performed by: STUDENT IN AN ORGANIZED HEALTH CARE EDUCATION/TRAINING PROGRAM

## 2023-01-29 PROCEDURE — 84484 ASSAY OF TROPONIN QUANT: CPT

## 2023-01-29 PROCEDURE — 71045 X-RAY EXAM CHEST 1 VIEW: CPT

## 2023-01-29 PROCEDURE — 96361 HYDRATE IV INFUSION ADD-ON: CPT

## 2023-01-29 PROCEDURE — 99285 EMERGENCY DEPT VISIT HI MDM: CPT

## 2023-01-29 PROCEDURE — 93005 ELECTROCARDIOGRAM TRACING: CPT | Performed by: STUDENT IN AN ORGANIZED HEALTH CARE EDUCATION/TRAINING PROGRAM

## 2023-01-29 PROCEDURE — 85025 COMPLETE CBC W/AUTO DIFF WBC: CPT

## 2023-01-29 PROCEDURE — 80053 COMPREHEN METABOLIC PANEL: CPT

## 2023-01-29 PROCEDURE — 81001 URINALYSIS AUTO W/SCOPE: CPT

## 2023-01-29 PROCEDURE — 83690 ASSAY OF LIPASE: CPT

## 2023-01-29 PROCEDURE — 83735 ASSAY OF MAGNESIUM: CPT

## 2023-01-29 PROCEDURE — 6360000002 HC RX W HCPCS: Performed by: STUDENT IN AN ORGANIZED HEALTH CARE EDUCATION/TRAINING PROGRAM

## 2023-01-29 PROCEDURE — 83605 ASSAY OF LACTIC ACID: CPT

## 2023-01-29 RX ORDER — 0.9 % SODIUM CHLORIDE 0.9 %
1000 INTRAVENOUS SOLUTION INTRAVENOUS ONCE
Status: COMPLETED | OUTPATIENT
Start: 2023-01-29 | End: 2023-01-30

## 2023-01-29 RX ORDER — 0.9 % SODIUM CHLORIDE 0.9 %
500 INTRAVENOUS SOLUTION INTRAVENOUS ONCE
Status: DISCONTINUED | OUTPATIENT
Start: 2023-01-29 | End: 2023-01-29

## 2023-01-29 RX ORDER — ONDANSETRON 2 MG/ML
4 INJECTION INTRAMUSCULAR; INTRAVENOUS ONCE
Status: COMPLETED | OUTPATIENT
Start: 2023-01-29 | End: 2023-01-29

## 2023-01-29 RX ADMIN — SODIUM CHLORIDE 1000 ML: 9 INJECTION, SOLUTION INTRAVENOUS at 23:25

## 2023-01-29 RX ADMIN — ONDANSETRON 4 MG: 2 INJECTION INTRAMUSCULAR; INTRAVENOUS at 23:26

## 2023-01-29 ASSESSMENT — PAIN - FUNCTIONAL ASSESSMENT: PAIN_FUNCTIONAL_ASSESSMENT: ADULT NONVERBAL PAIN SCALE (NPVS)

## 2023-01-30 VITALS
SYSTOLIC BLOOD PRESSURE: 136 MMHG | HEIGHT: 61 IN | HEART RATE: 89 BPM | BODY MASS INDEX: 24.73 KG/M2 | WEIGHT: 131 LBS | DIASTOLIC BLOOD PRESSURE: 70 MMHG | OXYGEN SATURATION: 98 % | RESPIRATION RATE: 18 BRPM | TEMPERATURE: 98 F

## 2023-01-30 LAB
EKG ATRIAL RATE: 104 BPM
EKG P AXIS: 55 DEGREES
EKG P-R INTERVAL: 134 MS
EKG Q-T INTERVAL: 334 MS
EKG QRS DURATION: 74 MS
EKG QTC CALCULATION (BAZETT): 439 MS
EKG R AXIS: -9 DEGREES
EKG T AXIS: 20 DEGREES
EKG VENTRICULAR RATE: 104 BPM

## 2023-01-30 PROCEDURE — 93010 ELECTROCARDIOGRAM REPORT: CPT | Performed by: INTERNAL MEDICINE

## 2023-01-30 RX ORDER — ONDANSETRON 4 MG/1
4 TABLET, ORALLY DISINTEGRATING ORAL EVERY 8 HOURS PRN
Qty: 12 TABLET | Refills: 0 | Status: SHIPPED | OUTPATIENT
Start: 2023-01-30 | End: 2023-02-03

## 2023-01-30 NOTE — ED PROVIDER NOTES
Name: Bretta Habermann    MRN: [id number]     Date / Time Roomed:  1/29/2023  8:36 PM  ED Bed Assignment:  14/14    ------------------ History of Present Illness --------------------  1/29/23, Time: 8:40 PM EST   Chief Complaint   Patient presents with    Nausea    Emesis      HPI    Bretta Habermann is a 70 y.o. female, with hx of hypothyroid, peripheral vascular disease, anemia, CKD, GERD, dementia, gastrostomy tube,, who presents to the ED today for nausea and vomiting, which began this afternoon. Patient is blind as well as averbal at baseline. Per EMS, she lives at [facility] which is a group home facility, staff there said that she had been having several episodes of vomiting starting this afternoon. Per EMS, BGL was 91. Patient is unable to give review of systems. The pt denies other ROS at this time. PCP: Alexandra Deal DO.    -------------------- PMH --------------------  Past Medical History:  has a past medical history of Acute kidney injury ([facility] [address] Blind in both eyes, Essential hypertension, Gastroesophageal reflux disease without esophagitis, Hemodialysis patient ([facility] [address] Hyperthyroidism, MR (mental retardation), Osteoarthritis, Peripheral vascular disease ([facility] [address] Pneumonia, Pneumonia due to infectious organism, and Sepsis ([facility] [address] Past Surgical History:  has a past surgical history that includes other surgical history (Right, 01/17/2017); other surgical history (01/25/2017); chest tube insertion (Right, 02/09/2017); bronchoscopy (02/10/2017); and Gastrostomy tube placement (N/A, 3/7/2021). Social History:  reports that she has never smoked. She has never used smokeless tobacco. She reports that she does not drink alcohol and does not use drugs. Family History: family history is not on file. Allergies: Patient has no known allergies. The patients past medical history has been reviewed.     -------------------- Current Meds --------------------  Meds: No current facility-administered medications for this encounter. Current Outpatient Medications:     ondansetron (ZOFRAN-ODT) 4 MG disintegrating tablet, Take 1 tablet by mouth every 8 hours as needed for Nausea or Vomiting, Disp: 12 tablet, Rfl: 0    blood glucose monitor kit and supplies, Dispense one Leo Matrix monitor and one lancet device. Patient tests blood glucose when nauseous, vomiting, or when tube feeding is held due to hypoglycemia risk., Disp: 1 kit, Rfl: 0    Lancets MISC, 1 each by Does not apply route daily Pt is in need of Leo Matrix blood glucose lancets. Patient tests bood glucose when nauseous, vomiting, or when tube feeding are held due to hypoglycemia risk., Disp: 100 each, Rfl: 5    blood glucose monitor strips, Leo Matrix testing strips. Patient tests bood glucose when nauseous, vomiting, or when tube feeding are held due to hypoglycemia risk., Disp: 100 strip, Rfl: 5    Alcohol Swabs (ALCOHOL PREP) PADS, 1 each by Does not apply route daily Use before testing blood glucose. Patient tests bood glucose when nauseous, vomiting, or when tube feeding are held due to hypoglycemia risk., Disp: 100 each, Rfl: 5    blood glucose monitor kit and supplies, Dispense sufficient amount for once daily testing frequency with ONE TOUCH glucometer. Dispense all needed supplies to include: monitor, strips, lancing device, lancets, control solutions, alcohol swabs. Patient tests BG when nauseous, vomiting, or when tube feeds are held due to hypoglycemia risk., Disp: 1 kit, Rfl: 0    Bacitracin-Polymyxin B (POLY BACITRACIN) 500-23446 UNIT/GM OINT, APPLY TOPICALLY TO OSTOMY TWICE DAILY. , Disp: 28 g, Rfl: 11    folic acid (FOLVITE) 1 MG tablet, TAKE 1 TABLET VIA G-TUBE DAILY, Disp: 90 tablet, Rfl: 3    levothyroxine (SYNTHROID) 150 MCG tablet, [email or web address] 1 TABLET VIA G-TUBE ONCE DAILY, Disp: 90 tablet, Rfl: 3    polyethylene glycol (GLYCOLAX) 17 g packet, 17 g by Per G Tube route daily as needed, Disp: 527 g, Rfl: 1 famotidine (PEPCID) 20 MG tablet, TAKE (1) TABLET VIA PEG TUBE TWO TIMES DAILY. , Disp: 180 tablet, Rfl: 1    montelukast (SINGULAIR) 10 MG tablet, TAKE 1 TABLET VIA G-TUBE AT BEDTIME., Disp: 90 tablet, Rfl: 1    PARoxetine (PAXIL) 10 MG tablet, TAKE 1 TABLET VIA G-TUBE TWICE A DAY, Disp: 180 tablet, Rfl: 1    Gauze Pads & Dressings 2\"X2\" PADS, 1 each by Does not apply route in the morning and at bedtime Apply to stoma, Disp: 10 each, Rfl: 5    bumetanide (BUMEX) 1 MG tablet, TAKE 1 TABLET VIA G-TUBE DAILY AS NEEDED FOR SWELLING., Disp: 30 tablet, Rfl: 5    midodrine (PROAMATINE) 2.5 MG tablet, TAKE 3 TABLET VIA G-TUBE THREE TIMES DAILY AS NEEDED. (HYPOTENSION, GIVE ONLY IF SYSTOLIC BP IS <666. (BP CHECKS 3 TIMES A DAY., Disp: 252 tablet, Rfl: 0    NATURAL VITAMIN D-3 125 MCG (5000 UT) TABS tablet, TAKE 1 TABLET VIA G-TUBE ONCE DAILY, Disp: 90 tablet, Rfl: 1    FEROSUL 325 (65 Fe) MG tablet, TAKE 1 TABLET VIA G-TUBE ONCE A DAY., Disp: 90 tablet, Rfl: 1    pantoprazole sodium (PROTONIX) 40 MG PACK packet, 40 mg by Per G Tube route every morning (before breakfast), Disp: , Rfl:     Disposable Gloves (POWDER FREE NITRILE GLOVES MED) MISC, As Directed, Disp: 2 each, Rfl: 5    albuterol (PROVENTIL) (2.5 MG/3ML) 0.083% nebulizer solution, Take 3 mLs by nebulization every 6 hours, Disp: 120 each, Rfl: 3    Respiratory Therapy Supplies (FULL KIT NEBULIZER SET) MISC, Use as directed with nebulized medication. , Disp: 1 each, Rfl: 0    acetaminophen (TYLENOL) 325 MG tablet, 650 mg by Per G Tube route every 4 hours as needed for Pain or Fever, Disp: , Rfl:     medicated lip balm (BLISTEX/CARMEX) 2-2.5-6.6 % STCK, Apply topically as needed for Dry Lips (CRACKED LIPS), Disp: , Rfl:     SUNSCREENS EX, Apply topically as needed (SUNBURN PREVENTION) Indications: *SPF 50*, Disp: , Rfl:     Neomycin-Bacitracin-Polymyxin (TRIPLE ANTIBIOTIC) OINT, Apply topically 2 times daily as needed (CUTS/SCRAPES/SKIN ABRASIONS), Disp: , Rfl: OLANZapine (ZYPREXA) 20 MG tablet, 20 mg by Per G Tube route in the morning and at bedtime, Disp: , Rfl:     valproic acid (DEPACON) 250 MG/5ML SOLN oral solution, 20 mLs by Per G Tube route 2 times daily, Disp: 300 mL, Rfl: 0     The patients home medications have been reviewed. -------------------- PE --------------------  Physical Exam  Constitutional:       General: She is not in acute distress. Appearance: She is ill-appearing (Chronic). She is not toxic-appearing or diaphoretic. Comments: Blind, nonverbal, will respond with some hand grasp   HENT:      Head: Normocephalic and atraumatic. Right Ear: External ear normal.      Left Ear: External ear normal.      Nose: No congestion or rhinorrhea. Mouth/Throat:      Mouth: Mucous membranes are dry. Pharynx: Oropharynx is clear. Eyes:      General: No scleral icterus. Right eye: No discharge. Left eye: No discharge. Comments: Blind   Cardiovascular:      Rate and Rhythm: Regular rhythm. Tachycardia present. Pulmonary:      Effort: Pulmonary effort is normal. No respiratory distress. Breath sounds: Normal breath sounds. No stridor. No wheezing, rhonchi or rales. Abdominal:      General: Abdomen is flat. Palpations: Abdomen is soft. Tenderness: There is no abdominal tenderness. Comments: Gastric tube present  No apparent abdominal tenderness   Musculoskeletal:         General: No swelling or tenderness. Normal range of motion. Cervical back: Normal range of motion. No rigidity or tenderness. Right lower leg: No edema. Left lower leg: No edema. Neurological:      Mental Status: She is alert.        --------------- External Imaging -------------    -------------------- Procedures --------------------    -------------------- MDM --------------------    Pt presented to ED today for acute onset nausea and vomiting starting this afternoon.   Patient not verbal, blind, does follow some simple commands. Unable to obtain review of systems however. Patient alert, no distress. Vital stable. BP (!) 144/78   Pulse 100   Temp 97.8 °F (36.6 °C) (Axillary)   Resp 20   Ht 5' 1\" (1.549 m)   Wt 131 lb (59.4 kg)   SpO2 96%   BMI 24.75 kg/m²     Exam remarkable for gastric tube. No abdominal distention present. Abdomen does not appear to be tender on exam.  No lower extremity edema or erythema. Heart regular rate rhythm. Diagnoses considered, but not limited to, include gastroenteritis, intractable nausea vomiting, dehydration, metabolic derangement, electrolyte abnormalities, cardiac etiology. EKG showed QTc WNL, No ST elevations, and sinus tach, interpreted by myself. Labwork ordered and interpretation by myself. Details below. Imaging ordered and interpretations by myself and radiologist. Details below. ED Course as of 01/30/23 0129   Lizzeth Cartagena Jan 29, 2023   2211 Ketones, Urine(!): 15  Mild dehydration [PW]   2211 Troponin, High Sensitivity(!): 35  Repeat ordered [PW]   2211 Lipase(!): 78  Did not have any epigastric tenderness. Only mild elevation of lipase. Pancreatitis unlikely. [PW]   2388 Influenza A by PCR: Not Detected [PW]   5181 SARS-CoV-2, NAAT: Not Detected [PW]   4611 Magnesium: 2.0  Normal [PW]   2633 CMP rather unremarkable, electrolytes balanced, normal kidney and liver function. [PW]   9127 CBC rather unremarkable, no anemia, no leukocytosis [PW]   2212 Lactic Acid: 0.9  Reassuring [PW]   Mon Jan 30, 2023   0052 Troponin, High Sensitivity(!): 33  Delta trop wnl. Cardiac etiology felt unlikely. [PW]   0019 Pt has not had any vomiting in ED. Will attempt to give some liquid through Gtube for PO challenge. [PW]   0114 Patient received G-tube fluid challenge has had no nausea or vomiting and 30 minutes afterwards. No evidence of acute process on lab work, advanced imaging felt unnecessary.   We will send patient home with prescription for Zofran.  [PW]      ED Course User Index  [PW] Nataliarhianna DO Ganga          Medications given include:    Medications   0.9 % sodium chloride bolus (1,000 mLs IntraVENous New Bag 1/29/23 0329)   ondansetron (ZOFRAN) injection 4 mg (4 mg IntraVENous Given 1/29/23 2326)       Lab work was remarkable for mild ketonuria evident of some mild dehydration. Electrolytes were balanced. Normal kidney and liver function. COVID and influenza were negative. No leukocytosis or anemia present. Lactic was within normal limits and reassuring. Patient was given IV fluids and Zofran. P.o. challenge was given through her G-tube and she did not have any episodes of vomiting. Further work-up felt unnecessary at this time as lab work was reassuring and abdominal exam was rather benign. Patient be discharged back to her care facility with prescription for Zofran and return precautions. Pt's symptoms were much improved after treatment. Vitals remained stable. Further workup felt not indicated at this time. Spoke with pt about results including any incidental findings. Recommended f/u with PCP on their results, including any incidental findings . Return precautions were given. Pt amenable to plan and was d/c'd home. New prescription(s):, Zofran    Final diagnosis:   1. Nausea and vomiting, unspecified vomiting type    2.  Dehydration        EKG Interpretation  Interpreted by emergency department physician.    1/29/23  Time: 2122     Rate: 104  Axis: Normal  MN: 134  QRS: 74  Qtc: 439  Rhythm: Regular  Clinical Impression: Sinus tachycardia  Comparison to old EKG: stable as compared to pt's most recent       -------------------- Consultations --------------------      -------------------- RESULTS --------------------    Labs:  Results for orders placed or performed during the hospital encounter of 01/29/23   COVID-19, Rapid    Specimen: Nasopharyngeal Swab   Result Value Ref Range    SARS-CoV-2, NAAT Not Detected Not Detected   RAPID INFLUENZA A/B ANTIGENS    Specimen: Nasopharyngeal   Result Value Ref Range    Influenza A by PCR Not Detected Not Detected    Influenza B by PCR Not Detected Not Detected   CBC with Auto Differential   Result Value Ref Range    WBC 11.0 4.5 - 11.5 E9/L    RBC 3.84 3.50 - 5.50 E12/L    Hemoglobin 11.9 11.5 - 15.5 g/dL    Hematocrit 37.6 34.0 - 48.0 %    MCV 97.9 80.0 - 99.9 fL    MCH 31.0 26.0 - 35.0 pg    MCHC 31.6 (L) 32.0 - 34.5 %    RDW 13.2 11.5 - 15.0 fL    Platelets 987 622 - 742 E9/L    MPV 10.6 7.0 - 12.0 fL    Neutrophils % 74.7 43.0 - 80.0 %    Immature Granulocytes % 0.5 0.0 - 5.0 %    Lymphocytes % 12.1 (L) 20.0 - 42.0 %    Monocytes % 11.5 2.0 - 12.0 %    Eosinophils % 1.0 0.0 - 6.0 %    Basophils % 0.2 0.0 - 2.0 %    Neutrophils Absolute 8.25 (H) 1.80 - 7.30 E9/L    Immature Granulocytes # 0.05 E9/L    Lymphocytes Absolute 1.34 (L) 1.50 - 4.00 E9/L    Monocytes Absolute 1.27 (H) 0.10 - 0.95 E9/L    Eosinophils Absolute 0.11 0.05 - 0.50 E9/L    Basophils Absolute 0.02 0.00 - 0.20 E9/L   CMP   Result Value Ref Range    Sodium 136 132 - 146 mmol/L    Potassium 4.2 3.5 - 5.0 mmol/L    Chloride 99 98 - 107 mmol/L    CO2 28 22 - 29 mmol/L    Anion Gap 9 7 - 16 mmol/L    Glucose 104 (H) 74 - 99 mg/dL    BUN 21 6 - 23 mg/dL    Creatinine 0.6 0.5 - 1.0 mg/dL    Est, Glom Filt Rate >60 >=60 mL/min/1.73    Calcium 9.8 8.6 - 10.2 mg/dL    Total Protein 7.1 6.4 - 8.3 g/dL    Albumin 2.9 (L) 3.5 - 5.2 g/dL    Total Bilirubin 0.2 0.0 - 1.2 mg/dL    Alkaline Phosphatase 56 35 - 104 U/L    ALT 6 0 - 32 U/L    AST 12 0 - 31 U/L   Lipase   Result Value Ref Range    Lipase 78 (H) 13 - 60 U/L   Lactic Acid   Result Value Ref Range    Lactic Acid 0.9 0.5 - 2.2 mmol/L   Troponin   Result Value Ref Range    Troponin, High Sensitivity 35 (H) 0 - 9 ng/L   Magnesium   Result Value Ref Range    Magnesium 2.0 1.6 - 2.6 mg/dL   Urinalysis with Microscopic   Result Value Ref Range    Color, UA Yellow Straw/Yellow    Clarity, UA Clear Clear Glucose, Ur Negative Negative mg/dL    Bilirubin Urine Negative Negative    Ketones, Urine 15 (A) Negative mg/dL    Specific Gravity, UA 1.020 1.005 - 1.030    Blood, Urine Negative Negative    pH, UA 7.0 5.0 - 9.0    Protein, UA Negative Negative mg/dL    Urobilinogen, Urine 1.0 <2.0 E.U./dL    Nitrite, Urine Negative Negative    Leukocyte Esterase, Urine SMALL (A) Negative    WBC, UA 0-1 0 - 5 /HPF    RBC, UA NONE 0 - 2 /HPF    Bacteria, UA NONE SEEN None Seen /HPF   Troponin   Result Value Ref Range    Troponin, High Sensitivity 33 (H) 0 - 9 ng/L       Radiology:  XR CHEST PORTABLE   Final Result   No acute process. -------------------- NURSING NOTES & VITALS --------------------    The nursing notes within the ED encounter and vital signs were reviewed. Vitals:    01/29/23 2115   BP: (!) 144/78   Pulse: 100   Resp: 20   Temp: 97.8 °F (36.6 °C)   SpO2: 96%        Patient Vitals for the past 8 hrs:   BP Temp Temp src Pulse Resp SpO2 Height Weight   01/29/23 2115 (!) 144/78 97.8 °F (36.6 °C) Axillary 100 20 96 % 5' 1\" (1.549 m) 131 lb (59.4 kg)       Oxygen Saturation Interpretation: Normal    -------------------- PROGRESS NOTES --------------------  1:21 AM EST  At this time, no further workup was felt necessary. I have spoken with the patient and discussed todays results, in addition to providing specific details for the plan of care and counseling regarding the diagnosis and prognosis. Their questions are answered at this time. I discussed at length with them reasons for immediate return here for re evaluation. They will followup with their PCP and were instructed to return to the ED for any new or worsening symptoms. They are amenable to this plan.    -------------------- ADDITIONAL PROVIDER NOTES --------------------  At this time the patient is without objective evidence of an acute process requiring hospitalization or inpatient management.   They have remained hemodynamically stable throughout their entire ED visit and are stable for discharge with outpatient follow-up. The plan has been discussed in detail and they are aware of the specific conditions for emergent return, as well as the importance of follow-up. New Prescriptions    ONDANSETRON (ZOFRAN-ODT) 4 MG DISINTEGRATING TABLET    Take 1 tablet by mouth every 8 hours as needed for Nausea or Vomiting       Diagnosis:  1. Nausea and vomiting, unspecified vomiting type    2. Dehydration        Disposition:  Patient's disposition: Discharge to home  Patient's condition is stable. Ricci Engle DO       *NOTE: This report was transcribed using voice recognition software. Every effort was made to ensure accuracy; however, inadvertent computerized transcription errors may be present.          Ricci Engle DO  Resident  01/30/23 9694

## 2023-01-30 NOTE — ED NOTES
Kristin De Santiago, provides services in her home. [phone], her ride home if discharged.      Kale Ross  01/29/23 3092

## 2023-02-22 ENCOUNTER — OFFICE VISIT (OUTPATIENT)
Dept: SURGERY | Age: 68
End: 2023-02-22
Payer: MEDICARE

## 2023-02-22 VITALS
WEIGHT: 131 LBS | HEART RATE: 119 BPM | TEMPERATURE: 98.7 F | SYSTOLIC BLOOD PRESSURE: 97 MMHG | BODY MASS INDEX: 24.73 KG/M2 | DIASTOLIC BLOOD PRESSURE: 63 MMHG | HEIGHT: 61 IN | OXYGEN SATURATION: 96 %

## 2023-02-22 DIAGNOSIS — K94.23 PEG TUBE MALFUNCTION (HCC): Primary | ICD-10-CM

## 2023-02-22 PROCEDURE — G8484 FLU IMMUNIZE NO ADMIN: HCPCS | Performed by: SURGERY

## 2023-02-22 PROCEDURE — G8427 DOCREV CUR MEDS BY ELIG CLIN: HCPCS | Performed by: SURGERY

## 2023-02-22 PROCEDURE — 3074F SYST BP LT 130 MM HG: CPT | Performed by: SURGERY

## 2023-02-22 PROCEDURE — 1123F ACP DISCUSS/DSCN MKR DOCD: CPT | Performed by: SURGERY

## 2023-02-22 PROCEDURE — 3017F COLORECTAL CA SCREEN DOC REV: CPT | Performed by: SURGERY

## 2023-02-22 PROCEDURE — 1036F TOBACCO NON-USER: CPT | Performed by: SURGERY

## 2023-02-22 PROCEDURE — 99212 OFFICE O/P EST SF 10 MIN: CPT | Performed by: SURGERY

## 2023-02-22 PROCEDURE — G8420 CALC BMI NORM PARAMETERS: HCPCS | Performed by: SURGERY

## 2023-02-22 PROCEDURE — 1090F PRES/ABSN URINE INCON ASSESS: CPT | Performed by: SURGERY

## 2023-02-22 PROCEDURE — G8400 PT W/DXA NO RESULTS DOC: HCPCS | Performed by: SURGERY

## 2023-02-22 PROCEDURE — 3078F DIAST BP <80 MM HG: CPT | Performed by: SURGERY

## 2023-02-23 ENCOUNTER — TELEPHONE (OUTPATIENT)
Dept: SURGERY | Age: 68
End: 2023-02-23

## 2023-02-23 NOTE — TELEPHONE ENCOUNTER
Scheduled patient for peg tube replacement on 4/7/23 at 9:10am in [facility] . Patient needs to report at the front entrance 1 hour prior to the procedure, no ASA products for 7 days. Celestina Chawla verbalized understanding. Instruction letter faxed to [phone]. Encouraged patient to call our office if any questions.      Electronically signed by Valeria Harper on 2/23/2023 at 11:37 AM

## 2023-02-23 NOTE — TELEPHONE ENCOUNTER
Prior Authorization Form:      DEMOGRAPHICS:                     Patient Name:  Severo Eduardo  Patient :  1955            Insurance:  Payor: MEDICARE / Plan: MEDICARE PART A AND B / Product Type: *No Product type* /   Insurance ID Number:    Payer/Plan Subscr  Sex Relation Sub. Ins. ID Effective Group Num   1. [place] - [place]* ARMANI ALDANA 1955 Female Self [id number] 1995                                    [address] [address]   2.  [place] -* ARMANI ALDANA 1955 Female Self [id number]                                    [address]         DIAGNOSIS & PROCEDURE:                       Procedure/Operation: peg tube replacement            CPT Code: 59690    Diagnosis:  peg tube issues    ICD10 Code: K94.23    Location:  [place]     Surgeon:  Dr. Rika Hodge INFORMATION:                          Date: 23    Time: 9:10am              Anesthesia:  MAC/TIVA                                                       Status:  Outpatient        Special Comments:  N/A       Electronically signed by Eliezer Lanza on 2023 at 11:31 AM

## 2023-03-02 ENCOUNTER — PROCEDURE VISIT (OUTPATIENT)
Dept: PODIATRY | Age: 68
End: 2023-03-02
Payer: MEDICARE

## 2023-03-02 VITALS — BODY MASS INDEX: 24.75 KG/M2 | WEIGHT: 131 LBS | TEMPERATURE: 97.2 F

## 2023-03-02 DIAGNOSIS — M79.675 PAIN IN LEFT TOE(S): ICD-10-CM

## 2023-03-02 DIAGNOSIS — F79 INTELLECTUAL DISABILITY: ICD-10-CM

## 2023-03-02 DIAGNOSIS — I73.9 PERIPHERAL VASCULAR DISEASE, UNSPECIFIED (HCC): ICD-10-CM

## 2023-03-02 DIAGNOSIS — H54.40 BLINDNESS OF LEFT EYE, UNSPECIFIED RIGHT EYE VISUAL IMPAIRMENT CATEGORY: ICD-10-CM

## 2023-03-02 DIAGNOSIS — M79.674 PAIN IN TOE OF RIGHT FOOT: ICD-10-CM

## 2023-03-02 DIAGNOSIS — B35.1 TINEA UNGUIUM: Primary | ICD-10-CM

## 2023-03-02 DIAGNOSIS — R26.2 DIFFICULTY WALKING: ICD-10-CM

## 2023-03-02 PROCEDURE — 11721 DEBRIDE NAIL 6 OR MORE: CPT | Performed by: PODIATRIST

## 2023-03-02 NOTE — PROGRESS NOTES
[facility]  [facility]    Chief Complaint   Patient presents with    Toe Pain     Saw pcp Dr. Claudette Kauffman 1/19/23       Subjective: This Rahat Cowper comes to office for foot and nail care. Pt currently has complaint of thickened, painful, elongated nails that he/she cannot manage by themselves. Pt. Relates pain to nails with shoe gear. Pt's primary care physician is Renata Purdy DO. Patient is seen with caregiver  Past Medical History:   Diagnosis Date    Acute kidney injury ([facility] [address] 01/15/2017    d/t Vancomycin, on Dialysis [facility] right chest    Blind in both eyes     Essential hypertension 4/2/2021    Gastroesophageal reflux disease without esophagitis 4/2/2021    Hemodialysis patient [facility])     Hyperthyroidism     MR (mental retardation)     Osteoarthritis     Peripheral vascular disease ([facility] [address]     Pneumonia 01/06/2017    Pneumonia due to infectious organism 1/8/2017    Sepsis ([facility] [address] 3/4/2021       No Known Allergies  Current Outpatient Medications on File Prior to Visit   Medication Sig Dispense Refill    blood glucose monitor kit and supplies Dispense one Leo Matrix monitor and one lancet device. Patient tests blood glucose when nauseous, vomiting, or when tube feeding is held due to hypoglycemia risk. 1 kit 0    Lancets MISC 1 each by Does not apply route daily Pt is in need of Leo Matrix blood glucose lancets. Patient tests bood glucose when nauseous, vomiting, or when tube feeding are held due to hypoglycemia risk. 100 each 5    blood glucose monitor strips Leo Matrix testing strips. Patient tests bood glucose when nauseous, vomiting, or when tube feeding are held due to hypoglycemia risk. 100 strip 5    blood glucose monitor kit and supplies Dispense sufficient amount for once daily testing frequency with ONE TOUCH glucometer. Dispense all needed supplies to include: monitor, strips, lancing device, lancets, control solutions, alcohol swabs.  Patient tests BG when nauseous, vomiting, or when tube feeds are held due to hypoglycemia risk. 1 kit 0    Bacitracin-Polymyxin B (POLY BACITRACIN) [id number] UNIT/GM OINT APPLY TOPICALLY TO OSTOMY TWICE DAILY. 28 g 11    folic acid (FOLVITE) 1 MG tablet TAKE 1 TABLET VIA G-TUBE DAILY 90 tablet 3    levothyroxine (SYNTHROID) 150 MCG tablet Kari@"[employer]Exo 1 TABLET VIA G-TUBE ONCE DAILY 90 tablet 3    polyethylene glycol (GLYCOLAX) 17 g packet 17 g by Per G Tube route daily as needed 527 g 1    montelukast (SINGULAIR) 10 MG tablet TAKE 1 TABLET VIA G-TUBE AT BEDTIME. 90 tablet 1    PARoxetine (PAXIL) 10 MG tablet TAKE 1 TABLET VIA G-TUBE TWICE A  tablet 1    bumetanide (BUMEX) 1 MG tablet TAKE 1 TABLET VIA G-TUBE DAILY AS NEEDED FOR SWELLING. 30 tablet 5    midodrine (PROAMATINE) 2.5 MG tablet TAKE 3 TABLET VIA G-TUBE THREE TIMES DAILY AS NEEDED. (HYPOTENSION, GIVE ONLY IF SYSTOLIC BP IS <377. (BP CHECKS 3 TIMES A DAY. 252 tablet 0    NATURAL VITAMIN D-3 125 MCG (5000 UT) TABS tablet TAKE 1 TABLET VIA G-TUBE ONCE DAILY 90 tablet 1    FEROSUL 325 (65 Fe) MG tablet TAKE 1 TABLET VIA G-TUBE ONCE A DAY. 90 tablet 1    pantoprazole sodium (PROTONIX) 40 MG PACK packet 40 mg by Per G Tube route every morning (before breakfast)      Disposable Gloves (POWDER FREE NITRILE GLOVES MED) MISC As Directed 2 each 5    albuterol (PROVENTIL) (2.5 MG/3ML) 0.083% nebulizer solution Take 3 mLs by nebulization every 6 hours 120 each 3    Respiratory Therapy Supplies (FULL KIT NEBULIZER SET) MISC Use as directed with nebulized medication.  1 each 0    acetaminophen (TYLENOL) 325 MG tablet 650 mg by Per G Tube route every 4 hours as needed for Pain or Fever      medicated lip balm (BLISTEX/CARMEX) 2-2.5-6.6 % STCK Apply topically as needed for Dry Lips (CRACKED LIPS)      SUNSCREENS EX Apply topically as needed (SUNBURN PREVENTION) Indications: *SPF 50*      Neomycin-Bacitracin-Polymyxin (TRIPLE ANTIBIOTIC) OINT Apply topically 2 times daily as needed (CUTS/SCRAPES/SKIN ABRASIONS)      OLANZapine (ZYPREXA) 20 MG tablet 20 mg by Per G Tube route in the morning and at bedtime      valproic acid (DEPACON) 250 MG/5ML SOLN oral solution 20 mLs by Per G Tube route 2 times daily 300 mL 0    Alcohol Swabs (ALCOHOL PREP) PADS 1 each by Does not apply route daily Use before testing blood glucose. Patient tests bood glucose when nauseous, vomiting, or when tube feeding are held due to hypoglycemia risk. 100 each 5    famotidine (PEPCID) 20 MG tablet TAKE (1) TABLET VIA PEG TUBE TWO TIMES DAILY. 180 tablet 1    Gauze Pads & Dressings 2\"X2\" PADS 1 each by Does not apply route in the morning and at bedtime Apply to stoma 10 each 5    [DISCONTINUED] NATURAL VITAMIN D-3 125 MCG (5000 UT) TABS tablet TAKE 1 TABLET VIA G-TUBE ONCE DAILY 28 tablet 0     No current facility-administered medications on file prior to visit. Review of Systems  Objective:  General: AAO x 3 in NAD.     Derm  Toenail Description  Sites of Onychomycosis Involvement (Check affected area)  [x] [x] [x] [x] [x] [x] [x] [x] [x] [x]  5 4 3 2 1 1 2 3 4 5                          Right                                        Left    Thickness  [x] [x] [x] [x] [x] [x] [x] [x] [x] [x]  5 4 3 2 1 1 2 3 4 5                         Right                                        Left    Dystrophic Changes   [x] [x] [x] [x] [x] [x] [x] [x] [x] [x]  5 4 3 2 1 1 2 3 4 5                         Right                                        Left    Color  [x] [x] [x] [x] [x] [x] [x] [x] [x] [x]  5 4 3 2 1 1 2 3 4 5                          Right                                        Left    Incurvation/Ingrowin   [x] [x] [x] [x] [x] [x] [x] [x] [x] [x]  5 4 3 2 1 1 2 3 4 5                         Right                                        Left    Inflammation/Pain   [x] [x] [x] [x] [x] [x] [x] [x] [x] [x]  5 4 3  2 1 1 2 3 4 5                         Right                                        Left      Nails that are described above are all elongated thickened pitting mycotic yellowish incurvated causing pain with both shoe gear. Palpation nails greater then 3 mm thick painful       Dermatologic Exam:hair loss noted  lower extremity    Skin lesion/ulceration   Skin   Callus   Musculoskeletal:     1st MPJ ROM normal, Bilateral.  Muscle strength 5/5, Bilateral.  Pain present upon palpation of toenails   Bilateral., Bilateral.  Ankle ROM normal,Bilateral.    Dorsally contracted digits , Bilateral.     Vascular: There are class B findings absent posterior tibialis pulses lack of hair growth thickened nails discoloration skin is discolored skin is shiny cool to touch to toes    Neurological:  Sensation present to light touch to level of digits, Bilateral    Foot Exam    General  General Appearance: appears stated age and healthy   Assistance: wheelchair use       Right Foot/Ankle     Inspection and Palpation  Skin Exam: skin changes and abnormal color; Neurovascular  Dorsalis pedis: 1+  Posterior tibial: absent    Muscle Strength  Ankle dorsiflexion: 1  Ankle plantar flexion: 1      Left Foot/Ankle      Inspection and Palpation  Skin Exam: skin changes and abnormal color; Neurovascular  Dorsalis pedis: 1+  Posterior tibial: absent    Muscle Strength  Ankle dorsiflexion: 1  Ankle plantar flexion: 1    Range of Motion    Normal left ankle ROM         Right Ankle Exam   Right ankle exam is normal.  Swelling: mild    Muscle Strength   Dorsiflexion:  1/5  Plantar flexion:  1/5  Anterior tibial:  1/5  Posterior tibial:  1/5  Gastrocsoleus:  1/5  Peroneal muscle:  1/5    Tests   Anterior drawer: physiological laxity  Varus tilt: physiological laxity       Left Ankle Exam   Left ankle exam is normal.  Swelling: mild    Range of Motion   The patient has normal left ankle ROM.      Muscle Strength   Dorsiflexion:  1/5   Plantar flexion:  1/5   Anterior tibial:  1/5   Gastrocsoleus:  1/5  Peroneal muscle:  1/5    Tests   Anterior drawer: physiological laxity  Varus tilt: physiological laxity        Q7   []Yes    []No                Q8   [x]Yes    []No                     Q9   []Yes    []No  Assessment:  68 y.o. female with:   1. Tinea unguium    2. Peripheral vascular disease, unspecified (HCC)    3. Pain in left toe(s)    4. Pain in toe of right foot    5. Difficulty walking    6. Mental retardation    7. Blindness of left eye, unspecified right eye visual impairment category     No orders of the defined types were placed in this encounter.        Plan: .  Pt was evaluated and examined. Patient was given personalized discharge instructions.  Nails 1-10 were debrided in length and thickness sharply with a nail nipper and  without incident. Pt will follow up in 9 weeks or sooner if any problems arise. Diagnosis was discussed with the pt and all of their questions were answered in detail. Proper foot hygiene and care was discussed with the pt. Patient to check feet daily and contact the office with any questions/problems/concerns.  Other comorbidity noted and will be managed by PCP.  Pain waiver discussed with patient and confirmed.   3/2/2023      Electronically signed by Ez Nunez DPM on 3/2/2023 at 8:58 AM  3/2/2023

## 2023-03-03 ENCOUNTER — HOSPITAL ENCOUNTER (INPATIENT)
Age: 68
LOS: 13 days | Discharge: ANOTHER HOSPITAL | DRG: 640 | End: 2023-03-16
Attending: EMERGENCY MEDICINE | Admitting: INTERNAL MEDICINE
Payer: MEDICARE

## 2023-03-03 ENCOUNTER — APPOINTMENT (OUTPATIENT)
Dept: GENERAL RADIOLOGY | Age: 68
DRG: 640 | End: 2023-03-03
Payer: MEDICARE

## 2023-03-03 ENCOUNTER — APPOINTMENT (OUTPATIENT)
Dept: ULTRASOUND IMAGING | Age: 68
DRG: 640 | End: 2023-03-03
Payer: MEDICARE

## 2023-03-03 ENCOUNTER — APPOINTMENT (OUTPATIENT)
Dept: CT IMAGING | Age: 68
DRG: 640 | End: 2023-03-03
Payer: MEDICARE

## 2023-03-03 DIAGNOSIS — E87.0 HYPERNATREMIA: Primary | ICD-10-CM

## 2023-03-03 PROBLEM — E86.1 HYPOTENSION DUE TO HYPOVOLEMIA: Status: ACTIVE | Noted: 2023-03-03

## 2023-03-03 PROBLEM — R00.0 TACHYCARDIA: Status: ACTIVE | Noted: 2023-03-03

## 2023-03-03 PROBLEM — R11.2 NAUSEA AND VOMITING: Status: ACTIVE | Noted: 2023-03-03

## 2023-03-03 PROBLEM — I95.89 HYPOTENSION DUE TO HYPOVOLEMIA: Status: ACTIVE | Noted: 2023-03-03

## 2023-03-03 PROBLEM — G93.41 METABOLIC ENCEPHALOPATHY: Status: ACTIVE | Noted: 2023-03-03

## 2023-03-03 PROBLEM — N17.9 AKI (ACUTE KIDNEY INJURY) (HCC): Status: ACTIVE | Noted: 2023-03-03

## 2023-03-03 LAB
ALBUMIN SERPL-MCNC: 2.8 G/DL (ref 3.5–5.2)
ALP BLD-CCNC: 85 U/L (ref 35–104)
ALT SERPL-CCNC: 12 U/L (ref 0–32)
ANION GAP SERPL CALCULATED.3IONS-SCNC: 11 MMOL/L (ref 7–16)
ANION GAP SERPL CALCULATED.3IONS-SCNC: 14 MMOL/L (ref 7–16)
AST SERPL-CCNC: 12 U/L (ref 0–31)
B.E.: -0.5 MMOL/L (ref -3–3)
BASOPHILS ABSOLUTE: 0 E9/L (ref 0–0.2)
BASOPHILS RELATIVE PERCENT: 0 % (ref 0–2)
BILIRUB SERPL-MCNC: 0.6 MG/DL (ref 0–1.2)
BILIRUBIN URINE: ABNORMAL
BLOOD, URINE: NEGATIVE
BUN BLDV-MCNC: 102 MG/DL (ref 6–23)
BUN BLDV-MCNC: 105 MG/DL (ref 6–23)
BURR CELLS: ABNORMAL
CALCIUM SERPL-MCNC: 10.1 MG/DL (ref 8.6–10.2)
CALCIUM SERPL-MCNC: 8.4 MG/DL (ref 8.6–10.2)
CHLORIDE BLD-SCNC: 121 MMOL/L (ref 98–107)
CHLORIDE BLD-SCNC: 129 MMOL/L (ref 98–107)
CHLORIDE URINE RANDOM: 29 MMOL/L
CLARITY: CLEAR
CO2: 23 MMOL/L (ref 22–29)
CO2: 23 MMOL/L (ref 22–29)
COHB: 2.1 % (ref 0–1.5)
COLOR: YELLOW
CREAT SERPL-MCNC: 1.2 MG/DL (ref 0.5–1)
CREAT SERPL-MCNC: 1.3 MG/DL (ref 0.5–1)
CREATININE URINE: 25 MG/DL (ref 29–226)
CRITICAL: ABNORMAL
DATE ANALYZED: ABNORMAL
DATE OF COLLECTION: ABNORMAL
EKG ATRIAL RATE: 121 BPM
EKG P AXIS: 72 DEGREES
EKG P-R INTERVAL: 130 MS
EKG Q-T INTERVAL: 308 MS
EKG QRS DURATION: 76 MS
EKG QTC CALCULATION (BAZETT): 437 MS
EKG R AXIS: 13 DEGREES
EKG T AXIS: 65 DEGREES
EKG VENTRICULAR RATE: 121 BPM
EOSINOPHILS ABSOLUTE: 0 E9/L (ref 0.05–0.5)
EOSINOPHILS RELATIVE PERCENT: 0 % (ref 0–6)
GFR SERPL CREATININE-BSD FRML MDRD: 44 ML/MIN/1.73
GFR SERPL CREATININE-BSD FRML MDRD: 49 ML/MIN/1.73
GLUCOSE BLD-MCNC: 128 MG/DL (ref 74–99)
GLUCOSE BLD-MCNC: 128 MG/DL (ref 74–99)
GLUCOSE URINE: NEGATIVE MG/DL
HCO3: 25.1 MMOL/L (ref 22–26)
HCT VFR BLD CALC: 44 % (ref 34–48)
HEMOGLOBIN: 13 G/DL (ref 11.5–15.5)
HHB: 4.8 % (ref 0–5)
HYPOCHROMIA: ABNORMAL
KETONES, URINE: NEGATIVE MG/DL
LAB: ABNORMAL
LACTIC ACID: 0.9 MMOL/L (ref 0.5–2.2)
LACTIC ACID: 1 MMOL/L (ref 0.5–2.2)
LEUKOCYTE ESTERASE, URINE: NEGATIVE
LYMPHOCYTES ABSOLUTE: 1.75 E9/L (ref 1.5–4)
LYMPHOCYTES RELATIVE PERCENT: 11.3 % (ref 20–42)
Lab: ABNORMAL
MCH RBC QN AUTO: 31 PG (ref 26–35)
MCHC RBC AUTO-ENTMCNC: 29.5 % (ref 32–34.5)
MCV RBC AUTO: 105 FL (ref 80–99.9)
METAMYELOCYTES RELATIVE PERCENT: 0.9 % (ref 0–1)
METHB: 0.6 % (ref 0–1.5)
MODE: ABNORMAL
MONOCYTES ABSOLUTE: 1.43 E9/L (ref 0.1–0.95)
MONOCYTES RELATIVE PERCENT: 8.7 % (ref 2–12)
MYELOCYTE PERCENT: 0.9 % (ref 0–0)
NEUTROPHILS ABSOLUTE: 12.72 E9/L (ref 1.8–7.3)
NEUTROPHILS RELATIVE PERCENT: 78.2 % (ref 43–80)
NITRITE, URINE: NEGATIVE
NUCLEATED RED BLOOD CELLS: 5.2 /100 WBC
O2 CONTENT: 18.2 ML/DL
O2 SATURATION: 95.1 % (ref 92–98.5)
O2HB: 92.5 % (ref 94–97)
OPERATOR ID: 2246
OSMOLALITY URINE: 601 MOSM/KG (ref 300–900)
OSMOLALITY: 369 MOSM/KG (ref 285–310)
PATIENT TEMP: 37 C
PCO2: 44.7 MMHG (ref 35–45)
PDW BLD-RTO: 15.5 FL (ref 11.5–15)
PH BLOOD GAS: 7.37 (ref 7.35–7.45)
PH UA: 5.5 (ref 5–9)
PLATELET # BLD: 256 E9/L (ref 130–450)
PMV BLD AUTO: 13.3 FL (ref 7–12)
PO2: 76.6 MMHG (ref 75–100)
POLYCHROMASIA: ABNORMAL
POTASSIUM SERPL-SCNC: 3.9 MMOL/L (ref 3.5–5)
POTASSIUM SERPL-SCNC: 4 MMOL/L (ref 3.5–5)
POTASSIUM, UR: 53 MMOL/L
PROCALCITONIN: 0.24 NG/ML (ref 0–0.08)
PROTEIN UA: NEGATIVE MG/DL
RBC # BLD: 4.19 E12/L (ref 3.5–5.5)
SARS-COV-2, NAAT: NOT DETECTED
SODIUM BLD-SCNC: 158 MMOL/L (ref 132–146)
SODIUM BLD-SCNC: 163 MMOL/L (ref 132–146)
SODIUM URINE: 26 MMOL/L
SOURCE, BLOOD GAS: ABNORMAL
SPECIFIC GRAVITY UA: 1.02 (ref 1–1.03)
TARGET CELLS: ABNORMAL
THB: 14 G/DL (ref 11.5–16.5)
TIME ANALYZED: 1608
TOTAL PROTEIN: 8 G/DL (ref 6.4–8.3)
TROPONIN, HIGH SENSITIVITY: 28 NG/L (ref 0–9)
TROPONIN, HIGH SENSITIVITY: 31 NG/L (ref 0–9)
UREA NITROGEN, UR: 654 MG/DL (ref 800–1666)
UROBILINOGEN, URINE: 0.2 E.U./DL
WBC # BLD: 15.9 E9/L (ref 4.5–11.5)

## 2023-03-03 PROCEDURE — 85025 COMPLETE CBC W/AUTO DIFF WBC: CPT

## 2023-03-03 PROCEDURE — 87040 BLOOD CULTURE FOR BACTERIA: CPT

## 2023-03-03 PROCEDURE — 6360000002 HC RX W HCPCS: Performed by: EMERGENCY MEDICINE

## 2023-03-03 PROCEDURE — 6360000004 HC RX CONTRAST MEDICATION: Performed by: PEDIATRICS

## 2023-03-03 PROCEDURE — 76770 US EXAM ABDO BACK WALL COMP: CPT

## 2023-03-03 PROCEDURE — 82533 TOTAL CORTISOL: CPT

## 2023-03-03 PROCEDURE — 87635 SARS-COV-2 COVID-19 AMP PRB: CPT

## 2023-03-03 PROCEDURE — 84484 ASSAY OF TROPONIN QUANT: CPT

## 2023-03-03 PROCEDURE — 82436 ASSAY OF URINE CHLORIDE: CPT

## 2023-03-03 PROCEDURE — 83605 ASSAY OF LACTIC ACID: CPT

## 2023-03-03 PROCEDURE — 84145 PROCALCITONIN (PCT): CPT

## 2023-03-03 PROCEDURE — 83935 ASSAY OF URINE OSMOLALITY: CPT

## 2023-03-03 PROCEDURE — 99222 1ST HOSP IP/OBS MODERATE 55: CPT | Performed by: STUDENT IN AN ORGANIZED HEALTH CARE EDUCATION/TRAINING PROGRAM

## 2023-03-03 PROCEDURE — 81003 URINALYSIS AUTO W/O SCOPE: CPT

## 2023-03-03 PROCEDURE — 36556 INSERT NON-TUNNEL CV CATH: CPT

## 2023-03-03 PROCEDURE — 84133 ASSAY OF URINE POTASSIUM: CPT

## 2023-03-03 PROCEDURE — 99285 EMERGENCY DEPT VISIT HI MDM: CPT

## 2023-03-03 PROCEDURE — 96361 HYDRATE IV INFUSION ADD-ON: CPT

## 2023-03-03 PROCEDURE — 71045 X-RAY EXAM CHEST 1 VIEW: CPT

## 2023-03-03 PROCEDURE — 84300 ASSAY OF URINE SODIUM: CPT

## 2023-03-03 PROCEDURE — 6360000002 HC RX W HCPCS: Performed by: NURSE PRACTITIONER

## 2023-03-03 PROCEDURE — 96360 HYDRATION IV INFUSION INIT: CPT

## 2023-03-03 PROCEDURE — 36415 COLL VENOUS BLD VENIPUNCTURE: CPT

## 2023-03-03 PROCEDURE — 87081 CULTURE SCREEN ONLY: CPT

## 2023-03-03 PROCEDURE — 93005 ELECTROCARDIOGRAM TRACING: CPT | Performed by: EMERGENCY MEDICINE

## 2023-03-03 PROCEDURE — 83930 ASSAY OF BLOOD OSMOLALITY: CPT

## 2023-03-03 PROCEDURE — 6370000000 HC RX 637 (ALT 250 FOR IP): Performed by: NURSE PRACTITIONER

## 2023-03-03 PROCEDURE — 2580000003 HC RX 258

## 2023-03-03 PROCEDURE — C9113 INJ PANTOPRAZOLE SODIUM, VIA: HCPCS | Performed by: NURSE PRACTITIONER

## 2023-03-03 PROCEDURE — 93010 ELECTROCARDIOGRAM REPORT: CPT | Performed by: INTERNAL MEDICINE

## 2023-03-03 PROCEDURE — APPSS45 APP SPLIT SHARED TIME 31-45 MINUTES: Performed by: NURSE PRACTITIONER

## 2023-03-03 PROCEDURE — 74018 RADEX ABDOMEN 1 VIEW: CPT

## 2023-03-03 PROCEDURE — 82570 ASSAY OF URINE CREATININE: CPT

## 2023-03-03 PROCEDURE — 84540 ASSAY OF URINE/UREA-N: CPT

## 2023-03-03 PROCEDURE — 2580000003 HC RX 258: Performed by: NURSE PRACTITIONER

## 2023-03-03 PROCEDURE — 70450 CT HEAD/BRAIN W/O DYE: CPT

## 2023-03-03 PROCEDURE — 51702 INSERT TEMP BLADDER CATH: CPT

## 2023-03-03 PROCEDURE — 80048 BASIC METABOLIC PNL TOTAL CA: CPT

## 2023-03-03 PROCEDURE — 80053 COMPREHEN METABOLIC PANEL: CPT

## 2023-03-03 PROCEDURE — 82805 BLOOD GASES W/O2 SATURATION: CPT

## 2023-03-03 PROCEDURE — 2580000003 HC RX 258: Performed by: EMERGENCY MEDICINE

## 2023-03-03 PROCEDURE — 1200000000 HC SEMI PRIVATE

## 2023-03-03 PROCEDURE — 99291 CRITICAL CARE FIRST HOUR: CPT

## 2023-03-03 RX ORDER — POLYETHYLENE GLYCOL 3350 17 G/17G
17 POWDER, FOR SOLUTION ORAL DAILY PRN
Status: DISCONTINUED | OUTPATIENT
Start: 2023-03-03 | End: 2023-03-03

## 2023-03-03 RX ORDER — MONTELUKAST SODIUM 10 MG/1
10 TABLET ORAL NIGHTLY
Status: DISCONTINUED | OUTPATIENT
Start: 2023-03-03 | End: 2023-03-16 | Stop reason: HOSPADM

## 2023-03-03 RX ORDER — BISACODYL 10 MG
10 SUPPOSITORY, RECTAL RECTAL DAILY
COMMUNITY

## 2023-03-03 RX ORDER — DEXTROSE MONOHYDRATE 50 MG/ML
INJECTION, SOLUTION INTRAVENOUS CONTINUOUS
Status: DISCONTINUED | OUTPATIENT
Start: 2023-03-03 | End: 2023-03-04

## 2023-03-03 RX ORDER — 0.9 % SODIUM CHLORIDE 0.9 %
1000 INTRAVENOUS SOLUTION INTRAVENOUS ONCE
Status: COMPLETED | OUTPATIENT
Start: 2023-03-03 | End: 2023-03-03

## 2023-03-03 RX ORDER — ACETAMINOPHEN 325 MG/1
650 TABLET ORAL EVERY 4 HOURS PRN
Status: DISCONTINUED | OUTPATIENT
Start: 2023-03-03 | End: 2023-03-16 | Stop reason: HOSPADM

## 2023-03-03 RX ORDER — TRIAMCINOLONE ACETONIDE 1 MG/ML
LOTION TOPICAL 2 TIMES DAILY
COMMUNITY

## 2023-03-03 RX ORDER — SODIUM CHLORIDE 0.9 % (FLUSH) 0.9 %
5-40 SYRINGE (ML) INJECTION PRN
Status: DISCONTINUED | OUTPATIENT
Start: 2023-03-03 | End: 2023-03-16 | Stop reason: HOSPADM

## 2023-03-03 RX ORDER — MIDODRINE HYDROCHLORIDE 5 MG/1
10 TABLET ORAL
Status: DISCONTINUED | OUTPATIENT
Start: 2023-03-04 | End: 2023-03-16 | Stop reason: HOSPADM

## 2023-03-03 RX ORDER — SODIUM CHLORIDE 0.9 % (FLUSH) 0.9 %
10 SYRINGE (ML) INJECTION PRN
Status: DISCONTINUED | OUTPATIENT
Start: 2023-03-03 | End: 2023-03-16 | Stop reason: HOSPADM

## 2023-03-03 RX ORDER — ACETAMINOPHEN 650 MG/1
650 SUPPOSITORY RECTAL EVERY 6 HOURS PRN
Status: DISCONTINUED | OUTPATIENT
Start: 2023-03-03 | End: 2023-03-16 | Stop reason: HOSPADM

## 2023-03-03 RX ORDER — SODIUM CHLORIDE 0.9 % (FLUSH) 0.9 %
5-40 SYRINGE (ML) INJECTION EVERY 12 HOURS SCHEDULED
Status: DISCONTINUED | OUTPATIENT
Start: 2023-03-03 | End: 2023-03-16 | Stop reason: HOSPADM

## 2023-03-03 RX ORDER — MIDODRINE HYDROCHLORIDE 5 MG/1
7.5 TABLET ORAL
Status: DISCONTINUED | OUTPATIENT
Start: 2023-03-03 | End: 2023-03-03

## 2023-03-03 RX ORDER — PAROXETINE 10 MG/1
10 TABLET, FILM COATED ORAL 2 TIMES DAILY
Status: DISCONTINUED | OUTPATIENT
Start: 2023-03-03 | End: 2023-03-16 | Stop reason: HOSPADM

## 2023-03-03 RX ORDER — PANTOPRAZOLE SODIUM 40 MG/10ML
40 INJECTION, POWDER, LYOPHILIZED, FOR SOLUTION INTRAVENOUS DAILY
Status: DISCONTINUED | OUTPATIENT
Start: 2023-03-03 | End: 2023-03-16 | Stop reason: HOSPADM

## 2023-03-03 RX ORDER — ONDANSETRON 2 MG/ML
4 INJECTION INTRAMUSCULAR; INTRAVENOUS EVERY 6 HOURS PRN
Status: DISCONTINUED | OUTPATIENT
Start: 2023-03-03 | End: 2023-03-16 | Stop reason: HOSPADM

## 2023-03-03 RX ORDER — OLANZAPINE 10 MG/1
20 TABLET ORAL 2 TIMES DAILY
Status: DISCONTINUED | OUTPATIENT
Start: 2023-03-03 | End: 2023-03-16 | Stop reason: HOSPADM

## 2023-03-03 RX ORDER — ACETAMINOPHEN 325 MG/1
650 TABLET ORAL EVERY 6 HOURS PRN
Status: DISCONTINUED | OUTPATIENT
Start: 2023-03-03 | End: 2023-03-16 | Stop reason: HOSPADM

## 2023-03-03 RX ORDER — ONDANSETRON 8 MG/1
4 TABLET, ORALLY DISINTEGRATING ORAL EVERY 8 HOURS PRN
COMMUNITY

## 2023-03-03 RX ORDER — ONDANSETRON 4 MG/1
4 TABLET, ORALLY DISINTEGRATING ORAL EVERY 8 HOURS PRN
Status: DISCONTINUED | OUTPATIENT
Start: 2023-03-03 | End: 2023-03-16 | Stop reason: HOSPADM

## 2023-03-03 RX ORDER — ENOXAPARIN SODIUM 100 MG/ML
40 INJECTION SUBCUTANEOUS DAILY
Status: DISCONTINUED | OUTPATIENT
Start: 2023-03-03 | End: 2023-03-06 | Stop reason: DRUGHIGH

## 2023-03-03 RX ORDER — SODIUM CHLORIDE 9 MG/ML
INJECTION, SOLUTION INTRAVENOUS PRN
Status: DISCONTINUED | OUTPATIENT
Start: 2023-03-03 | End: 2023-03-16 | Stop reason: HOSPADM

## 2023-03-03 RX ORDER — VALPROIC ACID 250 MG/5ML
1000 SOLUTION ORAL 2 TIMES DAILY
Status: DISCONTINUED | OUTPATIENT
Start: 2023-03-03 | End: 2023-03-16 | Stop reason: HOSPADM

## 2023-03-03 RX ORDER — POLYETHYLENE GLYCOL 3350 17 G/17G
17 POWDER, FOR SOLUTION ORAL DAILY
Status: DISCONTINUED | OUTPATIENT
Start: 2023-03-04 | End: 2023-03-16 | Stop reason: HOSPADM

## 2023-03-03 RX ADMIN — PANTOPRAZOLE SODIUM 40 MG: 40 INJECTION, POWDER, FOR SOLUTION INTRAVENOUS at 18:54

## 2023-03-03 RX ADMIN — VALPROIC ACID 1000 MG: 250 SOLUTION ORAL at 21:53

## 2023-03-03 RX ADMIN — ENOXAPARIN SODIUM 40 MG: 100 INJECTION SUBCUTANEOUS at 18:10

## 2023-03-03 RX ADMIN — SODIUM CHLORIDE 1000 ML: 9 INJECTION, SOLUTION INTRAVENOUS at 14:39

## 2023-03-03 RX ADMIN — Medication 10 ML: at 21:55

## 2023-03-03 RX ADMIN — VANCOMYCIN HYDROCHLORIDE 1250 MG: 10 INJECTION, POWDER, LYOPHILIZED, FOR SOLUTION INTRAVENOUS at 17:18

## 2023-03-03 RX ADMIN — DIATRIZOATE MEGLUMINE AND DIATRIZOATE SODIUM 30 ML: 600; 100 SOLUTION ORAL; RECTAL at 17:55

## 2023-03-03 RX ADMIN — PIPERACILLIN AND TAZOBACTAM 4500 MG: 4; .5 INJECTION, POWDER, FOR SOLUTION INTRAVENOUS at 19:56

## 2023-03-03 RX ADMIN — DEXTROSE MONOHYDRATE: 50 INJECTION, SOLUTION INTRAVENOUS at 18:08

## 2023-03-03 RX ADMIN — PAROXETINE 10 MG: 10 TABLET, FILM COATED ORAL at 21:53

## 2023-03-03 RX ADMIN — SODIUM CHLORIDE 1000 ML: 9 INJECTION, SOLUTION INTRAVENOUS at 17:22

## 2023-03-03 RX ADMIN — MONTELUKAST SODIUM 10 MG: 10 TABLET ORAL at 21:53

## 2023-03-03 RX ADMIN — DEXTROSE MONOHYDRATE: 50 INJECTION, SOLUTION INTRAVENOUS at 18:05

## 2023-03-03 RX ADMIN — SODIUM CHLORIDE, PRESERVATIVE FREE 10 ML: 5 INJECTION INTRAVENOUS at 14:06

## 2023-03-03 ASSESSMENT — PAIN SCALES - GENERAL
PAINLEVEL_OUTOF10: 0
PAINLEVEL_OUTOF10: 0

## 2023-03-03 ASSESSMENT — LIFESTYLE VARIABLES
HOW OFTEN DO YOU HAVE A DRINK CONTAINING ALCOHOL: NEVER
HOW OFTEN DO YOU HAVE A DRINK CONTAINING ALCOHOL: NEVER
HOW MANY STANDARD DRINKS CONTAINING ALCOHOL DO YOU HAVE ON A TYPICAL DAY: PATIENT DOES NOT DRINK

## 2023-03-03 ASSESSMENT — PAIN - FUNCTIONAL ASSESSMENT: PAIN_FUNCTIONAL_ASSESSMENT: ADULT NONVERBAL PAIN SCALE (NPVS)

## 2023-03-03 NOTE — ED NOTES
Spoke with Patient Bridgette Crowley, and she consents to PG&E Dissolve.        Izabella Berrios RN  03/03/23 [id number]

## 2023-03-03 NOTE — CONSULTS
Nephrology Consult Note  Patient's Name: Berhane Balderas  6:46 PM  3/3/2023    Nephrologist: Arnulfo Smith    Reason for Consult:  BEENA and Hypernatremia  Requesting Physician:  Dr. Erlinda Bence    Chief Complaint:  AMS    History Obtained From:  past medical records and ED Personal    History of Present Ilness:    Berhane Balderas is a 70 y.o. female with prior history of Developmental Disability who has a PEG tbe in place and currently resides at a group home. Pt brought into the ED today for worsening mental status. She is usually on Jevity TF 240ml q8hrs with 150ml free water. Pt has been having N/V and not tolerating TF for the last few days. In the ED Na+ 158 and trended up to 163  and cr 1.3-->1.2mg/dl. Pt has a baselline serum cr 0.6mg/dl    Past Medical History:   Diagnosis Date    Acute kidney injury ([facility] [address] 01/15/2017    d/t Vancomycin, on Dialysis [facility] right chest    Blind in both eyes     Essential hypertension 4/2/2021    Gastroesophageal reflux disease without esophagitis 4/2/2021    Hemodialysis patient [facility])     Hyperthyroidism     MR (mental retardation)     Osteoarthritis     Peripheral vascular disease ([facility] [address]     Pneumonia 01/06/2017    Pneumonia due to infectious organism 1/8/2017    Sepsis ([facility] [address] 3/4/2021       Past Surgical History:   Procedure Laterality Date    BRONCHOSCOPY  02/10/2017    CHEST TUBE INSERTION Right 02/09/2017    GASTROSTOMY TUBE PLACEMENT N/A 3/7/2021    EGD PEG TUBE PLACEMENT performed by Lolly Skiff, MD at [address] [place] [address] Right 01/17/2017    tessio insertion     OTHER SURGICAL HISTORY  01/25/2017    PEG tube insertion       No family history on file. reports that she has never smoked. She has never used smokeless tobacco. She reports that she does not drink alcohol and does not use drugs. Allergies:  Patient has no known allergies.     Current Medications:    sodium chloride flush 0.9 % injection 10 mL, PRN  piperacillin-tazobactam (ZOSYN) 4,500 mg in sodium chloride 0.9 % 100 mL IVPB (vial-mate), Once  vancomycin (VANCOCIN) 1,250 mg in sodium chloride 0.9 % 250 mL IVPB, Once  0.9 % sodium chloride bolus, Once  dextrose 5 % solution, Continuous  acetaminophen (TYLENOL) tablet 650 mg, Q4H PRN  midodrine (PROAMATINE) tablet 7.5 mg, TID WC  PARoxetine (PAXIL) tablet 10 mg, BID  montelukast (SINGULAIR) tablet 10 mg, Nightly  valproic acid (DEPAKENE) 250 MG/5ML oral solution 1,000 mg, BID  [Held by provider] OLANZapine (ZYPREXA) tablet 20 mg, BID  sodium chloride flush 0.9 % injection 5-40 mL, 2 times per day  sodium chloride flush 0.9 % injection 5-40 mL, PRN  0.9 % sodium chloride infusion, PRN  enoxaparin (LOVENOX) injection 40 mg, Daily  ondansetron (ZOFRAN-ODT) disintegrating tablet 4 mg, Q8H PRN   Or  ondansetron (ZOFRAN) injection 4 mg, Q6H PRN  polyethylene glycol (GLYCOLAX) packet 17 g, Daily PRN  acetaminophen (TYLENOL) tablet 650 mg, Q6H PRN   Or  acetaminophen (TYLENOL) suppository 650 mg, Q6H PRN  pantoprazole (PROTONIX) injection 40 mg, Daily  diatrizoate meglumine-sodium (GASTROGRAFIN) 66-10 % solution 30 mL, ONCE PRN        Review of Systems:   Review of systems not obtained due to patient factors.     Physical exam:   Constitutional:  Thin female  Vitals: VITALS:  /68   Pulse (!) 105   Temp 98.7 °F (37.1 °C) (Axillary)   Resp 20   Ht 5' 5\" (1.651 m)   Wt 121 lb (54.9 kg)   SpO2 96%   BMI 20.14 kg/m²   24HR INTAKE/OUTPUT:  No intake or output data in the 24 hours ending 03/03/23 1847  URINARY CATHETER OUTPUT (Curry):     DRAIN/TUBE OUTPUT:     VENT SETTINGS:     Additional Respiratory Assessments  Heart Rate: (!) 105  Resp: 20  SpO2: 96 %  Skin: no rash, turgor poor  Heent:  bilat blind  Neck: no bruits or jvd noted  Cardiovascular: Tachy S1, S2   Respiratory: Poor inspiratory effort  Abdomen:  +bs, soft, nt, nd, PEG in place  Ext: (-) lower extremity edema  Psychiatric: non verbal, did not follow commands for me    Data:   Labs:  CBC: Lab Results   Component Value Date/Time    WBC 15.9 03/03/2023 01:07 PM    RBC 4.19 03/03/2023 01:07 PM    HGB 13.0 03/03/2023 01:07 PM    HCT 44.0 03/03/2023 01:07 PM    .0 03/03/2023 01:07 PM    MCH 31.0 03/03/2023 01:07 PM    MCHC 29.5 03/03/2023 01:07 PM    RDW 15.5 03/03/2023 01:07 PM     03/03/2023 01:07 PM    MPV 13.3 03/03/2023 01:07 PM     CBC with Differential:    Lab Results   Component Value Date/Time    WBC 15.9 03/03/2023 01:07 PM    RBC 4.19 03/03/2023 01:07 PM    HGB 13.0 03/03/2023 01:07 PM    HCT 44.0 03/03/2023 01:07 PM     03/03/2023 01:07 PM    .0 03/03/2023 01:07 PM    MCH 31.0 03/03/2023 01:07 PM    MCHC 29.5 03/03/2023 01:07 PM    RDW 15.5 03/03/2023 01:07 PM    NRBC 5.2 03/03/2023 01:07 PM    SEGSPCT 58 06/26/2011 10:26 AM    METASPCT 0.9 03/03/2023 01:07 PM    LYMPHOPCT 11.3 03/03/2023 01:07 PM    PROMYELOPCT 0.9 03/07/2021 04:04 AM    MONOPCT 8.7 03/03/2023 01:07 PM    MYELOPCT 0.9 03/03/2023 01:07 PM    BASOPCT 0.0 03/03/2023 01:07 PM    MONOSABS 1.43 03/03/2023 01:07 PM    LYMPHSABS 1.75 03/03/2023 01:07 PM    EOSABS 0.00 03/03/2023 01:07 PM    BASOSABS 0.00 03/03/2023 01:07 PM     Hemoglobin/Hematocrit:    Lab Results   Component Value Date/Time    HGB 13.0 03/03/2023 01:07 PM    HCT 44.0 03/03/2023 01:07 PM     CMP:    Lab Results   Component Value Date/Time     03/03/2023 05:24 PM    K 3.9 03/03/2023 05:24 PM    K 4.8 09/30/2022 08:21 PM     03/03/2023 05:24 PM    CO2 23 03/03/2023 05:24 PM     03/03/2023 05:24 PM    CREATININE 1.2 03/03/2023 05:24 PM    GFRAA >60 10/03/2022 04:30 PM    LABGLOM 49 03/03/2023 05:24 PM    GLUCOSE 128 03/03/2023 05:24 PM    GLUCOSE 97 12/30/2011 09:13 AM    PROT 8.0 03/03/2023 01:07 PM    LABALBU 2.8 03/03/2023 01:07 PM    LABALBU 3.7 12/30/2011 09:13 AM    CALCIUM 8.4 03/03/2023 05:24 PM    BILITOT 0.6 03/03/2023 01:07 PM    ALKPHOS 85 03/03/2023 01:07 PM    AST 12 03/03/2023 01:07 PM    ALT 12 03/03/2023 01:07 PM     BMP:    Lab Results   Component Value Date/Time     03/03/2023 05:24 PM    K 3.9 03/03/2023 05:24 PM    K 4.8 09/30/2022 08:21 PM     03/03/2023 05:24 PM    CO2 23 03/03/2023 05:24 PM     03/03/2023 05:24 PM    LABALBU 2.8 03/03/2023 01:07 PM    LABALBU 3.7 12/30/2011 09:13 AM    CREATININE 1.2 03/03/2023 05:24 PM    CALCIUM 8.4 03/03/2023 05:24 PM    GFRAA >60 10/03/2022 04:30 PM    LABGLOM 49 03/03/2023 05:24 PM    GLUCOSE 128 03/03/2023 05:24 PM    GLUCOSE 97 12/30/2011 09:13 AM     BUN/Creatinine:    Lab Results   Component Value Date/Time     03/03/2023 05:24 PM    CREATININE 1.2 03/03/2023 05:24 PM     Hepatic Function Panel:    Lab Results   Component Value Date/Time    ALKPHOS 85 03/03/2023 01:07 PM    ALT 12 03/03/2023 01:07 PM    AST 12 03/03/2023 01:07 PM    PROT 8.0 03/03/2023 01:07 PM    BILITOT 0.6 03/03/2023 01:07 PM    LABALBU 2.8 03/03/2023 01:07 PM    LABALBU 3.7 12/30/2011 09:13 AM     Albumin:    Lab Results   Component Value Date/Time    LABALBU 2.8 03/03/2023 01:07 PM    LABALBU 3.7 12/30/2011 09:13 AM     Calcium:    Lab Results   Component Value Date/Time    CALCIUM 8.4 03/03/2023 05:24 PM     Ionized Calcium:  No results found for: IONCA  Magnesium:    Lab Results   Component Value Date/Time    MG 2.0 01/29/2023 09:11 PM     Phosphorus:    Lab Results   Component Value Date/Time    PHOS 3.2 09/30/2021 05:44 AM     LDH:    Lab Results   Component Value Date/Time     01/26/2017 04:30 AM     Uric Acid:    Lab Results   Component Value Date/Time    LABURIC 8.0 09/22/2021 05:55 AM     PT/INR:    Lab Results   Component Value Date/Time    PROTIME 12.4 03/08/2021 02:16 PM    INR 1.1 03/08/2021 02:16 PM     PTT:    Lab Results   Component Value Date/Time    APTT 25.7 03/08/2021 02:16 PM   [APTT}  Troponin:    Lab Results   Component Value Date/Time    TROPONINI 0.08 03/05/2021 04:08 AM     U/A:    Lab Results   Component Value Date/Time    COLORU Yellow  03/03/2023 05:24 PM    PROTEINU Negative 03/03/2023 05:24 PM    PHUR 5.5 03/03/2023 05:24 PM    LABCAST FEW 09/20/2017 09:25 PM    WBCUA 0-1 01/29/2023 09:36 PM    RBCUA NONE 01/29/2023 09:36 PM    MUCUS Present 02/08/2021 01:47 PM    BACTERIA NONE SEEN 01/29/2023 09:36 PM    CLARITYU Clear 03/03/2023 05:24 PM    SPECGRAV 1.025 03/03/2023 05:24 PM    LEUKOCYTESUR Negative 03/03/2023 05:24 PM    UROBILINOGEN 0.2 03/03/2023 05:24 PM    BILIRUBINUR SMALL 03/03/2023 05:24 PM    BLOODU Negative 03/03/2023 05:24 PM    GLUCOSEU Negative 03/03/2023 05:24 PM    AMORPHOUS MODERATE 01/21/2017 06:00 PM     ABG:    Lab Results   Component Value Date/Time    PH 7.367 03/03/2023 04:08 PM    PCO2 44.7 03/03/2023 04:08 PM    PO2 76.6 03/03/2023 04:08 PM    HCO3 25.1 03/03/2023 04:08 PM    BE -0.5 03/03/2023 04:08 PM    O2SAT 95.1 03/03/2023 04:08 PM     HgBA1c:    Lab Results   Component Value Date/Time    LABA1C 5.4 03/09/2021 02:28 PM     Microalbumen/Creatinine ratio:  No components found for: RUCREAT  FLP:    Lab Results   Component Value Date/Time    TRIG 57 11/11/2022 10:46 AM    HDL 75 11/11/2022 10:46 AM    LDLCALC 82 11/11/2022 10:46 AM    LABVLDL 11 11/11/2022 10:46 AM     TSH:    Lab Results   Component Value Date/Time    TSH 2.680 11/11/2022 10:46 AM     VITAMIN B12: No components found for: B12  FOLATE:    Lab Results   Component Value Date/Time    FOLATE 19.8 03/05/2021 04:08 AM     Iron Saturation:  No components found for: PERCENTFE  FERRITIN:    Lab Results   Component Value Date/Time    FERRITIN 370 03/05/2021 09:18 AM     AMARILIS:  No results found for: ANATITER, AMARILIS  LIPASE:    Lab Results   Component Value Date/Time    LIPASE 78 01/29/2023 09:11 PM     Fibrinogen Level:  No components found for: FIB  Urine Toxicology:  No components found for: IAMMENTA, IBARBIT, IBENZO, ICOCAINE, IMARTHC, IOPIATES, IPHENCYC  24 Hour Urine for Protein:  No components found for: RAWUPRO, UHRS3, VNWG33GU, UTV3  24 Hour Urine for Creatinine Clearance:  No components found for: CREAT4, Rella Pacer, UTV10     Imaging:  CT Head W/O Contrast [[id number]] Collected: 03/03/23 1339     Order Status: Completed Updated: 03/03/23 1343    Narrative:      EXAMINATION:   CT OF THE HEAD WITHOUT CONTRAST  3/3/2023 1:22 pm     TECHNIQUE:   CT of the head was performed without the administration of intravenous   contrast. Automated exposure control, iterative reconstruction, and/or weight   based adjustment of the mA/kV was utilized to reduce the radiation dose to as   low as reasonably achievable. COMPARISON:   None. HISTORY:   ORDERING SYSTEM PROVIDED HISTORY: ams   TECHNOLOGIST PROVIDED HISTORY:   Reason for exam:->ams   Has a \"code stroke\" or \"stroke alert\" been called? ->No   Decision Support Exception - unselect if not a suspected or confirmed   emergency medical condition->Emergency Medical Condition (MA)     FINDINGS:   BRAIN/VENTRICLES: There is no acute intracranial hemorrhage, mass effect or   midline shift. No abnormal extra-axial fluid collection. The gray-white   differentiation is maintained without evidence of an acute infarct. There is   no evidence of hydrocephalus. The ventricles, cisterns and sulci are   prominent consistent with atrophy. There is decreased attenuation within the   periventricular white matter consistent with periventricular leukomalacia. ORBITS: There are chronic calcifications seen within the optic globes. Leoncio Ogren SINUSES: The visualized paranasal sinuses and mastoid air cells demonstrate   no acute abnormality. SOFT TISSUES/SKULL:  No acute abnormality of the visualized skull or soft   tissues. Impression:      1. There is no acute intracranial abnormality. Specifically, there is no   intracranial hemorrhage.    2. Atrophy and periventricular leukomalacia,     XR CHEST PORTABLE [[id number]] Collected: 03/03/23 1318    Order Status: Completed Updated: 03/03/23 1321    Narrative:      EXAMINATION:   ONE XRAY VIEW OF THE CHEST     3/3/2023 1:16 pm     COMPARISON:   The lungs are without acute focal process.  There is no effusion or   pneumothorax. The cardiomediastinal silhouette is without acute process. The   osseous structures are without acute process.     Impression:      No acute process.         Assessment  1-Stage II BEENA sec to decreased effective renal perfusion in the setting of the 4.4L free water deficit (based on wt 54kg and Na+ 163)  Pre-renal Azotemia concern with the Hx of the N/V there is GI blood loss  UA Blood and protein (-), Ni and LE (-)  PLAN:  Correct the Free water deficit  Check urine cr and urea for  FeNa and FeUrea  Follow serial labs  Check Renal US    2-Hypovolemic Hypernatremia 4.4L free water deficit (based on wt 54kg and Na+ 163)  PLAN:  Follow serial Na+ on the D5W    3-Hypotension and Tachycardia with a Leukocytosis  Ruling out Sepsis-BC ordered  May reflect the vol contraction  PLAN:  Await BC  Vol resuscitate      Thank you Dr. Izabela Dove DO for allowing us to participate in care of Katerina rosales/w Dr. Sanford Medley MD  6:46 PM  3/3/2023

## 2023-03-03 NOTE — H&P
[facility] History and Physical      CHIEF COMPLAINT:  sent from group home/ altered mental status/ lethargy    History of Present Illness:     Patient is a 69 yo female patient who resides at a group home with a significant past medical history of MRDD/ dementia, h/o dysphagia with peg tube placement in 2021, h/o hemodialysis,  large hiatal hernia, HTN, thyroid disease, OA, and pneumonia. Please note limited HPI as pt not able to participate in HPI and no one with her in ER. Assistance from chart review, nursing,  ER physician and caregiver at group home [address] via telephone. . Patient was sent from [address] [place] [address] to ER with concerns of altered mental status/ lethargy apparently ongoing for several days. Reporting that she is normally alert and oriented x 1- but was not verbally responding this morning. Staff concerned that \" she looked like was on her death bed this morning and appeared very ill\" per caregiver. RN was called to evaluate her and then sent to ER. She had been spending a lot of time sleeping in bed last few days. She has a peg tube. Care giver reporting that recently she has had issues with nausea/ vomiting after most feedings. She had been following outpt with surgery end of February and plan was to possibly change peg tube to a cedric button. Work up in ER revealed hypernatremia of 158. Malika Avalos Noted h/o ER/ admissions for nausea/ vomiting. Also documented in January that pt following with speech and she was cleared to have honey thick liquids for pleasure feeding. Patient currently resting in ER in no acute distress. Unable to complete ROS.        Informant(s) for H&P:patient/ chart review     REVIEW OF SYSTEMS:      PMH:  Past Medical History:   Diagnosis Date    Acute kidney injury ([facility] [address] 01/15/2017    d/t Vancomycin, on Dialysis [facility] right chest    Blind in both eyes     Essential hypertension 4/2/2021    Gastroesophageal reflux disease without esophagitis 4/2/2021 Hemodialysis patient [facility])     Hyperthyroidism     MR (mental retardation)     Osteoarthritis     Peripheral vascular disease ([facility] [address]     Pneumonia 01/06/2017    Pneumonia due to infectious organism 1/8/2017    Sepsis ([facility] [address] 3/4/2021       Surgical History:  Past Surgical History:   Procedure Laterality Date    BRONCHOSCOPY  02/10/2017    CHEST TUBE INSERTION Right 02/09/2017    GASTROSTOMY TUBE PLACEMENT N/A 3/7/2021    EGD PEG TUBE PLACEMENT performed by Aria Redd MD at [address] [place] [address] Right 01/17/2017    tessio insertion     OTHER SURGICAL HISTORY  01/25/2017    PEG tube insertion       Medications Prior to Admission:    Prior to Admission medications    Medication Sig Start Date End Date Taking? Authorizing Provider   bisacodyl (DULCOLAX) 10 MG suppository Place 10 mg rectally daily   Yes Historical Provider, MD   Dextromethorphan-guaiFENesin (MUCUS-DM MAX PO) by PEG Tube route   Yes Historical Provider, MD   ondansetron (ZOFRAN-ODT) 8 MG TBDP disintegrating tablet Place 4 mg under the tongue every 8 hours as needed for Nausea or Vomiting   Yes Historical Provider, MD   blood glucose monitor kit and supplies Dispense one Leo Matrix monitor and one lancet device. Patient tests blood glucose when nauseous, vomiting, or when tube feeding is held due to hypoglycemia risk. 12/1/22   Shamar Oneal, DO   Lancets MISC 1 each by Does not apply route daily Pt is in need of Leo Matrix blood glucose lancets. Patient tests bood glucose when nauseous, vomiting, or when tube feeding are held due to hypoglycemia risk. 12/1/22   Shamar Oneal, DO   blood glucose monitor strips Leo Matrix testing strips. Patient tests bood glucose when nauseous, vomiting, or when tube feeding are held due to hypoglycemia risk. 12/1/22   Shamar Oneal, DO   Alcohol Swabs (ALCOHOL PREP) PADS 1 each by Does not apply route daily Use before testing blood glucose.  Patient tests bood glucose when nauseous, vomiting, or when tube feeding are held due to hypoglycemia risk. 12/1/22 2/22/23  Ezekiel Sanchez DO   blood glucose monitor kit and supplies Dispense sufficient amount for once daily testing frequency with ONE TOUCH glucometer. Dispense all needed supplies to include: monitor, strips, lancing device, lancets, control solutions, alcohol swabs. Patient tests BG when nauseous, vomiting, or when tube feeds are held due to hypoglycemia risk. 11/23/22   Ezekiel Sanchez DO   Bacitracin-Polymyxin B (POLY BACITRACIN) 500-34009 UNIT/GM OINT APPLY TOPICALLY TO OSTOMY TWICE DAILY. 11/22/22   Ezekiel aSnchez DO   folic acid (FOLVITE) 1 MG tablet TAKE 1 TABLET VIA G-TUBE DAILY 11/17/22   Ezekiel Sanchez DO   levothyroxine (SYNTHROID) 150 MCG tablet [email or web address] 1 TABLET VIA G-TUBE ONCE DAILY 11/17/22   Ezekiel Sanchez DO   polyethylene glycol (GLYCOLAX) 17 g packet 17 g by Per G Tube route daily as needed 10/17/22   Ezekiel Sanchez DO   famotidine (PEPCID) 20 MG tablet TAKE (1) TABLET VIA PEG TUBE TWO TIMES DAILY. 6/30/22 3/3/23  Ezekiel Sanchez DO   montelukast (SINGULAIR) 10 MG tablet TAKE 1 TABLET VIA G-TUBE AT BEDTIME. 6/30/22   Ezekiel Sanchez DO   PARoxetine (PAXIL) 10 MG tablet TAKE 1 TABLET VIA G-TUBE TWICE A DAY 6/30/22   Ezekiel Sanchez DO   Gauze Pads & Dressings 2\"X2\" PADS 1 each by Does not apply route in the morning and at bedtime Apply to stoma 6/23/22 2/22/23  Jennifer Hou, DO   bumetanide (BUMEX) 1 MG tablet TAKE 1 TABLET VIA G-TUBE DAILY AS NEEDED FOR SWELLING. 5/18/22   Ezekiel Sanchez DO   midodrine (PROAMATINE) 2.5 MG tablet TAKE 3 TABLET VIA G-TUBE THREE TIMES DAILY AS NEEDED. (HYPOTENSION, GIVE ONLY IF SYSTOLIC BP IS <365.  (BP CHECKS 3 TIMES A DAY. 5/18/22   Ezekiel Hoguet, DO   NATURAL VITAMIN D-3 125 MCG (5000 UT) TABS tablet TAKE 1 TABLET VIA G-TUBE ONCE DAILY 5/4/22   Ezekiel Sanchez DO   FEROSUL 325 (65 Fe) MG tablet TAKE 1 TABLET VIA G-TUBE ONCE A DAY. 3/11/22   Dulce Maria Fernandes DO   Disposable Gloves (POWDER FREE NITRILE GLOVES MED) MISC As Directed 11/4/21   Dulce Maria Fernandes DO   albuterol (PROVENTIL) (2.5 MG/3ML) 0.083% nebulizer solution Take 3 mLs by nebulization every 6 hours 9/29/21   Ross Moya MD   Respiratory Therapy Supplies (FULL KIT NEBULIZER SET) MISC Use as directed with nebulized medication. 9/29/21   Carly Saba MD   acetaminophen (TYLENOL) 325 MG tablet 650 mg by Per G Tube route every 4 hours as needed for Pain or Fever    Historical Provider, MD   medicated lip balm (BLISTEX/CARMEX) 2-2.5-6.6 % STCK Apply topically as needed for Dry Lips (CRACKED LIPS)    Historical Provider, MD   SUNSCREENS EX Apply topically as needed (SUNBURN PREVENTION) Indications: *SPF 48*    Historical Provider, MD   Neomycin-Bacitracin-Polymyxin (TRIPLE ANTIBIOTIC) OINT Apply topically 2 times daily as needed (CUTS/SCRAPES/SKIN ABRASIONS)    Historical Provider, MD   OLANZapine (ZYPREXA) 20 MG tablet by Per G Tube route in the morning and at bedtime    Historical Provider, MD   NATURAL VITAMIN D-3 125 MCG (5000 UT) TABS tablet TAKE 1 TABLET VIA G-TUBE ONCE DAILY 5/16/21   RADHA Becerra - CNP   valproic acid (DEPACON) 250 MG/5ML SOLN oral solution 20 mLs by Per G Tube route 2 times daily 3/16/21   Kervin Chang DO       Allergies:    Patient has no known allergies. Social History:    reports that she has never smoked. She has never used smokeless tobacco. She reports that she does not drink alcohol and does not use drugs. Family History:   family history is not on file. Unknown       PHYSICAL EXAM:  Vitals:  BP 91/75   Pulse (!) 123   Temp 98.7 °F (37.1 °C) (Axillary)   Resp 20   Ht 5' 5\" (1.651 m)   Wt 121 lb (54.9 kg)   SpO2 96%   BMI 20.14 kg/m²     General Appearance: awake- not verbalizing at this time.  Responds to painful stimuli  Skin: warm and dry  Head: normocephalic and atraumatic  Neck: neck supple and non tender without mass   Pulmonary/Chest: diminished throughout to auscultation   Cardiovascular: normal rate, normal S1 and S2 and no carotid bruits  Abdomen: soft, non-tender, non-distended, normal bowel sounds  Extremities: no cyanosis, no clubbing and no edema  Neurologic:  VIRAL        LABS:  Recent Labs     03/03/23  1307   *   K 4.0   *   CO2 23   *   CREATININE 1.3*   GLUCOSE 128*   CALCIUM 10.1       Recent Labs     03/03/23  1307   WBC 15.9*   RBC 4.19   HGB 13.0   HCT 44.0   .0*   MCH 31.0   MCHC 29.5*   RDW 15.5*      MPV 13.3*       No results for input(s): POCGLU in the last 72 hours. Radiology:   XR CHEST PORTABLE   Final Result   Nonacute chest.      Right subclavian central line with tip in the right atrium without   pneumothorax. CT Head W/O Contrast   Final Result   1. There is no acute intracranial abnormality. Specifically, there is no   intracranial hemorrhage. 2. Atrophy and periventricular leukomalacia,         XR CHEST PORTABLE   Final Result   No acute process. HISTORY:   ORDERING SYSTEM PROVIDED HISTORY: SOB   TECHNOLOGIST PROVIDED HISTORY:   Reason for exam:->SOB                 ASSESSMENT:      Principal Problem:    Hypernatremia  Resolved Problems:    * No resolved hospital problems. *      PLAN:    1. Hypernatremia;  on arrival to ER ? Due to n/v not tolerating TF- pt was sent to ER from alf with concerns of altered mental status/ lethargy. She has a h/o MRDD/ dementia and has a peg tube for h/o dysphagia. Appears she was recently cleared for pleasure feeds with honey thick liquids. Per staff at group home - pt has been vomiting up most of her feeding and concern that her peg tube needed changed to a lena button per surgery. Denied recent illness or diarrhea. Denies fevers. Pt received NS bolus in ER.  Has followed with nephrology in the past for hypo/hypernatremia/ Consult nephrology to assist. Start D5W. Check urine lytes/ serum/ urine osmo. Will attempt to flush peg tube. May need surgery to evaluate if would benefit from [facility] button     2. Hypotension with h/o HTN: likely due to hypovolemia/ dehydration: rehydrate- on midodrine     3. Acute encephalopathy: likely metabolic due to above: CT head neg for acute findings. 4. Septic criteria: pt with hypotension/ tachycardia/ leukocytosis- may all be related to contraction/ dehydration. Received empiric abx in ER. Check viral panel. Check UA. CXR with no pneumonia process. 5. MRDD/ Dementia: supportive care- hold zyprexa     6. Thyroid disease; synthroid    7. H/o dysphagia- peg tube placed by surgery 3/2021. H/o ER visits/ admissions for nausea / vomiting- seen by surgery recently ? Need for lena button. Start trickle feeds to see if tolerates/ Free water flushes. 8. H/o hiatal hernia     9. Difficult IV access: central line placed in ER. Code Status: FULL- confirmed with caregiver at group home. DVT prophylaxis: lovenox      NOTE: This report was transcribed using voice recognition software. Every effort was made to ensure accuracy; however, inadvertent computerized transcription errors may be present.   Electronically signed by SCHUYLER Bass on 3/3/2023 at 5:22 PM

## 2023-03-03 NOTE — ED PROVIDER NOTES
[address]      Pt Name: Evreette George  MRN: [id number]  [place] 1955  Date of evaluation: 3/3/2023  Provider: Tova Lopez MD     CHIEF COMPLAINT       Chief Complaint   Patient presents with    Altered Mental Status     Normally alert and oriented x1 but altered as of this morning. HISTORY OF PRESENT ILLNESS   (Location/Symptom, Timing/Onset, Context/Setting, Quality, Duration, Modifying Factors, Severity) Note limiting factors. I wore a kn95 mask for the entirety of this encounter. HPI    Everette George is a 70 y.o. female who presents to the emergency department altered mental status. Patient normally is alert and oriented and can stand on her own and was able to do any of this as of this morning reportedly. No known falls. She normally has low blood pressure but is slightly tachycardic. Unable to provide any further history. Nursing Notes were reviewed.     REVIEW OF SYSTEMS    (2+ for level 4; 10+ for level 5)   Review of Systems  Unable to obtain secondary to mental status  PAST MEDICAL HISTORY     Past Medical History:   Diagnosis Date    Acute kidney injury ([facility] [address] 01/15/2017    d/t Vancomycin, on Dialysis [facility] right chest    Blind in both eyes     Essential hypertension 4/2/2021    Gastroesophageal reflux disease without esophagitis 4/2/2021    Hemodialysis patient [facility])     Hyperthyroidism     MR (mental retardation)     Osteoarthritis     Peripheral vascular disease ([facility] [address]     Pneumonia 01/06/2017    Pneumonia due to infectious organism 1/8/2017    Sepsis ([facility] [address] 3/4/2021       SURGICAL HISTORY       Past Surgical History:   Procedure Laterality Date    BRONCHOSCOPY  02/10/2017    CHEST TUBE INSERTION Right 02/09/2017    GASTROSTOMY TUBE PLACEMENT N/A 3/7/2021    EGD PEG TUBE PLACEMENT performed by Ruben Head MD at [address] [place] [address] Right 01/17/2017    Abagail Lennox insertion     OTHER SURGICAL HISTORY  01/25/2017    PEG tube insertion       CURRENT MEDICATIONS       Previous Medications    ACETAMINOPHEN (TYLENOL) 325 MG TABLET    650 mg by Per G Tube route every 4 hours as needed for Pain or Fever    ALBUTEROL (PROVENTIL) (2.5 MG/3ML) 0.083% NEBULIZER SOLUTION    Take 3 mLs by nebulization every 6 hours    ALCOHOL SWABS (ALCOHOL PREP) PADS    1 each by Does not apply route daily Use before testing blood glucose. Patient tests bood glucose when nauseous, vomiting, or when tube feeding are held due to hypoglycemia risk. BACITRACIN-POLYMYXIN B (POLY BACITRACIN) [id number] UNIT/GM OINT    APPLY TOPICALLY TO OSTOMY TWICE DAILY. BLOOD GLUCOSE MONITOR KIT AND SUPPLIES    Dispense sufficient amount for once daily testing frequency with ONE TOUCH glucometer. Dispense all needed supplies to include: monitor, strips, lancing device, lancets, control solutions, alcohol swabs. Patient tests BG when nauseous, vomiting, or when tube feeds are held due to hypoglycemia risk. BLOOD GLUCOSE MONITOR KIT AND SUPPLIES    Dispense one Leo Matrix monitor and one lancet device. Patient tests blood glucose when nauseous, vomiting, or when tube feeding is held due to hypoglycemia risk. BLOOD GLUCOSE MONITOR STRIPS    Le[employer] Matrix testing strips. Patient tests bood glucose when nauseous, vomiting, or when tube feeding are held due to hypoglycemia risk. BUMETANIDE (BUMEX) 1 MG TABLET    TAKE 1 TABLET VIA G-TUBE DAILY AS NEEDED FOR SWELLING. DISPOSABLE GLOVES (POWDER FREE NITRILE GLOVES MED) MISC    As Directed    FAMOTIDINE (PEPCID) 20 MG TABLET    TAKE (1) TABLET VIA PEG TUBE TWO TIMES DAILY. FEROSUL 325 (65 FE) MG TABLET    TAKE 1 TABLET VIA G-TUBE ONCE A DAY.     FOLIC ACID (FOLVITE) 1 MG TABLET    TAKE 1 TABLET VIA G-TUBE DAILY    GAUZE PADS & DRESSINGS 2\"X2\" PADS    1 each by Does not apply route in the morning and at bedtime Apply to stoma    LANCETS MISC    1 each by Does not apply route daily Pt is in need of [employer] Matrix blood glucose lancets. Patient tests bood glucose when nauseous, vomiting, or when tube feeding are held due to hypoglycemia risk. LEVOTHYROXINE (SYNTHROID) 150 MCG TABLET    [email or web address] 1 TABLET VIA G-TUBE ONCE DAILY    MEDICATED LIP BALM (BLISTEX/CARMEX) 2-2.5-6.6 % STCK    Apply topically as needed for Dry Lips (CRACKED LIPS)    MIDODRINE (PROAMATINE) 2.5 MG TABLET    TAKE 3 TABLET VIA G-TUBE THREE TIMES DAILY AS NEEDED. (HYPOTENSION, GIVE ONLY IF SYSTOLIC BP IS <591. (BP CHECKS 3 TIMES A DAY. MONTELUKAST (SINGULAIR) 10 MG TABLET    TAKE 1 TABLET VIA G-TUBE AT BEDTIME. NATURAL VITAMIN D-3 125 MCG (5000 UT) TABS TABLET    TAKE 1 TABLET VIA G-TUBE ONCE DAILY    NEOMYCIN-BACITRACIN-POLYMYXIN (TRIPLE ANTIBIOTIC) OINT    Apply topically 2 times daily as needed (CUTS/SCRAPES/SKIN ABRASIONS)    OLANZAPINE (ZYPREXA) 20 MG TABLET    20 mg by Per G Tube route in the morning and at bedtime    PANTOPRAZOLE SODIUM (PROTONIX) 40 MG PACK PACKET    40 mg by Per G Tube route every morning (before breakfast)    PAROXETINE (PAXIL) 10 MG TABLET    TAKE 1 TABLET VIA G-TUBE TWICE A DAY    POLYETHYLENE GLYCOL (GLYCOLAX) 17 G PACKET    17 g by Per G Tube route daily as needed    RESPIRATORY THERAPY SUPPLIES (FULL KIT NEBULIZER SET) MISC    Use as directed with nebulized medication. SUNSCREENS EX    Apply topically as needed (SUNBURN PREVENTION) Indications: *SPF 50*    VALPROIC ACID (DEPACON) 250 MG/5ML SOLN ORAL SOLUTION    20 mLs by Per G Tube route 2 times daily       ALLERGIES     Patient has no known allergies. FAMILY HISTORY     No family history on file.      SOCIAL HISTORY       Social History     Socioeconomic History    Marital status: Single   Tobacco Use    Smoking status: Never    Smokeless tobacco: Never   Vaping Use    Vaping Use: Never used   Substance and Sexual Activity    Alcohol use: No    Drug use: No    Sexual activity: Never     Social Determinants of Health Financial Resource Strain: Low Risk     Difficulty of Paying Living Expenses: Not hard at all   Food Insecurity: No Food Insecurity    Worried About [address] in the Last Year: Never true    Ran Out of Food in the Last Year: Never true       SCREENINGS    [place] Coma Scale  Eye Opening: Spontaneous  Best Verbal Response: None  Best Motor Response: Withdraws from pain  Ovando Coma Scale Score: 9      PHYSICAL EXAM    (up to 7 for level 4, 8 or more for level 5)     ED Triage Vitals [03/03/23 1223]   BP Temp Temp Source Heart Rate Resp SpO2 Height Weight   89/61 98.7 °F (37.1 °C) Axillary (!) 127 16 96 % 5' 5\" (1.651 m) 121 lb (54.9 kg)       Physical Exam  Constitutional:       General: She is not in acute distress. Appearance: She is ill-appearing. She is not toxic-appearing. HENT:      Head: Normocephalic and atraumatic. Eyes:      Conjunctiva/sclera: Conjunctivae normal.   Neck:      Trachea: Trachea normal.   Cardiovascular:      Rate and Rhythm: Regular rhythm. Tachycardia present. Pulmonary:      Effort: Pulmonary effort is normal. No respiratory distress. Breath sounds: Normal breath sounds. Abdominal:      Palpations: Abdomen is soft. Tenderness: There is no abdominal tenderness. Musculoskeletal:         General: Normal range of motion. Cervical back: Normal range of motion. Comments: No deformity noted   Skin:     General: Skin is warm and dry. Neurological:      Comments: Moves all 4 extremities   Psychiatric:         Mood and Affect: Affect is not inappropriate. Thought Content: Thought content does not include suicidal plan. DIAGNOSTIC RESULTS     EKG (Per Emergency Physician):       RADIOLOGY (Per Emergency Physician): Interpretation per the Radiologist below, if available at the time of this note:  No results found.     ED BEDSIDE ULTRASOUND:   Performed by ED Physician - none    LABS:  Labs Reviewed   COVID-19, RAPID   CULTURE, BLOOD 1   CULTURE, BLOOD 1   CBC WITH AUTO DIFFERENTIAL   COMPREHENSIVE METABOLIC PANEL   LACTIC ACID   LACTIC ACID   TROPONIN   URINALYSIS   POCT GLUCOSE        All other labs were within normal range or not returned as of this dictation. EMERGENCY DEPARTMENT COURSE and DIFFERENTIAL DIAGNOSIS/MDM:   Vitals:    Vitals:    03/03/23 1223   BP: 89/61   Pulse: (!) 127   Resp: 16   Temp: 98.7 °F (37.1 °C)   TempSrc: Axillary   SpO2: 96%   Weight: 121 lb (54.9 kg)   Height: 5' 5\" (1.651 m)       Medications   0.9 % sodium chloride bolus (has no administration in time range)       MDM  . Patient presented tachycardia significant mental status changes. She lives in a nursing facility. Normally she is ANO x1 but is unable to verbally respond as of this morning. Patient unable to provide any history. She is somewhat ill-appearing tachycardic. Abdomen is benign she has no skin or soft tissue infection sacral decubitus ulcerations. Lungs are clear to auscultation abdomen is benign. No falls but will check a head CT to evaluate for intracranial hemorrhage. Most concern for severe sepsis or septic shock located intracranial pathology lower suspicion for stroke as she has no focal neurologic deficits. She will need admission to the hospital given these mental status changes. She may need admission for ICU level care. Holding on antibiotics as I do not have a obvious source at this time. REVAL:     Checking ABG. Patient has a sodium of 158 will likely require admission to the ICU given mental status and electrolyte abnormalities. Patient also has tachycardia and a white count of 15 no obvious source on my exam but will cover broadly with antibiotics that she could potentially have severe sepsis    Dr. Tracey Cloud does not think she needs admission to the intensive care unit.   Plan to admit to the floor      CRITICAL CARE TIME   Total Critical Care time was 30 minutes, excluding separately reportable procedures. There was a high probability of clinically significant/life threatening deterioration in the patient's condition which required my urgent intervention. CONSULTS:  IP CONSULT TO IV TEAM    PROCEDURES:  Unless otherwise noted below, none     Procedures    Patients symptoms are consistent with sepsis, severe sepsis, or septic shock (If yes use \".sepsiscoremeasure\"): no  FINAL IMPRESSION    No diagnosis found. DISPOSITION/PLAN   DISPOSITION        PATIENT REFERRED TO:  No follow-up provider specified. DISCHARGE MEDICATIONS:  New Prescriptions    No medications on file          (Please note:  Portions of this note were completed with a voice recognition program.  Efforts were made to edit the dictations but occasionally words and phrases are mis-transcribed.)  Form v2016. J.[id number]    Hillary Jarrett MD (electronically signed)  Emergency Medicine Provider       Hillary Jarrett MD  03/03/23 Micheal Israel MD  03/03/23 2020 59[facility] Seema Israel MD  03/03/23 [id number]

## 2023-03-03 NOTE — ED NOTES
Notified physician of no IV access. Preparing for central line placement.       Rosio Hobson RN  03/03/23 0109

## 2023-03-03 NOTE — ED NOTES
Pt presented to the ED via EMS from facility. Pt was reported to have altered mental status as of this morning. Normally she is oriented x1 but per facility she was not oriented at all today. Could not bear any weight or help transfer positions. Hypotensive upon arrival but documented to be baseline. Patient is able to follow commands.       Cheryl Noe RN  03/03/23 [id number]

## 2023-03-03 NOTE — PROGRESS NOTES
Database initiated. Patient comes in from [facility]. She follows commands does not appear to speak or walk. She has a Peg tube for medications and gets jevity 1.2, 240ml bolous Q8hrs 8am-12-8pm along with 150ml free water with feedings. Medications verified using the paper the facility sent.

## 2023-03-04 ENCOUNTER — APPOINTMENT (OUTPATIENT)
Dept: GENERAL RADIOLOGY | Age: 68
DRG: 640 | End: 2023-03-04
Payer: MEDICARE

## 2023-03-04 PROBLEM — E43 SEVERE PROTEIN-CALORIE MALNUTRITION (HCC): Status: ACTIVE | Noted: 2023-03-04

## 2023-03-04 LAB
ALBUMIN SERPL-MCNC: 2.4 G/DL (ref 3.5–5.2)
ALP BLD-CCNC: 76 U/L (ref 35–104)
ALT SERPL-CCNC: 10 U/L (ref 0–32)
ANION GAP SERPL CALCULATED.3IONS-SCNC: 5 MMOL/L (ref 7–16)
ANION GAP SERPL CALCULATED.3IONS-SCNC: 6 MMOL/L (ref 7–16)
ANISOCYTOSIS: ABNORMAL
AST SERPL-CCNC: 10 U/L (ref 0–31)
BASOPHILS ABSOLUTE: 0.05 E9/L (ref 0–0.2)
BASOPHILS RELATIVE PERCENT: 0.3 % (ref 0–2)
BILIRUB SERPL-MCNC: 0.5 MG/DL (ref 0–1.2)
BUN BLDV-MCNC: 55 MG/DL (ref 6–23)
BUN BLDV-MCNC: 77 MG/DL (ref 6–23)
CALCIUM SERPL-MCNC: 8.1 MG/DL (ref 8.6–10.2)
CALCIUM SERPL-MCNC: 8.1 MG/DL (ref 8.6–10.2)
CHLORIDE BLD-SCNC: 121 MMOL/L (ref 98–107)
CHLORIDE BLD-SCNC: 123 MMOL/L (ref 98–107)
CO2: 24 MMOL/L (ref 22–29)
CO2: 24 MMOL/L (ref 22–29)
CORTISOL TOTAL: 24.34 MCG/DL (ref 2.68–18.4)
CREAT SERPL-MCNC: 0.9 MG/DL (ref 0.5–1)
CREAT SERPL-MCNC: 1.2 MG/DL (ref 0.5–1)
EOSINOPHILS ABSOLUTE: 0.33 E9/L (ref 0.05–0.5)
EOSINOPHILS RELATIVE PERCENT: 1.7 % (ref 0–6)
GFR SERPL CREATININE-BSD FRML MDRD: 49 ML/MIN/1.73
GFR SERPL CREATININE-BSD FRML MDRD: >60 ML/MIN/1.73
GLUCOSE BLD-MCNC: 148 MG/DL (ref 74–99)
GLUCOSE BLD-MCNC: 174 MG/DL (ref 74–99)
HBA1C MFR BLD: 4.7 % (ref 4–5.6)
HCT VFR BLD CALC: 32.4 % (ref 34–48)
HEMOGLOBIN: 9.6 G/DL (ref 11.5–15.5)
IMMATURE GRANULOCYTES #: 0.19 E9/L
IMMATURE GRANULOCYTES %: 1 % (ref 0–5)
LYMPHOCYTES ABSOLUTE: 2.38 E9/L (ref 1.5–4)
LYMPHOCYTES RELATIVE PERCENT: 12.5 % (ref 20–42)
MAGNESIUM: 2.4 MG/DL (ref 1.6–2.6)
MCH RBC QN AUTO: 31 PG (ref 26–35)
MCHC RBC AUTO-ENTMCNC: 29.6 % (ref 32–34.5)
MCV RBC AUTO: 104.5 FL (ref 80–99.9)
METER GLUCOSE: 106 MG/DL (ref 74–99)
METER GLUCOSE: 119 MG/DL (ref 74–99)
METER GLUCOSE: 127 MG/DL (ref 74–99)
METER GLUCOSE: 94 MG/DL (ref 74–99)
MONOCYTES ABSOLUTE: 1.98 E9/L (ref 0.1–0.95)
MONOCYTES RELATIVE PERCENT: 10.4 % (ref 2–12)
NEUTROPHILS ABSOLUTE: 14.05 E9/L (ref 1.8–7.3)
NEUTROPHILS RELATIVE PERCENT: 74.1 % (ref 43–80)
PDW BLD-RTO: 15.7 FL (ref 11.5–15)
PHOSPHORUS: 2.1 MG/DL (ref 2.5–4.5)
PLATELET # BLD: 204 E9/L (ref 130–450)
PMV BLD AUTO: 12.6 FL (ref 7–12)
POIKILOCYTES: ABNORMAL
POTASSIUM SERPL-SCNC: 3.7 MMOL/L (ref 3.5–5)
POTASSIUM SERPL-SCNC: 3.7 MMOL/L (ref 3.5–5)
RBC # BLD: 3.1 E12/L (ref 3.5–5.5)
SODIUM BLD-SCNC: 150 MMOL/L (ref 132–146)
SODIUM BLD-SCNC: 153 MMOL/L (ref 132–146)
T4 FREE: 1.06 NG/DL (ref 0.93–1.7)
TARGET CELLS: ABNORMAL
TOTAL PROTEIN: 5.8 G/DL (ref 6.4–8.3)
TSH SERPL DL<=0.05 MIU/L-ACNC: 6.95 UIU/ML (ref 0.27–4.2)
VITAMIN D 25-HYDROXY: 62 NG/ML (ref 30–100)
WBC # BLD: 19 E9/L (ref 4.5–11.5)

## 2023-03-04 PROCEDURE — C9113 INJ PANTOPRAZOLE SODIUM, VIA: HCPCS | Performed by: NURSE PRACTITIONER

## 2023-03-04 PROCEDURE — 82306 VITAMIN D 25 HYDROXY: CPT

## 2023-03-04 PROCEDURE — 36415 COLL VENOUS BLD VENIPUNCTURE: CPT

## 2023-03-04 PROCEDURE — 2580000003 HC RX 258: Performed by: INTERNAL MEDICINE

## 2023-03-04 PROCEDURE — 6370000000 HC RX 637 (ALT 250 FOR IP): Performed by: NURSE PRACTITIONER

## 2023-03-04 PROCEDURE — 83036 HEMOGLOBIN GLYCOSYLATED A1C: CPT

## 2023-03-04 PROCEDURE — 6360000002 HC RX W HCPCS: Performed by: NURSE PRACTITIONER

## 2023-03-04 PROCEDURE — 36592 COLLECT BLOOD FROM PICC: CPT

## 2023-03-04 PROCEDURE — 99233 SBSQ HOSP IP/OBS HIGH 50: CPT | Performed by: STUDENT IN AN ORGANIZED HEALTH CARE EDUCATION/TRAINING PROGRAM

## 2023-03-04 PROCEDURE — 2580000003 HC RX 258

## 2023-03-04 PROCEDURE — 83735 ASSAY OF MAGNESIUM: CPT

## 2023-03-04 PROCEDURE — 84439 ASSAY OF FREE THYROXINE: CPT

## 2023-03-04 PROCEDURE — 6360000002 HC RX W HCPCS

## 2023-03-04 PROCEDURE — 74018 RADEX ABDOMEN 1 VIEW: CPT

## 2023-03-04 PROCEDURE — 80048 BASIC METABOLIC PNL TOTAL CA: CPT

## 2023-03-04 PROCEDURE — 84443 ASSAY THYROID STIM HORMONE: CPT

## 2023-03-04 PROCEDURE — 6360000004 HC RX CONTRAST MEDICATION: Performed by: RADIOLOGY

## 2023-03-04 PROCEDURE — 2060000000 HC ICU INTERMEDIATE R&B

## 2023-03-04 PROCEDURE — 6370000000 HC RX 637 (ALT 250 FOR IP)

## 2023-03-04 PROCEDURE — 84100 ASSAY OF PHOSPHORUS: CPT

## 2023-03-04 PROCEDURE — P9047 ALBUMIN (HUMAN), 25%, 50ML: HCPCS | Performed by: INTERNAL MEDICINE

## 2023-03-04 PROCEDURE — 82962 GLUCOSE BLOOD TEST: CPT

## 2023-03-04 PROCEDURE — 80053 COMPREHEN METABOLIC PANEL: CPT

## 2023-03-04 PROCEDURE — 2500000003 HC RX 250 WO HCPCS

## 2023-03-04 PROCEDURE — 6360000002 HC RX W HCPCS: Performed by: INTERNAL MEDICINE

## 2023-03-04 PROCEDURE — 2580000003 HC RX 258: Performed by: NURSE PRACTITIONER

## 2023-03-04 PROCEDURE — 85025 COMPLETE CBC W/AUTO DIFF WBC: CPT

## 2023-03-04 RX ORDER — DEXTROSE MONOHYDRATE 100 MG/ML
INJECTION, SOLUTION INTRAVENOUS CONTINUOUS PRN
Status: DISCONTINUED | OUTPATIENT
Start: 2023-03-04 | End: 2023-03-16 | Stop reason: HOSPADM

## 2023-03-04 RX ORDER — ALBUMIN (HUMAN) 12.5 G/50ML
25 SOLUTION INTRAVENOUS ONCE
Status: COMPLETED | OUTPATIENT
Start: 2023-03-04 | End: 2023-03-04

## 2023-03-04 RX ORDER — POTASSIUM CHLORIDE 29.8 MG/ML
INJECTION INTRAVENOUS
Status: DISPENSED
Start: 2023-03-04 | End: 2023-03-04

## 2023-03-04 RX ORDER — INSULIN LISPRO 100 [IU]/ML
0-4 INJECTION, SOLUTION INTRAVENOUS; SUBCUTANEOUS NIGHTLY
Status: DISCONTINUED | OUTPATIENT
Start: 2023-03-04 | End: 2023-03-16 | Stop reason: HOSPADM

## 2023-03-04 RX ORDER — INSULIN LISPRO 100 [IU]/ML
0-4 INJECTION, SOLUTION INTRAVENOUS; SUBCUTANEOUS
Status: DISCONTINUED | OUTPATIENT
Start: 2023-03-04 | End: 2023-03-16 | Stop reason: HOSPADM

## 2023-03-04 RX ORDER — LEVOTHYROXINE SODIUM 0.07 MG/1
150 TABLET ORAL DAILY
Status: DISCONTINUED | OUTPATIENT
Start: 2023-03-04 | End: 2023-03-16 | Stop reason: HOSPADM

## 2023-03-04 RX ADMIN — ALBUMIN (HUMAN) 25 G: 0.25 INJECTION, SOLUTION INTRAVENOUS at 08:59

## 2023-03-04 RX ADMIN — ENOXAPARIN SODIUM 40 MG: 100 INJECTION SUBCUTANEOUS at 17:50

## 2023-03-04 RX ADMIN — DIATRIZOATE MEGLUMINE AND DIATRIZOATE SODIUM 30 ML: 600; 100 SOLUTION ORAL; RECTAL at 13:27

## 2023-03-04 RX ADMIN — POTASSIUM PHOSPHATE, MONOBASIC AND POTASSIUM PHOSPHATE, DIBASIC 15 MMOL: 224; 236 INJECTION, SOLUTION, CONCENTRATE INTRAVENOUS at 06:30

## 2023-03-04 RX ADMIN — Medication 10 ML: at 21:13

## 2023-03-04 RX ADMIN — DEXTROSE MONOHYDRATE: 50 INJECTION, SOLUTION INTRAVENOUS at 04:31

## 2023-03-04 RX ADMIN — VALPROIC ACID 1000 MG: 250 SOLUTION ORAL at 21:13

## 2023-03-04 RX ADMIN — PAROXETINE 10 MG: 10 TABLET, FILM COATED ORAL at 21:13

## 2023-03-04 RX ADMIN — POTASSIUM CHLORIDE: 2 INJECTION, SOLUTION, CONCENTRATE INTRAVENOUS at 21:19

## 2023-03-04 RX ADMIN — POTASSIUM CHLORIDE: 2 INJECTION, SOLUTION, CONCENTRATE INTRAVENOUS at 10:30

## 2023-03-04 RX ADMIN — Medication 10 ML: at 08:57

## 2023-03-04 RX ADMIN — PANTOPRAZOLE SODIUM 40 MG: 40 INJECTION, POWDER, FOR SOLUTION INTRAVENOUS at 08:51

## 2023-03-04 RX ADMIN — MONTELUKAST SODIUM 10 MG: 10 TABLET ORAL at 21:13

## 2023-03-04 RX ADMIN — LEVOTHYROXINE SODIUM 150 MCG: 75 TABLET ORAL at 06:32

## 2023-03-04 ASSESSMENT — PAIN SCALES - GENERAL
PAINLEVEL_OUTOF10: 0

## 2023-03-04 NOTE — PROGRESS NOTES
Critical Care Team - Daily Progress Note      Date and time: 3/4/2023 7:44 AM  Patient's name:  Leonidas Tuttle  Medical Record Number: [id number]  Patient's account/billing number: [de-identified]  Patient's YOB: 1955  Age: 70 y.o. Date of Admission: 3/3/2023 12:17 PM  Length of stay during current admission: 1      Primary Care Physician: Damien Carias DO  ICU Attending Physician: Dr. Marialuisa Arias    Code Status: Full Code    Reason for ICU admission: Severe hypernatremia      SUBJECTIVE:     OVERNIGHT EVENTS:           03/04: Overnight, patient has not experienced any significant changes. Patient is on potassium chloride and dextrose 5% with 0.2% sodium chloride running at 150 mL/h. Patient did have an acute drop in her hemoglobin from 13.0-9.6, however patient has received large-volume fluid and this could be volume expansion associated anemia. We will monitor closely. Otherwise patient has remained hemodynamically stable and without additional intervention. Patient is at her baseline and is nonverbal and unable to communicate otherwise. Intake/Output:   No intake/output data recorded. I/O last 3 completed shifts: In: 1410 [NG/GT:160; IV Piggyback:1250]  Out: 56 [Urine:310]    Awake and following commands: Awake not able to follow commands  Current Ventilation: - No ventilator support  Secretions: tolerating  Sedation: none  Paralyzed: No  Vasopressors: None    Initial HPI + past overnight events:   Information obtained from EMR as no family present during my examination the patient is a 70 y.o. female with significant past medical history of developmental disability, HTN, GERD, hemodialysis patient, hypothyroidism, osteoarthritis, and PVD. Patient presented to ED today for altered mental status x1 day. Seems her baseline is usually ANO x1 but today she was unable to verbalize.   Labs in ED significant for Na+ 158, chloride 121, , creatinine 1.3, troponin 31, white count 15.9, UA was unremarkable. Rapid COVID was negative. CT of the head was negative for any acute process. Patient has PEG tube placed and resides in a group home. She is usually on Jevity tube feed 240 mL every 8 hours with 150 mL free water. Patient has been having nausea and vomiting and not tolerating tube feed for the last few days. Labs in ED were redrawn showing worsening hypernatremia with sodium of 163. Nephrology was consulted due to altered mental status she was ultimately sent to ICU. OBJECTIVE:     VITAL SIGNS:  BP (!) 84/49   Pulse 87   Temp 98.4 °F (36.9 °C) (Bladder)   Resp 11   Ht 5' 5\" (1.651 m)   Wt 94 lb 2.2 oz (42.7 kg)   SpO2 100%   BMI 15.67 kg/m²   Tmax over 24 hours:  Temp (24hrs), Av.4 °F (36.9 °C), Min:97.6 °F (36.4 °C), Max:99.1 °F (37.3 °C)      Patient Vitals for the past 6 hrs:   BP Temp Temp src Pulse Resp SpO2   23 0500 (!) 84/49 -- -- 87 11 --   23 0400 108/63 98.4 °F (36.9 °C) Bladder 97 16 100 %   23 0300 (!) 98/54 -- -- 91 14 100 %   23 0200 (!) 87/52 98.6 °F (37 °C) Bladder 87 14 99 %         Intake/Output Summary (Last 24 hours) at 3/4/2023 0744  Last data filed at 3/4/2023 0400  Gross per 24 hour   Intake 1410 ml   Output 310 ml   Net 1100 ml     Wt Readings from Last 2 Encounters:   23 94 lb 2.2 oz (42.7 kg)   23 131 lb (59.4 kg)     Body mass index is 15.67 kg/m². Physical Exam  CONSTITUTIONAL: Awake in bed, no acute distress, nonverbal  EYES: Legally blind  ENT:  Normocephalic, without obvious abnormality, atraumatic, dry oropharynx noted.     NECK: Supple, symmetrical, no adenopathy  BACK:  symmetric, no step-offs or lesions noted  LUNGS:  No increased work of breathing on room air, good air exchange, clear to auscultation bilaterally, no crackles or wheezing  CARDIOVASCULAR:  Normal apical impulse, sinus tachycardia, normal S1 and S2, no additional murmur noted   ABDOMEN: PEG tube in place with no unexpected drainage or erythema around insertion site, normal bowel sounds, soft, non-distended, non-tender, no masses palpated  GENITAL/URINARY: Curry catheter  MUSCULOSKELETAL:  There is no redness, warmth, or swelling of the joints. Full range of motion noted. Not following commands  NEUROLOGIC: Nonverbal and not following command. Cranial nerves II-XII are grossly intact. SKIN: no bruising or bleeding, poor skin turgor    MEDICATIONS:    Scheduled Meds:   levothyroxine  150 mcg PEG Tube Daily    potassium phosphate IVPB  15 mmol IntraVENous Once    insulin lispro  0-4 Units SubCUTAneous TID WC    insulin lispro  0-4 Units SubCUTAneous Nightly    PARoxetine  10 mg Oral BID    montelukast  10 mg PEG Tube Nightly    valproic acid  1,000 mg Per G Tube BID    [Held by provider] OLANZapine  20 mg Oral BID    sodium chloride flush  5-40 mL IntraVENous 2 times per day    enoxaparin  40 mg SubCUTAneous Daily    pantoprazole  40 mg IntraVENous Daily    midodrine  10 mg PEG Tube TID WC    docusate  100 mg Oral Daily    polyethylene glycol  17 g Per G Tube Daily     Continuous Infusions:   dextrose      dextrose 125 mL/hr at 03/04/23 0431    sodium chloride       PRN Meds:   bisacodyl, 10 mg, Daily PRN  glucose, 4 tablet, PRN  dextrose bolus, 125 mL, PRN   Or  dextrose bolus, 250 mL, PRN  glucagon (rDNA), 1 mg, PRN  dextrose, , Continuous PRN  sodium chloride flush, 10 mL, PRN  acetaminophen, 650 mg, Q4H PRN  sodium chloride flush, 5-40 mL, PRN  sodium chloride, , PRN  ondansetron, 4 mg, Q8H PRN   Or  ondansetron, 4 mg, Q6H PRN  acetaminophen, 650 mg, Q6H PRN   Or  acetaminophen, 650 mg, Q6H PRN  diatrizoate meglumine-sodium, 30 mL, ONCE PRN          VENT SETTINGS (Comprehensive) (if applicable): Additional Respiratory Assessments  Heart Rate: 87  Resp: 11  SpO2: 100 %    Arterial Blood Gas 3/4/2023  None.       Laboratory findings:    Complete Blood Count:   Recent Labs     03/03/23  1307 03/04/23  0428   WBC 15.9* 19.0*   HGB 13.0 9.6* HCT 44.0 32.4*    204        Last 3 Blood Glucose:   Recent Labs     03/03/23  1307 03/03/23  1724 03/04/23  0428   GLUCOSE 128* 128* 174*        PT/INR:    Lab Results   Component Value Date/Time    PROTIME 12.4 03/08/2021 02:16 PM    INR 1.1 03/08/2021 02:16 PM     PTT:    Lab Results   Component Value Date/Time    APTT 25.7 03/08/2021 02:16 PM       Comprehensive Metabolic Profile:   Recent Labs     03/03/23  1307 03/03/23  1724 03/04/23  0428   * 163* 153*   K 4.0 3.9 3.7   * 129* 123*   CO2 23 23 24   * 102* 77*   CREATININE 1.3* 1.2* 1.2*   GLUCOSE 128* 128* 174*   CALCIUM 10.1 8.4* 8.1*   PROT 8.0  --  5.8*   LABALBU 2.8*  --  2.4*   BILITOT 0.6  --  0.5   ALKPHOS 85  --  76   AST 12  --  10   ALT 12  --  10      Magnesium:   Lab Results   Component Value Date/Time    MG 2.4 03/04/2023 04:28 AM     Phosphorus:   Lab Results   Component Value Date/Time    PHOS 2.1 03/04/2023 04:28 AM     Ionized Calcium:   Lab Results   Component Value Date/Time    CAION 1.25 03/07/2021 11:10 AM        Urinalysis:     Troponin: No results for input(s): TROPONINI in the last 72 hours. Microbiology:  Cultures drawn: Blood pending    Radiology/Imaging:     Chest Xray (3/4/2023):   None today    ASSESSMENT:     Patient Active Problem List    Diagnosis Date Noted    Hypernatremia 03/03/2023    BEENA (acute kidney injury) ([facility] [address] 03/03/2023    Hypotension due to hypovolemia 03/03/2023    Tachycardia 94/22/7594    Metabolic encephalopathy 12/84/8448    Nausea and vomiting 03/03/2023    Hyponatremia 10/03/2022    Intractable nausea and vomiting 10/03/2022    Severe dehydration 09/21/2021    Gastrostomy tube dysfunction ([facility] [address] 08/16/2021    Dementia with behavioral disturbance 07/30/2021    Anemia due to chronic kidney disease 04/02/2021    Essential hypertension 04/02/2021    Vitamin D deficiency 04/02/2021    Gastroesophageal reflux disease without esophagitis 04/02/2021    S/P percutaneous endoscopic gastrostomy (PEG) tube placement ([facility] [address] 04/02/2021    Altered mental state 02/08/2021    Ingrowing nail 07/11/2019    Peripheral vascular disease ([facility] [address] 07/11/2019    Disruptive behavior disorder 02/04/2019    Macrocytosis 08/27/2018    Chronic kidney disease 07/13/2017    Nonsustained ventricular tachycardia     Hypothyroidism 04/21/2016    Impairment level: total impairment of both eyes 04/21/2016    Hyperglycemia 04/21/2016    Profound intellectual disability 06/14/2011    Onychomycosis 06/14/2011         PLAN:     Neuro   # Metabolic encephalopathy  - Apparent baseline is ANO x1 however, at this time unable to verbal  - likely 2/2 hypernatremia. CT of the head is negative. # Developmental disability  - Continue Paxil, Zyprexa and valproic acid as possible     Respiratory   #No active problems     Cardiovascular   # Acute on chronic hypotension  - Likely exacerbated by volume depletion. Check cortisol level  - Takes midodrine as outpatient, continue and increase dose to 10 mg     # Tachycardia  - 2/2 hypovolemia     # Elevated troponin  - Initial troponin 31 check repeat. Chronic elevation of troponin noted likely due to chronic disease. Recent troponin from January of this year 35  - EKG with no signs of ACS     Gastrointestinal   # Nausea and vomiting  - History of dysphagia PEG tube placement in 2021  - Has not been tolerating tube feeds per per outpatient facility. With episodes of nausea and vomiting. This is been ongoing for the past few days. - X-ray abdomen with PEG tube in the gastric body region. Appearing just below the diaphragm. In contrast seen within the stomach. Notable moderate size hiatal hernia. As well as constipation.  -Hold tube feeds  - General surgery consulted appreciate recommendation.   May possibly benefit from inpatient EGD   -Feeds pending surgery recommendation, IV fluids as prior otherwise  - Consider CT of the abdomen pelvis for further complication no improvement # Constipation  - Colace and GlycoLax     Renal   # BEENA  -  /Cr. 1.3  - Prerenal azotemia secondary to decreased effective renal perfusion in the setting of free water deficit concern with history of nausea and vomiting as evident by hypernatremia  - Correct free water deficit. Check urine studies and electrolytes. Serial labs. Avoid nephrotoxic medications dose accordingly. Strict I&O's  - Nephrology consulted appreciate recommendation    # Hypovolemic hypernatremia  - Correct free water deficit  - Serial BMPs. - D5W and 1/2 normal saline increased to 150mL/hr  per nephrology     Infectious Disease   # Leukocytosis  - White count of 15.9>19.0. Lungs are clear on chest x-ray, afebrile. No obvious source of infection. UA unremarkable. Blood cultures pending. Continue to follow. - No indications for antibiotics at this time. Possibly reactive. Continue to monitor and follow-up cultures and labs. Hematology/Oncology   # No active problems     Endocrine   # Thyroid disease  - Continue Synthroid     Social/Spiritual/DNR/Other   CODE STATUS: Full  DVT prophylaxis: Lovenox  PPI: Protonix   Disposition: Downgrade to medical floor today    Oumou Elly, DO  7:44 AM  03/04/23    I personally saw, examined and provided care for the patient. Radiographs, labs and medication list were reviewed by me independently. I spoke with bedside nursing, therapists and consultants. Critical care services and times documented are independent of procedures and multidisciplinary rounds with Residents. Additionally comprehensive, multidisciplinary rounds were conducted with the MICU team. The case was discussed in detail and plans for care were established. Review of Residents documentation was conducted and revisions were made as appropriate. I agree with the above documented exam, problem list and plan of care.    Tolu Mariscal MD   CCT excluding procedures :28'

## 2023-03-04 NOTE — PROGRESS NOTES
Jevity 1.2 not available, we carry only 1.5. NP Dagoberto Caldera aware. Hold Tube feed until assessed by general surgery. Equipment in room for feedings to be started at a later time, with clarification of formula to be administered.

## 2023-03-04 NOTE — ED NOTES
Report to [facility], care of patient relinquished. Report to ICU nurse Anisa, patient will be transferred once room is ready.       Cristhian Angel RN  03/03/23 2946

## 2023-03-04 NOTE — PLAN OF CARE
Problem: Skin/Tissue Integrity  Goal: Absence of new skin breakdown  Description: 1. Monitor for areas of redness and/or skin breakdown  2. Assess vascular access sites hourly  3. Every 4-6 hours minimum:  Change oxygen saturation probe site  4. Every 4-6 hours:  If on nasal continuous positive airway pressure, respiratory therapy assess nares and determine need for appliance change or resting period.   Outcome: Progressing     Problem: Discharge Planning  Goal: Discharge to home or other facility with appropriate resources  Outcome: Progressing  Flowsheets (Taken 3/3/2023 2143)  Discharge to home or other facility with appropriate resources:   Identify barriers to discharge with patient and caregiver   Identify discharge learning needs (meds, wound care, etc)   Refer to discharge planning if patient needs post-hospital services based on physician order or complex needs related to functional status, cognitive ability or social support system   Arrange for needed discharge resources and transportation as appropriate     Problem: Pain  Goal: Verbalizes/displays adequate comfort level or baseline comfort level  Outcome: Progressing  Flowsheets  Taken 3/4/2023 0400  Verbalizes/displays adequate comfort level or baseline comfort level: Assess pain using appropriate pain scale  Taken 3/4/2023 0000  Verbalizes/displays adequate comfort level or baseline comfort level: Assess pain using appropriate pain scale  Taken 3/3/2023 2100  Verbalizes/displays adequate comfort level or baseline comfort level: Assess pain using appropriate pain scale     Problem: Safety - Adult  Goal: Free from fall injury  Outcome: Progressing  Flowsheets (Taken 3/4/2023 0000)  Free From Fall Injury: Instruct family/caregiver on patient safety     Problem: ABCDS Injury Assessment  Goal: Absence of physical injury  Outcome: Progressing  Flowsheets (Taken 3/4/2023 0000)  Absence of Physical Injury: Implement safety measures based on patient assessment

## 2023-03-04 NOTE — PROGRESS NOTES
Nephrology Progress Note  3/4/2023 8:02 AM  Subjective:   Admit Date: 3/3/2023  PCP: Pawan Campos DO    Interval History: Patient was seen in the medical intensive care unit. Case was discussed with the patient's nurse at the bedside. Lying comfortably in bed tolerating IV fluids. Being evaluated by surgery for possible PEG tube problem? .  Has Curry catheter with good urine output. Awake. Not in distress. No more diarrhea. Diet: Diet NPO  ADULT TUBE FEEDING; PEG; Other Tube Feeding (specify); jevity 1.2; Continuous; 10; Yes; 0; Other (specify); start trickle feeding 10/hr; 10; 100; Q 4 hours    Data:     Scheduled Meds:   levothyroxine  150 mcg PEG Tube Daily    potassium phosphate IVPB  15 mmol IntraVENous Once    insulin lispro  0-4 Units SubCUTAneous TID WC    insulin lispro  0-4 Units SubCUTAneous Nightly    PARoxetine  10 mg Oral BID    montelukast  10 mg PEG Tube Nightly    valproic acid  1,000 mg Per G Tube BID    [Held by provider] OLANZapine  20 mg Oral BID    sodium chloride flush  5-40 mL IntraVENous 2 times per day    enoxaparin  40 mg SubCUTAneous Daily    pantoprazole  40 mg IntraVENous Daily    midodrine  10 mg PEG Tube TID WC    docusate  100 mg Oral Daily    polyethylene glycol  17 g Per G Tube Daily     Continuous Infusions:   dextrose      dextrose 125 mL/hr at 03/04/23 0431    sodium chloride       PRN Meds:bisacodyl, glucose, dextrose bolus **OR** dextrose bolus, glucagon (rDNA), dextrose, sodium chloride flush, acetaminophen, sodium chloride flush, sodium chloride, ondansetron **OR** ondansetron, acetaminophen **OR** acetaminophen, diatrizoate meglumine-sodium  I/O last 3 completed shifts: In: 1410 [NG/GT:160; IV Piggyback:1250]  Out: 310 [Urine:310]  No intake/output data recorded.     Intake/Output Summary (Last 24 hours) at 3/4/2023 0802  Last data filed at 3/4/2023 0400  Gross per 24 hour   Intake 1410 ml   Output 310 ml   Net 1100 ml     CBC:   Recent Labs 03/03/23  1307 03/04/23  0428   WBC 15.9* 19.0*   HGB 13.0 9.6*    204     BMP:    Recent Labs     03/03/23  1307 03/03/23  1724 03/04/23  0428   * 163* 153*   K 4.0 3.9 3.7   * 129* 123*   CO2 23 23 24   * 102* 77*   CREATININE 1.3* 1.2* 1.2*   GLUCOSE 128* 128* 174*     Hepatic:   Recent Labs     03/03/23  1307 03/04/23  0428   AST 12 10   ALT 12 10   BILITOT 0.6 0.5   ALKPHOS 85 76     Protein/ Albumin:    Lab Results   Component Value Date    LABALBU 2.4 (L) 03/04/2023       Latest Reference Range & Units 3/3/23 17:24   Color, UA Straw/Yellow  Yellow   Clarity, UA Clear  Clear   Glucose, UA Negative mg/dL Negative   Bilirubin, Urine Negative  SMALL ! Ketones, Urine Negative mg/dL Negative   Specific Gravity, UA 1.005 - 1.030  1.025   Blood, Urine Negative  Negative   pH, UA 5.0 - 9.0  5.5   Protein, UA Negative mg/dL Negative   Urobilinogen, Urine <2.0 E.U./dL 0.2   Nitrite, Urine Negative  Negative   Leukocyte Esterase, Urine Negative  Negative   Chloride Not Established mmol/L 29   Osmolality, Ur 300 - 900 mOsm/kg 601   Potassium, Ur Not Established mmol/L 53.0   Sodium, Ur Not Established mmol/L 26   !: Data is abnormal  Narrative   EXAMINATION:   RETROPERITONEAL ULTRASOUND OF THE KIDNEYS AND URINARY BLADDER       3/3/2023       COMPARISON:   CT 09/30/2022, 09/19/2022       HISTORY:   ORDERING SYSTEM PROVIDED HISTORY: BEENA   TECHNOLOGIST PROVIDED HISTORY:   Reason for exam:->BEENA   What reading provider will be dictating this exam?->CRC       FINDINGS:       Kidneys: Only the right kidney is visualized, due to patient's condition. Apparent   lesion at the upper pole is probably complex cyst such as hemorrhagic perhaps   layered dependent increased echogenicity noted on image 21, and on CT is high   attenuation without obvious enhancement. Is similar at about 1.8 cm on the   exams.   Note the exams however are limited and can be given attention on   follow-up with and without contrast MRI or CT as clinically warranted or   surveillance by ultrasound. Increased renal echogenicity. No hydronephrosis. Bladder:       Not distended for evaluation with report of catheter. Impression   1. Limited exam, only right kidney seen, with increased echogenicity   suggesting medical disease without hydronephrosis. 2. Upper pole lesion favored to be hyperdense cyst as able to be assessed, as   discussed above. Narrative   Patient MRN:  [id number]   : 1955   Age: 60 years   Gender: Female       Order Date:  1/10/2017 1:36 PM       EXAM: US RENAL COMPLETE       NUMBER OF IMAGES:  62       INDICATION: Acute renal failure. Technique: Multiplanar grayscale ultrasonographic images were obtained   of the kidneys by the ultrasound technologist on 1/10/2017 1:36 PM.    Adjacent structures are also visualized. Selected static images were   presented to the radiologist for review. Comparison: No prior studies available for comparison. Findings: The kidneys demonstrate normal smooth contour and   echotexture. The right kidney measures approximately 9.8 cm x 4.4 cm x   4.4 cm and the left kidney measures approximately 9.9 cm x 5.4 cm x   5.3 cm. No hydronephrosis, perinephric collections, renal calculi or   solid renal masses are demonstrated. A Curry catheter is present   within the urinary bladder. Impression   No evidence of hydronephrosis, perinephric collections,   renal calculi or solid renal masses. Narrative   EXAMINATION:   CT OF THE ABDOMEN AND PELVIS WITH CONTRAST 2022 10:35 pm       TECHNIQUE:   CT of the abdomen and pelvis was performed with the administration of   intravenous contrast. Multiplanar reformatted images are provided for review.    Automated exposure control, iterative reconstruction, and/or weight based   adjustment of the mA/kV was utilized to reduce the radiation dose to as low   as reasonably achievable. COMPARISON:   09/19/2022       HISTORY:   ORDERING SYSTEM PROVIDED HISTORY: emesis   TECHNOLOGIST PROVIDED HISTORY:   Additional Contrast?->None   Reason for exam:->emesis   Decision Support Exception - unselect if not a suspected or confirmed   emergency medical condition->Emergency Medical Condition (MA)       FINDINGS:   Lower Chest:  Visualized portion of the lower chest demonstrates no acute   abnormality. There is a large hiatal hernia. Organs: The liver, spleen, pancreas, and adrenals are within normal limits. The gallbladder is not visualized, possibly surgically absent. There are   bilateral renal cysts. No hydronephrosis. GI/Bowel: Percutaneous gastrostomy tube with retention balloon in the gastric   lumen. There is no evidence of bowel obstruction. No evidence of abnormal   bowel wall thickening or distension. The appendix is not visualized. However, there is no inflammatory change in the right lower quadrant to   suggest acute appendicitis. There is moderate stool throughout the colon. Pelvis: The urinary bladder is distended. There is small volume   intravesicular gas. The uterus is unremarkable. There is a 2.3 x 1.8 cm   left adnexal cyst.       Peritoneum/Retroperitoneum: There is small volume free fluid. No   extraluminal gas. No evidence of lymphadenopathy. Aorta is normal in   caliber. Bones/Soft Tissues:  No acute abnormality of the visualized osseous   structures. Impression   Large hiatal hernia. Small volume intravesicular gas, which could reflect recent instrumentation. Clinical correlation recommended. Left adnexal cyst.  See below for recommendations.        RECOMMENDATIONS:   Managing Incidental Adnexal Cystic Mass by CT or MR       Benign cyst:       Premenopausal (< or equal to 50 years if LMP unknown)       < or equal to 5 cm: no follow up       >5 cm: US in 6-12 weeks       > or equal to 10 cm: US promptly Early postmenopausal (0-5 years after LMP)       < or equal to 3 cm: no follow up       3-5 cm: US in 6-12 weeks       >5 cm: US promptly       Late postmenopausal       < or equal to 3 cm: no follow up       >3 cm: US promptly       Probably benign cyst : {benign appearing except demonstrates one or more of   the following - (a) angulated margin, (b) not round/oval shape, (c) not well   imaged due to artifact or technical parameters (ex. noncontrast CT)       Premenopausal (< than or equal to 50 years if LMP unknown)       < or equal to 3 cm: no follow up       3-5 cm: US in 6-12 weeks       >5 cm: US       Early postmenopausal       < or equal to 3 cm: no follow up       >3 cm: US promptly       Late postmenopausal       < or equal to 1 cm: no follow up       >1 cm: US       Reference:       Gavin Gil et al. Managing Incidental Findings on Abdominal CT: White Paper of   the ACR Incidental Findings Committee. J Am Jaki Radiol 2010;7:754-773                 Objective:     Vitals: BP (!) 84/49   Pulse 87   Temp 98.4 °F (36.9 °C) (Bladder)   Resp 11   Ht 5' 5\" (1.651 m)   Wt 94 lb 2.2 oz (42.7 kg)   SpO2 100%   BMI 15.67 kg/m²   General appearance: Lying comfortably in bed tolerating IV fluids. Has Curry catheter with good urine output. Awake. Not in distress. Lungs: Good air movement  Heart: No S3, No rub  Abdomen: Lax, soft No tenderness. PEG tube  L. Extremities: No edema    Assessment & Plan:     Acute kidney injury: Nonoliguric. Likely prerenal azotemia. [place] urine. Patient's blood pressure is on the low side. Continue IV fluids. Ultrasound kidney report revealed that only the right kidney was visualized because of the patient's condition yet recent CT scan revealed bilateral kidneys with cyst and no hydronephrosis.   (Please, review reports above)    Hypernatremia reflecting inadequate water supplementation in a patient who is able to drink and is dependent on our assessment to supplement water per her PEG tube. Continue hypotonic IV fluids for now I was told by the patient's nurse that tube feed will be held until patient is evaluated by general surgery. Hypotension and tachycardia on presentation likely reflecting significant hypovolemia: Continue IV fluids. Use IV albumin for intravascular volume expansion. Blood cultures are so far with no growth. Hypophosphatemia: Being supplemented intravenously. It is a reflection of poor enteral nutrition. Continue to monitor and supplement as needed. Hypocalcemia reflecting hypoalbuminemia: Monitor ionized calcium    Anemia: Not related to the patient's kidney function with baseline creatinine of 0.6 (on 1/29/2023)    Elevated TSH and left adnexal cyst on recent CT scan: Defer to primary          This note was created using voice recognition software.     Electronically signed by Marcai Garcia MD on 3/4/2023 at 8:02 AM

## 2023-03-04 NOTE — PATIENT CARE CONFERENCE
Intensive Care Daily Quality Rounding Checklist      ICU Team Members: Bedside Nurse, Charge nurse, Residents, Dr. Danilo Pina    ICU Day #: NUMBER: 2    Intubation Date:  N/A    Ventilator Day #: N/A    Central Line Insertion Date: March 3        Day #: NUMBER: 2        Indication: CVCIndication: Limited/no vessels suitable for conventional peripheral access     Arterial Line Insertion Date:  N/A      Day #: N/A    Temporary Hemodialysis Catheter Insertion Date:  N/A      Day # N/A    DVT Prophylaxis: Lovenox    GI Prophylaxis: Protonix    Curry Catheter Insertion Date: March 3       Day #: 2      Indications: Need for fluid management of the critically ill patient in a critical care setting      Continued need (if yes, reason documented and discussed with physician): yes, accurate I&O and noncompliance with external device. Skin Issues/ Wounds and ordered treatment discussed on rounds: No    Goals/ Plans for the Day: Monitor labs and vitals, treat/replace as needed. Nephrology to manage Na. Surgery consult for misplaced PEG tube. Transfer to [facility].

## 2023-03-04 NOTE — CARE COORDINATION
Social Work / Discharge Planning : Patient admitted with Hypernatremia. Patient has a Legal Guardian Jermaine Ordoñez. Patient admitted from [facility] group [place]. Patient is MRDD, blind, Dementia with behavioral disturbances, nonverbal and has a PEG tube. Admitted with AMS . AWait plan. Patient currently on RA. SW attempted to leave voicemail with Magalys Neal [phone]  X 2 and group home [phone]  . However weekend and closed. Prompts were NOT successful in reaching any physical person. Need to follow up on Monday. Hx at [address] [place] [address]. SW  left  [phone] with group Home services. VM left requesting a return call back in regards to patient. Await response. Need to follow up on Monday with  and Group Home in regards if they can manage patient back to [facility] at discharge. SW to follow. Electronically signed by AILYN Duggan on 3/4/23 at 10:44 AM EST     Case Management Assessment  Initial Evaluation    Date/Time of Evaluation: 3/4/2023 10:47 AM  Assessment Completed by: AILYN Duggan    If patient is discharged prior to next notation, then this note serves as note for discharge by case management. Patient Name: Felisha Abraham                   YOB: 1955  Diagnosis: Hypernatremia [E87.0]                   Date / Time: 3/3/2023 12:17 PM    Patient Admission Status: Inpatient   Readmission Risk (Low < 19, Mod (19-27), High > 27): Readmission Risk Score: 21.3    Current PCP: Joan Dalton, DO  PCP verified by CM? Yes    Chart Reviewed: Yes      History Provided by: Medical Record  Patient Orientation: Alert and Oriented, Other (see comment) (MR/DD and Dementia)    Patient Cognition: Dementia / Early Alzheimer's (MR/DD)    Hospitalization in the last 30 days (Readmission):  No    If yes, Readmission Assessment in  Navigator will be completed.     Advance Directives:      Code Status: Full Code   Patient's Primary Decision Maker is: Named in [address] [place] [address] Decision Maker: Gentry Griffin (Apsi) - Legal Guardian, Legal Guardian - [phone]    Supplemental (Other) Decision Maker: Rosas Cruz - Pt [facility] - [phone]    Discharge Planning:    Patient lives with: Other (Comment) (other clients in home and  24 hour caregivers) Type of Home: Group Home  Primary Care Giver: Private caregiver  Patient Support Systems include: Home Care Staff, Friends/Neighbors, /   Current Financial resources:    Current community resources:    Current services prior to admission: Other (Comment) (group home staff)            Current DME:              Type of Home Care services:  OT, PT    ADLS  Prior functional level: Bathing, Dressing, Toileting, Cooking, Feeding, Housework, Shopping, Mobility  Current functional level: Bathing, Dressing, Toileting, Cooking, Feeding, Housework, Shopping, Mobility    PT AM-PAC:   /24  OT AM-PAC:   /24    Family can provide assistance at DC: Other (comment) (Group HOme staff meets needs)  Would you like Case Management to discuss the discharge plan with any other family members/significant others, and if so, who?  Yes (Legal Ashley Brady [phone] x 2)  Plans to Return to Present Housing: Unknown at present  Other Identified Issues/Barriers to RETURNING to current housing: yes  Potential Assistance needed at discharge: [address] Olivo, Outpatient PT/OT, Durable Medical Equipment            Potential DME: Wheelchair, Home Aerosol, Glucometer, Enteral Feedings  Patient expects to discharge to: intermediate  Plan for transportation at discharge:      Financial    Payor: [address] [place] [address] [address] / Plan: MEDICARE PART A AND B / Product Type: *No Product type* /     Does insurance require precert for SNF:     Potential assistance Purchasing Medications: No  Meds-to-Beds request:        [facility] - [address] [place] [address] [place] [address]  Phone: [phone] Fax: [address] [facility], [address] [facility] Leo Almonte [phone]  [address] [facility] [address] [place] [address] Road  Phone: [phone] Fax: [phone]      Notes:    Factors facilitating achievement of predicted outcomes: Caregiver support    Barriers to discharge: Confusion, Cognitive deficit, Impulsivity, Limited safety awareness, and Communication deficit    Additional Case Management Notes: The Plan for Transition of Care is related to the following treatment goals of Hypernatremia [O70.5]    IF APPLICABLE: The Patient and/or patient representative Taryn Ochoa and her family were provided with a choice of provider and agrees with the discharge plan.  Freedom of choice list with basic dialogue that supports the patient's individualized plan of care/goals and shares the quality data associated with the providers was provided to:   Representative Name:   Janiya Oakes [phone]    The Patient and/or Patient Representative Agree with the Discharge Plan?  yes    Justino Marsh [place]  Case Management Department  Ph: [phone] Fax: [phone]

## 2023-03-04 NOTE — CONSULTS
Comprehensive Nutrition Assessment    Type and Reason for Visit:  Initial, Positive Nutrition Screen, Consult    Nutrition Recommendations/Plan:   Continue NPO    Discontinue current tube feeding- Jevity 1.2 not a formulary TF, note TF on hold at this time    Tube Feed recommendation when needed:    Standard with Fiber (Jevity 1.5) goal rate @ 40 ml/hr X 23 hrs (hold 30 min before and after Synthroid) with 30 ml q 4 hr free water flush  To provide: 920 ml tv, 1380 kcal, 59 g pro, 699 ml free water (879 ml w/ flush)  Regimen meets 100% calorie & protein needs  Or free water flush per critical care or renal management  Recommend initiate TF at 20 ml/hr and advance to goal rate as tolerated    Current Phos 2.1 replete prior to initiation of TF, monitor & replete lytes prn    Patient should be considered at increased risk for metabolic complications R/T refeeding, characterized by drops in serum K, Mg, and Phos levels. These parameters should be closely monitored and be WNL prior to initiation or progression of feeding. Malnutrition Assessment:  Malnutrition Status:  Severe malnutrition (03/04/23 2143)    Context:  Acute Illness     Findings of the 6 clinical characteristics of malnutrition:  Energy Intake:  50% or less of estimated energy requirements for 5 or more days  Weight Loss:  Greater than 7.5% over 3 months (-26.6% X 5 months, per actual EMR wt hx)     Body Fat Loss: Moderate body fat loss Triceps   Muscle Mass Loss: Moderate muscle mass loss Temples (temporalis), Clavicles (pectoralis & deltoids), Thigh (quadraceps)  Fluid Accumulation:  No significant fluid accumulation     Strength:  Not Performed    Nutrition Assessment:    Pt presents from group home w/ N/V/not tolerating TF for several days PTA, admits w/ metabolic encephalopathy, BEENA, hypovolemic hypernatremia (4.4 L deficit per renal). Hx dementia, MRDD, CKD, dysphagia w/ chronic PEG. Per bedside nurse TF on hold until assessed by GS. Will provide TF recommendation for when needed & monitor. Nutrition Related Findings:    confused, blind, +I&Os, no edema (observed bedside nurse eval), abd soft, PEG clamped, constipation, Na 153, hypophosaphatemia, Glu 174/A1C 4.7 Wound Type: None       Current Nutrition Intake & Therapies:    Average Meal Intake: NPO  Average Supplements Intake: NPO  Diet NPO  ADULT TUBE FEEDING; PEG; Other Tube Feeding (specify); jevity 1.2; Continuous; 10; Yes; 0; Other (specify); start trickle feeding 10/hr; 10; 100; Q 4 hours  Current Tube Feeding (TF) Orders:  Feeding Route: PEG  Formula: Other Tube Feeding (Comment) (Jevity 1.2)  Schedule: Continuous  Feeding Regimen: 10 ml/hr  Water Flushes: 100 ml q 4 hr = 600 ml/d free water flush  Current TF & Flush Orders Provides: clamped  Goal TF & Flush Orders Provides: 240 ml tv, 288 kcal, 13 g pro, 194 ml free water (794 ml w/ flush)    Anthropometric Measures:  Height: 5' 5\" (165.1 cm)  Ideal Body Weight (IBW): 125 lbs (57 kg)    Admission Body Weight: 94 lb 2.2 oz (42.7 kg) (3/3 bed)  Current Body Weight: 94 lb (42.6 kg), 75.2 % IBW.  Weight Source: Bed Scale (3/4)  Current BMI (kg/m2): 15.6  Usual Body Weight: 128 lb (58.1 kg) (10/2022 actual per EMR)  % Weight Change (Calculated): -26.6  Weight Adjustment For: No Adjustment                 BMI Categories: Underweight (BMI less than 22) age over 72    Estimated Daily Nutrient Needs:  Energy Requirements Based On: Kcal/kg  Weight Used for Energy Requirements: Current  Energy (kcal/day):   Weight Used for Protein Requirements: Current  Protein (g/day): 45-55 (1.1-1.3 as tolerated w/ BEENA)  Method Used for Fluid Requirements: Other (Comment)  Fluid (ml/day): per critical care or renal management    Nutrition Diagnosis:   Severe malnutrition, In context of acute illness or injury related to altered GI function as evidenced by Criteria as identified in malnutrition assessment    Nutrition Interventions:   Food and/or Nutrient Delivery: Continue NPO, Discontinue Tube Feeding (Current order for Jevity 1.2 not a formulary TF, per nursing TF on hold at this time)  Nutrition Education/Counseling: Education not appropriate  Coordination of Nutrition Care: Continue to monitor while inpatient  Plan of Care discussed with: discussed TF w/ bedside nurse    Goals:     Goals: other (specify)  Specify Other Goals: nutrition progression    Nutrition Monitoring and Evaluation:      Food/Nutrient Intake Outcomes: Diet Advancement/Tolerance  Physical Signs/Symptoms Outcomes: Biochemical Data, Constipation, GI Status, Fluid Status or Edema, Nutrition Focused Physical Findings, Skin, Weight    Discharge Planning:     Too soon to determine     Michelle RAFFI Srinivasan, LD  Contact: [phone]

## 2023-03-04 NOTE — PROGRESS NOTES
Pt. Awakens with eye opening when you call her name. Her eyes are opaque and she does not see. Pt. Is non-verbal and unable to follow commands. She does move all of her extremities with purpose for her purpose. When turning pt. She will reach and grab for the side rail and assist with turning. Pt. Incontinent of large amount of hard formed stool. Curry leaking as well. Pt. Washed linens changed pt. Turned and repositioned.

## 2023-03-04 NOTE — CONSULTS
Critical Care Admit/Consult Note         Patient - Satya English   MRN -  [id number]   [facility] # - [de-identified]   - 1955      Date of Admission -  3/3/2023 12:17 PM  Date of evaluation -  3/3/2023  VIDAL/VIDAL   Hospital Day - 0            ADMIT/CONSULT DETAILS     Reason for Admit/Consult   Hyponatremia    Consulting Service/Physician   Consulting - Jennifer Mcpherson MD  Primary Care Physician - Toya Cordon DO         HPI   Information obtained from EMR as no family present during my examination the patient is a 70 y.o. female with significant past medical history of developmental disability, HTN, GERD, hemodialysis patient, hypothyroidism, osteoarthritis, and PVD. Patient presented to ED today for altered mental status x1 day. Seems her baseline is usually ANO x1 but today she was unable to verbalize. Labs in ED significant for Na+ 158, chloride 121, , creatinine 1.3, troponin 31, white count 15.9, UA was unremarkable. Rapid COVID was negative. CT of the head was negative for any acute process. Patient has PEG tube placed and resides in a group home. She is usually on Jevity tube feed 240 mL every 8 hours with 150 mL free water. Patient has been having nausea and vomiting and not tolerating tube feed for the last few days. Labs in ED were redrawn showing worsening hypernatremia with sodium of 163. Nephrology was consulted due to altered mental status she was ultimately sent to ICU.     Past Medical History         Diagnosis Date    Acute kidney injury ([facility] [address] 01/15/2017    d/t Vancomycin, on Dialysis [facility] right chest    Blind in both eyes     Essential hypertension 2021    Gastroesophageal reflux disease without esophagitis 2021    Hemodialysis patient [facility])     Hyperthyroidism     MR (mental retardation)     Osteoarthritis     Peripheral vascular disease ([facility] [address]     Pneumonia 2017    Pneumonia due to infectious organism 2017    Sepsis ([address] 3/4/2021        Past Surgical History           Procedure Laterality Date    BRONCHOSCOPY  02/10/2017    CHEST TUBE INSERTION Right 02/09/2017    GASTROSTOMY TUBE PLACEMENT N/A 3/7/2021    EGD PEG TUBE PLACEMENT performed by Nick Bledsoe MD at [facility] Right 01/17/2017    tessio insertion     OTHER SURGICAL HISTORY  01/25/2017    PEG tube insertion       Current Medications   Current Medications    midodrine  7.5 mg PEG Tube TID WC    PARoxetine  10 mg Oral BID    montelukast  10 mg PEG Tube Nightly    valproic acid  1,000 mg Per G Tube BID    [Held by provider] OLANZapine  20 mg Oral BID    sodium chloride flush  5-40 mL IntraVENous 2 times per day    enoxaparin  40 mg SubCUTAneous Daily    pantoprazole  40 mg IntraVENous Daily     sodium chloride flush, acetaminophen, sodium chloride flush, sodium chloride, ondansetron **OR** ondansetron, polyethylene glycol, acetaminophen **OR** acetaminophen, diatrizoate meglumine-sodium  IV Drips/Infusions   dextrose 125 mL/hr at 03/03/23 1808    sodium chloride       Home Medications  Not in a hospital admission. Diet/Nutrition   Diet NPO  ADULT TUBE FEEDING; PEG; Other Tube Feeding (specify); jevity 1.2; Continuous; 10; Yes; 0; Other (specify); start trickle feeding 10/hr; 10; 100; Q 4 hours    Allergies   Patient has no known allergies. Social History   Tobacco   reports that she has never smoked. She has never used smokeless tobacco.    Alcohol     reports no history of alcohol use. Occupational history :    Family History   No family history on file.     Sleep History   n/a    ROS     REVIEW OF SYSTEMS:  Review of systems not obtained due to patient factors - mental status    Lines and Devices   Right subclavian TLC    Mechanical Ventilation Data   VENT SETTINGS (Comprehensive)     Additional Respiratory Assessments  Heart Rate: (!) 104  Resp: 13  SpO2: 98 %    ABG  Lab Results   Component Value Date/Time    PH 7.367 03/03/2023 04:08 PM    PCO2 44.7 03/03/2023 04:08 PM    PO2 76.6 2023 04:08 PM    HCO3 25.1 2023 04:08 PM    O2SAT 95.1 2023 04:08 PM     Lab Results   Component Value Date/Time    MODE RA 2023 04:08 PM           Vitals    height is 5' 5\" (1.651 m) and weight is 121 lb (54.9 kg). Her temperature is 98.2 °F (36.8 °C). Her blood pressure is 103/61 and her pulse is 104 (abnormal). Her respiration is 13 and oxygen saturation is 98%.       Temperature Range: Temp: 98.2 °F (36.8 °C) Temp  Av.5 °F (36.9 °C)  Min: 98.2 °F (36.8 °C)  Max: 98.7 °F (37.1 °C)  BP Range:  Systolic (24hrs), Av , Min:86 , Max:111     Diastolic (24hrs), Av, Min:61, Max:77    Pulse Range: Pulse  Av.5  Min: 104  Max: 127  Respiration Range: Resp  Av.3  Min: 13  Max: 26  Current Pulse Ox::  SpO2: 98 %  24HR Pulse Ox Range:  SpO2  Av %  Min: 96 %  Max: 98 %  Oxygen Amount and Delivery:        I/O (24 Hours)    Patient Vitals for the past 8 hrs:   BP Temp Pulse Resp SpO2   23 1859 103/61 98.2 °F (36.8 °C) (!) 104 13 98 %   23 1806 103/68 -- -- -- --   23 1747 111/75 -- -- -- --   23 1726 90/77 -- -- -- --   23 1706 104/69 -- -- -- --   23 1646 97/61 -- -- -- --   23 1630 100/64 -- (!) 105 -- --   23 1545 91/75 -- (!) 123 -- --   23 1530 90/66 -- (!) 120 -- --   23 1449 88/67 -- (!) 126 20 --   23 1430 86/73 -- (!) 126 23 --   23 1345 87/65 -- (!) 121 14 --   23 1330 100/64 -- (!) 119 23 --       Intake/Output Summary (Last 24 hours) at 3/3/2023 2043  Last data filed at 3/3/2023 194  Gross per 24 hour   Intake 1250 ml   Output 80 ml   Net 1170 ml     No intake/output data recorded.   Date 23 0000 - 23 2359   Shift 4919-5891 1487-4330 8421-9956 24 Hour Total   INTAKE   IV Piggyback   1250 1250   Shift Total(mL/kg)   1250(22.8) 1250(22.8)   OUTPUT   Urine   80 80   Shift Total(mL/kg)   80(1.5) 80(1.5)   Weight (kg)  54.9 54.9 54.9     Patient Vitals for the  past 96 hrs (Last 3 readings):   Weight   03/03/23 1223 121 lb (54.9 kg)         Drains/Tubes Outputs  Curry catheter    Exam         PHYSICAL EXAM:  CONSTITUTIONAL: Awake in bed, no acute distress, nonverbal  EYES: Legally blind  ENT:  Normocephalic, without obvious abnormality, atraumatic, sinuses nontender on palpation, external ears without lesions, oral pharynx with dry mucus membranes  NECK: Supple, symmetrical, no adenopathy  HEMATOLOGIC/LYMPHATICS:  no cervical lymphadenopathy  BACK:  symmetric  LUNGS:  No increased work of breathing on room air, good air exchange, clear to auscultation bilaterally, no crackles or wheezing  CARDIOVASCULAR:  Normal apical impulse, sinus tachycardia, normal S1 and S2, no S3 or S4, and no murmur noted  ABDOMEN: PEG tube, normal bowel sounds, soft, non-distended, non-tender, no masses palpated  GENITAL/URINARY: Curry catheter  MUSCULOSKELETAL:  There is no redness, warmth, or swelling of the joints.  Full range of motion noted.  Not following commands  NEUROLOGIC: Nonverbal and not following command.  Cranial nerves II-XII are grossly intact.   SKIN:  no bruising or bleeding, poor skin turgor    Data   Old records and images have been reviewed    Lab Results   CBC     Lab Results   Component Value Date/Time    WBC 15.9 03/03/2023 01:07 PM    RBC 4.19 03/03/2023 01:07 PM    HGB 13.0 03/03/2023 01:07 PM    HCT 44.0 03/03/2023 01:07 PM     03/03/2023 01:07 PM    .0 03/03/2023 01:07 PM    MCH 31.0 03/03/2023 01:07 PM    MCHC 29.5 03/03/2023 01:07 PM    RDW 15.5 03/03/2023 01:07 PM    NRBC 5.2 03/03/2023 01:07 PM    SEGSPCT 58 06/26/2011 10:26 AM    METASPCT 0.9 03/03/2023 01:07 PM    LYMPHOPCT 11.3 03/03/2023 01:07 PM    PROMYELOPCT 0.9 03/07/2021 04:04 AM    MONOPCT 8.7 03/03/2023 01:07 PM    MYELOPCT 0.9 03/03/2023 01:07 PM    BASOPCT 0.0 03/03/2023 01:07 PM    MONOSABS 1.43 03/03/2023 01:07 PM    LYMPHSABS 1.75 03/03/2023 01:07 PM    EOSABS 0.00 03/03/2023 01:07 PM  BASOSABS 0.00 03/03/2023 01:07 PM       BMP   Lab Results   Component Value Date/Time     03/03/2023 05:24 PM    K 3.9 03/03/2023 05:24 PM    K 4.8 09/30/2022 08:21 PM     03/03/2023 05:24 PM    CO2 23 03/03/2023 05:24 PM     03/03/2023 05:24 PM    CREATININE 1.2 03/03/2023 05:24 PM    GLUCOSE 128 03/03/2023 05:24 PM    GLUCOSE 97 12/30/2011 09:13 AM    CALCIUM 8.4 03/03/2023 05:24 PM       LFTS  Lab Results   Component Value Date/Time    ALKPHOS 85 03/03/2023 01:07 PM    ALT 12 03/03/2023 01:07 PM    AST 12 03/03/2023 01:07 PM    PROT 8.0 03/03/2023 01:07 PM    BILITOT 0.6 03/03/2023 01:07 PM    LABALBU 2.8 03/03/2023 01:07 PM    LABALBU 3.7 12/30/2011 09:13 AM       INR  No results for input(s): PROTIME, INR in the last 72 hours. APTT  No results for input(s): APTT in the last 72 hours. Lactic Acid  Lab Results   Component Value Date/Time    LACTA 0.9 03/03/2023 05:24 PM    LACTA 1.0 03/03/2023 01:07 PM    LACTA 0.9 01/29/2023 09:11 PM        BNP   No results for input(s): BNP in the last 72 hours. Cultures     No results for input(s): BC in the last 72 hours. No results for input(s): Naun Art in the last 72 hours. No results for input(s): LABURIN in the last 72 hours. Radiology   CXR  Impression   Nonacute chest.       Right subclavian central line with tip in the right atrium without   pneumothorax. XR Abdomen   Impression   Findings consistent with intragastric peg 2 positioning as described above. CT Head  Impression   1. There is no acute intracranial abnormality. Specifically, there is no   intracranial hemorrhage. 2. Atrophy and periventricular leukomalacia,         SYSTEMS ASSESSMENT    Neuro   # Metabolic encephalopathy  - Apparent baseline is ANO x1 however, at this time unable to verbal  - likely 2/2 hypernatremia. CT of the head is negative.   Check ammonia    # Developmental disability  - Continue Paxil, Zyprexa and valproic acid    Respiratory   #No active problems    Cardiovascular   # Acute on chronic hypotension  - Likely exacerbated by volume depletion. Check cortisol level  - Takes midodrine as outpatient, continue and increase dose to 10 mg    # Tachycardia  - 2/2 hypovolemia    # Elevated troponin  - Initial troponin 31 check repeat. Chronic elevation of troponin noted likely due to chronic disease. Recent troponin from January of this year 35  - EKG with no signs of ACS    Gastrointestinal   # Nausea and vomiting  - History of dysphagia PEG tube placement in 2021  - Has not been tolerating tube feeds per per outpatient facility. With episodes of nausea and vomiting. This is been ongoing for the past few days. - X-ray abdomen with PEG tube in the gastric body region. Appearing just below the diaphragm. In contrast seen within the stomach. Notable moderate size hiatal hernia. As well as constipation.  - Start trickle feeds and monitor  - General surgery consulted appreciate recommendation. May possibly benefit from inpatient EGD  - Consider CT of the abdomen pelvis for further complication no improvement    # Constipation  - Colace and GlycoLax    Renal   # BEENA  -  /Cr. 1.3  - Prerenal azotemia secondary to decreased effective renal perfusion in the setting of free water deficit concern with history of nausea and vomiting as evident by hypernatremia  - Correct free water deficit. Check urine studies and electrolytes. Serial labs. Avoid nephrotoxic medications dose accordingly. Strict I&O's  - Nephrology consulted appreciate recommendation    # Hypovolemic hypernatremia  - Correct free water deficit  - Serial BMPs. Serum osmolality 369  - D5W at 125 mL/hr and free water flushes per nephrology     Infectious Disease   # Leukocytosis  - White count of 15.9. Lungs are clear on chest x-ray, afebrile. No obvious source of infection. UA unremarkable. Blood cultures pending. Check CRP, Pro-Braden, and ESR.   Rule out sepsis  - No indications for antibiotics at this time. Possibly reactive. Continue to monitor and follow-up cultures and labs. Hematology/Oncology   # No active problems    Endocrine   # Thyroid disease  - Check TSH and free T4. Continue Synthroid    Social/Spiritual/DNR/Other   CODE STATUS: Full  DVT prophylaxis: Lovenox  PPI: Protonix       \"Thank you for asking us to see this complex patient. \"      Case discussed with Dr. Cleopatra Hargrove  Electronically signed by RADHA Alcala CNP on 3/3/2023 at 8:52 PM    Total critical care time caring for this patient with life threatening, unstable organ failure, including direct patient contact, management of life support systems, review of data including imaging and labs, discussions with other team members and physicians at least 39  Min so far today, excluding procedures. Please feel free to call with questions or concerns. NOTE: This report was transcribed using voice recognition software. Every effort was made to ensure accuracy; however, inadvertent computerized transcription errors may be present.

## 2023-03-04 NOTE — ED NOTES
Attempted to call nurse to nurse to ICU, they will call back down momentarily.       Stephanie Leon, RN  03/03/23 [id number]

## 2023-03-04 NOTE — PROGRESS NOTES
[facility] Progress Note    Admitting Date and Time: 3/3/2023 12:17 PM  Admit Dx: Hypernatremia [E87.0]    Subjective:  Patient is being followed for Hypernatremia [E87.0]   Pt nonverbal.  Per RN: on Ivfs, hb low.     ROS: denies fever, chills, cp, sob, n/v, HA unless stated above.      levothyroxine  150 mcg PEG Tube Daily    insulin lispro  0-4 Units SubCUTAneous TID WC    insulin lispro  0-4 Units SubCUTAneous Nightly    potassium chloride        PARoxetine  10 mg Oral BID    montelukast  10 mg PEG Tube Nightly    valproic acid  1,000 mg Per G Tube BID    [Held by provider] OLANZapine  20 mg Oral BID    sodium chloride flush  5-40 mL IntraVENous 2 times per day    enoxaparin  40 mg SubCUTAneous Daily    pantoprazole  40 mg IntraVENous Daily    midodrine  10 mg PEG Tube TID WC    docusate  100 mg Oral Daily    polyethylene glycol  17 g Per G Tube Daily     bisacodyl, 10 mg, Daily PRN  glucose, 4 tablet, PRN  dextrose bolus, 125 mL, PRN   Or  dextrose bolus, 250 mL, PRN  glucagon (rDNA), 1 mg, PRN  dextrose, , Continuous PRN  sodium chloride flush, 10 mL, PRN  acetaminophen, 650 mg, Q4H PRN  sodium chloride flush, 5-40 mL, PRN  sodium chloride, , PRN  ondansetron, 4 mg, Q8H PRN   Or  ondansetron, 4 mg, Q6H PRN  acetaminophen, 650 mg, Q6H PRN   Or  acetaminophen, 650 mg, Q6H PRN  diatrizoate meglumine-sodium, 30 mL, ONCE PRN         Objective:    /60   Pulse 80   Temp (!) 96.6 °F (35.9 °C) (Bladder)   Resp 11   Ht 5' 5\" (1.651 m)   Wt 94 lb (42.6 kg)   SpO2 100%   BMI 15.64 kg/m²     General Appearance: Minimally responsive, though following commands mostly non verbal, bed bound, emaciated, frail, CVC rt IJ  Skin: warm and dry  Head: normocephalic and atraumatic  Eyes: Blind, conjunctivae normal  Neck: neck supple and non tender without mass   Pulmonary/Chest: clear to auscultation bilaterally- no wheezes, rales or rhonchi, normal air movement, no respiratory distress  Cardiovascular: normal rate, normal S1 and S2 and no carotid bruits  Abdomen: soft, non-tender, non-distended, normal bowel sounds, no masses or organomegaly, Pg tube +, zaragoza  Extremities: no cyanosis, no clubbing and no edema, Spastic limbs  Neurologic: no cranial nerve deficit and speech normal        Recent Labs     03/03/23  1307 03/03/23  1724 03/04/23  0428   * 163* 153*   K 4.0 3.9 3.7   * 129* 123*   CO2 23 23 24   * 102* 77*   CREATININE 1.3* 1.2* 1.2*   GLUCOSE 128* 128* 174*   CALCIUM 10.1 8.4* 8.1*       Recent Labs     03/03/23  1307 03/04/23  0428   WBC 15.9* 19.0*   RBC 4.19 3.10*   HGB 13.0 9.6*   HCT 44.0 32.4*   .0* 104.5*   MCH 31.0 31.0   MCHC 29.5* 29.6*   RDW 15.5* 15.7*    204   MPV 13.3* 12.6*       Micro:  No components found for: BC)    Radiology:   XR ABDOMEN (KUB) (SINGLE AP VIEW)    Result Date: 3/3/2023  EXAMINATION: ONE SUPINE XRAY VIEW(S) OF THE ABDOMEN 3/3/2023 5:40 pm COMPARISON: None. HISTORY: ORDERING SYSTEM PROVIDED HISTORY: peg tube placement TECHNOLOGIST PROVIDED HISTORY: Reason for exam:->peg tube placement FINDINGS: Peg tube projects in gastric body region, appearing to be just below the diaphragm as able to be visualized, and the contrast is within the stomach. There is noted to be at least moderate sized hiatal hernia.  There is possible constipation.     Findings consistent with intragastric peg 2 positioning as described above.     CT Head W/O Contrast    Result Date: 3/3/2023  EXAMINATION: CT OF THE HEAD WITHOUT CONTRAST  3/3/2023 1:22 pm TECHNIQUE: CT of the head was performed without the administration of intravenous contrast. Automated exposure control, iterative reconstruction, and/or weight based adjustment of the mA/kV was utilized to reduce the radiation dose to as low as reasonably achievable. COMPARISON: None. HISTORY: ORDERING SYSTEM PROVIDED HISTORY: [facility] TECHNOLOGIST PROVIDED HISTORY: Reason for exam:->[facility] Has a  \"code stroke\" or \"stroke alert\" been called? ->No Decision Support Exception - unselect if not a suspected or confirmed emergency medical condition->Emergency Medical Condition (MA) FINDINGS: BRAIN/VENTRICLES: There is no acute intracranial hemorrhage, mass effect or midline shift. No abnormal extra-axial fluid collection. The gray-white differentiation is maintained without evidence of an acute infarct. There is no evidence of hydrocephalus. The ventricles, cisterns and sulci are prominent consistent with atrophy. There is decreased attenuation within the periventricular white matter consistent with periventricular leukomalacia. ORBITS: There are chronic calcifications seen within the optic globes. Lurdes Dionne SINUSES: The visualized paranasal sinuses and mastoid air cells demonstrate no acute abnormality. SOFT TISSUES/SKULL:  No acute abnormality of the visualized skull or soft tissues. 1. There is no acute intracranial abnormality. Specifically, there is no intracranial hemorrhage. 2. Atrophy and periventricular leukomalacia,     XR CHEST PORTABLE    Result Date: 3/3/2023  EXAMINATION: ONE XRAY VIEW OF THE CHEST 3/3/2023 4:05 pm COMPARISON: 03/03/2023 HISTORY: ORDERING SYSTEM PROVIDED HISTORY: CVC placement TECHNOLOGIST PROVIDED HISTORY: Reason for exam:->CVC placement FINDINGS: There is borderline cardiac size. Right subclavian central line is in place with tip in the right atrium. There is no pneumothorax. Lungs are free of acute infiltrate or pleural effusion. Degenerative changes of the right shoulder are noted. Nonacute chest. Right subclavian central line with tip in the right atrium without pneumothorax. XR CHEST PORTABLE    Result Date: 3/3/2023  EXAMINATION: ONE XRAY VIEW OF THE CHEST 3/3/2023 1:16 pm COMPARISON: The lungs are without acute focal process. There is no effusion or pneumothorax. The cardiomediastinal silhouette is without acute process.  The osseous structures are without acute process. No acute process. HISTORY: ORDERING SYSTEM PROVIDED HISTORY: SOB TECHNOLOGIST PROVIDED HISTORY: Reason for exam:->SOB     US RETROPERITONEAL COMPLETE    Result Date: 3/3/2023  EXAMINATION: RETROPERITONEAL ULTRASOUND OF THE KIDNEYS AND URINARY BLADDER 3/3/2023 COMPARISON: CT 09/30/2022, 09/19/2022 HISTORY: ORDERING SYSTEM PROVIDED HISTORY: BEENA TECHNOLOGIST PROVIDED HISTORY: Reason for exam:->BEENA What reading provider will be dictating this exam?->CRC FINDINGS: Kidneys: Only the right kidney is visualized, due to patient's condition. Apparent lesion at the upper pole is probably complex cyst such as hemorrhagic perhaps layered dependent increased echogenicity noted on image 21, and on CT is high attenuation without obvious enhancement. Is similar at about 1.8 cm on the exams. Note the exams however are limited and can be given attention on follow-up with and without contrast MRI or CT as clinically warranted or surveillance by ultrasound. Increased renal echogenicity. No hydronephrosis. Bladder: Not distended for evaluation with report of catheter. 1. Limited exam, only right kidney seen, with increased echogenicity suggesting medical disease without hydronephrosis. 2. Upper pole lesion favored to be hyperdense cyst as able to be assessed, as discussed above. Assessment:    Principal Problem:    Hypernatremia  Active Problems:    BEENA (acute kidney injury) ([address]    Hypotension due to hypovolemia    Tachycardia    Metabolic encephalopathy    Nausea and vomiting    Severe protein-calorie malnutrition (HCC)  Resolved Problems:    * No resolved hospital problems. *      Plan:  1. Hypernatremia;  on admission - Due to dehydration, n/v not tolerating TF - has a peg tube for h/o dysphagia. Appears she was recently cleared for pleasure feeds with honey thick liquids.  Per staff at group home - pt has been vomiting up most of her feeding and concern that her peg tube needed changed to a lena button per surgery, consulted G.S. s/p NS bolus in ER, Consulted nephrology. Start D5W. Check urine lytes/ serum/ urine osmo. Will attempt to flush peg tube, FWFs 100cc/q4hrs. 2. Hypotension with h/o HTN, sinus tacycardia: likely due to hypovolemia/ dehydration: rehydrate- on midodrine      3. Acute encephalopathy: pt was sent to ER from [facility] with concerns of altered mental status/ lethargy, likely metabolic due to above: CT head neg for acute findings. h/o MRDD/ dementia     4. Septic criteria: pt with hypotension/ tachycardia/ leukocytosis- may all be related to contraction/ dehydration. Received empiric abx in ER. Check viral panel. Check UA. CXR with no pneumonia process. MRDD/ dementia, h/o dysphagia with peg tube placement in 2021, h/o hemodialysis,  large hiatal hernia, HTN, thyroid disease, OA,  blind and pneumonia. DVT Prophylaxis - Lovenox   Patient admitted from [address] [facility]  Possible transfer out of ICU today. NOTE: This report was transcribed using voice recognition software. Every effort was made to ensure accuracy; however, inadvertent computerized transcription errors may be present.   Electronically signed by Elyssa Lorenzo MD on 3/4/2023 at 12:19 PM

## 2023-03-05 LAB
ALBUMIN SERPL-MCNC: 2.5 G/DL (ref 3.5–5.2)
ALP BLD-CCNC: 97 U/L (ref 35–104)
ALT SERPL-CCNC: 9 U/L (ref 0–32)
ANION GAP SERPL CALCULATED.3IONS-SCNC: 8 MMOL/L (ref 7–16)
AST SERPL-CCNC: 14 U/L (ref 0–31)
BASOPHILS ABSOLUTE: 0.05 E9/L (ref 0–0.2)
BASOPHILS RELATIVE PERCENT: 0.3 % (ref 0–2)
BILIRUB SERPL-MCNC: 0.4 MG/DL (ref 0–1.2)
BUN BLDV-MCNC: 34 MG/DL (ref 6–23)
CALCIUM IONIZED: 1.32 MMOL/L (ref 1.15–1.33)
CALCIUM SERPL-MCNC: 8.1 MG/DL (ref 8.6–10.2)
CHLORIDE BLD-SCNC: 116 MMOL/L (ref 98–107)
CO2: 22 MMOL/L (ref 22–29)
CREAT SERPL-MCNC: 0.7 MG/DL (ref 0.5–1)
EOSINOPHILS ABSOLUTE: 0.53 E9/L (ref 0.05–0.5)
EOSINOPHILS RELATIVE PERCENT: 2.9 % (ref 0–6)
GFR SERPL CREATININE-BSD FRML MDRD: >60 ML/MIN/1.73
GLUCOSE BLD-MCNC: 153 MG/DL (ref 74–99)
HCT VFR BLD CALC: 31.1 % (ref 34–48)
HEMOGLOBIN: 9.4 G/DL (ref 11.5–15.5)
IMMATURE GRANULOCYTES #: 0.31 E9/L
IMMATURE GRANULOCYTES %: 1.7 % (ref 0–5)
LYMPHOCYTES ABSOLUTE: 1.54 E9/L (ref 1.5–4)
LYMPHOCYTES RELATIVE PERCENT: 8.3 % (ref 20–42)
MAGNESIUM: 2 MG/DL (ref 1.6–2.6)
MCH RBC QN AUTO: 31.4 PG (ref 26–35)
MCHC RBC AUTO-ENTMCNC: 30.2 % (ref 32–34.5)
MCV RBC AUTO: 104 FL (ref 80–99.9)
METER GLUCOSE: 110 MG/DL (ref 74–99)
METER GLUCOSE: 113 MG/DL (ref 74–99)
METER GLUCOSE: 113 MG/DL (ref 74–99)
MONOCYTES ABSOLUTE: 1.36 E9/L (ref 0.1–0.95)
MONOCYTES RELATIVE PERCENT: 7.4 % (ref 2–12)
MRSA CULTURE ONLY: NORMAL
NEUTROPHILS ABSOLUTE: 14.7 E9/L (ref 1.8–7.3)
NEUTROPHILS RELATIVE PERCENT: 79.4 % (ref 43–80)
PDW BLD-RTO: 15.5 FL (ref 11.5–15)
PHOSPHORUS: 2 MG/DL (ref 2.5–4.5)
PLATELET # BLD: 184 E9/L (ref 130–450)
PMV BLD AUTO: 12.2 FL (ref 7–12)
POTASSIUM SERPL-SCNC: 4.2 MMOL/L (ref 3.5–5)
RBC # BLD: 2.99 E12/L (ref 3.5–5.5)
SODIUM BLD-SCNC: 146 MMOL/L (ref 132–146)
TOTAL PROTEIN: 5.8 G/DL (ref 6.4–8.3)
WBC # BLD: 18.5 E9/L (ref 4.5–11.5)

## 2023-03-05 PROCEDURE — 85025 COMPLETE CBC W/AUTO DIFF WBC: CPT

## 2023-03-05 PROCEDURE — 80053 COMPREHEN METABOLIC PANEL: CPT

## 2023-03-05 PROCEDURE — 6360000002 HC RX W HCPCS: Performed by: INTERNAL MEDICINE

## 2023-03-05 PROCEDURE — 6370000000 HC RX 637 (ALT 250 FOR IP)

## 2023-03-05 PROCEDURE — 2580000003 HC RX 258: Performed by: INTERNAL MEDICINE

## 2023-03-05 PROCEDURE — C9113 INJ PANTOPRAZOLE SODIUM, VIA: HCPCS | Performed by: NURSE PRACTITIONER

## 2023-03-05 PROCEDURE — 2580000003 HC RX 258: Performed by: NURSE PRACTITIONER

## 2023-03-05 PROCEDURE — 6370000000 HC RX 637 (ALT 250 FOR IP): Performed by: NURSE PRACTITIONER

## 2023-03-05 PROCEDURE — 6360000002 HC RX W HCPCS: Performed by: NURSE PRACTITIONER

## 2023-03-05 PROCEDURE — 82962 GLUCOSE BLOOD TEST: CPT

## 2023-03-05 PROCEDURE — 6370000000 HC RX 637 (ALT 250 FOR IP): Performed by: INTERNAL MEDICINE

## 2023-03-05 PROCEDURE — 82330 ASSAY OF CALCIUM: CPT

## 2023-03-05 PROCEDURE — 83735 ASSAY OF MAGNESIUM: CPT

## 2023-03-05 PROCEDURE — 2500000003 HC RX 250 WO HCPCS: Performed by: INTERNAL MEDICINE

## 2023-03-05 PROCEDURE — APPSS30 APP SPLIT SHARED TIME 16-30 MINUTES: Performed by: NURSE PRACTITIONER

## 2023-03-05 PROCEDURE — 84100 ASSAY OF PHOSPHORUS: CPT

## 2023-03-05 PROCEDURE — 1200000000 HC SEMI PRIVATE

## 2023-03-05 PROCEDURE — 36415 COLL VENOUS BLD VENIPUNCTURE: CPT

## 2023-03-05 PROCEDURE — 99232 SBSQ HOSP IP/OBS MODERATE 35: CPT | Performed by: STUDENT IN AN ORGANIZED HEALTH CARE EDUCATION/TRAINING PROGRAM

## 2023-03-05 PROCEDURE — 36592 COLLECT BLOOD FROM PICC: CPT

## 2023-03-05 RX ADMIN — VALPROIC ACID 1000 MG: 250 SOLUTION ORAL at 07:51

## 2023-03-05 RX ADMIN — Medication 10 ML: at 07:52

## 2023-03-05 RX ADMIN — MIDODRINE HYDROCHLORIDE 10 MG: 5 TABLET ORAL at 17:45

## 2023-03-05 RX ADMIN — SODIUM CHLORIDE, PRESERVATIVE FREE 10 ML: 5 INJECTION INTRAVENOUS at 11:28

## 2023-03-05 RX ADMIN — POTASSIUM CHLORIDE: 2 INJECTION, SOLUTION, CONCENTRATE INTRAVENOUS at 22:47

## 2023-03-05 RX ADMIN — POTASSIUM CHLORIDE: 2 INJECTION, SOLUTION, CONCENTRATE INTRAVENOUS at 11:55

## 2023-03-05 RX ADMIN — VALPROIC ACID 1000 MG: 250 SOLUTION ORAL at 20:59

## 2023-03-05 RX ADMIN — ENOXAPARIN SODIUM 40 MG: 100 INJECTION SUBCUTANEOUS at 18:46

## 2023-03-05 RX ADMIN — POTASSIUM PHOSPHATE, MONOBASIC AND POTASSIUM PHOSPHATE, DIBASIC 20 MMOL: 224; 236 INJECTION, SOLUTION, CONCENTRATE INTRAVENOUS at 12:07

## 2023-03-05 RX ADMIN — PAROXETINE 10 MG: 10 TABLET, FILM COATED ORAL at 07:51

## 2023-03-05 RX ADMIN — MIDODRINE HYDROCHLORIDE 10 MG: 5 TABLET ORAL at 11:59

## 2023-03-05 RX ADMIN — MIDODRINE HYDROCHLORIDE 10 MG: 5 TABLET ORAL at 07:51

## 2023-03-05 RX ADMIN — Medication 10 ML: at 20:59

## 2023-03-05 RX ADMIN — POLYETHYLENE GLYCOL 3350 17 G: 17 POWDER, FOR SOLUTION ORAL at 07:51

## 2023-03-05 RX ADMIN — MONTELUKAST SODIUM 10 MG: 10 TABLET ORAL at 20:59

## 2023-03-05 RX ADMIN — DOCUSATE SODIUM LIQUID 100 MG: 100 LIQUID ORAL at 07:51

## 2023-03-05 RX ADMIN — LEVOTHYROXINE SODIUM 150 MCG: 75 TABLET ORAL at 06:34

## 2023-03-05 RX ADMIN — POTASSIUM CHLORIDE: 2 INJECTION, SOLUTION, CONCENTRATE INTRAVENOUS at 04:39

## 2023-03-05 RX ADMIN — PANTOPRAZOLE SODIUM 40 MG: 40 INJECTION, POWDER, FOR SOLUTION INTRAVENOUS at 07:51

## 2023-03-05 ASSESSMENT — PAIN SCALES - GENERAL
PAINLEVEL_OUTOF10: 0

## 2023-03-05 NOTE — PROGRESS NOTES
Pt's zaragoza catheter leaking. Pad changed x2. Zaragoza removed to prevent skin breakdown. External catheter initiated. Removal charted. negative...

## 2023-03-05 NOTE — CONSULTS
General Surgery  Attending Consultation    Patient's Name/Date of Birth: Lexy Tiwari / 1955    Date: 3/3/2023    PCP/Referring Physician: Pawan Campos DO      CHIEF COMPLAINT:    Chief Complaint   Patient presents with    Altered Mental Status     Normally alert and oriented x1 but altered as of this morning. HISTORY OF PRESENT ILLNESS:    Lexy Tiwari is an 70 y.o. female with PMHx significant for MR, CRD on HD, GERD, HTN who presents with AMS. The patient resides in a group home and has a PEG that has been in place for several years. It has been changed in the past, most recently seen by Dr. Soham Zamora. The patient has been having issues with tolerating her TF per the chart. She has been having nausea and vomiting for the past several days following feeds. She had a g-tube study which shows the tube is in appropriate position. She also has a hiatal hernia. Past Medical History:   Past Medical History:   Diagnosis Date    Acute kidney injury ([facility] [address] 01/15/2017    d/t Vancomycin, on Dialysis [facility] right chest    Blind in both eyes     Essential hypertension 4/2/2021    Gastroesophageal reflux disease without esophagitis 4/2/2021    Hemodialysis patient [facility])     Hyperthyroidism     MR (mental retardation)     Osteoarthritis     Peripheral vascular disease ([facility] [address]     Pneumonia 01/06/2017    Pneumonia due to infectious organism 1/8/2017    Sepsis ([facility] [address] 3/4/2021        Past Surgical History:   Past Surgical History:   Procedure Laterality Date    BRONCHOSCOPY  02/10/2017    CHEST TUBE INSERTION Right 02/09/2017    GASTROSTOMY TUBE PLACEMENT N/A 3/7/2021    EGD PEG TUBE PLACEMENT performed by Kory Monsalve MD at [address] [place] [address] Right 01/17/2017    tessio insertion     OTHER SURGICAL HISTORY  01/25/2017    PEG tube insertion        Allergies: Patient has no known allergies.      Medications:   Current Facility-Administered Medications   Medication Dose Route Frequency Provider Last Rate Last Admin    levothyroxine (SYNTHROID) tablet 150 mcg  150 mcg PEG Tube Daily RADHA Mancia - CNP   150 mcg at 03/05/23 4658    bisacodyl (DULCOLAX) EC tablet 10 mg  10 mg Oral Daily PRN RADHA Mancia - CNP        insulin lispro (HUMALOG) injection vial 0-4 Units  0-4 Units SubCUTAneous TID  RADHA Mancia - CNP        insulin lispro (HUMALOG) injection vial 0-4 Units  0-4 Units SubCUTAneous Nightly RADHA Mancia - CNP        glucose chewable tablet 16 g  4 tablet Oral PRN Florentin Barone APRN - CNP        dextrose bolus 10% 125 mL  125 mL IntraVENous PRN RADHA Mancia - CNP        Or    dextrose bolus 10% 250 mL  250 mL IntraVENous PRN RADHA Mancia - CNP        glucagon (rDNA) injection 1 mg  1 mg SubCUTAneous PRN RADHA Mancia - CNP        dextrose 10 % infusion   IntraVENous Continuous PRN Florentin Barone APRN - CNP        potassium chloride 20 mEq in dextrose 5 % and 0.2 % NaCl 1,000 mL infusion   IntraVENous Continuous Aburey Torres  mL/hr at 03/05/23 0439 New Bag at 03/05/23 0439    diatrizoate meglumine-sodium (GASTROGRAFIN) 66-10 % solution 30 mL  30 mL Per G Tube ONCE PRN Sola Argueta MD   30 mL at 03/04/23 1327    sodium chloride flush 0.9 % injection 10 mL  10 mL IntraVENous PRN Roland Rome RN   10 mL at 03/03/23 1406    acetaminophen (TYLENOL) tablet 650 mg  650 mg Per G Tube Q4H PRN SCHUYLER Bacon        PARoxetine (PAXIL) tablet 10 mg  10 mg Oral BID Oumou Gaffney, APN   10 mg at 03/05/23 0751    montelukast (SINGULAIR) tablet 10 mg  10 mg PEG Tube Nightly Oumou Gaffney APN   10 mg at 03/04/23 2113    valproic acid (DEPAKENE) 250 MG/5ML oral solution 1,000 mg  1,000 mg Per G Tube BID Oumou Gaffney, APN   1,000 mg at 03/05/23 0751    [Held by provider] OLANZapine (ZYPREXA) tablet 20 mg  20 mg Oral BID SCHUYLER Bacon        sodium chloride flush 0.9 % injection 5-40 mL  5-40 mL IntraVENous 2 times per day Verdia Seagoville, APN   10 mL at 03/05/23 5861    sodium chloride flush 0.9 % injection 5-40 mL  5-40 mL IntraVENous PRN Verdia [place], APN        0.9 % sodium chloride infusion   IntraVENous PRN Verdia [place], APN        enoxaparin (LOVENOX) injection 40 mg  40 mg SubCUTAneous Daily Verdia [place], APN   40 mg at 03/04/23 1750    ondansetron (ZOFRAN-ODT) disintegrating tablet 4 mg  4 mg Oral Q8H PRN Verdia [place], APN        Or    ondansetron (ZOFRAN) injection 4 mg  4 mg IntraVENous Q6H PRN Verdia [place], APN        acetaminophen (TYLENOL) tablet 650 mg  650 mg Oral Q6H PRN Verdia [place], APN        Or    acetaminophen (TYLENOL) suppository 650 mg  650 mg Rectal Q6H PRN Verdia [place], APN        pantoprazole (PROTONIX) injection 40 mg  40 mg IntraVENous Daily Verdia [place], APN   40 mg at 03/05/23 0751    diatrizoate meglumine-sodium (GASTROGRAFIN) 66-10 % solution 30 mL  30 mL PEG Tube ONCE PRN Jaqueline Rae MD   30 mL at 03/03/23 1755    midodrine (PROAMATINE) tablet 10 mg  10 mg PEG Tube TID WC Rochele Plate, APRN - CNP   10 mg at 03/05/23 0751    docusate (COLACE) 50 MG/5ML liquid 100 mg  100 mg Oral Daily Rochele Plate, APRN - CNP   100 mg at 03/05/23 0751    polyethylene glycol (GLYCOLAX) packet 17 g  17 g Per G Tube Daily Rochele Plate, APRN - CNP   17 g at 03/05/23 2276         Social History:   Social History     Tobacco Use    Smoking status: Never    Smokeless tobacco: Never   Substance Use Topics    Alcohol use: No        Family History:   History reviewed. No pertinent family history.     Medications Prior to Admission:   Medications Prior to Admission: bisacodyl (DULCOLAX) 10 MG suppository, Place 10 mg rectally daily  Dextromethorphan-guaiFENesin (MUCUS-DM MAX PO), by PEG Tube route  ondansetron (ZOFRAN-ODT) 8 MG TBDP disintegrating tablet, Place 4 mg under the tongue every 8 hours as needed for Nausea or Vomiting  triamcinolone (KENALOG) 0.1 % lotion, Apply topically 2 times daily Indications: Abnormal Dryness of Skin Apply topically 2 times daily. To BUE/BLE  blood glucose monitor kit and supplies, Dispense one Leo Matrix monitor and one lancet device. Patient tests blood glucose when nauseous, vomiting, or when tube feeding is held due to hypoglycemia risk. Lancets MISC, 1 each by Does not apply route daily Pt is in need of Leo Matrix blood glucose lancets. Patient tests bood glucose when nauseous, vomiting, or when tube feeding are held due to hypoglycemia risk. blood glucose monitor strips, Leo Matrix testing strips. Patient tests bood glucose when nauseous, vomiting, or when tube feeding are held due to hypoglycemia risk. [DISCONTINUED] Alcohol Swabs (ALCOHOL PREP) PADS, 1 each by Does not apply route daily Use before testing blood glucose. Patient tests bood glucose when nauseous, vomiting, or when tube feeding are held due to hypoglycemia risk. blood glucose monitor kit and supplies, Dispense sufficient amount for once daily testing frequency with ONE TOUCH glucometer. Dispense all needed supplies to include: monitor, strips, lancing device, lancets, control solutions, alcohol swabs. Patient tests BG when nauseous, vomiting, or when tube feeds are held due to hypoglycemia risk. Bacitracin-Polymyxin B (POLY BACITRACIN) [id number] UNIT/GM OINT, APPLY TOPICALLY TO OSTOMY TWICE DAILY. folic acid (FOLVITE) 1 MG tablet, TAKE 1 TABLET VIA G-TUBE DAILY  levothyroxine (SYNTHROID) 150 MCG tablet, [email or web address] 1 TABLET VIA G-TUBE ONCE DAILY  polyethylene glycol (GLYCOLAX) 17 g packet, 17 g by Per G Tube route daily as needed  famotidine (PEPCID) 20 MG tablet, TAKE (1) TABLET VIA PEG TUBE TWO TIMES DAILY. montelukast (SINGULAIR) 10 MG tablet, TAKE 1 TABLET VIA G-TUBE AT BEDTIME.   PARoxetine (PAXIL) 10 MG tablet, TAKE 1 TABLET VIA G-TUBE TWICE A DAY  [DISCONTINUED] Gauze Pads & Dressings 2\"X2\" PADS, 1 each by Does not apply route in the morning and at bedtime Apply to stoma  bumetanide (BUMEX) 1 MG tablet, TAKE 1 TABLET VIA G-TUBE DAILY AS NEEDED FOR SWELLING.  midodrine (PROAMATINE) 2.5 MG tablet, TAKE 3 TABLET VIA G-TUBE THREE TIMES DAILY AS NEEDED. (HYPOTENSION, GIVE ONLY IF SYSTOLIC BP IS <232. (BP CHECKS 3 TIMES A DAY. NATURAL VITAMIN D-3 125 MCG (5000 UT) TABS tablet, TAKE 1 TABLET VIA G-TUBE ONCE DAILY  FEROSUL 325 (65 Fe) MG tablet, TAKE 1 TABLET VIA G-TUBE ONCE A DAY. (Patient taking differently: 220 mg by Per G Tube route daily (with breakfast))  Disposable Gloves (POWDER FREE NITRILE GLOVES MED) MISC, As Directed  albuterol (PROVENTIL) (2.5 MG/3ML) 0.083% nebulizer solution, Take 3 mLs by nebulization every 6 hours  Respiratory Therapy Supplies (FULL KIT NEBULIZER SET) MISC, Use as directed with nebulized medication.   acetaminophen (TYLENOL) 325 MG tablet, 650 mg by Per G Tube route every 4 hours as needed for Pain or Fever  medicated lip balm (BLISTEX/CARMEX) 2-2.5-6.6 % STCK, Apply topically as needed for Dry Lips (CRACKED LIPS)  SUNSCREENS EX, Apply topically as needed (SUNBURN PREVENTION) Indications: *SPF 50*  Neomycin-Bacitracin-Polymyxin (TRIPLE ANTIBIOTIC) OINT, Apply topically 2 times daily as needed (CUTS/SCRAPES/SKIN ABRASIONS)  OLANZapine (ZYPREXA) 20 MG tablet, by Per G Tube route in the morning and at bedtime  valproic acid (DEPACON) 250 MG/5ML SOLN oral solution, 20 mLs by Per G Tube route 2 times daily    REVIEW OF SYSTEMS:    Unable to assess secondary to mental status    PHYSICAL EXAM   /71   Pulse 75   Temp 97.3 °F (36.3 °C) (Bladder)   Resp 13   Ht 5' 5\" (1.651 m)   Wt 102 lb 4.7 oz (46.4 kg)   SpO2 100%   BMI 17.02 kg/m²     General appearance: alert  Head: NCAT  Eyes: no scleral icterus  Chest: no skin abnormalities, no masses  Lungs: normal work of breathing  Heart: regular rate  Abdomen: PEG in place, soft, non-tender, nondistended  Skin: No skin abnormalities  Neurologic: normal  Musculoskeletal: No edema. LABS:  CBC with Differential:    Lab Results   Component Value Date/Time    WBC 18.5 03/05/2023 04:45 AM    RBC 2.99 03/05/2023 04:45 AM    HGB 9.4 03/05/2023 04:45 AM    HCT 31.1 03/05/2023 04:45 AM     03/05/2023 04:45 AM    .0 03/05/2023 04:45 AM    MCH 31.4 03/05/2023 04:45 AM    MCHC 30.2 03/05/2023 04:45 AM    RDW 15.5 03/05/2023 04:45 AM    NRBC 5.2 03/03/2023 01:07 PM    SEGSPCT 58 06/26/2011 10:26 AM    METASPCT 0.9 03/03/2023 01:07 PM    LYMPHOPCT 8.3 03/05/2023 04:45 AM    PROMYELOPCT 0.9 03/07/2021 04:04 AM    MONOPCT 7.4 03/05/2023 04:45 AM    MYELOPCT 0.9 03/03/2023 01:07 PM    BASOPCT 0.3 03/05/2023 04:45 AM    MONOSABS 1.36 03/05/2023 04:45 AM    LYMPHSABS 1.54 03/05/2023 04:45 AM    EOSABS 0.53 03/05/2023 04:45 AM    BASOSABS 0.05 03/05/2023 04:45 AM       ASSESSMENT AND PLAN:     Hunter Chaidez is an 70 y.o. female who presents with nausea, vomiting with PEG    PEG is in proper position. Trial advancing tube feeds.     Physician Signature: Electronically signed by Dr. Bola Cruz MD, FACS    Send copy of H&P to PCP, Montserrat Cerna DO

## 2023-03-05 NOTE — PATIENT CARE CONFERENCE
Intensive Care Daily Quality Rounding Checklist        ICU Team Members: N/A IM rigo     ICU Day #: IM PATIENT      Intubation Date:  N/A     Ventilator Day #: N/A     Central Line Insertion Date: March 3                                                    Day #: NUMBER: 3                                                    Indication: CVCIndication: Limited/no vessels suitable for conventional peripheral access      Arterial Line Insertion Date:  N/A                             Day #: N/A     Temporary Hemodialysis Catheter Insertion Date:  N/A                             Day # N/A     DVT Prophylaxis: Lovenox    GI Prophylaxis: Protonix     Curry Catheter Insertion Date: March 3                                        Day #: 3                             Indications: Need for fluid management of the critically ill patient in a critical care setting                             Continued need (if yes, reason documented and discussed with physician): yes, accurate I&O and noncompliance with external device. Skin Issues/ Wounds and ordered treatment discussed on rounds: No     Goals/ Plans for the Day: Monitor labs and vitals, treat/replace as needed. Nephrology to manage Na. Surgery consult for misplaced PEG tube. Transfer to intermediate.

## 2023-03-05 NOTE — PROGRESS NOTES
[facility] Progress Note    Admitting Date and Time: 3/3/2023 12:17 PM  Admit Dx: Hypernatremia [E87.0]    Subjective:  Patient is being followed for Hypernatremia [E87.0]     Patient seen awake and alert- resting in bed in no acute distress  Pt is a telemetry hold in ICU  Tolerating Free water 30cc/ hr via peg  Has been off TF per nursing until evaluated by surgery   No vomiting per nursing  Per nursing pt has not been verbalizing- but has been assisting with turning    ROS: denies fever, chills, cp, sob, n/v, HA unless stated above.      levothyroxine  150 mcg PEG Tube Daily    insulin lispro  0-4 Units SubCUTAneous TID WC    insulin lispro  0-4 Units SubCUTAneous Nightly    PARoxetine  10 mg Oral BID    montelukast  10 mg PEG Tube Nightly    valproic acid  1,000 mg Per G Tube BID    [Held by provider] OLANZapine  20 mg Oral BID    sodium chloride flush  5-40 mL IntraVENous 2 times per day    enoxaparin  40 mg SubCUTAneous Daily    pantoprazole  40 mg IntraVENous Daily    midodrine  10 mg PEG Tube TID WC    docusate  100 mg Oral Daily    polyethylene glycol  17 g Per G Tube Daily     bisacodyl, 10 mg, Daily PRN  glucose, 4 tablet, PRN  dextrose bolus, 125 mL, PRN   Or  dextrose bolus, 250 mL, PRN  glucagon (rDNA), 1 mg, PRN  dextrose, , Continuous PRN  diatrizoate meglumine-sodium, 30 mL, ONCE PRN  sodium chloride flush, 10 mL, PRN  acetaminophen, 650 mg, Q4H PRN  sodium chloride flush, 5-40 mL, PRN  sodium chloride, , PRN  ondansetron, 4 mg, Q8H PRN   Or  ondansetron, 4 mg, Q6H PRN  acetaminophen, 650 mg, Q6H PRN   Or  acetaminophen, 650 mg, Q6H PRN  diatrizoate meglumine-sodium, 30 mL, ONCE PRN         Objective:    /71   Pulse 75   Temp 97.3 °F (36.3 °C) (Bladder)   Resp 13   Ht 5' 5\" (1.651 m)   Wt 102 lb 4.7 oz (46.4 kg)   SpO2 100%   BMI 17.02 kg/m²   General Appearance: awake- not verbalizing at this time.    Skin: warm and dry  Head: normocephalic and atraumatic  Neck: neck supple and non tender without mass   Pulmonary/Chest: diminished throughout to auscultation   Cardiovascular: normal rate, normal S1 and S2 and no carotid bruits  Abdomen: soft, non-tender, non-distended, normal bowel sounds- peg tube c/d/i  Extremities: no cyanosis, no clubbing and no edema  Neurologic:  VIRAL        Recent Labs     03/04/23  0428 03/04/23  1502 03/05/23  0445   * 150* 146   K 3.7 3.7 4.2   * 121* 116*   CO2 24 24 22   BUN 77* 55* 34*   CREATININE 1.2* 0.9 0.7   GLUCOSE 174* 148* 153*   CALCIUM 8.1* 8.1* 8.1*       Recent Labs     03/03/23  1307 03/04/23  0428 03/05/23  0445   WBC 15.9* 19.0* 18.5*   RBC 4.19 3.10* 2.99*   HGB 13.0 9.6* 9.4*   HCT 44.0 32.4* 31.1*   .0* 104.5* 104.0*   MCH 31.0 31.0 31.4   MCHC 29.5* 29.6* 30.2*   RDW 15.5* 15.7* 15.5*    204 184   MPV 13.3* 12.6* 12.2*       Assessment:    Principal Problem:    Hypernatremia  Active Problems:    BEENA (acute kidney injury) (HCC)    Hypotension due to hypovolemia    Tachycardia    Metabolic encephalopathy    Nausea and vomiting    Severe protein-calorie malnutrition (HCC)  Resolved Problems:    * No resolved hospital problems. *      Plan:  1. Severe Hypernatremia;  on arrival to ER ? Due to n/v not tolerating TF ? Issue with peg tube- pt was sent to ER from [facility] with concerns of altered mental status/ lethargy. She has a h/o MRDD/ dementia and has a peg tube for h/o dysphagia. Appears she was recently cleared for pleasure feeds with honey thick liquids. Per staff at group home - pt has been vomiting up most of her feeding and concern that her peg tube needed changed to a lena button per surgery. [facility] home denied recent illness or diarrhea. Denies fevers. Pt received NS bolus in ER. Has followed with nephrology in the past for hypo/hypernatremia/ Initially sent to ICU as sodium level continued to climb- peaked at 163. Nephrology consulted and following- appreciate input. Sodium- 146.  Remains on IVF per nephrology. Patient is now telemetry hold in ICU. General surgery consulted to evaluate peg tube ? Need for lena button. Tolerating flushes via peg tube with no vomiting. TF on hold until eval by surgery. More awake and interactive per nursing. 2. Hypotension with h/o HTN: likely due to hypovolemia/ dehydration: rehydrate- continues on midodrine      3. Acute encephalopathy: likely metabolic due to above: CT head neg for acute findings. More interactive per nursing. Not verbalizing. 4. Septic criteria: pt with hypotension/ tachycardia/ leukocytosis on arrival- may all be related to contraction/ dehydration. Received empiric abx in ER. Check UA- neg. CXR with no pneumonia process. Holding off on abx. Blood cultures 24 h no growth     5. MRDD/ Dementia: supportive care- holding zyprexa      6. Thyroid disease; synthroid     7. H/o dysphagia- peg tube placed by surgery 3/2021. H/o ER visits/ admissions for nausea / vomiting- seen by surgery recently ? Need for lena button. Await surgery input      8. H/o hiatal hernia      9. Difficult IV access: central line placed in ER. 10. Constipation: on bowel regimen- back off if increases stools            Dispo: pt resides at a [address] work to follow         Time spent reviewing chart, clinical exam, discussing case and answering questions with staff/consultants/patient/family = 20 minutes       NOTE: This report was transcribed using voice recognition software. Every effort was made to ensure accuracy; however, inadvertent computerized transcription errors may be present.   Electronically signed by SCHUYLER Cooper on 3/5/2023 at 9:08 AM

## 2023-03-05 NOTE — PROGRESS NOTES
Nephrology Progress Note  3/5/2023 10:35 AM  Subjective:   Admit Date: 3/3/2023  PCP: Myrna Durand DO    Interval History: Patient was seen in the medical intensive care unit. Case was discussed with the patient's nurse at the bedside. Same as yesterday. Lying comfortably in bed tolerating IV fluids. Has Curry catheter with good urine output. Awake. Not in distress. Diet: Diet NPO    Data:     Scheduled Meds:   levothyroxine  150 mcg PEG Tube Daily    insulin lispro  0-4 Units SubCUTAneous TID WC    insulin lispro  0-4 Units SubCUTAneous Nightly    PARoxetine  10 mg Oral BID    montelukast  10 mg PEG Tube Nightly    valproic acid  1,000 mg Per G Tube BID    [Held by provider] OLANZapine  20 mg Oral BID    sodium chloride flush  5-40 mL IntraVENous 2 times per day    enoxaparin  40 mg SubCUTAneous Daily    pantoprazole  40 mg IntraVENous Daily    midodrine  10 mg PEG Tube TID WC    docusate  100 mg Oral Daily    polyethylene glycol  17 g Per G Tube Daily     Continuous Infusions:   dextrose      IV infusion builder 150 mL/hr at 03/05/23 0439    sodium chloride       PRN Meds:bisacodyl, glucose, dextrose bolus **OR** dextrose bolus, glucagon (rDNA), dextrose, diatrizoate meglumine-sodium, sodium chloride flush, acetaminophen, sodium chloride flush, sodium chloride, ondansetron **OR** ondansetron, acetaminophen **OR** acetaminophen, diatrizoate meglumine-sodium  I/O last 3 completed shifts:   In: 8844.5 [I.V.:6914.8; NG/GT:430; IV Piggyback:1499.8]  Out: 710 [Urine:710]  I/O this shift:  In: -   Out: 50 [Urine:50]    Intake/Output Summary (Last 24 hours) at 3/5/2023 1035  Last data filed at 3/5/2023 0800  Gross per 24 hour   Intake 7434.52 ml   Output 450 ml   Net 6984.52 ml     CBC:   Recent Labs     03/03/23  1307 03/04/23  0428 03/05/23  0445   WBC 15.9* 19.0* 18.5*   HGB 13.0 9.6* 9.4*    204 184     BMP:    Recent Labs     03/04/23  0428 03/04/23  1502 03/05/23  0445   * 150* 146 K 3.7 3.7 4.2   * 121* 116*   CO2 24 24 22   BUN 77* 55* 34*   CREATININE 1.2* 0.9 0.7   GLUCOSE 174* 148* 153*     Hepatic:   Recent Labs     03/03/23  1307 03/04/23  0428 03/05/23  0445   AST 12 10 14   ALT 12 10 9   BILITOT 0.6 0.5 0.4   ALKPHOS 85 76 97     Protein/ Albumin:    Lab Results   Component Value Date    LABALBU 2.5 (L) 03/05/2023       Latest Reference Range & Units 3/3/23 17:24   Color, UA Straw/Yellow  Yellow   Clarity, UA Clear  Clear   Glucose, UA Negative mg/dL Negative   Bilirubin, Urine Negative  SMALL ! Ketones, Urine Negative mg/dL Negative   Specific Gravity, UA 1.005 - 1.030  1.025   Blood, Urine Negative  Negative   pH, UA 5.0 - 9.0  5.5   Protein, UA Negative mg/dL Negative   Urobilinogen, Urine <2.0 E.U./dL 0.2   Nitrite, Urine Negative  Negative   Leukocyte Esterase, Urine Negative  Negative   Chloride Not Established mmol/L 29   Osmolality, Ur 300 - 900 mOsm/kg 601   Potassium, Ur Not Established mmol/L 53.0   Sodium, Ur Not Established mmol/L 26   !: Data is abnormal  Narrative   EXAMINATION:   RETROPERITONEAL ULTRASOUND OF THE KIDNEYS AND URINARY BLADDER       3/3/2023       COMPARISON:   CT 09/30/2022, 09/19/2022       HISTORY:   ORDERING SYSTEM PROVIDED HISTORY: BEENA   TECHNOLOGIST PROVIDED HISTORY:   Reason for exam:->BEENA   What reading provider will be dictating this exam?->CRC       FINDINGS:       Kidneys: Only the right kidney is visualized, due to patient's condition. Apparent   lesion at the upper pole is probably complex cyst such as hemorrhagic perhaps   layered dependent increased echogenicity noted on image 21, and on CT is high   attenuation without obvious enhancement. Is similar at about 1.8 cm on the   exams. Note the exams however are limited and can be given attention on   follow-up with and without contrast MRI or CT as clinically warranted or   surveillance by ultrasound. Increased renal echogenicity. No hydronephrosis.            Bladder: Not distended for evaluation with report of catheter. Impression   1. Limited exam, only right kidney seen, with increased echogenicity   suggesting medical disease without hydronephrosis. 2. Upper pole lesion favored to be hyperdense cyst as able to be assessed, as   discussed above. Narrative   Patient MRN:  [id number]   : 1955   Age: 60 years   Gender: Female       Order Date:  1/10/2017 1:36 PM       EXAM: US RENAL COMPLETE       NUMBER OF IMAGES:  [id number]       INDICATION: Acute renal failure. Technique: Multiplanar grayscale ultrasonographic images were obtained   of the kidneys by the ultrasound technologist on 1/10/2017 1:36 PM.    Adjacent structures are also visualized. Selected static images were   presented to the radiologist for review. Comparison: No prior studies available for comparison. Findings: The kidneys demonstrate normal smooth contour and   echotexture. The right kidney measures approximately 9.8 cm x 4.4 cm x   4.4 cm and the left kidney measures approximately 9.9 cm x 5.4 cm x   5.3 cm. No hydronephrosis, perinephric collections, renal calculi or   solid renal masses are demonstrated. A Curry catheter is present   within the urinary bladder. Impression   No evidence of hydronephrosis, perinephric collections,   renal calculi or solid renal masses. Narrative   EXAMINATION:   CT OF THE ABDOMEN AND PELVIS WITH CONTRAST 2022 10:35 pm       TECHNIQUE:   CT of the abdomen and pelvis was performed with the administration of   intravenous contrast. Multiplanar reformatted images are provided for review. Automated exposure control, iterative reconstruction, and/or weight based   adjustment of the mA/kV was utilized to reduce the radiation dose to as low   as reasonably achievable.        COMPARISON:   2022       HISTORY:   ORDERING SYSTEM PROVIDED HISTORY: emesis   TECHNOLOGIST PROVIDED HISTORY:   Additional Contrast?->None   Reason for exam:->emesis   Decision Support Exception - unselect if not a suspected or confirmed   emergency medical condition->Emergency Medical Condition (MA)       FINDINGS:   Lower Chest:  Visualized portion of the lower chest demonstrates no acute   abnormality. There is a large hiatal hernia. Organs: The liver, spleen, pancreas, and adrenals are within normal limits. The gallbladder is not visualized, possibly surgically absent. There are   bilateral renal cysts. No hydronephrosis. GI/Bowel: Percutaneous gastrostomy tube with retention balloon in the gastric   lumen. There is no evidence of bowel obstruction. No evidence of abnormal   bowel wall thickening or distension. The appendix is not visualized. However, there is no inflammatory change in the right lower quadrant to   suggest acute appendicitis. There is moderate stool throughout the colon. Pelvis: The urinary bladder is distended. There is small volume   intravesicular gas. The uterus is unremarkable. There is a 2.3 x 1.8 cm   left adnexal cyst.       Peritoneum/Retroperitoneum: There is small volume free fluid. No   extraluminal gas. No evidence of lymphadenopathy. Aorta is normal in   caliber. Bones/Soft Tissues:  No acute abnormality of the visualized osseous   structures. Impression   Large hiatal hernia. Small volume intravesicular gas, which could reflect recent instrumentation. Clinical correlation recommended. Left adnexal cyst.  See below for recommendations.        RECOMMENDATIONS:   Managing Incidental Adnexal Cystic Mass by CT or MR       Benign cyst:       Premenopausal (< or equal to 50 years if LMP unknown)       < or equal to 5 cm: no follow up       >5 cm: US in 6-12 weeks       > or equal to 10 cm: US promptly       Early postmenopausal (0-5 years after LMP)       < or equal to 3 cm: no follow up       3-5 cm: US in 6-12 weeks       >5 cm: US promptly Late postmenopausal       < or equal to 3 cm: no follow up       >3 cm: US promptly       Probably benign cyst : {benign appearing except demonstrates one or more of   the following - (a) angulated margin, (b) not round/oval shape, (c) not well   imaged due to artifact or technical parameters (ex. noncontrast CT)       Premenopausal (< than or equal to 50 years if LMP unknown)       < or equal to 3 cm: no follow up       3-5 cm: US in 6-12 weeks       >5 cm: US       Early postmenopausal       < or equal to 3 cm: no follow up       >3 cm: US promptly       Late postmenopausal       < or equal to 1 cm: no follow up       >1 cm: US       Reference:       Jose R Valle et al. Managing Incidental Findings on Abdominal CT: White Paper of   the ACR Incidental Findings Committee. J Am Jaki Radiol 2010;7:754-773                 Objective:     Vitals: /71   Pulse 77   Temp 97.3 °F (36.3 °C) (Bladder)   Resp 18   Ht 5' 5\" (1.651 m)   Wt 102 lb 4.7 oz (46.4 kg)   SpO2 99%   BMI 17.02 kg/m²   General appearance: Lying comfortably in bed tolerating IV fluids. Has Curry catheter with good urine output. Awake. Not in distress. Not on oxygen  Lungs: Good air movement  Heart: No S3, No rub  Abdomen: Lax, soft No tenderness. PEG tube  L. Extremities: No edema    Assessment & Plan:     Acute kidney injury: Nonoliguric. Improved with IV fluid suggesting prerenal azotemia. Creatinine 1.3 ---> 0.7 bland urine. Ultrasound kidney report revealed that only the right kidney was visualized because of the patient's condition yet recent CT scan revealed bilateral kidneys with cyst and no hydronephrosis. (Please, review reports above). Decrease IV fluid rate. Hypernatremia: Resolved (sodium 158-->146 over many days) reflecting inadequate water supplementation in a patient who is able to drink and is dependent on our assessment to supplement water per her PEG tube. Decrease rate of hypotonic IV fluids.   Patient is now tolerating 30 mL of free water per hour via her PEG tube. Hypophosphatemia: Being supplemented intravenously. It is a reflection of poor enteral nutrition. Continue to monitor and supplement as needed. Hypocalcemia reflecting hypoalbuminemia: Normal ionized calcium    Anemia: Not related to the patient's kidney function with current serum creatinine of 0.7. Elevated TSH and left adnexal cyst on recent CT scan: Defer to primary          This note was created using voice recognition software.     Electronically signed by Priti Foss MD on 3/5/2023 at 10:35 AM

## 2023-03-05 NOTE — PLAN OF CARE
Problem: Skin/Tissue Integrity  Goal: Absence of new skin breakdown  Description: 1. Monitor for areas of redness and/or skin breakdown  2. Assess vascular access sites hourly  3. Every 4-6 hours minimum:  Change oxygen saturation probe site  4. Every 4-6 hours:  If on nasal continuous positive airway pressure, respiratory therapy assess nares and determine need for appliance change or resting period.   3/5/2023 0041 by Clay Hunt RN  Outcome: Progressing  3/4/2023 1150 by Serg Bob RN  Outcome: Progressing     Problem: Discharge Planning  Goal: Discharge to home or other facility with appropriate resources  3/5/2023 0041 by Clay Hunt RN  Outcome: Progressing  Flowsheets (Taken 3/4/2023 2000)  Discharge to home or other facility with appropriate resources:   Identify barriers to discharge with patient and caregiver   Arrange for needed discharge resources and transportation as appropriate   Identify discharge learning needs (meds, wound care, etc)  3/4/2023 1150 by Serg Bob RN  Outcome: Progressing     Problem: Pain  Goal: Verbalizes/displays adequate comfort level or baseline comfort level  3/5/2023 0041 by Clay Hunt RN  Outcome: Progressing  Flowsheets  Taken 3/5/2023 0000  Verbalizes/displays adequate comfort level or baseline comfort level: Assess pain using appropriate pain scale  Taken 3/4/2023 2000  Verbalizes/displays adequate comfort level or baseline comfort level: Assess pain using appropriate pain scale  3/4/2023 1150 by Serg Bob RN  Outcome: Progressing     Problem: Safety - Adult  Goal: Free from fall injury  Outcome: Progressing  Flowsheets  Taken 3/5/2023 0000 by Clay Hunt RN  Free From Fall Injury: Instruct family/caregiver on patient safety  Taken 3/4/2023 1148 by Serg Bob RN  Free From Fall Injury: Instruct family/caregiver on patient safety     Problem: ABCDS Injury Assessment  Goal: Absence of physical injury  3/5/2023 0041 by Clay Hunt RN  Outcome: Progressing  Flowsheets (Taken 3/5/2023 0000)  Absence of Physical Injury: Implement safety measures based on patient assessment  3/4/2023 1150 by Delmy Awad RN  Outcome: Progressing  Flowsheets (Taken 3/4/2023 1148)  Absence of Physical Injury: Implement safety measures based on patient assessment     Problem: Nutrition Deficit:  Goal: Optimize nutritional status  3/5/2023 0041 by Clinton Ray RN  Outcome: Not Progressing  3/4/2023 1150 by Delmy Awad RN  Outcome: Not Progressing     Problem: Nutrition Deficit:  Goal: Optimize nutritional status  3/5/2023 0041 by Clinton Ray RN  Outcome: Not Progressing  3/4/2023 1150 by Delmy Awad RN  Outcome: Not Progressing

## 2023-03-06 LAB
ALBUMIN SERPL-MCNC: 2.2 G/DL (ref 3.5–5.2)
ALP BLD-CCNC: 70 U/L (ref 35–104)
ALT SERPL-CCNC: 10 U/L (ref 0–32)
ANION GAP SERPL CALCULATED.3IONS-SCNC: 6 MMOL/L (ref 7–16)
AST SERPL-CCNC: 15 U/L (ref 0–31)
BASOPHILS ABSOLUTE: 0.03 E9/L (ref 0–0.2)
BASOPHILS RELATIVE PERCENT: 0.2 % (ref 0–2)
BILIRUB SERPL-MCNC: 0.2 MG/DL (ref 0–1.2)
BUN BLDV-MCNC: 16 MG/DL (ref 6–23)
CALCIUM SERPL-MCNC: 7.8 MG/DL (ref 8.6–10.2)
CHLORIDE BLD-SCNC: 107 MMOL/L (ref 98–107)
CO2: 21 MMOL/L (ref 22–29)
CREAT SERPL-MCNC: 0.6 MG/DL (ref 0.5–1)
EOSINOPHILS ABSOLUTE: 0.37 E9/L (ref 0.05–0.5)
EOSINOPHILS RELATIVE PERCENT: 2.8 % (ref 0–6)
GFR SERPL CREATININE-BSD FRML MDRD: >60 ML/MIN/1.73
GLUCOSE BLD-MCNC: 111 MG/DL (ref 74–99)
HCT VFR BLD CALC: 32.2 % (ref 34–48)
HEMOGLOBIN: 9.9 G/DL (ref 11.5–15.5)
IMMATURE GRANULOCYTES #: 0.42 E9/L
IMMATURE GRANULOCYTES %: 3.1 % (ref 0–5)
LYMPHOCYTES ABSOLUTE: 1.48 E9/L (ref 1.5–4)
LYMPHOCYTES RELATIVE PERCENT: 11 % (ref 20–42)
MAGNESIUM: 1.6 MG/DL (ref 1.6–2.6)
MCH RBC QN AUTO: 31.4 PG (ref 26–35)
MCHC RBC AUTO-ENTMCNC: 30.7 % (ref 32–34.5)
MCV RBC AUTO: 102.2 FL (ref 80–99.9)
METER GLUCOSE: 107 MG/DL (ref 74–99)
METER GLUCOSE: 112 MG/DL (ref 74–99)
METER GLUCOSE: 121 MG/DL (ref 74–99)
METER GLUCOSE: 90 MG/DL (ref 74–99)
MONOCYTES ABSOLUTE: 0.93 E9/L (ref 0.1–0.95)
MONOCYTES RELATIVE PERCENT: 6.9 % (ref 2–12)
NEUTROPHILS ABSOLUTE: 10.17 E9/L (ref 1.8–7.3)
NEUTROPHILS RELATIVE PERCENT: 76 % (ref 43–80)
PDW BLD-RTO: 15.1 FL (ref 11.5–15)
PHOSPHORUS: 2.5 MG/DL (ref 2.5–4.5)
PLATELET # BLD: 191 E9/L (ref 130–450)
PMV BLD AUTO: 12.7 FL (ref 7–12)
POTASSIUM SERPL-SCNC: 4.7 MMOL/L (ref 3.5–5)
RBC # BLD: 3.15 E12/L (ref 3.5–5.5)
SODIUM BLD-SCNC: 134 MMOL/L (ref 132–146)
TOTAL PROTEIN: 5.5 G/DL (ref 6.4–8.3)
WBC # BLD: 13.4 E9/L (ref 4.5–11.5)

## 2023-03-06 PROCEDURE — 36415 COLL VENOUS BLD VENIPUNCTURE: CPT

## 2023-03-06 PROCEDURE — 6370000000 HC RX 637 (ALT 250 FOR IP): Performed by: INTERNAL MEDICINE

## 2023-03-06 PROCEDURE — 2580000003 HC RX 258: Performed by: INTERNAL MEDICINE

## 2023-03-06 PROCEDURE — 6360000002 HC RX W HCPCS: Performed by: INTERNAL MEDICINE

## 2023-03-06 PROCEDURE — 84100 ASSAY OF PHOSPHORUS: CPT

## 2023-03-06 PROCEDURE — 97161 PT EVAL LOW COMPLEX 20 MIN: CPT

## 2023-03-06 PROCEDURE — APPSS30 APP SPLIT SHARED TIME 16-30 MINUTES: Performed by: NURSE PRACTITIONER

## 2023-03-06 PROCEDURE — 6360000002 HC RX W HCPCS: Performed by: NURSE PRACTITIONER

## 2023-03-06 PROCEDURE — 80053 COMPREHEN METABOLIC PANEL: CPT

## 2023-03-06 PROCEDURE — 82962 GLUCOSE BLOOD TEST: CPT

## 2023-03-06 PROCEDURE — 1200000000 HC SEMI PRIVATE

## 2023-03-06 PROCEDURE — C9113 INJ PANTOPRAZOLE SODIUM, VIA: HCPCS | Performed by: INTERNAL MEDICINE

## 2023-03-06 PROCEDURE — 83735 ASSAY OF MAGNESIUM: CPT

## 2023-03-06 PROCEDURE — 99231 SBSQ HOSP IP/OBS SF/LOW 25: CPT | Performed by: STUDENT IN AN ORGANIZED HEALTH CARE EDUCATION/TRAINING PROGRAM

## 2023-03-06 PROCEDURE — 85025 COMPLETE CBC W/AUTO DIFF WBC: CPT

## 2023-03-06 RX ORDER — ENOXAPARIN SODIUM 100 MG/ML
30 INJECTION SUBCUTANEOUS DAILY
Status: DISCONTINUED | OUTPATIENT
Start: 2023-03-06 | End: 2023-03-16 | Stop reason: HOSPADM

## 2023-03-06 RX ORDER — MAGNESIUM SULFATE IN WATER 40 MG/ML
2000 INJECTION, SOLUTION INTRAVENOUS ONCE
Status: COMPLETED | OUTPATIENT
Start: 2023-03-06 | End: 2023-03-06

## 2023-03-06 RX ADMIN — MIDODRINE HYDROCHLORIDE 10 MG: 5 TABLET ORAL at 16:33

## 2023-03-06 RX ADMIN — VALPROIC ACID 1000 MG: 250 SOLUTION ORAL at 08:08

## 2023-03-06 RX ADMIN — MIDODRINE HYDROCHLORIDE 10 MG: 5 TABLET ORAL at 08:07

## 2023-03-06 RX ADMIN — MIDODRINE HYDROCHLORIDE 10 MG: 5 TABLET ORAL at 11:07

## 2023-03-06 RX ADMIN — DOCUSATE SODIUM LIQUID 100 MG: 100 LIQUID ORAL at 08:07

## 2023-03-06 RX ADMIN — MAGNESIUM SULFATE HEPTAHYDRATE 2000 MG: 40 INJECTION, SOLUTION INTRAVENOUS at 09:45

## 2023-03-06 RX ADMIN — VALPROIC ACID 1000 MG: 250 SOLUTION ORAL at 20:41

## 2023-03-06 RX ADMIN — PAROXETINE 10 MG: 10 TABLET, FILM COATED ORAL at 08:07

## 2023-03-06 RX ADMIN — PAROXETINE 10 MG: 10 TABLET, FILM COATED ORAL at 20:41

## 2023-03-06 RX ADMIN — LEVOTHYROXINE SODIUM 150 MCG: 75 TABLET ORAL at 05:26

## 2023-03-06 RX ADMIN — POTASSIUM CHLORIDE: 2 INJECTION, SOLUTION, CONCENTRATE INTRAVENOUS at 12:04

## 2023-03-06 RX ADMIN — MONTELUKAST SODIUM 10 MG: 10 TABLET ORAL at 20:41

## 2023-03-06 RX ADMIN — Medication 10 ML: at 20:42

## 2023-03-06 RX ADMIN — ENOXAPARIN SODIUM 30 MG: 100 INJECTION SUBCUTANEOUS at 20:41

## 2023-03-06 RX ADMIN — POLYETHYLENE GLYCOL 3350 17 G: 17 POWDER, FOR SOLUTION ORAL at 08:07

## 2023-03-06 RX ADMIN — PANTOPRAZOLE SODIUM 40 MG: 40 INJECTION, POWDER, FOR SOLUTION INTRAVENOUS at 08:06

## 2023-03-06 ASSESSMENT — PAIN SCALES - GENERAL: PAINLEVEL_OUTOF10: 0

## 2023-03-06 NOTE — PATIENT CARE CONFERENCE
P Quality Flow/Interdisciplinary Rounds Progress Note        Quality Flow Rounds held on March 6, 2023    Disciplines Attending:  Bedside Nurse, , , and Nursing Unit Leadership    Sheridan Morales was admitted on 3/3/2023 12:17 PM    Anticipated Discharge Date:       Disposition:    Jose Score:  Jose Scale Score: 15    Readmission Risk              Risk of Unplanned Readmission:  46           Discussed patient goal for the day, patient clinical progression, and barriers to discharge.   The following Goal(s) of the Day/Commitment(s) have been identified:  Labs - Report Results      William Ruffin RN  March 6, 2023

## 2023-03-06 NOTE — PROGRESS NOTES
Surgical residents came to see the patient this morning. They notified this RN that they would like to try tube feed. Verified tube feed with dieticians note. No new orders placed at this time.   Electronically signed by Shiv Cantu RN on 3/6/2023 at 6:47 AM

## 2023-03-06 NOTE — PROGRESS NOTES
[facility] Progress Note    Admitting Date and Time: 3/3/2023 12:17 PM  Admit Dx: Hypernatremia [E87.0]    Subjective:  Patient is being followed for Hypernatremia [E87.0]       Patient seen resting in bed with blanket over her head  Examined pt and she pulled blanket back over her head  Per nursing pt has been tolerating TF at 20ml/ hr- goal is 40/hr  No vomiting  Not verbalizing per nursing           ROS: denies fever, chills, cp, sob, n/v, HA unless stated above.      enoxaparin  30 mg SubCUTAneous Daily    levothyroxine  150 mcg PEG Tube Daily    insulin lispro  0-4 Units SubCUTAneous TID WC    insulin lispro  0-4 Units SubCUTAneous Nightly    PARoxetine  10 mg Oral BID    montelukast  10 mg PEG Tube Nightly    valproic acid  1,000 mg Per G Tube BID    [Held by provider] OLANZapine  20 mg Oral BID    sodium chloride flush  5-40 mL IntraVENous 2 times per day    pantoprazole  40 mg IntraVENous Daily    midodrine  10 mg PEG Tube TID WC    docusate  100 mg Oral Daily    polyethylene glycol  17 g Per G Tube Daily     bisacodyl, 10 mg, Daily PRN  glucose, 4 tablet, PRN  dextrose bolus, 125 mL, PRN   Or  dextrose bolus, 250 mL, PRN  glucagon (rDNA), 1 mg, PRN  dextrose, , Continuous PRN  diatrizoate meglumine-sodium, 30 mL, ONCE PRN  sodium chloride flush, 10 mL, PRN  acetaminophen, 650 mg, Q4H PRN  sodium chloride flush, 5-40 mL, PRN  sodium chloride, , PRN  ondansetron, 4 mg, Q8H PRN   Or  ondansetron, 4 mg, Q6H PRN  acetaminophen, 650 mg, Q6H PRN   Or  acetaminophen, 650 mg, Q6H PRN  diatrizoate meglumine-sodium, 30 mL, ONCE PRN         Objective:    BP (!) 98/58   Pulse 71   Temp 97.6 °F (36.4 °C) (Axillary)   Resp 16   Ht 5' 5\" (1.651 m)   Wt 107 lb (48.5 kg)   SpO2 98%   BMI 17.81 kg/m²     General Appearance: awake- not verbalizing at this time. Likes to keep her head covered with blanket.    Skin: warm and dry  Head: normocephalic and atraumatic  Neck: neck supple and non tender without mass   Pulmonary/Chest: diminished throughout to auscultation   Cardiovascular: normal rate, normal S1 and S2 and no carotid bruits  Abdomen: soft, non-tender, non-distended, normal bowel sounds- peg tube c/d/i  Extremities: no cyanosis, no clubbing and no edema  Neurologic:  VIRAL           Recent Labs     03/04/23  1502 03/05/23  0445 03/06/23 0222   * 146 134   K 3.7 4.2 4.7   * 116* 107   CO2 24 22 21*   BUN 55* 34* 16   CREATININE 0.9 0.7 0.6   GLUCOSE 148* 153* 111*   CALCIUM 8.1* 8.1* 7.8*       Recent Labs     03/04/23  0428 03/05/23 0445 03/06/23 0222   WBC 19.0* 18.5* 13.4*   RBC 3.10* 2.99* 3.15*   HGB 9.6* 9.4* 9.9*   HCT 32.4* 31.1* 32.2*   .5* 104.0* 102.2*   MCH 31.0 31.4 31.4   MCHC 29.6* 30.2* 30.7*   RDW 15.7* 15.5* 15.1*    184 191   MPV 12.6* 12.2* 12.7*           Assessment:    Principal Problem:    Hypernatremia  Active Problems:    BEENA (acute kidney injury) (HCC)    Hypotension due to hypovolemia    Tachycardia    Metabolic encephalopathy    Nausea and vomiting    Severe protein-calorie malnutrition (HCC)  Resolved Problems:    * No resolved hospital problems. *      Plan:  1. Severe Hypernatremia;  on arrival to ER ? Due to n/v not tolerating TF ? Issue with peg tube- pt was sent to ER from [facility] with concerns of altered mental status/ lethargy. She has a h/o MRDD/ dementia and has a peg tube for h/o dysphagia. Appears she was recently cleared for pleasure feeds with honey thick liquids. Per staff at group home - pt has been vomiting up most of her feeding and concern that her peg tube needed changed to a lena button per surgery. [facility] home denied recent illness or diarrhea. Denies fevers. Pt received NS bolus in ER. Has followed with nephrology in the past for hypo/hypernatremia/ Initially sent to ICU as sodium level continued to climb- peaked at 163. Nephrology consulted and following- appreciate input. Sodium- 134.  Remains on IVF per nephrology. General surgery consulted to evaluate peg tube. Reporting today that peg tube was malpositioned/ not appropriately tightened and needs to be at 2cm to skin. Reporting if in not appropriate position could be causing mechanical obstruction. Tube feeding started this am at 20ml/ hr-  and pt tolerating thus far. 2. Hypotension with h/o HTN: likely due to hypovolemia/ dehydration: rehydrate- continues on midodrine      3. Acute encephalopathy: likely metabolic due to above: CT head neg for acute findings. More interactive per nursing. Not verbalizing. 4. Septic criteria: pt with hypotension/ tachycardia/ leukocytosis on arrival- may all be related to contraction/ dehydration. Received empiric abx in ER. Check UA- neg. CXR with no pneumonia process. Holding off on abx. Blood cultures 24 h no growth     5. MRDD/ Dementia: supportive care- holding zyprexa      6. Thyroid disease; synthroid     7. H/o dysphagia- peg tube placed by surgery 3/2021. H/o ER visits/ admissions for nausea / vomiting- seen by surgery recently ? Need for lena button. Appreciate surgery input- reporting peg was not in appropriate position today. Peg at 20cm likely causing obstruction. Need to keep peg 2cm at the skin. 8. H/o hiatal hernia      9. Difficult IV access: central line placed in ER. 10. Constipation: on bowel regimen- back off if stool increases       11. Hypomagnesemia: supplement         Dispo: pt resides at a [address] work to follow. Likely DC in 24 hours if pt is tolerating tube feeding. NOTE: This report was transcribed using voice recognition software. Every effort was made to ensure accuracy; however, inadvertent computerized transcription errors may be present.   Electronically signed by SCHUYLER Damon on 3/6/2023 at 11:54 AM

## 2023-03-06 NOTE — CARE COORDINATION
Social Work:    Social service placed a follow-up call to Elliott Lerner, medical coordinator at [facility], ([phone] fax [phone]), advised her about social work role, and confirmed plans to return to the [facility] vs snf need. [employer] advised that Gali Whatley is [facility] & bed bound,  has 24 hour aide care, [facility] nursing care, uses [facility] in [place], and is taken via [facility] to her PCP office as needed. [employer] advised that Gali Whatley will need skilled rehab if a PICC line & IVATB are required. [employer] also requested a medical update be faxed to her prior to return to confirm no other needs. Social work faxed a medical update today & RN will need to call a N-N prior to return, as well as fax discharge orders. Social work also left a voice message with Legal Guardian Kayli Whitlock [phone]. Electronically signed by AILYN Marie on 3/6/2023 at 11:37 AM    Addendum:    [employer] at [facility] advised that if they do not have an ambulette available to transport Gali Whatley back to the [facility], the hospital will need to arrange ambulette or ambulance.   (Forms completed if needed)    Electronically signed by AILYN Marie on 3/6/2023 at 3:09 PM

## 2023-03-06 NOTE — PROGRESS NOTES
GENERAL SURGERY  DAILY PROGRESS NOTE  3/6/2023    Subjective:  No events overnight. Per nurse patient did continue with nausea and vomiting. No other concerns. Objective:  BP (!) 98/58   Pulse 71   Temp 97.6 °F (36.4 °C) (Axillary)   Resp 16   Ht 5' 5\" (1.651 m)   Wt 107 lb (48.5 kg)   SpO2 98%   BMI 17.81 kg/m²     General appearance: alert, cooperative and in no acute distress. Eyes: grossly normal  Lungs: nonlabored breathing  Heart: regular rate  Abdomen: soft, non-tender, non distended; PEG tube initially at 20 cm with adjustment to 2 cm at the skin while in patient room  Skin: No skin abnormalities  Neurologic: Alert and oriented x 3. Grossly normal  Musculoskeletal: No clubbing cyanosis or edema    Assessment/Plan:  70 y.o. female with nausea and vomiting with PEG tube in place    -PEG tube was initially 20 cm at the skin, likely causing mechanical obstruction and nausea/vomiting  -Balloon was deflated, PEG tube was adjusted to 2 cm at the skin and secured in this position.   Discussed with nurse the need to keep PEG tube 2 cm of the skin to prevent any additional obstruction from occurring.  -We will zip tie PEG tube today to ensure that it remains held in place  -We will trial tube feeds today  -Patient case discussed with attending, Dr. Yoni Gonzalez    Electronically signed by Tereso Naylor MD on 3/6/2023 at 9:24 AM

## 2023-03-06 NOTE — PROGRESS NOTES
Progress Note  3/6/2023 1:10 PM  Subjective:   Admit Date: 3/3/2023  PCP: Laverne Marcial DO  Interval History:   Patient examined , non communicative , Tube feeding ( peg) in progress , on IVF as well     Diet: Diet NPO  ADULT TUBE FEEDING; PEG; Standard with Fiber; Continuous; 20; Yes; 5; Q 4 hours; 40; 30; Q 4 hours    Data:   Scheduled Meds:   enoxaparin  30 mg SubCUTAneous Daily    levothyroxine  150 mcg PEG Tube Daily    insulin lispro  0-4 Units SubCUTAneous TID WC    insulin lispro  0-4 Units SubCUTAneous Nightly    PARoxetine  10 mg Oral BID    montelukast  10 mg PEG Tube Nightly    valproic acid  1,000 mg Per G Tube BID    [Held by provider] OLANZapine  20 mg Oral BID    sodium chloride flush  5-40 mL IntraVENous 2 times per day    pantoprazole  40 mg IntraVENous Daily    midodrine  10 mg PEG Tube TID WC    docusate  100 mg Oral Daily    polyethylene glycol  17 g Per G Tube Daily     Continuous Infusions:   dextrose      IV infusion builder 100 mL/hr at 03/06/23 1204    sodium chloride       PRN Meds:bisacodyl, glucose, dextrose bolus **OR** dextrose bolus, glucagon (rDNA), dextrose, diatrizoate meglumine-sodium, sodium chloride flush, acetaminophen, sodium chloride flush, sodium chloride, ondansetron **OR** ondansetron, acetaminophen **OR** acetaminophen, diatrizoate meglumine-sodium  I/O last 3 completed shifts:   In: 6677.5 [I.V.:5717.5; NG/GT:960]  Out: 615 [Urine:615]  I/O this shift:  In: 60 [NG/GT:60]  Out: 0     Intake/Output Summary (Last 24 hours) at 3/6/2023 1310  Last data filed at 3/6/2023 0917  Gross per 24 hour   Intake 2374 ml   Output 300 ml   Net 2074 ml     CBC:   Recent Labs     03/04/23  0428 03/05/23 0445 03/06/23 0222   WBC 19.0* 18.5* 13.4*   HGB 9.6* 9.4* 9.9*    184 191     BMP:    Recent Labs     03/04/23  1502 03/05/23 0445 03/06/23 0222   * 146 134   K 3.7 4.2 4.7   * 116* 107   CO2 24 22 21*   BUN 55* 34* 16   CREATININE 0.9 0.7 0.6   GLUCOSE 148* 153* 111*     Hepatic:   Recent Labs     03/04/23  0428 03/05/23  0445 03/06/23  0222   AST 10 14 15   ALT 10 9 10   BILITOT 0.5 0.4 0.2   ALKPHOS 76 97 70     Troponin: No results for input(s): TROPONINI in the last 72 hours. BNP: No results for input(s): BNP in the last 72 hours. Lipids: No results for input(s): CHOL, HDL in the last 72 hours. Invalid input(s): LDLCALCU  ABGs:   Lab Results   Component Value Date/Time    PO2ART 75.2 02/08/2021 04:42 PM    GTV7AHO 39.3 02/08/2021 04:42 PM     INR: No results for input(s): INR in the last 72 hours. -----------------------------------------------------------------  RAD: XR ABDOMEN (KUB) (SINGLE AP VIEW)    Result Date: 3/3/2023  EXAMINATION: ONE SUPINE XRAY VIEW(S) OF THE ABDOMEN 3/3/2023 5:40 pm COMPARISON: None. HISTORY: ORDERING SYSTEM PROVIDED HISTORY: peg tube placement TECHNOLOGIST PROVIDED HISTORY: Reason for exam:->peg tube placement FINDINGS: Peg tube projects in gastric body region, appearing to be just below the diaphragm as able to be visualized, and the contrast is within the stomach. There is noted to be at least moderate sized hiatal hernia. There is possible constipation. Findings consistent with intragastric peg 2 positioning as described above. CT Head W/O Contrast    Result Date: 3/3/2023  EXAMINATION: CT OF THE HEAD WITHOUT CONTRAST  3/3/2023 1:22 pm TECHNIQUE: CT of the head was performed without the administration of intravenous contrast. Automated exposure control, iterative reconstruction, and/or weight based adjustment of the mA/kV was utilized to reduce the radiation dose to as low as reasonably achievable. COMPARISON: None. HISTORY: ORDERING SYSTEM PROVIDED HISTORY: ams TECHNOLOGIST PROVIDED HISTORY: Reason for exam:->ams Has a \"code stroke\" or \"stroke alert\" been called? ->No Decision Support Exception - unselect if not a suspected or confirmed emergency medical condition->Emergency Medical Condition (MA) FINDINGS: BRAIN/VENTRICLES: There is no acute intracranial hemorrhage, mass effect or midline shift. No abnormal extra-axial fluid collection. The gray-white differentiation is maintained without evidence of an acute infarct. There is no evidence of hydrocephalus. The ventricles, cisterns and sulci are prominent consistent with atrophy. There is decreased attenuation within the periventricular white matter consistent with periventricular leukomalacia. ORBITS: There are chronic calcifications seen within the optic globes. Mark Colon SINUSES: The visualized paranasal sinuses and mastoid air cells demonstrate no acute abnormality. SOFT TISSUES/SKULL:  No acute abnormality of the visualized skull or soft tissues. 1. There is no acute intracranial abnormality. Specifically, there is no intracranial hemorrhage. 2. Atrophy and periventricular leukomalacia,     XR CHEST PORTABLE    Result Date: 3/3/2023  EXAMINATION: ONE XRAY VIEW OF THE CHEST 3/3/2023 4:05 pm COMPARISON: 03/03/2023 HISTORY: ORDERING SYSTEM PROVIDED HISTORY: CVC placement TECHNOLOGIST PROVIDED HISTORY: Reason for exam:->CVC placement FINDINGS: There is borderline cardiac size. Right subclavian central line is in place with tip in the right atrium. There is no pneumothorax. Lungs are free of acute infiltrate or pleural effusion. Degenerative changes of the right shoulder are noted. Nonacute chest. Right subclavian central line with tip in the right atrium without pneumothorax. XR CHEST PORTABLE    Result Date: 3/3/2023  EXAMINATION: ONE XRAY VIEW OF THE CHEST 3/3/2023 1:16 pm COMPARISON: The lungs are without acute focal process. There is no effusion or pneumothorax. The cardiomediastinal silhouette is without acute process. The osseous structures are without acute process. No acute process.  HISTORY: ORDERING SYSTEM PROVIDED HISTORY: SOB TECHNOLOGIST PROVIDED HISTORY: Reason for exam:->SOB     XR ABDOMEN FOR NG/OG/NE TUBE PLACEMENT    Result Date: 3/4/2023  EXAMINATION: ONE SUPINE XRAY VIEW(S) OF THE ABDOMEN 3/4/2023 1:25 pm COMPARISON: None. HISTORY: ORDERING SYSTEM PROVIDED HISTORY: Confirmation of course of Gtube and location of tip of tube TECHNOLOGIST PROVIDED HISTORY: Reason for exam:->Confirmation of course of Gtube and location of tip of tube Portable? ->Yes FINDINGS: There is contrast in the colon from previous contrast administration. No evidence of ileus or mechanical obstruction. No free air. Lung bases appear clear. After injection of contrast material via the gastrostomy tube, contrast material is noted in the proximal jejunum supporting appropriate positioning of the enteric catheter. 1.  Contrast material noted in small bowel loops after injection of feeding catheter. Catheter tip is likely within bowel possibly in the distal stomach or proximal duodenum. The tip is not directly visualized. 2.  No extravasation of injected contrast material into the peritoneum. US RETROPERITONEAL COMPLETE    Result Date: 3/3/2023  EXAMINATION: RETROPERITONEAL ULTRASOUND OF THE KIDNEYS AND URINARY BLADDER 3/3/2023 COMPARISON: CT 09/30/2022, 09/19/2022 HISTORY: ORDERING SYSTEM PROVIDED HISTORY: BEENA TECHNOLOGIST PROVIDED HISTORY: Reason for exam:->BEENA What reading provider will be dictating this exam?->CRC FINDINGS: Kidneys: Only the right kidney is visualized, due to patient's condition. Apparent lesion at the upper pole is probably complex cyst such as hemorrhagic perhaps layered dependent increased echogenicity noted on image 21, and on CT is high attenuation without obvious enhancement. Is similar at about 1.8 cm on the exams. Note the exams however are limited and can be given attention on follow-up with and without contrast MRI or CT as clinically warranted or surveillance by ultrasound. Increased renal echogenicity. No hydronephrosis. Bladder: Not distended for evaluation with report of catheter.      1. Limited exam, only right kidney seen, with increased echogenicity suggesting medical disease without hydronephrosis. 2. Upper pole lesion favored to be hyperdense cyst as able to be assessed, as discussed above. Objective:   Vitals: BP (!) 98/58   Pulse 71   Temp 97.6 °F (36.4 °C) (Axillary)   Resp 16   Ht 5' 5\" (1.651 m)   Wt 107 lb (48.5 kg)   SpO2 98%   BMI 17.81 kg/m²   General appearance: appears stated age   Skin:  No rashes or lesions  HEENT: Head: Normocephalic, no lesions, without obvious abnormality.   Neck: no adenopathy, no carotid bruit, no JVD, supple, symmetrical, trachea midline, and thyroid not enlarged, symmetric, no tenderness/mass/nodules  Lungs: clear to auscultation bilaterally  Heart: regular rate and rhythm, S1, S2 normal, no murmur, click, rub or gallop  Abdomen: soft, non-tender; bowel sounds normal; no masses,  no organomegaly  Extremities: extremities normal, atraumatic, no cyanosis or edema  Neurologic: Mental status: alertness: lethargic    Assessment:   Patient Active Problem List:     Profound intellectual disability     Hypothyroidism     Impairment level: total impairment of both eyes     Hyperglycemia     Nonsustained ventricular tachycardia     Macrocytosis     Disruptive behavior disorder     Ingrowing nail     Peripheral vascular disease (HCC)     Altered mental state     Onychomycosis     Anemia due to chronic kidney disease     Essential hypertension     Vitamin D deficiency     Gastroesophageal reflux disease without esophagitis     S/P percutaneous endoscopic gastrostomy (PEG) tube placement (HCC)     Chronic kidney disease     Dementia with behavioral disturbance     Gastrostomy tube dysfunction (HCC)     Severe dehydration     Hyponatremia     Intractable nausea and vomiting     Hypernatremia     BEENA (acute kidney injury) (HCC)     Hypotension due to hypovolemia     Tachycardia     Metabolic encephalopathy     Nausea and vomiting     Severe protein-calorie malnutrition ([facility] [address]    Plan: Acute kidney injury: Nonoliguric. Improved with IV fluid suggesting prerenal azotemia. Creatinine 1.3 ---> 0.6 bland urine. Ultrasound kidney report revealed that only the right kidney was visualized because of the patient's condition yet recent CT scan revealed bilateral kidneys with cyst and no hydronephrosis. Hypernatremia: Resolved (sodium 158--> 134  over many days) reflecting inadequate water supplementation in a patient who is able to drink and is dependent on our assessment to supplement water per her PEG tube. Decrease rate of hypotonic IV fluids. Patient is now tolerating 30 mL of free water per hour via her PEG tube. Hypophosphatemia: Being supplemented intravenously. It is a reflection of poor enteral nutrition. Continue to monitor and supplement as needed. Hypocalcemia reflecting hypoalbuminemia: Normal ionized calcium    Anemia: Not related to the patient's kidney function with current serum creatinine of 0.7.     Elevated TSH and left adnexal cyst on recent CT scan: Defer to primary    Will cut IVF to 50 cc/hr --  aim to Discontinuie by am          Connie Mendieta MD

## 2023-03-06 NOTE — PROGRESS NOTES
This patient is on medication that requires renal, weight, and/or indication dose adjustment. Date Body Weight IBW  Adjusted BW SCr  CrCl Dialysis status   3/6/2023 107 lb (48.5 kg) Ideal body weight: 57 kg (125 lb 10.6 oz) Serum creatinine: 0.6 mg/dL 03/06/23 0222  Estimated creatinine clearance: 68 mL/min N/a       Pharmacy has dose-adjusted the following medication(s):    Date Previous Order Adjusted Order   3/6/2023 Lovenox 40 mg daily Lovenox 30 mg daily       These changes were made per protocol according to the [address] for Pharmacists. *Please note this dose may need readjusted if patient's condition changes. Please contact pharmacy with any questions regarding these changes.     Kale Yu, [facility]  3/6/2023  6:37 AM

## 2023-03-06 NOTE — PLAN OF CARE
Problem: Skin/Tissue Integrity  Goal: Absence of new skin breakdown  Description: 1. Monitor for areas of redness and/or skin breakdown  2. Assess vascular access sites hourly  3. Every 4-6 hours minimum:  Change oxygen saturation probe site  4. Every 4-6 hours:  If on nasal continuous positive airway pressure, respiratory therapy assess nares and determine need for appliance change or resting period.   3/5/2023 2311 by Sudarshan Moon RN  Outcome: Progressing     Problem: Safety - Adult  Goal: Free from fall injury  Outcome: Progressing     Problem: ABCDS Injury Assessment  Goal: Absence of physical injury  Outcome: Progressing

## 2023-03-06 NOTE — PROGRESS NOTES
Physical Therapy  Facility/Department: [facility]  Physical Therapy Initial Assessment    Name: Arsenio Jim  : 1955  MRN: [id number]  Date of Service: 3/6/2023    Attending Provider:  Janelle Pinon MD    Evaluating PT:  Odessa Alberts, P.T. Room #:  0520/[facility]  Diagnosis:  Hypernatremia [E87.0]  Pertinent PMHx/PSHx:  mental retardation, dementia with behavioral disturbances, Blind B eyes, does not speak, PEG tube  Precautions:  Falls, bed/chair alarm, HOB >30° when tube feed is running. SUBJECTIVE:  Per chart:   Pt lives at a group home  is normally alert and oriented to self and can stand on her own. OBJECTIVE:   Initial Evaluation  Date: 3/6/23 Treatment Short Term/ Long Term   Goals   Was pt agreeable to Eval/treatment? Pt did not speak nor did she shake or nod her head     Does pt have pain? No apparent pain     Bed Mobility  Rolling: dependent  Supine to sit: dependent  Sit to supine: dependent  Scooting: dependent  MIN A   Transfers Sit to stand: NA  Stand to sit: NA  Stand pivot: NA  MIN A   Ambulation   NA  50 feet with AAD if needed MIN A   Stair negotiation: ascended and descended NA  NA   AM-PAC 6 Clicks   Above goals based on information from chart that pt usually interacts with staff and is able to stand and walk. BLE ROM is WFL. BLE strength is grossly 1/5 to 2/5. Did not move legs to command, but did so spontaneously when lying back down in bed.   Balance: sitting is MAX A due to posterior lean  Endurance: poor    Patient education  Pt educated on bed mobility    Patient response to education:   Pt verbalized understanding Pt demonstrated skill Pt requires further education in this area   no no yes     ASSESSMENT:    Conditions Requiring Skilled Therapeutic Intervention:    [x]Decreased strength     []Decreased ROM  [x]Decreased functional mobility  [x]Decreased balance   [x]Decreased endurance   [x]Decreased posture  []Decreased sensation  []Decreased coordination   []Decreased vision  [x]Decreased safety awareness   []Increased pain       Comments:  Pt was in bed asleep and woke up and opened her eyes. Pt is blind and did not speak which is her baseline per chart. She did not communicate in any other way at this time. I assisted pt to EOB and while sitting she had strong posterior lean and was not attempting to hold self up without MAX A. Pt was assisted back into bed and repositioned in bed for comfort and safety. PT goals set based on information provided in chart that pt is usually able to participate with staff and does stand and walk at baseline. Pt was left supine in bed R 1/4 turn as found with HOB elevated to 30° and call light left by patient. Chair/bed alarm: bed alarm was re-activated. Pt's/ family goals   1. To go home. Patient and or family understand(s) diagnosis, prognosis, and plan of care. PHYSICAL THERAPY PLAN OF CARE:    PT POC is established based on physician order and patient diagnosis     Referring provider/PT Order:  PT eval and treat  Diagnosis:  Hypernatremia [E87.0]  Specific instructions for next treatment:  to progress functional mobility as pt is able.     Current Treatment Recommendations:     [x] Strengthening to improve independence with functional mobility   [] ROM to improve ROM and decrease spasm and pain which will help promote independence with functional mobility   [x] Balance Training to improve static/dynamic balance and to reduce fall risk  [x] Endurance Training to improve activity tolerance during functional mobility   [x] Transfer Training to improve safety and independence with all functional transfers   [x] Gait Training to improve gait mechanics, endurance and assess need for appropriate assistive device  [] Stair Training in preparation for safe discharge home and/or into the community   [] Positioning to prevent skin breakdown and contractures  [x] Safety and Education Training   [] Patient/Caregiver Education   [] HEP  [] Other     PT long term treatment goals are located in above grid    Frequency of treatments: 2-5x/week x 1-2 weeks. Time in  09:00  Time out  09:15    Evaluation Time includes thorough review of current medical information, gathering information on past medical history/social history and prior level of function, completion of standardized testing/informal observation of tasks, assessment of data and education on plan of care and goals. CPT codes:  [x] Low Complexity PT evaluation 19018  [] Moderate Complexity PT evaluation 61788  [] High Complexity PT evaluation 26199  [] PT Re-evaluation 86490  [] Gait training 88699 ** minutes  [] Manual therapy 26830 ** minutes  [] Therapeutic activities 34158 ** minutes  [] Therapeutic exercises 22144 ** minutes  [] Neuromuscular reeducation 26573 ** minutes     Josemanuel Swanson., P.T.   License Number: [id number]

## 2023-03-06 NOTE — PROGRESS NOTES
GENERAL SURGERY  DAILY PROGRESS NOTE  3/6/2023    Subjective:  No further episodes of nausea/vomiting overnight per nursing. Patient currently with tube feeds held. Objective:  BP (!) 98/58   Pulse 71   Temp 97.6 °F (36.4 °C) (Axillary)   Resp 16   Ht 5' 5\" (1.651 m)   Wt 107 lb (48.5 kg)   SpO2 98%   BMI 17.81 kg/m²     General appearance: alert, cooperative and in no acute distress but chronically ill appearing. Eyes: grossly normal  Lungs: nonlabored breathing on room air  Heart: regular rate  Abdomen: soft, non-tender, non distended. Repositioned 2cm at the skin with PEG in place. Skin: No skin abnormalities  Neurologic: Alert and oriented x 3. Grossly normal  Musculoskeletal: No clubbing cyanosis or edema. Assessment/Plan:  70 y.o. female with nausea/vomiting likely related to obstruction from malpositioned PEG causing mechanical obstruction with PEG not appropriately tightened to proper position 2 cm at the skin    PEG was noted on examination this a.m. to be at 20 cm at the skin. The balloon was deflated at bedside and drawn back to the appropriate position of 2 cm at the skin balloon was reinflated. PEG was noted freely rotated to be secured in place at 2 cm at the skin. Keep bumper/PEG 2 cm at the skin as the balloon at 20 cm was likely causing an obstruction. Okay for trickle tube feeds   No plans for acute surgical intervention    Electronically signed by Carine Chaney DO on 3/6/2023 at 9:16 AM    I saw and examined the patient. I reviewed the above resident's note. I reviewed all labs, radiology, and all test results listed above. I agree with the assessment and plan as outlined. PEG keeps migrating into the small bowel. I adjusted it yesterday and it was in the incorrect position again today. Will likely benefit from a NICOLE-KEY button, but this will have to be ordered and it is uncertain when it will come in. Can replace as an outpatient.     Yoni Gonzalez MD  General Surgery

## 2023-03-07 LAB
ALBUMIN SERPL-MCNC: 1.9 G/DL (ref 3.5–5.2)
ALP BLD-CCNC: 67 U/L (ref 35–104)
ALT SERPL-CCNC: 13 U/L (ref 0–32)
ANION GAP SERPL CALCULATED.3IONS-SCNC: 4 MMOL/L (ref 7–16)
AST SERPL-CCNC: 9 U/L (ref 0–31)
BASOPHILS ABSOLUTE: 0.03 E9/L (ref 0–0.2)
BASOPHILS RELATIVE PERCENT: 0.3 % (ref 0–2)
BILIRUB SERPL-MCNC: <0.2 MG/DL (ref 0–1.2)
BUN BLDV-MCNC: 13 MG/DL (ref 6–23)
CALCIUM SERPL-MCNC: 7.6 MG/DL (ref 8.6–10.2)
CHLORIDE BLD-SCNC: 107 MMOL/L (ref 98–107)
CO2: 21 MMOL/L (ref 22–29)
CREAT SERPL-MCNC: 0.7 MG/DL (ref 0.5–1)
EOSINOPHILS ABSOLUTE: 0.44 E9/L (ref 0.05–0.5)
EOSINOPHILS RELATIVE PERCENT: 4.4 % (ref 0–6)
GFR SERPL CREATININE-BSD FRML MDRD: >60 ML/MIN/1.73
GLUCOSE BLD-MCNC: 120 MG/DL (ref 74–99)
HCT VFR BLD CALC: 31.7 % (ref 34–48)
HEMOGLOBIN: 9.9 G/DL (ref 11.5–15.5)
IMMATURE GRANULOCYTES #: 0.45 E9/L
IMMATURE GRANULOCYTES %: 4.5 % (ref 0–5)
LYMPHOCYTES ABSOLUTE: 1.44 E9/L (ref 1.5–4)
LYMPHOCYTES RELATIVE PERCENT: 14.5 % (ref 20–42)
MAGNESIUM: 2.2 MG/DL (ref 1.6–2.6)
MCH RBC QN AUTO: 31.2 PG (ref 26–35)
MCHC RBC AUTO-ENTMCNC: 31.2 % (ref 32–34.5)
MCV RBC AUTO: 100 FL (ref 80–99.9)
METER GLUCOSE: 108 MG/DL (ref 74–99)
METER GLUCOSE: 109 MG/DL (ref 74–99)
METER GLUCOSE: 118 MG/DL (ref 74–99)
METER GLUCOSE: 137 MG/DL (ref 74–99)
MONOCYTES ABSOLUTE: 0.98 E9/L (ref 0.1–0.95)
MONOCYTES RELATIVE PERCENT: 9.8 % (ref 2–12)
NEUTROPHILS ABSOLUTE: 6.61 E9/L (ref 1.8–7.3)
NEUTROPHILS RELATIVE PERCENT: 66.5 % (ref 43–80)
PDW BLD-RTO: 14.9 FL (ref 11.5–15)
PHOSPHORUS: 2.3 MG/DL (ref 2.5–4.5)
PLATELET # BLD: 188 E9/L (ref 130–450)
PMV BLD AUTO: 12.2 FL (ref 7–12)
POTASSIUM SERPL-SCNC: 5.3 MMOL/L (ref 3.5–5)
POTASSIUM SERPL-SCNC: 5.5 MMOL/L (ref 3.5–5)
RBC # BLD: 3.17 E12/L (ref 3.5–5.5)
SODIUM BLD-SCNC: 132 MMOL/L (ref 132–146)
TOTAL PROTEIN: 5 G/DL (ref 6.4–8.3)
WBC # BLD: 10 E9/L (ref 4.5–11.5)

## 2023-03-07 PROCEDURE — 99232 SBSQ HOSP IP/OBS MODERATE 35: CPT | Performed by: INTERNAL MEDICINE

## 2023-03-07 PROCEDURE — 84100 ASSAY OF PHOSPHORUS: CPT

## 2023-03-07 PROCEDURE — 36415 COLL VENOUS BLD VENIPUNCTURE: CPT

## 2023-03-07 PROCEDURE — APPSS30 APP SPLIT SHARED TIME 16-30 MINUTES: Performed by: NURSE PRACTITIONER

## 2023-03-07 PROCEDURE — 36592 COLLECT BLOOD FROM PICC: CPT

## 2023-03-07 PROCEDURE — 6360000002 HC RX W HCPCS: Performed by: INTERNAL MEDICINE

## 2023-03-07 PROCEDURE — 2580000003 HC RX 258: Performed by: INTERNAL MEDICINE

## 2023-03-07 PROCEDURE — 84132 ASSAY OF SERUM POTASSIUM: CPT

## 2023-03-07 PROCEDURE — 6370000000 HC RX 637 (ALT 250 FOR IP): Performed by: INTERNAL MEDICINE

## 2023-03-07 PROCEDURE — C9113 INJ PANTOPRAZOLE SODIUM, VIA: HCPCS | Performed by: INTERNAL MEDICINE

## 2023-03-07 PROCEDURE — 80053 COMPREHEN METABOLIC PANEL: CPT

## 2023-03-07 PROCEDURE — 1200000000 HC SEMI PRIVATE

## 2023-03-07 PROCEDURE — 85025 COMPLETE CBC W/AUTO DIFF WBC: CPT

## 2023-03-07 PROCEDURE — 83735 ASSAY OF MAGNESIUM: CPT

## 2023-03-07 PROCEDURE — 82962 GLUCOSE BLOOD TEST: CPT

## 2023-03-07 RX ADMIN — MIDODRINE HYDROCHLORIDE 10 MG: 5 TABLET ORAL at 08:31

## 2023-03-07 RX ADMIN — MONTELUKAST SODIUM 10 MG: 10 TABLET ORAL at 21:45

## 2023-03-07 RX ADMIN — PAROXETINE 10 MG: 10 TABLET, FILM COATED ORAL at 08:31

## 2023-03-07 RX ADMIN — LEVOTHYROXINE SODIUM 150 MCG: 75 TABLET ORAL at 06:18

## 2023-03-07 RX ADMIN — MIDODRINE HYDROCHLORIDE 10 MG: 5 TABLET ORAL at 11:29

## 2023-03-07 RX ADMIN — PAROXETINE 10 MG: 10 TABLET, FILM COATED ORAL at 21:45

## 2023-03-07 RX ADMIN — DOCUSATE SODIUM LIQUID 100 MG: 100 LIQUID ORAL at 09:56

## 2023-03-07 RX ADMIN — VALPROIC ACID 1000 MG: 250 SOLUTION ORAL at 21:46

## 2023-03-07 RX ADMIN — POLYETHYLENE GLYCOL 3350 17 G: 17 POWDER, FOR SOLUTION ORAL at 09:56

## 2023-03-07 RX ADMIN — Medication 10 ML: at 08:32

## 2023-03-07 RX ADMIN — PANTOPRAZOLE SODIUM 40 MG: 40 INJECTION, POWDER, FOR SOLUTION INTRAVENOUS at 08:31

## 2023-03-07 RX ADMIN — Medication 10 ML: at 21:56

## 2023-03-07 RX ADMIN — VALPROIC ACID 1000 MG: 250 SOLUTION ORAL at 08:30

## 2023-03-07 RX ADMIN — SODIUM ZIRCONIUM CYCLOSILICATE 10 G: 10 POWDER, FOR SUSPENSION ORAL at 14:18

## 2023-03-07 RX ADMIN — ENOXAPARIN SODIUM 30 MG: 100 INJECTION SUBCUTANEOUS at 17:15

## 2023-03-07 RX ADMIN — MIDODRINE HYDROCHLORIDE 10 MG: 5 TABLET ORAL at 17:14

## 2023-03-07 ASSESSMENT — PAIN SCALES - GENERAL: PAINLEVEL_OUTOF10: 0

## 2023-03-07 NOTE — PROGRESS NOTES
[facility] Progress Note    Admitting Date and Time: 3/3/2023 12:17 PM  Admit Dx: Hypernatremia [E87.0]    Subjective:  Patient is being followed for Hypernatremia [E87.0]     Patient awake and alert  Per nursing pt has been verbalizing and telling staff to \" stop\" with care this am  Has been tolerating TF with no vomiting  However peg tube still appear to move at times/ change positions  Cameron button ordered - per nursing not sure how long before available      ROS: denies fever, chills, cp, sob, n/v, HA unless stated above.      enoxaparin  30 mg SubCUTAneous Daily    levothyroxine  150 mcg PEG Tube Daily    insulin lispro  0-4 Units SubCUTAneous TID WC    insulin lispro  0-4 Units SubCUTAneous Nightly    PARoxetine  10 mg Oral BID    montelukast  10 mg PEG Tube Nightly    valproic acid  1,000 mg Per G Tube BID    [Held by provider] OLANZapine  20 mg Oral BID    sodium chloride flush  5-40 mL IntraVENous 2 times per day    pantoprazole  40 mg IntraVENous Daily    midodrine  10 mg PEG Tube TID WC    docusate  100 mg Oral Daily    polyethylene glycol  17 g Per G Tube Daily     bisacodyl, 10 mg, Daily PRN  glucose, 4 tablet, PRN  dextrose bolus, 125 mL, PRN   Or  dextrose bolus, 250 mL, PRN  glucagon (rDNA), 1 mg, PRN  dextrose, , Continuous PRN  diatrizoate meglumine-sodium, 30 mL, ONCE PRN  sodium chloride flush, 10 mL, PRN  acetaminophen, 650 mg, Q4H PRN  sodium chloride flush, 5-40 mL, PRN  sodium chloride, , PRN  ondansetron, 4 mg, Q8H PRN   Or  ondansetron, 4 mg, Q6H PRN  acetaminophen, 650 mg, Q6H PRN   Or  acetaminophen, 650 mg, Q6H PRN  diatrizoate meglumine-sodium, 30 mL, ONCE PRN         Objective:    BP (!) 107/55   Pulse 91   Temp 97.6 °F (36.4 °C) (Oral)   Resp 18   Ht 5' 5\" (1.651 m)   Wt 109 lb (49.4 kg)   SpO2 95%   BMI 18.14 kg/m²      General Appearance: awake- alert.  Some verbalization this am with staff  Skin: warm and dry  Head: normocephalic and atraumatic  Neck: neck supple and non tender without mass   Pulmonary/Chest: diminished throughout to auscultation   Cardiovascular: normal rate, normal S1 and S2 and no carotid bruits  Abdomen: soft, non-tender, non-distended, normal bowel sounds- peg tube c/d/i  Extremities: no cyanosis, no clubbing and no edema  Neurologic:  VIRAL           Recent Labs     03/05/23 0445 03/06/23 0222 03/07/23  0400    134 132   K 4.2 4.7 5.5*   * 107 107   CO2 22 21* 21*   BUN 34* 16 13   CREATININE 0.7 0.6 0.7   GLUCOSE 153* 111* 120*   CALCIUM 8.1* 7.8* 7.6*       Recent Labs     03/05/23 0445 03/06/23 0222 03/07/23  0400   WBC 18.5* 13.4* 10.0   RBC 2.99* 3.15* 3.17*   HGB 9.4* 9.9* 9.9*   HCT 31.1* 32.2* 31.7*   .0* 102.2* 100.0*   MCH 31.4 31.4 31.2   MCHC 30.2* 30.7* 31.2*   RDW 15.5* 15.1* 14.9    191 188   MPV 12.2* 12.7* 12.2*         Assessment:    Principal Problem:    Hypernatremia  Active Problems:    BEENA (acute kidney injury) (HCC)    Hypotension due to hypovolemia    Tachycardia    Metabolic encephalopathy    Nausea and vomiting    Severe protein-calorie malnutrition (HCC)  Resolved Problems:    * No resolved hospital problems. *      Plan:   1. Severe Hypernatremia;  on arrival to ER ? Due to n/v not tolerating TF ? Issue with peg tube- pt was sent to ER from [facility] with concerns of altered mental status/ lethargy. She has a h/o MRDD/ dementia and has a peg tube for h/o dysphagia. Appears she was recently cleared for pleasure feeds with honey thick liquids. Per staff at group home - pt has been vomiting up most of her feeding and concern that her peg tube needed changed to a lena button per surgery. Group home denied recent illness or diarrhea. Denies fevers. Pt received NS bolus in ER. Has followed with nephrology in the past for hypo/hypernatremia/ Initially sent to ICU as sodium level continued to climb- peaked at 163. Nephrology consulted and following- appreciate input.  General surgery consulted to evaluate peg tube. Reporting 3/6 that peg tube was malpositioned/ not appropriately tightened and needs to be at 2cm to skin. Reporting if in not appropriate position could be causing mechanical obstruction. Pt tolerating TF- IVF stopped. Cedric button was ordered. Plan to change PEG tub cedric button. Ideally would like this to occur prior to pt discharge back to [address] [place] [address] so pt has no further issues. 2. Hypotension with h/o HTN: likely due to hypovolemia/ dehydration: rehydrate- continues on midodrine      3. Acute encephalopathy: likely metabolic due to above: CT head neg for acute findings. More interactive per nursing. Not verbalizing. 4. Septic criteria: pt with hypotension/ tachycardia/ leukocytosis on arrival- may all be related to contraction/ dehydration. Received empiric abx in ER. Check UA- neg. CXR with no pneumonia process. Holding off on abx. Blood cultures 24 h no growth     5. MRDD/ Dementia: supportive care- holding zyprexa      6. Thyroid disease; synthroid     7. H/o dysphagia- peg tube placed by surgery 3/2021. H/o ER visits/ admissions for nausea / vomiting- seen by surgery recently ? Need for lena button. Appreciate surgery input- reporting peg was not in appropriate position x 2 during admission. Peg at 20cm likely causing obstruction. Need to keep peg 2cm at the skin. Cedric button ordered. 8. H/o hiatal hernia      9. Difficult IV access: central line placed in ER. DC TLC today. 10. Constipation: on bowel regimen- back off if pt having diarrhea. 11. Hypomagnesemia: supplement     12. Hyperkalemia s/p lokelma- stop IVF with KCL                Dispo: [facility] [address] [facility]. Surgery is attempting to obtain [facility] button. Plan is to change PEG to [facility] button. NOTE: This report was transcribed using voice recognition software.  Every effort was made to ensure accuracy; however, inadvertent computerized transcription errors may be present.   Electronically signed by SCHUYLER Bass on 3/7/2023 at 8:31 AM

## 2023-03-07 NOTE — PATIENT CARE CONFERENCE
[facility] Quality Flow/Interdisciplinary Rounds Progress Note        Quality Flow Rounds held on March 7, 2023    Disciplines Attending:  Bedside Nurse, , , and Nursing Unit Leadership    Berhane Balderas was admitted on 3/3/2023 12:17 PM    Anticipated Discharge Date:       Disposition:    Jose Score:  Jose Scale Score: 14    Readmission Risk              Risk of Unplanned Readmission:  46           Discussed patient goal for the day, patient clinical progression, and barriers to discharge.   The following Goal(s) of the Day/Commitment(s) have been identified:  Labs - Report Results      Adi Garcia RN  March 7, 2023

## 2023-03-07 NOTE — PROGRESS NOTES
Tolerating tube feeds overnight per nursing staff. Cameron button ordered. Will place once obtained. Please message [facility] when at bedside. Danny Carvalho for tube feeds as tolerated.     Electronically signed by Celsa Jones MD on 3/7/2023 at 7:29 AM

## 2023-03-07 NOTE — PROGRESS NOTES
Progress Note  3/7/2023 12:19 PM  Subjective:   Admit Date: 3/3/2023  PCP: Clementina Sierra DO  Interval History: Patient examined , much the same , non communicative     Diet: Diet NPO  ADULT TUBE FEEDING; PEG; Standard with Fiber; Continuous; 20; Yes; 5; Q 4 hours; 40; 30; Q 4 hours    Data:   Scheduled Meds:   sodium zirconium cyclosilicate  10 g Oral Once    enoxaparin  30 mg SubCUTAneous Daily    levothyroxine  150 mcg PEG Tube Daily    insulin lispro  0-4 Units SubCUTAneous TID WC    insulin lispro  0-4 Units SubCUTAneous Nightly    PARoxetine  10 mg Oral BID    montelukast  10 mg PEG Tube Nightly    valproic acid  1,000 mg Per G Tube BID    [Held by provider] OLANZapine  20 mg Oral BID    sodium chloride flush  5-40 mL IntraVENous 2 times per day    pantoprazole  40 mg IntraVENous Daily    midodrine  10 mg PEG Tube TID WC    docusate  100 mg Oral Daily    polyethylene glycol  17 g Per G Tube Daily     Continuous Infusions:   dextrose      sodium chloride       PRN Meds:bisacodyl, glucose, dextrose bolus **OR** dextrose bolus, glucagon (rDNA), dextrose, diatrizoate meglumine-sodium, sodium chloride flush, acetaminophen, sodium chloride flush, sodium chloride, ondansetron **OR** ondansetron, acetaminophen **OR** acetaminophen, diatrizoate meglumine-sodium  I/O last 3 completed shifts: In: 570 [NG/GT:570]  Out: 300 [Urine:300]  No intake/output data recorded.   No intake or output data in the 24 hours ending 03/07/23 1219  CBC:   Recent Labs     03/05/23 0445 03/06/23 0222 03/07/23  0400   WBC 18.5* 13.4* 10.0   HGB 9.4* 9.9* 9.9*    191 188     BMP:    Recent Labs     03/05/23 0445 03/06/23 0222 03/07/23  0400 03/07/23  1012    134 132  --    K 4.2 4.7 5.5* 5.3*   * 107 107  --    CO2 22 21* 21*  --    BUN 34* 16 13  --    CREATININE 0.7 0.6 0.7  --    GLUCOSE 153* 111* 120*  --      Hepatic:   Recent Labs     03/05/23  0445 03/06/23  0222 03/07/23  0400   AST 14 15 9   ALT 9 10 13 BILITOT 0.4 0.2 <0.2   ALKPHOS 97 70 67     Troponin: No results for input(s): TROPONINI in the last 72 hours. BNP: No results for input(s): BNP in the last 72 hours. Lipids: No results for input(s): CHOL, HDL in the last 72 hours. Invalid input(s): LDLCALCU  ABGs:   Lab Results   Component Value Date/Time    PO2ART 75.2 02/08/2021 04:42 PM    DKX0PEP 39.3 02/08/2021 04:42 PM     INR: No results for input(s): INR in the last 72 hours. -----------------------------------------------------------------  RAD: XR ABDOMEN (KUB) (SINGLE AP VIEW)    Result Date: 3/3/2023  EXAMINATION: ONE SUPINE XRAY VIEW(S) OF THE ABDOMEN 3/3/2023 5:40 pm COMPARISON: None. HISTORY: ORDERING SYSTEM PROVIDED HISTORY: peg tube placement TECHNOLOGIST PROVIDED HISTORY: Reason for exam:->peg tube placement FINDINGS: Peg tube projects in gastric body region, appearing to be just below the diaphragm as able to be visualized, and the contrast is within the stomach. There is noted to be at least moderate sized hiatal hernia. There is possible constipation. Findings consistent with intragastric peg 2 positioning as described above. CT Head W/O Contrast    Result Date: 3/3/2023  EXAMINATION: CT OF THE HEAD WITHOUT CONTRAST  3/3/2023 1:22 pm TECHNIQUE: CT of the head was performed without the administration of intravenous contrast. Automated exposure control, iterative reconstruction, and/or weight based adjustment of the mA/kV was utilized to reduce the radiation dose to as low as reasonably achievable. COMPARISON: None. HISTORY: ORDERING SYSTEM PROVIDED HISTORY: ams TECHNOLOGIST PROVIDED HISTORY: Reason for exam:->ams Has a \"code stroke\" or \"stroke alert\" been called? ->No Decision Support Exception - unselect if not a suspected or confirmed emergency medical condition->Emergency Medical Condition (MA) FINDINGS: BRAIN/VENTRICLES: There is no acute intracranial hemorrhage, mass effect or midline shift.   No abnormal extra-axial fluid collection.  The gray-white differentiation is maintained without evidence of an acute infarct.  There is no evidence of hydrocephalus. The ventricles, cisterns and sulci are prominent consistent with atrophy.  There is decreased attenuation within the periventricular white matter consistent with periventricular leukomalacia. ORBITS: There are chronic calcifications seen within the optic globes.. SINUSES: The visualized paranasal sinuses and mastoid air cells demonstrate no acute abnormality. SOFT TISSUES/SKULL:  No acute abnormality of the visualized skull or soft tissues.     1. There is no acute intracranial abnormality.  Specifically, there is no intracranial hemorrhage. 2. Atrophy and periventricular leukomalacia,     XR CHEST PORTABLE    Result Date: 3/3/2023  EXAMINATION: ONE XRAY VIEW OF THE CHEST 3/3/2023 4:05 pm COMPARISON: 03/03/2023 HISTORY: ORDERING SYSTEM PROVIDED HISTORY: CVC placement TECHNOLOGIST PROVIDED HISTORY: Reason for exam:->CVC placement FINDINGS: There is borderline cardiac size.  Right subclavian central line is in place with tip in the right atrium.  There is no pneumothorax.  Lungs are free of acute infiltrate or pleural effusion.  Degenerative changes of the right shoulder are noted.     Nonacute chest. Right subclavian central line with tip in the right atrium without pneumothorax.     XR CHEST PORTABLE    Result Date: 3/3/2023  EXAMINATION: ONE XRAY VIEW OF THE CHEST 3/3/2023 1:16 pm COMPARISON: The lungs are without acute focal process.  There is no effusion or pneumothorax. The cardiomediastinal silhouette is without acute process. The osseous structures are without acute process.     No acute process. HISTORY: ORDERING SYSTEM PROVIDED HISTORY: SOB TECHNOLOGIST PROVIDED HISTORY: Reason for exam:->SOB     XR ABDOMEN FOR NG/OG/NE TUBE PLACEMENT    Result Date: 3/4/2023  EXAMINATION: ONE SUPINE XRAY VIEW(S) OF THE ABDOMEN 3/4/2023 1:25 pm COMPARISON: None. HISTORY: ORDERING SYSTEM  PROVIDED HISTORY: Confirmation of course of Gtube and location of tip of tube TECHNOLOGIST PROVIDED HISTORY: Reason for exam:->Confirmation of course of Gtube and location of tip of tube Portable? ->Yes FINDINGS: There is contrast in the colon from previous contrast administration. No evidence of ileus or mechanical obstruction. No free air. Lung bases appear clear. After injection of contrast material via the gastrostomy tube, contrast material is noted in the proximal jejunum supporting appropriate positioning of the enteric catheter. 1.  Contrast material noted in small bowel loops after injection of feeding catheter. Catheter tip is likely within bowel possibly in the distal stomach or proximal duodenum. The tip is not directly visualized. 2.  No extravasation of injected contrast material into the peritoneum. US RETROPERITONEAL COMPLETE    Result Date: 3/3/2023  EXAMINATION: RETROPERITONEAL ULTRASOUND OF THE KIDNEYS AND URINARY BLADDER 3/3/2023 COMPARISON: CT 09/30/2022, 09/19/2022 HISTORY: ORDERING SYSTEM PROVIDED HISTORY: BEENA TECHNOLOGIST PROVIDED HISTORY: Reason for exam:->BEENA What reading provider will be dictating this exam?->CRC FINDINGS: Kidneys: Only the right kidney is visualized, due to patient's condition. Apparent lesion at the upper pole is probably complex cyst such as hemorrhagic perhaps layered dependent increased echogenicity noted on image 21, and on CT is high attenuation without obvious enhancement. Is similar at about 1.8 cm on the exams. Note the exams however are limited and can be given attention on follow-up with and without contrast MRI or CT as clinically warranted or surveillance by ultrasound. Increased renal echogenicity. No hydronephrosis. Bladder: Not distended for evaluation with report of catheter. 1. Limited exam, only right kidney seen, with increased echogenicity suggesting medical disease without hydronephrosis.  2. Upper pole lesion favored to be hyperdense cyst as able to be assessed, as discussed above. Objective:   Vitals: BP (!) 103/51   Pulse 83   Temp 97.7 °F (36.5 °C) (Axillary)   Resp 17   Ht 5' 5\" (1.651 m)   Wt 109 lb (49.4 kg)   SpO2 97%   BMI 18.14 kg/m²   General appearance: appears stated age   Skin:  No rashes or lesions  HEENT: Head: Normocephalic, no lesions, without obvious abnormality. Neck: no adenopathy, no carotid bruit, no JVD, supple, symmetrical, trachea midline, and thyroid not enlarged, symmetric, no tenderness/mass/nodules  Lungs: clear to auscultation bilaterally  Heart: regular rate and rhythm, S1, S2 normal, no murmur, click, rub or gallop  Abdomen: soft, non-tender; bowel sounds normal; no masses,  no organomegaly  Extremities: extremities normal, atraumatic, no cyanosis or edema  Neurologic: Mental status: alertness: lethargic    Assessment:   Patient Active Problem List:     Profound intellectual disability     Hypothyroidism     Impairment level: total impairment of both eyes     Hyperglycemia     Nonsustained ventricular tachycardia     Macrocytosis     Disruptive behavior disorder     Ingrowing nail     Peripheral vascular disease (HCC)     Altered mental state     Onychomycosis     Anemia due to chronic kidney disease     Essential hypertension     Vitamin D deficiency     Gastroesophageal reflux disease without esophagitis     S/P percutaneous endoscopic gastrostomy (PEG) tube placement ([facility])     Chronic kidney disease     Dementia with behavioral disturbance     Gastrostomy tube dysfunction (HCC)     Severe dehydration     Hyponatremia     Intractable nausea and vomiting     Hypernatremia     BEENA (acute kidney injury) ([facility])     Hypotension due to hypovolemia     Tachycardia     Metabolic encephalopathy     Nausea and vomiting     Severe protein-calorie malnutrition ([facility])    Plan:     Acute kidney injury: Nonoliguric. Improved with IV fluid suggesting prerenal azotemia.   Creatinine 1.3 ---> 0.7 bland urine. Ultrasound kidney report revealed that only the right kidney was visualized because of the patient's condition yet recent CT scan revealed bilateral kidneys with cyst and no hydronephrosis. Hypernatremia: Resolved (sodium 158--> 132 ) reflecting inadequate water supplementation in a patient who is able to drink and is dependent on our assessment to supplement water per her PEG tube. Patient is now tolerating 30 mL of free water per hour via her PEG tube. Hypophosphatemia: Being supplemented intravenously. It is a reflection of poor enteral nutrition. Continue to monitor and supplement as needed. Hypocalcemia reflecting hypoalbuminemia: Normal ionized calcium,     Anemia: Not related to the patient's kidney function with current serum creatinine of 0. 7. HB stable     Elevated TSH and left adnexal cyst on recent CT scan: Defer to primary    Hyperkalemia - possible dietary source - feeding - will dose 1 dose only - Earnstine  IVF     Desmond Chan MD

## 2023-03-08 ENCOUNTER — APPOINTMENT (OUTPATIENT)
Dept: GENERAL RADIOLOGY | Age: 68
DRG: 640 | End: 2023-03-08
Payer: MEDICARE

## 2023-03-08 PROBLEM — E87.5 HYPERKALEMIA: Status: ACTIVE | Noted: 2023-03-08

## 2023-03-08 LAB
ALBUMIN SERPL-MCNC: 2 G/DL (ref 3.5–5.2)
ALP BLD-CCNC: 70 U/L (ref 35–104)
ALT SERPL-CCNC: 9 U/L (ref 0–32)
ANION GAP SERPL CALCULATED.3IONS-SCNC: 5 MMOL/L (ref 7–16)
AST SERPL-CCNC: 11 U/L (ref 0–31)
BASOPHILS ABSOLUTE: 0.03 E9/L (ref 0–0.2)
BASOPHILS RELATIVE PERCENT: 0.3 % (ref 0–2)
BILIRUB SERPL-MCNC: <0.2 MG/DL (ref 0–1.2)
BLOOD CULTURE, ROUTINE: NORMAL
BLOOD CULTURE, ROUTINE: NORMAL
BUN BLDV-MCNC: 14 MG/DL (ref 6–23)
CALCIUM SERPL-MCNC: 8 MG/DL (ref 8.6–10.2)
CHLORIDE BLD-SCNC: 107 MMOL/L (ref 98–107)
CO2: 22 MMOL/L (ref 22–29)
CREAT SERPL-MCNC: 0.8 MG/DL (ref 0.5–1)
EOSINOPHILS ABSOLUTE: 0.41 E9/L (ref 0.05–0.5)
EOSINOPHILS RELATIVE PERCENT: 4.4 % (ref 0–6)
GFR SERPL CREATININE-BSD FRML MDRD: >60 ML/MIN/1.73
GLUCOSE BLD-MCNC: 129 MG/DL (ref 74–99)
HCT VFR BLD CALC: 30.6 % (ref 34–48)
HEMOGLOBIN: 9.5 G/DL (ref 11.5–15.5)
IMMATURE GRANULOCYTES #: 0.39 E9/L
IMMATURE GRANULOCYTES %: 4.2 % (ref 0–5)
LYMPHOCYTES ABSOLUTE: 1.36 E9/L (ref 1.5–4)
LYMPHOCYTES RELATIVE PERCENT: 14.6 % (ref 20–42)
MAGNESIUM: 2.2 MG/DL (ref 1.6–2.6)
MCH RBC QN AUTO: 30.5 PG (ref 26–35)
MCHC RBC AUTO-ENTMCNC: 31 % (ref 32–34.5)
MCV RBC AUTO: 98.4 FL (ref 80–99.9)
METER GLUCOSE: 116 MG/DL (ref 74–99)
METER GLUCOSE: 124 MG/DL (ref 74–99)
METER GLUCOSE: 80 MG/DL (ref 74–99)
METER GLUCOSE: 85 MG/DL (ref 74–99)
MONOCYTES ABSOLUTE: 1.06 E9/L (ref 0.1–0.95)
MONOCYTES RELATIVE PERCENT: 11.4 % (ref 2–12)
NEUTROPHILS ABSOLUTE: 6.07 E9/L (ref 1.8–7.3)
NEUTROPHILS RELATIVE PERCENT: 65.1 % (ref 43–80)
PDW BLD-RTO: 15.5 FL (ref 11.5–15)
PHOSPHORUS: 2.3 MG/DL (ref 2.5–4.5)
PLATELET # BLD: 185 E9/L (ref 130–450)
PMV BLD AUTO: 11.7 FL (ref 7–12)
POTASSIUM SERPL-SCNC: 4.8 MMOL/L (ref 3.5–5)
RBC # BLD: 3.11 E12/L (ref 3.5–5.5)
SODIUM BLD-SCNC: 134 MMOL/L (ref 132–146)
TOTAL PROTEIN: 5.3 G/DL (ref 6.4–8.3)
WBC # BLD: 9.3 E9/L (ref 4.5–11.5)

## 2023-03-08 PROCEDURE — C9113 INJ PANTOPRAZOLE SODIUM, VIA: HCPCS | Performed by: INTERNAL MEDICINE

## 2023-03-08 PROCEDURE — 83735 ASSAY OF MAGNESIUM: CPT

## 2023-03-08 PROCEDURE — 6370000000 HC RX 637 (ALT 250 FOR IP): Performed by: INTERNAL MEDICINE

## 2023-03-08 PROCEDURE — 6360000002 HC RX W HCPCS: Performed by: INTERNAL MEDICINE

## 2023-03-08 PROCEDURE — 85025 COMPLETE CBC W/AUTO DIFF WBC: CPT

## 2023-03-08 PROCEDURE — 99232 SBSQ HOSP IP/OBS MODERATE 35: CPT | Performed by: NURSE PRACTITIONER

## 2023-03-08 PROCEDURE — 36415 COLL VENOUS BLD VENIPUNCTURE: CPT

## 2023-03-08 PROCEDURE — 2580000003 HC RX 258: Performed by: INTERNAL MEDICINE

## 2023-03-08 PROCEDURE — 80053 COMPREHEN METABOLIC PANEL: CPT

## 2023-03-08 PROCEDURE — 84100 ASSAY OF PHOSPHORUS: CPT

## 2023-03-08 PROCEDURE — 74018 RADEX ABDOMEN 1 VIEW: CPT

## 2023-03-08 PROCEDURE — 82962 GLUCOSE BLOOD TEST: CPT

## 2023-03-08 PROCEDURE — 1200000000 HC SEMI PRIVATE

## 2023-03-08 RX ADMIN — PAROXETINE 10 MG: 10 TABLET, FILM COATED ORAL at 20:42

## 2023-03-08 RX ADMIN — VALPROIC ACID 1000 MG: 250 SOLUTION ORAL at 10:20

## 2023-03-08 RX ADMIN — LEVOTHYROXINE SODIUM 150 MCG: 75 TABLET ORAL at 05:23

## 2023-03-08 RX ADMIN — PAROXETINE 10 MG: 10 TABLET, FILM COATED ORAL at 10:21

## 2023-03-08 RX ADMIN — PANTOPRAZOLE SODIUM 40 MG: 40 INJECTION, POWDER, FOR SOLUTION INTRAVENOUS at 10:20

## 2023-03-08 RX ADMIN — MIDODRINE HYDROCHLORIDE 10 MG: 5 TABLET ORAL at 10:21

## 2023-03-08 RX ADMIN — MONTELUKAST SODIUM 10 MG: 10 TABLET ORAL at 20:42

## 2023-03-08 RX ADMIN — Medication 10 ML: at 11:12

## 2023-03-08 RX ADMIN — VALPROIC ACID 1000 MG: 250 SOLUTION ORAL at 20:42

## 2023-03-08 RX ADMIN — DOCUSATE SODIUM LIQUID 100 MG: 100 LIQUID ORAL at 10:20

## 2023-03-08 RX ADMIN — ENOXAPARIN SODIUM 30 MG: 100 INJECTION SUBCUTANEOUS at 16:48

## 2023-03-08 RX ADMIN — POLYETHYLENE GLYCOL 3350 17 G: 17 POWDER, FOR SOLUTION ORAL at 10:20

## 2023-03-08 NOTE — PROGRESS NOTES
Nephrology Progress Note  Patient's Name: Roberto Galvan  4:08 PM  3/8/2023    Nephrologist: Rakesh Barnes    Reason for Consult:  BEENA and Hypernatremia    History of Present Ilness from the 3/3/23 note:    Roberto Galvan is a 70 y.o. female with prior history of Developmental Disability who has a PEG tbe in place and currently resides at a group home. Pt brought into the ED today for worsening mental status. She is usually on Jevity TF 240ml q8hrs with 150ml free water. Pt has been having N/V and not tolerating TF for the last few days. In the ED Na+ 158 and trended up to 163  and cr 1.3-->1.2mg/dl. Pt has a baselline serum cr 0.6mg/dl    3/8/23:pt somnolent arouses does not interact with me    Past Medical History:   Diagnosis Date    Acute kidney injury ([address] 01/15/2017    d/t Vancomycin, on Dialysis [facility] right chest    Blind in both eyes     Essential hypertension 4/2/2021    Gastroesophageal reflux disease without esophagitis 4/2/2021    Hemodialysis patient [facility])     Hyperthyroidism     MR (mental retardation)     Osteoarthritis     Peripheral vascular disease ([facility]     Pneumonia 01/06/2017    Pneumonia due to infectious organism 1/8/2017    Sepsis ([facility] [address] 3/4/2021       Past Surgical History:   Procedure Laterality Date    BRONCHOSCOPY  02/10/2017    CHEST TUBE INSERTION Right 02/09/2017    GASTROSTOMY TUBE PLACEMENT N/A 3/7/2021    EGD PEG TUBE PLACEMENT performed by Jose Ma MD at [address] [place] [address] Right 01/17/2017    tessio insertion     OTHER SURGICAL HISTORY  01/25/2017    PEG tube insertion       History reviewed. No pertinent family history. reports that she has never smoked. She has never used smokeless tobacco. She reports that she does not drink alcohol and does not use drugs. Allergies:  Patient has no known allergies.     Current Medications:    enoxaparin Sodium (LOVENOX) injection 30 mg, Daily  levothyroxine (SYNTHROID) tablet 150 mcg, Daily  bisacodyl (DULCOLAX) EC tablet 10 mg, Daily PRN  insulin lispro (HUMALOG) injection vial 0-4 Units, TID WC  insulin lispro (HUMALOG) injection vial 0-4 Units, Nightly  glucose chewable tablet 16 g, PRN  dextrose bolus 10% 125 mL, PRN   Or  dextrose bolus 10% 250 mL, PRN  glucagon (rDNA) injection 1 mg, PRN  dextrose 10 % infusion, Continuous PRN  diatrizoate meglumine-sodium (GASTROGRAFIN) 66-10 % solution 30 mL, ONCE PRN  sodium chloride flush 0.9 % injection 10 mL, PRN  acetaminophen (TYLENOL) tablet 650 mg, Q4H PRN  PARoxetine (PAXIL) tablet 10 mg, BID  montelukast (SINGULAIR) tablet 10 mg, Nightly  valproic acid (DEPAKENE) 250 MG/5ML oral solution 1,000 mg, BID  [Held by provider] OLANZapine (ZYPREXA) tablet 20 mg, BID  sodium chloride flush 0.9 % injection 5-40 mL, 2 times per day  sodium chloride flush 0.9 % injection 5-40 mL, PRN  0.9 % sodium chloride infusion, PRN  ondansetron (ZOFRAN-ODT) disintegrating tablet 4 mg, Q8H PRN   Or  ondansetron (ZOFRAN) injection 4 mg, Q6H PRN  acetaminophen (TYLENOL) tablet 650 mg, Q6H PRN   Or  acetaminophen (TYLENOL) suppository 650 mg, Q6H PRN  pantoprazole (PROTONIX) injection 40 mg, Daily  diatrizoate meglumine-sodium (GASTROGRAFIN) 66-10 % solution 30 mL, ONCE PRN  midodrine (PROAMATINE) tablet 10 mg, TID WC  docusate (COLACE) 50 MG/5ML liquid 100 mg, Daily  polyethylene glycol (GLYCOLAX) packet 17 g, Daily      Review of Systems:   Review of systems not obtained due to patient factors.     Physical exam:   Constitutional:  Thin female  Vitals: VITALS:  BP (!) 106/52   Pulse 85   Temp 97.6 °F (36.4 °C) (Axillary)   Resp 16   Ht 5' 5\" (1.651 m)   Wt 111 lb (50.3 kg)   SpO2 94%   BMI 18.47 kg/m²   24HR INTAKE/OUTPUT:  No intake or output data in the 24 hours ending 03/08/23 1608  URINARY CATHETER OUTPUT (Curry):  [REMOVED] Urinary Catheter 03/03/23 Dbzpv-Hpmlsrvqonm-Opqzuw (mL): 40 mL (one wet pad)  External Urinary Catheter-Output (mL): 300 mL  DRAIN/TUBE OUTPUT:     VENT SETTINGS:     Additional Respiratory Assessments  Heart Rate: 85  Resp: 16  SpO2: 94 %  Skin: no rash, turgor poor  Heent:  bilat blind  Neck: no bruits or jvd noted  Cardiovascular: RRR S1, S2   Respiratory: Poor inspiratory effort  Abdomen:  +bs, soft, nt, nd, PEG in place  Ext: (-) lower extremity edema  Psychiatric: non verbal, did not follow commands for me    Data:   Labs:  CBC:   Lab Results   Component Value Date/Time    WBC 9.3 03/08/2023 05:25 AM    RBC 3.11 03/08/2023 05:25 AM    HGB 9.5 03/08/2023 05:25 AM    HCT 30.6 03/08/2023 05:25 AM    MCV 98.4 03/08/2023 05:25 AM    MCH 30.5 03/08/2023 05:25 AM    MCHC 31.0 03/08/2023 05:25 AM    RDW 15.5 03/08/2023 05:25 AM     03/08/2023 05:25 AM    MPV 11.7 03/08/2023 05:25 AM     CBC with Differential:    Lab Results   Component Value Date/Time    WBC 9.3 03/08/2023 05:25 AM    RBC 3.11 03/08/2023 05:25 AM    HGB 9.5 03/08/2023 05:25 AM    HCT 30.6 03/08/2023 05:25 AM     03/08/2023 05:25 AM    MCV 98.4 03/08/2023 05:25 AM    MCH 30.5 03/08/2023 05:25 AM    MCHC 31.0 03/08/2023 05:25 AM    RDW 15.5 03/08/2023 05:25 AM    NRBC 5.2 03/03/2023 01:07 PM    SEGSPCT 58 06/26/2011 10:26 AM    METASPCT 0.9 03/03/2023 01:07 PM    LYMPHOPCT 14.6 03/08/2023 05:25 AM    PROMYELOPCT 0.9 03/07/2021 04:04 AM    MONOPCT 11.4 03/08/2023 05:25 AM    MYELOPCT 0.9 03/03/2023 01:07 PM    BASOPCT 0.3 03/08/2023 05:25 AM    MONOSABS 1.06 03/08/2023 05:25 AM    LYMPHSABS 1.36 03/08/2023 05:25 AM    EOSABS 0.41 03/08/2023 05:25 AM    BASOSABS 0.03 03/08/2023 05:25 AM     Hemoglobin/Hematocrit:    Lab Results   Component Value Date/Time    HGB 9.5 03/08/2023 05:25 AM    HCT 30.6 03/08/2023 05:25 AM     CMP:    Lab Results   Component Value Date/Time     03/08/2023 05:25 AM    K 4.8 03/08/2023 05:25 AM    K 4.8 09/30/2022 08:21 PM     03/08/2023 05:25 AM    CO2 22 03/08/2023 05:25 AM    BUN 14 03/08/2023 05:25 AM    CREATININE 0.8 03/08/2023 05:25 AM    GFRAA >60 10/03/2022 04:30 PM    LABGLOM >60 03/08/2023 05:25 AM    GLUCOSE 129 03/08/2023 05:25 AM    GLUCOSE 97 12/30/2011 09:13 AM    PROT 5.3 03/08/2023 05:25 AM    LABALBU 2.0 03/08/2023 05:25 AM    LABALBU 3.7 12/30/2011 09:13 AM    CALCIUM 8.0 03/08/2023 05:25 AM    BILITOT <0.2 03/08/2023 05:25 AM    ALKPHOS 70 03/08/2023 05:25 AM    AST 11 03/08/2023 05:25 AM    ALT 9 03/08/2023 05:25 AM     BMP:    Lab Results   Component Value Date/Time     03/08/2023 05:25 AM    K 4.8 03/08/2023 05:25 AM    K 4.8 09/30/2022 08:21 PM     03/08/2023 05:25 AM    CO2 22 03/08/2023 05:25 AM    BUN 14 03/08/2023 05:25 AM    LABALBU 2.0 03/08/2023 05:25 AM    LABALBU 3.7 12/30/2011 09:13 AM    CREATININE 0.8 03/08/2023 05:25 AM    CALCIUM 8.0 03/08/2023 05:25 AM    GFRAA >60 10/03/2022 04:30 PM    LABGLOM >60 03/08/2023 05:25 AM    GLUCOSE 129 03/08/2023 05:25 AM    GLUCOSE 97 12/30/2011 09:13 AM     BUN/Creatinine:    Lab Results   Component Value Date/Time    BUN 14 03/08/2023 05:25 AM    CREATININE 0.8 03/08/2023 05:25 AM     Hepatic Function Panel:    Lab Results   Component Value Date/Time    ALKPHOS 70 03/08/2023 05:25 AM    ALT 9 03/08/2023 05:25 AM    AST 11 03/08/2023 05:25 AM    PROT 5.3 03/08/2023 05:25 AM    BILITOT <0.2 03/08/2023 05:25 AM    LABALBU 2.0 03/08/2023 05:25 AM    LABALBU 3.7 12/30/2011 09:13 AM     Albumin:    Lab Results   Component Value Date/Time    LABALBU 2.0 03/08/2023 05:25 AM    LABALBU 3.7 12/30/2011 09:13 AM     Calcium:    Lab Results   Component Value Date/Time    CALCIUM 8.0 03/08/2023 05:25 AM     Ionized Calcium:  No results found for: IONCA  Magnesium:    Lab Results   Component Value Date/Time    MG 2.2 03/08/2023 05:25 AM     Phosphorus:    Lab Results   Component Value Date/Time    PHOS 2.3 03/08/2023 05:25 AM     LDH:    Lab Results   Component Value Date/Time     01/26/2017 04:30 AM     Uric Acid:    Lab Results   Component Value Date/Time    LABURIC 8.0 09/22/2021 05:55 AM     PT/INR:    Lab Results   Component Value Date/Time    PROTIME 12.4 03/08/2021 02:16 PM    INR 1.1 03/08/2021 02:16 PM     PTT:    Lab Results   Component Value Date/Time    APTT 25.7 03/08/2021 02:16 PM   [APTT}  Troponin:    Lab Results   Component Value Date/Time    TROPONINI 0.08 03/05/2021 04:08 AM     U/A:    Lab Results   Component Value Date/Time    COLORU Yellow 03/03/2023 05:24 PM    PROTEINU Negative 03/03/2023 05:24 PM    PHUR 5.5 03/03/2023 05:24 PM    LABCAST FEW 09/20/2017 09:25 PM    WBCUA 0-1 01/29/2023 09:36 PM    RBCUA NONE 01/29/2023 09:36 PM    MUCUS Present 02/08/2021 01:47 PM    BACTERIA NONE SEEN 01/29/2023 09:36 PM    CLARITYU Clear 03/03/2023 05:24 PM    SPECGRAV 1.025 03/03/2023 05:24 PM    LEUKOCYTESUR Negative 03/03/2023 05:24 PM    UROBILINOGEN 0.2 03/03/2023 05:24 PM    BILIRUBINUR SMALL 03/03/2023 05:24 PM    BLOODU Negative 03/03/2023 05:24 PM    GLUCOSEU Negative 03/03/2023 05:24 PM    AMORPHOUS MODERATE 01/21/2017 06:00 PM     ABG:    Lab Results   Component Value Date/Time    PH 7.367 03/03/2023 04:08 PM    PCO2 44.7 03/03/2023 04:08 PM    PO2 76.6 03/03/2023 04:08 PM    HCO3 25.1 03/03/2023 04:08 PM    BE -0.5 03/03/2023 04:08 PM    O2SAT 95.1 03/03/2023 04:08 PM     HgBA1c:    Lab Results   Component Value Date/Time    LABA1C 4.7 03/04/2023 04:28 AM     Microalbumen/Creatinine ratio:  No components found for: RUCREAT  FLP:    Lab Results   Component Value Date/Time    TRIG 57 11/11/2022 10:46 AM    HDL 75 11/11/2022 10:46 AM    LDLCALC 82 11/11/2022 10:46 AM    LABVLDL 11 11/11/2022 10:46 AM     TSH:    Lab Results   Component Value Date/Time    TSH 6.950 03/04/2023 04:28 AM     VITAMIN B12: No components found for: B12  FOLATE:    Lab Results   Component Value Date/Time    FOLATE 19.8 03/05/2021 04:08 AM     Iron Saturation:  No components found for: PERCENTFE  FERRITIN:    Lab Results   Component Value Date/Time    FERRITIN 370 03/05/2021 09:18 AM     AMARILIS:  No results found for: ANATITER, AMARILIS  LIPASE:    Lab Results   Component Value Date/Time    LIPASE 78 01/29/2023 09:11 PM     Fibrinogen Level:  No components found for: FIB  Urine Toxicology:  No components found for: IAMMENTA, IBARBIT, IBENZO, ICOCAINE, IMARTHC, IOPIATES, IPHENCYC  24 Hour Urine for Protein:  No components found for: RAWUPRO, UHRS3, LDGX89PQ, UTV3  24 Hour Urine for Creatinine Clearance:  No components found for: CREAT4, UHRS10, UTV10     Imaging:  CT Head W/O Contrast [[id number]] Collected: 03/03/23 1339     Order Status: Completed Updated: 03/03/23 1343    Narrative:      EXAMINATION:   CT OF THE HEAD WITHOUT CONTRAST  3/3/2023 1:22 pm     TECHNIQUE:   CT of the head was performed without the administration of intravenous   contrast. Automated exposure control, iterative reconstruction, and/or weight   based adjustment of the mA/kV was utilized to reduce the radiation dose to as   low as reasonably achievable. COMPARISON:   None. HISTORY:   ORDERING SYSTEM PROVIDED HISTORY: ams   TECHNOLOGIST PROVIDED HISTORY:   Reason for exam:->ams   Has a \"code stroke\" or \"stroke alert\" been called? ->No   Decision Support Exception - unselect if not a suspected or confirmed   emergency medical condition->Emergency Medical Condition (MA)     FINDINGS:   BRAIN/VENTRICLES: There is no acute intracranial hemorrhage, mass effect or   midline shift. No abnormal extra-axial fluid collection. The gray-white   differentiation is maintained without evidence of an acute infarct. There is   no evidence of hydrocephalus. The ventricles, cisterns and sulci are   prominent consistent with atrophy. There is decreased attenuation within the   periventricular white matter consistent with periventricular leukomalacia. ORBITS: There are chronic calcifications seen within the optic globes. Valri Dykes SINUSES: The visualized paranasal sinuses and mastoid air cells demonstrate   no acute abnormality.      SOFT TISSUES/SKULL: No acute abnormality of the visualized skull or soft   tissues. Impression:      1. There is no acute intracranial abnormality. Specifically, there is no   intracranial hemorrhage. 2. Atrophy and periventricular leukomalacia,     XR CHEST PORTABLE [[id number]] Collected: 03/03/23 1318    Order Status: Completed Updated: 03/03/23 1321    Narrative:      EXAMINATION:   ONE XRAY VIEW OF THE CHEST     3/3/2023 1:16 pm     COMPARISON:   The lungs are without acute focal process. There is no effusion or   pneumothorax. The cardiomediastinal silhouette is without acute process. The   osseous structures are without acute process. Impression:      No acute process. US RETROPERITONEAL COMPLETE [[id number]] Collected: 03/03/23 2254     Order Status: Completed Updated: 03/03/23 2310    Narrative:      EXAMINATION:   RETROPERITONEAL ULTRASOUND OF THE KIDNEYS AND URINARY BLADDER     3/3/2023     COMPARISON:   CT 09/30/2022, 09/19/2022     HISTORY:   ORDERING SYSTEM PROVIDED HISTORY: BEENA   TECHNOLOGIST PROVIDED HISTORY:   Reason for exam:->BEENA   What reading provider will be dictating this exam?->CRC     FINDINGS:     Kidneys: Only the right kidney is visualized, due to patient's condition. Apparent   lesion at the upper pole is probably complex cyst such as hemorrhagic perhaps   layered dependent increased echogenicity noted on image 21, and on CT is high   attenuation without obvious enhancement. Is similar at about 1.8 cm on the   exams. Note the exams however are limited and can be given attention on   follow-up with and without contrast MRI or CT as clinically warranted or   surveillance by ultrasound. Increased renal echogenicity. No hydronephrosis. Bladder:     Not distended for evaluation with report of catheter. Impression:      1. Limited exam, only right kidney seen, with increased echogenicity   suggesting medical disease without hydronephrosis.    2. Upper pole lesion favored to be hyperdense cyst as able to be assessed, as   discussed above.          Assessment  1-Stage II BEENA sec to decreased effective renal perfusion in the setting of the 4.4L free water deficit (based on wt 54kg and Na+ 163)  Pre-renal Azotemia concern with the Hx of the N/V there is GI blood loss  UA Blood and protein (-), Ni and LE (-)  Cr down to 0.8mg/dl  Hyperdense Renal Cyst  PLAN:  Follow Labs  Follow cyst for size change as an outpt     2-Hypovolemic Hypernatremia 4.4L free water deficit (based on wt 54kg and Na+ 163) Na+ down to 134  PLAN:  Follow  Na+    3-Hypotension and Tachycardia with a Leukocytosis-WBC WNL-BP stable  Ruling out Sepsis-BC 3/3//23 NG  Resolved    4- Anemia with Macrocytosis  HgB low but stable  PLAN:  Follow as an out pt    Little else to add from a renal perspective-will sign off   Thank you  for allowing us to participate in care of Laretta Olszewski, MD  4:08 PM  3/8/2023

## 2023-03-08 NOTE — PLAN OF CARE
Problem: Skin/Tissue Integrity  Goal: Absence of new skin breakdown  Description: 1. Monitor for areas of redness and/or skin breakdown  2. Assess vascular access sites hourly  3. Every 4-6 hours minimum:  Change oxygen saturation probe site  4. Every 4-6 hours:  If on nasal continuous positive airway pressure, respiratory therapy assess nares and determine need for appliance change or resting period.   3/8/2023 1241 by David Porter RN  Outcome: Progressing     Problem: Discharge Planning  Goal: Discharge to home or other facility with appropriate resources  3/8/2023 1241 by David Porter RN  Outcome: Progressing     Problem: Pain  Goal: Verbalizes/displays adequate comfort level or baseline comfort level  3/8/2023 1241 by David Porter RN  Outcome: Progressing     Problem: Safety - Adult  Goal: Free from fall injury  3/8/2023 1241 by David Porter RN  Outcome: Progressing     Problem: ABCDS Injury Assessment  Goal: Absence of physical injury  3/8/2023 1241 by David Porter RN  Outcome: Progressing     Problem: Nutrition Deficit:  Goal: Optimize nutritional status  3/8/2023 1241 by David Porter RN  Outcome: Progressing  Flowsheets (Taken 3/8/2023 1159 by Jadyn Lopez, RAFFI, LD)  Nutrient intake appropriate for improving, restoring, or maintaining nutritional needs:   Assess nutritional status and recommend course of action   Monitor oral intake, labs, and treatment plans   Recommend, monitor, and adjust tube feedings and TPN/PPN based on assessed needs

## 2023-03-08 NOTE — PROGRESS NOTES
Physician Progress Note      Joseph Mcintyre  [facility] #:                  [id number]  :                       1955  ADMIT DATE:       3/3/2023 12:17 PM  [address] [place] DATE:  RESPONDING  PROVIDER #:        Krysten Duffy MD          QUERY TEXT:    Patient admitted with AMS and hypernatremia. Noted documentation of sepsis in   H&P and IM notes. If possible, please document in progress notes and discharge   summary the source of sepsis:    The medical record reflects the following:  Risk Factors: malpositioned PEG  Clinical Indicators: WBC 15.9-19, lactic 1.0, temp 96.6, 86/73, , per IM   \". Loreatha Press Loreatha Press Septic criteria: pt with hypotension/ tachycardia/ leukocytosis on   arrival- may all be related to contraction/ dehydration. Received empiric abx   in ER. Check UA- neg. CXR with no pneumonia process. Holding off on abx. Blood cultures neg. Loreatha Press Loreatha Press \"  Treatment: IV Zosyn and Vanc x1 dose each    Thank you,  April Gary Power, RN, BSN, CDIS  Clinical Documentation Integrity  [email or web address]  Options provided:  -- Sepsis, present on admission, due to, Please document source. -- Sepsis, present on admission, now resolved, due to, Please document source. -- Sepsis ruled out after study  -- Other - I will add my own diagnosis  -- Disagree - Not applicable / Not valid  -- Disagree - Clinically unable to determine / Unknown  -- Refer to Clinical Documentation Reviewer    PROVIDER RESPONSE TEXT:    After study, sepsis has been ruled out.     Query created by: Sherrye Sicard on 3/8/2023 11:55 AM      Electronically signed by:  Krysten Duffy MD 3/8/2023 3:32 PM

## 2023-03-08 NOTE — PROGRESS NOTES
Spoke with Maria Isabel - she would like for pt. To go to SNF for a stay prior to returning to group home. Per Eryn Anderson, there is not a nurse at the group home but once a week to see pt. And she is worried that the staff there will not be able to care for a cedric button.   Eryn Anderson can be reached at [phone]    Electronically signed by Maulik Manzanares RN on 3/8/2023 at 6:45 PM

## 2023-03-08 NOTE — PROGRESS NOTES
Tube feed was restarted - pt. Is tolerating well.     Electronically signed by Isaac Foss RN on 3/8/2023 at 1:14 PM

## 2023-03-08 NOTE — PROGRESS NOTES
[facility] Progress Note    Admitting Date and Time: 3/3/2023 12:17 PM  Admit Dx: Hypernatremia [E87.0]    Subjective:  Patient is being followed for Hypernatremia [E87.0]     Pt resting in no acute distress  Cooperative with exam  Per nursing peg tube removed- Cameron button placed this am.  KUB confirmed placement  As soon as TF was restarted pt vomited. TF now on hold. Nursing reached out to surgery. ROS: denies fever, chills, cp, sob, n/v, HA unless stated above.      enoxaparin  30 mg SubCUTAneous Daily    levothyroxine  150 mcg PEG Tube Daily    insulin lispro  0-4 Units SubCUTAneous TID WC    insulin lispro  0-4 Units SubCUTAneous Nightly    PARoxetine  10 mg Oral BID    montelukast  10 mg PEG Tube Nightly    valproic acid  1,000 mg Per G Tube BID    [Held by provider] OLANZapine  20 mg Oral BID    sodium chloride flush  5-40 mL IntraVENous 2 times per day    pantoprazole  40 mg IntraVENous Daily    midodrine  10 mg PEG Tube TID WC    docusate  100 mg Oral Daily    polyethylene glycol  17 g Per G Tube Daily     bisacodyl, 10 mg, Daily PRN  glucose, 4 tablet, PRN  dextrose bolus, 125 mL, PRN   Or  dextrose bolus, 250 mL, PRN  glucagon (rDNA), 1 mg, PRN  dextrose, , Continuous PRN  diatrizoate meglumine-sodium, 30 mL, ONCE PRN  sodium chloride flush, 10 mL, PRN  acetaminophen, 650 mg, Q4H PRN  sodium chloride flush, 5-40 mL, PRN  sodium chloride, , PRN  ondansetron, 4 mg, Q8H PRN   Or  ondansetron, 4 mg, Q6H PRN  acetaminophen, 650 mg, Q6H PRN   Or  acetaminophen, 650 mg, Q6H PRN  diatrizoate meglumine-sodium, 30 mL, ONCE PRN         Objective:    BP (!) 106/52   Pulse 85   Temp 97.6 °F (36.4 °C) (Axillary)   Resp 16   Ht 5' 5\" (1.651 m)   Wt 111 lb (50.3 kg)   SpO2 94%   BMI 18.47 kg/m²   General Appearance: awake- alert.  Some verbalization this am with staff  Skin: warm and dry  Head: normocephalic and atraumatic  Neck: neck supple and non tender without mass Pulmonary/Chest: diminished throughout to auscultation   Cardiovascular: normal rate, normal S1 and S2 and no carotid bruits  Abdomen: soft, non-tender, non-distended, normal bowel sounds- peg tube c/d/i  Extremities: no cyanosis, no clubbing and no edema  Neurologic:  VIRAL        Recent Labs     03/06/23 0222 03/07/23  0400 03/07/23  1012 03/08/23  0525    132  --  134   K 4.7 5.5* 5.3* 4.8    107  --  107   CO2 21* 21*  --  22   BUN 16 13  --  14   CREATININE 0.6 0.7  --  0.8   GLUCOSE 111* 120*  --  129*   CALCIUM 7.8* 7.6*  --  8.0*       Recent Labs     03/06/23 0222 03/07/23  0400 03/08/23  0525   WBC 13.4* 10.0 9.3   RBC 3.15* 3.17* 3.11*   HGB 9.9* 9.9* 9.5*   HCT 32.2* 31.7* 30.6*   .2* 100.0* 98.4   MCH 31.4 31.2 30.5   MCHC 30.7* 31.2* 31.0*   RDW 15.1* 14.9 15.5*    188 185   MPV 12.7* 12.2* 11.7       Assessment:    Principal Problem:    Hypernatremia  Active Problems:    BEENA (acute kidney injury) (HCC)    Hypotension due to hypovolemia    Tachycardia    Metabolic encephalopathy    Nausea and vomiting    Severe protein-calorie malnutrition (HCC)  Resolved Problems:    * No resolved hospital problems. *      Plan:   1. Severe Hypernatremia;  on arrival to ER ? Due to n/v not tolerating TF ? Issue with peg tube s/p peg tube removed and Cameron button placed 3/8- pt was sent to ER from alf with concerns of altered mental status/ lethargy. She has a h/o MRDD/ dementia and has a peg tube for h/o dysphagia. Appears she was recently cleared for pleasure feeds with honey thick liquids. Per staff at group [facility] - pt has been vomiting up most of her feeding and concern that her peg tube needed changed to a lena button per surgery. Group home denied recent illness or diarrhea. Denies fevers. Pt received NS bolus in ER. Has followed with nephrology in the past for hypo/hypernatremia/ Initially sent to ICU as sodium level continued to climb- peaked at 163.  Nephrology consulted and following- appreciate input. General surgery consulted to evaluate peg tube. Reporting 3/6 that peg tube was malpositioned/ not appropriately tightened and needs to be at 2cm to skin. Reporting if in not appropriate position could be causing mechanical obstruction. Pt has been rehydrated and Sodium level/ electrolytes are back to normal. PEG tube removed this am and Cameron button placed due to concern that peg tube would not stay tightened/ in correct position. However as soon as TF started via NICHOLASHAYNE button- pt vomited. TF now on hold. Surgery notified. 2. Hypotension with h/o HTN: likely due to hypovolemia/ dehydration: rehydrated- continues on midodrine. BP stable. 3. Acute encephalopathy: likely metabolic due to above: CT head neg for acute findings. Pt appears back to baseline. Not verbalizing much. More interactive- assisting with turning. 4. Septic criteria: pt with hypotension/ tachycardia/ leukocytosis on arrival- may all be related to contraction/ dehydration. Received empiric abx in ER. Check UA- neg. CXR with no pneumonia process. Holding off on abx. Blood cultures neg. 5. MRDD/ Dementia: supportive care- holding zyprexa      6. Thyroid disease; synthroid     7. H/o dysphagia- peg tube placed by surgery 3/2021. H/o ER visits/ admissions for nausea / vomiting- seen by surgery recently ? Need for lena button. Appreciate surgery input- reporting peg was not in appropriate position x 2 during admission. Peg at 20cm likely causing obstruction. PEG removed today and Cameron button placed. 8. H/o hiatal hernia      9. Difficult IV access: central line placed in ER. TLC dc 3/7     10. Constipation: on bowel regimen- back off if pt having diarrhea. 11. Hypomagnesemia: supplement      12. Hyperkalemia s/p lokelma- stop IVF with KCL             Dispo: pt resides at a [address] work to follow. Surgery removed peg and placed Cameron button this morning.  Plan was for possible discharge back to Group Home this afternoon. However pt is not tolerating TF and started vomiting as soon as TF restarted. TF is now on hold. Plan to DC patient back to Group Home when tolerating TF         Time spent reviewing chart, clinical exam, discussing case and answering questions with staff/consultants/patient/family = 22 minutes     NOTE: This report was transcribed using voice recognition software. Every effort was made to ensure accuracy; however, inadvertent computerized transcription errors may be present.   Electronically signed by SCHUYLER Alves Cera on 3/8/2023 at 8:25 AM

## 2023-03-08 NOTE — PROGRESS NOTES
Ok with surgery to use Cameron button - KUB reviewed. Meds administered and tube feed about to be connected when pt began making gurgling noises. Patient sat completely upright. Pt. Did vomit most of medication up - 50cc. Residual was checked and about 50cc of residuals. Pt. Still making some gurgling noises, but not vomiting. Pt. Left upright and tube feeds held for now.     Electronically signed by Candance Park, RN on 3/8/2023 at 11:11 AM

## 2023-03-08 NOTE — PATIENT CARE CONFERENCE
[facility] Quality Flow/Interdisciplinary Rounds Progress Note        Quality Flow Rounds held on March 8, 2023    Disciplines Attending:  Bedside Nurse, , , and Nursing Unit Leadership    Katerina Paiz was admitted on 3/3/2023 12:17 PM    Anticipated Discharge Date:  Expected Discharge Date: 03/08/23    Disposition:    Jose Score:  Jose Scale Score: 14    Readmission Risk              Risk of Unplanned Readmission:  47           Discussed patient goal for the day, patient clinical progression, and barriers to discharge.  The following Goal(s) of the Day/Commitment(s) have been identified:  Diagnostics - Report Results      Ninfa Garcia RN  March 8, 2023

## 2023-03-08 NOTE — CARE COORDINATION
03/08/23 1049   IMM Letter   IMM Letter given to Patient/Family/Significant other/Guardian/POA/by: guardicheikh Perez at [facility]   IMM Letter date given: 03/08/23   IMM Letter time given: 0     SW emailed IMM to Kirby Figueroa. [email or web address]  [email or web address] also called Kirby Figueroa and left message re:  IMM on her voicemail [phone] [phone]

## 2023-03-08 NOTE — PROGRESS NOTES
Comprehensive Nutrition Assessment    Type and Reason for Visit:  Reassess    Nutrition Recommendations/Plan:   Continue to hold TF, monitor for recommendations per GS  Tube Feed recommendation when needed:    Standard with Fiber (Jevity 1.5) goal rate @ 45 ml/hr X 23 hrs (hold 30 min before & after Synthroid)  Free water flush per renal management  To provide: 1035 ml tv, 1553 kcal, 66 g pro, 787 ml free water  Regimen meets 100% calorie & protein needs     Malnutrition Assessment:  Malnutrition Status:  Severe malnutrition (03/04/23 0858)    Context:  Acute Illness     Findings of the 6 clinical characteristics of malnutrition:  Energy Intake:  50% or less of estimated energy requirements for 5 or more days  Weight Loss:  Greater than 7.5% over 3 months (-26.6% X 5 months, per actual EMR wt hx)     Body Fat Loss: Moderate body fat loss Triceps   Muscle Mass Loss: Moderate muscle mass loss Temples (temporalis), Clavicles (pectoralis & deltoids), Thigh (quadraceps)  Fluid Accumulation:  No significant fluid accumulation     Strength:  Not Performed    Nutrition Assessment:    Severe volume/water depletion w/ hypernatremia now resolved. Pt w/ multiple PEG placement issues this admit, pt was tolerating TF 3/7, s/p Cameron button placement this AM- pt now vomiting w/ med administration & TF on hold. Monitor for recommendations per GS, will provide TF recommendation & monitor.     Nutrition Related Findings:    nonverbal, blind, +10.1 L, no edema, abd flat, +BS, vomiting, PEG (TF on hold), Phos 2.3 Wound Type: None       Current Nutrition Intake & Therapies:    Diet NPO  ADULT TUBE FEEDING; PEG; Standard with Fiber; Continuous; 20; Yes; 5; Q 4 hours; 40; 30; Q 4 hours  Current Tube Feeding (TF) Orders:  Feeding Route: PEG  Formula: Standard with Fiber  Schedule: Continuous  Feeding Regimen: 40 ml/hr  Water Flushes: 30 ml q 4 hr = 180 ml/d  Current TF & Flush Orders Provides: on hold  Goal TF & Flush Orders Provides: 960 ml tv, 1440 kcal, 61 g pro, 730 ml free water (910 ml w/ flush)    Anthropometric Measures:  Height: 5' 5\" (165.1 cm)  Ideal Body Weight (IBW): 125 lbs (57 kg)    Admission Body Weight: 94 lb 2.2 oz (42.7 kg) (3/3 bed)  Current Body Weight: 111 lb (50.3 kg), 75.2 % IBW. Weight Source: Bed Scale (3/8 (pt w/ free water deficit on admit))  Current BMI (kg/m2): 18.5  Usual Body Weight: 128 lb (58.1 kg) (10/2022 actual per EMR)  % Weight Change (Calculated): -26.6  Weight Adjustment For: No Adjustment                 BMI Categories: Underweight (BMI less than 22) age over 72    Estimated Daily Nutrient Needs:  Energy Requirements Based On: Kcal/kg  Weight Used for Energy Requirements: Current  Energy (kcal/day):   Weight Used for Protein Requirements: Current  Protein (g/day): 60-70 (1.2-1.4)  Method Used for Fluid Requirements: Standard Renal  Fluid (ml/day): per renal management    Nutrition Diagnosis:   Severe malnutrition, In context of acute illness or injury related to altered GI function as evidenced by Criteria as identified in malnutrition assessment    Nutrition Interventions:   Food and/or Nutrient Delivery: Continue NPO  Nutrition Education/Counseling: No recommendation at this time  Coordination of Nutrition Care: Continue to monitor while inpatient  Plan of Care discussed with: discussed TF w/ bedside nurse    Goals:  Previous Goal Met: Progressing toward Goal(s)  Goals: Tolerate nutrition support at goal rate, by next RD assessment  Specify Other Goals: nutrition progression    Nutrition Monitoring and Evaluation:      Food/Nutrient Intake Outcomes: Enteral Nutrition Intake/Tolerance  Physical Signs/Symptoms Outcomes: Biochemical Data, GI Status, Nausea or Vomiting, Fluid Status or Edema, Nutrition Focused Physical Findings, Skin, Weight    Discharge Planning:     Too soon to determine     Sandra Bravo RD, LD  Contact: [phone]

## 2023-03-09 LAB
ALBUMIN SERPL-MCNC: 2.3 G/DL (ref 3.5–5.2)
ALP BLD-CCNC: 75 U/L (ref 35–104)
ALT SERPL-CCNC: 9 U/L (ref 0–32)
ANION GAP SERPL CALCULATED.3IONS-SCNC: 5 MMOL/L (ref 7–16)
AST SERPL-CCNC: 11 U/L (ref 0–31)
BASOPHILS ABSOLUTE: 0.03 E9/L (ref 0–0.2)
BASOPHILS RELATIVE PERCENT: 0.3 % (ref 0–2)
BILIRUB SERPL-MCNC: <0.2 MG/DL (ref 0–1.2)
BUN BLDV-MCNC: 13 MG/DL (ref 6–23)
CALCIUM SERPL-MCNC: 8.4 MG/DL (ref 8.6–10.2)
CHLORIDE BLD-SCNC: 107 MMOL/L (ref 98–107)
CO2: 26 MMOL/L (ref 22–29)
CREAT SERPL-MCNC: 0.8 MG/DL (ref 0.5–1)
EOSINOPHILS ABSOLUTE: 0.27 E9/L (ref 0.05–0.5)
EOSINOPHILS RELATIVE PERCENT: 2.9 % (ref 0–6)
GFR SERPL CREATININE-BSD FRML MDRD: >60 ML/MIN/1.73
GLUCOSE BLD-MCNC: 97 MG/DL (ref 74–99)
HCT VFR BLD CALC: 31.7 % (ref 34–48)
HEMOGLOBIN: 9.6 G/DL (ref 11.5–15.5)
IMMATURE GRANULOCYTES #: 0.4 E9/L
IMMATURE GRANULOCYTES %: 4.3 % (ref 0–5)
LYMPHOCYTES ABSOLUTE: 1.5 E9/L (ref 1.5–4)
LYMPHOCYTES RELATIVE PERCENT: 16 % (ref 20–42)
MAGNESIUM: 2.3 MG/DL (ref 1.6–2.6)
MCH RBC QN AUTO: 31 PG (ref 26–35)
MCHC RBC AUTO-ENTMCNC: 30.3 % (ref 32–34.5)
MCV RBC AUTO: 102.3 FL (ref 80–99.9)
METER GLUCOSE: 102 MG/DL (ref 74–99)
METER GLUCOSE: 114 MG/DL (ref 74–99)
METER GLUCOSE: 121 MG/DL (ref 74–99)
METER GLUCOSE: 90 MG/DL (ref 74–99)
MONOCYTES ABSOLUTE: 1.28 E9/L (ref 0.1–0.95)
MONOCYTES RELATIVE PERCENT: 13.7 % (ref 2–12)
NEUTROPHILS ABSOLUTE: 5.89 E9/L (ref 1.8–7.3)
NEUTROPHILS RELATIVE PERCENT: 62.8 % (ref 43–80)
PDW BLD-RTO: 15.9 FL (ref 11.5–15)
PHOSPHORUS: 2.5 MG/DL (ref 2.5–4.5)
PLATELET # BLD: 163 E9/L (ref 130–450)
PMV BLD AUTO: 11.8 FL (ref 7–12)
POTASSIUM SERPL-SCNC: 4.5 MMOL/L (ref 3.5–5)
RBC # BLD: 3.1 E12/L (ref 3.5–5.5)
SODIUM BLD-SCNC: 138 MMOL/L (ref 132–146)
TOTAL PROTEIN: 6.2 G/DL (ref 6.4–8.3)
WBC # BLD: 9.4 E9/L (ref 4.5–11.5)

## 2023-03-09 PROCEDURE — 36415 COLL VENOUS BLD VENIPUNCTURE: CPT

## 2023-03-09 PROCEDURE — 84100 ASSAY OF PHOSPHORUS: CPT

## 2023-03-09 PROCEDURE — 85025 COMPLETE CBC W/AUTO DIFF WBC: CPT

## 2023-03-09 PROCEDURE — 82962 GLUCOSE BLOOD TEST: CPT

## 2023-03-09 PROCEDURE — 6370000000 HC RX 637 (ALT 250 FOR IP): Performed by: INTERNAL MEDICINE

## 2023-03-09 PROCEDURE — 6360000002 HC RX W HCPCS: Performed by: INTERNAL MEDICINE

## 2023-03-09 PROCEDURE — 99232 SBSQ HOSP IP/OBS MODERATE 35: CPT | Performed by: PHYSICIAN ASSISTANT

## 2023-03-09 PROCEDURE — 83735 ASSAY OF MAGNESIUM: CPT

## 2023-03-09 PROCEDURE — 1200000000 HC SEMI PRIVATE

## 2023-03-09 PROCEDURE — C9113 INJ PANTOPRAZOLE SODIUM, VIA: HCPCS | Performed by: INTERNAL MEDICINE

## 2023-03-09 PROCEDURE — 2580000003 HC RX 258: Performed by: INTERNAL MEDICINE

## 2023-03-09 PROCEDURE — 80053 COMPREHEN METABOLIC PANEL: CPT

## 2023-03-09 RX ADMIN — ENOXAPARIN SODIUM 30 MG: 100 INJECTION SUBCUTANEOUS at 17:02

## 2023-03-09 RX ADMIN — MIDODRINE HYDROCHLORIDE 10 MG: 5 TABLET ORAL at 11:07

## 2023-03-09 RX ADMIN — MONTELUKAST SODIUM 10 MG: 10 TABLET ORAL at 22:40

## 2023-03-09 RX ADMIN — VALPROIC ACID 1000 MG: 250 SOLUTION ORAL at 22:40

## 2023-03-09 RX ADMIN — PANTOPRAZOLE SODIUM 40 MG: 40 INJECTION, POWDER, FOR SOLUTION INTRAVENOUS at 08:46

## 2023-03-09 RX ADMIN — LEVOTHYROXINE SODIUM 150 MCG: 75 TABLET ORAL at 06:16

## 2023-03-09 RX ADMIN — Medication 10 ML: at 08:47

## 2023-03-09 RX ADMIN — PAROXETINE 10 MG: 10 TABLET, FILM COATED ORAL at 22:40

## 2023-03-09 RX ADMIN — Medication 10 ML: at 22:39

## 2023-03-09 RX ADMIN — VALPROIC ACID 1000 MG: 250 SOLUTION ORAL at 08:46

## 2023-03-09 RX ADMIN — PAROXETINE 10 MG: 10 TABLET, FILM COATED ORAL at 08:46

## 2023-03-09 NOTE — PROGRESS NOTES
[facility] Progress Note    Admitting Date and Time: 3/3/2023 12:17 PM  Admit Dx: Hypernatremia [E87.0]    Subjective:  Patient is being followed for Hypernatremia [E87.0]     Patient was lying in bed in no acute distress. Per nursing, no acute concerns or overnight events. Tolerating her feeds.      ROS: denies fever, chills, cp, sob, n/v, HA unless stated above.      enoxaparin  30 mg SubCUTAneous Daily    levothyroxine  150 mcg PEG Tube Daily    insulin lispro  0-4 Units SubCUTAneous TID WC    insulin lispro  0-4 Units SubCUTAneous Nightly    PARoxetine  10 mg Oral BID    montelukast  10 mg PEG Tube Nightly    valproic acid  1,000 mg Per G Tube BID    [Held by provider] OLANZapine  20 mg Oral BID    sodium chloride flush  5-40 mL IntraVENous 2 times per day    pantoprazole  40 mg IntraVENous Daily    midodrine  10 mg PEG Tube TID WC    docusate  100 mg Oral Daily    polyethylene glycol  17 g Per G Tube Daily     bisacodyl, 10 mg, Daily PRN  glucose, 4 tablet, PRN  dextrose bolus, 125 mL, PRN   Or  dextrose bolus, 250 mL, PRN  glucagon (rDNA), 1 mg, PRN  dextrose, , Continuous PRN  diatrizoate meglumine-sodium, 30 mL, ONCE PRN  sodium chloride flush, 10 mL, PRN  acetaminophen, 650 mg, Q4H PRN  sodium chloride flush, 5-40 mL, PRN  sodium chloride, , PRN  ondansetron, 4 mg, Q8H PRN   Or  ondansetron, 4 mg, Q6H PRN  acetaminophen, 650 mg, Q6H PRN   Or  acetaminophen, 650 mg, Q6H PRN  diatrizoate meglumine-sodium, 30 mL, ONCE PRN         Objective:    /62   Pulse 90   Temp 98 °F (36.7 °C) (Axillary)   Resp 16   Ht 5' 5\" (1.651 m)   Wt 106 lb (48.1 kg)   SpO2 96%   BMI 17.64 kg/m²   General Appearance: in no acute distress  Skin: warm and dry  Head: normocephalic and atraumatic  Eyes: pupils equal, round, and reactive to light, extraocular eye movements intact, conjunctivae normal  Neck: neck supple and non tender without mass   Pulmonary/Chest: clear to auscultation bilaterally- no wheezes, rales or rhonchi, normal air movement, no respiratory distress  Cardiovascular: normal rate, normal S1 and S2 and no carotid bruits  Abdomen: soft, non-tender, non-distended, normal bowel sounds, cedric button   Extremities: no cyanosis, no clubbing and no edema          Recent Labs     03/07/23  0400 03/07/23  1012 03/08/23  0525 03/09/23  0311     --  134 138   K 5.5* 5.3* 4.8 4.5     --  107 107   CO2 21*  --  22 26   BUN 13  --  14 13   CREATININE 0.7  --  0.8 0.8   GLUCOSE 120*  --  129* 97   CALCIUM 7.6*  --  8.0* 8.4*       Recent Labs     03/07/23  0400 03/08/23  0525 03/09/23  0311   WBC 10.0 9.3 9.4   RBC 3.17* 3.11* 3.10*   HGB 9.9* 9.5* 9.6*   HCT 31.7* 30.6* 31.7*   .0* 98.4 102.3*   MCH 31.2 30.5 31.0   MCHC 31.2* 31.0* 30.3*   RDW 14.9 15.5* 15.9*    185 163   MPV 12.2* 11.7 11.8       Radiology: reviewed     Assessment:    Principal Problem:    Hypernatremia  Active Problems:    BEENA (acute kidney injury) ([facility] [address]    Hypotension due to hypovolemia    Tachycardia    Metabolic encephalopathy    Nausea and vomiting    Severe protein-calorie malnutrition (HCC)    Hyperkalemia  Resolved Problems:    * No resolved hospital problems. *      Plan:  1. Severe Hypernatremia: Sent from group home to ED due to AMS and lethargy. Had peg due to dysphagia. Recently cleared for pleasure feeds with honey thick liquids. Na on admission 158. Likely secondary to n/v from not tolerating TF. Received NS bolus in ED. Initially sent to ICU due to increased sodium -peaked at 163. Nephrology consulted. Received IVF with improvement. Na now 138. S/p PEG removal and cedric button placed on 3/8.     2. Malpositioned PEG: General surgery consulted. On  3/6 reported that PEG was malpositioned and not appropriately tightened. Per GS, if not in appropriate position could be causing mechanical obstruction. S/p PEG removal and cedric button placed on 3/8.  TF restarted with patient vomiting, were held but restarted and now tolerated. 3. Hypotension with hx of hypertension: Likely due to hypovolemia/ dehydration. Received IVF. Continue midodrine. BP stable. 4. Acute encephalopathy: Likely metabolic due to above. CT head negative. Appears back to baseline. 5. Septic criteria: On admission patient with hypotension, tachycardia, and leukocytosis. May be related to contraction/ dehydration. Received empiric antibiotics in ED. UA negative. CXR with no acute process. Blood cultures negative. No further antibiotics needed. 6. MRDD/ Dementia: supportive care. holding zyprexa      7. Thyroid disease: Continue synthroid     8. Hx of dysphagia: peg tube placed by surgery 3/2021. Multiple ER visits/ admissions for nausea / vomiting. General surgery consulted. Reporting peg was not in appropriate position x 2 during admission. Peg at 2 cm likely causing obstruction. PEG removed and Cameron button placed on 3/8.      9. H/o hiatal hernia      10. Difficult IV access: central line placed in ER. TLC dc 3/7    11. Constipation: on bowel regimen. Monitor for diarrhea       12. Hypomagnesemia: supplement prn. Monitor. 13. Hyperkalemia:  s/p lokelma. Monitor      14. BEENA: Resolved. Cr 1.3 now down to 0.8. Avoid nephrotoxic agents. Monitor. NOTE: This report was transcribed using voice recognition software. Every effort was made to ensure accuracy; however, inadvertent computerized transcription errors may be present. Electronically signed by Shirley Ramirez PA-C on 3/9/2023 at 9:47 AM       25 minutes time spent reviewing patient chart, assessing patient, discussing plan of care with patient and family, discussing plan of care with collaborating physician, and charting.

## 2023-03-09 NOTE — PLAN OF CARE
Problem: Skin/Tissue Integrity  Goal: Absence of new skin breakdown  Description: 1.  Monitor for areas of redness and/or skin breakdown  2.  Assess vascular access sites hourly  3.  Every 4-6 hours minimum:  Change oxygen saturation probe site  4.  Every 4-6 hours:  If on nasal continuous positive airway pressure, respiratory therapy assess nares and determine need for appliance change or resting period.  3/9/2023 1028 by Rosette Harris RN  Outcome: Progressing     Problem: Discharge Planning  Goal: Discharge to home or other facility with appropriate resources  3/9/2023 1028 by Rosette Harris RN  Outcome: Progressing     Problem: Pain  Goal: Verbalizes/displays adequate comfort level or baseline comfort level  3/9/2023 1028 by Rosette Harris RN  Outcome: Progressing     Problem: Safety - Adult  Goal: Free from fall injury  Outcome: Progressing     Problem: ABCDS Injury Assessment  Goal: Absence of physical injury  Outcome: Progressing     Problem: Nutrition Deficit:  Goal: Optimize nutritional status  Outcome: Progressing

## 2023-03-09 NOTE — CARE COORDINATION
Social Work discharge planning    Sw called [facility] again, and was told Mario Flanagan not available. Alfreda spoke to Beth at [facility]. She took Sw contact info, and said she will tell Mario Flanagan that Alfreda needs snf choices asap today, and that Giovanni Luna is asking for rehab d/t D.Nicholas Goldberg.   Electronically signed by Marita Mercado on 3/9/2023 at 3:11 PM

## 2023-03-09 NOTE — PATIENT CARE CONFERENCE
P Quality Flow/Interdisciplinary Rounds Progress Note        Quality Flow Rounds held on March 9, 2023    Disciplines Attending:  Bedside Nurse, , , and Nursing Unit Leadership    Bhavik Steel was admitted on 3/3/2023 12:17 PM    Anticipated Discharge Date:  Expected Discharge Date: 03/10/23    Disposition:    Jose Score:  Jose Scale Score: 13    Readmission Risk              Risk of Unplanned Readmission:  47           Discussed patient goal for the day, patient clinical progression, and barriers to discharge.   The following Goal(s) of the Day/Commitment(s) have been identified:  Discharge - Obtain Order planning      Lizeth Gates RN  March 9, 2023

## 2023-03-10 ENCOUNTER — APPOINTMENT (OUTPATIENT)
Dept: GENERAL RADIOLOGY | Age: 68
DRG: 640 | End: 2023-03-10
Payer: MEDICARE

## 2023-03-10 LAB
ALBUMIN SERPL-MCNC: 2 G/DL (ref 3.5–5.2)
ALP BLD-CCNC: 74 U/L (ref 35–104)
ALT SERPL-CCNC: 9 U/L (ref 0–32)
ANION GAP SERPL CALCULATED.3IONS-SCNC: 5 MMOL/L (ref 7–16)
AST SERPL-CCNC: 13 U/L (ref 0–31)
BASOPHILS ABSOLUTE: 0.03 E9/L (ref 0–0.2)
BASOPHILS RELATIVE PERCENT: 0.3 % (ref 0–2)
BILIRUB SERPL-MCNC: <0.2 MG/DL (ref 0–1.2)
BUN BLDV-MCNC: 12 MG/DL (ref 6–23)
CALCIUM SERPL-MCNC: 8.5 MG/DL (ref 8.6–10.2)
CHLORIDE BLD-SCNC: 106 MMOL/L (ref 98–107)
CO2: 25 MMOL/L (ref 22–29)
CREAT SERPL-MCNC: 0.7 MG/DL (ref 0.5–1)
EOSINOPHILS ABSOLUTE: 0.22 E9/L (ref 0.05–0.5)
EOSINOPHILS RELATIVE PERCENT: 1.8 % (ref 0–6)
GFR SERPL CREATININE-BSD FRML MDRD: >60 ML/MIN/1.73
GLUCOSE BLD-MCNC: 99 MG/DL (ref 74–99)
HCT VFR BLD CALC: 31.6 % (ref 34–48)
HEMOGLOBIN: 9.7 G/DL (ref 11.5–15.5)
IMMATURE GRANULOCYTES #: 0.57 E9/L
IMMATURE GRANULOCYTES %: 4.8 % (ref 0–5)
LYMPHOCYTES ABSOLUTE: 1.99 E9/L (ref 1.5–4)
LYMPHOCYTES RELATIVE PERCENT: 16.6 % (ref 20–42)
MAGNESIUM: 2.1 MG/DL (ref 1.6–2.6)
MCH RBC QN AUTO: 31.3 PG (ref 26–35)
MCHC RBC AUTO-ENTMCNC: 30.7 % (ref 32–34.5)
MCV RBC AUTO: 101.9 FL (ref 80–99.9)
METER GLUCOSE: 105 MG/DL (ref 74–99)
METER GLUCOSE: 106 MG/DL (ref 74–99)
METER GLUCOSE: 107 MG/DL (ref 74–99)
METER GLUCOSE: 84 MG/DL (ref 74–99)
MONOCYTES ABSOLUTE: 1.41 E9/L (ref 0.1–0.95)
MONOCYTES RELATIVE PERCENT: 11.8 % (ref 2–12)
NEUTROPHILS ABSOLUTE: 7.76 E9/L (ref 1.8–7.3)
NEUTROPHILS RELATIVE PERCENT: 64.7 % (ref 43–80)
PDW BLD-RTO: 16.1 FL (ref 11.5–15)
PHOSPHORUS: 2.1 MG/DL (ref 2.5–4.5)
PLATELET # BLD: 219 E9/L (ref 130–450)
PMV BLD AUTO: 11.3 FL (ref 7–12)
POTASSIUM SERPL-SCNC: 4.6 MMOL/L (ref 3.5–5)
RBC # BLD: 3.1 E12/L (ref 3.5–5.5)
SODIUM BLD-SCNC: 136 MMOL/L (ref 132–146)
TOTAL PROTEIN: 6.2 G/DL (ref 6.4–8.3)
WBC # BLD: 12 E9/L (ref 4.5–11.5)

## 2023-03-10 PROCEDURE — 2580000003 HC RX 258: Performed by: INTERNAL MEDICINE

## 2023-03-10 PROCEDURE — 87088 URINE BACTERIA CULTURE: CPT

## 2023-03-10 PROCEDURE — 87040 BLOOD CULTURE FOR BACTERIA: CPT

## 2023-03-10 PROCEDURE — 1200000000 HC SEMI PRIVATE

## 2023-03-10 PROCEDURE — 6370000000 HC RX 637 (ALT 250 FOR IP): Performed by: INTERNAL MEDICINE

## 2023-03-10 PROCEDURE — 80053 COMPREHEN METABOLIC PANEL: CPT

## 2023-03-10 PROCEDURE — 99232 SBSQ HOSP IP/OBS MODERATE 35: CPT | Performed by: PHYSICIAN ASSISTANT

## 2023-03-10 PROCEDURE — 83735 ASSAY OF MAGNESIUM: CPT

## 2023-03-10 PROCEDURE — 85025 COMPLETE CBC W/AUTO DIFF WBC: CPT

## 2023-03-10 PROCEDURE — 2580000003 HC RX 258: Performed by: PHYSICIAN ASSISTANT

## 2023-03-10 PROCEDURE — 6360000002 HC RX W HCPCS: Performed by: INTERNAL MEDICINE

## 2023-03-10 PROCEDURE — 6360000002 HC RX W HCPCS: Performed by: PHYSICIAN ASSISTANT

## 2023-03-10 PROCEDURE — 71045 X-RAY EXAM CHEST 1 VIEW: CPT

## 2023-03-10 PROCEDURE — 36415 COLL VENOUS BLD VENIPUNCTURE: CPT

## 2023-03-10 PROCEDURE — C9113 INJ PANTOPRAZOLE SODIUM, VIA: HCPCS | Performed by: INTERNAL MEDICINE

## 2023-03-10 PROCEDURE — 82962 GLUCOSE BLOOD TEST: CPT

## 2023-03-10 PROCEDURE — 84100 ASSAY OF PHOSPHORUS: CPT

## 2023-03-10 RX ADMIN — MIDODRINE HYDROCHLORIDE 10 MG: 5 TABLET ORAL at 17:44

## 2023-03-10 RX ADMIN — AMPICILLIN SODIUM AND SULBACTAM SODIUM 3000 MG: 2; 1 INJECTION, POWDER, FOR SOLUTION INTRAMUSCULAR; INTRAVENOUS at 18:33

## 2023-03-10 RX ADMIN — VALPROIC ACID 1000 MG: 250 SOLUTION ORAL at 10:33

## 2023-03-10 RX ADMIN — Medication 10 ML: at 10:21

## 2023-03-10 RX ADMIN — DOCUSATE SODIUM LIQUID 100 MG: 100 LIQUID ORAL at 10:24

## 2023-03-10 RX ADMIN — PAROXETINE 10 MG: 10 TABLET, FILM COATED ORAL at 12:09

## 2023-03-10 RX ADMIN — POLYETHYLENE GLYCOL 3350 17 G: 17 POWDER, FOR SOLUTION ORAL at 10:24

## 2023-03-10 RX ADMIN — MONTELUKAST SODIUM 10 MG: 10 TABLET ORAL at 20:49

## 2023-03-10 RX ADMIN — MIDODRINE HYDROCHLORIDE 10 MG: 5 TABLET ORAL at 10:24

## 2023-03-10 RX ADMIN — ENOXAPARIN SODIUM 30 MG: 100 INJECTION SUBCUTANEOUS at 17:43

## 2023-03-10 RX ADMIN — Medication 10 ML: at 20:47

## 2023-03-10 RX ADMIN — PANTOPRAZOLE SODIUM 40 MG: 40 INJECTION, POWDER, FOR SOLUTION INTRAVENOUS at 10:22

## 2023-03-10 RX ADMIN — VALPROIC ACID 1000 MG: 250 SOLUTION ORAL at 20:49

## 2023-03-10 RX ADMIN — MIDODRINE HYDROCHLORIDE 10 MG: 5 TABLET ORAL at 12:15

## 2023-03-10 RX ADMIN — LEVOTHYROXINE SODIUM 150 MCG: 75 TABLET ORAL at 05:49

## 2023-03-10 RX ADMIN — PAROXETINE 10 MG: 10 TABLET, FILM COATED ORAL at 20:48

## 2023-03-10 ASSESSMENT — PAIN SCALES - GENERAL: PAINLEVEL_OUTOF10: 0

## 2023-03-10 NOTE — PROGRESS NOTES
[facility] Hospitalist Progress Note    Admitting Date and Time: 3/3/2023 12:17 PM  Admit Dx: Hypernatremia [E87.0]    Subjective:  Patient is being followed for Hypernatremia [E87.0]     Patient was lying in bed in no acute distress.     Per nursing, no acute concerns or overnight events. Concern about loose tooth.     ROS: denies fever, chills, cp, sob, n/v, HA unless stated above.      enoxaparin  30 mg SubCUTAneous Daily    levothyroxine  150 mcg PEG Tube Daily    insulin lispro  0-4 Units SubCUTAneous TID WC    insulin lispro  0-4 Units SubCUTAneous Nightly    PARoxetine  10 mg Oral BID    montelukast  10 mg PEG Tube Nightly    valproic acid  1,000 mg Per G Tube BID    [Held by provider] OLANZapine  20 mg Oral BID    sodium chloride flush  5-40 mL IntraVENous 2 times per day    pantoprazole  40 mg IntraVENous Daily    midodrine  10 mg PEG Tube TID WC    docusate  100 mg Oral Daily    polyethylene glycol  17 g Per G Tube Daily     bisacodyl, 10 mg, Daily PRN  glucose, 4 tablet, PRN  dextrose bolus, 125 mL, PRN   Or  dextrose bolus, 250 mL, PRN  glucagon (rDNA), 1 mg, PRN  dextrose, , Continuous PRN  diatrizoate meglumine-sodium, 30 mL, ONCE PRN  sodium chloride flush, 10 mL, PRN  acetaminophen, 650 mg, Q4H PRN  sodium chloride flush, 5-40 mL, PRN  sodium chloride, , PRN  ondansetron, 4 mg, Q8H PRN   Or  ondansetron, 4 mg, Q6H PRN  acetaminophen, 650 mg, Q6H PRN   Or  acetaminophen, 650 mg, Q6H PRN  diatrizoate meglumine-sodium, 30 mL, ONCE PRN       Objective:    BP (!) 146/65   Pulse 85   Temp 99.1 °F (37.3 °C) (Axillary)   Resp 16   Ht 5' 5\" (1.651 m)   Wt 108 lb (49 kg)   SpO2 98%   BMI 17.97 kg/m²   General Appearance: in no acute distress  Skin: warm and dry  Head: normocephalic and atraumatic  Eyes: pupils equal, round, and reactive to light, extraocular eye movements intact, conjunctivae normal  Neck: neck supple and non tender without mass   Pulmonary/Chest: clear to auscultation  bilaterally- no wheezes, rales or rhonchi, normal air movement, no respiratory distress  Cardiovascular: normal rate, normal S1 and S2 and no carotid bruits  Abdomen: soft, non-tender, non-distended, normal bowel sounds, cedric button   Extremities: no cyanosis, no clubbing and no edema          Recent Labs     03/07/23  1012 03/08/23  0525 03/09/23  0311   NA  --  134 138   K 5.3* 4.8 4.5   CL  --  107 107   CO2  --  22 26   BUN  --  14 13   CREATININE  --  0.8 0.8   GLUCOSE  --  129* 97   CALCIUM  --  8.0* 8.4*         Recent Labs     03/08/23  0525 03/09/23  0311   WBC 9.3 9.4   RBC 3.11* 3.10*   HGB 9.5* 9.6*   HCT 30.6* 31.7*   MCV 98.4 102.3*   MCH 30.5 31.0   MCHC 31.0* 30.3*   RDW 15.5* 15.9*    163   MPV 11.7 11.8         Radiology: reviewed     Assessment:    Principal Problem:    Hypernatremia  Active Problems:    BEENA (acute kidney injury) ([facility] [address]    Hypotension due to hypovolemia    Tachycardia    Metabolic encephalopathy    Nausea and vomiting    Severe protein-calorie malnutrition (HCC)    Hyperkalemia  Resolved Problems:    * No resolved hospital problems. *      Plan:  1. Severe Hypernatremia: Sent from group home to ED due to AMS and lethargy. Had peg due to dysphagia. Recently cleared for pleasure feeds with honey thick liquids. Na on admission 158. Likely secondary to n/v from not tolerating TF. Received NS bolus in ED. Initially sent to ICU due to increased sodium -peaked at 163. Nephrology consulted. Received IVF with improvement. Na now 138. S/p PEG removal and cedric button placed on 3/8.     2. Malpositioned PEG: General surgery consulted. On  3/6 reported that PEG was malpositioned and not appropriately tightened. Per GS, if not in appropriate position could be causing mechanical obstruction. S/p PEG removal and cedric button placed on 3/8. TF restarted with patient vomiting, were held but restarted and now tolerated.       3. Hypotension with hx of hypertension: Likely due to hypovolemia/ dehydration. Received IVF. Continue midodrine. BP stable. 4. Acute encephalopathy: Likely metabolic due to above. CT head negative. Appears back to baseline. 5. Septic criteria: On admission patient with hypotension, tachycardia, and leukocytosis. May be related to contraction/ dehydration. Received empiric antibiotics in ED. UA negative. CXR with no acute process. Blood cultures negative. No further antibiotics needed. 6. MRDD/ Dementia: supportive care. holding zyprexa      7. Thyroid disease: Continue synthroid     8. Hx of dysphagia: peg tube placed by surgery 3/2021. Multiple ER visits/ admissions for nausea / vomiting. General surgery consulted. Reporting peg was not in appropriate position x 2 during admission. Peg at 2 cm likely causing obstruction. PEG removed and Cameron button placed on 3/8.      9. H/o hiatal hernia      10. Difficult IV access: central line placed in ER. TLC dc 3/7    11. Constipation: on bowel regimen. Monitor for diarrhea       12. Hypomagnesemia: supplement prn. Monitor. 13. Hyperkalemia:  s/p lokelma. Monitor      14. BEENA: Resolved. Cr 1.3 now down to 0.8. Avoid nephrotoxic agents. Monitor. 15. Loose tooth: Nursing noted loose tooth, had concern of patient swallowing tooth and aspirating tooth. Oral surgery consulted. Dispo: Group home requesting patient have short SNF stay prior to returning. NOTE: This report was transcribed using voice recognition software. Every effort was made to ensure accuracy; however, inadvertent computerized transcription errors may be present. Electronically signed by Yonathan Hart PA-C on 3/10/2023 at 8:26 AM       25 minutes time spent reviewing patient chart, assessing patient, discussing plan of care with patient and family, discussing plan of care with collaborating physician, and charting.

## 2023-03-10 NOTE — PLAN OF CARE
Problem: Skin/Tissue Integrity  Goal: Absence of new skin breakdown  Description: 1. Monitor for areas of redness and/or skin breakdown  2. Assess vascular access sites hourly  3. Every 4-6 hours minimum:  Change oxygen saturation probe site  4. Every 4-6 hours:  If on nasal continuous positive airway pressure, respiratory therapy assess nares and determine need for appliance change or resting period.   Outcome: Progressing     Problem: Discharge Planning  Goal: Discharge to home or other facility with appropriate resources  Outcome: Progressing     Problem: Safety - Adult  Goal: Free from fall injury  Outcome: Progressing     Problem: ABCDS Injury Assessment  Goal: Absence of physical injury  Outcome: Progressing

## 2023-03-10 NOTE — PROGRESS NOTES
Was in the patient's room doing patient care and noticed her left eye tooth was moving around. I got her to open her mouth so I could get a better look at the tooth. We contacted the NP on for the night and she came and looked at the tooth. Our concern was that the patient would swallow the tooth and possibly choke or aspirate on the tooth. She suggested we consult oral for the tooth.   Electronically signed by Danna Jones RN on 3/10/2023 at 5:31 AM

## 2023-03-10 NOTE — DISCHARGE INSTR - COC
Continuity of Care Form    Patient Name: Rosangela Mendes   :  1955  MRN:  [id number]    Admit date:  3/3/2023  Discharge date:  ***    Code Status Order: Full Code   Advance Directives:     Admitting Physician:  Lexy Ferris MD  PCP: Isadora Bobby DO    Discharging Nurse: [facility] Unit/Room#: 2392/7520-X  Discharging Unit Phone Number: [phone]    Emergency Contact:   Extended Emergency Contact Information  Primary Emergency Contact: Leyda [facility])  Home Phone: [phone]  Mobile Phone: [phone]  Relation: Legal Guardian   needed? No  Secondary Emergency Contact: [facility] Phone: [phone]  Work Phone: [phone]  Relation: Other  Preferred language: [address] needed?  No    Past Surgical History:  Past Surgical History:   Procedure Laterality Date    BRONCHOSCOPY  02/10/2017    CHEST TUBE INSERTION Right 2017    GASTROSTOMY TUBE PLACEMENT N/A 3/7/2021    EGD PEG TUBE PLACEMENT performed by Natali Corado MD at [address] [place] [address] Right 2017    tessio insertion     OTHER SURGICAL HISTORY  2017    PEG tube insertion       Immunization History:   Immunization History   Administered Date(s) Administered    Influenza Vaccine, unspecified formulation 09/10/2014    Influenza Virus Vaccine 10/21/2017    Influenza Whole 09/10/2014    Influenza, FLUAD, (age 72 y+), Adjuvanted, 0.5mL 12/10/2021    Influenza, FLUARIX, FLULAVAL, FLUZONE (age 10 mo+) AND AFLURIA, (age 1 y+), PF, 0.5mL 2016, 2017, 10/21/2017, 10/03/2018    Influenza, High Dose (Fluzone 65 yrs and older) 10/18/2019    Pneumococcal Polysaccharide ([id number]) 10/18/2019       Active Problems:  Patient Active Problem List   Diagnosis Code    Profound intellectual disability F73    Hypothyroidism E03.9    Impairment level: total impairment of both eyes H54.0X55    Hyperglycemia R73.9    Nonsustained ventricular tachycardia I47.29 Macrocytosis D75.89    Disruptive behavior disorder F91.9    Ingrowing nail L60.0    Peripheral vascular disease (HCC) I73.9    Altered mental state R41.82    Onychomycosis B35.1    Anemia due to chronic kidney disease N18.9, D63.1    Essential hypertension I10    Vitamin D deficiency E55.9    Gastroesophageal reflux disease without esophagitis K21.9    S/P percutaneous endoscopic gastrostomy (PEG) tube placement (HCC) Z93.1    Chronic kidney disease N18.9    Dementia with behavioral disturbance F03.918    Gastrostomy tube dysfunction (HCC) K94.23    Severe dehydration E86.0    Hyponatremia E87.1    Intractable nausea and vomiting R11.2    Hypernatremia E87.0    BEENA (acute kidney injury) ([address] N17.9    Hypotension due to hypovolemia I95.89, E86.1    Tachycardia M51.4    Metabolic encephalopathy N46.93    Nausea and vomiting R11.2    Severe protein-calorie malnutrition (HCC) E43    Hyperkalemia E87.5       Isolation/Infection:   Isolation            No Isolation          Patient Infection Status       Infection Onset Added Last Indicated Last Indicated By Review Planned Expiration Resolved Resolved By    None active    Resolved    COVID-19 (Rule Out) 03/03/23 03/03/23 03/03/23 COVID-19, Rapid (Ordered)   03/03/23 Rule-Out Test Resulted    COVID-19 (Rule Out) 01/29/23 01/29/23 01/29/23 COVID-19, Rapid (Ordered)   01/29/23 Rule-Out Test Resulted    COVID-19 (Rule Out) 09/30/22 09/30/22 09/30/22 COVID-19, Rapid (Ordered)   09/30/22 Rule-Out Test Resulted    COVID-19 (Rule Out) 08/15/22 08/15/22 08/15/22 COVID-19, Rapid (Ordered)   08/15/22 Rule-Out Test Resulted    COVID-19 (Rule Out) 09/21/21 09/21/21 09/21/21 Respiratory Panel, Molecular, with COVID-19 (Restricted: peds pts or suitable admitted adults) (Ordered)   09/21/21 Rule-Out Test Resulted    COVID-19 (Rule Out) 09/21/21 09/21/21 09/21/21 COVID-19, Rapid (Ordered)   09/21/21 Rule-Out Test Resulted    COVID-19 (Rule Out) 03/04/21 03/04/21 03/04/21 COVID-19, Rapid (Ordered)   03/04/21 Rule-Out Test Resulted    COVID-19 (Rule Out) 02/08/21 02/08/21 02/08/21 COVID-19 (Ordered)   02/08/21 Rule-Out Test Resulted            Nurse Assessment:  Last Vital Signs: BP (!) 146/65   Pulse 85   Temp 99.1 °F (37.3 °C) (Axillary)   Resp 16   Ht 5' 5\" (1.651 m)   Wt 108 lb (49 kg)   SpO2 98%   BMI 17.97 kg/m²     Last documented pain score (0-10 scale): Pain Level: 0  Last Weight:   Wt Readings from Last 1 Encounters:   03/10/23 108 lb (49 kg)     Mental Status:  {IP PT MENTAL STATUS:20030}    IV Access:  { ARIES IV ACCESS:670618766}    Nursing Mobility/ADLs:  Walking   {P DME DJEX:540471206}  Transfer  {P DME VCWR:648896538}  Bathing  {P DME BHAKTI:972927743}  Dressing  {P DME AHLH:952751929}  Toileting  {P DME UKQH:306139799}  Feeding  {P DME LTLA:085634698}  Med Admin  {P DME USNN:229668249}  Med Delivery   { ARIES MED Delivery:286395338}    Wound Care Documentation and Therapy:        Elimination:  Continence: Bowel: {YES / IX:09178}  Bladder: {YES / UO:75246}  Urinary Catheter: {Urinary Catheter:985049774}   Colostomy/Ileostomy/Ileal Conduit: {YES / HL:30859}       Date of Last BM: ***    Intake/Output Summary (Last 24 hours) at 3/10/2023 1554  Last data filed at 3/10/2023 1444  Gross per 24 hour   Intake 4259 ml   Output 800 ml   Net 3459 ml     I/O last 3 completed shifts:   In: 3752 [NG/GT:3571]  Out: 250 [Urine:250]    Safety Concerns:     [employer] ARIES Safety Concerns:371384153}    Impairments/Disabilities:      [employer] Impairments/Disabilities:832968061}    Nutrition Therapy:  Current Nutrition Therapy:   [employer] Diet List:878567112}    Routes of Feeding: {CHP DME Other Feedings:538132463}  Liquids: {Slp liquid thickness:10750}  Daily Fluid Restriction: {CHP DME Yes amt example:933121949}  Last Modified Barium Swallow with Video (Video Swallowing Test): {Done Not Done KTFK:950417775}    Treatments at the Time of Hospital Discharge:   Respiratory Treatments: ***  Oxygen Therapy:  {Therapy; copd oxygen:29477}  Ventilator:    { CC Vent QHDL:448866704}    Rehab Therapies: Physical Therapy, Occupational Therapy, and Speech/Language Therapy  Weight Bearing Status/Restrictions: { CC Weight Bearin}  Other Medical Equipment (for information only, NOT a DME order):  {EQUIPMENT:237661352}  Other Treatments: ***    Patient's personal belongings (please select all that are sent with patient):  {[facility] DME Belongings:047075986}    RN SIGNATURE:  {Esignature:226434085}    CASE MANAGEMENT/SOCIAL WORK SECTION    Inpatient Status Date: ***    Readmission Risk Assessment Score:  Readmission Risk              Risk of Unplanned Readmission:  47           Discharging to Facility/ Agency   [facility]  [facility] [place] [address]  Phone: [phone]  Fax:    / signature: Electronically signed by Barabara Apley on 3/10/23 at 3:54 PM EST    PHYSICIAN SECTION    Prognosis: {Prognosis:6475836574}    Condition at Discharge: Stable    Rehab Potential (if transferring to Rehab): {Prognosis:5682093059}    Recommended Labs or Other Treatments After Discharge: ***    Physician Certification: I certify the above information and transfer of Asif Parson  is necessary for the continuing treatment of the diagnosis listed and that she requires [facility] for less 30 days.      Update Admission H&P: {P DME Changes in NKZXP:209302289}    PHYSICIAN SIGNATURE:  {Esignature:967376496}

## 2023-03-10 NOTE — PROGRESS NOTES
Comprehensive Nutrition Assessment    Type and Reason for Visit:  Reassess    Nutrition Recommendations/Plan:   Recommend modify tube feeding to hold for Synthroid:    Standard with Fiber (Jevity 1.5) @ 40 ml/hr X 23 hrs (hold 30 min before & after Synthroid) with 100 ml q 4 hr free water flush  To provide: 920 ml tv, 1380 kcal, 59 g pro, 1299 ml total free water  Regimen meets ~100% calorie & 100% protein needs     Malnutrition Assessment:  Malnutrition Status:  Severe malnutrition (03/04/23 5107)    Context:  Acute Illness     Findings of the 6 clinical characteristics of malnutrition:  Energy Intake:  50% or less of estimated energy requirements for 5 or more days  Weight Loss:  Greater than 7.5% over 3 months (-26.6% X 5 months, per actual EMR wt hx)     Body Fat Loss: Moderate body fat loss Triceps   Muscle Mass Loss: Moderate muscle mass loss Temples (temporalis), Clavicles (pectoralis & deltoids), Thigh (quadraceps)  Fluid Accumulation:  No significant fluid accumulation     Strength:  Not Performed    Nutrition Assessment:    Tube feeding has been restarted & pt tolerating. Group home requesting SNF. Will provide TF recommendation & monitor while inpatient.     Nutrition Related Findings:    nonverbal, blind, +13.4 L, no edema, abd soft/flat, +BS, diarrhea, Cameron tube to TF Wound Type: None       Current Nutrition Intake & Therapies:    Diet NPO  ADULT TUBE FEEDING; PEG; Standard with Fiber; Continuous; 20; Yes; 5; Q 4 hours; 40; 30; Q 4 hours  Current Tube Feeding (TF) Orders:  Feeding Route: PEG  Formula: Standard with Fiber  Schedule: Continuous  Feeding Regimen: 40 ml/hr  Water Flushes: 30 ml q 4 hr = 180 ml/d  Current TF & Flush Orders Provides: at goal  Goal TF & Flush Orders Provides: 960 ml tv, 1440 kcal, 61 g pro, 730 ml free water (910 ml w/ flush)    Anthropometric Measures:  Height: 5' 5\" (165.1 cm)  Ideal Body Weight (IBW): 125 lbs (57 kg)    Admission Body Weight: 94 lb 2.2 oz (42.7 kg) (3/3 bed)  Current Body Weight: 108 lb (49 kg), 75.2 % IBW. Weight Source: Bed Scale (3/10 (pt w/ free water deficit on admit))  Current BMI (kg/m2): 18  Usual Body Weight: 128 lb (58.1 kg) (10/2022 actual per EMR)  % Weight Change (Calculated): -26.6  Weight Adjustment For: No Adjustment                 BMI Categories: Underweight (BMI less than 22) age over 72    Estimated Daily Nutrient Needs:  Energy Requirements Based On: Kcal/kg  Weight Used for Energy Requirements: Current  Energy (kcal/day):   Weight Used for Protein Requirements: Current  Protein (g/day): 55-65 (1.2-1.4)  Method Used for Fluid Requirements: 1 ml/kcal  Fluid (ml/day):     Nutrition Diagnosis:   Severe malnutrition, In context of acute illness or injury related to altered GI function as evidenced by Criteria as identified in malnutrition assessment    Nutrition Interventions:   Food and/or Nutrient Delivery: Continue NPO (recommend modify tube feeding)  Nutrition Education/Counseling: Education not indicated  Coordination of Nutrition Care: Continue to monitor while inpatient    Goals:  Previous Goal Met: Progressing toward Goal(s)  Goals:  Tolerate nutrition support at goal rate, by next RD assessment  Specify Other Goals: nutrition progression    Nutrition Monitoring and Evaluation:      Food/Nutrient Intake Outcomes: Enteral Nutrition Intake/Tolerance  Physical Signs/Symptoms Outcomes: Biochemical Data, Diarrhea, GI Status, Fluid Status or Edema, Nutrition Focused Physical Findings, Skin, Weight    Discharge Planning:    Enteral Nutrition     Patric Chase RD, LD  Contact: [phone]

## 2023-03-10 NOTE — PROGRESS NOTES
Attempted to contact for consult: Dr. Sandra Manzanares, Dr. Jasen Saleh, Dr. Austen Vera, Dr. Licha Gardner  Electronically signed by David Song RN on 3/10/2023 at 4:40 PM    [address] states no coverage here. Amal aware.    Electronically signed by David Song RN on 3/10/2023 at 4:45 PM

## 2023-03-10 NOTE — PROGRESS NOTES
This patient is on medication that requires renal, weight, and/or indication dose adjustment. Date Body Weight IBW  Adjusted BW SCr  CrCl Dialysis status   3/10/2023 108 lb (49 kg) Ideal body weight: 57 kg (125 lb 10.6 oz) Serum creatinine: 0.7 mg/dL 03/10/23 0941  Estimated creatinine clearance: 59 mL/min N/a       Pharmacy has dose-adjusted the following medication(s):    Date Previous Order Adjusted Order   3/10/2023 Unasyn 1.5 gm every 6 hr Unasyn 3 gm every 6 hr       These changes were made per protocol according to the [address] for Pharmacists. *Please note this dose may need readjusted if patient's condition changes. Please contact pharmacy with any questions regarding these changes.     marina ayala, [facility]  3/10/2023  5:13 PM

## 2023-03-10 NOTE — PATIENT CARE CONFERENCE
[facility] Quality Flow/Interdisciplinary Rounds Progress Note        Quality Flow Rounds held on March 10, 2023    Disciplines Attending:  Bedside Nurse, , , and Nursing Unit Leadership    Michele Hernandez was admitted on 3/3/2023 12:17 PM    Anticipated Discharge Date:  Expected Discharge Date: 03/13/23    Disposition:    Jose Score:  Jose Scale Score: 12    Readmission Risk              Risk of Unplanned Readmission:  47           Discussed patient goal for the day, patient clinical progression, and barriers to discharge.   The following Goal(s) of the Day/Commitment(s) have been identified:  Diagnostics - Report Results      David Song RN  March 10, 2023

## 2023-03-10 NOTE — CARE COORDINATION
Social Work discharge Nenita Manzano called [facility] [phone] and asked for a supervisor since there has been no response from [facility] about snf choices. Sw forwarded to supervisor, but only able to leave her a message on her voicemail that prompt response is needed from [facility] with SNF choices for pt's discharge from hospital today. Electronically signed by Taurus Fernandez on 3/10/2023 at 12:16 PM     Addendum-    BEVERLEY spoke to [facility] guardian [facility], and she chose 1. [address]  2. [facility] [place] 3. [facility]. Beverley called referral to Gilford Melena with [facility] and asked if they can accept pt today. Await response from [facility]. Electronically signed by Taurus Fernandez on 3/10/2023 at 12:56 PM     Addendum-    Gilford Melena with [facility] received referral.  Electronically signed by Taurus Fernandez on 3/10/2023 at 2:06 PM     Addendum-    Per Gilford Melena with [address], they CAN accept pt snf skilled over weekend if ready. N17 started in [address] [place]. Ambulance forms in folder. HENS 7000 done today and copy in folder. Pt will need a Covid test on day of discharge. Precert NOT needed. [phone] notify [facility] that [address] will accept pt snf skilled at discharge (possibly tomorrow or Sunday). BEVERLEY spoke to Rudy Bravo, the rep on duty for [employer] now.    Electronically signed by Taurus Fernandez on 3/10/2023 at 3:53 PM

## 2023-03-11 LAB
ALBUMIN SERPL-MCNC: 2 G/DL (ref 3.5–5.2)
ALP BLD-CCNC: 63 U/L (ref 35–104)
ALT SERPL-CCNC: 8 U/L (ref 0–32)
ANION GAP SERPL CALCULATED.3IONS-SCNC: 3 MMOL/L (ref 7–16)
AST SERPL-CCNC: 12 U/L (ref 0–31)
BASOPHILS ABSOLUTE: 0.04 E9/L (ref 0–0.2)
BASOPHILS RELATIVE PERCENT: 0.3 % (ref 0–2)
BILIRUB SERPL-MCNC: <0.2 MG/DL (ref 0–1.2)
BUN BLDV-MCNC: 13 MG/DL (ref 6–23)
CALCIUM SERPL-MCNC: 8.5 MG/DL (ref 8.6–10.2)
CHLORIDE BLD-SCNC: 105 MMOL/L (ref 98–107)
CO2: 28 MMOL/L (ref 22–29)
CREAT SERPL-MCNC: 0.7 MG/DL (ref 0.5–1)
EOSINOPHILS ABSOLUTE: 0.24 E9/L (ref 0.05–0.5)
EOSINOPHILS RELATIVE PERCENT: 2 % (ref 0–6)
GFR SERPL CREATININE-BSD FRML MDRD: >60 ML/MIN/1.73
GLUCOSE BLD-MCNC: 92 MG/DL (ref 74–99)
HCT VFR BLD CALC: 28.2 % (ref 34–48)
HEMOGLOBIN: 8.8 G/DL (ref 11.5–15.5)
IMMATURE GRANULOCYTES #: 0.45 E9/L
IMMATURE GRANULOCYTES %: 3.7 % (ref 0–5)
LYMPHOCYTES ABSOLUTE: 2.18 E9/L (ref 1.5–4)
LYMPHOCYTES RELATIVE PERCENT: 18 % (ref 20–42)
MAGNESIUM: 1.8 MG/DL (ref 1.6–2.6)
MCH RBC QN AUTO: 31.2 PG (ref 26–35)
MCHC RBC AUTO-ENTMCNC: 31.2 % (ref 32–34.5)
MCV RBC AUTO: 100 FL (ref 80–99.9)
METER GLUCOSE: 102 MG/DL (ref 74–99)
METER GLUCOSE: 103 MG/DL (ref 74–99)
METER GLUCOSE: 110 MG/DL (ref 74–99)
METER GLUCOSE: 112 MG/DL (ref 74–99)
MONOCYTES ABSOLUTE: 1.11 E9/L (ref 0.1–0.95)
MONOCYTES RELATIVE PERCENT: 9.1 % (ref 2–12)
NEUTROPHILS ABSOLUTE: 8.12 E9/L (ref 1.8–7.3)
NEUTROPHILS RELATIVE PERCENT: 66.9 % (ref 43–80)
PDW BLD-RTO: 15.9 FL (ref 11.5–15)
PHOSPHORUS: 2.2 MG/DL (ref 2.5–4.5)
PLATELET # BLD: 269 E9/L (ref 130–450)
PMV BLD AUTO: 10.8 FL (ref 7–12)
POTASSIUM SERPL-SCNC: 4.5 MMOL/L (ref 3.5–5)
RBC # BLD: 2.82 E12/L (ref 3.5–5.5)
SODIUM BLD-SCNC: 136 MMOL/L (ref 132–146)
TOTAL PROTEIN: 5.6 G/DL (ref 6.4–8.3)
WBC # BLD: 12.1 E9/L (ref 4.5–11.5)

## 2023-03-11 PROCEDURE — 6360000002 HC RX W HCPCS: Performed by: INTERNAL MEDICINE

## 2023-03-11 PROCEDURE — 2580000003 HC RX 258: Performed by: PHYSICIAN ASSISTANT

## 2023-03-11 PROCEDURE — 6360000002 HC RX W HCPCS: Performed by: PHYSICIAN ASSISTANT

## 2023-03-11 PROCEDURE — 82962 GLUCOSE BLOOD TEST: CPT

## 2023-03-11 PROCEDURE — 2580000003 HC RX 258: Performed by: INTERNAL MEDICINE

## 2023-03-11 PROCEDURE — 6370000000 HC RX 637 (ALT 250 FOR IP): Performed by: INTERNAL MEDICINE

## 2023-03-11 PROCEDURE — 1200000000 HC SEMI PRIVATE

## 2023-03-11 PROCEDURE — 80053 COMPREHEN METABOLIC PANEL: CPT

## 2023-03-11 PROCEDURE — 99232 SBSQ HOSP IP/OBS MODERATE 35: CPT | Performed by: PHYSICIAN ASSISTANT

## 2023-03-11 PROCEDURE — 36415 COLL VENOUS BLD VENIPUNCTURE: CPT

## 2023-03-11 PROCEDURE — C9113 INJ PANTOPRAZOLE SODIUM, VIA: HCPCS | Performed by: INTERNAL MEDICINE

## 2023-03-11 PROCEDURE — 84100 ASSAY OF PHOSPHORUS: CPT

## 2023-03-11 PROCEDURE — 83735 ASSAY OF MAGNESIUM: CPT

## 2023-03-11 PROCEDURE — 85025 COMPLETE CBC W/AUTO DIFF WBC: CPT

## 2023-03-11 RX ORDER — POLYETHYLENE GLYCOL 3350 17 G/17G
17 POWDER, FOR SOLUTION ORAL DAILY
Status: CANCELLED | OUTPATIENT
Start: 2023-03-12

## 2023-03-11 RX ORDER — SODIUM CHLORIDE 0.9 % (FLUSH) 0.9 %
5-40 SYRINGE (ML) INJECTION EVERY 12 HOURS SCHEDULED
Status: CANCELLED | OUTPATIENT
Start: 2023-03-11

## 2023-03-11 RX ORDER — SODIUM CHLORIDE 9 MG/ML
INJECTION, SOLUTION INTRAVENOUS PRN
Status: CANCELLED | OUTPATIENT
Start: 2023-03-11

## 2023-03-11 RX ORDER — DEXTROSE MONOHYDRATE 100 MG/ML
INJECTION, SOLUTION INTRAVENOUS CONTINUOUS PRN
Status: CANCELLED | OUTPATIENT
Start: 2023-03-11

## 2023-03-11 RX ORDER — ENOXAPARIN SODIUM 100 MG/ML
30 INJECTION SUBCUTANEOUS DAILY
Status: CANCELLED | OUTPATIENT
Start: 2023-03-11

## 2023-03-11 RX ORDER — PAROXETINE 10 MG/1
10 TABLET, FILM COATED ORAL 2 TIMES DAILY
Status: CANCELLED | OUTPATIENT
Start: 2023-03-11

## 2023-03-11 RX ORDER — VALPROIC ACID 250 MG/5ML
1000 SOLUTION ORAL 2 TIMES DAILY
Status: CANCELLED | OUTPATIENT
Start: 2023-03-11

## 2023-03-11 RX ORDER — ACETAMINOPHEN 650 MG/1
650 SUPPOSITORY RECTAL EVERY 6 HOURS PRN
Status: CANCELLED | OUTPATIENT
Start: 2023-03-11

## 2023-03-11 RX ORDER — ACETAMINOPHEN 325 MG/1
650 TABLET ORAL EVERY 4 HOURS PRN
Status: CANCELLED | OUTPATIENT
Start: 2023-03-11

## 2023-03-11 RX ORDER — ONDANSETRON 2 MG/ML
4 INJECTION INTRAMUSCULAR; INTRAVENOUS EVERY 6 HOURS PRN
Status: CANCELLED | OUTPATIENT
Start: 2023-03-11

## 2023-03-11 RX ORDER — LEVOTHYROXINE SODIUM 0.07 MG/1
150 TABLET ORAL DAILY
Status: CANCELLED | OUTPATIENT
Start: 2023-03-12

## 2023-03-11 RX ORDER — SODIUM CHLORIDE 0.9 % (FLUSH) 0.9 %
10 SYRINGE (ML) INJECTION PRN
Status: CANCELLED | OUTPATIENT
Start: 2023-03-11

## 2023-03-11 RX ORDER — MIDODRINE HYDROCHLORIDE 5 MG/1
10 TABLET ORAL
Status: CANCELLED | OUTPATIENT
Start: 2023-03-11

## 2023-03-11 RX ORDER — INSULIN LISPRO 100 [IU]/ML
0-4 INJECTION, SOLUTION INTRAVENOUS; SUBCUTANEOUS NIGHTLY
Status: CANCELLED | OUTPATIENT
Start: 2023-03-11

## 2023-03-11 RX ORDER — MONTELUKAST SODIUM 10 MG/1
10 TABLET ORAL NIGHTLY
Status: CANCELLED | OUTPATIENT
Start: 2023-03-11

## 2023-03-11 RX ORDER — PANTOPRAZOLE SODIUM 40 MG/10ML
40 INJECTION, POWDER, LYOPHILIZED, FOR SOLUTION INTRAVENOUS DAILY
Status: CANCELLED | OUTPATIENT
Start: 2023-03-12

## 2023-03-11 RX ORDER — ACETAMINOPHEN 325 MG/1
650 TABLET ORAL EVERY 6 HOURS PRN
Status: CANCELLED | OUTPATIENT
Start: 2023-03-11

## 2023-03-11 RX ORDER — ONDANSETRON 4 MG/1
4 TABLET, ORALLY DISINTEGRATING ORAL EVERY 8 HOURS PRN
Status: CANCELLED | OUTPATIENT
Start: 2023-03-11

## 2023-03-11 RX ORDER — INSULIN LISPRO 100 [IU]/ML
0-4 INJECTION, SOLUTION INTRAVENOUS; SUBCUTANEOUS
Status: CANCELLED | OUTPATIENT
Start: 2023-03-11

## 2023-03-11 RX ORDER — SODIUM CHLORIDE 0.9 % (FLUSH) 0.9 %
5-40 SYRINGE (ML) INJECTION PRN
Status: CANCELLED | OUTPATIENT
Start: 2023-03-11

## 2023-03-11 RX ADMIN — VALPROIC ACID 1000 MG: 250 SOLUTION ORAL at 08:11

## 2023-03-11 RX ADMIN — AMPICILLIN SODIUM AND SULBACTAM SODIUM 3000 MG: 2; 1 INJECTION, POWDER, FOR SOLUTION INTRAMUSCULAR; INTRAVENOUS at 18:08

## 2023-03-11 RX ADMIN — PAROXETINE 10 MG: 10 TABLET, FILM COATED ORAL at 08:10

## 2023-03-11 RX ADMIN — PANTOPRAZOLE SODIUM 40 MG: 40 INJECTION, POWDER, FOR SOLUTION INTRAVENOUS at 12:37

## 2023-03-11 RX ADMIN — PAROXETINE 10 MG: 10 TABLET, FILM COATED ORAL at 21:17

## 2023-03-11 RX ADMIN — AMPICILLIN SODIUM AND SULBACTAM SODIUM 3000 MG: 2; 1 INJECTION, POWDER, FOR SOLUTION INTRAMUSCULAR; INTRAVENOUS at 12:39

## 2023-03-11 RX ADMIN — LEVOTHYROXINE SODIUM 150 MCG: 75 TABLET ORAL at 05:51

## 2023-03-11 RX ADMIN — MIDODRINE HYDROCHLORIDE 10 MG: 5 TABLET ORAL at 11:14

## 2023-03-11 RX ADMIN — MIDODRINE HYDROCHLORIDE 10 MG: 5 TABLET ORAL at 16:41

## 2023-03-11 RX ADMIN — AMPICILLIN SODIUM AND SULBACTAM SODIUM 3000 MG: 2; 1 INJECTION, POWDER, FOR SOLUTION INTRAMUSCULAR; INTRAVENOUS at 00:30

## 2023-03-11 RX ADMIN — Medication 10 ML: at 21:21

## 2023-03-11 RX ADMIN — POLYETHYLENE GLYCOL 3350 17 G: 17 POWDER, FOR SOLUTION ORAL at 08:11

## 2023-03-11 RX ADMIN — DOCUSATE SODIUM LIQUID 100 MG: 100 LIQUID ORAL at 08:10

## 2023-03-11 RX ADMIN — SODIUM CHLORIDE, PRESERVATIVE FREE 10 ML: 5 INJECTION INTRAVENOUS at 12:24

## 2023-03-11 RX ADMIN — VALPROIC ACID 1000 MG: 250 SOLUTION ORAL at 21:16

## 2023-03-11 RX ADMIN — MONTELUKAST SODIUM 10 MG: 10 TABLET ORAL at 21:17

## 2023-03-11 RX ADMIN — AMPICILLIN SODIUM AND SULBACTAM SODIUM 3000 MG: 2; 1 INJECTION, POWDER, FOR SOLUTION INTRAMUSCULAR; INTRAVENOUS at 23:58

## 2023-03-11 RX ADMIN — ENOXAPARIN SODIUM 30 MG: 100 INJECTION SUBCUTANEOUS at 18:08

## 2023-03-11 RX ADMIN — MIDODRINE HYDROCHLORIDE 10 MG: 5 TABLET ORAL at 08:10

## 2023-03-11 NOTE — PLAN OF CARE
Problem: Skin/Tissue Integrity  Goal: Absence of new skin breakdown  Description: 1. Monitor for areas of redness and/or skin breakdown  2. Assess vascular access sites hourly  3. Every 4-6 hours minimum:  Change oxygen saturation probe site  4. Every 4-6 hours:  If on nasal continuous positive airway pressure, respiratory therapy assess nares and determine need for appliance change or resting period.   3/11/2023 1006 by Nate English RN  Outcome: Progressing  3/10/2023 2341 by Marlyn Damico RN  Outcome: Progressing     Problem: Discharge Planning  Goal: Discharge to home or other facility with appropriate resources  3/11/2023 1006 by Nate English RN  Outcome: Progressing  3/10/2023 2341 by Marlyn Damico RN  Outcome: Progressing     Problem: Pain  Goal: Verbalizes/displays adequate comfort level or baseline comfort level  Outcome: Progressing     Problem: Safety - Adult  Goal: Free from fall injury  3/11/2023 1006 by Nate English RN  Outcome: Progressing  3/10/2023 2341 by Marlyn Damico RN  Outcome: Progressing     Problem: ABCDS Injury Assessment  Goal: Absence of physical injury  3/11/2023 1006 by Nate English RN  Outcome: Progressing  3/10/2023 2341 by Marlyn Damico RN  Outcome: Progressing     Problem: Nutrition Deficit:  Goal: Optimize nutritional status  Outcome: Progressing

## 2023-03-11 NOTE — PROGRESS NOTES
[facility] Progress Note    Admitting Date and Time: 3/3/2023 12:17 PM  Admit Dx: Hypernatremia [E87.0]    Subjective:  Patient is being followed for Hypernatremia [E87.0]     Patient was lying in bed in no acute distress. Awake and alert. Nursing and I tried to examine tooth at bedside but patient continually closed mouth. Per nursing, no acute concerns or overnight events. ROS: denies fever, chills, cp, sob, n/v, HA unless stated above.       ampicillin-sulbactam  3,000 mg IntraVENous Q6H    enoxaparin  30 mg SubCUTAneous Daily    levothyroxine  150 mcg PEG Tube Daily    insulin lispro  0-4 Units SubCUTAneous TID WC    insulin lispro  0-4 Units SubCUTAneous Nightly    PARoxetine  10 mg Oral BID    montelukast  10 mg PEG Tube Nightly    valproic acid  1,000 mg Per G Tube BID    [Held by provider] OLANZapine  20 mg Oral BID    sodium chloride flush  5-40 mL IntraVENous 2 times per day    pantoprazole  40 mg IntraVENous Daily    midodrine  10 mg PEG Tube TID WC    docusate  100 mg Oral Daily    polyethylene glycol  17 g Per G Tube Daily     bisacodyl, 10 mg, Daily PRN  glucose, 4 tablet, PRN  dextrose bolus, 125 mL, PRN   Or  dextrose bolus, 250 mL, PRN  glucagon (rDNA), 1 mg, PRN  dextrose, , Continuous PRN  diatrizoate meglumine-sodium, 30 mL, ONCE PRN  sodium chloride flush, 10 mL, PRN  acetaminophen, 650 mg, Q4H PRN  sodium chloride flush, 5-40 mL, PRN  sodium chloride, , PRN  ondansetron, 4 mg, Q8H PRN   Or  ondansetron, 4 mg, Q6H PRN  acetaminophen, 650 mg, Q6H PRN   Or  acetaminophen, 650 mg, Q6H PRN  diatrizoate meglumine-sodium, 30 mL, ONCE PRN       Objective:    /63   Pulse 87   Temp 97.9 °F (36.6 °C) (Axillary)   Resp 18   Ht 5' 5\" (1.651 m)   Wt 108 lb (49 kg)   SpO2 95%   BMI 17.97 kg/m²   General Appearance: awake and alert in no acute distress, poor dentition   Skin: warm and dry  Head: normocephalic and atraumatic  Eyes: pupils equal, round, and reactive to light, extraocular eye movements intact, conjunctivae normal  Neck: neck supple and non tender without mass   Pulmonary/Chest: clear to auscultation bilaterally- no wheezes, rales or rhonchi, normal air movement, no respiratory distress  Cardiovascular: normal rate, normal S1 and S2 and no carotid bruits  Abdomen: soft, non-tender, non-distended, normal bowel sounds, cedric button   Extremities: no cyanosis, no clubbing and no edema          Recent Labs     03/09/23  0311 03/10/23  0941    136   K 4.5 4.6    106   CO2 26 25   BUN 13 12   CREATININE 0.8 0.7   GLUCOSE 97 99   CALCIUM 8.4* 8.5*         Recent Labs     03/09/23  0311 03/10/23  0941   WBC 9.4 12.0*   RBC 3.10* 3.10*   HGB 9.6* 9.7*   HCT 31.7* 31.6*   .3* 101.9*   MCH 31.0 31.3   MCHC 30.3* 30.7*   RDW 15.9* 16.1*    219   MPV 11.8 11.3         Radiology: reviewed     Assessment:    Principal Problem:    Hypernatremia  Active Problems:    BEENA (acute kidney injury) (HCC)    Hypotension due to hypovolemia    Tachycardia    Metabolic encephalopathy    Nausea and vomiting    Severe protein-calorie malnutrition (HCC)    Hyperkalemia  Resolved Problems:    * No resolved hospital problems. *      Plan:  1. Severe Hypernatremia: Sent from group home to ED due to AMS and lethargy. Had peg due to dysphagia. Recently cleared for pleasure feeds with honey thick liquids. Na on admission 158. Likely secondary to n/v from not tolerating TF. Received NS bolus in ED. Initially sent to ICU due to increased sodium -peaked at 163. Nephrology consulted. Received IVF with improvement. Na now 138. S/p PEG removal and cedric button placed on 3/8.     2. Malpositioned PEG: General surgery consulted. On  3/6 reported that PEG was malpositioned and not appropriately tightened. Per GS, if not in appropriate position could be causing mechanical obstruction. S/p PEG removal and cedric button placed on 3/8.  TF restarted with patient vomiting, were held but restarted and now tolerated.      3. Hypotension with hx of hypertension: Likely due to hypovolemia/ dehydration. Received IVF. Continue midodrine. BP stable.     4. Acute encephalopathy: Likely metabolic due to above. CT head negative. Appears back to baseline.     5. Septic criteria: On admission patient with hypotension, tachycardia, and leukocytosis. May be related to contraction/ dehydration. Received empiric antibiotics in ED. UA negative. CXR with no acute process. Blood cultures negative.  No further antibiotics needed.      6. MRDD/ Dementia: supportive care. holding zyprexa      7. Thyroid disease: Continue synthroid     8. Hx of dysphagia: peg tube placed by surgery 3/2021.  Multiple ER visits/ admissions for nausea / vomiting. General surgery consulted. Reporting peg was not in appropriate position x 2 during admission. Peg at 2 cm likely causing obstruction. PEG removed and Cameron button placed on 3/8.      9. H/o hiatal hernia      10. Difficult IV access: central line placed in ER.  TLC dc 3/7    11. Constipation: on bowel regimen. Monitor for diarrhea       12. Hypomagnesemia: supplement prn. Monitor.      13. Hyperkalemia:  s/p lokelma. Monitor      14. BEENA: Resolved. Cr 1.3 now down to 0.8. Avoid nephrotoxic agents. Monitor.      15. Loose tooth: Nursing noted loose tooth, had concern of patient swallowing tooth and aspirating tooth. Oral surgery consulted. No coverage here. Accepted downtown for evaluation. Continue Unasyn.     16. Leukocytosis: WBC 9.4>12.0. Temp 99.4. Urine and blood cultures ordered on 3/11. CXR 3/11 with no acute process. ? Dental infection. No coverage here. Accepted downtown for evaluation. Continue Unasyn.     Dispo: Accepted at [facility]. Awaiting open bed at [facility] for oral surgery evaluation.     NOTE: This report was transcribed using voice recognition software. Every effort was made to ensure accuracy; however, inadvertent computerized transcription errors  may be present. Electronically signed by Angela Noble PA-C on 3/11/2023 at 8:33 AM       27 minutes time spent reviewing patient chart, assessing patient, discussing plan of care with patient and family, discussing plan of care with collaborating physician, and charting.

## 2023-03-12 LAB
ALBUMIN SERPL-MCNC: 2.1 G/DL (ref 3.5–5.2)
ALP BLD-CCNC: 66 U/L (ref 35–104)
ALT SERPL-CCNC: 8 U/L (ref 0–32)
ANION GAP SERPL CALCULATED.3IONS-SCNC: 4 MMOL/L (ref 7–16)
AST SERPL-CCNC: 12 U/L (ref 0–31)
BASOPHILS ABSOLUTE: 0.04 E9/L (ref 0–0.2)
BASOPHILS RELATIVE PERCENT: 0.3 % (ref 0–2)
BILIRUB SERPL-MCNC: <0.2 MG/DL (ref 0–1.2)
BUN BLDV-MCNC: 14 MG/DL (ref 6–23)
CALCIUM SERPL-MCNC: 8.6 MG/DL (ref 8.6–10.2)
CHLORIDE BLD-SCNC: 104 MMOL/L (ref 98–107)
CO2: 27 MMOL/L (ref 22–29)
CREAT SERPL-MCNC: 0.8 MG/DL (ref 0.5–1)
EOSINOPHILS ABSOLUTE: 0.25 E9/L (ref 0.05–0.5)
EOSINOPHILS RELATIVE PERCENT: 2.1 % (ref 0–6)
GFR SERPL CREATININE-BSD FRML MDRD: >60 ML/MIN/1.73
GLUCOSE BLD-MCNC: 100 MG/DL (ref 74–99)
HCT VFR BLD CALC: 29.9 % (ref 34–48)
HEMOGLOBIN: 9.1 G/DL (ref 11.5–15.5)
IMMATURE GRANULOCYTES #: 0.36 E9/L
IMMATURE GRANULOCYTES %: 3.1 % (ref 0–5)
LYMPHOCYTES ABSOLUTE: 2.01 E9/L (ref 1.5–4)
LYMPHOCYTES RELATIVE PERCENT: 17.2 % (ref 20–42)
MAGNESIUM: 2 MG/DL (ref 1.6–2.6)
MCH RBC QN AUTO: 30.5 PG (ref 26–35)
MCHC RBC AUTO-ENTMCNC: 30.4 % (ref 32–34.5)
MCV RBC AUTO: 100.3 FL (ref 80–99.9)
METER GLUCOSE: 109 MG/DL (ref 74–99)
METER GLUCOSE: 112 MG/DL (ref 74–99)
METER GLUCOSE: 113 MG/DL (ref 74–99)
METER GLUCOSE: 131 MG/DL (ref 74–99)
MONOCYTES ABSOLUTE: 1.13 E9/L (ref 0.1–0.95)
MONOCYTES RELATIVE PERCENT: 9.7 % (ref 2–12)
NEUTROPHILS ABSOLUTE: 7.91 E9/L (ref 1.8–7.3)
NEUTROPHILS RELATIVE PERCENT: 67.6 % (ref 43–80)
PDW BLD-RTO: 16.2 FL (ref 11.5–15)
PHOSPHORUS: 2.8 MG/DL (ref 2.5–4.5)
PLATELET # BLD: 284 E9/L (ref 130–450)
PMV BLD AUTO: 10.5 FL (ref 7–12)
POTASSIUM SERPL-SCNC: 4.6 MMOL/L (ref 3.5–5)
RBC # BLD: 2.98 E12/L (ref 3.5–5.5)
SODIUM BLD-SCNC: 135 MMOL/L (ref 132–146)
TOTAL PROTEIN: 6.1 G/DL (ref 6.4–8.3)
WBC # BLD: 11.7 E9/L (ref 4.5–11.5)

## 2023-03-12 PROCEDURE — 2580000003 HC RX 258: Performed by: PHYSICIAN ASSISTANT

## 2023-03-12 PROCEDURE — 6360000002 HC RX W HCPCS: Performed by: INTERNAL MEDICINE

## 2023-03-12 PROCEDURE — 2580000003 HC RX 258: Performed by: INTERNAL MEDICINE

## 2023-03-12 PROCEDURE — 82962 GLUCOSE BLOOD TEST: CPT

## 2023-03-12 PROCEDURE — 6360000002 HC RX W HCPCS: Performed by: PHYSICIAN ASSISTANT

## 2023-03-12 PROCEDURE — 6370000000 HC RX 637 (ALT 250 FOR IP): Performed by: INTERNAL MEDICINE

## 2023-03-12 PROCEDURE — 85025 COMPLETE CBC W/AUTO DIFF WBC: CPT

## 2023-03-12 PROCEDURE — 99232 SBSQ HOSP IP/OBS MODERATE 35: CPT | Performed by: PHYSICIAN ASSISTANT

## 2023-03-12 PROCEDURE — C9113 INJ PANTOPRAZOLE SODIUM, VIA: HCPCS | Performed by: INTERNAL MEDICINE

## 2023-03-12 PROCEDURE — 80053 COMPREHEN METABOLIC PANEL: CPT

## 2023-03-12 PROCEDURE — 83735 ASSAY OF MAGNESIUM: CPT

## 2023-03-12 PROCEDURE — 84100 ASSAY OF PHOSPHORUS: CPT

## 2023-03-12 PROCEDURE — 36415 COLL VENOUS BLD VENIPUNCTURE: CPT

## 2023-03-12 PROCEDURE — 1200000000 HC SEMI PRIVATE

## 2023-03-12 RX ADMIN — MIDODRINE HYDROCHLORIDE 10 MG: 5 TABLET ORAL at 11:40

## 2023-03-12 RX ADMIN — AMPICILLIN SODIUM AND SULBACTAM SODIUM 3000 MG: 2; 1 INJECTION, POWDER, FOR SOLUTION INTRAMUSCULAR; INTRAVENOUS at 11:42

## 2023-03-12 RX ADMIN — VALPROIC ACID 1000 MG: 250 SOLUTION ORAL at 20:37

## 2023-03-12 RX ADMIN — Medication 10 ML: at 20:37

## 2023-03-12 RX ADMIN — AMPICILLIN SODIUM AND SULBACTAM SODIUM 3000 MG: 2; 1 INJECTION, POWDER, FOR SOLUTION INTRAMUSCULAR; INTRAVENOUS at 17:57

## 2023-03-12 RX ADMIN — AMPICILLIN SODIUM AND SULBACTAM SODIUM 3000 MG: 2; 1 INJECTION, POWDER, FOR SOLUTION INTRAMUSCULAR; INTRAVENOUS at 05:54

## 2023-03-12 RX ADMIN — PAROXETINE 10 MG: 10 TABLET, FILM COATED ORAL at 08:53

## 2023-03-12 RX ADMIN — VALPROIC ACID 1000 MG: 250 SOLUTION ORAL at 08:53

## 2023-03-12 RX ADMIN — MONTELUKAST SODIUM 10 MG: 10 TABLET ORAL at 20:37

## 2023-03-12 RX ADMIN — PAROXETINE 10 MG: 10 TABLET, FILM COATED ORAL at 20:37

## 2023-03-12 RX ADMIN — Medication 10 ML: at 08:53

## 2023-03-12 RX ADMIN — AMPICILLIN SODIUM AND SULBACTAM SODIUM 3000 MG: 2; 1 INJECTION, POWDER, FOR SOLUTION INTRAMUSCULAR; INTRAVENOUS at 23:57

## 2023-03-12 RX ADMIN — DOCUSATE SODIUM LIQUID 100 MG: 100 LIQUID ORAL at 08:53

## 2023-03-12 RX ADMIN — ENOXAPARIN SODIUM 30 MG: 100 INJECTION SUBCUTANEOUS at 17:53

## 2023-03-12 RX ADMIN — PANTOPRAZOLE SODIUM 40 MG: 40 INJECTION, POWDER, FOR SOLUTION INTRAVENOUS at 08:53

## 2023-03-12 RX ADMIN — POLYETHYLENE GLYCOL 3350 17 G: 17 POWDER, FOR SOLUTION ORAL at 08:52

## 2023-03-12 RX ADMIN — LEVOTHYROXINE SODIUM 150 MCG: 75 TABLET ORAL at 05:52

## 2023-03-12 NOTE — PLAN OF CARE
Problem: Skin/Tissue Integrity  Goal: Absence of new skin breakdown  Description: 1. Monitor for areas of redness and/or skin breakdown  2. Assess vascular access sites hourly  3. Every 4-6 hours minimum:  Change oxygen saturation probe site  4. Every 4-6 hours:  If on nasal continuous positive airway pressure, respiratory therapy assess nares and determine need for appliance change or resting period.   3/12/2023 1045 by Ibeth Hassan RN  Outcome: Progressing  3/12/2023 0024 by Tesfaye Brian RN  Outcome: Progressing     Problem: Discharge Planning  Goal: Discharge to home or other facility with appropriate resources  3/12/2023 1045 by Ibeth Hassan RN  Outcome: Progressing  3/12/2023 0024 by Tesfaye Brian RN  Outcome: Progressing     Problem: Pain  Goal: Verbalizes/displays adequate comfort level or baseline comfort level  3/12/2023 1045 by Ibeth Hassan RN  Outcome: Progressing  3/12/2023 0024 by Tesfaye Brian RN  Outcome: Progressing     Problem: Safety - Adult  Goal: Free from fall injury  Outcome: Progressing     Problem: ABCDS Injury Assessment  Goal: Absence of physical injury  Outcome: Progressing     Problem: Nutrition Deficit:  Goal: Optimize nutritional status  Outcome: Progressing

## 2023-03-12 NOTE — PROGRESS NOTES
[facility] Progress Note    Admitting Date and Time: 3/3/2023 12:17 PM  Admit Dx: Hypernatremia [E87.0]    Subjective:  Patient is being followed for Hypernatremia [E87.0]     Patient was lying in bed in no acute distress. Per nursing, no acute concerns or overnight events. ROS: denies fever, chills, cp, sob, n/v, HA unless stated above.       ampicillin-sulbactam  3,000 mg IntraVENous Q6H    enoxaparin  30 mg SubCUTAneous Daily    levothyroxine  150 mcg PEG Tube Daily    insulin lispro  0-4 Units SubCUTAneous TID WC    insulin lispro  0-4 Units SubCUTAneous Nightly    PARoxetine  10 mg Oral BID    montelukast  10 mg PEG Tube Nightly    valproic acid  1,000 mg Per G Tube BID    [Held by provider] OLANZapine  20 mg Oral BID    sodium chloride flush  5-40 mL IntraVENous 2 times per day    pantoprazole  40 mg IntraVENous Daily    midodrine  10 mg PEG Tube TID WC    docusate  100 mg Oral Daily    polyethylene glycol  17 g Per G Tube Daily     bisacodyl, 10 mg, Daily PRN  glucose, 4 tablet, PRN  dextrose bolus, 125 mL, PRN   Or  dextrose bolus, 250 mL, PRN  glucagon (rDNA), 1 mg, PRN  dextrose, , Continuous PRN  diatrizoate meglumine-sodium, 30 mL, ONCE PRN  sodium chloride flush, 10 mL, PRN  acetaminophen, 650 mg, Q4H PRN  sodium chloride flush, 5-40 mL, PRN  sodium chloride, , PRN  ondansetron, 4 mg, Q8H PRN   Or  ondansetron, 4 mg, Q6H PRN  acetaminophen, 650 mg, Q6H PRN   Or  acetaminophen, 650 mg, Q6H PRN  diatrizoate meglumine-sodium, 30 mL, ONCE PRN       Objective:    /68   Pulse 63   Temp 97.2 °F (36.2 °C) (Axillary)   Resp 18   Ht 5' 5\" (1.651 m)   Wt 111 lb (50.3 kg)   SpO2 100%   BMI 18.47 kg/m²   General Appearance: awake and alert in no acute distress, poor dentition   Skin: warm and dry  Head: normocephalic and atraumatic  Eyes: pupils equal, round, and reactive to light, extraocular eye movements intact, conjunctivae normal  Neck: neck supple and non tender without mass   Pulmonary/Chest: clear to auscultation bilaterally- no wheezes, rales or rhonchi, normal air movement, no respiratory distress  Cardiovascular: normal rate, normal S1 and S2 and no carotid bruits  Abdomen: soft, non-tender, non-distended, normal bowel sounds, cedric button   Extremities: no cyanosis, no clubbing and no edema          Recent Labs     03/10/23  0941 03/11/23  1220 03/12/23  0630    136 135   K 4.6 4.5 4.6    105 104   CO2 25 28 27   BUN 12 13 14   CREATININE 0.7 0.7 0.8   GLUCOSE 99 92 100*   CALCIUM 8.5* 8.5* 8.6         Recent Labs     03/10/23  0941 03/11/23  1220 03/12/23  0630   WBC 12.0* 12.1* 11.7*   RBC 3.10* 2.82* 2.98*   HGB 9.7* 8.8* 9.1*   HCT 31.6* 28.2* 29.9*   .9* 100.0* 100.3*   MCH 31.3 31.2 30.5   MCHC 30.7* 31.2* 30.4*   RDW 16.1* 15.9* 16.2*    269 284   MPV 11.3 10.8 10.5         Radiology: reviewed     Assessment:    Principal Problem:    Hypernatremia  Active Problems:    BEENA (acute kidney injury) ([facility] [address]    Hypotension due to hypovolemia    Tachycardia    Metabolic encephalopathy    Nausea and vomiting    Severe protein-calorie malnutrition (HCC)    Hyperkalemia  Resolved Problems:    * No resolved hospital problems. *      Plan:  1. Severe Hypernatremia: Sent from group home to ED due to AMS and lethargy. Had peg due to dysphagia. Recently cleared for pleasure feeds with honey thick liquids. Na on admission 158. Likely secondary to n/v from not tolerating TF. Received NS bolus in ED. Initially sent to ICU due to increased sodium -peaked at 163. Nephrology consulted. Received IVF with improvement. Na now 138. S/p PEG removal and cedric button placed on 3/8.     2. Malpositioned PEG: General surgery consulted. On  3/6 reported that PEG was malpositioned and not appropriately tightened. Per GS, if not in appropriate position could be causing mechanical obstruction. S/p PEG removal and cedric button placed on 3/8.  TF restarted with patient vomiting, were held but restarted and now tolerated. 3. Hypotension with hx of hypertension: Likely due to hypovolemia/ dehydration. Received IVF. Continue midodrine. BP stable. 4. Acute encephalopathy: Likely metabolic due to above. CT head negative. Appears back to baseline. 5. Septic criteria: On admission patient with hypotension, tachycardia, and leukocytosis. May be related to contraction/ dehydration. Received empiric antibiotics in ED. UA negative. CXR with no acute process. Blood cultures negative. No further antibiotics needed. 6. MRDD/ Dementia: supportive care. holding zyprexa      7. Thyroid disease: Continue synthroid     8. Hx of dysphagia: peg tube placed by surgery 3/2021. Multiple ER visits/ admissions for nausea / vomiting. General surgery consulted. Reporting peg was not in appropriate position x 2 during admission. Peg at 2 cm likely causing obstruction. PEG removed and Cameron button placed on 3/8.      9. H/o hiatal hernia      10. Difficult IV access: central line placed in ER. TLC dc 3/7    11. Constipation: on bowel regimen. Monitor for diarrhea       12. Hypomagnesemia: supplement prn. Monitor. 13. Hyperkalemia:  s/p lokelma. Monitor      14. BEENA: Resolved. Cr 1.3 now down to 0.8. Avoid nephrotoxic agents. Monitor. 15. Loose tooth: Nursing noted loose tooth, had concern of patient swallowing tooth and aspirating tooth. Oral surgery consulted. No coverage here. Accepted downtown for evaluation. Continue Unasyn. 16. Leukocytosis: WBC 9.4>12.0>11.7. Temp 99.4. Urine cultures pending. blood cultures on 3/11 negative so far. CXR 3/11 with no acute process. ? Dental infection. No coverage here. Accepted downtown for evaluation. Continue Unasyn. Dispo: Accepted at [facility]. Awaiting open bed at [address] [place] [address] for oral surgery evaluation. NOTE: This report was transcribed using voice recognition software.  Every effort was made to ensure accuracy; however, inadvertent computerized transcription errors may be present. Electronically signed by Bobby Wynne PA-C on 3/12/2023 at 8:19 AM       23 minutes time spent reviewing patient chart, assessing patient, discussing plan of care with patient and family, discussing plan of care with collaborating physician, and charting.

## 2023-03-13 PROBLEM — K04.7 DENTAL INFECTION: Status: ACTIVE | Noted: 2023-03-13

## 2023-03-13 LAB
ALBUMIN SERPL-MCNC: 2.1 G/DL (ref 3.5–5.2)
ALP BLD-CCNC: 63 U/L (ref 35–104)
ALT SERPL-CCNC: 8 U/L (ref 0–32)
ANION GAP SERPL CALCULATED.3IONS-SCNC: 7 MMOL/L (ref 7–16)
AST SERPL-CCNC: 16 U/L (ref 0–31)
BASOPHILS ABSOLUTE: 0.03 E9/L (ref 0–0.2)
BASOPHILS RELATIVE PERCENT: 0.2 % (ref 0–2)
BILIRUB SERPL-MCNC: <0.2 MG/DL (ref 0–1.2)
BUN BLDV-MCNC: 13 MG/DL (ref 6–23)
CALCIUM SERPL-MCNC: 8.6 MG/DL (ref 8.6–10.2)
CHLORIDE BLD-SCNC: 103 MMOL/L (ref 98–107)
CO2: 27 MMOL/L (ref 22–29)
CREAT SERPL-MCNC: 0.8 MG/DL (ref 0.5–1)
EOSINOPHILS ABSOLUTE: 0.19 E9/L (ref 0.05–0.5)
EOSINOPHILS RELATIVE PERCENT: 1.3 % (ref 0–6)
GFR SERPL CREATININE-BSD FRML MDRD: >60 ML/MIN/1.73
GLUCOSE BLD-MCNC: 110 MG/DL (ref 74–99)
HCT VFR BLD CALC: 28.3 % (ref 34–48)
HEMOGLOBIN: 8.6 G/DL (ref 11.5–15.5)
IMMATURE GRANULOCYTES #: 0.23 E9/L
IMMATURE GRANULOCYTES %: 1.6 % (ref 0–5)
LYMPHOCYTES ABSOLUTE: 1.56 E9/L (ref 1.5–4)
LYMPHOCYTES RELATIVE PERCENT: 10.8 % (ref 20–42)
MAGNESIUM: 2 MG/DL (ref 1.6–2.6)
MCH RBC QN AUTO: 31.3 PG (ref 26–35)
MCHC RBC AUTO-ENTMCNC: 30.4 % (ref 32–34.5)
MCV RBC AUTO: 102.9 FL (ref 80–99.9)
METER GLUCOSE: 108 MG/DL (ref 74–99)
METER GLUCOSE: 115 MG/DL (ref 74–99)
METER GLUCOSE: 135 MG/DL (ref 74–99)
METER GLUCOSE: 143 MG/DL (ref 74–99)
MONOCYTES ABSOLUTE: 1.13 E9/L (ref 0.1–0.95)
MONOCYTES RELATIVE PERCENT: 7.8 % (ref 2–12)
NEUTROPHILS ABSOLUTE: 11.26 E9/L (ref 1.8–7.3)
NEUTROPHILS RELATIVE PERCENT: 78.3 % (ref 43–80)
PDW BLD-RTO: 16.7 FL (ref 11.5–15)
PHOSPHORUS: 2.8 MG/DL (ref 2.5–4.5)
PLATELET # BLD: 307 E9/L (ref 130–450)
PMV BLD AUTO: 10.7 FL (ref 7–12)
POTASSIUM SERPL-SCNC: 3.9 MMOL/L (ref 3.5–5)
RBC # BLD: 2.75 E12/L (ref 3.5–5.5)
SODIUM BLD-SCNC: 137 MMOL/L (ref 132–146)
TOTAL PROTEIN: 6.3 G/DL (ref 6.4–8.3)
URINE CULTURE, ROUTINE: NORMAL
WBC # BLD: 14.4 E9/L (ref 4.5–11.5)

## 2023-03-13 PROCEDURE — 2580000003 HC RX 258: Performed by: INTERNAL MEDICINE

## 2023-03-13 PROCEDURE — 6360000002 HC RX W HCPCS: Performed by: INTERNAL MEDICINE

## 2023-03-13 PROCEDURE — 6370000000 HC RX 637 (ALT 250 FOR IP): Performed by: INTERNAL MEDICINE

## 2023-03-13 PROCEDURE — 6360000002 HC RX W HCPCS: Performed by: PHYSICIAN ASSISTANT

## 2023-03-13 PROCEDURE — 85025 COMPLETE CBC W/AUTO DIFF WBC: CPT

## 2023-03-13 PROCEDURE — 82962 GLUCOSE BLOOD TEST: CPT

## 2023-03-13 PROCEDURE — 99232 SBSQ HOSP IP/OBS MODERATE 35: CPT | Performed by: PHYSICIAN ASSISTANT

## 2023-03-13 PROCEDURE — 84100 ASSAY OF PHOSPHORUS: CPT

## 2023-03-13 PROCEDURE — C9113 INJ PANTOPRAZOLE SODIUM, VIA: HCPCS | Performed by: INTERNAL MEDICINE

## 2023-03-13 PROCEDURE — 99232 SBSQ HOSP IP/OBS MODERATE 35: CPT | Performed by: STUDENT IN AN ORGANIZED HEALTH CARE EDUCATION/TRAINING PROGRAM

## 2023-03-13 PROCEDURE — 36415 COLL VENOUS BLD VENIPUNCTURE: CPT

## 2023-03-13 PROCEDURE — 1200000000 HC SEMI PRIVATE

## 2023-03-13 PROCEDURE — 83735 ASSAY OF MAGNESIUM: CPT

## 2023-03-13 PROCEDURE — 2580000003 HC RX 258: Performed by: PHYSICIAN ASSISTANT

## 2023-03-13 PROCEDURE — 80053 COMPREHEN METABOLIC PANEL: CPT

## 2023-03-13 RX ADMIN — ENOXAPARIN SODIUM 30 MG: 100 INJECTION SUBCUTANEOUS at 17:30

## 2023-03-13 RX ADMIN — MONTELUKAST SODIUM 10 MG: 10 TABLET ORAL at 21:02

## 2023-03-13 RX ADMIN — AMPICILLIN SODIUM AND SULBACTAM SODIUM 3000 MG: 2; 1 INJECTION, POWDER, FOR SOLUTION INTRAMUSCULAR; INTRAVENOUS at 05:25

## 2023-03-13 RX ADMIN — POLYETHYLENE GLYCOL 3350 17 G: 17 POWDER, FOR SOLUTION ORAL at 09:20

## 2023-03-13 RX ADMIN — Medication 10 ML: at 21:05

## 2023-03-13 RX ADMIN — PANTOPRAZOLE SODIUM 40 MG: 40 INJECTION, POWDER, FOR SOLUTION INTRAVENOUS at 09:21

## 2023-03-13 RX ADMIN — DOCUSATE SODIUM LIQUID 100 MG: 100 LIQUID ORAL at 09:20

## 2023-03-13 RX ADMIN — MIDODRINE HYDROCHLORIDE 10 MG: 5 TABLET ORAL at 17:23

## 2023-03-13 RX ADMIN — SODIUM CHLORIDE, PRESERVATIVE FREE 10 ML: 5 INJECTION INTRAVENOUS at 09:25

## 2023-03-13 RX ADMIN — MIDODRINE HYDROCHLORIDE 10 MG: 5 TABLET ORAL at 09:20

## 2023-03-13 RX ADMIN — PAROXETINE 10 MG: 10 TABLET, FILM COATED ORAL at 09:20

## 2023-03-13 RX ADMIN — MIDODRINE HYDROCHLORIDE 10 MG: 5 TABLET ORAL at 12:11

## 2023-03-13 RX ADMIN — Medication 10 ML: at 09:20

## 2023-03-13 RX ADMIN — VALPROIC ACID 1000 MG: 250 SOLUTION ORAL at 21:02

## 2023-03-13 RX ADMIN — AMPICILLIN SODIUM AND SULBACTAM SODIUM 3000 MG: 2; 1 INJECTION, POWDER, FOR SOLUTION INTRAMUSCULAR; INTRAVENOUS at 12:13

## 2023-03-13 RX ADMIN — VALPROIC ACID 1000 MG: 250 SOLUTION ORAL at 09:21

## 2023-03-13 RX ADMIN — PAROXETINE 10 MG: 10 TABLET, FILM COATED ORAL at 21:02

## 2023-03-13 RX ADMIN — LEVOTHYROXINE SODIUM 150 MCG: 75 TABLET ORAL at 05:25

## 2023-03-13 RX ADMIN — AMPICILLIN SODIUM AND SULBACTAM SODIUM 3000 MG: 2; 1 INJECTION, POWDER, FOR SOLUTION INTRAMUSCULAR; INTRAVENOUS at 17:35

## 2023-03-13 NOTE — PROGRESS NOTES
[facility] Quality Flow/Interdisciplinary Rounds Progress Note        Quality Flow Rounds held on March 13, 2023    Disciplines Attending:  Bedside Nurse, , , and Nursing Unit Leadership    Fuentes Al was admitted on 3/3/2023 12:17 PM    Anticipated Discharge Date:  Expected Discharge Date: 03/13/23    Disposition:    Jose Score:  Jose Scale Score: 11    Readmission Risk              Risk of Unplanned Readmission:  42           Discussed patient goal for the day, patient clinical progression, and barriers to discharge.   The following Goal(s) of the Day/Commitment(s) have been identified:  Diagnostics - Report Results and Labs - Report Results, transfer downtown awaiting bed      Alexandria Tobar RN  March 13, 2023

## 2023-03-13 NOTE — PROGRESS NOTES
No bed per access center.     Electronically signed by Abdirashid Leslie RN on 3/13/2023 at 11:41 AM

## 2023-03-13 NOTE — PLAN OF CARE
Problem: Skin/Tissue Integrity  Goal: Absence of new skin breakdown  Description: 1. Monitor for areas of redness and/or skin breakdown  2. Assess vascular access sites hourly  3. Every 4-6 hours minimum:  Change oxygen saturation probe site  4. Every 4-6 hours:  If on nasal continuous positive airway pressure, respiratory therapy assess nares and determine need for appliance change or resting period.   3/12/2023 2228 by Warden Mobley  Outcome: Progressing  3/12/2023 1045 by Chuy Pierce RN  Outcome: Progressing     Problem: Discharge Planning  Goal: Discharge to home or other facility with appropriate resources  3/12/2023 2228 by Warden Mobley  Outcome: Progressing  3/12/2023 1045 by Chuy Pierce RN  Outcome: Progressing     Problem: Pain  Goal: Verbalizes/displays adequate comfort level or baseline comfort level  3/12/2023 2228 by Warden Mobley  Outcome: Progressing  3/12/2023 1045 by Chuy Pierce RN  Outcome: Progressing     Problem: Safety - Adult  Goal: Free from fall injury  3/12/2023 2228 by Warden Mobley  Outcome: Progressing  3/12/2023 1045 by Chuy Pierce RN  Outcome: Progressing     Problem: ABCDS Injury Assessment  Goal: Absence of physical injury  3/12/2023 2228 by Warden Mobley  Outcome: Progressing  3/12/2023 1045 by Chuy Pierce RN  Outcome: Progressing     Problem: Nutrition Deficit:  Goal: Optimize nutritional status  3/12/2023 2228 by Warden Mobley  Outcome: Progressing  3/12/2023 1045 by Chuy Pierce RN  Outcome: Progressing

## 2023-03-13 NOTE — CARE COORDINATION
Social Work discharge planning    Per Yousuf Mary Kay with [facility], they are following pt to accept pt at discharge. SW updated [facility] that pt is awaiting [facility] bed for dental surgery.    Electronically signed by Barabara Apley on 3/13/2023 at 11:24 AM

## 2023-03-13 NOTE — PROGRESS NOTES
[facility] Progress Note    Admitting Date and Time: 3/3/2023 12:17 PM  Admit Dx: Hypernatremia [E87.0]    Subjective:  Patient is being followed for Hypernatremia [E87.0]     Patient was lying in bed in no acute distress. Per nursing, no acute concerns or overnight events. No bed available this am.     ROS: denies fever, chills, cp, sob, n/v, HA unless stated above.       ampicillin-sulbactam  3,000 mg IntraVENous Q6H    enoxaparin  30 mg SubCUTAneous Daily    levothyroxine  150 mcg PEG Tube Daily    insulin lispro  0-4 Units SubCUTAneous TID WC    insulin lispro  0-4 Units SubCUTAneous Nightly    PARoxetine  10 mg Oral BID    montelukast  10 mg PEG Tube Nightly    valproic acid  1,000 mg Per G Tube BID    [Held by provider] OLANZapine  20 mg Oral BID    sodium chloride flush  5-40 mL IntraVENous 2 times per day    pantoprazole  40 mg IntraVENous Daily    midodrine  10 mg PEG Tube TID WC    docusate  100 mg Oral Daily    polyethylene glycol  17 g Per G Tube Daily     bisacodyl, 10 mg, Daily PRN  glucose, 4 tablet, PRN  dextrose bolus, 125 mL, PRN   Or  dextrose bolus, 250 mL, PRN  glucagon (rDNA), 1 mg, PRN  dextrose, , Continuous PRN  diatrizoate meglumine-sodium, 30 mL, ONCE PRN  sodium chloride flush, 10 mL, PRN  acetaminophen, 650 mg, Q4H PRN  sodium chloride flush, 5-40 mL, PRN  sodium chloride, , PRN  ondansetron, 4 mg, Q8H PRN   Or  ondansetron, 4 mg, Q6H PRN  acetaminophen, 650 mg, Q6H PRN   Or  acetaminophen, 650 mg, Q6H PRN  diatrizoate meglumine-sodium, 30 mL, ONCE PRN       Objective:    BP (!) 115/52   Pulse (!) 108   Temp 99 °F (37.2 °C) (Axillary)   Resp 18   Ht 5' 5\" (1.651 m)   Wt 108 lb (49 kg)   SpO2 97%   BMI 17.97 kg/m²   General Appearance: awake and alert in no acute distress, poor dentition   Skin: warm and dry  Head: normocephalic and atraumatic  Eyes: pupils equal, round, and reactive to light, extraocular eye movements intact, conjunctivae normal  Neck: neck supple and non tender without mass   Pulmonary/Chest: clear to auscultation bilaterally- no wheezes, rales or rhonchi, normal air movement, no respiratory distress  Cardiovascular: normal rate, normal S1 and S2 and no carotid bruits  Abdomen: soft, non-tender, non-distended, normal bowel sounds, cedric button   Extremities: no cyanosis, no clubbing and no edema          Recent Labs     03/11/23  1220 03/12/23  0630 03/13/23  0310    135 137   K 4.5 4.6 3.9    104 103   CO2 28 27 27   BUN 13 14 13   CREATININE 0.7 0.8 0.8   GLUCOSE 92 100* 110*   CALCIUM 8.5* 8.6 8.6         Recent Labs     03/11/23  1220 03/12/23  0630 03/13/23  0310   WBC 12.1* 11.7* 14.4*   RBC 2.82* 2.98* 2.75*   HGB 8.8* 9.1* 8.6*   HCT 28.2* 29.9* 28.3*   .0* 100.3* 102.9*   MCH 31.2 30.5 31.3   MCHC 31.2* 30.4* 30.4*   RDW 15.9* 16.2* 16.7*    284 307   MPV 10.8 10.5 10.7         Radiology: reviewed     Assessment:    Principal Problem:    Hypernatremia  Active Problems:    BEENA (acute kidney injury) (HCC)    Hypotension due to hypovolemia    Tachycardia    Metabolic encephalopathy    Nausea and vomiting    Severe protein-calorie malnutrition (HCC)    Hyperkalemia  Resolved Problems:    * No resolved hospital problems. *      Plan:  1. Severe Hypernatremia: Sent from group home to ED due to AMS and lethargy. Had peg due to dysphagia. Recently cleared for pleasure feeds with honey thick liquids. Na on admission 158. Likely secondary to n/v from not tolerating TF. Received NS bolus in ED. Initially sent to ICU due to increased sodium -peaked at 163. Nephrology consulted. Received IVF with improvement. Na now 138. S/p PEG removal and cedric button placed on 3/8.     2. Malpositioned PEG: General surgery consulted. On  3/6 reported that PEG was malpositioned and not appropriately tightened. Per GS, if not in appropriate position could be causing mechanical obstruction. S/p PEG removal and cedric button placed on 3/8.  TF restarted with patient vomiting, were held but restarted and now tolerated. 3. Hypotension with hx of hypertension: Likely due to hypovolemia/ dehydration. Received IVF. Continue midodrine. BP stable. 4. Acute encephalopathy: Likely metabolic due to above. CT head negative. Appears back to baseline. 5. Septic criteria: On admission patient with hypotension, tachycardia, and leukocytosis. May be related to contraction/ dehydration. Received empiric antibiotics in ED. UA negative. CXR with no acute process. Blood cultures negative. No further antibiotics needed. 6. MRDD/ Dementia: supportive care. holding zyprexa      7. Thyroid disease: Continue synthroid     8. Hx of dysphagia: peg tube placed by surgery 3/2021. Multiple ER visits/ admissions for nausea / vomiting. General surgery consulted. Reporting peg was not in appropriate position x 2 during admission. Peg at 2 cm likely causing obstruction. PEG removed and Cameron button placed on 3/8.      9. H/o hiatal hernia      10. Difficult IV access: central line placed in ER. TLC dc 3/7    11. Constipation: on bowel regimen. Monitor for diarrhea       12. Hypomagnesemia: supplement prn. Monitor. 13. Hyperkalemia:  s/p lokelma. Monitor      14. BEENA: Resolved. Cr 1.3 now down to 0.8. Avoid nephrotoxic agents. Monitor. 15. Loose tooth: Nursing noted loose tooth, had concern of patient swallowing tooth and aspirating tooth. Oral surgery consulted. No coverage here. Accepted downtown for evaluation. Continue Unasyn. 16. Leukocytosis: WBC 9.4>12.0>11.7>14.4. Temp 99.4. Urine cultures pending. blood cultures on 3/11 negative so far. CXR 3/11 with no acute process. ? Dental infection. No coverage here. Accepted downtown for evaluation. Continue Unasyn. Dispo: Accepted at [address]  Awaiting open bed at [facility] for oral surgery evaluation. NOTE: This report was transcribed using voice recognition software.  Every effort was made to ensure accuracy; however, inadvertent computerized transcription errors may be present. Electronically signed by Yonathan Hart PA-C on 3/13/2023 at 8:27 AM       20 minutes time spent reviewing patient chart, assessing patient, discussing plan of care with patient and family, discussing plan of care with collaborating physician, and charting. Addendum: I have personally participated in the history, exam, medical decision making with  Joshua PARKER  on the date of service and I agree with all of the pertinent clinical information unless otherwise noted. I have also reviewed and agree with the past medical, family, and social history unless otherwise noted. Patient was admitted with hyponatremia, malpositioned PEG tube, acute metabolic encephalopathy, periodontal disease concern for dental infection. PHYSICAL EXAM:  Vitals:  BP (!) 115/52   Pulse (!) 108   Temp 99 °F (37.2 °C) (Axillary)   Resp 18   Ht 5' 5\" (1.651 m)   Wt 108 lb (49 kg)   SpO2 97%   BMI 17.97 kg/m²   Gen: awake, alert, NAD  Lungs: clear to auscultation bilaterally no crackles no wheezing. Heart: RRR, no murmur   Abdomen: soft nontender nondistended positive bowel sounds. Extremities: full range of motion no peripheral edema. Impression:  Principal Problem:    Hypernatremia  Active Problems:    BEENA (acute kidney injury) ([address]    Hypotension due to hypovolemia    Tachycardia    Metabolic encephalopathy    Nausea and vomiting    Severe protein-calorie malnutrition (HCC)    Hyperkalemia  Resolved Problems:    * No resolved hospital problems. *      My findings/plan include:  Patient is admitted with hyponatremia that is now resolved, malpositioned PEG that is now resolved, acute metabolic encephalopathy that is improved, periodontal disease with concern for dental infection. Patient has been accepted for transfer to [facility] for oral surgery evaluation. Awaiting bed.   Continue IV Unasyn at this time. NOTE: This report was transcribed using voice recognition software. Every effort was made to ensure accuracy; however, inadvertent computerized transcription errors may be present.   Electronically signed by Jarred Landa MD on 3/13/2023 at 2:16 PM

## 2023-03-14 LAB
ALBUMIN SERPL-MCNC: 2.2 G/DL (ref 3.5–5.2)
ALP BLD-CCNC: 67 U/L (ref 35–104)
ALT SERPL-CCNC: 7 U/L (ref 0–32)
ANION GAP SERPL CALCULATED.3IONS-SCNC: 10 MMOL/L (ref 7–16)
ANISOCYTOSIS: ABNORMAL
AST SERPL-CCNC: 10 U/L (ref 0–31)
BASOPHILS ABSOLUTE: 0.04 E9/L (ref 0–0.2)
BASOPHILS RELATIVE PERCENT: 0.2 % (ref 0–2)
BILIRUB SERPL-MCNC: 0.2 MG/DL (ref 0–1.2)
BUN BLDV-MCNC: 17 MG/DL (ref 6–23)
CALCIUM SERPL-MCNC: 8.8 MG/DL (ref 8.6–10.2)
CHLORIDE BLD-SCNC: 102 MMOL/L (ref 98–107)
CO2: 24 MMOL/L (ref 22–29)
CREAT SERPL-MCNC: 1 MG/DL (ref 0.5–1)
EOSINOPHILS ABSOLUTE: 0.17 E9/L (ref 0.05–0.5)
EOSINOPHILS RELATIVE PERCENT: 1 % (ref 0–6)
GFR SERPL CREATININE-BSD FRML MDRD: >60 ML/MIN/1.73
GLUCOSE BLD-MCNC: 71 MG/DL (ref 74–99)
HCT VFR BLD CALC: 30.5 % (ref 34–48)
HEMOGLOBIN: 9.5 G/DL (ref 11.5–15.5)
IMMATURE GRANULOCYTES #: 0.22 E9/L
IMMATURE GRANULOCYTES %: 1.3 % (ref 0–5)
LYMPHOCYTES ABSOLUTE: 1.84 E9/L (ref 1.5–4)
LYMPHOCYTES RELATIVE PERCENT: 10.7 % (ref 20–42)
MAGNESIUM: 2.1 MG/DL (ref 1.6–2.6)
MCH RBC QN AUTO: 31.7 PG (ref 26–35)
MCHC RBC AUTO-ENTMCNC: 31.1 % (ref 32–34.5)
MCV RBC AUTO: 101.7 FL (ref 80–99.9)
METER GLUCOSE: 107 MG/DL (ref 74–99)
METER GLUCOSE: 117 MG/DL (ref 74–99)
METER GLUCOSE: 129 MG/DL (ref 74–99)
METER GLUCOSE: 139 MG/DL (ref 74–99)
METER GLUCOSE: 67 MG/DL (ref 74–99)
MONOCYTES ABSOLUTE: 1.55 E9/L (ref 0.1–0.95)
MONOCYTES RELATIVE PERCENT: 9 % (ref 2–12)
NEUTROPHILS ABSOLUTE: 13.34 E9/L (ref 1.8–7.3)
NEUTROPHILS RELATIVE PERCENT: 77.8 % (ref 43–80)
PDW BLD-RTO: 16.7 FL (ref 11.5–15)
PHOSPHORUS: 3.4 MG/DL (ref 2.5–4.5)
PLATELET # BLD: 329 E9/L (ref 130–450)
PMV BLD AUTO: 10.8 FL (ref 7–12)
POIKILOCYTES: ABNORMAL
POTASSIUM SERPL-SCNC: 3.9 MMOL/L (ref 3.5–5)
RBC # BLD: 3 E12/L (ref 3.5–5.5)
SODIUM BLD-SCNC: 136 MMOL/L (ref 132–146)
TARGET CELLS: ABNORMAL
TOTAL PROTEIN: 6.9 G/DL (ref 6.4–8.3)
VACUOLATED NEUTROPHILS: ABNORMAL
WBC # BLD: 17.2 E9/L (ref 4.5–11.5)

## 2023-03-14 PROCEDURE — 80053 COMPREHEN METABOLIC PANEL: CPT

## 2023-03-14 PROCEDURE — 6370000000 HC RX 637 (ALT 250 FOR IP): Performed by: INTERNAL MEDICINE

## 2023-03-14 PROCEDURE — 82962 GLUCOSE BLOOD TEST: CPT

## 2023-03-14 PROCEDURE — 6360000002 HC RX W HCPCS: Performed by: PHYSICIAN ASSISTANT

## 2023-03-14 PROCEDURE — C9113 INJ PANTOPRAZOLE SODIUM, VIA: HCPCS | Performed by: INTERNAL MEDICINE

## 2023-03-14 PROCEDURE — 36415 COLL VENOUS BLD VENIPUNCTURE: CPT

## 2023-03-14 PROCEDURE — 6360000002 HC RX W HCPCS: Performed by: INTERNAL MEDICINE

## 2023-03-14 PROCEDURE — 83735 ASSAY OF MAGNESIUM: CPT

## 2023-03-14 PROCEDURE — 2580000003 HC RX 258: Performed by: INTERNAL MEDICINE

## 2023-03-14 PROCEDURE — 1200000000 HC SEMI PRIVATE

## 2023-03-14 PROCEDURE — 99232 SBSQ HOSP IP/OBS MODERATE 35: CPT | Performed by: STUDENT IN AN ORGANIZED HEALTH CARE EDUCATION/TRAINING PROGRAM

## 2023-03-14 PROCEDURE — 2580000003 HC RX 258: Performed by: PHYSICIAN ASSISTANT

## 2023-03-14 PROCEDURE — 85025 COMPLETE CBC W/AUTO DIFF WBC: CPT

## 2023-03-14 PROCEDURE — 99231 SBSQ HOSP IP/OBS SF/LOW 25: CPT | Performed by: PHYSICIAN ASSISTANT

## 2023-03-14 PROCEDURE — 84100 ASSAY OF PHOSPHORUS: CPT

## 2023-03-14 RX ADMIN — ENOXAPARIN SODIUM 30 MG: 100 INJECTION SUBCUTANEOUS at 18:54

## 2023-03-14 RX ADMIN — MONTELUKAST SODIUM 10 MG: 10 TABLET ORAL at 22:20

## 2023-03-14 RX ADMIN — AMPICILLIN SODIUM AND SULBACTAM SODIUM 3000 MG: 2; 1 INJECTION, POWDER, FOR SOLUTION INTRAMUSCULAR; INTRAVENOUS at 18:54

## 2023-03-14 RX ADMIN — PAROXETINE 10 MG: 10 TABLET, FILM COATED ORAL at 22:20

## 2023-03-14 RX ADMIN — PANTOPRAZOLE SODIUM 40 MG: 40 INJECTION, POWDER, FOR SOLUTION INTRAVENOUS at 08:32

## 2023-03-14 RX ADMIN — MIDODRINE HYDROCHLORIDE 10 MG: 5 TABLET ORAL at 16:39

## 2023-03-14 RX ADMIN — Medication 10 ML: at 22:21

## 2023-03-14 RX ADMIN — MIDODRINE HYDROCHLORIDE 10 MG: 5 TABLET ORAL at 11:57

## 2023-03-14 RX ADMIN — LEVOTHYROXINE SODIUM 150 MCG: 75 TABLET ORAL at 06:38

## 2023-03-14 RX ADMIN — AMPICILLIN SODIUM AND SULBACTAM SODIUM 3000 MG: 2; 1 INJECTION, POWDER, FOR SOLUTION INTRAMUSCULAR; INTRAVENOUS at 00:23

## 2023-03-14 RX ADMIN — POLYETHYLENE GLYCOL 3350 17 G: 17 POWDER, FOR SOLUTION ORAL at 08:32

## 2023-03-14 RX ADMIN — PAROXETINE 10 MG: 10 TABLET, FILM COATED ORAL at 08:33

## 2023-03-14 RX ADMIN — VALPROIC ACID 1000 MG: 250 SOLUTION ORAL at 21:20

## 2023-03-14 RX ADMIN — AMPICILLIN SODIUM AND SULBACTAM SODIUM 3000 MG: 2; 1 INJECTION, POWDER, FOR SOLUTION INTRAMUSCULAR; INTRAVENOUS at 12:20

## 2023-03-14 RX ADMIN — VALPROIC ACID 1000 MG: 250 SOLUTION ORAL at 13:18

## 2023-03-14 RX ADMIN — DEXTROSE MONOHYDRATE 125 ML: 100 INJECTION, SOLUTION INTRAVENOUS at 07:06

## 2023-03-14 RX ADMIN — Medication 10 ML: at 08:36

## 2023-03-14 RX ADMIN — MIDODRINE HYDROCHLORIDE 10 MG: 5 TABLET ORAL at 08:26

## 2023-03-14 RX ADMIN — AMPICILLIN SODIUM AND SULBACTAM SODIUM 3000 MG: 2; 1 INJECTION, POWDER, FOR SOLUTION INTRAMUSCULAR; INTRAVENOUS at 06:30

## 2023-03-14 NOTE — ACP (ADVANCE CARE PLANNING)
Advance Care Planning   Healthcare Decision Maker:    Primary Decision Maker: [facility] [phone]    Supplemental (Other) Decision Maker: Jason Romero -  representative - [phone]    Click here to complete Healthcare Decision Makers including selection of the Healthcare Decision Maker Relationship (ie \"Primary\").

## 2023-03-14 NOTE — PROGRESS NOTES
[facility] Progress Note    Admitting Date and Time: 3/3/2023 12:17 PM  Admit Dx: Hypernatremia [E87.0]    Subjective:  Patient is being followed for Hypernatremia [E87.0]     Patient was lying in bed in no acute distress. Per nursing, no acute concerns or overnight events. ROS: denies fever, chills, cp, sob, n/v, HA unless stated above.       ampicillin-sulbactam  3,000 mg IntraVENous Q6H    enoxaparin  30 mg SubCUTAneous Daily    levothyroxine  150 mcg PEG Tube Daily    insulin lispro  0-4 Units SubCUTAneous TID WC    insulin lispro  0-4 Units SubCUTAneous Nightly    PARoxetine  10 mg Oral BID    montelukast  10 mg PEG Tube Nightly    valproic acid  1,000 mg Per G Tube BID    [Held by provider] OLANZapine  20 mg Oral BID    sodium chloride flush  5-40 mL IntraVENous 2 times per day    pantoprazole  40 mg IntraVENous Daily    midodrine  10 mg PEG Tube TID WC    docusate  100 mg Oral Daily    polyethylene glycol  17 g Per G Tube Daily     bisacodyl, 10 mg, Daily PRN  glucose, 4 tablet, PRN  dextrose bolus, 125 mL, PRN   Or  dextrose bolus, 250 mL, PRN  glucagon (rDNA), 1 mg, PRN  dextrose, , Continuous PRN  diatrizoate meglumine-sodium, 30 mL, ONCE PRN  sodium chloride flush, 10 mL, PRN  acetaminophen, 650 mg, Q4H PRN  sodium chloride flush, 5-40 mL, PRN  sodium chloride, , PRN  ondansetron, 4 mg, Q8H PRN   Or  ondansetron, 4 mg, Q6H PRN  acetaminophen, 650 mg, Q6H PRN   Or  acetaminophen, 650 mg, Q6H PRN  diatrizoate meglumine-sodium, 30 mL, ONCE PRN       Objective:    BP (!) 117/56   Pulse 78   Temp 99 °F (37.2 °C) (Axillary)   Resp 18   Ht 5' 5\" (1.651 m)   Wt 110 lb (49.9 kg)   SpO2 97%   BMI 18.30 kg/m²   General Appearance: awake and alert in no acute distress, poor dentition   Skin: warm and dry  Head: normocephalic and atraumatic  Eyes: pupils equal, round, and reactive to light, extraocular eye movements intact, conjunctivae normal  Neck: neck supple and non tender without mass   Pulmonary/Chest: clear to auscultation bilaterally- no wheezes, rales or rhonchi, normal air movement, no respiratory distress  Cardiovascular: normal rate, normal S1 and S2 and no carotid bruits  Abdomen: soft, non-tender, non-distended, normal bowel sounds, cedric button   Extremities: no cyanosis, no clubbing and no edema          Recent Labs     03/12/23  0630 03/13/23  0310 03/14/23  0400    137 136   K 4.6 3.9 3.9    103 102   CO2 27 27 24   BUN 14 13 17   CREATININE 0.8 0.8 1.0   GLUCOSE 100* 110* 71*   CALCIUM 8.6 8.6 8.8         Recent Labs     03/12/23 0630 03/13/23 0310 03/14/23  0400   WBC 11.7* 14.4* 17.2*   RBC 2.98* 2.75* 3.00*   HGB 9.1* 8.6* 9.5*   HCT 29.9* 28.3* 30.5*   .3* 102.9* 101.7*   MCH 30.5 31.3 31.7   MCHC 30.4* 30.4* 31.1*   RDW 16.2* 16.7* 16.7*    307 329   MPV 10.5 10.7 10.8         Radiology: reviewed     Assessment:    Principal Problem:    Hypernatremia  Active Problems:    BEENA (acute kidney injury) (HCC)    Hypotension due to hypovolemia    Tachycardia    Metabolic encephalopathy    Nausea and vomiting    Severe protein-calorie malnutrition (HCC)    Hyperkalemia    Dental infection    PEG tube malfunction ([facility] [address]  Resolved Problems:    * No resolved hospital problems. *      Plan:  1. Severe Hypernatremia: Sent from group home to ED due to AMS and lethargy. Had peg due to dysphagia. Recently cleared for pleasure feeds with honey thick liquids. Na on admission 158. Likely secondary to n/v from not tolerating TF. Received NS bolus in ED. Initially sent to ICU due to increased sodium -peaked at 163. Nephrology consulted. Received IVF with improvement. Na now 138. S/p PEG removal and cedric button placed on 3/8.     2. Malpositioned PEG: General surgery consulted. On  3/6 reported that PEG was malpositioned and not appropriately tightened. Per GS, if not in appropriate position could be causing mechanical obstruction.  S/p PEG removal and cedric button placed on 3/8. TF restarted with patient vomiting, were held but restarted and now tolerated. 3. Hypotension with hx of hypertension: Likely due to hypovolemia/ dehydration. Received IVF. Continue midodrine. BP stable. 4. Acute encephalopathy: Likely metabolic due to above. CT head negative. Appears back to baseline. 5. Septic criteria: On admission patient with hypotension, tachycardia, and leukocytosis. May be related to contraction/ dehydration. Received empiric antibiotics in ED. UA negative. CXR with no acute process. Blood cultures negative. No further antibiotics needed. 6. MRDD/ Dementia: supportive care. holding zyprexa      7. Thyroid disease: Continue synthroid     8. Hx of dysphagia: peg tube placed by surgery 3/2021. Multiple ER visits/ admissions for nausea / vomiting. General surgery consulted. Reporting peg was not in appropriate position x 2 during admission. Peg at 2 cm likely causing obstruction. PEG removed and Cameron button placed on 3/8.      9. H/o hiatal hernia      10. Difficult IV access: central line placed in ER. TLC dc 3/7    11. Constipation: on bowel regimen. Monitor for diarrhea       12. Hypomagnesemia: supplement prn. Monitor. 13. Hyperkalemia:  s/p lokelma. Monitor      14. BEENA: Resolved. Cr 1.3 now down to 0.8. Avoid nephrotoxic agents. Monitor. 15. Loose tooth: Nursing noted loose tooth, had concern of patient swallowing tooth and aspirating tooth. Oral surgery consulted. No coverage here. Accepted downtown for evaluation. Continue Unasyn. 16. Leukocytosis: WBC 9.4>12.0>11.7>14.4. Temp 99.4. Urine cultures from 3/11 with ,000 mixed lloyd. blood cultures on 3/11 negative so far. CXR 3/11 with no acute process. ? Dental infection. No coverage here. Accepted downtown for evaluation. Continue Unasyn. Dispo: Accepted at [facility]. Awaiting open bed at [address] [place] [address] for oral surgery evaluation.      NOTE: This report was transcribed using voice recognition software. Every effort was made to ensure accuracy; however, inadvertent computerized transcription errors may be present. Electronically signed by Shirley Ramirez PA-C on 3/14/2023 at 8:31 AM       16 minutes time spent reviewing patient chart, assessing patient, discussing plan of care with patient and family, discussing plan of care with collaborating physician, and charting. Addendum: I have personally participated in the history, exam, medical decision making with  Joshua PARKER  on the date of service and I agree with all of the pertinent clinical information unless otherwise noted. I have also reviewed and agree with the past medical, family, and social history unless otherwise noted. Patient was admitted with hyponatremia, malfunctioning PEG tube, concern for dental infection. Patient has been accepted for transfer to [facility] to be evaluated by oral surgery, currently awaiting bed. She continues on IV Unasyn for dental infection. PHYSICAL EXAM:  Vitals:  /79   Pulse 68   Temp 98.9 °F (37.2 °C) (Axillary)   Resp 16   Ht 5' 5\" (1.651 m)   Wt 110 lb (49.9 kg)   SpO2 97%   BMI 18.30 kg/m²   Gen: awake, alert, NAD  Lungs: clear to auscultation bilaterally no crackles no wheezing. Heart: RRR, no murmur   Abdomen: soft nontender nondistended positive bowel sounds. Extremities: full range of motion no peripheral edema. Impression:  Principal Problem:    Hypernatremia  Active Problems:    BEENA (acute kidney injury) ([address]    Hypotension due to hypovolemia    Tachycardia    Metabolic encephalopathy    Nausea and vomiting    Severe protein-calorie malnutrition (HCC)    Hyperkalemia    Dental infection    PEG tube malfunction ([address]  Resolved Problems:    * No resolved hospital problems. *      My findings/plan include:  Patient has been accepted to transfer to [facility] for oral surgery evaluation.   She continues on IV Unasyn for concern for dental infection. Monitor CBC. Transfer to [facility] when bed is available. NOTE: This report was transcribed using voice recognition software. Every effort was made to ensure accuracy; however, inadvertent computerized transcription errors may be present.   Electronically signed by Benjamine Heimlich, MD on 3/14/2023 at 1:39 PM

## 2023-03-14 NOTE — PLAN OF CARE
Problem: Skin/Tissue Integrity  Goal: Absence of new skin breakdown  Description: 1. Monitor for areas of redness and/or skin breakdown  2. Assess vascular access sites hourly  3. Every 4-6 hours minimum:  Change oxygen saturation probe site  4. Every 4-6 hours:  If on nasal continuous positive airway pressure, respiratory therapy assess nares and determine need for appliance change or resting period.   Outcome: Progressing     Problem: Discharge Planning  Goal: Discharge to home or other facility with appropriate resources  Outcome: Progressing     Problem: Safety - Adult  Goal: Free from fall injury  Outcome: Progressing     Problem: ABCDS Injury Assessment  Goal: Absence of physical injury  Outcome: Progressing     Problem: Nutrition Deficit:  Goal: Optimize nutritional status  Outcome: Progressing

## 2023-03-14 NOTE — PROGRESS NOTES
[facility] called and no bed available downtown at this time.     Electronically signed by Francisco Pearl RN on 3/14/2023 at 6:46 AM

## 2023-03-14 NOTE — PROGRESS NOTES
[address] [place] [address] called and stated that there was still no bed available.  Electronically signed by Hunter Macario RN on 3/14/2023 at 12:17 PM

## 2023-03-14 NOTE — PATIENT CARE CONFERENCE
[facility] Quality Flow/Interdisciplinary Rounds Progress Note        Quality Flow Rounds held on March 14, 2023    Disciplines Attending:  Bedside Nurse, , , and Nursing Unit Leadership    Ciara Coles was admitted on 3/3/2023 12:17 PM    Anticipated Discharge Date:  Expected Discharge Date: 03/13/23    Disposition:    Jose Score:  Jose Scale Score: 11    Readmission Risk              Risk of Unplanned Readmission:  43           Discussed patient goal for the day, patient clinical progression, and barriers to discharge.   The following Goal(s) of the Day/Commitment(s) have been identified:  Diagnostics - Report Results transfer to [facility] CE, RN  March 14, 2023

## 2023-03-15 LAB
ALBUMIN SERPL-MCNC: 2 G/DL (ref 3.5–5.2)
ALP BLD-CCNC: 65 U/L (ref 35–104)
ALT SERPL-CCNC: 5 U/L (ref 0–32)
ANION GAP SERPL CALCULATED.3IONS-SCNC: 8 MMOL/L (ref 7–16)
ANISOCYTOSIS: ABNORMAL
AST SERPL-CCNC: 10 U/L (ref 0–31)
BACTERIA BLD CULT ORG #2: NORMAL
BACTERIA BLD CULT: NORMAL
BASOPHILIC STIPPLING: ABNORMAL
BASOPHILS # BLD: 0 E9/L (ref 0–0.2)
BASOPHILS NFR BLD: 0 % (ref 0–2)
BILIRUB SERPL-MCNC: <0.2 MG/DL (ref 0–1.2)
BUN BLDV-MCNC: 20 MG/DL (ref 6–23)
CALCIUM SERPL-MCNC: 8.5 MG/DL (ref 8.6–10.2)
CHLORIDE BLD-SCNC: 103 MMOL/L (ref 98–107)
CO2: 22 MMOL/L (ref 22–29)
CREAT SERPL-MCNC: 1.2 MG/DL (ref 0.5–1)
EOSINOPHIL # BLD: 0.28 E9/L (ref 0.05–0.5)
EOSINOPHIL NFR BLD: 1.7 % (ref 0–6)
ERYTHROCYTE [DISTWIDTH] IN BLOOD BY AUTOMATED COUNT: 16.6 FL (ref 11.5–15)
GFR SERPL CREATININE-BSD FRML MDRD: 49 ML/MIN/1.73
GLUCOSE BLD-MCNC: 102 MG/DL (ref 74–99)
HCT VFR BLD AUTO: 25 % (ref 34–48)
HGB BLD-MCNC: 8 G/DL (ref 11.5–15.5)
HYPOCHROMIA: ABNORMAL
LYMPHOCYTES # BLD: 1 E9/L (ref 1.5–4)
LYMPHOCYTES NFR BLD: 6.1 % (ref 20–42)
MAGNESIUM: 2.4 MG/DL (ref 1.6–2.6)
MCH RBC QN AUTO: 32.1 PG (ref 26–35)
MCHC RBC AUTO-ENTMCNC: 32 % (ref 32–34.5)
MCV RBC AUTO: 100.4 FL (ref 80–99.9)
METER GLUCOSE: 104 MG/DL (ref 74–99)
METER GLUCOSE: 121 MG/DL (ref 74–99)
METER GLUCOSE: 146 MG/DL (ref 74–99)
MONOCYTES # BLD: 0.84 E9/L (ref 0.1–0.95)
MONOCYTES NFR BLD: 5.2 % (ref 2–12)
NEUTROPHILS # BLD: 14.53 E9/L (ref 1.8–7.3)
NEUTS SEG NFR BLD: 87 % (ref 43–80)
NRBC BLD-RTO: 0 /100 WBC
OVALOCYTES: ABNORMAL
PHOSPHORUS: 3.3 MG/DL (ref 2.5–4.5)
PLATELET # BLD AUTO: 309 E9/L (ref 130–450)
PMV BLD AUTO: 11 FL (ref 7–12)
POIKILOCYTES: ABNORMAL
POLYCHROMASIA: ABNORMAL
POTASSIUM SERPL-SCNC: 4 MMOL/L (ref 3.5–5)
RBC # BLD AUTO: 2.49 E12/L (ref 3.5–5.5)
REASON FOR REJECTION: NORMAL
REJECTED TEST: NORMAL
SCHISTOCYTES: ABNORMAL
SODIUM BLD-SCNC: 133 MMOL/L (ref 132–146)
TARGET CELLS: ABNORMAL
TOTAL PROTEIN: 6.5 G/DL (ref 6.4–8.3)
VACUOLATED NEUTROPHILS: ABNORMAL
WBC # BLD: 16.7 E9/L (ref 4.5–11.5)

## 2023-03-15 PROCEDURE — 6360000002 HC RX W HCPCS: Performed by: INTERNAL MEDICINE

## 2023-03-15 PROCEDURE — 83735 ASSAY OF MAGNESIUM: CPT

## 2023-03-15 PROCEDURE — 80053 COMPREHEN METABOLIC PANEL: CPT

## 2023-03-15 PROCEDURE — 36415 COLL VENOUS BLD VENIPUNCTURE: CPT

## 2023-03-15 PROCEDURE — 6370000000 HC RX 637 (ALT 250 FOR IP): Performed by: INTERNAL MEDICINE

## 2023-03-15 PROCEDURE — 84100 ASSAY OF PHOSPHORUS: CPT

## 2023-03-15 PROCEDURE — 82962 GLUCOSE BLOOD TEST: CPT

## 2023-03-15 PROCEDURE — 2580000003 HC RX 258: Performed by: INTERNAL MEDICINE

## 2023-03-15 PROCEDURE — 85025 COMPLETE CBC W/AUTO DIFF WBC: CPT

## 2023-03-15 PROCEDURE — 2580000003 HC RX 258: Performed by: PHYSICIAN ASSISTANT

## 2023-03-15 PROCEDURE — 1200000000 HC SEMI PRIVATE

## 2023-03-15 PROCEDURE — 99232 SBSQ HOSP IP/OBS MODERATE 35: CPT | Performed by: STUDENT IN AN ORGANIZED HEALTH CARE EDUCATION/TRAINING PROGRAM

## 2023-03-15 PROCEDURE — C9113 INJ PANTOPRAZOLE SODIUM, VIA: HCPCS | Performed by: INTERNAL MEDICINE

## 2023-03-15 PROCEDURE — 99232 SBSQ HOSP IP/OBS MODERATE 35: CPT | Performed by: PHYSICIAN ASSISTANT

## 2023-03-15 PROCEDURE — 6360000002 HC RX W HCPCS: Performed by: PHYSICIAN ASSISTANT

## 2023-03-15 RX ADMIN — AMPICILLIN SODIUM AND SULBACTAM SODIUM 3000 MG: 2; 1 INJECTION, POWDER, FOR SOLUTION INTRAMUSCULAR; INTRAVENOUS at 18:19

## 2023-03-15 RX ADMIN — AMPICILLIN SODIUM AND SULBACTAM SODIUM 3000 MG: 2; 1 INJECTION, POWDER, FOR SOLUTION INTRAMUSCULAR; INTRAVENOUS at 12:45

## 2023-03-15 RX ADMIN — LEVOTHYROXINE SODIUM 150 MCG: 75 TABLET ORAL at 06:15

## 2023-03-15 RX ADMIN — PANTOPRAZOLE SODIUM 40 MG: 40 INJECTION, POWDER, FOR SOLUTION INTRAVENOUS at 09:47

## 2023-03-15 RX ADMIN — Medication 10 ML: at 21:00

## 2023-03-15 RX ADMIN — PAROXETINE 10 MG: 10 TABLET, FILM COATED ORAL at 20:37

## 2023-03-15 RX ADMIN — ENOXAPARIN SODIUM 30 MG: 100 INJECTION SUBCUTANEOUS at 18:13

## 2023-03-15 RX ADMIN — AMPICILLIN SODIUM AND SULBACTAM SODIUM 3000 MG: 2; 1 INJECTION, POWDER, FOR SOLUTION INTRAMUSCULAR; INTRAVENOUS at 00:30

## 2023-03-15 RX ADMIN — MIDODRINE HYDROCHLORIDE 10 MG: 5 TABLET ORAL at 12:40

## 2023-03-15 RX ADMIN — MIDODRINE HYDROCHLORIDE 10 MG: 5 TABLET ORAL at 09:46

## 2023-03-15 RX ADMIN — DOCUSATE SODIUM LIQUID 100 MG: 100 LIQUID ORAL at 09:47

## 2023-03-15 RX ADMIN — AMPICILLIN SODIUM AND SULBACTAM SODIUM 3000 MG: 2; 1 INJECTION, POWDER, FOR SOLUTION INTRAMUSCULAR; INTRAVENOUS at 06:15

## 2023-03-15 RX ADMIN — PAROXETINE 10 MG: 10 TABLET, FILM COATED ORAL at 09:47

## 2023-03-15 RX ADMIN — POLYETHYLENE GLYCOL 3350 17 G: 17 POWDER, FOR SOLUTION ORAL at 09:46

## 2023-03-15 RX ADMIN — MONTELUKAST SODIUM 10 MG: 10 TABLET ORAL at 20:37

## 2023-03-15 RX ADMIN — VALPROIC ACID 1000 MG: 250 SOLUTION ORAL at 20:37

## 2023-03-15 RX ADMIN — VALPROIC ACID 1000 MG: 250 SOLUTION ORAL at 09:47

## 2023-03-15 RX ADMIN — SODIUM CHLORIDE, PRESERVATIVE FREE 10 ML: 5 INJECTION INTRAVENOUS at 09:47

## 2023-03-15 RX ADMIN — Medication 10 ML: at 09:51

## 2023-03-15 ASSESSMENT — PAIN SCALES - GENERAL: PAINLEVEL_OUTOF10: 0

## 2023-03-15 NOTE — PATIENT CARE CONFERENCE
P Quality Flow/Interdisciplinary Rounds Progress Note        Quality Flow Rounds held on March 15, 2023    Disciplines Attending:  Bedside Nurse, , , and Nursing Unit Leadership    Ciara Coles was admitted on 3/3/2023 12:17 PM    Anticipated Discharge Date:  Expected Discharge Date: 03/15/23    Disposition:    Jose Score:  Jose Scale Score: 11    Readmission Risk              Risk of Unplanned Readmission:  43           Discussed patient goal for the day, patient clinical progression, and barriers to discharge.   The following Goal(s) of the Day/Commitment(s) have been identified:  Diagnostics - Report Results      Maury Zabala RN  March 15, 2023

## 2023-03-15 NOTE — PROGRESS NOTES
[facility] Progress Note    Admitting Date and Time: 3/3/2023 12:17 PM  Admit Dx: Hypernatremia [E87.0]    Subjective:  Patient is being followed for Hypernatremia [E87.0]     Patient was lying in bed in no acute distress. Per nursing, patient appears to be back at baseline. Will be awake and alert, yelling out throughout the day. ROS: denies fever, chills, cp, sob, n/v, HA unless stated above.       ampicillin-sulbactam  3,000 mg IntraVENous Q6H    enoxaparin  30 mg SubCUTAneous Daily    levothyroxine  150 mcg PEG Tube Daily    insulin lispro  0-4 Units SubCUTAneous TID WC    insulin lispro  0-4 Units SubCUTAneous Nightly    PARoxetine  10 mg Oral BID    montelukast  10 mg PEG Tube Nightly    valproic acid  1,000 mg Per G Tube BID    [Held by provider] OLANZapine  20 mg Oral BID    sodium chloride flush  5-40 mL IntraVENous 2 times per day    pantoprazole  40 mg IntraVENous Daily    midodrine  10 mg PEG Tube TID WC    docusate  100 mg Oral Daily    polyethylene glycol  17 g Per G Tube Daily     bisacodyl, 10 mg, Daily PRN  glucose, 4 tablet, PRN  dextrose bolus, 125 mL, PRN   Or  dextrose bolus, 250 mL, PRN  glucagon (rDNA), 1 mg, PRN  dextrose, , Continuous PRN  diatrizoate meglumine-sodium, 30 mL, ONCE PRN  sodium chloride flush, 10 mL, PRN  acetaminophen, 650 mg, Q4H PRN  sodium chloride flush, 5-40 mL, PRN  sodium chloride, , PRN  ondansetron, 4 mg, Q8H PRN   Or  ondansetron, 4 mg, Q6H PRN  acetaminophen, 650 mg, Q6H PRN   Or  acetaminophen, 650 mg, Q6H PRN  diatrizoate meglumine-sodium, 30 mL, ONCE PRN       Objective:    /60   Pulse 77   Temp 98.4 °F (36.9 °C) (Axillary)   Resp 16   Ht 5' 5\" (1.651 m)   Wt 107 lb (48.5 kg)   SpO2 96%   BMI 17.81 kg/m²   General Appearance: awake and alert in no acute distress, poor dentition   Skin: warm and dry  Head: normocephalic and atraumatic  Eyes: pupils equal, round, and reactive to light, extraocular eye movements intact, conjunctivae normal  Neck: neck supple and non tender without mass   Pulmonary/Chest: clear to auscultation bilaterally- no wheezes, rales or rhonchi, normal air movement, no respiratory distress  Cardiovascular: normal rate, normal S1 and S2 and no carotid bruits  Abdomen: soft, non-tender, non-distended, normal bowel sounds, cedric button   Extremities: no cyanosis, no clubbing and no edema          Recent Labs     03/13/23  0310 03/14/23  0400 03/15/23  0630    136 133   K 3.9 3.9 4.0    102 103   CO2 27 24 22   BUN 13 17 20   CREATININE 0.8 1.0 1.2*   GLUCOSE 110* 71* 102*   CALCIUM 8.6 8.8 8.5*         Recent Labs     03/13/23  0310 03/14/23  0400   WBC 14.4* 17.2*   RBC 2.75* 3.00*   HGB 8.6* 9.5*   HCT 28.3* 30.5*   .9* 101.7*   MCH 31.3 31.7   MCHC 30.4* 31.1*   RDW 16.7* 16.7*    329   MPV 10.7 10.8         Radiology: reviewed     Assessment:    Principal Problem:    Hypernatremia  Active Problems:    BEENA (acute kidney injury) ([facility] [address]    Hypotension due to hypovolemia    Tachycardia    Metabolic encephalopathy    Nausea and vomiting    Severe protein-calorie malnutrition (HCC)    Hyperkalemia    Dental infection    PEG tube malfunction ([address]  Resolved Problems:    * No resolved hospital problems. *      Plan:  1. Severe Hypernatremia: Sent from group home to ED due to AMS and lethargy. Had peg due to dysphagia. Recently cleared for pleasure feeds with honey thick liquids. Na on admission 158. Likely secondary to n/v from not tolerating TF. Received NS bolus in ED. Initially sent to ICU due to increased sodium -peaked at 163. Nephrology consulted. Received IVF with improvement. Na now 138. S/p PEG removal and cedric button placed on 3/8.     2. Malpositioned PEG: General surgery consulted. On  3/6 reported that PEG was malpositioned and not appropriately tightened. Per GS, if not in appropriate position could be causing mechanical obstruction.  S/p PEG removal and cedric button placed on 3/8. TF restarted with patient vomiting, were held but restarted and now tolerated. 3. Hypotension with hx of hypertension: Likely due to hypovolemia/ dehydration. Received IVF. Continue midodrine. BP stable. 4. Acute encephalopathy: Likely metabolic due to above. CT head negative. Appears back to baseline. 5. Septic criteria: On admission patient with hypotension, tachycardia, and leukocytosis. May be related to contraction/ dehydration. Received empiric antibiotics in ED. UA negative. CXR with no acute process. Blood cultures negative. No further antibiotics needed. 6. MRDD/ Dementia: supportive care. holding zyprexa      7. Thyroid disease: Continue synthroid     8. Hx of dysphagia: peg tube placed by surgery 3/2021. Multiple ER visits/ admissions for nausea / vomiting. General surgery consulted. Reporting peg was not in appropriate position x 2 during admission. Peg at 2 cm likely causing obstruction. PEG removed and Cameron button placed on 3/8.      9. H/o hiatal hernia      10. Difficult IV access: central line placed in ER. TLC dc 3/7    11. Constipation: on bowel regimen. Monitor for diarrhea       12. Hypomagnesemia: supplement prn. Monitor. 13. Hyperkalemia:  s/p lokelma. Monitor      14. BEENA: Resolved. Cr 1.2. Avoid nephrotoxic agents. Monitor. Increase free water flushes from 30 ml q4h to 60 ml q4h. 15. Loose tooth: Nursing noted loose tooth, had concern of patient swallowing tooth and aspirating tooth. Oral surgery consulted. No coverage here. Accepted downtown for evaluation. Continue Unasyn. 16. Leukocytosis: WBC 9.4>12.0>11.7>14.4>16.7. Temp 99.4. Urine cultures from 3/11 with ,000 mixed lloyd. blood cultures on 3/11 negative so far. CXR 3/11 with no acute process. ? Dental infection. No coverage here. Accepted downtown for evaluation. Continue Unasyn. Dispo: Accepted at [facility]. Awaiting open bed at [address] [place] [address] for oral surgery evaluation. NOTE: This report was transcribed using voice recognition software. Every effort was made to ensure accuracy; however, inadvertent computerized transcription errors may be present. Electronically signed by Caro Gramajo PA-C on 3/15/2023 at 9:01 AM       18 minutes time spent reviewing patient chart, assessing patient, discussing plan of care with patient and family, discussing plan of care with collaborating physician, and charting. Addendum: I have personally participated in the history, exam, medical decision making with  Joshua PARKER  on the date of service and I agree with all of the pertinent clinical information unless otherwise noted. I have also reviewed and agree with the past medical, family, and social history unless otherwise noted. Patient was admitted with hypernatremia, malpositioned PEG tube, dental infection. Hyponatremia and malposition PEG tube are now resolved. Patient continues on IV Unasyn for dental infection, currently awaiting bed at [facility] for oral surgery evaluation. CBC reveals uptrending leukocytosis. PHYSICAL EXAM:  Vitals:  /60   Pulse 77   Temp 98.4 °F (36.9 °C) (Axillary)   Resp 16   Ht 5' 5\" (1.651 m)   Wt 107 lb (48.5 kg)   SpO2 96%   BMI 17.81 kg/m²   Gen: awake, alert, NAD, resting in bed with blanket overhead  Lungs: clear to auscultation bilaterally no crackles no wheezing. Heart: RRR, no murmur   Abdomen: soft nontender nondistended positive bowel sounds. Extremities: full range of motion no peripheral edema. Impression:  Principal Problem:    Hypernatremia  Active Problems:    BEENA (acute kidney injury) ([address]    Hypotension due to hypovolemia    Tachycardia    Metabolic encephalopathy    Nausea and vomiting    Severe protein-calorie malnutrition (HCC)    Hyperkalemia    Dental infection    PEG tube malfunction ([address]  Resolved Problems:    * No resolved hospital problems.  *      My findings/plan include:  Patient is admitted with hypernatremia, malposition PEG, dental infection. Her hyponatremia and malposition PEG are now resolved. She continues on IV Unasyn for dental infection. CBC reveals uptrending leukocytosis. We are currently awaiting a bed at [facility] to transfer her for oral surgery evaluation. BMP reveals slightly bumped creatinine, will increase free water flushes and tube feeds. NOTE: This report was transcribed using voice recognition software. Every effort was made to ensure accuracy; however, inadvertent computerized transcription errors may be present.   Electronically signed by Lina Casey MD on 3/15/2023 at 1:58 PM

## 2023-03-16 ENCOUNTER — HOSPITAL ENCOUNTER (INPATIENT)
Age: 68
LOS: 1 days | Discharge: NURSING, REHAB OR LONG-TERM CARE | End: 2023-03-17
Attending: INTERNAL MEDICINE | Admitting: INTERNAL MEDICINE
Payer: MEDICARE

## 2023-03-16 VITALS
DIASTOLIC BLOOD PRESSURE: 70 MMHG | SYSTOLIC BLOOD PRESSURE: 130 MMHG | BODY MASS INDEX: 18.33 KG/M2 | HEART RATE: 80 BPM | HEIGHT: 65 IN | WEIGHT: 110 LBS | OXYGEN SATURATION: 97 % | RESPIRATION RATE: 16 BRPM | TEMPERATURE: 98.1 F

## 2023-03-16 VITALS
TEMPERATURE: 97.6 F | DIASTOLIC BLOOD PRESSURE: 79 MMHG | OXYGEN SATURATION: 97 % | RESPIRATION RATE: 18 BRPM | HEIGHT: 65 IN | BODY MASS INDEX: 18.3 KG/M2 | SYSTOLIC BLOOD PRESSURE: 120 MMHG | HEART RATE: 69 BPM

## 2023-03-16 PROBLEM — R50.9 FEVER IN ADULT: Status: ACTIVE | Noted: 2023-03-16

## 2023-03-16 PROBLEM — K04.7 DENTAL ABSCESS: Status: ACTIVE | Noted: 2023-03-16

## 2023-03-16 PROBLEM — E44.0 MODERATE PROTEIN-CALORIE MALNUTRITION (HCC): Chronic | Status: ACTIVE | Noted: 2023-03-16

## 2023-03-16 LAB
METER GLUCOSE: 66 MG/DL (ref 74–99)
METER GLUCOSE: 70 MG/DL (ref 74–99)
METER GLUCOSE: 71 MG/DL (ref 74–99)
METER GLUCOSE: 84 MG/DL (ref 74–99)
METER GLUCOSE: 87 MG/DL (ref 74–99)
METER GLUCOSE: 96 MG/DL (ref 74–99)

## 2023-03-16 PROCEDURE — 6360000002 HC RX W HCPCS: Performed by: PHYSICIAN ASSISTANT

## 2023-03-16 PROCEDURE — 6370000000 HC RX 637 (ALT 250 FOR IP): Performed by: FAMILY MEDICINE

## 2023-03-16 PROCEDURE — 6370000000 HC RX 637 (ALT 250 FOR IP): Performed by: PHYSICIAN ASSISTANT

## 2023-03-16 PROCEDURE — 2580000003 HC RX 258: Performed by: PHYSICIAN ASSISTANT

## 2023-03-16 PROCEDURE — 99239 HOSP IP/OBS DSCHRG MGMT >30: CPT | Performed by: STUDENT IN AN ORGANIZED HEALTH CARE EDUCATION/TRAINING PROGRAM

## 2023-03-16 PROCEDURE — C9113 INJ PANTOPRAZOLE SODIUM, VIA: HCPCS | Performed by: PHYSICIAN ASSISTANT

## 2023-03-16 PROCEDURE — 1200000000 HC SEMI PRIVATE

## 2023-03-16 PROCEDURE — 82962 GLUCOSE BLOOD TEST: CPT

## 2023-03-16 PROCEDURE — 2580000003 HC RX 258: Performed by: INTERNAL MEDICINE

## 2023-03-16 PROCEDURE — G0378 HOSPITAL OBSERVATION PER HR: HCPCS

## 2023-03-16 PROCEDURE — G0379 DIRECT REFER HOSPITAL OBSERV: HCPCS

## 2023-03-16 RX ORDER — MIDODRINE HYDROCHLORIDE 5 MG/1
10 TABLET ORAL
Status: DISCONTINUED | OUTPATIENT
Start: 2023-03-16 | End: 2023-03-17 | Stop reason: HOSPADM

## 2023-03-16 RX ORDER — PANTOPRAZOLE SODIUM 40 MG/10ML
40 INJECTION, POWDER, LYOPHILIZED, FOR SOLUTION INTRAVENOUS DAILY
Status: DISCONTINUED | OUTPATIENT
Start: 2023-03-16 | End: 2023-03-17 | Stop reason: HOSPADM

## 2023-03-16 RX ORDER — SODIUM CHLORIDE 0.9 % (FLUSH) 0.9 %
5-40 SYRINGE (ML) INJECTION EVERY 12 HOURS SCHEDULED
Status: DISCONTINUED | OUTPATIENT
Start: 2023-03-16 | End: 2023-03-17 | Stop reason: HOSPADM

## 2023-03-16 RX ORDER — SODIUM CHLORIDE 9 MG/ML
INJECTION, SOLUTION INTRAVENOUS PRN
Status: DISCONTINUED | OUTPATIENT
Start: 2023-03-16 | End: 2023-03-17 | Stop reason: HOSPADM

## 2023-03-16 RX ORDER — INSULIN LISPRO 100 [IU]/ML
0-4 INJECTION, SOLUTION INTRAVENOUS; SUBCUTANEOUS NIGHTLY
Status: DISCONTINUED | OUTPATIENT
Start: 2023-03-16 | End: 2023-03-17 | Stop reason: HOSPADM

## 2023-03-16 RX ORDER — INSULIN LISPRO 100 [IU]/ML
0-4 INJECTION, SOLUTION INTRAVENOUS; SUBCUTANEOUS
Status: DISCONTINUED | OUTPATIENT
Start: 2023-03-16 | End: 2023-03-17 | Stop reason: HOSPADM

## 2023-03-16 RX ORDER — MONTELUKAST SODIUM 10 MG/1
10 TABLET ORAL NIGHTLY
Status: DISCONTINUED | OUTPATIENT
Start: 2023-03-16 | End: 2023-03-17 | Stop reason: HOSPADM

## 2023-03-16 RX ORDER — DEXTROSE MONOHYDRATE 100 MG/ML
INJECTION, SOLUTION INTRAVENOUS CONTINUOUS PRN
Status: DISCONTINUED | OUTPATIENT
Start: 2023-03-16 | End: 2023-03-17 | Stop reason: HOSPADM

## 2023-03-16 RX ORDER — PAROXETINE 10 MG/1
10 TABLET, FILM COATED ORAL 2 TIMES DAILY
Status: DISCONTINUED | OUTPATIENT
Start: 2023-03-16 | End: 2023-03-17 | Stop reason: HOSPADM

## 2023-03-16 RX ORDER — BISACODYL 5 MG/1
10 TABLET, DELAYED RELEASE ORAL DAILY PRN
Status: DISCONTINUED | OUTPATIENT
Start: 2023-03-16 | End: 2023-03-17 | Stop reason: HOSPADM

## 2023-03-16 RX ORDER — SODIUM CHLORIDE 9 MG/ML
INJECTION, SOLUTION INTRAVENOUS CONTINUOUS
Status: DISCONTINUED | OUTPATIENT
Start: 2023-03-16 | End: 2023-03-17 | Stop reason: HOSPADM

## 2023-03-16 RX ORDER — VALPROIC ACID 250 MG/5ML
1000 SOLUTION ORAL 2 TIMES DAILY
Status: DISCONTINUED | OUTPATIENT
Start: 2023-03-16 | End: 2023-03-17 | Stop reason: HOSPADM

## 2023-03-16 RX ORDER — ACETAMINOPHEN 325 MG/1
650 TABLET ORAL EVERY 4 HOURS PRN
Status: DISCONTINUED | OUTPATIENT
Start: 2023-03-16 | End: 2023-03-16 | Stop reason: SDUPTHER

## 2023-03-16 RX ORDER — DOCUSATE SODIUM 50 MG/5ML
100 LIQUID ORAL DAILY
Status: DISCONTINUED | OUTPATIENT
Start: 2023-03-16 | End: 2023-03-17 | Stop reason: HOSPADM

## 2023-03-16 RX ORDER — ENOXAPARIN SODIUM 100 MG/ML
30 INJECTION SUBCUTANEOUS DAILY
Status: DISCONTINUED | OUTPATIENT
Start: 2023-03-16 | End: 2023-03-17 | Stop reason: HOSPADM

## 2023-03-16 RX ORDER — ONDANSETRON 2 MG/ML
4 INJECTION INTRAMUSCULAR; INTRAVENOUS EVERY 6 HOURS PRN
Status: DISCONTINUED | OUTPATIENT
Start: 2023-03-16 | End: 2023-03-17 | Stop reason: HOSPADM

## 2023-03-16 RX ORDER — ACETAMINOPHEN 325 MG/1
650 TABLET ORAL EVERY 6 HOURS PRN
Status: DISCONTINUED | OUTPATIENT
Start: 2023-03-16 | End: 2023-03-17 | Stop reason: HOSPADM

## 2023-03-16 RX ORDER — SODIUM CHLORIDE 0.9 % (FLUSH) 0.9 %
10 SYRINGE (ML) INJECTION PRN
Status: DISCONTINUED | OUTPATIENT
Start: 2023-03-16 | End: 2023-03-17 | Stop reason: HOSPADM

## 2023-03-16 RX ORDER — ACETAMINOPHEN 650 MG/1
650 SUPPOSITORY RECTAL EVERY 6 HOURS PRN
Status: DISCONTINUED | OUTPATIENT
Start: 2023-03-16 | End: 2023-03-17 | Stop reason: HOSPADM

## 2023-03-16 RX ORDER — ONDANSETRON 4 MG/1
4 TABLET, ORALLY DISINTEGRATING ORAL EVERY 8 HOURS PRN
Status: DISCONTINUED | OUTPATIENT
Start: 2023-03-16 | End: 2023-03-17 | Stop reason: HOSPADM

## 2023-03-16 RX ORDER — POLYETHYLENE GLYCOL 3350 17 G/17G
17 POWDER, FOR SOLUTION ORAL DAILY
Status: DISCONTINUED | OUTPATIENT
Start: 2023-03-16 | End: 2023-03-17 | Stop reason: HOSPADM

## 2023-03-16 RX ORDER — SODIUM CHLORIDE 0.9 % (FLUSH) 0.9 %
5-40 SYRINGE (ML) INJECTION PRN
Status: DISCONTINUED | OUTPATIENT
Start: 2023-03-16 | End: 2023-03-17 | Stop reason: HOSPADM

## 2023-03-16 RX ADMIN — DEXTROSE MONOHYDRATE 125 ML: 10 INJECTION, SOLUTION INTRAVENOUS at 10:29

## 2023-03-16 RX ADMIN — MIDODRINE HYDROCHLORIDE 10 MG: 5 TABLET ORAL at 10:13

## 2023-03-16 RX ADMIN — MONTELUKAST 10 MG: 10 TABLET, FILM COATED ORAL at 21:57

## 2023-03-16 RX ADMIN — SODIUM CHLORIDE, PRESERVATIVE FREE 10 ML: 5 INJECTION INTRAVENOUS at 10:14

## 2023-03-16 RX ADMIN — AMPICILLIN SODIUM AND SULBACTAM SODIUM 3000 MG: 2; 1 INJECTION, POWDER, FOR SOLUTION INTRAMUSCULAR; INTRAVENOUS at 18:11

## 2023-03-16 RX ADMIN — AMPICILLIN SODIUM AND SULBACTAM SODIUM 3000 MG: 2; 1 INJECTION, POWDER, FOR SOLUTION INTRAMUSCULAR; INTRAVENOUS at 06:53

## 2023-03-16 RX ADMIN — VALPROIC ACID 1000 MG: 250 SOLUTION ORAL at 21:57

## 2023-03-16 RX ADMIN — ENOXAPARIN SODIUM 30 MG: 100 INJECTION SUBCUTANEOUS at 06:46

## 2023-03-16 RX ADMIN — DEXTROSE MONOHYDRATE 125 ML: 10 INJECTION, SOLUTION INTRAVENOUS at 17:35

## 2023-03-16 RX ADMIN — PAROXETINE HYDROCHLORIDE 10 MG: 10 TABLET, FILM COATED ORAL at 21:57

## 2023-03-16 RX ADMIN — PETROLATUM: 420 OINTMENT TOPICAL at 05:50

## 2023-03-16 RX ADMIN — ENOXAPARIN SODIUM 30 MG: 100 INJECTION SUBCUTANEOUS at 17:35

## 2023-03-16 RX ADMIN — LEVOTHYROXINE SODIUM 150 MCG: 0.1 TABLET ORAL at 10:13

## 2023-03-16 RX ADMIN — PAROXETINE HYDROCHLORIDE 10 MG: 10 TABLET, FILM COATED ORAL at 10:14

## 2023-03-16 RX ADMIN — POLYETHYLENE GLYCOL 3350 17 G: 17 POWDER, FOR SOLUTION ORAL at 10:12

## 2023-03-16 RX ADMIN — SODIUM CHLORIDE, PRESERVATIVE FREE 10 ML: 5 INJECTION INTRAVENOUS at 21:58

## 2023-03-16 RX ADMIN — VALPROIC ACID 1000 MG: 250 SOLUTION ORAL at 10:11

## 2023-03-16 RX ADMIN — DOCUSATE SODIUM 100 MG: 50 LIQUID ORAL at 10:12

## 2023-03-16 RX ADMIN — AMPICILLIN SODIUM AND SULBACTAM SODIUM 3000 MG: 2; 1 INJECTION, POWDER, FOR SOLUTION INTRAMUSCULAR; INTRAVENOUS at 12:47

## 2023-03-16 RX ADMIN — AMPICILLIN SODIUM AND SULBACTAM SODIUM 3000 MG: 2; 1 INJECTION, POWDER, FOR SOLUTION INTRAMUSCULAR; INTRAVENOUS at 00:27

## 2023-03-16 RX ADMIN — PANTOPRAZOLE SODIUM 40 MG: 40 INJECTION, POWDER, FOR SOLUTION INTRAVENOUS at 10:14

## 2023-03-16 RX ADMIN — SODIUM CHLORIDE: 9 INJECTION, SOLUTION INTRAVENOUS at 10:52

## 2023-03-16 ASSESSMENT — PAIN SCALES - GENERAL
PAINLEVEL_OUTOF10: 0
PAINLEVEL_OUTOF10: 0

## 2023-03-16 NOTE — PROGRESS NOTES
Patient admitted after midnight by my colleague as a transfer for concern for loose tooth, choking hazard, and dental infection. Dental infection, loose tooth, concern for choking/aspiration- consult oral surgery. On unasyn. Tube feeds on hold for now. Severe MRDD/dementia  Hypernatremia- resolved at prior OSH  Malpositioned PEG- resolved at prior OSH  Hypertension  Leukocytosis- likely due to dental infection. CXR 3/10 no acute process  Hypothyroidism- synthroid  History of dysphagia s/p PEG  History of hiatal hernia  Constipation  Hypomagnesemia- resolved at OSH  Hyperkalemia- resolved at OSH  BEENA- IV fluids.    Protein calorie malnutrition     Electronically signed by Tenna Apgar, DO on 3/16/2023 at 11:20 AM

## 2023-03-16 NOTE — PROGRESS NOTES
Access center called with transportation information.  [facility], ETA 4 hours    Electronically signed by Divine Todd RN on 3/15/2023 at 9:18 PM

## 2023-03-16 NOTE — H&P
Hospitalist History & Physical      PCP: Damien Carias DO    Date of Service: Pt seen/examined on 3/16/2023     Chief Complaint:  had no chief complaint listed for this encounter. History Of Present Illness:    Ms. Leonidas Tuttle, a 70y.o. year old female  who  has a past medical history of Acute kidney injury ([address] Blind in both eyes, Essential hypertension, Gastroesophageal reflux disease without esophagitis, Hemodialysis patient ([address] Hyperthyroidism, MR (mental retardation), Osteoarthritis, Peripheral vascular disease ([address] Pneumonia, Pneumonia due to infectious organism, and Sepsis ([address] Patient transferred from [facility] to [facility] due to concern of loose tooth, choking hazard, dental infection. Patient needs to be seen by oral surgery. Currently on Unasyn. Oratory studies show a WBC of 16,000. Patient had been originally admitted for severe hyponatremia, acute encephalopathy, malpositioned PEG tube. Currently at baseline. Vital signs within normal limits and stable.       Past Medical History:   Diagnosis Date    Acute kidney injury ([address] 01/15/2017    d/t Vancomycin, on Dialysis [facility] right chest    Blind in both eyes     Essential hypertension 4/2/2021    Gastroesophageal reflux disease without esophagitis 4/2/2021    Hemodialysis patient [facility])     Hyperthyroidism     MR (mental retardation)     Osteoarthritis     Peripheral vascular disease ([address]     Pneumonia 01/06/2017    Pneumonia due to infectious organism 1/8/2017    Sepsis ([address] 3/4/2021       Past Surgical History:   Procedure Laterality Date    BRONCHOSCOPY  02/10/2017    CHEST TUBE INSERTION Right 02/09/2017    GASTROSTOMY TUBE PLACEMENT N/A 3/7/2021    EGD PEG TUBE PLACEMENT performed by Lili Hoyt MD at [place] [address] Right 01/17/2017    tessio insertion     OTHER SURGICAL HISTORY  01/25/2017    PEG tube insertion       Prior to Admission medications Medication Sig Start Date End Date Taking? Authorizing Provider   bisacodyl (DULCOLAX) 10 MG suppository Place 10 mg rectally daily    Historical Provider, MD   Dextromethorphan-guaiFENesin (MUCUS-DM MAX PO) by PEG Tube route    Historical Provider, MD   ondansetron (ZOFRAN-ODT) 8 MG TBDP disintegrating tablet Place 4 mg under the tongue every 8 hours as needed for Nausea or Vomiting    Historical Provider, MD   triamcinolone (KENALOG) 0.1 % lotion Apply topically 2 times daily Indications: Abnormal Dryness of Skin Apply topically 2 times daily. To BUE/BLE    Historical Provider, MD   blood glucose monitor kit and supplies Dispense one Leo Matrix monitor and one lancet device. Patient tests blood glucose when nauseous, vomiting, or when tube feeding is held due to hypoglycemia risk. 12/1/22   Rebecca York DO   Lancets MISC 1 each by Does not apply route daily Pt is in need of Leo Matrix blood glucose lancets. Patient tests bood glucose when nauseous, vomiting, or when tube feeding are held due to hypoglycemia risk. 12/1/22   Rebecca York DO   blood glucose monitor strips Leo Matrix testing strips. Patient tests bood glucose when nauseous, vomiting, or when tube feeding are held due to hypoglycemia risk. 12/1/22   Rebecca York DO   blood glucose monitor kit and supplies Dispense sufficient amount for once daily testing frequency with ONE TOUCH glucometer. Dispense all needed supplies to include: monitor, strips, lancing device, lancets, control solutions, alcohol swabs. Patient tests BG when nauseous, vomiting, or when tube feeds are held due to hypoglycemia risk. 11/23/22   Rebecca York DO   Bacitracin-Polymyxin B (POLY BACITRACIN) [id number] UNIT/GM OINT APPLY TOPICALLY TO OSTOMY TWICE DAILY.  11/22/22   Rebecca York DO   folic acid (FOLVITE) 1 MG tablet TAKE 1 TABLET VIA G-TUBE DAILY 11/17/22   Rebecca Yokr DO   levothyroxine (SYNTHROID) 150 MCG tablet [email or web address] 1 TABLET VIA G-TUBE ONCE DAILY 11/17/22   Rebecca York DO   polyethylene glycol (GLYCOLAX) 17 g packet 17 g by Per G Tube route daily as needed 10/17/22   Rebecca York DO   famotidine (PEPCID) 20 MG tablet TAKE (1) TABLET VIA PEG TUBE TWO TIMES DAILY. 6/30/22 3/3/23  Rebecca York DO   montelukast (SINGULAIR) 10 MG tablet TAKE 1 TABLET VIA G-TUBE AT BEDTIME. 6/30/22   Rebecca York DO   PARoxetine (PAXIL) 10 MG tablet TAKE 1 TABLET VIA G-TUBE TWICE A DAY 6/30/22   Rebecca York DO   bumetanide (BUMEX) 1 MG tablet TAKE 1 TABLET VIA G-TUBE DAILY AS NEEDED FOR SWELLING. 5/18/22   Rebecca York DO   midodrine (PROAMATINE) 2.5 MG tablet TAKE 3 TABLET VIA G-TUBE THREE TIMES DAILY AS NEEDED. (HYPOTENSION, GIVE ONLY IF SYSTOLIC BP IS <888. (BP CHECKS 3 TIMES A DAY. 5/18/22   Rebecca York DO   NATURAL VITAMIN D-3 125 MCG (5000 UT) TABS tablet TAKE 1 TABLET VIA G-TUBE ONCE DAILY 5/4/22   Izabela Dove DO   FEROSUL 325 (65 Fe) MG tablet TAKE 1 TABLET VIA G-TUBE ONCE A DAY. Patient taking differently: 220 mg by Per G Tube route daily (with breakfast) 3/11/22   Rebecca York DO   Disposable Gloves (POWDER FREE NITRILE GLOVES MED) MISC As Directed 11/4/21   Rebecca York DO   albuterol (PROVENTIL) (2.5 MG/3ML) 0.083% nebulizer solution Take 3 mLs by nebulization every 6 hours 9/29/21   Ross Granado MD   Respiratory Therapy Supplies (FULL KIT NEBULIZER SET) MISC Use as directed with nebulized medication.  9/29/21   Humberto Schneider MD   acetaminophen (TYLENOL) 325 MG tablet 650 mg by Per G Tube route every 4 hours as needed for Pain or Fever    Historical Provider, MD   medicated lip balm (BLISTEX/CARMEX) 2-2.5-6.6 % STCK Apply topically as needed for Dry Lips (CRACKED LIPS)    Historical Provider, MD   SUNSCREENS EX Apply topically as needed (SUNBURN PREVENTION) Indications: *SPF 48*    Historical Provider, MD Neomycin-Bacitracin-Polymyxin (TRIPLE ANTIBIOTIC) OINT Apply topically 2 times daily as needed (CUTS/SCRAPES/SKIN ABRASIONS)    Historical Provider, MD   OLANZapine (ZYPREXA) 20 MG tablet by Per G Tube route in the morning and at bedtime    Historical Provider, MD   NATURAL VITAMIN D-3 125 MCG (5000 UT) TABS tablet TAKE 1 TABLET VIA G-TUBE ONCE DAILY 5/16/21   Gillermina Och, APRN - CNP   valproic acid (DEPACON) 250 MG/5ML SOLN oral solution 20 mLs by Per G Tube route 2 times daily 3/16/21   Gee Ip, DO         Allergies:  Patient has no known allergies. Social History:    TOBACCO:   reports that she has never smoked. She has never used smokeless tobacco.  ETOH:   reports no history of alcohol use. Family History:    Reviewed in detail and negative for DM, CAD, Cancer, CVA. Positive as follows\"  No family history on file. REVIEW OF SYSTEMS:   Pertinent positives as noted in the HPI. All other systems reviewed and negative. PHYSICAL EXAM:  There were no vitals taken for this visit. General appearance: No apparent distress  HEENT: Normal cephalic, atraumatic without obvious deformity. Pupils equal, round, and reactive to light. Extra ocular muscles intact. Conjunctivae/corneas clear. Neck: Supple, with full range of motion. No jugular venous distention. Trachea midline. Respiratory: CTA  Cardiovascular: RRR  Abdomen: S/NT/ND  Musculoskeletal: No clubbing, cyanosis, edema of bilateral lower extremities. Brisk capillary refill. Skin: Normal skin color. No rashes or lesions. Neurologic:  Neurovascularly intact without any focal sensory/motor deficits.  Cranial nerves: II-XII intact, grossly non-focal.      CBC:   Recent Labs     03/14/23  0400 03/15/23  1100   WBC 17.2* 16.7*   RBC 3.00* 2.49*   HGB 9.5* 8.0*   HCT 30.5* 25.0*   .7* 100.4*   RDW 16.7* 16.6*    309     BMP:   Recent Labs     03/14/23  0400 03/15/23  0630    133   K 3.9 4.0    103   CO2 24 22   BUN 17 20   CREATININE 1.0 1.2*   MG 2.1 2.4   PHOS 3.4 3.3     LFT:  Recent Labs     03/14/23  0400 03/15/23  0630   PROT 6.9 6.5   ALKPHOS 67 65   ALT 7 5   AST 10 10   BILITOT 0.2 <0.2     CE:  No results for input(s): Florentino Nena in the last 72 hours. PT/INR: No results for input(s): INR, APTT in the last 72 hours. BNP: No results for input(s): BNP in the last 72 hours. ESR: No results found for: SEDRATE  CRP:   Lab Results   Component Value Date    CRP 11.5 (H) 01/25/2017     D Dimer: No results found for: DDIMER   Folate and B12:   Lab Results   Component Value Date    MPWOZYAV17 7374 (H) 03/05/2021   ,   Lab Results   Component Value Date    FOLATE 19.8 03/05/2021     Lactic Acid:   Lab Results   Component Value Date    LACTA 0.9 03/03/2023     Thyroid Studies:   Lab Results   Component Value Date    TSH 6.950 (H) 03/04/2023    S5YFPEH 11.2 (H) 03/09/2013       Oupatient labs:  Lab Results   Component Value Date    CHOL 168 11/11/2022    TRIG 57 11/11/2022    HDL 75 11/11/2022    LDLCALC 82 11/11/2022    TSH 6.950 (H) 03/04/2023    INR 1.1 03/08/2021    LABA1C 4.7 03/04/2023       Urinalysis:    Lab Results   Component Value Date/Time    NITRU Negative 03/03/2023 05:24 PM    WBCUA 0-1 01/29/2023 09:36 PM    BACTERIA NONE SEEN 01/29/2023 09:36 PM    RBCUA NONE 01/29/2023 09:36 PM    BLOODU Negative 03/03/2023 05:24 PM    SPECGRAV 1.025 03/03/2023 05:24 PM    GLUCOSEU Negative 03/03/2023 05:24 PM       Imaging:  XR ABDOMEN (KUB) (SINGLE AP VIEW)    Result Date: 3/3/2023  EXAMINATION: ONE SUPINE XRAY VIEW(S) OF THE ABDOMEN 3/3/2023 5:40 pm COMPARISON: None. HISTORY: ORDERING SYSTEM PROVIDED HISTORY: peg tube placement TECHNOLOGIST PROVIDED HISTORY: Reason for exam:->peg tube placement FINDINGS: Peg tube projects in gastric body region, appearing to be just below the diaphragm as able to be visualized, and the contrast is within the stomach. There is noted to be at least moderate sized hiatal hernia.   There is possible constipation. Findings consistent with intragastric peg 2 positioning as described above. CT Head W/O Contrast    Result Date: 3/3/2023  EXAMINATION: CT OF THE HEAD WITHOUT CONTRAST  3/3/2023 1:22 pm TECHNIQUE: CT of the head was performed without the administration of intravenous contrast. Automated exposure control, iterative reconstruction, and/or weight based adjustment of the mA/kV was utilized to reduce the radiation dose to as low as reasonably achievable. COMPARISON: None. HISTORY: ORDERING SYSTEM PROVIDED HISTORY: ams TECHNOLOGIST PROVIDED HISTORY: Reason for exam:->ams Has a \"code stroke\" or \"stroke alert\" been called? ->No Decision Support Exception - unselect if not a suspected or confirmed emergency medical condition->Emergency Medical Condition (MA) FINDINGS: BRAIN/VENTRICLES: There is no acute intracranial hemorrhage, mass effect or midline shift. No abnormal extra-axial fluid collection. The gray-white differentiation is maintained without evidence of an acute infarct. There is no evidence of hydrocephalus. The ventricles, cisterns and sulci are prominent consistent with atrophy. There is decreased attenuation within the periventricular white matter consistent with periventricular leukomalacia. ORBITS: There are chronic calcifications seen within the optic globes. Mathew Mering SINUSES: The visualized paranasal sinuses and mastoid air cells demonstrate no acute abnormality. SOFT TISSUES/SKULL:  No acute abnormality of the visualized skull or soft tissues. 1. There is no acute intracranial abnormality. Specifically, there is no intracranial hemorrhage. 2. Atrophy and periventricular leukomalacia,     XR CHEST PORTABLE    Result Date: 3/10/2023  EXAMINATION: ONE XRAY VIEW OF THE CHEST 3/10/2023 5:56 pm COMPARISON: 03/03/2023 HISTORY: ORDERING SYSTEM PROVIDED HISTORY: fever TECHNOLOGIST PROVIDED HISTORY: Reason for exam:->fever FINDINGS: The lungs are without acute focal process.   There is no effusion or pneumothorax. The cardiomediastinal silhouette is without acute process. The osseous structures are without acute process. No acute process. XR CHEST PORTABLE    Result Date: 3/3/2023  EXAMINATION: ONE XRAY VIEW OF THE CHEST 3/3/2023 4:05 pm COMPARISON: 03/03/2023 HISTORY: ORDERING SYSTEM PROVIDED HISTORY: CVC placement TECHNOLOGIST PROVIDED HISTORY: Reason for exam:->CVC placement FINDINGS: There is borderline cardiac size. Right subclavian central line is in place with tip in the right atrium. There is no pneumothorax. Lungs are free of acute infiltrate or pleural effusion. Degenerative changes of the right shoulder are noted. Nonacute chest. Right subclavian central line with tip in the right atrium without pneumothorax. XR CHEST PORTABLE    Result Date: 3/3/2023  EXAMINATION: ONE XRAY VIEW OF THE CHEST 3/3/2023 1:16 pm COMPARISON: The lungs are without acute focal process. There is no effusion or pneumothorax. The cardiomediastinal silhouette is without acute process. The osseous structures are without acute process. No acute process. HISTORY: ORDERING SYSTEM PROVIDED HISTORY: SOB TECHNOLOGIST PROVIDED HISTORY: Reason for exam:->SOB     XR ABDOMEN FOR NG/OG/NE TUBE PLACEMENT    Result Date: 3/8/2023  EXAMINATION: ONE SUPINE XRAY VIEW(S) OF THE ABDOMEN 3/8/2023 9:06 am COMPARISON: 03/04/2023 HISTORY: ORDERING SYSTEM PROVIDED HISTORY: PEG check with contrast TECHNOLOGIST PROVIDED HISTORY: Confirm course/placement of NG/OG/PE or PEG tube Reason for exam:->PEG check with contrast FINDINGS: Mild hazy opacity in the left lung base. Slight eventration of the right hemidiaphragm. Nonobstructive bowel gas pattern. Injected contrast opacifies stomach and proximal small bowel. No suspicious bone lesion or calcification. Injected contrast opacifies stomach and proximal small bowel. Nonobstructive bowel gas pattern.      XR ABDOMEN FOR NG/OG/NE TUBE PLACEMENT    Result Date: 3/4/2023  EXAMINATION: ONE SUPINE XRAY VIEW(S) OF THE ABDOMEN 3/4/2023 1:25 pm COMPARISON: None. HISTORY: ORDERING SYSTEM PROVIDED HISTORY: Confirmation of course of Gtube and location of tip of tube TECHNOLOGIST PROVIDED HISTORY: Reason for exam:->Confirmation of course of Gtube and location of tip of tube Portable? ->Yes FINDINGS: There is contrast in the colon from previous contrast administration. No evidence of ileus or mechanical obstruction. No free air. Lung bases appear clear. After injection of contrast material via the gastrostomy tube, contrast material is noted in the proximal jejunum supporting appropriate positioning of the enteric catheter. 1.  Contrast material noted in small bowel loops after injection of feeding catheter. Catheter tip is likely within bowel possibly in the distal stomach or proximal duodenum. The tip is not directly visualized. 2.  No extravasation of injected contrast material into the peritoneum. US RETROPERITONEAL COMPLETE    Result Date: 3/3/2023  EXAMINATION: RETROPERITONEAL ULTRASOUND OF THE KIDNEYS AND URINARY BLADDER 3/3/2023 COMPARISON: CT 09/30/2022, 09/19/2022 HISTORY: ORDERING SYSTEM PROVIDED HISTORY: BEENA TECHNOLOGIST PROVIDED HISTORY: Reason for exam:->BEENA What reading provider will be dictating this exam?->CRC FINDINGS: Kidneys: Only the right kidney is visualized, due to patient's condition. Apparent lesion at the upper pole is probably complex cyst such as hemorrhagic perhaps layered dependent increased echogenicity noted on image 21, and on CT is high attenuation without obvious enhancement. Is similar at about 1.8 cm on the exams. Note the exams however are limited and can be given attention on follow-up with and without contrast MRI or CT as clinically warranted or surveillance by ultrasound. Increased renal echogenicity. No hydronephrosis. Bladder: Not distended for evaluation with report of catheter.      1. Limited exam, only right kidney seen, with increased echogenicity suggesting medical disease without hydronephrosis. 2. Upper pole lesion favored to be hyperdense cyst as able to be assessed, as discussed above. ASSESSMENT:  -Dental infection  -Loose tooth concern for choking/aspiration  -Leukocytosis  -Hypertension  -Dementia      PLAN:  -Admit to medicine  -Consult oral surgery  -Unasyn  -Monitor cultures  -Continue home medications        Diet: Diet NPO  Code Status: Full Code  Surrogate decision maker confirmed with patient:   Extended Emergency Contact Information  Primary Emergency ContactCristopher Later)  Home Phone: [phone] [phone]  Mobile Phone: [phone]  Relation: Legal Guardian   needed? No  Secondary Emergency Contact: [facility] Phone: [phone]  Work Phone: [phone]  Relation: Other  Preferred language: [phone] needed? No    DVT Prophylaxis: []Lovenox []Heparin []PCD [] [address] []Encouraged ambulation  Disposition: []Med/Surg [] Intermediate [] ICU/CCU  Admit status: [] Observation [] Inpatient     +++++++++++++++++++++++++++++++++++++++++++++++++  Erma Hooper DO  +++++++++++++++++++++++++++++++++++++++++++++++++  NOTE: This report was transcribed using voice recognition software. Every effort was made to ensure accuracy; however, inadvertent computerized transcription errors may be present.

## 2023-03-16 NOTE — PROGRESS NOTES
Comprehensive Nutrition Assessment    Type and Reason for Visit:  Initial, Consult (TF order and management)    Nutrition Recommendations/Plan:   Continue NPO. TF recs provided. Regimen at goal meets 100% of estimated protein and calorie needs. Will continue to monitor. Recommend:   Standard Formula with Fiber (Jevity 1.5) @ 40 ml/hr X 23 hrs (hold 30 min before & after Synthroid): To provide: 920 ml TV 1380 kcal, 59 g pro, 699ml free water (1299 ml total free water w/ flush). Flush rec provided if needed: 100 ml q 4 hr free water flush       Malnutrition Assessment:  Malnutrition Status: Moderate malnutrition (03/16/23 1324)    Context:  Chronic Illness     Findings of the 6 clinical characteristics of malnutrition:  Energy Intake:  Mild decrease in energy intake (Comment) (d/t inconsistent EN / NPO status. Hx of recent Malpositioned PEG and replacement prior to transfer; current NPO)  Weight Loss:  Greater than 10% over 6 months (14% x 6 months)     Body Fat Loss:   (moderate per previous RD assessment 3/04) Triceps   Muscle Mass Loss:   (moderate per previous RD assessment 3/04) Temples (temporalis), Clavicles (pectoralis & deltoids), Thigh (quadraceps)  Fluid Accumulation:  No significant fluid accumulation     Strength:  Not Performed    Nutrition Assessment:    Pt. admit as transfer from [facility] d.t loose tooth w/ concern for choking hazard/aspiration and dental infection. Hx dementia, MRDD, CKD, dysphagia w/ chronic PEG; Hx of malpositioned/dislodged PEG and recent cedric button placed on 3/8. Hx of malnutrition. Pt. does meet crieteria for moderate malnutrition. Pt. is NPO and TF held at this time. Will provide TF recs and monitor.     Nutrition Related Findings:    Nonverbal, blind, no I/O data, no edema, +BS, PEG (clamped) (cedric), Wound Type: None       Current Nutrition Intake & Therapies:    Average Meal Intake: NPO  Average Supplements Intake: NPO  Diet NPO    Anthropometric Measures:  Height: 5' 5\" (165.1 cm)  Ideal Body Weight (IBW): 125 lbs (57 kg)    Admission Body Weight: 110 lb (49.9 kg) (3/16 BS)  Current Body Weight: 110 lb (49.9 kg) (3/16 BS), 88 % IBW.  Weight Source: Bed Scale  Current BMI (kg/m2): 18.3  Usual Body Weight: 128 lb 2 oz (58.1 kg) (10/2022 per EMR)  % Weight Change (Calculated): -14.1  Weight Adjustment For: No Adjustment                 BMI Categories: Underweight (BMI less than 22) age over 72    Estimated Daily Nutrient Needs:  Energy Requirements Based On: Formula  Weight Used for Energy Requirements: Current  Energy (kcal/day):   Weight Used for Protein Requirements: Current  Protein (g/day): 55-65g (1.2-1.4g/kg)  Method Used for Fluid Requirements: 1 ml/kcal  Fluid (ml/day):     Nutrition Diagnosis:   Severe malnutrition, In context of chronic illness related to cognitive or neurological impairment (Hx of MRDD; PEG dependence) as evidenced by Criteria as identified in malnutrition assessment    Nutrition Interventions:   Food and/or Nutrient Delivery: Continue NPO, Start Tube Feeding (Standard with Fiber (Jevity 1.5) @ 40 ml/hr X 23 hrs (hold 30 min before & after Synthroid) with 100 ml q 4 hr free water flush  To provide: 920 ml tv, 1380 kcal, 59 g pro, 1299 ml total free water)  Nutrition Education/Counseling: Education not indicated  Coordination of Nutrition Care: Continue to monitor while inpatient       Goals:     Goals: Initiate nutrition support, by next RD assessment       Nutrition Monitoring and Evaluation:   Behavioral-Environmental Outcomes: None Identified  Food/Nutrient Intake Outcomes: Enteral Nutrition Intake/Tolerance  Physical Signs/Symptoms Outcomes: Biochemical Data, Fluid Status or Edema, Nutrition Focused Physical Findings, Weight, Skin, GI Status    Discharge Planning:    Enteral Nutrition     Mary Jane Escalante RD  Contact: ext [phone]

## 2023-03-16 NOTE — PROGRESS NOTES
Access center called with bed assignment. Going to [facility] bed 5254. Report number [phone]. Access center stated would call back with transport information.    Electronically signed by Cedrick Rushing RN on 3/15/2023 at 8:50 PM

## 2023-03-16 NOTE — PROGRESS NOTES
[place] [facility].     Electronically signed by Chiquis Hernandez RN on 3/16/2023 at 1:10 AM

## 2023-03-16 NOTE — DISCHARGE SUMMARY
[facility] Physician Discharge Summary       Adele Meade MD  [address] [id number]    Call  Do BMP/CBC/PO4/Mg++ on Mon 3/13  and fax results [phone] with the wt and BP   Pt also will need a Renal US with contrast in 3 months to evaluate the renal cyst    CESAR [address]el[address] [facility] [address]  [phone]          Activity level: As tolerated     Dispo: Discharge and transfer to [facility] for oral surgery evaluation. Condition on discharge: Stable     Patient ID:  Satya English  [id number]  47 y.o.  1955    Admit date: 3/3/2023    Discharge date and time:  3/16/2023  10:47 AM    Admission Diagnoses: Principal Problem:    Hypernatremia  Active Problems:    BEENA (acute kidney injury) ([facility] [address]    Hypotension due to hypovolemia    Tachycardia    Metabolic encephalopathy    Nausea and vomiting    Severe protein-calorie malnutrition (HCC)    Hyperkalemia    Dental infection    PEG tube malfunction ([facility] [address]  Resolved Problems:    * No resolved hospital problems. *      Discharge Diagnoses:   Hypernatremia  Malpositioned PEG  Hypotension with history of hypertension  Acute metabolic encephalopathy  Sepsis  MRDD/dementia  Hypothyroidism  History of dysphagia s/p PEG  History of hiatal hernia  Constipation  Hypomagnesemia  Hyperkalemia  BEENA  Dental infection  Leukocytosis      Consults:  IP CONSULT TO IV TEAM  IP CONSULT TO PULMONOLOGY  IP CONSULT TO NEPHROLOGY  IP CONSULT TO SOCIAL WORK  IP CONSULT TO DIETITIAN  IP CONSULT TO GENERAL SURGERY  IP CONSULT TO IV TEAM  IP CONSULT TO ORAL SURGERY  IP CONSULT TO IV TEAM  IP CONSULT TO IV TEAM  IP CONSULT TO IV TEAM  IP CONSULT TO IV TEAM    Procedures: None    Hospital Course:   Patient Satya English is a 70 y.o. presented with Hypernatremia [E87.0] to the [facility] emergency department on 3/3/2023.     She was admitted and her hypernatremia was treated and it resolved. Her malpositioned PEG was addressed by general surgery and a Cameron button was obtained. There was concern for dental infection with persistent increasing leukocytosis. There is no oral surgery available at [facility] and decision was made to transfer the patient to [facility] for oral surgery evaluation. A significant amount of time of her hospitalization was spent awaiting a bed to open up at [facility].  She was deemed stable for discharge and transfer to [facility] on 3/16/2023. Discharge Exam:    General Appearance: alert and oriented to person, place and time and in no acute distress  Skin: warm and dry  Head: normocephalic and atraumatic  Eyes: pupils equal, round, and reactive to light, extraocular eye movements intact, conjunctivae normal  Neck: neck supple and non tender without mass   Pulmonary/Chest: clear to auscultation bilaterally- no wheezes, rales or rhonchi, normal air movement, no respiratory distress  Cardiovascular: normal rate, normal S1 and S2 and no carotid bruits  Abdomen: soft, non-tender, non-distended, normal bowel sounds, no masses or organomegaly  Extremities: no cyanosis, no clubbing and no edema  Neurologic: no cranial nerve deficit and speech normal    I/O last 3 completed shifts: In: 2999 [NG/GT:2340]  Out: 1050 [Urine:1050]  No intake/output data recorded. LABS:  Recent Labs     03/14/23  0400 03/15/23  0630    133   K 3.9 4.0    103   CO2 24 22   BUN 17 20   CREATININE 1.0 1.2*   GLUCOSE 71* 102*   CALCIUM 8.8 8.5*       Recent Labs     03/14/23  0400 03/15/23  1100   WBC 17.2* 16.7*   RBC 3.00* 2.49*   HGB 9.5* 8.0*   HCT 30.5* 25.0*   .7* 100.4*   MCH 31.7 32.1   MCHC 31.1* 32.0   RDW 16.7* 16.6*    309   MPV 10.8 11.0       No results for input(s): POCGLU in the last 72 hours.     Imaging:  No results found. Patient Instructions:      Medication List        CONTINUE taking these medications      * blood glucose monitor kit and supplies  Dispense sufficient amount for once daily testing frequency with ONE TOUCH glucometer. Dispense all needed supplies to include: monitor, strips, lancing device, lancets, control solutions, alcohol swabs. Patient tests BG when nauseous, vomiting, or when tube feeds are held due to hypoglycemia risk. * blood glucose monitor kit and supplies  Dispense one [employer] monitor and one lancet device. Patient tests blood glucose when nauseous, vomiting, or when tube feeding is held due to hypoglycemia risk. Full Kit Nebulizer Set Misc  Use as directed with nebulized medication. Lancets Misc  1 each by Does not apply route daily Pt is in need of Leo [employer] blood glucose lancets. Patient tests bood glucose when nauseous, vomiting, or when tube feeding are held due to hypoglycemia risk. Powder Free Nitrile Gloves Med Misc  As Directed           * This list has 2 medication(s) that are the same as other medications prescribed for you. Read the directions carefully, and ask your doctor or other care provider to review them with you. STOP taking these medications      pantoprazole sodium 40 MG Pack packet  Commonly known as: PROTONIX            ASK your doctor about these medications      acetaminophen 325 MG tablet  Commonly known as: TYLENOL     albuterol (2.5 MG/3ML) 0.083% nebulizer solution  Commonly known as: PROVENTIL  Take 3 mLs by nebulization every 6 hours     bisacodyl 10 MG suppository  Commonly known as: DULCOLAX     blood glucose test strips  Leo Matrix testing strips. Patient tests bood glucose when nauseous, vomiting, or when tube feeding are held due to hypoglycemia risk. bumetanide 1 MG tablet  Commonly known as: BUMEX  TAKE 1 TABLET VIA G-TUBE DAILY AS NEEDED FOR SWELLING.      famotidine 20 MG tablet  Commonly known as: PEPCID  TAKE (1) TABLET VIA PEG TUBE TWO TIMES DAILY. FeroSul 325 (65 Fe) MG tablet  Generic drug: ferrous sulfate  TAKE 1 TABLET VIA G-TUBE ONCE A DAY. folic acid 1 MG tablet  Commonly known as: FOLVITE  TAKE 1 TABLET VIA G-TUBE DAILY     levothyroxine 150 MCG tablet  Commonly known as: SYNTHROID  [email or web address] 1 TABLET VIA G-TUBE ONCE DAILY     * medicated lip balm 2-2.5-6.6 % Stck     * SUNSCREENS EX     midodrine 2.5 MG tablet  Commonly known as: PROAMATINE  TAKE 3 TABLET VIA G-TUBE THREE TIMES DAILY AS NEEDED. (HYPOTENSION, GIVE ONLY IF SYSTOLIC BP IS <775. (BP CHECKS 3 TIMES A DAY. montelukast 10 MG tablet  Commonly known as: SINGULAIR  TAKE 1 TABLET VIA G-TUBE AT BEDTIME. MUCUS-DM MAX PO     Natural Vitamin D-3 125 MCG (5000 UT) Tabs tablet  Generic drug: vitamin D3  TAKE 1 TABLET VIA G-TUBE ONCE DAILY     OLANZapine 20 MG tablet  Commonly known as: ZYPREXA     ondansetron 8 MG Tbdp disintegrating tablet  Commonly known as: ZOFRAN-ODT     PARoxetine 10 MG tablet  Commonly known as: PAXIL  TAKE 1 TABLET VIA G-TUBE TWICE A DAY     Poly Bacitracin [id number] UNIT/GM Oint  APPLY TOPICALLY TO OSTOMY TWICE DAILY. polyethylene glycol 17 g packet  Commonly known as: GLYCOLAX  17 g by Per G Tube route daily as needed     triamcinolone 0.1 % lotion  Commonly known as: KENALOG     Triple Antibiotic Oint     valproic acid 250 MG/5ML Soln oral solution  Commonly known as: DEPAKENE  20 mLs by Per G Tube route 2 times daily           * This list has 2 medication(s) that are the same as other medications prescribed for you. Read the directions carefully, and ask your doctor or other care provider to review them with you.                     Note that more than 30 minutes was spent in preparing discharge papers, discussing discharge with patient, medication review, etc.    Signed:  Electronically signed by Amelia Cordero MD on 3/16/2023 at 10:47 AM

## 2023-03-16 NOTE — DISCHARGE INSTR - COC
Continuity of Care Form    Patient Name: Javan Rosenberg   :  1955  MRN:  [id number]    Admit date:  3/16/2023  Discharge date:  2023    Code Status Order: Full Code   Advance Directives:     Admitting Physician:  Nikita Kincaid MD  PCP: Liliane Wolfe DO    Discharging Nurse: Jelena Albert RN  [address] Unit/Room#: 7644/2479-C  Discharging Unit Phone Number: [phone]    Emergency Contact:   Extended Emergency Contact Information  Primary Emergency Contact: Lesa Brooks [facility])  Home Phone: [phone] [phone]  Mobile Phone: [phone]  Relation: Legal Guardian   needed? No  Secondary Emergency Contact: [facility] Phone: [phone]  Work Phone: [phone]  Relation: Other  Preferred language: [address] needed?  No    Past Surgical History:  Past Surgical History:   Procedure Laterality Date    BRONCHOSCOPY  02/10/2017    CHEST TUBE INSERTION Right 2017    GASTROSTOMY TUBE PLACEMENT N/A 3/7/2021    EGD PEG TUBE PLACEMENT performed by Cira Villa MD at [address] [place] [address] Right 2017    tessio insertion     OTHER SURGICAL HISTORY  2017    PEG tube insertion       Immunization History:   Immunization History   Administered Date(s) Administered    Influenza Vaccine, unspecified formulation 09/10/2014    Influenza Virus Vaccine 10/21/2017    Influenza Whole 09/10/2014    Influenza, FLUAD, (age 72 y+), Adjuvanted, 0.5mL 12/10/2021    Influenza, FLUARIX, FLULAVAL, FLUZONE (age 10 mo+) AND AFLURIA, (age 1 y+), PF, 0.5mL 2016, 2017, 10/21/2017, 10/03/2018    Influenza, High Dose (Fluzone 65 yrs and older) 10/18/2019    Pneumococcal Polysaccharide ([id number]) 10/18/2019       Active Problems:  Patient Active Problem List   Diagnosis Code    Profound intellectual disability F73    Hypothyroidism E03.9    Impairment level: total impairment of both eyes H54.0X55    Hyperglycemia R73.9    Nonsustained ventricular tachycardia I47.29    Macrocytosis D75.89    Disruptive behavior disorder F91.9    Ingrowing nail L60.0    Peripheral vascular disease (HCC) I73.9    Altered mental state R41.82    Onychomycosis B35.1    Anemia due to chronic kidney disease N18.9, D63.1    Essential hypertension I10    Vitamin D deficiency E55.9    Gastroesophageal reflux disease without esophagitis K21.9    S/P percutaneous endoscopic gastrostomy (PEG) tube placement (HCC) Z93.1    Chronic kidney disease N18.9    Dementia with behavioral disturbance F03.918    PEG tube malfunction (HCC) K94.23    Severe dehydration E86.0    Hyponatremia E87.1    Intractable nausea and vomiting R11.2    Hypernatremia E87.0    BEENA (acute kidney injury) ([facility] [address] N17.9    Hypotension due to hypovolemia I95.89, E86.1    Tachycardia Q34.2    Metabolic encephalopathy P01.38    Nausea and vomiting R11.2    Severe protein-calorie malnutrition (HCC) E43    Hyperkalemia E87.5    Dental infection K04.7    Fever in adult R50.9       Isolation/Infection:   Isolation            No Isolation          Patient Infection Status       Infection Onset Added Last Indicated Last Indicated By Review Planned Expiration Resolved Resolved By    None active    Resolved    COVID-19 (Rule Out) 03/03/23 03/03/23 03/03/23 COVID-19, Rapid (Ordered)   03/03/23 Rule-Out Test Resulted    COVID-19 (Rule Out) 01/29/23 01/29/23 01/29/23 COVID-19, Rapid (Ordered)   01/29/23 Rule-Out Test Resulted    COVID-19 (Rule Out) 09/30/22 09/30/22 09/30/22 COVID-19, Rapid (Ordered)   09/30/22 Rule-Out Test Resulted    COVID-19 (Rule Out) 08/15/22 08/15/22 08/15/22 COVID-19, Rapid (Ordered)   08/15/22 Rule-Out Test Resulted    COVID-19 (Rule Out) 09/21/21 09/21/21 09/21/21 Respiratory Panel, Molecular, with COVID-19 (Restricted: peds pts or suitable admitted adults) (Ordered)   09/21/21 Rule-Out Test Resulted    COVID-19 (Rule Out) 09/21/21 09/21/21 09/21/21 COVID-19, Rapid (Ordered)   09/21/21 Rule-Out Test Resulted COVID-19 (Rule Out) 03/04/21 03/04/21 03/04/21 COVID-19, Rapid (Ordered)   03/04/21 Rule-Out Test Resulted    COVID-19 (Rule Out) 02/08/21 02/08/21 02/08/21 COVID-19 (Ordered)   02/08/21 Rule-Out Test Resulted            Nurse Assessment:  Last Vital Signs: /62   Pulse 76   Temp 97.7 °F (36.5 °C) (Temporal)   Resp 16   SpO2 97%     Last documented pain score (0-10 scale): Pain Level: 0  Last Weight:   Wt Readings from Last 1 Encounters:   03/16/23 110 lb (49.9 kg)     Mental Status:  disoriented    IV Access:  - ***    Nursing Mobility/ADLs:  Walking   Dependent  Transfer  Dependent  Bathing  Dependent  Dressing  Dependent  Toileting  Dependent  Feeding  Dependent  Med Admin  Dependent  Med Delivery    Peg tube    Wound Care Documentation and Therapy:        Elimination:  Continence: Bowel: No  Bladder: No  Urinary Catheter: None   Colostomy/Ileostomy/Ileal Conduit: No       Date of Last BM: ***  No intake or output data in the 24 hours ending 03/16/23 1054  No intake/output data recorded. Safety Concerns: At Risk for Falls    Impairments/Disabilities:      Vision and Contractures - leg/arms    Nutrition Therapy:  Current Nutrition Therapy:   - Oral Diet:  ***  - Tube Feedings:  {[facility] Tube Feedings:[id number]}    Routes of Feeding: Gastrostomy Tube  Liquids: ***  Daily Fluid Restriction: no  Last Modified Barium Swallow with Video (Video Swallowing Test): {Done Not Done BTCL:130152760}    Treatments at the Time of Hospital Discharge:   Respiratory Treatments: ***  Oxygen Therapy:  is not on home oxygen therapy.   Ventilator:    - No ventilator support    Rehab Therapies: Physical Therapy and Occupational Therapy  Weight Bearing Status/Restrictions: No weight bearing restrictions  Other Medical Equipment (for information only, NOT a DME order):  hospital bed  Other Treatments: ***    Patient's personal belongings (please select all that are sent with patient):  None    RN SIGNATURE: Electronically signed by Shante Hercules RN on 3/17/23 at 4:02 PM EDT    CASE MANAGEMENT/SOCIAL WORK SECTION    Inpatient Status Date: ***    Readmission Risk Assessment Score:  Readmission Risk              Risk of Unplanned Readmission:  0           Discharging to Facility/ Agency   Name: Linda Schafer   Address:  Phone:  Fax:    Dialysis Facility (if applicable)   Name:  Address:  Dialysis Schedule:  Phone:  Fax:    / signature: Electronically signed by Gela Vega RN CM DISCHARGE PLANNER  on 3/16/2023 at 10:54 AM     PHYSICIAN SECTION    Prognosis: Fair    Condition at Discharge: Stable    Rehab Potential (if transferring to Rehab): Fair    Recommended Labs or Other Treatments After Discharge: Follow up with PCP within 7 days. Follow up with nephrology. Follow BMP and CBC every other day. COntinue tube feeds and free water flushes. Do BMP/CBC/PO4/Mg++ on   3/19  and fax results [phone] with the wt and BP   Pt also will need a Renal US with contrast in 3 months to evaluate the renal cyst    Physician Certification: I certify the above information and transfer of Fuentes Al  is necessary for the continuing treatment of the diagnosis listed and that she requires [facility] for greater 30 days.      Update Admission H&P: No change in H&P    PHYSICIAN SIGNATURE:  Electronically signed by Chang Bhatt DO on 3/17/23 at 9:24 AM EDT

## 2023-03-16 NOTE — CARE COORDINATION
03/16/23 Transition of Care: patient is a transfer from [facility] due to \" a loose tooth\" and risk for choking if tooth falls out. She is pending oral surgery consult. She is on iv unasyn q6. She does have a peg tube. She is MR/Blind/peg tube. She has a legal guardian, Jermaine Ordoñez. She is from the [address] [place] [address] located on [facility] in [place]. She is being followed by Karl Lopez at [facility] and she will not need therapy evaluations, she will not need precert. 7000 will need to be completed when discharge order is placed/ambulance/rojelio/destination completed and Envelope placed in soft chart. Electronically signed by Mahnaz Hansen RN CM on 3/16/2023 at 10:54 AM    Case Management Assessment  Initial Evaluation    Date/Time of Evaluation: 3/16/2023 10:57 AM  Assessment Completed by: Mahnaz Hansen RN    If patient is discharged prior to next notation, then this note serves as note for discharge by case management. Patient Name: Felisha Abraham                   YOB: 1955  Diagnosis: Fever in adult [R50.9]                   Date / Time: 3/16/2023  3:09 AM    Patient Admission Status: Observation   Readmission Risk (Low < 19, Mod (19-27), High > 27): Readmission Risk Score: 21.2    Current PCP: Joan Dalton, DO  PCP verified by CM? No (UNABLE)    Chart Reviewed: Yes      History Provided by: Medical Record  Patient Orientation: Unresponsive, Unable to Assess    Patient Cognition: Dementia / Early Alzheimer's    Hospitalization in the last 30 days (Readmission):  No    If yes, Readmission Assessment in  Navigator will be completed.     Advance Directives:      Code Status: Full Code   Patient's Primary Decision Maker is: Named in [address] [place] [address]    Primary Decision Maker: Latanya Canada (Apsi) - Legal Guardian, [address] [place] [address] [phone]    Supplemental (Other) Decision Maker: Bernadene Bloch - [facility] - [phone]    Discharge Planning:    Patient lives with: Type of Home:    Primary Care Giver: Other (Comment) (GROUP HOME)  Patient Support Systems include: Other (Comment) (GROUP HOME)   Current Financial resources:    Current community resources:    Current services prior to admission:              Current DME:              Type of Home Care services:       ADLS  Prior functional level: Assistance with the following:, Bathing, Dressing, Toileting, Feeding, Cooking, Shopping, Mobility, Housework  Current functional level: Assistance with the following:, Bathing, Dressing, Toileting, Feeding, Cooking, Housework, Shopping, Mobility    PT AM-PAC:   /  OT AM-PAC:       Family can provide assistance at DC: Other (comment) (SHE HAS A COURT APPOINTED GUARDIAN)  Would you like Case Management to discuss the discharge plan with any other family members/significant others, and if so, who?  No  Plans to Return to Present Housing: No  Other Identified Issues/Barriers to RETURNING to current housin [address] needed at discharge:              Potential DME:    Patient expects to discharge to:    Plan for transportation at discharge:      Financial    Payor: Klaus Flannery / Plan: MEDICARE PART A AND B / Product Type: *No Product type* /     Does insurance require precert for SNF: Yes    Potential assistance Purchasing Medications:    Meds-to-Beds request: Yes      [place], [place] - [address] [place] [address]  Phone: [phone] Fax: [address], [facility] [phone] Ashippun Carry [phone]  [address] [place] [address]  Phone: [phone] Fax: [phone]      Notes:    Factors facilitating achievement of predicted outcomes: NONE    Barriers to discharge: REQUIRES FULL 24HR CARE     Additional Case Management Notes: SEE NOTES    The Plan for Transition of Care is related to the following treatment goals of Fever in adult [G97.0]    IF APPLICABLE: The Patient and/or patient representative Gali Whatley and her family were provided with a choice of provider and agrees with the discharge plan. Freedom of choice list with basic dialogue that supports the patient's individualized plan of care/goals and shares the quality data associated with the providers was provided to:     Patient Representative Name:  Heide Abraham    The Patient and/or Patient Representative Agree with the Discharge Plan?   YES     Ra Peralta RN  Case Management Department  Ph: [phone] Fax: [phone]

## 2023-03-16 NOTE — LETTER
[place] Department Medicaid  CERTIFICATION OF NECESSITY  FOR NON-EMERGENCY TRANSPORTATION   BY GROUND AMBULANCE      Individual Information   1. Name: Aminah Schneider 2. [place] Medicaid Billing Number:   3. Address: [address] [place] [address]      Transportation Provider Information   4. Provider Name:    5. [place] Medicaid Provider Number:  National Provider Identifier (NPI):      Certification  7. Criteria:  During transport, this individual requires:  [x] Medical treatment or continuous     supervision by an EMT. [] The administration or regulation of oxygen by another person. [] Supervised protective restraint. 8. Period Beginning Date: 3/17/23   9. Length  [x] Not more than 1  day(s)  [] One Year     Additional Information Relevant to Certification   10. Comments or Explanations, If Necessary or Appropriate   Safety issue due to developmental disability, blind in both eyes     Certifying Practitioner Information   11. Name of Practitioner: Dr. Kerri Abreu   12. [place] Medicaid Provider Number, If Applicable:  [address] Provider Identifier (NPI):      Signature Information   14. Date of Signature: 3/17/23 15. Name of Employee Benefit Plans   16.  Signature and Professional Designation: Electronically signed by AILYN Mason on 3/17/2023 at 11:14 AM       ODM 22188  Rev. 7/2015

## 2023-03-16 NOTE — CONSULTS
Dental Consult Note    Reason for Consult:  Loose tooth that is an aspiration risk. Requesting Physician:  George Levin DO    No chief complaint on file. HISTORY OF PRESENT ILLNESS:    [unfilled] female who presents with #12 loose tooth that is an aspiration risk.     Past Medical History:   Diagnosis Date    Acute kidney injury ([facility] 01/15/2017    d/t Vancomycin, on Dialysis [facility] right chest    Blind in both eyes     Essential hypertension 4/2/2021    Gastroesophageal reflux disease without esophagitis 4/2/2021    Hemodialysis patient [facility])     Hyperthyroidism     MR (mental retardation)     Osteoarthritis     Peripheral vascular disease ([facility]     Pneumonia 01/06/2017    Pneumonia due to infectious organism 1/8/2017    Sepsis ([facility] 3/4/2021       Past Surgical History:   Procedure Laterality Date    BRONCHOSCOPY  02/10/2017    CHEST TUBE INSERTION Right 02/09/2017    GASTROSTOMY TUBE PLACEMENT N/A 3/7/2021    EGD PEG TUBE PLACEMENT performed by Dotty Alegria MD at [address] [place] [address] Right 01/17/2017    tessio insertion     OTHER SURGICAL HISTORY  01/25/2017    PEG tube insertion       Scheduled Meds:   PARoxetine  10 mg Oral BID    montelukast  10 mg PEG Tube Nightly    valproic acid  1,000 mg Per G Tube BID    sodium chloride flush  5-40 mL IntraVENous 2 times per day    pantoprazole  40 mg IntraVENous Daily    [Held by provider] midodrine  10 mg PEG Tube TID WC    docusate sodium  100 mg Oral Daily    polyethylene glycol  17 g Per G Tube Daily    levothyroxine  150 mcg PEG Tube Daily    insulin lispro  0-4 Units SubCUTAneous TID     insulin lispro  0-4 Units SubCUTAneous Nightly    enoxaparin  30 mg SubCUTAneous Daily    ampicillin-sulbactam  3,000 mg IntraVENous Q6H     Continuous Infusions:   sodium chloride      dextrose      sodium chloride 75 mL/hr at 03/16/23 1052     PRN Meds:sodium chloride flush, sodium chloride flush, sodium chloride, ondansetron **OR** ondansetron, acetaminophen **OR** acetaminophen, bisacodyl, glucose, dextrose bolus **OR** dextrose bolus, glucagon (rDNA), dextrose, white petrolatum    No Known Allergies    Social History     Tobacco Use    Smoking status: Never    Smokeless tobacco: Never   Vaping Use    Vaping Use: Never used   Substance Use Topics    Alcohol use: No    Drug use: No       Review Of Systems:   Reviewed most recent documented ROS       Physical Exam:  Vitals:    03/16/23 0324 03/16/23 0953 03/16/23 1307   BP: (!) 159/56 120/62    Pulse: 71 76    Resp: 16 16    Temp: 98 °F (36.7 °C) 97.7 °F (36.5 °C)    TempSrc: Temporal Temporal    SpO2: 94% 97%    Height:   5' 5\" (1.651 m)         Oral Exam:  Hygiene: mucous membranes moist, pharynx normal without lesions and dental hygiene poor  Dentition: poor  Teeth: caries: N/A  Retained roots N/A  Impactions tooth # NA  Gingiva: inflamed  Tongue: tongue midline, papillated  Floor of mouth: normal  Alveolar Process: normal  Salivary Glands: normal    Current results reviewed - #12 grade 3 mobility 70% of root exposed.      Assessment/Plan:  Patient Active Problem List   Diagnosis    Profound intellectual disability    Hypothyroidism    Impairment level: total impairment of both eyes    Hyperglycemia    Nonsustained ventricular tachycardia    Macrocytosis    Disruptive behavior disorder    Ingrowing nail    Peripheral vascular disease (HCC)    Altered mental state    Onychomycosis    Anemia due to chronic kidney disease    Essential hypertension    Vitamin D deficiency    Gastroesophageal reflux disease without esophagitis    S/P percutaneous endoscopic gastrostomy (PEG) tube placement (HCC)    Chronic kidney disease    Dementia with behavioral disturbance    PEG tube malfunction (HCC)    Severe dehydration    Hyponatremia    Intractable nausea and vomiting    Hypernatremia    BEENA (acute kidney injury) ([facility]    Hypotension due to hypovolemia    Tachycardia    Metabolic encephalopathy    Nausea and vomiting    Severe protein-calorie malnutrition (HCC)    Hyperkalemia    Dental infection    Fever in adult    Dental abscess    Moderate protein-calorie malnutrition ([facility] [address]       Clinical exam of patient conducted. Due to patients capacity no radiographs were taken. #12 grade 3 mobility. Consent obtained from [facility](Beth Barraza). Anesthesia achieved with 0.5ml of 4% septocaine with 1:100k epi. #12 extracted. Patient to return to floor. No changes recommended for diet, or medications.        Electronically signed by Dion Schreiber DMD  on 3/16/2023 at 2:17 PM

## 2023-03-17 LAB
ALBUMIN SERPL-MCNC: 1.4 G/DL (ref 3.5–5.2)
ALP SERPL-CCNC: 58 U/L (ref 35–104)
ALT SERPL-CCNC: 5 U/L (ref 0–32)
ANION GAP SERPL CALCULATED.3IONS-SCNC: 12 MMOL/L (ref 7–16)
AST SERPL-CCNC: 13 U/L (ref 0–31)
BILIRUB SERPL-MCNC: <0.2 MG/DL (ref 0–1.2)
BUN SERPL-MCNC: 20 MG/DL (ref 6–23)
CALCIUM SERPL-MCNC: 8.2 MG/DL (ref 8.6–10.2)
CHLORIDE SERPL-SCNC: 110 MMOL/L (ref 98–107)
CO2 SERPL-SCNC: 18 MMOL/L (ref 22–29)
CORTIS SERPL-MCNC: 10.84 MCG/DL (ref 2.68–18.4)
CREAT SERPL-MCNC: 1.3 MG/DL (ref 0.5–1)
GLUCOSE SERPL-MCNC: 161 MG/DL (ref 74–99)
MAGNESIUM SERPL-MCNC: 2.2 MG/DL (ref 1.6–2.6)
METER GLUCOSE: 151 MG/DL (ref 74–99)
METER GLUCOSE: 89 MG/DL (ref 74–99)
PHOSPHATE SERPL-MCNC: 3.5 MG/DL (ref 2.5–4.5)
POTASSIUM SERPL-SCNC: 4 MMOL/L (ref 3.5–5)
PROT SERPL-MCNC: 5.9 G/DL (ref 6.4–8.3)
SARS-COV-2 RDRP RESP QL NAA+PROBE: NOT DETECTED
SODIUM SERPL-SCNC: 140 MMOL/L (ref 132–146)
TROPONIN, HIGH SENSITIVITY: 21 NG/L (ref 0–9)

## 2023-03-17 PROCEDURE — 6370000000 HC RX 637 (ALT 250 FOR IP): Performed by: PHYSICIAN ASSISTANT

## 2023-03-17 PROCEDURE — 84100 ASSAY OF PHOSPHORUS: CPT

## 2023-03-17 PROCEDURE — 2580000003 HC RX 258: Performed by: PHYSICIAN ASSISTANT

## 2023-03-17 PROCEDURE — 82962 GLUCOSE BLOOD TEST: CPT

## 2023-03-17 PROCEDURE — 84484 ASSAY OF TROPONIN QUANT: CPT

## 2023-03-17 PROCEDURE — 6370000000 HC RX 637 (ALT 250 FOR IP): Performed by: INTERNAL MEDICINE

## 2023-03-17 PROCEDURE — 83735 ASSAY OF MAGNESIUM: CPT

## 2023-03-17 PROCEDURE — 2580000003 HC RX 258: Performed by: INTERNAL MEDICINE

## 2023-03-17 PROCEDURE — 36415 COLL VENOUS BLD VENIPUNCTURE: CPT

## 2023-03-17 PROCEDURE — 87635 SARS-COV-2 COVID-19 AMP PRB: CPT

## 2023-03-17 PROCEDURE — 6360000002 HC RX W HCPCS: Performed by: PHYSICIAN ASSISTANT

## 2023-03-17 PROCEDURE — C9113 INJ PANTOPRAZOLE SODIUM, VIA: HCPCS | Performed by: PHYSICIAN ASSISTANT

## 2023-03-17 PROCEDURE — 82533 TOTAL CORTISOL: CPT

## 2023-03-17 PROCEDURE — 80053 COMPREHEN METABOLIC PANEL: CPT

## 2023-03-17 RX ORDER — DOCUSATE SODIUM 50 MG/5ML
100 LIQUID ORAL DAILY
Qty: 300 ML | Refills: 0 | DISCHARGE
Start: 2023-03-17

## 2023-03-17 RX ORDER — AMOXICILLIN AND CLAVULANATE POTASSIUM 875; 125 MG/1; MG/1
1 TABLET, FILM COATED ORAL 2 TIMES DAILY
Qty: 20 TABLET | Refills: 0 | DISCHARGE
Start: 2023-03-17 | End: 2023-03-27

## 2023-03-17 RX ADMIN — VALPROIC ACID 1000 MG: 250 SOLUTION ORAL at 11:03

## 2023-03-17 RX ADMIN — PETROLATUM: 420 OINTMENT TOPICAL at 11:30

## 2023-03-17 RX ADMIN — AMPICILLIN SODIUM AND SULBACTAM SODIUM 3000 MG: 2; 1 INJECTION, POWDER, FOR SOLUTION INTRAMUSCULAR; INTRAVENOUS at 11:31

## 2023-03-17 RX ADMIN — SODIUM CHLORIDE, PRESERVATIVE FREE 10 ML: 5 INJECTION INTRAVENOUS at 11:07

## 2023-03-17 RX ADMIN — PANTOPRAZOLE SODIUM 40 MG: 40 INJECTION, POWDER, FOR SOLUTION INTRAVENOUS at 11:07

## 2023-03-17 RX ADMIN — POLYETHYLENE GLYCOL 3350 17 G: 17 POWDER, FOR SOLUTION ORAL at 11:07

## 2023-03-17 RX ADMIN — PETROLATUM: 420 OINTMENT TOPICAL at 11:18

## 2023-03-17 RX ADMIN — AMPICILLIN SODIUM AND SULBACTAM SODIUM 3000 MG: 2; 1 INJECTION, POWDER, FOR SOLUTION INTRAMUSCULAR; INTRAVENOUS at 02:02

## 2023-03-17 RX ADMIN — SODIUM CHLORIDE: 9 INJECTION, SOLUTION INTRAVENOUS at 05:52

## 2023-03-17 RX ADMIN — LEVOTHYROXINE SODIUM 150 MCG: 0.1 TABLET ORAL at 11:06

## 2023-03-17 RX ADMIN — DOCUSATE SODIUM 100 MG: 50 LIQUID ORAL at 11:07

## 2023-03-17 RX ADMIN — AMPICILLIN SODIUM AND SULBACTAM SODIUM 3000 MG: 2; 1 INJECTION, POWDER, FOR SOLUTION INTRAMUSCULAR; INTRAVENOUS at 05:52

## 2023-03-17 RX ADMIN — PAROXETINE HYDROCHLORIDE 10 MG: 10 TABLET, FILM COATED ORAL at 11:04

## 2023-03-17 ASSESSMENT — PAIN SCALES - GENERAL: PAINLEVEL_OUTOF10: 0

## 2023-03-17 NOTE — CARE COORDINATION
Social Work/Case Management Transition of Care Planning (Elva Mac [facility] [phone]): Discharge order noted. Spoke with legal guardian, Vicki Palma. She indicated the patient cannot return to the group home until staff is trained with the Kevin Harrell button. Discharge plan is to [facility]. Transport via [facility] ambulance with a  time of 3:00pm.  7000 completed. ARIES/destination completed. Discharge envelope with completed ambulance form is in the soft chart. Notified legal guardian, Anyi Segundo of [employer], and floor nurse of  time. Nurse notified of need for Covid test prior to discharge.   AILYN Duncan  3/17/2023

## 2023-03-17 NOTE — PLAN OF CARE
Problem: Safety - Adult  Goal: Free from fall injury  Outcome: Adequate for Discharge     Problem: Pain  Goal: Verbalizes/displays adequate comfort level or baseline comfort level  Outcome: Adequate for Discharge     Problem: Skin/Tissue Integrity  Goal: Absence of new skin breakdown  Description: 1. Monitor for areas of redness and/or skin breakdown  2. Assess vascular access sites hourly  3. Every 4-6 hours minimum:  Change oxygen saturation probe site  4. Every 4-6 hours:  If on nasal continuous positive airway pressure, respiratory therapy assess nares and determine need for appliance change or resting period.   Outcome: Adequate for Discharge     Problem: Nutrition Deficit:  Goal: Optimize nutritional status  Outcome: Adequate for Discharge

## 2023-03-17 NOTE — DISCHARGE SUMMARY
Discharge Summary    Admit date: 3/16/2023    Discharge date and time: No discharge date for patient encounter. Admitting Physician: Anneliese Black DO     Consultants: oral surgery    Admission Diagnoses:  Dental infection    Discharge Diagnoses AND Hospital Course:  Dental infection, loose tooth, concern for choking/aspiration- consulted oral surgery. On unasyn inpatient. Transitioned to augmentin. Follow up with dentistry outpatient. Severe MRDD/dementia  Hypernatremia- resolved at prior OSH. Follow BMP every other day. Malpositioned PEG- resolved at prior OSH  Hypertension  Leukocytosis- likely due to dental infection. CXR 3/10 no acute process. Hypothyroidism- synthroid  History of dysphagia s/p PEG  History of hiatal hernia  Constipation  Hypomagnesemia- resolved at OSH  Hyperkalemia- resolved at OSH  BEENA-  continue tube feeds and free water flushes. FOllow with nephro outpatient. BEENA likely due to tube feeds being held for dental procedure. Hyperdense renal cyst- Follow cyst for size change as an outpt   Protein calorie malnutrition - POA    Discharge Exam:  Vitals:    03/16/23 2030   BP: 120/79   Pulse: 69   Resp: 18   Temp: 97.6 °F (36.4 °C)   SpO2: 97%       General appearance: No apparent distress, severe MR  HEENT: Normal cephalic, atraumatic without obvious deformity. Neck: Supple, with full range of motion. No jugular venous distention. Trachea midline. Respiratory: CTA  Cardiovascular: RRR  Abdomen: S/NT/ND  Musculoskeletal: No clubbing, cyanosis, edema of bilateral lower extremities. Brisk capillary refill. Skin: Normal skin color. No rashes or lesions. Neurologic:  Neurovascularly intact without any focal sensory/motor deficits. Cranial nerves: II-XII intact, grossly non-focal.    Disposition: SNF  The patient's condition is fair. At this time the patient is without objective evidence of an acute process requiring continuing hospitalization or inpatient management.   They are stable for discharge with outpatient follow-up. I have spoken with the patient and discussed the results of the current hospitalization, in addition to providing specific details for the plan of care and counseling regarding the diagnosis and prognosis. The plan has been discussed in detail and they are aware of the specific conditions for emergent return, as well as the importance of follow-up. Their questions are answered at this time and they are agreeable with the plan for discharge to [facility]     Patient Instructions: CBC and BMP every other day. Continue tube feeds and free water flushes. Follow up with nephology and dentistry outpatient as directed  Future Appointments   Date Time Provider Marissa Law   4/17/2023 10:00 AM Anil Romero DO [address] [place] [address]   6/1/2023  9:45 AM EUGENIO [place] [facility]       Discharge Medications:     Medication List        START taking these medications      amoxicillin-clavulanate 875-125 MG per tablet  Commonly known as: AUGMENTIN  1 tablet by Per G Tube route 2 times daily for 10 days     docusate sodium 150 MG/15ML liquid  Commonly known as: COLACE  Take 10 mLs by mouth daily     white petrolatum Oint ointment  Apply topically 2 times daily as needed (dry skin)            CHANGE how you take these medications      FeroSul 325 (65 Fe) MG tablet  Generic drug: ferrous sulfate  TAKE 1 TABLET VIA G-TUBE ONCE A DAY. What changed: See the new instructions. CONTINUE taking these medications      acetaminophen 325 MG tablet  Commonly known as: TYLENOL     albuterol (2.5 MG/3ML) 0.083% nebulizer solution  Commonly known as: PROVENTIL  Take 3 mLs by nebulization every 6 hours     bisacodyl 10 MG suppository  Commonly known as: DULCOLAX     * blood glucose monitor kit and supplies  Dispense sufficient amount for once daily testing frequency with ONE TOUCH glucometer.  Dispense all needed supplies to include: monitor, strips, lancing device, lancets, control solutions, alcohol swabs. Patient tests BG when nauseous, vomiting, or when tube feeds are held due to hypoglycemia risk. * blood glucose monitor kit and supplies  Dispense one Leo Matrix monitor and one lancet device. Patient tests blood glucose when nauseous, vomiting, or when tube feeding is held due to hypoglycemia risk. blood glucose test strips  Leo Matrix testing strips. Patient tests bood glucose when nauseous, vomiting, or when tube feeding are held due to hypoglycemia risk. bumetanide 1 MG tablet  Commonly known as: BUMEX  TAKE 1 TABLET VIA G-TUBE DAILY AS NEEDED FOR SWELLING. famotidine 20 MG tablet  Commonly known as: PEPCID  TAKE (1) TABLET VIA PEG TUBE TWO TIMES DAILY. folic acid 1 MG tablet  Commonly known as: FOLVITE  TAKE 1 TABLET VIA G-TUBE DAILY     Full Kit Nebulizer Set Misc  Use as directed with nebulized medication. Lancets Misc  1 each by Does not apply route daily Pt is in need of Leo Matrix blood glucose lancets. Patient tests bood glucose when nauseous, vomiting, or when tube feeding are held due to hypoglycemia risk. levothyroxine 150 MCG tablet  Commonly known as: SYNTHROID  Jin@[employer] 1 TABLET VIA G-TUBE ONCE DAILY     * medicated lip balm 2-2.5-6.6 % Stck     * SUNSCREENS EX     midodrine 2.5 MG tablet  Commonly known as: PROAMATINE  TAKE 3 TABLET VIA G-TUBE THREE TIMES DAILY AS NEEDED. (HYPOTENSION, GIVE ONLY IF SYSTOLIC BP IS <012. (BP CHECKS 3 TIMES A DAY. montelukast 10 MG tablet  Commonly known as: SINGULAIR  TAKE 1 TABLET VIA G-TUBE AT BEDTIME.      MUCUS-DM MAX PO     Natural Vitamin D-3 125 MCG (5000 UT) Tabs tablet  Generic drug: vitamin D3  TAKE 1 TABLET VIA G-TUBE ONCE DAILY     OLANZapine 20 MG tablet  Commonly known as: ZYPREXA     ondansetron 8 MG Tbdp disintegrating tablet  Commonly known as: ZOFRAN-ODT     PARoxetine 10 MG tablet  Commonly known as: PAXIL  TAKE 1 TABLET VIA G-TUBE TWICE A DAY     Poly Bacitracin [id number] UNIT/GM Oint  APPLY TOPICALLY TO OSTOMY TWICE DAILY. polyethylene glycol 17 g packet  Commonly known as: GLYCOLAX  17 g by Per G Tube route daily as needed     Powder Free Nitrile Gloves Med Misc  As Directed     triamcinolone 0.1 % lotion  Commonly known as: KENALOG     Triple Antibiotic Oint     valproic acid 250 MG/5ML Soln oral solution  Commonly known as: DEPAKENE  20 mLs by Per G Tube route 2 times daily           * This list has 4 medication(s) that are the same as other medications prescribed for you. Read the directions carefully, and ask your doctor or other care provider to review them with you. Where to Get Your Medications        Information about where to get these medications is not yet available    Ask your nurse or doctor about these medications  amoxicillin-clavulanate 875-125 MG per tablet  docusate sodium 150 MG/15ML liquid  white petrolatum Oint ointment         Activity: activity as tolerated    Diet: tube feeds    Wound Care: as directed    Follow-up:    This patient is instructed to follow-up with her primary care physician. Patient is instructed to follow-up with the consults listed above as directed by them. They are instructed to resume home medications and take new medications as indicated in the list above. If the patient has a recurrence of symptoms, they are instructed to go to the ED. Preparing for this patient's discharge, including paperwork, orders, instructions, and meeting with patient did require > 30 minutes.     Jeremiah Elizabeth DO   9:22 AM  3/17/2023

## 2023-03-17 NOTE — FLOWSHEET NOTE
Inpatient Wound Care (Initial consult) 8416A    Admit Date: 3/16/2023  3:09 AM    Reason for consult:  Peg tube    Significant history:  Per H&P    Chief Complaint:  had no chief complaint listed for this encounter. History Of Present Illness:    Ms. Bretta Habermann, a 70y.o. year old female  who  has a past medical history of Acute kidney injury ([facility] [address] Blind in both eyes, Essential hypertension, Gastroesophageal reflux disease without esophagitis, Hemodialysis patient ([facility] Hyperthyroidism, MR (mental retardation), Osteoarthritis, Peripheral vascular disease ([facility] Pneumonia, Pneumonia due to infectious organism, and Sepsis ([facility] Findings:     03/17/23 1035   Skin Integumentary    Skin Integrity   (dry flaky)   Location bilateral feet   Skin Integrity Site 2   Skin Integrity Location 2   (dry flaky)   Location 2 bilateral buttocks       Impression:  Skin assessment complete; see above documentation    Plan: Aquaphor  Medix heel boots  TAPS  Patient will need continued preventative care.       Randie Cogan, RN 3/17/2023 10:47 AM

## 2023-03-17 NOTE — DISCHARGE INSTRUCTIONS
Do BMP/CBC/PO4/Mg++ on   3/19  and fax results [phone] with the wt and BP   Pt also will need a Renal US with contrast in 3 months to evaluate the renal cyst

## 2023-03-20 ENCOUNTER — HOSPITAL ENCOUNTER (EMERGENCY)
Age: 68
Discharge: HOME | End: 2023-03-21
Attending: EMERGENCY MEDICINE
Payer: MEDICARE

## 2023-03-20 DIAGNOSIS — D64.9 ANEMIA, UNSPECIFIED TYPE: Primary | ICD-10-CM

## 2023-03-20 LAB
ABO + RH BLD: NORMAL
ALBUMIN SERPL-MCNC: 1.9 G/DL (ref 3.5–5.2)
ALP SERPL-CCNC: 58 U/L (ref 35–104)
ALT SERPL-CCNC: 7 U/L (ref 0–32)
ANION GAP SERPL CALCULATED.3IONS-SCNC: 7 MMOL/L (ref 7–16)
ANISOCYTOSIS: ABNORMAL
AST SERPL-CCNC: 16 U/L (ref 0–31)
BASOPHILIC STIPPLING: ABNORMAL
BASOPHILS # BLD: 0 E9/L (ref 0–0.2)
BASOPHILS NFR BLD: 0.1 % (ref 0–2)
BILIRUB SERPL-MCNC: <0.2 MG/DL (ref 0–1.2)
BLD GP AB SCN SERPL QL: NORMAL
BLOOD BANK DISPENSE STATUS: NORMAL
BLOOD BANK PRODUCT CODE: NORMAL
BPU ID: NORMAL
BUN SERPL-MCNC: 26 MG/DL (ref 6–23)
CALCIUM SERPL-MCNC: 8.4 MG/DL (ref 8.6–10.2)
CHLORIDE SERPL-SCNC: 105 MMOL/L (ref 98–107)
CHP ED QC CHECK: NORMAL
CO2 SERPL-SCNC: 27 MMOL/L (ref 22–29)
CREAT SERPL-MCNC: 1.4 MG/DL (ref 0.5–1)
DESCRIPTION BLOOD BANK: NORMAL
EOSINOPHIL # BLD: 0.36 E9/L (ref 0.05–0.5)
EOSINOPHIL NFR BLD: 3.5 % (ref 0–6)
ERYTHROCYTE [DISTWIDTH] IN BLOOD BY AUTOMATED COUNT: 17.2 FL (ref 11.5–15)
GLUCOSE SERPL-MCNC: 130 MG/DL (ref 74–99)
HCT VFR BLD AUTO: 22.7 % (ref 34–48)
HEMOCCULT STL QL: NEGATIVE
HGB BLD-MCNC: 6.9 G/DL (ref 11.5–15.5)
HYPOCHROMIA: ABNORMAL
LYMPHOCYTES # BLD: 2.29 E9/L (ref 1.5–4)
LYMPHOCYTES NFR BLD: 21.7 % (ref 20–42)
MCH RBC QN AUTO: 30.8 PG (ref 26–35)
MCHC RBC AUTO-ENTMCNC: 30.4 % (ref 32–34.5)
MCV RBC AUTO: 101.3 FL (ref 80–99.9)
MONOCYTES # BLD: 1.14 E9/L (ref 0.1–0.95)
MONOCYTES NFR BLD: 11.3 % (ref 2–12)
NEUTROPHILS # BLD: 6.66 E9/L (ref 1.8–7.3)
NEUTS SEG NFR BLD: 63.5 % (ref 43–80)
PLATELET # BLD AUTO: 223 E9/L (ref 130–450)
PMV BLD AUTO: 12.3 FL (ref 7–12)
POIKILOCYTES: ABNORMAL
POLYCHROMASIA: ABNORMAL
POTASSIUM SERPL-SCNC: 3.5 MMOL/L (ref 3.5–5)
PROT SERPL-MCNC: 7 G/DL (ref 6.4–8.3)
RBC # BLD AUTO: 2.24 E12/L (ref 3.5–5.5)
SODIUM SERPL-SCNC: 139 MMOL/L (ref 132–146)
SPHEROCYTES: ABNORMAL
TARGET CELLS: ABNORMAL
VACUOLATED NEUTROPHILS: ABNORMAL
WBC # BLD: 10.4 E9/L (ref 4.5–11.5)

## 2023-03-20 PROCEDURE — 80053 COMPREHEN METABOLIC PANEL: CPT

## 2023-03-20 PROCEDURE — 85025 COMPLETE CBC W/AUTO DIFF WBC: CPT

## 2023-03-20 PROCEDURE — P9016 RBC LEUKOCYTES REDUCED: HCPCS

## 2023-03-20 PROCEDURE — 86850 RBC ANTIBODY SCREEN: CPT

## 2023-03-20 PROCEDURE — 36430 TRANSFUSION BLD/BLD COMPNT: CPT

## 2023-03-20 PROCEDURE — 99285 EMERGENCY DEPT VISIT HI MDM: CPT

## 2023-03-20 PROCEDURE — 86901 BLOOD TYPING SEROLOGIC RH(D): CPT

## 2023-03-20 PROCEDURE — 86923 COMPATIBILITY TEST ELECTRIC: CPT

## 2023-03-20 PROCEDURE — 86900 BLOOD TYPING SEROLOGIC ABO: CPT

## 2023-03-20 RX ORDER — SODIUM CHLORIDE 9 MG/ML
INJECTION, SOLUTION INTRAVENOUS PRN
Status: DISCONTINUED | OUTPATIENT
Start: 2023-03-20 | End: 2023-03-21 | Stop reason: HOSPADM

## 2023-03-21 ENCOUNTER — HOSPITAL ENCOUNTER (EMERGENCY)
Age: 68
Discharge: HOME | End: 2023-03-22
Attending: EMERGENCY MEDICINE
Payer: MEDICARE

## 2023-03-21 VITALS
WEIGHT: 110 LBS | TEMPERATURE: 97.2 F | HEART RATE: 63 BPM | DIASTOLIC BLOOD PRESSURE: 74 MMHG | HEIGHT: 65 IN | BODY MASS INDEX: 18.33 KG/M2 | OXYGEN SATURATION: 96 % | RESPIRATION RATE: 16 BRPM | SYSTOLIC BLOOD PRESSURE: 130 MMHG

## 2023-03-21 VITALS
HEART RATE: 80 BPM | TEMPERATURE: 98.3 F | DIASTOLIC BLOOD PRESSURE: 57 MMHG | OXYGEN SATURATION: 97 % | RESPIRATION RATE: 15 BRPM | SYSTOLIC BLOOD PRESSURE: 127 MMHG

## 2023-03-21 DIAGNOSIS — Z46.59 ENCOUNTER FOR CARE RELATED TO FEEDING TUBE: Primary | ICD-10-CM

## 2023-03-21 LAB
HCT VFR BLD AUTO: 28.3 % (ref 34–48)
HGB BLD-MCNC: 9 G/DL (ref 11.5–15.5)

## 2023-03-21 PROCEDURE — 85018 HEMOGLOBIN: CPT

## 2023-03-21 PROCEDURE — 85014 HEMATOCRIT: CPT

## 2023-03-21 PROCEDURE — 99283 EMERGENCY DEPT VISIT LOW MDM: CPT

## 2023-03-21 PROCEDURE — 6370000000 HC RX 637 (ALT 250 FOR IP): Performed by: EMERGENCY MEDICINE

## 2023-03-21 RX ORDER — ACETAMINOPHEN 650 MG/1
650 SUPPOSITORY RECTAL ONCE
Status: COMPLETED | OUTPATIENT
Start: 2023-03-21 | End: 2023-03-21

## 2023-03-21 RX ADMIN — ACETAMINOPHEN 650 MG: 650 SUPPOSITORY RECTAL at 02:01

## 2023-03-21 NOTE — ED NOTES
Called legal guardian and was sent to a messaging system. Voice message sent by this RN, awaiting response for consent.       Joel Mosher RN  03/20/23 8498

## 2023-03-21 NOTE — ED NOTES
Dr. Angelika Sharpe notified of temp 100.1F. Verbal orders for 650mg rectal tylenol received and placed by this RN.       Natalie Garner RN  03/21/23 1141

## 2023-03-21 NOTE — CARE COORDINATION
Social Work/Transition of Care:     Pt presented due to Feeding tube issue. Pt is from [facility]. SW received consult due to pt sent in due to a missing feeding tube extension part, per ED RN pt can not return to facility without a proper way to receive nutrition. BEVERLEY spoke with liason from the facility Babar Ochoa, who stated pt was sent to [facility] (-) 3/20/23 for a blood transfusion when pt returned to the facility the connecting tube was missing, facility attempted to obtain a additional tube and they were unsuccessful, facility sent to the ED due pt has had no nutrition in 24 hrs. SW called [facility] spoke with on call nurse Michelle Burleson to request a Merline Long, per Michelle Burleson the kits are all in one and they are currently not in stock. SW called Alexandria BRAGG care coordinator who was able to locate a replacement part, Once part was delivered to the ED Charge Nurse Irvin Block was able to attach the part and flush several times, Claudia Davenport., Liaison notified, [facility] called to transport pt back to the facility.     Electronically signed by Rexann Dandy

## 2023-03-21 NOTE — ED NOTES
Called PAS to arrange transport for patient back to [facility], [facility] ETA 3-4 hours, [phone].      Venessa Murguia RN  03/21/23 0531

## 2023-03-21 NOTE — ED NOTES
Called [address] [facility] [address] to inform that patient is going to be discharged from hospital, Message left with Yumiko Paulson for nurse to return call.      Leroy Caballero RN  03/21/23 4542

## 2023-03-21 NOTE — ED NOTES
Patient cleaned of urinary incontinence. New brief placed on the patient at this time.       Chauncy Holter, RN  03/21/23 2825

## 2023-03-21 NOTE — ED PROVIDER NOTES
patient is blind. Mouth: Oropharynx clear, handling secretions, no trismus  Neck: Supple, full ROM, non tender to palpation in the midline, no stridor, no crepitus, no meningeal signs  Pulmonary: Lungs clear to auscultation bilaterally, no wheezes, rales, or rhonchi. Not in respiratory distress  Cardiovascular:  Regular rate. Regular rhythm. No murmurs, gallops, or rubs. 2+ distal pulses  Chest: no chest wall tenderness  Abdomen: Soft. Non tender. Non distended. +BS. No rebound, guarding, or rigidity. No pulsatile masses appreciated. Musculoskeletal: Patient able to move upper extremities limited range of motion of lower extremities  Skin: warm and dry. No rashes. Neurologic: Is awake alert unable to assess orientation. Patient does have history of MR    -------------------------------------------------- RESULTS -------------------------------------------------  I have personally reviewed all laboratory and imaging results for this patient. Results are listed below.      LABS:  Results for orders placed or performed during the hospital encounter of 03/20/23   CBC with Auto Differential   Result Value Ref Range    WBC 10.4 4.5 - 11.5 E9/L    RBC 2.24 (L) 3.50 - 5.50 E12/L    Hemoglobin 6.9 (LL) 11.5 - 15.5 g/dL    Hematocrit 22.7 (L) 34.0 - 48.0 %    .3 (H) 80.0 - 99.9 fL    MCH 30.8 26.0 - 35.0 pg    MCHC 30.4 (L) 32.0 - 34.5 %    RDW 17.2 (H) 11.5 - 15.0 fL    Platelets 283 202 - 609 E9/L    MPV 12.3 (H) 7.0 - 12.0 fL    Neutrophils % 63.5 43.0 - 80.0 %    Lymphocytes % 21.7 20.0 - 42.0 %    Monocytes % 11.3 2.0 - 12.0 %    Eosinophils % 3.5 0.0 - 6.0 %    Basophils % 0.1 0.0 - 2.0 %    Neutrophils Absolute 6.66 1.80 - 7.30 E9/L    Lymphocytes Absolute 2.29 1.50 - 4.00 E9/L    Monocytes Absolute 1.14 (H) 0.10 - 0.95 E9/L    Eosinophils Absolute 0.36 0.05 - 0.50 E9/L    Basophils Absolute 0.00 0.00 - 0.20 E9/L    Vacuolated Neutrophils 2+     Anisocytosis 2+     Polychromasia 3+     Hypochromia 2+

## 2023-03-21 NOTE — ED PROVIDER NOTES
exam findings failed to demonstrate any acute pathology that would require further evaluation or admission. Vital signs are stable. As the piece of equipment they sent the patient here to get is not within this facility, we will not be able to provide this for his nursing home. The Cameron button appears to be properly placed in the correct position. There is no acute life or limb threatening condition at this time. --------------------------------------------- PAST HISTORY ---------------------------------------------  Past Medical History:  has a past medical history of Acute kidney injury ([facility] Blind in both eyes, Essential hypertension, Gastroesophageal reflux disease without esophagitis, Hemodialysis patient ([facility] Hyperthyroidism, MR (mental retardation), Osteoarthritis, Peripheral vascular disease ([facility] Pneumonia, Pneumonia due to infectious organism, and Sepsis ([facility] Past Surgical History:  has a past surgical history that includes other surgical history (Right, 01/17/2017); other surgical history (01/25/2017); chest tube insertion (Right, 02/09/2017); bronchoscopy (02/10/2017); and Gastrostomy tube placement (N/A, 3/7/2021). Social History:  reports that she has never smoked. She has never used smokeless tobacco. She reports that she does not drink alcohol and does not use drugs. Family History: family history is not on file. The patients home medications have been reviewed. Allergies: Patient has no known allergies. -------------------------------------------------- RESULTS -------------------------------------------------  Labs:  No results found for this visit on 03/21/23. Radiology:  No orders to display       ------------------------- NURSING NOTES AND VITALS REVIEWED ---------------------------  Date / Time Roomed:  3/21/2023  2:57 PM  ED Bed Assignment:  [id number]    The nursing notes within the ED encounter and vital signs as below have been reviewed.

## 2023-03-21 NOTE — ED NOTES
Patient is non-verbal/ incomprehesable, unable to perform screenings.       Galilea Sampson, RN  03/21/23 8488

## 2023-03-21 NOTE — ED NOTES
Legal guardian called back - obtained consent by this RN and Esperanza Lee MD.      Joel Mosher RN  03/20/23 1474

## 2023-03-21 NOTE — ED NOTES
Unit secretary contacted responsible nurse and notified them of abnormal lab value, no contact was made by lab to relay information      Ladarius Landeros, [address] [facility]  03/20/23 7009

## 2023-03-22 NOTE — ED NOTES
Called PAS to check on status of transport back to [facility], ALIVIA Eta 20 minutes, [address] [facility] [facility]  03/21/23 9517

## 2023-03-24 ENCOUNTER — HOSPITAL ENCOUNTER (EMERGENCY)
Age: 68
Discharge: HOME | End: 2023-03-24
Attending: EMERGENCY MEDICINE
Payer: MEDICARE

## 2023-03-24 ENCOUNTER — APPOINTMENT (OUTPATIENT)
Dept: GENERAL RADIOLOGY | Age: 68
End: 2023-03-24
Payer: MEDICARE

## 2023-03-24 VITALS
DIASTOLIC BLOOD PRESSURE: 66 MMHG | BODY MASS INDEX: 18.33 KG/M2 | RESPIRATION RATE: 16 BRPM | HEART RATE: 76 BPM | WEIGHT: 110 LBS | TEMPERATURE: 97.5 F | HEIGHT: 65 IN | SYSTOLIC BLOOD PRESSURE: 115 MMHG | OXYGEN SATURATION: 99 %

## 2023-03-24 DIAGNOSIS — K94.23 GASTROSTOMY TUBE DYSFUNCTION (HCC): Primary | ICD-10-CM

## 2023-03-24 PROCEDURE — 43762 RPLC GTUBE NO REVJ TRC: CPT

## 2023-03-24 PROCEDURE — 6360000004 HC RX CONTRAST MEDICATION: Performed by: EMERGENCY MEDICINE

## 2023-03-24 PROCEDURE — 74018 RADEX ABDOMEN 1 VIEW: CPT

## 2023-03-24 PROCEDURE — 99283 EMERGENCY DEPT VISIT LOW MDM: CPT

## 2023-03-24 RX ADMIN — DIATRIZOATE MEGLUMINE AND DIATRIZOATE SODIUM 30 ML: 660; 100 LIQUID ORAL; RECTAL at 15:14

## 2023-03-24 NOTE — ED PROVIDER NOTES
[facility] EMERGENCY DEPARTMENT  EMERGENCY DEPARTMENT ENCOUNTER        Pt Name: Katerina Paiz  MRN: [id number]  Birthdate 1955  Date of evaluation: 3/24/2023  Provider: Stacia Mahmood MD  PCP: Izabela Dove DO  Note Started: 10:43 AM EDT 3/24/23    CHIEF COMPLAINT       Chief Complaint   Patient presents with    G Tube Complications     Patient pulled g-tube out, unknown when       HISTORY OF PRESENT ILLNESS: 1 or more Elements   History From: Patient    Limitations to history : MR  Social Determinants : Patient is illiterate    Katerina Paiz is a 68 y.o. female PMHx MR, autism, hypothyroidism who presents from nursing home G-tube was pulled out.  At home unsure of when she pulled out of G-tube but states that this morning she had to feed all over her body.  Said it could have been pulled out last night or this morning.  Nursing home deny any other issues. unable to assess review of system given patient's mental status.  Nursing Notes were all reviewed and agreed with or any disagreements were addressed in the HPI.    ROS:   Unable to assess due to mental status      --------------------------------------------- PAST HISTORY ---------------------------------------------  Past Medical History:  has a past medical history of Acute kidney injury ([facility]), Blind in both eyes, Essential hypertension, Gastroesophageal reflux disease without esophagitis, Hemodialysis patient ([facility]), Hyperthyroidism, MR (mental retardation), Osteoarthritis, Peripheral vascular disease ([facility]), Pneumonia, Pneumonia due to infectious organism, and Sepsis ([facility]).    Past Surgical History:  has a past surgical history that includes other surgical history (Right, 01/17/2017); other surgical history (01/25/2017); chest tube insertion (Right, 02/09/2017); bronchoscopy (02/10/2017); and Gastrostomy tube placement (N/A, 3/7/2021).    Social History:  reports that she has never smoked. She has never used  Questions are answered at this time and they are agreeable with the plan.       --------------------------------- IMPRESSION AND DISPOSITION ---------------------------------    IMPRESSION  1. Gastrostomy tube dysfunction ([facility] [address]        DISPOSITION  Disposition: Discharge to nursing home  Patient condition is stable        NOTE: This report was transcribed using voice recognition software.  Every effort was made to ensure accuracy; however, inadvertent computerized transcription errors may be present         Claire Velasco MD  Resident  03/25/23 3454

## 2023-03-24 NOTE — CARE COORDINATION
Social Work/Transition of Care:     Pt in need of transportation back to [address] [place] [address] contacted [facility] they are agreeable to transport ETA 1649  SW completed Medical Necessity form and updated ED RN.      Electronically signed by Peace Snyder on 2/17/3176 at 4:08 PM

## 2023-03-24 NOTE — ED NOTES
[address] notified that patient will be returning to facility.       Corinna Gooden RN  03/24/23 1902

## 2023-03-28 ENCOUNTER — PREP FOR PROCEDURE (OUTPATIENT)
Dept: SURGERY | Age: 68
End: 2023-03-28

## 2023-03-28 RX ORDER — SODIUM CHLORIDE 0.9 % (FLUSH) 0.9 %
5-40 SYRINGE (ML) INJECTION PRN
Status: CANCELLED | OUTPATIENT
Start: 2023-03-28

## 2023-03-28 RX ORDER — SODIUM CHLORIDE 9 MG/ML
25 INJECTION, SOLUTION INTRAVENOUS PRN
Status: CANCELLED | OUTPATIENT
Start: 2023-03-28

## 2023-03-28 RX ORDER — SODIUM CHLORIDE 0.9 % (FLUSH) 0.9 %
5-40 SYRINGE (ML) INJECTION EVERY 12 HOURS SCHEDULED
Status: CANCELLED | OUTPATIENT
Start: 2023-03-28

## 2023-04-03 ENCOUNTER — TELEPHONE (OUTPATIENT)
Dept: SURGERY | Age: 68
End: 2023-04-03

## 2023-04-03 NOTE — TELEPHONE ENCOUNTER
Brennan Delgadillo called from residential services and they need to cancel patient's procedure for Friday 4/7 peg tube replacement Cameron pinto. She said she is now in a step down facility [facility] so will need to cancel. If you need to speak to them the number is [phone].

## 2023-04-04 NOTE — TELEPHONE ENCOUNTER
Spoke with Girard Lennox on the phone. She stated the pt had Paulino peg tube in ER on 3/31/23.    Electronically signed by Ar Burt on 4/4/2023 at 10:30 AM

## 2023-04-07 ENCOUNTER — TELEPHONE (OUTPATIENT)
Dept: SURGERY | Age: 68
End: 2023-04-07

## 2023-04-07 NOTE — TELEPHONE ENCOUNTER
Received a call from [address] [place] [address]. She stated they brought the patient to pegtube replacement today in [facility]. MA told her that MA spoke with Cassie Coulter and was told that the pt went to ED the end of March and replaced the peg tube to [facility] button type pegtube there. She requested to cancel the procedure. Per chart, she was replaced pegtube 20 fr G tube Arturo-key at ED on 3/24/23. Johnathan and their facility was not aware of cancelling the appt and was not aware of replacing the pegtube with cedric button. She will call our office if they have any issues with pegtube.    Electronically signed by Hanna Kendrick on 4/7/2023 at 8:20 AM

## 2023-04-17 ENCOUNTER — HOSPITAL ENCOUNTER (EMERGENCY)
Age: 68
Discharge: HOME | End: 2023-04-18
Attending: EMERGENCY MEDICINE
Payer: MEDICARE

## 2023-04-17 ENCOUNTER — APPOINTMENT (OUTPATIENT)
Dept: GENERAL RADIOLOGY | Age: 68
End: 2023-04-17
Payer: MEDICARE

## 2023-04-17 VITALS
HEART RATE: 87 BPM | OXYGEN SATURATION: 100 % | DIASTOLIC BLOOD PRESSURE: 73 MMHG | TEMPERATURE: 98.4 F | SYSTOLIC BLOOD PRESSURE: 129 MMHG | RESPIRATION RATE: 16 BRPM

## 2023-04-17 DIAGNOSIS — K94.23 MALFUNCTION OF GASTROSTOMY TUBE (HCC): Primary | ICD-10-CM

## 2023-04-17 PROCEDURE — 6360000004 HC RX CONTRAST MEDICATION: Performed by: EMERGENCY MEDICINE

## 2023-04-17 PROCEDURE — 74018 RADEX ABDOMEN 1 VIEW: CPT

## 2023-04-17 PROCEDURE — 99283 EMERGENCY DEPT VISIT LOW MDM: CPT

## 2023-04-17 RX ADMIN — DIATRIZOATE MEGLUMINE AND DIATRIZOATE SODIUM 30 ML: 600; 100 SOLUTION ORAL; RECTAL at 20:49

## 2023-04-17 ASSESSMENT — PAIN SCALES - WONG BAKER: WONGBAKER_NUMERICALRESPONSE: 0

## 2023-04-17 ASSESSMENT — PAIN - FUNCTIONAL ASSESSMENT: PAIN_FUNCTIONAL_ASSESSMENT: WONG-BAKER FACES

## 2023-04-17 NOTE — ED PROVIDER NOTES
Pharynx: Oropharynx is clear. Eyes:      Comments: Bilateral pupils opaque   Cardiovascular:      Rate and Rhythm: Normal rate and regular rhythm. Pulses: Normal pulses. Heart sounds: Normal heart sounds. Pulmonary:      Effort: Pulmonary effort is normal.      Breath sounds: Normal breath sounds. No wheezing, rhonchi or rales. Abdominal:      General: Abdomen is flat. Palpations: Abdomen is soft. Tenderness: There is no abdominal tenderness. Comments: G-tube entrance site clean without any surrounding erythema   Musculoskeletal:         General: No swelling. Cervical back: Normal range of motion and neck supple. Right lower leg: No edema. Left lower leg: No edema. Skin:     General: Skin is warm and dry. Capillary Refill: Capillary refill takes less than 2 seconds. Neurological:      Mental Status: Mental status is at baseline. Comments: Pt not answering questions or following commands but moaning and moving all extremities aimlessly without difficulty   Psychiatric:         Behavior: Behavior is uncooperative.           -----------------------------------------PROCEDURES----------------------------------------------------  PROCEDURE  4/17/23       Time: 2030    FEEDING TUBE REPLACEMENT  Risks, benefits and alternatives (for applicable procedures below) described. Performed By: Hardin Landau, DO and EM Attending Physician. Indication: Tube fell out. Informed consent: Consent unable to be obtained due to patient's condition. .  Local Anesthesia:  not required/indicated. Procedure: A feeding tube was replaced using a 20 Ethiopian gastrostomy tube and the bulb was inflated using 5 cc of sterile water. Tube was secured to skin pre-attached rubber skin bumper device                                                          . Post-Procedure: Tube position confirmed by x-ray with contrast injection. Patient tolerated the procedure well.   Complications:

## 2023-04-18 NOTE — ED NOTES
Discharge instructions reviewed , including diagnosis, medications, follow up appointments, home care, and also when to call 911. All discharge instructions questions answered.        Pt left ED ambulance to facility, [address], RN  04/18/23 9806

## 2023-05-17 NOTE — TELEPHONE ENCOUNTER
Last Appointment:  3/31/2021  Future Appointments   Date Time Provider Marissa Law   4/30/2021 10:30 AM Mayme Leyden, [facility]   7/15/2021 10:00 AM EUGENIO Delgado [facility] No

## 2023-05-31 ENCOUNTER — HOSPITAL ENCOUNTER (EMERGENCY)
Age: 68
Discharge: HOME | End: 2023-06-01
Attending: EMERGENCY MEDICINE
Payer: MEDICARE

## 2023-05-31 ENCOUNTER — APPOINTMENT (OUTPATIENT)
Dept: GENERAL RADIOLOGY | Age: 68
End: 2023-05-31
Payer: MEDICARE

## 2023-05-31 DIAGNOSIS — T85.528A GASTROJEJUNOSTOMY TUBE DISLODGEMENT: Primary | ICD-10-CM

## 2023-05-31 PROCEDURE — 74018 RADEX ABDOMEN 1 VIEW: CPT

## 2023-05-31 PROCEDURE — 43762 RPLC GTUBE NO REVJ TRC: CPT

## 2023-05-31 PROCEDURE — 6360000004 HC RX CONTRAST MEDICATION: Performed by: EMERGENCY MEDICINE

## 2023-05-31 PROCEDURE — 99283 EMERGENCY DEPT VISIT LOW MDM: CPT

## 2023-05-31 RX ADMIN — DIATRIZOATE MEGLUMINE AND DIATRIZOATE SODIUM 30 ML: 600; 100 SOLUTION ORAL; RECTAL at 21:59

## 2023-05-31 ASSESSMENT — PAIN DESCRIPTION - LOCATION: LOCATION: ABDOMEN

## 2023-05-31 ASSESSMENT — PAIN SCALES - WONG BAKER: WONGBAKER_NUMERICALRESPONSE: 2

## 2023-06-01 VITALS
TEMPERATURE: 97.2 F | RESPIRATION RATE: 18 BRPM | SYSTOLIC BLOOD PRESSURE: 119 MMHG | OXYGEN SATURATION: 99 % | HEART RATE: 97 BPM | DIASTOLIC BLOOD PRESSURE: 64 MMHG

## 2023-06-01 NOTE — ED PROVIDER NOTES
NEOMYCIN-BACITRACIN-POLYMYXIN (TRIPLE ANTIBIOTIC) OINT    Apply topically 2 times daily as needed (CUTS/SCRAPES/SKIN ABRASIONS)    OLANZAPINE (ZYPREXA) 20 MG TABLET    by Per G Tube route in the morning and at bedtime    ONDANSETRON (ZOFRAN-ODT) 8 MG TBDP DISINTEGRATING TABLET    Place 4 mg under the tongue every 8 hours as needed for Nausea or Vomiting    PAROXETINE (PAXIL) 10 MG TABLET    TAKE 1 TABLET VIA G-TUBE TWICE A DAY    POLYETHYLENE GLYCOL (GLYCOLAX) 17 G PACKET    17 g by Per G Tube route daily as needed    RESPIRATORY THERAPY SUPPLIES (FULL KIT NEBULIZER SET) MISC    Use as directed with nebulized medication. TRIAMCINOLONE (KENALOG) 0.1 % LOTION    Apply topically 2 times daily Indications: Abnormal Dryness of Skin Apply topically 2 times daily. To BUE/BLE    VALPROIC ACID (DEPACON) 250 MG/5ML SOLN ORAL SOLUTION    20 mLs by Per G Tube route 2 times daily    WHITE PETROLATUM OINT OINTMENT    Apply topically 2 times daily as needed (dry skin)       ALLERGIES     Patient has no known allergies. FAMILYHISTORY     No family history on file.      SOCIAL HISTORY       Social History     Tobacco Use    Smoking status: Never    Smokeless tobacco: Never   Vaping Use    Vaping Use: Never used   Substance Use Topics    Alcohol use: No    Drug use: No       SCREENINGS        [place] Coma Scale  Eye Opening: Spontaneous  Best Verbal Response: Inappropriate words  Best Motor Response: Localizes pain  [place] Coma Scale Score: 12                CIWA Assessment  BP: (!) 158/94  Pulse: 97           PHYSICAL EXAM  1 or more Elements     ED Triage Vitals [05/31/23 2000]   BP Temp Temp Source Pulse Respirations SpO2 Height Weight   125/69 97.2 °F (36.2 °C) Oral 97 18 99 % -- --           Constitutional/General: Alert; nonverbal at baseline about the place  Head: Normocephalic and atraumatic  Eyes: Blind in both eyes  ENT:  Oropharynx clear, handling secretions, no trismus, no asymmetry of the posterior oropharynx or

## 2023-06-26 ENCOUNTER — APPOINTMENT (OUTPATIENT)
Dept: GENERAL RADIOLOGY | Age: 68
End: 2023-06-26
Payer: MEDICARE

## 2023-06-26 ENCOUNTER — HOSPITAL ENCOUNTER (EMERGENCY)
Age: 68
Discharge: ANOTHER HOSPITAL | End: 2023-06-26
Attending: EMERGENCY MEDICINE
Payer: MEDICARE

## 2023-06-26 ENCOUNTER — HOSPITAL ENCOUNTER (INPATIENT)
Age: 68
LOS: 4 days | Discharge: HOME | DRG: 872 | End: 2023-06-30
Attending: FAMILY MEDICINE | Admitting: FAMILY MEDICINE
Payer: MEDICARE

## 2023-06-26 VITALS
HEART RATE: 80 BPM | RESPIRATION RATE: 20 BRPM | DIASTOLIC BLOOD PRESSURE: 60 MMHG | OXYGEN SATURATION: 99 % | SYSTOLIC BLOOD PRESSURE: 124 MMHG | TEMPERATURE: 97.8 F

## 2023-06-26 DIAGNOSIS — N30.01 ACUTE CYSTITIS WITH HEMATURIA: ICD-10-CM

## 2023-06-26 DIAGNOSIS — E87.1 HYPONATREMIA: Primary | ICD-10-CM

## 2023-06-26 DIAGNOSIS — R65.10 SIRS (SYSTEMIC INFLAMMATORY RESPONSE SYNDROME) (HCC): ICD-10-CM

## 2023-06-26 PROBLEM — A41.9 SEPSIS (HCC): Status: ACTIVE | Noted: 2023-06-26

## 2023-06-26 PROBLEM — N30.90 CYSTITIS: Status: ACTIVE | Noted: 2023-06-26

## 2023-06-26 LAB
ALBUMIN SERPL-MCNC: 3.3 G/DL (ref 3.5–5.2)
ALP SERPL-CCNC: 97 U/L (ref 35–104)
ALT SERPL-CCNC: 21 U/L (ref 0–32)
ANION GAP SERPL CALCULATED.3IONS-SCNC: 7 MMOL/L (ref 7–16)
AST SERPL-CCNC: 34 U/L (ref 0–31)
BACTERIA URNS QL MICRO: ABNORMAL /HPF
BASOPHILS # BLD: 0.03 E9/L (ref 0–0.2)
BASOPHILS NFR BLD: 0.2 % (ref 0–2)
BILIRUB SERPL-MCNC: <0.2 MG/DL (ref 0–1.2)
BILIRUB UR QL STRIP: NEGATIVE
BUN SERPL-MCNC: 70 MG/DL (ref 6–23)
CALCIUM SERPL-MCNC: 10.1 MG/DL (ref 8.6–10.2)
CHLORIDE SERPL-SCNC: 83 MMOL/L (ref 98–107)
CLARITY UR: CLEAR
CO2 SERPL-SCNC: 37 MMOL/L (ref 22–29)
COLOR UR: YELLOW
CREAT SERPL-MCNC: 0.9 MG/DL (ref 0.5–1)
EOSINOPHIL # BLD: 0.21 E9/L (ref 0.05–0.5)
EOSINOPHIL NFR BLD: 1.5 % (ref 0–6)
ERYTHROCYTE [DISTWIDTH] IN BLOOD BY AUTOMATED COUNT: 12.1 FL (ref 11.5–15)
GLUCOSE SERPL-MCNC: 84 MG/DL (ref 74–99)
GLUCOSE UR STRIP-MCNC: NEGATIVE MG/DL
HCT VFR BLD AUTO: 35.1 % (ref 34–48)
HGB BLD-MCNC: 11.9 G/DL (ref 11.5–15.5)
HGB UR QL STRIP: ABNORMAL
IMM GRANULOCYTES # BLD: 0.06 E9/L
IMM GRANULOCYTES NFR BLD: 0.4 % (ref 0–5)
KETONES UR STRIP-MCNC: NEGATIVE MG/DL
LACTATE BLDV-SCNC: 1.3 MMOL/L (ref 0.5–2.2)
LEUKOCYTE ESTERASE UR QL STRIP: ABNORMAL
LYMPHOCYTES # BLD: 2.34 E9/L (ref 1.5–4)
LYMPHOCYTES NFR BLD: 16.2 % (ref 20–42)
MAGNESIUM SERPL-MCNC: 2.1 MG/DL (ref 1.6–2.6)
MCH RBC QN AUTO: 31.7 PG (ref 26–35)
MCHC RBC AUTO-ENTMCNC: 33.9 % (ref 32–34.5)
MCV RBC AUTO: 93.6 FL (ref 80–99.9)
MONOCYTES # BLD: 1.06 E9/L (ref 0.1–0.95)
MONOCYTES NFR BLD: 7.3 % (ref 2–12)
NEUTROPHILS # BLD: 10.73 E9/L (ref 1.8–7.3)
NEUTS SEG NFR BLD: 74.4 % (ref 43–80)
NITRITE UR QL STRIP: NEGATIVE
PH UR STRIP: 5.5 [PH] (ref 5–9)
PLATELET # BLD AUTO: 190 E9/L (ref 130–450)
PMV BLD AUTO: 11.1 FL (ref 7–12)
POTASSIUM SERPL-SCNC: 3.5 MMOL/L (ref 3.5–5)
PROT SERPL-MCNC: 8.1 G/DL (ref 6.4–8.3)
PROT UR STRIP-MCNC: NEGATIVE MG/DL
RBC # BLD AUTO: 3.75 E12/L (ref 3.5–5.5)
RBC #/AREA URNS HPF: ABNORMAL /HPF (ref 0–2)
RENAL EPITHELIAL, UA: ABNORMAL /HPF
SODIUM SERPL-SCNC: 127 MMOL/L (ref 132–146)
SP GR UR STRIP: <=1.005 (ref 1–1.03)
UROBILINOGEN UR STRIP-ACNC: 0.2 E.U./DL
WBC # BLD: 14.4 E9/L (ref 4.5–11.5)
WBC #/AREA URNS HPF: ABNORMAL /HPF (ref 0–5)

## 2023-06-26 PROCEDURE — 36415 COLL VENOUS BLD VENIPUNCTURE: CPT

## 2023-06-26 PROCEDURE — 81001 URINALYSIS AUTO W/SCOPE: CPT

## 2023-06-26 PROCEDURE — 87040 BLOOD CULTURE FOR BACTERIA: CPT

## 2023-06-26 PROCEDURE — 85025 COMPLETE CBC W/AUTO DIFF WBC: CPT

## 2023-06-26 PROCEDURE — 96374 THER/PROPH/DIAG INJ IV PUSH: CPT

## 2023-06-26 PROCEDURE — 2580000003 HC RX 258: Performed by: NURSE PRACTITIONER

## 2023-06-26 PROCEDURE — 87088 URINE BACTERIA CULTURE: CPT

## 2023-06-26 PROCEDURE — 1200000000 HC SEMI PRIVATE

## 2023-06-26 PROCEDURE — APPSS45 APP SPLIT SHARED TIME 31-45 MINUTES

## 2023-06-26 PROCEDURE — 99285 EMERGENCY DEPT VISIT HI MDM: CPT

## 2023-06-26 PROCEDURE — 83605 ASSAY OF LACTIC ACID: CPT

## 2023-06-26 PROCEDURE — 87186 SC STD MICRODIL/AGAR DIL: CPT

## 2023-06-26 PROCEDURE — 93005 ELECTROCARDIOGRAM TRACING: CPT | Performed by: NURSE PRACTITIONER

## 2023-06-26 PROCEDURE — 80053 COMPREHEN METABOLIC PANEL: CPT

## 2023-06-26 PROCEDURE — 83735 ASSAY OF MAGNESIUM: CPT

## 2023-06-26 PROCEDURE — 71045 X-RAY EXAM CHEST 1 VIEW: CPT

## 2023-06-26 PROCEDURE — 6360000002 HC RX W HCPCS: Performed by: NURSE PRACTITIONER

## 2023-06-26 RX ORDER — ACETAMINOPHEN 325 MG/1
650 TABLET ORAL EVERY 6 HOURS PRN
Status: DISCONTINUED | OUTPATIENT
Start: 2023-06-26 | End: 2023-06-30 | Stop reason: HOSPADM

## 2023-06-26 RX ORDER — ONDANSETRON 2 MG/ML
4 INJECTION INTRAMUSCULAR; INTRAVENOUS EVERY 6 HOURS PRN
Status: DISCONTINUED | OUTPATIENT
Start: 2023-06-26 | End: 2023-06-30 | Stop reason: HOSPADM

## 2023-06-26 RX ORDER — SODIUM CHLORIDE 9 MG/ML
INJECTION, SOLUTION INTRAVENOUS CONTINUOUS
Status: DISCONTINUED | OUTPATIENT
Start: 2023-06-27 | End: 2023-06-28

## 2023-06-26 RX ORDER — ONDANSETRON 4 MG/1
4 TABLET, ORALLY DISINTEGRATING ORAL EVERY 8 HOURS PRN
Status: DISCONTINUED | OUTPATIENT
Start: 2023-06-26 | End: 2023-06-30 | Stop reason: HOSPADM

## 2023-06-26 RX ORDER — 0.9 % SODIUM CHLORIDE 0.9 %
1000 INTRAVENOUS SOLUTION INTRAVENOUS ONCE
Status: COMPLETED | OUTPATIENT
Start: 2023-06-26 | End: 2023-06-26

## 2023-06-26 RX ORDER — SODIUM CHLORIDE 9 MG/ML
INJECTION, SOLUTION INTRAVENOUS PRN
Status: DISCONTINUED | OUTPATIENT
Start: 2023-06-26 | End: 2023-06-30 | Stop reason: HOSPADM

## 2023-06-26 RX ORDER — SODIUM CHLORIDE 9 MG/ML
INJECTION, SOLUTION INTRAVENOUS CONTINUOUS
Status: DISCONTINUED | OUTPATIENT
Start: 2023-06-26 | End: 2023-06-26 | Stop reason: HOSPADM

## 2023-06-26 RX ORDER — POLYETHYLENE GLYCOL 3350 17 G/17G
17 POWDER, FOR SOLUTION ORAL DAILY PRN
Status: DISCONTINUED | OUTPATIENT
Start: 2023-06-26 | End: 2023-06-30 | Stop reason: HOSPADM

## 2023-06-26 RX ORDER — SODIUM CHLORIDE 0.9 % (FLUSH) 0.9 %
5-40 SYRINGE (ML) INJECTION PRN
Status: DISCONTINUED | OUTPATIENT
Start: 2023-06-26 | End: 2023-06-30 | Stop reason: HOSPADM

## 2023-06-26 RX ORDER — ACETAMINOPHEN 650 MG/1
650 SUPPOSITORY RECTAL EVERY 6 HOURS PRN
Status: DISCONTINUED | OUTPATIENT
Start: 2023-06-26 | End: 2023-06-30 | Stop reason: HOSPADM

## 2023-06-26 RX ORDER — ENOXAPARIN SODIUM 100 MG/ML
30 INJECTION SUBCUTANEOUS DAILY
Status: DISCONTINUED | OUTPATIENT
Start: 2023-06-27 | End: 2023-06-30 | Stop reason: HOSPADM

## 2023-06-26 RX ORDER — SODIUM CHLORIDE 0.9 % (FLUSH) 0.9 %
5-40 SYRINGE (ML) INJECTION EVERY 12 HOURS SCHEDULED
Status: DISCONTINUED | OUTPATIENT
Start: 2023-06-27 | End: 2023-06-30 | Stop reason: HOSPADM

## 2023-06-26 RX ADMIN — SODIUM CHLORIDE 1000 ML: 9 INJECTION, SOLUTION INTRAVENOUS at 16:58

## 2023-06-26 RX ADMIN — WATER 1000 MG: 1 INJECTION INTRAMUSCULAR; INTRAVENOUS; SUBCUTANEOUS at 18:38

## 2023-06-26 RX ADMIN — SODIUM CHLORIDE: 9 INJECTION, SOLUTION INTRAVENOUS at 17:31

## 2023-06-26 ASSESSMENT — PAIN - FUNCTIONAL ASSESSMENT: PAIN_FUNCTIONAL_ASSESSMENT: ADULT NONVERBAL PAIN SCALE (NPVS)

## 2023-06-27 PROBLEM — E44.0 MODERATE PROTEIN-CALORIE MALNUTRITION (HCC): Chronic | Status: ACTIVE | Noted: 2023-06-27

## 2023-06-27 LAB
ALBUMIN SERPL-MCNC: 3.1 G/DL (ref 3.5–5.2)
ALP SERPL-CCNC: 80 U/L (ref 35–104)
ALT SERPL-CCNC: 17 U/L (ref 0–32)
ANION GAP SERPL CALCULATED.3IONS-SCNC: 8 MMOL/L (ref 7–16)
AST SERPL-CCNC: 30 U/L (ref 0–31)
BASOPHILS # BLD: 0.02 E9/L (ref 0–0.2)
BASOPHILS NFR BLD: 0.1 % (ref 0–2)
BILIRUB SERPL-MCNC: 0.2 MG/DL (ref 0–1.2)
BUN SERPL-MCNC: 46 MG/DL (ref 6–23)
CALCIUM SERPL-MCNC: 10.2 MG/DL (ref 8.6–10.2)
CHLORIDE SERPL-SCNC: 95 MMOL/L (ref 98–107)
CO2 SERPL-SCNC: 34 MMOL/L (ref 22–29)
CREAT SERPL-MCNC: 0.7 MG/DL (ref 0.5–1)
EKG ATRIAL RATE: 86 BPM
EKG P AXIS: 64 DEGREES
EKG P-R INTERVAL: 156 MS
EKG Q-T INTERVAL: 374 MS
EKG QRS DURATION: 88 MS
EKG QTC CALCULATION (BAZETT): 447 MS
EKG R AXIS: 9 DEGREES
EKG T AXIS: 33 DEGREES
EKG VENTRICULAR RATE: 86 BPM
EOSINOPHIL # BLD: 0.08 E9/L (ref 0.05–0.5)
EOSINOPHIL NFR BLD: 0.5 % (ref 0–6)
ERYTHROCYTE [DISTWIDTH] IN BLOOD BY AUTOMATED COUNT: 12.3 FL (ref 11.5–15)
GLUCOSE SERPL-MCNC: 85 MG/DL (ref 74–99)
HCT VFR BLD AUTO: 35.2 % (ref 34–48)
HGB BLD-MCNC: 11.8 G/DL (ref 11.5–15.5)
IMM GRANULOCYTES # BLD: 0.06 E9/L
IMM GRANULOCYTES NFR BLD: 0.4 % (ref 0–5)
LYMPHOCYTES # BLD: 1.69 E9/L (ref 1.5–4)
LYMPHOCYTES NFR BLD: 10.8 % (ref 20–42)
MCH RBC QN AUTO: 32.4 PG (ref 26–35)
MCHC RBC AUTO-ENTMCNC: 33.5 % (ref 32–34.5)
MCV RBC AUTO: 96.7 FL (ref 80–99.9)
MONOCYTES # BLD: 1.25 E9/L (ref 0.1–0.95)
MONOCYTES NFR BLD: 8 % (ref 2–12)
NEUTROPHILS # BLD: 12.61 E9/L (ref 1.8–7.3)
NEUTS SEG NFR BLD: 80.2 % (ref 43–80)
PLATELET # BLD AUTO: 201 E9/L (ref 130–450)
PMV BLD AUTO: 11.1 FL (ref 7–12)
POTASSIUM SERPL-SCNC: 3.6 MMOL/L (ref 3.5–5)
PROT SERPL-MCNC: 7.9 G/DL (ref 6.4–8.3)
RBC # BLD AUTO: 3.64 E12/L (ref 3.5–5.5)
SODIUM SERPL-SCNC: 137 MMOL/L (ref 132–146)
WBC # BLD: 15.7 E9/L (ref 4.5–11.5)

## 2023-06-27 PROCEDURE — 6360000002 HC RX W HCPCS

## 2023-06-27 PROCEDURE — 1200000000 HC SEMI PRIVATE

## 2023-06-27 PROCEDURE — 99232 SBSQ HOSP IP/OBS MODERATE 35: CPT | Performed by: INTERNAL MEDICINE

## 2023-06-27 PROCEDURE — 36415 COLL VENOUS BLD VENIPUNCTURE: CPT

## 2023-06-27 PROCEDURE — 6370000000 HC RX 637 (ALT 250 FOR IP)

## 2023-06-27 PROCEDURE — 93010 ELECTROCARDIOGRAM REPORT: CPT | Performed by: INTERNAL MEDICINE

## 2023-06-27 PROCEDURE — 85025 COMPLETE CBC W/AUTO DIFF WBC: CPT

## 2023-06-27 PROCEDURE — APPSS30 APP SPLIT SHARED TIME 16-30 MINUTES: Performed by: NURSE PRACTITIONER

## 2023-06-27 PROCEDURE — 80053 COMPREHEN METABOLIC PANEL: CPT

## 2023-06-27 PROCEDURE — 2580000003 HC RX 258

## 2023-06-27 RX ORDER — VALPROIC ACID 250 MG/5ML
1000 SOLUTION ORAL 2 TIMES DAILY
Status: DISCONTINUED | OUTPATIENT
Start: 2023-06-27 | End: 2023-06-30 | Stop reason: HOSPADM

## 2023-06-27 RX ORDER — FAMOTIDINE 20 MG/1
20 TABLET, FILM COATED ORAL 2 TIMES DAILY
Status: DISCONTINUED | OUTPATIENT
Start: 2023-06-27 | End: 2023-06-30 | Stop reason: HOSPADM

## 2023-06-27 RX ORDER — FERROUS SULFATE 325(65) MG
325 TABLET ORAL
Status: DISCONTINUED | OUTPATIENT
Start: 2023-06-27 | End: 2023-06-30 | Stop reason: HOSPADM

## 2023-06-27 RX ORDER — FOLIC ACID 1 MG/1
1 TABLET ORAL DAILY
Status: DISCONTINUED | OUTPATIENT
Start: 2023-06-27 | End: 2023-06-30 | Stop reason: HOSPADM

## 2023-06-27 RX ORDER — MIDODRINE HYDROCHLORIDE 5 MG/1
5 TABLET ORAL 3 TIMES DAILY PRN
Status: DISCONTINUED | OUTPATIENT
Start: 2023-06-27 | End: 2023-06-30 | Stop reason: HOSPADM

## 2023-06-27 RX ORDER — CHOLECALCIFEROL (VITAMIN D3) 50 MCG
5000 TABLET ORAL DAILY
Status: DISCONTINUED | OUTPATIENT
Start: 2023-06-27 | End: 2023-06-30 | Stop reason: HOSPADM

## 2023-06-27 RX ORDER — ALBUTEROL SULFATE 2.5 MG/3ML
2.5 SOLUTION RESPIRATORY (INHALATION) EVERY 6 HOURS PRN
Status: DISCONTINUED | OUTPATIENT
Start: 2023-06-27 | End: 2023-06-30 | Stop reason: HOSPADM

## 2023-06-27 RX ORDER — MONTELUKAST SODIUM 10 MG/1
10 TABLET ORAL NIGHTLY
Status: DISCONTINUED | OUTPATIENT
Start: 2023-06-27 | End: 2023-06-30 | Stop reason: HOSPADM

## 2023-06-27 RX ORDER — LEVOTHYROXINE SODIUM 0.07 MG/1
150 TABLET ORAL DAILY
Status: DISCONTINUED | OUTPATIENT
Start: 2023-06-27 | End: 2023-06-30 | Stop reason: HOSPADM

## 2023-06-27 RX ORDER — DOCUSATE SODIUM 50 MG/5ML
100 LIQUID ORAL DAILY
Status: DISCONTINUED | OUTPATIENT
Start: 2023-06-27 | End: 2023-06-30 | Stop reason: HOSPADM

## 2023-06-27 RX ORDER — OLANZAPINE 10 MG/1
10 TABLET ORAL DAILY
Status: DISCONTINUED | OUTPATIENT
Start: 2023-06-27 | End: 2023-06-30 | Stop reason: HOSPADM

## 2023-06-27 RX ORDER — OLANZAPINE 10 MG/1
20 TABLET ORAL NIGHTLY
Status: DISCONTINUED | OUTPATIENT
Start: 2023-06-27 | End: 2023-06-30 | Stop reason: HOSPADM

## 2023-06-27 RX ORDER — PAROXETINE 10 MG/1
10 TABLET, FILM COATED ORAL NIGHTLY
Status: DISCONTINUED | OUTPATIENT
Start: 2023-06-27 | End: 2023-06-30 | Stop reason: HOSPADM

## 2023-06-27 RX ADMIN — Medication 5000 UNITS: at 03:35

## 2023-06-27 RX ADMIN — SODIUM CHLORIDE, PRESERVATIVE FREE 10 ML: 5 INJECTION INTRAVENOUS at 16:27

## 2023-06-27 RX ADMIN — FOLIC ACID 1 MG: 1 TABLET ORAL at 08:37

## 2023-06-27 RX ADMIN — ENOXAPARIN SODIUM 30 MG: 100 INJECTION SUBCUTANEOUS at 08:37

## 2023-06-27 RX ADMIN — PAROXETINE 10 MG: 10 TABLET, FILM COATED ORAL at 03:35

## 2023-06-27 RX ADMIN — OLANZAPINE 20 MG: 10 TABLET, FILM COATED ORAL at 21:53

## 2023-06-27 RX ADMIN — FERROUS SULFATE TAB 325 MG (65 MG ELEMENTAL FE) 325 MG: 325 (65 FE) TAB at 08:37

## 2023-06-27 RX ADMIN — SODIUM CHLORIDE: 9 INJECTION, SOLUTION INTRAVENOUS at 01:05

## 2023-06-27 RX ADMIN — FAMOTIDINE 20 MG: 20 TABLET ORAL at 08:37

## 2023-06-27 RX ADMIN — FAMOTIDINE 20 MG: 20 TABLET ORAL at 03:35

## 2023-06-27 RX ADMIN — OLANZAPINE 10 MG: 10 TABLET, FILM COATED ORAL at 08:37

## 2023-06-27 RX ADMIN — VALPROIC ACID 1000 MG: 250 SOLUTION ORAL at 08:38

## 2023-06-27 RX ADMIN — WATER 1000 MG: 1 INJECTION INTRAMUSCULAR; INTRAVENOUS; SUBCUTANEOUS at 18:17

## 2023-06-27 RX ADMIN — DOCUSATE SODIUM LIQUID 100 MG: 100 LIQUID ORAL at 08:37

## 2023-06-27 RX ADMIN — VALPROIC ACID 1000 MG: 250 SOLUTION ORAL at 03:35

## 2023-06-27 RX ADMIN — MONTELUKAST SODIUM 10 MG: 10 TABLET ORAL at 03:35

## 2023-06-27 RX ADMIN — Medication 5000 UNITS: at 21:52

## 2023-06-27 RX ADMIN — PAROXETINE 10 MG: 10 TABLET, FILM COATED ORAL at 21:53

## 2023-06-27 RX ADMIN — MONTELUKAST SODIUM 10 MG: 10 TABLET ORAL at 21:52

## 2023-06-27 RX ADMIN — LEVOTHYROXINE SODIUM 150 MCG: 75 TABLET ORAL at 08:42

## 2023-06-27 RX ADMIN — OLANZAPINE 20 MG: 10 TABLET, FILM COATED ORAL at 03:35

## 2023-06-27 RX ADMIN — FAMOTIDINE 20 MG: 20 TABLET ORAL at 21:50

## 2023-06-27 RX ADMIN — VALPROIC ACID 1000 MG: 250 SOLUTION ORAL at 21:53

## 2023-06-27 RX ADMIN — Medication 10 ML: at 21:53

## 2023-06-28 LAB
ALBUMIN SERPL-MCNC: 2.7 G/DL (ref 3.5–5.2)
ALP SERPL-CCNC: 73 U/L (ref 35–104)
ALT SERPL-CCNC: 14 U/L (ref 0–32)
ANION GAP SERPL CALCULATED.3IONS-SCNC: 7 MMOL/L (ref 7–16)
AST SERPL-CCNC: 23 U/L (ref 0–31)
BASOPHILS # BLD: 0.02 E9/L (ref 0–0.2)
BASOPHILS NFR BLD: 0.2 % (ref 0–2)
BILIRUB SERPL-MCNC: <0.2 MG/DL (ref 0–1.2)
BUN SERPL-MCNC: 28 MG/DL (ref 6–23)
CALCIUM SERPL-MCNC: 9.6 MG/DL (ref 8.6–10.2)
CHLORIDE SERPL-SCNC: 106 MMOL/L (ref 98–107)
CO2 SERPL-SCNC: 29 MMOL/L (ref 22–29)
CREAT SERPL-MCNC: 0.6 MG/DL (ref 0.5–1)
EOSINOPHIL # BLD: 0.14 E9/L (ref 0.05–0.5)
EOSINOPHIL NFR BLD: 1.3 % (ref 0–6)
ERYTHROCYTE [DISTWIDTH] IN BLOOD BY AUTOMATED COUNT: 12.6 FL (ref 11.5–15)
GLUCOSE SERPL-MCNC: 87 MG/DL (ref 74–99)
HCT VFR BLD AUTO: 34 % (ref 34–48)
HGB BLD-MCNC: 10.8 G/DL (ref 11.5–15.5)
IMM GRANULOCYTES # BLD: 0.05 E9/L
IMM GRANULOCYTES NFR BLD: 0.5 % (ref 0–5)
LYMPHOCYTES # BLD: 2.19 E9/L (ref 1.5–4)
LYMPHOCYTES NFR BLD: 20.4 % (ref 20–42)
MCH RBC QN AUTO: 31.7 PG (ref 26–35)
MCHC RBC AUTO-ENTMCNC: 31.8 % (ref 32–34.5)
MCV RBC AUTO: 99.7 FL (ref 80–99.9)
MONOCYTES # BLD: 1.01 E9/L (ref 0.1–0.95)
MONOCYTES NFR BLD: 9.4 % (ref 2–12)
NEUTROPHILS # BLD: 7.33 E9/L (ref 1.8–7.3)
NEUTS SEG NFR BLD: 68.2 % (ref 43–80)
PLATELET # BLD AUTO: 190 E9/L (ref 130–450)
PMV BLD AUTO: 11 FL (ref 7–12)
POTASSIUM SERPL-SCNC: 4 MMOL/L (ref 3.5–5)
PROT SERPL-MCNC: 7 G/DL (ref 6.4–8.3)
RBC # BLD AUTO: 3.41 E12/L (ref 3.5–5.5)
SODIUM SERPL-SCNC: 142 MMOL/L (ref 132–146)
WBC # BLD: 10.7 E9/L (ref 4.5–11.5)

## 2023-06-28 PROCEDURE — 99232 SBSQ HOSP IP/OBS MODERATE 35: CPT | Performed by: INTERNAL MEDICINE

## 2023-06-28 PROCEDURE — 2580000003 HC RX 258

## 2023-06-28 PROCEDURE — 85025 COMPLETE CBC W/AUTO DIFF WBC: CPT

## 2023-06-28 PROCEDURE — 6370000000 HC RX 637 (ALT 250 FOR IP)

## 2023-06-28 PROCEDURE — 1200000000 HC SEMI PRIVATE

## 2023-06-28 PROCEDURE — 6360000002 HC RX W HCPCS

## 2023-06-28 PROCEDURE — 36415 COLL VENOUS BLD VENIPUNCTURE: CPT

## 2023-06-28 PROCEDURE — 80053 COMPREHEN METABOLIC PANEL: CPT

## 2023-06-28 PROCEDURE — APPSS30 APP SPLIT SHARED TIME 16-30 MINUTES: Performed by: NURSE PRACTITIONER

## 2023-06-28 RX ADMIN — OLANZAPINE 10 MG: 10 TABLET, FILM COATED ORAL at 08:33

## 2023-06-28 RX ADMIN — ENOXAPARIN SODIUM 30 MG: 100 INJECTION SUBCUTANEOUS at 08:34

## 2023-06-28 RX ADMIN — FERROUS SULFATE TAB 325 MG (65 MG ELEMENTAL FE) 325 MG: 325 (65 FE) TAB at 08:33

## 2023-06-28 RX ADMIN — Medication 10 ML: at 08:33

## 2023-06-28 RX ADMIN — Medication 10 ML: at 22:53

## 2023-06-28 RX ADMIN — VALPROIC ACID 1000 MG: 250 SOLUTION ORAL at 08:32

## 2023-06-28 RX ADMIN — Medication 5000 UNITS: at 21:45

## 2023-06-28 RX ADMIN — FAMOTIDINE 20 MG: 20 TABLET ORAL at 21:38

## 2023-06-28 RX ADMIN — LEVOTHYROXINE SODIUM 150 MCG: 75 TABLET ORAL at 07:00

## 2023-06-28 RX ADMIN — OLANZAPINE 20 MG: 10 TABLET, FILM COATED ORAL at 21:38

## 2023-06-28 RX ADMIN — DOCUSATE SODIUM LIQUID 100 MG: 100 LIQUID ORAL at 08:32

## 2023-06-28 RX ADMIN — FAMOTIDINE 20 MG: 20 TABLET ORAL at 08:33

## 2023-06-28 RX ADMIN — VALPROIC ACID 1000 MG: 250 SOLUTION ORAL at 21:39

## 2023-06-28 RX ADMIN — WATER 1000 MG: 1 INJECTION INTRAMUSCULAR; INTRAVENOUS; SUBCUTANEOUS at 18:16

## 2023-06-28 RX ADMIN — PAROXETINE 10 MG: 10 TABLET, FILM COATED ORAL at 21:38

## 2023-06-28 RX ADMIN — FOLIC ACID 1 MG: 1 TABLET ORAL at 08:33

## 2023-06-28 RX ADMIN — MONTELUKAST SODIUM 10 MG: 10 TABLET ORAL at 21:38

## 2023-06-29 VITALS
HEIGHT: 65 IN | WEIGHT: 121.25 LBS | DIASTOLIC BLOOD PRESSURE: 66 MMHG | RESPIRATION RATE: 16 BRPM | HEART RATE: 86 BPM | OXYGEN SATURATION: 100 % | TEMPERATURE: 98 F | SYSTOLIC BLOOD PRESSURE: 144 MMHG | BODY MASS INDEX: 20.2 KG/M2

## 2023-06-29 LAB
ALBUMIN SERPL-MCNC: 3.2 G/DL (ref 3.5–5.2)
ALP SERPL-CCNC: 73 U/L (ref 35–104)
ALT SERPL-CCNC: 14 U/L (ref 0–32)
ANION GAP SERPL CALCULATED.3IONS-SCNC: 6 MMOL/L (ref 7–16)
AST SERPL-CCNC: 25 U/L (ref 0–31)
BACTERIA UR CULT: ABNORMAL
BACTERIA UR CULT: ABNORMAL
BASOPHILS # BLD: 0.02 E9/L (ref 0–0.2)
BASOPHILS NFR BLD: 0.2 % (ref 0–2)
BILIRUB SERPL-MCNC: <0.2 MG/DL (ref 0–1.2)
BUN SERPL-MCNC: 20 MG/DL (ref 6–23)
CALCIUM SERPL-MCNC: 10 MG/DL (ref 8.6–10.2)
CHLORIDE SERPL-SCNC: 108 MMOL/L (ref 98–107)
CO2 SERPL-SCNC: 30 MMOL/L (ref 22–29)
CREAT SERPL-MCNC: 0.6 MG/DL (ref 0.5–1)
EOSINOPHIL # BLD: 0.15 E9/L (ref 0.05–0.5)
EOSINOPHIL NFR BLD: 1.6 % (ref 0–6)
ERYTHROCYTE [DISTWIDTH] IN BLOOD BY AUTOMATED COUNT: 12.8 FL (ref 11.5–15)
GLUCOSE SERPL-MCNC: 69 MG/DL (ref 74–99)
HCT VFR BLD AUTO: 38.4 % (ref 34–48)
HGB BLD-MCNC: 12 G/DL (ref 11.5–15.5)
IMM GRANULOCYTES # BLD: 0.06 E9/L
IMM GRANULOCYTES NFR BLD: 0.6 % (ref 0–5)
LYMPHOCYTES # BLD: 2.51 E9/L (ref 1.5–4)
LYMPHOCYTES NFR BLD: 26 % (ref 20–42)
MCH RBC QN AUTO: 31.8 PG (ref 26–35)
MCHC RBC AUTO-ENTMCNC: 31.3 % (ref 32–34.5)
MCV RBC AUTO: 101.9 FL (ref 80–99.9)
MONOCYTES # BLD: 0.97 E9/L (ref 0.1–0.95)
MONOCYTES NFR BLD: 10.1 % (ref 2–12)
NEUTROPHILS # BLD: 5.94 E9/L (ref 1.8–7.3)
NEUTS SEG NFR BLD: 61.5 % (ref 43–80)
ORGANISM: ABNORMAL
PLATELET # BLD AUTO: 216 E9/L (ref 130–450)
PMV BLD AUTO: 10.8 FL (ref 7–12)
POTASSIUM SERPL-SCNC: 4.1 MMOL/L (ref 3.5–5)
PROT SERPL-MCNC: 7.4 G/DL (ref 6.4–8.3)
RBC # BLD AUTO: 3.77 E12/L (ref 3.5–5.5)
SODIUM SERPL-SCNC: 144 MMOL/L (ref 132–146)
WBC # BLD: 9.7 E9/L (ref 4.5–11.5)

## 2023-06-29 PROCEDURE — 6360000002 HC RX W HCPCS

## 2023-06-29 PROCEDURE — 85025 COMPLETE CBC W/AUTO DIFF WBC: CPT

## 2023-06-29 PROCEDURE — 6370000000 HC RX 637 (ALT 250 FOR IP): Performed by: SPECIALIST

## 2023-06-29 PROCEDURE — 80053 COMPREHEN METABOLIC PANEL: CPT

## 2023-06-29 PROCEDURE — 99239 HOSP IP/OBS DSCHRG MGMT >30: CPT | Performed by: NURSE PRACTITIONER

## 2023-06-29 PROCEDURE — 1200000000 HC SEMI PRIVATE

## 2023-06-29 PROCEDURE — 36415 COLL VENOUS BLD VENIPUNCTURE: CPT

## 2023-06-29 PROCEDURE — 6370000000 HC RX 637 (ALT 250 FOR IP)

## 2023-06-29 PROCEDURE — 2580000003 HC RX 258

## 2023-06-29 RX ORDER — SULFAMETHOXAZOLE AND TRIMETHOPRIM 200; 40 MG/5ML; MG/5ML
160 SUSPENSION ORAL EVERY 12 HOURS
Status: DISCONTINUED | OUTPATIENT
Start: 2023-06-29 | End: 2023-06-30 | Stop reason: HOSPADM

## 2023-06-29 RX ORDER — BUMETANIDE 1 MG/1
1 TABLET ORAL DAILY
Qty: 30 TABLET | Refills: 0 | DISCHARGE
Start: 2023-07-03

## 2023-06-29 RX ORDER — SULFAMETHOXAZOLE AND TRIMETHOPRIM 200; 40 MG/5ML; MG/5ML
160 SUSPENSION ORAL EVERY 12 HOURS
Refills: 0 | DISCHARGE
Start: 2023-06-29 | End: 2023-07-02

## 2023-06-29 RX ADMIN — LEVOTHYROXINE SODIUM 150 MCG: 75 TABLET ORAL at 05:22

## 2023-06-29 RX ADMIN — FAMOTIDINE 20 MG: 20 TABLET ORAL at 09:30

## 2023-06-29 RX ADMIN — PAROXETINE 10 MG: 10 TABLET, FILM COATED ORAL at 20:16

## 2023-06-29 RX ADMIN — OLANZAPINE 20 MG: 10 TABLET, FILM COATED ORAL at 20:15

## 2023-06-29 RX ADMIN — FAMOTIDINE 20 MG: 20 TABLET ORAL at 20:15

## 2023-06-29 RX ADMIN — SULFAMETHOXAZOLE AND TRIMETHOPRIM 20 ML: 200; 40 SUSPENSION ORAL at 15:31

## 2023-06-29 RX ADMIN — DOCUSATE SODIUM LIQUID 100 MG: 100 LIQUID ORAL at 09:28

## 2023-06-29 RX ADMIN — VALPROIC ACID 1000 MG: 250 SOLUTION ORAL at 09:33

## 2023-06-29 RX ADMIN — MONTELUKAST SODIUM 10 MG: 10 TABLET ORAL at 20:16

## 2023-06-29 RX ADMIN — ENOXAPARIN SODIUM 30 MG: 100 INJECTION SUBCUTANEOUS at 09:29

## 2023-06-29 RX ADMIN — FOLIC ACID 1 MG: 1 TABLET ORAL at 09:30

## 2023-06-29 RX ADMIN — VALPROIC ACID 1000 MG: 250 SOLUTION ORAL at 20:16

## 2023-06-29 RX ADMIN — Medication 5000 UNITS: at 23:19

## 2023-06-29 RX ADMIN — Medication 10 ML: at 09:30

## 2023-06-29 RX ADMIN — OLANZAPINE 10 MG: 10 TABLET, FILM COATED ORAL at 09:30

## 2023-06-29 RX ADMIN — Medication 10 ML: at 20:16

## 2023-06-29 RX ADMIN — FERROUS SULFATE TAB 325 MG (65 MG ELEMENTAL FE) 325 MG: 325 (65 FE) TAB at 09:30

## 2023-06-29 ASSESSMENT — PAIN SCALES - GENERAL: PAINLEVEL_OUTOF10: 0

## 2023-07-02 LAB
BACTERIA BLD CULT ORG #2: NORMAL
BACTERIA BLD CULT: NORMAL

## 2023-07-10 RX ORDER — MONTELUKAST SODIUM 5 MG/1
TABLET, CHEWABLE ORAL
Qty: 186 TABLET | Refills: 0 | Status: SHIPPED | OUTPATIENT
Start: 2023-07-10

## 2023-07-10 NOTE — TELEPHONE ENCOUNTER
Last Appointment:  1/16/2023  Future Appointments   Date Time Provider [address]   7/17/2023  1:00 PM Amandeep Velasquez

## 2023-07-12 RX ORDER — FERROUS SULFATE 220 (44)/5
ELIXIR ORAL
Qty: 240 ML | Refills: 0 | Status: SHIPPED | OUTPATIENT
Start: 2023-07-12

## 2023-07-12 RX ORDER — FAMOTIDINE 40 MG/5ML
POWDER, FOR SUSPENSION ORAL
Qty: 150 ML | Refills: 0 | Status: SHIPPED | OUTPATIENT
Start: 2023-07-12

## 2023-07-12 NOTE — TELEPHONE ENCOUNTER
Last Appointment:  1/16/2023  Future Appointments   Date Time Provider [address]   7/17/2023  1:00 PM Amandeep Hankins

## 2023-07-16 DIAGNOSIS — I95.9 HYPOTENSION, UNSPECIFIED HYPOTENSION TYPE: ICD-10-CM

## 2023-07-17 RX ORDER — BUMETANIDE 1 MG/1
TABLET ORAL
Qty: 31 TABLET | Refills: 5 | Status: SHIPPED | OUTPATIENT
Start: 2023-07-17

## 2023-07-17 RX ORDER — MIDODRINE HYDROCHLORIDE 2.5 MG/1
TABLET ORAL
Qty: 279 TABLET | Refills: 0 | Status: SHIPPED
Start: 2023-07-17 | End: 2023-08-23

## 2023-07-17 NOTE — TELEPHONE ENCOUNTER
Last Appointment:  1/16/2023  Future Appointments   Date Time Provider [address]   7/26/2023  1:00 PM Amandeep Brannon

## 2023-07-20 ENCOUNTER — APPOINTMENT (OUTPATIENT)
Dept: GENERAL RADIOLOGY | Age: 68
End: 2023-07-20
Payer: MEDICARE

## 2023-07-20 ENCOUNTER — HOSPITAL ENCOUNTER (EMERGENCY)
Age: 68
Discharge: HOME | End: 2023-07-20
Payer: MEDICARE

## 2023-07-20 VITALS
BODY MASS INDEX: 20.14 KG/M2 | TEMPERATURE: 96.7 F | OXYGEN SATURATION: 96 % | HEART RATE: 84 BPM | DIASTOLIC BLOOD PRESSURE: 54 MMHG | WEIGHT: 121 LBS | SYSTOLIC BLOOD PRESSURE: 128 MMHG | RESPIRATION RATE: 22 BRPM

## 2023-07-20 DIAGNOSIS — Z43.1 ATTENTION TO G-TUBE (HCC): ICD-10-CM

## 2023-07-20 DIAGNOSIS — T85.528A DISLODGED GASTROSTOMY TUBE: Primary | ICD-10-CM

## 2023-07-20 PROCEDURE — 99283 EMERGENCY DEPT VISIT LOW MDM: CPT

## 2023-07-20 PROCEDURE — 74018 RADEX ABDOMEN 1 VIEW: CPT

## 2023-07-20 PROCEDURE — 6360000004 HC RX CONTRAST MEDICATION: Performed by: PHYSICIAN ASSISTANT

## 2023-07-20 PROCEDURE — 43762 RPLC GTUBE NO REVJ TRC: CPT

## 2023-07-20 RX ADMIN — DIATRIZOATE MEGLUMINE AND DIATRIZOATE SODIUM 60 ML: 600; 100 SOLUTION ORAL; RECTAL at 13:27

## 2023-07-20 NOTE — DISCHARGE INSTRUCTIONS
Please continue to have the patient wear the binder. Please do not patient pull at the gastrostomy tube.

## 2023-07-20 NOTE — CARE COORDINATION
Social Work/Transition of Care: Social Work/Transition of Care:     Pt in need of transportation back to [address] [place] [address] St contacted [facility] ETA 1700, BEVERLEY called @ 1700 ETA 2 to 3 more hours,  BEVERLEY called RAQUEL perdue will be available @ 1800, SW completed Medical Necessity form and updated ED RN.      Electronically signed by Lobito Plascencia on 0/31/2942 at 5:27 PM

## 2023-07-20 NOTE — ED PROVIDER NOTES
Independent ANDRIY Visit. [facility]  Department of Emergency Medicine   ED  Encounter Note  Admit Date/RoomTime: 2023 12:11 PM  ED Room:     NAME: Jeramy Groves  : 1955  MRN: [id number]     Chief Complaint:  G Tube Complications (Per the facility, patient had a lot of drainage around her tube. No drainage when I flushed both ports. No current drainage. )    History of Present Illness   History/Exam Limitations: chronic condition and communication barrier. Jeramy Groves is a 70 y.o. old female with a past medical history of:   Past Medical History:   Diagnosis Date    Abscess     periapical    Acute kidney injury ([address]) 01/15/2017    d/t Vancomycin, on Dialysis [facility] right chest    Anemia     iron deficiency anemia    Blind in both eyes     Dementia ([address])     Dysphagia     Essential hypertension 2021    Gastroesophageal reflux disease without esophagitis 2021    Hemodialysis patient ([address])     Hernia, diaphragmatic     Hyperthyroidism     MR (mental retardation)      cerebral leukomalacia     Osteoarthritis     Other seizures ([address])     Peptic ulcer     Peripheral vascular disease ([address])     Pneumonia 2017    Pneumonia due to infectious organism 2017    Sepsis ([address]) 2021    presents to the emergency department from [facility] nursing facility, for evaluation for malfunctioning gastrostomy tube. She has a permanent gastrostomy tube. The patient has no associated symptoms. Patient has been seen multiple times for G-tube replacement. Patient was last placed with an 25 [place] G-tube within the last month. Patient has chronic falls and chronically is removing the G-tube. Patient is completely nonverbal unable to obtain history    ROS   Pertinent positives and negatives are stated within HPI, all other systems reviewed and are negative.     Past Medical History:  has a past medical history of Abscess, Acute kidney injury ([address]), Anemia, Blind 07/20/23. Imaging: All Radiology results interpreted by Radiologist unless otherwise noted. XR ABDOMEN FOR NG/OG/NE TUBE PLACEMENT   Final Result   Intraluminal gastrostomy tube. The balloon on the tube appears to be located   in the duodenal bulb. ED Course / Medical Decision Making     Medications   diatrizoate meglumine-sodium (GASTROGRAFIN) 66-10 % solution 60 mL (60 mLs Oral Given 7/20/23 1327)        Consult(s):   None    Procedure(s):  PROCEDURE  7/20/23       Time: 1330    FEEDING TUBE REPLACEMENT  Risks, benefits and alternatives (for applicable procedures below) described. Performed By: Mario Alberto Martin PA-C. Indication: Tube fell out. Informed consent: Verbal consent obtained. The patient was counseled regarding the procedure in person, it's indications, risks, potential complications and alternatives and any questions were answered. Verbal consent was obtained. Local Anesthesia:  not required/indicated. Procedure: A feeding tube was replaced using an 18 Vietnamese gastrostomy tube and the bulb was inflated using 12 cc of normal saline. Tube was secured to skin pre-attached rubber skin bumper device                                                          . Post-Procedure: Tube position confirmed by x-ray with contrast injection. Patient tolerated the procedure well. Complications:  None. MDM:   Pt  presents to the emergency department from [facility] nursing [facility], for evaluation for malfunctioning gastrostomy tube. She has a permanent gastrostomy tube. The patient has no associated symptoms. Patient has been seen multiple times for G-tube replacement. Patient was last placed with an 25 [place] G-tube within the last month. Patient has chronic falls and chronically is removing the G-tube. Patient is completely nonverbal unable to obtain history,  Patient had a thorough examination and G-tube was partially removed.   25 Moldovan was ordered which was the last show

## 2023-07-24 ENCOUNTER — TELEPHONE (OUTPATIENT)
Dept: FAMILY MEDICINE CLINIC | Age: 68
End: 2023-07-24

## 2023-07-24 NOTE — TELEPHONE ENCOUNTER
Phone call from Sabrina Cabezas, pharmacist with [facility]. Liquid iron is no longer available. He wants to know if there is an alternative or if it will be dc'd.

## 2023-07-24 NOTE — TELEPHONE ENCOUNTER
Phone call again from pharmacist. He thinks they found an alternative. They will get it ordered for patient.

## 2023-07-25 ENCOUNTER — TELEPHONE (OUTPATIENT)
Dept: FAMILY MEDICINE CLINIC | Age: 68
End: 2023-07-25

## 2023-07-25 NOTE — TELEPHONE ENCOUNTER
Patient's feeding pump is not turning on and has been out since at least 8am today 7/25. Ravi Ricardo is wanting to know if they can bolus her. Pt has been on continuous feeds since coming out of nursing home 1 week ago. Spoke with Dr. Yadi Charles and she stated it is okay to bolus pt and Amber Davis had stated they are trying to get pump fixed. Also pt has appt tomorrow 7/26/23. Laura Singh it was okay to bolus.

## 2023-07-26 ENCOUNTER — OFFICE VISIT (OUTPATIENT)
Dept: FAMILY MEDICINE CLINIC | Age: 68
End: 2023-07-26

## 2023-07-26 VITALS
DIASTOLIC BLOOD PRESSURE: 64 MMHG | RESPIRATION RATE: 16 BRPM | SYSTOLIC BLOOD PRESSURE: 104 MMHG | HEART RATE: 87 BPM | OXYGEN SATURATION: 95 % | TEMPERATURE: 98.6 F

## 2023-07-26 DIAGNOSIS — Z09 HOSPITAL DISCHARGE FOLLOW-UP: Primary | ICD-10-CM

## 2023-07-26 DIAGNOSIS — R26.2 AMBULATORY DYSFUNCTION: ICD-10-CM

## 2023-07-26 DIAGNOSIS — F73 PROFOUND INTELLECTUAL DISABILITY: ICD-10-CM

## 2023-07-26 DIAGNOSIS — F03.918 DEMENTIA WITH BEHAVIORAL DISTURBANCE (HCC): ICD-10-CM

## 2023-07-26 SDOH — ECONOMIC STABILITY: FOOD INSECURITY: WITHIN THE PAST 12 MONTHS, YOU WORRIED THAT YOUR FOOD WOULD RUN OUT BEFORE YOU GOT MONEY TO BUY MORE.: NEVER TRUE

## 2023-07-26 SDOH — ECONOMIC STABILITY: FOOD INSECURITY: WITHIN THE PAST 12 MONTHS, THE FOOD YOU BOUGHT JUST DIDN'T LAST AND YOU DIDN'T HAVE MONEY TO GET MORE.: NEVER TRUE

## 2023-07-26 SDOH — ECONOMIC STABILITY: INCOME INSECURITY: HOW HARD IS IT FOR YOU TO PAY FOR THE VERY BASICS LIKE FOOD, HOUSING, MEDICAL CARE, AND HEATING?: NOT HARD AT ALL

## 2023-07-26 SDOH — ECONOMIC STABILITY: HOUSING INSECURITY
IN THE LAST 12 MONTHS, WAS THERE A TIME WHEN YOU DID NOT HAVE A STEADY PLACE TO SLEEP OR SLEPT IN A SHELTER (INCLUDING NOW)?: NO

## 2023-07-26 ASSESSMENT — PATIENT HEALTH QUESTIONNAIRE - PHQ9: DEPRESSION UNABLE TO ASSESS: FUNCTIONAL CAPACITY MOTIVATION LIMITS ACCURACY

## 2023-07-26 NOTE — PROGRESS NOTES
Back at her normal group home facility         Patient Active Problem List   Diagnosis    Profound intellectual disability    Hypothyroidism    Impairment level: total impairment of both eyes    Hyperglycemia    Nonsustained ventricular tachycardia (HCC)    Macrocytosis    Disruptive behavior disorder    Ingrowing nail    Peripheral vascular disease (HCC)    Altered mental state    Onychomycosis    Anemia due to chronic kidney disease    Essential hypertension    Vitamin D deficiency    Gastroesophageal reflux disease without esophagitis    S/P percutaneous endoscopic gastrostomy (PEG) tube placement (HCC)    Chronic kidney disease    Dementia with behavioral disturbance    PEG tube malfunction (HCC)    Severe dehydration    Hyponatremia    Intractable nausea and vomiting    Hypernatremia    BEENA (acute kidney injury) ([address])    Hypotension due to hypovolemia    Tachycardia    Metabolic encephalopathy    Nausea and vomiting    Severe protein-calorie malnutrition (HCC)    Hyperkalemia    Dental infection    Fever in adult    Dental abscess    Moderate protein-calorie malnutrition ([address])    Sepsis ([address])    Cystitis       Medications listed as ordered at the time of discharge from hospital     Medication List            Accurate as of July 26, 2023 11:59 PM. If you have any questions, ask your nurse or doctor. START taking these medications      PARoxetine 10 MG/5ML suspension  Commonly known as: Paxil  5 mLs by Per G Tube route at bedtime  Started by: Ronak Elena, DO            CONTINUE taking these medications      acetaminophen 325 MG tablet  Commonly known as: TYLENOL     bisacodyl 10 MG suppository  Commonly known as: DULCOLAX     blood glucose monitor kit and supplies  Dispense one Leo Matrix monitor and one lancet device. Patient tests blood glucose when nauseous, vomiting, or when tube feeding is held due to hypoglycemia risk. blood glucose test strips  Leo Matrix testing strips.

## 2023-07-28 RX ORDER — FERROUS SULFATE 220 (44)/5
ELIXIR ORAL
Qty: 240 ML | Refills: 0 | OUTPATIENT
Start: 2023-07-28

## 2023-07-28 NOTE — TELEPHONE ENCOUNTER
Pharmacy would like to know if you are wanting to continue Dulcolax suppositories and Docusate liquid. If so they are in need of prescriptions.   Please advise

## 2023-07-29 RX ORDER — PAROXETINE 10 MG/5ML
10 SUSPENSION ORAL NIGHTLY
Qty: 300 ML | Refills: 3 | Status: SHIPPED | OUTPATIENT
Start: 2023-07-29

## 2023-07-30 ENCOUNTER — APPOINTMENT (OUTPATIENT)
Dept: GENERAL RADIOLOGY | Age: 68
End: 2023-07-30
Payer: MEDICARE

## 2023-07-30 ENCOUNTER — HOSPITAL ENCOUNTER (EMERGENCY)
Age: 68
Discharge: HOME | End: 2023-07-31
Attending: STUDENT IN AN ORGANIZED HEALTH CARE EDUCATION/TRAINING PROGRAM
Payer: MEDICARE

## 2023-07-30 DIAGNOSIS — R68.89 COMPLAINT ASSOCIATED WITH GASTRIC TUBE (HCC): Primary | ICD-10-CM

## 2023-07-30 DIAGNOSIS — Z93.1 COMPLAINT ASSOCIATED WITH GASTRIC TUBE (HCC): Primary | ICD-10-CM

## 2023-07-30 PROCEDURE — 6360000004 HC RX CONTRAST MEDICATION: Performed by: STUDENT IN AN ORGANIZED HEALTH CARE EDUCATION/TRAINING PROGRAM

## 2023-07-30 PROCEDURE — 99283 EMERGENCY DEPT VISIT LOW MDM: CPT

## 2023-07-30 PROCEDURE — 74018 RADEX ABDOMEN 1 VIEW: CPT

## 2023-07-30 RX ADMIN — DIATRIZOATE MEGLUMINE AND DIATRIZOATE SODIUM 30 ML: 600; 100 SOLUTION ORAL; RECTAL at 20:59

## 2023-07-30 RX ADMIN — DIATRIZOATE MEGLUMINE AND DIATRIZOATE SODIUM 30 ML: 600; 100 SOLUTION ORAL; RECTAL at 21:16

## 2023-07-30 ASSESSMENT — LIFESTYLE VARIABLES
HOW OFTEN DO YOU HAVE A DRINK CONTAINING ALCOHOL: NEVER
HOW MANY STANDARD DRINKS CONTAINING ALCOHOL DO YOU HAVE ON A TYPICAL DAY: PATIENT DOES NOT DRINK

## 2023-07-30 ASSESSMENT — PAIN - FUNCTIONAL ASSESSMENT: PAIN_FUNCTIONAL_ASSESSMENT: NONE - DENIES PAIN

## 2023-07-30 NOTE — ED PROVIDER NOTES
Jodi        Pt Name: Danny Vicente  MRN: [id number]  [address] [facility] 1955  Date of evaluation: 7/30/2023  Provider: Lukas Cruz DO  PCP: Adryan Perez DO  Note Started: 7:46 PM EDT 7/30/23    CHIEF COMPLAINT       Chief Complaint   Patient presents with    G Tube Complications     Patient from group home, ems states that facility attempted to give tube feed and was not going through, states it would not flush and therefore sent patient in. HISTORY OF PRESENT ILLNESS: 1 or more Elements        Limitations to history : nonverbal at baseline    Danny Vicente is a 70 y.o. female who presents for a clogged G tube. Pt is nonverbal at baseline, history given by her caretaker from her group home. Per caretaker patient's G-tube was not able to flush today. Chart review shows that patient has been here several times for same complaint. Nursing Notes were all reviewed and agreed with or any disagreements were addressed in the HPI. REVIEW OF EXTERNAL NOTE :       Chart review shows many past visits for similar complaint      Chart Review/External Note Review    Last Echo reviewed by Me:  Lab Results   Component Value Date    LVEF 60 01/17/2017             Controlled Substance Monitoring:    Acute and Chronic Pain Monitoring:   RX Monitoring 6/11/2018   Attestation The Prescription Monitoring Report for this patient was reviewed today. Periodic Controlled Substance Monitoring No signs of potential drug abuse or diversion identified. REVIEW OF SYSTEMS :      Positives and Pertinent negatives as per HPI.      SURGICAL HISTORY     Past Surgical History:   Procedure Laterality Date    BRONCHOSCOPY  02/10/2017    CHEST TUBE INSERTION Right 02/09/2017    GASTROSTOMY TUBE PLACEMENT N/A 3/7/2021    EGD PEG TUBE PLACEMENT performed by Chad Stewart MD at [address] [place] [address]

## 2023-07-31 VITALS
HEIGHT: 65 IN | OXYGEN SATURATION: 95 % | RESPIRATION RATE: 15 BRPM | SYSTOLIC BLOOD PRESSURE: 104 MMHG | WEIGHT: 122 LBS | DIASTOLIC BLOOD PRESSURE: 65 MMHG | TEMPERATURE: 97.9 F | HEART RATE: 98 BPM | BODY MASS INDEX: 20.33 KG/M2

## 2023-07-31 DIAGNOSIS — F03.918 DEMENTIA WITH BEHAVIORAL DISTURBANCE (HCC): ICD-10-CM

## 2023-07-31 DIAGNOSIS — F73 PROFOUND INTELLECTUAL DISABILITY: ICD-10-CM

## 2023-07-31 RX ORDER — DOCUSATE SODIUM 50 MG/5ML
100 LIQUID ORAL DAILY
Qty: 300 ML | Refills: 0 | OUTPATIENT
Start: 2023-07-31

## 2023-07-31 RX ORDER — PAROXETINE 10 MG/5ML
10 SUSPENSION ORAL NIGHTLY
Qty: 150 ML | Refills: 5 | Status: SHIPPED
Start: 2023-07-31 | End: 2023-08-03 | Stop reason: SDUPTHER

## 2023-07-31 RX ORDER — BISACODYL 10 MG
10 SUPPOSITORY, RECTAL RECTAL DAILY
Qty: 90 SUPPOSITORY | Refills: 2 | OUTPATIENT
Start: 2023-07-31

## 2023-07-31 NOTE — DISCHARGE INSTRUCTIONS
Please follow-up with your primary care doctor for hospital follow-up. Please return to the ER for any new or worsening symptoms.

## 2023-07-31 NOTE — ED NOTES
D/C via ambulance back to facility. Gtube intact and patent. Report called.      Sebastián Bah RN  07/31/23 0003

## 2023-07-31 NOTE — TELEPHONE ENCOUNTER
Spoke with facility and pt is not having any issues with bowel movements currently. Please advise. They are also wanting to know if you could d/c nebulizer treatments as pt has not needed them for some time.   Please advise

## 2023-07-31 NOTE — ED NOTES
Gtube assessed. Irrigated/flushed with 20cc water. Initially some resistance noted- flushed with another 20cc water, no resistance observed. Caregiver remains at the bedside.      Landon Smith RN  07/30/23 4875

## 2023-07-31 NOTE — TELEPHONE ENCOUNTER
Phone call from pharmacy. Paxil refill was sent in on Saturday but pharmacist said her dose was increased to 10mg twice daily about 3 weeks ago. Please send new script.

## 2023-08-03 DIAGNOSIS — F73 PROFOUND INTELLECTUAL DISABILITY: ICD-10-CM

## 2023-08-03 DIAGNOSIS — F03.918 DEMENTIA WITH BEHAVIORAL DISTURBANCE (HCC): ICD-10-CM

## 2023-08-03 RX ORDER — PAROXETINE 10 MG/5ML
10 SUSPENSION ORAL 2 TIMES DAILY
Qty: 300 ML | Refills: 5 | Status: SHIPPED | OUTPATIENT
Start: 2023-08-03 | End: 2024-01-30

## 2023-08-03 RX ORDER — TRIAMCINOLONE ACETONIDE 1 MG/ML
LOTION TOPICAL 2 TIMES DAILY
Qty: 60 ML | Refills: 5 | Status: SHIPPED | OUTPATIENT
Start: 2023-08-03

## 2023-08-03 NOTE — TELEPHONE ENCOUNTER
I spoke with Rox Santoro at [facility] and was advised that patient is taking the paxil twice daily.

## 2023-08-17 RX ORDER — FAMOTIDINE 40 MG/5ML
POWDER, FOR SUSPENSION ORAL
Qty: 150 ML | Refills: 0 | Status: SHIPPED | OUTPATIENT
Start: 2023-08-17

## 2023-08-17 NOTE — TELEPHONE ENCOUNTER
Last Appointment:  7/26/2023  Future Appointments   Date Time Provider [address]   8/24/2023  3:45 PM Luverne Goltz, DPM [facility]   10/26/2023 12:30 PM Izabela Vicente DO [address] [facility]

## 2023-08-22 RX ORDER — CHOLECALCIFEROL (VITAMIN D3) 10(400)/ML
DROPS ORAL
Qty: 400 ML | Refills: 0 | Status: SHIPPED | OUTPATIENT
Start: 2023-08-22

## 2023-08-22 NOTE — TELEPHONE ENCOUNTER
Last Appointment:  7/26/2023  Future Appointments   Date Time Provider [address]   8/24/2023  3:45 PM EUGENIO Chavez [facility]   10/26/2023 12:30 PM Izabela Maldonado, DO [address] [facility]

## 2023-08-23 DIAGNOSIS — I95.9 HYPOTENSION, UNSPECIFIED HYPOTENSION TYPE: ICD-10-CM

## 2023-08-23 RX ORDER — FERROUS SULFATE 220 (44)/5
ELIXIR ORAL
Qty: 225 ML | Refills: 2 | Status: SHIPPED | OUTPATIENT
Start: 2023-08-23

## 2023-08-23 RX ORDER — MIDODRINE HYDROCHLORIDE 2.5 MG/1
TABLET ORAL
Qty: 270 TABLET | Refills: 0 | Status: SHIPPED
Start: 2023-08-23 | End: 2023-09-07

## 2023-08-23 NOTE — TELEPHONE ENCOUNTER
Last Appointment:  7/26/2023  Future Appointments   Date Time Provider [address]   8/24/2023  3:45 PM EUGENIO Berrios [facility]   10/26/2023 12:30 PM Izabela Hobbs, DO [facility] [facility]

## 2023-08-24 ENCOUNTER — PROCEDURE VISIT (OUTPATIENT)
Dept: PODIATRY | Age: 68
End: 2023-08-24
Payer: MEDICARE

## 2023-08-24 VITALS — TEMPERATURE: 97.5 F | BODY MASS INDEX: 20.3 KG/M2 | WEIGHT: 122 LBS

## 2023-08-24 DIAGNOSIS — H54.40 BLINDNESS OF LEFT EYE, UNSPECIFIED RIGHT EYE VISUAL IMPAIRMENT CATEGORY: ICD-10-CM

## 2023-08-24 DIAGNOSIS — I73.9 PERIPHERAL VASCULAR DISEASE, UNSPECIFIED (HCC): ICD-10-CM

## 2023-08-24 DIAGNOSIS — M79.674 PAIN IN TOE OF RIGHT FOOT: ICD-10-CM

## 2023-08-24 DIAGNOSIS — F79 INTELLECTUAL DISABILITY: ICD-10-CM

## 2023-08-24 DIAGNOSIS — R26.2 DIFFICULTY WALKING: ICD-10-CM

## 2023-08-24 DIAGNOSIS — B35.1 TINEA UNGUIUM: Primary | ICD-10-CM

## 2023-08-24 DIAGNOSIS — M79.675 PAIN IN LEFT TOE(S): ICD-10-CM

## 2023-08-24 PROCEDURE — 11721 DEBRIDE NAIL 6 OR MORE: CPT | Performed by: PODIATRIST

## 2023-08-24 NOTE — PROGRESS NOTES
6. Mental retardation    7. Blindness of left eye, unspecified right eye visual impairment category     No orders of the defined types were placed in this encounter. Plan: .  Pt was evaluated and examined. Patient was given personalized discharge instructions. Nails 1-10 were debrided in length and thickness sharply with a nail nipper and  without incident. Pt will follow up in 9 weeks or sooner if any problems arise. Diagnosis was discussed with the pt and all of their questions were answered in detail. Proper foot hygiene and care was discussed with the pt. Patient to check feet daily and contact the office with any questions/problems/concerns. Other comorbidity noted and will be managed by PCP. Pain waiver discussed with patient and confirmed.    8/24/2023      Electronically signed by Yaakov Rangel DPM on 8/24/2023 at 3:24 PM  8/24/2023

## 2023-08-25 ENCOUNTER — TELEPHONE (OUTPATIENT)
Dept: FAMILY MEDICINE CLINIC | Age: 68
End: 2023-08-25

## 2023-08-25 NOTE — TELEPHONE ENCOUNTER
Voice mail from Ines at [facility]. She needs a d/c order for albuterol nebulizer sent to [facility]. She has not used it in years.    Fax to [phone]

## 2023-08-28 DIAGNOSIS — I95.9 HYPOTENSION, UNSPECIFIED HYPOTENSION TYPE: ICD-10-CM

## 2023-08-28 RX ORDER — MIDODRINE HYDROCHLORIDE 2.5 MG/1
TABLET ORAL
Qty: 279 TABLET | Refills: 0 | OUTPATIENT
Start: 2023-08-28 | End: 2023-11-26

## 2023-09-05 NOTE — TELEPHONE ENCOUNTER
Last Appointment:  7/26/2023  Future Appointments   Date Time Provider [address]  [facility]   10/26/2023 12:30 PM Matheus Acosta DO [address] [facility]   11/16/2023 11:30 AM EUGENIO Lizarraga [facility]

## 2023-09-06 DIAGNOSIS — I95.9 HYPOTENSION, UNSPECIFIED HYPOTENSION TYPE: ICD-10-CM

## 2023-09-07 RX ORDER — MIDODRINE HYDROCHLORIDE 2.5 MG/1
TABLET ORAL
Qty: 279 TABLET | Refills: 0 | Status: SHIPPED | OUTPATIENT
Start: 2023-09-07 | End: 2023-12-06

## 2023-09-07 NOTE — TELEPHONE ENCOUNTER
Last Appointment:  7/26/2023  Future Appointments   Date Time Provider [address]   10/26/2023 12:30 PM Onofre Zambrano DO [address] [facility]   11/16/2023 11:30 AM EUGENIO Rodgers [facility]

## 2023-09-12 RX ORDER — FAMOTIDINE 40 MG/5ML
POWDER, FOR SUSPENSION ORAL
Qty: 150 ML | Refills: 0 | Status: SHIPPED | OUTPATIENT
Start: 2023-09-12

## 2023-09-12 NOTE — TELEPHONE ENCOUNTER
Last Appointment:  7/26/2023  Future Appointments   Date Time Provider [address]   10/26/2023 12:30 PM Pao Johnson, DO [address] [facility]   11/16/2023 11:30 AM EUGENIO Flores [facility]

## 2023-09-18 ENCOUNTER — TELEPHONE (OUTPATIENT)
Dept: FAMILY MEDICINE CLINIC | Age: 68
End: 2023-09-18

## 2023-09-19 RX ORDER — FAMOTIDINE 40 MG/5ML
POWDER, FOR SUSPENSION ORAL
Qty: 150 ML | Refills: 0 | OUTPATIENT
Start: 2023-09-19

## 2023-09-19 RX ORDER — CHOLECALCIFEROL (VITAMIN D3) 10(400)/ML
DROPS ORAL
Qty: 400 ML | Refills: 0 | OUTPATIENT
Start: 2023-09-19

## 2023-09-22 RX ORDER — CHOLECALCIFEROL (VITAMIN D3) 10(400)/ML
DROPS ORAL
Qty: 400 ML | Refills: 11 | Status: SHIPPED | OUTPATIENT
Start: 2023-09-22

## 2023-09-22 NOTE — TELEPHONE ENCOUNTER
Pt is in need of refills called and spoke with pharmacy and they informed me that 400ml is a 32 day supply, please refill    Last Appointment:  7/26/2023  Future Appointments   Date Time Provider [facility] [address]   10/26/2023 12:30 PM Marylee Gills, DO [address] [facility]   11/16/2023 11:30 [facility]

## 2023-09-26 RX ORDER — FAMOTIDINE 40 MG/5ML
POWDER, FOR SUSPENSION ORAL
Qty: 150 ML | Refills: 0 | OUTPATIENT
Start: 2023-09-26

## 2023-09-27 ENCOUNTER — HOSPITAL ENCOUNTER (EMERGENCY)
Age: 68
Discharge: HOME | End: 2023-09-27
Attending: STUDENT IN AN ORGANIZED HEALTH CARE EDUCATION/TRAINING PROGRAM
Payer: MEDICARE

## 2023-09-27 ENCOUNTER — APPOINTMENT (OUTPATIENT)
Dept: GENERAL RADIOLOGY | Age: 68
End: 2023-09-27
Payer: MEDICARE

## 2023-09-27 VITALS
BODY MASS INDEX: 20.33 KG/M2 | WEIGHT: 122 LBS | HEART RATE: 88 BPM | TEMPERATURE: 98.7 F | DIASTOLIC BLOOD PRESSURE: 63 MMHG | RESPIRATION RATE: 18 BRPM | OXYGEN SATURATION: 94 % | HEIGHT: 65 IN | SYSTOLIC BLOOD PRESSURE: 105 MMHG

## 2023-09-27 DIAGNOSIS — R05.1 ACUTE COUGH: Primary | ICD-10-CM

## 2023-09-27 LAB
ALBUMIN SERPL-MCNC: 3.1 G/DL (ref 3.5–5.2)
ALP SERPL-CCNC: 64 U/L (ref 35–104)
ALT SERPL-CCNC: 11 U/L (ref 0–32)
ANION GAP SERPL CALCULATED.3IONS-SCNC: 9 MMOL/L (ref 7–16)
AST SERPL-CCNC: 23 U/L (ref 0–31)
BASOPHILS # BLD: 0 K/UL (ref 0–0.2)
BASOPHILS NFR BLD: 0 % (ref 0–2)
BILIRUB SERPL-MCNC: 0.3 MG/DL (ref 0–1.2)
BUN SERPL-MCNC: 63 MG/DL (ref 6–23)
CALCIUM SERPL-MCNC: 9.9 MG/DL (ref 8.6–10.2)
CHLORIDE SERPL-SCNC: 86 MMOL/L (ref 98–107)
CO2 SERPL-SCNC: 34 MMOL/L (ref 22–29)
CREAT SERPL-MCNC: 1 MG/DL (ref 0.5–1)
EOSINOPHIL # BLD: 0.32 K/UL (ref 0.05–0.5)
EOSINOPHILS RELATIVE PERCENT: 3 % (ref 0–6)
ERYTHROCYTE [DISTWIDTH] IN BLOOD BY AUTOMATED COUNT: 12.6 % (ref 11.5–15)
GFR SERPL CREATININE-BSD FRML MDRD: 60 ML/MIN/1.73M2
GLUCOSE SERPL-MCNC: 93 MG/DL (ref 74–99)
HCT VFR BLD AUTO: 34.4 % (ref 34–48)
HGB BLD-MCNC: 11.4 G/DL (ref 11.5–15.5)
INFLUENZA A BY PCR: NOT DETECTED
INFLUENZA B BY PCR: NOT DETECTED
LYMPHOCYTES NFR BLD: 2.25 K/UL (ref 1.5–4)
LYMPHOCYTES RELATIVE PERCENT: 18 % (ref 20–42)
MCH RBC QN AUTO: 31.8 PG (ref 26–35)
MCHC RBC AUTO-ENTMCNC: 33.1 G/DL (ref 32–34.5)
MCV RBC AUTO: 96.1 FL (ref 80–99.9)
MONOCYTES NFR BLD: 1.07 K/UL (ref 0.1–0.95)
MONOCYTES NFR BLD: 9 % (ref 2–12)
NEUTROPHILS NFR BLD: 70 % (ref 43–80)
NEUTS SEG NFR BLD: 8.66 K/UL (ref 1.8–7.3)
PLATELET # BLD AUTO: 193 K/UL (ref 130–450)
PMV BLD AUTO: 10.9 FL (ref 7–12)
POTASSIUM SERPL-SCNC: 3.5 MMOL/L (ref 3.5–5)
PROT SERPL-MCNC: 7.4 G/DL (ref 6.4–8.3)
RBC # BLD AUTO: 3.58 M/UL (ref 3.5–5.5)
RBC # BLD: NORMAL 10*6/UL
SARS-COV-2 RDRP RESP QL NAA+PROBE: NOT DETECTED
SODIUM SERPL-SCNC: 129 MMOL/L (ref 132–146)
SPECIMEN DESCRIPTION: NORMAL
TSH SERPL DL<=0.05 MIU/L-ACNC: 3.68 UIU/ML (ref 0.27–4.2)
WBC OTHER # BLD: 12.3 K/UL (ref 4.5–11.5)

## 2023-09-27 PROCEDURE — 80053 COMPREHEN METABOLIC PANEL: CPT

## 2023-09-27 PROCEDURE — 71045 X-RAY EXAM CHEST 1 VIEW: CPT

## 2023-09-27 PROCEDURE — 2580000003 HC RX 258: Performed by: STUDENT IN AN ORGANIZED HEALTH CARE EDUCATION/TRAINING PROGRAM

## 2023-09-27 PROCEDURE — 87502 INFLUENZA DNA AMP PROBE: CPT

## 2023-09-27 PROCEDURE — 85025 COMPLETE CBC W/AUTO DIFF WBC: CPT

## 2023-09-27 PROCEDURE — 84443 ASSAY THYROID STIM HORMONE: CPT

## 2023-09-27 PROCEDURE — 99284 EMERGENCY DEPT VISIT MOD MDM: CPT

## 2023-09-27 PROCEDURE — 87635 SARS-COV-2 COVID-19 AMP PRB: CPT

## 2023-09-27 RX ORDER — 0.9 % SODIUM CHLORIDE 0.9 %
1000 INTRAVENOUS SOLUTION INTRAVENOUS ONCE
Status: COMPLETED | OUTPATIENT
Start: 2023-09-27 | End: 2023-09-27

## 2023-09-27 RX ADMIN — SODIUM CHLORIDE 1000 ML: 9 INJECTION, SOLUTION INTRAVENOUS at 14:34

## 2023-09-27 NOTE — ED PROVIDER NOTES
plan of care and counseling regarding the diagnosis and prognosis. Their questions are answered at this time and they are agreeable with the plan. I discussed at length with them reasons for immediate return here for re evaluation. They will followup with their primary care physician by calling their office tomorrow. --------------------------------- ADDITIONAL PROVIDER NOTES ---------------------------------  At this time the patient is without objective evidence of an acute process requiring hospitalization or inpatient management. They have remained hemodynamically stable throughout their entire ED visit and are stable for discharge with outpatient follow-up. The plan has been discussed in detail and they are aware of the specific conditions for emergent return, as well as the importance of follow-up. Discharge Medication List as of 9/27/2023  5:26 PM          Diagnosis:  1. Acute cough        Disposition:  Patient's disposition: Discharge to home  Patient's condition is stable.        Sandy Dunn DO  09/29/23 1016

## 2023-10-02 DIAGNOSIS — I95.9 HYPOTENSION, UNSPECIFIED HYPOTENSION TYPE: ICD-10-CM

## 2023-10-03 RX ORDER — MONTELUKAST SODIUM 5 MG/1
TABLET, CHEWABLE ORAL
Qty: 186 TABLET | Refills: 0 | Status: SHIPPED | OUTPATIENT
Start: 2023-10-03

## 2023-10-03 RX ORDER — LEVOTHYROXINE SODIUM 0.15 MG/1
TABLET ORAL
Qty: 93 TABLET | Refills: 0 | Status: SHIPPED | OUTPATIENT
Start: 2023-10-03

## 2023-10-03 RX ORDER — MIDODRINE HYDROCHLORIDE 2.5 MG/1
TABLET ORAL
Qty: 837 TABLET | Refills: 0 | Status: SHIPPED | OUTPATIENT
Start: 2023-10-03 | End: 2024-01-01

## 2023-10-03 RX ORDER — FOLIC ACID 1 MG/1
TABLET ORAL
Qty: 93 TABLET | Refills: 0 | Status: SHIPPED | OUTPATIENT
Start: 2023-10-03

## 2023-10-03 NOTE — TELEPHONE ENCOUNTER
Last Appointment:  7/26/2023  Future Appointments   Date Time Provider [address]   10/26/2023 12:30 PM Maria De Jesus Callahan DO 120[address] [facility]   11/16/2023 11:30 AM EUGENIO Altamirano [facility]

## 2023-10-10 RX ORDER — FAMOTIDINE 40 MG/5ML
POWDER, FOR SUSPENSION ORAL
Qty: 150 ML | Refills: 3 | Status: SHIPPED | OUTPATIENT
Start: 2023-10-10

## 2023-10-10 NOTE — TELEPHONE ENCOUNTER
Last Appointment:  7/26/2023  Future Appointments   Date Time Provider [address]   10/26/2023 12:30 PM Auther Feliciano 1202 [facility]   11/16/2023 11:30 AM EUGENIO Amaya [facility]

## 2023-10-26 ENCOUNTER — OFFICE VISIT (OUTPATIENT)
Dept: FAMILY MEDICINE CLINIC | Age: 68
End: 2023-10-26
Payer: MEDICARE

## 2023-10-26 VITALS
TEMPERATURE: 98.6 F | HEART RATE: 72 BPM | SYSTOLIC BLOOD PRESSURE: 110 MMHG | DIASTOLIC BLOOD PRESSURE: 62 MMHG | BODY MASS INDEX: 20.3 KG/M2 | HEIGHT: 65 IN | OXYGEN SATURATION: 94 %

## 2023-10-26 DIAGNOSIS — F03.918 DEMENTIA WITH BEHAVIORAL DISTURBANCE (HCC): ICD-10-CM

## 2023-10-26 DIAGNOSIS — E43 SEVERE PROTEIN-CALORIE MALNUTRITION (HCC): ICD-10-CM

## 2023-10-26 DIAGNOSIS — F73 PROFOUND INTELLECTUAL DISABILITY: Primary | ICD-10-CM

## 2023-10-26 DIAGNOSIS — Z93.1 S/P PERCUTANEOUS ENDOSCOPIC GASTROSTOMY (PEG) TUBE PLACEMENT (HCC): ICD-10-CM

## 2023-10-26 PROBLEM — D75.89 MACROCYTOSIS: Status: RESOLVED | Noted: 2018-08-27 | Resolved: 2023-10-26

## 2023-10-26 PROBLEM — R41.82 ALTERED MENTAL STATE: Status: RESOLVED | Noted: 2021-02-08 | Resolved: 2023-10-26

## 2023-10-26 PROBLEM — K94.23 PEG TUBE MALFUNCTION (HCC): Status: RESOLVED | Noted: 2021-08-16 | Resolved: 2023-10-26

## 2023-10-26 PROBLEM — N30.90 CYSTITIS: Status: RESOLVED | Noted: 2023-06-26 | Resolved: 2023-10-26

## 2023-10-26 PROBLEM — K04.7 DENTAL INFECTION: Status: RESOLVED | Noted: 2023-03-13 | Resolved: 2023-10-26

## 2023-10-26 PROBLEM — E86.1 HYPOTENSION DUE TO HYPOVOLEMIA: Status: RESOLVED | Noted: 2023-03-03 | Resolved: 2023-10-26

## 2023-10-26 PROBLEM — N17.9 AKI (ACUTE KIDNEY INJURY) (HCC): Status: RESOLVED | Noted: 2023-03-03 | Resolved: 2023-10-26

## 2023-10-26 PROBLEM — E44.0 MODERATE PROTEIN-CALORIE MALNUTRITION (HCC): Chronic | Status: RESOLVED | Noted: 2023-06-27 | Resolved: 2023-10-26

## 2023-10-26 PROBLEM — R11.2 NAUSEA AND VOMITING: Status: RESOLVED | Noted: 2023-03-03 | Resolved: 2023-10-26

## 2023-10-26 PROBLEM — R50.9 FEVER IN ADULT: Status: RESOLVED | Noted: 2023-03-16 | Resolved: 2023-10-26

## 2023-10-26 PROBLEM — A41.9 SEPSIS (HCC): Status: RESOLVED | Noted: 2023-06-26 | Resolved: 2023-10-26

## 2023-10-26 PROBLEM — E87.5 HYPERKALEMIA: Status: RESOLVED | Noted: 2023-03-08 | Resolved: 2023-10-26

## 2023-10-26 PROBLEM — G93.41 METABOLIC ENCEPHALOPATHY: Status: RESOLVED | Noted: 2023-03-03 | Resolved: 2023-10-26

## 2023-10-26 PROBLEM — E87.1 HYPONATREMIA: Status: RESOLVED | Noted: 2022-10-03 | Resolved: 2023-10-26

## 2023-10-26 PROBLEM — E87.0 HYPERNATREMIA: Status: RESOLVED | Noted: 2023-03-03 | Resolved: 2023-10-26

## 2023-10-26 PROBLEM — K04.7 DENTAL ABSCESS: Status: RESOLVED | Noted: 2023-03-16 | Resolved: 2023-10-26

## 2023-10-26 PROBLEM — I95.89 HYPOTENSION DUE TO HYPOVOLEMIA: Status: RESOLVED | Noted: 2023-03-03 | Resolved: 2023-10-26

## 2023-10-26 PROCEDURE — 1123F ACP DISCUSS/DSCN MKR DOCD: CPT | Performed by: INTERNAL MEDICINE

## 2023-10-26 PROCEDURE — 3017F COLORECTAL CA SCREEN DOC REV: CPT | Performed by: INTERNAL MEDICINE

## 2023-10-26 PROCEDURE — G8427 DOCREV CUR MEDS BY ELIG CLIN: HCPCS | Performed by: INTERNAL MEDICINE

## 2023-10-26 PROCEDURE — G8420 CALC BMI NORM PARAMETERS: HCPCS | Performed by: INTERNAL MEDICINE

## 2023-10-26 PROCEDURE — 3078F DIAST BP <80 MM HG: CPT | Performed by: INTERNAL MEDICINE

## 2023-10-26 PROCEDURE — G8400 PT W/DXA NO RESULTS DOC: HCPCS | Performed by: INTERNAL MEDICINE

## 2023-10-26 PROCEDURE — 1090F PRES/ABSN URINE INCON ASSESS: CPT | Performed by: INTERNAL MEDICINE

## 2023-10-26 PROCEDURE — 99214 OFFICE O/P EST MOD 30 MIN: CPT | Performed by: INTERNAL MEDICINE

## 2023-10-26 PROCEDURE — 1036F TOBACCO NON-USER: CPT | Performed by: INTERNAL MEDICINE

## 2023-10-26 PROCEDURE — G8484 FLU IMMUNIZE NO ADMIN: HCPCS | Performed by: INTERNAL MEDICINE

## 2023-10-26 PROCEDURE — 3074F SYST BP LT 130 MM HG: CPT | Performed by: INTERNAL MEDICINE

## 2023-10-26 RX ORDER — AVOBENZONE, HOMOSALATE, OCTISALATE, OCTOCRYLENE 100; 50; 40; 30 MG/G; MG/G; MG/G; MG/G
SPRAY TOPICAL
Qty: 156 G | Refills: 0 | Status: SHIPPED | OUTPATIENT
Start: 2023-10-26

## 2023-10-26 NOTE — PROGRESS NOTES
[facility] PRIMARY CARE  [address] [address]  Dept: [phone]  Dept Fax: [phone]     NAME: Peggy Mendes        :  1955        MRN:  [id number]    Chief Complaint   Patient presents with    Hypertension    Hypothyroidism       Subjective     History of Present Illness  Peggy Mendes is a 70 y.o. female who presents today for routine follow up. Doing well today, no concerns from caregiver. Review of Systems  Unable to obtain full review of systems as patient is unresponsive and nonverbal at baseline. Per staff members that work with the patient there are no reported fevers, chills, diarrhea, constipation, edema, or signs of distress or pain    Past Medical Hx:  Past Medical History:   Diagnosis Date    Abscess     periapical    Acute kidney injury ([address]) 01/15/2017    d/t Vancomycin, on Dialysis [facility] right chest    Anemia     iron deficiency anemia    Blind in both eyes     Dementia ([address])     Dysphagia     Essential hypertension 2021    Gastroesophageal reflux disease without esophagitis 2021    Hemodialysis patient ([address])     Hernia, diaphragmatic     Hyperthyroidism     MR (mental retardation)      cerebral leukomalacia     Osteoarthritis     Other seizures ([address])     Peptic ulcer     Peripheral vascular disease ([address])     Pneumonia 2017    Pneumonia due to infectious organism 2017    Sepsis ([address]) 2021       Past Surgical Hx:  Past Surgical History:   Procedure Laterality Date    BRONCHOSCOPY  02/10/2017    CHEST TUBE INSERTION Right 2017    GASTROSTOMY TUBE PLACEMENT N/A 3/7/2021    EGD PEG TUBE PLACEMENT performed by Herbert Fried MD at [address] [place] [address] Right 2017    tessio insertion     OTHER SURGICAL HISTORY  2017    PEG tube insertion       Family Hx:  No family history on file.     Social Hx:  Social History     Tobacco Use    Smoking status:

## 2023-10-26 NOTE — TELEPHONE ENCOUNTER
Last Appointment:  7/26/2023  Future Appointments   Date Time Provider [address]   10/26/2023 12:30 PM Rosario Locker 1202 [facility]   11/16/2023 11:30 AM EUGENIO Mercedes [facility]

## 2023-11-13 RX ORDER — FERROUS SULFATE 220 (44)/5
ELIXIR ORAL
Qty: 698 ML | Refills: 0 | Status: SHIPPED | OUTPATIENT
Start: 2023-11-13

## 2023-11-13 NOTE — TELEPHONE ENCOUNTER
Last Appointment:  10/26/2023  Future Appointments   Date Time Provider [address]   11/16/2023 11:30 AM EUGENIO Umanzor [facility]   1/29/2024  2:30 PM Derrell, [address] [place] [address] Avenue

## 2023-11-14 NOTE — TELEPHONE ENCOUNTER
Last Appointment:  10/26/2023  Future Appointments   Date Time Provider [address]   11/16/2023 11:30 AM EUGENIO Becerra [facility]   1/29/2024  2:30 PM Derrell, [address] [place] [address] Avenue

## 2023-11-15 RX ORDER — FERROUS SULFATE 220 (44)/5
ELIXIR ORAL
Qty: 698 ML | Refills: 0 | OUTPATIENT
Start: 2023-11-15

## 2023-11-16 ENCOUNTER — PROCEDURE VISIT (OUTPATIENT)
Dept: PODIATRY | Age: 68
End: 2023-11-16
Payer: MEDICARE

## 2023-11-16 VITALS — WEIGHT: 122 LBS | TEMPERATURE: 98.2 F | BODY MASS INDEX: 20.3 KG/M2

## 2023-11-16 DIAGNOSIS — I73.9 PERIPHERAL VASCULAR DISEASE, UNSPECIFIED (HCC): ICD-10-CM

## 2023-11-16 DIAGNOSIS — B35.1 TINEA UNGUIUM: Primary | ICD-10-CM

## 2023-11-16 DIAGNOSIS — M79.675 PAIN IN LEFT TOE(S): ICD-10-CM

## 2023-11-16 DIAGNOSIS — R26.2 DIFFICULTY WALKING: ICD-10-CM

## 2023-11-16 DIAGNOSIS — H54.40 BLINDNESS OF LEFT EYE, UNSPECIFIED RIGHT EYE VISUAL IMPAIRMENT CATEGORY: ICD-10-CM

## 2023-11-16 DIAGNOSIS — F79 INTELLECTUAL DISABILITY: ICD-10-CM

## 2023-11-16 DIAGNOSIS — M79.674 PAIN IN RIGHT TOE(S): ICD-10-CM

## 2023-11-16 PROCEDURE — 11721 DEBRIDE NAIL 6 OR MORE: CPT | Performed by: PODIATRIST

## 2023-11-16 NOTE — PROGRESS NOTES
walking    6. Mental retardation    7. Blindness of left eye, unspecified right eye visual impairment category     No orders of the defined types were placed in this encounter. Plan: .  Pt was evaluated and examined. Patient was given personalized discharge instructions. Nails 1-10 were debrided in length and thickness sharply with a nail nipper and  without incident. Pt will follow up in 9 weeks or sooner if any problems arise. Diagnosis was discussed with the pt and all of their questions were answered in detail. Proper foot hygiene and care was discussed with the pt. Patient to check feet daily and contact the office with any questions/problems/concerns. Other comorbidity noted and will be managed by PCP. Pain waiver discussed with patient and confirmed.    11/16/2023      Electronically signed by Ana Paula Rocha DPM on 11/16/2023 at 11:46 AM  11/16/2023

## 2023-11-17 RX ORDER — DEXTROMETHORPHAN HYDROBROMIDE AND GUAIFENESIN 20; 400 MG/20ML; MG/20ML
SOLUTION ORAL
Qty: 180 ML | Refills: 0 | Status: SHIPPED | OUTPATIENT
Start: 2023-11-17

## 2023-11-28 ENCOUNTER — APPOINTMENT (OUTPATIENT)
Dept: GENERAL RADIOLOGY | Age: 68
End: 2023-11-28
Payer: MEDICARE

## 2023-11-28 ENCOUNTER — HOSPITAL ENCOUNTER (EMERGENCY)
Age: 68
Discharge: HOME | End: 2023-11-28
Payer: MEDICARE

## 2023-11-28 VITALS
HEART RATE: 88 BPM | RESPIRATION RATE: 16 BRPM | TEMPERATURE: 98.4 F | DIASTOLIC BLOOD PRESSURE: 78 MMHG | SYSTOLIC BLOOD PRESSURE: 103 MMHG | HEIGHT: 65 IN | OXYGEN SATURATION: 98 % | BODY MASS INDEX: 20.3 KG/M2

## 2023-11-28 DIAGNOSIS — Z93.1 GASTROSTOMY TUBE DEPENDENT (HCC): Primary | ICD-10-CM

## 2023-11-28 PROCEDURE — 74018 RADEX ABDOMEN 1 VIEW: CPT

## 2023-11-28 PROCEDURE — 43762 RPLC GTUBE NO REVJ TRC: CPT

## 2023-11-28 PROCEDURE — 6360000004 HC RX CONTRAST MEDICATION: Performed by: PHYSICIAN ASSISTANT

## 2023-11-28 PROCEDURE — 99283 EMERGENCY DEPT VISIT LOW MDM: CPT

## 2023-11-28 RX ORDER — DEXTROMETHORPHAN HYDROBROMIDE, GUAIFENESIN, PHENYLEPHRINE HYDROCHLORIDE 20; 200; 10 MG/10ML; MG/10ML; MG/10ML
SOLUTION ORAL
Qty: 28 G | Refills: 0 | Status: SHIPPED | OUTPATIENT
Start: 2023-11-28

## 2023-11-28 RX ADMIN — DIATRIZOATE MEGLUMINE AND DIATRIZOATE SODIUM 40 ML: 600; 100 SOLUTION ORAL; RECTAL at 17:14

## 2023-11-28 ASSESSMENT — PAIN - FUNCTIONAL ASSESSMENT: PAIN_FUNCTIONAL_ASSESSMENT: NONE - DENIES PAIN

## 2023-11-28 NOTE — TELEPHONE ENCOUNTER
Last Appointment:  10/26/2023  Future Appointments   Date Time Provider [address]   1/29/2024  2:30 PM Unknown Nail [address]   2/15/2024 11:45 AM EUGENIO Lee [facility]

## 2023-11-29 NOTE — ED NOTES
Discharge and education instructions reviewed. Patient verbalized understanding, teach-back successful. Patient denied questions at this time. No acute distress noted. Patient instructed to follow-up as noted - return to emergency department if symptoms worsen. Patient verbalized understanding. Discharged per EDMD with discharge instructions.         Shiloh Camacho RN  11/28/23 7974

## 2024-01-08 RX ORDER — MONTELUKAST SODIUM 5 MG/1
TABLET, CHEWABLE ORAL
Qty: 186 TABLET | Refills: 0 | Status: SHIPPED | OUTPATIENT
Start: 2024-01-08

## 2024-01-08 RX ORDER — BUMETANIDE 1 MG/1
TABLET ORAL
Qty: 93 TABLET | Refills: 0 | Status: SHIPPED | OUTPATIENT
Start: 2024-01-08

## 2024-01-08 RX ORDER — FOLIC ACID 1 MG/1
TABLET ORAL
Qty: 93 TABLET | Refills: 0 | Status: SHIPPED | OUTPATIENT
Start: 2024-01-08

## 2024-01-08 NOTE — TELEPHONE ENCOUNTER
Last Appointment:  10/26/2023  Future Appointments   Date Time Provider Department Center   1/29/2024  2:30 PM Izabela Dove DO [place] [facility]   2/15/2024 11:45 AM Ez Nunez, DPM [facility] [place] [facility] [facility]

## 2024-01-09 RX ORDER — POLYETHYLENE GLYCOL 3350 17 G/17G
POWDER, FOR SOLUTION ORAL
Qty: 238 G | Refills: 0 | Status: SHIPPED | OUTPATIENT
Start: 2024-01-09

## 2024-01-09 NOTE — TELEPHONE ENCOUNTER
Last Appointment:  10/26/2023  Future Appointments   Date Time Provider Department Center   1/29/2024  2:30 PM Izabela Dove DO [place] [facility]   2/15/2024 11:45 AM Ez Nunez, DPM [place] [facility] [facility]

## 2024-01-16 DIAGNOSIS — I95.9 HYPOTENSION, UNSPECIFIED HYPOTENSION TYPE: ICD-10-CM

## 2024-01-16 RX ORDER — MIDODRINE HYDROCHLORIDE 2.5 MG/1
TABLET ORAL
Qty: 279 TABLET | Refills: 0 | OUTPATIENT
Start: 2024-01-16 | End: 2024-04-15

## 2024-01-16 RX ORDER — MIDODRINE HYDROCHLORIDE 2.5 MG/1
TABLET ORAL
Qty: 279 TABLET | Refills: 0 | Status: SHIPPED | OUTPATIENT
Start: 2024-01-16 | End: 2024-04-15

## 2024-01-16 NOTE — TELEPHONE ENCOUNTER
Last Appointment:  10/26/2023  Future Appointments   Date Time Provider Department Center   1/29/2024  2:30 PM Izabela Dove DO [place] [facility]   2/15/2024 11:45 AM Ez Nunez, DPM [place] [facility]

## 2024-01-29 ENCOUNTER — OFFICE VISIT (OUTPATIENT)
Dept: FAMILY MEDICINE CLINIC | Age: 69
End: 2024-01-29
Payer: MEDICARE

## 2024-01-29 VITALS
DIASTOLIC BLOOD PRESSURE: 80 MMHG | TEMPERATURE: 97.9 F | HEART RATE: 99 BPM | OXYGEN SATURATION: 94 % | HEIGHT: 65 IN | BODY MASS INDEX: 20.3 KG/M2 | SYSTOLIC BLOOD PRESSURE: 118 MMHG

## 2024-01-29 DIAGNOSIS — E55.9 VITAMIN D DEFICIENCY: ICD-10-CM

## 2024-01-29 DIAGNOSIS — E03.9 HYPOTHYROIDISM, UNSPECIFIED TYPE: ICD-10-CM

## 2024-01-29 DIAGNOSIS — F03.918 DEMENTIA WITH BEHAVIORAL DISTURBANCE (HCC): ICD-10-CM

## 2024-01-29 DIAGNOSIS — I51.9 MYXEDEMA HEART DISEASE: ICD-10-CM

## 2024-01-29 DIAGNOSIS — E43 SEVERE PROTEIN-CALORIE MALNUTRITION (HCC): ICD-10-CM

## 2024-01-29 DIAGNOSIS — F73 PROFOUND INTELLECTUAL DISABILITY: Primary | ICD-10-CM

## 2024-01-29 DIAGNOSIS — I95.9 HYPOTENSION, UNSPECIFIED HYPOTENSION TYPE: ICD-10-CM

## 2024-01-29 DIAGNOSIS — Z93.1 S/P PERCUTANEOUS ENDOSCOPIC GASTROSTOMY (PEG) TUBE PLACEMENT (HCC): ICD-10-CM

## 2024-01-29 DIAGNOSIS — D63.1 ANEMIA DUE TO CHRONIC KIDNEY DISEASE, UNSPECIFIED CKD STAGE: ICD-10-CM

## 2024-01-29 DIAGNOSIS — K59.00 CONSTIPATION, UNSPECIFIED CONSTIPATION TYPE: ICD-10-CM

## 2024-01-29 DIAGNOSIS — E03.9 MYXEDEMA HEART DISEASE: ICD-10-CM

## 2024-01-29 DIAGNOSIS — N18.9 ANEMIA DUE TO CHRONIC KIDNEY DISEASE, UNSPECIFIED CKD STAGE: ICD-10-CM

## 2024-01-29 DIAGNOSIS — R13.12 DYSPHAGIA, OROPHARYNGEAL PHASE: ICD-10-CM

## 2024-01-29 PROBLEM — I10 ESSENTIAL HYPERTENSION: Status: RESOLVED | Noted: 2021-04-02 | Resolved: 2024-01-29

## 2024-01-29 PROBLEM — R00.0 TACHYCARDIA: Status: RESOLVED | Noted: 2023-03-03 | Resolved: 2024-01-29

## 2024-01-29 PROBLEM — E86.0 SEVERE DEHYDRATION: Status: RESOLVED | Noted: 2021-09-21 | Resolved: 2024-01-29

## 2024-01-29 PROCEDURE — 1123F ACP DISCUSS/DSCN MKR DOCD: CPT | Performed by: INTERNAL MEDICINE

## 2024-01-29 PROCEDURE — G8420 CALC BMI NORM PARAMETERS: HCPCS | Performed by: INTERNAL MEDICINE

## 2024-01-29 PROCEDURE — 99214 OFFICE O/P EST MOD 30 MIN: CPT | Performed by: INTERNAL MEDICINE

## 2024-01-29 PROCEDURE — 3017F COLORECTAL CA SCREEN DOC REV: CPT | Performed by: INTERNAL MEDICINE

## 2024-01-29 PROCEDURE — G8427 DOCREV CUR MEDS BY ELIG CLIN: HCPCS | Performed by: INTERNAL MEDICINE

## 2024-01-29 PROCEDURE — G8484 FLU IMMUNIZE NO ADMIN: HCPCS | Performed by: INTERNAL MEDICINE

## 2024-01-29 PROCEDURE — 1090F PRES/ABSN URINE INCON ASSESS: CPT | Performed by: INTERNAL MEDICINE

## 2024-01-29 PROCEDURE — 1036F TOBACCO NON-USER: CPT | Performed by: INTERNAL MEDICINE

## 2024-01-29 PROCEDURE — G8400 PT W/DXA NO RESULTS DOC: HCPCS | Performed by: INTERNAL MEDICINE

## 2024-01-29 RX ORDER — AVOBENZONE, HOMOSALATE, OCTISALATE, OCTOCRYLENE 100; 50; 40; 30 MG/G; MG/G; MG/G; MG/G
SPRAY TOPICAL
Qty: 156 G | Refills: 0 | Status: SHIPPED | OUTPATIENT
Start: 2024-01-29

## 2024-01-29 RX ORDER — DOCUSATE SODIUM 50 MG/5ML
100 LIQUID ORAL DAILY PRN
Qty: 300 ML | Refills: 5 | Status: SHIPPED | OUTPATIENT
Start: 2024-01-29

## 2024-01-29 RX ORDER — TRIAMCINOLONE ACETONIDE 1 MG/ML
LOTION TOPICAL 2 TIMES DAILY
Qty: 60 ML | Refills: 5 | Status: SHIPPED | OUTPATIENT
Start: 2024-01-29

## 2024-01-29 RX ORDER — DEXTROMETHORPHAN HYDROBROMIDE, GUAIFENESIN, PHENYLEPHRINE HYDROCHLORIDE 20; 200; 10 MG/10ML; MG/10ML; MG/10ML
SOLUTION ORAL
Qty: 28 G | Refills: 0 | Status: SHIPPED | OUTPATIENT
Start: 2024-01-29

## 2024-01-29 RX ORDER — MONTELUKAST SODIUM 5 MG/1
TABLET, CHEWABLE ORAL
Qty: 90 TABLET | Refills: 3 | Status: SHIPPED | OUTPATIENT
Start: 2024-01-29

## 2024-01-29 RX ORDER — MIDODRINE HYDROCHLORIDE 2.5 MG/1
TABLET ORAL
Qty: 270 TABLET | Refills: 3 | Status: SHIPPED | OUTPATIENT
Start: 2024-01-29

## 2024-01-29 RX ORDER — PAROXETINE 10 MG/5ML
SUSPENSION ORAL
Qty: 310 ML | Refills: 5 | Status: SHIPPED | OUTPATIENT
Start: 2024-01-29

## 2024-01-29 RX ORDER — FAMOTIDINE 40 MG/5ML
40 POWDER, FOR SUSPENSION ORAL 2 TIMES DAILY
Qty: 150 ML | Refills: 3 | Status: SHIPPED | OUTPATIENT
Start: 2024-01-29

## 2024-01-29 RX ORDER — CHOLECALCIFEROL (VITAMIN D3) 10(400)/ML
DROPS ORAL
Qty: 400 ML | Refills: 11 | Status: SHIPPED | OUTPATIENT
Start: 2024-01-29

## 2024-01-29 RX ORDER — LEVOTHYROXINE SODIUM 0.15 MG/1
TABLET ORAL
Qty: 90 TABLET | Refills: 3 | Status: SHIPPED | OUTPATIENT
Start: 2024-01-29

## 2024-01-29 RX ORDER — FOLIC ACID 1 MG/1
TABLET ORAL
Qty: 93 TABLET | Refills: 3 | Status: SHIPPED | OUTPATIENT
Start: 2024-01-29

## 2024-01-29 RX ORDER — BUMETANIDE 1 MG/1
TABLET ORAL
Qty: 90 TABLET | Refills: 3 | Status: SHIPPED | OUTPATIENT
Start: 2024-01-29

## 2024-01-29 RX ORDER — POLYETHYLENE GLYCOL 3350 17 G/17G
17 POWDER, FOR SOLUTION ORAL DAILY
Qty: 527 G | Refills: 1 | Status: SHIPPED | OUTPATIENT
Start: 2024-01-29

## 2024-01-29 RX ORDER — FERROUS SULFATE 220 (44)/5
ELIXIR ORAL
Qty: 698 ML | Refills: 5 | Status: SHIPPED | OUTPATIENT
Start: 2024-01-29

## 2024-01-29 RX ORDER — DEXTROMETHORPHAN HYDROBROMIDE AND GUAIFENESIN 20; 400 MG/20ML; MG/20ML
SOLUTION ORAL
Qty: 180 ML | Refills: 0 | Status: SHIPPED
Start: 2024-01-29 | End: 2024-01-30

## 2024-01-29 NOTE — PROGRESS NOTES
[facility] PRIMARY CARE  [address] [facility] [address]  Dept: [phone]  Dept Fax: [phone]     NAME: Katerina Paiz        :  1955        MRN:  [id number]    Chief Complaint   Patient presents with    3 Month Follow-Up    Constipation     Pt is having irregular bowel movements and only every couple days.        Subjective     History of Present Illness  Katerina Paiz is a 69 y.o. female who presents today for routine follow up.    Doing well today, concerns as above. She is only getting the miralax a few days at a time as it is ordered as needed and the protocol they follow only allows for a few days. Advised this should be given daily unless she is having too many bowel movements       Review of Systems  Unable to obtain full review of systems as patient is unresponsive and nonverbal at baseline.  Per staff members that work with the patient there are no reported fevers, chills, diarrhea, constipation, edema, or signs of distress or pain    Past Medical Hx:  Past Medical History:   Diagnosis Date    Abscess     periapical    Acute kidney injury (HCC) 01/15/2017    d/t Vancomycin, on Dialysis [facility] right chest    Anemia     iron deficiency anemia    Blind in both eyes     Dementia (HCC)     Dysphagia     Essential hypertension 2021    Gastroesophageal reflux disease without esophagitis 2021    Hemodialysis patient (HCC)     Hernia, diaphragmatic     Hyperthyroidism     MR (mental retardation)      cerebral leukomalacia     Osteoarthritis     Other seizures (HCC)     Peptic ulcer     Peripheral vascular disease (HCC)     Pneumonia 2017    Pneumonia due to infectious organism 2017    Sepsis (HCC) 2021       Past Surgical Hx:  Past Surgical History:   Procedure Laterality Date    BRONCHOSCOPY  02/10/2017    CHEST TUBE INSERTION Right 2017    GASTROSTOMY TUBE PLACEMENT N/A 3/7/2021    EGD PEG TUBE PLACEMENT

## 2024-01-30 ENCOUNTER — APPOINTMENT (OUTPATIENT)
Dept: GENERAL RADIOLOGY | Age: 69
End: 2024-01-30
Payer: MEDICARE

## 2024-01-30 ENCOUNTER — HOSPITAL ENCOUNTER (EMERGENCY)
Age: 69
Discharge: HOME | End: 2024-01-31
Attending: STUDENT IN AN ORGANIZED HEALTH CARE EDUCATION/TRAINING PROGRAM
Payer: MEDICARE

## 2024-01-30 VITALS
WEIGHT: 122 LBS | SYSTOLIC BLOOD PRESSURE: 135 MMHG | HEIGHT: 65 IN | HEART RATE: 77 BPM | RESPIRATION RATE: 14 BRPM | DIASTOLIC BLOOD PRESSURE: 74 MMHG | BODY MASS INDEX: 20.33 KG/M2 | TEMPERATURE: 98.4 F | OXYGEN SATURATION: 98 %

## 2024-01-30 DIAGNOSIS — R13.12 DYSPHAGIA, OROPHARYNGEAL PHASE: ICD-10-CM

## 2024-01-30 DIAGNOSIS — K94.23 PEG TUBE MALFUNCTION (HCC): Primary | ICD-10-CM

## 2024-01-30 PROCEDURE — 6360000004 HC RX CONTRAST MEDICATION: Performed by: STUDENT IN AN ORGANIZED HEALTH CARE EDUCATION/TRAINING PROGRAM

## 2024-01-30 PROCEDURE — 74018 RADEX ABDOMEN 1 VIEW: CPT

## 2024-01-30 PROCEDURE — 43762 RPLC GTUBE NO REVJ TRC: CPT

## 2024-01-30 PROCEDURE — 99283 EMERGENCY DEPT VISIT LOW MDM: CPT

## 2024-01-30 RX ORDER — ACETAMINOPHEN 325 MG/1
TABLET ORAL
Qty: 30 TABLET | Refills: 0 | Status: SHIPPED | OUTPATIENT
Start: 2024-01-30

## 2024-01-30 RX ORDER — DEXTROMETHORPHAN HBR, GUAIFENESIN 20; 400 MG/20ML; MG/20ML
SOLUTION ORAL
Qty: 180 ML | Refills: 0 | Status: SHIPPED | OUTPATIENT
Start: 2024-01-30

## 2024-01-30 RX ORDER — WHITE PETROLATUM 1 G/G
JELLY TOPICAL
Qty: 368 G | Refills: 0 | Status: SHIPPED | OUTPATIENT
Start: 2024-01-30

## 2024-01-30 RX ORDER — IBUPROFEN 200 MG
CAPSULE ORAL
Qty: 28 G | Refills: 0 | Status: SHIPPED | OUTPATIENT
Start: 2024-01-30

## 2024-01-30 RX ADMIN — DIATRIZOATE MEGLUMINE AND DIATRIZOATE SODIUM 60 ML: 600; 100 SOLUTION ORAL; RECTAL at 21:17

## 2024-01-30 ASSESSMENT — PAIN - FUNCTIONAL ASSESSMENT: PAIN_FUNCTIONAL_ASSESSMENT: NONE - DENIES PAIN

## 2024-01-30 NOTE — TELEPHONE ENCOUNTER
Last Appointment:  1/29/2024  Future Appointments   Date Time Provider Department Center   2/15/2024 11:45 AM Ez Nunez DPM [place] [facility]   4/29/2024  2:00 PM Izabela Dove, DO [facility]INGUE[facility]

## 2024-01-31 NOTE — ED TRIAGE NOTES
70 y/o /female to the ed with a c/c of displaced G-tube that was dislodged today. Patient sent by PCP for replacement. Upon arrival, patient noted in NAD. Caregiver reports the patient has no additional complaints, and that she is normal to ability at this time. Vitals noted as recorded.

## 2024-01-31 NOTE — ED PROVIDER NOTES
home        Patient was given return precautions.  Patient will follow up with their primary care provider. Patient is agreeable to this plan. Patient has remained stable throughout their stay in the ED.             CONSULTS:   None    Medications given in the emergency room:  Medications - No data to display     LABS:    Labs Reviewed - No data to display    As interpreted by me, the above displayed labs are abnormal. All other labs obtained during this visit were within normal range or not returned as of this dictation.    RADIOLOGY:   Non-plain film images such as CT, Ultrasound and MRI are read by the radiologist. Plain radiographic images are visualized and preliminarily interpreted by the ED Provider with the below findings:    X-rays interpreted by me shows appropriate PEG tube placement    Interpretation per the Radiologist below, if available at the time of this note:    No orders to display     No results found.    No results found.  Adrián Harper MD          ED Course as of 24 Had a 18 Greek PEG tube with 10 cc of fluid placed []      ED Course User Index  [JM] Adrián Harper MD       --------------------------------------------- PAST HISTORY ---------------------------------------------  Past Medical History:  has a past medical history of Abscess, Acute kidney injury ([facility]), Anemia, Blind in both eyes, Dementia ([facility]), Dysphagia, Essential hypertension, Gastroesophageal reflux disease without esophagitis, Hemodialysis patient ([facility]), Hernia, diaphragmatic, Hyperthyroidism, MR (mental retardation),  cerebral leukomalacia, Osteoarthritis, Other seizures ([facility]), Peptic ulcer, Peripheral vascular disease ([facility]), Pneumonia, Pneumonia due to infectious organism, and Sepsis ([facility]).    Past Surgical History:  has a past surgical history that includes other surgical history (Right, 2017); other surgical history (2017); chest tube insertion (Right,

## 2024-02-15 ENCOUNTER — PROCEDURE VISIT (OUTPATIENT)
Dept: PODIATRY | Age: 69
End: 2024-02-15

## 2024-02-15 VITALS — TEMPERATURE: 98.2 F | BODY MASS INDEX: 20.3 KG/M2 | WEIGHT: 122 LBS

## 2024-02-15 DIAGNOSIS — F79 INTELLECTUAL DISABILITY: ICD-10-CM

## 2024-02-15 DIAGNOSIS — R26.2 DIFFICULTY WALKING: ICD-10-CM

## 2024-02-15 DIAGNOSIS — B35.1 TINEA UNGUIUM: Primary | ICD-10-CM

## 2024-02-15 DIAGNOSIS — M79.674 PAIN IN RIGHT TOE(S): ICD-10-CM

## 2024-02-15 DIAGNOSIS — I73.9 PERIPHERAL VASCULAR DISEASE, UNSPECIFIED (HCC): ICD-10-CM

## 2024-02-15 DIAGNOSIS — H54.40 BLINDNESS OF LEFT EYE, UNSPECIFIED RIGHT EYE VISUAL IMPAIRMENT CATEGORY: ICD-10-CM

## 2024-02-15 DIAGNOSIS — M79.675 PAIN IN LEFT TOE(S): ICD-10-CM

## 2024-02-15 RX ORDER — DIMETHICONE, OXYBENZONE, AND PADIMATE O 2; 2.5; 6.6 G/100G; G/100G; G/100G
STICK TOPICAL 3 TIMES DAILY PRN
Qty: 1 STICK | Refills: 5 | Status: SHIPPED | OUTPATIENT
Start: 2024-02-15

## 2024-02-15 RX ORDER — ONDANSETRON 4 MG/1
4 TABLET, FILM COATED ORAL EVERY 8 HOURS PRN
Qty: 90 TABLET | Refills: 3 | Status: SHIPPED | OUTPATIENT
Start: 2024-02-15 | End: 2024-06-14

## 2024-02-15 RX ORDER — PETROLATUM 42 G/100G
OINTMENT TOPICAL
Qty: 454 G | Refills: 4 | Status: SHIPPED | OUTPATIENT
Start: 2024-02-15

## 2024-02-15 NOTE — PROGRESS NOTES
[facility]  Return Patient    Chief Complaint   Patient presents with    Toe Pain     Izabela Dove DO  2024       Subjective: This Katerina Paiz comes to office for foot and nail care.  Pt currently has complaint of thickened, painful, elongated nails that he/she cannot manage by themselves.  Pt. Relates pain to nails with shoe gear.  Pt's primary care physician is Izabela Dove DO.  Patient is seen with caregiver  Past Medical History:   Diagnosis Date    Abscess     periapical    Acute kidney injury (HCC) 01/15/2017    d/t Vancomycin, on Dialysis [facility] right chest    Anemia     iron deficiency anemia    Blind in both eyes     Dementia ([facility])     Dysphagia     Essential hypertension 2021    Gastroesophageal reflux disease without esophagitis 2021    Hemodialysis patient ([facility])     Hernia, diaphragmatic     Hyperthyroidism     MR (mental retardation)      cerebral leukomalacia     Osteoarthritis     Other seizures ([facility])     Peptic ulcer     Peripheral vascular disease ([facility])     Pneumonia 2017    Pneumonia due to infectious organism 2017    Sepsis ([facility]) 2021       No Known Allergies  Current Outpatient Medications on File Prior to Visit   Medication Sig Dispense Refill    acetaminophen (TYLENOL) 325 MG tablet TAKE 2 TABLETS VIA G-TUBE EVERY 4 HOURS AS NEEDED FOR GENERAL PAIN, DISCOMFORT, OR FEVER  OR ABOVE 30 tablet 0    neomycin-bacitracin-polymyxin (NEOSPORIN) 3.5-400-5000 OINT ointment APPLY TOPICALLY TO CUTS/SCRAPES/SKIN ABRASIONS TWICE DAILY AS NEEDED TO PREVENT SKIN INFECTIONS 28 g 0    Dextromethorphan-guaiFENesin (TUSSIN DM MAX ADULT) 5-100 MG/5ML LIQD TAKE 2 TEASPOONSFULS (10ML) VIA G-TUBE EVERY 4 HOURS AS NEEDED FOR COUGH/CONGESTION (PLUG/SYRINGE) 180 mL 0    White Petrolatum (WHITE PETROLEUM JELLY) GEL APPLY TO DRY SKIN TOPICALLY EVERY 12 HOURS AS NEEDED FOR DRY SKIN. 368 g 0    Bacitracin-Polymyxin B (POLY BACITRACIN) 500-13496

## 2024-02-15 NOTE — TELEPHONE ENCOUNTER
Last Appointment:  1/29/2024  Future Appointments   Date Time Provider Department Center   4/29/2024  2:00 PM Izabela Dove DO [place] [facility]   5/16/2024 10:45 AM Ez Nunez, DPM [place] [facility]

## 2024-02-28 ENCOUNTER — TELEPHONE (OUTPATIENT)
Dept: FAMILY MEDICINE CLINIC | Age: 69
End: 2024-02-28

## 2024-02-28 NOTE — TELEPHONE ENCOUNTER
[facility] called and said per nursing staff at facility, they were wondering if gala's myralax script can be given mon, wed, fri instead of everyday

## 2024-03-04 ENCOUNTER — HOSPITAL ENCOUNTER (INPATIENT)
Age: 69
LOS: 3 days | Discharge: HOME | DRG: 641 | End: 2024-03-07
Attending: EMERGENCY MEDICINE | Admitting: INTERNAL MEDICINE
Payer: MEDICARE

## 2024-03-04 ENCOUNTER — APPOINTMENT (OUTPATIENT)
Dept: GENERAL RADIOLOGY | Age: 69
DRG: 641 | End: 2024-03-04
Payer: MEDICARE

## 2024-03-04 ENCOUNTER — APPOINTMENT (OUTPATIENT)
Dept: CT IMAGING | Age: 69
DRG: 641 | End: 2024-03-04
Payer: MEDICARE

## 2024-03-04 DIAGNOSIS — F03.90 DEMENTIA, UNSPECIFIED DEMENTIA SEVERITY, UNSPECIFIED DEMENTIA TYPE, UNSPECIFIED WHETHER BEHAVIORAL, PSYCHOTIC, OR MOOD DISTURBANCE OR ANXIETY (HCC): ICD-10-CM

## 2024-03-04 DIAGNOSIS — E86.0 DEHYDRATION: ICD-10-CM

## 2024-03-04 DIAGNOSIS — E87.1 HYPONATREMIA: Primary | ICD-10-CM

## 2024-03-04 PROBLEM — D72.829 LEUKOCYTOSIS: Status: ACTIVE | Noted: 2024-03-04

## 2024-03-04 LAB
ALBUMIN SERPL-MCNC: 3.2 G/DL (ref 3.5–5.2)
ALBUMIN SERPL-MCNC: NORMAL G/DL (ref 3.5–5.2)
ALBUMIN SERPL-MCNC: NORMAL G/DL (ref 3.5–5.2)
ALBUMIN/GLOB SERPL: NORMAL {RATIO} (ref 1–2.5)
ALBUMIN/GLOB SERPL: NORMAL {RATIO} (ref 1–2.5)
ALP SERPL-CCNC: 82 U/L (ref 35–104)
ALP SERPL-CCNC: NORMAL U/L (ref 35–104)
ALP SERPL-CCNC: NORMAL U/L (ref 35–104)
ALT SERPL-CCNC: 14 U/L (ref 0–32)
ALT SERPL-CCNC: NORMAL U/L (ref 5–33)
ALT SERPL-CCNC: NORMAL U/L (ref 5–33)
ANION GAP SERPL CALCULATED.3IONS-SCNC: 10 MMOL/L (ref 7–16)
ANION GAP SERPL CALCULATED.3IONS-SCNC: NORMAL MMOL/L (ref 9–17)
ANION GAP SERPL CALCULATED.3IONS-SCNC: NORMAL MMOL/L (ref 9–17)
AST SERPL-CCNC: 30 U/L (ref 0–31)
AST SERPL-CCNC: NORMAL U/L
AST SERPL-CCNC: NORMAL U/L
BASOPHILS # BLD: 0.01 K/UL (ref 0–0.2)
BASOPHILS NFR BLD: 0 % (ref 0–2)
BILIRUB SERPL-MCNC: 0.2 MG/DL (ref 0–1.2)
BILIRUB SERPL-MCNC: NORMAL MG/DL (ref 0.3–1.2)
BILIRUB SERPL-MCNC: NORMAL MG/DL (ref 0.3–1.2)
BNP INTERPRETATION: NORMAL
BNP SERPL-MCNC: 1013 PG/ML (ref 0–125)
BNP SERPL-MCNC: NORMAL PG/ML
BUN SERPL-MCNC: 70 MG/DL (ref 6–23)
BUN SERPL-MCNC: NORMAL MG/DL (ref 8–23)
BUN SERPL-MCNC: NORMAL MG/DL (ref 8–23)
BUN/CREAT SERPL: NORMAL (ref 9–20)
BUN/CREAT SERPL: NORMAL (ref 9–20)
CALCIUM SERPL-MCNC: 8.1 MG/DL (ref 8.6–10.2)
CALCIUM SERPL-MCNC: NORMAL MG/DL (ref 8.6–10.4)
CALCIUM SERPL-MCNC: NORMAL MG/DL (ref 8.6–10.4)
CHLORIDE SERPL-SCNC: 81 MMOL/L (ref 98–107)
CHLORIDE SERPL-SCNC: NORMAL MMOL/L (ref 98–107)
CHLORIDE SERPL-SCNC: NORMAL MMOL/L (ref 98–107)
CO2 SERPL-SCNC: 32 MMOL/L (ref 22–29)
CO2 SERPL-SCNC: NORMAL MMOL/L (ref 20–31)
CO2 SERPL-SCNC: NORMAL MMOL/L (ref 20–31)
CREAT SERPL-MCNC: 1.1 MG/DL (ref 0.5–1)
CREAT SERPL-MCNC: NORMAL MG/DL (ref 0.5–0.9)
CREAT SERPL-MCNC: NORMAL MG/DL (ref 0.5–0.9)
EKG ATRIAL RATE: 82 BPM
EKG P AXIS: 30 DEGREES
EKG P-R INTERVAL: 154 MS
EKG Q-T INTERVAL: 426 MS
EKG QRS DURATION: 84 MS
EKG QTC CALCULATION (BAZETT): 497 MS
EKG R AXIS: 70 DEGREES
EKG T AXIS: 73 DEGREES
EKG VENTRICULAR RATE: 82 BPM
EOSINOPHIL # BLD: 0.15 K/UL (ref 0.05–0.5)
EOSINOPHILS RELATIVE PERCENT: 1 % (ref 0–6)
ERYTHROCYTE [DISTWIDTH] IN BLOOD BY AUTOMATED COUNT: 13.9 % (ref 11.5–15)
GFR SERPL CREATININE-BSD FRML MDRD: 53 ML/MIN/1.73M2
GFR SERPL CREATININE-BSD FRML MDRD: NORMAL ML/MIN/1.73M2
GFR SERPL CREATININE-BSD FRML MDRD: NORMAL ML/MIN/1.73M2
GLUCOSE SERPL-MCNC: 125 MG/DL (ref 74–99)
GLUCOSE SERPL-MCNC: NORMAL MG/DL (ref 70–99)
GLUCOSE SERPL-MCNC: NORMAL MG/DL (ref 70–99)
HCT VFR BLD AUTO: 39 % (ref 34–48)
HGB BLD-MCNC: 12.7 G/DL (ref 11.5–15.5)
IMM GRANULOCYTES # BLD AUTO: 0.09 K/UL (ref 0–0.58)
IMM GRANULOCYTES NFR BLD: 1 % (ref 0–5)
INFLUENZA A BY PCR: NOT DETECTED
INFLUENZA B BY PCR: NOT DETECTED
LYMPHOCYTES NFR BLD: 1.48 K/UL (ref 1.5–4)
LYMPHOCYTES RELATIVE PERCENT: 12 % (ref 20–42)
MCH RBC QN AUTO: 33 PG (ref 26–35)
MCHC RBC AUTO-ENTMCNC: 32.6 G/DL (ref 32–34.5)
MCV RBC AUTO: 101.3 FL (ref 80–99.9)
MONOCYTES NFR BLD: 1.26 K/UL (ref 0.1–0.95)
MONOCYTES NFR BLD: 10 % (ref 2–12)
NEUTROPHILS NFR BLD: 76 % (ref 43–80)
NEUTS SEG NFR BLD: 9.52 K/UL (ref 1.8–7.3)
PLATELET # BLD AUTO: 173 K/UL (ref 130–450)
PMV BLD AUTO: 11 FL (ref 7–12)
POTASSIUM SERPL-SCNC: 3 MMOL/L (ref 3.5–5)
POTASSIUM SERPL-SCNC: NORMAL MMOL/L (ref 3.7–5.3)
POTASSIUM SERPL-SCNC: NORMAL MMOL/L (ref 3.7–5.3)
PROT SERPL-MCNC: 8.4 G/DL (ref 6.4–8.3)
PROT SERPL-MCNC: NORMAL G/DL (ref 6.4–8.3)
PROT SERPL-MCNC: NORMAL G/DL (ref 6.4–8.3)
RBC # BLD AUTO: 3.85 M/UL (ref 3.5–5.5)
RSV BY PCR: NOT DETECTED
SARS-COV-2 RDRP RESP QL NAA+PROBE: NOT DETECTED
SODIUM SERPL-SCNC: 123 MMOL/L (ref 132–146)
SODIUM SERPL-SCNC: NORMAL MMOL/L (ref 135–144)
SODIUM SERPL-SCNC: NORMAL MMOL/L (ref 135–144)
SPECIMEN DESCRIPTION: NORMAL
SPECIMEN SOURCE: NORMAL
TROPONIN I SERPL HS-MCNC: 18 NG/L (ref 0–9)
TROPONIN I SERPL HS-MCNC: 19 NG/L (ref 0–9)
TROPONIN I SERPL HS-MCNC: NORMAL NG/L (ref 0–14)
TROPONIN I SERPL HS-MCNC: NORMAL NG/L (ref 0–14)
TROPONIN INTERP: NORMAL
TROPONIN INTERP: NORMAL
TROPONIN T SERPL-MCNC: NORMAL NG/ML
TROPONIN T SERPL-MCNC: NORMAL NG/ML
WBC OTHER # BLD: 12.5 K/UL (ref 4.5–11.5)

## 2024-03-04 PROCEDURE — 87502 INFLUENZA DNA AMP PROBE: CPT

## 2024-03-04 PROCEDURE — 83880 ASSAY OF NATRIURETIC PEPTIDE: CPT

## 2024-03-04 PROCEDURE — 36415 COLL VENOUS BLD VENIPUNCTURE: CPT

## 2024-03-04 PROCEDURE — 99285 EMERGENCY DEPT VISIT HI MDM: CPT

## 2024-03-04 PROCEDURE — 85025 COMPLETE CBC W/AUTO DIFF WBC: CPT

## 2024-03-04 PROCEDURE — 71275 CT ANGIOGRAPHY CHEST: CPT

## 2024-03-04 PROCEDURE — 71045 X-RAY EXAM CHEST 1 VIEW: CPT

## 2024-03-04 PROCEDURE — 87634 RSV DNA/RNA AMP PROBE: CPT

## 2024-03-04 PROCEDURE — 2060000000 HC ICU INTERMEDIATE R&B

## 2024-03-04 PROCEDURE — 2580000003 HC RX 258: Performed by: EMERGENCY MEDICINE

## 2024-03-04 PROCEDURE — 93010 ELECTROCARDIOGRAM REPORT: CPT | Performed by: INTERNAL MEDICINE

## 2024-03-04 PROCEDURE — 80053 COMPREHEN METABOLIC PANEL: CPT

## 2024-03-04 PROCEDURE — 87635 SARS-COV-2 COVID-19 AMP PRB: CPT

## 2024-03-04 PROCEDURE — 93005 ELECTROCARDIOGRAM TRACING: CPT | Performed by: EMERGENCY MEDICINE

## 2024-03-04 PROCEDURE — 99222 1ST HOSP IP/OBS MODERATE 55: CPT | Performed by: INTERNAL MEDICINE

## 2024-03-04 PROCEDURE — 6370000000 HC RX 637 (ALT 250 FOR IP): Performed by: EMERGENCY MEDICINE

## 2024-03-04 PROCEDURE — 84484 ASSAY OF TROPONIN QUANT: CPT

## 2024-03-04 PROCEDURE — 6360000004 HC RX CONTRAST MEDICATION: Performed by: RADIOLOGY

## 2024-03-04 RX ORDER — MIDODRINE HYDROCHLORIDE 2.5 MG/1
2.5 TABLET ORAL
Status: DISCONTINUED | OUTPATIENT
Start: 2024-03-05 | End: 2024-03-07 | Stop reason: HOSPADM

## 2024-03-04 RX ORDER — GUAIFENESIN/DEXTROMETHORPHAN 100-10MG/5
10 SYRUP ORAL EVERY 4 HOURS
Status: DISCONTINUED | OUTPATIENT
Start: 2024-03-04 | End: 2024-03-07 | Stop reason: HOSPADM

## 2024-03-04 RX ORDER — VALPROIC ACID 250 MG/5ML
1000 SOLUTION ORAL 2 TIMES DAILY
Status: DISCONTINUED | OUTPATIENT
Start: 2024-03-04 | End: 2024-03-07 | Stop reason: HOSPADM

## 2024-03-04 RX ORDER — FAMOTIDINE 20 MG/1
40 TABLET, FILM COATED ORAL DAILY
Status: DISCONTINUED | OUTPATIENT
Start: 2024-03-05 | End: 2024-03-07 | Stop reason: HOSPADM

## 2024-03-04 RX ORDER — LEVOTHYROXINE SODIUM 0.07 MG/1
150 TABLET ORAL DAILY
Status: DISCONTINUED | OUTPATIENT
Start: 2024-03-05 | End: 2024-03-07 | Stop reason: HOSPADM

## 2024-03-04 RX ORDER — 0.9 % SODIUM CHLORIDE 0.9 %
500 INTRAVENOUS SOLUTION INTRAVENOUS ONCE
Status: COMPLETED | OUTPATIENT
Start: 2024-03-04 | End: 2024-03-04

## 2024-03-04 RX ORDER — MONTELUKAST SODIUM 10 MG/1
10 TABLET ORAL NIGHTLY
Status: DISCONTINUED | OUTPATIENT
Start: 2024-03-04 | End: 2024-03-07 | Stop reason: HOSPADM

## 2024-03-04 RX ORDER — OLANZAPINE 10 MG/1
20 TABLET ORAL NIGHTLY
Status: DISCONTINUED | OUTPATIENT
Start: 2024-03-04 | End: 2024-03-07 | Stop reason: HOSPADM

## 2024-03-04 RX ADMIN — POTASSIUM BICARBONATE 40 MEQ: 782 TABLET, EFFERVESCENT ORAL at 19:56

## 2024-03-04 RX ADMIN — SODIUM CHLORIDE 500 ML: 9 INJECTION, SOLUTION INTRAVENOUS at 20:25

## 2024-03-04 RX ADMIN — IOPAMIDOL 75 ML: 755 INJECTION, SOLUTION INTRAVENOUS at 20:59

## 2024-03-04 ASSESSMENT — PAIN SCALES - WONG BAKER
WONGBAKER_NUMERICALRESPONSE: 0

## 2024-03-04 ASSESSMENT — PAIN - FUNCTIONAL ASSESSMENT
PAIN_FUNCTIONAL_ASSESSMENT: WONG-BAKER FACES

## 2024-03-04 NOTE — ED TRIAGE NOTES
Department of Emergency Medicine    FIRST PROVIDER TRIAGE NOTE             Independent MLP           3/4/24  12:02 PM EST    Date of Encounter: 3/4/24   MRN: [id number]    Vitals:    03/04/24 1136 03/04/24 1201   BP:  (!) 96/58   Pulse:  91   Resp:  14   Temp:  96.8 °F (36 °C)   TempSrc:  Temporal   SpO2:  100%   Weight: 55.3 kg (122 lb)    Height: 1.651 m (5' 5\")       HPI: Katerina Paiz is a 69 y.o. female who presents to the ED for Wheezing (From [employer]. Sent in for audible wheezing while laying down. Non verbal at baseline. No evident distress. Staff states no wheezing while upright.)     No cough, fever, or urinary complaints   Hx COPD     ROS: Negative for fever, vomiting, diarrhea, or urinary complaints.    Physical Exam:   Gen Appearance/Constitutional: alert  CV: regular rate     Initial Plan of Care: All treatment areas with department are currently occupied.     Plan to order/Initiate the following while awaiting opening in ED: Triage evaluation  imaging studies.    Initial Plan of Care: Initiate Treatment-Testing, Proceed toTreatment Area When Bed Available for ED Attending/MLP to Continue Care    Electronically signed by Veronica Monahan PA-C   DD: 3/4/24

## 2024-03-04 NOTE — ED PROVIDER NOTES
[facility] MED SURG  EMERGENCY DEPARTMENT ENCOUNTER        Pt Name: Katerina Paiz  MRN: [id number]  Birthdate 1955  Date of evaluation: 3/4/2024  Provider: Kelly Dai MD  PCP: Izabela Dove DO  Note Started: 2:04 PM EST 3/4/24    CHIEF COMPLAINT       Chief Complaint   Patient presents with    Wheezing     [facility] in for audible wheezing while laying down. Non verbal at baseline. No evident distress. Staff states no wheezing while upright.       HISTORY OF PRESENT ILLNESS: 1 or more Elements   History From: [facility] home staff member    Limitations to history : Patient is nonverbal at baseline due to MR dementia    Katerina Paiz is a 69 y.o. female who presents from [facility] today after they noticed some audible wheezing this morning.  Patient is nonverbal at baseline due to MR and dementia.  She is wheelchair-bound.  She has PEG tube for all feedings.  They state that she has not had any cough congestion or fevers.  She has not appeared short of breath.  They noticed some wheezing today so they called the nurse who came and evaluated there sent her in for further evaluation.  No breathing treatments were given.  Staff member who is at the bedside heard the wheezing this morning but admits that there is no wheezing now no increased work of breathing.  Patient's not had any vomiting or diarrhea.  She is on Bumex at baseline    Nursing Notes were all reviewed and agreed with or any disagreements were addressed in the HPI.      REVIEW OF EXTERNAL NOTE :       Echocardiogram from 2017 shows an EF of 60%.    REVIEW OF SYSTEMS :           Positives and Pertinent negatives as per HPI.     SURGICAL HISTORY     Past Surgical History:   Procedure Laterality Date    BRONCHOSCOPY  02/10/2017    CHEST TUBE INSERTION Right 02/09/2017    GASTROSTOMY TUBE PLACEMENT N/A 3/7/2021    EGD PEG TUBE PLACEMENT performed by Rupert Metz [facility]    OTHER SURGICAL HISTORY Right 01/17/2017  Specimen hemolyzed. 0.5 - 0.9 mg/dL    Est, Glom Filt Rate Unable to perform testing: Specimen hemolyzed. >60 mL/min/1.73m2    Bun/Cre Ratio Unable to perform testing: Specimen hemolyzed. 9 - 20    Calcium Unable to perform testing: Specimen hemolyzed. 8.6 - 10.4 mg/dL    Total Protein Unable to perform testing: Specimen hemolyzed. 6.4 - 8.3 g/dL    Albumin Unable to perform testing: Specimen hemolyzed. 3.5 - 5.2 g/dL    Albumin/Globulin Ratio Unable to perform testing: Specimen hemolyzed. 1.0 - 2.5    Total Bilirubin Unable to perform testing: Specimen hemolyzed. 0.3 - 1.2 mg/dL    Alkaline Phosphatase Unable to perform testing: Specimen hemolyzed. 35 - 104 U/L    ALT Unable to perform testing: Specimen hemolyzed. 5 - 33 U/L    AST Unable to perform testing: Specimen hemolyzed. <32 U/L   Brain Natriuretic Peptide   Result Value Ref Range    Pro-BNP 1,013 (H) 0 - 125 pg/mL   Troponin   Result Value Ref Range    Troponin, High Sensitivity Unable to perform testing: Specimen hemolyzed. 0 - 14 ng/L    Troponin T Unable to perform testing: Specimen hemolyzed. <0.03 ng/mL    Troponin Interp Unable to perform testing: Specimen hemolyzed.    Comprehensive Metabolic Panel   Result Value Ref Range    Sodium 123 (L) 132 - 146 mmol/L    Potassium 3.0 (L) 3.5 - 5.0 mmol/L    Chloride 81 (L) 98 - 107 mmol/L    CO2 32 (H) 22 - 29 mmol/L    Anion Gap 10 7 - 16 mmol/L    Glucose 125 (H) 74 - 99 mg/dL    BUN 70 (H) 6 - 23 mg/dL    Creatinine 1.1 (H) 0.50 - 1.00 mg/dL    Est, Glom Filt Rate 53 (L) >60 mL/min/1.73m2    Calcium 8.1 (L) 8.6 - 10.2 mg/dL    Total Protein 8.4 (H) 6.4 - 8.3 g/dL    Albumin 3.2 (L) 3.5 - 5.2 g/dL    Total Bilirubin 0.2 0.0 - 1.2 mg/dL    Alkaline Phosphatase 82 35 - 104 U/L    ALT 14 0 - 32 U/L    AST 30 0 - 31 U/L   Troponin   Result Value Ref Range    Troponin, High Sensitivity 19 (H) 0 - 9 ng/L   Troponin   Result Value Ref Range    Troponin, High Sensitivity 18 (H) 0 - 9 ng/L   Magnesium   Result Value

## 2024-03-05 LAB
ALBUMIN SERPL-MCNC: 2.4 G/DL (ref 3.5–5.2)
ALP SERPL-CCNC: 61 U/L (ref 35–104)
ALT SERPL-CCNC: 11 U/L (ref 0–32)
ANION GAP SERPL CALCULATED.3IONS-SCNC: 6 MMOL/L (ref 7–16)
ANION GAP SERPL CALCULATED.3IONS-SCNC: 7 MMOL/L (ref 7–16)
AST SERPL-CCNC: 19 U/L (ref 0–31)
BASOPHILS # BLD: 0.01 K/UL (ref 0–0.2)
BASOPHILS NFR BLD: 0 % (ref 0–2)
BILIRUB SERPL-MCNC: 0.2 MG/DL (ref 0–1.2)
BUN SERPL-MCNC: 44 MG/DL (ref 6–23)
BUN SERPL-MCNC: 53 MG/DL (ref 6–23)
CALCIUM SERPL-MCNC: 7.8 MG/DL (ref 8.6–10.2)
CALCIUM SERPL-MCNC: 8.4 MG/DL (ref 8.6–10.2)
CHLORIDE SERPL-SCNC: 100 MMOL/L (ref 98–107)
CHLORIDE SERPL-SCNC: 99 MMOL/L (ref 98–107)
CO2 SERPL-SCNC: 30 MMOL/L (ref 22–29)
CO2 SERPL-SCNC: 31 MMOL/L (ref 22–29)
CREAT SERPL-MCNC: 0.7 MG/DL (ref 0.5–1)
CREAT SERPL-MCNC: 0.8 MG/DL (ref 0.5–1)
CREAT UR-MCNC: 12.2 MG/DL (ref 29–226)
CRP SERPL HS-MCNC: 68 MG/L (ref 0–5)
EOSINOPHIL # BLD: 0.18 K/UL (ref 0.05–0.5)
EOSINOPHILS RELATIVE PERCENT: 2 % (ref 0–6)
ERYTHROCYTE [DISTWIDTH] IN BLOOD BY AUTOMATED COUNT: 13.8 % (ref 11.5–15)
GFR SERPL CREATININE-BSD FRML MDRD: >60 ML/MIN/1.73M2
GFR SERPL CREATININE-BSD FRML MDRD: >60 ML/MIN/1.73M2
GLUCOSE SERPL-MCNC: 111 MG/DL (ref 74–99)
GLUCOSE SERPL-MCNC: 90 MG/DL (ref 74–99)
HCT VFR BLD AUTO: 31.7 % (ref 34–48)
HGB BLD-MCNC: 10.3 G/DL (ref 11.5–15.5)
IMM GRANULOCYTES # BLD AUTO: 0.04 K/UL (ref 0–0.58)
IMM GRANULOCYTES NFR BLD: 1 % (ref 0–5)
LYMPHOCYTES NFR BLD: 1.41 K/UL (ref 1.5–4)
LYMPHOCYTES RELATIVE PERCENT: 18 % (ref 20–42)
MAGNESIUM SERPL-MCNC: 2.2 MG/DL (ref 1.6–2.6)
MCH RBC QN AUTO: 32.3 PG (ref 26–35)
MCHC RBC AUTO-ENTMCNC: 32.5 G/DL (ref 32–34.5)
MCV RBC AUTO: 99.4 FL (ref 80–99.9)
MONOCYTES NFR BLD: 1.01 K/UL (ref 0.1–0.95)
MONOCYTES NFR BLD: 13 % (ref 2–12)
NEUTROPHILS NFR BLD: 67 % (ref 43–80)
NEUTS SEG NFR BLD: 5.3 K/UL (ref 1.8–7.3)
OSMOLALITY UR: 225 MOSM/KG (ref 300–900)
PLATELET # BLD AUTO: 170 K/UL (ref 130–450)
PMV BLD AUTO: 10.8 FL (ref 7–12)
POTASSIUM SERPL-SCNC: 3.7 MMOL/L (ref 3.5–5)
POTASSIUM SERPL-SCNC: 3.8 MMOL/L (ref 3.5–5)
PROT SERPL-MCNC: 6.5 G/DL (ref 6.4–8.3)
RBC # BLD AUTO: 3.19 M/UL (ref 3.5–5.5)
SODIUM SERPL-SCNC: 136 MMOL/L (ref 132–146)
SODIUM SERPL-SCNC: 137 MMOL/L (ref 132–146)
SODIUM UR-SCNC: 24 MMOL/L
WBC OTHER # BLD: 8 K/UL (ref 4.5–11.5)

## 2024-03-05 PROCEDURE — 6370000000 HC RX 637 (ALT 250 FOR IP): Performed by: INTERNAL MEDICINE

## 2024-03-05 PROCEDURE — 80053 COMPREHEN METABOLIC PANEL: CPT

## 2024-03-05 PROCEDURE — 2580000003 HC RX 258: Performed by: INTERNAL MEDICINE

## 2024-03-05 PROCEDURE — 83735 ASSAY OF MAGNESIUM: CPT

## 2024-03-05 PROCEDURE — 99232 SBSQ HOSP IP/OBS MODERATE 35: CPT | Performed by: INTERNAL MEDICINE

## 2024-03-05 PROCEDURE — 83935 ASSAY OF URINE OSMOLALITY: CPT

## 2024-03-05 PROCEDURE — 2060000000 HC ICU INTERMEDIATE R&B

## 2024-03-05 PROCEDURE — 36415 COLL VENOUS BLD VENIPUNCTURE: CPT

## 2024-03-05 PROCEDURE — 80048 BASIC METABOLIC PNL TOTAL CA: CPT

## 2024-03-05 PROCEDURE — 84300 ASSAY OF URINE SODIUM: CPT

## 2024-03-05 PROCEDURE — 86140 C-REACTIVE PROTEIN: CPT

## 2024-03-05 PROCEDURE — 82570 ASSAY OF URINE CREATININE: CPT

## 2024-03-05 PROCEDURE — 85025 COMPLETE CBC W/AUTO DIFF WBC: CPT

## 2024-03-05 RX ORDER — 0.9 % SODIUM CHLORIDE 0.9 %
2000 INTRAVENOUS SOLUTION INTRAVENOUS ONCE
Status: COMPLETED | OUTPATIENT
Start: 2024-03-05 | End: 2024-03-05

## 2024-03-05 RX ORDER — IPRATROPIUM BROMIDE AND ALBUTEROL SULFATE 2.5; .5 MG/3ML; MG/3ML
1 SOLUTION RESPIRATORY (INHALATION) EVERY 4 HOURS PRN
Status: DISCONTINUED | OUTPATIENT
Start: 2024-03-05 | End: 2024-03-07 | Stop reason: HOSPADM

## 2024-03-05 RX ADMIN — VALPROIC ACID 1000 MG: 250 SOLUTION ORAL at 09:23

## 2024-03-05 RX ADMIN — MONTELUKAST SODIUM 10 MG: 10 TABLET ORAL at 01:26

## 2024-03-05 RX ADMIN — GUAIFENESIN AND DEXTROMETHORPHAN 10 ML: 100; 10 SYRUP ORAL at 09:23

## 2024-03-05 RX ADMIN — OLANZAPINE 20 MG: 10 TABLET, FILM COATED ORAL at 20:36

## 2024-03-05 RX ADMIN — GUAIFENESIN AND DEXTROMETHORPHAN 10 ML: 100; 10 SYRUP ORAL at 01:26

## 2024-03-05 RX ADMIN — MONTELUKAST SODIUM 10 MG: 10 TABLET ORAL at 20:35

## 2024-03-05 RX ADMIN — POTASSIUM BICARBONATE 40 MEQ: 782 TABLET, EFFERVESCENT ORAL at 01:25

## 2024-03-05 RX ADMIN — VALPROIC ACID 1000 MG: 250 SOLUTION ORAL at 20:35

## 2024-03-05 RX ADMIN — MIDODRINE HYDROCHLORIDE 2.5 MG: 2.5 TABLET ORAL at 19:07

## 2024-03-05 RX ADMIN — GUAIFENESIN AND DEXTROMETHORPHAN 10 ML: 100; 10 SYRUP ORAL at 04:08

## 2024-03-05 RX ADMIN — LEVOTHYROXINE SODIUM 150 MCG: 75 TABLET ORAL at 06:04

## 2024-03-05 RX ADMIN — MIDODRINE HYDROCHLORIDE 2.5 MG: 2.5 TABLET ORAL at 15:10

## 2024-03-05 RX ADMIN — VALPROIC ACID 1000 MG: 250 SOLUTION ORAL at 01:49

## 2024-03-05 RX ADMIN — FAMOTIDINE 40 MG: 20 TABLET ORAL at 09:23

## 2024-03-05 RX ADMIN — OLANZAPINE 20 MG: 10 TABLET, FILM COATED ORAL at 01:49

## 2024-03-05 RX ADMIN — GUAIFENESIN AND DEXTROMETHORPHAN 10 ML: 100; 10 SYRUP ORAL at 15:10

## 2024-03-05 RX ADMIN — MIDODRINE HYDROCHLORIDE 2.5 MG: 2.5 TABLET ORAL at 09:23

## 2024-03-05 RX ADMIN — GUAIFENESIN AND DEXTROMETHORPHAN 10 ML: 100; 10 SYRUP ORAL at 20:00

## 2024-03-05 RX ADMIN — SODIUM CHLORIDE 2000 ML: 9 INJECTION, SOLUTION INTRAVENOUS at 01:42

## 2024-03-05 RX ADMIN — GUAIFENESIN AND DEXTROMETHORPHAN 10 ML: 100; 10 SYRUP ORAL at 23:47

## 2024-03-05 ASSESSMENT — PAIN SCALES - WONG BAKER
WONGBAKER_NUMERICALRESPONSE: 0
WONGBAKER_NUMERICALRESPONSE: 0

## 2024-03-05 NOTE — ED NOTES
ED to Inpatient Handoff Report    Notified Marialuisa that electronic handoff available and patient ready for transport to room 615.    Safety Risks: Risk of falls    Patient in Restraints: no    Constant Observer or Patient : no    Telemetry Monitoring Ordered: Yes          Order to transfer to unit without monitor: NO    Last MEWS: 1 Time completed: 2253    Deterioration Index: 23.92    Vitals:    03/04/24 1402 03/04/24 1441 03/04/24 1807 03/04/24 2253   BP:  118/78 124/78 127/86   Pulse:  92 96 99   Resp:  16 17 18   Temp: 97.4 °F (36.3 °C) 97.4 °F (36.3 °C) 97.4 °F (36.3 °C) 97.3 °F (36.3 °C)   TempSrc: Axillary Axillary Axillary Axillary   SpO2:  95% 95% 94%   Weight:       Height:           Opportunity for questions and clarification was provided.

## 2024-03-05 NOTE — ACP (ADVANCE CARE PLANNING)
Advance Care Planning   Healthcare Decision Maker:    Primary Decision Maker: Latanya [place] (Apsi) - Legal Guardian, Legal Guardian - [phone]    Click here to complete Healthcare Decision Makers including selection of the Healthcare Decision Maker Relationship (ie \"Primary\").

## 2024-03-05 NOTE — CARE COORDINATION
Transition of Care-Patient admitted from [facility], placed a follow-up call to Nancie, medical coordinator at [facility], ([phone] fax [phone]),confirmed plans to return to the [facility] vs snf need. Nancie advised that Katerina is WC & bed bound, has 24 hour aide care, [facility] nursing care, [facility], and is taken via WC to her PCP office as needed. Nancie advised that Katerina will need skilled rehab if a PICC line & IVATB are required. Facility requested all new scripts be faxed to [facility] pharmacy. Patient will require ambulance transportation back to facility.   Admitted for hyponatremia, no consultants added to case. CM following.    Shaila HERNÁNDEZN, RN  [facility]

## 2024-03-05 NOTE — H&P
CONTRAST    Result Date: 3/4/2024  EXAMINATION: CTA OF THE CHEST 3/4/2024 8:50 pm TECHNIQUE: CTA of the chest was performed after the administration of intravenous contrast.  Multiplanar reformatted images are provided for review.  MIP images are provided for review. Automated exposure control, iterative reconstruction, and/or weight based adjustment of the mA/kV was utilized to reduce the radiation dose to as low as reasonably achievable. COMPARISON: None. HISTORY: ORDERING SYSTEM PROVIDED HISTORY: r/o PE vs pneumonia TECHNOLOGIST PROVIDED HISTORY: Reason for exam:->r/o PE vs pneumonia Decision Support Exception - unselect if not a suspected or confirmed emergency medical condition->Emergency Medical Condition (MA) FINDINGS: Pulmonary Arteries: Pulmonary arteries are adequately opacified for evaluation.  No evidence of intraluminal filling defect to suggest large central or apical pulmonary embolism.  Resolution for bilateral basilar pulmonary emboli is limited secondary to patient respiratory motion artifact. If there is high suspicion for basilar pulmonary embolus consider repeat examination.  Main pulmonary artery is normal in caliber. Mediastinum: Moderate retrocardiac hiatal hernia.  No evidence of mediastinal lymphadenopathy.  The heart and pericardium demonstrate no acute abnormality. There is no acute abnormality of the thoracic aorta. Lungs/pleura: The lungs are without acute process.  No focal consolidation or pulmonary edema.  No evidence of pleural effusion or pneumothorax. Upper Abdomen: Gastrostomy tube appears with good positioning.  Otherwise, limited images of the upper abdomen are unremarkable. Soft Tissues/Bones: No acute bone or soft tissue abnormality.     1. No evidence of central or apical pulmonary artery embolism. Resolution for bilateral basilar pulmonary emboli is limited secondary to patient respiratory motion artifact. If there is high suspicion for basilar pulmonary embolus consider  repeat examination. 2. Hiatal hernia. RECOMMENDATIONS: Careful clinical correlation and follow up recommended.     XR CHEST PORTABLE    Result Date: 3/4/2024  EXAMINATION: ONE XRAY VIEW OF THE CHEST 3/4/2024 1:42 pm COMPARISON: 09/27/2023 HISTORY: ORDERING SYSTEM PROVIDED HISTORY: wheezing TECHNOLOGIST PROVIDED HISTORY: Reason for exam:->wheezing FINDINGS: The lungs are without acute focal process.  There is no effusion or pneumothorax. The cardiomediastinal silhouette is without acute process. The osseous structures are without acute process.  Stable elevation right hemidiaphragm.     No acute process. Stable appearance of the chest compared to 09/27/2023.       EKG:  Normal sinus rhythm  Possible Anterior infarct (cited on or before 04-MAR-2024)  Abnormal ECG  When compared with ECG of 03-MAR-2023 12:44,  Questionable change in initial forces of Anteroseptal leads  ST elevation now present in Inferior leads  Nonspecific T wave abnormality, worse in Anterolateral leads   Assessment & Plan   ACTIVE hospital problems being addressed/reassessed for this admission:  Principal Problem:    Hyponatremia  Active Problems:    Profound intellectual disability    Hypothyroidism    S/P percutaneous endoscopic gastrostomy (PEG) tube placement (HCC)    Chronic kidney disease    Leukocytosis  Resolved Problems:    * No resolved hospital problems. *    Code status/DVT prophylaxis and PLAN --see orders   Note extensive time spent coordinating care between ER docs, ER and floor nurses, and transitioning care over to day providers  Plan of care/ clinical impressions/communication specifics detailed below:     Pt has a h/o MRDD/ dementia/ blind in both eyes.  Sent in for wheezing when flat- but this seems resolved  Presented from [employer] [facility] home- sent in reportedly bc of wheezing while flat, no wheezing when upright.  Reportedly no new cough, or fever  +hx of copd on chest congestion meds and breathing Tx  Flu A/covid in ER

## 2024-03-05 NOTE — ED NOTES
10:45 PM EST  I received this patient at sign out from Dr. Dai.  I have discussed the patient's initial exam, treatment and plan of care with the out going physician.  I have introduced my self to the patient / family and have answered their questions to this point.  I have examined the patient myself and reviewed ordered tests / medications and  reviewed any available results to this point.  If a resident is involved in the Emergency Department care, I have discussed my findings and plan with them as well.      CTA did not show any evidence of PE.  Patient was admitted for hyponatremia.  Discussed with hospitalist, Dr. Cedeño, who accepted for admission. Admitted to medicine in stable condition.      Mary Miramontes DO  03/04/24 [id number]

## 2024-03-05 NOTE — PROGRESS NOTES
4 Eyes Skin Assessment     NAME:  Katerina Paiz  YOB: 1955  MEDICAL RECORD NUMBER:  [id number]    The patient is being assessed for  Admission    I agree that at least one RN has performed a thorough Head to Toe Skin Assessment on the patient. ALL assessment sites listed below have been assessed.      Areas assessed by both nurses:    Head, Face, Ears, Shoulders, Back, Chest, Arms, Elbows, Hands, Sacrum. Buttock, Coccyx, Ischium, Legs. Feet and Heels, and Under Medical Devices         Does the Patient have a Wound? No noted wound(s)       Jose Prevention initiated by RN: No  Wound Care Orders initiated by RN: No    Pressure Injury (Stage 3,4, Unstageable, DTI, NWPT, and Complex wounds) if present, place Wound referral order by RN under : No    New Ostomies, if present place, Ostomy referral order under : No     Nurse 1 eSignature: Electronically signed by Tahir Lewis RN on 3/5/24 at 12:59 AM EST    **SHARE this note so that the co-signing nurse can place an eSignature**    Nurse 2 eSignature: {Esignature:[id number]}

## 2024-03-05 NOTE — PROGRESS NOTES
Comprehensive Nutrition Assessment    Type and Reason for Visit:  Initial, Positive Nutrition Screen    Nutrition Recommendations/Plan:   Continue NPO.  Recommend hold TF 30 mins before & after Levothyroxine admin.  TF regimen will then provide 1035 ml, 1553 kcal, 66 gm pro, 2787 ml (includes H2O flush). Meets 100% pro & kcal needs.  Nephro to manage H2O flushes.     Malnutrition Assessment:  Malnutrition Status:  At risk for malnutrition (Comment) (03/05/24 1235)    Context:  Chronic Illness     Findings of the 6 clinical characteristics of malnutrition:  Energy Intake:  No significant decrease in energy intake  Weight Loss:  No significant weight loss     Body Fat Loss:  No significant body fat loss     Muscle Mass Loss:  No significant muscle mass loss    Fluid Accumulation:  No significant fluid accumulation     Strength:  Not Performed    Nutrition Assessment:    ADM from group home for Hypovolemia hyponatremia, BEENA.  PMH: MRDD, dysphagia w/cchronic PEG TF, Dementia, COPD, 3/24/23 severe PCM. Current TF of Standard w/fiber (Jevity 1.5) @ 45 ml/hr will provide 1080 ml, 1620 kcal, 69 gm pro, 2501 ml free H2O. Nephro increased H2O flushes & is following. Recommend holding TF 30 mins before & after Synthroid administration. Pt at nut'l risk for NPO/TF. Will monitor.    Nutrition Related Findings:    nonverbal, blind, I&Os WDL, no edema, soft abd, +BS, PEG clamped, Synthroid Wound Type: None       Current Nutrition Intake & Therapies:    Average Meal Intake: NPO     Current Tube Feeding (TF) Orders:  Feeding Route: PEG  Formula: Standard without Fiber  Schedule: Continuous  Feeding Regimen: 45 ml/hr= 1080 ml tv/day  Additives/Modulars:    Water Flushes: 210 ml q 3 hrs= 1680 ml tv/day  Current TF & Flush Orders Provides: not hung yet  Goal TF & Flush Orders Provides: 1620 kcal, 69 gm pro, 2501 ml free water (incl flushes)    Anthropometric Measures:  Height: 165.1 cm (5' 5\")  Ideal Body Weight (IBW): 125 lbs (57  kg)    Admission Body Weight:  (122# stated)  Current Body Weight: 68 kg (149 lb 14.4 oz), 119.9 % IBW. Weight Source: Bed Scale (subtracted xtra blanket, wedges)  Current BMI (kg/m2): 24.9  Usual Body Weight: 55 kg (121 lb 4 oz) (6/2023 last actual wt (bed))  % Weight Change (Calculated): 23.6  Weight Adjustment For: No Adjustment                 BMI Categories: Normal Weight (BMI 22.0 to 24.9) age over 65    Estimated Daily Nutrient Needs:  Energy Requirements Based On: Kcal/kg  Weight Used for Energy Requirements: Current  Energy (kcal/day): 8621-2497  Weight Used for Protein Requirements: Current  Protein (g/day): 65-75  Method Used for Fluid Requirements: 1 ml/kcal  Fluid (ml/day): 1050-3111    Nutrition Diagnosis:   Inadequate oral intake related to swallowing difficulty as evidenced by nutrition support - enteral nutrition, NPO or clear liquid status due to medical condition    Nutrition Interventions:   Food and/or Nutrient Delivery: Continue Current Tube Feeding  Nutrition Education/Counseling: Education not indicated  Coordination of Nutrition Care: Continue to monitor while inpatient       Goals:     Goals: Tolerate nutrition support at goal rate, by next RD assessment       Nutrition Monitoring and Evaluation:         Physical Signs/Symptoms Outcomes: Biochemical Data, Nutrition Focused Physical Findings, Skin, Weight, Fluid Status or Edema    Discharge Planning:    Enteral Nutrition     Samara Slaughter RD  Contact: [phone]

## 2024-03-05 NOTE — PROGRESS NOTES
hemolyzed. 125* 90   CALCIUM Unable to perform testing: Specimen hemolyzed. 8.1* 7.8*       Recent Labs     03/04/24  1425 03/05/24  0647   WBC 12.5* 8.0   RBC 3.85 3.19*   HGB 12.7 10.3*   HCT 39.0 31.7*   .3* 99.4   MCH 33.0 32.3   MCHC 32.6 32.5   RDW 13.9 13.8    170   MPV 11.0 10.8         Assessment and Plan:     Principal Problem:    Hyponatremia  Active Problems:    Profound intellectual disability    Hypothyroidism    S/P percutaneous endoscopic gastrostomy (PEG) tube placement (HCC)    Chronic kidney disease    Leukocytosis  Resolved Problems:    * No resolved hospital problems. *    Hypovolemic hyponatremia.  Sodium 123 on admission.  Hold Bumex and paroxetine for now.  Corrected rapidly to 136 after NS bolus overnight due to hypotension.  Nephrology following.  On FWF.  Repeat BMP in the afternoon  BEENA.  Likely prerenal.  Resolved after IV fluids   Hypotension.  Continue midodrine  History of COPD.  Presented with shortness of breath and congestion.  Not in acute exacerbation.  No new cough.  Breathing treatments as needed.  CTA negative for PE.  Respiratory panel pending.  History of dysphagia, s/p PEG tube.  Tube feeds per dietary.  History of seizures.  Continue Depakote  MR.  Legally blind and nonverbal    Time spent reviewing chart, clinical exam, discussing case and answering questions with staff/consultants/patient/family aprox 35 mins.     NOTE: This report was transcribed using voice recognition software. Every effort was made to ensure accuracy; however, inadvertent computerized transcription errors may be present.  Electronically signed by Eliezer Reed MD on 3/5/2024 at 11:41 AM

## 2024-03-05 NOTE — CARE COORDINATION
Received phone call from patient SSA requesting call from patient unit .    Patient is connected with the [facility] Board of Developmental Disabilities. Her SSA is Yoan Boyce [phone].     Once guardian consent is obtained, please coordinate with the SSA and [facility] regarding patient discharge planning/placement.     If skilled rehabilitation is not recommended, the SSA is in the process of exploring potential alternative ICF's for any medical needs.    If you have any questions, please contact [facility] to assist with coordination.    Leena MONTANA, AILYN  [phone]    Electronically signed by NAN Ramey, AILYN on 3/5/2024 at 11:58 AM

## 2024-03-05 NOTE — CONSULTS
[facility]  MD Gustavo Eagle MD Ali Hassan, MD Lisa Kniska, DIONICIO Cobb CNP  Consultation  Patient's Name: Katerina Paiz  11:00 AM  3/5/2024    Nephrologist: Francisco Javier Baca MD    Reason for Consult:  Hyponatremia   Requesting Physician:  Kelly Dai MD    Chief Complaint:  Hyponatremia     History Obtained From: chart    History of Present Ilness:         Ms. Paiz is a 69 year old woman who was sent to the hospital from the group home she resides at for audible wheezing. She has a history MRDD and dementia. She is non-verbal. There are no family members present at the bedside. Work-up in the emergency room included a chest xray and CTA of the lungs that were both negative for an acute process. Lab work did reveal hyponatremia, hypokalemia, and mild BEENA. She was given a 500 cc bolus in the emergency room and admitted for further evaluation. She does receive tube feedings for nutritionally support. Her past medical history is significant for anemia, dementia, dysphagia, hypertension, hyperthyroidism, mental retardation, seizures, and peripheral vascular disease.          We were consulted for hyponatremia. Her sodium on presentation was 123. As of this morning her sodium level increased rapidly to 136. She did receive a 2 liter fluid bolus for hypotension early this morning. She is on midodrine at the facility she resides at on an as needed basis. Creatinine level was 1.1 mg/dL yesterday and has improved to 0.8 mg/dL. She is on tube feedings and tolerating without issues. She still does have audible wheezing, though does not appear to be in distress. She is on room air. She is nonverbal and unable to answer any questions.     Past Medical History:   Diagnosis Date    Abscess     periapical    Acute kidney injury (HCC) 01/15/2017    d/t Vancomycin, on Dialysis [facility] Tesio right chest    Anemia     iron deficiency anemia    Blind in both  hypotension early this morning. Will increase FWF. Would hold off on further isotonic IVF.   Cr 1.1-->0.8 mg/dL    Plan  Increase FWF to 250 cc q 3 hours  BMP at 1600, will increase or adjust FWF as necessary   Continue midodrine 2.5 mg TID   Goal of sodium correction, no more than 6-8 mmol in 24 hours   Follow labs   Monitor I&O  Continue supportive care       Thank you for the opportunity to participate in the care of your pleasant patient.  We look forward to following along with you.        Electronically signed by RADHA Bose CNP on 3/5/2024 at 11:00 AM

## 2024-03-05 NOTE — PROGRESS NOTES
Renal Dose Adjustment Policy (Generic)     This patient is on medication that requires renal, weight, and/or indication dose adjustment.      Date Body Weight IBW  Adjusted BW SCr  CrCl Dialysis status   3/4/2024 55.3 kg (122 lb) Ideal body weight: 57 kg (125 lb 10.6 oz) Serum creatinine: 1.1 mg/dL (H) 03/04/24 1730  Estimated creatinine clearance: 42 mL/min (A) N/a       Pharmacy has dose-adjusted the following medication(s):    Date Previous Order Adjusted Order   3/4/2024 Famotidine 40 mg BID Famotidine 40 mg Daily       These changes were made per protocol according to the [facility]   Automatic Renal Dose Adjustment Policy.     *Please note this dose may need readjusted if patient's condition changes.    Please contact pharmacy with any questions regarding these changes.    Manuel Marquez RPH  3/4/2024  11:25 PM

## 2024-03-06 LAB
ANION GAP SERPL CALCULATED.3IONS-SCNC: 6 MMOL/L (ref 7–16)
BUN SERPL-MCNC: 38 MG/DL (ref 6–23)
CALCIUM SERPL-MCNC: 8.7 MG/DL (ref 8.6–10.2)
CHLORIDE SERPL-SCNC: 103 MMOL/L (ref 98–107)
CO2 SERPL-SCNC: 30 MMOL/L (ref 22–29)
CREAT SERPL-MCNC: 0.7 MG/DL (ref 0.5–1)
ERYTHROCYTE [DISTWIDTH] IN BLOOD BY AUTOMATED COUNT: 14.3 % (ref 11.5–15)
GFR SERPL CREATININE-BSD FRML MDRD: >60 ML/MIN/1.73M2
GLUCOSE SERPL-MCNC: 83 MG/DL (ref 74–99)
HCT VFR BLD AUTO: 32.6 % (ref 34–48)
HGB BLD-MCNC: 10.4 G/DL (ref 11.5–15.5)
MCH RBC QN AUTO: 32.3 PG (ref 26–35)
MCHC RBC AUTO-ENTMCNC: 31.9 G/DL (ref 32–34.5)
MCV RBC AUTO: 101.2 FL (ref 80–99.9)
PLATELET # BLD AUTO: 210 K/UL (ref 130–450)
PMV BLD AUTO: 10.7 FL (ref 7–12)
POTASSIUM SERPL-SCNC: 3.8 MMOL/L (ref 3.5–5)
RBC # BLD AUTO: 3.22 M/UL (ref 3.5–5.5)
SODIUM SERPL-SCNC: 139 MMOL/L (ref 132–146)
WBC OTHER # BLD: 8.3 K/UL (ref 4.5–11.5)

## 2024-03-06 PROCEDURE — 2060000000 HC ICU INTERMEDIATE R&B

## 2024-03-06 PROCEDURE — 36415 COLL VENOUS BLD VENIPUNCTURE: CPT

## 2024-03-06 PROCEDURE — 80048 BASIC METABOLIC PNL TOTAL CA: CPT

## 2024-03-06 PROCEDURE — 85027 COMPLETE CBC AUTOMATED: CPT

## 2024-03-06 PROCEDURE — 99239 HOSP IP/OBS DSCHRG MGMT >30: CPT | Performed by: INTERNAL MEDICINE

## 2024-03-06 PROCEDURE — 6370000000 HC RX 637 (ALT 250 FOR IP): Performed by: INTERNAL MEDICINE

## 2024-03-06 RX ADMIN — FAMOTIDINE 40 MG: 20 TABLET ORAL at 09:02

## 2024-03-06 RX ADMIN — MIDODRINE HYDROCHLORIDE 2.5 MG: 2.5 TABLET ORAL at 16:50

## 2024-03-06 RX ADMIN — GUAIFENESIN AND DEXTROMETHORPHAN 10 ML: 100; 10 SYRUP ORAL at 11:24

## 2024-03-06 RX ADMIN — MIDODRINE HYDROCHLORIDE 2.5 MG: 2.5 TABLET ORAL at 11:24

## 2024-03-06 RX ADMIN — GUAIFENESIN AND DEXTROMETHORPHAN 10 ML: 100; 10 SYRUP ORAL at 18:54

## 2024-03-06 RX ADMIN — OLANZAPINE 20 MG: 10 TABLET, FILM COATED ORAL at 21:36

## 2024-03-06 RX ADMIN — VALPROIC ACID 1000 MG: 250 SOLUTION ORAL at 21:36

## 2024-03-06 RX ADMIN — LEVOTHYROXINE SODIUM 150 MCG: 75 TABLET ORAL at 06:21

## 2024-03-06 RX ADMIN — MONTELUKAST SODIUM 10 MG: 10 TABLET ORAL at 21:36

## 2024-03-06 RX ADMIN — GUAIFENESIN AND DEXTROMETHORPHAN 10 ML: 100; 10 SYRUP ORAL at 06:21

## 2024-03-06 RX ADMIN — MIDODRINE HYDROCHLORIDE 2.5 MG: 2.5 TABLET ORAL at 09:02

## 2024-03-06 RX ADMIN — GUAIFENESIN AND DEXTROMETHORPHAN 10 ML: 100; 10 SYRUP ORAL at 04:04

## 2024-03-06 RX ADMIN — GUAIFENESIN AND DEXTROMETHORPHAN 10 ML: 100; 10 SYRUP ORAL at 15:45

## 2024-03-06 RX ADMIN — VALPROIC ACID 1000 MG: 250 SOLUTION ORAL at 09:03

## 2024-03-06 ASSESSMENT — PAIN SCALES - WONG BAKER: WONGBAKER_NUMERICALRESPONSE: 0

## 2024-03-06 NOTE — PLAN OF CARE
Problem: Discharge Planning  Goal: Discharge to home or other facility with appropriate resources  Outcome: Progressing  Flowsheets (Taken 3/5/2024 2000)  Discharge to home or other facility with appropriate resources:   Identify barriers to discharge with patient and caregiver   Arrange for needed discharge resources and transportation as appropriate   Identify discharge learning needs (meds, wound care, etc)     Problem: Pain  Goal: Verbalizes/displays adequate comfort level or baseline comfort level  Outcome: Progressing     Problem: Safety - Adult  Goal: Free from fall injury  Outcome: Progressing     Problem: ABCDS Injury Assessment  Goal: Absence of physical injury  Outcome: Progressing     Problem: Skin/Tissue Integrity  Goal: Absence of new skin breakdown  Description: 1.  Monitor for areas of redness and/or skin breakdown  2.  Assess vascular access sites hourly  3.  Every 4-6 hours minimum:  Change oxygen saturation probe site  4.  Every 4-6 hours:  If on nasal continuous positive airway pressure, respiratory therapy assess nares and determine need for appliance change or resting period.  Outcome: Progressing

## 2024-03-06 NOTE — PLAN OF CARE
Problem: Discharge Planning  Goal: Discharge to home or other facility with appropriate resources  3/6/2024 0937 by Negra Brady RN  Outcome: Progressing  3/5/2024 2249 by Tejas Combs RN  Outcome: Progressing  Flowsheets (Taken 3/5/2024 2000)  Discharge to home or other facility with appropriate resources:   Identify barriers to discharge with patient and caregiver   Arrange for needed discharge resources and transportation as appropriate   Identify discharge learning needs (meds, wound care, etc)     Problem: Pain  Goal: Verbalizes/displays adequate comfort level or baseline comfort level  3/6/2024 0937 by Negra Brady RN  Outcome: Progressing  3/5/2024 2249 by Tejas Combs RN  Outcome: Progressing     Problem: Safety - Adult  Goal: Free from fall injury  3/6/2024 0937 by Negra Brady RN  Outcome: Progressing  3/5/2024 2249 by Tejas Combs RN  Outcome: Progressing     Problem: ABCDS Injury Assessment  Goal: Absence of physical injury  3/6/2024 0937 by Negra Brady RN  Outcome: Progressing  3/5/2024 2249 by Tejas Combs RN  Outcome: Progressing     Problem: Skin/Tissue Integrity  Goal: Absence of new skin breakdown  Description: 1.  Monitor for areas of redness and/or skin breakdown  2.  Assess vascular access sites hourly  3.  Every 4-6 hours minimum:  Change oxygen saturation probe site  4.  Every 4-6 hours:  If on nasal continuous positive airway pressure, respiratory therapy assess nares and determine need for appliance change or resting period.  3/6/2024 0937 by Negra Brady RN  Outcome: Progressing  3/5/2024 2249 by Tejas Combs RN  Outcome: Progressing

## 2024-03-06 NOTE — PROGRESS NOTES
Transport arranged with [facility] for patient to return to [facility] with ETA 2000.

## 2024-03-06 NOTE — PROGRESS NOTES
[facility]  MD Gustavo Eagle MD Ali Hassan, MD Lisa Kniska, SHARA Olvera, DIONICIO Coombs, CNP  Progress note  Patient's Name: Katerina Paiz  2:42 PM  3/6/2024    Appear comfortable in NAD     History of Present Ilness:         Ms. Paiz is a 69 year old woman who was sent to the hospital from the group home she resides at for audible wheezing. She has a history MRDD and dementia. She is non-verbal. There are no family members present at the bedside. Work-up in the emergency room included a chest xray and CTA of the lungs that were both negative for an acute process. Lab work did reveal hyponatremia, hypokalemia, and mild BEENA. She was given a 500 cc bolus in the emergency room and admitted for further evaluation. She does receive tube feedings for nutritionally support. Her past medical history is significant for anemia, dementia, dysphagia, hypertension, hyperthyroidism, mental retardation, seizures, and peripheral vascular disease.          We were consulted for hyponatremia. Her sodium on presentation was 123. As of this morning her sodium level increased rapidly to 136. She did receive a 2 liter fluid bolus for hypotension early this morning. She is on midodrine at the facility she resides at on an as needed basis. Creatinine level was 1.1 mg/dL yesterday and has improved to 0.8 mg/dL. She is on tube feedings and tolerating without issues. She still does have audible wheezing, though does not appear to be in distress. She is on room air. She is nonverbal and unable to answer any questions.     Past Medical History:   Diagnosis Date    Abscess     periapical    Acute kidney injury (HCC) 01/15/2017    d/t Vancomycin, on Dialysis [facility] right chest    Anemia     iron deficiency anemia    Blind in both eyes     Dementia (HCC)     Dysphagia     Essential hypertension 04/02/2021    Gastroesophageal reflux disease without esophagitis 04/02/2021     Hemodialysis patient (HCC)     Hernia, diaphragmatic     Hyperthyroidism     MR (mental retardation)      cerebral leukomalacia     Osteoarthritis     Other seizures (HCC)     Peptic ulcer     Peripheral vascular disease (HCC)     Pneumonia 2017    Pneumonia due to infectious organism 2017    Sepsis (HCC) 2021       Past Surgical History:   Procedure Laterality Date    BRONCHOSCOPY  02/10/2017    CHEST TUBE INSERTION Right 2017    GASTROSTOMY TUBE PLACEMENT N/A 3/7/2021    EGD PEG TUBE PLACEMENT performed by Rupert Metz MD at [facility] ENDOSCOPY    OTHER SURGICAL HISTORY Right 2017    tessio insertion     OTHER SURGICAL HISTORY  2017    PEG tube insertion       Allergies:  Patient has no known allergies.    Current Medications:    ipratropium 0.5 mg-albuterol 2.5 mg (DUONEB) nebulizer solution 1 Dose, Q4H PRN  guaiFENesin-dextromethorphan (ROBITUSSIN DM) 100-10 MG/5ML syrup 10 mL, Q4H  famotidine (PEPCID) tablet 40 mg, Daily  levothyroxine (SYNTHROID) tablet 150 mcg, Daily  midodrine (PROAMATINE) tablet 2.5 mg, TID WC  montelukast (SINGULAIR) tablet 10 mg, Nightly  OLANZapine (ZYPREXA) tablet 20 mg, Nightly  valproic acid (DEPAKENE) 250 MG/5ML oral solution 1,000 mg, BID        Review of Systems:   Unable to complete     Physical exam:  General Appearance:  awake, alert, nonverbal, in no acute distress  Skin:  Skin color, texture, turgor normal. No rashes or lesions.  Eyes:  PERRL, EOMI, Sclera nonicteric, and Conjunctiva clear  Ears:  canals and TMs intact  Nose/Sinuses:  Nares normal. Septum midline. Mucosa normal. No drainage or sinus tenderness.  Mouth/Throat:  Mucosa moist.  No lesions.   Neck:  neck- supple, no mass, non-tender  Lungs:  Normal expansion.  Clear to auscultation. Does have some audible wheezing at times. No rales, rhonchi  Heart:   Regular rate and rhythm without murmur, gallop or rub.  Abdomen:  Soft, non-tender, normal bowel sounds.  No bruits,

## 2024-03-06 NOTE — CARE COORDINATION
Transition of Care-Sodium levels are normal-per IDR charge nurse will prompt for discharge. Ambulance transportation will need arranged for return back to the group home-[facility]. Nurse to nurse is [phone], fax is [phone]. Anticipate discharge in the next 24 hrs.    Shaila STEVENS, RN  [facility]

## 2024-03-06 NOTE — PROGRESS NOTES
[facility] Hospitalist Progress Note    Admitting Date and Time: 3/4/2024  1:20 PM  Admit Dx: Dehydration [E86.0]  Hyponatremia [E87.1]    Subjective:  Patient is being followed for Dehydration [E86.0]  Hyponatremia [E87.1]   Pt seen and examined this morning  No acute events overnight  Unable to answer questions as she is nonverbal    ROS: Unable to assess   guaiFENesin-dextromethorphan  10 mL Per G Tube Q4H    famotidine  40 mg Per G Tube Daily    levothyroxine  150 mcg Oral Daily    midodrine  2.5 mg PEG Tube TID WC    montelukast  10 mg Oral Nightly    OLANZapine  20 mg Oral Nightly    valproic acid  1,000 mg Per G Tube BID     ipratropium 0.5 mg-albuterol 2.5 mg, 1 Dose, Q4H PRN         Objective:    /61   Pulse 93   Temp 97.8 °F (36.6 °C) (Axillary)   Resp 16   Ht 1.651 m (5' 5\")   Wt 68 kg (149 lb 14.4 oz)   SpO2 93%   BMI 24.94 kg/m²     General Appearance: no acute distress  Skin: warm and dry  Head: normocephalic and atraumatic  Eyes: pupils equal, round, and reactive to light, extraocular eye movements intact, conjunctivae normal  Neck: neck supple and non tender without mass   Pulmonary/Chest: clear to auscultation bilaterally- no wheezes, rales or rhonchi, normal air movement, no respiratory distress  Cardiovascular: normal rate, normal S1 and S2 and no carotid bruits  Abdomen: soft, non-tender, non-distended, normal bowel sounds, no masses or organomegaly  Extremities: no cyanosis, no clubbing, trace pitting edema BLE  Neurologic: Nonverbal        Recent Labs     03/05/24  0647 03/05/24  1812 03/06/24  0431    137 139   K 3.7 3.8 3.8   CL 99 100 103   CO2 31* 30* 30*   BUN 53* 44* 38*   CREATININE 0.8 0.7 0.7   GLUCOSE 90 111* 83   CALCIUM 7.8* 8.4* 8.7       Recent Labs     03/04/24  1425 03/05/24  0647 03/06/24  0431   WBC 12.5* 8.0 8.3   RBC 3.85 3.19* 3.22*   HGB 12.7 10.3* 10.4*   HCT 39.0 31.7* 32.6*   .3* 99.4 101.2*   MCH 33.0 32.3 32.3   MCHC 32.6 32.5 31.9*    RDW 13.9 13.8 14.3    170 210   MPV 11.0 10.8 10.7         Assessment and Plan:     Principal Problem:    Hyponatremia  Active Problems:    Profound intellectual disability    Hypothyroidism    S/P percutaneous endoscopic gastrostomy (PEG) tube placement (HCC)    Chronic kidney disease    Leukocytosis  Resolved Problems:    * No resolved hospital problems. *    Hypovolemic hyponatremia.  Sodium 123 on admission.  Hold Bumex and paroxetine for now.  Corrected rapidly to 136 after NS bolus overnight due to hypotension. 139 toady. Nephrology following.  On FWF.  BEENA.  Likely prerenal.  Resolved after IV fluids   Hypotension.  Continue midodrine  History of COPD.  Presented with shortness of breath and congestion.  Not in acute exacerbation.  No new cough.  Breathing treatments as needed.  CTA negative for PE.  Respiratory panel pending.  History of dysphagia, s/p PEG tube.  Tube feeds per dietary.  History of seizures.  Continue Depakote  MR.  Legally blind and nonverbal    Time spent reviewing chart, clinical exam, discussing case and answering questions with staff/consultants/patient/family aprox 25 mins.     NOTE: This report was transcribed using voice recognition software. Every effort was made to ensure accuracy; however, inadvertent computerized transcription errors may be present.  Electronically signed by Eliezer Reed MD on 3/6/2024 at 11:38 AM

## 2024-03-06 NOTE — DISCHARGE INSTR - COC
data filed at 3/6/2024 0915  Gross per 24 hour   Intake 180 ml   Output --   Net 180 ml     I/O last 3 completed shifts:  In: 670 [NG/GT:670]  Out: 550 [Urine:550]    Safety Concerns:     At Risk for Falls    Impairments/Disabilities:      Speech    Nutrition Therapy:  Current Nutrition Therapy:   - Tube Feedings:  Standard with fiber    Routes of Feeding: Gastrostomy Tube  Liquids: No Liquids  Daily Fluid Restriction: no  Last Modified Barium Swallow with Video (Video Swallowing Test): not done    Treatments at the Time of Hospital Discharge:   Respiratory Treatments: none  Oxygen Therapy:  is not on home oxygen therapy.  Ventilator:    - No ventilator support    Rehab Therapies: Physical Therapy and Occupational Therapy  Weight Bearing Status/Restrictions: No weight bearing restrictions  Other Medical Equipment (for information only, NOT a DME order):  wheelchair  Other Treatments: n/a    Patient's personal belongings (please select all that are sent with patient):  None    RN SIGNATURE:  Electronically signed by Negra Brady RN on 3/6/24 at 5:50 PM EST    CASE MANAGEMENT/SOCIAL WORK SECTION    Inpatient Status Date: ***    Readmission Risk Assessment Score:  Readmission Risk              Risk of Unplanned Readmission:  27           Discharging to Facility/ Agency   Name:   Address:  Phone:  Fax:    Dialysis Facility (if applicable)   Name:  Address:  Dialysis Schedule:  Phone:  Fax:    / signature: {Esignature:[id number]}    PHYSICIAN SECTION    Prognosis: {Prognosis:9678361700}    Condition at Discharge: { Patient Condition:657453921}    Rehab Potential (if transferring to Rehab): {Prognosis:2570501793}    Recommended Labs or Other Treatments After Discharge: ***    Physician Certification: I certify the above information and transfer of Katerina Paiz  is necessary for the continuing treatment of the diagnosis listed and that she requires {Admit to Appropriate Level of Care:18881} for  {GREATER/LESS:611838784} 30 days.     Update Admission H&P: {CHP DME Changes in HandP:565149949}    PHYSICIAN SIGNATURE:  {Esignature:[id number]}

## 2024-03-06 NOTE — DISCHARGE SUMMARY
[facility] Hospitalist Physician Discharge Summary       No follow-up provider specified.    Activity level: As tolerated     Dispo: group home      Condition on discharge: Stable     Patient ID:  Katerina Paiz  [id number]  69 y.o.  1955    Admit date: 3/4/2024    Discharge date and time:  3/6/2024  5:36 PM    Admission Diagnoses: Principal Problem:    Hyponatremia  Active Problems:    Profound intellectual disability    Hypothyroidism    S/P percutaneous endoscopic gastrostomy (PEG) tube placement (HCC)    Chronic kidney disease    Leukocytosis  Resolved Problems:    * No resolved hospital problems. *      Discharge Diagnoses: Principal Problem:    Hyponatremia  Active Problems:    Profound intellectual disability    Hypothyroidism    S/P percutaneous endoscopic gastrostomy (PEG) tube placement (HCC)    Chronic kidney disease    Leukocytosis  Resolved Problems:    * No resolved hospital problems. *      Consults:  IP CONSULT TO IV TEAM  IP CONSULT TO IV TEAM  IP CONSULT TO NEPHROLOGY  IP CONSULT TO SOCIAL WORK    Hospital Course:   Patient Katerina Paiz is a 69 y.o. presented with Dehydration [E86.0]  Hyponatremia [E87.1]    Patient is an old female who was admitted due to hypovolemic hyponatremia BEENA.  BEENA resolved after IV fluids.  Sodium was 123 on admission and corrected to 136 after NS bolus in the ED was given for hypotension.  She was started on free water flushes with tube feeds by nephrology.  Sodium 139 today.  She was cleared for discharge by nephro. And will be discharged back to group home in stable condition.    Discharge Exam:    General Appearance: alert , legally blind. Non verbal  Skin: warm and dry  Head: normocephalic and atraumatic  Eyes: pupils equal, round, and reactive to light, extraocular eye movements intact, conjunctivae normal  Neck: neck supple and non tender without mass   Pulmonary/Chest: clear to auscultation bilaterally- no wheezes, rales or rhonchi, normal air movement,    ondansetron 4 MG tablet  Commonly known as: ZOFRAN  1 tablet by Per G Tube route every 8 hours as needed for Nausea or Vomiting     PARoxetine 10 MG/5ML suspension  Commonly known as: PAXIL  TAKE 10MG (5ML) VIA G-TUBE TWO TIMES A DAY.     Poly Bacitracin 500-59248 UNIT/GM Oint  APPLY TOPICALLY TO OSTOMY TWICE DAILY.     Powder Free Nitrile Gloves Med Misc  As Directed     * Sunscreen Kids SPF 50 Aero  APPLY TOPICALLY AS DIRECTED AS NEEDED FOR SUNBURN PREVENTION.     * medicated lip balm 2-2.5-6.6 % Stck  Apply topically 3 times daily as needed for Dry Lips (CRACKED LIPS)     triamcinolone 0.1 % lotion  Commonly known as: KENALOG  Apply topically 2 times daily Indications: Abnormal Dryness of Skin Apply topically 2 times daily. To BUE/BLE     valproic acid 250 MG/5ML Soln oral solution  Commonly known as: DEPAKENE  20 mLs by Per G Tube route 2 times daily     vitamin D 10 MCG/ML Liqd  TAKE 12.5ML (5000 UNITS) VIA G-TUBE ONCE A DAY. (PLUG/SYR)     White Petroleum Jelly Gel  APPLY TO DRY SKIN TOPICALLY EVERY 12 HOURS AS NEEDED FOR DRY SKIN.     ZOFRAN ODT PO           * This list has 2 medication(s) that are the same as other medications prescribed for you. Read the directions carefully, and ask your doctor or other care provider to review them with you.                STOP taking these medications      white petrolatum Oint ointment            ASK your doctor about these medications      montelukast 5 MG chewable tablet  Commonly known as: SINGULAIR  TAKE 2 TABLETS (10MG) VIA G-TUBE ONCE A DAY AT BEDTIME.     Tussin DM Max Adult 5-100 MG/5ML Liqd  Generic drug: Dextromethorphan-guaiFENesin  TAKE 2 TEASPOONSFULS (10ML) VIA G-TUBE EVERY 4 HOURS AS NEEDED FOR COUGH/CONGESTION (PLUG/SYRINGE)                Note that more than 30 minutes was spent in preparing discharge papers, discussing discharge with patient, medication review, etc.    Signed:  Electronically signed by Eliezer Reed MD on 3/6/2024 at 5:36 PM

## 2024-03-07 ENCOUNTER — CARE COORDINATION (OUTPATIENT)
Dept: CARE COORDINATION | Age: 69
End: 2024-03-07

## 2024-03-07 VITALS
HEIGHT: 65 IN | DIASTOLIC BLOOD PRESSURE: 81 MMHG | BODY MASS INDEX: 26.89 KG/M2 | SYSTOLIC BLOOD PRESSURE: 128 MMHG | WEIGHT: 161.4 LBS | HEART RATE: 82 BPM | TEMPERATURE: 98 F | OXYGEN SATURATION: 95 % | RESPIRATION RATE: 18 BRPM

## 2024-03-08 ENCOUNTER — CARE COORDINATION (OUTPATIENT)
Dept: CARE COORDINATION | Age: 69
End: 2024-03-08

## 2024-03-08 NOTE — CARE COORDINATION
Care Transitions Initial Follow Up Call    Call within 2 business days of discharge: Yes    Care Transition Nurse attempted second initial outreach leaving VM to [facility] nurse to nurse line, purpose of call, my contact. CTN s/o.     Patient: Katerina Paiz Patient : 1955   MRN: [id number]  Reason for Admission: 3/4/2024 - 3/7/2024 [facility] IP. Hyponatremia, Hypovolemia, BEENA.   Discharge Date: 3/7/24 RARS: Readmission Risk Score: 19.4  NR  CT     [facility] PCP appt w/ [facility].     [facility]. Nurse to nurse P: [phone]      PCP  2:00     CHANGE how you take:  ferrous sulfate  polyethylene glycol (GLYCOLAX)  STOP taking:  white petrolatum Oint ointment  Ask your medical team for guidance about these  existing prescriptions.  montelukast 5 MG chewable tablet (SINGULAIR)  Tussin DM Max Adult 5-100 MG/5ML  Liqd (Dextromethorphan-guaiFENesin)     Legal guardian: Latanya Canada (APSI) [phone]   Connected with the [facility]. Her SSA is [facility] [phone].     Last Discharge Facility       Date Complaint Diagnosis Description Type Department Provider    3/4/24 Wheezing Hyponatremia ... ED to Hosp-Admission (Discharged) (ADMITTED) Eliezer Lui MD; Janice Dai.            Alis Garcia RN

## 2024-03-11 ENCOUNTER — TELEPHONE (OUTPATIENT)
Dept: FAMILY MEDICINE CLINIC | Age: 69
End: 2024-03-11

## 2024-03-11 NOTE — TELEPHONE ENCOUNTER
Care Transitions Initial Follow Up Call    Outreach made within 2 business days of discharge: Yes    Patient: Katerina Paiz Patient : 1955   MRN: [id number]  Reason for Admission: There are no discharge diagnoses documented for the most recent discharge.  Discharge Date: 3/7/24       Spoke with: Latanya    Discharge department/facility: [facility] Interactive Patient Contact:  Was patient able to fill all prescriptions: Yes  Was patient instructed to bring all medications to the follow-up visit: Yes  Is patient taking all medications as directed in the discharge summary? Yes  Does patient understand their discharge instructions: Yes  Does patient have questions or concerns that need addressed prior to 7-14 day follow up office visit: no    Scheduled appointment with PCP within 7-14 days    Follow Up  Future Appointments   Date Time Provider Department Center   3/14/2024  1:00 PM Izabela Dove DO [facility]   2024  2:00 PM Izabela Dove DO [facility]   2024 10:45 AM Ez Nunez, DPM [facility]       Asad Martinez

## 2024-03-12 NOTE — PROGRESS NOTES
Physician Progress Note      PATIENT:               ARMANI ALDANA  [facility] #:                  [id number]  :                       1955  ADMIT DATE:       3/4/2024 1:20 PM  DISCH DATE:        3/7/2024 1:55 AM  RESPONDING  PROVIDER #:        Eliezer Reed MD        QUERY TEXT:    Stage of Chronic Kidney Disease: Please provide further specificity, if known.    Clinical indicators include: bun, creatinine, chronic kidney disease  Options provided:  -- Chronic kidney disease stage 1  -- Chronic kidney disease stage 2  -- Chronic kidney disease stage 3  -- Chronic kidney disease stage 3a  -- Chronic kidney disease stage 3b  -- Chronic kidney disease stage 4  -- Chronic kidney disease stage 5  -- Chronic kidney disease stage 5, requiring dialysis  -- End stage renal disease  -- Other - I will add my own diagnosis  -- Disagree - Not applicable / Not valid  -- Disagree - Clinically Unable to determine / Unknown        PROVIDER RESPONSE TEXT:    Provider was unable to determine a response for this query.      Electronically signed by:  Eliezer Reed MD 3/12/2024 7:23 AM

## 2024-03-14 ENCOUNTER — OFFICE VISIT (OUTPATIENT)
Dept: FAMILY MEDICINE CLINIC | Age: 69
End: 2024-03-14

## 2024-03-14 VITALS
HEART RATE: 76 BPM | HEIGHT: 65 IN | OXYGEN SATURATION: 93 % | BODY MASS INDEX: 26.82 KG/M2 | DIASTOLIC BLOOD PRESSURE: 78 MMHG | TEMPERATURE: 97 F | WEIGHT: 161 LBS | SYSTOLIC BLOOD PRESSURE: 126 MMHG

## 2024-03-14 DIAGNOSIS — F73 PROFOUND INTELLECTUAL DISABILITY: ICD-10-CM

## 2024-03-14 DIAGNOSIS — I95.9 HYPOTENSION, UNSPECIFIED HYPOTENSION TYPE: ICD-10-CM

## 2024-03-14 DIAGNOSIS — E87.1 HYPONATREMIA: ICD-10-CM

## 2024-03-14 DIAGNOSIS — E44.1 MILD PROTEIN-CALORIE MALNUTRITION (HCC): ICD-10-CM

## 2024-03-14 DIAGNOSIS — Z09 HOSPITAL DISCHARGE FOLLOW-UP: Primary | ICD-10-CM

## 2024-03-14 DIAGNOSIS — Z93.1 S/P PERCUTANEOUS ENDOSCOPIC GASTROSTOMY (PEG) TUBE PLACEMENT (HCC): ICD-10-CM

## 2024-03-14 RX ORDER — MIDODRINE HYDROCHLORIDE 2.5 MG/1
TABLET ORAL
Qty: 270 TABLET | Refills: 3 | Status: SHIPPED | OUTPATIENT
Start: 2024-03-14

## 2024-03-14 ASSESSMENT — PATIENT HEALTH QUESTIONNAIRE - PHQ9
1. LITTLE INTEREST OR PLEASURE IN DOING THINGS: 0
SUM OF ALL RESPONSES TO PHQ QUESTIONS 1-9: 0
2. FEELING DOWN, DEPRESSED OR HOPELESS: 0
SUM OF ALL RESPONSES TO PHQ QUESTIONS 1-9: 0
SUM OF ALL RESPONSES TO PHQ9 QUESTIONS 1 & 2: 0
SUM OF ALL RESPONSES TO PHQ QUESTIONS 1-9: 0
SUM OF ALL RESPONSES TO PHQ QUESTIONS 1-9: 0

## 2024-03-14 NOTE — PROGRESS NOTES
3/14/2024 I have spent 45 minutes reviewing previous notes, test results and face to face with the patient discussing the diagnosis and importance of compliance with the treatment plan as well as documenting on the day of the visit.       Subjective:   HPI:  Follow up of Hospital problems/diagnosis(es):     Hyponatremia    Profound intellectual disability    Hypothyroidism    S/P percutaneous endoscopic gastrostomy (PEG) tube placement (HCC)    Chronic kidney disease    Leukocytosis    Inpatient course: Discharge summary reviewed- see chart.  Patient Katerina Paiz is a 69 y.o. presented with Dehydration [E86.0]  Hyponatremia [E87.1]     Patient is an old female who was admitted due to hypovolemic hyponatremia BEENA.  BEENA resolved after IV fluids.  Sodium was 123 on admission and corrected to 136 after NS bolus in the ED was given for hypotension.  She was started on free water flushes with tube feeds by nephrology.  Sodium 139 today.  She was cleared for discharge by nephro. And will be discharged back to group home in stable condition.    Interval history/Current status: stable  Chief Complaint   Patient presents with    Follow-Up from Hospital     Discharged from [facility] last Wednesday.      Pt was having diarrhea for several days prior to hospital admission.  She was receiving MiraLAX daily rather than as needed due to incorrect documentation of her bowel movements and medication dosing.  MiraLAX is now being given 3 days a week on Mondays, Wednesdays, and Fridays which seems to be working well.  There is still some difficulty with accurately recording her bowel movements as several different staff members help care for Katerina      Patient Active Problem List   Diagnosis    Profound intellectual disability    Hypothyroidism    Impairment level: total impairment of both eyes    Disruptive behavior disorder    Ingrowing nail    Peripheral vascular disease (HCC)    Onychomycosis    Anemia due to chronic kidney

## 2024-03-22 ENCOUNTER — HOSPITAL ENCOUNTER (EMERGENCY)
Age: 69
Discharge: HOME | End: 2024-03-22
Attending: EMERGENCY MEDICINE
Payer: MEDICARE

## 2024-03-22 VITALS
RESPIRATION RATE: 16 BRPM | OXYGEN SATURATION: 95 % | SYSTOLIC BLOOD PRESSURE: 147 MMHG | DIASTOLIC BLOOD PRESSURE: 69 MMHG | HEART RATE: 81 BPM | TEMPERATURE: 98.2 F

## 2024-03-22 DIAGNOSIS — K94.23 LEAKING PEG TUBE (HCC): Primary | ICD-10-CM

## 2024-03-22 PROCEDURE — 99283 EMERGENCY DEPT VISIT LOW MDM: CPT

## 2024-03-22 ASSESSMENT — ENCOUNTER SYMPTOMS
ABDOMINAL DISTENTION: 0
EYE DISCHARGE: 0
WHEEZING: 0
VOMITING: 0
EYE PAIN: 0
DIARRHEA: 0
SINUS PRESSURE: 0
SORE THROAT: 0
EYE REDNESS: 0
COUGH: 0
BACK PAIN: 0
SHORTNESS OF BREATH: 0
NAUSEA: 0

## 2024-03-22 ASSESSMENT — PAIN - FUNCTIONAL ASSESSMENT: PAIN_FUNCTIONAL_ASSESSMENT: NONE - DENIES PAIN

## 2024-03-22 NOTE — ED PROVIDER NOTES
69 old female history of dementia relying on a PEG tube for treatment and nutrition presents to the emergency department due to the PEG tube leaking.  The aide at the bedside states that she is having some leakage from a site that needs it.  They state no other concerns or complaints patient has no other issues or complaints at this time and cannot provide any history    The history is provided by a caregiver. The history is limited by the condition of the patient.        Review of Systems   Constitutional:  Negative for chills and fever.   HENT:  Negative for ear pain, sinus pressure and sore throat.    Eyes:  Negative for pain, discharge and redness.   Respiratory:  Negative for cough, shortness of breath and wheezing.    Cardiovascular:  Negative for chest pain.   Gastrointestinal:  Negative for abdominal distention, diarrhea, nausea and vomiting.   Genitourinary:  Negative for dysuria and frequency.   Musculoskeletal:  Negative for arthralgias and back pain.   Skin:  Negative for rash and wound.   Neurological:  Negative for weakness and headaches.   Hematological:  Negative for adenopathy.   All other systems reviewed and are negative.       Physical Exam  Constitutional:       Appearance: Normal appearance.   HENT:      Head: Normocephalic and atraumatic.      Nose: Nose normal.   Eyes:      Extraocular Movements: Extraocular movements intact.      Pupils: Pupils are equal, round, and reactive to light.   Cardiovascular:      Rate and Rhythm: Normal rate and regular rhythm.      Pulses: Normal pulses.   Pulmonary:      Effort: Pulmonary effort is normal.      Breath sounds: Normal breath sounds.   Abdominal:      General: Abdomen is flat. Bowel sounds are normal. There is no distension.      Palpations: Abdomen is soft.      Tenderness: There is no abdominal tenderness. There is no guarding.      Comments: PEG tube in place there is a Missing on one of the areas that will require new.  There is small amount  does not use drugs.    Family History: family history is not on file.     The patient’s home medications have been reviewed.    Allergies: Patient has no known allergies.    -------------------------------------------------- RESULTS -------------------------------------------------  Labs:  No results found for this visit on 03/22/24.    Radiology:  No orders to display       ------------------------- NURSING NOTES AND VITALS REVIEWED ---------------------------  Date / Time Roomed:  3/22/2024  3:58 PM  ED Bed Assignment:  VIDAL/VIDAL    The nursing notes within the ED encounter and vital signs as below have been reviewed.   BP (!) 147/69   Pulse 81   Temp 98.2 °F (36.8 °C) (Oral)   Resp 16   SpO2 95%   Oxygen Saturation Interpretation: Normal      ------------------------------------------ PROGRESS NOTES ------------------------------------------  I have spoken with the patient and discussed today’s results, in addition to providing specific details for the plan of care and counseling regarding the diagnosis and prognosis.  Their questions are answered at this time and they are agreeable with the plan. I discussed at length with them reasons for immediate return here for re evaluation. They will followup with their primary care physician by calling their office on Monday.      --------------------------------- ADDITIONAL PROVIDER NOTES ---------------------------------  At this time the patient is without objective evidence of an acute process requiring hospitalization or inpatient management.  They have remained hemodynamically stable throughout their entire ED visit and are stable for discharge with outpatient follow-up.     The plan has been discussed in detail and they are aware of the specific conditions for emergent return, as well as the importance of follow-up.      Discharge Medication List as of 3/22/2024 11:51 PM          Diagnosis:  1. Leaking PEG tube (HCC)        Disposition:  Patient's disposition:

## 2024-03-22 NOTE — ED NOTES
Patient resps easy and unlabored, patient resting at this time with no c/o pain, awaiting PAS transport back to facility.

## 2024-03-22 NOTE — ED NOTES
placed on uncapped end of austin valve of PEG tube.  Educated staff with patient on the use of austin valve.

## 2024-03-29 RX ORDER — FAMOTIDINE 40 MG/5ML
POWDER, FOR SUSPENSION ORAL
Qty: 930 ML | Refills: 5 | Status: SHIPPED | OUTPATIENT
Start: 2024-03-29

## 2024-03-29 NOTE — TELEPHONE ENCOUNTER
Last Appointment:  3/14/2024  Future Appointments   Date Time Provider Department Center   5/16/2024 10:45 AM Ez Nunez DPM [facility]

## 2024-04-01 ENCOUNTER — TELEPHONE (OUTPATIENT)
Dept: FAMILY MEDICINE CLINIC | Age: 69
End: 2024-04-01

## 2024-04-01 NOTE — TELEPHONE ENCOUNTER
Last Appointment:  3/14/2024  Future Appointments   Date Time Provider Department Center   5/16/2024 10:45 AM Ez Nunez DPM [facility]     They request  for 2 boxes of medical gloves.

## 2024-05-12 ENCOUNTER — APPOINTMENT (OUTPATIENT)
Dept: GENERAL RADIOLOGY | Age: 69
DRG: 393 | End: 2024-05-12
Payer: MEDICARE

## 2024-05-12 ENCOUNTER — HOSPITAL ENCOUNTER (INPATIENT)
Age: 69
LOS: 2 days | Discharge: HOME | DRG: 393 | End: 2024-05-16
Attending: EMERGENCY MEDICINE | Admitting: INTERNAL MEDICINE
Payer: MEDICARE

## 2024-05-12 DIAGNOSIS — F79 INTELLECTUAL DISABILITY: ICD-10-CM

## 2024-05-12 DIAGNOSIS — K94.23 MALFUNCTION OF PERCUTANEOUS ENDOSCOPIC GASTROSTOMY (PEG) TUBE (HCC): Primary | ICD-10-CM

## 2024-05-12 LAB
ALBUMIN SERPL-MCNC: 3.6 G/DL (ref 3.5–5.2)
ALP SERPL-CCNC: 76 U/L (ref 35–104)
ALT SERPL-CCNC: 11 U/L (ref 0–32)
ANION GAP SERPL CALCULATED.3IONS-SCNC: 7 MMOL/L (ref 7–16)
AST SERPL-CCNC: 27 U/L (ref 0–31)
BASOPHILS # BLD: 0.02 K/UL (ref 0–0.2)
BASOPHILS NFR BLD: 0 % (ref 0–2)
BILIRUB SERPL-MCNC: 0.3 MG/DL (ref 0–1.2)
BUN SERPL-MCNC: 54 MG/DL (ref 6–23)
CALCIUM SERPL-MCNC: 10.5 MG/DL (ref 8.6–10.2)
CHLORIDE SERPL-SCNC: 84 MMOL/L (ref 98–107)
CO2 SERPL-SCNC: 39 MMOL/L (ref 22–29)
CREAT SERPL-MCNC: 1.1 MG/DL (ref 0.5–1)
EOSINOPHIL # BLD: 0.15 K/UL (ref 0.05–0.5)
EOSINOPHILS RELATIVE PERCENT: 2 % (ref 0–6)
ERYTHROCYTE [DISTWIDTH] IN BLOOD BY AUTOMATED COUNT: 14.6 % (ref 11.5–15)
GFR, ESTIMATED: 55 ML/MIN/1.73M2
GLUCOSE SERPL-MCNC: 114 MG/DL (ref 74–99)
HCT VFR BLD AUTO: 39.5 % (ref 34–48)
HGB BLD-MCNC: 12.8 G/DL (ref 11.5–15.5)
IMM GRANULOCYTES # BLD AUTO: 0.13 K/UL (ref 0–0.58)
IMM GRANULOCYTES NFR BLD: 1 % (ref 0–5)
LYMPHOCYTES NFR BLD: 1.26 K/UL (ref 1.5–4)
LYMPHOCYTES RELATIVE PERCENT: 14 % (ref 20–42)
MCH RBC QN AUTO: 32.8 PG (ref 26–35)
MCHC RBC AUTO-ENTMCNC: 32.4 G/DL (ref 32–34.5)
MCV RBC AUTO: 101.3 FL (ref 80–99.9)
MONOCYTES NFR BLD: 1.34 K/UL (ref 0.1–0.95)
MONOCYTES NFR BLD: 15 % (ref 2–12)
NEUTROPHILS NFR BLD: 69 % (ref 43–80)
NEUTS SEG NFR BLD: 6.33 K/UL (ref 1.8–7.3)
PLATELET # BLD AUTO: 209 K/UL (ref 130–450)
PMV BLD AUTO: 10.4 FL (ref 7–12)
POTASSIUM SERPL-SCNC: 4 MMOL/L (ref 3.5–5)
PROT SERPL-MCNC: 8.7 G/DL (ref 6.4–8.3)
RBC # BLD AUTO: 3.9 M/UL (ref 3.5–5.5)
SODIUM SERPL-SCNC: 130 MMOL/L (ref 132–146)
TROPONIN I SERPL HS-MCNC: 17 NG/L (ref 0–9)
WBC OTHER # BLD: 9.2 K/UL (ref 4.5–11.5)

## 2024-05-12 PROCEDURE — 80053 COMPREHEN METABOLIC PANEL: CPT

## 2024-05-12 PROCEDURE — 99285 EMERGENCY DEPT VISIT HI MDM: CPT

## 2024-05-12 PROCEDURE — G0378 HOSPITAL OBSERVATION PER HR: HCPCS

## 2024-05-12 PROCEDURE — 93005 ELECTROCARDIOGRAM TRACING: CPT | Performed by: PHYSICIAN ASSISTANT

## 2024-05-12 PROCEDURE — 84484 ASSAY OF TROPONIN QUANT: CPT

## 2024-05-12 PROCEDURE — 85025 COMPLETE CBC W/AUTO DIFF WBC: CPT

## 2024-05-12 PROCEDURE — 99222 1ST HOSP IP/OBS MODERATE 55: CPT | Performed by: INTERNAL MEDICINE

## 2024-05-12 PROCEDURE — 71045 X-RAY EXAM CHEST 1 VIEW: CPT

## 2024-05-12 PROCEDURE — 2580000003 HC RX 258: Performed by: INTERNAL MEDICINE

## 2024-05-12 PROCEDURE — 2580000003 HC RX 258: Performed by: PHYSICIAN ASSISTANT

## 2024-05-12 RX ORDER — FOLIC ACID 1 MG/1
1 TABLET ORAL DAILY
Status: DISCONTINUED | OUTPATIENT
Start: 2024-05-12 | End: 2024-05-16 | Stop reason: HOSPADM

## 2024-05-12 RX ORDER — PAROXETINE 10 MG/5ML
10 SUSPENSION ORAL 2 TIMES DAILY
Status: DISCONTINUED | OUTPATIENT
Start: 2024-05-12 | End: 2024-05-16 | Stop reason: HOSPADM

## 2024-05-12 RX ORDER — OLANZAPINE 10 MG/1
20 TABLET ORAL 2 TIMES DAILY
Status: DISCONTINUED | OUTPATIENT
Start: 2024-05-12 | End: 2024-05-13

## 2024-05-12 RX ORDER — FAMOTIDINE 40 MG/5ML
40 POWDER, FOR SUSPENSION ORAL 2 TIMES DAILY
Status: DISCONTINUED | OUTPATIENT
Start: 2024-05-12 | End: 2024-05-13 | Stop reason: CLARIF

## 2024-05-12 RX ORDER — FERROUS SULFATE 300 MG/5ML
220 LIQUID (ML) ORAL DAILY
Status: DISCONTINUED | OUTPATIENT
Start: 2024-05-12 | End: 2024-05-16 | Stop reason: HOSPADM

## 2024-05-12 RX ORDER — ENOXAPARIN SODIUM 100 MG/ML
40 INJECTION SUBCUTANEOUS DAILY
Status: DISCONTINUED | OUTPATIENT
Start: 2024-05-12 | End: 2024-05-16 | Stop reason: HOSPADM

## 2024-05-12 RX ORDER — VALPROIC ACID 250 MG/5ML
1000 SOLUTION ORAL 2 TIMES DAILY
Status: DISCONTINUED | OUTPATIENT
Start: 2024-05-12 | End: 2024-05-16 | Stop reason: HOSPADM

## 2024-05-12 RX ORDER — ONDANSETRON 2 MG/ML
4 INJECTION INTRAMUSCULAR; INTRAVENOUS EVERY 6 HOURS PRN
Status: DISCONTINUED | OUTPATIENT
Start: 2024-05-12 | End: 2024-05-12 | Stop reason: ALTCHOICE

## 2024-05-12 RX ORDER — POLYETHYLENE GLYCOL 3350 17 G/17G
17 POWDER, FOR SOLUTION ORAL DAILY PRN
Status: DISCONTINUED | OUTPATIENT
Start: 2024-05-12 | End: 2024-05-16 | Stop reason: HOSPADM

## 2024-05-12 RX ORDER — ACETAMINOPHEN 650 MG/1
650 SUPPOSITORY RECTAL EVERY 6 HOURS PRN
Status: DISCONTINUED | OUTPATIENT
Start: 2024-05-12 | End: 2024-05-16 | Stop reason: HOSPADM

## 2024-05-12 RX ORDER — PROCHLORPERAZINE EDISYLATE 5 MG/ML
10 INJECTION INTRAMUSCULAR; INTRAVENOUS EVERY 6 HOURS PRN
Status: DISCONTINUED | OUTPATIENT
Start: 2024-05-12 | End: 2024-05-16 | Stop reason: HOSPADM

## 2024-05-12 RX ORDER — SODIUM CHLORIDE 9 MG/ML
INJECTION, SOLUTION INTRAVENOUS PRN
Status: DISCONTINUED | OUTPATIENT
Start: 2024-05-12 | End: 2024-05-16 | Stop reason: HOSPADM

## 2024-05-12 RX ORDER — 0.9 % SODIUM CHLORIDE 0.9 %
1000 INTRAVENOUS SOLUTION INTRAVENOUS ONCE
Status: COMPLETED | OUTPATIENT
Start: 2024-05-12 | End: 2024-05-12

## 2024-05-12 RX ORDER — LEVOTHYROXINE SODIUM 0.07 MG/1
150 TABLET ORAL DAILY
Status: DISCONTINUED | OUTPATIENT
Start: 2024-05-12 | End: 2024-05-16 | Stop reason: HOSPADM

## 2024-05-12 RX ORDER — SODIUM CHLORIDE 0.9 % (FLUSH) 0.9 %
5-40 SYRINGE (ML) INJECTION PRN
Status: DISCONTINUED | OUTPATIENT
Start: 2024-05-12 | End: 2024-05-16 | Stop reason: HOSPADM

## 2024-05-12 RX ORDER — ONDANSETRON 4 MG/1
4 TABLET, ORALLY DISINTEGRATING ORAL EVERY 8 HOURS PRN
Status: DISCONTINUED | OUTPATIENT
Start: 2024-05-12 | End: 2024-05-12 | Stop reason: ALTCHOICE

## 2024-05-12 RX ORDER — ACETAMINOPHEN 325 MG/1
650 TABLET ORAL EVERY 6 HOURS PRN
Status: DISCONTINUED | OUTPATIENT
Start: 2024-05-12 | End: 2024-05-16 | Stop reason: HOSPADM

## 2024-05-12 RX ORDER — SODIUM CHLORIDE 0.9 % (FLUSH) 0.9 %
5-40 SYRINGE (ML) INJECTION EVERY 12 HOURS SCHEDULED
Status: DISCONTINUED | OUTPATIENT
Start: 2024-05-12 | End: 2024-05-16 | Stop reason: HOSPADM

## 2024-05-12 RX ORDER — SODIUM CHLORIDE 9 MG/ML
INJECTION, SOLUTION INTRAVENOUS CONTINUOUS
Status: DISCONTINUED | OUTPATIENT
Start: 2024-05-12 | End: 2024-05-15

## 2024-05-12 RX ORDER — PROCHLORPERAZINE MALEATE 10 MG
10 TABLET ORAL EVERY 8 HOURS PRN
Status: DISCONTINUED | OUTPATIENT
Start: 2024-05-12 | End: 2024-05-16 | Stop reason: HOSPADM

## 2024-05-12 RX ORDER — MIDODRINE HYDROCHLORIDE 5 MG/1
2.5 TABLET ORAL 3 TIMES DAILY
Status: DISCONTINUED | OUTPATIENT
Start: 2024-05-12 | End: 2024-05-13

## 2024-05-12 RX ADMIN — SODIUM CHLORIDE 1000 ML: 9 INJECTION, SOLUTION INTRAVENOUS at 11:44

## 2024-05-12 RX ADMIN — SODIUM CHLORIDE: 9 INJECTION, SOLUTION INTRAVENOUS at 12:51

## 2024-05-12 NOTE — ED NOTES
..ED to Inpatient Handoff Report    Notified Elsa that electronic handoff available and patient ready for transport to room 523.    Safety Risks: Risk of falls    Patient in Restraints: no    Constant Observer or Patient : no    Telemetry Monitoring Ordered :No           Order to transfer to unit without monitor:N/A    Last MEWS: 2 Time completed: 1146    Deterioration Index Score:   Predictive Model Details          46 (Caution)  Factor Value    Calculated 5/12/2024 11:57 62% [place] coma scale 7    Deterioration Index Model 24% Age 69 years old     6% Systolic 156     5% Pulse 102     1% WBC count 9.2 k/uL     0% Pulse oximetry 97 %     0% Temperature 97.6 °F (36.4 °C)     0% Hematocrit 39.5 %     0% Respiratory rate 16        Vitals:    05/12/24 1033 05/12/24 1034 05/12/24 1146   BP:  (!) 140/96 (!) 156/81   Pulse:  93 (!) 102   Resp:  18 16   Temp:  97.6 °F (36.4 °C) 97.6 °F (36.4 °C)   SpO2:  97% 97%   Weight: 73 kg (161 lb)     Height: 1.651 m (5' 5\")           Opportunity for questions and clarification was provided.

## 2024-05-12 NOTE — ED PROVIDER NOTES
Shared ANDRIY-ED Attending Visit.  CC: No             [facility] EMERGENCY DEPARTMENT  ED  Encounter Note  Admit Date/RoomTime: 2024  9:44 AM  ED Room:   NAME: Katerina Paiz  : 1955  MRN: [id number]  PCP: Izabela Dove DO    CHIEF COMPLAINT     G Tube Complications ([place])    HISTORY OF PRESENT ILLNESS        Katerina Paiz is a 69 y.o. female who presents to the ED by ambulance for PEG tube placement, beginning this AM. The complaint has been persistent and are moderate in severity.  The patient arrives via EMS from a group home in the local area.  She has a history of profound intellectual disability, disruptive behavior disorder, anemia of chronic disease chronic, chronic kidney disease and PEG tube for nutritional support.  The patient apparently sometime between yesterday around 2 PM and this morning pulled out her PEG tube.  According to the staff they came on to duty this morning and received no report about any issues.  When they went to give her the a.m. feeding they discovered that the PEG tube was out.  They did not bring it with them but we were able to FaceTime the facility and I was able to get correct sizing.   According to the records it looks like this was placed in .  The patient has no other new issues at this time per staff.   She is nonverbal.   No vomiting or fever reported.      REVIEW OF SYSTEMS     Pertinent positives and negatives are stated within HPI, all other systems reviewed and are negative.    Past Medical History:  has a past medical history of Abscess, Acute kidney injury ([facility]), Anemia, Blind in both eyes, Dementia ([facility]), Dysphagia, Essential hypertension, Gastroesophageal reflux disease without esophagitis, Hemodialysis patient ([facility]), Hernia, diaphragmatic, Hyperthyroidism, MR (mental retardation),  cerebral leukomalacia, Osteoarthritis, Other seizures ([facility]), Peptic ulcer, Peripheral vascular disease ([facility]),

## 2024-05-12 NOTE — H&P
Cholecalciferol (VITAMIN D) 10 MCG/ML LIQD TAKE 12.5ML (5000 UNITS) VIA G-TUBE ONCE A DAY. (PLUG/SYR) 1/29/24   Izabela Dove DO   folic acid (FOLVITE) 1 MG tablet TAKE 1 TABLET VIA G-TUBE DAILY 1/29/24   Izabela Dove DO   levothyroxine (SYNTHROID) 150 MCG tablet TAKE 1 TABLET VIA G-TUBE ONCE DAILY 1/29/24   Izabela Dove DO   PARoxetine (PAXIL) 10 MG/5ML suspension TAKE 10MG (5ML) VIA G-TUBE TWO TIMES A DAY. 1/29/24   Izabela Dove DO   Sunscreens (SUNSCREEN KIDS SPF 50) AERO APPLY TOPICALLY AS DIRECTED AS NEEDED FOR SUNBURN PREVENTION. 1/29/24   Izabela Dove DO   triamcinolone (KENALOG) 0.1 % lotion Apply topically 2 times daily Indications: Abnormal Dryness of Skin Apply topically 2 times daily. To BUE/BLE 1/29/24   Izabela Dove DO   docusate sodium (COLACE) 150 MG/15ML liquid 10 mLs by Per G Tube route daily as needed (constipation) If no bowel movement for 2 days 1/29/24   Izabela Dove DO   polyethylene glycol (GLYCOLAX) 17 GM/SCOOP powder MIX 17GM (TO FILL LINE ON CAP) IN 8 OUNCES OF WATER AND GIVE AS NEEDED FOR CONSTIPATION. 1/9/24   Izabela Dove DO   ferrous sulfate 220 (44 Fe) MG/5ML solution GIVE 7.5ML VIA G-TUBE ONCE A DAY. 8/23/23   Izabela Dove DO   Ondansetron (ZOFRAN ODT PO) Take 4 mg by mouth 3 times daily as needed Via GT    Marco Adams MD   OLANZapine (ZYPREXA) 20 MG tablet by Per G Tube route in the morning and at bedtime 10 mg  am and 20 mg nightly    ProviderMarco MD   valproic acid (DEPACON) 250 MG/5ML SOLN oral solution 20 mLs by Per G Tube route 2 times daily 3/16/21   Chacko, Caridad I, DO       Allergies:    Patient has no known allergies.    Social History:    reports that she has never smoked. She has never used smokeless tobacco. She reports that she does not drink alcohol and does not use drugs.      Family History:   family history is not on file.  Reviewed    PHYSICAL 
Reviewed    PHYSICAL EXAM:  Vitals:  BP (!) 140/96   Pulse 93   Temp 97.6 °F (36.4 °C)   Resp 18   Ht 1.651 m (5' 5\")   Wt 73 kg (161 lb)   SpO2 97%   BMI 26.79 kg/m²     General Appearance: alert and oriented to person, place and time and in no acute distress  Skin: warm and dry  Head: normocephalic and atraumatic  Eyes: pupils equal, round, and reactive to light, extraocular eye movements intact, conjunctivae normal  Neck: neck supple and non tender without mass   Pulmonary/Chest: clear to auscultation bilaterally- no wheezes, rales or rhonchi, normal air movement, no respiratory distress  Cardiovascular: normal rate, normal S1 and S2 and no RGM  Abdomen: soft, non-tender, non-distended, normal bowel sounds  Extremities: no cyanosis, no clubbing and no edema  Neurologic: no cranial nerve deficit and speech normal    LABS:    Radiology: No results found.    EKG: None    ASSESSMENT:      Principal Problem:    PEG tube malfunction (HCC)  Resolved Problems:    * No resolved hospital problems. *      PLAN:    PEG tube malfunction-reportedly patient pulled out PEG tube between the hours of 2 PM and this morning.  Staff found this morning.  PEG tube for nutritional support/dysphagia.  GS input appreciated.  ESRD on HD- BUN/Crt avoid nephrotoxic agents.  Received 1L NS bolus in ED course.  Nephrology input appreciated  Hypothyroid-levothyroxine  PUD  PVD  Dementia-supportive care  Intellectual Disability/legally blind/nonverbal.  Hx seizures-Depakote    Code Status: Full  DVT prophylaxis: Lovenox    NOTE: This report was transcribed using voice recognition software. Every effort was made to ensure accuracy; however, inadvertent computerized transcription errors may be present.     Electronically signed by RADHA Andersen CNP on 5/12/2024 at 10:39 AM

## 2024-05-13 ENCOUNTER — ANESTHESIA EVENT (OUTPATIENT)
Dept: ENDOSCOPY | Age: 69
DRG: 393 | End: 2024-05-13
Payer: MEDICARE

## 2024-05-13 LAB
ANION GAP SERPL CALCULATED.3IONS-SCNC: 8 MMOL/L (ref 7–16)
BUN SERPL-MCNC: 35 MG/DL (ref 6–23)
CALCIUM SERPL-MCNC: 9.9 MG/DL (ref 8.6–10.2)
CHLORIDE SERPL-SCNC: 98 MMOL/L (ref 98–107)
CO2 SERPL-SCNC: 32 MMOL/L (ref 22–29)
CREAT SERPL-MCNC: 0.9 MG/DL (ref 0.5–1)
EKG ATRIAL RATE: 98 BPM
EKG P AXIS: 27 DEGREES
EKG P-R INTERVAL: 168 MS
EKG Q-T INTERVAL: 402 MS
EKG QRS DURATION: 74 MS
EKG QTC CALCULATION (BAZETT): 513 MS
EKG R AXIS: -11 DEGREES
EKG T AXIS: -7 DEGREES
EKG VENTRICULAR RATE: 98 BPM
ERYTHROCYTE [DISTWIDTH] IN BLOOD BY AUTOMATED COUNT: 15 % (ref 11.5–15)
GFR, ESTIMATED: 73 ML/MIN/1.73M2
GLUCOSE SERPL-MCNC: 96 MG/DL (ref 74–99)
HCT VFR BLD AUTO: 39.5 % (ref 34–48)
HGB BLD-MCNC: 12.4 G/DL (ref 11.5–15.5)
MCH RBC QN AUTO: 32.9 PG (ref 26–35)
MCHC RBC AUTO-ENTMCNC: 31.4 G/DL (ref 32–34.5)
MCV RBC AUTO: 104.8 FL (ref 80–99.9)
PLATELET # BLD AUTO: 196 K/UL (ref 130–450)
PMV BLD AUTO: 10.1 FL (ref 7–12)
POTASSIUM SERPL-SCNC: 4 MMOL/L (ref 3.5–5)
RBC # BLD AUTO: 3.77 M/UL (ref 3.5–5.5)
SODIUM SERPL-SCNC: 138 MMOL/L (ref 132–146)
WBC OTHER # BLD: 9.6 K/UL (ref 4.5–11.5)

## 2024-05-13 PROCEDURE — 2580000003 HC RX 258: Performed by: INTERNAL MEDICINE

## 2024-05-13 PROCEDURE — 93010 ELECTROCARDIOGRAM REPORT: CPT | Performed by: INTERNAL MEDICINE

## 2024-05-13 PROCEDURE — 99232 SBSQ HOSP IP/OBS MODERATE 35: CPT | Performed by: STUDENT IN AN ORGANIZED HEALTH CARE EDUCATION/TRAINING PROGRAM

## 2024-05-13 PROCEDURE — 80048 BASIC METABOLIC PNL TOTAL CA: CPT

## 2024-05-13 PROCEDURE — 99222 1ST HOSP IP/OBS MODERATE 55: CPT | Performed by: SURGERY

## 2024-05-13 PROCEDURE — 85027 COMPLETE CBC AUTOMATED: CPT

## 2024-05-13 PROCEDURE — 6360000002 HC RX W HCPCS: Performed by: INTERNAL MEDICINE

## 2024-05-13 PROCEDURE — G0378 HOSPITAL OBSERVATION PER HR: HCPCS

## 2024-05-13 RX ORDER — MONTELUKAST SODIUM 10 MG/1
5 TABLET ORAL NIGHTLY
Status: DISCONTINUED | OUTPATIENT
Start: 2024-05-13 | End: 2024-05-16 | Stop reason: HOSPADM

## 2024-05-13 RX ORDER — MONTELUKAST SODIUM 5 MG/1
10 TABLET, CHEWABLE ORAL NIGHTLY
COMMUNITY

## 2024-05-13 RX ORDER — OLANZAPINE 10 MG/1
10 TABLET ORAL EVERY MORNING
Status: DISCONTINUED | OUTPATIENT
Start: 2024-05-14 | End: 2024-05-16 | Stop reason: HOSPADM

## 2024-05-13 RX ORDER — MIDODRINE HYDROCHLORIDE 5 MG/1
7.5 TABLET ORAL 3 TIMES DAILY
Status: DISCONTINUED | OUTPATIENT
Start: 2024-05-13 | End: 2024-05-16 | Stop reason: HOSPADM

## 2024-05-13 RX ORDER — OLANZAPINE 10 MG/1
20 TABLET ORAL NIGHTLY
Status: DISCONTINUED | OUTPATIENT
Start: 2024-05-13 | End: 2024-05-16 | Stop reason: HOSPADM

## 2024-05-13 RX ORDER — FAMOTIDINE 40 MG/5ML
40 POWDER, FOR SUSPENSION ORAL 2 TIMES DAILY
Status: DISCONTINUED | OUTPATIENT
Start: 2024-05-13 | End: 2024-05-16 | Stop reason: HOSPADM

## 2024-05-13 RX ADMIN — ENOXAPARIN SODIUM 40 MG: 100 INJECTION SUBCUTANEOUS at 09:58

## 2024-05-13 RX ADMIN — SODIUM CHLORIDE: 9 INJECTION, SOLUTION INTRAVENOUS at 14:25

## 2024-05-13 RX ADMIN — SODIUM CHLORIDE: 9 INJECTION, SOLUTION INTRAVENOUS at 00:45

## 2024-05-13 ASSESSMENT — LIFESTYLE VARIABLES: SMOKING_STATUS: 0

## 2024-05-13 NOTE — PATIENT CARE CONFERENCE
[facility] Quality Flow/Interdisciplinary Rounds Progress Note        Quality Flow Rounds held on May 13, 2024    Disciplines Attending:  Bedside Nurse, , , and Nursing Unit Leadership    Katerina Paiz was admitted on 5/12/2024  9:44 AM    Anticipated Discharge Date:       Disposition:    Jose Score:  Jose Scale Score: 15    Readmission Risk              Risk of Unplanned Readmission:  0           Discussed patient goal for the day, patient clinical progression, and barriers to discharge.  The following Goal(s) of the Day/Commitment(s) have been identified:  Diagnostics - Report Results      Lucy Prince RN  May 13, 2024

## 2024-05-13 NOTE — CARE COORDINATION
Social Work discharge planning  Pt is from [facility] at [address] [place] [address]. SW spoke to Faye with [facility] [phone]. She said they can not transport pt, she will need an ambulance to bring her home at discharge. Ambulance forms placed in folder.   Pt's Legal Guardian Latanya [place] is through [employer] [phone].   Faye at [facility] requested that for any new meds at discharge:  A 5 day supply from [facility]   90 Rx to their New pharmacy [facility] ph [phone].   Electronically signed by GEOVANNA Soni on 5/13/2024 at 2:09 PM

## 2024-05-13 NOTE — ANESTHESIA PRE PROCEDURE
Dose Route Frequency Provider Last Rate Last Admin    ferrous Sulfate 300 (60 Fe) MG/5ML solution 220 mg  220 mg Per G Tube Daily Eliezer Reed MD        famotidine (PEPCID) 40 MG/5ML suspension 40 mg  40 mg Per G Tube BID Eliezer Reed MD        folic acid (FOLVITE) tablet 1 mg  1 mg Per G Tube Daily Eliezer Reed MD        levothyroxine (SYNTHROID) tablet 150 mcg  150 mcg Per G Tube Daily Eliezer Reed MD        midodrine (PROAMATINE) tablet 2.5 mg  2.5 mg Per G Tube TID Eliezer Reed MD        OLANZapine (ZYPREXA) tablet 20 mg  20 mg Per G Tube BID Eliezer Reed MD        PARoxetine (PAXIL) 10 MG/5ML suspension 10 mg  10 mg Per G Tube BID Eliezer Reed MD        valproic acid (DEPAKENE) 250 MG/5ML oral solution 1,000 mg  1,000 mg Per G Tube BID Eliezer Reed MD        sodium chloride flush 0.9 % injection 5-40 mL  5-40 mL IntraVENous 2 times per day Eliezer Reed MD        sodium chloride flush 0.9 % injection 5-40 mL  5-40 mL IntraVENous PRN Eliezer Reed MD        0.9 % sodium chloride infusion   IntraVENous PRN Eliezer Reed MD        enoxaparin (LOVENOX) injection 40 mg  40 mg SubCUTAneous Daily Eliezer Reed MD        polyethylene glycol (GLYCOLAX) packet 17 g  17 g Oral Daily PRN Eliezer Reed MD        acetaminophen (TYLENOL) tablet 650 mg  650 mg Oral Q6H PRN Eliezer Reed MD        Or    acetaminophen (TYLENOL) suppository 650 mg  650 mg Rectal Q6H PRN Eliezer Reed MD        0.9 % sodium chloride infusion   IntraVENous Continuous Eliezer Reed MD 75 mL/hr at 05/13/24 0554 Rate Verify at 05/13/24 0554    prochlorperazine (COMPAZINE) tablet 10 mg  10 mg Oral Q8H PRN Eliezer Reed MD        Or    prochlorperazine (COMPAZINE) injection 10 mg  10 mg IntraVENous Q6H PRN Eliezer Reed MD           Allergies:  No Known Allergies    Problem List:    Patient Active Problem List   Diagnosis Code    Intellectual disability F79    Hypothyroidism E03.9

## 2024-05-13 NOTE — ACP (ADVANCE CARE PLANNING)
Advance Care Planning   Healthcare Decision Maker:    Primary Decision Maker: Latanya [place] ([employer]) - Legal Guardian, Legal Guardian - [phone]    Click here to complete Healthcare Decision Makers including selection of the Healthcare Decision Maker Relationship (ie \"Primary\").

## 2024-05-13 NOTE — PLAN OF CARE
Problem: Pain  Goal: Verbalizes/displays adequate comfort level or baseline comfort level  5/13/2024 1013 by Vega Mason RN  Outcome: Progressing  5/12/2024 2153 by Kierra Villarreal RN  Outcome: Progressing     Problem: Nutrition Deficit:  Goal: Optimize nutritional status  5/13/2024 1013 by Vega Mason RN  Outcome: Progressing  5/12/2024 2153 by Kierra Villarreal RN  Outcome: Progressing     Problem: Skin/Tissue Integrity  Goal: Absence of new skin breakdown  Description: 1.  Monitor for areas of redness and/or skin breakdown  2.  Assess vascular access sites hourly  3.  Every 4-6 hours minimum:  Change oxygen saturation probe site  4.  Every 4-6 hours:  If on nasal continuous positive airway pressure, respiratory therapy assess nares and determine need for appliance change or resting period.  5/13/2024 1013 by Vega Mason RN  Outcome: Progressing  5/12/2024 2153 by Kierra Villarreal RN  Outcome: Progressing     Problem: Safety - Adult  Goal: Free from fall injury  5/13/2024 1013 by Vega Mason RN  Outcome: Progressing  5/12/2024 2153 by Kierra Villarreal RN  Outcome: Progressing

## 2024-05-14 ENCOUNTER — ANESTHESIA (OUTPATIENT)
Dept: ENDOSCOPY | Age: 69
DRG: 393 | End: 2024-05-14
Payer: MEDICARE

## 2024-05-14 PROBLEM — K94.23 PEG TUBE MALFUNCTION (HCC): Status: ACTIVE | Noted: 2024-05-14

## 2024-05-14 LAB
ANION GAP SERPL CALCULATED.3IONS-SCNC: 7 MMOL/L (ref 7–16)
BUN SERPL-MCNC: 17 MG/DL (ref 6–23)
CALCIUM SERPL-MCNC: 9.5 MG/DL (ref 8.6–10.2)
CHLORIDE SERPL-SCNC: 109 MMOL/L (ref 98–107)
CO2 SERPL-SCNC: 28 MMOL/L (ref 22–29)
CREAT SERPL-MCNC: 0.8 MG/DL (ref 0.5–1)
ERYTHROCYTE [DISTWIDTH] IN BLOOD BY AUTOMATED COUNT: 15.4 % (ref 11.5–15)
GFR, ESTIMATED: 86 ML/MIN/1.73M2
GLUCOSE SERPL-MCNC: 74 MG/DL (ref 74–99)
HCT VFR BLD AUTO: 38.5 % (ref 34–48)
HGB BLD-MCNC: 11.8 G/DL (ref 11.5–15.5)
MCH RBC QN AUTO: 32.3 PG (ref 26–35)
MCHC RBC AUTO-ENTMCNC: 30.6 G/DL (ref 32–34.5)
MCV RBC AUTO: 105.5 FL (ref 80–99.9)
PLATELET # BLD AUTO: 191 K/UL (ref 130–450)
PMV BLD AUTO: 10.4 FL (ref 7–12)
POTASSIUM SERPL-SCNC: 3.9 MMOL/L (ref 3.5–5)
RBC # BLD AUTO: 3.65 M/UL (ref 3.5–5.5)
SODIUM SERPL-SCNC: 144 MMOL/L (ref 132–146)
WBC OTHER # BLD: 10.4 K/UL (ref 4.5–11.5)

## 2024-05-14 PROCEDURE — 0DH63UZ INSERTION OF FEEDING DEVICE INTO STOMACH, PERCUTANEOUS APPROACH: ICD-10-PCS | Performed by: SURGERY

## 2024-05-14 PROCEDURE — 2709999900 HC NON-CHARGEABLE SUPPLY: Performed by: SURGERY

## 2024-05-14 PROCEDURE — 3E0G76Z INTRODUCTION OF NUTRITIONAL SUBSTANCE INTO UPPER GI, VIA NATURAL OR ARTIFICIAL OPENING: ICD-10-PCS | Performed by: SURGERY

## 2024-05-14 PROCEDURE — 7100000010 HC PHASE II RECOVERY - FIRST 15 MIN: Performed by: SURGERY

## 2024-05-14 PROCEDURE — 99232 SBSQ HOSP IP/OBS MODERATE 35: CPT | Performed by: STUDENT IN AN ORGANIZED HEALTH CARE EDUCATION/TRAINING PROGRAM

## 2024-05-14 PROCEDURE — 3700000000 HC ANESTHESIA ATTENDED CARE: Performed by: SURGERY

## 2024-05-14 PROCEDURE — 6360000002 HC RX W HCPCS

## 2024-05-14 PROCEDURE — 3609013300 HC EGD TUBE PLACEMENT: Performed by: SURGERY

## 2024-05-14 PROCEDURE — 6370000000 HC RX 637 (ALT 250 FOR IP): Performed by: SURGERY

## 2024-05-14 PROCEDURE — 85027 COMPLETE CBC AUTOMATED: CPT

## 2024-05-14 PROCEDURE — 3700000001 HC ADD 15 MINUTES (ANESTHESIA): Performed by: SURGERY

## 2024-05-14 PROCEDURE — 1200000000 HC SEMI PRIVATE

## 2024-05-14 PROCEDURE — 93005 ELECTROCARDIOGRAM TRACING: CPT | Performed by: STUDENT IN AN ORGANIZED HEALTH CARE EDUCATION/TRAINING PROGRAM

## 2024-05-14 PROCEDURE — 2580000003 HC RX 258

## 2024-05-14 PROCEDURE — 80048 BASIC METABOLIC PNL TOTAL CA: CPT

## 2024-05-14 PROCEDURE — G0378 HOSPITAL OBSERVATION PER HR: HCPCS

## 2024-05-14 PROCEDURE — 43246 EGD PLACE GASTROSTOMY TUBE: CPT | Performed by: SURGERY

## 2024-05-14 PROCEDURE — 36415 COLL VENOUS BLD VENIPUNCTURE: CPT

## 2024-05-14 PROCEDURE — 7100000011 HC PHASE II RECOVERY - ADDTL 15 MIN: Performed by: SURGERY

## 2024-05-14 PROCEDURE — 6360000002 HC RX W HCPCS: Performed by: REGISTERED NURSE

## 2024-05-14 RX ORDER — PROCHLORPERAZINE EDISYLATE 5 MG/ML
5 INJECTION INTRAMUSCULAR; INTRAVENOUS
Status: ACTIVE | OUTPATIENT
Start: 2024-05-14 | End: 2024-05-15

## 2024-05-14 RX ORDER — HYDRALAZINE HYDROCHLORIDE 20 MG/ML
5 INJECTION INTRAMUSCULAR; INTRAVENOUS
Status: DISCONTINUED | OUTPATIENT
Start: 2024-05-14 | End: 2024-05-16 | Stop reason: HOSPADM

## 2024-05-14 RX ORDER — PROPOFOL 10 MG/ML
INJECTION, EMULSION INTRAVENOUS PRN
Status: DISCONTINUED | OUTPATIENT
Start: 2024-05-14 | End: 2024-05-14 | Stop reason: SDUPTHER

## 2024-05-14 RX ORDER — NALOXONE HYDROCHLORIDE 0.4 MG/ML
INJECTION, SOLUTION INTRAMUSCULAR; INTRAVENOUS; SUBCUTANEOUS PRN
Status: DISCONTINUED | OUTPATIENT
Start: 2024-05-14 | End: 2024-05-16 | Stop reason: HOSPADM

## 2024-05-14 RX ORDER — DIPHENHYDRAMINE HYDROCHLORIDE 50 MG/ML
12.5 INJECTION INTRAMUSCULAR; INTRAVENOUS
Status: ACTIVE | OUTPATIENT
Start: 2024-05-14 | End: 2024-05-15

## 2024-05-14 RX ORDER — MEPERIDINE HYDROCHLORIDE 25 MG/ML
12.5 INJECTION INTRAMUSCULAR; INTRAVENOUS; SUBCUTANEOUS ONCE
Status: DISCONTINUED | OUTPATIENT
Start: 2024-05-14 | End: 2024-05-16 | Stop reason: HOSPADM

## 2024-05-14 RX ORDER — FENTANYL CITRATE 50 UG/ML
25 INJECTION, SOLUTION INTRAMUSCULAR; INTRAVENOUS EVERY 5 MIN PRN
Status: DISCONTINUED | OUTPATIENT
Start: 2024-05-14 | End: 2024-05-16 | Stop reason: HOSPADM

## 2024-05-14 RX ORDER — SODIUM CHLORIDE 0.9 % (FLUSH) 0.9 %
5-40 SYRINGE (ML) INJECTION EVERY 12 HOURS SCHEDULED
Status: DISCONTINUED | OUTPATIENT
Start: 2024-05-14 | End: 2024-05-16 | Stop reason: HOSPADM

## 2024-05-14 RX ORDER — SODIUM CHLORIDE 9 MG/ML
INJECTION, SOLUTION INTRAVENOUS PRN
Status: DISCONTINUED | OUTPATIENT
Start: 2024-05-14 | End: 2024-05-16 | Stop reason: HOSPADM

## 2024-05-14 RX ORDER — SODIUM CHLORIDE 0.9 % (FLUSH) 0.9 %
5-40 SYRINGE (ML) INJECTION PRN
Status: DISCONTINUED | OUTPATIENT
Start: 2024-05-14 | End: 2024-05-16 | Stop reason: HOSPADM

## 2024-05-14 RX ORDER — LABETALOL HYDROCHLORIDE 5 MG/ML
5 INJECTION, SOLUTION INTRAVENOUS
Status: DISCONTINUED | OUTPATIENT
Start: 2024-05-14 | End: 2024-05-16 | Stop reason: HOSPADM

## 2024-05-14 RX ORDER — CEFAZOLIN SODIUM 1 G/3ML
INJECTION, POWDER, FOR SOLUTION INTRAMUSCULAR; INTRAVENOUS PRN
Status: DISCONTINUED | OUTPATIENT
Start: 2024-05-14 | End: 2024-05-14 | Stop reason: SDUPTHER

## 2024-05-14 RX ADMIN — WATER 2000 MG: 1 INJECTION INTRAMUSCULAR; INTRAVENOUS; SUBCUTANEOUS at 12:09

## 2024-05-14 RX ADMIN — CEFAZOLIN 2 G: 1 INJECTION, POWDER, FOR SOLUTION INTRAMUSCULAR; INTRAVENOUS at 11:22

## 2024-05-14 RX ADMIN — FAMOTIDINE 40 MG: 40 POWDER, FOR SUSPENSION ORAL at 20:37

## 2024-05-14 RX ADMIN — VALPROIC ACID 1000 MG: 250 SOLUTION ORAL at 20:36

## 2024-05-14 RX ADMIN — PROPOFOL 130 MG: 10 INJECTION, EMULSION INTRAVENOUS at 11:22

## 2024-05-14 RX ADMIN — MONTELUKAST SODIUM 5 MG: 10 TABLET ORAL at 20:37

## 2024-05-14 NOTE — ANESTHESIA POSTPROCEDURE EVALUATION
Department of Anesthesiology  Postprocedure Note    Patient: Katerina Paiz  MRN: [id number]  YOB: 1955  Date of evaluation: 5/14/2024    Procedure Summary       Date: 05/14/24 Room / Location: [place] [address] / [facility]    Anesthesia Start: 1118 Anesthesia Stop: 1134    Procedure: ESOPHAGOGASTRODUODENOSCOPY PERCUTANEOUS ENDOSCOPIC GASTROSTOMY TUBE PLACEMENT Diagnosis:       Malfunction of percutaneous endoscopic gastrostomy (PEG) tube (HCC)      (Malfunction of percutaneous endoscopic gastrostomy (PEG) tube (HCC) [K94.23])    Surgeons: Jose Han MD Responsible Provider: Roman Garcia DO    Anesthesia Type: MAC ASA Status: 4            Anesthesia Type: No value filed.    Horacio Phase I:      Horacio Phase II:      Anesthesia Post Evaluation    Patient location during evaluation: bedside  Patient participation: complete - patient participated  Level of consciousness: awake and alert  Pain score: 0  Airway patency: patent  Nausea & Vomiting: no nausea and no vomiting  Cardiovascular status: blood pressure returned to baseline  Respiratory status: acceptable  Hydration status: euvolemic  Pain management: adequate        No notable events documented.

## 2024-05-14 NOTE — PLAN OF CARE
Problem: Pain  Goal: Verbalizes/displays adequate comfort level or baseline comfort level  Outcome: Progressing     Problem: Nutrition Deficit:  Goal: Optimize nutritional status  Outcome: Progressing     Problem: Skin/Tissue Integrity  Goal: Absence of new skin breakdown  Description: 1.  Monitor for areas of redness and/or skin breakdown  2.  Assess vascular access sites hourly  3.  Every 4-6 hours minimum:  Change oxygen saturation probe site  4.  Every 4-6 hours:  If on nasal continuous positive airway pressure, respiratory therapy assess nares and determine need for appliance change or resting period.  Outcome: Progressing     Problem: Safety - Adult  Goal: Free from fall injury  Outcome: Progressing

## 2024-05-14 NOTE — PATIENT CARE CONFERENCE
[facility] Quality Flow/Interdisciplinary Rounds Progress Note        Quality Flow Rounds held on May 14, 2024    Disciplines Attending:  Bedside Nurse, , , and Nursing Unit Leadership    Katerina Paiz was admitted on 5/12/2024  9:44 AM    Anticipated Discharge Date:       Disposition:    Jose Score:  Jose Scale Score: 15    Readmission Risk              Risk of Unplanned Readmission:  0           Discussed patient goal for the day, patient clinical progression, and barriers to discharge.  The following Goal(s) of the Day/Commitment(s) have been identified:  Diagnostics - Report Results      Lucy Prince RN  May 14, 2024

## 2024-05-15 LAB
ANION GAP SERPL CALCULATED.3IONS-SCNC: 9 MMOL/L (ref 7–16)
BUN SERPL-MCNC: 13 MG/DL (ref 6–23)
CALCIUM SERPL-MCNC: 9.4 MG/DL (ref 8.6–10.2)
CHLORIDE SERPL-SCNC: 114 MMOL/L (ref 98–107)
CO2 SERPL-SCNC: 26 MMOL/L (ref 22–29)
CREAT SERPL-MCNC: 0.8 MG/DL (ref 0.5–1)
EKG ATRIAL RATE: 91 BPM
EKG P AXIS: 51 DEGREES
EKG P-R INTERVAL: 158 MS
EKG Q-T INTERVAL: 294 MS
EKG QRS DURATION: 66 MS
EKG QTC CALCULATION (BAZETT): 361 MS
EKG R AXIS: -3 DEGREES
EKG T AXIS: 6 DEGREES
EKG VENTRICULAR RATE: 91 BPM
ERYTHROCYTE [DISTWIDTH] IN BLOOD BY AUTOMATED COUNT: 15.6 % (ref 11.5–15)
GFR, ESTIMATED: 84 ML/MIN/1.73M2
GLUCOSE SERPL-MCNC: 114 MG/DL (ref 74–99)
HCT VFR BLD AUTO: 39.9 % (ref 34–48)
HGB BLD-MCNC: 12.3 G/DL (ref 11.5–15.5)
MCH RBC QN AUTO: 32.9 PG (ref 26–35)
MCHC RBC AUTO-ENTMCNC: 30.8 G/DL (ref 32–34.5)
MCV RBC AUTO: 106.7 FL (ref 80–99.9)
PLATELET # BLD AUTO: 196 K/UL (ref 130–450)
PMV BLD AUTO: 10.4 FL (ref 7–12)
POTASSIUM SERPL-SCNC: 3.9 MMOL/L (ref 3.5–5)
RBC # BLD AUTO: 3.74 M/UL (ref 3.5–5.5)
SODIUM SERPL-SCNC: 149 MMOL/L (ref 132–146)
WBC OTHER # BLD: 9.2 K/UL (ref 4.5–11.5)

## 2024-05-15 PROCEDURE — 85027 COMPLETE CBC AUTOMATED: CPT

## 2024-05-15 PROCEDURE — 36415 COLL VENOUS BLD VENIPUNCTURE: CPT

## 2024-05-15 PROCEDURE — 2580000003 HC RX 258: Performed by: ANESTHESIOLOGY

## 2024-05-15 PROCEDURE — 99232 SBSQ HOSP IP/OBS MODERATE 35: CPT | Performed by: STUDENT IN AN ORGANIZED HEALTH CARE EDUCATION/TRAINING PROGRAM

## 2024-05-15 PROCEDURE — 6360000002 HC RX W HCPCS: Performed by: SURGERY

## 2024-05-15 PROCEDURE — 80048 BASIC METABOLIC PNL TOTAL CA: CPT

## 2024-05-15 PROCEDURE — 2580000003 HC RX 258: Performed by: SURGERY

## 2024-05-15 PROCEDURE — 99232 SBSQ HOSP IP/OBS MODERATE 35: CPT | Performed by: SURGERY

## 2024-05-15 PROCEDURE — 6370000000 HC RX 637 (ALT 250 FOR IP): Performed by: SURGERY

## 2024-05-15 PROCEDURE — 1200000000 HC SEMI PRIVATE

## 2024-05-15 RX ADMIN — SODIUM CHLORIDE, PRESERVATIVE FREE 10 ML: 5 INJECTION INTRAVENOUS at 20:39

## 2024-05-15 RX ADMIN — VALPROIC ACID 1000 MG: 250 SOLUTION ORAL at 10:49

## 2024-05-15 RX ADMIN — MONTELUKAST SODIUM 5 MG: 10 TABLET ORAL at 20:33

## 2024-05-15 RX ADMIN — ENOXAPARIN SODIUM 40 MG: 100 INJECTION SUBCUTANEOUS at 10:50

## 2024-05-15 RX ADMIN — FAMOTIDINE 40 MG: 40 POWDER, FOR SUSPENSION ORAL at 10:49

## 2024-05-15 RX ADMIN — OLANZAPINE 20 MG: 10 TABLET, FILM COATED ORAL at 21:59

## 2024-05-15 RX ADMIN — FOLIC ACID 1 MG: 1 TABLET ORAL at 10:45

## 2024-05-15 RX ADMIN — OLANZAPINE 10 MG: 10 TABLET, FILM COATED ORAL at 10:46

## 2024-05-15 RX ADMIN — VALPROIC ACID 1000 MG: 250 SOLUTION ORAL at 20:33

## 2024-05-15 RX ADMIN — LEVOTHYROXINE SODIUM 150 MCG: 75 TABLET ORAL at 05:49

## 2024-05-15 RX ADMIN — FAMOTIDINE 40 MG: 40 POWDER, FOR SUSPENSION ORAL at 20:33

## 2024-05-15 RX ADMIN — Medication 220 MG: at 10:48

## 2024-05-15 NOTE — PATIENT CARE CONFERENCE
[facility] Quality Flow/Interdisciplinary Rounds Progress Note        Quality Flow Rounds held on May 15, 2024    Disciplines Attending:  Bedside Nurse, , , and Nursing Unit Leadership    Katerina Paiz was admitted on 5/12/2024  9:44 AM    Anticipated Discharge Date:       Disposition:    Jose Score:  Jose Scale Score: 10    Readmission Risk              Risk of Unplanned Readmission:  27           Discussed patient goal for the day, patient clinical progression, and barriers to discharge.  The following Goal(s) of the Day/Commitment(s) have been identified:  Discharge - Obtain Order      Ninfa Garcia RN  May 15, 2024

## 2024-05-15 NOTE — CARE COORDINATION
Social Work discharge planning  Pt tentatively set up with [facility] ambulance to return to her group home at 3p today. BEVERLEY spoke to Yudith at [facility]. BEVERLEY called [employer] guardian [phone], and spoke to Chetan. Charge Rn Ninfa aware. Spoke to Taylor at Group home, who asked for Nurse to Nurse be called to Idania Adhikari [phone].  Electronically signed by GEOVANNA Soni on 5/15/2024 at 9:43 AM    Addendum  BEVERLEY called Ana Keith with [employer]. [phone]  She said to call her instead of Idania.   Charge Rn Ninfa notified.  Electronically signed by GEOVANNA Soni on 5/15/2024 at 10:47 AM     Addendum  BEVERLEY spoke to Yanique at [facility] to reschedule transport back to group home tomorrow 5/16 at 11am.  Electronically signed by GEOVANNA Soni on 5/15/2024 at 12:33 PM

## 2024-05-15 NOTE — CONSULTS
Comprehensive Nutrition Assessment    Type and Reason for Visit:  Initial, Consult (TF O/M)    Nutrition Recommendations/Plan:   Start TF    Standard w Fiber (Jevity 1.5) @ 40ml/hr x 23 hr (hold 30 min before and after Synthroid administration) w/ 30 ml flush Q4h  This will provide:  920 ml TV, 1380 kcal, 59 g pro, 879 ml total water  This regimen meets 100% protein/energy needs    Will continue to monitor while inpatient     Malnutrition Assessment:  Malnutrition Status:  No malnutrition (05/15/24 1055)    Context:  Chronic Illness     Findings of the 6 clinical characteristics of malnutrition:  Energy Intake:  No significant decrease in energy intake  Weight Loss:  No significant weight loss     Body Fat Loss:  No significant body fat loss     Muscle Mass Loss:  No significant muscle mass loss    Fluid Accumulation:  Unable to assess (d/t multifactorial)     Strength:  Not Performed    Nutrition Assessment:    Pt presents w/ PEG malfunction- pt pulled PEG out. PEG reinserted and in place. Hx ESRD on HD, dysphagia. Will order TF per consult to meet pt's needs. Hold TF 30 min before and after synthroid administration. Will continue to monitor.    Nutrition Related Findings:    nonverbal, blind, abd soft/rounded/nontendere, +BS, BLE +2 edema, -1.1 L, synthroid Wound Type: None       Current Nutrition Intake & Therapies:    Average Meal Intake: NPO  Average Supplements Intake: NPO  Current Tube Feeding (TF) Orders:  Feeding Route: PEG  Formula: Standard with Fiber  Schedule: Continuous  Feeding Regimen: 40 ml/hr  Additives/Modulars: None  Water Flushes: 30 Q4h  Current TF & Flush Orders Provides: 720 ml TV, 1080 kcal, 46 g pro, 727 ml total water  Goal TF & Flush Orders Provides: 960 ml TV, 1440 kcal, 61 g pro, 910 ml total water    Anthropometric Measures:  Height: 165.1 cm (5' 5\")  Ideal Body Weight (IBW): 125 lbs (57 kg)    Admission Body Weight: 78 kg (172 lb) (5/14 bed scale)  Current Body Weight: 78.2 kg 
DIRECTED AS NEEDED FOR SUNBURN PREVENTION. 1/29/24   Izabela Dove DO   triamcinolone (KENALOG) 0.1 % lotion Apply topically 2 times daily Indications: Abnormal Dryness of Skin Apply topically 2 times daily. To BUE/BLE 1/29/24   Izabela Dove, DO   docusate sodium (COLACE) 150 MG/15ML liquid 10 mLs by Per G Tube route daily as needed (constipation) If no bowel movement for 2 days 1/29/24   Izabela Dove, DO   polyethylene glycol (GLYCOLAX) 17 GM/SCOOP powder MIX 17GM (TO FILL LINE ON CAP) IN 8 OUNCES OF WATER AND GIVE AS NEEDED FOR CONSTIPATION. 1/9/24   Izabela Dove DO   ferrous sulfate 220 (44 Fe) MG/5ML solution GIVE 7.5ML VIA G-TUBE ONCE A DAY. 8/23/23   Izabela Dove DO   Ondansetron (ZOFRAN ODT PO) Take 4 mg by mouth 3 times daily as needed Via GT    Provider, MD Marco   OLANZapine (ZYPREXA) 20 MG tablet by Per G Tube route in the morning and at bedtime 10 mg  am and 20 mg nightly    Provider, MD Marco   valproic acid (DEPACON) 250 MG/5ML SOLN oral solution 20 mLs by Per G Tube route 2 times daily 3/16/21   Caridad Chacko DO       No Known Allergies    No family history on file.          Review of Systems      PHYSICAL EXAM:    Vitals:    05/12/24 2026   BP: (!) 146/75   Pulse: 96   Resp: 16   Temp: 98.2 °F (36.8 °C)   SpO2: 93%       General Appearance:  awake, alert. Not in acute distress.  Skin:  Skin color, texture, turgor normal. No rashes or lesions.  Head/face:  NCAT  Eyes:  No gross abnormalities. Sclera nonicteric  Lungs/Chest:  Normal expansion. No respiratory distress. On RA. No chest wall tenderness  Heart: Warm throughout. Regular rate   Abdomen:  Soft, no tenderness, not distended.    Extremities: Extremities warm to touch, pink.      LABS:    CBC  Recent Labs     05/13/24  0420   WBC 9.6   HGB 12.4   HCT 39.5        BMP  Recent Labs     05/13/24  0420      K 4.0   CL 98   CO2 32*   BUN 35*   CREATININE 0.9   CALCIUM 9.9

## 2024-05-16 VITALS
WEIGHT: 172.2 LBS | RESPIRATION RATE: 24 BRPM | HEART RATE: 80 BPM | DIASTOLIC BLOOD PRESSURE: 80 MMHG | HEIGHT: 65 IN | OXYGEN SATURATION: 96 % | SYSTOLIC BLOOD PRESSURE: 164 MMHG | TEMPERATURE: 98.9 F | BODY MASS INDEX: 28.69 KG/M2

## 2024-05-16 LAB
ANION GAP SERPL CALCULATED.3IONS-SCNC: 7 MMOL/L (ref 7–16)
BUN SERPL-MCNC: 15 MG/DL (ref 6–23)
CALCIUM SERPL-MCNC: 9.1 MG/DL (ref 8.6–10.2)
CHLORIDE SERPL-SCNC: 110 MMOL/L (ref 98–107)
CO2 SERPL-SCNC: 27 MMOL/L (ref 22–29)
CREAT SERPL-MCNC: 0.7 MG/DL (ref 0.5–1)
GFR, ESTIMATED: 87 ML/MIN/1.73M2
GLUCOSE SERPL-MCNC: 113 MG/DL (ref 74–99)
POTASSIUM SERPL-SCNC: 3.8 MMOL/L (ref 3.5–5)
SODIUM SERPL-SCNC: 144 MMOL/L (ref 132–146)

## 2024-05-16 PROCEDURE — 6360000002 HC RX W HCPCS: Performed by: SURGERY

## 2024-05-16 PROCEDURE — 36415 COLL VENOUS BLD VENIPUNCTURE: CPT

## 2024-05-16 PROCEDURE — 99239 HOSP IP/OBS DSCHRG MGMT >30: CPT | Performed by: STUDENT IN AN ORGANIZED HEALTH CARE EDUCATION/TRAINING PROGRAM

## 2024-05-16 PROCEDURE — 6370000000 HC RX 637 (ALT 250 FOR IP): Performed by: SURGERY

## 2024-05-16 PROCEDURE — 80048 BASIC METABOLIC PNL TOTAL CA: CPT

## 2024-05-16 RX ADMIN — VALPROIC ACID 1000 MG: 250 SOLUTION ORAL at 08:07

## 2024-05-16 RX ADMIN — OLANZAPINE 10 MG: 10 TABLET, FILM COATED ORAL at 08:07

## 2024-05-16 RX ADMIN — Medication 220 MG: at 08:07

## 2024-05-16 RX ADMIN — FOLIC ACID 1 MG: 1 TABLET ORAL at 08:07

## 2024-05-16 RX ADMIN — LEVOTHYROXINE SODIUM 150 MCG: 75 TABLET ORAL at 05:43

## 2024-05-16 RX ADMIN — ENOXAPARIN SODIUM 40 MG: 100 INJECTION SUBCUTANEOUS at 08:07

## 2024-05-16 NOTE — DISCHARGE SUMMARY
[facility] Hospitalist Physician Discharge Summary       No follow-up provider specified.    Activity level: As tolerated     Dispo: Home      Condition on discharge: Stable     Patient ID:  Katerina Paiz  [id number]  69 y.o.  1955    Admit date: 5/12/2024    Discharge date and time:  5/16/2024  12:33 PM    Admission Diagnoses: Principal Problem:    Malfunction of percutaneous endoscopic gastrostomy (PEG) tube (HCC)  Active Problems:    Intellectual disability    PEG tube malfunction (HCC)  Resolved Problems:    * No resolved hospital problems. *      Discharge Diagnoses: Principal Problem:    Malfunction of percutaneous endoscopic gastrostomy (PEG) tube (HCC)  Active Problems:    Intellectual disability    PEG tube malfunction (HCC)  Resolved Problems:    * No resolved hospital problems. *      Consults:  IP CONSULT TO GENERAL SURGERY  IP CONSULT TO IV TEAM  IP CONSULT TO DIETITIAN  IP CONSULT TO IV TEAM    Hospital Course:   Patient Katerina Paiz is a 69 y.o. presented with Intellectual disability [F79]  Malfunction of percutaneous endoscopic gastrostomy (PEG) tube (HCC) [K94.23]  PEG tube malfunction (HCC) [K94.23]  Patient was admitted and managed for PEG tube malfunction-reportedly patient pulled out PEG tube. IVFs for nutritional support/dysphagia.  GS input appreciated. npo . S/p  EGD and PEG replacement 5/14, restarted tube feeds. ESRD on HD- BUN/Crt avoid nephrotoxic agents. Received 1L NS bolus in ED course. Nephrology input appreciated.  Hypernatremia - worsened, stop N.S IV, increased FWFlushes. Monitor bmp.     Discharge Exam:  General Appearance: alert and oriented to person, place and time and in no acute distress  Skin: warm and dry  Head: normocephalic and atraumatic  Eyes: pupils equal, round, and reactive to light, extraocular eye movements intact, conjunctivae normal  Neck: neck supple and non tender without mass   Pulmonary/Chest: clear to auscultation bilaterally- no wheezes, rales or

## 2024-05-16 NOTE — PROGRESS NOTES
[facility] Hospitalist Progress Note    Admitting Date and Time: 5/12/2024  9:44 AM  Admit Dx: Intellectual disability [F79]  Malfunction of percutaneous endoscopic gastrostomy (PEG) tube (HCC) [K94.23]  PEG tube malfunction (HCC) [K94.23]    Subjective:  Patient is being followed for Intellectual disability [F79]  Malfunction of percutaneous endoscopic gastrostomy (PEG) tube (HCC) [K94.23]  PEG tube malfunction (HCC) [K94.23]   Pt poor historian   Per RN: no major concerns.     ROS: denies fever, chills, cp, sob, n/v, HA unless stated above.      sodium chloride flush  5-40 mL IntraVENous 2 times per day    meperidine  12.5 mg IntraVENous Once    famotidine  40 mg Per G Tube BID    [Held by provider] midodrine  7.5 mg Per G Tube TID    montelukast  5 mg PEG Tube Nightly    OLANZapine  10 mg PEG Tube QAM    OLANZapine  20 mg Oral Nightly    ferrous Sulfate  220 mg Per G Tube Daily    folic acid  1 mg Per G Tube Daily    levothyroxine  150 mcg Per G Tube Daily    PARoxetine  10 mg Per G Tube BID    valproic acid  1,000 mg Per G Tube BID    sodium chloride flush  5-40 mL IntraVENous 2 times per day    enoxaparin  40 mg SubCUTAneous Daily     naloxone 0.4 mg in 10 mL sodium chloride syringe, , PRN  sodium chloride flush, 5-40 mL, PRN  sodium chloride, , PRN  fentanNYL, 25 mcg, Q5 Min PRN  HYDROmorphone, 0.5 mg, Q5 Min PRN  prochlorperazine, 5 mg, Once PRN  diphenhydrAMINE, 12.5 mg, Once PRN  labetalol, 5 mg, Q15 Min PRN   Or  hydrALAZINE, 5 mg, Q15 Min PRN  sodium chloride flush, 5-40 mL, PRN  sodium chloride, , PRN  polyethylene glycol, 17 g, Daily PRN  acetaminophen, 650 mg, Q6H PRN   Or  acetaminophen, 650 mg, Q6H PRN  prochlorperazine, 10 mg, Q8H PRN   Or  prochlorperazine, 10 mg, Q6H PRN         Objective:    BP (!) 165/78   Pulse 83   Temp 98.8 °F (37.1 °C) (Axillary)   Resp 16   Ht 1.651 m (5' 5\")   Wt 78.2 kg (172 lb 4.8 oz)   SpO2 98%   BMI 28.67 kg/m²     General Appearance: alert and awake 
       [facility] Hospitalist Progress Note    Admitting Date and Time: 5/12/2024  9:44 AM  Admit Dx: Intellectual disability [F79]  Malfunction of percutaneous endoscopic gastrostomy (PEG) tube (HCC) [K94.23]  PEG tube malfunction (HCC) [K94.23]    Subjective:  Patient is being followed for Intellectual disability [F79]  Malfunction of percutaneous endoscopic gastrostomy (PEG) tube (HCC) [K94.23]  PEG tube malfunction (HCC) [K94.23]   Pt poor historian   Per RN: no major concerns.     ROS: denies fever, chills, cp, sob, n/v, HA unless stated above.      sodium chloride flush  5-40 mL IntraVENous 2 times per day    meperidine  12.5 mg IntraVENous Once    famotidine  40 mg Per G Tube BID    [Held by provider] midodrine  7.5 mg Per G Tube TID    montelukast  5 mg PEG Tube Nightly    OLANZapine  10 mg PEG Tube QAM    OLANZapine  20 mg Oral Nightly    ferrous Sulfate  220 mg Per G Tube Daily    folic acid  1 mg Per G Tube Daily    levothyroxine  150 mcg Per G Tube Daily    PARoxetine  10 mg Per G Tube BID    valproic acid  1,000 mg Per G Tube BID    sodium chloride flush  5-40 mL IntraVENous 2 times per day    enoxaparin  40 mg SubCUTAneous Daily     naloxone 0.4 mg in 10 mL sodium chloride syringe, , PRN  sodium chloride flush, 5-40 mL, PRN  sodium chloride, , PRN  fentanNYL, 25 mcg, Q5 Min PRN  HYDROmorphone, 0.5 mg, Q5 Min PRN  prochlorperazine, 5 mg, Once PRN  diphenhydrAMINE, 12.5 mg, Once PRN  labetalol, 5 mg, Q15 Min PRN   Or  hydrALAZINE, 5 mg, Q15 Min PRN  sodium chloride flush, 5-40 mL, PRN  sodium chloride, , PRN  polyethylene glycol, 17 g, Daily PRN  acetaminophen, 650 mg, Q6H PRN   Or  acetaminophen, 650 mg, Q6H PRN  prochlorperazine, 10 mg, Q8H PRN   Or  prochlorperazine, 10 mg, Q6H PRN         Objective:    BP (!) 155/76   Pulse 82   Temp 97 °F (36.1 °C)   Resp 18   Ht 1.651 m (5' 5\")   Wt 78 kg (172 lb)   SpO2 95%   BMI 28.62 kg/m²     General Appearance: alert and awake and in no acute 
4 Eyes Skin Assessment     NAME:  Katerina Paiz  YOB: 1955  MEDICAL RECORD NUMBER:  [id number]    The patient is being assessed for  Admission    I agree that at least one RN has performed a thorough Head to Toe Skin Assessment on the patient. ALL assessment sites listed below have been assessed.      Areas assessed by both nurses:    Head, Face, Ears, Shoulders, Back, Chest, Arms, Elbows, Hands, Sacrum. Buttock, Coccyx, Ischium, and Legs. Feet and Heels        Does the Patient have a Wound? No noted wound(s)       Jose Prevention initiated by RN: Yes  Wound Care Orders initiated by RN: No    Pressure Injury (Stage 3,4, Unstageable, DTI, NWPT, and Complex wounds) if present, place Wound referral order by RN under : No    New Ostomies, if present place, Ostomy referral order under : No     Nurse 1 eSignature: Electronically signed by Latanya Rose RN on 5/12/24 at 1:30 PM EDT    **SHARE this note so that the co-signing nurse can place an eSignature**    Nurse 2 eSignature: Electronically signed by Lashon Tilley RN on 5/12/24 at 1:32 PM EDT    
Attempted to call group home to be able to determine tube feed rate and free water flush rate. Unable to contact anyone by phone and unable to leave message. I also attempted to call the prescribing pharmacy for the the tube feed formula and they were not able to locate a rate either. Will pass on to attempt to call in the morning. Electronically signed by Farshad Cox RN on 5/14/2024 at 6:23 PM    
Attempted to call pt's legal guardian, Latanya [place]. Latanya is out on vacation until May 17th and will not be in office. Then tried calling the 1-800 number and office is closed on the weekend. Database was unable to be completed.      Electronically signed by Latanya Rose RN on 5/12/2024 at 1:31 PM    
Called group home, [facility] to ask for patients tube feeding. No answer at this time. Checked the papers from the group home found out she is on [employer].   
Consent for EGD with gastrostomy placement obtained from rep on duty, Chetan Gracia. Call [phone] for any available rep while patients legal guardian, Latanya, is on vacation.  
For repeat PEG today. NPO. Preop abx ordered. The surgical risks, benefits, alternatives, and potential complications of the procedure, including the risks of bleeding, infection, injury to surrounding structures, need for additional procedures or need for conversion to open procedure were explained to the patient    Electronically signed by Maximino Breen DO on 5/14/2024 at 7:30 AM    
Left a message with [facility] for someone to call to give pt information. Awaiting call back, office does not open until 9am.   Electronically signed by Kierra Villarreal RN on 5/13/2024 at 6:36 AM    
Notified NP re patients blood pressure being elevated. Said to continue to monitor and update her if it continue to get elevated.  
Nurse to nurse called to Ana. Awaiting transport for discharge at 2pm.  
[facility] called and stated transportation was pushed back to 60-90mins. Bedside RN notified. Electronically signed by Eliza Muller RN on 5/16/2024 at 2:34 PM    
Pharmacy did not approve patients Zyprexa because her QTc was elevated at 469. They recommend to do an EKG to recheck it. Repeat EKG was 513, Zyprexa was not given. Notified NP.  
Tube feed started at 10 ml/hr w 2 anita hn equivalent w her Novasouce tube feeding. To increase 10 ml/hr as patient tolerate.  
05/15/24  0842   *   K 3.9   *   CO2 26   BUN 13   CREATININE 0.8   CALCIUM 9.4     Liver Function  No results for input(s): \"AMYLASE\", \"LIPASE\", \"BILITOT\", \"BILIDIR\", \"AST\", \"ALT\", \"ALKPHOS\", \"PROT\", \"LABALBU\" in the last 72 hours.  No results for input(s): \"LACTATE\" in the last 72 hours.  No results for input(s): \"INR\" in the last 72 hours.    Invalid input(s): \"PT\", \"PTT\"    Imaging:    XR CHEST 1 VIEW    Result Date: 5/12/2024  EXAMINATION: ONE XRAY VIEW OF THE CHEST 5/12/2024 12:25 pm COMPARISON: None. HISTORY: ORDERING SYSTEM PROVIDED HISTORY: Pre op TECHNOLOGIST PROVIDED HISTORY: Reason for exam:->Pre op FINDINGS: The heart is enlarged.  There is no mediastinal widening. The lungs are grossly clear.  No focal infiltrate or pleural effusion is noted.     Cardiomegaly.  There are no gross findings of CHF or pneumonia.       Assessment/Plan  69 y.o. female admitted 5/12/2024 with dislodged PEG tube, now 1 Day Post-Op replacement.  Imposition currently.  Okay to resume tube feeds.        Jose Han MD  05/15/24  11:40 AM    NOTE: This report, in part or full, may have been transcribed using voice recognition software. Every effort was made to ensure accuracy; however, inadvertent computerized transcription errors may be present. Please excuse any transcriptional grammatical or spelling errors that may have escaped my editorial review.    
cyanosis, no clubbing and no edema  Neurologic: difficult to assess, Non verbal        Recent Labs     05/12/24  1129 05/13/24  0420   * 138   K 4.0 4.0   CL 84* 98   CO2 39* 32*   BUN 54* 35*   CREATININE 1.1* 0.9   GLUCOSE 114* 96   CALCIUM 10.5* 9.9       Recent Labs     05/12/24  1129 05/13/24  0420   WBC 9.2 9.6   RBC 3.90 3.77   HGB 12.8 12.4   HCT 39.5 39.5   .3* 104.8*   MCH 32.8 32.9   MCHC 32.4 31.4*   RDW 14.6 15.0    196   MPV 10.4 10.1       Micro:  No components found for: \"BC)\"    Radiology:   XR CHEST 1 VIEW    Result Date: 5/12/2024  EXAMINATION: ONE XRAY VIEW OF THE CHEST 5/12/2024 12:25 pm COMPARISON: None. HISTORY: ORDERING SYSTEM PROVIDED HISTORY: Pre op TECHNOLOGIST PROVIDED HISTORY: Reason for exam:->Pre op FINDINGS: The heart is enlarged.  There is no mediastinal widening. The lungs are grossly clear.  No focal infiltrate or pleural effusion is noted.     Cardiomegaly.  There are no gross findings of CHF or pneumonia.       Assessment:    Principal Problem:    Malfunction of percutaneous endoscopic gastrostomy (PEG) tube (HCC)  Active Problems:    Intellectual disability  Resolved Problems:    * No resolved hospital problems. *      Plan:  PEG tube malfunction-reportedly patient pulled out PEG tube. IVFs for nutritional support/dysphagia.  GS input appreciated. NPO . Plan for EGD nad PEG replacement tomorrow.   ESRD on HD- BUN/Crt avoid nephrotoxic agents.  Received 1L NS bolus in ED course.  Nephrology input appreciated  Hypothyroid-levothyroxine  PUD  PVD  Dementia-supportive care  Intellectual Disability/legally blind/nonverbal.  Hx seizures-Depakote     Code Status: Full  DVT prophylaxis: Lovenox      NOTE: This report was transcribed using voice recognition software. Every effort was made to ensure accuracy; however, inadvertent computerized transcription errors may be present.  Electronically signed by Lobo Aly MD on 5/13/2024 at 1:41 PM

## 2024-05-16 NOTE — CARE COORDINATION
Social Work discharge planning  Pt needs labs first.  changed from 11am to 2p with Velam yu \[facility]\"". Charge Rn Ninfa bonilla.  Electronically signed by GEOVANNA Soni on 5/16/2024 at 8:09 AM

## 2024-05-16 NOTE — PLAN OF CARE
Problem: Pain  Goal: Verbalizes/displays adequate comfort level or baseline comfort level  5/16/2024 0035 by Rakesh Medellin RN  Outcome: Progressing  5/15/2024 1837 by Orly Rivas RN  Outcome: Progressing     Problem: Nutrition Deficit:  Goal: Optimize nutritional status  5/16/2024 0035 by Rakesh Medellin RN  Outcome: Progressing  5/15/2024 1837 by Orly Rivas RN  Outcome: Progressing  Flowsheets (Taken 5/15/2024 0886 by Rosio Rocha RD)  Nutrient intake appropriate for improving, restoring, or maintaining nutritional needs:   Assess nutritional status and recommend course of action   Recommend, monitor, and adjust tube feedings and TPN/PPN based on assessed needs     Problem: Skin/Tissue Integrity  Goal: Absence of new skin breakdown  Description: 1.  Monitor for areas of redness and/or skin breakdown  2.  Assess vascular access sites hourly  3.  Every 4-6 hours minimum:  Change oxygen saturation probe site  4.  Every 4-6 hours:  If on nasal continuous positive airway pressure, respiratory therapy assess nares and determine need for appliance change or resting period.  5/16/2024 0035 by Rakesh Medellin RN  Outcome: Progressing  5/15/2024 1837 by Orly Rivas RN  Outcome: Progressing     Problem: Safety - Adult  Goal: Free from fall injury  5/16/2024 0035 by Rakesh Medellin RN  Outcome: Progressing  5/15/2024 1837 by Orly Rivas RN  Outcome: Progressing

## 2024-05-16 NOTE — PATIENT CARE CONFERENCE
[facility] Quality Flow/Interdisciplinary Rounds Progress Note        Quality Flow Rounds held on May 16, 2024    Disciplines Attending:  Bedside Nurse, , , and Nursing Unit Leadership    Katerina Paiz was admitted on 5/12/2024  9:44 AM    Anticipated Discharge Date:       Disposition:    Jose Score:  Jose Scale Score: 10    Readmission Risk              Risk of Unplanned Readmission:  26           Discussed patient goal for the day, patient clinical progression, and barriers to discharge.  The following Goal(s) of the Day/Commitment(s) have been identified:  Discharge - Obtain Order      Ninfa Garcia RN  May 16, 2024

## 2024-05-17 ENCOUNTER — CARE COORDINATION (OUTPATIENT)
Dept: CARE COORDINATION | Age: 69
End: 2024-05-17

## 2024-05-17 NOTE — CARE COORDINATION
Non MH PCP Care Transitions Note    Initial Call - Call within 2 business days of discharge: Yes     Patient Current Location:  [place]    Care Transition Nurse contacted the  pt's direct nurse caregiver at [facility], Ana Aguilarl,  by telephone to perform post hospital discharge assessment, verified name and  as identifiers. Provided introduction to self, and explanation of the Care Transition Nurse role.     Patient: Katerina Paiz    Patient : 1955   MRN: [id number]    Reason for Admission: Malfunction of percutaneous endoscopic gastrostomy (PEG) tube   Discharge Date: 24  RURS: Readmission Risk Score: 21.1      Last Discharge Facility       Date Complaint Diagnosis Description Type Department Provider    24 G Tube Complications Malfunction of percutaneous endoscopic gastrostomy (PEG) tube (HCC) ... ED to Hosp-Admission (Discharged) (ADMITTED) Lobo Yu MD; Yogi Nguyen, ...            Was this an external facility discharge? No    Additional needs identified to be addressed with provider   No needs identified             Method of communication with provider: none.    Patients top risk factors for readmission: functional cognitive ability, functional physical ability, medical condition-Peg tube, PVD, HTN, COPD, Fe def anemia, intellectual delay, polypharmacy, and utilization of services    Interventions to address risk factors:   Scheduled HFU with pcp~Per nurse Ana at [facility], pt is established with [facility] physicians medical group.     Care Summary Note: CTN called and spoke with the pt's direct nurse caregiver at [facility], Ana Vázquez, for an initial care transition call. Pt admitted on 24 dx malfunctioning PEG tube. Per chart review pt had pulled out PEG tube and s/p replacement on 24.    Per Ana, she reports pt is at her baseline today. Reports PEG tube looks \"great\" and denies any leaking or issues with infusing

## 2024-05-25 ENCOUNTER — APPOINTMENT (OUTPATIENT)
Dept: GENERAL RADIOLOGY | Age: 69
End: 2024-05-25
Payer: MEDICARE

## 2024-05-25 ENCOUNTER — HOSPITAL ENCOUNTER (EMERGENCY)
Age: 69
Discharge: HOME | End: 2024-05-25
Attending: EMERGENCY MEDICINE
Payer: MEDICARE

## 2024-05-25 VITALS
DIASTOLIC BLOOD PRESSURE: 83 MMHG | HEART RATE: 79 BPM | TEMPERATURE: 98 F | HEIGHT: 64 IN | BODY MASS INDEX: 29.4 KG/M2 | OXYGEN SATURATION: 92 % | SYSTOLIC BLOOD PRESSURE: 150 MMHG | WEIGHT: 172.18 LBS | RESPIRATION RATE: 18 BRPM

## 2024-05-25 DIAGNOSIS — R05.9 COUGH, UNSPECIFIED TYPE: Primary | ICD-10-CM

## 2024-05-25 LAB
ALBUMIN SERPL-MCNC: 3.5 G/DL (ref 3.5–5.2)
ALP SERPL-CCNC: 75 U/L (ref 35–104)
ALT SERPL-CCNC: 10 U/L (ref 0–32)
ANION GAP SERPL CALCULATED.3IONS-SCNC: 5 MMOL/L (ref 7–16)
AST SERPL-CCNC: 20 U/L (ref 0–31)
BASOPHILS # BLD: 0.02 K/UL (ref 0–0.2)
BASOPHILS NFR BLD: 0 % (ref 0–2)
BILIRUB SERPL-MCNC: 0.3 MG/DL (ref 0–1.2)
BUN SERPL-MCNC: 42 MG/DL (ref 6–23)
CALCIUM SERPL-MCNC: 10.4 MG/DL (ref 8.6–10.2)
CHLORIDE SERPL-SCNC: 98 MMOL/L (ref 98–107)
CO2 SERPL-SCNC: 39 MMOL/L (ref 22–29)
CREAT SERPL-MCNC: 0.9 MG/DL (ref 0.5–1)
EKG ATRIAL RATE: 79 BPM
EKG P AXIS: 41 DEGREES
EKG P-R INTERVAL: 162 MS
EKG Q-T INTERVAL: 376 MS
EKG QRS DURATION: 80 MS
EKG QTC CALCULATION (BAZETT): 431 MS
EKG R AXIS: -7 DEGREES
EKG T AXIS: 5 DEGREES
EKG VENTRICULAR RATE: 79 BPM
EOSINOPHIL # BLD: 0.11 K/UL (ref 0.05–0.5)
EOSINOPHILS RELATIVE PERCENT: 2 % (ref 0–6)
ERYTHROCYTE [DISTWIDTH] IN BLOOD BY AUTOMATED COUNT: 14.6 % (ref 11.5–15)
GFR, ESTIMATED: 67 ML/MIN/1.73M2
GLUCOSE SERPL-MCNC: 81 MG/DL (ref 74–99)
HCT VFR BLD AUTO: 42.9 % (ref 34–48)
HGB BLD-MCNC: 13.3 G/DL (ref 11.5–15.5)
IMM GRANULOCYTES # BLD AUTO: 0.04 K/UL (ref 0–0.58)
IMM GRANULOCYTES NFR BLD: 1 % (ref 0–5)
INFLUENZA A BY PCR: NOT DETECTED
INFLUENZA B BY PCR: NOT DETECTED
LYMPHOCYTES NFR BLD: 1.32 K/UL (ref 1.5–4)
LYMPHOCYTES RELATIVE PERCENT: 24 % (ref 20–42)
MCH RBC QN AUTO: 32.4 PG (ref 26–35)
MCHC RBC AUTO-ENTMCNC: 31 G/DL (ref 32–34.5)
MCV RBC AUTO: 104.4 FL (ref 80–99.9)
MONOCYTES NFR BLD: 0.71 K/UL (ref 0.1–0.95)
MONOCYTES NFR BLD: 13 % (ref 2–12)
NEUTROPHILS NFR BLD: 60 % (ref 43–80)
NEUTS SEG NFR BLD: 3.28 K/UL (ref 1.8–7.3)
PLATELET # BLD AUTO: 183 K/UL (ref 130–450)
PMV BLD AUTO: 10.7 FL (ref 7–12)
POTASSIUM SERPL-SCNC: 4.3 MMOL/L (ref 3.5–5)
PROT SERPL-MCNC: 8.6 G/DL (ref 6.4–8.3)
RBC # BLD AUTO: 4.11 M/UL (ref 3.5–5.5)
SODIUM SERPL-SCNC: 142 MMOL/L (ref 132–146)
WBC OTHER # BLD: 5.5 K/UL (ref 4.5–11.5)

## 2024-05-25 PROCEDURE — 80053 COMPREHEN METABOLIC PANEL: CPT

## 2024-05-25 PROCEDURE — 85025 COMPLETE CBC W/AUTO DIFF WBC: CPT

## 2024-05-25 PROCEDURE — 99285 EMERGENCY DEPT VISIT HI MDM: CPT

## 2024-05-25 PROCEDURE — 71045 X-RAY EXAM CHEST 1 VIEW: CPT

## 2024-05-25 PROCEDURE — 93005 ELECTROCARDIOGRAM TRACING: CPT | Performed by: EMERGENCY MEDICINE

## 2024-05-25 PROCEDURE — 87502 INFLUENZA DNA AMP PROBE: CPT

## 2024-05-25 ASSESSMENT — PAIN - FUNCTIONAL ASSESSMENT: PAIN_FUNCTIONAL_ASSESSMENT: NONE - DENIES PAIN

## 2024-05-25 NOTE — ED PROVIDER NOTES
[facility] EMERGENCY DEPARTMENT  EMERGENCY DEPARTMENT ENCOUNTER        Pt Name: Katerina Paiz  MRN: [id number]  Birthdate 1955  Date of evaluation: 5/25/2024  Provider: Patito Cordero DO  PCP: Izabela Dove DO  Note Started: 1:21 PM EDT 5/25/24    CHIEF COMPLAINT       Chief Complaint   Patient presents with    Influenza     Sent from [facility] services for flu like symptoms. She is blind and non verbal.       HISTORY OF PRESENT ILLNESS: 1 or more Elements   History From: EMS    Limitations to history : Nonverbal    Katerina Paiz is a 69 y.o. female who presents with concern for flulike symptoms from the nursing facility.  Per EMS report the patient had not been coughing, no vomiting, no diarrhea, no associated complaints at all.  Acting at baseline.  Patient unable provide history.    REVIEW OF SYSTEMS :      Positives and Pertinent negatives as per HPI.     SURGICAL HISTORY     Past Surgical History:   Procedure Laterality Date    BRONCHOSCOPY  02/10/2017    CHEST TUBE INSERTION Right 02/09/2017    GASTROSTOMY TUBE PLACEMENT N/A 3/7/2021    EGD PEG TUBE PLACEMENT performed by Rupert Metz MD at [facility]    OTHER SURGICAL HISTORY Right 01/17/2017    tessio insertion     OTHER SURGICAL HISTORY  01/25/2017    PEG tube insertion    UPPER GASTROINTESTINAL ENDOSCOPY N/A 5/14/2024    ESOPHAGOGASTRODUODENOSCOPY PERCUTANEOUS ENDOSCOPIC GASTROSTOMY TUBE PLACEMENT performed by Jose Han MD at [facility] ENDOSCOPY       CURRENTMEDICATIONS       Previous Medications    ACETAMINOPHEN (TYLENOL) 325 MG TABLET    TAKE 2 TABLETS VIA G-TUBE EVERY 4 HOURS AS NEEDED FOR GENERAL PAIN, DISCOMFORT, OR FEVER  OR ABOVE    BACITRACIN-POLYMYXIN B (POLY BACITRACIN) 500-07255 UNIT/GM OINT    APPLY TOPICALLY TO OSTOMY TWICE DAILY.    BUMETANIDE (BUMEX) 1 MG TABLET    TAKE 1 TABLET VIA G-TUBE ONE TIME A DAY.    CHOLECALCIFEROL (VITAMIN D) 10 MCG/ML LIQD    TAKE 12.5ML (5000  patient's most recent EKG on 5/14/2024   [MM]   1505 Influenza A by PCR: Not Detected [MM]   1505 Influenza B by PCR: Not Detected [MM]      ED Course User Index  [MM] Patito Cordero DO          CONSULTS: (Who and What was discussed)  None    FINAL IMPRESSION      1. Cough, unspecified type          DISPOSITION/PLAN     DISPOSITION Decision To Discharge 05/25/2024 03:05:26 PM      PATIENT REFERRED TO:  Izabela Dove DO  [address] [facility] [address] [phone]    Call in 1 day  follow up      DISCHARGE MEDICATIONS:  New Prescriptions    No medications on file            (Please note that portions of this note were completed with a voice recognition program.  Efforts were made to edit the dictations but occasionally words are mis-transcribed.)    Patito Cordero DO (electronically signed)

## 2024-05-28 LAB
EKG ATRIAL RATE: 79 BPM
EKG P AXIS: 41 DEGREES
EKG P-R INTERVAL: 162 MS
EKG Q-T INTERVAL: 376 MS
EKG QRS DURATION: 80 MS
EKG QTC CALCULATION (BAZETT): 431 MS
EKG R AXIS: -7 DEGREES
EKG T AXIS: 5 DEGREES
EKG VENTRICULAR RATE: 79 BPM

## 2024-05-28 PROCEDURE — 93010 ELECTROCARDIOGRAM REPORT: CPT | Performed by: INTERNAL MEDICINE

## 2024-06-07 ENCOUNTER — TELEPHONE (OUTPATIENT)
Dept: FAMILY MEDICINE CLINIC | Age: 69
End: 2024-06-07

## 2024-06-07 DIAGNOSIS — N18.9 ANEMIA DUE TO CHRONIC KIDNEY DISEASE, UNSPECIFIED CKD STAGE: ICD-10-CM

## 2024-06-07 DIAGNOSIS — E55.9 VITAMIN D DEFICIENCY: ICD-10-CM

## 2024-06-07 DIAGNOSIS — D63.1 ANEMIA DUE TO CHRONIC KIDNEY DISEASE, UNSPECIFIED CKD STAGE: ICD-10-CM

## 2024-06-07 RX ORDER — POLYETHYLENE GLYCOL 3350 17 G/17G
POWDER, FOR SOLUTION ORAL
Qty: 510 G | Refills: 3 | Status: SHIPPED | OUTPATIENT
Start: 2024-06-07

## 2024-06-07 RX ORDER — CHOLECALCIFEROL (VITAMIN D3) 10(400)/ML
DROPS ORAL
Qty: 400 ML | Refills: 3 | Status: SHIPPED | OUTPATIENT
Start: 2024-06-07

## 2024-06-07 RX ORDER — MONTELUKAST SODIUM 5 MG/1
TABLET, CHEWABLE ORAL
Qty: 60 TABLET | Refills: 3 | Status: SHIPPED | OUTPATIENT
Start: 2024-06-07

## 2024-06-07 RX ORDER — FOLIC ACID 1 MG/1
TABLET ORAL
Qty: 30 TABLET | Refills: 3 | Status: SHIPPED | OUTPATIENT
Start: 2024-06-07

## 2024-06-07 RX ORDER — MONTELUKAST SODIUM 10 MG/1
TABLET ORAL
Qty: 60 TABLET | Refills: 3 | Status: SHIPPED
Start: 2024-06-07 | End: 2024-06-07

## 2024-06-07 RX ORDER — MONTELUKAST SODIUM 5 MG/1
10 TABLET, CHEWABLE ORAL NIGHTLY
Qty: 30 TABLET | Refills: 5 | OUTPATIENT
Start: 2024-06-07

## 2024-06-10 ENCOUNTER — HOSPITAL ENCOUNTER (OUTPATIENT)
Age: 69
Setting detail: OBSERVATION
Discharge: HOME | End: 2024-06-11
Attending: EMERGENCY MEDICINE | Admitting: INTERNAL MEDICINE
Payer: MEDICARE

## 2024-06-10 ENCOUNTER — APPOINTMENT (OUTPATIENT)
Dept: GENERAL RADIOLOGY | Age: 69
End: 2024-06-10
Payer: MEDICARE

## 2024-06-10 DIAGNOSIS — E87.1 HYPONATREMIA: Primary | ICD-10-CM

## 2024-06-10 DIAGNOSIS — Z78.9 ON TUBE FEEDING DIET: ICD-10-CM

## 2024-06-10 LAB
ALBUMIN SERPL-MCNC: 3.4 G/DL (ref 3.5–5.2)
ALP SERPL-CCNC: 74 U/L (ref 35–104)
ALT SERPL-CCNC: 13 U/L (ref 0–32)
ANION GAP SERPL CALCULATED.3IONS-SCNC: 3 MMOL/L (ref 7–16)
AST SERPL-CCNC: 52 U/L (ref 0–31)
BACTERIA URNS QL MICRO: ABNORMAL
BASOPHILS # BLD: 0.02 K/UL (ref 0–0.2)
BASOPHILS NFR BLD: 0 % (ref 0–2)
BILIRUB SERPL-MCNC: 0.2 MG/DL (ref 0–1.2)
BILIRUB UR QL STRIP: NEGATIVE
BNP SERPL-MCNC: 52 PG/ML (ref 0–125)
BUN SERPL-MCNC: 51 MG/DL (ref 6–23)
CALCIUM SERPL-MCNC: 10.1 MG/DL (ref 8.6–10.2)
CHLORIDE SERPL-SCNC: 86 MMOL/L (ref 98–107)
CLARITY UR: CLEAR
CO2 SERPL-SCNC: 39 MMOL/L (ref 22–29)
COLOR UR: YELLOW
CREAT SERPL-MCNC: 1 MG/DL (ref 0.5–1)
EOSINOPHIL # BLD: 0.18 K/UL (ref 0.05–0.5)
EOSINOPHILS RELATIVE PERCENT: 2 % (ref 0–6)
ERYTHROCYTE [DISTWIDTH] IN BLOOD BY AUTOMATED COUNT: 14.3 % (ref 11.5–15)
GFR, ESTIMATED: 61 ML/MIN/1.73M2
GLUCOSE SERPL-MCNC: 85 MG/DL (ref 74–99)
GLUCOSE UR STRIP-MCNC: NEGATIVE MG/DL
HCT VFR BLD AUTO: 41.5 % (ref 34–48)
HGB BLD-MCNC: 13.2 G/DL (ref 11.5–15.5)
HGB UR QL STRIP.AUTO: ABNORMAL
IMM GRANULOCYTES # BLD AUTO: 0.2 K/UL (ref 0–0.58)
IMM GRANULOCYTES NFR BLD: 3 % (ref 0–5)
KETONES UR STRIP-MCNC: NEGATIVE MG/DL
LACTATE BLDV-SCNC: 1.9 MMOL/L (ref 0.5–2.2)
LEUKOCYTE ESTERASE UR QL STRIP: ABNORMAL
LYMPHOCYTES NFR BLD: 1.63 K/UL (ref 1.5–4)
LYMPHOCYTES RELATIVE PERCENT: 21 % (ref 20–42)
MCH RBC QN AUTO: 32.3 PG (ref 26–35)
MCHC RBC AUTO-ENTMCNC: 31.8 G/DL (ref 32–34.5)
MCV RBC AUTO: 101.5 FL (ref 80–99.9)
MONOCYTES NFR BLD: 1.05 K/UL (ref 0.1–0.95)
MONOCYTES NFR BLD: 13 % (ref 2–12)
NEUTROPHILS NFR BLD: 61 % (ref 43–80)
NEUTS SEG NFR BLD: 4.89 K/UL (ref 1.8–7.3)
NITRITE UR QL STRIP: NEGATIVE
PH UR STRIP: 6 [PH] (ref 5–9)
PLATELET # BLD AUTO: 167 K/UL (ref 130–450)
PMV BLD AUTO: 11.1 FL (ref 7–12)
POTASSIUM SERPL-SCNC: 5.1 MMOL/L (ref 3.5–5)
PROT SERPL-MCNC: 9 G/DL (ref 6.4–8.3)
PROT UR STRIP-MCNC: NEGATIVE MG/DL
RBC # BLD AUTO: 4.09 M/UL (ref 3.5–5.5)
RBC #/AREA URNS HPF: ABNORMAL /HPF
SODIUM SERPL-SCNC: 128 MMOL/L (ref 132–146)
SP GR UR STRIP: <1.005 (ref 1–1.03)
UROBILINOGEN UR STRIP-ACNC: 0.2 EU/DL (ref 0–1)
VALPROATE SERPL-MCNC: 103 UG/ML (ref 50–100)
WBC #/AREA URNS HPF: ABNORMAL /HPF
WBC OTHER # BLD: 8 K/UL (ref 4.5–11.5)

## 2024-06-10 PROCEDURE — G0378 HOSPITAL OBSERVATION PER HR: HCPCS

## 2024-06-10 PROCEDURE — 2580000003 HC RX 258: Performed by: INTERNAL MEDICINE

## 2024-06-10 PROCEDURE — 81001 URINALYSIS AUTO W/SCOPE: CPT

## 2024-06-10 PROCEDURE — 93005 ELECTROCARDIOGRAM TRACING: CPT | Performed by: STUDENT IN AN ORGANIZED HEALTH CARE EDUCATION/TRAINING PROGRAM

## 2024-06-10 PROCEDURE — 87086 URINE CULTURE/COLONY COUNT: CPT

## 2024-06-10 PROCEDURE — 6370000000 HC RX 637 (ALT 250 FOR IP): Performed by: INTERNAL MEDICINE

## 2024-06-10 PROCEDURE — 94640 AIRWAY INHALATION TREATMENT: CPT

## 2024-06-10 PROCEDURE — 85025 COMPLETE CBC W/AUTO DIFF WBC: CPT

## 2024-06-10 PROCEDURE — 36415 COLL VENOUS BLD VENIPUNCTURE: CPT

## 2024-06-10 PROCEDURE — 83605 ASSAY OF LACTIC ACID: CPT

## 2024-06-10 PROCEDURE — 6370000000 HC RX 637 (ALT 250 FOR IP): Performed by: EMERGENCY MEDICINE

## 2024-06-10 PROCEDURE — 96372 THER/PROPH/DIAG INJ SC/IM: CPT

## 2024-06-10 PROCEDURE — 83880 ASSAY OF NATRIURETIC PEPTIDE: CPT

## 2024-06-10 PROCEDURE — 99285 EMERGENCY DEPT VISIT HI MDM: CPT

## 2024-06-10 PROCEDURE — 80164 ASSAY DIPROPYLACETIC ACD TOT: CPT

## 2024-06-10 PROCEDURE — 96360 HYDRATION IV INFUSION INIT: CPT

## 2024-06-10 PROCEDURE — 99222 1ST HOSP IP/OBS MODERATE 55: CPT | Performed by: INTERNAL MEDICINE

## 2024-06-10 PROCEDURE — 87040 BLOOD CULTURE FOR BACTERIA: CPT

## 2024-06-10 PROCEDURE — 80053 COMPREHEN METABOLIC PANEL: CPT

## 2024-06-10 PROCEDURE — 96361 HYDRATE IV INFUSION ADD-ON: CPT

## 2024-06-10 PROCEDURE — 71045 X-RAY EXAM CHEST 1 VIEW: CPT

## 2024-06-10 PROCEDURE — 6360000002 HC RX W HCPCS: Performed by: INTERNAL MEDICINE

## 2024-06-10 RX ORDER — SODIUM CHLORIDE 0.9 % (FLUSH) 0.9 %
5-40 SYRINGE (ML) INJECTION EVERY 12 HOURS SCHEDULED
Status: DISCONTINUED | OUTPATIENT
Start: 2024-06-10 | End: 2024-06-11 | Stop reason: HOSPADM

## 2024-06-10 RX ORDER — MONTELUKAST SODIUM 5 MG/1
10 TABLET, CHEWABLE ORAL NIGHTLY
COMMUNITY

## 2024-06-10 RX ORDER — TRIAMCINOLONE ACETONIDE 1 MG/ML
LOTION TOPICAL 2 TIMES DAILY
Status: DISCONTINUED | OUTPATIENT
Start: 2024-06-10 | End: 2024-06-10 | Stop reason: ALTCHOICE

## 2024-06-10 RX ORDER — MIDODRINE HYDROCHLORIDE 2.5 MG/1
7.5 TABLET ORAL 3 TIMES DAILY PRN
COMMUNITY

## 2024-06-10 RX ORDER — ACETAMINOPHEN 650 MG/1
650 SUPPOSITORY RECTAL EVERY 6 HOURS PRN
Status: DISCONTINUED | OUTPATIENT
Start: 2024-06-10 | End: 2024-06-11 | Stop reason: HOSPADM

## 2024-06-10 RX ORDER — PAROXETINE 10 MG/1
10 TABLET, FILM COATED ORAL DAILY
Status: DISCONTINUED | OUTPATIENT
Start: 2024-06-10 | End: 2024-06-11 | Stop reason: HOSPADM

## 2024-06-10 RX ORDER — SODIUM CHLORIDE 0.9 % (FLUSH) 0.9 %
5-40 SYRINGE (ML) INJECTION PRN
Status: DISCONTINUED | OUTPATIENT
Start: 2024-06-10 | End: 2024-06-11 | Stop reason: HOSPADM

## 2024-06-10 RX ORDER — FAMOTIDINE 20 MG/1
40 TABLET, FILM COATED ORAL DAILY
Status: DISCONTINUED | OUTPATIENT
Start: 2024-06-10 | End: 2024-06-11 | Stop reason: HOSPADM

## 2024-06-10 RX ORDER — SODIUM CHLORIDE 9 MG/ML
INJECTION, SOLUTION INTRAVENOUS PRN
Status: DISCONTINUED | OUTPATIENT
Start: 2024-06-10 | End: 2024-06-11 | Stop reason: HOSPADM

## 2024-06-10 RX ORDER — FERROUS SULFATE 220 (44)/5
330 ELIXIR ORAL EVERY MORNING
COMMUNITY

## 2024-06-10 RX ORDER — POTASSIUM CHLORIDE 7.45 MG/ML
10 INJECTION INTRAVENOUS PRN
Status: DISCONTINUED | OUTPATIENT
Start: 2024-06-10 | End: 2024-06-11 | Stop reason: HOSPADM

## 2024-06-10 RX ORDER — LEVOTHYROXINE SODIUM 0.15 MG/1
150 TABLET ORAL
COMMUNITY

## 2024-06-10 RX ORDER — TRIAMCINOLONE ACETONIDE 1 MG/ML
1 LOTION TOPICAL 2 TIMES DAILY
COMMUNITY

## 2024-06-10 RX ORDER — DOCUSATE SODIUM 50 MG/5ML
100 LIQUID ORAL DAILY PRN
Status: DISCONTINUED | OUTPATIENT
Start: 2024-06-10 | End: 2024-06-11 | Stop reason: HOSPADM

## 2024-06-10 RX ORDER — OLANZAPINE 20 MG/1
20 TABLET, ORALLY DISINTEGRATING ORAL NIGHTLY
COMMUNITY

## 2024-06-10 RX ORDER — VALPROIC ACID 250 MG/5ML
1000 SOLUTION ORAL 2 TIMES DAILY
COMMUNITY

## 2024-06-10 RX ORDER — FOLIC ACID 1 MG/1
1 TABLET ORAL DAILY
Status: DISCONTINUED | OUTPATIENT
Start: 2024-06-10 | End: 2024-06-11 | Stop reason: HOSPADM

## 2024-06-10 RX ORDER — VALPROIC ACID 250 MG/5ML
1000 SOLUTION ORAL 2 TIMES DAILY
Status: DISCONTINUED | OUTPATIENT
Start: 2024-06-10 | End: 2024-06-11 | Stop reason: HOSPADM

## 2024-06-10 RX ORDER — POTASSIUM CHLORIDE 20 MEQ/1
40 TABLET, EXTENDED RELEASE ORAL PRN
Status: DISCONTINUED | OUTPATIENT
Start: 2024-06-10 | End: 2024-06-11 | Stop reason: HOSPADM

## 2024-06-10 RX ORDER — ACETAMINOPHEN 160 MG/5ML
650 LIQUID ORAL EVERY 6 HOURS PRN
Status: DISCONTINUED | OUTPATIENT
Start: 2024-06-10 | End: 2024-06-11 | Stop reason: HOSPADM

## 2024-06-10 RX ORDER — ENOXAPARIN SODIUM 100 MG/ML
40 INJECTION SUBCUTANEOUS DAILY
Status: DISCONTINUED | OUTPATIENT
Start: 2024-06-10 | End: 2024-06-11 | Stop reason: HOSPADM

## 2024-06-10 RX ORDER — POLYETHYLENE GLYCOL 3350 17 G/17G
17 POWDER, FOR SOLUTION ORAL DAILY PRN
Status: DISCONTINUED | OUTPATIENT
Start: 2024-06-10 | End: 2024-06-11 | Stop reason: HOSPADM

## 2024-06-10 RX ORDER — FAMOTIDINE 40 MG/5ML
20 POWDER, FOR SUSPENSION ORAL 2 TIMES DAILY
COMMUNITY

## 2024-06-10 RX ORDER — ONDANSETRON 4 MG/1
4 TABLET, ORALLY DISINTEGRATING ORAL EVERY 8 HOURS PRN
Status: DISCONTINUED | OUTPATIENT
Start: 2024-06-10 | End: 2024-06-11 | Stop reason: HOSPADM

## 2024-06-10 RX ORDER — BUMETANIDE 1 MG/1
1 TABLET ORAL EVERY MORNING
COMMUNITY

## 2024-06-10 RX ORDER — OLANZAPINE 10 MG/1
20 TABLET ORAL SEE ADMIN INSTRUCTIONS
Status: DISCONTINUED | OUTPATIENT
Start: 2024-06-10 | End: 2024-06-10 | Stop reason: ALTCHOICE

## 2024-06-10 RX ORDER — ONDANSETRON 2 MG/ML
4 INJECTION INTRAMUSCULAR; INTRAVENOUS EVERY 6 HOURS PRN
Status: DISCONTINUED | OUTPATIENT
Start: 2024-06-10 | End: 2024-06-11 | Stop reason: HOSPADM

## 2024-06-10 RX ORDER — MAGNESIUM SULFATE IN WATER 40 MG/ML
2000 INJECTION, SOLUTION INTRAVENOUS PRN
Status: DISCONTINUED | OUTPATIENT
Start: 2024-06-10 | End: 2024-06-11 | Stop reason: HOSPADM

## 2024-06-10 RX ORDER — FOLIC ACID 1 MG/1
1 TABLET ORAL EVERY MORNING
COMMUNITY

## 2024-06-10 RX ORDER — IPRATROPIUM BROMIDE AND ALBUTEROL SULFATE 2.5; .5 MG/3ML; MG/3ML
1 SOLUTION RESPIRATORY (INHALATION)
Status: COMPLETED | OUTPATIENT
Start: 2024-06-10 | End: 2024-06-10

## 2024-06-10 RX ORDER — LEVOTHYROXINE SODIUM 0.07 MG/1
150 TABLET ORAL DAILY
Status: DISCONTINUED | OUTPATIENT
Start: 2024-06-10 | End: 2024-06-11 | Stop reason: HOSPADM

## 2024-06-10 RX ORDER — SODIUM CHLORIDE 9 MG/ML
INJECTION, SOLUTION INTRAVENOUS CONTINUOUS
Status: DISCONTINUED | OUTPATIENT
Start: 2024-06-10 | End: 2024-06-11 | Stop reason: HOSPADM

## 2024-06-10 RX ORDER — MONTELUKAST SODIUM 10 MG/1
5 TABLET ORAL NIGHTLY
Status: DISCONTINUED | OUTPATIENT
Start: 2024-06-10 | End: 2024-06-11 | Stop reason: HOSPADM

## 2024-06-10 RX ORDER — BACITRACIN ZINC AND POLYMYXIN B SULFATE 500; 1000 [USP'U]/G; [USP'U]/G
OINTMENT TOPICAL 2 TIMES DAILY
COMMUNITY

## 2024-06-10 RX ORDER — PAROXETINE 10 MG/5ML
10 SUSPENSION ORAL 2 TIMES DAILY
COMMUNITY

## 2024-06-10 RX ORDER — FERROUS SULFATE 300 MG/5ML
220 LIQUID (ML) ORAL DAILY
Status: DISCONTINUED | OUTPATIENT
Start: 2024-06-10 | End: 2024-06-11 | Stop reason: HOSPADM

## 2024-06-10 RX ORDER — CHOLECALCIFEROL (VITAMIN D3) 10(400)/ML
12.5 DROPS ORAL EVERY MORNING
COMMUNITY

## 2024-06-10 RX ORDER — OLANZAPINE 10 MG/1
10 TABLET, ORALLY DISINTEGRATING ORAL EVERY MORNING
COMMUNITY

## 2024-06-10 RX ADMIN — IPRATROPIUM BROMIDE AND ALBUTEROL SULFATE 1 DOSE: .5; 2.5 SOLUTION RESPIRATORY (INHALATION) at 13:20

## 2024-06-10 RX ADMIN — PAROXETINE 10 MG: 10 TABLET, FILM COATED ORAL at 20:42

## 2024-06-10 RX ADMIN — ENOXAPARIN SODIUM 40 MG: 100 INJECTION SUBCUTANEOUS at 18:07

## 2024-06-10 RX ADMIN — LEVOTHYROXINE SODIUM 150 MCG: 75 TABLET ORAL at 18:07

## 2024-06-10 RX ADMIN — SODIUM CHLORIDE: 9 INJECTION, SOLUTION INTRAVENOUS at 17:47

## 2024-06-10 RX ADMIN — Medication 220 MG: at 18:07

## 2024-06-10 RX ADMIN — IPRATROPIUM BROMIDE AND ALBUTEROL SULFATE 1 DOSE: .5; 2.5 SOLUTION RESPIRATORY (INHALATION) at 13:22

## 2024-06-10 RX ADMIN — MONTELUKAST SODIUM 5 MG: 10 TABLET, FILM COATED ORAL at 20:41

## 2024-06-10 RX ADMIN — FOLIC ACID 1 MG: 1 TABLET ORAL at 18:07

## 2024-06-10 RX ADMIN — IPRATROPIUM BROMIDE AND ALBUTEROL SULFATE 1 DOSE: .5; 2.5 SOLUTION RESPIRATORY (INHALATION) at 13:26

## 2024-06-10 RX ADMIN — FAMOTIDINE 40 MG: 20 TABLET ORAL at 18:11

## 2024-06-10 RX ADMIN — VALPROIC ACID 1000 MG: 250 SOLUTION ORAL at 20:42

## 2024-06-10 ASSESSMENT — PAIN SCALES - WONG BAKER
WONGBAKER_NUMERICALRESPONSE: NO HURT
WONGBAKER_NUMERICALRESPONSE: NO HURT

## 2024-06-10 NOTE — PROGRESS NOTES
Database initiated. Patient is visually impaired and mostly non verbal just yells out, comes in from [facility]. She requires a wheelchair. She is NPO and has a Peg tube and gets; [employer] Renal @45ml/hr; free water 250ml Q3hrs

## 2024-06-10 NOTE — H&P
[facility] Hospitalist Group History and Physical      CHIEF COMPLAINT: Difficulty breathing and hypoxia    History of Present Illness:    Patient is 69-year-old female who was brought to the ED due to difficulty breathing and hypoxia.  Per reports, patient was having abnormal respirations and desaturated into the 80s.  Upon arrival to the ED, she was weaned off oxygen by EMS and is saturating well on room air.  She was hemodynamically stable.  On blood work, noticed hyponatremia of sodium of 128 and hyperkalemia with potassium of 5.1.  Patient admitted for further management.        Informant(s) for H&P: Chart review    REVIEW OF SYSTEMS:  Unable to assess as patient is nonverbal    PMH:  Past Medical History:   Diagnosis Date    Abscess     periapical    Acute kidney injury (HCC) 01/15/2017    d/t Vancomycin, on Dialysis [facility] right chest    Anemia     iron deficiency anemia    Blind in both eyes     Dementia (HCC)     Dysphagia     Essential hypertension 2021    Gastroesophageal reflux disease without esophagitis 2021    Hemodialysis patient (HCC)     Hernia, diaphragmatic     Hyperthyroidism     MR (mental retardation)      cerebral leukomalacia     Osteoarthritis     Other seizures (HCC)     Peptic ulcer     Peripheral vascular disease ([facility])     Pneumonia 2017    Pneumonia due to infectious organism 2017    Sepsis (HCC) 2021       Surgical History:  Past Surgical History:   Procedure Laterality Date    BRONCHOSCOPY  02/10/2017    CHEST TUBE INSERTION Right 2017    GASTROSTOMY TUBE PLACEMENT N/A 3/7/2021    EGD PEG TUBE PLACEMENT performed by Rupert Metz MD at [facility]OSCOPY    OTHER SURGICAL HISTORY Right 2017    tessio insertion     OTHER SURGICAL HISTORY  2017    PEG tube insertion    UPPER GASTROINTESTINAL ENDOSCOPY N/A 2024    ESOPHAGOGASTRODUODENOSCOPY PERCUTANEOUS ENDOSCOPIC GASTROSTOMY TUBE PLACEMENT performed by Jose Han

## 2024-06-10 NOTE — ED NOTES
ED to Inpatient Handoff Report    Notified floor that electronic handoff available and patient ready for transport to room 507.    Safety Risks: Risk of falls    Patient in Restraints: no    Constant Observer or Patient : no    Telemetry Monitoring Ordered: No          Order to transfer to unit without monitor: NA    Last MEWS: 2 Time completed: 1715    Deterioration Index: 41.96    Vitals:    06/10/24 1142 06/10/24 1712   BP: 139/80 135/82   Pulse: 86 91   Resp: 16 18   Temp: 98.3 °F (36.8 °C) 97.9 °F (36.6 °C)   TempSrc: Oral Axillary   SpO2: 96% 97%   Weight: 80.7 kg (178 lb)    Height: 1.626 m (5' 4\")        Opportunity for questions and clarification was provided.

## 2024-06-10 NOTE — PROGRESS NOTES
Wound care notified of consult at this time. Electronically signed by Sweta Vazquez RN on 6/10/2024 at 6:46 PM

## 2024-06-10 NOTE — ED PROVIDER NOTES
topically 3 times daily as needed for Dry Lips (CRACKED LIPS)    MINERAL OIL-HYDROPHILIC PETROLATUM (HYDROPHOR) OINTMENT    Apply topically as needed.    MONTELUKAST (SINGULAIR) 5 MG CHEWABLE TABLET    2 tabs via G tube nightly    NEOMYCIN-BACITRACIN-POLYMYXIN (NEOSPORIN) 3.5-400-5000 OINT OINTMENT    APPLY TOPICALLY TO CUTS/SCRAPES/SKIN ABRASIONS TWICE DAILY AS NEEDED TO PREVENT SKIN INFECTIONS    OLANZAPINE (ZYPREXA) 20 MG TABLET    by Per G Tube route in the morning and at bedtime 10 mg  am and 20 mg nightly    PAROXETINE (PAXIL) 10 MG/5ML SUSPENSION    TAKE 10MG (5ML) VIA G-TUBE TWO TIMES A DAY.    TRIAMCINOLONE (KENALOG) 0.1 % LOTION    Apply topically 2 times daily Indications: Abnormal Dryness of Skin Apply topically 2 times daily. To BUE/BLE              (Please note that portions of this note were completed with a voice recognition program.  Efforts were made to edit the dictations but occasionally words are mis-transcribed.)    Aiden Julien DO (electronically signed)

## 2024-06-11 VITALS
OXYGEN SATURATION: 97 % | BODY MASS INDEX: 29.53 KG/M2 | WEIGHT: 173 LBS | HEIGHT: 64 IN | SYSTOLIC BLOOD PRESSURE: 121 MMHG | DIASTOLIC BLOOD PRESSURE: 58 MMHG | TEMPERATURE: 97.6 F | HEART RATE: 84 BPM | RESPIRATION RATE: 18 BRPM

## 2024-06-11 LAB
ALBUMIN SERPL-MCNC: 2.9 G/DL (ref 3.5–5.2)
ALP SERPL-CCNC: 68 U/L (ref 35–104)
ALT SERPL-CCNC: 9 U/L (ref 0–32)
ANION GAP SERPL CALCULATED.3IONS-SCNC: 3 MMOL/L (ref 7–16)
AST SERPL-CCNC: 21 U/L (ref 0–31)
BILIRUB SERPL-MCNC: 0.2 MG/DL (ref 0–1.2)
BUN SERPL-MCNC: 34 MG/DL (ref 6–23)
CALCIUM SERPL-MCNC: 9.7 MG/DL (ref 8.6–10.2)
CHLORIDE SERPL-SCNC: 98 MMOL/L (ref 98–107)
CO2 SERPL-SCNC: 34 MMOL/L (ref 22–29)
CREAT SERPL-MCNC: 0.9 MG/DL (ref 0.5–1)
ERYTHROCYTE [DISTWIDTH] IN BLOOD BY AUTOMATED COUNT: 14.6 % (ref 11.5–15)
GFR, ESTIMATED: 74 ML/MIN/1.73M2
GLUCOSE SERPL-MCNC: 119 MG/DL (ref 74–99)
HCT VFR BLD AUTO: 36.1 % (ref 34–48)
HGB BLD-MCNC: 11.1 G/DL (ref 11.5–15.5)
MCH RBC QN AUTO: 32.7 PG (ref 26–35)
MCHC RBC AUTO-ENTMCNC: 30.7 G/DL (ref 32–34.5)
MCV RBC AUTO: 106.5 FL (ref 80–99.9)
MICROORGANISM SPEC CULT: NO GROWTH
PLATELET, FLUORESCENCE: 130 K/UL (ref 130–450)
PMV BLD AUTO: 10.8 FL (ref 7–12)
POTASSIUM SERPL-SCNC: 3.8 MMOL/L (ref 3.5–5)
PROT SERPL-MCNC: 7 G/DL (ref 6.4–8.3)
RBC # BLD AUTO: 3.39 M/UL (ref 3.5–5.5)
SERVICE CMNT-IMP: NORMAL
SODIUM SERPL-SCNC: 135 MMOL/L (ref 132–146)
SPECIMEN DESCRIPTION: NORMAL
WBC OTHER # BLD: 7.4 K/UL (ref 4.5–11.5)

## 2024-06-11 PROCEDURE — 96361 HYDRATE IV INFUSION ADD-ON: CPT

## 2024-06-11 PROCEDURE — G0378 HOSPITAL OBSERVATION PER HR: HCPCS

## 2024-06-11 PROCEDURE — 80053 COMPREHEN METABOLIC PANEL: CPT

## 2024-06-11 PROCEDURE — 96372 THER/PROPH/DIAG INJ SC/IM: CPT

## 2024-06-11 PROCEDURE — 6360000002 HC RX W HCPCS: Performed by: INTERNAL MEDICINE

## 2024-06-11 PROCEDURE — 6370000000 HC RX 637 (ALT 250 FOR IP): Performed by: INTERNAL MEDICINE

## 2024-06-11 PROCEDURE — 85027 COMPLETE CBC AUTOMATED: CPT

## 2024-06-11 PROCEDURE — 99239 HOSP IP/OBS DSCHRG MGMT >30: CPT | Performed by: INTERNAL MEDICINE

## 2024-06-11 PROCEDURE — 2700000000 HC OXYGEN THERAPY PER DAY

## 2024-06-11 RX ADMIN — FAMOTIDINE 40 MG: 20 TABLET ORAL at 08:54

## 2024-06-11 RX ADMIN — LEVOTHYROXINE SODIUM 150 MCG: 75 TABLET ORAL at 06:51

## 2024-06-11 RX ADMIN — VALPROIC ACID 1000 MG: 250 SOLUTION ORAL at 08:53

## 2024-06-11 RX ADMIN — Medication 220 MG: at 08:54

## 2024-06-11 RX ADMIN — FOLIC ACID 1 MG: 1 TABLET ORAL at 08:54

## 2024-06-11 RX ADMIN — PAROXETINE 10 MG: 10 TABLET, FILM COATED ORAL at 08:54

## 2024-06-11 RX ADMIN — ENOXAPARIN SODIUM 40 MG: 100 INJECTION SUBCUTANEOUS at 08:53

## 2024-06-11 ASSESSMENT — PAIN SCALES - WONG BAKER: WONGBAKER_NUMERICALRESPONSE: NO HURT

## 2024-06-11 NOTE — PLAN OF CARE
Problem: ABCDS Injury Assessment  Goal: Absence of physical injury  Outcome: Progressing     Problem: Skin/Tissue Integrity  Goal: Absence of new skin breakdown  Description: 1.  Monitor for areas of redness and/or skin breakdown  2.  Assess vascular access sites hourly  3.  Every 4-6 hours minimum:  Change oxygen saturation probe site  4.  Every 4-6 hours:  If on nasal continuous positive airway pressure, respiratory therapy assess nares and determine need for appliance change or resting period.  6/10/2024 2132 by Latanya Buckner, RN  Outcome: Progressing  6/10/2024 1829 by Sweta Vazquez, RN  Outcome: Progressing     Problem: Discharge Planning  Goal: Discharge to home or other facility with appropriate resources  Outcome: Progressing  Flowsheets (Taken 6/10/2024 1140 by Alexa Bonds, RN)  Discharge to home or other facility with appropriate resources: Refer to discharge planning if patient needs post-hospital services based on physician order or complex needs related to functional status, cognitive ability or social support system     Problem: Safety - Adult  Goal: Free from fall injury  Outcome: Progressing     Problem: Pain  Goal: Verbalizes/displays adequate comfort level or baseline comfort level  Outcome: Progressing

## 2024-06-11 NOTE — DISCHARGE SUMMARY
[facility] Hospitalist Physician Discharge Summary       No follow-up provider specified.    Activity level: As tolerated     Dispo:   [facility]    Condition on discharge: Stable     Patient ID:  Katerina Paiz  [id number]  69 y.o.  1955    Admit date: 6/10/2024    Discharge date and time:  6/11/2024  4:52 PM    Admission Diagnoses: Principal Problem:    Hyponatremia  Resolved Problems:    * No resolved hospital problems. *      Discharge Diagnoses: Principal Problem:    Hyponatremia  Resolved Problems:    * No resolved hospital problems. *      Consults:  IP CONSULT TO IV TEAM    Hospital Course:   Patient Katerina Paiz is a 69 y.o. presented with Hyponatremia [E87.1]  On tube feeding diet [Z78.9]    Patient is 69-year-old female was brought to the ED due to difficulty breathing and hypoxia.  Per EMS, patient was having abnormal respirations and desaturated to the 80s.  Upon arrival to the ED, patient was weaned off of oxygen by EMS.  She was saturating well on room air.  On blood work noted to have sodium of 128 and hyperkalemia with potassium of 5.1 so decision was made to admit her for further management.  Overnight, patient was started on IV fluids.  Free water flushes and tube feeds were also decreased.  Labs normalized today and patient will be discharged back to facility in stable condition.    Discharge Exam:  General Appearance: alert, no acute distress  Skin: warm and dry  Head: normocephalic and atraumatic  Eyes: Blind in both eyes  Neck: neck supple and non tender without mass   Pulmonary/Chest: clear to auscultation bilaterally- no wheezes, rales or rhonchi, normal air movement, no respiratory distress  Cardiovascular: normal rate, normal S1 and S2 and no carotid bruits  Abdomen: soft, non-tender, non-distended, +peg  Extremities: no cyanosis, no clubbing and no edema  Neurologic: Nonverbal    No intake/output data recorded.  I/O this shift:  In: 30 [NG/GT:30]  Out: -

## 2024-06-11 NOTE — PATIENT CARE CONFERENCE
[facility] Quality Flow/Interdisciplinary Rounds Progress Note        Quality Flow Rounds held on June 11, 2024    Disciplines Attending:  Bedside Nurse, , , and Nursing Unit Leadership    Katerina Paiz was admitted on 6/10/2024 11:31 AM    Anticipated Discharge Date:       Disposition:    Jose Score:  Jose Scale Score: 11    Readmission Risk              Risk of Unplanned Readmission:  0           Discussed patient goal for the day, patient clinical progression, and barriers to discharge.  The following Goal(s) of the Day/Commitment(s) have been identified:  Diagnostics - Report Results and Labs - Report Results      Elsa Bucio RN  June 11, 2024

## 2024-06-11 NOTE — DISCHARGE INSTR - COC
Continuity of Care Form    Patient Name: Katerina Paiz   :  1955  MRN:  [id number]    Admit date:  6/10/2024  Discharge date:  24    Code Status Order: Full Code   Advance Directives:     Admitting Physician:  Eliezer Reed MD  PCP: Izabela Dove DO    Discharging Nurse: Michell  Discharging Hospital Unit/Room#: 0507/0507-A  Discharging Unit Phone Number: [phone]    Emergency Contact:   Extended Emergency Contact Information  Primary Emergency Contact: Latanya Canada  Address: [employer]           [address] [place] [address] [address] [place] [address] [facility]  Home Phone: [phone]  Work Phone: [phone]  Mobile Phone: [phone]  Relation: Legal Guardian   needed? No  Secondary Emergency Contact: CarlosShashi  Address: [address] [address] [facility]  Home Phone: [phone]  Work Phone: [phone]  Relation:     Past Surgical History:  Past Surgical History:   Procedure Laterality Date    BRONCHOSCOPY  02/10/2017    CHEST TUBE INSERTION Right 2017    GASTROSTOMY TUBE PLACEMENT N/A 3/7/2021    EGD PEG TUBE PLACEMENT performed by Rupert Metz MD at [facility] ENDOSCOPY    OTHER SURGICAL HISTORY Right 2017    tessio insertion     OTHER SURGICAL HISTORY  2017    PEG tube insertion    UPPER GASTROINTESTINAL ENDOSCOPY N/A 2024    ESOPHAGOGASTRODUODENOSCOPY PERCUTANEOUS ENDOSCOPIC GASTROSTOMY TUBE PLACEMENT performed by Jose Han MD at [facility] ENDOSCOPY       Immunization History:   Immunization History   Administered Date(s) Administered    COVID-19, MODERNA BLUE border, Primary or Immunocompromised, (age 12y+), IM, 100 mcg/0.5mL 2021, 2021, 2021, 2021, 2021, 2021    Influenza Vaccine, unspecified formulation 09/10/2014    Influenza Virus Vaccine 10/21/2017    Influenza Whole 09/10/2014    Influenza,

## 2024-06-11 NOTE — PROGRESS NOTES
Attempted to call nurse to nurse to [facility].  Left a message via voicemail.  Awaiting call back.

## 2024-06-11 NOTE — PROGRESS NOTES
Comprehensive Nutrition Assessment    Type and Reason for Visit:  Initial, Positive Nutrition Screen (Tube Feeding)    Nutrition Recommendations/Plan:   Recommend modify TF & monitor  TF recommendation:    Standard with Fiber (Jevity 1.5) goal rate @ 45 ml/hr X 23 hrs (hold 30 min before & after Synthroid) with 90 ml Q 3 hr free water flush or water flush per physician management  To provide: 1035 ml tv, 1553 kcal, 66 g pro, 1507 ml free water   Regimen meets 100% calorie & protein needs       Malnutrition Assessment:  Malnutrition Status:  Insufficient data (06/11/24 5763)    Context:  Acute Illness     Findings of the 6 clinical characteristics of malnutrition:  Energy Intake:  No significant decrease in energy intake  Weight Loss:  No significant weight loss     Body Fat Loss:  Unable to assess (pt sleeping with sheet over face)     Muscle Mass Loss:  Unable to assess    Fluid Accumulation:  Unable to assess (d/t edema likely multifactorial)     Strength:  Not Performed    Nutrition Assessment:    Pt admits w/ hyponatremia, hyperkalemia. Hx CKD, MRDD, dementia, dysphagia w/ chronic PEG. Recommend modify TF, will provide recommendation. Note possible discharge today.    Nutrition Related Findings:    nonverbal, blind, I&Os WDL, +1 edema, abd soft/rounded, +BS, PEG, Na 128, K+ 5.1 (hemolyzed specimen), BUN 51, +Synthroid, +IVF   Wound Type: Wound Consult Pending (wound between toes per pending wound consult)       Current Nutrition Intake & Therapies:    Average Meal Intake: NPO  Average Supplements Intake: NPO  Diet NPO  ADULT TUBE FEEDING; PEG; Renal Formula; Continuous; 45; No; 150; Q 3 hours  Current Tube Feeding (TF) Orders:  Feeding Route: PEG  Formula: Renal Formula  Schedule: Continuous  Feeding Regimen: 45 ml/hd  Water Flushes: 150 ml Q 3 hrs = 1200 ml/d  Current TF & Flush Orders Provides: at goal per observation  Goal TF & Flush Orders Provides: 1080 ml tv, 1912 kcal, 87 g pro, 1985 ml total free

## 2024-06-11 NOTE — CARE COORDINATION
HX of MR, nonverbal. Patient is from [facility]. Patient has a legal guardian guardian. Called legal guardian office. Legal guardian Latanya is out of the office today. Spoke to covering legal guardian Chetan, discharge plan is to return to [facility]. Spoke to [facility] [phone] who states the patient is able to return. Will need an ambulance transport back to facility. [employer] and transport forms completed and in soft chart. When patient is discharges, place call [phone] for nurse to Nurse. Patient currently on 2L O2. Room air at baseline. Wean as tolerated.   Electronically signed by Jade Gonzalez RN on 6/11/2024 at 9:54 AM    UPDATE: Per nursing, patient can be discharged back to group home. [facility] set up for 1800. Nursing and Legal Guardian Aware. Nursing to call nurse to nurse report.   Electronically signed by Jade Gonzalez RN on 6/11/2024 at 1:07 PM

## 2024-06-11 NOTE — ACP (ADVANCE CARE PLANNING)
Advance Care Planning   Healthcare Decision Maker:    Primary Decision Maker: Latanya Canada - Legal Guardian, Legal Guardian - [phone]    Click here to complete Healthcare Decision Makers including selection of the Healthcare Decision Maker Relationship (ie \"Primary\").  Today we documented Decision Maker(s) consistent with ACP documents on file.

## 2024-06-12 ENCOUNTER — CARE COORDINATION (OUTPATIENT)
Dept: CARE COORDINATION | Age: 69
End: 2024-06-12

## 2024-06-12 RX ORDER — FOLIC ACID 1 MG/1
1 TABLET ORAL EVERY MORNING
Qty: 30 TABLET | Refills: 2 | OUTPATIENT
Start: 2024-06-12

## 2024-06-12 RX ORDER — MONTELUKAST SODIUM 5 MG/1
10 TABLET, CHEWABLE ORAL NIGHTLY
Qty: 30 TABLET | Refills: 2 | OUTPATIENT
Start: 2024-06-12

## 2024-06-12 RX ORDER — POLYETHYLENE GLYCOL 3350 17 G/17G
POWDER, FOR SOLUTION ORAL
Qty: 238 G | Refills: 2 | OUTPATIENT
Start: 2024-06-12

## 2024-06-12 RX ORDER — CHOLECALCIFEROL (VITAMIN D3) 10(400)/ML
12.5 DROPS ORAL EVERY MORNING
Qty: 120 ML | Refills: 2 | OUTPATIENT
Start: 2024-06-12

## 2024-06-12 NOTE — CARE COORDINATION
Care Transitions Note  Initial Call - Call within 2 business days of discharge: Yes    Patient Current Location:  Home: [facility]  [address] [place] [address]    Care Transition Nurse contacted the facility intake by telephone to perform post hospital discharge assessment, verified name and  as identifiers. Provided introduction to self, and explanation of the Care Transition Nurse role.     Patient: Katerina Paiz      Patient : 1955   MRN: [id number]      Reason for Admission: 6/10/2024 - 2024 [facility] Obs. Hypoxia, Hyponatremia   Discharge Date: 24    RURS: Readmission Risk Score: 21.1  NR  CT    [facility] resident. Pt is established with [facility] for primary care home visits.      No changes were made to your medications.    Last Discharge Facility       Date Complaint Diagnosis Description Type Department Provider    6/10/24 lethargic Hyponatremia ... ED to Hosp-Admission (Discharged) (ADMITTED) PERNELL HoangW Eliezer Reed MD; DICK Bucio..            Was this an external facility discharge? No    Additional needs identified to be addressed with provider   No needs identified             Method of communication with provider: none.    Patients top risk factors for readmission: Hypoxia, Hyponatremia, Intellectual disability w/ chronic PEG and resident of [facility] P: [phone].    Interventions to address risk factors:   Nurse, Jigar, is going to assess pt today. Possible readmission for labored breathing.    Care Summary Note: CTN spoke w/ intake who shares concern pt was brought back w/out a nurse to nurse report. Provided [place] pt advocate outreach P: [phone]. States just advised pt is having labored breathing on room air and sending the house nurseJigar, now to assess for possible return to ED. No available vitals/report at this time.     Noting discharge sat of 97%. Pt was on 2L NC to wean

## 2024-06-13 ENCOUNTER — CARE COORDINATION (OUTPATIENT)
Dept: CARE COORDINATION | Age: 69
End: 2024-06-13

## 2024-06-13 LAB
EKG ATRIAL RATE: 85 BPM
EKG P AXIS: 38 DEGREES
EKG P-R INTERVAL: 160 MS
EKG Q-T INTERVAL: 364 MS
EKG QRS DURATION: 84 MS
EKG QTC CALCULATION (BAZETT): 433 MS
EKG R AXIS: -9 DEGREES
EKG T AXIS: 6 DEGREES
EKG VENTRICULAR RATE: 85 BPM

## 2024-06-13 PROCEDURE — 93010 ELECTROCARDIOGRAM REPORT: CPT | Performed by: INTERNAL MEDICINE

## 2024-06-13 NOTE — CARE COORDINATION
Care Transitions Note  Follow Up Call     Reason for Admission: 6/10/2024 - 2024 [facility] Obs. Hypoxia, Hyponatremia     Patient Current Location:  Home: [facility]  [address] [place] [address]    Care Transition Nurse contacted the [facility] by telephone. Verified name and  as identifiers. CTN spoke w/ Mirta and left VM to Jigar/pt caregiver.    Additional needs identified to be addressed with provider   No needs identified                 Method of communication with provider: none.    Care Summary Note: CTN briefly spoke w/ Mirta and she states pt was assessed yesterday and no respiratory concers. Has no further questions or pt care concerns. CTN did leave message to caregiver/Jigar requesting call back for vitals and pt care concerns and I did provide my contact information. CTN s/o.      Follow Up Appointment:     Future Appointments         Provider Specialty Dept Phone    2024 2:15 PM Ez Nunez DPM Podiatry [phone]            Alis Garcia RN

## 2024-06-15 LAB
MICROORGANISM SPEC CULT: NORMAL
MICROORGANISM SPEC CULT: NORMAL
SERVICE CMNT-IMP: NORMAL
SERVICE CMNT-IMP: NORMAL
SPECIMEN DESCRIPTION: NORMAL
SPECIMEN DESCRIPTION: NORMAL

## 2024-08-02 ENCOUNTER — APPOINTMENT (OUTPATIENT)
Dept: GENERAL RADIOLOGY | Age: 69
End: 2024-08-02
Payer: MEDICARE

## 2024-08-02 ENCOUNTER — HOSPITAL ENCOUNTER (INPATIENT)
Age: 69
LOS: 5 days | Discharge: NURSING, REHAB OR LONG-TERM CARE | End: 2024-08-07
Attending: STUDENT IN AN ORGANIZED HEALTH CARE EDUCATION/TRAINING PROGRAM | Admitting: HOSPITALIST
Payer: MEDICARE

## 2024-08-02 DIAGNOSIS — F79 INTELLECTUAL DISABILITY: ICD-10-CM

## 2024-08-02 DIAGNOSIS — T17.908A ASPIRATION INTO AIRWAY, INITIAL ENCOUNTER: ICD-10-CM

## 2024-08-02 DIAGNOSIS — E87.1 HYPONATREMIA: Primary | ICD-10-CM

## 2024-08-02 LAB
ALBUMIN SERPL-MCNC: 3.2 G/DL (ref 3.5–5.2)
ALP SERPL-CCNC: 62 U/L (ref 35–104)
ALT SERPL-CCNC: 15 U/L (ref 0–32)
ANION GAP SERPL CALCULATED.3IONS-SCNC: 7 MMOL/L (ref 7–16)
ANION GAP SERPL CALCULATED.3IONS-SCNC: 7 MMOL/L (ref 7–16)
AST SERPL-CCNC: 38 U/L (ref 0–31)
BASOPHILS # BLD: 0.01 K/UL (ref 0–0.2)
BASOPHILS # BLD: 0.02 K/UL (ref 0–0.2)
BASOPHILS NFR BLD: 0 % (ref 0–2)
BASOPHILS NFR BLD: 0 % (ref 0–2)
BILIRUB SERPL-MCNC: 0.3 MG/DL (ref 0–1.2)
BUN SERPL-MCNC: 33 MG/DL (ref 6–23)
BUN SERPL-MCNC: 35 MG/DL (ref 6–23)
CALCIUM SERPL-MCNC: 10.4 MG/DL (ref 8.6–10.2)
CALCIUM SERPL-MCNC: 9.5 MG/DL (ref 8.6–10.2)
CHLORIDE SERPL-SCNC: 82 MMOL/L (ref 98–107)
CHLORIDE SERPL-SCNC: 86 MMOL/L (ref 98–107)
CO2 SERPL-SCNC: 33 MMOL/L (ref 22–29)
CO2 SERPL-SCNC: 37 MMOL/L (ref 22–29)
CREAT SERPL-MCNC: 1 MG/DL (ref 0.5–1)
CREAT SERPL-MCNC: 1.1 MG/DL (ref 0.5–1)
EOSINOPHIL # BLD: 0.06 K/UL (ref 0.05–0.5)
EOSINOPHIL # BLD: 0.07 K/UL (ref 0.05–0.5)
EOSINOPHILS RELATIVE PERCENT: 1 % (ref 0–6)
EOSINOPHILS RELATIVE PERCENT: 1 % (ref 0–6)
ERYTHROCYTE [DISTWIDTH] IN BLOOD BY AUTOMATED COUNT: 13.8 % (ref 11.5–15)
ERYTHROCYTE [DISTWIDTH] IN BLOOD BY AUTOMATED COUNT: 14 % (ref 11.5–15)
GFR, ESTIMATED: 53 ML/MIN/1.73M2
GFR, ESTIMATED: 62 ML/MIN/1.73M2
GLUCOSE BLD-MCNC: 85 MG/DL (ref 74–99)
GLUCOSE SERPL-MCNC: 67 MG/DL (ref 74–99)
GLUCOSE SERPL-MCNC: 76 MG/DL (ref 74–99)
HCT VFR BLD AUTO: 35.9 % (ref 34–48)
HCT VFR BLD AUTO: 38.3 % (ref 34–48)
HGB BLD-MCNC: 11.8 G/DL (ref 11.5–15.5)
HGB BLD-MCNC: 12.5 G/DL (ref 11.5–15.5)
IMM GRANULOCYTES # BLD AUTO: 0.11 K/UL (ref 0–0.58)
IMM GRANULOCYTES # BLD AUTO: 0.12 K/UL (ref 0–0.58)
IMM GRANULOCYTES NFR BLD: 1 % (ref 0–5)
IMM GRANULOCYTES NFR BLD: 1 % (ref 0–5)
LYMPHOCYTES NFR BLD: 1.44 K/UL (ref 1.5–4)
LYMPHOCYTES NFR BLD: 1.94 K/UL (ref 1.5–4)
LYMPHOCYTES RELATIVE PERCENT: 17 % (ref 20–42)
LYMPHOCYTES RELATIVE PERCENT: 19 % (ref 20–42)
MCH RBC QN AUTO: 32.9 PG (ref 26–35)
MCH RBC QN AUTO: 33.2 PG (ref 26–35)
MCHC RBC AUTO-ENTMCNC: 32.6 G/DL (ref 32–34.5)
MCHC RBC AUTO-ENTMCNC: 32.9 G/DL (ref 32–34.5)
MCV RBC AUTO: 100.8 FL (ref 80–99.9)
MCV RBC AUTO: 101.1 FL (ref 80–99.9)
MONOCYTES NFR BLD: 1.35 K/UL (ref 0.1–0.95)
MONOCYTES NFR BLD: 1.8 K/UL (ref 0.1–0.95)
MONOCYTES NFR BLD: 16 % (ref 2–12)
MONOCYTES NFR BLD: 18 % (ref 2–12)
NEUTROPHILS NFR BLD: 61 % (ref 43–80)
NEUTROPHILS NFR BLD: 65 % (ref 43–80)
NEUTS SEG NFR BLD: 5.54 K/UL (ref 1.8–7.3)
NEUTS SEG NFR BLD: 6.03 K/UL (ref 1.8–7.3)
PLATELET # BLD AUTO: 177 K/UL (ref 130–450)
PLATELET # BLD AUTO: 178 K/UL (ref 130–450)
PMV BLD AUTO: 9.8 FL (ref 7–12)
PMV BLD AUTO: 9.8 FL (ref 7–12)
POTASSIUM SERPL-SCNC: 3.8 MMOL/L (ref 3.5–5)
POTASSIUM SERPL-SCNC: 4.4 MMOL/L (ref 3.5–5)
PROT SERPL-MCNC: 8.2 G/DL (ref 6.4–8.3)
RBC # BLD AUTO: 3.55 M/UL (ref 3.5–5.5)
RBC # BLD AUTO: 3.8 M/UL (ref 3.5–5.5)
RBC # BLD: ABNORMAL 10*6/UL
SODIUM SERPL-SCNC: 126 MMOL/L (ref 132–146)
SODIUM SERPL-SCNC: 126 MMOL/L (ref 132–146)
WBC # BLD: ABNORMAL 10*3/UL
WBC OTHER # BLD: 10 K/UL (ref 4.5–11.5)
WBC OTHER # BLD: 8.5 K/UL (ref 4.5–11.5)

## 2024-08-02 PROCEDURE — 80053 COMPREHEN METABOLIC PANEL: CPT

## 2024-08-02 PROCEDURE — 2060000000 HC ICU INTERMEDIATE R&B

## 2024-08-02 PROCEDURE — 96360 HYDRATION IV INFUSION INIT: CPT

## 2024-08-02 PROCEDURE — 96361 HYDRATE IV INFUSION ADD-ON: CPT

## 2024-08-02 PROCEDURE — 80048 BASIC METABOLIC PNL TOTAL CA: CPT

## 2024-08-02 PROCEDURE — 2580000003 HC RX 258: Performed by: STUDENT IN AN ORGANIZED HEALTH CARE EDUCATION/TRAINING PROGRAM

## 2024-08-02 PROCEDURE — 71045 X-RAY EXAM CHEST 1 VIEW: CPT

## 2024-08-02 PROCEDURE — 85025 COMPLETE CBC W/AUTO DIFF WBC: CPT

## 2024-08-02 PROCEDURE — 82962 GLUCOSE BLOOD TEST: CPT

## 2024-08-02 PROCEDURE — 99223 1ST HOSP IP/OBS HIGH 75: CPT | Performed by: HOSPITALIST

## 2024-08-02 PROCEDURE — 99285 EMERGENCY DEPT VISIT HI MDM: CPT

## 2024-08-02 RX ORDER — ONDANSETRON 4 MG/1
4 TABLET, ORALLY DISINTEGRATING ORAL ONCE
Status: DISCONTINUED | OUTPATIENT
Start: 2024-08-02 | End: 2024-08-02

## 2024-08-02 RX ORDER — 0.9 % SODIUM CHLORIDE 0.9 %
1000 INTRAVENOUS SOLUTION INTRAVENOUS ONCE
Status: COMPLETED | OUTPATIENT
Start: 2024-08-02 | End: 2024-08-02

## 2024-08-02 RX ADMIN — SODIUM CHLORIDE 1000 ML: 9 INJECTION, SOLUTION INTRAVENOUS at 14:18

## 2024-08-02 NOTE — CARE COORDINATION
Social Work/Transition of Care:    ED Charge Nurse received a call from [facility] where pt resides stating pt is unable to return due to needing a higher level of care.  SW called [facility] spoke with Manny OROZCO who stated pts SSA needs contacted in order to facilitate a placement.  Mesha Naranjo of [facility] can be contacted @ [phone] M thru F [phone].      Electronically signed by AILYN ang on 8/2/2024 at 6:16 PM

## 2024-08-02 NOTE — H&P
[facility] Hospitalist Group History and Physical      CHIEF COMPLAINT:  Aspiration    History of Present Illness:      68 yo AA visually impaired nonverbal lady PMH of Dementia with behavioral disturbance, MRDD, Recurrent aspiration pneumonia s/p PEG tube, Chronic Hypoxemic Respiratory Failure on on 3L nasal cannula, ESRD, PVD, Anemia of CKD, & GERD with esophagitis presented post aspiration event. With hypoglycemia (initial RBG was 47).    Informant(s) for H&P:EHR    REVIEW OF SYSTEMS:  A comprehensive review of systems was negative except for: what is in the HPI      PMH:  Past Medical History:   Diagnosis Date    Abscess     periapical    Acute kidney injury (HCC) 01/15/2017    d/t Vancomycin, on Dialysis [facility] right chest    Anemia     iron deficiency anemia    Blind in both eyes     Dementia (HCC)     Dysphagia     Essential hypertension 2021    Gastroesophageal reflux disease without esophagitis 2021    Hemodialysis patient (HCC)     Hernia, diaphragmatic     Hyperthyroidism     MR (mental retardation)      cerebral leukomalacia     Osteoarthritis     Other seizures (HCC)     Peptic ulcer     Peripheral vascular disease (HCC)     Pneumonia 2017    Pneumonia due to infectious organism 2017    Sepsis ([facility] 2021       Surgical History:  Past Surgical History:   Procedure Laterality Date    BRONCHOSCOPY  02/10/2017    CHEST TUBE INSERTION Right 2017    GASTROSTOMY TUBE PLACEMENT N/A 3/7/2021    EGD PEG TUBE PLACEMENT performed by Rupert Metz MD at [facility] ENDOSCOPY    OTHER SURGICAL HISTORY Right 2017    tessio insertion     OTHER SURGICAL HISTORY  2017    PEG tube insertion    UPPER GASTROINTESTINAL ENDOSCOPY N/A 2024    ESOPHAGOGASTRODUODENOSCOPY PERCUTANEOUS ENDOSCOPIC GASTROSTOMY TUBE PLACEMENT performed by Jose Han MD at [facility] ENDOSCOPY       Medications Prior to Admission:    Prior to Admission medications    Medication Sig Start  requirement at baseline  -No antibiotics at this time    # Seizure Disorder  -Continue Depakote 1000 mg bid through g tube    # Chronic Hypoxemic Respiratory failure  -Continue home 3 L nasal cannula    # MRDD with Dementia   -Patient minimally interactive  -Continue home Zyprexa and paxil    # GERD/Esophagitis/PUD  -Continue PPI through G-tube    # hypotension  -Continue Midodrine 7.5 mg TID    # peripheral edema  -suspect secondary to immobility  -Continue home bumex    Nutrition: Consulted dietician for tube feedings  Code Status: Full Code   VTE prophylaxis: SCDs      NOTE: This report was transcribed using voice recognition software. Every effort was made to ensure accuracy; however, inadvertent computerized transcription errors may be present.  Electronically signed by Eliazar Holland DO on 8/2/2024 at 7:06 PM

## 2024-08-02 NOTE — ED TRIAGE NOTES
BGL 47 for EMS. Pt is on tube feedings at group home. Group home reports the pt aspirated last night so they shut her feedings off. Pt is at baseline right now and only responses to pain, she is non verbal.

## 2024-08-02 NOTE — ED PROVIDER NOTES
DEPARTMENT COURSE    Vitals:    Vitals:    08/02/24 1629 08/02/24 1632 08/02/24 1659 08/02/24 1729   BP: 111/67   (!) 148/83   Pulse:       Resp: 16      Temp:       TempSrc:       SpO2:  99% 100%    Height:           Patient was given the following medications:  Medications   sodium chloride 0.9 % bolus 1,000 mL (0 mLs IntraVENous Stopped 8/2/24 1632)           Medical Decision Making/Differential Diagnosis:    CC/HPI Summary, Social Determinants of health, Records Reviewed, DDx, testing done/not done, ED Course, Reassessment, disposition considerations/shared decision making with patient, consults, disposition:      ED Course as of 08/02/24 2007   Fri Aug 02, 2024   1845 Spoke with Dr. Brito, he will admit the patient. [BB]      ED Course User Index  [BB] Mirza Prieto DO        Patient presents emergency department for concern for possible aspiration and the patient had BGL of 47 per EMS and route to the emergency department.  He did shut off her tube feeds last night because of there is concern for aspiration.  Patient is on baseline 3 L NC O2 satting 98% no acute distress.  Differential diagnosis includes aspiration, pneumonia, hypoglycemia to name a few.  Chest x-ray shows scattered opacifications, not much different in comparison to her previous.  Labs show hyponatremia of 126.  No leukocytosis or anemia.  Caretaker at bedside reported that they stopped her tube feeds and free water flushes last night because of the concern for aspiration.  Administered the patient IV fluid bolus, no improvement to her hyponatremia.  Discussed the patient's results with group home in the believe that the patient requires a higher level of care that they could provide now and will not accept the patient back to their facility.  Discussed case with Dr. Brito for the patient's hyponatremia as well as requiring a higher level of care with the patient being a guardian of the state.       CONSULTS: (Who and What was discussed)  IP  CONSULT TO CASE MANAGEMENT  IP CONSULT TO DIETITIAN        FINAL IMPRESSION      1. Hyponatremia    2. Aspiration into airway, initial encounter    3. Intellectual disability          DISPOSITION/PLAN     DISPOSITION Decision To Admit 08/02/2024 07:10:45 PM    DISPOSITION  Disposition: Admit to med/surg floor  Patient condition is stable    8/2/24, 11:41 AM EDT.    Vincent Pa DO  Emergency Medicine    PATIENT REFERRED TO:  No follow-up provider specified.    DISCHARGE MEDICATIONS:  New Prescriptions    No medications on file       DISCONTINUED MEDICATIONS:  Discontinued Medications    No medications on file              (Please note that portions of this note were completed with a voice recognition program.  Efforts were made to edit the dictations but occasionally words are mis-transcribed.)    Vincent Pa DO (electronically signed)

## 2024-08-02 NOTE — ED NOTES
This RN received call from Donovan Palacios, legal guardian, consent to treat was obtained with witness, LIBBY Jennings  Per guardian if to be admitted, will need to obtain 2nd consent during business hours [phone], after hours [phone]

## 2024-08-03 LAB
ANION GAP SERPL CALCULATED.3IONS-SCNC: 7 MMOL/L (ref 7–16)
BASOPHILS # BLD: 0.02 K/UL (ref 0–0.2)
BASOPHILS NFR BLD: 0 % (ref 0–2)
BUN SERPL-MCNC: 30 MG/DL (ref 6–23)
BUN SERPL-MCNC: 30 MG/DL (ref 6–23)
BUN SERPL-MCNC: 31 MG/DL (ref 6–23)
BUN SERPL-MCNC: 32 MG/DL (ref 6–23)
CALCIUM SERPL-MCNC: 9.2 MG/DL (ref 8.6–10.2)
CALCIUM SERPL-MCNC: 9.3 MG/DL (ref 8.6–10.2)
CALCIUM SERPL-MCNC: 9.7 MG/DL (ref 8.6–10.2)
CALCIUM SERPL-MCNC: 9.7 MG/DL (ref 8.6–10.2)
CHLORIDE SERPL-SCNC: 90 MMOL/L (ref 98–107)
CHLORIDE SERPL-SCNC: 91 MMOL/L (ref 98–107)
CHLORIDE SERPL-SCNC: 92 MMOL/L (ref 98–107)
CHLORIDE SERPL-SCNC: 92 MMOL/L (ref 98–107)
CO2 SERPL-SCNC: 34 MMOL/L (ref 22–29)
CO2 SERPL-SCNC: 34 MMOL/L (ref 22–29)
CO2 SERPL-SCNC: 35 MMOL/L (ref 22–29)
CO2 SERPL-SCNC: 36 MMOL/L (ref 22–29)
CREAT SERPL-MCNC: 0.9 MG/DL (ref 0.5–1)
CREAT SERPL-MCNC: 0.9 MG/DL (ref 0.5–1)
CREAT SERPL-MCNC: 1 MG/DL (ref 0.5–1)
CREAT SERPL-MCNC: 1.1 MG/DL (ref 0.5–1)
EOSINOPHIL # BLD: 0.08 K/UL (ref 0.05–0.5)
EOSINOPHILS RELATIVE PERCENT: 1 % (ref 0–6)
ERYTHROCYTE [DISTWIDTH] IN BLOOD BY AUTOMATED COUNT: 14.3 % (ref 11.5–15)
GFR, ESTIMATED: 55 ML/MIN/1.73M2
GFR, ESTIMATED: 63 ML/MIN/1.73M2
GFR, ESTIMATED: 65 ML/MIN/1.73M2
GFR, ESTIMATED: 71 ML/MIN/1.73M2
GLUCOSE BLD-MCNC: 75 MG/DL (ref 74–99)
GLUCOSE BLD-MCNC: 82 MG/DL (ref 74–99)
GLUCOSE BLD-MCNC: 95 MG/DL (ref 74–99)
GLUCOSE SERPL-MCNC: 59 MG/DL (ref 74–99)
GLUCOSE SERPL-MCNC: 78 MG/DL (ref 74–99)
GLUCOSE SERPL-MCNC: 84 MG/DL (ref 74–99)
GLUCOSE SERPL-MCNC: 95 MG/DL (ref 74–99)
HCT VFR BLD AUTO: 36.9 % (ref 34–48)
HGB BLD-MCNC: 11.9 G/DL (ref 11.5–15.5)
IMM GRANULOCYTES # BLD AUTO: 0.1 K/UL (ref 0–0.58)
IMM GRANULOCYTES NFR BLD: 1 % (ref 0–5)
LYMPHOCYTES NFR BLD: 1.52 K/UL (ref 1.5–4)
LYMPHOCYTES RELATIVE PERCENT: 15 % (ref 20–42)
MCH RBC QN AUTO: 32.8 PG (ref 26–35)
MCHC RBC AUTO-ENTMCNC: 32.2 G/DL (ref 32–34.5)
MCV RBC AUTO: 101.7 FL (ref 80–99.9)
MONOCYTES NFR BLD: 1.65 K/UL (ref 0.1–0.95)
MONOCYTES NFR BLD: 16 % (ref 2–12)
NEUTROPHILS NFR BLD: 67 % (ref 43–80)
NEUTS SEG NFR BLD: 6.99 K/UL (ref 1.8–7.3)
PLATELET # BLD AUTO: 180 K/UL (ref 130–450)
PMV BLD AUTO: 9.6 FL (ref 7–12)
POTASSIUM SERPL-SCNC: 3.8 MMOL/L (ref 3.5–5)
POTASSIUM SERPL-SCNC: 3.9 MMOL/L (ref 3.5–5)
POTASSIUM SERPL-SCNC: 3.9 MMOL/L (ref 3.5–5)
POTASSIUM SERPL-SCNC: 4 MMOL/L (ref 3.5–5)
RBC # BLD AUTO: 3.63 M/UL (ref 3.5–5.5)
RBC # BLD: ABNORMAL 10*6/UL
RBC # BLD: ABNORMAL 10*6/UL
SODIUM SERPL-SCNC: 131 MMOL/L (ref 132–146)
SODIUM SERPL-SCNC: 133 MMOL/L (ref 132–146)
SODIUM SERPL-SCNC: 134 MMOL/L (ref 132–146)
SODIUM SERPL-SCNC: 134 MMOL/L (ref 132–146)
WBC OTHER # BLD: 10.4 K/UL (ref 4.5–11.5)

## 2024-08-03 PROCEDURE — 82962 GLUCOSE BLOOD TEST: CPT

## 2024-08-03 PROCEDURE — 85025 COMPLETE CBC W/AUTO DIFF WBC: CPT

## 2024-08-03 PROCEDURE — 2580000003 HC RX 258: Performed by: HOSPITALIST

## 2024-08-03 PROCEDURE — 2060000000 HC ICU INTERMEDIATE R&B

## 2024-08-03 PROCEDURE — 80048 BASIC METABOLIC PNL TOTAL CA: CPT

## 2024-08-03 PROCEDURE — 99232 SBSQ HOSP IP/OBS MODERATE 35: CPT | Performed by: STUDENT IN AN ORGANIZED HEALTH CARE EDUCATION/TRAINING PROGRAM

## 2024-08-03 PROCEDURE — 6370000000 HC RX 637 (ALT 250 FOR IP): Performed by: HOSPITALIST

## 2024-08-03 RX ORDER — SODIUM CHLORIDE 0.9 % (FLUSH) 0.9 %
5-40 SYRINGE (ML) INJECTION EVERY 12 HOURS SCHEDULED
Status: DISCONTINUED | OUTPATIENT
Start: 2024-08-03 | End: 2024-08-07 | Stop reason: HOSPADM

## 2024-08-03 RX ORDER — ONDANSETRON 2 MG/ML
4 INJECTION INTRAMUSCULAR; INTRAVENOUS EVERY 6 HOURS PRN
Status: DISCONTINUED | OUTPATIENT
Start: 2024-08-03 | End: 2024-08-07 | Stop reason: HOSPADM

## 2024-08-03 RX ORDER — MIDODRINE HYDROCHLORIDE 5 MG/1
7.5 TABLET ORAL 3 TIMES DAILY PRN
Status: DISCONTINUED | OUTPATIENT
Start: 2024-08-03 | End: 2024-08-07 | Stop reason: HOSPADM

## 2024-08-03 RX ORDER — SENNOSIDES 8.8 MG/5ML
5 LIQUID ORAL 2 TIMES DAILY PRN
Status: DISCONTINUED | OUTPATIENT
Start: 2024-08-03 | End: 2024-08-07 | Stop reason: HOSPADM

## 2024-08-03 RX ORDER — VALPROIC ACID 250 MG/5ML
1000 SOLUTION ORAL 2 TIMES DAILY
Status: DISCONTINUED | OUTPATIENT
Start: 2024-08-03 | End: 2024-08-07 | Stop reason: HOSPADM

## 2024-08-03 RX ORDER — ONDANSETRON 4 MG/1
4 TABLET, ORALLY DISINTEGRATING ORAL EVERY 8 HOURS PRN
Status: DISCONTINUED | OUTPATIENT
Start: 2024-08-03 | End: 2024-08-07 | Stop reason: HOSPADM

## 2024-08-03 RX ORDER — PAROXETINE 10 MG/1
10 TABLET, FILM COATED ORAL 2 TIMES DAILY
Status: DISCONTINUED | OUTPATIENT
Start: 2024-08-03 | End: 2024-08-07 | Stop reason: HOSPADM

## 2024-08-03 RX ORDER — OLANZAPINE 10 MG/1
10 TABLET, ORALLY DISINTEGRATING ORAL DAILY
Status: DISCONTINUED | OUTPATIENT
Start: 2024-08-03 | End: 2024-08-07 | Stop reason: HOSPADM

## 2024-08-03 RX ORDER — ENOXAPARIN SODIUM 100 MG/ML
40 INJECTION SUBCUTANEOUS DAILY
Status: DISCONTINUED | OUTPATIENT
Start: 2024-08-03 | End: 2024-08-03

## 2024-08-03 RX ORDER — SODIUM CHLORIDE 0.9 % (FLUSH) 0.9 %
5-40 SYRINGE (ML) INJECTION PRN
Status: DISCONTINUED | OUTPATIENT
Start: 2024-08-03 | End: 2024-08-07 | Stop reason: HOSPADM

## 2024-08-03 RX ORDER — MONTELUKAST SODIUM 10 MG/1
10 TABLET ORAL NIGHTLY
Status: DISCONTINUED | OUTPATIENT
Start: 2024-08-03 | End: 2024-08-07 | Stop reason: HOSPADM

## 2024-08-03 RX ORDER — ACETAMINOPHEN 325 MG/1
650 TABLET ORAL EVERY 6 HOURS PRN
Status: DISCONTINUED | OUTPATIENT
Start: 2024-08-03 | End: 2024-08-07 | Stop reason: HOSPADM

## 2024-08-03 RX ORDER — ACETAMINOPHEN 650 MG/1
650 SUPPOSITORY RECTAL EVERY 6 HOURS PRN
Status: DISCONTINUED | OUTPATIENT
Start: 2024-08-03 | End: 2024-08-07 | Stop reason: HOSPADM

## 2024-08-03 RX ORDER — MAGNESIUM HYDROXIDE/ALUMINUM HYDROXICE/SIMETHICONE 120; 1200; 1200 MG/30ML; MG/30ML; MG/30ML
30 SUSPENSION ORAL EVERY 6 HOURS PRN
Status: DISCONTINUED | OUTPATIENT
Start: 2024-08-03 | End: 2024-08-07 | Stop reason: HOSPADM

## 2024-08-03 RX ORDER — LEVOTHYROXINE SODIUM 0.07 MG/1
150 TABLET ORAL
Status: DISCONTINUED | OUTPATIENT
Start: 2024-08-03 | End: 2024-08-07 | Stop reason: HOSPADM

## 2024-08-03 RX ORDER — BUMETANIDE 1 MG/1
1 TABLET ORAL EVERY MORNING
Status: DISCONTINUED | OUTPATIENT
Start: 2024-08-03 | End: 2024-08-07 | Stop reason: HOSPADM

## 2024-08-03 RX ORDER — SODIUM CHLORIDE 9 MG/ML
INJECTION, SOLUTION INTRAVENOUS PRN
Status: DISCONTINUED | OUTPATIENT
Start: 2024-08-03 | End: 2024-08-07 | Stop reason: HOSPADM

## 2024-08-03 RX ORDER — OLANZAPINE 10 MG/1
20 TABLET, ORALLY DISINTEGRATING ORAL NIGHTLY
Status: DISCONTINUED | OUTPATIENT
Start: 2024-08-03 | End: 2024-08-07 | Stop reason: HOSPADM

## 2024-08-03 RX ORDER — DEXTROSE MONOHYDRATE 50 MG/ML
INJECTION, SOLUTION INTRAVENOUS CONTINUOUS
Status: DISCONTINUED | OUTPATIENT
Start: 2024-08-03 | End: 2024-08-03

## 2024-08-03 RX ORDER — ENEMA 19; 7 G/133ML; G/133ML
1 ENEMA RECTAL DAILY PRN
Status: DISCONTINUED | OUTPATIENT
Start: 2024-08-03 | End: 2024-08-07 | Stop reason: HOSPADM

## 2024-08-03 RX ADMIN — BUMETANIDE 1 MG: 1 TABLET ORAL at 08:49

## 2024-08-03 RX ADMIN — OLANZAPINE 20 MG: 10 TABLET, ORALLY DISINTEGRATING ORAL at 20:41

## 2024-08-03 RX ADMIN — SODIUM CHLORIDE, PRESERVATIVE FREE 10 ML: 5 INJECTION INTRAVENOUS at 20:42

## 2024-08-03 RX ADMIN — MONTELUKAST 10 MG: 10 TABLET, FILM COATED ORAL at 03:06

## 2024-08-03 RX ADMIN — OLANZAPINE 10 MG: 10 TABLET, ORALLY DISINTEGRATING ORAL at 08:48

## 2024-08-03 RX ADMIN — OLANZAPINE 20 MG: 10 TABLET, ORALLY DISINTEGRATING ORAL at 03:00

## 2024-08-03 RX ADMIN — VALPROIC ACID 1000 MG: 250 SOLUTION ORAL at 20:41

## 2024-08-03 RX ADMIN — MONTELUKAST 10 MG: 10 TABLET, FILM COATED ORAL at 20:41

## 2024-08-03 RX ADMIN — SODIUM CHLORIDE, PRESERVATIVE FREE 10 ML: 5 INJECTION INTRAVENOUS at 08:49

## 2024-08-03 RX ADMIN — PAROXETINE 10 MG: 10 TABLET, FILM COATED ORAL at 03:01

## 2024-08-03 RX ADMIN — VALPROIC ACID 1000 MG: 250 SOLUTION ORAL at 08:47

## 2024-08-03 RX ADMIN — LEVOTHYROXINE SODIUM 150 MCG: 75 TABLET ORAL at 06:07

## 2024-08-03 RX ADMIN — VALPROIC ACID 1000 MG: 250 SOLUTION ORAL at 03:03

## 2024-08-03 RX ADMIN — PAROXETINE 10 MG: 10 TABLET, FILM COATED ORAL at 08:49

## 2024-08-03 RX ADMIN — PAROXETINE 10 MG: 10 TABLET, FILM COATED ORAL at 20:41

## 2024-08-03 ASSESSMENT — PAIN SCALES - GENERAL: PAINLEVEL_OUTOF10: 0

## 2024-08-03 NOTE — PROGRESS NOTES
[facility] Hospitalist Progress Note    Admitting Date and Time: 8/2/2024 11:16 AM  Admit Dx: Hyponatremia [E87.1]  Intellectual disability [F79]  Aspiration into airway, initial encounter [T17.908A]    Subjective:  Patient is being followed for Hyponatremia [E87.1]  Intellectual disability [F79]  Aspiration into airway, initial encounter [T17.908A]   Pt unable to participate  Per RN: no additional concerns    ROS: VIRAL     levothyroxine  150 mcg Oral QAM AC    montelukast  10 mg Oral Nightly    PARoxetine  10 mg Per G Tube BID    valproic acid  1,000 mg Per G Tube BID    OLANZapine zydis  20 mg Oral Nightly    OLANZapine zydis  10 mg Oral Daily    bumetanide  1 mg Per G Tube QAM    sodium chloride flush  5-40 mL IntraVENous 2 times per day     midodrine, 7.5 mg, TID PRN  sodium chloride flush, 5-40 mL, PRN  sodium chloride, , PRN  ondansetron, 4 mg, Q8H PRN   Or  ondansetron, 4 mg, Q6H PRN  aluminum & magnesium hydroxide-simethicone, 30 mL, Q6H PRN  acetaminophen, 650 mg, Q6H PRN   Or  acetaminophen, 650 mg, Q6H PRN  sodium phosphate, 1 enema, Daily PRN  senna, 5 mL, BID PRN         Objective:  BP (!) 125/56   Pulse 74   Temp 97.8 °F (36.6 °C) (Temporal)   Resp 18   Ht 1.626 m (5' 4\")   Wt 52.4 kg (115 lb 9.6 oz)   SpO2 97%   BMI 19.84 kg/m²     General Appearance: laying in bed, sonorous, mouth open, does not wake to painful stimuli  Skin: warm and dry  Head: normocephalic and atraumatic  Eyes: PERRL, EOMI, conjunctivae normal  Neck: neck supple, trachea midline   Pulmonary/Chest: CTAB, no respiratory distress, 3L NC  Cardiovascular: RRR, no murmurs  Abdomen: soft, PEG in place, abdominal binder in place  Extremities: no cyanosis, no clubbing and no edema  Neurologic: not interactive with examiner, does not wake to painful stimuli      Recent Labs     08/02/24  1204 08/02/24  1800 08/03/24  0345   * 126* 131*   K 4.4 3.8 3.8   CL 82* 86* 90*   CO2 37* 33* 34*   BUN 35* 33* 30*   CREATININE

## 2024-08-03 NOTE — ED NOTES
ED to Inpatient Handoff Report    Notified Gregory SOUZA that electronic handoff available and patient ready for transport to room 0637.    Safety Risks: Risk of falls    Patient in Restraints: no    Constant Observer or Patient : no    Telemetry Monitoring Ordered :No           Order to transfer to unit without monitor:N/A    Last MEWS: 1 Time completed: 2221    Deterioration Index Score:   Predictive Model Details          50  Factor Value    Calculated 8/2/2024 22:22 38% Ringling coma scale 11    Deterioration Index Model 23% Age 69 years old     22% Supplemental oxygen Nasal cannula     5% Sodium abnormal (126 mmol/L)     5% Respiratory rate 18     3% Pulse oximetry 100 %     2% Systolic 131     2% BUN abnormal (33 mg/dL)     1% WBC count 8.5 k/uL     0% Pulse 84     0% Potassium 3.8 mmol/L     0% Temperature 98.1 °F (36.7 °C)     0% Hematocrit 35.9 %        Vitals:    08/02/24 1632 08/02/24 1659 08/02/24 1729 08/02/24 2221   BP:   (!) 148/83 131/86   Pulse:    84   Resp:    18   Temp:    98.1 °F (36.7 °C)   TempSrc:    Axillary   SpO2: 99% 100%  100%   Height:             Opportunity for questions and clarification was provided.

## 2024-08-03 NOTE — CARE COORDINATION
Social Work / Discharge PLanning : Patient admitted from [facility]. Per Group Home, patient cannot return and needs a higher level of care. Patient has a LG Latanya Canada and SW/CM will need to speak with LG for NF placement. Meanwhile, SW received a call from Nancie from [facility] [phone]. Nancie updated SW that patient was currently active with them while in [facility] but unsure if they can follow at NF due to trying to coordinate a change in Code Status from Full code to DNR status with the LG. After SW speak to LG , will need to contact Mesha Naranjo of [facility]  @ [phone] M thru F [phone] to coordinate placement. SW to follow. Electronically signed by AILYN Graf on 8/3/24 at 10:44 AM EDT

## 2024-08-03 NOTE — PROGRESS NOTES
Spoke with someone in lab regarding BMP sent at 0958. Per lab, results not uploading into EPIC. Na 134 at 0958 per lab

## 2024-08-03 NOTE — PROGRESS NOTES
[facility] Quality Flow/Interdisciplinary Rounds Progress Note        Quality Flow Rounds held on August 3, 2024    Disciplines Attending:  Bedside Nurse and Nursing Unit Leadership    Katerina Paiz was admitted on 8/2/2024 11:16 AM    Anticipated Discharge Date:       Disposition:    Jose Score:  Jose Scale Score: 12    Readmission Risk              Risk of Unplanned Readmission:  29           Discussed patient goal for the day, patient clinical progression, and barriers to discharge.  The following Goal(s) of the Day/Commitment(s) have been identified:   monitor labs.       Mariaa Marin RN  August 3, 2024

## 2024-08-03 NOTE — PROGRESS NOTES
Comprehensive Nutrition Assessment    Type and Reason for Visit:  Initial, Consult (TF O& M)    Nutrition Recommendations/Plan:   Continue NPO. Tube feedings initiated -Standard w/Fiber @ 20 ml hr, to increase in 10 ml/hr increments q 8 hrs as tolerated until goal rate 45 ml/hr achieved & maintained. Hold TF 30 mins before & after Levothyroxine admin. Goal TF regimen provides 1035 ml TV, 1553 kcal, 66 gm pro, 1387 ml H2O. Meets 100% Kcal & pro needs.   Will monitor.     Malnutrition Assessment:  Malnutrition Status:  At risk for malnutrition (Comment) (NPO/TF aspiration risk) (08/03/24 7749)    Context:  Chronic Illness     Findings of the 6 clinical characteristics of malnutrition:  Energy Intake:  No significant decrease in energy intake (CBW does not appear accurate-bed scale in error ??)  Weight Loss:  Unable to assess (CBW does not appear accurate-bed scale in error ??)     Body Fat Loss:  No significant body fat loss     Muscle Mass Loss:  No significant muscle mass loss    Fluid Accumulation:  Unable to assess (d/t multifactoral)     Strength:       Nutrition Assessment:    ADM: Hyponatremia, Aspiration pneumonitis,  hypoglycemia PMH: Dementia, MRDD, Recurrent aspiration pneumonia s/p PEG tube, CKD, PVD, Chronic Resp Disease. Tube feedings initiated -Standard w/Fiber @ 20 ml hr, to increase in 10 ml/hr increments q 8 hrs as tolerated until goal rate 45 ml/hr achieved & maintained. Hold TF 30 mins before & after Levothyroxine admin. Goal TF regimen provides 1035 ml TV,  1553 kcal, 66 gm pro, 1387 ml H2O. Meets 100% Kcal & pro needs. Does not meet criteria for Malnutrition at this time. Will monitor.    Nutrition Related Findings:    Nonverbal, blind, soft/round abd, + BS, PEG,  Synthroid, +2 pitting edema, Bumex, AST 38, GFR/Cr 71/0.9, aspiration risk Wound Type: None       Current Nutrition Intake & Therapies:    Average Meal Intake: NPO     Current Tube Feeding (TF) Orders:  Feeding Route: PEG  Formula:  Standard with Fiber  Schedule: Continuous  Feeding Regimen: Goal TF at 45 ml x 23= 1035 ml TV (hold TF 30 mins before & after Levothyroxine)  Additives/Modulars: None  Water Flushes: 100 ml q 4= 600 ml  Current TF & Flush Orders Provides: 20 ml x 23 hrs= 460 ml (hold TF 30 mins before & after Levothyroxine) provides 690 kcal, 29 gm pro, 950 ml H2O (w/flush)  Goal TF & Flush Orders Provides: 1035 ml provides 1553 kcal, 66 gm pro, 1387 ml H2O.  Meets 100% Kcal & pro needs    Anthropometric Measures:  Height: 162.6 cm (5' 4\")  Ideal Body Weight (IBW): 120 lbs (55 kg)    Admission Body Weight:  (115# 9.6 oz not specified-pt appears larger)  Current Body Weight: 78.2 kg (172 lb 5 oz), 143.6 % IBW. Weight Source: Other (Comment) (using UBW. Scale reads 93.8 kg wide discrepency)  Current BMI (kg/m2): 29.6  Usual Body Weight: 78.2 kg (172 lb 5 oz) (x 3 mos ago-bed scale)  % Weight Change (Calculated): 0  Weight Adjustment For: No Adjustment                 BMI Categories: Overweight (BMI 25.0-29.9)    Estimated Daily Nutrient Needs:  Energy Requirements Based On: Formula  Weight Used for Energy Requirements: Current  Energy (kcal/day): 3599-8668  Weight Used for Protein Requirements: Ideal  Protein (g/day): 60-70  Method Used for Fluid Requirements: 1 ml/kcal  Fluid (ml/day): 4167-8137    Nutrition Diagnosis:   Inadequate oral intake related to impaired respiratory function (aspiration risk) as evidenced by nutrition support - enteral nutrition    Nutrition Interventions:   Food and/or Nutrient Delivery: Start Tube Feeding  Nutrition Education/Counseling: Education not appropriate  Coordination of Nutrition Care: Continue to monitor while inpatient       Goals:     Goals: Meet at least 75% of estimated needs, Tolerate nutrition support at goal rate, by next RD assessment       Nutrition Monitoring and Evaluation:      Food/Nutrient Intake Outcomes: Enteral Nutrition Intake/Tolerance  Physical Signs/Symptoms Outcomes:

## 2024-08-03 NOTE — PLAN OF CARE
Problem: Safety - Adult  Goal: Free from fall injury  8/3/2024 1104 by Anuradha Green RN  Outcome: Progressing     Problem: Skin/Tissue Integrity  Goal: Absence of new skin breakdown  Description: 1.  Monitor for areas of redness and/or skin breakdown  2.  Assess vascular access sites hourly  3.  Every 4-6 hours minimum:  Change oxygen saturation probe site  4.  Every 4-6 hours:  If on nasal continuous positive airway pressure, respiratory therapy assess nares and determine need for appliance change or resting period.  Outcome: Progressing

## 2024-08-03 NOTE — PROGRESS NOTES
Attempted to call legal guardian to complete admission database, unable to reach. No option to leave voicemail.

## 2024-08-03 NOTE — PROGRESS NOTES
4 Eyes Skin Assessment     NAME:  Katerina Paiz  YOB: 1955  MEDICAL RECORD NUMBER:  [id number]    The patient is being assessed for  Admission    I agree that at least one RN has performed a thorough Head to Toe Skin Assessment on the patient. ALL assessment sites listed below have been assessed.      Areas assessed by both nurses:    Head, Face, Ears, Shoulders, Back, Chest, Arms, Elbows, Hands, Sacrum. Buttock, Coccyx, Ischium, and Legs. Feet and Heels        Does the Patient have a Wound? No noted wound(s)       Jose Prevention initiated by RN: Yes  Wound Care Orders initiated by RN: No    Pressure Injury (Stage 3,4, Unstageable, DTI, NWPT, and Complex wounds) if present, place Wound referral order by RN under : No    New Ostomies, if present place, Ostomy referral order under : No     Nurse 1 eSignature: Electronically signed by Lorenzo Woodall RN on 8/3/24 at 1:50 AM EDT    **SHARE this note so that the co-signing nurse can place an eSignature**    Nurse 2 eSignature: Electronically signed by Parvin Fontaine RN on 8/3/24 at 4:17 AM EDT

## 2024-08-04 PROCEDURE — 6370000000 HC RX 637 (ALT 250 FOR IP): Performed by: HOSPITALIST

## 2024-08-04 PROCEDURE — 2060000000 HC ICU INTERMEDIATE R&B

## 2024-08-04 PROCEDURE — 99232 SBSQ HOSP IP/OBS MODERATE 35: CPT | Performed by: STUDENT IN AN ORGANIZED HEALTH CARE EDUCATION/TRAINING PROGRAM

## 2024-08-04 PROCEDURE — 2580000003 HC RX 258: Performed by: HOSPITALIST

## 2024-08-04 PROCEDURE — 2700000000 HC OXYGEN THERAPY PER DAY

## 2024-08-04 PROCEDURE — 36415 COLL VENOUS BLD VENIPUNCTURE: CPT

## 2024-08-04 RX ADMIN — BUMETANIDE 1 MG: 1 TABLET ORAL at 08:16

## 2024-08-04 RX ADMIN — OLANZAPINE 20 MG: 10 TABLET, ORALLY DISINTEGRATING ORAL at 20:18

## 2024-08-04 RX ADMIN — MONTELUKAST 10 MG: 10 TABLET, FILM COATED ORAL at 20:18

## 2024-08-04 RX ADMIN — LEVOTHYROXINE SODIUM 150 MCG: 75 TABLET ORAL at 06:55

## 2024-08-04 RX ADMIN — PAROXETINE 10 MG: 10 TABLET, FILM COATED ORAL at 08:16

## 2024-08-04 RX ADMIN — VALPROIC ACID 1000 MG: 250 SOLUTION ORAL at 08:16

## 2024-08-04 RX ADMIN — OLANZAPINE 10 MG: 10 TABLET, ORALLY DISINTEGRATING ORAL at 08:16

## 2024-08-04 RX ADMIN — ACETAMINOPHEN 650 MG: 325 TABLET ORAL at 20:18

## 2024-08-04 RX ADMIN — VALPROIC ACID 1000 MG: 250 SOLUTION ORAL at 20:31

## 2024-08-04 RX ADMIN — SODIUM CHLORIDE, PRESERVATIVE FREE 10 ML: 5 INJECTION INTRAVENOUS at 20:31

## 2024-08-04 RX ADMIN — PAROXETINE 10 MG: 10 TABLET, FILM COATED ORAL at 20:18

## 2024-08-04 RX ADMIN — SODIUM CHLORIDE, PRESERVATIVE FREE 10 ML: 5 INJECTION INTRAVENOUS at 08:19

## 2024-08-04 ASSESSMENT — PAIN SCALES - GENERAL
PAINLEVEL_OUTOF10: 0

## 2024-08-04 NOTE — PROGRESS NOTES
[facility] Quality Flow/Interdisciplinary Rounds Progress Note        Quality Flow Rounds held on August 4, 2024    Disciplines Attending:  Bedside Nurse and Nursing Unit Leadership    Katerina Paiz was admitted on 8/2/2024 11:16 AM    Anticipated Discharge Date:       Disposition:    Jose Score:  Jose Scale Score: 13    Readmission Risk              Risk of Unplanned Readmission:  31           Discussed patient goal for the day, patient clinical progression, and barriers to discharge.  The following Goal(s) of the Day/Commitment(s) have been identified:   monitor labs, discharge planning.       Mariaa Marin RN  August 4, 2024

## 2024-08-04 NOTE — PLAN OF CARE
Problem: Discharge Planning  Goal: Discharge to home or other facility with appropriate resources  Outcome: Progressing     Problem: Safety - Adult  Goal: Free from fall injury  8/4/2024 1010 by Anuradha Green, RN  Outcome: Progressing     Problem: Skin/Tissue Integrity  Goal: Absence of new skin breakdown  Description: 1.  Monitor for areas of redness and/or skin breakdown  2.  Assess vascular access sites hourly  3.  Every 4-6 hours minimum:  Change oxygen saturation probe site  4.  Every 4-6 hours:  If on nasal continuous positive airway pressure, respiratory therapy assess nares and determine need for appliance change or resting period.  8/4/2024 1956 by Lorenzo Jones, RN  Outcome: Progressing     Problem: Pain  Goal: Verbalizes/displays adequate comfort level or baseline comfort level  Outcome: Progressing     Problem: Nutrition Deficit:  Goal: Optimize nutritional status  Outcome: Progressing

## 2024-08-04 NOTE — PROGRESS NOTES
[facility] Hospitalist Progress Note    Admitting Date and Time: 8/2/2024 11:16 AM  Admit Dx: Hyponatremia [E87.1]  Intellectual disability [F79]  Aspiration into airway, initial encounter [T17.908A]    Subjective:  Patient is being followed for Hyponatremia [E87.1]  Intellectual disability [F79]  Aspiration into airway, initial encounter [T17.908A]   Pt unable to participate  Per RN: no additional concerns    ROS: VIRAL     levothyroxine  150 mcg Oral QAM AC    montelukast  10 mg Oral Nightly    PARoxetine  10 mg Per G Tube BID    valproic acid  1,000 mg Per G Tube BID    OLANZapine zydis  20 mg Oral Nightly    OLANZapine zydis  10 mg Oral Daily    bumetanide  1 mg Per G Tube QAM    sodium chloride flush  5-40 mL IntraVENous 2 times per day     midodrine, 7.5 mg, TID PRN  sodium chloride flush, 5-40 mL, PRN  sodium chloride, , PRN  ondansetron, 4 mg, Q8H PRN   Or  ondansetron, 4 mg, Q6H PRN  aluminum & magnesium hydroxide-simethicone, 30 mL, Q6H PRN  acetaminophen, 650 mg, Q6H PRN   Or  acetaminophen, 650 mg, Q6H PRN  sodium phosphate, 1 enema, Daily PRN  senna, 5 mL, BID PRN         Objective:  BP (!) 100/57   Pulse 94   Temp 98.1 °F (36.7 °C) (Axillary)   Resp 18   Ht 1.626 m (5' 4\")   Wt 52.4 kg (115 lb 9.6 oz)   SpO2 96%   BMI 19.84 kg/m²     General Appearance: sitting in bed, does not wake to painful stimuli  Skin: warm and dry  Head: normocephalic and atraumatic  Eyes: PERRL, EOMI, conjunctivae normal  Neck: neck supple, trachea midline   Pulmonary/Chest: CTAB, no respiratory distress, 3L NC  Cardiovascular: RRR, no murmurs  Abdomen: soft, PEG in place, abdominal binder in place  Extremities: no cyanosis, no clubbing and no edema  Neurologic: not interactive with examiner, does not wake to painful stimuli      Recent Labs     08/03/24  0949 08/03/24  1503 08/03/24  2300    133 134   K 3.9 3.9 4.0   CL 92* 92* 91*   CO2 35* 34* 36*   BUN 30* 31* 32*   CREATININE 0.9 1.0 1.1*   GLUCOSE 84

## 2024-08-04 NOTE — PLAN OF CARE
Problem: Discharge Planning  Goal: Discharge to home or other facility with appropriate resources  Outcome: Progressing     Problem: Safety - Adult  Goal: Free from fall injury  8/3/2024 2226 by Lorenzo Jones, RN  Outcome: Progressing     Problem: Skin/Tissue Integrity  Goal: Absence of new skin breakdown  Description: 1.  Monitor for areas of redness and/or skin breakdown  2.  Assess vascular access sites hourly  3.  Every 4-6 hours minimum:  Change oxygen saturation probe site  4.  Every 4-6 hours:  If on nasal continuous positive airway pressure, respiratory therapy assess nares and determine need for appliance change or resting period.  8/3/2024 2226 by Lorenzo Jones, RN  Outcome: Progressing     Problem: Pain  Goal: Verbalizes/displays adequate comfort level or baseline comfort level  Outcome: Progressing

## 2024-08-04 NOTE — PLAN OF CARE
Problem: Safety - Adult  Goal: Free from fall injury  8/4/2024 1010 by Anuradha Green, RN  Outcome: Progressing  8/3/2024 2226 by Lorenzo Jones, RN  Outcome: Progressing     Problem: Skin/Tissue Integrity  Goal: Absence of new skin breakdown  Description: 1.  Monitor for areas of redness and/or skin breakdown  2.  Assess vascular access sites hourly  3.  Every 4-6 hours minimum:  Change oxygen saturation probe site  4.  Every 4-6 hours:  If on nasal continuous positive airway pressure, respiratory therapy assess nares and determine need for appliance change or resting period.  8/4/2024 1010 by Anuradha Green, RN  Outcome: Progressing  8/3/2024 2226 by Lorenzo Jones, RN  Outcome: Progressing

## 2024-08-05 LAB
ANION GAP SERPL CALCULATED.3IONS-SCNC: 8 MMOL/L (ref 7–16)
BASOPHILS # BLD: 0 K/UL (ref 0–0.2)
BASOPHILS NFR BLD: 0 % (ref 0–2)
BUN SERPL-MCNC: 36 MG/DL (ref 6–23)
CALCIUM SERPL-MCNC: 10 MG/DL (ref 8.6–10.2)
CHLORIDE SERPL-SCNC: 91 MMOL/L (ref 98–107)
CO2 SERPL-SCNC: 37 MMOL/L (ref 22–29)
CREAT SERPL-MCNC: 1 MG/DL (ref 0.5–1)
EOSINOPHIL # BLD: 0.07 K/UL (ref 0.05–0.5)
EOSINOPHILS RELATIVE PERCENT: 1 % (ref 0–6)
ERYTHROCYTE [DISTWIDTH] IN BLOOD BY AUTOMATED COUNT: 14.2 % (ref 11.5–15)
GFR, ESTIMATED: 61 ML/MIN/1.73M2
GLUCOSE SERPL-MCNC: 123 MG/DL (ref 74–99)
HCT VFR BLD AUTO: 37.1 % (ref 34–48)
HGB BLD-MCNC: 11.6 G/DL (ref 11.5–15.5)
IMM GRANULOCYTES # BLD AUTO: 0.03 K/UL (ref 0–0.58)
IMM GRANULOCYTES NFR BLD: 0 % (ref 0–5)
LYMPHOCYTES NFR BLD: 0.89 K/UL (ref 1.5–4)
LYMPHOCYTES RELATIVE PERCENT: 13 % (ref 20–42)
MCH RBC QN AUTO: 32.4 PG (ref 26–35)
MCHC RBC AUTO-ENTMCNC: 31.3 G/DL (ref 32–34.5)
MCV RBC AUTO: 103.6 FL (ref 80–99.9)
MONOCYTES NFR BLD: 1.5 K/UL (ref 0.1–0.95)
MONOCYTES NFR BLD: 22 % (ref 2–12)
NEUTROPHILS NFR BLD: 64 % (ref 43–80)
NEUTS SEG NFR BLD: 4.35 K/UL (ref 1.8–7.3)
PLATELET # BLD AUTO: 173 K/UL (ref 130–450)
PMV BLD AUTO: 10 FL (ref 7–12)
POTASSIUM SERPL-SCNC: 4.3 MMOL/L (ref 3.5–5)
RBC # BLD AUTO: 3.58 M/UL (ref 3.5–5.5)
SODIUM SERPL-SCNC: 136 MMOL/L (ref 132–146)
WBC OTHER # BLD: 6.8 K/UL (ref 4.5–11.5)

## 2024-08-05 PROCEDURE — 36415 COLL VENOUS BLD VENIPUNCTURE: CPT

## 2024-08-05 PROCEDURE — 2700000000 HC OXYGEN THERAPY PER DAY

## 2024-08-05 PROCEDURE — 80048 BASIC METABOLIC PNL TOTAL CA: CPT

## 2024-08-05 PROCEDURE — 2580000003 HC RX 258: Performed by: HOSPITALIST

## 2024-08-05 PROCEDURE — 99231 SBSQ HOSP IP/OBS SF/LOW 25: CPT | Performed by: STUDENT IN AN ORGANIZED HEALTH CARE EDUCATION/TRAINING PROGRAM

## 2024-08-05 PROCEDURE — 2060000000 HC ICU INTERMEDIATE R&B

## 2024-08-05 PROCEDURE — 6370000000 HC RX 637 (ALT 250 FOR IP): Performed by: HOSPITALIST

## 2024-08-05 PROCEDURE — 85025 COMPLETE CBC W/AUTO DIFF WBC: CPT

## 2024-08-05 RX ADMIN — SODIUM CHLORIDE, PRESERVATIVE FREE 10 ML: 5 INJECTION INTRAVENOUS at 20:40

## 2024-08-05 RX ADMIN — OLANZAPINE 10 MG: 10 TABLET, ORALLY DISINTEGRATING ORAL at 08:26

## 2024-08-05 RX ADMIN — SODIUM CHLORIDE, PRESERVATIVE FREE 10 ML: 5 INJECTION INTRAVENOUS at 08:27

## 2024-08-05 RX ADMIN — VALPROIC ACID 1000 MG: 250 SOLUTION ORAL at 08:25

## 2024-08-05 RX ADMIN — VALPROIC ACID 1000 MG: 250 SOLUTION ORAL at 20:39

## 2024-08-05 RX ADMIN — MONTELUKAST 10 MG: 10 TABLET, FILM COATED ORAL at 20:40

## 2024-08-05 RX ADMIN — LEVOTHYROXINE SODIUM 150 MCG: 75 TABLET ORAL at 06:55

## 2024-08-05 RX ADMIN — PAROXETINE 10 MG: 10 TABLET, FILM COATED ORAL at 20:40

## 2024-08-05 RX ADMIN — PAROXETINE 10 MG: 10 TABLET, FILM COATED ORAL at 08:26

## 2024-08-05 RX ADMIN — BUMETANIDE 1 MG: 1 TABLET ORAL at 08:26

## 2024-08-05 RX ADMIN — OLANZAPINE 20 MG: 10 TABLET, ORALLY DISINTEGRATING ORAL at 20:40

## 2024-08-05 ASSESSMENT — PAIN SCALES - GENERAL
PAINLEVEL_OUTOF10: 0

## 2024-08-05 NOTE — PROGRESS NOTES
P Quality Flow/Interdisciplinary Rounds Progress Note        Quality Flow Rounds held on August 5, 2024    Disciplines Attending:  Bedside Nurse, , , and Nursing Unit Leadership    Katerina Paiz was admitted on 8/2/2024 11:16 AM    Anticipated Discharge Date:       Disposition:    Jose Score:  Jose Scale Score: 13    Readmission Risk              Risk of Unplanned Readmission:  31           Discussed patient goal for the day, patient clinical progression, and barriers to discharge.  The following Goal(s) of the Day/Commitment(s) have been identified:   discharge planning      Brett Shirley RN  August 5, 2024

## 2024-08-05 NOTE — PROGRESS NOTES
[facility] Hospitalist Progress Note    Admitting Date and Time: 8/2/2024 11:16 AM  Admit Dx: Hyponatremia [E87.1]  Intellectual disability [F79]  Aspiration into airway, initial encounter [T17.908A]    Subjective:  Patient is being followed for Hyponatremia [E87.1]  Intellectual disability [F79]  Aspiration into airway, initial encounter [T17.908A]   Pt unable to participate  Per RN: no additional concerns    ROS: VIRAL     levothyroxine  150 mcg Oral QAM AC    montelukast  10 mg Oral Nightly    PARoxetine  10 mg Per G Tube BID    valproic acid  1,000 mg Per G Tube BID    OLANZapine zydis  20 mg Oral Nightly    OLANZapine zydis  10 mg Oral Daily    bumetanide  1 mg Per G Tube QAM    sodium chloride flush  5-40 mL IntraVENous 2 times per day     midodrine, 7.5 mg, TID PRN  sodium chloride flush, 5-40 mL, PRN  sodium chloride, , PRN  ondansetron, 4 mg, Q8H PRN   Or  ondansetron, 4 mg, Q6H PRN  aluminum & magnesium hydroxide-simethicone, 30 mL, Q6H PRN  acetaminophen, 650 mg, Q6H PRN   Or  acetaminophen, 650 mg, Q6H PRN  sodium phosphate, 1 enema, Daily PRN  senna, 5 mL, BID PRN         Objective:  BP (!) 178/87   Pulse 92   Temp 97.3 °F (36.3 °C) (Axillary)   Resp 17   Ht 1.626 m (5' 4\")   Wt 52.4 kg (115 lb 9.6 oz)   SpO2 94%   BMI 19.84 kg/m²     General Appearance: sitting in bed, makes some spontaneous vocalizations but unable to communicate verbally  Skin: warm and dry  Head: normocephalic and atraumatic  Eyes: PERRL, EOMI, conjunctivae normal  Neck: neck supple, trachea midline   Pulmonary/Chest: CTAB, no respiratory distress, 3L NC  Cardiovascular: RRR, no murmurs  Abdomen: soft, PEG in place, abdominal binder in place  Extremities: no cyanosis, no clubbing and no edema  Neurologic: not interactive with examiner      Recent Labs     08/03/24  0949 08/03/24  1503 08/03/24  2300    133 134   K 3.9 3.9 4.0   CL 92* 92* 91*   CO2 35* 34* 36*   BUN 30* 31* 32*   CREATININE 0.9 1.0 1.1*   GLUCOSE

## 2024-08-05 NOTE — CARE COORDINATION
Social Work / Discharge PLanning :BEVERLEY spoke to patient Legal Guardian in per son : Latanya Santos. She verified plan at discharge would be a ST stay at a SNF. Prior to admit, patient was residing at [facility]. The group home sent her in to the hospital d/t aspiration concerns. [facility] cannot accept patient back due to stating she needs a higher level than what they can provide.Patient was receiving [facility]. LG does NOT want [facility] therefore no information will be provided to them..  Laatnya did notify BEVERLEY that the end of August or beginning of Sept , she has secured a spot for patient to reside at [facility] located in [place]. Patient will need ST stay until a room is available. SNF choices discussed and she would like either [facility] or [facility]. REferral are out. Await acceptance/ denial. HENS maybe sufficient due to ONLY a ST stay and NOT a permanent placement.. SW to follow. Electronically signed by AILYN Graf on 8/5/24 at 1:08 PM EDT     Addendum: [facility] cannot accept. Referral to [facility]. SW to follow. Electronically signed by AILYN Graf on 8/5/24 at 1:55 PM EDT

## 2024-08-06 VITALS
TEMPERATURE: 98.9 F | BODY MASS INDEX: 34.06 KG/M2 | DIASTOLIC BLOOD PRESSURE: 65 MMHG | RESPIRATION RATE: 18 BRPM | HEIGHT: 64 IN | HEART RATE: 89 BPM | SYSTOLIC BLOOD PRESSURE: 123 MMHG | WEIGHT: 199.52 LBS | OXYGEN SATURATION: 94 %

## 2024-08-06 LAB
ANION GAP SERPL CALCULATED.3IONS-SCNC: 7 MMOL/L (ref 7–16)
BASOPHILS # BLD: 0.01 K/UL (ref 0–0.2)
BASOPHILS NFR BLD: 0 % (ref 0–2)
BUN SERPL-MCNC: 33 MG/DL (ref 6–23)
CALCIUM SERPL-MCNC: 10.2 MG/DL (ref 8.6–10.2)
CHLORIDE SERPL-SCNC: 93 MMOL/L (ref 98–107)
CO2 SERPL-SCNC: 39 MMOL/L (ref 22–29)
CREAT SERPL-MCNC: 0.9 MG/DL (ref 0.5–1)
EOSINOPHIL # BLD: 0.07 K/UL (ref 0.05–0.5)
EOSINOPHILS RELATIVE PERCENT: 1 % (ref 0–6)
ERYTHROCYTE [DISTWIDTH] IN BLOOD BY AUTOMATED COUNT: 14.4 % (ref 11.5–15)
GFR, ESTIMATED: 65 ML/MIN/1.73M2
GLUCOSE SERPL-MCNC: 137 MG/DL (ref 74–99)
HCT VFR BLD AUTO: 39.3 % (ref 34–48)
HGB BLD-MCNC: 12.2 G/DL (ref 11.5–15.5)
IMM GRANULOCYTES # BLD AUTO: <0.03 K/UL (ref 0–0.58)
IMM GRANULOCYTES NFR BLD: 0 % (ref 0–5)
LYMPHOCYTES NFR BLD: 1.28 K/UL (ref 1.5–4)
LYMPHOCYTES RELATIVE PERCENT: 16 % (ref 20–42)
MCH RBC QN AUTO: 33.1 PG (ref 26–35)
MCHC RBC AUTO-ENTMCNC: 31 G/DL (ref 32–34.5)
MCV RBC AUTO: 106.5 FL (ref 80–99.9)
MONOCYTES NFR BLD: 1.7 K/UL (ref 0.1–0.95)
MONOCYTES NFR BLD: 21 % (ref 2–12)
NEUTROPHILS NFR BLD: 62 % (ref 43–80)
NEUTS SEG NFR BLD: 4.98 K/UL (ref 1.8–7.3)
PLATELET # BLD AUTO: 162 K/UL (ref 130–450)
PMV BLD AUTO: 9.4 FL (ref 7–12)
POTASSIUM SERPL-SCNC: 4.9 MMOL/L (ref 3.5–5)
RBC # BLD AUTO: 3.69 M/UL (ref 3.5–5.5)
RBC # BLD: ABNORMAL 10*6/UL
RBC # BLD: ABNORMAL 10*6/UL
SODIUM SERPL-SCNC: 139 MMOL/L (ref 132–146)
WBC OTHER # BLD: 8.1 K/UL (ref 4.5–11.5)

## 2024-08-06 PROCEDURE — 2060000000 HC ICU INTERMEDIATE R&B

## 2024-08-06 PROCEDURE — 6370000000 HC RX 637 (ALT 250 FOR IP): Performed by: HOSPITALIST

## 2024-08-06 PROCEDURE — 99239 HOSP IP/OBS DSCHRG MGMT >30: CPT | Performed by: STUDENT IN AN ORGANIZED HEALTH CARE EDUCATION/TRAINING PROGRAM

## 2024-08-06 PROCEDURE — 2700000000 HC OXYGEN THERAPY PER DAY

## 2024-08-06 PROCEDURE — 80048 BASIC METABOLIC PNL TOTAL CA: CPT

## 2024-08-06 PROCEDURE — 2580000003 HC RX 258: Performed by: HOSPITALIST

## 2024-08-06 PROCEDURE — 36415 COLL VENOUS BLD VENIPUNCTURE: CPT

## 2024-08-06 PROCEDURE — 85025 COMPLETE CBC W/AUTO DIFF WBC: CPT

## 2024-08-06 RX ADMIN — PAROXETINE 10 MG: 10 TABLET, FILM COATED ORAL at 08:29

## 2024-08-06 RX ADMIN — MONTELUKAST 10 MG: 10 TABLET, FILM COATED ORAL at 19:53

## 2024-08-06 RX ADMIN — VALPROIC ACID 1000 MG: 250 SOLUTION ORAL at 08:29

## 2024-08-06 RX ADMIN — VALPROIC ACID 1000 MG: 250 SOLUTION ORAL at 19:53

## 2024-08-06 RX ADMIN — BUMETANIDE 1 MG: 1 TABLET ORAL at 08:30

## 2024-08-06 RX ADMIN — OLANZAPINE 20 MG: 10 TABLET, ORALLY DISINTEGRATING ORAL at 19:53

## 2024-08-06 RX ADMIN — OLANZAPINE 10 MG: 10 TABLET, ORALLY DISINTEGRATING ORAL at 08:30

## 2024-08-06 RX ADMIN — LEVOTHYROXINE SODIUM 150 MCG: 75 TABLET ORAL at 08:30

## 2024-08-06 RX ADMIN — PAROXETINE 10 MG: 10 TABLET, FILM COATED ORAL at 19:53

## 2024-08-06 RX ADMIN — SODIUM CHLORIDE, PRESERVATIVE FREE 10 ML: 5 INJECTION INTRAVENOUS at 08:30

## 2024-08-06 ASSESSMENT — PAIN SCALES - GENERAL
PAINLEVEL_OUTOF10: 0
PAINLEVEL_OUTOF10: 0

## 2024-08-06 NOTE — DISCHARGE SUMMARY
at this time, monitor for fever     # Seizure Disorder  -Continue Depakote 1000 mg bid through g tube     # Chronic Hypoxemic Respiratory failure  -Continue home 3 L nasal cannula     # MRDD with Dementia   -Patient minimally interactive  -Continue home Zyprexa and paxil     # GERD/Esophagitis/PUD  -Continue PPI through G-tube     # hypotension  -Continue Midodrine 7.5 mg TID     # peripheral edema  -suspect secondary to immobility  -Continue home bumex  _________________________  **Most recent hospitalist plan**    Discharge Exam:  General Appearance: sitting in bed, responds vocally to voice but no discernable words  Skin: warm and dry  Head: normocephalic and atraumatic  Eyes: PERRL, EOMI, conjunctivae normal  Neck: neck supple, trachea midline   Pulmonary/Chest: CTAB, no respiratory distress, 3L NC  Cardiovascular: RRR, no murmurs  Abdomen: soft, PEG in place, abdominal binder in place  Extremities: no cyanosis, no clubbing and no edema  Neurologic: opens eyes to voice, does not otherwise follow commands    I/O last 3 completed shifts:  In: 2869 [NG/GT:2869]  Out: 300 [Urine:300]  I/O this shift:  In: 644 [NG/GT:644]  Out: 350 [Urine:350]      LABS:  Recent Labs     08/03/24  2300 08/05/24  1010 08/06/24  0353    136 139   K 4.0 4.3 4.9   CL 91* 91* 93*   CO2 36* 37* 39*   BUN 32* 36* 33*   CREATININE 1.1* 1.0 0.9   GLUCOSE 95 123* 137*   CALCIUM 9.7 10.0 10.2       Recent Labs     08/05/24  1010 08/06/24  0353   WBC 6.8 8.1   RBC 3.58 3.69   HGB 11.6 12.2   HCT 37.1 39.3   .6* 106.5*   MCH 32.4 33.1   MCHC 31.3* 31.0*   RDW 14.2 14.4    162   MPV 10.0 9.4       Recent Labs     08/03/24  1649 08/03/24  2139   POCGLU 82 95       Imaging:  XR CHEST PORTABLE    Result Date: 8/2/2024  EXAMINATION: ONE XRAY VIEW OF THE CHEST 8/2/2024 12:21 pm COMPARISON: Chest x-ray dated 06/10/2024 HISTORY: ORDERING SYSTEM PROVIDED HISTORY: concern for aspiration TECHNOLOGIST PROVIDED HISTORY: Reason for  however, inadvertent computerized transcription errors may be present.    Signed:  Electronically signed by Kathya Felton MD on 8/15/2024 at 7:37 AM     Addendum:  Note updated to reflect date of discharge

## 2024-08-06 NOTE — DISCHARGE INSTR - COC
Continuity of Care Form    Patient Name: Katerina Paiz   :  1955  MRN:  [id number]    Admit date:  2024  Discharge date:  2024    Code Status Order: Full Code   Advance Directives:     Admitting Physician:  Eliazar Holland DO  PCP: Izabela Dove DO    Discharging Nurse: Negra Brady RN  Discharging Hospital Unit/Room#: 0637/0637-A  Discharging Unit Phone Number: [phone]    Emergency Contact:   Extended Emergency Contact Information  Primary Emergency Contact: Latanya Canada  Address: [employer]           [address] [place] [address] [address] [place], [place] [id number] [facility]  Home Phone: [phone]  Work Phone: [phone]  Mobile Phone: [phone]  Relation: Legal Guardian   needed? No  Secondary Emergency Contact: CarlosShashi  Address: [address]  Home Phone: [phone]  Work Phone: [phone]  Relation:     Past Surgical History:  Past Surgical History:   Procedure Laterality Date    BRONCHOSCOPY  02/10/2017    CHEST TUBE INSERTION Right 2017    GASTROSTOMY TUBE PLACEMENT N/A 3/7/2021    EGD PEG TUBE PLACEMENT performed by Rupert Metz MD at [facility] ENDOSCOPY    OTHER SURGICAL HISTORY Right 2017    tessio insertion     OTHER SURGICAL HISTORY  2017    PEG tube insertion    UPPER GASTROINTESTINAL ENDOSCOPY N/A 2024    ESOPHAGOGASTRODUODENOSCOPY PERCUTANEOUS ENDOSCOPIC GASTROSTOMY TUBE PLACEMENT performed by Jose Han MD at [facility] ENDOSCOPY       Immunization History:   Immunization History   Administered Date(s) Administered    COVID-19, MODERNA BLUE border, Primary or Immunocompromised, (age 12y+), IM, 100 mcg/0.5mL 2021, 2021, 2021, 2021, 2021, 2021    Influenza Vaccine, unspecified formulation 09/10/2014    Influenza Virus Vaccine 10/21/2017    Influenza Whole 09/10/2014

## 2024-08-06 NOTE — PLAN OF CARE
Problem: Discharge Planning  Goal: Discharge to home or other facility with appropriate resources  8/6/2024 0925 by Adilia Dumont RN  Outcome: Progressing  8/5/2024 2008 by Marya Cornelius RN  Outcome: Progressing     Problem: Safety - Adult  Goal: Free from fall injury  8/6/2024 0925 by Adilia Dumont RN  Outcome: Progressing  8/5/2024 2008 by Marya Cornelius RN  Outcome: Progressing     Problem: Skin/Tissue Integrity  Goal: Absence of new skin breakdown  Description: 1.  Monitor for areas of redness and/or skin breakdown  2.  Assess vascular access sites hourly  3.  Every 4-6 hours minimum:  Change oxygen saturation probe site  4.  Every 4-6 hours:  If on nasal continuous positive airway pressure, respiratory therapy assess nares and determine need for appliance change or resting period.  Outcome: Progressing     Problem: Pain  Goal: Verbalizes/displays adequate comfort level or baseline comfort level  Outcome: Progressing     Problem: Nutrition Deficit:  Goal: Optimize nutritional status  Outcome: Progressing

## 2024-08-06 NOTE — PLAN OF CARE
Problem: Discharge Planning  Goal: Discharge to home or other facility with appropriate resources  Outcome: Progressing     Problem: Safety - Adult  Goal: Free from fall injury  8/5/2024 2008 by Marya Cornelius, RN  Outcome: Progressing  8/5/2024 1004 by Anuradha Green, RN  Outcome: Progressing

## 2024-08-06 NOTE — PROGRESS NOTES
While rounding this  found the patient to be soundly at rest and appeared peaceful. Patient was not disturbed and silent prayer was offered for the patient.  remains available for support.

## 2024-08-06 NOTE — CARE COORDINATION
Social Work / Discharge Planning : Marce from [facility] accepted patient for ST rehab placement ONLY under medicare at [place]. Hens completed. Patient plan is [facility] located in [place] at the [place] of this month very beginning of September . N 17 generated and transport forms completed. SW will update Legal Guardian Latanya Santos. SW to follow. Electronically signed by AILYN Graf on 8/6/24 at 8:10 AM EDT     AddenduM: Patient will be discharged to [facility] at 6:00 [facility].  left with Legal Guardian Latanya Santos. BEVERLEY did speak to LG earlier and did update on potential D/C today to [place]. Nursing updated as well as Marce at [facility].SW to follow. Electronically signed by AILYN Graf on 8/6/24 at 3:20 PM EDT

## 2024-08-06 NOTE — PROGRESS NOTES
P Quality Flow/Interdisciplinary Rounds Progress Note        Quality Flow Rounds held on August 6, 2024    Disciplines Attending:  Bedside Nurse, , , and Nursing Unit Leadership    Katerina Paiz was admitted on 8/2/2024 11:16 AM    Anticipated Discharge Date:       Disposition:    Jose Score:  Jose Scale Score: 13    Readmission Risk              Risk of Unplanned Readmission:  30           Discussed patient goal for the day, patient clinical progression, and barriers to discharge.  The following Goal(s) of the Day/Commitment(s) have been identified:   discharge      Brett Shirley RN  August 6, 2024

## 2024-08-09 ENCOUNTER — APPOINTMENT (OUTPATIENT)
Dept: CT IMAGING | Age: 69
DRG: 871 | End: 2024-08-09
Payer: MEDICARE

## 2024-08-09 ENCOUNTER — HOSPITAL ENCOUNTER (INPATIENT)
Age: 69
LOS: 9 days | Discharge: NURSING, REHAB OR LONG-TERM CARE | DRG: 871 | End: 2024-08-18
Attending: EMERGENCY MEDICINE | Admitting: INTERNAL MEDICINE
Payer: MEDICARE

## 2024-08-09 ENCOUNTER — APPOINTMENT (OUTPATIENT)
Dept: GENERAL RADIOLOGY | Age: 69
DRG: 871 | End: 2024-08-09
Payer: MEDICARE

## 2024-08-09 DIAGNOSIS — E87.0 HYPERNATREMIA: ICD-10-CM

## 2024-08-09 DIAGNOSIS — N83.202 CYST OF LEFT OVARY: ICD-10-CM

## 2024-08-09 DIAGNOSIS — D64.9 ANEMIA, UNSPECIFIED TYPE: ICD-10-CM

## 2024-08-09 DIAGNOSIS — A41.9 SEPTICEMIA (HCC): ICD-10-CM

## 2024-08-09 DIAGNOSIS — J98.19 LUNG COLLAPSE: Primary | ICD-10-CM

## 2024-08-09 DIAGNOSIS — J18.9 PNEUMONIA DUE TO INFECTIOUS ORGANISM, UNSPECIFIED LATERALITY, UNSPECIFIED PART OF LUNG: ICD-10-CM

## 2024-08-09 LAB
ALBUMIN SERPL-MCNC: 3.2 G/DL (ref 3.5–5.2)
ALP SERPL-CCNC: 55 U/L (ref 35–104)
ALT SERPL-CCNC: 10 U/L (ref 0–32)
ANION GAP SERPL CALCULATED.3IONS-SCNC: 8 MMOL/L (ref 7–16)
AST SERPL-CCNC: 20 U/L (ref 0–31)
BACTERIA URNS QL MICRO: ABNORMAL
BASOPHILS # BLD: 0 K/UL (ref 0–0.2)
BASOPHILS NFR BLD: 0 % (ref 0–2)
BILIRUB SERPL-MCNC: 1.3 MG/DL (ref 0–1.2)
BILIRUB UR QL STRIP: NEGATIVE
BNP SERPL-MCNC: 635 PG/ML (ref 0–125)
BUN SERPL-MCNC: 52 MG/DL (ref 6–23)
CALCIUM SERPL-MCNC: 9.4 MG/DL (ref 8.6–10.2)
CHLORIDE SERPL-SCNC: 101 MMOL/L (ref 98–107)
CLARITY UR: CLEAR
CO2 SERPL-SCNC: 34 MMOL/L (ref 22–29)
COLOR UR: YELLOW
CORTIS SERPL-MCNC: 32.8 UG/DL (ref 2.7–18.4)
CREAT SERPL-MCNC: 1.1 MG/DL (ref 0.5–1)
CRP SERPL HS-MCNC: 49 MG/L (ref 0–5)
EOSINOPHIL # BLD: 0 K/UL (ref 0.05–0.5)
EOSINOPHILS RELATIVE PERCENT: 0 % (ref 0–6)
ERYTHROCYTE [DISTWIDTH] IN BLOOD BY AUTOMATED COUNT: 14.6 % (ref 11.5–15)
ERYTHROCYTE [SEDIMENTATION RATE] IN BLOOD BY WESTERGREN METHOD: 56 MM/HR (ref 0–20)
GFR, ESTIMATED: 55 ML/MIN/1.73M2
GLUCOSE SERPL-MCNC: 143 MG/DL (ref 74–99)
GLUCOSE UR STRIP-MCNC: NEGATIVE MG/DL
HCT VFR BLD AUTO: 35.4 % (ref 34–48)
HGB BLD-MCNC: 10.9 G/DL (ref 11.5–15.5)
HGB UR QL STRIP.AUTO: NEGATIVE
INR PPP: 1
KETONES UR STRIP-MCNC: NEGATIVE MG/DL
LACTATE BLDV-SCNC: 1.1 MMOL/L (ref 0.5–1.9)
LACTATE BLDV-SCNC: 1.2 MMOL/L (ref 0.5–2.2)
LACTATE BLDV-SCNC: 1.4 MMOL/L (ref 0.5–1.9)
LACTATE BLDV-SCNC: 1.5 MMOL/L (ref 0.5–1.9)
LACTATE BLDV-SCNC: 2.2 MMOL/L (ref 0.5–1.9)
LEUKOCYTE ESTERASE UR QL STRIP: ABNORMAL
LYMPHOCYTES NFR BLD: 0.73 K/UL (ref 1.5–4)
LYMPHOCYTES RELATIVE PERCENT: 4 % (ref 20–42)
MAGNESIUM SERPL-MCNC: 2.2 MG/DL (ref 1.6–2.6)
MCH RBC QN AUTO: 32.5 PG (ref 26–35)
MCHC RBC AUTO-ENTMCNC: 30.8 G/DL (ref 32–34.5)
MCV RBC AUTO: 105.7 FL (ref 80–99.9)
MONOCYTES NFR BLD: 1.74 K/UL (ref 0.1–0.95)
MONOCYTES NFR BLD: 10 % (ref 2–12)
NEUTROPHILS NFR BLD: 85 % (ref 43–80)
NEUTS SEG NFR BLD: 14.23 K/UL (ref 1.8–7.3)
NITRITE UR QL STRIP: NEGATIVE
NUCLEATED RED BLOOD CELLS: 1 PER 100 WBC
PH UR STRIP: >9 [PH] (ref 5–9)
PLATELET # BLD AUTO: 154 K/UL (ref 130–450)
PMV BLD AUTO: 10.4 FL (ref 7–12)
POTASSIUM SERPL-SCNC: 4.6 MMOL/L (ref 3.5–5)
PROCALCITONIN SERPL-MCNC: 0.2 NG/ML (ref 0–0.08)
PROT SERPL-MCNC: 8.6 G/DL (ref 6.4–8.3)
PROT UR STRIP-MCNC: 30 MG/DL
PROTHROMBIN TIME: 10.9 SEC (ref 9.3–12.4)
RBC # BLD AUTO: 3.35 M/UL (ref 3.5–5.5)
RBC # BLD: ABNORMAL 10*6/UL
RBC #/AREA URNS HPF: ABNORMAL /HPF
SODIUM SERPL-SCNC: 143 MMOL/L (ref 132–146)
SP GR UR STRIP: 1.01 (ref 1–1.03)
UROBILINOGEN UR STRIP-ACNC: 0.2 EU/DL (ref 0–1)
WBC #/AREA URNS HPF: ABNORMAL /HPF
WBC OTHER # BLD: 16.7 K/UL (ref 4.5–11.5)

## 2024-08-09 PROCEDURE — 71045 X-RAY EXAM CHEST 1 VIEW: CPT

## 2024-08-09 PROCEDURE — 96365 THER/PROPH/DIAG IV INF INIT: CPT

## 2024-08-09 PROCEDURE — 6370000000 HC RX 637 (ALT 250 FOR IP): Performed by: INTERNAL MEDICINE

## 2024-08-09 PROCEDURE — 85652 RBC SED RATE AUTOMATED: CPT

## 2024-08-09 PROCEDURE — 80053 COMPREHEN METABOLIC PANEL: CPT

## 2024-08-09 PROCEDURE — 85610 PROTHROMBIN TIME: CPT

## 2024-08-09 PROCEDURE — 87040 BLOOD CULTURE FOR BACTERIA: CPT

## 2024-08-09 PROCEDURE — 94640 AIRWAY INHALATION TREATMENT: CPT

## 2024-08-09 PROCEDURE — 6360000002 HC RX W HCPCS

## 2024-08-09 PROCEDURE — 2580000003 HC RX 258: Performed by: INTERNAL MEDICINE

## 2024-08-09 PROCEDURE — 87086 URINE CULTURE/COLONY COUNT: CPT

## 2024-08-09 PROCEDURE — 99285 EMERGENCY DEPT VISIT HI MDM: CPT

## 2024-08-09 PROCEDURE — 82533 TOTAL CORTISOL: CPT

## 2024-08-09 PROCEDURE — 6370000000 HC RX 637 (ALT 250 FOR IP)

## 2024-08-09 PROCEDURE — 83735 ASSAY OF MAGNESIUM: CPT

## 2024-08-09 PROCEDURE — 94664 DEMO&/EVAL PT USE INHALER: CPT

## 2024-08-09 PROCEDURE — 81001 URINALYSIS AUTO W/SCOPE: CPT

## 2024-08-09 PROCEDURE — 96375 TX/PRO/DX INJ NEW DRUG ADDON: CPT

## 2024-08-09 PROCEDURE — 83605 ASSAY OF LACTIC ACID: CPT

## 2024-08-09 PROCEDURE — 6360000002 HC RX W HCPCS: Performed by: INTERNAL MEDICINE

## 2024-08-09 PROCEDURE — 85025 COMPLETE CBC W/AUTO DIFF WBC: CPT

## 2024-08-09 PROCEDURE — 71275 CT ANGIOGRAPHY CHEST: CPT

## 2024-08-09 PROCEDURE — 1200000000 HC SEMI PRIVATE

## 2024-08-09 PROCEDURE — 74177 CT ABD & PELVIS W/CONTRAST: CPT

## 2024-08-09 PROCEDURE — 2580000003 HC RX 258

## 2024-08-09 PROCEDURE — 2700000000 HC OXYGEN THERAPY PER DAY

## 2024-08-09 PROCEDURE — 93005 ELECTROCARDIOGRAM TRACING: CPT

## 2024-08-09 PROCEDURE — 84145 PROCALCITONIN (PCT): CPT

## 2024-08-09 PROCEDURE — 83880 ASSAY OF NATRIURETIC PEPTIDE: CPT

## 2024-08-09 PROCEDURE — 6360000004 HC RX CONTRAST MEDICATION: Performed by: RADIOLOGY

## 2024-08-09 PROCEDURE — 86140 C-REACTIVE PROTEIN: CPT

## 2024-08-09 PROCEDURE — 70450 CT HEAD/BRAIN W/O DYE: CPT

## 2024-08-09 RX ORDER — FOLIC ACID 1 MG/1
1 TABLET ORAL EVERY MORNING
Status: DISCONTINUED | OUTPATIENT
Start: 2024-08-10 | End: 2024-08-18 | Stop reason: HOSPADM

## 2024-08-09 RX ORDER — MIDODRINE HYDROCHLORIDE 5 MG/1
7.5 TABLET ORAL
Status: DISCONTINUED | OUTPATIENT
Start: 2024-08-09 | End: 2024-08-18 | Stop reason: HOSPADM

## 2024-08-09 RX ORDER — ACETAMINOPHEN 500 MG
1000 TABLET ORAL ONCE
Status: COMPLETED | OUTPATIENT
Start: 2024-08-09 | End: 2024-08-09

## 2024-08-09 RX ORDER — OLANZAPINE 10 MG/1
10 TABLET, ORALLY DISINTEGRATING ORAL DAILY
Status: DISCONTINUED | OUTPATIENT
Start: 2024-08-10 | End: 2024-08-18 | Stop reason: HOSPADM

## 2024-08-09 RX ORDER — OLANZAPINE 10 MG/1
20 TABLET, ORALLY DISINTEGRATING ORAL NIGHTLY
Status: DISCONTINUED | OUTPATIENT
Start: 2024-08-09 | End: 2024-08-18 | Stop reason: HOSPADM

## 2024-08-09 RX ORDER — VALPROIC ACID 250 MG/5ML
1000 SOLUTION ORAL 2 TIMES DAILY
Status: DISCONTINUED | OUTPATIENT
Start: 2024-08-09 | End: 2024-08-18 | Stop reason: HOSPADM

## 2024-08-09 RX ORDER — LEVOTHYROXINE SODIUM 0.07 MG/1
150 TABLET ORAL
Status: DISCONTINUED | OUTPATIENT
Start: 2024-08-10 | End: 2024-08-18 | Stop reason: HOSPADM

## 2024-08-09 RX ORDER — MONTELUKAST SODIUM 10 MG/1
10 TABLET ORAL NIGHTLY
Status: DISCONTINUED | OUTPATIENT
Start: 2024-08-09 | End: 2024-08-18 | Stop reason: HOSPADM

## 2024-08-09 RX ORDER — ACETAMINOPHEN 325 MG/1
650 TABLET ORAL EVERY 6 HOURS PRN
Status: DISCONTINUED | OUTPATIENT
Start: 2024-08-09 | End: 2024-08-18 | Stop reason: HOSPADM

## 2024-08-09 RX ORDER — FAMOTIDINE 20 MG/1
10 TABLET, FILM COATED ORAL 2 TIMES DAILY
Status: DISCONTINUED | OUTPATIENT
Start: 2024-08-09 | End: 2024-08-18 | Stop reason: HOSPADM

## 2024-08-09 RX ORDER — SODIUM CHLORIDE 9 MG/ML
INJECTION, SOLUTION INTRAVENOUS PRN
Status: DISCONTINUED | OUTPATIENT
Start: 2024-08-09 | End: 2024-08-18 | Stop reason: HOSPADM

## 2024-08-09 RX ORDER — 0.9 % SODIUM CHLORIDE 0.9 %
30 INTRAVENOUS SOLUTION INTRAVENOUS ONCE
Status: COMPLETED | OUTPATIENT
Start: 2024-08-09 | End: 2024-08-09

## 2024-08-09 RX ORDER — ENOXAPARIN SODIUM 100 MG/ML
40 INJECTION SUBCUTANEOUS DAILY
Status: DISCONTINUED | OUTPATIENT
Start: 2024-08-09 | End: 2024-08-18 | Stop reason: HOSPADM

## 2024-08-09 RX ORDER — SODIUM CHLORIDE 0.9 % (FLUSH) 0.9 %
5-40 SYRINGE (ML) INJECTION PRN
Status: DISCONTINUED | OUTPATIENT
Start: 2024-08-09 | End: 2024-08-18 | Stop reason: HOSPADM

## 2024-08-09 RX ORDER — TRIAMCINOLONE ACETONIDE 1 MG/G
OINTMENT TOPICAL 2 TIMES DAILY
Status: DISCONTINUED | OUTPATIENT
Start: 2024-08-09 | End: 2024-08-18 | Stop reason: HOSPADM

## 2024-08-09 RX ORDER — FERROUS SULFATE 300 MG/5ML
330 LIQUID (ML) ORAL EVERY MORNING
Status: DISCONTINUED | OUTPATIENT
Start: 2024-08-10 | End: 2024-08-18 | Stop reason: HOSPADM

## 2024-08-09 RX ORDER — IPRATROPIUM BROMIDE AND ALBUTEROL SULFATE 2.5; .5 MG/3ML; MG/3ML
1 SOLUTION RESPIRATORY (INHALATION)
Status: DISCONTINUED | OUTPATIENT
Start: 2024-08-09 | End: 2024-08-18 | Stop reason: HOSPADM

## 2024-08-09 RX ORDER — SODIUM CHLORIDE 0.9 % (FLUSH) 0.9 %
5-40 SYRINGE (ML) INJECTION EVERY 12 HOURS SCHEDULED
Status: DISCONTINUED | OUTPATIENT
Start: 2024-08-09 | End: 2024-08-18 | Stop reason: HOSPADM

## 2024-08-09 RX ORDER — BUMETANIDE 0.25 MG/ML
1 INJECTION INTRAMUSCULAR; INTRAVENOUS ONCE
Status: COMPLETED | OUTPATIENT
Start: 2024-08-09 | End: 2024-08-09

## 2024-08-09 RX ORDER — SODIUM CHLORIDE, SODIUM LACTATE, POTASSIUM CHLORIDE, CALCIUM CHLORIDE 600; 310; 30; 20 MG/100ML; MG/100ML; MG/100ML; MG/100ML
INJECTION, SOLUTION INTRAVENOUS CONTINUOUS
Status: DISCONTINUED | OUTPATIENT
Start: 2024-08-09 | End: 2024-08-11

## 2024-08-09 RX ORDER — BACITRACIN ZINC AND POLYMYXIN B SULFATE 500; 1000 [USP'U]/G; [USP'U]/G
OINTMENT TOPICAL 2 TIMES DAILY
Status: DISCONTINUED | OUTPATIENT
Start: 2024-08-09 | End: 2024-08-18 | Stop reason: HOSPADM

## 2024-08-09 RX ORDER — TRIAMCINOLONE ACETONIDE 1 MG/ML
1 LOTION TOPICAL 2 TIMES DAILY
Status: DISCONTINUED | OUTPATIENT
Start: 2024-08-09 | End: 2024-08-09 | Stop reason: CLARIF

## 2024-08-09 RX ORDER — PAROXETINE 10 MG/1
10 TABLET, FILM COATED ORAL 2 TIMES DAILY
Status: DISCONTINUED | OUTPATIENT
Start: 2024-08-09 | End: 2024-08-18 | Stop reason: HOSPADM

## 2024-08-09 RX ORDER — ACETAMINOPHEN 650 MG/1
650 SUPPOSITORY RECTAL EVERY 6 HOURS PRN
Status: DISCONTINUED | OUTPATIENT
Start: 2024-08-09 | End: 2024-08-18 | Stop reason: HOSPADM

## 2024-08-09 RX ORDER — OLANZAPINE 10 MG/1
10 TABLET, ORALLY DISINTEGRATING ORAL 2 TIMES DAILY
Status: DISCONTINUED | OUTPATIENT
Start: 2024-08-09 | End: 2024-08-09 | Stop reason: DRUGHIGH

## 2024-08-09 RX ADMIN — ACETAMINOPHEN 1000 MG: 500 TABLET ORAL at 10:29

## 2024-08-09 RX ADMIN — PIPERACILLIN AND TAZOBACTAM 4500 MG: 4; .5 INJECTION, POWDER, LYOPHILIZED, FOR SOLUTION INTRAVENOUS at 17:38

## 2024-08-09 RX ADMIN — VALPROIC ACID 1000 MG: 250 SOLUTION ORAL at 20:19

## 2024-08-09 RX ADMIN — SODIUM CHLORIDE 2715 ML: 9 INJECTION, SOLUTION INTRAVENOUS at 10:26

## 2024-08-09 RX ADMIN — PAROXETINE 10 MG: 10 TABLET, FILM COATED ORAL at 20:19

## 2024-08-09 RX ADMIN — BUMETANIDE 1 MG: 0.25 INJECTION INTRAMUSCULAR; INTRAVENOUS at 11:21

## 2024-08-09 RX ADMIN — IOPAMIDOL 75 ML: 755 INJECTION, SOLUTION INTRAVENOUS at 12:19

## 2024-08-09 RX ADMIN — PIPERACILLIN AND TAZOBACTAM 4500 MG: 4; .5 INJECTION, POWDER, LYOPHILIZED, FOR SOLUTION INTRAVENOUS at 10:28

## 2024-08-09 RX ADMIN — OLANZAPINE 20 MG: 10 TABLET, ORALLY DISINTEGRATING ORAL at 20:19

## 2024-08-09 RX ADMIN — FAMOTIDINE 10 MG: 20 TABLET ORAL at 20:18

## 2024-08-09 RX ADMIN — MONTELUKAST 10 MG: 10 TABLET, FILM COATED ORAL at 20:18

## 2024-08-09 RX ADMIN — BACITRACIN ZINC AND POLYMYXIN B SULFATE: at 20:20

## 2024-08-09 RX ADMIN — MIDODRINE HYDROCHLORIDE 7.5 MG: 5 TABLET ORAL at 17:39

## 2024-08-09 RX ADMIN — SODIUM CHLORIDE, POTASSIUM CHLORIDE, SODIUM LACTATE AND CALCIUM CHLORIDE: 600; 310; 30; 20 INJECTION, SOLUTION INTRAVENOUS at 18:49

## 2024-08-09 RX ADMIN — VANCOMYCIN HYDROCHLORIDE 2000 MG: 10 INJECTION, POWDER, LYOPHILIZED, FOR SOLUTION INTRAVENOUS at 11:05

## 2024-08-09 RX ADMIN — SODIUM CHLORIDE, PRESERVATIVE FREE 10 ML: 5 INJECTION INTRAVENOUS at 20:20

## 2024-08-09 RX ADMIN — IPRATROPIUM BROMIDE AND ALBUTEROL SULFATE 1 DOSE: 2.5; .5 SOLUTION RESPIRATORY (INHALATION) at 19:43

## 2024-08-09 RX ADMIN — TRIAMCINOLONE ACETONIDE: 1 OINTMENT TOPICAL at 20:19

## 2024-08-09 RX ADMIN — ENOXAPARIN SODIUM 40 MG: 100 INJECTION SUBCUTANEOUS at 17:39

## 2024-08-09 RX ADMIN — IPRATROPIUM BROMIDE AND ALBUTEROL SULFATE 1 DOSE: 2.5; .5 SOLUTION RESPIRATORY (INHALATION) at 16:00

## 2024-08-09 RX ADMIN — ACETAMINOPHEN 650 MG: 325 TABLET ORAL at 20:23

## 2024-08-09 ASSESSMENT — PAIN SCALES - GENERAL
PAINLEVEL_OUTOF10: 0
PAINLEVEL_OUTOF10: 0

## 2024-08-09 ASSESSMENT — PAIN - FUNCTIONAL ASSESSMENT: PAIN_FUNCTIONAL_ASSESSMENT: WONG-BAKER FACES

## 2024-08-09 ASSESSMENT — PAIN SCALES - WONG BAKER
WONGBAKER_NUMERICALRESPONSE: NO HURT
WONGBAKER_NUMERICALRESPONSE: NO HURT

## 2024-08-09 NOTE — ED PROVIDER NOTES
[facility]W MED SURG  EMERGENCY DEPARTMENT ENCOUNTER      Pt Name: Katerina Paiz  MRN: [id number]  Birthdate 1955  Date of evaluation: 2024  Provider: Spencer Flanagan MD  PCP: Izabela Dove DO  Note Started: 10:04 AM EDT 24    CHIEF COMPLAINT       Chief Complaint   Patient presents with    Tachycardia     Per facility, 130's HR, pt previously tx for sepsis, not on abx    Fever     103.8 orally       HISTORY OF PRESENT ILLNESS: 1 or more Elements   History From: EMS  Limitations to history : Baseline nonverbal    Katerina Paiz is a 69 y.o. female who presents BIBEMS from nursing facility with reports of tachycardia to 130s and concerns for subjective fevers, onset past few days.  Patient nonverbal at baseline.  EMS reports that patient is apparently new to the facility from which she arrives but it was reported that patient has appeared unwell for past 3 days.  Patient febrile on arrival to 103.8 °F.  Tolerating baseline 3 L nasal cannula O2.  No caregiver or family at bedside.  Chronically ill-appearing.  In no acute distress.    Nursing Notes were all reviewed and agreed with or any disagreements were addressed in the HPI.    REVIEW OF SYSTEMS :    Positives and Pertinent negatives as per HPI.     PAST MEDICAL HISTORY/Chronic Conditions Affecting Care    has a past medical history of Abscess, Acute kidney injury (HCC) (01/15/2017), Anemia, Blind in both eyes, Dementia ([facility]), Dysphagia, Essential hypertension (2021), Gastroesophageal reflux disease without esophagitis (2021), Hemodialysis patient ([facility]), Hernia, diaphragmatic, Hyperthyroidism, MR (mental retardation),  cerebral leukomalacia, Osteoarthritis, Other seizures ([facility]), Peptic ulcer, Peripheral vascular disease ([facility]), Pneumonia (2017), Pneumonia due to infectious organism (2017), and Sepsis ([facility]) (2021).     SURGICAL HISTORY     Past Surgical History:   Procedure Laterality Date    BRONCHOSCOPY

## 2024-08-09 NOTE — H&P
Additional Contrast? None   Final Result   1.  Diverticulosis coli without diverticulitis.  No free fluid, abscess, or   acute abdominal disease identified.      2.  Visualization of the upper abdomen partly degraded by patient motion.      3.  Left ovarian 3.6 cm cystic lesion which is increased in size from CT exam   in 2022.  Cystic neoplasm remains in the differential.  Further evaluation   with pelvic ultrasound to include transvaginal imaging could be obtained on a   nonemergent basis.         CTA PULMONARY W CONTRAST   Final Result   1. Obstruction of the bronchi of the medial basal segment and posterior basal   segment of the left lower lobe likely secondary to mucoid impaction.  There   is partial collapse of the left lower lobe.  The consolidation seen within   the left lung base is favored to represent collapse rather than pneumonia of   the left lung.   2. Minimal airspace disease seen within the right infrahilar region favored   to represent atelectasis.   3. Minimal pleural thickening seen within the right upper lobe.   4. There is no pulmonary embolus.         CT Head W/O Contrast   Final Result   1.  There is no acute intracranial abnormality.  Specifically, there is no   intracranial hemorrhage.   2. Atrophy and periventricular microvascular ischemic disease,   .         XR CHEST PORTABLE   Final Result   1. Mild cardiomegaly with mild pulmonary vascular congestion.   2. Mild retrocardiac opacity may represent atelectasis or infiltrate.             EKG: reviewed       ASSESSMENT:      Active Problems:    * No active hospital problems. *  Resolved Problems:    * No resolved hospital problems. *      PLAN:    Sepsis 2/2 UTI vs aspiration pneumonia - Urine pH > 9.0 with leukocyte esterase, WBC 16.7, tachycardia, fever 103.8, PCT 0.20. Continue antibiotics and IVF. Monitor labs, follow cultures. Tylenol PRN.   Lactic acidosis 2/2 above - LA 2.2 s/p 2.7 L bolus. Monitor.   HFpEF - no recent echo.

## 2024-08-09 NOTE — CONSULTS
Pulmonary Consultation for [facility] Pulmonology    Admit Date: 2024  Requesting Physician: Izabela Dove DO    Reason for consultation:  Left lower lobe collapse  HPI:  The patient is a 69-year-old female known to the pulmonary service from admissions several years ago.  She was recently discharged from this facility as well.  She presents to the emergency room with hypoxia.  History is obtained exclusively through the chart.  Discharge a few days ago was after admission for hyponatremia.  There was concern for aspiration pneumonia however the patient had no increased oxygen requirements was not febrile.  Today, the patient presents with a leukocytosis.  And fever.  A CT scan of the chest is posted below evidencing left lower lobe collapse.  There may also be a UTI.  She was given Zosyn and vancomycin.    PMH:    Past Medical History:   Diagnosis Date    Abscess     periapical    Acute kidney injury (HCC) 01/15/2017    d/t Vancomycin, on Dialysis [facility] right chest    Anemia     iron deficiency anemia    Blind in both eyes     Dementia ([facility])     Dysphagia     Essential hypertension 2021    Gastroesophageal reflux disease without esophagitis 2021    Hemodialysis patient (HCC)     Hernia, diaphragmatic     Hyperthyroidism     MR (mental retardation)      cerebral leukomalacia     Osteoarthritis     Other seizures (HCC)     Peptic ulcer     Peripheral vascular disease (HCC)     Pneumonia 2017    Pneumonia due to infectious organism 2017    Sepsis ([facility]) 2021     PSH:   Past Surgical History:   Procedure Laterality Date    BRONCHOSCOPY  02/10/2017    CHEST TUBE INSERTION Right 2017    GASTROSTOMY TUBE PLACEMENT N/A 3/7/2021    EGD PEG TUBE PLACEMENT [place] Rupert [place]z [place] [facility] [place]    OTHER SURGICAL HISTORY Right 2017    tessio insertion     OTHER SURGICAL HISTORY  2017    PEG tube insertion    UPPER GASTROINTESTINAL

## 2024-08-09 NOTE — ED NOTES
ED to Inpatient Handoff Report    Notified RN that electronic handoff available and patient ready for transport to room 519.    Safety Risks: Risk of falls    Patient in Restraints: no    Constant Observer or Patient : no    Telemetry Monitoring Ordered: No          Order to transfer to unit without monitor: NO    Last MEWS: 3 Time completed: 1600    Deterioration Index: (!) 78.46    Vitals:    08/09/24 1352 08/09/24 1408 08/09/24 1539 08/09/24 1600   BP: (!) 98/49 (!) 142/116 (!) 148/121    Pulse: (!) 111 (!) 111 (!) 107 (!) 103   Resp: 14 13 13 14   Temp:   99 °F (37.2 °C)    TempSrc:   Axillary    SpO2: 100% 100% 100% 100%   Weight:       Height:           Opportunity for questions and clarification was provided.

## 2024-08-09 NOTE — ED NOTES
Iv infiltrated while Vanco running. Swelling noted, no redness. Warm compress applied to site. IV removed

## 2024-08-10 PROBLEM — E83.39 HYPOPHOSPHATEMIA: Status: ACTIVE | Noted: 2024-08-10

## 2024-08-10 PROBLEM — J69.0 ASPIRATION PNEUMONIA (HCC): Status: ACTIVE | Noted: 2024-08-10

## 2024-08-10 PROBLEM — E86.0 DEHYDRATION: Status: ACTIVE | Noted: 2024-08-10

## 2024-08-10 LAB
ALBUMIN SERPL-MCNC: 3 G/DL (ref 3.5–5.2)
ALP SERPL-CCNC: 60 U/L (ref 35–104)
ALT SERPL-CCNC: 17 U/L (ref 0–32)
ANION GAP SERPL CALCULATED.3IONS-SCNC: 10 MMOL/L (ref 7–16)
AST SERPL-CCNC: 52 U/L (ref 0–31)
B PARAP IS1001 DNA NPH QL NAA+NON-PROBE: NOT DETECTED
B PERT DNA SPEC QL NAA+PROBE: NOT DETECTED
BILIRUB DIRECT SERPL-MCNC: 0.4 MG/DL (ref 0–0.3)
BILIRUB INDIRECT SERPL-MCNC: 0.5 MG/DL (ref 0–1)
BILIRUB SERPL-MCNC: 0.9 MG/DL (ref 0–1.2)
BUN SERPL-MCNC: 43 MG/DL (ref 6–23)
C PNEUM DNA NPH QL NAA+NON-PROBE: NOT DETECTED
CALCIUM SERPL-MCNC: 8.7 MG/DL (ref 8.6–10.2)
CHLORIDE SERPL-SCNC: 105 MMOL/L (ref 98–107)
CO2 SERPL-SCNC: 31 MMOL/L (ref 22–29)
CREAT SERPL-MCNC: 1.2 MG/DL (ref 0.5–1)
EKG ATRIAL RATE: 125 BPM
EKG P AXIS: 50 DEGREES
EKG P-R INTERVAL: 140 MS
EKG Q-T INTERVAL: 284 MS
EKG QRS DURATION: 68 MS
EKG QTC CALCULATION (BAZETT): 409 MS
EKG R AXIS: -3 DEGREES
EKG T AXIS: 30 DEGREES
EKG VENTRICULAR RATE: 125 BPM
ERYTHROCYTE [DISTWIDTH] IN BLOOD BY AUTOMATED COUNT: 14.8 % (ref 11.5–15)
FLUAV RNA NPH QL NAA+NON-PROBE: NOT DETECTED
FLUBV RNA NPH QL NAA+NON-PROBE: NOT DETECTED
GFR, ESTIMATED: 48 ML/MIN/1.73M2
GLUCOSE SERPL-MCNC: 113 MG/DL (ref 74–99)
HADV DNA NPH QL NAA+NON-PROBE: NOT DETECTED
HCOV 229E RNA NPH QL NAA+NON-PROBE: NOT DETECTED
HCOV HKU1 RNA NPH QL NAA+NON-PROBE: NOT DETECTED
HCOV NL63 RNA NPH QL NAA+NON-PROBE: NOT DETECTED
HCOV OC43 RNA NPH QL NAA+NON-PROBE: NOT DETECTED
HCT VFR BLD AUTO: 30.9 % (ref 34–48)
HGB BLD-MCNC: 9.7 G/DL (ref 11.5–15.5)
HMPV RNA NPH QL NAA+NON-PROBE: NOT DETECTED
HPIV1 RNA NPH QL NAA+NON-PROBE: NOT DETECTED
HPIV2 RNA NPH QL NAA+NON-PROBE: NOT DETECTED
HPIV3 RNA NPH QL NAA+NON-PROBE: NOT DETECTED
HPIV4 RNA NPH QL NAA+NON-PROBE: NOT DETECTED
M PNEUMO DNA NPH QL NAA+NON-PROBE: NOT DETECTED
MAGNESIUM SERPL-MCNC: 2 MG/DL (ref 1.6–2.6)
MCH RBC QN AUTO: 33.6 PG (ref 26–35)
MCHC RBC AUTO-ENTMCNC: 31.4 G/DL (ref 32–34.5)
MCV RBC AUTO: 106.9 FL (ref 80–99.9)
MICROORGANISM SPEC CULT: ABNORMAL
MICROORGANISM SPEC CULT: ABNORMAL
PHOSPHATE SERPL-MCNC: 2.1 MG/DL (ref 2.5–4.5)
PLATELET # BLD AUTO: 192 K/UL (ref 130–450)
PMV BLD AUTO: 10.5 FL (ref 7–12)
POTASSIUM SERPL-SCNC: 4.4 MMOL/L (ref 3.5–5)
PROT SERPL-MCNC: 7.6 G/DL (ref 6.4–8.3)
RBC # BLD AUTO: 2.89 M/UL (ref 3.5–5.5)
RSV RNA NPH QL NAA+NON-PROBE: NOT DETECTED
RV+EV RNA NPH QL NAA+NON-PROBE: NOT DETECTED
SARS-COV-2 RNA NPH QL NAA+NON-PROBE: NOT DETECTED
SERVICE CMNT-IMP: ABNORMAL
SODIUM SERPL-SCNC: 146 MMOL/L (ref 132–146)
SPECIMEN DESCRIPTION: ABNORMAL
SPECIMEN DESCRIPTION: NORMAL
TRIGL SERPL-MCNC: 98 MG/DL
WBC OTHER # BLD: 19.2 K/UL (ref 4.5–11.5)

## 2024-08-10 PROCEDURE — 93010 ELECTROCARDIOGRAM REPORT: CPT | Performed by: INTERNAL MEDICINE

## 2024-08-10 PROCEDURE — 94640 AIRWAY INHALATION TREATMENT: CPT

## 2024-08-10 PROCEDURE — 99232 SBSQ HOSP IP/OBS MODERATE 35: CPT | Performed by: INTERNAL MEDICINE

## 2024-08-10 PROCEDURE — 6370000000 HC RX 637 (ALT 250 FOR IP): Performed by: INTERNAL MEDICINE

## 2024-08-10 PROCEDURE — 1200000000 HC SEMI PRIVATE

## 2024-08-10 PROCEDURE — 82248 BILIRUBIN DIRECT: CPT

## 2024-08-10 PROCEDURE — 85027 COMPLETE CBC AUTOMATED: CPT

## 2024-08-10 PROCEDURE — 84478 ASSAY OF TRIGLYCERIDES: CPT

## 2024-08-10 PROCEDURE — 87449 NOS EACH ORGANISM AG IA: CPT

## 2024-08-10 PROCEDURE — 0202U NFCT DS 22 TRGT SARS-COV-2: CPT

## 2024-08-10 PROCEDURE — 83735 ASSAY OF MAGNESIUM: CPT

## 2024-08-10 PROCEDURE — 6360000002 HC RX W HCPCS: Performed by: INTERNAL MEDICINE

## 2024-08-10 PROCEDURE — 87081 CULTURE SCREEN ONLY: CPT

## 2024-08-10 PROCEDURE — 2500000003 HC RX 250 WO HCPCS: Performed by: INTERNAL MEDICINE

## 2024-08-10 PROCEDURE — 84100 ASSAY OF PHOSPHORUS: CPT

## 2024-08-10 PROCEDURE — 80053 COMPREHEN METABOLIC PANEL: CPT

## 2024-08-10 PROCEDURE — 2700000000 HC OXYGEN THERAPY PER DAY

## 2024-08-10 PROCEDURE — 2580000003 HC RX 258: Performed by: INTERNAL MEDICINE

## 2024-08-10 PROCEDURE — 87899 AGENT NOS ASSAY W/OPTIC: CPT

## 2024-08-10 RX ADMIN — IPRATROPIUM BROMIDE AND ALBUTEROL SULFATE 1 DOSE: 2.5; .5 SOLUTION RESPIRATORY (INHALATION) at 14:30

## 2024-08-10 RX ADMIN — SODIUM CHLORIDE, PRESERVATIVE FREE 10 ML: 5 INJECTION INTRAVENOUS at 21:37

## 2024-08-10 RX ADMIN — VALPROIC ACID 1000 MG: 250 SOLUTION ORAL at 21:36

## 2024-08-10 RX ADMIN — PIPERACILLIN AND TAZOBACTAM 4500 MG: 4; .5 INJECTION, POWDER, LYOPHILIZED, FOR SOLUTION INTRAVENOUS at 01:05

## 2024-08-10 RX ADMIN — SODIUM PHOSPHATE, MONOBASIC, MONOHYDRATE AND SODIUM PHOSPHATE, DIBASIC, ANHYDROUS 10 MMOL: 142; 276 INJECTION, SOLUTION INTRAVENOUS at 14:40

## 2024-08-10 RX ADMIN — ACETAMINOPHEN 650 MG: 325 TABLET ORAL at 08:25

## 2024-08-10 RX ADMIN — MIDODRINE HYDROCHLORIDE 7.5 MG: 5 TABLET ORAL at 17:13

## 2024-08-10 RX ADMIN — FAMOTIDINE 10 MG: 20 TABLET ORAL at 08:26

## 2024-08-10 RX ADMIN — MONTELUKAST 10 MG: 10 TABLET, FILM COATED ORAL at 21:35

## 2024-08-10 RX ADMIN — OLANZAPINE 20 MG: 10 TABLET, ORALLY DISINTEGRATING ORAL at 21:36

## 2024-08-10 RX ADMIN — MIDODRINE HYDROCHLORIDE 7.5 MG: 5 TABLET ORAL at 08:26

## 2024-08-10 RX ADMIN — Medication 330 MG: at 08:26

## 2024-08-10 RX ADMIN — PIPERACILLIN AND TAZOBACTAM 3375 MG: 3; .375 INJECTION, POWDER, LYOPHILIZED, FOR SOLUTION INTRAVENOUS at 17:22

## 2024-08-10 RX ADMIN — PIPERACILLIN AND TAZOBACTAM 4500 MG: 4; .5 INJECTION, POWDER, LYOPHILIZED, FOR SOLUTION INTRAVENOUS at 08:42

## 2024-08-10 RX ADMIN — BACITRACIN ZINC AND POLYMYXIN B SULFATE 14.2 G: at 08:27

## 2024-08-10 RX ADMIN — SODIUM CHLORIDE, PRESERVATIVE FREE 10 ML: 5 INJECTION INTRAVENOUS at 08:43

## 2024-08-10 RX ADMIN — PAROXETINE 10 MG: 10 TABLET, FILM COATED ORAL at 21:36

## 2024-08-10 RX ADMIN — FOLIC ACID 1 MG: 1 TABLET ORAL at 08:26

## 2024-08-10 RX ADMIN — IPRATROPIUM BROMIDE AND ALBUTEROL SULFATE 1 DOSE: 2.5; .5 SOLUTION RESPIRATORY (INHALATION) at 20:22

## 2024-08-10 RX ADMIN — ACETAMINOPHEN 650 MG: 325 TABLET ORAL at 17:12

## 2024-08-10 RX ADMIN — SODIUM CHLORIDE, POTASSIUM CHLORIDE, SODIUM LACTATE AND CALCIUM CHLORIDE: 600; 310; 30; 20 INJECTION, SOLUTION INTRAVENOUS at 08:36

## 2024-08-10 RX ADMIN — MIDODRINE HYDROCHLORIDE 7.5 MG: 5 TABLET ORAL at 12:27

## 2024-08-10 RX ADMIN — VANCOMYCIN HYDROCHLORIDE 1250 MG: 10 INJECTION, POWDER, LYOPHILIZED, FOR SOLUTION INTRAVENOUS at 12:27

## 2024-08-10 RX ADMIN — TRIAMCINOLONE ACETONIDE: 1 OINTMENT TOPICAL at 21:44

## 2024-08-10 RX ADMIN — BACITRACIN ZINC AND POLYMYXIN B SULFATE 14.2 G: at 21:44

## 2024-08-10 RX ADMIN — LEVOTHYROXINE SODIUM 150 MCG: 75 TABLET ORAL at 08:26

## 2024-08-10 RX ADMIN — FAMOTIDINE 10 MG: 20 TABLET ORAL at 21:35

## 2024-08-10 RX ADMIN — TRIAMCINOLONE ACETONIDE: 1 OINTMENT TOPICAL at 08:28

## 2024-08-10 RX ADMIN — IPRATROPIUM BROMIDE AND ALBUTEROL SULFATE 1 DOSE: 2.5; .5 SOLUTION RESPIRATORY (INHALATION) at 16:53

## 2024-08-10 RX ADMIN — ENOXAPARIN SODIUM 40 MG: 100 INJECTION SUBCUTANEOUS at 08:37

## 2024-08-10 RX ADMIN — SODIUM CHLORIDE, POTASSIUM CHLORIDE, SODIUM LACTATE AND CALCIUM CHLORIDE: 600; 310; 30; 20 INJECTION, SOLUTION INTRAVENOUS at 23:34

## 2024-08-10 RX ADMIN — IPRATROPIUM BROMIDE AND ALBUTEROL SULFATE 1 DOSE: 2.5; .5 SOLUTION RESPIRATORY (INHALATION) at 08:55

## 2024-08-10 RX ADMIN — VALPROIC ACID 1000 MG: 250 SOLUTION ORAL at 08:26

## 2024-08-10 RX ADMIN — PAROXETINE 10 MG: 10 TABLET, FILM COATED ORAL at 08:27

## 2024-08-10 RX ADMIN — OLANZAPINE 10 MG: 10 TABLET, ORALLY DISINTEGRATING ORAL at 08:27

## 2024-08-10 NOTE — CONSULTS
Comprehensive Nutrition Assessment    Type and Reason for Visit:  Initial, Positive Nutrition Screen, Consult (TF Ordering and Management)    Nutrition Recommendations/Plan:   Continue current TF order, as tolerated:    8/10 TF ORDER: Standard TF with Fiber (Jevity 1.5) at 45 ml/hr x 23 hr/d (Hold TF 30 min before and after Synthroid) with flushes at 30 ml Q 4 hr  This will provide: 1035 ml, 1553 anita, 66 g pro, 967 ml total free water  This regimen will meet 100% energy and protein needs    Continue inpatient monitoring     Malnutrition Assessment:  Malnutrition Status:  At risk for malnutrition (Comment) (08/10/24 9680)    Context:  Chronic Illness     Findings of the 6 clinical characteristics of malnutrition:  Energy Intake:  Mild decrease in energy intake (Comment) (NPO prior to EN resumed)  Weight Loss:  No significant weight loss     Body Fat Loss:  No significant body fat loss     Muscle Mass Loss:  No significant muscle mass loss    Fluid Accumulation:  Unable to assess Extremities (multiple factors (CKD))   Strength:  Not Performed    Nutrition Assessment:    Pt admit 2/2 sepsis, LLL collapse.  Recent admit 8/2 for hyponatremia and concern for aspiration pneumonitis. PMHx=MRDD, dysphagia/PEG, dementia, CKD, CHF. Will order TF as pt had been on recent admit, as pt tolerated. Continue inpatient monitoring.    Nutrition Related Findings:    PEG clamped this AM, MRDD/nonverbal, Blind/deaf, hypophosphatemia, trace to +1 edema, Synthroid, +I/O 1.6L Wound Type: None       Current Nutrition Intake & Therapies:    Average Meal Intake: NPO  Average Supplements Intake: NPO  Diet NPO  ADULT TUBE FEEDING; PEG; Standard with Fiber; Continuous; 15; Yes; 15; Q 4 hours; 45; 30; Q 4 hours    Anthropometric Measures:  Height: 162.6 cm (5' 4\")  Ideal Body Weight (IBW): 120 lbs (55 kg)    Admission Body Weight: 85 kg (187 lb 6.3 oz) (8/10 bedscale)  Current Body Weight: 85 kg (187 lb 6.3 oz), 156.2 % IBW. Weight Source: Bed

## 2024-08-10 NOTE — PLAN OF CARE
Problem: Safety - Adult  Goal: Free from fall injury  8/9/2024 2106 by Katelyn Carter RN  Outcome: Progressing  8/9/2024 1835 by Farshad Cox RN  Outcome: Progressing     Problem: Pain  Goal: Verbalizes/displays adequate comfort level or baseline comfort level  8/9/2024 2106 by Katelyn Carter RN  Outcome: Progressing  8/9/2024 1835 by Farshad Cox RN  Outcome: Progressing     Problem: Skin/Tissue Integrity  Goal: Absence of new skin breakdown  Description: 1.  Monitor for areas of redness and/or skin breakdown  2.  Assess vascular access sites hourly  3.  Every 4-6 hours minimum:  Change oxygen saturation probe site  4.  Every 4-6 hours:  If on nasal continuous positive airway pressure, respiratory therapy assess nares and determine need for appliance change or resting period.  8/9/2024 2106 by Katelyn Carter RN  Outcome: Progressing  8/9/2024 1835 by Farshad Cox RN  Outcome: Progressing

## 2024-08-10 NOTE — PLAN OF CARE
Problem: Safety - Adult  Goal: Free from fall injury  Outcome: Progressing     Problem: Pain  Goal: Verbalizes/displays adequate comfort level or baseline comfort level  Outcome: Progressing     Problem: Skin/Tissue Integrity  Goal: Absence of new skin breakdown  Description: 1.  Monitor for areas of redness and/or skin breakdown  2.  Assess vascular access sites hourly  3.  Every 4-6 hours minimum:  Change oxygen saturation probe site  4.  Every 4-6 hours:  If on nasal continuous positive airway pressure, respiratory therapy assess nares and determine need for appliance change or resting period.  Outcome: Progressing     Problem: Nutrition Deficit:  Goal: Optimize nutritional status  8/10/2024 1129 by Farshad Cox RN  Outcome: Progressing  8/10/2024 0939 by Jessica Alvarado RD, CNSC, LD  Flowsheets (Taken 8/10/2024 0939)  Nutrient intake appropriate for improving, restoring, or maintaining nutritional needs:   Assess nutritional status and recommend course of action   Recommend, monitor, and adjust tube feedings and TPN/PPN based on assessed needs

## 2024-08-11 ENCOUNTER — APPOINTMENT (OUTPATIENT)
Dept: GENERAL RADIOLOGY | Age: 69
DRG: 871 | End: 2024-08-11
Payer: MEDICARE

## 2024-08-11 LAB
ALBUMIN SERPL-MCNC: 2.6 G/DL (ref 3.5–5.2)
ALP SERPL-CCNC: 101 U/L (ref 35–104)
ALT SERPL-CCNC: 43 U/L (ref 0–32)
ANION GAP SERPL CALCULATED.3IONS-SCNC: 8 MMOL/L (ref 7–16)
ANION GAP SERPL CALCULATED.3IONS-SCNC: 9 MMOL/L (ref 7–16)
AST SERPL-CCNC: 73 U/L (ref 0–31)
BILIRUB DIRECT SERPL-MCNC: 1.2 MG/DL (ref 0–0.3)
BILIRUB INDIRECT SERPL-MCNC: 0.4 MG/DL (ref 0–1)
BILIRUB SERPL-MCNC: 1.6 MG/DL (ref 0–1.2)
BUN SERPL-MCNC: 44 MG/DL (ref 6–23)
BUN SERPL-MCNC: 44 MG/DL (ref 6–23)
CALCIUM SERPL-MCNC: 8.7 MG/DL (ref 8.6–10.2)
CALCIUM SERPL-MCNC: 8.7 MG/DL (ref 8.6–10.2)
CHLORIDE SERPL-SCNC: 109 MMOL/L (ref 98–107)
CHLORIDE SERPL-SCNC: 109 MMOL/L (ref 98–107)
CO2 SERPL-SCNC: 29 MMOL/L (ref 22–29)
CO2 SERPL-SCNC: 30 MMOL/L (ref 22–29)
CREAT SERPL-MCNC: 1.6 MG/DL (ref 0.5–1)
CREAT SERPL-MCNC: 1.8 MG/DL (ref 0.5–1)
FOLATE SERPL-MCNC: >20 NG/ML (ref 4.8–24.2)
GFR, ESTIMATED: 31 ML/MIN/1.73M2
GFR, ESTIMATED: 34 ML/MIN/1.73M2
GLUCOSE SERPL-MCNC: 159 MG/DL (ref 74–99)
GLUCOSE SERPL-MCNC: 213 MG/DL (ref 74–99)
L PNEUMO1 AG UR QL IA.RAPID: NEGATIVE
MAGNESIUM SERPL-MCNC: 2.2 MG/DL (ref 1.6–2.6)
PHOSPHATE SERPL-MCNC: 2.3 MG/DL (ref 2.5–4.5)
POTASSIUM SERPL-SCNC: 3.3 MMOL/L (ref 3.5–5)
POTASSIUM SERPL-SCNC: 3.7 MMOL/L (ref 3.5–5)
PROT SERPL-MCNC: 7.1 G/DL (ref 6.4–8.3)
S PNEUM AG SPEC QL: NEGATIVE
SODIUM SERPL-SCNC: 147 MMOL/L (ref 132–146)
SODIUM SERPL-SCNC: 147 MMOL/L (ref 132–146)
SPECIMEN SOURCE: NORMAL
VANCOMYCIN SERPL-MCNC: 23.2 UG/ML (ref 5–40)
VIT B12 SERPL-MCNC: 829 PG/ML (ref 211–946)

## 2024-08-11 PROCEDURE — 2580000003 HC RX 258: Performed by: INTERNAL MEDICINE

## 2024-08-11 PROCEDURE — 71045 X-RAY EXAM CHEST 1 VIEW: CPT

## 2024-08-11 PROCEDURE — 6370000000 HC RX 637 (ALT 250 FOR IP): Performed by: INTERNAL MEDICINE

## 2024-08-11 PROCEDURE — 84100 ASSAY OF PHOSPHORUS: CPT

## 2024-08-11 PROCEDURE — 2700000000 HC OXYGEN THERAPY PER DAY

## 2024-08-11 PROCEDURE — 94640 AIRWAY INHALATION TREATMENT: CPT

## 2024-08-11 PROCEDURE — 82607 VITAMIN B-12: CPT

## 2024-08-11 PROCEDURE — 6360000002 HC RX W HCPCS: Performed by: INTERNAL MEDICINE

## 2024-08-11 PROCEDURE — 2500000003 HC RX 250 WO HCPCS: Performed by: INTERNAL MEDICINE

## 2024-08-11 PROCEDURE — 80053 COMPREHEN METABOLIC PANEL: CPT

## 2024-08-11 PROCEDURE — 83735 ASSAY OF MAGNESIUM: CPT

## 2024-08-11 PROCEDURE — 82746 ASSAY OF FOLIC ACID SERUM: CPT

## 2024-08-11 PROCEDURE — 2060000000 HC ICU INTERMEDIATE R&B

## 2024-08-11 PROCEDURE — 80202 ASSAY OF VANCOMYCIN: CPT

## 2024-08-11 PROCEDURE — 82248 BILIRUBIN DIRECT: CPT

## 2024-08-11 PROCEDURE — 99232 SBSQ HOSP IP/OBS MODERATE 35: CPT | Performed by: INTERNAL MEDICINE

## 2024-08-11 PROCEDURE — 80048 BASIC METABOLIC PNL TOTAL CA: CPT

## 2024-08-11 RX ORDER — METHYLPREDNISOLONE SODIUM SUCCINATE 40 MG/ML
40 INJECTION, POWDER, LYOPHILIZED, FOR SOLUTION INTRAMUSCULAR; INTRAVENOUS EVERY 12 HOURS
Status: DISCONTINUED | OUTPATIENT
Start: 2024-08-11 | End: 2024-08-18 | Stop reason: HOSPADM

## 2024-08-11 RX ORDER — SODIUM CHLORIDE 450 MG/100ML
INJECTION, SOLUTION INTRAVENOUS CONTINUOUS
Status: DISCONTINUED | OUTPATIENT
Start: 2024-08-11 | End: 2024-08-13

## 2024-08-11 RX ADMIN — IPRATROPIUM BROMIDE AND ALBUTEROL SULFATE 1 DOSE: 2.5; .5 SOLUTION RESPIRATORY (INHALATION) at 08:04

## 2024-08-11 RX ADMIN — METHYLPREDNISOLONE SODIUM SUCCINATE 40 MG: 40 INJECTION INTRAMUSCULAR; INTRAVENOUS at 14:59

## 2024-08-11 RX ADMIN — ACETAMINOPHEN 650 MG: 325 TABLET ORAL at 08:46

## 2024-08-11 RX ADMIN — PIPERACILLIN AND TAZOBACTAM 3375 MG: 3; .375 INJECTION, POWDER, LYOPHILIZED, FOR SOLUTION INTRAVENOUS at 18:34

## 2024-08-11 RX ADMIN — IPRATROPIUM BROMIDE AND ALBUTEROL SULFATE 1 DOSE: 2.5; .5 SOLUTION RESPIRATORY (INHALATION) at 16:07

## 2024-08-11 RX ADMIN — IPRATROPIUM BROMIDE AND ALBUTEROL SULFATE 1 DOSE: 2.5; .5 SOLUTION RESPIRATORY (INHALATION) at 12:04

## 2024-08-11 RX ADMIN — MIDODRINE HYDROCHLORIDE 7.5 MG: 5 TABLET ORAL at 13:56

## 2024-08-11 RX ADMIN — OLANZAPINE 10 MG: 10 TABLET, ORALLY DISINTEGRATING ORAL at 08:46

## 2024-08-11 RX ADMIN — PAROXETINE 10 MG: 10 TABLET, FILM COATED ORAL at 08:46

## 2024-08-11 RX ADMIN — IPRATROPIUM BROMIDE AND ALBUTEROL SULFATE 1 DOSE: 2.5; .5 SOLUTION RESPIRATORY (INHALATION) at 19:48

## 2024-08-11 RX ADMIN — ACETAMINOPHEN 650 MG: 325 TABLET ORAL at 14:54

## 2024-08-11 RX ADMIN — SODIUM CHLORIDE: 4.5 INJECTION, SOLUTION INTRAVENOUS at 18:31

## 2024-08-11 RX ADMIN — LEVOTHYROXINE SODIUM 150 MCG: 75 TABLET ORAL at 08:46

## 2024-08-11 RX ADMIN — POTASSIUM PHOSPHATE, MONOBASIC AND POTASSIUM PHOSPHATE, DIBASIC 10 MMOL: 224; 236 INJECTION, SOLUTION, CONCENTRATE INTRAVENOUS at 14:02

## 2024-08-11 RX ADMIN — VALPROIC ACID 1000 MG: 250 SOLUTION ORAL at 08:45

## 2024-08-11 RX ADMIN — SODIUM CHLORIDE, POTASSIUM CHLORIDE, SODIUM LACTATE AND CALCIUM CHLORIDE: 600; 310; 30; 20 INJECTION, SOLUTION INTRAVENOUS at 12:27

## 2024-08-11 RX ADMIN — PIPERACILLIN AND TAZOBACTAM 3375 MG: 3; .375 INJECTION, POWDER, LYOPHILIZED, FOR SOLUTION INTRAVENOUS at 01:15

## 2024-08-11 RX ADMIN — PIPERACILLIN AND TAZOBACTAM 3375 MG: 3; .375 INJECTION, POWDER, LYOPHILIZED, FOR SOLUTION INTRAVENOUS at 09:03

## 2024-08-11 RX ADMIN — MIDODRINE HYDROCHLORIDE 7.5 MG: 5 TABLET ORAL at 08:45

## 2024-08-11 RX ADMIN — VANCOMYCIN HYDROCHLORIDE 750 MG: 5 INJECTION, POWDER, LYOPHILIZED, FOR SOLUTION INTRAVENOUS at 12:25

## 2024-08-11 RX ADMIN — FAMOTIDINE 10 MG: 20 TABLET ORAL at 08:46

## 2024-08-11 RX ADMIN — BACITRACIN ZINC AND POLYMYXIN B SULFATE: at 22:08

## 2024-08-11 RX ADMIN — ENOXAPARIN SODIUM 40 MG: 100 INJECTION SUBCUTANEOUS at 08:46

## 2024-08-11 RX ADMIN — TRIAMCINOLONE ACETONIDE: 1 OINTMENT TOPICAL at 08:45

## 2024-08-11 RX ADMIN — TRIAMCINOLONE ACETONIDE: 1 OINTMENT TOPICAL at 22:07

## 2024-08-11 RX ADMIN — Medication 330 MG: at 08:47

## 2024-08-11 RX ADMIN — SODIUM CHLORIDE, PRESERVATIVE FREE 10 ML: 5 INJECTION INTRAVENOUS at 08:47

## 2024-08-11 RX ADMIN — BACITRACIN ZINC AND POLYMYXIN B SULFATE 14.2 G: at 08:45

## 2024-08-11 RX ADMIN — FOLIC ACID 1 MG: 1 TABLET ORAL at 08:46

## 2024-08-11 ASSESSMENT — PAIN SCALES - WONG BAKER: WONGBAKER_NUMERICALRESPONSE: HURTS A LITTLE BIT

## 2024-08-11 ASSESSMENT — PAIN SCALES - GENERAL
PAINLEVEL_OUTOF10: 0
PAINLEVEL_OUTOF10: 0

## 2024-08-11 NOTE — PLAN OF CARE
Problem: Safety - Adult  Goal: Free from fall injury  Outcome: Progressing     Problem: Pain  Goal: Verbalizes/displays adequate comfort level or baseline comfort level  Outcome: Progressing     Problem: Skin/Tissue Integrity  Goal: Absence of new skin breakdown  Description: 1.  Monitor for areas of redness and/or skin breakdown  2.  Assess vascular access sites hourly  3.  Every 4-6 hours minimum:  Change oxygen saturation probe site  4.  Every 4-6 hours:  If on nasal continuous positive airway pressure, respiratory therapy assess nares and determine need for appliance change or resting period.  Outcome: Progressing     Problem: Nutrition Deficit:  Goal: Optimize nutritional status  Outcome: Progressing     Problem: Safety - Adult  Goal: Free from fall injury  Outcome: Progressing     Problem: Pain  Goal: Verbalizes/displays adequate comfort level or baseline comfort level  Outcome: Progressing     Problem: Skin/Tissue Integrity  Goal: Absence of new skin breakdown  Description: 1.  Monitor for areas of redness and/or skin breakdown  2.  Assess vascular access sites hourly  3.  Every 4-6 hours minimum:  Change oxygen saturation probe site  4.  Every 4-6 hours:  If on nasal continuous positive airway pressure, respiratory therapy assess nares and determine need for appliance change or resting period.  Outcome: Progressing     Problem: Nutrition Deficit:  Goal: Optimize nutritional status  Outcome: Progressing

## 2024-08-11 NOTE — PLAN OF CARE
Problem: Safety - Adult  Goal: Free from fall injury  8/11/2024 1104 by Farshad Cxo RN  Outcome: Progressing  8/11/2024 0315 by Claudette Lamas RN  Outcome: Progressing     Problem: Pain  Goal: Verbalizes/displays adequate comfort level or baseline comfort level  8/11/2024 1104 by Farshad Cox RN  Outcome: Progressing  8/11/2024 0315 by Claudette Lamas RN  Outcome: Progressing     Problem: Skin/Tissue Integrity  Goal: Absence of new skin breakdown  Description: 1.  Monitor for areas of redness and/or skin breakdown  2.  Assess vascular access sites hourly  3.  Every 4-6 hours minimum:  Change oxygen saturation probe site  4.  Every 4-6 hours:  If on nasal continuous positive airway pressure, respiratory therapy assess nares and determine need for appliance change or resting period.  8/11/2024 1104 by Farshad Cox RN  Outcome: Progressing  8/11/2024 0315 by Claudette Lamas RN  Outcome: Progressing     Problem: Nutrition Deficit:  Goal: Optimize nutritional status  8/11/2024 1104 by Farshad Cox RN  Outcome: Progressing  8/11/2024 0315 by Claudette Lamas RN  Outcome: Progressing

## 2024-08-12 PROBLEM — D64.9 ANEMIA: Status: ACTIVE | Noted: 2024-08-12

## 2024-08-12 LAB
ALBUMIN SERPL-MCNC: 2.5 G/DL (ref 3.5–5.2)
ALP SERPL-CCNC: 93 U/L (ref 35–104)
ALT SERPL-CCNC: 63 U/L (ref 0–32)
ANION GAP SERPL CALCULATED.3IONS-SCNC: 8 MMOL/L (ref 7–16)
AST SERPL-CCNC: 92 U/L (ref 0–31)
BASOPHILS # BLD: 0.03 K/UL (ref 0–0.2)
BASOPHILS NFR BLD: 0 % (ref 0–2)
BILIRUB DIRECT SERPL-MCNC: 0.9 MG/DL (ref 0–0.3)
BILIRUB INDIRECT SERPL-MCNC: 0.5 MG/DL (ref 0–1)
BILIRUB SERPL-MCNC: 1.4 MG/DL (ref 0–1.2)
BUN SERPL-MCNC: 48 MG/DL (ref 6–23)
CALCIUM SERPL-MCNC: 8.7 MG/DL (ref 8.6–10.2)
CHLORIDE SERPL-SCNC: 108 MMOL/L (ref 98–107)
CO2 SERPL-SCNC: 28 MMOL/L (ref 22–29)
CREAT SERPL-MCNC: 1.8 MG/DL (ref 0.5–1)
EOSINOPHIL # BLD: 0.01 K/UL (ref 0.05–0.5)
EOSINOPHILS RELATIVE PERCENT: 0 % (ref 0–6)
ERYTHROCYTE [DISTWIDTH] IN BLOOD BY AUTOMATED COUNT: 15.3 % (ref 11.5–15)
GFR, ESTIMATED: 31 ML/MIN/1.73M2
GLUCOSE SERPL-MCNC: 138 MG/DL (ref 74–99)
HCT VFR BLD AUTO: 30.6 % (ref 34–48)
HGB BLD-MCNC: 9.4 G/DL (ref 11.5–15.5)
IMM GRANULOCYTES # BLD AUTO: 0.13 K/UL (ref 0–0.58)
IMM GRANULOCYTES NFR BLD: 1 % (ref 0–5)
LYMPHOCYTES NFR BLD: 0.57 K/UL (ref 1.5–4)
LYMPHOCYTES RELATIVE PERCENT: 4 % (ref 20–42)
MAGNESIUM SERPL-MCNC: 2.4 MG/DL (ref 1.6–2.6)
MCH RBC QN AUTO: 33 PG (ref 26–35)
MCHC RBC AUTO-ENTMCNC: 30.7 G/DL (ref 32–34.5)
MCV RBC AUTO: 107.4 FL (ref 80–99.9)
MICROORGANISM SPEC CULT: NORMAL
MONOCYTES NFR BLD: 1 K/UL (ref 0.1–0.95)
MONOCYTES NFR BLD: 7 % (ref 2–12)
NEUTROPHILS NFR BLD: 88 % (ref 43–80)
NEUTS SEG NFR BLD: 13.18 K/UL (ref 1.8–7.3)
PHOSPHATE SERPL-MCNC: 5.1 MG/DL (ref 2.5–4.5)
PLATELET # BLD AUTO: 148 K/UL (ref 130–450)
PMV BLD AUTO: 11.1 FL (ref 7–12)
POTASSIUM SERPL-SCNC: 4.8 MMOL/L (ref 3.5–5)
PROT SERPL-MCNC: 8 G/DL (ref 6.4–8.3)
RBC # BLD AUTO: 2.85 M/UL (ref 3.5–5.5)
RBC # BLD: ABNORMAL 10*6/UL
SERVICE CMNT-IMP: NORMAL
SODIUM SERPL-SCNC: 144 MMOL/L (ref 132–146)
SPECIMEN DESCRIPTION: NORMAL
VANCOMYCIN SERPL-MCNC: 21.4 UG/ML (ref 5–40)
WBC OTHER # BLD: 14.9 K/UL (ref 4.5–11.5)

## 2024-08-12 PROCEDURE — 83735 ASSAY OF MAGNESIUM: CPT

## 2024-08-12 PROCEDURE — 94640 AIRWAY INHALATION TREATMENT: CPT

## 2024-08-12 PROCEDURE — 82248 BILIRUBIN DIRECT: CPT

## 2024-08-12 PROCEDURE — 6360000002 HC RX W HCPCS: Performed by: INTERNAL MEDICINE

## 2024-08-12 PROCEDURE — 2580000003 HC RX 258: Performed by: INTERNAL MEDICINE

## 2024-08-12 PROCEDURE — 2060000000 HC ICU INTERMEDIATE R&B

## 2024-08-12 PROCEDURE — 80202 ASSAY OF VANCOMYCIN: CPT

## 2024-08-12 PROCEDURE — 6370000000 HC RX 637 (ALT 250 FOR IP): Performed by: INTERNAL MEDICINE

## 2024-08-12 PROCEDURE — 85025 COMPLETE CBC W/AUTO DIFF WBC: CPT

## 2024-08-12 PROCEDURE — 99232 SBSQ HOSP IP/OBS MODERATE 35: CPT | Performed by: INTERNAL MEDICINE

## 2024-08-12 PROCEDURE — 84100 ASSAY OF PHOSPHORUS: CPT

## 2024-08-12 PROCEDURE — 2700000000 HC OXYGEN THERAPY PER DAY

## 2024-08-12 PROCEDURE — 80053 COMPREHEN METABOLIC PANEL: CPT

## 2024-08-12 RX ADMIN — VALPROIC ACID 1000 MG: 250 SOLUTION ORAL at 20:48

## 2024-08-12 RX ADMIN — SODIUM CHLORIDE, PRESERVATIVE FREE 10 ML: 5 INJECTION INTRAVENOUS at 15:28

## 2024-08-12 RX ADMIN — OLANZAPINE 20 MG: 10 TABLET, ORALLY DISINTEGRATING ORAL at 20:48

## 2024-08-12 RX ADMIN — PIPERACILLIN AND TAZOBACTAM 3375 MG: 3; .375 INJECTION, POWDER, LYOPHILIZED, FOR SOLUTION INTRAVENOUS at 00:52

## 2024-08-12 RX ADMIN — IPRATROPIUM BROMIDE AND ALBUTEROL SULFATE 1 DOSE: 2.5; .5 SOLUTION RESPIRATORY (INHALATION) at 12:30

## 2024-08-12 RX ADMIN — OLANZAPINE 10 MG: 10 TABLET, ORALLY DISINTEGRATING ORAL at 12:38

## 2024-08-12 RX ADMIN — LEVOTHYROXINE SODIUM 150 MCG: 75 TABLET ORAL at 12:39

## 2024-08-12 RX ADMIN — FAMOTIDINE 10 MG: 20 TABLET ORAL at 12:37

## 2024-08-12 RX ADMIN — METHYLPREDNISOLONE SODIUM SUCCINATE 40 MG: 40 INJECTION INTRAMUSCULAR; INTRAVENOUS at 03:47

## 2024-08-12 RX ADMIN — FAMOTIDINE 10 MG: 20 TABLET ORAL at 20:48

## 2024-08-12 RX ADMIN — IPRATROPIUM BROMIDE AND ALBUTEROL SULFATE 1 DOSE: 2.5; .5 SOLUTION RESPIRATORY (INHALATION) at 00:56

## 2024-08-12 RX ADMIN — SODIUM CHLORIDE: 4.5 INJECTION, SOLUTION INTRAVENOUS at 07:22

## 2024-08-12 RX ADMIN — PIPERACILLIN AND TAZOBACTAM 3375 MG: 3; .375 INJECTION, POWDER, LYOPHILIZED, FOR SOLUTION INTRAVENOUS at 09:24

## 2024-08-12 RX ADMIN — PAROXETINE 10 MG: 10 TABLET, FILM COATED ORAL at 12:38

## 2024-08-12 RX ADMIN — Medication 330 MG: at 12:36

## 2024-08-12 RX ADMIN — PIPERACILLIN AND TAZOBACTAM 3375 MG: 3; .375 INJECTION, POWDER, LYOPHILIZED, FOR SOLUTION INTRAVENOUS at 16:37

## 2024-08-12 RX ADMIN — IPRATROPIUM BROMIDE AND ALBUTEROL SULFATE 1 DOSE: 2.5; .5 SOLUTION RESPIRATORY (INHALATION) at 15:45

## 2024-08-12 RX ADMIN — MONTELUKAST 10 MG: 10 TABLET, FILM COATED ORAL at 20:48

## 2024-08-12 RX ADMIN — BACITRACIN ZINC AND POLYMYXIN B SULFATE: at 11:17

## 2024-08-12 RX ADMIN — PAROXETINE 10 MG: 10 TABLET, FILM COATED ORAL at 20:48

## 2024-08-12 RX ADMIN — IPRATROPIUM BROMIDE AND ALBUTEROL SULFATE 1 DOSE: 2.5; .5 SOLUTION RESPIRATORY (INHALATION) at 08:43

## 2024-08-12 RX ADMIN — SODIUM CHLORIDE, PRESERVATIVE FREE 10 ML: 5 INJECTION INTRAVENOUS at 11:19

## 2024-08-12 RX ADMIN — IPRATROPIUM BROMIDE AND ALBUTEROL SULFATE 1 DOSE: 2.5; .5 SOLUTION RESPIRATORY (INHALATION) at 19:49

## 2024-08-12 RX ADMIN — METHYLPREDNISOLONE SODIUM SUCCINATE 40 MG: 40 INJECTION INTRAMUSCULAR; INTRAVENOUS at 15:28

## 2024-08-12 RX ADMIN — VALPROIC ACID 1000 MG: 250 SOLUTION ORAL at 12:35

## 2024-08-12 RX ADMIN — BACITRACIN ZINC AND POLYMYXIN B SULFATE: at 20:49

## 2024-08-12 RX ADMIN — TRIAMCINOLONE ACETONIDE: 1 OINTMENT TOPICAL at 20:49

## 2024-08-12 RX ADMIN — IPRATROPIUM BROMIDE AND ALBUTEROL SULFATE 1 DOSE: 2.5; .5 SOLUTION RESPIRATORY (INHALATION) at 03:08

## 2024-08-12 ASSESSMENT — PAIN SCALES - GENERAL: PAINLEVEL_OUTOF10: 0

## 2024-08-12 NOTE — CARE COORDINATION
Social Work:    Chart reviewed. Katerina Paiz was re-admitted from [facility] snf due to fevers, and tachycardia. Katerina is MR & nonverbal. She was recently hospitalized due to bloody sputum, aspiration pneumonia, hypoglycemia. Katerina resided at a group home prior to recent move to [facility]. Social service met with Katerina and her sister Dori at bedside and offered support as well as explained social service role.   Social service also left a voice message with Katerina's Legal Guardian, Latanya Canada, to confirm plans to return to [facility] snf.  Marce at [facility] confirmed bedhold and snf return. (N-17, ambulance from in chart)    Electronically signed by AILYN Harris on 8/12/2024 at 10:37 AM

## 2024-08-12 NOTE — DISCHARGE INSTR - COC
Continuity of Care Form    Patient Name: Katerina Paiz   :  1955  MRN:  [id number]    Admit date:  2024  Discharge date:  2024    Code Status Order: Full Code   Advance Directives:   Advance Care Flowsheet Documentation             Admitting Physician:  Dane Crowder MD  PCP: Izabela Dove DO    Discharging Nurse: Norma Crowder  Discharging Hospital Unit/Room#: 0402/0402-A  Discharging Unit Phone Number: [phone]    Emergency Contact:   Extended Emergency Contact Information  Primary Emergency Contact: Latanya Canada  Address: [employer]           [address] [place] [address] [address] [place] [address] [facility]  Home Phone: [phone]  Work Phone: [phone]  Mobile Phone: [phone]  Relation: Legal Guardian   needed? No  Secondary Emergency Contact: Shashi Gonzalez  Address: [address] [address] [facility]  Home Phone: [phone]  Work Phone: [phone]  Relation:     Past Surgical History:  Past Surgical History:   Procedure Laterality Date    BRONCHOSCOPY  02/10/2017    CHEST TUBE INSERTION Right 2017    GASTROSTOMY TUBE PLACEMENT N/A 3/7/2021    EGD PEG TUBE PLACEMENT [place]z [place] [facility] [place]    OTHER SURGICAL HISTORY Right 2017    tessio insertion     OTHER SURGICAL HISTORY  2017    PEG tube insertion    UPPER GASTROINTESTINAL ENDOSCOPY N/A 2024    ESOPHAGOGASTRODUODENOSCOPY PERCUTANEOUS ENDOSCOPIC GASTROSTOMY TUBE PLACEMENT performed by Jose Han MD at [facility] ENDOSCOPY       Immunization History:   Immunization History   Administered Date(s) Administered    COVID-19, MODERNA BLUE border, Primary or Immunocompromised, (age 12y+), IM, 100 mcg/0.5mL 2021, 2021, 2021, 2021, 2021, 2021    Influenza Vaccine, unspecified formulation 09/10/2014    Influenza Virus Vaccine  Patient: Patricia Ko Date of Service: 2022   : 1937 MRN: [id number]     SUBJECTIVE:   HISTORY OF PRESENT ILLNESS:  Patricia Ko is a 79year old male who presents today for ear flushing. Known wax issues bilaterally. Here for removal.        PAST MEDICAL HISTORY:  No past medical history on file. MEDICATIONS:  Current Outpatient Medications   Medication Sig   â¢ albuterol 108 (90 Base) MCG/ACT inhaler Inhale 2 puffs into the lungs every 4 hours as needed for Shortness of Breath, Wheezing or Other (cough). â¢ docusate sodium-sennosides (SENOKOT S) 50-8.6 MG per tablet Take 1 tablet by mouth 2 times daily. â¢ glycerin, adult, 2 g suppository Place 1 suppository rectally as needed for Constipation. No current facility-administered medications for this visit. ALLERGIES:  No Known Allergies    PAST SURGICAL HISTORY:  No past surgical history on file. FAMILY HISTORY:  No family history on file. SOCIAL HISTORY:  Social History     Tobacco Use   â¢ Smoking status: Never Smoker   â¢ Smokeless tobacco: Never Used   Vaping Use   â¢ Vaping Use: never used   Substance Use Topics   â¢ Alcohol use: Yes     Comment: 2-3 cans of beer/wk   â¢ Drug use: Never       Review of Systems   Constitutional: Negative for fever. HENT: Negative for nosebleeds. Eyes: Negative for discharge. Respiratory: Negative for cough, wheezing and stridor. Cardiovascular: Negative for orthopnea. Gastrointestinal: Negative for blood in stool, diarrhea and vomiting. Genitourinary: Negative for hematuria. Musculoskeletal: Negative for falls. Skin: Negative for rash. Neurological: Negative for speech change and seizures. Endo/Heme/Allergies: Does not bruise/bleed easily. Psychiatric/Behavioral: Negative for hallucinations.          OBJECTIVE:     Visit Vitals  /70 (BP Location: LUE - Left upper extremity, Patient Position: Sitting, Cuff Size: Regular)   Pulse 70   Temp 98 Â°F (36.7 Â°C) (Temporal)   Wt 68.9 kg (151 lb 14.4 oz)   SpO2 98%   BMI 21.89 kg/mÂ²       Physical Exam  Vitals reviewed. Constitutional:       General: He is not in acute distress. Appearance: Normal appearance. He is not ill-appearing, toxic-appearing or diaphoretic. HENT:      Head: Normocephalic and atraumatic. Right Ear: External ear normal. There is impacted cerumen. Left Ear: External ear normal. There is impacted cerumen. Nose: Nose normal.   Eyes:      General: No scleral icterus. Right eye: No discharge. Left eye: No discharge. Extraocular Movements: Extraocular movements intact. Conjunctiva/sclera: Conjunctivae normal.   Pulmonary:      Effort: Pulmonary effort is normal.   Musculoskeletal:         General: Normal range of motion. Cervical back: Normal range of motion and neck supple. Skin:     General: Skin is warm and dry. Coloration: Skin is not jaundiced. Findings: No lesion or rash. Neurological:      General: No focal deficit present. Mental Status: He is alert and oriented to person, place, and time. Mental status is at baseline. Cranial Nerves: No cranial nerve deficit. Gait: Gait is intact. Gait normal.      Deep Tendon Reflexes: Reflexes are normal and symmetric. Psychiatric:         Mood and Affect: Mood and affect normal.         Behavior: Behavior normal.         Thought Content: Thought content normal.         Cognition and Memory: Memory normal.         Judgment: Judgment normal.       Timeout  Physician verified correct patient. Physician verified correct procedure. Physician verified completed informed consent reviewed and accurate. Physician verified pertinent /relevant medical record detail reviewed. Physician verified site and procedure with either patient or family if patient cannot provide confirmation. Physician verified correct positioning of the patient.    Physician verified immediate availability of all necessary medication. Physician verified immediate availability of necessary equipment. Physician verified sterility or high level disinfection of instruments/equipment prior to procedure. Procedure - Cerumen Removal  Indication: tympanic membrane(s) could not be visualized as there was complete cerumen impaction in both ear(s). Provider(s) performing procedure: Dr. Kavita Canada. Consent: Verbal was obtained from either patient or guardian. Preparation: warm water with hydrogen peroxide  Details of procedure: Otoscope magnification was used in the ear canal(s) to visualize cerumen and debris. The ear was cleaned using a loop curette and warm water irrigation. The procedure was successful, and the cerumen was easily cleaned. A minimal amount of time was spent. Patient Status: The patient tolerated the procedure well. Complications: None. Patient instructions: Discussed dry ear precautions and avoiding using q-tips. Also use ear wax removal drops as directed. Follow-up as needed. Assessment AND PLAN:     Problem List Items Addressed This Visit        ENT    Bilateral impacted cerumen - Primary     S/p successful irrigation. Will check ears again in 5 months or so. Please take medications as directed. Instructions provided as documented in the AVS.    The patient indicated understanding of the diagnosis and agreed with the plan of care. Return in about 5 months (around 12/18/2022) for follow-up ear wax.

## 2024-08-12 NOTE — ACP (ADVANCE CARE PLANNING)
Advance Care Planning   Healthcare Decision Maker:    Primary Decision Maker: Latanya Canada - Legal Guardian, Legal Guardian - [phone]    Click here to complete Healthcare Decision Makers including selection of the Healthcare Decision Maker Relationship (ie \"Primary\").            yes

## 2024-08-12 NOTE — PLAN OF CARE
Problem: Safety - Adult  Goal: Free from fall injury  8/11/2024 2318 by Oumou Veronica, RN  Outcome: Progressing     Problem: Pain  Goal: Verbalizes/displays adequate comfort level or baseline comfort level  8/11/2024 2318 by Oumou Veronica, RN  Outcome: Progressing     Problem: Skin/Tissue Integrity  Goal: Absence of new skin breakdown  Description: 1.  Monitor for areas of redness and/or skin breakdown  2.  Assess vascular access sites hourly  3.  Every 4-6 hours minimum:  Change oxygen saturation probe site  4.  Every 4-6 hours:  If on nasal continuous positive airway pressure, respiratory therapy assess nares and determine need for appliance change or resting period.  8/11/2024 2318 by Oumou Veronica, RN  Outcome: Progressing

## 2024-08-13 ENCOUNTER — ANESTHESIA (OUTPATIENT)
Dept: ENDOSCOPY | Age: 69
End: 2024-08-13
Payer: MEDICARE

## 2024-08-13 ENCOUNTER — ANESTHESIA EVENT (OUTPATIENT)
Dept: ENDOSCOPY | Age: 69
End: 2024-08-13
Payer: MEDICARE

## 2024-08-13 LAB
ANION GAP SERPL CALCULATED.3IONS-SCNC: 10 MMOL/L (ref 7–16)
BASOPHILS # BLD: 0 K/UL (ref 0–0.2)
BASOPHILS NFR BLD: 0 % (ref 0–2)
BUN SERPL-MCNC: 52 MG/DL (ref 6–23)
CALCIUM SERPL-MCNC: 9 MG/DL (ref 8.6–10.2)
CHLORIDE SERPL-SCNC: 112 MMOL/L (ref 98–107)
CO2 SERPL-SCNC: 26 MMOL/L (ref 22–29)
CREAT SERPL-MCNC: 1.5 MG/DL (ref 0.5–1)
EOSINOPHIL # BLD: 0.11 K/UL (ref 0.05–0.5)
EOSINOPHILS RELATIVE PERCENT: 1 % (ref 0–6)
ERYTHROCYTE [DISTWIDTH] IN BLOOD BY AUTOMATED COUNT: 15.1 % (ref 11.5–15)
GFR, ESTIMATED: 38 ML/MIN/1.73M2
GLUCOSE SERPL-MCNC: 160 MG/DL (ref 74–99)
HCT VFR BLD AUTO: 30.6 % (ref 34–48)
HGB BLD-MCNC: 9.6 G/DL (ref 11.5–15.5)
LYMPHOCYTES NFR BLD: 0.88 K/UL (ref 1.5–4)
LYMPHOCYTES RELATIVE PERCENT: 7 % (ref 20–42)
MAGNESIUM SERPL-MCNC: 2.6 MG/DL (ref 1.6–2.6)
MCH RBC QN AUTO: 33 PG (ref 26–35)
MCHC RBC AUTO-ENTMCNC: 31.4 G/DL (ref 32–34.5)
MCV RBC AUTO: 105.2 FL (ref 80–99.9)
METAMYELOCYTES ABSOLUTE COUNT: 0.11 K/UL (ref 0–0.12)
METAMYELOCYTES: 1 % (ref 0–1)
MONOCYTES NFR BLD: 0.99 K/UL (ref 0.1–0.95)
MONOCYTES NFR BLD: 8 % (ref 2–12)
MYELOCYTES ABSOLUTE COUNT: 0.11 K/UL
MYELOCYTES: 1 %
NEUTROPHILS NFR BLD: 83 % (ref 43–80)
NEUTS SEG NFR BLD: 10.41 K/UL (ref 1.8–7.3)
NUCLEATED RED BLOOD CELLS: 1 PER 100 WBC
PHOSPHATE SERPL-MCNC: 3.9 MG/DL (ref 2.5–4.5)
PLATELET # BLD AUTO: 181 K/UL (ref 130–450)
PMV BLD AUTO: 11.1 FL (ref 7–12)
POTASSIUM SERPL-SCNC: 3.2 MMOL/L (ref 3.5–5)
POTASSIUM SERPL-SCNC: 3.5 MMOL/L (ref 3.5–5)
RBC # BLD AUTO: 2.91 M/UL (ref 3.5–5.5)
RBC # BLD: ABNORMAL 10*6/UL
SODIUM SERPL-SCNC: 148 MMOL/L (ref 132–146)
WBC OTHER # BLD: 12.6 K/UL (ref 4.5–11.5)

## 2024-08-13 PROCEDURE — 2700000000 HC OXYGEN THERAPY PER DAY

## 2024-08-13 PROCEDURE — 2580000003 HC RX 258: Performed by: INTERNAL MEDICINE

## 2024-08-13 PROCEDURE — 6370000000 HC RX 637 (ALT 250 FOR IP): Performed by: INTERNAL MEDICINE

## 2024-08-13 PROCEDURE — 6360000002 HC RX W HCPCS: Performed by: INTERNAL MEDICINE

## 2024-08-13 PROCEDURE — 80048 BASIC METABOLIC PNL TOTAL CA: CPT

## 2024-08-13 PROCEDURE — 99232 SBSQ HOSP IP/OBS MODERATE 35: CPT | Performed by: INTERNAL MEDICINE

## 2024-08-13 PROCEDURE — 94640 AIRWAY INHALATION TREATMENT: CPT

## 2024-08-13 PROCEDURE — 85025 COMPLETE CBC W/AUTO DIFF WBC: CPT

## 2024-08-13 PROCEDURE — 2060000000 HC ICU INTERMEDIATE R&B

## 2024-08-13 PROCEDURE — 84132 ASSAY OF SERUM POTASSIUM: CPT

## 2024-08-13 PROCEDURE — 36415 COLL VENOUS BLD VENIPUNCTURE: CPT

## 2024-08-13 PROCEDURE — 84100 ASSAY OF PHOSPHORUS: CPT

## 2024-08-13 PROCEDURE — 83735 ASSAY OF MAGNESIUM: CPT

## 2024-08-13 RX ORDER — SODIUM CHLORIDE 9 MG/ML
INJECTION, SOLUTION INTRAVENOUS PRN
Status: DISCONTINUED | OUTPATIENT
Start: 2024-08-13 | End: 2024-08-13 | Stop reason: HOSPADM

## 2024-08-13 RX ORDER — DEXTROSE MONOHYDRATE 50 MG/ML
INJECTION, SOLUTION INTRAVENOUS CONTINUOUS
Status: DISCONTINUED | OUTPATIENT
Start: 2024-08-13 | End: 2024-08-18 | Stop reason: HOSPADM

## 2024-08-13 RX ORDER — SODIUM CHLORIDE 0.9 % (FLUSH) 0.9 %
5-40 SYRINGE (ML) INJECTION PRN
Status: DISCONTINUED | OUTPATIENT
Start: 2024-08-13 | End: 2024-08-13 | Stop reason: HOSPADM

## 2024-08-13 RX ORDER — ONDANSETRON 2 MG/ML
4 INJECTION INTRAMUSCULAR; INTRAVENOUS
Status: CANCELLED | OUTPATIENT
Start: 2024-08-13 | End: 2024-08-14

## 2024-08-13 RX ORDER — NALOXONE HYDROCHLORIDE 0.4 MG/ML
INJECTION, SOLUTION INTRAMUSCULAR; INTRAVENOUS; SUBCUTANEOUS PRN
Status: CANCELLED | OUTPATIENT
Start: 2024-08-13

## 2024-08-13 RX ORDER — SODIUM CHLORIDE 0.9 % (FLUSH) 0.9 %
5-40 SYRINGE (ML) INJECTION PRN
Status: CANCELLED | OUTPATIENT
Start: 2024-08-13

## 2024-08-13 RX ORDER — POTASSIUM CHLORIDE 7.45 MG/ML
10 INJECTION INTRAVENOUS
Status: COMPLETED | OUTPATIENT
Start: 2024-08-13 | End: 2024-08-13

## 2024-08-13 RX ORDER — SODIUM CHLORIDE 9 MG/ML
INJECTION, SOLUTION INTRAVENOUS PRN
Status: CANCELLED | OUTPATIENT
Start: 2024-08-13

## 2024-08-13 RX ORDER — POTASSIUM CHLORIDE 20 MEQ/1
40 TABLET, EXTENDED RELEASE ORAL ONCE
Status: COMPLETED | OUTPATIENT
Start: 2024-08-13 | End: 2024-08-13

## 2024-08-13 RX ORDER — SODIUM CHLORIDE 0.9 % (FLUSH) 0.9 %
5-40 SYRINGE (ML) INJECTION EVERY 12 HOURS SCHEDULED
Status: DISCONTINUED | OUTPATIENT
Start: 2024-08-13 | End: 2024-08-13 | Stop reason: HOSPADM

## 2024-08-13 RX ORDER — SODIUM CHLORIDE 0.9 % (FLUSH) 0.9 %
5-40 SYRINGE (ML) INJECTION EVERY 12 HOURS SCHEDULED
Status: CANCELLED | OUTPATIENT
Start: 2024-08-13

## 2024-08-13 RX ADMIN — METHYLPREDNISOLONE SODIUM SUCCINATE 40 MG: 40 INJECTION INTRAMUSCULAR; INTRAVENOUS at 03:48

## 2024-08-13 RX ADMIN — IPRATROPIUM BROMIDE AND ALBUTEROL SULFATE 1 DOSE: 2.5; .5 SOLUTION RESPIRATORY (INHALATION) at 13:21

## 2024-08-13 RX ADMIN — IPRATROPIUM BROMIDE AND ALBUTEROL SULFATE 1 DOSE: 2.5; .5 SOLUTION RESPIRATORY (INHALATION) at 00:28

## 2024-08-13 RX ADMIN — MIDODRINE HYDROCHLORIDE 7.5 MG: 5 TABLET ORAL at 08:30

## 2024-08-13 RX ADMIN — POTASSIUM CHLORIDE 10 MEQ: 7.46 INJECTION, SOLUTION INTRAVENOUS at 16:57

## 2024-08-13 RX ADMIN — FOLIC ACID 1 MG: 1 TABLET ORAL at 08:30

## 2024-08-13 RX ADMIN — PIPERACILLIN AND TAZOBACTAM 3375 MG: 3; .375 INJECTION, POWDER, LYOPHILIZED, FOR SOLUTION INTRAVENOUS at 01:21

## 2024-08-13 RX ADMIN — OLANZAPINE 10 MG: 10 TABLET, ORALLY DISINTEGRATING ORAL at 08:30

## 2024-08-13 RX ADMIN — SODIUM CHLORIDE, PRESERVATIVE FREE 10 ML: 5 INJECTION INTRAVENOUS at 20:47

## 2024-08-13 RX ADMIN — POTASSIUM CHLORIDE 40 MEQ: 1500 TABLET, EXTENDED RELEASE ORAL at 15:27

## 2024-08-13 RX ADMIN — DEXTROSE MONOHYDRATE: 50 INJECTION, SOLUTION INTRAVENOUS at 15:42

## 2024-08-13 RX ADMIN — PAROXETINE 10 MG: 10 TABLET, FILM COATED ORAL at 20:48

## 2024-08-13 RX ADMIN — VALPROIC ACID 1000 MG: 250 SOLUTION ORAL at 08:31

## 2024-08-13 RX ADMIN — PIPERACILLIN AND TAZOBACTAM 3375 MG: 3; .375 INJECTION, POWDER, LYOPHILIZED, FOR SOLUTION INTRAVENOUS at 08:29

## 2024-08-13 RX ADMIN — Medication 330 MG: at 08:31

## 2024-08-13 RX ADMIN — BACITRACIN ZINC AND POLYMYXIN B SULFATE 14.2 G: at 08:31

## 2024-08-13 RX ADMIN — IPRATROPIUM BROMIDE AND ALBUTEROL SULFATE 1 DOSE: 2.5; .5 SOLUTION RESPIRATORY (INHALATION) at 09:00

## 2024-08-13 RX ADMIN — IPRATROPIUM BROMIDE AND ALBUTEROL SULFATE 1 DOSE: 2.5; .5 SOLUTION RESPIRATORY (INHALATION) at 19:12

## 2024-08-13 RX ADMIN — IPRATROPIUM BROMIDE AND ALBUTEROL SULFATE 1 DOSE: 2.5; .5 SOLUTION RESPIRATORY (INHALATION) at 16:01

## 2024-08-13 RX ADMIN — POTASSIUM CHLORIDE 10 MEQ: 7.46 INJECTION, SOLUTION INTRAVENOUS at 15:55

## 2024-08-13 RX ADMIN — VALPROIC ACID 1000 MG: 250 SOLUTION ORAL at 20:47

## 2024-08-13 RX ADMIN — POTASSIUM CHLORIDE 10 MEQ: 7.46 INJECTION, SOLUTION INTRAVENOUS at 12:53

## 2024-08-13 RX ADMIN — POTASSIUM CHLORIDE 10 MEQ: 7.46 INJECTION, SOLUTION INTRAVENOUS at 13:47

## 2024-08-13 RX ADMIN — PAROXETINE 10 MG: 10 TABLET, FILM COATED ORAL at 08:30

## 2024-08-13 RX ADMIN — MONTELUKAST 10 MG: 10 TABLET, FILM COATED ORAL at 20:49

## 2024-08-13 RX ADMIN — FAMOTIDINE 10 MG: 20 TABLET ORAL at 20:48

## 2024-08-13 RX ADMIN — MIDODRINE HYDROCHLORIDE 7.5 MG: 5 TABLET ORAL at 15:48

## 2024-08-13 RX ADMIN — IPRATROPIUM BROMIDE AND ALBUTEROL SULFATE 1 DOSE: 2.5; .5 SOLUTION RESPIRATORY (INHALATION) at 02:57

## 2024-08-13 RX ADMIN — PIPERACILLIN AND TAZOBACTAM 3375 MG: 3; .375 INJECTION, POWDER, LYOPHILIZED, FOR SOLUTION INTRAVENOUS at 17:58

## 2024-08-13 RX ADMIN — METHYLPREDNISOLONE SODIUM SUCCINATE 40 MG: 40 INJECTION INTRAMUSCULAR; INTRAVENOUS at 15:46

## 2024-08-13 RX ADMIN — TRIAMCINOLONE ACETONIDE: 1 OINTMENT TOPICAL at 20:50

## 2024-08-13 RX ADMIN — OLANZAPINE 20 MG: 10 TABLET, ORALLY DISINTEGRATING ORAL at 20:48

## 2024-08-13 RX ADMIN — FAMOTIDINE 10 MG: 20 TABLET ORAL at 10:32

## 2024-08-14 ENCOUNTER — APPOINTMENT (OUTPATIENT)
Dept: GENERAL RADIOLOGY | Age: 69
DRG: 871 | End: 2024-08-14
Payer: MEDICARE

## 2024-08-14 LAB
ALBUMIN SERPL-MCNC: 2.6 G/DL (ref 3.5–5.2)
ALP SERPL-CCNC: 82 U/L (ref 35–104)
ALT SERPL-CCNC: 34 U/L (ref 0–32)
ANION GAP SERPL CALCULATED.3IONS-SCNC: 8 MMOL/L (ref 7–16)
AST SERPL-CCNC: 22 U/L (ref 0–31)
BASOPHILS # BLD: 0.04 K/UL (ref 0–0.2)
BASOPHILS NFR BLD: 0 % (ref 0–2)
BILIRUB DIRECT SERPL-MCNC: <0.2 MG/DL (ref 0–0.3)
BILIRUB INDIRECT SERPL-MCNC: ABNORMAL MG/DL (ref 0–1)
BILIRUB SERPL-MCNC: 0.4 MG/DL (ref 0–1.2)
BUN SERPL-MCNC: 47 MG/DL (ref 6–23)
CALCIUM SERPL-MCNC: 9.5 MG/DL (ref 8.6–10.2)
CHLORIDE SERPL-SCNC: 113 MMOL/L (ref 98–107)
CO2 SERPL-SCNC: 26 MMOL/L (ref 22–29)
CREAT SERPL-MCNC: 1.2 MG/DL (ref 0.5–1)
EOSINOPHIL # BLD: 0.01 K/UL (ref 0.05–0.5)
EOSINOPHILS RELATIVE PERCENT: 0 % (ref 0–6)
ERYTHROCYTE [DISTWIDTH] IN BLOOD BY AUTOMATED COUNT: 15.4 % (ref 11.5–15)
GFR, ESTIMATED: 51 ML/MIN/1.73M2
GLUCOSE SERPL-MCNC: 161 MG/DL (ref 74–99)
HCT VFR BLD AUTO: 30.3 % (ref 34–48)
HGB BLD-MCNC: 9.6 G/DL (ref 11.5–15.5)
IMM GRANULOCYTES # BLD AUTO: 0.52 K/UL (ref 0–0.58)
IMM GRANULOCYTES NFR BLD: 5 % (ref 0–5)
LYMPHOCYTES NFR BLD: 0.73 K/UL (ref 1.5–4)
LYMPHOCYTES RELATIVE PERCENT: 7 % (ref 20–42)
MAGNESIUM SERPL-MCNC: 2.3 MG/DL (ref 1.6–2.6)
MCH RBC QN AUTO: 33.1 PG (ref 26–35)
MCHC RBC AUTO-ENTMCNC: 31.7 G/DL (ref 32–34.5)
MCV RBC AUTO: 104.5 FL (ref 80–99.9)
MICROORGANISM SPEC CULT: NORMAL
MICROORGANISM SPEC CULT: NORMAL
MONOCYTES NFR BLD: 0.53 K/UL (ref 0.1–0.95)
MONOCYTES NFR BLD: 5 % (ref 2–12)
NEUTROPHILS NFR BLD: 83 % (ref 43–80)
NEUTS SEG NFR BLD: 8.87 K/UL (ref 1.8–7.3)
PHOSPHATE SERPL-MCNC: 3.2 MG/DL (ref 2.5–4.5)
PLATELET # BLD AUTO: 199 K/UL (ref 130–450)
PMV BLD AUTO: 11.4 FL (ref 7–12)
POTASSIUM SERPL-SCNC: 3.3 MMOL/L (ref 3.5–5)
PROT SERPL-MCNC: 7 G/DL (ref 6.4–8.3)
RBC # BLD AUTO: 2.9 M/UL (ref 3.5–5.5)
SERVICE CMNT-IMP: NORMAL
SERVICE CMNT-IMP: NORMAL
SODIUM SERPL-SCNC: 147 MMOL/L (ref 132–146)
SPECIMEN DESCRIPTION: NORMAL
SPECIMEN DESCRIPTION: NORMAL
T4 FREE SERPL-MCNC: 0.8 NG/DL (ref 0.9–1.7)
TSH SERPL DL<=0.05 MIU/L-ACNC: 8 UIU/ML (ref 0.27–4.2)
WBC OTHER # BLD: 10.7 K/UL (ref 4.5–11.5)

## 2024-08-14 PROCEDURE — 2580000003 HC RX 258: Performed by: INTERNAL MEDICINE

## 2024-08-14 PROCEDURE — 2580000003 HC RX 258: Performed by: SPECIALIST

## 2024-08-14 PROCEDURE — 80053 COMPREHEN METABOLIC PANEL: CPT

## 2024-08-14 PROCEDURE — 71045 X-RAY EXAM CHEST 1 VIEW: CPT

## 2024-08-14 PROCEDURE — 99232 SBSQ HOSP IP/OBS MODERATE 35: CPT | Performed by: INTERNAL MEDICINE

## 2024-08-14 PROCEDURE — APPSS60 APP SPLIT SHARED TIME 46-60 MINUTES: Performed by: NURSE PRACTITIONER

## 2024-08-14 PROCEDURE — 6370000000 HC RX 637 (ALT 250 FOR IP): Performed by: INTERNAL MEDICINE

## 2024-08-14 PROCEDURE — 6360000002 HC RX W HCPCS: Performed by: INTERNAL MEDICINE

## 2024-08-14 PROCEDURE — 82248 BILIRUBIN DIRECT: CPT

## 2024-08-14 PROCEDURE — 85025 COMPLETE CBC W/AUTO DIFF WBC: CPT

## 2024-08-14 PROCEDURE — 83735 ASSAY OF MAGNESIUM: CPT

## 2024-08-14 PROCEDURE — 2060000000 HC ICU INTERMEDIATE R&B

## 2024-08-14 PROCEDURE — 99223 1ST HOSP IP/OBS HIGH 75: CPT | Performed by: INTERNAL MEDICINE

## 2024-08-14 PROCEDURE — 6360000002 HC RX W HCPCS: Performed by: SPECIALIST

## 2024-08-14 PROCEDURE — 84439 ASSAY OF FREE THYROXINE: CPT

## 2024-08-14 PROCEDURE — 84100 ASSAY OF PHOSPHORUS: CPT

## 2024-08-14 PROCEDURE — 84443 ASSAY THYROID STIM HORMONE: CPT

## 2024-08-14 PROCEDURE — 2700000000 HC OXYGEN THERAPY PER DAY

## 2024-08-14 PROCEDURE — 36415 COLL VENOUS BLD VENIPUNCTURE: CPT

## 2024-08-14 PROCEDURE — 94640 AIRWAY INHALATION TREATMENT: CPT

## 2024-08-14 RX ORDER — METOPROLOL SUCCINATE 25 MG/1
25 TABLET, EXTENDED RELEASE ORAL DAILY
Status: DISCONTINUED | OUTPATIENT
Start: 2024-08-14 | End: 2024-08-14

## 2024-08-14 RX ADMIN — IPRATROPIUM BROMIDE AND ALBUTEROL SULFATE 1 DOSE: 2.5; .5 SOLUTION RESPIRATORY (INHALATION) at 19:51

## 2024-08-14 RX ADMIN — IPRATROPIUM BROMIDE AND ALBUTEROL SULFATE 1 DOSE: 2.5; .5 SOLUTION RESPIRATORY (INHALATION) at 09:38

## 2024-08-14 RX ADMIN — PAROXETINE 10 MG: 10 TABLET, FILM COATED ORAL at 21:10

## 2024-08-14 RX ADMIN — VALPROIC ACID 1000 MG: 250 SOLUTION ORAL at 21:09

## 2024-08-14 RX ADMIN — FOLIC ACID 1 MG: 1 TABLET ORAL at 09:29

## 2024-08-14 RX ADMIN — OLANZAPINE 20 MG: 10 TABLET, ORALLY DISINTEGRATING ORAL at 21:10

## 2024-08-14 RX ADMIN — IPRATROPIUM BROMIDE AND ALBUTEROL SULFATE 1 DOSE: 2.5; .5 SOLUTION RESPIRATORY (INHALATION) at 03:22

## 2024-08-14 RX ADMIN — VALPROIC ACID 1000 MG: 250 SOLUTION ORAL at 09:30

## 2024-08-14 RX ADMIN — OLANZAPINE 10 MG: 10 TABLET, ORALLY DISINTEGRATING ORAL at 09:29

## 2024-08-14 RX ADMIN — IPRATROPIUM BROMIDE AND ALBUTEROL SULFATE 1 DOSE: 2.5; .5 SOLUTION RESPIRATORY (INHALATION) at 16:55

## 2024-08-14 RX ADMIN — Medication 330 MG: at 09:30

## 2024-08-14 RX ADMIN — MONTELUKAST 10 MG: 10 TABLET, FILM COATED ORAL at 21:10

## 2024-08-14 RX ADMIN — FAMOTIDINE 10 MG: 20 TABLET ORAL at 09:29

## 2024-08-14 RX ADMIN — IPRATROPIUM BROMIDE AND ALBUTEROL SULFATE 1 DOSE: 2.5; .5 SOLUTION RESPIRATORY (INHALATION) at 00:23

## 2024-08-14 RX ADMIN — METHYLPREDNISOLONE SODIUM SUCCINATE 40 MG: 40 INJECTION INTRAMUSCULAR; INTRAVENOUS at 03:54

## 2024-08-14 RX ADMIN — PIPERACILLIN AND TAZOBACTAM 3375 MG: 3; .375 INJECTION, POWDER, LYOPHILIZED, FOR SOLUTION INTRAVENOUS at 13:05

## 2024-08-14 RX ADMIN — BACITRACIN ZINC AND POLYMYXIN B SULFATE 14.2 G: at 09:34

## 2024-08-14 RX ADMIN — PIPERACILLIN AND TAZOBACTAM 3375 MG: 3; .375 INJECTION, POWDER, LYOPHILIZED, FOR SOLUTION INTRAVENOUS at 01:12

## 2024-08-14 RX ADMIN — IPRATROPIUM BROMIDE AND ALBUTEROL SULFATE 1 DOSE: 2.5; .5 SOLUTION RESPIRATORY (INHALATION) at 12:41

## 2024-08-14 RX ADMIN — PAROXETINE 10 MG: 10 TABLET, FILM COATED ORAL at 09:29

## 2024-08-14 RX ADMIN — METHYLPREDNISOLONE SODIUM SUCCINATE 40 MG: 40 INJECTION INTRAMUSCULAR; INTRAVENOUS at 16:06

## 2024-08-14 RX ADMIN — TRIAMCINOLONE ACETONIDE: 1 OINTMENT TOPICAL at 21:10

## 2024-08-14 RX ADMIN — BACITRACIN ZINC AND POLYMYXIN B SULFATE 14.2 G: at 21:10

## 2024-08-14 RX ADMIN — ENOXAPARIN SODIUM 40 MG: 100 INJECTION SUBCUTANEOUS at 09:34

## 2024-08-14 RX ADMIN — FAMOTIDINE 10 MG: 20 TABLET ORAL at 21:10

## 2024-08-14 RX ADMIN — SODIUM CHLORIDE, PRESERVATIVE FREE 10 ML: 5 INJECTION INTRAVENOUS at 11:45

## 2024-08-14 RX ADMIN — SODIUM CHLORIDE, PRESERVATIVE FREE 10 ML: 5 INJECTION INTRAVENOUS at 21:10

## 2024-08-14 NOTE — PLAN OF CARE
Problem: Safety - Adult  Goal: Free from fall injury  Outcome: Progressing     Problem: Pain  Goal: Verbalizes/displays adequate comfort level or baseline comfort level  Outcome: Progressing     Problem: Skin/Tissue Integrity  Goal: Absence of new skin breakdown  Description: 1.  Monitor for areas of redness and/or skin breakdown  2.  Assess vascular access sites hourly  3.  Every 4-6 hours minimum:  Change oxygen saturation probe site  4.  Every 4-6 hours:  If on nasal continuous positive airway pressure, respiratory therapy assess nares and determine need for appliance change or resting period.  Outcome: Progressing     Problem: Nutrition Deficit:  Goal: Optimize nutritional status  Outcome: Progressing

## 2024-08-14 NOTE — CONSULTS
Inpatient Cardiology Consultation      Reason for Consult:  V-tach    Consulting Physician: Dr. Staples    Requesting Physician:  Dr. Armenta     Date of Consultation: 8/14/2024    HISTORY OF PRESENT ILLNESS: 69 y.o. AA female, not current with [facility], previously seen by Dr. Reed as inpatient only, for nonsustained V. tach, 1/14/2017, in the setting of pneumonia, acute on chronic renal failure, and metabolic acidosis.    Patient presented on 8/9/2024 from nursing facility for evaluation of tachycardia and fever, temperature on arrival was 103.8.    Patient has a history of severe MR, she is nonverbal at baseline, speech is very garbled.  Her sister is at bedside helping with ADLs.    Patient is alert, mildly agitated due to sister getting her cleaned up and doing mouth care.  She can follow simple directions.  Respiratory easy, does not seem to be in any obvious distress.  Lung sounds diminished, L>R, with rales and rhonchi, getting supplemental O2 at 4 L nasal cannula.    She lives in a nursing home facility.  Bedbound/wheelchair-bound.  Sleeps in bed with one pillow, and patient sister denies any history of DIEGO.     [facility] 8/09/2024: /73, , temp 103.8, O2 sat 95% on 3 L NC     P-, K+ 4.6>4.4>3.3>3.7>4.8>3.2>3.5, BUN/CR 52/1.1>43/1.2>44/1.6>48/1.8>521.5, AST 20>52>73>92, WBC 16.7>19.2>14.9>12.6, H/H 10.9/35.4>9.7/30.9>9.4/30.6>9.6/30.6, >192>148>181, Mag 2.2>20.>2.2>2.4>2.6, Phos 2.1>2.3>5.1>3.9, Lactic acid 2.2>1.5>1.2>1.4>1.1, PT/INR 10.9/1.0, CRP 49.0, sed rate 56     CXR 8/9/2024: Mild cardiomegaly with mild pulmonary vascular congestion. Mild retrocardiac opacity may represent atelectasis or infiltrate.     CT ABDOMEN PELVIS W IV CONTRAST  8/9/2024:  Diverticulosis coli without diverticulitis.  No free fluid, abscess, or acute abdominal disease identified.  Visualization of the upper abdomen partly degraded by patient motion.  Left ovarian 3.6 cm cystic lesion which

## 2024-08-15 LAB
ANION GAP SERPL CALCULATED.3IONS-SCNC: 6 MMOL/L (ref 7–16)
BUN SERPL-MCNC: 36 MG/DL (ref 6–23)
CALCIUM SERPL-MCNC: 9.4 MG/DL (ref 8.6–10.2)
CHLORIDE SERPL-SCNC: 112 MMOL/L (ref 98–107)
CO2 SERPL-SCNC: 29 MMOL/L (ref 22–29)
CREAT SERPL-MCNC: 1 MG/DL (ref 0.5–1)
GFR, ESTIMATED: 60 ML/MIN/1.73M2
GLUCOSE SERPL-MCNC: 183 MG/DL (ref 74–99)
MAGNESIUM SERPL-MCNC: 2 MG/DL (ref 1.6–2.6)
PHOSPHATE SERPL-MCNC: 2.5 MG/DL (ref 2.5–4.5)
POTASSIUM SERPL-SCNC: 3.1 MMOL/L (ref 3.5–5)
SODIUM SERPL-SCNC: 147 MMOL/L (ref 132–146)

## 2024-08-15 PROCEDURE — 2700000000 HC OXYGEN THERAPY PER DAY

## 2024-08-15 PROCEDURE — 80048 BASIC METABOLIC PNL TOTAL CA: CPT

## 2024-08-15 PROCEDURE — 36415 COLL VENOUS BLD VENIPUNCTURE: CPT

## 2024-08-15 PROCEDURE — 99232 SBSQ HOSP IP/OBS MODERATE 35: CPT | Performed by: STUDENT IN AN ORGANIZED HEALTH CARE EDUCATION/TRAINING PROGRAM

## 2024-08-15 PROCEDURE — 6360000002 HC RX W HCPCS: Performed by: INTERNAL MEDICINE

## 2024-08-15 PROCEDURE — 84100 ASSAY OF PHOSPHORUS: CPT

## 2024-08-15 PROCEDURE — 83735 ASSAY OF MAGNESIUM: CPT

## 2024-08-15 PROCEDURE — 6370000000 HC RX 637 (ALT 250 FOR IP): Performed by: INTERNAL MEDICINE

## 2024-08-15 PROCEDURE — 2060000000 HC ICU INTERMEDIATE R&B

## 2024-08-15 PROCEDURE — 94640 AIRWAY INHALATION TREATMENT: CPT

## 2024-08-15 PROCEDURE — 2580000003 HC RX 258: Performed by: INTERNAL MEDICINE

## 2024-08-15 RX ADMIN — BACITRACIN ZINC AND POLYMYXIN B SULFATE: at 21:40

## 2024-08-15 RX ADMIN — OLANZAPINE 20 MG: 10 TABLET, ORALLY DISINTEGRATING ORAL at 21:39

## 2024-08-15 RX ADMIN — BACITRACIN ZINC AND POLYMYXIN B SULFATE: at 09:36

## 2024-08-15 RX ADMIN — METHYLPREDNISOLONE SODIUM SUCCINATE 40 MG: 40 INJECTION INTRAMUSCULAR; INTRAVENOUS at 15:01

## 2024-08-15 RX ADMIN — METHYLPREDNISOLONE SODIUM SUCCINATE 40 MG: 40 INJECTION INTRAMUSCULAR; INTRAVENOUS at 04:18

## 2024-08-15 RX ADMIN — IPRATROPIUM BROMIDE AND ALBUTEROL SULFATE 1 DOSE: 2.5; .5 SOLUTION RESPIRATORY (INHALATION) at 15:28

## 2024-08-15 RX ADMIN — IPRATROPIUM BROMIDE AND ALBUTEROL SULFATE 1 DOSE: 2.5; .5 SOLUTION RESPIRATORY (INHALATION) at 00:04

## 2024-08-15 RX ADMIN — IPRATROPIUM BROMIDE AND ALBUTEROL SULFATE 1 DOSE: 2.5; .5 SOLUTION RESPIRATORY (INHALATION) at 07:43

## 2024-08-15 RX ADMIN — VALPROIC ACID 1000 MG: 250 SOLUTION ORAL at 21:40

## 2024-08-15 RX ADMIN — TRIAMCINOLONE ACETONIDE: 1 OINTMENT TOPICAL at 21:41

## 2024-08-15 RX ADMIN — Medication 330 MG: at 09:35

## 2024-08-15 RX ADMIN — IPRATROPIUM BROMIDE AND ALBUTEROL SULFATE 1 DOSE: 2.5; .5 SOLUTION RESPIRATORY (INHALATION) at 19:33

## 2024-08-15 RX ADMIN — FAMOTIDINE 10 MG: 20 TABLET ORAL at 09:35

## 2024-08-15 RX ADMIN — PAROXETINE 10 MG: 10 TABLET, FILM COATED ORAL at 21:39

## 2024-08-15 RX ADMIN — VALPROIC ACID 1000 MG: 250 SOLUTION ORAL at 09:35

## 2024-08-15 RX ADMIN — FOLIC ACID 1 MG: 1 TABLET ORAL at 09:35

## 2024-08-15 RX ADMIN — IPRATROPIUM BROMIDE AND ALBUTEROL SULFATE 1 DOSE: 2.5; .5 SOLUTION RESPIRATORY (INHALATION) at 11:38

## 2024-08-15 RX ADMIN — PAROXETINE 10 MG: 10 TABLET, FILM COATED ORAL at 09:35

## 2024-08-15 RX ADMIN — MONTELUKAST 10 MG: 10 TABLET, FILM COATED ORAL at 21:39

## 2024-08-15 RX ADMIN — IPRATROPIUM BROMIDE AND ALBUTEROL SULFATE 1 DOSE: 2.5; .5 SOLUTION RESPIRATORY (INHALATION) at 03:48

## 2024-08-15 RX ADMIN — OLANZAPINE 10 MG: 10 TABLET, ORALLY DISINTEGRATING ORAL at 09:35

## 2024-08-15 RX ADMIN — FAMOTIDINE 10 MG: 20 TABLET ORAL at 21:39

## 2024-08-15 RX ADMIN — ENOXAPARIN SODIUM 40 MG: 100 INJECTION SUBCUTANEOUS at 09:37

## 2024-08-15 RX ADMIN — SODIUM CHLORIDE, PRESERVATIVE FREE 10 ML: 5 INJECTION INTRAVENOUS at 21:40

## 2024-08-15 NOTE — PLAN OF CARE
Problem: Safety - Adult  Goal: Free from fall injury  8/14/2024 2137 by Latoya Pa RN  Outcome: Progressing  8/14/2024 1541 by Damián Garner RN  Outcome: Progressing     Problem: Pain  Goal: Verbalizes/displays adequate comfort level or baseline comfort level  8/14/2024 2137 by Latoya Pa RN  Outcome: Progressing  8/14/2024 1541 by Damián Garner RN  Outcome: Progressing     Problem: Skin/Tissue Integrity  Goal: Absence of new skin breakdown  Description: 1.  Monitor for areas of redness and/or skin breakdown  2.  Assess vascular access sites hourly  3.  Every 4-6 hours minimum:  Change oxygen saturation probe site  4.  Every 4-6 hours:  If on nasal continuous positive airway pressure, respiratory therapy assess nares and determine need for appliance change or resting period.  8/14/2024 2137 by Latoya Pa RN  Outcome: Progressing  8/14/2024 1541 by Damián Garner RN  Outcome: Progressing     Problem: Nutrition Deficit:  Goal: Optimize nutritional status  Recent Flowsheet Documentation  Taken 8/14/2024 1212 by Jessica Alvarado, RD, CNSC, LD  Nutrient intake appropriate for improving, restoring, or maintaining nutritional needs:   Assess nutritional status and recommend course of action   Recommend, monitor, and adjust tube feedings and TPN/PPN based on assessed needs

## 2024-08-16 LAB
ANION GAP SERPL CALCULATED.3IONS-SCNC: 7 MMOL/L (ref 7–16)
BUN SERPL-MCNC: 31 MG/DL (ref 6–23)
CALCIUM SERPL-MCNC: 9.6 MG/DL (ref 8.6–10.2)
CHLORIDE SERPL-SCNC: 113 MMOL/L (ref 98–107)
CO2 SERPL-SCNC: 26 MMOL/L (ref 22–29)
CREAT SERPL-MCNC: 0.8 MG/DL (ref 0.5–1)
GFR, ESTIMATED: 83 ML/MIN/1.73M2
GLUCOSE BLD-MCNC: 195 MG/DL (ref 74–99)
GLUCOSE SERPL-MCNC: 125 MG/DL (ref 74–99)
MAGNESIUM SERPL-MCNC: 1.9 MG/DL (ref 1.6–2.6)
PHOSPHATE SERPL-MCNC: 2.6 MG/DL (ref 2.5–4.5)
POTASSIUM SERPL-SCNC: 4.4 MMOL/L (ref 3.5–5)
SODIUM SERPL-SCNC: 146 MMOL/L (ref 132–146)

## 2024-08-16 PROCEDURE — 3609027000 HC BRONCHOSCOPY: Performed by: INTERNAL MEDICINE

## 2024-08-16 PROCEDURE — 99232 SBSQ HOSP IP/OBS MODERATE 35: CPT | Performed by: STUDENT IN AN ORGANIZED HEALTH CARE EDUCATION/TRAINING PROGRAM

## 2024-08-16 PROCEDURE — 87206 SMEAR FLUORESCENT/ACID STAI: CPT

## 2024-08-16 PROCEDURE — 6360000002 HC RX W HCPCS: Performed by: NURSE ANESTHETIST, CERTIFIED REGISTERED

## 2024-08-16 PROCEDURE — 6360000002 HC RX W HCPCS: Performed by: STUDENT IN AN ORGANIZED HEALTH CARE EDUCATION/TRAINING PROGRAM

## 2024-08-16 PROCEDURE — 87015 SPECIMEN INFECT AGNT CONCNTJ: CPT

## 2024-08-16 PROCEDURE — 82962 GLUCOSE BLOOD TEST: CPT

## 2024-08-16 PROCEDURE — 0B9F8ZZ DRAINAGE OF RIGHT LOWER LUNG LOBE, VIA NATURAL OR ARTIFICIAL OPENING ENDOSCOPIC: ICD-10-PCS | Performed by: INTERNAL MEDICINE

## 2024-08-16 PROCEDURE — 3700000001 HC ADD 15 MINUTES (ANESTHESIA): Performed by: INTERNAL MEDICINE

## 2024-08-16 PROCEDURE — 6370000000 HC RX 637 (ALT 250 FOR IP): Performed by: INTERNAL MEDICINE

## 2024-08-16 PROCEDURE — 2060000000 HC ICU INTERMEDIATE R&B

## 2024-08-16 PROCEDURE — 87305 ASPERGILLUS AG IA: CPT

## 2024-08-16 PROCEDURE — 3700000000 HC ANESTHESIA ATTENDED CARE: Performed by: INTERNAL MEDICINE

## 2024-08-16 PROCEDURE — 84100 ASSAY OF PHOSPHORUS: CPT

## 2024-08-16 PROCEDURE — 87116 MYCOBACTERIA CULTURE: CPT

## 2024-08-16 PROCEDURE — 2580000003 HC RX 258: Performed by: NURSE ANESTHETIST, CERTIFIED REGISTERED

## 2024-08-16 PROCEDURE — 0B978ZZ DRAINAGE OF LEFT MAIN BRONCHUS, VIA NATURAL OR ARTIFICIAL OPENING ENDOSCOPIC: ICD-10-PCS | Performed by: INTERNAL MEDICINE

## 2024-08-16 PROCEDURE — 94640 AIRWAY INHALATION TREATMENT: CPT

## 2024-08-16 PROCEDURE — 6360000002 HC RX W HCPCS: Performed by: INTERNAL MEDICINE

## 2024-08-16 PROCEDURE — 2500000003 HC RX 250 WO HCPCS: Performed by: STUDENT IN AN ORGANIZED HEALTH CARE EDUCATION/TRAINING PROGRAM

## 2024-08-16 PROCEDURE — 7100000011 HC PHASE II RECOVERY - ADDTL 15 MIN: Performed by: INTERNAL MEDICINE

## 2024-08-16 PROCEDURE — 2580000003 HC RX 258: Performed by: INTERNAL MEDICINE

## 2024-08-16 PROCEDURE — 87205 SMEAR GRAM STAIN: CPT

## 2024-08-16 PROCEDURE — 87102 FUNGUS ISOLATION CULTURE: CPT

## 2024-08-16 PROCEDURE — 7100000010 HC PHASE II RECOVERY - FIRST 15 MIN: Performed by: INTERNAL MEDICINE

## 2024-08-16 PROCEDURE — 83735 ASSAY OF MAGNESIUM: CPT

## 2024-08-16 PROCEDURE — 2700000000 HC OXYGEN THERAPY PER DAY

## 2024-08-16 PROCEDURE — 2709999900 HC NON-CHARGEABLE SUPPLY: Performed by: INTERNAL MEDICINE

## 2024-08-16 PROCEDURE — 2500000003 HC RX 250 WO HCPCS: Performed by: INTERNAL MEDICINE

## 2024-08-16 PROCEDURE — 89051 BODY FLUID CELL COUNT: CPT

## 2024-08-16 PROCEDURE — 80048 BASIC METABOLIC PNL TOTAL CA: CPT

## 2024-08-16 PROCEDURE — 87070 CULTURE OTHR SPECIMN AEROBIC: CPT

## 2024-08-16 RX ORDER — SODIUM CHLORIDE 9 MG/ML
INJECTION, SOLUTION INTRAVENOUS CONTINUOUS PRN
Status: DISCONTINUED | OUTPATIENT
Start: 2024-08-16 | End: 2024-08-16 | Stop reason: SDUPTHER

## 2024-08-16 RX ORDER — LIDOCAINE HYDROCHLORIDE 20 MG/ML
INJECTION, SOLUTION EPIDURAL; INFILTRATION; INTRACAUDAL; PERINEURAL PRN
Status: DISCONTINUED | OUTPATIENT
Start: 2024-08-16 | End: 2024-08-16 | Stop reason: ALTCHOICE

## 2024-08-16 RX ORDER — PROPOFOL 10 MG/ML
INJECTION, EMULSION INTRAVENOUS PRN
Status: DISCONTINUED | OUTPATIENT
Start: 2024-08-16 | End: 2024-08-16 | Stop reason: SDUPTHER

## 2024-08-16 RX ORDER — POTASSIUM CHLORIDE 7.45 MG/ML
10 INJECTION INTRAVENOUS
Status: COMPLETED | OUTPATIENT
Start: 2024-08-16 | End: 2024-08-16

## 2024-08-16 RX ADMIN — BACITRACIN ZINC AND POLYMYXIN B SULFATE: at 08:43

## 2024-08-16 RX ADMIN — MONTELUKAST 10 MG: 10 TABLET, FILM COATED ORAL at 21:51

## 2024-08-16 RX ADMIN — IPRATROPIUM BROMIDE AND ALBUTEROL SULFATE 1 DOSE: 2.5; .5 SOLUTION RESPIRATORY (INHALATION) at 09:44

## 2024-08-16 RX ADMIN — METHYLPREDNISOLONE SODIUM SUCCINATE 40 MG: 40 INJECTION INTRAMUSCULAR; INTRAVENOUS at 03:28

## 2024-08-16 RX ADMIN — PAROXETINE 10 MG: 10 TABLET, FILM COATED ORAL at 21:51

## 2024-08-16 RX ADMIN — IPRATROPIUM BROMIDE AND ALBUTEROL SULFATE 1 DOSE: 2.5; .5 SOLUTION RESPIRATORY (INHALATION) at 03:43

## 2024-08-16 RX ADMIN — IPRATROPIUM BROMIDE AND ALBUTEROL SULFATE 1 DOSE: 2.5; .5 SOLUTION RESPIRATORY (INHALATION) at 00:18

## 2024-08-16 RX ADMIN — DEXTROSE MONOHYDRATE: 50 INJECTION, SOLUTION INTRAVENOUS at 22:16

## 2024-08-16 RX ADMIN — SODIUM CHLORIDE: 9 INJECTION, SOLUTION INTRAVENOUS at 11:31

## 2024-08-16 RX ADMIN — FAMOTIDINE 10 MG: 20 TABLET ORAL at 14:17

## 2024-08-16 RX ADMIN — VALPROIC ACID 1000 MG: 250 SOLUTION ORAL at 21:51

## 2024-08-16 RX ADMIN — Medication 330 MG: at 14:16

## 2024-08-16 RX ADMIN — POTASSIUM CHLORIDE 10 MEQ: 10 INJECTION, SOLUTION INTRAVENOUS at 09:49

## 2024-08-16 RX ADMIN — TRIAMCINOLONE ACETONIDE: 1 OINTMENT TOPICAL at 21:49

## 2024-08-16 RX ADMIN — BACITRACIN ZINC AND POLYMYXIN B SULFATE: at 21:48

## 2024-08-16 RX ADMIN — IPRATROPIUM BROMIDE AND ALBUTEROL SULFATE 1 DOSE: 2.5; .5 SOLUTION RESPIRATORY (INHALATION) at 13:02

## 2024-08-16 RX ADMIN — POTASSIUM CHLORIDE 10 MEQ: 10 INJECTION, SOLUTION INTRAVENOUS at 15:25

## 2024-08-16 RX ADMIN — SODIUM CHLORIDE, PRESERVATIVE FREE 10 ML: 5 INJECTION INTRAVENOUS at 21:46

## 2024-08-16 RX ADMIN — POTASSIUM CHLORIDE 10 MEQ: 10 INJECTION, SOLUTION INTRAVENOUS at 08:42

## 2024-08-16 RX ADMIN — IPRATROPIUM BROMIDE AND ALBUTEROL SULFATE 1 DOSE: 2.5; .5 SOLUTION RESPIRATORY (INHALATION) at 16:09

## 2024-08-16 RX ADMIN — OLANZAPINE 20 MG: 10 TABLET, ORALLY DISINTEGRATING ORAL at 21:51

## 2024-08-16 RX ADMIN — FOLIC ACID 1 MG: 1 TABLET ORAL at 14:18

## 2024-08-16 RX ADMIN — POTASSIUM CHLORIDE 10 MEQ: 10 INJECTION, SOLUTION INTRAVENOUS at 14:23

## 2024-08-16 RX ADMIN — METHYLPREDNISOLONE SODIUM SUCCINATE 40 MG: 40 INJECTION INTRAMUSCULAR; INTRAVENOUS at 17:11

## 2024-08-16 RX ADMIN — MICONAZOLE NITRATE: 20 POWDER TOPICAL at 21:53

## 2024-08-16 RX ADMIN — PAROXETINE 10 MG: 10 TABLET, FILM COATED ORAL at 14:17

## 2024-08-16 RX ADMIN — OLANZAPINE 10 MG: 10 TABLET, ORALLY DISINTEGRATING ORAL at 14:18

## 2024-08-16 RX ADMIN — VALPROIC ACID 1000 MG: 250 SOLUTION ORAL at 14:16

## 2024-08-16 RX ADMIN — IPRATROPIUM BROMIDE AND ALBUTEROL SULFATE 1 DOSE: 2.5; .5 SOLUTION RESPIRATORY (INHALATION) at 20:53

## 2024-08-16 RX ADMIN — PROPOFOL 230 MG: 10 INJECTION, EMULSION INTRAVENOUS at 11:31

## 2024-08-16 RX ADMIN — FAMOTIDINE 10 MG: 20 TABLET ORAL at 21:51

## 2024-08-16 RX ADMIN — MIDODRINE HYDROCHLORIDE 7.5 MG: 5 TABLET ORAL at 15:32

## 2024-08-16 ASSESSMENT — LIFESTYLE VARIABLES: SMOKING_STATUS: 0

## 2024-08-16 NOTE — ANESTHESIA POSTPROCEDURE EVALUATION
Department of Anesthesiology  Postprocedure Note    Patient: Katerina Paiz  MRN: [id number]  YOB: 1955  Date of evaluation: 8/16/2024    Procedure Summary       Date: 08/16/24 Room / Location: [place] [id number] / [facility]    Anesthesia Start: 1124 Anesthesia Stop: 1149    Procedure: DIAGNOSTIC BRONCHOSCOPY      ++CONTACT ISOLATION++ Diagnosis:       Pneumonia due to infectious organism, unspecified laterality, unspecified part of lung      (Pneumonia due to infectious organism, unspecified laterality, unspecified part of lung [J18.9])    Surgeons: Yuriy Pinto MD Responsible Provider: Iva Kwan DO    Anesthesia Type: MAC ASA Status: 4            Anesthesia Type: No value filed.    Horacio Phase I:      Horacio Phase II:      Anesthesia Post Evaluation    Patient location during evaluation: PACU  Patient participation: complete - patient participated  Level of consciousness: awake  Airway patency: patent  Nausea & Vomiting: no nausea and no vomiting  Cardiovascular status: hemodynamically stable  Respiratory status: acceptable  Hydration status: euvolemic  Pain management: adequate        No notable events documented.

## 2024-08-16 NOTE — PLAN OF CARE
Problem: Safety - Adult  Goal: Free from fall injury  8/15/2024 2152 by Latoya Pa RN  Outcome: Progressing  8/15/2024 1140 by Damián Garner RN  Outcome: Progressing     Problem: Pain  Goal: Verbalizes/displays adequate comfort level or baseline comfort level  8/15/2024 2152 by Latoya Pa RN  Outcome: Progressing  8/15/2024 1140 by Damián Garner, RN  Outcome: Progressing     Problem: Skin/Tissue Integrity  Goal: Absence of new skin breakdown  Description: 1.  Monitor for areas of redness and/or skin breakdown  2.  Assess vascular access sites hourly  3.  Every 4-6 hours minimum:  Change oxygen saturation probe site  4.  Every 4-6 hours:  If on nasal continuous positive airway pressure, respiratory therapy assess nares and determine need for appliance change or resting period.  8/15/2024 2152 by Latoya Pa RN  Outcome: Progressing  8/15/2024 1140 by Damián Garner, RN  Outcome: Progressing

## 2024-08-16 NOTE — CARE COORDINATION
Social Work:    Social service received a call back from Latanya Canada, Legal Guardian at [facility] ([phone]) confirming plans for Katerina to return to [facility] upon discharge.  Latanya will need notified of discharge day, as well as Mar[facility] liaison [phone]. (Use [facility] [phone] or [facility] [phone])    Electronically signed by AILYN Harris on 8/16/2024 at 9:55 AM

## 2024-08-16 NOTE — ANESTHESIA PRE PROCEDURE
Department of Anesthesiology  Preprocedure Note       Name:  Katerina Paiz   Age:  69 y.o.  :  1955                                          MRN:  [id number]         Date:  2024      Surgeon: Surgeon(s):  Yuriy Pinto MD    Procedure: Procedure(s):  DIAGNOSTIC BRONCHOSCOPY      ++CONTACT ISOLATION++    Medications prior to admission:   Prior to Admission medications    Medication Sig Start Date End Date Taking? Authorizing Provider   OXYGEN Inhale 2 L/min into the lungs continuous   Yes Marco Adams MD   bacitracin-polymyxin b (POLYSPORIN) [id number] UNIT/GM ointment Apply topically 2 times daily @ 0800 & 2000  -STOMA-    Marco Adams MD   bumetanide (BUMEX) 1 MG tablet 1 tablet by PEG Tube route every morning @ 0800    Marco Adams MD   famotidine (PEPCID) 40 MG/5ML suspension 2.5 mLs by Per G Tube route 2 times daily @ 0800 & 2000    Marco Admas MD   ferrous sulfate 220 (44 Fe) MG/5ML SOLN 330 mg by PEG Tube route every morning @ 0800 (330MG=7.5ML)    Marco Adams MD   folic acid (FOLVITE) 1 MG tablet 1 tablet by PEG Tube route every morning @ 0800    Marco Adams MD   levothyroxine (SYNTHROID) 150 MCG tablet 1 tablet by PEG Tube route every morning (before breakfast) @ 0700    Marco Adams MD   midodrine (PROAMATINE) 2.5 MG tablet 3 tablets by Per G Tube route 3 times daily Indications: HOLD IF SBP<100 @ 0800, 1600, &     Marco Adams MD   montelukast (SINGULAIR) 5 MG chewable tablet 2 tablets by Per G Tube route nightly @      Marco Adams MD   OLANZapine zydis (ZYPREXA) 20 MG disintegrating tablet 1 tablet by Per G Tube route nightly @   **SEE OTHER ORDER**    Marco Adams MD   OLANZapine zydis (ZYPREXA) 10 MG disintegrating tablet 1 tablet by Per G Tube route every morning @ 0800  **SEE OTHER ORDER**    Marco Adams MD   PARoxetine (PAXIL) 10 MG/5ML suspension 5 mLs by Per G Tube route

## 2024-08-17 LAB
ANION GAP SERPL CALCULATED.3IONS-SCNC: 6 MMOL/L (ref 7–16)
APPEARANCE BRONCH: NORMAL
APPEARANCE BRONCH: NORMAL
BUN SERPL-MCNC: 28 MG/DL (ref 6–23)
CALCIUM SERPL-MCNC: 9.2 MG/DL (ref 8.6–10.2)
CHLORIDE SERPL-SCNC: 113 MMOL/L (ref 98–107)
CLOT CHECK: NORMAL
CLOT CHECK: NORMAL
CO2 SERPL-SCNC: 27 MMOL/L (ref 22–29)
COLOR BRONCH: NORMAL
COLOR BRONCH: NORMAL
CREAT SERPL-MCNC: 0.8 MG/DL (ref 0.5–1)
ERYTHROCYTE [DISTWIDTH] IN BLOOD BY AUTOMATED COUNT: 15.9 % (ref 11.5–15)
GFR, ESTIMATED: 83 ML/MIN/1.73M2
GLUCOSE SERPL-MCNC: 215 MG/DL (ref 74–99)
HCT VFR BLD AUTO: 27.3 % (ref 34–48)
HGB BLD-MCNC: 8.6 G/DL (ref 11.5–15.5)
LYMPHOCYTES, BAL: 30 %
LYMPHOCYTES, BAL: 43 %
MACROPHAGES, BAL: 43 %
MACROPHAGES, BAL: 56 %
MAGNESIUM SERPL-MCNC: 1.6 MG/DL (ref 1.6–2.6)
MCH RBC QN AUTO: 33.5 PG (ref 26–35)
MCHC RBC AUTO-ENTMCNC: 31.5 G/DL (ref 32–34.5)
MCV RBC AUTO: 106.2 FL (ref 80–99.9)
MICROORGANISM SPEC CULT: ABNORMAL
MICROORGANISM/AGENT SPEC: ABNORMAL
PHOSPHATE SERPL-MCNC: 1.5 MG/DL (ref 2.5–4.5)
PLATELET # BLD AUTO: 249 K/UL (ref 130–450)
PMV BLD AUTO: 11.1 FL (ref 7–12)
POTASSIUM SERPL-SCNC: 4.6 MMOL/L (ref 3.5–5)
RBC # BLD AUTO: 2.57 M/UL (ref 3.5–5.5)
RBC, BAL: 1195 CELLS/UL
RBC, BAL: NORMAL CELLS/UL
SEGMENTED NEUTROPHILS, BAL: 14 %
SEGMENTED NEUTROPHILS, BAL: 14 %
SERVICE CMNT-IMP: ABNORMAL
SODIUM SERPL-SCNC: 146 MMOL/L (ref 132–146)
SPECIMEN DESCRIPTION: ABNORMAL
SPECIMEN TYPE: NORMAL
SPECIMEN TYPE: NORMAL
TOTAL CELLS COUNTED BRONCH: 230 CELLS/UL
TOTAL CELLS COUNTED BRONCH: NORMAL CELLS/UL
WBC OTHER # BLD: 21.8 K/UL (ref 4.5–11.5)

## 2024-08-17 PROCEDURE — 6370000000 HC RX 637 (ALT 250 FOR IP): Performed by: INTERNAL MEDICINE

## 2024-08-17 PROCEDURE — 6360000002 HC RX W HCPCS: Performed by: INTERNAL MEDICINE

## 2024-08-17 PROCEDURE — 2700000000 HC OXYGEN THERAPY PER DAY

## 2024-08-17 PROCEDURE — 2580000003 HC RX 258: Performed by: INTERNAL MEDICINE

## 2024-08-17 PROCEDURE — 85027 COMPLETE CBC AUTOMATED: CPT

## 2024-08-17 PROCEDURE — 94640 AIRWAY INHALATION TREATMENT: CPT

## 2024-08-17 PROCEDURE — 2060000000 HC ICU INTERMEDIATE R&B

## 2024-08-17 PROCEDURE — 84100 ASSAY OF PHOSPHORUS: CPT

## 2024-08-17 PROCEDURE — 99232 SBSQ HOSP IP/OBS MODERATE 35: CPT | Performed by: STUDENT IN AN ORGANIZED HEALTH CARE EDUCATION/TRAINING PROGRAM

## 2024-08-17 PROCEDURE — 83735 ASSAY OF MAGNESIUM: CPT

## 2024-08-17 PROCEDURE — 80048 BASIC METABOLIC PNL TOTAL CA: CPT

## 2024-08-17 RX ADMIN — IPRATROPIUM BROMIDE AND ALBUTEROL SULFATE 1 DOSE: 2.5; .5 SOLUTION RESPIRATORY (INHALATION) at 20:12

## 2024-08-17 RX ADMIN — IPRATROPIUM BROMIDE AND ALBUTEROL SULFATE 1 DOSE: 2.5; .5 SOLUTION RESPIRATORY (INHALATION) at 07:43

## 2024-08-17 RX ADMIN — PAROXETINE 10 MG: 10 TABLET, FILM COATED ORAL at 08:57

## 2024-08-17 RX ADMIN — ENOXAPARIN SODIUM 40 MG: 100 INJECTION SUBCUTANEOUS at 08:55

## 2024-08-17 RX ADMIN — VALPROIC ACID 1000 MG: 250 SOLUTION ORAL at 08:58

## 2024-08-17 RX ADMIN — TRIAMCINOLONE ACETONIDE: 1 OINTMENT TOPICAL at 09:07

## 2024-08-17 RX ADMIN — MICONAZOLE NITRATE: 20 POWDER TOPICAL at 22:11

## 2024-08-17 RX ADMIN — FOLIC ACID 1 MG: 1 TABLET ORAL at 08:55

## 2024-08-17 RX ADMIN — VALPROIC ACID 1000 MG: 250 SOLUTION ORAL at 21:39

## 2024-08-17 RX ADMIN — MIDODRINE HYDROCHLORIDE 7.5 MG: 5 TABLET ORAL at 15:57

## 2024-08-17 RX ADMIN — LEVOTHYROXINE SODIUM 150 MCG: 75 TABLET ORAL at 05:16

## 2024-08-17 RX ADMIN — MIDODRINE HYDROCHLORIDE 7.5 MG: 5 TABLET ORAL at 11:56

## 2024-08-17 RX ADMIN — MONTELUKAST 10 MG: 10 TABLET, FILM COATED ORAL at 21:39

## 2024-08-17 RX ADMIN — IPRATROPIUM BROMIDE AND ALBUTEROL SULFATE 1 DOSE: 2.5; .5 SOLUTION RESPIRATORY (INHALATION) at 15:44

## 2024-08-17 RX ADMIN — SODIUM CHLORIDE, PRESERVATIVE FREE 10 ML: 5 INJECTION INTRAVENOUS at 09:07

## 2024-08-17 RX ADMIN — METHYLPREDNISOLONE SODIUM SUCCINATE 40 MG: 40 INJECTION INTRAMUSCULAR; INTRAVENOUS at 15:55

## 2024-08-17 RX ADMIN — FAMOTIDINE 10 MG: 20 TABLET ORAL at 08:55

## 2024-08-17 RX ADMIN — PAROXETINE 10 MG: 10 TABLET, FILM COATED ORAL at 21:38

## 2024-08-17 RX ADMIN — OLANZAPINE 20 MG: 10 TABLET, ORALLY DISINTEGRATING ORAL at 21:38

## 2024-08-17 RX ADMIN — IPRATROPIUM BROMIDE AND ALBUTEROL SULFATE 1 DOSE: 2.5; .5 SOLUTION RESPIRATORY (INHALATION) at 11:44

## 2024-08-17 RX ADMIN — TRIAMCINOLONE ACETONIDE: 1 OINTMENT TOPICAL at 22:11

## 2024-08-17 RX ADMIN — IPRATROPIUM BROMIDE AND ALBUTEROL SULFATE 1 DOSE: 2.5; .5 SOLUTION RESPIRATORY (INHALATION) at 00:02

## 2024-08-17 RX ADMIN — MICONAZOLE NITRATE: 20 POWDER TOPICAL at 09:07

## 2024-08-17 RX ADMIN — IPRATROPIUM BROMIDE AND ALBUTEROL SULFATE 1 DOSE: 2.5; .5 SOLUTION RESPIRATORY (INHALATION) at 04:17

## 2024-08-17 RX ADMIN — BACITRACIN ZINC AND POLYMYXIN B SULFATE: at 09:06

## 2024-08-17 RX ADMIN — METHYLPREDNISOLONE SODIUM SUCCINATE 40 MG: 40 INJECTION INTRAMUSCULAR; INTRAVENOUS at 05:16

## 2024-08-17 RX ADMIN — DEXTROSE MONOHYDRATE: 50 INJECTION, SOLUTION INTRAVENOUS at 18:32

## 2024-08-17 RX ADMIN — FAMOTIDINE 10 MG: 20 TABLET ORAL at 21:39

## 2024-08-17 RX ADMIN — MIDODRINE HYDROCHLORIDE 7.5 MG: 5 TABLET ORAL at 08:54

## 2024-08-17 RX ADMIN — Medication 330 MG: at 08:53

## 2024-08-17 RX ADMIN — OLANZAPINE 10 MG: 10 TABLET, ORALLY DISINTEGRATING ORAL at 08:56

## 2024-08-17 RX ADMIN — BACITRACIN ZINC AND POLYMYXIN B SULFATE: at 21:44

## 2024-08-17 ASSESSMENT — PAIN SCALES - GENERAL: PAINLEVEL_OUTOF10: 0

## 2024-08-17 NOTE — PLAN OF CARE
Problem: Safety - Adult  Goal: Free from fall injury  8/17/2024 1009 by Artis Faust RN  Outcome: Progressing  8/16/2024 2322 by Lashon Gonzalez RN  Outcome: Adequate for Discharge     Problem: Pain  Goal: Verbalizes/displays adequate comfort level or baseline comfort level  8/17/2024 1009 by Artis Faust RN  Outcome: Progressing  8/16/2024 2322 by Lashon Gonzalez RN  Outcome: Adequate for Discharge     Problem: Skin/Tissue Integrity  Goal: Absence of new skin breakdown  Description: 1.  Monitor for areas of redness and/or skin breakdown  2.  Assess vascular access sites hourly  3.  Every 4-6 hours minimum:  Change oxygen saturation probe site  4.  Every 4-6 hours:  If on nasal continuous positive airway pressure, respiratory therapy assess nares and determine need for appliance change or resting period.  8/17/2024 1009 by Artis Faust RN  Outcome: Progressing  8/16/2024 2322 by Lashon Gonzalez RN  Outcome: Adequate for Discharge     Problem: Nutrition Deficit:  Goal: Optimize nutritional status  8/17/2024 1009 by Artis Faust RN  Outcome: Progressing  8/16/2024 2322 by Lashon Gonzalez RN  Outcome: Adequate for Discharge     Problem: Discharge Planning  Goal: Discharge to home or other facility with appropriate resources  8/17/2024 1009 by Artis Faust RN  Outcome: Progressing  8/16/2024 2322 by Lashon Gonzalez RN  Outcome: Adequate for Discharge

## 2024-08-17 NOTE — PLAN OF CARE
Problem: Safety - Adult  Goal: Free from fall injury  Outcome: Adequate for Discharge     Problem: Pain  Goal: Verbalizes/displays adequate comfort level or baseline comfort level  Outcome: Adequate for Discharge     Problem: Skin/Tissue Integrity  Goal: Absence of new skin breakdown  Description: 1.  Monitor for areas of redness and/or skin breakdown  2.  Assess vascular access sites hourly  3.  Every 4-6 hours minimum:  Change oxygen saturation probe site  4.  Every 4-6 hours:  If on nasal continuous positive airway pressure, respiratory therapy assess nares and determine need for appliance change or resting period.  Outcome: Adequate for Discharge     Problem: Nutrition Deficit:  Goal: Optimize nutritional status  Outcome: Adequate for Discharge     Problem: Discharge Planning  Goal: Discharge to home or other facility with appropriate resources  Outcome: Adequate for Discharge

## 2024-08-18 VITALS
WEIGHT: 199.2 LBS | BODY MASS INDEX: 34.01 KG/M2 | SYSTOLIC BLOOD PRESSURE: 141 MMHG | DIASTOLIC BLOOD PRESSURE: 76 MMHG | HEIGHT: 64 IN | HEART RATE: 79 BPM | RESPIRATION RATE: 19 BRPM | OXYGEN SATURATION: 100 % | TEMPERATURE: 97.3 F

## 2024-08-18 LAB
ANION GAP SERPL CALCULATED.3IONS-SCNC: 7 MMOL/L (ref 7–16)
BUN SERPL-MCNC: 27 MG/DL (ref 6–23)
CALCIUM SERPL-MCNC: 9.4 MG/DL (ref 8.6–10.2)
CHLORIDE SERPL-SCNC: 106 MMOL/L (ref 98–107)
CO2 SERPL-SCNC: 28 MMOL/L (ref 22–29)
CREAT SERPL-MCNC: 0.8 MG/DL (ref 0.5–1)
GFR, ESTIMATED: 79 ML/MIN/1.73M2
GLUCOSE BLD-MCNC: 171 MG/DL (ref 74–99)
GLUCOSE SERPL-MCNC: 171 MG/DL (ref 74–99)
MICROORGANISM SPEC CULT: ABNORMAL
MICROORGANISM/AGENT SPEC: ABNORMAL
POTASSIUM SERPL-SCNC: 4.4 MMOL/L (ref 3.5–5)
SERVICE CMNT-IMP: ABNORMAL
SODIUM SERPL-SCNC: 141 MMOL/L (ref 132–146)
SPECIMEN DESCRIPTION: ABNORMAL

## 2024-08-18 PROCEDURE — 6370000000 HC RX 637 (ALT 250 FOR IP): Performed by: INTERNAL MEDICINE

## 2024-08-18 PROCEDURE — 82962 GLUCOSE BLOOD TEST: CPT

## 2024-08-18 PROCEDURE — 6360000002 HC RX W HCPCS: Performed by: INTERNAL MEDICINE

## 2024-08-18 PROCEDURE — 94640 AIRWAY INHALATION TREATMENT: CPT

## 2024-08-18 PROCEDURE — 99239 HOSP IP/OBS DSCHRG MGMT >30: CPT | Performed by: STUDENT IN AN ORGANIZED HEALTH CARE EDUCATION/TRAINING PROGRAM

## 2024-08-18 PROCEDURE — 80048 BASIC METABOLIC PNL TOTAL CA: CPT

## 2024-08-18 PROCEDURE — 2700000000 HC OXYGEN THERAPY PER DAY

## 2024-08-18 PROCEDURE — 36415 COLL VENOUS BLD VENIPUNCTURE: CPT

## 2024-08-18 PROCEDURE — 6370000000 HC RX 637 (ALT 250 FOR IP): Performed by: STUDENT IN AN ORGANIZED HEALTH CARE EDUCATION/TRAINING PROGRAM

## 2024-08-18 RX ORDER — DEXTROSE MONOHYDRATE 100 MG/ML
INJECTION, SOLUTION INTRAVENOUS CONTINUOUS PRN
Status: DISCONTINUED | OUTPATIENT
Start: 2024-08-18 | End: 2024-08-18 | Stop reason: HOSPADM

## 2024-08-18 RX ORDER — INSULIN LISPRO 100 [IU]/ML
0-4 INJECTION, SOLUTION INTRAVENOUS; SUBCUTANEOUS
Status: DISCONTINUED | OUTPATIENT
Start: 2024-08-18 | End: 2024-08-18 | Stop reason: HOSPADM

## 2024-08-18 RX ORDER — INSULIN LISPRO 100 [IU]/ML
0-4 INJECTION, SOLUTION INTRAVENOUS; SUBCUTANEOUS NIGHTLY
Status: DISCONTINUED | OUTPATIENT
Start: 2024-08-18 | End: 2024-08-18 | Stop reason: HOSPADM

## 2024-08-18 RX ORDER — GLUCAGON 1 MG/ML
1 KIT INJECTION PRN
Status: DISCONTINUED | OUTPATIENT
Start: 2024-08-18 | End: 2024-08-18 | Stop reason: HOSPADM

## 2024-08-18 RX ORDER — PREDNISOLONE 15 MG/5ML
SOLUTION ORAL
Qty: 73.2 ML | Refills: 0 | Status: SHIPPED | OUTPATIENT
Start: 2024-08-18 | End: 2024-08-28

## 2024-08-18 RX ADMIN — IPRATROPIUM BROMIDE AND ALBUTEROL SULFATE 1 DOSE: 2.5; .5 SOLUTION RESPIRATORY (INHALATION) at 11:42

## 2024-08-18 RX ADMIN — FOLIC ACID 1 MG: 1 TABLET ORAL at 09:03

## 2024-08-18 RX ADMIN — FAMOTIDINE 10 MG: 20 TABLET ORAL at 09:04

## 2024-08-18 RX ADMIN — MIDODRINE HYDROCHLORIDE 7.5 MG: 5 TABLET ORAL at 09:04

## 2024-08-18 RX ADMIN — INSULIN LISPRO 1 UNITS: 100 INJECTION, SOLUTION INTRAVENOUS; SUBCUTANEOUS at 09:31

## 2024-08-18 RX ADMIN — PAROXETINE 10 MG: 10 TABLET, FILM COATED ORAL at 09:06

## 2024-08-18 RX ADMIN — LEVOTHYROXINE SODIUM 150 MCG: 75 TABLET ORAL at 06:04

## 2024-08-18 RX ADMIN — IPRATROPIUM BROMIDE AND ALBUTEROL SULFATE 1 DOSE: 2.5; .5 SOLUTION RESPIRATORY (INHALATION) at 00:12

## 2024-08-18 RX ADMIN — IPRATROPIUM BROMIDE AND ALBUTEROL SULFATE 1 DOSE: 2.5; .5 SOLUTION RESPIRATORY (INHALATION) at 04:04

## 2024-08-18 RX ADMIN — BACITRACIN ZINC AND POLYMYXIN B SULFATE: at 09:03

## 2024-08-18 RX ADMIN — MICONAZOLE NITRATE: 20 POWDER TOPICAL at 09:10

## 2024-08-18 RX ADMIN — VALPROIC ACID 1000 MG: 250 SOLUTION ORAL at 09:12

## 2024-08-18 RX ADMIN — ENOXAPARIN SODIUM 40 MG: 100 INJECTION SUBCUTANEOUS at 09:07

## 2024-08-18 RX ADMIN — IPRATROPIUM BROMIDE AND ALBUTEROL SULFATE 1 DOSE: 2.5; .5 SOLUTION RESPIRATORY (INHALATION) at 09:07

## 2024-08-18 RX ADMIN — METHYLPREDNISOLONE SODIUM SUCCINATE 40 MG: 40 INJECTION INTRAMUSCULAR; INTRAVENOUS at 04:31

## 2024-08-18 RX ADMIN — TRIAMCINOLONE ACETONIDE: 1 OINTMENT TOPICAL at 09:09

## 2024-08-18 RX ADMIN — OLANZAPINE 10 MG: 10 TABLET, ORALLY DISINTEGRATING ORAL at 09:05

## 2024-08-18 RX ADMIN — Medication 330 MG: at 09:05

## 2024-08-18 ASSESSMENT — PAIN SCALES - WONG BAKER
WONGBAKER_NUMERICALRESPONSE: NO HURT
WONGBAKER_NUMERICALRESPONSE: NO HURT

## 2024-08-18 ASSESSMENT — PAIN SCALES - GENERAL
PAINLEVEL_OUTOF10: 0
PAINLEVEL_OUTOF10: 0

## 2024-08-18 NOTE — PROGRESS NOTES
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    Disruptive behavior disorder    Ingrowing nail    Peripheral vascular disease (HCC)    Onychomycosis    Anemia due to chronic kidney disease    Vitamin D deficiency    Gastroesophageal reflux disease without esophagitis    S/P percutaneous endoscopic gastrostomy (PEG) tube placement (HCC)    Chronic kidney disease    Dementia with behavioral disturbance    Intractable nausea and vomiting    Hypotension    Severe protein-calorie malnutrition (HCC)    Hyponatremia    Leukocytosis    Malfunction of percutaneous endoscopic gastrostomy (PEG) tube (HCC)    PEG tube malfunction (HCC)    Sepsis (HCC)    Hypophosphatemia    Aspiration pneumonia (HCC)    Dehydration    Anemia       Subjective:  Patient is seen on 4 L nasal cannula. She looks to be comfortable, patient asleep. No cough witnessed.       Vitals:  VITALS:  /78   Pulse 51   Temp 97.1 °F (36.2 °C) (Temporal)   Resp 18   Ht 1.626 m (5' 4\")   Wt 89.4 kg (197 lb)   SpO2 96%   BMI 33.81 kg/m²     24HR INTAKE/OUTPUT:      Intake/Output Summary (Last 24 hours) at 2024 1202  Last data filed at 2024 0349  Gross per 24 hour   Intake --   Output 1000 ml   Net -1000 ml       24HR PULSE OXIMETRY RANGE:    SpO2  Av.7 %  Min: 94 %  Max: 100 %    Medications:  IV:   sodium chloride 50 mL/hr at 24 1857    sodium chloride         Scheduled Meds:   methylPREDNISolone  40 mg IntraVENous Q12H    piperacillin-tazobactam  3,375 mg IntraVENous Q8H    bacitracin-polymyxin b   Topical BID    famotidine  10 mg Per G Tube BID    ferrous Sulfate  330 mg Per G Tube QAM    folic acid  1 mg PEG Tube QAM    levothyroxine  150 mcg PEG Tube QAM AC    midodrine  7.5 
                 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    Disruptive behavior disorder    Ingrowing nail    Peripheral vascular disease (HCC)    Onychomycosis    Anemia due to chronic kidney disease    Vitamin D deficiency    Gastroesophageal reflux disease without esophagitis    S/P percutaneous endoscopic gastrostomy (PEG) tube placement (HCC)    Chronic kidney disease    Dementia with behavioral disturbance    Intractable nausea and vomiting    Hypotension    Severe protein-calorie malnutrition (HCC)    Hyponatremia    Leukocytosis    Malfunction of percutaneous endoscopic gastrostomy (PEG) tube (HCC)    PEG tube malfunction (HCC)    Sepsis (HCC)    Hypophosphatemia    Aspiration pneumonia (HCC)    Dehydration    Anemia       Subjective:  Resting this p.m., breathing comfortably.  Bronchoscopy results reviewed.      Vitals:  VITALS:  /64   Pulse 84   Temp 97.6 °F (36.4 °C) (Axillary)   Resp 18   Ht 1.626 m (5' 4\")   Wt 89.6 kg (197 lb 8 oz)   SpO2 98%   BMI 33.90 kg/m²     24HR INTAKE/OUTPUT:      Intake/Output Summary (Last 24 hours) at 2024 1412  Last data filed at 2024 1207  Gross per 24 hour   Intake 4123.4 ml   Output 900 ml   Net 3223.4 ml       24HR PULSE OXIMETRY RANGE:    SpO2  Av.3 %  Min: 94 %  Max: 98 %    Medications:  IV:   dextrose 50 mL/hr at 24 0627    sodium chloride         Scheduled Meds:   miconazole   Topical BID    methylPREDNISolone  40 mg IntraVENous Q12H    bacitracin-polymyxin b   Topical BID    famotidine  10 mg Per G Tube BID    ferrous Sulfate  330 mg Per G Tube QAM    folic acid  1 mg PEG Tube QAM    levothyroxine  150 mcg PEG Tube QAM AC    midodrine  7.5 mg Per G Tube TID WC    montelukast  10 mg PEG Tube 
       [facility] Hospitalist Progress Note    Admitting Date and Time: 8/9/2024  9:28 AM  Admit Dx: Septicemia (HCC) [A41.9]  Lung collapse [J98.19]  Sepsis (HCC) [A41.9]  Pneumonia due to infectious organism, unspecified laterality, unspecified part of lung [J18.9]    Subjective:  Patient is being followed for Septicemia (HCC) [A41.9]  Lung collapse [J98.19]  Sepsis (HCC) [A41.9]  Pneumonia due to infectious organism, unspecified laterality, unspecified part of lung [J18.9]   Pt unable to participate  Per RN: no additional concerns    ROS: VIRAL     methylPREDNISolone  40 mg IntraVENous Q12H    bacitracin-polymyxin b   Topical BID    famotidine  10 mg Per G Tube BID    ferrous Sulfate  330 mg Per G Tube QAM    folic acid  1 mg PEG Tube QAM    levothyroxine  150 mcg PEG Tube QAM AC    midodrine  7.5 mg Per G Tube TID WC    montelukast  10 mg PEG Tube Nightly    PARoxetine  10 mg Per G Tube BID    valproic acid  1,000 mg Per G Tube BID    sodium chloride flush  5-40 mL IntraVENous 2 times per day    enoxaparin  40 mg SubCUTAneous Daily    ipratropium 0.5 mg-albuterol 2.5 mg  1 Dose Inhalation Q4H RT    OLANZapine zydis  10 mg Oral Daily    And    OLANZapine zydis  20 mg Oral Nightly    triamcinolone   Topical BID     sodium chloride flush, 5-40 mL, PRN  sodium chloride, , PRN  acetaminophen, 650 mg, Q6H PRN   Or  acetaminophen, 650 mg, Q6H PRN         Objective:  /77   Pulse 78   Temp 97.4 °F (36.3 °C) (Temporal)   Resp 20   Ht 1.626 m (5' 4\")   Wt 89.6 kg (197 lb 8 oz)   SpO2 93%   BMI 33.90 kg/m²     General Appearance: alert, vocal but no discernable words  Skin: warm and dry  Head: normocephalic and atraumatic  Eyes: closed  Neck: neck supple, trachea midline   Pulmonary/Chest: CTAB, no respiratory distress, 4L NC  Cardiovascular: RRR, no definite murmurs  Abdomen: soft, non-tender, non-distended, PEG in place  Extremities: no cyanosis, no clubbing and no edema  Neurologic: blind, alert, 
       [facility] Hospitalist Progress Note    Admitting Date and Time: 8/9/2024  9:28 AM  Admit Dx: Septicemia (HCC) [A41.9]  Lung collapse [J98.19]  Sepsis (HCC) [A41.9]  Pneumonia due to infectious organism, unspecified laterality, unspecified part of lung [J18.9]    Subjective:  Patient is being followed for Septicemia (HCC) [A41.9]  Lung collapse [J98.19]  Sepsis (HCC) [A41.9]  Pneumonia due to infectious organism, unspecified laterality, unspecified part of lung [J18.9]   Pt unable to participate  Per RN: no additional concerns    ROS: VIRAL     miconazole   Topical BID    methylPREDNISolone  40 mg IntraVENous Q12H    bacitracin-polymyxin b   Topical BID    famotidine  10 mg Per G Tube BID    ferrous Sulfate  330 mg Per G Tube QAM    folic acid  1 mg PEG Tube QAM    levothyroxine  150 mcg PEG Tube QAM AC    midodrine  7.5 mg Per G Tube TID WC    montelukast  10 mg PEG Tube Nightly    PARoxetine  10 mg Per G Tube BID    valproic acid  1,000 mg Per G Tube BID    sodium chloride flush  5-40 mL IntraVENous 2 times per day    enoxaparin  40 mg SubCUTAneous Daily    ipratropium 0.5 mg-albuterol 2.5 mg  1 Dose Inhalation Q4H RT    OLANZapine zydis  10 mg Oral Daily    And    OLANZapine zydis  20 mg Oral Nightly    triamcinolone   Topical BID     sodium chloride flush, 5-40 mL, PRN  sodium chloride, , PRN  acetaminophen, 650 mg, Q6H PRN   Or  acetaminophen, 650 mg, Q6H PRN         Objective:  /64   Pulse 89   Temp 97.7 °F (36.5 °C) (Infrared)   Resp 18   Ht 1.626 m (5' 4\")   Wt 89.6 kg (197 lb 8 oz)   SpO2 94%   BMI 33.90 kg/m²     General Appearance: sleeping comfortably  Skin: warm and dry  Head: normocephalic and atraumatic  Eyes: closed  Neck: neck supple, trachea midline   Pulmonary/Chest: CTAB, no respiratory distress, 4L NC  Cardiovascular: RRR, no definite murmurs  Abdomen: soft, non-tender, non-distended, PEG in place  Extremities: no cyanosis, no clubbing and no edema  Neurologic: blind, 
       [facility] Hospitalist Progress Note    Admitting Date and Time: 8/9/2024  9:28 AM  Admit Dx: Septicemia (HCC) [A41.9]  Lung collapse [J98.19]  Sepsis (HCC) [A41.9]  Pneumonia due to infectious organism, unspecified laterality, unspecified part of lung [J18.9]    Subjective:  Patient is being followed for Septicemia (HCC) [A41.9]  Lung collapse [J98.19]  Sepsis (HCC) [A41.9]  Pneumonia due to infectious organism, unspecified laterality, unspecified part of lung [J18.9]   Pt unable to participate  Per RN: no additional concerns    ROS: VIRAL     methylPREDNISolone  40 mg IntraVENous Q12H    bacitracin-polymyxin b   Topical BID    famotidine  10 mg Per G Tube BID    ferrous Sulfate  330 mg Per G Tube QAM    folic acid  1 mg PEG Tube QAM    levothyroxine  150 mcg PEG Tube QAM AC    midodrine  7.5 mg Per G Tube TID WC    montelukast  10 mg PEG Tube Nightly    PARoxetine  10 mg Per G Tube BID    valproic acid  1,000 mg Per G Tube BID    sodium chloride flush  5-40 mL IntraVENous 2 times per day    enoxaparin  40 mg SubCUTAneous Daily    ipratropium 0.5 mg-albuterol 2.5 mg  1 Dose Inhalation Q4H RT    OLANZapine zydis  10 mg Oral Daily    And    OLANZapine zydis  20 mg Oral Nightly    triamcinolone   Topical BID     sodium chloride flush, 5-40 mL, PRN  sodium chloride, , PRN  acetaminophen, 650 mg, Q6H PRN   Or  acetaminophen, 650 mg, Q6H PRN         Objective:  BP (!) 151/104   Pulse 79   Temp 97.5 °F (36.4 °C) (Axillary)   Resp 16   Ht 1.626 m (5' 4\")   Wt 91.7 kg (202 lb 3.2 oz)   SpO2 98%   BMI 34.71 kg/m²     General Appearance: sleeping comfortably  Skin: warm and dry  Head: normocephalic and atraumatic  Eyes: closed  Neck: neck supple, trachea midline   Pulmonary/Chest: CTAB, no respiratory distress, RA  Cardiovascular: RRR, no definite murmurs  Abdomen: soft, non-tender, non-distended, PEG in place  Extremities: no cyanosis, no clubbing and no edema  Neurologic: sleeping      Recent Labs     
       [facility] Hospitalist Progress Note    Admitting Date and Time: 8/9/2024  9:28 AM  Admit Dx: Septicemia (HCC) [A41.9]  Lung collapse [J98.19]  Sepsis (HCC) [A41.9]  Pneumonia due to infectious organism, unspecified laterality, unspecified part of lung [J18.9]    Subjective:  Patient is being followed for Septicemia (HCC) [A41.9]  Lung collapse [J98.19]  Sepsis (HCC) [A41.9]  Pneumonia due to infectious organism, unspecified laterality, unspecified part of lung [J18.9]   Pt unable to participate  Per RN: no additional concerns    ROS: VIRAL     miconazole   Topical BID    methylPREDNISolone  40 mg IntraVENous Q12H    bacitracin-polymyxin b   Topical BID    famotidine  10 mg Per G Tube BID    ferrous Sulfate  330 mg Per G Tube QAM    folic acid  1 mg PEG Tube QAM    levothyroxine  150 mcg PEG Tube QAM AC    midodrine  7.5 mg Per G Tube TID WC    montelukast  10 mg PEG Tube Nightly    PARoxetine  10 mg Per G Tube BID    valproic acid  1,000 mg Per G Tube BID    sodium chloride flush  5-40 mL IntraVENous 2 times per day    enoxaparin  40 mg SubCUTAneous Daily    ipratropium 0.5 mg-albuterol 2.5 mg  1 Dose Inhalation Q4H RT    OLANZapine zydis  10 mg Oral Daily    And    OLANZapine zydis  20 mg Oral Nightly    triamcinolone   Topical BID     sodium chloride flush, 5-40 mL, PRN  sodium chloride, , PRN  acetaminophen, 650 mg, Q6H PRN   Or  acetaminophen, 650 mg, Q6H PRN         Objective:  BP (!) 124/58   Pulse 85   Temp 97.5 °F (36.4 °C) (Axillary)   Resp 18   Ht 1.626 m (5' 4\")   Wt 90.4 kg (199 lb 3.2 oz)   SpO2 99%   BMI 34.19 kg/m²     General Appearance: sleeping comfortably in bed  Skin: warm and dry  Head: normocephalic and atraumatic  Eyes: closed  Neck: neck supple, trachea midline   Pulmonary/Chest: CTAB, no respiratory distress, 4L NC  Cardiovascular: RRR, no definite murmurs  Abdomen: soft, non-tender, non-distended, PEG in place  Extremities: no cyanosis, no clubbing and 1+ BLE 
     [facility] Hospitalist   Progress Note    Admitting Date and Time: 8/9/2024  9:28 AM  Admit Dx: Septicemia (HCC) [A41.9]  Lung collapse [J98.19]  Sepsis (HCC) [A41.9]  Pneumonia due to infectious organism, unspecified laterality, unspecified part of lung [J18.9]    Subjective:    Patient was admitted with Septicemia (HCC) [A41.9]  Lung collapse [J98.19]  Sepsis (HCC) [A41.9]  Pneumonia due to infectious organism, unspecified laterality, unspecified part of lung [J18.9]. Patient nonverbal .     piperacillin-tazobactam  3,375 mg IntraVENous Once    methylPREDNISolone  40 mg IntraVENous Q12H    bacitracin-polymyxin b   Topical BID    famotidine  10 mg Per G Tube BID    ferrous Sulfate  330 mg Per G Tube QAM    folic acid  1 mg PEG Tube QAM    levothyroxine  150 mcg PEG Tube QAM AC    midodrine  7.5 mg Per G Tube TID WC    montelukast  10 mg PEG Tube Nightly    PARoxetine  10 mg Per G Tube BID    valproic acid  1,000 mg Per G Tube BID    sodium chloride flush  5-40 mL IntraVENous 2 times per day    enoxaparin  40 mg SubCUTAneous Daily    ipratropium 0.5 mg-albuterol 2.5 mg  1 Dose Inhalation Q4H RT    OLANZapine zydis  10 mg Oral Daily    And    OLANZapine zydis  20 mg Oral Nightly    triamcinolone   Topical BID     sodium chloride flush, 5-40 mL, PRN  sodium chloride, , PRN  acetaminophen, 650 mg, Q6H PRN   Or  acetaminophen, 650 mg, Q6H PRN         Objective:    /65   Pulse 77   Temp 97.1 °F (36.2 °C) (Temporal)   Resp 18   Ht 1.626 m (5' 4\")   Wt 89.8 kg (198 lb)   SpO2 100%   BMI 33.99 kg/m²   Skin: warm and dry, no rash or erythema  Pulmonary/Chest: clear to auscultation bilaterally- no wheezes, rales or rhonchi, normal air movement, no respiratory distress  Cardiovascular: rhythm reg at rate of 78  Abdomen: soft, non-tender, non-distended, normal bowel sounds, no masses or organomegaly  Extremities: no cyanosis, no clubbing, and no edema      Recent Labs     08/12/24  0987 
     [facility] Hospitalist   Progress Note    Admitting Date and Time: 8/9/2024  9:28 AM  Admit Dx: Septicemia (HCC) [A41.9]  Lung collapse [J98.19]  Sepsis (HCC) [A41.9]  Pneumonia due to infectious organism, unspecified laterality, unspecified part of lung [J18.9]    Subjective:    Patient was admitted with Septicemia (HCC) [A41.9]  Lung collapse [J98.19]  Sepsis (HCC) [A41.9]  Pneumonia due to infectious organism, unspecified laterality, unspecified part of lung [J18.9]. Patient nonverbal.     methylPREDNISolone  40 mg IntraVENous Q12H    piperacillin-tazobactam  3,375 mg IntraVENous Q8H    bacitracin-polymyxin b   Topical BID    famotidine  10 mg Per G Tube BID    ferrous Sulfate  330 mg Per G Tube QAM    folic acid  1 mg PEG Tube QAM    levothyroxine  150 mcg PEG Tube QAM AC    midodrine  7.5 mg Per G Tube TID WC    montelukast  10 mg PEG Tube Nightly    PARoxetine  10 mg Per G Tube BID    valproic acid  1,000 mg Per G Tube BID    sodium chloride flush  5-40 mL IntraVENous 2 times per day    enoxaparin  40 mg SubCUTAneous Daily    ipratropium 0.5 mg-albuterol 2.5 mg  1 Dose Inhalation Q4H RT    OLANZapine zydis  10 mg Oral Daily    And    OLANZapine zydis  20 mg Oral Nightly    triamcinolone   Topical BID     sodium chloride flush, 5-40 mL, PRN  sodium chloride, , PRN  acetaminophen, 650 mg, Q6H PRN   Or  acetaminophen, 650 mg, Q6H PRN         Objective:    /80   Pulse 94   Temp 97.5 °F (36.4 °C) (Axillary)   Resp 18   Ht 1.626 m (5' 4\")   Wt 89.4 kg (197 lb)   SpO2 98%   BMI 33.81 kg/m²   Skin: warm and dry, no rash or erythema  Pulmonary/Chest: clear to auscultation bilaterally- no wheezes, rales or rhonchi, normal air movement, no respiratory distress  Cardiovascular: rhythm reg at rate of 96  Abdomen: soft, non-tender, non-distended, normal bowel sounds, no masses or organomegaly  Extremities: no cyanosis, no clubbing, and edema of upper and lower ext b/l      Recent Labs     
     [facility] Hospitalist   Progress Note    Admitting Date and Time: 8/9/2024  9:28 AM  Admit Dx: Septicemia (HCC) [A41.9]  Lung collapse [J98.19]  Sepsis (HCC) [A41.9]  Pneumonia due to infectious organism, unspecified laterality, unspecified part of lung [J18.9]    Subjective:    Patient was admitted with Septicemia (HCC) [A41.9]  Lung collapse [J98.19]  Sepsis (HCC) [A41.9]  Pneumonia due to infectious organism, unspecified laterality, unspecified part of lung [J18.9]. Patient noncommunicative.     methylPREDNISolone  40 mg IntraVENous Q12H    piperacillin-tazobactam  3,375 mg IntraVENous Q8H    bacitracin-polymyxin b   Topical BID    famotidine  10 mg Per G Tube BID    ferrous Sulfate  330 mg Per G Tube QAM    folic acid  1 mg PEG Tube QAM    levothyroxine  150 mcg PEG Tube QAM AC    midodrine  7.5 mg Per G Tube TID WC    montelukast  10 mg PEG Tube Nightly    PARoxetine  10 mg Per G Tube BID    valproic acid  1,000 mg Per G Tube BID    sodium chloride flush  5-40 mL IntraVENous 2 times per day    enoxaparin  40 mg SubCUTAneous Daily    ipratropium 0.5 mg-albuterol 2.5 mg  1 Dose Inhalation Q4H RT    OLANZapine zydis  10 mg Oral Daily    And    OLANZapine zydis  20 mg Oral Nightly    triamcinolone   Topical BID     sodium chloride flush, 5-40 mL, PRN  sodium chloride, , PRN  acetaminophen, 650 mg, Q6H PRN   Or  acetaminophen, 650 mg, Q6H PRN         Objective:    BP (!) 115/99   Pulse 88   Temp 97.1 °F (36.2 °C) (Axillary)   Resp 21   Ht 1.626 m (5' 4\")   Wt 90.3 kg (199 lb)   SpO2 100%   BMI 34.16 kg/m²   Skin: warm and dry, no rash or erythema  Pulmonary/Chest: clear to auscultation bilaterally- no wheezes, rales or rhonchi, normal air movement, no respiratory distress  Cardiovascular: rhythm reg at rate of 84  Abdomen: soft, non-tender, non-distended, normal bowel sounds, no masses or organomegaly  Extremities: no cyanosis, no clubbing, and no edema      Recent Labs     08/11/24  0630 
  [facility]  [address], [address]  [place] [address]      Pulmonary Progress Note      SUBJECTIVE:  lying down in bed on 3.5 li NC, non-purposeful reaction to stimulus, ronchorous breathing, no cough during exam, looking similar overall.    OBJECTIVE    Medications    Continuous Infusions:   dextrose 40 mL/hr at 24 1542    sodium chloride         Scheduled Meds:   methylPREDNISolone  40 mg IntraVENous Q12H    bacitracin-polymyxin b   Topical BID    famotidine  10 mg Per G Tube BID    ferrous Sulfate  330 mg Per G Tube QAM    folic acid  1 mg PEG Tube QAM    levothyroxine  150 mcg PEG Tube QAM AC    midodrine  7.5 mg Per G Tube TID WC    montelukast  10 mg PEG Tube Nightly    PARoxetine  10 mg Per G Tube BID    valproic acid  1,000 mg Per G Tube BID    sodium chloride flush  5-40 mL IntraVENous 2 times per day    enoxaparin  40 mg SubCUTAneous Daily    ipratropium 0.5 mg-albuterol 2.5 mg  1 Dose Inhalation Q4H RT    OLANZapine zydis  10 mg Oral Daily    And    OLANZapine zydis  20 mg Oral Nightly    triamcinolone   Topical BID       PRN Meds:sodium chloride flush, sodium chloride, acetaminophen **OR** acetaminophen    Physical    VITALS:  BP (!) 151/104   Pulse 79   Temp 97.5 °F (36.4 °C) (Axillary)   Resp 16   Ht 1.626 m (5' 4\")   Wt 91.7 kg (202 lb 3.2 oz)   SpO2 98%   BMI 34.71 kg/m²     24HR INTAKE/OUTPUT:      Intake/Output Summary (Last 24 hours) at 8/15/2024 0844  Last data filed at 2024 1800  Gross per 24 hour   Intake 110 ml   Output 500 ml   Net -390 ml       24HR PULSE OXIMETRY RANGE:    SpO2  Av.1 %  Min: 95 %  Max: 100 %    General appearance: appears stated age  Lungs: rhonchi bilaterally  Heart: regular rate and rhythm, S1, S2 normal, no murmur, click, rub or gallop  Abdomen:  (+) PEG  Extremities: edema bipedal  Neurologic: Mental status: non-purposeful reaction to stimulus, MRDD, occ moving 
  [facility]  [address], [address]  [place] [address]      Pulmonary Progress Note      SUBJECTIVE:  lying down in bed on 4 li NC, non-purposeful reaction to stimulus, ronchorous breathing, no cough during exam    OBJECTIVE    Medications    Continuous Infusions:   sodium chloride 75 mL/hr at 24 0722    sodium chloride         Scheduled Meds:   methylPREDNISolone  40 mg IntraVENous Q12H    piperacillin-tazobactam  3,375 mg IntraVENous Q8H    bacitracin-polymyxin b   Topical BID    famotidine  10 mg Per G Tube BID    ferrous Sulfate  330 mg Per G Tube QAM    folic acid  1 mg PEG Tube QAM    levothyroxine  150 mcg PEG Tube QAM AC    midodrine  7.5 mg Per G Tube TID WC    montelukast  10 mg PEG Tube Nightly    PARoxetine  10 mg Per G Tube BID    valproic acid  1,000 mg Per G Tube BID    sodium chloride flush  5-40 mL IntraVENous 2 times per day    enoxaparin  40 mg SubCUTAneous Daily    ipratropium 0.5 mg-albuterol 2.5 mg  1 Dose Inhalation Q4H RT    OLANZapine zydis  10 mg Oral Daily    And    OLANZapine zydis  20 mg Oral Nightly    triamcinolone   Topical BID       PRN Meds:sodium chloride flush, sodium chloride, acetaminophen **OR** acetaminophen    Physical    VITALS:  BP (!) 160/79   Pulse 92   Temp 96.9 °F (36.1 °C) (Axillary)   Resp 21   Ht 1.626 m (5' 4\")   Wt 90.3 kg (199 lb)   SpO2 93%   BMI 34.16 kg/m²     24HR INTAKE/OUTPUT:      Intake/Output Summary (Last 24 hours) at 2024 1327  Last data filed at 2024 0445  Gross per 24 hour   Intake --   Output 1150 ml   Net -1150 ml       24HR PULSE OXIMETRY RANGE:    SpO2  Av.4 %  Min: 93 %  Max: 99 %    General appearance: appears stated age  Lungs: rhonchi bilaterally  Heart: regular rate and rhythm, S1, S2 normal, no murmur, click, rub or gallop  Abdomen:  (+) PEG  Extremities: edema bipedal  Neurologic: Mental status: non-purposeful reaction to stimulus, MRDD, occ 
  [facility]  [address], [address]  [place] [address]      Pulmonary Progress Note      SUBJECTIVE:  lying down in bed on 4 li NC, non-purposeful reaction to stimulus, ronchorous breathing, no cough during exam    OBJECTIVE    Medications    Continuous Infusions:   sodium chloride      lactated ringers IV soln 75 mL/hr at 08/10/24 0836       Scheduled Meds:   sodium phosphate IVPB (PERIPHERAL line)  10 mmol IntraVENous Once    piperacillin-tazobactam  3,375 mg IntraVENous Q8H    bacitracin-polymyxin b   Topical BID    famotidine  10 mg Per G Tube BID    ferrous Sulfate  330 mg Per G Tube QAM    folic acid  1 mg PEG Tube QAM    levothyroxine  150 mcg PEG Tube QAM AC    midodrine  7.5 mg Per G Tube TID WC    montelukast  10 mg PEG Tube Nightly    PARoxetine  10 mg Per G Tube BID    valproic acid  1,000 mg Per G Tube BID    sodium chloride flush  5-40 mL IntraVENous 2 times per day    enoxaparin  40 mg SubCUTAneous Daily    ipratropium 0.5 mg-albuterol 2.5 mg  1 Dose Inhalation Q4H RT    OLANZapine zydis  10 mg Oral Daily    And    OLANZapine zydis  20 mg Oral Nightly    triamcinolone   Topical BID    vancomycin  1,250 mg IntraVENous Q24H       PRN Meds:sodium chloride flush, sodium chloride, acetaminophen **OR** acetaminophen    Physical    VITALS:  /75   Pulse (!) 115   Temp 99.5 °F (37.5 °C) (Axillary)   Resp 16   Ht 1.626 m (5' 4\")   Wt 85 kg (187 lb 6.4 oz)   SpO2 95%   BMI 32.17 kg/m²     24HR INTAKE/OUTPUT:      Intake/Output Summary (Last 24 hours) at 8/10/2024 1345  Last data filed at 2024 1621  Gross per 24 hour   Intake 500 ml   Output --   Net 500 ml       24HR PULSE OXIMETRY RANGE:    SpO2  Av.8 %  Min: 93 %  Max: 100 %    General appearance: appears stated age  Lungs: rhonchi bilaterally  Heart: regular rate and rhythm, S1, S2 normal, no murmur, click, rub or gallop  Abdomen:  (+) PEG  Extremities: edema 
  [facility]  [address], [address]  [place] [address]      Pulmonary Progress Note      SUBJECTIVE:  lying down in bed on 4 li NC, non-purposeful reaction to stimulus, ronchorous breathing, no cough during exam. Bronchoscopy cancelled yesterday due to mention of VTach yesterday morning.    OBJECTIVE    Medications    Continuous Infusions:   dextrose 40 mL/hr at 24 1542    sodium chloride         Scheduled Meds:   methylPREDNISolone  40 mg IntraVENous Q12H    piperacillin-tazobactam  3,375 mg IntraVENous Q8H    bacitracin-polymyxin b   Topical BID    famotidine  10 mg Per G Tube BID    ferrous Sulfate  330 mg Per G Tube QAM    folic acid  1 mg PEG Tube QAM    levothyroxine  150 mcg PEG Tube QAM AC    midodrine  7.5 mg Per G Tube TID WC    montelukast  10 mg PEG Tube Nightly    PARoxetine  10 mg Per G Tube BID    valproic acid  1,000 mg Per G Tube BID    sodium chloride flush  5-40 mL IntraVENous 2 times per day    enoxaparin  40 mg SubCUTAneous Daily    ipratropium 0.5 mg-albuterol 2.5 mg  1 Dose Inhalation Q4H RT    OLANZapine zydis  10 mg Oral Daily    And    OLANZapine zydis  20 mg Oral Nightly    triamcinolone   Topical BID       PRN Meds:sodium chloride flush, sodium chloride, acetaminophen **OR** acetaminophen    Physical    VITALS:  BP (!) 119/95   Pulse 76   Temp 97.4 °F (36.3 °C) (Axillary)   Resp 20   Ht 1.626 m (5' 4\")   Wt 89.8 kg (198 lb)   SpO2 100%   BMI 33.99 kg/m²     24HR INTAKE/OUTPUT:      Intake/Output Summary (Last 24 hours) at 2024 0890  Last data filed at 2024 0354  Gross per 24 hour   Intake --   Output 1000 ml   Net -1000 ml       24HR PULSE OXIMETRY RANGE:    SpO2  Av.8 %  Min: 96 %  Max: 100 %    General appearance: appears stated age  Lungs: rhonchi bilaterally  Heart: regular rate and rhythm, S1, S2 normal, no murmur, click, rub or gallop  Abdomen:  (+) PEG  Extremities: edema 
  [facility]  [facility]  Progress Note    SUBJECTIVE:  Chief Complaint   Patient presents with    Tachycardia     Per facility, 130's HR, pt previously tx for sepsis, not on abx    Fever     103.8 orally     No fevers. On breathing treatment. No new events overnight.     Review of systems:  As stated above in the chief complaint, otherwise negative.    Medications:  Scheduled Meds:   miconazole   Topical BID    methylPREDNISolone  40 mg IntraVENous Q12H    bacitracin-polymyxin b   Topical BID    famotidine  10 mg Per G Tube BID    ferrous Sulfate  330 mg Per G Tube QAM    folic acid  1 mg PEG Tube QAM    levothyroxine  150 mcg PEG Tube QAM AC    midodrine  7.5 mg Per G Tube TID WC    montelukast  10 mg PEG Tube Nightly    PARoxetine  10 mg Per G Tube BID    valproic acid  1,000 mg Per G Tube BID    sodium chloride flush  5-40 mL IntraVENous 2 times per day    enoxaparin  40 mg SubCUTAneous Daily    ipratropium 0.5 mg-albuterol 2.5 mg  1 Dose Inhalation Q4H RT    OLANZapine zydis  10 mg Oral Daily    And    OLANZapine zydis  20 mg Oral Nightly    triamcinolone   Topical BID     Continuous Infusions:   dextrose 50 mL/hr at 24 0627    sodium chloride       PRN Meds:sodium chloride flush, sodium chloride, acetaminophen **OR** acetaminophen    OBJECTIVE:  /64   Pulse 84   Temp 97.6 °F (36.4 °C) (Axillary)   Resp 18   Ht 1.626 m (5' 4\")   Wt 89.6 kg (197 lb 8 oz)   SpO2 98%   BMI 33.90 kg/m²   Temp  Av.8 °F (36.6 °C)  Min: 97.6 °F (36.4 °C)  Max: 98.2 °F (36.8 °C)  Constitutional: The patient is lying in bed.  Resting comfortably.    Skin: Warm and dry. No rashes were noted.   HEENT: Right eye could not be seen and is atrophic.  Left cornea is opacified.  Dry mouth.  Poor oral hygiene.  Neck: Supple to movements.   Chest: No respiratory distress.  No crackles.  Cardiovascular: Heart sounds rhythmic and regular.  Abdomen: Positive bowel sounds to auscultation. Benign 
  Located within [facility]  [facility]  Progress Note    SUBJECTIVE:  Chief Complaint   Patient presents with    Tachycardia     Per facility, 130's HR, pt previously tx for sepsis, not on abx    Fever     103.8 orally     The patient seems to be tolerating antibiotics.  No fever.  No nausea or vomiting.    Review of systems:  As stated above in the chief complaint, otherwise negative.    Medications:  Scheduled Meds:   methylPREDNISolone  40 mg IntraVENous Q12H    piperacillin-tazobactam  3,375 mg IntraVENous Q8H    bacitracin-polymyxin b   Topical BID    famotidine  10 mg Per G Tube BID    ferrous Sulfate  330 mg Per G Tube QAM    folic acid  1 mg PEG Tube QAM    levothyroxine  150 mcg PEG Tube QAM AC    midodrine  7.5 mg Per G Tube TID WC    montelukast  10 mg PEG Tube Nightly    PARoxetine  10 mg Per G Tube BID    valproic acid  1,000 mg Per G Tube BID    sodium chloride flush  5-40 mL IntraVENous 2 times per day    enoxaparin  40 mg SubCUTAneous Daily    ipratropium 0.5 mg-albuterol 2.5 mg  1 Dose Inhalation Q4H RT    OLANZapine zydis  10 mg Oral Daily    And    OLANZapine zydis  20 mg Oral Nightly    triamcinolone   Topical BID     Continuous Infusions:   sodium chloride 50 mL/hr at 24 1857    sodium chloride       PRN Meds:sodium chloride flush, sodium chloride, acetaminophen **OR** acetaminophen    OBJECTIVE:  /67   Pulse 88   Temp 97.7 °F (36.5 °C) (Axillary)   Resp 18   Ht 1.626 m (5' 4\")   Wt 89.4 kg (197 lb)   SpO2 94%   BMI 33.81 kg/m²   Temp  Av.4 °F (36.3 °C)  Min: 96.9 °F (36.1 °C)  Max: 97.9 °F (36.6 °C)  Constitutional: The patient is lying in bed.  She is unresponsive.  Skin: Warm and dry. No rashes were noted.   HEENT: Right eye could not be seen and is atrophic.  Left cornea is opacified.  Dry mouth.  Poor oral hygiene.  Neck: Supple to movements.   Chest: No respiratory distress.  No crackles.  Cardiovascular: Heart sounds rhythmic and regular.  Abdomen: 
  [facility]  [facility]  Progress Note    SUBJECTIVE:  Chief Complaint   Patient presents with    Tachycardia     Per facility, 130's HR, pt previously tx for sepsis, not on abx    Fever     103.8 orally     No new problems reported by nursing.    Review of systems:  As stated above in the chief complaint, otherwise negative.    Medications:  Scheduled Meds:   potassium chloride  10 mEq IntraVENous Q1H    methylPREDNISolone  40 mg IntraVENous Q12H    bacitracin-polymyxin b   Topical BID    famotidine  10 mg Per G Tube BID    ferrous Sulfate  330 mg Per G Tube QAM    folic acid  1 mg PEG Tube QAM    levothyroxine  150 mcg PEG Tube QAM AC    midodrine  7.5 mg Per G Tube TID WC    montelukast  10 mg PEG Tube Nightly    PARoxetine  10 mg Per G Tube BID    valproic acid  1,000 mg Per G Tube BID    sodium chloride flush  5-40 mL IntraVENous 2 times per day    enoxaparin  40 mg SubCUTAneous Daily    ipratropium 0.5 mg-albuterol 2.5 mg  1 Dose Inhalation Q4H RT    OLANZapine zydis  10 mg Oral Daily    And    OLANZapine zydis  20 mg Oral Nightly    triamcinolone   Topical BID     Continuous Infusions:   dextrose 50 mL/hr at 24 0846    sodium chloride       PRN Meds:sodium chloride flush, sodium chloride, acetaminophen **OR** acetaminophen    OBJECTIVE:  /77   Pulse 78   Temp 97.4 °F (36.3 °C) (Temporal)   Resp 20   Ht 1.626 m (5' 4\")   Wt 89.6 kg (197 lb 8 oz)   SpO2 93%   BMI 33.90 kg/m²   Temp  Av.6 °F (36.4 °C)  Min: 97 °F (36.1 °C)  Max: 98.6 °F (37 °C)  Constitutional: The patient is lying in bed.  Resting comfortably.    Skin: Warm and dry. No rashes were noted.   HEENT: Right eye could not be seen and is atrophic.  Left cornea is opacified.  Dry mouth.  Poor oral hygiene.  Neck: Supple to movements.   Chest: No respiratory distress.  No crackles.  Cardiovascular: Heart sounds rhythmic and regular.  Abdomen: Positive bowel sounds to auscultation. Benign to 
  [facility]  [facility]  Progress Note    SUBJECTIVE:  Chief Complaint   Patient presents with    Tachycardia     Per facility, 130's HR, pt previously tx for sepsis, not on abx    Fever     103.8 orally     No new problems reported by nursing.  No fever.  She is on steroids.    Review of systems:  As stated above in the chief complaint, otherwise negative.    Medications:  Scheduled Meds:   methylPREDNISolone  40 mg IntraVENous Q12H    bacitracin-polymyxin b   Topical BID    famotidine  10 mg Per G Tube BID    ferrous Sulfate  330 mg Per G Tube QAM    folic acid  1 mg PEG Tube QAM    levothyroxine  150 mcg PEG Tube QAM AC    midodrine  7.5 mg Per G Tube TID WC    montelukast  10 mg PEG Tube Nightly    PARoxetine  10 mg Per G Tube BID    valproic acid  1,000 mg Per G Tube BID    sodium chloride flush  5-40 mL IntraVENous 2 times per day    enoxaparin  40 mg SubCUTAneous Daily    ipratropium 0.5 mg-albuterol 2.5 mg  1 Dose Inhalation Q4H RT    OLANZapine zydis  10 mg Oral Daily    And    OLANZapine zydis  20 mg Oral Nightly    triamcinolone   Topical BID     Continuous Infusions:   dextrose 40 mL/hr at 24 1542    sodium chloride       PRN Meds:sodium chloride flush, sodium chloride, acetaminophen **OR** acetaminophen    OBJECTIVE:  BP (!) 151/104   Pulse 79   Temp 97.5 °F (36.4 °C) (Axillary)   Resp 16   Ht 1.626 m (5' 4\")   Wt 91.7 kg (202 lb 3.2 oz)   SpO2 98%   BMI 34.71 kg/m²   Temp  Av.3 °F (36.3 °C)  Min: 96.9 °F (36.1 °C)  Max: 97.7 °F (36.5 °C)  Constitutional: The patient is lying in bed.  Resting comfortably.  Becomes slightly agitated when examined.  Skin: Warm and dry. No rashes were noted.   HEENT: Right eye could not be seen and is atrophic.  Left cornea is opacified.  Dry mouth.  Poor oral hygiene.  Neck: Supple to movements.   Chest: No respiratory distress.  No crackles.  Cardiovascular: Heart sounds rhythmic and regular.  Abdomen: Positive bowel 
  [facility]  [facility]  Progress Note    SUBJECTIVE:  Chief Complaint   Patient presents with    Tachycardia     Per facility, 130's HR, pt previously tx for sepsis, not on abx    Fever     103.8 orally     The patient seems to be tolerating antibiotics.  The bronchoscopy was again postponed.  The patient is not being fed.    Review of systems:  As stated above in the chief complaint, otherwise negative.    Medications:  Scheduled Meds:   methylPREDNISolone  40 mg IntraVENous Q12H    piperacillin-tazobactam  3,375 mg IntraVENous Q8H    bacitracin-polymyxin b   Topical BID    famotidine  10 mg Per G Tube BID    ferrous Sulfate  330 mg Per G Tube QAM    folic acid  1 mg PEG Tube QAM    levothyroxine  150 mcg PEG Tube QAM AC    midodrine  7.5 mg Per G Tube TID WC    montelukast  10 mg PEG Tube Nightly    PARoxetine  10 mg Per G Tube BID    valproic acid  1,000 mg Per G Tube BID    sodium chloride flush  5-40 mL IntraVENous 2 times per day    enoxaparin  40 mg SubCUTAneous Daily    ipratropium 0.5 mg-albuterol 2.5 mg  1 Dose Inhalation Q4H RT    OLANZapine zydis  10 mg Oral Daily    And    OLANZapine zydis  20 mg Oral Nightly    triamcinolone   Topical BID     Continuous Infusions:   dextrose 40 mL/hr at 24 1542    sodium chloride       PRN Meds:sodium chloride flush, sodium chloride, acetaminophen **OR** acetaminophen    OBJECTIVE:  /72   Pulse 70   Temp 96.9 °F (36.1 °C) (Infrared)   Resp 20   Ht 1.626 m (5' 4\")   Wt 89.8 kg (198 lb)   SpO2 100%   BMI 33.99 kg/m²   Temp  Av.6 °F (36.4 °C)  Min: 96.9 °F (36.1 °C)  Max: 98.9 °F (37.2 °C)  Constitutional: The patient is lying in bed.  She became slightly agitated when examined.  Skin: Warm and dry. No rashes were noted.   HEENT: Right eye could not be seen and is atrophic.  Left cornea is opacified.  Dry mouth.  Poor oral hygiene.  Neck: Supple to movements.   Chest: No respiratory distress.  No 
Comprehensive Nutrition Assessment    Type and Reason for Visit:  Reassess    Nutrition Recommendations/Plan:   Continue current TF as tolerated, with increased water flushes (110 ml Q 4 hr- This will provide total free water to 1447 ml/day) Current TF regimen meets 100% energy, protein needs and fluid needs  Continue inpatient monitoring     Malnutrition Assessment:  Malnutrition Status:  At risk for malnutrition (Comment) (08/10/24 0948)    Context:  Chronic Illness     Findings of the 6 clinical characteristics of malnutrition:  Energy Intake:  Mild decrease in energy intake (Comment) (NPO prior to EN resumed)  Weight Loss:  No significant weight loss     Body Fat Loss:  No significant body fat loss     Muscle Mass Loss:  No significant muscle mass loss    Fluid Accumulation:  Unable to assess Extremities (multiple factors (CKD))   Strength:  Not Performed    Nutrition Assessment:    Pt is currently NPO and TF was Held d/t respiratory status and planned bronchoscopy. Bronch postponed from 8/13 d/t Vtach. Note hypernatremia. TF resumed 8/14. Will increase Free water flushes    Nutrition Related Findings:    Nonverbal/MRDD, +2 edema, PEG (to resume TF 8/14), Na 148, Synthroid, on NC, soft abd +BS Wound Type: None       Current Nutrition Intake & Therapies:    Average Meal Intake: NPO  Average Supplements Intake: NPO  Current Tube Feeding (TF) Orders:  Feeding Route: PEG  Formula: Standard with Fiber  Schedule: Continuous  Feeding Regimen: 45 ml/hr x 23 hr/d (Hold TF 30 min before and after Synthoid) - 1035 ml/d  Additives/Modulars: None  Water Flushes: 30 ml Q 4 hr =180 ml/d  Current TF & Flush Orders Provides: 8/14 order just resumed  Goal TF & Flush Orders Provides: 1035 ml/d, 1553 anita, 66 g pro, 967 ml total free water    Anthropometric Measures:  Height: 162.6 cm (5' 4\")  Ideal Body Weight (IBW): 120 lbs (55 kg)    Admission Body Weight: 85 kg (187 lb 6.3 oz) (8/10 bedscale)  Current Body Weight: 89.8 kg 
Consult messaged to Chiquis Astorga  
Dr. Armenta notified of 6 Bts of VT and k 3.2  
Gave report to Norma on 4W. Provided opportunity to ask questions. Electronically signed by Farshad Cox RN on 8/11/2024 at 4:45 PM    
Message left for Latanya [place], legal guardian, to obtain consent for bronchoscopy  
Patient discharged. Spoke with Nelsy  who will let facility know. [facility] to  ETA 1500.  Mihaela Suazo RN    
Pharmacy Consultation Note  (Antibiotic Dosing and Monitoring)    Initial consult date: 08/09/2024  Consulting physician/provider: Dane Crowder  Drug: Vancomycin  Indication: Pneumonia (CAP)    Age/  Gender Height Weight IBW  Allergy Information   69 y.o./female 162.6 cm (5' 4\") 90.3 kg (199 lb)     Ideal body weight: 54.7 kg (120 lb 9.5 oz)  Adjusted ideal body weight: 68.9 kg (151 lb 15.3 oz)   Patient has no known allergies.      Renal Function:  Recent Labs     08/09/24  1006   BUN 52*   CREATININE 1.1*       Intake/Output Summary (Last 24 hours) at 8/9/2024 1810  Last data filed at 8/9/2024 1621  Gross per 24 hour   Intake 1600 ml   Output --   Net 1600 ml       Vancomycin Monitoring:  Trough:  No results for input(s): \"VANCOTROUGH\" in the last 72 hours.  Random:  No results for input(s): \"VANCORANDOM\" in the last 72 hours.    Vancomycin Administration Times:  Recent vancomycin administrations                     vancomycin (VANCOCIN) 2,000 mg in sodium chloride 0.9 % 500 mL IVPB (mg) 2,000 mg New Bag 08/09/24 1105                    Assessment:  Patient is a 69 y.o. female who has been initiated on vancomycin  Estimated Creatinine Clearance: 52 mL/min (A) (based on SCr of 1.1 mg/dL (H)).  To dose vancomycin, pharmacy will be utilizing [employer] calculation software for goal AUC/NICOLE 400-600 mg/L-hr (predicted AUC/NICOLE = 574, Tr =19.2 mcg/mL)    Plan:  Will continue vancomycin 750 mg IV every 12 hours  Will check vancomycin levels when appropriate  Will continue to monitor renal function   Pharmacy to follow      marina ayala RPH 8/9/2024 6:10 PM    EFRAIN: [phone]  SEY: [phone]  SJW: [phone]    
Pharmacy Consultation Note  (Antibiotic Dosing and Monitoring)    Initial consult date: 8/9/24  Consulting physician/provider: Dr Bull  Drug: Vancomycin  Indication: CAP    Age/  Gender Height Weight IBW  Allergy Information   69 y.o./female @FLOW(11:first:1)@ @FLOW[14:FIRST:1@     Ideal body weight: 54.7 kg (120 lb 9.5 oz)  Adjusted ideal body weight: 68.9 kg (151 lb 15.3 oz)   Patient has no known allergies.      Renal Function:  Recent Labs     08/09/24  1006   BUN 52*   CREATININE 1.1*       Intake/Output Summary (Last 24 hours) at 8/9/2024 1811  Last data filed at 8/9/2024 1621  Gross per 24 hour   Intake 1600 ml   Output --   Net 1600 ml       Vancomycin Monitoring:  Trough:  No results for input(s): \"VANCOTROUGH\" in the last 72 hours.  Random:  No results for input(s): \"VANCORANDOM\" in the last 72 hours.    Vancomycin Administration Times:  Recent vancomycin administrations                     vancomycin (VANCOCIN) 2,000 mg in sodium chloride 0.9 % 500 mL IVPB (mg) 2,000 mg New Bag 08/09/24 1105                    Assessment:  Patient is a 69 y.o. female who has been initiated on vancomycin  Estimated Creatinine Clearance: 52 mL/min (A) (based on SCr of 1.1 mg/dL (H)).  To dose vancomycin, pharmacy will be utilizing [employer] (acquired by [employer] in 2014) calculation software for goal AUC/NICOLE 400-600 mg/L-hr     Plan:  Will continue vancomycin 1250 mg IV every 24 hours  Will check vancomycin levels when appropriate  Will continue to monitor renal function   Pharmacy to follow      Ez Brumfield RPH 8/9/2024 6:11 PM    EFRAIN: [phone]  SEY: [phone]  SJW: [phone]    
Pharmacy Consultation Note  (Antibiotic Dosing and Monitoring)    Initial consult date: 8/9/24  Consulting physician/provider: Dr Crowder  Drug: Vancomycin  Indication: CAP      Renal Function:  Recent Labs     08/09/24  1006 08/10/24  0447 08/11/24  0630   BUN 52* 43* 44*   CREATININE 1.1* 1.2* 1.6*       Intake/Output Summary (Last 24 hours) at 8/11/2024 0930  Last data filed at 8/11/2024 0314  Gross per 24 hour   Intake 2236.13 ml   Output 1150 ml   Net 1086.13 ml       Vancomycin Monitoring:  Trough:  No results for input(s): \"VANCOTROUGH\" in the last 72 hours.  Random:    Recent Labs     08/11/24  0630   VANCORANDOM 23.2         Assessment:  Patient is a 69 y.o. female who has been initiated on vancomycin  Estimated Creatinine Clearance: 35 mL/min (A) (based on SCr of 1.6 mg/dL (H)).  To dose vancomycin, pharmacy will be utilizing [employer] calculation software for goal AUC/NICOLE 400-600 mg/L-hr   Given increase in serum creatinine, model in [employer] will be less reliable, but now suggests regimen of vancomycin 750 mg IV q24h..    Plan:  Will adjust vancomycin regimen to vancomycin 750 mg IV X 1 to begin today at 1200.  Vancomycin level ordered with morning labs on 8/12  to assess safety and efficacy.  Will assess vancomycin level and renal function on 8/12 to determine future vancomycin dosing plans.   Will continue to monitor renal function   Pharmacy to follow      Yoan Rios RPH 8/11/2024 9:30 AM    EFRAIN: [phone]  SEY: [phone]  SJW: [phone]    
Pharmacy Consultation Note  (Antibiotic Dosing and Monitoring)    Initial consult date: 8/9/24  Consulting physician/provider: Dr Crowder  Drug: Vancomycin  Indication: CAP    Age/  Gender Height Weight IBW  Allergy Information   69 y.o./female @FLOW(11:first:1)@ @FLOW[14:FIRST:1@     Ideal body weight: 54.7 kg (120 lb 9.5 oz)  Adjusted ideal body weight: 66.8 kg (147 lb 5 oz)   Patient has no known allergies.      Renal Function:  Recent Labs     08/09/24  1006 08/10/24  0447   BUN 52* 43*   CREATININE 1.1* 1.2*       Intake/Output Summary (Last 24 hours) at 8/10/2024 1455  Last data filed at 8/10/2024 1448  Gross per 24 hour   Intake 2415.13 ml   Output 850 ml   Net 1565.13 ml       Vancomycin Monitoring:  Trough:  No results for input(s): \"VANCOTROUGH\" in the last 72 hours.  Random:  No results for input(s): \"VANCORANDOM\" in the last 72 hours.    Vancomycin Administration Times:  Recent vancomycin administrations                     vancomycin (VANCOCIN) 1,250 mg in sodium chloride 0.9 % 250 mL IVPB (mg) 1,250 mg New Bag 08/10/24 1227    vancomycin (VANCOCIN) 2,000 mg in sodium chloride 0.9 % 500 mL IVPB (mg) 2,000 mg New Bag 08/09/24 1105                       Assessment:  Patient is a 69 y.o. female who has been initiated on vancomycin  Estimated Creatinine Clearance: 46 mL/min (A) (based on SCr of 1.2 mg/dL (H)).  To dose vancomycin, pharmacy will be utilizing [employer] calculation software for goal AUC/NICOLE 400-600 mg/L-hr     Plan:  Will continue vancomycin 1250 mg IV every 24 hours  Will check vancomycin levels when appropriate - rm tomorrow am  Will continue to monitor renal function   Pharmacy to follow      Sharon Coker RPH 8/10/2024 2:55 PM    EFRAIN: [phone]  SEY: [phone]  SJW: [phone]    
Pharmacy Consultation Note  (Antibiotic Dosing and Monitoring)    Initial consult date: 8/9/24  Consulting physician/provider: Dr Crowder  Drug: Vancomycin  Indication: CAP    Note vancomycin discontinued. Clinical pharmacy will sign-off. Please re-consult if we can be of further assistance.    Davis Barajas RPH, PharmD, BCCCP  8/12/2024  12:17 PM    
Voicemail message left for patient's legal guardian, Latanya [place] regarding patient's discharge back to [facility]. ABDOULAYE Crowder RN  
S1, S2 normal, no murmur, click, rub or gallop  Abdomen:  (+) PEG  Extremities: edema bipedal  Neurologic: Mental status: non-purposeful reaction to stimulus, MRDD, occ moving extremities    Data    CBC:   Recent Labs     08/12/24 0425 08/13/24  0930   WBC 14.9* 12.6*   HGB 9.4* 9.6*   HCT 30.6* 30.6*   .4* 105.2*    181       BMP:  Recent Labs     08/11/24  0630 08/11/24 2020 08/12/24 0425 08/13/24  0930   * 147* 144 148*   K 3.3* 3.7 4.8 3.2*   * 109* 108* 112*   CO2 30* 29 28 26   PHOS 2.3*  --  5.1* 3.9   BUN 44* 44* 48* 52*   CREATININE 1.6* 1.8* 1.8* 1.5*    ALB:3,BILIDIR:3,BILITOT:3,ALKPHOS:3)@    PT/INR:   No results for input(s): \"PROTIME\", \"INR\" in the last 72 hours.      ABG:   No results for input(s): \"PH\", \"PO2\", \"PCO2\", \"HCO3\", \"BE\", \"O2SAT\", \"METHB\", \"O2HB\", \"COHB\", \"O2CON\", \"HHB\", \"THB\" in the last 72 hours.          Radiology/Other tests reviewed: CXR reviewed with no significant change compared to previous    Assessment:     Principal Problem:    Sepsis (HCC)  Active Problems:    Hypophosphatemia    Aspiration pneumonia (HCC)    Dehydration    Anemia  Resolved Problems:    * No resolved hospital problems. *      Plan:       Cont with nebs, observe respiratory function and cough  Bronchoscopy today to clean out her lungs, has a weak cough  Cont with oxygen, taper as tolerated  Antibiotics as per ID  Watch fluid balance, on PEG feeds    *VTach noted earlier today, seen by anesthesia and bronchoscopy was cancelled by them until seen by Cardiology. Will reschedule when stable and cleared by Cardiology      Time at the bedside, reviewing labs and radiographs, reviewing notes and consultations, discussing with staff and family was more than 50 minutes.      Thanks for letting us see this patient in consultation.  Please contact us with any questions. Office [phone] or after hours through [employer], [phone].    
movements.   Chest: No respiratory distress.  No crackles.  Lungs are diminished.  2 L nasal cannula  Cardiovascular: S1 and S2 are rhythmic and regular. No murmurs appreciated.   Abdomen: Positive bowel sounds to auscultation. Benign to palpation. No masses felt.  PEG.  Pure wick dark concentrated urine  Extremities: No edema.  Lines: Peripheral.    Laboratory and Tests:  Lab Results   Component Value Date    CRP 49.0 (H) 08/09/2024    CRP 68.0 (H) 03/05/2024    CRP 11.5 (H) 01/25/2017     Lab Results   Component Value Date    SEDRATE 56 (H) 08/09/2024       Radiology:  Reviewed    Microbiology:   Respiratory panel: Negative  Streptococcus pneumoniae/Legionella urine Ag: negative   Blood cultures 8/9/2024: Negative so far  Nares screen MRSA: Negative  Urine culture 8/9/2024: Mixed GPO, GNR    Assessment:  Probable aspiration pneumonia/pneumonitis  Fever with leukocytosis associated to the above  Developmental delay  Bacteriuria     Plan:  Continue Zosyn  Stop Vancomycin since MRSA seems to be less likely  Possible bronchoscopy  We will follow with you  Continue supportive care    Spoke with nursing.    Jose Hankins MD  10:52 AM  8/12/2024  
of accessory muscles to breathe. Symmetrical expansion.  No wheezing, crackles or rhonchi.  Lungs are diminished.  2 L nasal cannula  Cardiovascular: S1 and S2 are rhythmic and regular. No murmurs appreciated.   Abdomen: Positive bowel sounds to auscultation. Benign to palpation. No masses felt. No hepatosplenomegaly.  PEG tube-tube feeding running  Pure wick dark concentrated urine  Extremities: No clubbing, no cyanosis, no edema.  Lines: Peripheral.    Laboratory and Tests:  Lab Results   Component Value Date    CRP 49.0 (H) 08/09/2024    CRP 68.0 (H) 03/05/2024    CRP 11.5 (H) 01/25/2017     Lab Results   Component Value Date    SEDRATE 56 (H) 08/09/2024       Radiology:  CTA  1. Obstruction of the bronchi of the medial basal segment and posterior basal   segment of the left lower lobe likely secondary to mucoid impaction.  There   is partial collapse of the left lower lobe.  The consolidation seen within   the left lung base is favored to represent collapse rather than pneumonia of   the left lung.   2. Minimal airspace disease seen within the right infrahilar region favored   to represent atelectasis.   3. Minimal pleural thickening seen within the right upper lobe.   4. There is no pulmonary embolus.     Microbiology:   Respiratory array - 8/10/2024  Urine strep Legionella sent  8/9/2024 blood cultures sent negative so far      Assessment:  Sepsis/leukocytosis/fever, suspect secondary to aspiration pneumonia  Developmental delay     Plan:  Continue piperacillin-tazobactam at 3.375 g every 8 hours and vancomycin dosed according to level per pharmacy.  Check urine Streptococcus pneumonia and Legionella antigens, MRSA nares culture, respiratory pathogen PCR panel.  Follow-up blood cultures.  Follow up Pulmonology recommendations.  Continue supportive care    RADHA Churchill  10:01 AM  8/11/2024  
rhythm, S1, S2 normal, no murmur, click, rub or gallop  Abdomen:  (+) PEG  Extremities: edema bipedal  Neurologic: Mental status: non-purposeful reaction to stimulus, MRDD, occ moving extremities    Data    CBC:   Recent Labs     08/09/24  1006 08/10/24  0447   WBC 16.7* 19.2*   HGB 10.9* 9.7*   HCT 35.4 30.9*   .7* 106.9*    192       BMP:  Recent Labs     08/09/24  1006 08/10/24  0447 08/11/24  0630    146 147*   K 4.6 4.4 3.3*    105 109*   CO2 34* 31* 30*   PHOS  --  2.1* 2.3*   BUN 52* 43* 44*   CREATININE 1.1* 1.2* 1.6*    ALB:3,BILIDIR:3,BILITOT:3,ALKPHOS:3)@    PT/INR:   Recent Labs     08/09/24  1006   PROTIME 10.9   INR 1.0       ABG:   No results for input(s): \"PH\", \"PO2\", \"PCO2\", \"HCO3\", \"BE\", \"O2SAT\", \"METHB\", \"O2HB\", \"COHB\", \"O2CON\", \"HHB\", \"THB\" in the last 72 hours.          Radiology/Other tests reviewed: CXR reviewed with no significant change compared to previous    Assessment:     Principal Problem:    Sepsis (HCC)  Active Problems:    Hypophosphatemia    Aspiration pneumonia (HCC)    Dehydration  Resolved Problems:    * No resolved hospital problems. *      Plan:       Cont with nebs, observe respiratory function and cough  Will consider bronchoscopy to clean out her lungs, has a weak cough  Cont with oxygen, taper as tolerated  Antibiotics as per ID  Watch fluid balance, on PEG feeds      Time at the bedside, reviewing labs and radiographs, reviewing notes and consultations, discussing with staff and family was more than 50 minutes.      Thanks for letting us see this patient in consultation.  Please contact us with any questions. Office [phone] or after hours through [facility] [facility], x [phone].    
  CTA PULMONARY W CONTRAST   Final Result   1. Obstruction of the bronchi of the medial basal segment and posterior basal   segment of the left lower lobe likely secondary to mucoid impaction.  There   is partial collapse of the left lower lobe.  The consolidation seen within   the left lung base is favored to represent collapse rather than pneumonia of   the left lung.   2. Minimal airspace disease seen within the right infrahilar region favored   to represent atelectasis.   3. Minimal pleural thickening seen within the right upper lobe.   4. There is no pulmonary embolus.         CT Head W/O Contrast   Final Result   1.  There is no acute intracranial abnormality.  Specifically, there is no   intracranial hemorrhage.   2. Atrophy and periventricular microvascular ischemic disease,   .         XR CHEST PORTABLE   Final Result   1. Mild cardiomegaly with mild pulmonary vascular congestion.   2. Mild retrocardiac opacity may represent atelectasis or infiltrate.             Assessment:    Principal Problem:    Sepsis (HCC)  Resolved Problems:    * No resolved hospital problems. *      Plan:  Sepsis(fever, leukocytosis, infection)POA supportive care and tx underlying infection  Possible aspiration pneumonia continue abx. Will ask id to see  Elevated lactic acid level monitor  Hypothyroidism continue med  Chronic respiratory failure with hypoxia adjust o2 as needed  Hypophosphatemia monitor and replace prn  Dehydration iv fluids        Electronically signed by Josemanuel Armenta DO on 8/10/2024 at 1:09 PM    
PORTABLE   Final Result   1. Mild cardiomegaly with mild pulmonary vascular congestion.   2. Mild retrocardiac opacity may represent atelectasis or infiltrate.             Assessment:  Left lower lobe collapse: Cultures grew diphtheroids.  Likely aspiration.  BAL shows a lymphocytic alveolitis, that she is usually associated with a hypersensitivity pneumonitis but that likely represents just lab variation for clotted specimens.    Plan:  No changes from pulmonary  Okay to discharge    Time at the bedside, reviewing labs and radiographs, reviewing updated notes and consultations, discussing with staff and family was more than 35 minutes.    Please note that voice recognition technology was used in the preparation of this note and make therefore it may contain inadvertent transcription errors.  If the patient is a COVID 19 isolation patient, the above physical exam reflects that of the examining physician for the day.        Davis Carroll MD,  M.D., F.C.C.P.    [facility]    [facility], [address] [place] [address], [address], [place] [address]  Office visits:  [address] [place] [address]  
since there is an impairment in reporting symptoms. At this time, concerned of the inflammation caused by aspiration and will add steroids. Pt on o2 and may mask any microaspirations/reflux from TF. Will adjust type of iv fluids and will hold TF for now. Pt would appear to me as high risk of decompensating and would benefit from heart monitoring on intermediate unit.         Electronically signed by Josemanuel Armenta,  on 8/11/2024 at 3:42 PM

## 2024-08-18 NOTE — DISCHARGE SUMMARY
[facility] Hospitalist Physician Discharge Summary       [facility]  [address] [id number]  [phone]          Activity level: as tolerated    Dispo: SNF/rehab      Condition on discharge: stable    Patient ID:  Katerina Paiz  [id number]  69 y.o.  1955    Admit date: 8/9/2024    Discharge date and time:  8/18/2024  3:04 PM    Admission Diagnoses: Principal Problem:    Sepsis (HCC)  Active Problems:    Hypophosphatemia    Aspiration pneumonia (HCC)    Dehydration    Anemia  Resolved Problems:    * No resolved hospital problems. *      Discharge Diagnoses: Principal Problem:    Sepsis (HCC)  Active Problems:    Hypophosphatemia    Aspiration pneumonia (HCC)    Dehydration    Anemia  Resolved Problems:    * No resolved hospital problems. *      Consults:  IP CONSULT TO PULMONOLOGY  IP CONSULT TO VASCULAR ACCESS TEAM  IP CONSULT TO DIETITIAN  PHARMACY TO DOSE VANCOMYCIN  IP CONSULT TO VASCULAR ACCESS TEAM  IP CONSULT TO INFECTIOUS DISEASES  IP CONSULT TO VASCULAR ACCESS TEAM  IP CONSULT TO VASCULAR ACCESS TEAM  IP CONSULT TO CARDIOLOGY  IP CONSULT TO VASCULAR ACCESS TEAM  IP CONSULT TO VASCULAR ACCESS TEAM  IP CONSULT TO VASCULAR ACCESS TEAM  IP CONSULT TO VASCULAR ACCESS TEAM  IP CONSULT TO VASCULAR ACCESS TEAM  IP CONSULT TO VASCULAR ACCESS TEAM  IP CONSULT TO VASCULAR ACCESS TEAM  IP CONSULT TO VASCULAR ACCESS TEAM    Procedures: None    Hospital Course:   Patient Katerina Paiz is a 69 y.o. presented with Septicemia (HCC) [A41.9]  Lung collapse [J98.19]  Sepsis (HCC) [A41.9]  Pneumonia due to infectious organism, unspecified laterality, unspecified part of lung [J18.9]    Brief summary:  Patient presented with sepsis from presumed aspiration PNA to LLL. Seen by ID, completed course of abx and has been stable off abx for several days, no further abx at time of dispo. Seen by Pulm, underwent bronch on 8/16 which was c/w aspiration pneumonitis, she is otherwise stable

## 2024-08-20 LAB
GALACTOMANNAN AG BAL QL: NEGATIVE
GALACTOMANNAN AG SPEC IA-ACNC: 0.4

## 2024-08-21 LAB
GALACTOMANNAN AG BAL QL: POSITIVE
GALACTOMANNAN AG SPEC IA-ACNC: 3.46

## 2024-08-22 LAB
MICROORGANISM SPEC CULT: NORMAL
MICROORGANISM/AGENT SPEC: NORMAL
SERVICE CMNT-IMP: NORMAL
SPECIMEN DESCRIPTION: NORMAL

## 2024-08-26 LAB
MICROORGANISM SPEC CULT: ABNORMAL
MICROORGANISM/AGENT SPEC: ABNORMAL
SERVICE CMNT-IMP: ABNORMAL
SPECIMEN DESCRIPTION: ABNORMAL

## 2024-09-05 ENCOUNTER — TELEPHONE (OUTPATIENT)
Dept: CARDIOLOGY CLINIC | Age: 69
End: 2024-09-05

## 2024-09-09 PROBLEM — E86.0 DEHYDRATION: Status: RESOLVED | Noted: 2024-08-10 | Resolved: 2024-09-09

## 2024-10-23 LAB — AMMONIA: 49 UMOL/L (ref 11–32)

## 2024-11-26 LAB
ALBUMIN: 2.3 GM/DL (ref 3.4–5)
ALP BLD-CCNC: 58 U/L (ref 46–116)
ALT SERPL-CCNC: 8 U/L (ref 5–49)
AST SERPL-CCNC: 17 IU/L (ref 0–34)
B-TYPE NATRIURETIC PEPTIDE: 14.56 PG/ML (ref 0–100)
BILIRUB SERPL-MCNC: <0.2 MG/DL (ref 0.3–1.2)
BUN BLDV-MCNC: 38 MG/DL (ref 9–23)
CALCIUM SERPL-MCNC: 9.5 MD/DL (ref 8.7–10.4)
CHLORIDE BLD-SCNC: 98 MMOL/L (ref 98–107)
CO2: 38 MMOL/L (ref 20–31)
CREAT SERPL-MCNC: 1.27 MG/DL (ref 0.55–1.02)
GFR AFRICAN AMERICAN: 51 ML/MIN
GFR, ESTIMATED: 42 ML/MIN/
GLUCOSE: 80 MG/DL (ref 65–99)
POTASSIUM SERPL-SCNC: 5 MMOL/L (ref 3.4–5.1)
SODIUM BLD-SCNC: 137 MMOL/L (ref 136–145)
TOTAL PROTEIN: 8.2 GM/DL (ref 6–8)

## 2024-11-27 LAB — AMMONIA: 36 UMOL/L (ref 11–32)

## 2024-12-03 LAB
BUN BLDV-MCNC: 25 MG/DL (ref 9–23)
CALCIUM SERPL-MCNC: 9.9 MD/DL (ref 8.7–10.4)
CHLORIDE BLD-SCNC: 96 MMOL/L (ref 98–107)
CO2: 38 MMOL/L (ref 20–31)
CREAT SERPL-MCNC: 1.4 MG/DL (ref 0.55–1.02)
GFR AFRICAN AMERICAN: 45 ML/MIN
GFR, ESTIMATED: 37 ML/MIN/
GLUCOSE: 90 MG/DL (ref 65–99)
POTASSIUM SERPL-SCNC: 5.7 MMOL/L (ref 3.4–5.1)
SODIUM BLD-SCNC: 137 MMOL/L (ref 136–145)
VALPROIC ACID LEVEL: 50.5 UG/ML (ref 50–100)

## 2024-12-04 LAB — AMMONIA: 35 UMOL/L (ref 11–32)

## 2024-12-10 LAB
BUN BLDV-MCNC: 22 MG/DL (ref 9–23)
CALCIUM SERPL-MCNC: 9.7 MD/DL (ref 8.7–10.4)
CHLORIDE BLD-SCNC: 96 MMOL/L (ref 98–107)
CO2: 38 MMOL/L (ref 20–31)
CREAT SERPL-MCNC: 1.09 MG/DL (ref 0.55–1.02)
GFR AFRICAN AMERICAN: > 60 ML/MIN
GFR, ESTIMATED: 50 ML/MIN
GLUCOSE: 68 MG/DL (ref 65–99)
POTASSIUM SERPL-SCNC: 4.5 MMOL/L (ref 3.4–5.1)
SODIUM BLD-SCNC: 135 MMOL/L (ref 136–145)
VALPROIC ACID LEVEL: 79.4 UG/ML (ref 50–100)

## 2024-12-25 LAB — AMMONIA: 47 UMOL/L (ref 11–32)

## 2025-01-01 ENCOUNTER — HOSPITAL ENCOUNTER (OUTPATIENT)
Age: 70
End: 2025-03-04
Attending: INTERNAL MEDICINE | Admitting: INTERNAL MEDICINE
Payer: MEDICARE

## 2025-01-01 ENCOUNTER — APPOINTMENT (OUTPATIENT)
Dept: GENERAL RADIOLOGY | Age: 70
End: 2025-01-01
Attending: INTERNAL MEDICINE
Payer: MEDICARE

## 2025-01-01 ENCOUNTER — ANESTHESIA (OUTPATIENT)
Dept: OPERATING ROOM | Age: 70
End: 2025-01-01
Payer: MEDICARE

## 2025-01-01 ENCOUNTER — ANESTHESIA EVENT (OUTPATIENT)
Dept: OPERATING ROOM | Age: 70
End: 2025-01-01
Payer: MEDICARE

## 2025-01-01 VITALS
RESPIRATION RATE: 20 BRPM | OXYGEN SATURATION: 88 % | WEIGHT: 200 LBS | HEART RATE: 98 BPM | SYSTOLIC BLOOD PRESSURE: 127 MMHG | BODY MASS INDEX: 34.33 KG/M2 | DIASTOLIC BLOOD PRESSURE: 50 MMHG

## 2025-01-01 LAB
ALBUMIN SERPL-MCNC: 2.6 G/DL (ref 3.5–5.2)
ALP SERPL-CCNC: 236 U/L (ref 35–104)
ALT SERPL-CCNC: 238 U/L (ref 0–32)
ANION GAP SERPL CALCULATED.3IONS-SCNC: 18 MMOL/L (ref 7–16)
AST SERPL-CCNC: 554 U/L (ref 0–31)
B.E.: -11.8 MMOL/L (ref -3–3)
BASOPHILS # BLD: 0 K/UL (ref 0–0.2)
BASOPHILS NFR BLD: 0 % (ref 0–2)
BILIRUB SERPL-MCNC: 3.6 MG/DL (ref 0–1.2)
BUN SERPL-MCNC: 53 MG/DL (ref 6–23)
CA-I BLD-SCNC: 1.17 MMOL/L (ref 1.15–1.33)
CALCIUM SERPL-MCNC: 8.4 MG/DL (ref 8.6–10.2)
CHLORIDE SERPL-SCNC: 95 MMOL/L (ref 98–107)
CLOT ANGLE.KAOLIN INDUCED BLD RES TEG: 62.7 DEG (ref 53–70)
CO2 SERPL-SCNC: 20 MMOL/L (ref 22–29)
COHB: 0.2 % (ref 0–1.5)
CREAT SERPL-MCNC: 4.3 MG/DL (ref 0.5–1)
CRITICAL: ABNORMAL
D-DIMER QUANTITATIVE: 2709 NG/ML DDU (ref 0–230)
DATE ANALYZED: ABNORMAL
DATE OF COLLECTION: ABNORMAL
EOSINOPHIL # BLD: 0.37 K/UL (ref 0.05–0.5)
EOSINOPHILS RELATIVE PERCENT: 2 % (ref 0–6)
EPL-TEG: 0 % (ref 0–15)
ERYTHROCYTE [DISTWIDTH] IN BLOOD BY AUTOMATED COUNT: 23.6 % (ref 11.5–15)
FIBRINOGEN PPP-MCNC: >700 MG/DL (ref 200–400)
G-TEG: 19.9 KDYN/CM2 (ref 4.5–11)
GFR, ESTIMATED: 10 ML/MIN/1.73M2
GLUCOSE SERPL-MCNC: 258 MG/DL (ref 74–99)
HCO3: 18.6 MMOL/L (ref 22–26)
HCT VFR BLD AUTO: 22.6 % (ref 34–48)
HGB BLD-MCNC: 7 G/DL (ref 11.5–15.5)
HHB: 10 % (ref 0–5)
INR PPP: 1.3
KINETICS TEG: 2.4 MIN (ref 1–3)
LAB: ABNORMAL
LACTATE BLDV-SCNC: 2 MMOL/L (ref 0.5–2.2)
LY30 (LYSIS) TEG: 0 % (ref 0–8)
LYMPHOCYTES NFR BLD: 1.31 K/UL (ref 1.5–4)
LYMPHOCYTES RELATIVE PERCENT: 6 % (ref 20–42)
Lab: 130
MA (MAX CLOT) TEG: 79.9 MM (ref 50–70)
MAGNESIUM SERPL-MCNC: 2.7 MG/DL (ref 1.6–2.6)
MCH RBC QN AUTO: 30.6 PG (ref 26–35)
MCHC RBC AUTO-ENTMCNC: 31 G/DL (ref 32–34.5)
MCV RBC AUTO: 98.7 FL (ref 80–99.9)
METAMYELOCYTES ABSOLUTE COUNT: 0.37 K/UL (ref 0–0.12)
METAMYELOCYTES: 2 % (ref 0–1)
METHB: 1 % (ref 0–1.5)
MODE: ABNORMAL
MONOCYTES NFR BLD: 1.31 K/UL (ref 0.1–0.95)
MONOCYTES NFR BLD: 6 % (ref 2–12)
MYELOCYTES ABSOLUTE COUNT: 1.31 K/UL
MYELOCYTES: 6 %
NEUTROPHILS NFR BLD: 79 % (ref 43–80)
NEUTS SEG NFR BLD: 17.34 K/UL (ref 1.8–7.3)
NUCLEATED RED BLOOD CELLS: 9 PER 100 WBC
O2 SATURATION: 89.9 % (ref 92–98.5)
O2HB: 88.8 % (ref 94–97)
OPERATOR ID: 2593
PATIENT TEMP: 37 C
PCO2: 71.6 MMHG (ref 35–45)
PH BLOOD GAS: 7.03 (ref 7.35–7.45)
PHOSPHATE SERPL-MCNC: 6.3 MG/DL (ref 2.5–4.5)
PLATELET CONFIRMATION: NORMAL
PLATELET, FLUORESCENCE: 95 K/UL (ref 130–450)
PMV BLD AUTO: 12.8 FL (ref 7–12)
PO2: 76.1 MMHG (ref 75–100)
POTASSIUM SERPL-SCNC: 3.7 MMOL/L (ref 3.5–5)
PROT SERPL-MCNC: 7.1 G/DL (ref 6.4–8.3)
PROTHROMBIN TIME: 14 SEC (ref 9.3–12.4)
RBC # BLD AUTO: 2.29 M/UL (ref 3.5–5.5)
RBC # BLD: ABNORMAL 10*6/UL
REACTION TIME TEG: 11.8 MIN (ref 5–10)
SODIUM SERPL-SCNC: 133 MMOL/L (ref 132–146)
SOURCE, BLOOD GAS: ABNORMAL
THB: 8.1 G/DL (ref 11.5–16.5)
TIME ANALYZED: 134
TROPONIN I SERPL HS-MCNC: 74 NG/L (ref 0–9)
TSH SERPL DL<=0.05 MIU/L-ACNC: 16.5 UIU/ML (ref 0.27–4.2)
WBC # BLD: ABNORMAL 10*3/UL
WBC OTHER # BLD: 22 K/UL (ref 4.5–11.5)

## 2025-01-01 PROCEDURE — 3700000001 HC ADD 15 MINUTES (ANESTHESIA): Performed by: SURGERY

## 2025-01-01 PROCEDURE — 2709999900 HC NON-CHARGEABLE SUPPLY: Performed by: SURGERY

## 2025-01-01 PROCEDURE — 86900 BLOOD TYPING SEROLOGIC ABO: CPT

## 2025-01-01 PROCEDURE — 71045 X-RAY EXAM CHEST 1 VIEW: CPT

## 2025-01-01 PROCEDURE — 3700000000 HC ANESTHESIA ATTENDED CARE: Performed by: SURGERY

## 2025-01-01 PROCEDURE — 80053 COMPREHEN METABOLIC PANEL: CPT

## 2025-01-01 PROCEDURE — 3600000003 HC SURGERY LEVEL 3 BASE: Performed by: SURGERY

## 2025-01-01 PROCEDURE — 3600000013 HC SURGERY LEVEL 3 ADDTL 15MIN: Performed by: SURGERY

## 2025-01-01 PROCEDURE — 85347 COAGULATION TIME ACTIVATED: CPT

## 2025-01-01 PROCEDURE — 84484 ASSAY OF TROPONIN QUANT: CPT

## 2025-01-01 PROCEDURE — 84443 ASSAY THYROID STIM HORMONE: CPT

## 2025-01-01 PROCEDURE — 82805 BLOOD GASES W/O2 SATURATION: CPT

## 2025-01-01 PROCEDURE — 36415 COLL VENOUS BLD VENIPUNCTURE: CPT

## 2025-01-01 PROCEDURE — 2000000000 HC ICU R&B

## 2025-01-01 PROCEDURE — 99223 1ST HOSP IP/OBS HIGH 75: CPT | Performed by: SURGERY

## 2025-01-01 PROCEDURE — 6360000002 HC RX W HCPCS

## 2025-01-01 PROCEDURE — 85384 FIBRINOGEN ACTIVITY: CPT

## 2025-01-01 PROCEDURE — 86923 COMPATIBILITY TEST ELECTRIC: CPT

## 2025-01-01 PROCEDURE — 85379 FIBRIN DEGRADATION QUANT: CPT

## 2025-01-01 PROCEDURE — 86901 BLOOD TYPING SEROLOGIC RH(D): CPT

## 2025-01-01 PROCEDURE — 2500000003 HC RX 250 WO HCPCS

## 2025-01-01 PROCEDURE — 85576 BLOOD PLATELET AGGREGATION: CPT

## 2025-01-01 PROCEDURE — 31645 BRNCHSC W/THER ASPIR 1ST: CPT | Performed by: SURGERY

## 2025-01-01 PROCEDURE — 84100 ASSAY OF PHOSPHORUS: CPT

## 2025-01-01 PROCEDURE — 86850 RBC ANTIBODY SCREEN: CPT

## 2025-01-01 PROCEDURE — 85390 FIBRINOLYSINS SCREEN I&R: CPT

## 2025-01-01 PROCEDURE — 2580000003 HC RX 258

## 2025-01-01 PROCEDURE — 85610 PROTHROMBIN TIME: CPT

## 2025-01-01 PROCEDURE — 85025 COMPLETE CBC W/AUTO DIFF WBC: CPT

## 2025-01-01 PROCEDURE — 82330 ASSAY OF CALCIUM: CPT

## 2025-01-01 PROCEDURE — 83605 ASSAY OF LACTIC ACID: CPT

## 2025-01-01 PROCEDURE — 83735 ASSAY OF MAGNESIUM: CPT

## 2025-01-01 RX ORDER — FENTANYL CITRATE-0.9 % NACL/PF 10 MCG/ML
PLASTIC BAG, INJECTION (ML) INTRAVENOUS
Status: COMPLETED
Start: 2025-01-01 | End: 2025-01-01

## 2025-01-01 RX ORDER — ACETAMINOPHEN 325 MG/1
650 TABLET ORAL EVERY 6 HOURS PRN
Status: DISCONTINUED | OUTPATIENT
Start: 2025-01-01 | End: 2025-01-01 | Stop reason: HOSPADM

## 2025-01-01 RX ORDER — NOREPINEPHRINE BITARTRATE 0.06 MG/ML
1-100 INJECTION, SOLUTION INTRAVENOUS CONTINUOUS
Status: DISCONTINUED | OUTPATIENT
Start: 2025-01-01 | End: 2025-01-01 | Stop reason: HOSPADM

## 2025-01-01 RX ORDER — SODIUM CHLORIDE 0.9 % (FLUSH) 0.9 %
5-40 SYRINGE (ML) INJECTION PRN
Status: DISCONTINUED | OUTPATIENT
Start: 2025-01-01 | End: 2025-01-01 | Stop reason: HOSPADM

## 2025-01-01 RX ORDER — WATER 10 ML/10ML
INJECTION INTRAMUSCULAR; INTRAVENOUS; SUBCUTANEOUS
Status: COMPLETED
Start: 2025-01-01 | End: 2025-01-01

## 2025-01-01 RX ORDER — VECURONIUM BROMIDE 1 MG/ML
10 INJECTION, POWDER, LYOPHILIZED, FOR SOLUTION INTRAVENOUS ONCE
Status: COMPLETED | OUTPATIENT
Start: 2025-01-01 | End: 2025-01-01

## 2025-01-01 RX ORDER — ONDANSETRON 4 MG/1
4 TABLET, ORALLY DISINTEGRATING ORAL EVERY 8 HOURS PRN
Status: DISCONTINUED | OUTPATIENT
Start: 2025-01-01 | End: 2025-01-01 | Stop reason: HOSPADM

## 2025-01-01 RX ORDER — VASOPRESSIN 20 [USP'U]/ML
INJECTION, SOLUTION INTRAVENOUS
Status: COMPLETED
Start: 2025-01-01 | End: 2025-01-01

## 2025-01-01 RX ORDER — ACETAMINOPHEN 650 MG/1
650 SUPPOSITORY RECTAL EVERY 6 HOURS PRN
Status: DISCONTINUED | OUTPATIENT
Start: 2025-01-01 | End: 2025-01-01 | Stop reason: HOSPADM

## 2025-01-01 RX ORDER — FENTANYL CITRATE-0.9 % NACL/PF 10 MCG/ML
25-200 PLASTIC BAG, INJECTION (ML) INTRAVENOUS CONTINUOUS
Status: DISCONTINUED | OUTPATIENT
Start: 2025-01-01 | End: 2025-01-01 | Stop reason: HOSPADM

## 2025-01-01 RX ORDER — SODIUM CHLORIDE 9 MG/ML
INJECTION, SOLUTION INTRAVENOUS PRN
Status: DISCONTINUED | OUTPATIENT
Start: 2025-01-01 | End: 2025-01-01 | Stop reason: HOSPADM

## 2025-01-01 RX ORDER — VECURONIUM BROMIDE 1 MG/ML
INJECTION, POWDER, LYOPHILIZED, FOR SOLUTION INTRAVENOUS
Status: COMPLETED
Start: 2025-01-01 | End: 2025-01-01

## 2025-01-01 RX ORDER — SODIUM CHLORIDE 0.9 % (FLUSH) 0.9 %
5-40 SYRINGE (ML) INJECTION EVERY 12 HOURS SCHEDULED
Status: DISCONTINUED | OUTPATIENT
Start: 2025-01-01 | End: 2025-01-01 | Stop reason: HOSPADM

## 2025-01-01 RX ORDER — ATROPINE SULFATE 0.4 MG/ML
INJECTION, SOLUTION INTRAMUSCULAR; INTRAVENOUS; SUBCUTANEOUS
Status: DISCONTINUED | OUTPATIENT
Start: 2025-01-01 | End: 2025-01-01 | Stop reason: SDUPTHER

## 2025-01-01 RX ORDER — EPINEPHRINE 1 MG/ML
INJECTION INTRAMUSCULAR; INTRAVENOUS; SUBCUTANEOUS
Status: DISCONTINUED | OUTPATIENT
Start: 2025-01-01 | End: 2025-01-01 | Stop reason: SDUPTHER

## 2025-01-01 RX ORDER — PHENYLEPHRINE HYDROCHLORIDE 10 MG/ML
INJECTION INTRAVENOUS
Status: COMPLETED
Start: 2025-01-01 | End: 2025-01-01

## 2025-01-01 RX ORDER — POLYETHYLENE GLYCOL 3350 17 G/17G
17 POWDER, FOR SOLUTION ORAL DAILY PRN
Status: DISCONTINUED | OUTPATIENT
Start: 2025-01-01 | End: 2025-01-01 | Stop reason: HOSPADM

## 2025-01-01 RX ORDER — NOREPINEPHRINE BITARTRATE 0.06 MG/ML
INJECTION, SOLUTION INTRAVENOUS
Status: COMPLETED
Start: 2025-01-01 | End: 2025-01-01

## 2025-01-01 RX ORDER — ONDANSETRON 2 MG/ML
4 INJECTION INTRAMUSCULAR; INTRAVENOUS EVERY 6 HOURS PRN
Status: DISCONTINUED | OUTPATIENT
Start: 2025-01-01 | End: 2025-01-01 | Stop reason: HOSPADM

## 2025-01-01 RX ADMIN — ATROPINE SULFATE 0.4 MCG: 0.4 INJECTION, SOLUTION INTRAMUSCULAR; INTRAVENOUS; SUBCUTANEOUS at 02:56

## 2025-01-01 RX ADMIN — VECURONIUM BROMIDE 10 MG: 1 INJECTION, POWDER, LYOPHILIZED, FOR SOLUTION INTRAVENOUS at 01:38

## 2025-01-01 RX ADMIN — WATER: 1 INJECTION INTRAMUSCULAR; INTRAVENOUS; SUBCUTANEOUS at 01:41

## 2025-01-01 RX ADMIN — NOREPINEPHRINE BITARTRATE 25 MCG/MIN: 0.06 INJECTION, SOLUTION INTRAVENOUS at 01:13

## 2025-01-01 RX ADMIN — VASOPRESSIN 0.03 UNITS/MIN: 20 INJECTION INTRAVENOUS at 01:14

## 2025-01-01 RX ADMIN — Medication 25 MCG/MIN: at 01:13

## 2025-01-01 RX ADMIN — PHENYLEPHRINE HYDROCHLORIDE 10 MG: 10 INJECTION INTRAVENOUS at 01:09

## 2025-01-01 RX ADMIN — Medication 50 MCG/HR: at 01:33

## 2025-01-01 RX ADMIN — EPINEPHRINE 10 MCG: 1 INJECTION INTRAMUSCULAR; INTRAVENOUS; SUBCUTANEOUS at 02:56

## 2025-01-01 RX ADMIN — PHENYLEPHRINE HYDROCHLORIDE 100 MCG/MIN: 10 INJECTION INTRAVENOUS at 00:42

## 2025-01-01 RX ADMIN — SODIUM BICARBONATE: 84 INJECTION, SOLUTION INTRAVENOUS at 01:15

## 2025-01-01 RX ADMIN — EPINEPHRINE 90 MCG: 1 INJECTION INTRAMUSCULAR; INTRAVENOUS; SUBCUTANEOUS at 02:57

## 2025-01-01 RX ADMIN — VASOPRESSIN 20 UNITS: 20 INJECTION INTRAVENOUS at 01:08

## 2025-01-29 LAB — AMMONIA: 21 UMOL/L (ref 11–32)

## 2025-02-08 ENCOUNTER — APPOINTMENT (OUTPATIENT)
Dept: CT IMAGING | Age: 70
DRG: 194 | End: 2025-02-08
Payer: MEDICARE

## 2025-02-08 ENCOUNTER — HOSPITAL ENCOUNTER (INPATIENT)
Age: 70
LOS: 3 days | Discharge: HOME | DRG: 194 | End: 2025-02-11
Attending: EMERGENCY MEDICINE | Admitting: INTERNAL MEDICINE
Payer: MEDICARE

## 2025-02-08 ENCOUNTER — APPOINTMENT (OUTPATIENT)
Dept: GENERAL RADIOLOGY | Age: 70
DRG: 194 | End: 2025-02-08
Payer: MEDICARE

## 2025-02-08 DIAGNOSIS — R50.9 ACUTE FEBRILE ILLNESS: Primary | ICD-10-CM

## 2025-02-08 DIAGNOSIS — I21.4 NSTEMI (NON-ST ELEVATED MYOCARDIAL INFARCTION) (HCC): ICD-10-CM

## 2025-02-08 DIAGNOSIS — I10 HYPERTENSION, UNSPECIFIED TYPE: ICD-10-CM

## 2025-02-08 DIAGNOSIS — J18.9 PNEUMONIA OF BOTH LUNGS DUE TO INFECTIOUS ORGANISM, UNSPECIFIED PART OF LUNG: ICD-10-CM

## 2025-02-08 PROBLEM — R53.1 WEAKNESS: Status: ACTIVE | Noted: 2025-02-08

## 2025-02-08 LAB
ALBUMIN SERPL-MCNC: 3 G/DL (ref 3.5–5.2)
ALBUMIN SERPL-MCNC: NORMAL G/DL (ref 3.5–5.2)
ALBUMIN/GLOB SERPL: NORMAL {RATIO} (ref 1–2.5)
ALP SERPL-CCNC: 65 U/L (ref 35–104)
ALP SERPL-CCNC: NORMAL U/L (ref 35–104)
ALT SERPL-CCNC: 18 U/L (ref 0–32)
ALT SERPL-CCNC: NORMAL U/L (ref 5–33)
ANION GAP SERPL CALCULATED.3IONS-SCNC: 5 MMOL/L (ref 7–16)
ANION GAP SERPL CALCULATED.3IONS-SCNC: NORMAL MMOL/L (ref 9–17)
AST SERPL-CCNC: 44 U/L (ref 0–31)
AST SERPL-CCNC: NORMAL U/L
B PARAP IS1001 DNA NPH QL NAA+NON-PROBE: NOT DETECTED
B PERT DNA SPEC QL NAA+PROBE: NOT DETECTED
BASOPHILS # BLD: 0 K/UL (ref 0–0.2)
BASOPHILS NFR BLD: 0 % (ref 0–2)
BILIRUB SERPL-MCNC: 0.3 MG/DL (ref 0–1.2)
BILIRUB SERPL-MCNC: NORMAL MG/DL (ref 0.3–1.2)
BILIRUB UR QL STRIP: NEGATIVE
BNP SERPL-MCNC: 174 PG/ML (ref 0–125)
BUN SERPL-MCNC: 25 MG/DL (ref 6–23)
BUN SERPL-MCNC: NORMAL MG/DL (ref 8–23)
BUN/CREAT SERPL: NORMAL (ref 9–20)
C PNEUM DNA NPH QL NAA+NON-PROBE: NOT DETECTED
CALCIUM SERPL-MCNC: 10.2 MG/DL (ref 8.6–10.2)
CALCIUM SERPL-MCNC: NORMAL MG/DL (ref 8.6–10.4)
CHLORIDE SERPL-SCNC: 97 MMOL/L (ref 98–107)
CHLORIDE SERPL-SCNC: NORMAL MMOL/L (ref 98–107)
CLARITY UR: CLEAR
CO2 SERPL-SCNC: 32 MMOL/L (ref 22–29)
CO2 SERPL-SCNC: NORMAL MMOL/L (ref 20–31)
COLOR UR: YELLOW
CREAT SERPL-MCNC: 0.9 MG/DL (ref 0.5–1)
CREAT SERPL-MCNC: NORMAL MG/DL (ref 0.5–0.9)
EOSINOPHIL # BLD: 0 K/UL (ref 0.05–0.5)
EOSINOPHILS RELATIVE PERCENT: 0 % (ref 0–6)
ERYTHROCYTE [DISTWIDTH] IN BLOOD BY AUTOMATED COUNT: 16.5 % (ref 11.5–15)
FLUAV RNA NPH QL NAA+NON-PROBE: NOT DETECTED
FLUBV RNA NPH QL NAA+NON-PROBE: NOT DETECTED
GFR, ESTIMATED: 67 ML/MIN/1.73M2
GFR, ESTIMATED: NORMAL ML/MIN/1.73M2
GLUCOSE SERPL-MCNC: 96 MG/DL (ref 74–99)
GLUCOSE SERPL-MCNC: NORMAL MG/DL (ref 70–99)
GLUCOSE UR STRIP-MCNC: NEGATIVE MG/DL
HADV DNA NPH QL NAA+NON-PROBE: NOT DETECTED
HCOV 229E RNA NPH QL NAA+NON-PROBE: NOT DETECTED
HCOV HKU1 RNA NPH QL NAA+NON-PROBE: NOT DETECTED
HCOV NL63 RNA NPH QL NAA+NON-PROBE: NOT DETECTED
HCOV OC43 RNA NPH QL NAA+NON-PROBE: NOT DETECTED
HCT VFR BLD AUTO: 37.6 % (ref 34–48)
HGB BLD-MCNC: 11 G/DL (ref 11.5–15.5)
HGB UR QL STRIP.AUTO: ABNORMAL
HMPV RNA NPH QL NAA+NON-PROBE: NOT DETECTED
HPIV1 RNA NPH QL NAA+NON-PROBE: NOT DETECTED
HPIV2 RNA NPH QL NAA+NON-PROBE: NOT DETECTED
HPIV3 RNA NPH QL NAA+NON-PROBE: NOT DETECTED
HPIV4 RNA NPH QL NAA+NON-PROBE: NOT DETECTED
INFLUENZA A BY PCR: NOT DETECTED
INFLUENZA B BY PCR: NOT DETECTED
KETONES UR STRIP-MCNC: ABNORMAL MG/DL
LEUKOCYTE ESTERASE UR QL STRIP: NEGATIVE
LYMPHOCYTES NFR BLD: 1.12 K/UL (ref 1.5–4)
LYMPHOCYTES RELATIVE PERCENT: 11 % (ref 20–42)
M PNEUMO DNA NPH QL NAA+NON-PROBE: NOT DETECTED
MAGNESIUM SERPL-MCNC: 2.2 MG/DL (ref 1.6–2.6)
MCH RBC QN AUTO: 32.4 PG (ref 26–35)
MCHC RBC AUTO-ENTMCNC: 29.3 G/DL (ref 32–34.5)
MCV RBC AUTO: 110.6 FL (ref 80–99.9)
METAMYELOCYTES ABSOLUTE COUNT: 0.09 K/UL (ref 0–0.12)
METAMYELOCYTES: 1 % (ref 0–1)
MONOCYTES NFR BLD: 1.12 K/UL (ref 0.1–0.95)
MONOCYTES NFR BLD: 11 % (ref 2–12)
MYELOCYTES ABSOLUTE COUNT: 0.09 K/UL
MYELOCYTES: 1 %
NEUTROPHILS NFR BLD: 75 % (ref 43–80)
NEUTS SEG NFR BLD: 7.39 K/UL (ref 1.8–7.3)
NITRITE UR QL STRIP: NEGATIVE
PH UR STRIP: 8.5 [PH] (ref 5–8)
PLATELET # BLD AUTO: 188 K/UL (ref 130–450)
PMV BLD AUTO: 10.4 FL (ref 7–12)
POTASSIUM SERPL-SCNC: 4.8 MMOL/L (ref 3.5–5)
POTASSIUM SERPL-SCNC: 4.9 MMOL/L (ref 3.5–5)
POTASSIUM SERPL-SCNC: 6.2 MMOL/L (ref 3.5–5)
POTASSIUM SERPL-SCNC: NORMAL MMOL/L (ref 3.7–5.3)
PROT SERPL-MCNC: 9.7 G/DL (ref 6.4–8.3)
PROT SERPL-MCNC: NORMAL G/DL (ref 6.4–8.3)
PROT UR STRIP-MCNC: ABNORMAL MG/DL
RBC # BLD AUTO: 3.4 M/UL (ref 3.5–5.5)
RBC # BLD: ABNORMAL 10*6/UL
RBC #/AREA URNS HPF: ABNORMAL /HPF
RSV RNA NPH QL NAA+NON-PROBE: NOT DETECTED
RV+EV RNA NPH QL NAA+NON-PROBE: NOT DETECTED
SARS-COV-2 RDRP RESP QL NAA+PROBE: NOT DETECTED
SARS-COV-2 RNA NPH QL NAA+NON-PROBE: NOT DETECTED
SODIUM SERPL-SCNC: 134 MMOL/L (ref 132–146)
SODIUM SERPL-SCNC: NORMAL MMOL/L (ref 135–144)
SP GR UR STRIP: 1.01 (ref 1–1.03)
SPECIMEN DESCRIPTION: NORMAL
SPECIMEN DESCRIPTION: NORMAL
TROPONIN I SERPL HS-MCNC: 29 NG/L (ref 0–9)
TROPONIN I SERPL HS-MCNC: 56 NG/L (ref 0–9)
TROPONIN I SERPL HS-MCNC: NORMAL NG/L (ref 0–14)
UROBILINOGEN UR STRIP-ACNC: 0.2 EU/DL (ref 0–1)
WBC # BLD: ABNORMAL 10*3/UL
WBC # BLD: ABNORMAL 10*3/UL
WBC #/AREA URNS HPF: ABNORMAL /HPF
WBC OTHER # BLD: 9.8 K/UL (ref 4.5–11.5)

## 2025-02-08 PROCEDURE — 81001 URINALYSIS AUTO W/SCOPE: CPT

## 2025-02-08 PROCEDURE — 84132 ASSAY OF SERUM POTASSIUM: CPT

## 2025-02-08 PROCEDURE — 1200000000 HC SEMI PRIVATE

## 2025-02-08 PROCEDURE — 0202U NFCT DS 22 TRGT SARS-COV-2: CPT

## 2025-02-08 PROCEDURE — 87502 INFLUENZA DNA AMP PROBE: CPT

## 2025-02-08 PROCEDURE — 80053 COMPREHEN METABOLIC PANEL: CPT

## 2025-02-08 PROCEDURE — 87040 BLOOD CULTURE FOR BACTERIA: CPT

## 2025-02-08 PROCEDURE — 87635 SARS-COV-2 COVID-19 AMP PRB: CPT

## 2025-02-08 PROCEDURE — 2500000003 HC RX 250 WO HCPCS

## 2025-02-08 PROCEDURE — 99285 EMERGENCY DEPT VISIT HI MDM: CPT

## 2025-02-08 PROCEDURE — 96374 THER/PROPH/DIAG INJ IV PUSH: CPT

## 2025-02-08 PROCEDURE — 6360000002 HC RX W HCPCS

## 2025-02-08 PROCEDURE — 96375 TX/PRO/DX INJ NEW DRUG ADDON: CPT

## 2025-02-08 PROCEDURE — 96361 HYDRATE IV INFUSION ADD-ON: CPT

## 2025-02-08 PROCEDURE — 71275 CT ANGIOGRAPHY CHEST: CPT

## 2025-02-08 PROCEDURE — 6370000000 HC RX 637 (ALT 250 FOR IP): Performed by: STUDENT IN AN ORGANIZED HEALTH CARE EDUCATION/TRAINING PROGRAM

## 2025-02-08 PROCEDURE — 83880 ASSAY OF NATRIURETIC PEPTIDE: CPT

## 2025-02-08 PROCEDURE — 2580000003 HC RX 258

## 2025-02-08 PROCEDURE — 2580000003 HC RX 258: Performed by: STUDENT IN AN ORGANIZED HEALTH CARE EDUCATION/TRAINING PROGRAM

## 2025-02-08 PROCEDURE — 71045 X-RAY EXAM CHEST 1 VIEW: CPT

## 2025-02-08 PROCEDURE — 93005 ELECTROCARDIOGRAM TRACING: CPT

## 2025-02-08 PROCEDURE — 85025 COMPLETE CBC W/AUTO DIFF WBC: CPT

## 2025-02-08 PROCEDURE — 83735 ASSAY OF MAGNESIUM: CPT

## 2025-02-08 PROCEDURE — 6360000004 HC RX CONTRAST MEDICATION: Performed by: RADIOLOGY

## 2025-02-08 PROCEDURE — 84484 ASSAY OF TROPONIN QUANT: CPT

## 2025-02-08 RX ORDER — POLYETHYLENE GLYCOL 3350 17 G/17G
17 POWDER, FOR SOLUTION ORAL DAILY PRN
Status: DISCONTINUED | OUTPATIENT
Start: 2025-02-08 | End: 2025-02-11 | Stop reason: HOSPADM

## 2025-02-08 RX ORDER — OLANZAPINE 10 MG/1
10 TABLET, ORALLY DISINTEGRATING ORAL EVERY MORNING
Status: DISCONTINUED | OUTPATIENT
Start: 2025-02-09 | End: 2025-02-11 | Stop reason: HOSPADM

## 2025-02-08 RX ORDER — SODIUM CHLORIDE 0.9 % (FLUSH) 0.9 %
5-40 SYRINGE (ML) INJECTION EVERY 12 HOURS SCHEDULED
Status: DISCONTINUED | OUTPATIENT
Start: 2025-02-08 | End: 2025-02-11 | Stop reason: HOSPADM

## 2025-02-08 RX ORDER — KETOROLAC TROMETHAMINE 15 MG/ML
15 INJECTION, SOLUTION INTRAMUSCULAR; INTRAVENOUS ONCE
Status: COMPLETED | OUTPATIENT
Start: 2025-02-08 | End: 2025-02-08

## 2025-02-08 RX ORDER — SODIUM CHLORIDE 9 MG/ML
INJECTION, SOLUTION INTRAVENOUS PRN
Status: DISCONTINUED | OUTPATIENT
Start: 2025-02-08 | End: 2025-02-11 | Stop reason: HOSPADM

## 2025-02-08 RX ORDER — ENOXAPARIN SODIUM 100 MG/ML
40 INJECTION SUBCUTANEOUS DAILY
Status: DISCONTINUED | OUTPATIENT
Start: 2025-02-08 | End: 2025-02-11 | Stop reason: HOSPADM

## 2025-02-08 RX ORDER — MIDODRINE HYDROCHLORIDE 5 MG/1
7.5 TABLET ORAL 3 TIMES DAILY
Status: DISCONTINUED | OUTPATIENT
Start: 2025-02-08 | End: 2025-02-10

## 2025-02-08 RX ORDER — SODIUM CHLORIDE 0.9 % (FLUSH) 0.9 %
5-40 SYRINGE (ML) INJECTION PRN
Status: DISCONTINUED | OUTPATIENT
Start: 2025-02-08 | End: 2025-02-11 | Stop reason: HOSPADM

## 2025-02-08 RX ORDER — VALPROIC ACID 250 MG/5ML
1000 SOLUTION ORAL 2 TIMES DAILY
Status: DISCONTINUED | OUTPATIENT
Start: 2025-02-08 | End: 2025-02-11 | Stop reason: HOSPADM

## 2025-02-08 RX ORDER — ACETAMINOPHEN 160 MG/5ML
650 LIQUID ORAL EVERY 6 HOURS PRN
Status: DISCONTINUED | OUTPATIENT
Start: 2025-02-08 | End: 2025-02-11 | Stop reason: HOSPADM

## 2025-02-08 RX ORDER — OLANZAPINE 10 MG/1
20 TABLET, ORALLY DISINTEGRATING ORAL NIGHTLY
Status: DISCONTINUED | OUTPATIENT
Start: 2025-02-08 | End: 2025-02-11 | Stop reason: HOSPADM

## 2025-02-08 RX ORDER — CHOLECALCIFEROL (VITAMIN D3) 10(400)/ML
12.5 DROPS ORAL EVERY MORNING
Status: DISCONTINUED | OUTPATIENT
Start: 2025-02-09 | End: 2025-02-09 | Stop reason: CLARIF

## 2025-02-08 RX ORDER — 0.9 % SODIUM CHLORIDE 0.9 %
500 INTRAVENOUS SOLUTION INTRAVENOUS ONCE
Status: COMPLETED | OUTPATIENT
Start: 2025-02-08 | End: 2025-02-08

## 2025-02-08 RX ORDER — ONDANSETRON 2 MG/ML
4 INJECTION INTRAMUSCULAR; INTRAVENOUS EVERY 6 HOURS PRN
Status: DISCONTINUED | OUTPATIENT
Start: 2025-02-08 | End: 2025-02-11 | Stop reason: HOSPADM

## 2025-02-08 RX ORDER — ONDANSETRON 4 MG/1
4 TABLET, ORALLY DISINTEGRATING ORAL EVERY 8 HOURS PRN
Status: DISCONTINUED | OUTPATIENT
Start: 2025-02-08 | End: 2025-02-11 | Stop reason: HOSPADM

## 2025-02-08 RX ORDER — POTASSIUM CHLORIDE 7.45 MG/ML
10 INJECTION INTRAVENOUS PRN
Status: DISCONTINUED | OUTPATIENT
Start: 2025-02-08 | End: 2025-02-11 | Stop reason: HOSPADM

## 2025-02-08 RX ORDER — 0.9 % SODIUM CHLORIDE 0.9 %
1000 INTRAVENOUS SOLUTION INTRAVENOUS ONCE
Status: COMPLETED | OUTPATIENT
Start: 2025-02-08 | End: 2025-02-08

## 2025-02-08 RX ORDER — FERROUS SULFATE 220 (44)/5
330 ELIXIR ORAL EVERY MORNING
Status: DISCONTINUED | OUTPATIENT
Start: 2025-02-09 | End: 2025-02-09

## 2025-02-08 RX ORDER — LEVOTHYROXINE SODIUM 75 UG/1
150 TABLET ORAL
Status: DISCONTINUED | OUTPATIENT
Start: 2025-02-09 | End: 2025-02-11 | Stop reason: HOSPADM

## 2025-02-08 RX ORDER — SODIUM CHLORIDE 9 MG/ML
INJECTION, SOLUTION INTRAVENOUS CONTINUOUS
Status: ACTIVE | OUTPATIENT
Start: 2025-02-08 | End: 2025-02-09

## 2025-02-08 RX ORDER — POTASSIUM CHLORIDE 1500 MG/1
40 TABLET, EXTENDED RELEASE ORAL PRN
Status: DISCONTINUED | OUTPATIENT
Start: 2025-02-08 | End: 2025-02-11 | Stop reason: HOSPADM

## 2025-02-08 RX ORDER — FAMOTIDINE 20 MG/1
20 TABLET, FILM COATED ORAL 2 TIMES DAILY
Status: DISCONTINUED | OUTPATIENT
Start: 2025-02-08 | End: 2025-02-11 | Stop reason: HOSPADM

## 2025-02-08 RX ORDER — ACETAMINOPHEN 650 MG/1
650 SUPPOSITORY RECTAL ONCE
Status: COMPLETED | OUTPATIENT
Start: 2025-02-08 | End: 2025-02-08

## 2025-02-08 RX ORDER — PAROXETINE 10 MG/1
10 TABLET, FILM COATED ORAL 2 TIMES DAILY
Status: DISCONTINUED | OUTPATIENT
Start: 2025-02-08 | End: 2025-02-11 | Stop reason: HOSPADM

## 2025-02-08 RX ORDER — FOLIC ACID 1 MG/1
1 TABLET ORAL EVERY MORNING
Status: DISCONTINUED | OUTPATIENT
Start: 2025-02-09 | End: 2025-02-11 | Stop reason: HOSPADM

## 2025-02-08 RX ORDER — MONTELUKAST SODIUM 10 MG/1
10 TABLET ORAL NIGHTLY
Status: DISCONTINUED | OUTPATIENT
Start: 2025-02-08 | End: 2025-02-11 | Stop reason: HOSPADM

## 2025-02-08 RX ORDER — ACETAMINOPHEN 650 MG/1
650 SUPPOSITORY RECTAL EVERY 6 HOURS PRN
Status: DISCONTINUED | OUTPATIENT
Start: 2025-02-08 | End: 2025-02-11 | Stop reason: HOSPADM

## 2025-02-08 RX ORDER — MAGNESIUM SULFATE IN WATER 40 MG/ML
2000 INJECTION, SOLUTION INTRAVENOUS PRN
Status: DISCONTINUED | OUTPATIENT
Start: 2025-02-08 | End: 2025-02-11 | Stop reason: HOSPADM

## 2025-02-08 RX ORDER — IOPAMIDOL 755 MG/ML
75 INJECTION, SOLUTION INTRAVASCULAR
Status: COMPLETED | OUTPATIENT
Start: 2025-02-08 | End: 2025-02-08

## 2025-02-08 RX ADMIN — KETOROLAC TROMETHAMINE 15 MG: 15 INJECTION, SOLUTION INTRAMUSCULAR; INTRAVENOUS at 13:17

## 2025-02-08 RX ADMIN — SODIUM CHLORIDE 500 ML: 9 INJECTION, SOLUTION INTRAVENOUS at 17:35

## 2025-02-08 RX ADMIN — IOPAMIDOL 75 ML: 755 INJECTION, SOLUTION INTRAVENOUS at 08:42

## 2025-02-08 RX ADMIN — CEFTRIAXONE SODIUM 2000 MG: 2 INJECTION, POWDER, FOR SOLUTION INTRAMUSCULAR; INTRAVENOUS at 18:55

## 2025-02-08 RX ADMIN — SODIUM CHLORIDE 1000 ML: 9 INJECTION, SOLUTION INTRAVENOUS at 13:16

## 2025-02-08 RX ADMIN — ACETAMINOPHEN 650 MG: 650 SUPPOSITORY RECTAL at 17:33

## 2025-02-08 RX ADMIN — DOXYCYCLINE 100 MG: 100 INJECTION, POWDER, LYOPHILIZED, FOR SOLUTION INTRAVENOUS at 18:55

## 2025-02-08 ASSESSMENT — PAIN - FUNCTIONAL ASSESSMENT: PAIN_FUNCTIONAL_ASSESSMENT: NONE - DENIES PAIN

## 2025-02-08 NOTE — ED PROVIDER NOTES
imaging results for this patient. Results are listed below.     LABS:  Results for orders placed or performed during the hospital encounter of 02/08/25   COVID-19, Rapid    Specimen: Nasopharyngeal Swab   Result Value Ref Range    Specimen Description .NASOPHARYNGEAL SWAB     SARS-CoV-2, Rapid Not Detected Not Detected   Influenza A+B, PCR    Specimen: Nasal   Result Value Ref Range    Influenza A by PCR Not Detected Not Detected    Influenza B by PCR Not Detected Not Detected   Respiratory Panel, Molecular, with COVID-19 (Restricted: peds pts or suitable admitted adults)    Specimen: Nasopharyngeal Swab   Result Value Ref Range    Specimen Description .NASOPHARYNGEAL SWAB     Adenovirus PCR Not Detected Not Detected    Coronavirus 229E PCR Not Detected Not Detected    Coronavirus HKU1 PCR Not Detected Not Detected    Coronavirus NL63 PCR Not Detected Not Detected    Coronavirus OC43 PCR Not Detected Not Detected    SARS-CoV-2, PCR Not Detected Not Detected    Human Metapneumovirus PCR Not Detected Not Detected    Rhino/Enterovirus PCR Not Detected Not Detected    Influenza A by PCR Not Detected Not Detected    Influenza B by PCR Not Detected Not Detected    Parainfluenza 1 PCR Not Detected Not Detected    Parainfluenza 2 PCR Not Detected Not Detected    Parainfluenza 3 PCR Not Detected Not Detected    Parainfluenza 4 PCR Not Detected Not Detected    Resp Syncytial Virus PCR Not Detected Not Detected    Bordetella parapertussis by PCR Not Detected Not Detected    B Pertussis by PCR Not Detected Not Detected    Chlamydia pneumoniae By PCR Not Detected Not Detected    Mycoplasma pneumo by PCR Not Detected Not Detected   Culture, Blood 1    Specimen: Blood   Result Value Ref Range    Specimen Description .BLOOD     Special Requests          Culture NO GROWTH <24 HRS    Culture, Blood 2    Specimen: Blood   Result Value Ref Range    Specimen Description .BLOOD     Special Requests          Culture NO GROWTH <24 HRS

## 2025-02-09 LAB
ALBUMIN SERPL-MCNC: 2.9 G/DL (ref 3.5–5.2)
ALP SERPL-CCNC: 70 U/L (ref 35–104)
ALT SERPL-CCNC: 21 U/L (ref 0–32)
ANION GAP SERPL CALCULATED.3IONS-SCNC: 7 MMOL/L (ref 7–16)
AST SERPL-CCNC: 50 U/L (ref 0–31)
BASOPHILS # BLD: 0.03 K/UL (ref 0–0.2)
BASOPHILS NFR BLD: 0 % (ref 0–2)
BILIRUB DIRECT SERPL-MCNC: <0.2 MG/DL (ref 0–0.3)
BILIRUB INDIRECT SERPL-MCNC: ABNORMAL MG/DL (ref 0–1)
BILIRUB SERPL-MCNC: 0.3 MG/DL (ref 0–1.2)
BUN SERPL-MCNC: 23 MG/DL (ref 6–23)
CALCIUM SERPL-MCNC: 9.1 MG/DL (ref 8.6–10.2)
CHLORIDE SERPL-SCNC: 105 MMOL/L (ref 98–107)
CO2 SERPL-SCNC: 29 MMOL/L (ref 22–29)
CREAT SERPL-MCNC: 1 MG/DL (ref 0.5–1)
EOSINOPHIL # BLD: 0.01 K/UL (ref 0.05–0.5)
EOSINOPHILS RELATIVE PERCENT: 0 % (ref 0–6)
ERYTHROCYTE [DISTWIDTH] IN BLOOD BY AUTOMATED COUNT: 16.4 % (ref 11.5–15)
GFR, ESTIMATED: 63 ML/MIN/1.73M2
GLUCOSE SERPL-MCNC: 95 MG/DL (ref 74–99)
HCT VFR BLD AUTO: 34.3 % (ref 34–48)
HGB BLD-MCNC: 9.8 G/DL (ref 11.5–15.5)
IMM GRANULOCYTES # BLD AUTO: 0.14 K/UL (ref 0–0.58)
IMM GRANULOCYTES NFR BLD: 2 % (ref 0–5)
INR PPP: 1.1
LYMPHOCYTES NFR BLD: 1.13 K/UL (ref 1.5–4)
LYMPHOCYTES RELATIVE PERCENT: 14 % (ref 20–42)
MCH RBC QN AUTO: 31.4 PG (ref 26–35)
MCHC RBC AUTO-ENTMCNC: 28.6 G/DL (ref 32–34.5)
MCV RBC AUTO: 109.9 FL (ref 80–99.9)
MONOCYTES NFR BLD: 1.64 K/UL (ref 0.1–0.95)
MONOCYTES NFR BLD: 20 % (ref 2–12)
NEUTROPHILS NFR BLD: 64 % (ref 43–80)
NEUTS SEG NFR BLD: 5.18 K/UL (ref 1.8–7.3)
PLATELET # BLD AUTO: 184 K/UL (ref 130–450)
PMV BLD AUTO: 10.4 FL (ref 7–12)
POTASSIUM SERPL-SCNC: 4.9 MMOL/L (ref 3.5–5)
PROT SERPL-MCNC: 9.1 G/DL (ref 6.4–8.3)
PROTHROMBIN TIME: 11.1 SEC (ref 9.3–12.4)
RBC # BLD AUTO: 3.12 M/UL (ref 3.5–5.5)
RBC # BLD: ABNORMAL 10*6/UL
SODIUM SERPL-SCNC: 141 MMOL/L (ref 132–146)
WBC OTHER # BLD: 8.1 K/UL (ref 4.5–11.5)

## 2025-02-09 PROCEDURE — 99232 SBSQ HOSP IP/OBS MODERATE 35: CPT | Performed by: INTERNAL MEDICINE

## 2025-02-09 PROCEDURE — 2500000003 HC RX 250 WO HCPCS: Performed by: INTERNAL MEDICINE

## 2025-02-09 PROCEDURE — 1200000000 HC SEMI PRIVATE

## 2025-02-09 PROCEDURE — 6370000000 HC RX 637 (ALT 250 FOR IP): Performed by: INTERNAL MEDICINE

## 2025-02-09 PROCEDURE — 2580000003 HC RX 258: Performed by: INTERNAL MEDICINE

## 2025-02-09 PROCEDURE — 80053 COMPREHEN METABOLIC PANEL: CPT

## 2025-02-09 PROCEDURE — 6360000002 HC RX W HCPCS: Performed by: INTERNAL MEDICINE

## 2025-02-09 PROCEDURE — 85610 PROTHROMBIN TIME: CPT

## 2025-02-09 PROCEDURE — 82248 BILIRUBIN DIRECT: CPT

## 2025-02-09 PROCEDURE — 36415 COLL VENOUS BLD VENIPUNCTURE: CPT

## 2025-02-09 PROCEDURE — 85025 COMPLETE CBC W/AUTO DIFF WBC: CPT

## 2025-02-09 RX ORDER — FERROUS SULFATE 300 MG/5ML
300 LIQUID (ML) ORAL DAILY
Status: DISCONTINUED | OUTPATIENT
Start: 2025-02-09 | End: 2025-02-11 | Stop reason: HOSPADM

## 2025-02-09 RX ORDER — ERGOCALCIFEROL (VITAMIN D2) 200 MCG/ML
5000 DROPS ORAL DAILY
Status: DISCONTINUED | OUTPATIENT
Start: 2025-02-09 | End: 2025-02-11 | Stop reason: HOSPADM

## 2025-02-09 RX ADMIN — ENOXAPARIN SODIUM 40 MG: 100 INJECTION SUBCUTANEOUS at 01:09

## 2025-02-09 RX ADMIN — FOLIC ACID 1 MG: 1 TABLET ORAL at 10:44

## 2025-02-09 RX ADMIN — DOXYCYCLINE 100 MG: 100 INJECTION, POWDER, LYOPHILIZED, FOR SOLUTION INTRAVENOUS at 10:44

## 2025-02-09 RX ADMIN — LEVOTHYROXINE SODIUM 150 MCG: 75 TABLET ORAL at 10:44

## 2025-02-09 RX ADMIN — OLANZAPINE 20 MG: 10 TABLET, ORALLY DISINTEGRATING ORAL at 22:00

## 2025-02-09 RX ADMIN — Medication 5000 UNITS: at 10:45

## 2025-02-09 RX ADMIN — SODIUM CHLORIDE, PRESERVATIVE FREE 10 ML: 5 INJECTION INTRAVENOUS at 10:45

## 2025-02-09 RX ADMIN — DOXYCYCLINE 100 MG: 100 INJECTION, POWDER, LYOPHILIZED, FOR SOLUTION INTRAVENOUS at 21:16

## 2025-02-09 RX ADMIN — ENOXAPARIN SODIUM 40 MG: 100 INJECTION SUBCUTANEOUS at 21:13

## 2025-02-09 RX ADMIN — VALPROIC ACID 1000 MG: 250 SOLUTION ORAL at 22:00

## 2025-02-09 RX ADMIN — FAMOTIDINE 20 MG: 20 TABLET, FILM COATED ORAL at 10:44

## 2025-02-09 RX ADMIN — WATER 2000 MG: 1 INJECTION INTRAMUSCULAR; INTRAVENOUS; SUBCUTANEOUS at 21:10

## 2025-02-09 RX ADMIN — SODIUM CHLORIDE: 9 INJECTION, SOLUTION INTRAVENOUS at 01:09

## 2025-02-09 RX ADMIN — OLANZAPINE 10 MG: 10 TABLET, ORALLY DISINTEGRATING ORAL at 10:43

## 2025-02-09 RX ADMIN — FAMOTIDINE 20 MG: 20 TABLET, FILM COATED ORAL at 21:59

## 2025-02-09 RX ADMIN — MONTELUKAST 10 MG: 10 TABLET, FILM COATED ORAL at 21:59

## 2025-02-09 RX ADMIN — VALPROIC ACID 1000 MG: 250 SOLUTION ORAL at 10:43

## 2025-02-09 RX ADMIN — MIDODRINE HYDROCHLORIDE 7.5 MG: 5 TABLET ORAL at 17:46

## 2025-02-09 RX ADMIN — MIDODRINE HYDROCHLORIDE 7.5 MG: 5 TABLET ORAL at 01:10

## 2025-02-09 RX ADMIN — PAROXETINE 10 MG: 10 TABLET, FILM COATED ORAL at 10:44

## 2025-02-09 RX ADMIN — PAROXETINE 10 MG: 10 TABLET, FILM COATED ORAL at 22:00

## 2025-02-09 RX ADMIN — SODIUM CHLORIDE, PRESERVATIVE FREE 10 ML: 5 INJECTION INTRAVENOUS at 21:58

## 2025-02-09 RX ADMIN — Medication 300 MG: at 10:43

## 2025-02-09 ASSESSMENT — PAIN SCALES - WONG BAKER: WONGBAKER_NUMERICALRESPONSE: NO HURT

## 2025-02-09 NOTE — PLAN OF CARE
Problem: Discharge Planning  Goal: Discharge to home or other facility with appropriate resources  2/9/2025 1641 by Lorrie Garrett RN  Outcome: Progressing  2/9/2025 1641 by Lorrie Garrett RN  Outcome: Progressing  Flowsheets (Taken 2/9/2025 1617)  Discharge to home or other facility with appropriate resources:   Identify barriers to discharge with patient and caregiver   Arrange for interpreters to assist at discharge as needed   Arrange for needed discharge resources and transportation as appropriate     Problem: Safety - Adult  Goal: Free from fall injury  2/9/2025 1641 by Lorrie Garrett RN  Outcome: Progressing  2/9/2025 1641 by Lorrie Garrett RN  Outcome: Progressing     Problem: Pain  Goal: Verbalizes/displays adequate comfort level or baseline comfort level  2/9/2025 1641 by Lorrie Garrett RN  Outcome: Progressing  2/9/2025 1641 by Lorrie Garrett RN  Outcome: Progressing     Problem: ABCDS Injury Assessment  Goal: Absence of physical injury  Outcome: Progressing     Problem: Skin/Tissue Integrity  Goal: Skin integrity remains intact  Description: 1.  Monitor for areas of redness and/or skin breakdown  2.  Assess vascular access sites hourly  3.  Every 4-6 hours minimum:  Change oxygen saturation probe site  4.  Every 4-6 hours:  If on nasal continuous positive airway pressure, respiratory therapy assess nares and determine need for appliance change or resting period  Outcome: Progressing

## 2025-02-09 NOTE — PROGRESS NOTES
4 Eyes Skin Assessment     NAME:  Katerina Paiz  YOB: 1955  MEDICAL RECORD NUMBER:  [id number]    The patient is being assessed for  Admission    I agree that at least one RN has performed a thorough Head to Toe Skin Assessment on the patient. ALL assessment sites listed below have been assessed.      Areas assessed by both nurses:    Head, Face, Ears, Shoulders, Back, Chest, Arms, Elbows, Hands, Sacrum. Buttock, Coccyx, Ischium, Legs. Feet and Heels, and Under Medical Devices         Does the Patient have a Wound? No noted wound(s)       Jose Prevention initiated by RN: Yes  Wound Care Orders initiated by RN: No    Pressure Injury (Stage 3,4, Unstageable, DTI, NWPT, and Complex wounds) if present, place Wound referral order by RN under : No    New Ostomies, if present place, Ostomy referral order under : No     Nurse 1 eSignature: Electronically signed by Lorrie Garrett RN on 2/9/25 at 4:37 PM EST    **SHARE this note so that the co-signing nurse can place an eSignature**    Nurse 2 eSignature: Electronically signed by Aspen Talamantes RN on 2/9/25 at 4:41 PM EST

## 2025-02-09 NOTE — PROGRESS NOTES
[facility] Hospitalist Progress Note    Admitting Date and Time: 2/8/2025  3:51 AM  Admit Dx: Weakness [R53.1]    Subjective:  Patient is being followed for Weakness [R53.1]     Patient is seen this a.m.  She appears sleepy.  Withdraws to pain but minimally responsive.  Nonverbal.       ferrous Sulfate  300 mg Per G Tube Daily    Ergocalciferol  5,000 Units Per G Tube Daily    cefTRIAXone (ROCEPHIN) IV  2,000 mg IntraVENous Q24H    doxycycline (VIBRAMYCIN) IV  100 mg IntraVENous Q12H    valproic acid  1,000 mg Per G Tube BID    PARoxetine  10 mg Per G Tube BID    OLANZapine zydis  20 mg Oral Nightly    OLANZapine zydis  10 mg Oral QAM    montelukast  10 mg Oral Nightly    midodrine  7.5 mg Per G Tube TID    levothyroxine  150 mcg PEG Tube QAM AC    folic acid  1 mg PEG Tube QAM    famotidine  20 mg Per G Tube BID    sodium chloride flush  5-40 mL IntraVENous 2 times per day    enoxaparin  40 mg SubCUTAneous Daily     sodium chloride flush, 5-40 mL, PRN  sodium chloride, , PRN  potassium chloride, 40 mEq, PRN   Or  potassium alternative oral replacement, 40 mEq, PRN   Or  potassium chloride, 10 mEq, PRN  magnesium sulfate, 2,000 mg, PRN  ondansetron, 4 mg, Q8H PRN   Or  ondansetron, 4 mg, Q6H PRN  polyethylene glycol, 17 g, Daily PRN  acetaminophen, 650 mg, Q6H PRN   Or  acetaminophen, 650 mg, Q6H PRN         Objective:    /88   Pulse (!) 103   Temp 98.3 °F (36.8 °C) (Oral)   Resp 14   Ht 1.626 m (5' 4\")   Wt 90.7 kg (200 lb)   SpO2 98%   BMI 34.33 kg/m²     General Appearance: Difficult to evaluate orientation.  Lethargic  Skin: warm and dry  Head: normocephalic and atraumatic  Eyes: pupils equal, round, and reactive to light, extraocular eye movements intact, conjunctivae normal  Neck: neck supple and non tender without mass   Pulmonary/Chest: clear to auscultation bilaterally- no wheezes, rales or rhonchi, normal air movement, no respiratory distress  Cardiovascular: normal rate, normal S1

## 2025-02-09 NOTE — H&P
[facility]          PCP: Tevin Poon DO    Date of Admission: 2025    Date of Service: Pt seen/examined on 2025 and is admitted to Inpatient with expected LOS greater than two midnights due to medical therapy.  Placed in inpatient    Chief Complaint:  had concerns including Hypertension (From group home, 140/92 for ems.).    History Of Present Illness:    Ms. Katerina Paiz, a 70 y.o. year old female  who  has a past medical history of Abscess, Acute kidney injury (HCC), Anemia, Blind in both eyes, Dementia ([facility]), Dysphagia, Essential hypertension, Gastroesophageal reflux disease without esophagitis, Hemodialysis patient ([facility]), Hernia, diaphragmatic, Hyperthyroidism, MR (mental retardation),  cerebral leukomalacia, Osteoarthritis, Other seizures ([facility]), Peptic ulcer, Peripheral vascular disease ([facility]), Pneumonia, Pneumonia due to infectious organism, and Sepsis ([facility]).     Mr. Sepulveda is a 70-year-old female with past medical history of generalized weakness, recurrent falls, intellectual disability, dementia, peripheral vascular disease, hypertension, GERD, seizure disorder, dysphagia with PEG tube, insomnia, anxiety/depression, COPD, hypothyroidism, iron deficiency anemia, who presented from local nursing home with elevated blood pressure.  Patient is a poor historian, nonverbal, does not follow commands appropriately, no collateral information at bedside.  Reportedly had elevated blood pressure, upon arrival her BP was 165/95, tachycardic, low-grade fever 100.7, her labs were significant for troponin 29, repeat 56, negative respiratory panel, urinalysis without infectious process, CTA chest showed no acute cardiopulmonary disease or effusion.  Patient met SIRS criteria, received antibiotics, blood cultures, admitted for close observation.  CODE STATUS full code.      Past Medical History:        Diagnosis Date    Abscess     periapical    Acute

## 2025-02-10 LAB
EKG ATRIAL RATE: 120 BPM
EKG P AXIS: 44 DEGREES
EKG P-R INTERVAL: 148 MS
EKG Q-T INTERVAL: 324 MS
EKG QRS DURATION: 70 MS
EKG QTC CALCULATION (BAZETT): 457 MS
EKG R AXIS: 0 DEGREES
EKG T AXIS: 52 DEGREES
EKG VENTRICULAR RATE: 120 BPM

## 2025-02-10 PROCEDURE — 97161 PT EVAL LOW COMPLEX 20 MIN: CPT

## 2025-02-10 PROCEDURE — 99232 SBSQ HOSP IP/OBS MODERATE 35: CPT | Performed by: INTERNAL MEDICINE

## 2025-02-10 PROCEDURE — 2500000003 HC RX 250 WO HCPCS: Performed by: INTERNAL MEDICINE

## 2025-02-10 PROCEDURE — 6360000002 HC RX W HCPCS: Performed by: INTERNAL MEDICINE

## 2025-02-10 PROCEDURE — 6370000000 HC RX 637 (ALT 250 FOR IP): Performed by: INTERNAL MEDICINE

## 2025-02-10 PROCEDURE — 1200000000 HC SEMI PRIVATE

## 2025-02-10 PROCEDURE — 2580000003 HC RX 258: Performed by: INTERNAL MEDICINE

## 2025-02-10 PROCEDURE — 97165 OT EVAL LOW COMPLEX 30 MIN: CPT

## 2025-02-10 PROCEDURE — 93010 ELECTROCARDIOGRAM REPORT: CPT | Performed by: INTERNAL MEDICINE

## 2025-02-10 RX ORDER — MIDODRINE HYDROCHLORIDE 2.5 MG/1
5 TABLET ORAL 3 TIMES DAILY
DISCHARGE
Start: 2025-02-10 | End: 2025-02-10

## 2025-02-10 RX ORDER — MIDODRINE HYDROCHLORIDE 2.5 MG/1
5 TABLET ORAL 3 TIMES DAILY
DISCHARGE
Start: 2025-02-10

## 2025-02-10 RX ORDER — MIDODRINE HYDROCHLORIDE 5 MG/1
5 TABLET ORAL 3 TIMES DAILY
Qty: 90 TABLET | Refills: 0 | Status: SHIPPED | OUTPATIENT
Start: 2025-02-10 | End: 2025-03-12

## 2025-02-10 RX ORDER — MIDODRINE HYDROCHLORIDE 5 MG/1
5 TABLET ORAL 3 TIMES DAILY
DISCHARGE
Start: 2025-02-10 | End: 2025-02-10

## 2025-02-10 RX ORDER — PAROXETINE 10 MG/5ML
10 SUSPENSION ORAL 2 TIMES DAILY
DISCHARGE
Start: 2025-02-10

## 2025-02-10 RX ORDER — MIDODRINE HYDROCHLORIDE 2.5 MG/1
5 TABLET ORAL 3 TIMES DAILY
Refills: 0 | DISCHARGE
Start: 2025-02-10 | End: 2025-02-10

## 2025-02-10 RX ORDER — MIDODRINE HYDROCHLORIDE 5 MG/1
5 TABLET ORAL 3 TIMES DAILY
Status: DISCONTINUED | OUTPATIENT
Start: 2025-02-10 | End: 2025-02-11 | Stop reason: HOSPADM

## 2025-02-10 RX ADMIN — OLANZAPINE 10 MG: 10 TABLET, ORALLY DISINTEGRATING ORAL at 08:47

## 2025-02-10 RX ADMIN — FOLIC ACID 1 MG: 1 TABLET ORAL at 08:48

## 2025-02-10 RX ADMIN — FAMOTIDINE 20 MG: 20 TABLET, FILM COATED ORAL at 08:48

## 2025-02-10 RX ADMIN — Medication 300 MG: at 08:47

## 2025-02-10 RX ADMIN — FAMOTIDINE 20 MG: 20 TABLET, FILM COATED ORAL at 22:10

## 2025-02-10 RX ADMIN — OLANZAPINE 20 MG: 10 TABLET, ORALLY DISINTEGRATING ORAL at 22:10

## 2025-02-10 RX ADMIN — PAROXETINE 10 MG: 10 TABLET, FILM COATED ORAL at 08:48

## 2025-02-10 RX ADMIN — DOXYCYCLINE 100 MG: 100 INJECTION, POWDER, LYOPHILIZED, FOR SOLUTION INTRAVENOUS at 08:47

## 2025-02-10 RX ADMIN — VALPROIC ACID 1000 MG: 250 SOLUTION ORAL at 22:18

## 2025-02-10 RX ADMIN — LEVOTHYROXINE SODIUM 150 MCG: 75 TABLET ORAL at 06:57

## 2025-02-10 RX ADMIN — VALPROIC ACID 1000 MG: 250 SOLUTION ORAL at 08:47

## 2025-02-10 RX ADMIN — SODIUM CHLORIDE, PRESERVATIVE FREE 10 ML: 5 INJECTION INTRAVENOUS at 08:48

## 2025-02-10 RX ADMIN — PAROXETINE 10 MG: 10 TABLET, FILM COATED ORAL at 22:10

## 2025-02-10 RX ADMIN — MONTELUKAST 10 MG: 10 TABLET, FILM COATED ORAL at 22:10

## 2025-02-10 RX ADMIN — ENOXAPARIN SODIUM 40 MG: 100 INJECTION SUBCUTANEOUS at 22:07

## 2025-02-10 ASSESSMENT — PAIN SCALES - WONG BAKER: WONGBAKER_NUMERICALRESPONSE: NO HURT

## 2025-02-10 NOTE — CARE COORDINATION
Discharge order noted  Non emergency stretcher transport scheduled for 330-530PM   Lorenzo Owusu MSN RN  [facility] Case Management  [phone]

## 2025-02-10 NOTE — DISCHARGE SUMMARY
[facility] Hospitalist Physician Discharge Summary       No follow-up provider specified.    Activity level: As tolerated     Dispo: SNF      Condition on discharge: Stable     Patient ID:  Katerina Paiz  [id number]  70 y.o.  1955    Admit date: 2/8/2025    Discharge date and time:  2/10/2025  3:19 PM    Admission Diagnoses: Principal Problem:    Weakness  Resolved Problems:    * No resolved hospital problems. *      Discharge Diagnoses: Principal Problem:    Weakness  Resolved Problems:    * No resolved hospital problems. *      Consults:  IP CONSULT TO INTERNAL MEDICINE  IP CONSULT TO INTERNAL MEDICINE  IP CONSULT TO VASCULAR ACCESS TEAM  IP CONSULT TO SOCIAL WORK    Procedures:     Hospital Course:   Patient Katerina Paiz is a 70 y.o. presented with Weakness [R53.1]  NSTEMI (non-ST elevated myocardial infarction) (HCC) [I21.4]  Acute febrile illness [R50.9]  Pneumonia of both lungs due to infectious organism, unspecified part of lung [J18.9]  Hypertension, unspecified type [I10]    Patient is a 70 y.o. female with PMH of MR, dementia, PVD, HTN, GERD, seizure disorder, dysphagia with PEG, anxiety/depression, COPD, hypothyroidism, and OPAL, presented from a group home with elevated BP. Nonverbal, poor historian. /95, tachycardic, febrile (100.7). Labs: troponin 29 ? 56, WBC normal, UA neg, resp panel neg. CTA chest w/o acute disease. Met SIRS criteria, started on empiric antibiotics, admitted for observation.    Sepsis of unclear source, fever and tachycardia improved, cultures neg. AMS difficult to assess due to baseline MR/dementia. BP improved, midodrine decreased to 5 mg TID. Synthroid, Paxil, valproic acid, PPI, and tube feeds resumed.    Guardian Kierra contacted; non-emergency ambulance arranged for discharge back to the group home. Stable for d/c later today if continued improvement.    Discharge Exam:    General Appearance: Difficult to evaluate orientation. Non verbal.

## 2025-02-10 NOTE — PLAN OF CARE
Problem: Discharge Planning  Goal: Discharge to home or other facility with appropriate resources  2/10/2025 0102 by Anyi Kelley RN  Outcome: Progressing  2/9/2025 1641 by Lorrie Garrett RN  Outcome: Progressing  2/9/2025 1641 by Lorrie Garrett RN  Outcome: Progressing  Flowsheets (Taken 2/9/2025 1617)  Discharge to home or other facility with appropriate resources:   Identify barriers to discharge with patient and caregiver   Arrange for interpreters to assist at discharge as needed   Arrange for needed discharge resources and transportation as appropriate     Problem: Safety - Adult  Goal: Free from fall injury  2/10/2025 0102 by Anyi Kelley RN  Outcome: Progressing  2/9/2025 1641 by Lorrie Garrett RN  Outcome: Progressing  2/9/2025 1641 by Lorrie Garrett RN  Outcome: Progressing     Problem: Pain  Goal: Verbalizes/displays adequate comfort level or baseline comfort level  2/10/2025 0102 by Anyi Kelley RN  Outcome: Progressing  2/9/2025 1641 by Lorrie Garrett RN  Outcome: Progressing  2/9/2025 1641 by Lorrie Garrett RN  Outcome: Progressing     Problem: ABCDS Injury Assessment  Goal: Absence of physical injury  2/10/2025 0102 by Anyi Kelley RN  Outcome: Progressing  2/9/2025 1641 by Lorrie Garrett RN  Outcome: Progressing     Problem: Skin/Tissue Integrity  Goal: Skin integrity remains intact  Description: 1.  Monitor for areas of redness and/or skin breakdown  2.  Assess vascular access sites hourly  3.  Every 4-6 hours minimum:  Change oxygen saturation probe site  4.  Every 4-6 hours:  If on nasal continuous positive airway pressure, respiratory therapy assess nares and determine need for appliance change or resting period  2/10/2025 0102 by Anyi Kelley RN  Outcome: Progressing  2/9/2025 1641 by Lorrie Garrett RN  Outcome: Progressing

## 2025-02-10 NOTE — PLAN OF CARE
Problem: Discharge Planning  Goal: Discharge to home or other facility with appropriate resources  2/10/2025 0957 by Izabela Arreaga RN  Outcome: Not Progressing  2/10/2025 0102 by Anyi Kelley RN  Outcome: Progressing     Problem: Safety - Adult  Goal: Free from fall injury  2/10/2025 0957 by Izabela Arreaga RN  Outcome: Not Progressing  2/10/2025 0102 by Anyi Kelley RN  Outcome: Progressing     Problem: Pain  Goal: Verbalizes/displays adequate comfort level or baseline comfort level  2/10/2025 0957 by Izabela Arreaga RN  Outcome: Not Progressing  2/10/2025 0102 by Anyi Kelley RN  Outcome: Progressing     Problem: ABCDS Injury Assessment  Goal: Absence of physical injury  2/10/2025 0957 by Izabela Arreaga RN  Outcome: Not Progressing  2/10/2025 0102 by Anyi Kelley RN  Outcome: Progressing     Problem: Skin/Tissue Integrity  Goal: Skin integrity remains intact  Description: 1.  Monitor for areas of redness and/or skin breakdown  2.  Assess vascular access sites hourly  3.  Every 4-6 hours minimum:  Change oxygen saturation probe site  4.  Every 4-6 hours:  If on nasal continuous positive airway pressure, respiratory therapy assess nares and determine need for appliance change or resting period  2/10/2025 0957 by Izabela Arreaga RN  Outcome: Not Progressing  2/10/2025 0102 by Anyi Kelley RN  Outcome: Progressing     Problem: Discharge Planning  Goal: Discharge to home or other facility with appropriate resources  2/10/2025 0957 by Izabela Arreaga RN  Outcome: Not Progressing  2/10/2025 0102 by Anyi Kelley RN  Outcome: Progressing     Problem: Safety - Adult  Goal: Free from fall injury  2/10/2025 0957 by Izabela Arreaga RN  Outcome: Not Progressing  2/10/2025 0102 by Anyi Kelley RN  Outcome: Progressing     Problem: Pain  Goal: Verbalizes/displays adequate comfort level or baseline comfort level  2/10/2025 0957 by Gibson

## 2025-02-10 NOTE — PROGRESS NOTES
Comprehensive Nutrition Assessment    Type and Reason for Visit:  Initial, Positive nutrition screen    Nutrition Recommendations/Plan:   Discharge plan in progress for later today  At the [facility], Recommend Hold TF at least 30 min before and after Synthroid     Nutrition Assessment:    Pt admit from [facility] 2/2 poss sepsis. PMHx=MRDD, dysphagia/PEG, dementia, CKD, CHF. Currently on EN via PEG. Recommend Hold TF 30 min before and after Synthroid and run TF x 23 hr/d, as is recommended in MAR.    Nutrition Related Findings:    Nonverbal/MRDD, PEG to TF, Synthroid, +2 gen edema, I/O WNL, obese abd +BS Wound Type: None       Current Nutrition Intake & Therapies:    Average Meal Intake: NPO  Average Supplements Intake: NPO  Current Tube Feeding (TF) Orders:  Feeding Route: PEG  Formula: Standard with Fiber  Schedule: Continuous  Feeding Regimen: goal = 45 ml/hr = 1080 ml/d  Additives/Modulars: None  Water Flushes: 100 ml Q 4 hr =600 ml/d  Current TF Provides: 1620 anita, 69 g pro, 1421 ml total free water    Anthropometric Measures:  Height: 162.6 cm (5' 4\")  Ideal Body Weight (IBW): 120 lbs (55 kg)       Current Body Weight: 90.7 kg (199 lb 15.3 oz), 166.6 % IBW. Weight Source: Not specified (2/8)  Current BMI (kg/m2): 34.3  Usual Body Weight: 79.4 kg (175 lb) (11/21/24 at [facility])     % Weight Change (Calculated): 14.3                    BMI Categories: Obese Class 1 (BMI 30.0-34.9)    Estimated Daily Nutrient Needs:  Energy Requirements Based On: Kcal/kg  Weight Used for Energy Requirements: Current  Energy (kcal/day):  (15-18 kcal/kg)  Weight Used for Protein Requirements: Ideal  Protein (g/day): 60-70 (1.1-1.3 g/kg)  Method Used for Fluid Requirements: 1 ml/kcal  Fluid (ml/day):     Nutrition Diagnosis:   Inadequate oral intake related to cognitive or neurological impairment as evidenced by NPO or clear liquid status due to medical condition, nutrition support - enteral

## 2025-02-10 NOTE — DISCHARGE INSTR - COC
Status:  alert    IV Access:  - None    Nursing Mobility/ADLs:  Walking   Dependent  Transfer  Dependent  Bathing  Dependent  Dressing  Dependent  Toileting  Dependent  Feeding  Dependent  Med Admin  Dependent  Med Delivery   none    Wound Care Documentation and Therapy:  Wound 06/10/24 Toe (Comment  which one) Right in between 4th and 5th digit (Active)   Number of days: 244       Wound 06/10/24 Toe (Comment  which one) Left inbetween 4th and 5th digit (Active)   Number of days: 244        Elimination:  Continence:   Bowel: No  Bladder: No  Urinary Catheter: None   Colostomy/Ileostomy/Ileal Conduit: No       Date of Last BM: 02/10/2025    Intake/Output Summary (Last 24 hours) at 2/10/2025 1222  Last data filed at 2/10/2025 0630  Gross per 24 hour   Intake 77.46 ml   Output 300 ml   Net -222.54 ml     I/O last 3 completed shifts:  In: 795 [I.V.:795]  Out: 300 [Urine:300]    Safety Concerns:     None    Impairments/Disabilities:      None    Nutrition Therapy:  Current Nutrition Therapy:   - Tube Feedings:  Standard with fiber    Routes of Feeding: Gastrostomy Tube  Liquids: No Liquids  Daily Fluid Restriction: no  Last Modified Barium Swallow with Video (Video Swallowing Test): not done    Treatments at the Time of Hospital Discharge:   Respiratory Treatments: ***  Oxygen Therapy:  is on oxygen at *** L/min per nasal cannula.  Ventilator:    - No ventilator support    Rehab Therapies: {THERAPEUTIC INTERVENTION:[id number]}  Weight Bearing Status/Restrictions: No weight bearing restrictions  Other Medical Equipment (for information only, NOT a DME order):  {EQUIPMENT:[id number]}  Other Treatments: ***    Patient's personal belongings (please select all that are sent with patient):  None    RN SIGNATURE:  Electronically signed by Izabela Arreaga RN on 2/10/25 at 12:40 PM EST    CASE MANAGEMENT/SOCIAL WORK SECTION    Inpatient Status Date: 02/08/2025    Readmission Risk Assessment Score:  [facility] RISK OF UNPLANNED

## 2025-02-10 NOTE — ACP (ADVANCE CARE PLANNING)
Advance Care Planning   Healthcare Decision Maker:    Primary Decision Maker: Kierra Barraza - Legal Guardian - [phone]    Click here to complete Healthcare Decision Makers including selection of the Healthcare Decision Maker Relationship (ie \"Primary\").

## 2025-02-10 NOTE — PROGRESS NOTES
[facility] Hospitalist Progress Note    Admitting Date and Time: 2/8/2025  3:51 AM  Admit Dx: Weakness [R53.1]  NSTEMI (non-ST elevated myocardial infarction) (HCC) [I21.4]  Acute febrile illness [R50.9]  Pneumonia of both lungs due to infectious organism, unspecified part of lung [J18.9]  Hypertension, unspecified type [I10]    Subjective:  Patient is being followed for Weakness [R53.1]  NSTEMI (non-ST elevated myocardial infarction) (HCC) [I21.4]  Acute febrile illness [R50.9]  Pneumonia of both lungs due to infectious organism, unspecified part of lung [J18.9]  Hypertension, unspecified type [I10]     Patient is seen this a.m.  She appears sleepy.  Withdraws to pain but minimally responsive.  Nonverbal.       ferrous Sulfate  300 mg Per G Tube Daily    Ergocalciferol  5,000 Units Per G Tube Daily    cefTRIAXone (ROCEPHIN) IV  2,000 mg IntraVENous Q24H    doxycycline (VIBRAMYCIN) IV  100 mg IntraVENous Q12H    valproic acid  1,000 mg Per G Tube BID    PARoxetine  10 mg Per G Tube BID    OLANZapine zydis  20 mg Oral Nightly    OLANZapine zydis  10 mg Oral QAM    montelukast  10 mg Oral Nightly    midodrine  7.5 mg Per G Tube TID    levothyroxine  150 mcg PEG Tube QAM AC    folic acid  1 mg PEG Tube QAM    famotidine  20 mg Per G Tube BID    sodium chloride flush  5-40 mL IntraVENous 2 times per day    enoxaparin  40 mg SubCUTAneous Daily     sodium chloride flush, 5-40 mL, PRN  sodium chloride, , PRN  potassium chloride, 40 mEq, PRN   Or  potassium alternative oral replacement, 40 mEq, PRN   Or  potassium chloride, 10 mEq, PRN  magnesium sulfate, 2,000 mg, PRN  ondansetron, 4 mg, Q8H PRN   Or  ondansetron, 4 mg, Q6H PRN  polyethylene glycol, 17 g, Daily PRN  acetaminophen, 650 mg, Q6H PRN   Or  acetaminophen, 650 mg, Q6H PRN         Objective:    /76   Pulse 95   Temp 97.9 °F (36.6 °C) (Temporal)   Resp 20   Ht 1.626 m (5' 4\")   Wt 90.7 kg (200 lb)   SpO2 100%   BMI 34.33 kg/m²     General

## 2025-02-10 NOTE — H&P
Patient non verbal, hx MR, dementia.  Arrives from [facility] in [place].  Dependent on staff for care at baseline.  Anticipate return to [facility] at time of discharge.  Left VM for guardian Kierra, await return call.  Did speak with nursing staff at [facility], Kierra who requested non emergency ambulance transportation arrangements at time of discharge. Forms/demos to lite chart.  Lorenzo Owusu MSN RN  [facility] Case Management  [phone]

## 2025-02-10 NOTE — PROGRESS NOTES
Physical Therapy  Facility/Department: [address] INTERNAL MEDICINE 2  Physical Therapy Initial Assessment    Name: Katerina Paiz  : 1955  MRN: [id number]  Date of Service: 2/10/2025        Patient Diagnosis(es): The primary encounter diagnosis was Acute febrile illness. Diagnoses of Pneumonia of both lungs due to infectious organism, unspecified part of lung, Hypertension, unspecified type, and NSTEMI (non-ST elevated myocardial infarction) (HCC) were also pertinent to this visit.  Past Medical History:  has a past medical history of Abscess, Acute kidney injury (HCC), Anemia, Blind in both eyes, Dementia (HCC), Dysphagia, Essential hypertension, Gastroesophageal reflux disease without esophagitis, Hemodialysis patient (HCC), Hernia, diaphragmatic, Hyperthyroidism, MR (mental retardation),  cerebral leukomalacia, Osteoarthritis, Other seizures (HCC), Peptic ulcer, Peripheral vascular disease (HCC), Pneumonia, Pneumonia due to infectious organism, and Sepsis (HCC).  Past Surgical History:  has a past surgical history that includes other surgical history (Right, 2017); other surgical history (2017); chest tube insertion (Right, 2017); bronchoscopy (02/10/2017); Gastrostomy tube placement (N/A, 3/7/2021); Upper gastrointestinal endoscopy (N/A, 2024); and bronchoscopy (N/A, 2024).      Evaluating Therapist: Judy Child PT      Room #:  0413/0413-A  Diagnosis:  Weakness [R53.1]  NSTEMI (non-ST elevated myocardial infarction) (HCC) [I21.4]  Acute febrile illness [R50.9]  Pneumonia of both lungs due to infectious organism, unspecified part of lung [J18.9]  Hypertension, unspecified type [I10]  PMHx/PSHx:  MR, HTN, dysphagia, OA  Precautions:  falls, alarm,PEG    Social:  Per chart pt admitted from group home. Dependent for all care.      Initial Evaluation  Date: 2/10/25   Was pt agreeable to Eval/treatment?     Does pt have pain? No indication of pain   Bed Mobility  Rolling:

## 2025-02-10 NOTE — PROGRESS NOTES
Called [facility] pharmacy to confirm new prescriptions were received - spoke to Rafaela - they did NOT receive prescriptions at this time     CM, charge nurse and MD aware.

## 2025-02-10 NOTE — PROGRESS NOTES
Occupational Therapy  OCCUPATIONAL THERAPY INITIAL EVALUATION  [facility]  [address], [place]    Date: 2/10/2025     Patient Name: Katerina Paiz  MRN: [id number]  : 1955  Room: [address] [place] [address]    Evaluating OT: Anneliese Trevino, OTR/L - [id number]    Referring Provider: Nubia Lara DO   Specific Provider Orders/Date: \"OT eval and treat\" - 2025    Diagnosis: Weakness [R53.1]  NSTEMI (non-ST elevated myocardial infarction) (HCC) [I21.4]  Acute febrile illness [R50.9]  Pneumonia of both lungs due to infectious organism, unspecified part of lung [J18.9]  Hypertension, unspecified type [I10]      Pertinent Medical History: BEENA, anemia, blind in B eyes, dementia, dysphagia, GERD, hemodialysis patient, MR, OA, seizures, PVD, PEG tube    Precautions: fall risk, alarms, PEG, blind    Assessment of Current Deficits:    [x] Functional mobility   [x]ADLs  [x] Strength               []Cognition   [x] Functional transfers   [x] IADLs         [x] Safety Awareness   [x]Endurance   [] Fine Coordination              [x] Balance      [] Vision/perception   []Sensation    []Gross Motor Coordination  [] ROM  [] Delirium                   [] Motor Control     OT PLAN OF CARE   OT POC is based on physician orders, patient diagnosis, and results of clinical assessment.  No OT treatment indicated    Recommended Adaptive Equipment: none    Home Living: Patient was unable to provide information, per chart patient was admitted from a group home    Prior Level of Function (PLOF): Patient was unable to provide information, according to chart patient was dependent for all care    Pain Level: Patient did indicate pain with passive movement of RLE   Cognition: Patient alert and oriented x self. Patient did not follow verbal commands, occasionally participates with ROM once initiated and spontaneously withdrawals from activity    Functional Assessment:  AM-PAC Daily

## 2025-02-11 VITALS
DIASTOLIC BLOOD PRESSURE: 91 MMHG | SYSTOLIC BLOOD PRESSURE: 149 MMHG | TEMPERATURE: 98 F | OXYGEN SATURATION: 95 % | RESPIRATION RATE: 20 BRPM | HEIGHT: 64 IN | BODY MASS INDEX: 34.15 KG/M2 | HEART RATE: 89 BPM | WEIGHT: 200 LBS

## 2025-02-11 PROCEDURE — 2700000000 HC OXYGEN THERAPY PER DAY

## 2025-02-11 PROCEDURE — 6370000000 HC RX 637 (ALT 250 FOR IP): Performed by: INTERNAL MEDICINE

## 2025-02-11 RX ADMIN — OLANZAPINE 10 MG: 10 TABLET, ORALLY DISINTEGRATING ORAL at 08:18

## 2025-02-11 RX ADMIN — FOLIC ACID 1 MG: 1 TABLET ORAL at 08:18

## 2025-02-11 RX ADMIN — LEVOTHYROXINE SODIUM 150 MCG: 75 TABLET ORAL at 05:49

## 2025-02-11 RX ADMIN — PAROXETINE 10 MG: 10 TABLET, FILM COATED ORAL at 08:19

## 2025-02-11 RX ADMIN — Medication 300 MG: at 08:18

## 2025-02-11 RX ADMIN — FAMOTIDINE 20 MG: 20 TABLET, FILM COATED ORAL at 08:18

## 2025-02-11 RX ADMIN — VALPROIC ACID 1000 MG: 250 SOLUTION ORAL at 08:18

## 2025-02-11 NOTE — PLAN OF CARE
Problem: Discharge Planning  Goal: Discharge to home or other facility with appropriate resources  Outcome: Adequate for Discharge     Problem: Safety - Adult  Goal: Free from fall injury  Outcome: Adequate for Discharge     Problem: Pain  Goal: Verbalizes/displays adequate comfort level or baseline comfort level  Outcome: Adequate for Discharge     Problem: ABCDS Injury Assessment  Goal: Absence of physical injury  Outcome: Adequate for Discharge     Problem: Skin/Tissue Integrity  Goal: Skin integrity remains intact  Description: 1.  Monitor for areas of redness and/or skin breakdown  2.  Assess vascular access sites hourly  3.  Every 4-6 hours minimum:  Change oxygen saturation probe site  4.  Every 4-6 hours:  If on nasal continuous positive airway pressure, respiratory therapy assess nares and determine need for appliance change or resting period  Outcome: Adequate for Discharge     Problem: Nutrition Deficit:  Goal: Optimize nutritional status  Outcome: Adequate for Discharge

## 2025-02-12 ENCOUNTER — CARE COORDINATION (OUTPATIENT)
Dept: CARE COORDINATION | Age: 70
End: 2025-02-12

## 2025-02-12 DIAGNOSIS — R53.1 WEAKNESS: Primary | ICD-10-CM

## 2025-02-12 PROCEDURE — 1111F DSCHRG MED/CURRENT MED MERGE: CPT | Performed by: FAMILY MEDICINE
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
## 2025-02-14 NOTE — PROGRESS NOTES
Physician Progress Note      PATIENT:               ARMANI ALDANA  [facility] #:                  [id number]  :                       1955  ADMIT DATE:       2025 3:51 AM  DISCH DATE:        2025 10:00 AM  RESPONDING  PROVIDER #:        Yfn Colbert MD          QUERY TEXT:    Patient admitted with HTN and Hypoxia.   Noted documentation of NSTEMI by the   ER provider, and on the DC summary.  However, the H&P states \" likely due to   tachycardia, sepsis/demand ischemia, was febrile at 100.7\". If possible,   please document in progress notes and discharge summary if you are evaluating   and /or treating any of the following:    The medical record reflects the following:  Risk Factors:  Clinical Indicators: TROPS: 29, 56. Documentation as noted above. H&P :   Elevated troponin,-Initial  troponin 29, repeat 56, repeat pending, no reports of chest pain-Likely due to   tachycardia, sepsis/demand ischemia, continue to monitor.  Treatment: Serial TROPS, Lovenox.  Options provided:  -- NSTEMI is confirmed after study.  -- After study, NSTEMI is ruled out and demand ischemia only.  -- Other - I will add my own diagnosis  -- Disagree - Not applicable / Not valid  -- Disagree - Clinically unable to determine / Unknown  -- Refer to Clinical Documentation Reviewer    PROVIDER RESPONSE TEXT:    After study, NSTEMI is ruled out and demand ischemia only.    Query created by: Jemma Kurtz on 2025 11:33 AM      QUERY TEXT:    Patient admitted with Pneumonia. Noted documentation of sepsis on the H&P and   progress notes dated 25 by . In order to support the   diagnosis of sepsis, please include additional clinical indicators in your   documentation.  Or please document if the diagnosis of sepsis has been ruled   out after further study    The medical record reflects the following:  Risk Factors: PNA  Clinical Indicators: TEMP Max: 100.7 on arrival with elevated heart rate: 96,

## 2025-02-24 LAB — Lab: NORMAL

## 2025-03-04 PROBLEM — J98.8 AIRWAY COMPROMISE: Status: ACTIVE | Noted: 2025-01-01

## 2025-03-04 NOTE — OP NOTE
clearing the plug were made with saline flushes and a red rubber catheter. The plug was unable to be removed. The patient became hypoxic, bradycardic, hypotensive, and eventually went into cardiac arrest. Time of death was called at 0259.    Electronically signed by Rubi Avery MD on 3/4/2025 at 3:08 AM

## 2025-03-04 NOTE — PROGRESS NOTES
Paged to [phone] for stat airway at 0035. Upon arrival, Pt was being bag ventilated and was unable to be adequately ventilated on vent without a decrease in saturation. Saturation was maintained with bag ventilation. Cuff on ETT was determined to be blown. Airway was visualized with Marrero and noted to be bleeding and tissue extremely edematous. Patient had limited neck mobility also making it difficult to fully assess airway. After discussion with medical team, surgical team was consulted for bronch and need for stand by if tube exchange was unsuccessful r/t airway edema. Decision was made to move to OR for patient safety. Greyson discussion, difficulty of procedure, and extreme high risk to pt was done by surgical attending with pt guardian. All teams were assembled and case proceeded.

## 2025-03-04 NOTE — ANESTHESIA POSTPROCEDURE EVALUATION
Department of Anesthesiology  Postprocedure Note    Patient: Katerina Paiz  MRN: [id number]  YOB: 1955  Date of evaluation: 3/4/2025    Procedure Summary       Date: 25 Room / Location: [address]    Anesthesia Start: 0232 Anesthesia Stop:     Procedure: ENT TUBE EXCHANGE AND THERAPUTIC BRONCHOSCOPY (Throat) Diagnosis:       Airway problem      (Airway problem [J98.9])    Surgeons: Anai Judge MD Responsible Provider: Pepe Bauman MD    Anesthesia Type: Not recorded ASA Status: Not recorded            Anesthesia Type: No value filed.    Horacio Phase I:      Horacio Phase II:      Anesthesia Post Evaluation    Comments: Pt     No notable events documented.

## 2025-03-04 NOTE — PROGRESS NOTES
The patient's Sister new who I was as I was walking past to clock in she said hello. I stop to say hello on my way out and a nurse comes to the family to share the news that their loved one had  earlier this morning. The family was in shock in the way it occurred and stated that the patient had been brought by ambulance from [place]. The family knew she was extremely ill but did not know that she was . I asked if they wanted prayer prior to going to the [facility]? The patient's mother and sister did desire prayer. The  provided sustaining ministry presence, active listening, and prayer for the family as they prepared to see their loved one. If additional support is needed please reach out to [facility] (ext [phone]).    Rev CHER Moss MDIV,

## 2025-03-04 NOTE — PROGRESS NOTES
DR. COREA AND DR. PALMA CALLED TIME OF DEATH 2:59 AM FROM COMPLICATIONS FROM AIRWAY OBSTRUCTION, FRIDA FERNANDES RN.

## 2025-03-04 NOTE — PROGRESS NOTES
This RN called [facility] ([facility]) ICU called for a chart. [facility] RN stated that she sent the chart with [employer] along with belongings. [facility]. [facility] stated they would call back.

## 2025-03-04 NOTE — ANESTHESIA PRE PROCEDURE
unable to participate.)  Induction: intravenous.  arterial line and BIS  MIPS: Postoperative ventilation.    Use of blood products discussed with legal guardian whom consented to blood products.    Plan discussed with attending.                RADHA Mcknight - CRNA

## 2025-03-04 NOTE — CONSULTS
orthopnea  Gastrointestinal ROS: negative for - hematochezia or hematemesis  Genito-Urinary ROS: negative for -  genital discharge or hematuria  Musculoskeletal ROS: negative for - focal weakness, gangrene  Psych/Neuro ROS: negative for - visual or auditory hallucinations, suicidal ideation      Physical exam:   BP (!) 127/50   Pulse 98   Resp 20   Wt 90.7 kg (200 lb)   SpO2 (!) 88%   BMI 34.33 kg/m²   General appearance: critically ill, intubated  Head: NCAT  Neck: Immobile, fat, and stiff neck  Lungs: bagged via ETT  Heart: warm throughout  Abdomen: soft, non-distended, non-tender to palpation throughout  Skin: warm and dry, no cyanosis  Gu: no cva tenderness  Extremities: atraumatic, no open wounds  Psych: no tremor    Assessment:  Katerina Paiz is a 70 y.o. female with difficult airway.    Patient Active Problem List   Diagnosis    Intellectual disability    Hypothyroidism    Impairment level: total impairment of both eyes    Disruptive behavior disorder    Ingrowing nail    Peripheral vascular disease    Onychomycosis    Anemia due to chronic kidney disease    Vitamin D deficiency    Gastroesophageal reflux disease without esophagitis    S/P percutaneous endoscopic gastrostomy (PEG) tube placement (HCC)    Chronic kidney disease    Dementia with behavioral disturbance    Intractable nausea and vomiting    Hypotension    Severe protein-calorie malnutrition    Hyponatremia    Leukocytosis    Malfunction of percutaneous endoscopic gastrostomy (PEG) tube (HCC)    PEG tube malfunction (HCC)    Sepsis (HCC)    Hypophosphatemia    Aspiration pneumonia (HCC)    Anemia    Weakness       Plan:  OR for tracheostomy.    Patient is guardian of the state and will attempt to reach guardian. However, this is an emergent situation.    Rubi Avery MD  PGY-4 General Surgery Resident

## 2025-03-04 NOTE — DEATH NOTES
Death Pronouncement Note  Patient's Name: Katerina Paiz   Patient's YOB: 1955  MRN Number: [id number]    Admitting Provider: Vannesa Malhotra MD  Attending Provider: Vannesa Malhotra MD    Patient was examined and the following were absent: Pulses, Blood Pressure, and Respiratory effort    I declared the patient dead on 3/4/2025 at 2:59 AM    Preliminary Cause of Death: Acute hypoxic respiratory failure (HCC)     Electronically signed by Rubi Avery MD on 3/4/25 at 3:07 AM EST

## 2025-03-04 NOTE — PLAN OF CARE
Decreased breath sounds present.   Abdominal:      General: There is distension.      Palpations: Abdomen is soft. There is no fluid wave.   Musculoskeletal:      Cervical back: Rigidity present.   Lymphadenopathy:      Cervical: No cervical adenopathy.   Neurological:      Mental Status: She is unresponsive.      Comments: Patient is blind, deaf, and aphasic at baseline.       PLAN:   CXR reviewed, secretions cleared, concern for further plugging vs blood accumulation considering bleeding in larynx  Temporary paralysis after starting fentanyl drip  Airway is not sufficient, unable to do tube exchange at bedside  STAT General surgery consultation for evaluation for bedside bronchoscopy and tube exchange vs tracheostomy  Case was discussed with Gena Brock, and Alfie, they will take her STAT to OR    *POST-OR ADDENDUM*  Call received from surgical team. Airway exchange was successful, however patient had large clots blocking airway that the team was unable to clear. Patient progressed to bradycardia and coded, and resuscitation was unsuccessful. Death exam to be completed by surgical team. Legal guardian will be notified.

## 2025-03-05 LAB
ABO/RH: NORMAL
ANTIBODY SCREEN: NEGATIVE
ARM BAND NUMBER: NORMAL
BLOOD BANK DISPENSE STATUS: NORMAL
BLOOD BANK SAMPLE EXPIRATION: NORMAL
BPU ID: NORMAL
COMPONENT: NORMAL
CROSSMATCH RESULT: NORMAL
TRANSFUSION STATUS: NORMAL
UNIT DIVISION: 0

## 2025-03-08 PROBLEM — J11.00 PNEUMONIA AND INFLUENZA: Status: ACTIVE | Noted: 2025-03-08

## (undated) DEVICE — SYRINGE MED 50ML LUERSLIP TIP

## (undated) DEVICE — Device

## (undated) DEVICE — 3M™ MICROPORE™ TAPE, 1530-2: Brand: 3M™ MICROPORE™

## (undated) DEVICE — DRESSING FOAM W35XL35IN PNK HYDROCELLULAR NONADHESIVE KEY

## (undated) DEVICE — ELECTRODE PT RET AD L9FT HI MOIST COND ADH HYDRGEL CORDED

## (undated) DEVICE — CATHETER,SUCTION,18FR,WHISTL,STR PK,CAL: Brand: MEDLINE

## (undated) DEVICE — 1870+ HEALTH CARE PART RESP. 120/CASE: Brand: AURA™

## (undated) DEVICE — BLOCK BITE 60FR RUBBER ADLT DENTAL

## (undated) DEVICE — SYRINGE MED 20ML STD CLR PLAS LUERLOCK TIP N CTRL DISP

## (undated) DEVICE — SPONGE GZ W4XL4IN RAYON POLY CVR W/NONWOVEN FAB STRL 2/PK

## (undated) DEVICE — SPONGE,DRAIN,NONWVN,4"X4",6PLY,STRL,LF: Brand: MEDLINE

## (undated) DEVICE — GLOVE ORANGE PI 7   MSG9070

## (undated) DEVICE — APPLICATOR MEDICATED 26 CC SOLUTION HI LT ORNG CHLORAPREP

## (undated) DEVICE — GAUZE,SPONGE,POST-OP,4X3,STRL,LF: Brand: MEDLINE

## (undated) DEVICE — AGENT HEMOSTATIC SURG ORIGINAL ABS 4X8IN LOOSE KNIT 12/CA

## (undated) DEVICE — BLADE,STAINLESS-STEEL,11,STRL,DISPOSABLE: Brand: MEDLINE

## (undated) DEVICE — DEFENDO AIR WATER SUCTION AND BIOPSY VALVE KIT FOR  OLYMPUS: Brand: DEFENDO AIR/WATER/SUCTION AND BIOPSY VALVE

## (undated) DEVICE — TOWEL,OR,DSP,ST,BLUE,STD,6/PK,12PK/CS: Brand: MEDLINE

## (undated) DEVICE — SURGICAL PROCEDURE PACK BRONCH

## (undated) DEVICE — GRADUATE TRIANG MEASURE 1000ML BLK PRNT

## (undated) DEVICE — GLOVE SURG SZ 65 THK91MIL LTX FREE SYN POLYISOPRENE

## (undated) DEVICE — BITEBLOCK 54FR W/ DENT RIM BLOX

## (undated) DEVICE — SPONGE,PEANUT,XRAY,ST,SM,3/8",5/CARD: Brand: MEDLINE INDUSTRIES, INC.

## (undated) DEVICE — HEAD AND NECK: Brand: MEDLINE INDUSTRIES, INC.

## (undated) DEVICE — MAGNETIC INSTR DRAPE 20X16: Brand: MEDLINE INDUSTRIES, INC.

## (undated) DEVICE — SOLUTION IRRIG 500ML 0.9% SOD CHLO USP POUR PLAS BTL

## (undated) DEVICE — STRIP,CLOSURE,WOUND,MEDI-STRIP,1/2X4: Brand: MEDLINE

## (undated) DEVICE — STRIP,CLOSURE,WOUND,MEDI-STRIP,1/4X3: Brand: MEDLINE